# Patient Record
Sex: MALE | NOT HISPANIC OR LATINO
[De-identification: names, ages, dates, MRNs, and addresses within clinical notes are randomized per-mention and may not be internally consistent; named-entity substitution may affect disease eponyms.]

---

## 2018-08-29 ENCOUNTER — MESSAGE (OUTPATIENT)
Age: 58
End: 2018-08-29

## 2018-08-29 PROBLEM — Z86.79 HISTORY OF CONGESTIVE HEART FAILURE: Status: RESOLVED | Noted: 2018-08-29 | Resolved: 2018-08-29

## 2018-08-31 ENCOUNTER — APPOINTMENT (OUTPATIENT)
Dept: THORACIC SURGERY | Facility: CLINIC | Age: 58
End: 2018-08-31

## 2018-08-31 DIAGNOSIS — Z87.19 PERSONAL HISTORY OF OTHER DISEASES OF THE DIGESTIVE SYSTEM: ICD-10-CM

## 2018-08-31 DIAGNOSIS — M10.9 GOUT, UNSPECIFIED: ICD-10-CM

## 2018-08-31 DIAGNOSIS — N40.0 BENIGN PROSTATIC HYPERPLASIA WITHOUT LOWER URINARY TRACT SYMPMS: ICD-10-CM

## 2018-08-31 DIAGNOSIS — Z86.79 PERSONAL HISTORY OF OTHER DISEASES OF THE CIRCULATORY SYSTEM: ICD-10-CM

## 2018-08-31 DIAGNOSIS — Z01.810 ENCOUNTER FOR PREPROCEDURAL CARDIOVASCULAR EXAMINATION: ICD-10-CM

## 2018-08-31 DIAGNOSIS — K21.9 GASTRO-ESOPHAGEAL REFLUX DISEASE W/OUT ESOPHAGITIS: ICD-10-CM

## 2018-09-09 PROBLEM — N40.0 BPH (BENIGN PROSTATIC HYPERPLASIA): Status: ACTIVE | Noted: 2018-08-29

## 2018-09-11 ENCOUNTER — APPOINTMENT (OUTPATIENT)
Dept: THORACIC SURGERY | Facility: CLINIC | Age: 58
End: 2018-09-11
Payer: MEDICARE

## 2018-09-11 VITALS
DIASTOLIC BLOOD PRESSURE: 55 MMHG | BODY MASS INDEX: 18.47 KG/M2 | RESPIRATION RATE: 18 BRPM | TEMPERATURE: 97.4 F | WEIGHT: 129 LBS | OXYGEN SATURATION: 98 % | SYSTOLIC BLOOD PRESSURE: 92 MMHG | HEART RATE: 91 BPM | HEIGHT: 70 IN

## 2018-09-11 DIAGNOSIS — Z87.891 PERSONAL HISTORY OF NICOTINE DEPENDENCE: ICD-10-CM

## 2018-09-11 PROCEDURE — 99205 OFFICE O/P NEW HI 60 MIN: CPT

## 2018-09-11 RX ORDER — VITAMIN A 10000 UNIT
TABLET ORAL
Refills: 0 | Status: ACTIVE | COMMUNITY

## 2018-09-11 RX ORDER — VITAMIN B COMPLEX
CAPSULE ORAL
Refills: 0 | Status: ACTIVE | COMMUNITY

## 2018-09-25 ENCOUNTER — APPOINTMENT (OUTPATIENT)
Dept: THORACIC SURGERY | Facility: CLINIC | Age: 58
End: 2018-09-25
Payer: MEDICARE

## 2018-09-25 VITALS
SYSTOLIC BLOOD PRESSURE: 104 MMHG | OXYGEN SATURATION: 93 % | RESPIRATION RATE: 18 BRPM | DIASTOLIC BLOOD PRESSURE: 64 MMHG | BODY MASS INDEX: 18.8 KG/M2 | HEART RATE: 118 BPM | WEIGHT: 131 LBS

## 2018-09-25 PROCEDURE — 99214 OFFICE O/P EST MOD 30 MIN: CPT

## 2018-10-01 ENCOUNTER — OUTPATIENT (OUTPATIENT)
Dept: OUTPATIENT SERVICES | Facility: HOSPITAL | Age: 58
LOS: 1 days | End: 2018-10-01
Payer: MEDICARE

## 2018-10-01 VITALS
SYSTOLIC BLOOD PRESSURE: 120 MMHG | HEART RATE: 101 BPM | OXYGEN SATURATION: 98 % | RESPIRATION RATE: 16 BRPM | TEMPERATURE: 97 F | HEIGHT: 69.5 IN | DIASTOLIC BLOOD PRESSURE: 70 MMHG | WEIGHT: 130.07 LBS

## 2018-10-01 DIAGNOSIS — R22.2 LOCALIZED SWELLING, MASS AND LUMP, TRUNK: ICD-10-CM

## 2018-10-01 DIAGNOSIS — Z86.19 PERSONAL HISTORY OF OTHER INFECTIOUS AND PARASITIC DISEASES: Chronic | ICD-10-CM

## 2018-10-01 DIAGNOSIS — Z96.643 PRESENCE OF ARTIFICIAL HIP JOINT, BILATERAL: Chronic | ICD-10-CM

## 2018-10-01 DIAGNOSIS — F03.90 UNSPECIFIED DEMENTIA WITHOUT BEHAVIORAL DISTURBANCE: ICD-10-CM

## 2018-10-01 DIAGNOSIS — Z95.810 PRESENCE OF AUTOMATIC (IMPLANTABLE) CARDIAC DEFIBRILLATOR: Chronic | ICD-10-CM

## 2018-10-01 DIAGNOSIS — T14.90XA INJURY, UNSPECIFIED, INITIAL ENCOUNTER: ICD-10-CM

## 2018-10-01 DIAGNOSIS — I10 ESSENTIAL (PRIMARY) HYPERTENSION: ICD-10-CM

## 2018-10-01 LAB
BLD GP AB SCN SERPL QL: NEGATIVE — SIGNIFICANT CHANGE UP
RH IG SCN BLD-IMP: POSITIVE — SIGNIFICANT CHANGE UP

## 2018-10-01 PROCEDURE — G9001: CPT

## 2018-10-01 PROCEDURE — 93010 ELECTROCARDIOGRAM REPORT: CPT

## 2018-10-01 RX ORDER — ESOMEPRAZOLE MAGNESIUM 40 MG/1
1 CAPSULE, DELAYED RELEASE ORAL
Qty: 0 | Refills: 0 | COMMUNITY

## 2018-10-01 RX ORDER — SEVELAMER CARBONATE 2400 MG/1
1 POWDER, FOR SUSPENSION ORAL
Qty: 0 | Refills: 0 | COMMUNITY

## 2018-10-01 NOTE — H&P PST ADULT - PROBLEM SELECTOR PLAN 1
Pt and wife given preop instructions through  and verbalized understanding  Will call pt wife at 12 today for confirmation of meds - call placed to Dr Pickering office and NP states she personally charted med list in note - will confirm.   Call to  to obtain info concerning ICD make and model   Cardiologist called and states he did not do Echo or Stress on patient - last Echo in  [employer] from 2014 -  OR Book[facility] notified of ICD [employer] Model 1010 serial # [id number] and of SANTHOSH precautions

## 2018-10-01 NOTE — H&P PST ADULT - MOUTH
[facility]    BH Treatment Plan Acknowledgement    Kalen Nair was involved in the treatment plan for this current admission, 10/28/2017.  Patient has seen and agrees to the Interdisciplinary Treatment Plan.  Kalen Nair verbalizes that he/she will work with the treatment team to meet the Interdisciplinary Treatment Plan goals.    X____________________________________    Date/Time: _____________________    Patient/Legally Authorized Representative  X____________________________________    Date/Time: _____________________    Treatment Team Member   X____________________________________    Date/Time: _____________________       X____________________________________    Date/Time: _____________________    Patient/Legally Authorized Representative  X____________________________________    Date/Time: _____________________    Treatment Team Member  X____________________________________    Date/Time: _____________________       X____________________________________    Date/Time: _____________________    Patient/Legally Authorized Representative  X____________________________________    Date/Time: _____________________    Treatment Team Member  X____________________________________    Date/Time: _____________________         I have reviewed and agree with the proposed treatment plan that the treatment team and the patient have defined.    X____________________________________    Date/Time: _____________________    MD Signature (for Inpatient/Residential encounters only)    I was notified of my drug test results.    X____________________________________    Date/Time: _____________________    Patient/Legally Authorized Representative  X____________________________________    Date/Time: _____________________    Patient/Legally Authorized Representative    [id number]     normal mouth and gums/moist

## 2018-10-01 NOTE — H&P PST ADULT - PSH
H/O bilateral hip replacements  2008, 2009  History of implantable cardioverter-defibrillator (ICD) placement  pt unsure when placed  History of wound infection  right chest wall - revision 5/18 and again in 7/18

## 2018-10-01 NOTE — H&P PST ADULT - PMH
BPH (benign prostatic hyperplasia)    ESRD (end stage renal disease)    H/O chest wound  right  HLD (hyperlipidemia)    HTN (hypertension)    ICD (implantable cardioverter-defibrillator) in place    OA (osteoarthritis)    Pleural effusion BPH (benign prostatic hyperplasia)    Cardiomyopathy    ESRD (end stage renal disease)    H/O chest wound  right  HLD (hyperlipidemia)    HTN (hypertension)    ICD (implantable cardioverter-defibrillator) in place    OA (osteoarthritis)    Pleural effusion    Smoking hx    Wound  right chest

## 2018-10-01 NOTE — H&P PST ADULT - NEUROLOGICAL DETAILS
sensation intact/normal strength/responds to pain/responds to verbal commands/alert and oriented x 3

## 2018-10-01 NOTE — H&P PST ADULT - HISTORY OF PRESENT ILLNESS
Pt Pt is a 58 yr old Mandarin speaking male scheduled for Right Thoractomy Decortiation, Right Chest Wall reconstruction with Latissimjus Dorsi Flap Poss Skin Graft with VAC dressing with Dr Pickering 10/11/18. Pt has ICD but unable to identify make or model. Pt hx of ESRD with HD T/Th/Sat for 4 hrs and has stated he has stopped some of his meds but cannot say which ones. Pt seen and received [facility] and [facility] but has 2 different med lists and is unable to identify meds taking. Pt denies blood thinners. Pt has stopped ASA as of last week. Pt hx gathered from [employer] and notes from Dr Pickering - pt is poor historian. Pt is a 58 yr old Mandarin speaking male scheduled for Right Thoractomy Decortiation, Right Chest Wall reconstruction with Latissimjus Dorsi Flap Poss Skin Graft with VAC dressing with Dr Pickering 10/11/18. Pt has ICD but unable to identify make or model. Pt hx of ESRD with HD T/Th/Sat for 4 hrs and has stated he has stopped some of his meds but cannot say which ones. Pt seen and received [facility] and [facility] but has 2 different med lists and is unable to identify meds taking. Pt denies blood thinners. Pt has stopped ASA as of last week. Pt hx gathered from [employer] and notes from Dr Pickering - pt is poor historian.     Call through  to patient who went to local pharmacy for confirmation of meds and pt given preop instructions

## 2018-10-01 NOTE — H&P PST ADULT - NSANTHOSAYNRD_GEN_A_CORE
No. SANTHOSH screening performed.  STOP BANG Legend: 0-2 = LOW Risk; 3-4 = INTERMEDIATE Risk; 5-8 = HIGH Risk

## 2018-10-11 ENCOUNTER — RESULT REVIEW (OUTPATIENT)
Age: 58
End: 2018-10-11

## 2018-10-11 ENCOUNTER — INPATIENT (INPATIENT)
Facility: HOSPITAL | Age: 58
LOS: 18 days | Discharge: HOME HEALTH | End: 2018-10-30
Attending: THORACIC SURGERY (CARDIOTHORACIC VASCULAR SURGERY) | Admitting: THORACIC SURGERY (CARDIOTHORACIC VASCULAR SURGERY)
Payer: MEDICARE

## 2018-10-11 ENCOUNTER — APPOINTMENT (OUTPATIENT)
Dept: THORACIC SURGERY | Facility: HOSPITAL | Age: 58
End: 2018-10-11
Payer: MEDICARE

## 2018-10-11 VITALS
WEIGHT: 130.07 LBS | SYSTOLIC BLOOD PRESSURE: 105 MMHG | OXYGEN SATURATION: 98 % | TEMPERATURE: 98 F | HEIGHT: 69.5 IN | RESPIRATION RATE: 15 BRPM | HEART RATE: 111 BPM | DIASTOLIC BLOOD PRESSURE: 69 MMHG

## 2018-10-11 DIAGNOSIS — Z95.810 PRESENCE OF AUTOMATIC (IMPLANTABLE) CARDIAC DEFIBRILLATOR: Chronic | ICD-10-CM

## 2018-10-11 DIAGNOSIS — Z96.643 PRESENCE OF ARTIFICIAL HIP JOINT, BILATERAL: Chronic | ICD-10-CM

## 2018-10-11 DIAGNOSIS — Z86.19 PERSONAL HISTORY OF OTHER INFECTIOUS AND PARASITIC DISEASES: Chronic | ICD-10-CM

## 2018-10-11 DIAGNOSIS — R22.2 LOCALIZED SWELLING, MASS AND LUMP, TRUNK: ICD-10-CM

## 2018-10-11 LAB
ALBUMIN SERPL ELPH-MCNC: 2.6 G/DL — LOW (ref 3.3–5)
ALP SERPL-CCNC: 157 U/L — HIGH (ref 40–120)
ALT FLD-CCNC: 10 U/L — SIGNIFICANT CHANGE UP (ref 4–41)
APTT BLD: 29.9 SEC — SIGNIFICANT CHANGE UP (ref 27.5–37.4)
AST SERPL-CCNC: 18 U/L — SIGNIFICANT CHANGE UP (ref 4–40)
BASE EXCESS BLDA CALC-SCNC: -1.4 MMOL/L — SIGNIFICANT CHANGE UP
BASE EXCESS BLDA CALC-SCNC: -1.8 MMOL/L — SIGNIFICANT CHANGE UP
BASE EXCESS BLDA CALC-SCNC: -2.4 MMOL/L — SIGNIFICANT CHANGE UP
BASE EXCESS BLDA CALC-SCNC: -6 MMOL/L — SIGNIFICANT CHANGE UP
BASE EXCESS BLDA CALC-SCNC: 1.9 MMOL/L — SIGNIFICANT CHANGE UP
BASOPHILS # BLD AUTO: 0.01 K/UL — SIGNIFICANT CHANGE UP (ref 0–0.2)
BASOPHILS NFR BLD AUTO: 0.1 % — SIGNIFICANT CHANGE UP (ref 0–2)
BILIRUB SERPL-MCNC: 1.5 MG/DL — HIGH (ref 0.2–1.2)
BUN SERPL-MCNC: 35 MG/DL — HIGH (ref 7–23)
CA-I BLDA-SCNC: 1.13 MMOL/L — LOW (ref 1.15–1.29)
CA-I BLDA-SCNC: 1.16 MMOL/L — SIGNIFICANT CHANGE UP (ref 1.15–1.29)
CA-I BLDA-SCNC: 1.2 MMOL/L — SIGNIFICANT CHANGE UP (ref 1.15–1.29)
CA-I BLDA-SCNC: 1.21 MMOL/L — SIGNIFICANT CHANGE UP (ref 1.15–1.29)
CA-I BLDA-SCNC: 1.22 MMOL/L — SIGNIFICANT CHANGE UP (ref 1.15–1.29)
CALCIUM SERPL-MCNC: 8.5 MG/DL — SIGNIFICANT CHANGE UP (ref 8.4–10.5)
CHLORIDE SERPL-SCNC: 99 MMOL/L — SIGNIFICANT CHANGE UP (ref 98–107)
CO2 SERPL-SCNC: 22 MMOL/L — SIGNIFICANT CHANGE UP (ref 22–31)
CREAT SERPL-MCNC: 3.91 MG/DL — HIGH (ref 0.5–1.3)
EOSINOPHIL # BLD AUTO: 0 K/UL — SIGNIFICANT CHANGE UP (ref 0–0.5)
EOSINOPHIL NFR BLD AUTO: 0 % — SIGNIFICANT CHANGE UP (ref 0–6)
GLUCOSE BLDA-MCNC: 100 MG/DL — HIGH (ref 70–99)
GLUCOSE BLDA-MCNC: 134 MG/DL — HIGH (ref 70–99)
GLUCOSE BLDA-MCNC: 150 MG/DL — HIGH (ref 70–99)
GLUCOSE BLDA-MCNC: 225 MG/DL — HIGH (ref 70–99)
GLUCOSE BLDA-MCNC: 269 MG/DL — HIGH (ref 70–99)
GLUCOSE SERPL-MCNC: 219 MG/DL — HIGH (ref 70–99)
GRAM STN FLD: SIGNIFICANT CHANGE UP
GRAM STN WND: SIGNIFICANT CHANGE UP
GRAM STN WND: SIGNIFICANT CHANGE UP
HCO3 BLDA-SCNC: 19 MMOL/L — LOW (ref 22–26)
HCO3 BLDA-SCNC: 22 MMOL/L — SIGNIFICANT CHANGE UP (ref 22–26)
HCO3 BLDA-SCNC: 22 MMOL/L — SIGNIFICANT CHANGE UP (ref 22–26)
HCO3 BLDA-SCNC: 23 MMOL/L — SIGNIFICANT CHANGE UP (ref 22–26)
HCO3 BLDA-SCNC: 25 MMOL/L — SIGNIFICANT CHANGE UP (ref 22–26)
HCT VFR BLD CALC: 35.8 % — LOW (ref 39–50)
HCT VFR BLD CALC: 35.8 % — LOW (ref 39–50)
HCT VFR BLDA CALC: 31.3 % — LOW (ref 39–51)
HCT VFR BLDA CALC: 32.2 % — LOW (ref 39–51)
HCT VFR BLDA CALC: 33.1 % — LOW (ref 39–51)
HCT VFR BLDA CALC: 36 % — LOW (ref 39–51)
HCT VFR BLDA CALC: 36.3 % — LOW (ref 39–51)
HGB BLD-MCNC: 11.6 G/DL — LOW (ref 13–17)
HGB BLD-MCNC: 11.6 G/DL — LOW (ref 13–17)
HGB BLDA-MCNC: 10.1 G/DL — LOW (ref 13–17)
HGB BLDA-MCNC: 10.4 G/DL — LOW (ref 13–17)
HGB BLDA-MCNC: 10.7 G/DL — LOW (ref 13–17)
HGB BLDA-MCNC: 11.7 G/DL — LOW (ref 13–17)
HGB BLDA-MCNC: 11.8 G/DL — LOW (ref 13–17)
IMM GRANULOCYTES # BLD AUTO: 0.08 # — SIGNIFICANT CHANGE UP
IMM GRANULOCYTES NFR BLD AUTO: 0.7 % — SIGNIFICANT CHANGE UP (ref 0–1.5)
INR BLD: 1.11 — SIGNIFICANT CHANGE UP (ref 0.88–1.17)
LACTATE BLDA-SCNC: 0.9 MMOL/L — SIGNIFICANT CHANGE UP (ref 0.5–2)
LACTATE BLDA-SCNC: 1.4 MMOL/L — SIGNIFICANT CHANGE UP (ref 0.5–2)
LACTATE BLDA-SCNC: 2.2 MMOL/L — HIGH (ref 0.5–2)
LACTATE BLDA-SCNC: 2.5 MMOL/L — HIGH (ref 0.5–2)
LYMPHOCYTES # BLD AUTO: 0.25 K/UL — LOW (ref 1–3.3)
LYMPHOCYTES # BLD AUTO: 2.3 % — LOW (ref 13–44)
MAGNESIUM SERPL-MCNC: 2.1 MG/DL — SIGNIFICANT CHANGE UP (ref 1.6–2.6)
MCHC RBC-ENTMCNC: 31.2 PG — SIGNIFICANT CHANGE UP (ref 27–34)
MCHC RBC-ENTMCNC: 31.2 PG — SIGNIFICANT CHANGE UP (ref 27–34)
MCHC RBC-ENTMCNC: 32.4 % — SIGNIFICANT CHANGE UP (ref 32–36)
MCHC RBC-ENTMCNC: 32.4 % — SIGNIFICANT CHANGE UP (ref 32–36)
MCV RBC AUTO: 96.2 FL — SIGNIFICANT CHANGE UP (ref 80–100)
MCV RBC AUTO: 96.2 FL — SIGNIFICANT CHANGE UP (ref 80–100)
MONOCYTES # BLD AUTO: 0.73 K/UL — SIGNIFICANT CHANGE UP (ref 0–0.9)
MONOCYTES NFR BLD AUTO: 6.6 % — SIGNIFICANT CHANGE UP (ref 2–14)
NEUTROPHILS # BLD AUTO: 9.96 K/UL — HIGH (ref 1.8–7.4)
NEUTROPHILS NFR BLD AUTO: 90.3 % — HIGH (ref 43–77)
NRBC # FLD: 0 — SIGNIFICANT CHANGE UP
NRBC # FLD: 0 — SIGNIFICANT CHANGE UP
PCO2 BLDA: 50 MMHG — HIGH (ref 35–48)
PCO2 BLDA: 53 MMHG — HIGH (ref 35–48)
PCO2 BLDA: 55 MMHG — HIGH (ref 35–48)
PCO2 BLDA: 56 MMHG — HIGH (ref 35–48)
PCO2 BLDA: 57 MMHG — HIGH (ref 35–48)
PH BLDA: 7.2 PH — CRITICAL LOW (ref 7.35–7.45)
PH BLDA: 7.27 PH — LOW (ref 7.35–7.45)
PH BLDA: 7.27 PH — LOW (ref 7.35–7.45)
PH BLDA: 7.3 PH — LOW (ref 7.35–7.45)
PH BLDA: 7.31 PH — LOW (ref 7.35–7.45)
PHOSPHATE SERPL-MCNC: 5.6 MG/DL — HIGH (ref 2.5–4.5)
PLATELET # BLD AUTO: 119 K/UL — LOW (ref 150–400)
PLATELET # BLD AUTO: 119 K/UL — LOW (ref 150–400)
PMV BLD: 9.7 FL — SIGNIFICANT CHANGE UP (ref 7–13)
PMV BLD: 9.7 FL — SIGNIFICANT CHANGE UP (ref 7–13)
PO2 BLDA: 112 MMHG — HIGH (ref 83–108)
PO2 BLDA: 169 MMHG — HIGH (ref 83–108)
PO2 BLDA: 272 MMHG — HIGH (ref 83–108)
PO2 BLDA: 402 MMHG — HIGH (ref 83–108)
PO2 BLDA: 437 MMHG — HIGH (ref 83–108)
POTASSIUM BLDA-SCNC: 3.1 MMOL/L — LOW (ref 3.4–4.5)
POTASSIUM BLDA-SCNC: 3.3 MMOL/L — LOW (ref 3.4–4.5)
POTASSIUM BLDA-SCNC: 3.4 MMOL/L — SIGNIFICANT CHANGE UP (ref 3.4–4.5)
POTASSIUM BLDA-SCNC: 3.4 MMOL/L — SIGNIFICANT CHANGE UP (ref 3.4–4.5)
POTASSIUM BLDA-SCNC: 3.6 MMOL/L — SIGNIFICANT CHANGE UP (ref 3.4–4.5)
POTASSIUM BLDV-SCNC: 3.6 MMOL/L — SIGNIFICANT CHANGE UP (ref 3.4–4.5)
POTASSIUM SERPL-MCNC: 3.4 MMOL/L — LOW (ref 3.5–5.3)
POTASSIUM SERPL-SCNC: 3.4 MMOL/L — LOW (ref 3.5–5.3)
PROT SERPL-MCNC: 5.8 G/DL — LOW (ref 6–8.3)
PROTHROM AB SERPL-ACNC: 12.4 SEC — SIGNIFICANT CHANGE UP (ref 9.8–13.1)
RBC # BLD: 3.72 M/UL — LOW (ref 4.2–5.8)
RBC # BLD: 3.72 M/UL — LOW (ref 4.2–5.8)
RBC # FLD: 18.1 % — HIGH (ref 10.3–14.5)
RBC # FLD: 18.1 % — HIGH (ref 10.3–14.5)
RH IG SCN BLD-IMP: POSITIVE — SIGNIFICANT CHANGE UP
SAO2 % BLDA: 100 % — HIGH (ref 95–99)
SAO2 % BLDA: 97.1 % — SIGNIFICANT CHANGE UP (ref 95–99)
SAO2 % BLDA: 99.2 % — HIGH (ref 95–99)
SAO2 % BLDA: 99.3 % — HIGH (ref 95–99)
SAO2 % BLDA: 99.8 % — HIGH (ref 95–99)
SODIUM BLDA-SCNC: 135 MMOL/L — LOW (ref 136–146)
SODIUM BLDA-SCNC: 136 MMOL/L — SIGNIFICANT CHANGE UP (ref 136–146)
SODIUM BLDA-SCNC: 137 MMOL/L — SIGNIFICANT CHANGE UP (ref 136–146)
SODIUM BLDA-SCNC: 138 MMOL/L — SIGNIFICANT CHANGE UP (ref 136–146)
SODIUM BLDA-SCNC: 140 MMOL/L — SIGNIFICANT CHANGE UP (ref 136–146)
SODIUM SERPL-SCNC: 141 MMOL/L — SIGNIFICANT CHANGE UP (ref 135–145)
SPECIMEN SOURCE: SIGNIFICANT CHANGE UP
WBC # BLD: 11.03 K/UL — HIGH (ref 3.8–10.5)
WBC # BLD: 11.03 K/UL — HIGH (ref 3.8–10.5)
WBC # FLD AUTO: 11.03 K/UL — HIGH (ref 3.8–10.5)
WBC # FLD AUTO: 11.03 K/UL — HIGH (ref 3.8–10.5)

## 2018-10-11 PROCEDURE — 88300 SURGICAL PATH GROSS: CPT | Mod: 26,59

## 2018-10-11 PROCEDURE — 32220 RELEASE OF LUNG: CPT

## 2018-10-11 PROCEDURE — 93287 PERI-PX DEVICE EVAL & PRGR: CPT | Mod: 26

## 2018-10-11 PROCEDURE — 32220 RELEASE OF LUNG: CPT | Mod: 80

## 2018-10-11 PROCEDURE — 21600 PARTIAL REMOVAL OF RIB: CPT

## 2018-10-11 PROCEDURE — 88312 SPECIAL STAINS GROUP 1: CPT | Mod: 26

## 2018-10-11 PROCEDURE — 21600 PARTIAL REMOVAL OF RIB: CPT | Mod: 80

## 2018-10-11 PROCEDURE — 88331 PATH CONSLTJ SURG 1 BLK 1SPC: CPT | Mod: 26

## 2018-10-11 PROCEDURE — 88342 IMHCHEM/IMCYTCHM 1ST ANTB: CPT | Mod: 26

## 2018-10-11 PROCEDURE — 99291 CRITICAL CARE FIRST HOUR: CPT

## 2018-10-11 PROCEDURE — 99292 CRITICAL CARE ADDL 30 MIN: CPT

## 2018-10-11 PROCEDURE — 71045 X-RAY EXAM CHEST 1 VIEW: CPT | Mod: 26

## 2018-10-11 PROCEDURE — 19272: CPT | Mod: 22

## 2018-10-11 PROCEDURE — 88112 CYTOPATH CELL ENHANCE TECH: CPT | Mod: 26

## 2018-10-11 PROCEDURE — 88305 TISSUE EXAM BY PATHOLOGIST: CPT | Mod: 26,59

## 2018-10-11 PROCEDURE — 88341 IMHCHEM/IMCYTCHM EA ADD ANTB: CPT | Mod: 26

## 2018-10-11 PROCEDURE — 19272: CPT | Mod: 80

## 2018-10-11 PROCEDURE — 88305 TISSUE EXAM BY PATHOLOGIST: CPT | Mod: 26

## 2018-10-11 RX ORDER — PIPERACILLIN AND TAZOBACTAM 4; .5 G/20ML; G/20ML
3.38 INJECTION, POWDER, LYOPHILIZED, FOR SOLUTION INTRAVENOUS EVERY 12 HOURS
Qty: 0 | Refills: 0 | Status: DISCONTINUED | OUTPATIENT
Start: 2018-10-11 | End: 2018-10-24

## 2018-10-11 RX ORDER — VANCOMYCIN HCL 1 G
1000 VIAL (EA) INTRAVENOUS ONCE
Qty: 0 | Refills: 0 | Status: COMPLETED | OUTPATIENT
Start: 2018-10-11 | End: 2018-10-11

## 2018-10-11 RX ORDER — DEXTROSE 50 % IN WATER 50 %
12.5 SYRINGE (ML) INTRAVENOUS ONCE
Qty: 0 | Refills: 0 | Status: DISCONTINUED | OUTPATIENT
Start: 2018-10-11 | End: 2018-10-20

## 2018-10-11 RX ORDER — DEXTROSE 50 % IN WATER 50 %
25 SYRINGE (ML) INTRAVENOUS ONCE
Qty: 0 | Refills: 0 | Status: DISCONTINUED | OUTPATIENT
Start: 2018-10-11 | End: 2018-10-20

## 2018-10-11 RX ORDER — PANTOPRAZOLE SODIUM 20 MG/1
40 TABLET, DELAYED RELEASE ORAL DAILY
Qty: 0 | Refills: 0 | Status: DISCONTINUED | OUTPATIENT
Start: 2018-10-11 | End: 2018-10-30

## 2018-10-11 RX ORDER — HEPARIN SODIUM 5000 [USP'U]/ML
5000 INJECTION INTRAVENOUS; SUBCUTANEOUS ONCE
Qty: 0 | Refills: 0 | Status: COMPLETED | OUTPATIENT
Start: 2018-10-11 | End: 2018-10-11

## 2018-10-11 RX ORDER — GLUCAGON INJECTION, SOLUTION 0.5 MG/.1ML
1 INJECTION, SOLUTION SUBCUTANEOUS ONCE
Qty: 0 | Refills: 0 | Status: DISCONTINUED | OUTPATIENT
Start: 2018-10-11 | End: 2018-10-20

## 2018-10-11 RX ORDER — PROPOFOL 10 MG/ML
20 INJECTION, EMULSION INTRAVENOUS
Qty: 500 | Refills: 0 | Status: DISCONTINUED | OUTPATIENT
Start: 2018-10-11 | End: 2018-10-11

## 2018-10-11 RX ORDER — EPINEPHRINE 0.3 MG/.3ML
0.15 INJECTION INTRAMUSCULAR; SUBCUTANEOUS
Qty: 4 | Refills: 0 | Status: DISCONTINUED | OUTPATIENT
Start: 2018-10-11 | End: 2018-10-13

## 2018-10-11 RX ORDER — PHENYLEPHRINE HYDROCHLORIDE 10 MG/ML
0.2 INJECTION INTRAVENOUS
Qty: 160 | Refills: 0 | Status: DISCONTINUED | OUTPATIENT
Start: 2018-10-11 | End: 2018-10-13

## 2018-10-11 RX ORDER — METRONIDAZOLE 500 MG
TABLET ORAL
Qty: 0 | Refills: 0 | Status: DISCONTINUED | OUTPATIENT
Start: 2018-10-11 | End: 2018-10-18

## 2018-10-11 RX ORDER — METRONIDAZOLE 500 MG
500 TABLET ORAL EVERY 8 HOURS
Qty: 0 | Refills: 0 | Status: DISCONTINUED | OUTPATIENT
Start: 2018-10-12 | End: 2018-10-18

## 2018-10-11 RX ORDER — SODIUM CHLORIDE 9 MG/ML
1000 INJECTION INTRAMUSCULAR; INTRAVENOUS; SUBCUTANEOUS
Qty: 0 | Refills: 0 | Status: DISCONTINUED | OUTPATIENT
Start: 2018-10-11 | End: 2018-10-20

## 2018-10-11 RX ORDER — HEPARIN SODIUM 5000 [USP'U]/ML
5000 INJECTION INTRAVENOUS; SUBCUTANEOUS EVERY 12 HOURS
Qty: 0 | Refills: 0 | Status: DISCONTINUED | OUTPATIENT
Start: 2018-10-11 | End: 2018-10-15

## 2018-10-11 RX ORDER — DOBUTAMINE HCL 250MG/20ML
7 VIAL (ML) INTRAVENOUS
Qty: 500 | Refills: 0 | Status: DISCONTINUED | OUTPATIENT
Start: 2018-10-11 | End: 2018-10-12

## 2018-10-11 RX ORDER — PROPOFOL 10 MG/ML
20 INJECTION, EMULSION INTRAVENOUS
Qty: 1000 | Refills: 0 | Status: DISCONTINUED | OUTPATIENT
Start: 2018-10-11 | End: 2018-10-12

## 2018-10-11 RX ORDER — FENTANYL CITRATE 50 UG/ML
1 INJECTION INTRAVENOUS
Qty: 2500 | Refills: 0 | Status: DISCONTINUED | OUTPATIENT
Start: 2018-10-11 | End: 2018-10-17

## 2018-10-11 RX ORDER — ONDANSETRON 8 MG/1
4 TABLET, FILM COATED ORAL EVERY 6 HOURS
Qty: 0 | Refills: 0 | Status: DISCONTINUED | OUTPATIENT
Start: 2018-10-11 | End: 2018-10-13

## 2018-10-11 RX ORDER — SODIUM CHLORIDE 9 MG/ML
1000 INJECTION, SOLUTION INTRAVENOUS
Qty: 0 | Refills: 0 | Status: DISCONTINUED | OUTPATIENT
Start: 2018-10-11 | End: 2018-10-11

## 2018-10-11 RX ORDER — NOREPINEPHRINE BITARTRATE/D5W 8 MG/250ML
0.05 PLASTIC BAG, INJECTION (ML) INTRAVENOUS
Qty: 16 | Refills: 0 | Status: DISCONTINUED | OUTPATIENT
Start: 2018-10-11 | End: 2018-10-18

## 2018-10-11 RX ORDER — INSULIN LISPRO 100/ML
VIAL (ML) SUBCUTANEOUS EVERY 6 HOURS
Qty: 0 | Refills: 0 | Status: DISCONTINUED | OUTPATIENT
Start: 2018-10-11 | End: 2018-10-20

## 2018-10-11 RX ORDER — METRONIDAZOLE 500 MG
500 TABLET ORAL ONCE
Qty: 0 | Refills: 0 | Status: COMPLETED | OUTPATIENT
Start: 2018-10-11 | End: 2018-10-11

## 2018-10-11 RX ORDER — NALOXONE HYDROCHLORIDE 4 MG/.1ML
0.1 SPRAY NASAL
Qty: 0 | Refills: 0 | Status: DISCONTINUED | OUTPATIENT
Start: 2018-10-11 | End: 2018-10-13

## 2018-10-11 RX ORDER — SIMETHICONE 80 MG/1
1 TABLET, CHEWABLE ORAL
Qty: 0 | Refills: 0 | COMMUNITY

## 2018-10-11 RX ORDER — SODIUM CHLORIDE 9 MG/ML
1000 INJECTION, SOLUTION INTRAVENOUS
Qty: 0 | Refills: 0 | Status: DISCONTINUED | OUTPATIENT
Start: 2018-10-11 | End: 2018-10-20

## 2018-10-11 RX ORDER — DEXTROSE 50 % IN WATER 50 %
15 SYRINGE (ML) INTRAVENOUS ONCE
Qty: 0 | Refills: 0 | Status: DISCONTINUED | OUTPATIENT
Start: 2018-10-11 | End: 2018-10-20

## 2018-10-11 RX ADMIN — FENTANYL CITRATE 5.9 MICROGRAM(S)/KG/HR: 50 INJECTION INTRAVENOUS at 19:53

## 2018-10-11 RX ADMIN — PHENYLEPHRINE HYDROCHLORIDE 2.21 MICROGRAM(S)/KG/MIN: 10 INJECTION INTRAVENOUS at 21:02

## 2018-10-11 RX ADMIN — SODIUM CHLORIDE 30 MILLILITER(S): 9 INJECTION INTRAMUSCULAR; INTRAVENOUS; SUBCUTANEOUS at 19:52

## 2018-10-11 RX ADMIN — Medication 100 MILLIGRAM(S): at 18:30

## 2018-10-11 RX ADMIN — Medication 250 MILLIGRAM(S): at 21:20

## 2018-10-11 RX ADMIN — Medication 17.7 MICROGRAM(S)/KG/MIN: at 19:52

## 2018-10-11 RX ADMIN — FENTANYL CITRATE 1 MICROGRAM(S)/KG/HR: 50 INJECTION INTRAVENOUS at 20:10

## 2018-10-11 RX ADMIN — HEPARIN SODIUM 5000 UNIT(S): 5000 INJECTION INTRAVENOUS; SUBCUTANEOUS at 08:26

## 2018-10-11 RX ADMIN — PROPOFOL 7.08 MICROGRAM(S)/KG/MIN: 10 INJECTION, EMULSION INTRAVENOUS at 20:01

## 2018-10-11 RX ADMIN — PANTOPRAZOLE SODIUM 40 MILLIGRAM(S): 20 TABLET, DELAYED RELEASE ORAL at 23:02

## 2018-10-11 RX ADMIN — EPINEPHRINE 33.19 MICROGRAM(S)/KG/MIN: 0.3 INJECTION INTRAMUSCULAR; SUBCUTANEOUS at 19:52

## 2018-10-11 RX ADMIN — Medication 2.77 MICROGRAM(S)/KG/MIN: at 19:53

## 2018-10-11 RX ADMIN — PIPERACILLIN AND TAZOBACTAM 200 GRAM(S): 4; .5 INJECTION, POWDER, LYOPHILIZED, FOR SOLUTION INTRAVENOUS at 20:43

## 2018-10-11 RX ADMIN — Medication 3: at 20:21

## 2018-10-11 NOTE — BRIEF OPERATIVE NOTE - PRE-OP DX
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

## 2018-10-11 NOTE — ASU PATIENT PROFILE, ADULT - PMH
BPH (benign prostatic hyperplasia)    Cardiomyopathy    ESRD (end stage renal disease)    H/O chest wound  right  HLD (hyperlipidemia)    HTN (hypertension)    ICD (implantable cardioverter-defibrillator) in place    OA (osteoarthritis)    Pleural effusion    Smoking hx    Wound  right chest

## 2018-10-11 NOTE — BRIEF OPERATIVE NOTE - POST-OP DX
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

## 2018-10-11 NOTE — PROGRESS NOTE ADULT - SUBJECTIVE AND OBJECTIVE BOX
CLAUDIA HAN          MRN-[id number]    HPI:  Pt is a 58 yr old Mandarin speaking male scheduled for Right Thoractomy Decortiation, Right Chest Wall reconstruction with Latissimjus Dorsi Flap Poss Skin Graft with VAC dressing with Dr Pickering 10/11/18. Pt has ICD but unable to identify make or model. Pt hx of ESRD with HD T/Th/Sat for 4 hrs and has stated he has stopped some of his meds but cannot say which ones. Pt seen and received [facility] and [facility] but has 2 different med lists and is unable to identify meds taking. Pt denies blood thinners. Pt has stopped ASA as of last week. Pt hx gathered from [employer] and notes from Dr Pickering - pt is poor historian.     Call through  to patient who went to local pharmacy for confirmation of meds and pt given preop instructions (01 Oct 2018 09:25)      Procedure:  POD # :     Issues:        Interval/Overnight Events/ ROS  Pt remained hemodynamically stable overnight, not on any pressors or inotropes. OOB to chair, breathing comfortably with minimal pain. Ambulated several times . Denies pain, no SOB, no palpitations, no nausea/ no vomiting, no dizziness  A-line and sharma d/kirsten         PAST MEDICAL & SURGICAL HISTORY:  Smoking hx  Cardiomyopathy  Wound: right chest  ICD (implantable cardioverter-defibrillator) in place  OA (osteoarthritis)  HLD (hyperlipidemia)  BPH (benign prostatic hyperplasia)  Pleural effusion  HTN (hypertension)  ESRD (end stage renal disease)  H/O chest wound: right  History of wound infection: right chest wall - revision 5/18 and again in 7/18  History of implantable cardioverter-defibrillator (ICD) placement: pt unsure when placed  H/O bilateral hip replacements: 2008, 2009    Allergies    No Known Allergies    Intolerances            ***VITAL SIGNS:  Vital Signs Last 24 Hrs  T(C): 36.3 (11 Oct 2018 20:00), Max: 36.6 (11 Oct 2018 07:47)  T(F): 97.4 (11 Oct 2018 20:00), Max: 97.9 (11 Oct 2018 07:47)  HR: 109 (11 Oct 2018 20:00) (95 - 111)  BP: 97/57 (11 Oct 2018 20:00) (83/50 - 117/64)  BP(mean): 67 (11 Oct 2018 20:00) (58 - 76)  RR: 16 (11 Oct 2018 20:00) (12 - 16)  SpO2: 100% (11 Oct 2018 20:00) (98% - 100%)    I/Os:   I&O's Detail    11 Oct 2018 07:01  -  11 Oct 2018 20:13  --------------------------------------------------------  IN:    DOBUTamine Infusion: 17.7 mL    EPINEPHrine Infusion: 33.2 mL    fentaNYL  Infusion: 11.8 mL    IV PiggyBack: 100 mL    lactated ringers.: 60 mL    norepinephrine Infusion: 23.2 mL    propofol Infusion: 14.2 mL    sodium chloride 0.9%.: 90 mL  Total IN: 350.1 mL    OUT:    Bulb: 25 mL    Chest Tube: 45 mL    Chest Tube: 10 mL    Chest Tube: 310 mL  Total OUT: 390 mL    Total NET: -39.9 mL          CAPILLARY BLOOD GLUCOSE      POCT Blood Glucose.: 206 mg/dL (11 Oct 2018 18:06)      =======================  MEDICATIONS  ===================  MEDICATIONS  (STANDING):  dextrose 5%. 1000 milliLiter(s) (50 mL/Hr) IV Continuous <Continuous>  dextrose 50% Injectable 12.5 Gram(s) IV Push once  dextrose 50% Injectable 25 Gram(s) IV Push once  dextrose 50% Injectable 25 Gram(s) IV Push once  DOBUTamine Infusion 10 MICROgram(s)/kG/Min (17.7 mL/Hr) IV Continuous <Continuous>  EPINEPHrine    Infusion 0.15 MICROgram(s)/kG/Min (33.188 mL/Hr) IV Continuous <Continuous>  fentaNYL   Infusion 1 MICROgram(s)/kG/Hr (5.9 mL/Hr) IV Continuous <Continuous>  heparin  Injectable 5000 Unit(s) SubCutaneous every 12 hours  HYDROmorphone (10 MICROgram(s)/mL) + BUpivacaine 0.0625% in 0.9% Sodium Chloride PCEA 250 milliLiter(s) Epidural PCA Continuous  insulin lispro (HumaLOG) corrective regimen sliding scale   SubCutaneous every 6 hours  metroNIDAZOLE  IVPB      norepinephrine Infusion 0.05 MICROgram(s)/kG/Min (2.766 mL/Hr) IV Continuous <Continuous>  piperacillin/tazobactam IVPB. 3.375 Gram(s) IV Intermittent every 12 hours  propofol Infusion 20 MICROgram(s)/kG/Min (7.08 mL/Hr) IV Continuous <Continuous>  sodium chloride 0.9%. 1000 milliLiter(s) (30 mL/Hr) IV Continuous <Continuous>  vancomycin  IVPB 1000 milliGRAM(s) IV Intermittent once    MEDICATIONS  (PRN):  dextrose 40% Gel 15 Gram(s) Oral once PRN Blood Glucose LESS THAN 70 milliGRAM(s)/deciliter  glucagon  Injectable 1 milliGRAM(s) IntraMuscular once PRN Glucose LESS THAN 70 milligrams/deciliter  HYDROmorphone (10 MICROgram(s)/mL) + BUpivacaine 0.0625% in 0.9% Sodium Chloride PCEA Rescue Clinician  Bolus 5 milliLiter(s) Epidural every 15 minutes PRN for Pain Score greater than 6  naloxone Injectable 0.1 milliGRAM(s) IV Push every 3 minutes PRN For ANY of the following changes in patient status:  A. RR LESS THAN 10 breaths per minute, B. Oxygen saturation LESS THAN 90%, C. Sedation score of 6  ondansetron Injectable 4 milliGRAM(s) IV Push every 6 hours PRN Nausea      ======================VENTILATOR SETTINGS  ==============  Mode: AC/ CMV (Assist Control/ Continuous Mandatory Ventilation)  RR (machine): 16  TV (machine): 500  FiO2: 50  PEEP: 5  MAP: 9  PIP: 19      =================== PATIENT CARE ACCESS DEVICES ==========  Peripheral IV  Central Venous Line	R	L	IJ	Fem	SC			Placed:   Arterial Line	R	L	PT	DP	Fem	Rad	Ax	Placed:   Midline:				  Urinary Catheter, Date Placed:   Necessity of urinary, arterial, and venous catheters discussed    ======================= PHYSICAL EXAM===================  General:                         Comfortable, Awake, alert, not in any distress  Neuro:                            Moving all extremities to commands. No focal deficits	  HEENT:                           CHER/ ETT/ NGT/ trach  Respiratory:	Lungs clear on auscultation bilaterally with good aeration.                                           No rales, rhonchi, no wheezing. Effort even and unlabored.  CV:		Regular rate and rhythm. Normal S1/S2. No murmurs  Abdomen:	                     Soft,  nontender, not-distended. Bowel sounds present / absent.   Skin:		No rash.  Extremities:	Warm, no cyanosis or edema.  Palpable pulses    ============================ LABS =======================                        11.6   11.03 )-----------( 119      ( 11 Oct 2018 18:00 )             35.8     10-11    141  |  99  |  35<H>  ----------------------------<  219<H>  3.4<L>   |  22  |  3.91<H>    Ca    8.5      11 Oct 2018 18:00  Phos  5.6     10-11  Mg     2.1     10-11    TPro  5.8<L>  /  Alb  2.6<L>  /  TBili  1.5<H>  /  DBili  x   /  AST  18  /  ALT  10  /  AlkPhos  157<H>  10-11    LIVER FUNCTIONS - ( 11 Oct 2018 18:00 )  Alb: 2.6 g/dL / Pro: 5.8 g/dL / ALK PHOS: 157 u/L / ALT: 10 u/L / AST: 18 u/L / GGT: x           PT/INR - ( 11 Oct 2018 18:00 )   PT: 12.4 SEC;   INR: 1.11          PTT - ( 11 Oct 2018 18:00 )  PTT:29.9 SEC  ABG - ( 11 Oct 2018 18:00 )  pH, Arterial: 7.27  pH, Blood: x     /  pCO2: 53    /  pO2: 272   / HCO3: 22    / Base Excess: -2.4  /  SaO2: 99.3                TISSUE  10-11-18 --  --  --      TISSUE  10-11-18 --  --  --      PLEURAL FLUID  10-11-18 --  --    GPCCH^Gram Pos Cocci in Chains  QUANTITY OF BACTERIA SEEN: MODERATE (3+)  GPCPR^Gram Pos Cocci in Pairs  QUANTITY OF BACTERIA SEEN: MODERATE (3+)  WBC^White Blood Cells  QNTY CELLS IN GRAM STAIN: FEW (2+)          ===================== IMAGING STUDIES ===================  Radiology personally reviewed.    ====================ASSESSMENT AND PLAN ================      ====================== NEUROLOGY=======================  Pain control with PCA / PCEA / Tylenol IV / Toradol / Percocet  Pt is on Precedex for agitation  Pt is sedated with Propofol / Fentanyl    ==================== RESPIRATORY========================  Pt is on            L nasal canula / Face tent____% FiO2  Comfortable, no evidence of distress.  Using incentive spirometry & doing                ml  Monitor chest tube output  Chest tube to suction / water seal	    Mechanical Ventilation:  Mode: AC/ CMV (Assist Control/ Continuous Mandatory Ventilation)  RR (machine): 16  TV (machine): 500  FiO2: 50  PEEP: 5  MAP: 9  PIP: 19    Mechanical ventilator status assessed & settings reviewed  Continue bronchodilators, pulmonary toilet  Head of bed elevation to 30-40 degrees    ====================CARDIOVASCULAR=====================  Continue hemodynamic monitoring/ telemetry  Not on any pressors  Continue cardiovascular / antihypertensive medications    ===================== RENAL ============================  Continue LR 30CC/hr      D/C IVF  Monitor I/Os, BUN/ Cr  and electrolytes  D/C Sharma      Keep Sharma for UO monitoring  BPH: Continue Flomax/ Finasteride      ==================== GASTROINTESTINAL===================  On regular diet, tolerating well  Continue GI prophylaxis with Pepcid / Protonix  Continue Zofran / Reglan for nausea - PRN	  NPO    =======================    ENDOCRIN  =====================  Glycemic monitoring  F/S with coverage  ===================HEMATOLOGIC/ONCOLOGIC =============  Monitor chest tube output. No signs of active bleeding.   Follow CBC, coags  in AM  DVT prophylaxis with SCD, sc Heparin    ========================INFECTIOUS DISEASE===============  No signs of infection. Monitor for fever / leukocytosis.  All surgical incision / chest tube  sites look clean  D/C Sharma      Pertinent clinical, laboratory, radiographic, hemodynamic, echocardiographic, respiratory data, microbiologic data and chart were reviewed and analyzed frequently throughout the course of the day and night. GI and DVT prophylaxis, glycemic control, head of bed elevation and skin care issues were addressed.  Patient seen, examined and plan discussed with CT Surgery / CTICU team during rounds.  Pt remains critically ill in imminent risk of  deterioration and requires very careful cardio- pulmonary monitoring and support.    I have spent               minutes of critical care time with this pt between            am/pm    and               am/ pm         minutes spent on total encounter; more than 50% of the visit was spent counseling and/or coordinating care by the attending physician.        KERLINE Faith MD CLAUDIA HAN          MRN-[id number]    HPI:  Pt is a 58 yr old Mandarin speaking male scheduled for Right Thoractomy Decortiation, Right Chest Wall reconstruction with Latissimjus Dorsi Flap Poss Skin Graft with VAC dressing with Dr Pickering 10/11/18. Pt has ICD but unable to identify make or model. Pt hx of ESRD with HD T/Th/Sat for 4 hrs and has stated he has stopped some of his meds but cannot say which ones. Pt seen and received [facility] and CC but has 2 different med lists and is unable to identify meds taking. Pt denies blood thinners. Pt has stopped ASA as of last week. Pt hx gathered from [employer] and notes from Dr Pickering - pt is poor historian.     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    Procedure:    Procedure:  Thoracotomy  10/11/2018    Active  BFASUNCION.       Operative Findings:  · Operative Findings	Right thoracotomy, resection of portion of R 7th rib, evacuation of infected hemothorax, takedown of pleurocutaneous fistula	    Procedure:  POD # :     Issues:        Interval/Overnight Events/ ROS  Pt remained hemodynamically stable overnight, not on any pressors or inotropes. OOB to chair, breathing comfortably with minimal pain. Ambulated several times . Denies pain, no SOB, no palpitations, no nausea/ no vomiting, no dizziness  A-line and sharma d/kirsten         PAST MEDICAL & SURGICAL HISTORY:  Smoking hx  Cardiomyopathy  Wound: right chest  ICD (implantable cardioverter-defibrillator) in place  OA (osteoarthritis)  HLD (hyperlipidemia)  BPH (benign prostatic hyperplasia)  Pleural effusion  HTN (hypertension)  ESRD (end stage renal disease)  H/O chest wound: right  History of wound infection: right chest wall - revision 5/18 and again in 7/18  History of implantable cardioverter-defibrillator (ICD) placement: pt unsure when placed  H/O bilateral hip replacements: 2008, 2009    Allergies    No Known Allergies    Intolerances            ***VITAL SIGNS:  Vital Signs Last 24 Hrs  T(C): 36.3 (11 Oct 2018 20:00), Max: 36.6 (11 Oct 2018 07:47)  T(F): 97.4 (11 Oct 2018 20:00), Max: 97.9 (11 Oct 2018 07:47)  HR: 109 (11 Oct 2018 20:00) (95 - 111)  BP: 97/57 (11 Oct 2018 20:00) (83/50 - 117/64)  BP(mean): 67 (11 Oct 2018 20:00) (58 - 76)  RR: 16 (11 Oct 2018 20:00) (12 - 16)  SpO2: 100% (11 Oct 2018 20:00) (98% - 100%)    I/Os:   I&O's Detail    11 Oct 2018 07:01  -  11 Oct 2018 20:13  --------------------------------------------------------  IN:    DOBUTamine Infusion: 17.7 mL    EPINEPHrine Infusion: 33.2 mL    fentaNYL  Infusion: 11.8 mL    IV PiggyBack: 100 mL    lactated ringers.: 60 mL    norepinephrine Infusion: 23.2 mL    propofol Infusion: 14.2 mL    sodium chloride 0.9%.: 90 mL  Total IN: 350.1 mL    OUT:    Bulb: 25 mL    Chest Tube: 45 mL    Chest Tube: 10 mL    Chest Tube: 310 mL  Total OUT: 390 mL    Total NET: -39.9 mL          CAPILLARY BLOOD GLUCOSE      POCT Blood Glucose.: 206 mg/dL (11 Oct 2018 18:06)      =======================  MEDICATIONS  ===================  MEDICATIONS  (STANDING):  dextrose 5%. 1000 milliLiter(s) (50 mL/Hr) IV Continuous <Continuous>  dextrose 50% Injectable 12.5 Gram(s) IV Push once  dextrose 50% Injectable 25 Gram(s) IV Push once  dextrose 50% Injectable 25 Gram(s) IV Push once  DOBUTamine Infusion 10 MICROgram(s)/kG/Min (17.7 mL/Hr) IV Continuous <Continuous>  EPINEPHrine    Infusion 0.15 MICROgram(s)/kG/Min (33.188 mL/Hr) IV Continuous <Continuous>  fentaNYL   Infusion 1 MICROgram(s)/kG/Hr (5.9 mL/Hr) IV Continuous <Continuous>  heparin  Injectable 5000 Unit(s) SubCutaneous every 12 hours  HYDROmorphone (10 MICROgram(s)/mL) + BUpivacaine 0.0625% in 0.9% Sodium Chloride PCEA 250 milliLiter(s) Epidural PCA Continuous  insulin lispro (HumaLOG) corrective regimen sliding scale   SubCutaneous every 6 hours  metroNIDAZOLE  IVPB      norepinephrine Infusion 0.05 MICROgram(s)/kG/Min (2.766 mL/Hr) IV Continuous <Continuous>  piperacillin/tazobactam IVPB. 3.375 Gram(s) IV Intermittent every 12 hours  propofol Infusion 20 MICROgram(s)/kG/Min (7.08 mL/Hr) IV Continuous <Continuous>  sodium chloride 0.9%. 1000 milliLiter(s) (30 mL/Hr) IV Continuous <Continuous>  vancomycin  IVPB 1000 milliGRAM(s) IV Intermittent once    MEDICATIONS  (PRN):  dextrose 40% Gel 15 Gram(s) Oral once PRN Blood Glucose LESS THAN 70 milliGRAM(s)/deciliter  glucagon  Injectable 1 milliGRAM(s) IntraMuscular once PRN Glucose LESS THAN 70 milligrams/deciliter  HYDROmorphone (10 MICROgram(s)/mL) + BUpivacaine 0.0625% in 0.9% Sodium Chloride PCEA Rescue Clinician  Bolus 5 milliLiter(s) Epidural every 15 minutes PRN for Pain Score greater than 6  naloxone Injectable 0.1 milliGRAM(s) IV Push every 3 minutes PRN For ANY of the following changes in patient status:  A. RR LESS THAN 10 breaths per minute, B. Oxygen saturation LESS THAN 90%, C. Sedation score of 6  ondansetron Injectable 4 milliGRAM(s) IV Push every 6 hours PRN Nausea      ======================VENTILATOR SETTINGS  ==============  Mode: AC/ CMV (Assist Control/ Continuous Mandatory Ventilation)  RR (machine): 16  TV (machine): 500  FiO2: 50  PEEP: 5  MAP: 9  PIP: 19      =================== PATIENT CARE ACCESS DEVICES ==========  Peripheral IV  Central Venous Line	R	L	IJ	Fem	SC			Placed:   Arterial Line	R	L	PT	DP	Fem	Rad	Ax	Placed:   Midline:				  Urinary Catheter, Date Placed:   Necessity of urinary, arterial, and venous catheters discussed    ======================= PHYSICAL EXAM===================  General:                         Comfortable, Awake, alert, not in any distress  Neuro:                            Moving all extremities to commands. No focal deficits	  HEENT:                           CHER/ ETT/ NGT/ trach  Respiratory:	Lungs clear on auscultation bilaterally with good aeration.                                           No rales, rhonchi, no wheezing. Effort even and unlabored.  CV:		Regular rate and rhythm. Normal S1/S2. No murmurs  Abdomen:	                     Soft,  nontender, not-distended. Bowel sounds present / absent.   Skin:		No rash.  Extremities:	Warm, no cyanosis or edema.  Palpable pulses    ============================ LABS =======================                        11.6   11.03 )-----------( 119      ( 11 Oct 2018 18:00 )             35.8     10-11    141  |  99  |  35<H>  ----------------------------<  219<H>  3.4<L>   |  22  |  3.91<H>    Ca    8.5      11 Oct 2018 18:00  Phos  5.6     10-11  Mg     2.1     10-11    TPro  5.8<L>  /  Alb  2.6<L>  /  TBili  1.5<H>  /  DBili  x   /  AST  18  /  ALT  10  /  AlkPhos  157<H>  10-11    LIVER FUNCTIONS - ( 11 Oct 2018 18:00 )  Alb: 2.6 g/dL / Pro: 5.8 g/dL / ALK PHOS: 157 u/L / ALT: 10 u/L / AST: 18 u/L / GGT: x           PT/INR - ( 11 Oct 2018 18:00 )   PT: 12.4 SEC;   INR: 1.11          PTT - ( 11 Oct 2018 18:00 )  PTT:29.9 SEC  ABG - ( 11 Oct 2018 18:00 )  pH, Arterial: 7.27  pH, Blood: x     /  pCO2: 53    /  pO2: 272   / HCO3: 22    / Base Excess: -2.4  /  SaO2: 99.3                TISSUE  10-11-18 --  --  --      TISSUE  10-11-18 --  --  --      PLEURAL FLUID  10-11-18 --  --    GPCCH^Gram Pos Cocci in Chains  QUANTITY OF BACTERIA SEEN: MODERATE (3+)  GPCPR^Gram Pos Cocci in Pairs  QUANTITY OF BACTERIA SEEN: MODERATE (3+)  WBC^White Blood Cells  QNTY CELLS IN GRAM STAIN: FEW (2+)          ===================== IMAGING STUDIES ===================  Radiology personally reviewed.    ====================ASSESSMENT AND PLAN ================      ====================== NEUROLOGY=======================  Pain control with PCA / PCEA / Tylenol IV / Toradol / Percocet  Pt is on Precedex for agitation  Pt is sedated with Propofol / Fentanyl    ==================== RESPIRATORY========================  Pt is on            L nasal canula / Face tent____% FiO2  Comfortable, no evidence of distress.  Using incentive spirometry & doing                ml  Monitor chest tube output  Chest tube to suction / water seal	    Mechanical Ventilation:  Mode: AC/ CMV (Assist Control/ Continuous Mandatory Ventilation)  RR (machine): 16  TV (machine): 500  FiO2: 50  PEEP: 5  MAP: 9  PIP: 19    Mechanical ventilator status assessed & settings reviewed  Continue bronchodilators, pulmonary toilet  Head of bed elevation to 30-40 degrees    ====================CARDIOVASCULAR=====================  Continue hemodynamic monitoring/ telemetry  Not on any pressors  Continue cardiovascular / antihypertensive medications    ===================== RENAL ============================  Continue LR 30CC/hr      D/C IVF  Monitor I/Os, BUN/ Cr  and electrolytes  D/C Sharma      Keep Sharma for UO monitoring  BPH: Continue Flomax/ Finasteride      ==================== GASTROINTESTINAL===================  On regular diet, tolerating well  Continue GI prophylaxis with Pepcid / Protonix  Continue Zofran / Reglan for nausea - PRN	  NPO    =======================    ENDOCRIN  =====================  Glycemic monitoring  F/S with coverage  ===================HEMATOLOGIC/ONCOLOGIC =============  Monitor chest tube output. No signs of active bleeding.   Follow CBC, coags  in AM  DVT prophylaxis with SCD, sc Heparin    ========================INFECTIOUS DISEASE===============  No signs of infection. Monitor for fever / leukocytosis.  All surgical incision / chest tube  sites look clean  D/C Sharma      Pertinent clinical, laboratory, radiographic, hemodynamic, echocardiographic, respiratory data, microbiologic data and chart were reviewed and analyzed frequently throughout the course of the day and night. GI and DVT prophylaxis, glycemic control, head of bed elevation and skin care issues were addressed.  Patient seen, examined and plan discussed with CT Surgery / CTICU team during rounds.  Pt remains critically ill in imminent risk of  deterioration and requires very careful cardio- pulmonary monitoring and support.    I have spent               minutes of critical care time with this pt between            am/pm    and               am/ pm         minutes spent on total encounter; more than 50% of the visit was spent counseling and/or coordinating care by the attending physician.        KERLINE Faith MD CLAUDIA HAN          MRN-[id number]    HPI:  Pt is a 58 yr old Mandarin speaking male scheduled for Right Thoractomy Decortiation, Right Chest Wall reconstruction with Latissimjus Dorsi Flap Poss Skin Graft with VAC dressing with Dr Pickering 10/11/18. Pt has ICD but unable to identify make or model. Pt hx of ESRD with HD T/Th/Sat for 4 hrs and has stated he has stopped some of his meds but cannot say which ones. Pt seen and received MC and CC but has 2 different med lists and is unable to identify meds taking. Pt denies blood thinners. Pt has stopped ASA as of last week. Pt hx gathered from [employer] and notes from Dr Pickering - pt is poor historian.     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    Procedure:    Procedure:  Thoracotomy  10/11/2018    Active  BFASUNCION.       Operative Findings:  · Operative Findings	Right thoracotomy, resection of portion of R 7th rib, evacuation of infected hemothorax, takedown of pleurocutaneous fistula	  · Drains	3 28Fr chest tubes (A,P, and diaphragm), 2 PRS SQ SANDY drains, VAC	  · Estimated Blood Loss	1000 milliLiter(s)	  · IV Infusions - Crystalloids	4L	  · IV Infusions - Colloids	500cc	  · IV Infusions - Blood Products	4u PRBC	    Procedure:  POD # :     Issues:        Interval/Overnight Events/ ROS  Pt remained hemodynamically stable overnight, not on any pressors or inotropes. OOB to chair, breathing comfortably with minimal pain. Ambulated several times . Denies pain, no SOB, no palpitations, no nausea/ no vomiting, no dizziness  A-line and sharma d/kirsten         PAST MEDICAL & SURGICAL HISTORY:  Smoking hx  Cardiomyopathy  Wound: right chest  ICD (implantable cardioverter-defibrillator) in place  OA (osteoarthritis)  HLD (hyperlipidemia)  BPH (benign prostatic hyperplasia)  Pleural effusion  HTN (hypertension)  ESRD (end stage renal disease)  H/O chest wound: right  History of wound infection: right chest wall - revision 5/18 and again in 7/18  History of implantable cardioverter-defibrillator (ICD) placement: pt unsure when placed  H/O bilateral hip replacements: 2008, 2009    Allergies    No Known Allergies    Intolerances            ***VITAL SIGNS:  Vital Signs Last 24 Hrs  T(C): 36.3 (11 Oct 2018 20:00), Max: 36.6 (11 Oct 2018 07:47)  T(F): 97.4 (11 Oct 2018 20:00), Max: 97.9 (11 Oct 2018 07:47)  HR: 109 (11 Oct 2018 20:00) (95 - 111)  BP: 97/57 (11 Oct 2018 20:00) (83/50 - 117/64)  BP(mean): 67 (11 Oct 2018 20:00) (58 - 76)  RR: 16 (11 Oct 2018 20:00) (12 - 16)  SpO2: 100% (11 Oct 2018 20:00) (98% - 100%)    I/Os:   I&O's Detail    11 Oct 2018 07:01  -  11 Oct 2018 20:13  --------------------------------------------------------  IN:    DOBUTamine Infusion: 17.7 mL    EPINEPHrine Infusion: 33.2 mL    fentaNYL  Infusion: 11.8 mL    IV PiggyBack: 100 mL    lactated ringers.: 60 mL    norepinephrine Infusion: 23.2 mL    propofol Infusion: 14.2 mL    sodium chloride 0.9%.: 90 mL  Total IN: 350.1 mL    OUT:    Bulb: 25 mL    Chest Tube: 45 mL    Chest Tube: 10 mL    Chest Tube: 310 mL  Total OUT: 390 mL    Total NET: -39.9 mL          CAPILLARY BLOOD GLUCOSE      POCT Blood Glucose.: 206 mg/dL (11 Oct 2018 18:06)      =======================  MEDICATIONS  ===================  MEDICATIONS  (STANDING):  dextrose 5%. 1000 milliLiter(s) (50 mL/Hr) IV Continuous <Continuous>  dextrose 50% Injectable 12.5 Gram(s) IV Push once  dextrose 50% Injectable 25 Gram(s) IV Push once  dextrose 50% Injectable 25 Gram(s) IV Push once  DOBUTamine Infusion 10 MICROgram(s)/kG/Min (17.7 mL/Hr) IV Continuous <Continuous>  EPINEPHrine    Infusion 0.15 MICROgram(s)/kG/Min (33.188 mL/Hr) IV Continuous <Continuous>  fentaNYL   Infusion 1 MICROgram(s)/kG/Hr (5.9 mL/Hr) IV Continuous <Continuous>  heparin  Injectable 5000 Unit(s) SubCutaneous every 12 hours  HYDROmorphone (10 MICROgram(s)/mL) + BUpivacaine 0.0625% in 0.9% Sodium Chloride PCEA 250 milliLiter(s) Epidural PCA Continuous  insulin lispro (HumaLOG) corrective regimen sliding scale   SubCutaneous every 6 hours  metroNIDAZOLE  IVPB      norepinephrine Infusion 0.05 MICROgram(s)/kG/Min (2.766 mL/Hr) IV Continuous <Continuous>  piperacillin/tazobactam IVPB. 3.375 Gram(s) IV Intermittent every 12 hours  propofol Infusion 20 MICROgram(s)/kG/Min (7.08 mL/Hr) IV Continuous <Continuous>  sodium chloride 0.9%. 1000 milliLiter(s) (30 mL/Hr) IV Continuous <Continuous>  vancomycin  IVPB 1000 milliGRAM(s) IV Intermittent once    MEDICATIONS  (PRN):  dextrose 40% Gel 15 Gram(s) Oral once PRN Blood Glucose LESS THAN 70 milliGRAM(s)/deciliter  glucagon  Injectable 1 milliGRAM(s) IntraMuscular once PRN Glucose LESS THAN 70 milligrams/deciliter  HYDROmorphone (10 MICROgram(s)/mL) + BUpivacaine 0.0625% in 0.9% Sodium Chloride PCEA Rescue Clinician  Bolus 5 milliLiter(s) Epidural every 15 minutes PRN for Pain Score greater than 6  naloxone Injectable 0.1 milliGRAM(s) IV Push every 3 minutes PRN For ANY of the following changes in patient status:  A. RR LESS THAN 10 breaths per minute, B. Oxygen saturation LESS THAN 90%, C. Sedation score of 6  ondansetron Injectable 4 milliGRAM(s) IV Push every 6 hours PRN Nausea      ======================VENTILATOR SETTINGS  ==============  Mode: AC/ CMV (Assist Control/ Continuous Mandatory Ventilation)  RR (machine): 16  TV (machine): 500  FiO2: 50  PEEP: 5  MAP: 9  PIP: 19      =================== PATIENT CARE ACCESS DEVICES ==========  Peripheral IV  Central Venous Line	R	L	IJ	Fem	SC			Placed:   Arterial Line	R	L	PT	DP	Fem	Rad	Ax	Placed:   Midline:				  Urinary Catheter, Date Placed:   Necessity of urinary, arterial, and venous catheters discussed    ======================= PHYSICAL EXAM===================  General:                         Comfortable, Awake, alert, not in any distress  Neuro:                            Moving all extremities to commands. No focal deficits	  HEENT:                           CHER/ ETT/ NGT/ trach  Respiratory:	Lungs clear on auscultation bilaterally with good aeration.                                           No rales, rhonchi, no wheezing. Effort even and unlabored.  CV:		Regular rate and rhythm. Normal S1/S2. No murmurs  Abdomen:	                     Soft,  nontender, not-distended. Bowel sounds present / absent.   Skin:		No rash.  Extremities:	Warm, no cyanosis or edema.  Palpable pulses    ============================ LABS =======================                        11.6   11.03 )-----------( 119      ( 11 Oct 2018 18:00 )             35.8     10-11    141  |  99  |  35<H>  ----------------------------<  219<H>  3.4<L>   |  22  |  3.91<H>    Ca    8.5      11 Oct 2018 18:00  Phos  5.6     10-11  Mg     2.1     10-11    TPro  5.8<L>  /  Alb  2.6<L>  /  TBili  1.5<H>  /  DBili  x   /  AST  18  /  ALT  10  /  AlkPhos  157<H>  10-11    LIVER FUNCTIONS - ( 11 Oct 2018 18:00 )  Alb: 2.6 g/dL / Pro: 5.8 g/dL / ALK PHOS: 157 u/L / ALT: 10 u/L / AST: 18 u/L / GGT: x           PT/INR - ( 11 Oct 2018 18:00 )   PT: 12.4 SEC;   INR: 1.11          PTT - ( 11 Oct 2018 18:00 )  PTT:29.9 SEC  ABG - ( 11 Oct 2018 18:00 )  pH, Arterial: 7.27  pH, Blood: x     /  pCO2: 53    /  pO2: 272   / HCO3: 22    / Base Excess: -2.4  /  SaO2: 99.3                TISSUE  10-11-18 --  --  --      TISSUE  10-11-18 --  --  --      PLEURAL FLUID  10-11-18 --  --    GPCCH^Gram Pos Cocci in Chains  QUANTITY OF BACTERIA SEEN: MODERATE (3+)  GPCPR^Gram Pos Cocci in Pairs  QUANTITY OF BACTERIA SEEN: MODERATE (3+)  WBC^White Blood Cells  QNTY CELLS IN GRAM STAIN: FEW (2+)          ===================== IMAGING STUDIES ===================  Radiology personally reviewed.    ====================ASSESSMENT AND PLAN ================      ====================== NEUROLOGY=======================  Pain control with PCA / PCEA / Tylenol IV / Toradol / Percocet  Pt is on Precedex for agitation  Pt is sedated with Propofol / Fentanyl    ==================== RESPIRATORY========================  Pt is on            L nasal canula / Face tent____% FiO2  Comfortable, no evidence of distress.  Using incentive spirometry & doing                ml  Monitor chest tube output  Chest tube to suction / water seal	    Mechanical Ventilation:  Mode: AC/ CMV (Assist Control/ Continuous Mandatory Ventilation)  RR (machine): 16  TV (machine): 500  FiO2: 50  PEEP: 5  MAP: 9  PIP: 19    Mechanical ventilator status assessed & settings reviewed  Continue bronchodilators, pulmonary toilet  Head of bed elevation to 30-40 degrees    ====================CARDIOVASCULAR=====================  Continue hemodynamic monitoring/ telemetry  Not on any pressors  Continue cardiovascular / antihypertensive medications    ===================== RENAL ============================  Continue LR 30CC/hr      D/C IVF  Monitor I/Os, BUN/ Cr  and electrolytes  D/C Sharma      Keep Sharma for UO monitoring  BPH: Continue Flomax/ Finasteride      ==================== GASTROINTESTINAL===================  On regular diet, tolerating well  Continue GI prophylaxis with Pepcid / Protonix  Continue Zofran / Reglan for nausea - PRN	  NPO    =======================    ENDOCRIN  =====================  Glycemic monitoring  F/S with coverage  ===================HEMATOLOGIC/ONCOLOGIC =============  Monitor chest tube output. No signs of active bleeding.   Follow CBC, coags  in AM  DVT prophylaxis with SCD, sc Heparin    ========================INFECTIOUS DISEASE===============  No signs of infection. Monitor for fever / leukocytosis.  All surgical incision / chest tube  sites look clean  D/C Sharma      Pertinent clinical, laboratory, radiographic, hemodynamic, echocardiographic, respiratory data, microbiologic data and chart were reviewed and analyzed frequently throughout the course of the day and night. GI and DVT prophylaxis, glycemic control, head of bed elevation and skin care issues were addressed.  Patient seen, examined and plan discussed with CT Surgery / CTICU team during rounds.  Pt remains critically ill in imminent risk of  deterioration and requires very careful cardio- pulmonary monitoring and support.    I have spent               minutes of critical care time with this pt between            am/pm    and               am/ pm         minutes spent on total encounter; more than 50% of the visit was spent counseling and/or coordinating care by the attending physician.        KERLINE Faith MD CLAUDIA HAN          MRN-[id number]    HPI:  Pt is a 58 yr old Mandarin speaking male scheduled for Right Thoractomy Decortiation, Right Chest Wall reconstruction with Latissimjus Dorsi Flap Poss Skin Graft with VAC dressing with Dr Pickering 10/11/18. Pt has ICD but unable to identify make or model. Pt hx of ESRD with HD T/Th/Sat for 4 hrs and has stated he has stopped some of his meds but cannot say which ones. Pt seen and received MC and CC but has 2 different med lists and is unable to identify meds taking. Pt denies blood thinners. Pt has stopped ASA as of last week. Pt hx gathered from [employer] and notes from Dr Pickering - pt is poor historian.     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    Procedure:    Procedure:  Thoracotomy  10/11/2018    Active  BFALIKS.  · Operative Findings	s/p R chest wall reconstruction with tunneled latissimus dorsi flap, covered by serratus anterior flap, after R thoracotomy, takedown pleurocutaneous fistula, decortication, resection Right 7th rib	       Operative Findings:  · Operative Findings	Right thoracotomy, resection of portion of R 7th rib, evacuation of infected hemothorax, takedown of pleurocutaneous fistula	  · Drains	3 28Fr chest tubes (A,P, and diaphragm), 2 PRS SQ SANDY drains, VAC	  · Estimated Blood Loss	1000 milliLiter(s)	  · IV Infusions - Crystalloids	4L	  · IV Infusions - Colloids	500cc	  · IV Infusions - Blood Products	4u PRBC	    Procedure:  POD # :     Issues:        Interval/Overnight Events/ ROS  Pt remained hemodynamically stable overnight, not on any pressors or inotropes. OOB to chair, breathing comfortably with minimal pain. Ambulated several times . Denies pain, no SOB, no palpitations, no nausea/ no vomiting, no dizziness  A-line and sharma d/kirsten         PAST MEDICAL & SURGICAL HISTORY:  Smoking hx  Cardiomyopathy  Wound: right chest  ICD (implantable cardioverter-defibrillator) in place  OA (osteoarthritis)  HLD (hyperlipidemia)  BPH (benign prostatic hyperplasia)  Pleural effusion  HTN (hypertension)  ESRD (end stage renal disease)  H/O chest wound: right  History of wound infection: right chest wall - revision 5/18 and again in 7/18  History of implantable cardioverter-defibrillator (ICD) placement: pt unsure when placed  H/O bilateral hip replacements: 2008, 2009    Allergies    No Known Allergies    Intolerances            ***VITAL SIGNS:  Vital Signs Last 24 Hrs  T(C): 36.3 (11 Oct 2018 20:00), Max: 36.6 (11 Oct 2018 07:47)  T(F): 97.4 (11 Oct 2018 20:00), Max: 97.9 (11 Oct 2018 07:47)  HR: 109 (11 Oct 2018 20:00) (95 - 111)  BP: 97/57 (11 Oct 2018 20:00) (83/50 - 117/64)  BP(mean): 67 (11 Oct 2018 20:00) (58 - 76)  RR: 16 (11 Oct 2018 20:00) (12 - 16)  SpO2: 100% (11 Oct 2018 20:00) (98% - 100%)    I/Os:   I&O's Detail    11 Oct 2018 07:01  -  11 Oct 2018 20:13  --------------------------------------------------------  IN:    DOBUTamine Infusion: 17.7 mL    EPINEPHrine Infusion: 33.2 mL    fentaNYL  Infusion: 11.8 mL    IV PiggyBack: 100 mL    lactated ringers.: 60 mL    norepinephrine Infusion: 23.2 mL    propofol Infusion: 14.2 mL    sodium chloride 0.9%.: 90 mL  Total IN: 350.1 mL    OUT:    Bulb: 25 mL    Chest Tube: 45 mL    Chest Tube: 10 mL    Chest Tube: 310 mL  Total OUT: 390 mL    Total NET: -39.9 mL          CAPILLARY BLOOD GLUCOSE      POCT Blood Glucose.: 206 mg/dL (11 Oct 2018 18:06)      =======================  MEDICATIONS  ===================  MEDICATIONS  (STANDING):  dextrose 5%. 1000 milliLiter(s) (50 mL/Hr) IV Continuous <Continuous>  dextrose 50% Injectable 12.5 Gram(s) IV Push once  dextrose 50% Injectable 25 Gram(s) IV Push once  dextrose 50% Injectable 25 Gram(s) IV Push once  DOBUTamine Infusion 10 MICROgram(s)/kG/Min (17.7 mL/Hr) IV Continuous <Continuous>  EPINEPHrine    Infusion 0.15 MICROgram(s)/kG/Min (33.188 mL/Hr) IV Continuous <Continuous>  fentaNYL   Infusion 1 MICROgram(s)/kG/Hr (5.9 mL/Hr) IV Continuous <Continuous>  heparin  Injectable 5000 Unit(s) SubCutaneous every 12 hours  HYDROmorphone (10 MICROgram(s)/mL) + BUpivacaine 0.0625% in 0.9% Sodium Chloride PCEA 250 milliLiter(s) Epidural PCA Continuous  insulin lispro (HumaLOG) corrective regimen sliding scale   SubCutaneous every 6 hours  metroNIDAZOLE  IVPB      norepinephrine Infusion 0.05 MICROgram(s)/kG/Min (2.766 mL/Hr) IV Continuous <Continuous>  piperacillin/tazobactam IVPB. 3.375 Gram(s) IV Intermittent every 12 hours  propofol Infusion 20 MICROgram(s)/kG/Min (7.08 mL/Hr) IV Continuous <Continuous>  sodium chloride 0.9%. 1000 milliLiter(s) (30 mL/Hr) IV Continuous <Continuous>  vancomycin  IVPB 1000 milliGRAM(s) IV Intermittent once    MEDICATIONS  (PRN):  dextrose 40% Gel 15 Gram(s) Oral once PRN Blood Glucose LESS THAN 70 milliGRAM(s)/deciliter  glucagon  Injectable 1 milliGRAM(s) IntraMuscular once PRN Glucose LESS THAN 70 milligrams/deciliter  HYDROmorphone (10 MICROgram(s)/mL) + BUpivacaine 0.0625% in 0.9% Sodium Chloride PCEA Rescue Clinician  Bolus 5 milliLiter(s) Epidural every 15 minutes PRN for Pain Score greater than 6  naloxone Injectable 0.1 milliGRAM(s) IV Push every 3 minutes PRN For ANY of the following changes in patient status:  A. RR LESS THAN 10 breaths per minute, B. Oxygen saturation LESS THAN 90%, C. Sedation score of 6  ondansetron Injectable 4 milliGRAM(s) IV Push every 6 hours PRN Nausea      ======================VENTILATOR SETTINGS  ==============  Mode: AC/ CMV (Assist Control/ Continuous Mandatory Ventilation)  RR (machine): 16  TV (machine): 500  FiO2: 50  PEEP: 5  MAP: 9  PIP: 19      =================== PATIENT CARE ACCESS DEVICES ==========  Peripheral IV  Central Venous Line	R	L	IJ	Fem	SC			Placed:   Arterial Line	R	L	PT	DP	Fem	Rad	Ax	Placed:   Midline:				  Urinary Catheter, Date Placed:   Necessity of urinary, arterial, and venous catheters discussed    ======================= PHYSICAL EXAM===================  General:                         Comfortable, Awake, alert, not in any distress  Neuro:                            Moving all extremities to commands. No focal deficits	  HEENT:                           CHER/ ETT/ NGT/ trach  Respiratory:	Lungs clear on auscultation bilaterally with good aeration.                                           No rales, rhonchi, no wheezing. Effort even and unlabored.  CV:		Regular rate and rhythm. Normal S1/S2. No murmurs  Abdomen:	                     Soft,  nontender, not-distended. Bowel sounds present / absent.   Skin:		No rash.  Extremities:	Warm, no cyanosis or edema.  Palpable pulses    ============================ LABS =======================                        11.6   11.03 )-----------( 119      ( 11 Oct 2018 18:00 )             35.8     10-11    141  |  99  |  35<H>  ----------------------------<  219<H>  3.4<L>   |  22  |  3.91<H>    Ca    8.5      11 Oct 2018 18:00  Phos  5.6     10-11  Mg     2.1     10-11    TPro  5.8<L>  /  Alb  2.6<L>  /  TBili  1.5<H>  /  DBili  x   /  AST  18  /  ALT  10  /  AlkPhos  157<H>  10-11    LIVER FUNCTIONS - ( 11 Oct 2018 18:00 )  Alb: 2.6 g/dL / Pro: 5.8 g/dL / ALK PHOS: 157 u/L / ALT: 10 u/L / AST: 18 u/L / GGT: x           PT/INR - ( 11 Oct 2018 18:00 )   PT: 12.4 SEC;   INR: 1.11          PTT - ( 11 Oct 2018 18:00 )  PTT:29.9 SEC  ABG - ( 11 Oct 2018 18:00 )  pH, Arterial: 7.27  pH, Blood: x     /  pCO2: 53    /  pO2: 272   / HCO3: 22    / Base Excess: -2.4  /  SaO2: 99.3                TISSUE  10-11-18 --  --  --      TISSUE  10-11-18 --  --  --      PLEURAL FLUID  10-11-18 --  --    GPCCH^Gram Pos Cocci in Chains  QUANTITY OF BACTERIA SEEN: MODERATE (3+)  GPCPR^Gram Pos Cocci in Pairs  QUANTITY OF BACTERIA SEEN: MODERATE (3+)  WBC^White Blood Cells  QNTY CELLS IN GRAM STAIN: FEW (2+)          ===================== IMAGING STUDIES ===================  Radiology personally reviewed.    ====================ASSESSMENT AND PLAN ================      ====================== NEUROLOGY=======================  Pain control with PCA / PCEA / Tylenol IV / Toradol / Percocet  Pt is on Precedex for agitation  Pt is sedated with Propofol / Fentanyl    ==================== RESPIRATORY========================  Pt is on            L nasal canula / Face tent____% FiO2  Comfortable, no evidence of distress.  Using incentive spirometry & doing                ml  Monitor chest tube output  Chest tube to suction / water seal	    Mechanical Ventilation:  Mode: AC/ CMV (Assist Control/ Continuous Mandatory Ventilation)  RR (machine): 16  TV (machine): 500  FiO2: 50  PEEP: 5  MAP: 9  PIP: 19    Mechanical ventilator status assessed & settings reviewed  Continue bronchodilators, pulmonary toilet  Head of bed elevation to 30-40 degrees    ====================CARDIOVASCULAR=====================  Continue hemodynamic monitoring/ telemetry  Not on any pressors  Continue cardiovascular / antihypertensive medications    ===================== RENAL ============================  Continue LR 30CC/hr      D/C IVF  Monitor I/Os, BUN/ Cr  and electrolytes  D/C Sharma      Keep Sharma for UO monitoring  BPH: Continue Flomax/ Finasteride      ==================== GASTROINTESTINAL===================  On regular diet, tolerating well  Continue GI prophylaxis with Pepcid / Protonix  Continue Zofran / Reglan for nausea - PRN	  NPO    =======================    ENDOCRIN  =====================  Glycemic monitoring  F/S with coverage  ===================HEMATOLOGIC/ONCOLOGIC =============  Monitor chest tube output. No signs of active bleeding.   Follow CBC, coags  in AM  DVT prophylaxis with SCD, sc Heparin    ========================INFECTIOUS DISEASE===============  No signs of infection. Monitor for fever / leukocytosis.  All surgical incision / chest tube  sites look clean  D/C Sharma      Pertinent clinical, laboratory, radiographic, hemodynamic, echocardiographic, respiratory data, microbiologic data and chart were reviewed and analyzed frequently throughout the course of the day and night. GI and DVT prophylaxis, glycemic control, head of bed elevation and skin care issues were addressed.  Patient seen, examined and plan discussed with CT Surgery / CTICU team during rounds.  Pt remains critically ill in imminent risk of  deterioration and requires very careful cardio- pulmonary monitoring and support.    I have spent               minutes of critical care time with this pt between            am/pm    and               am/ pm         minutes spent on total encounter; more than 50% of the visit was spent counseling and/or coordinating care by the attending physician.        KERLINE Faith MD CLAUDIA HAN          MRN-[id number]    HPI:  Pt is a 58 yr old Mandarin speaking male scheduled for Right Thoractomy Decortiation, Right Chest Wall reconstruction with Latissimjus Dorsi Flap Poss Skin Graft with VAC dressing with Dr Pickering 10/11/18. Pt has ICD but unable to identify make or model. Pt hx of ESRD with HD T/Th/Sat for 4 hrs and has stated he has stopped some of his meds but cannot say which ones. Pt seen and received MC and CC but has 2 different med lists and is unable to identify meds taking. Pt denies blood thinners. Pt has stopped ASA as of last week. Pt hx gathered from [employer] and notes from Dr Pickering - pt is poor historian.        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          Procedure:  Thoracotomy  10/11/2018       · Operative Findings	Right thoracotomy, resection of portion of R 7th rib, evacuation of infected hemothorax, takedown of pleurocutaneous fistula	    · Operative Findings	s/p R chest wall reconstruction with tunneled latissimus dorsi flap, covered by serratus anterior flap, after R thoracotomy, takedown pleurocutaneous fistula, decortication, resection Right 7th rib	         · Drains	3 28Fr chest tubes (A,P, and diaphragm), 2 PRS SQ SANDY drains, VAC	  · Estimated Blood Loss	1000 milliLiter(s)	  · IV Infusions - Crystalloids	4L	  · IV Infusions - Colloids	500cc	  · IV Infusions - Blood Products	4u PRBC	       POD # : 0    Issues: s/p  infected right hemothorax evacuation             multiple right  chest tubes/ drains in place             distributive and cardiogenic shock             acute resp failure on vent support             posthemorrhagic anemia             lactic acidosis             ESRD on HD             acute on chronic systolic CHF ( EF 10 %), ICD in place        Interval/OR Events/ ROS  Pt hypotensive through the surgery - on multiple pressors / inotropes ( Levo, Epi, Dobutamine). Required 4 units of PRBC in addition to colloids and crystalloids for  symptomatic  posthemorrhagic anemia during the surgery. Pt arrived in ICU orally intubated, sedated, on Levo, Dobut, Epi.         PAST MEDICAL & SURGICAL HISTORY:  Smoking hx  Cardiomyopathy  Wound: right chest  ICD (implantable cardioverter-defibrillator) in place  OA (osteoarthritis)  HLD (hyperlipidemia)  BPH (benign prostatic hyperplasia)  Pleural effusion  HTN (hypertension)  ESRD (end stage renal disease)  H/O chest wound: right  History of wound infection: right chest wall - revision 5/18 and again in 7/18  History of implantable cardioverter-defibrillator (ICD) placement: pt unsure when placed  H/O bilateral hip replacements: 2008, 2009    Home Medications:   * Patient Currently Takes Medications as of 01-Oct-2018 09:29 documented in Structured Notes  · 	nortriptyline 50 mg oral capsule: 1 cap(s) orally once a day  · 	Vitamin B Complex: 1 tab(s) orally once a day  · 	Nephplex Rx oral tablet: 1 tab(s) orally once a day  · 	aspirin 81 mg oral tablet: 1 tab(s) orally once a day last dose 10/3/2018  · 	Vitamin C 500 mg oral tablet: 1 tab(s) orally once a day  · 	Breo Ellipta 100 mcg-25 mcg/inh inhalation powder: 1 puff(s) inhaled once a day patient denies using it  · 	Calcium 500: 1 tab(s) orally 2 times a day  · 	carvedilol 6.25 mg oral tablet: 1 tab(s) orally once a day  · 	docusate sodium 100 mg oral tablet: 1 tab(s) orally 2 times a day, As Needed  · 	folic acid 1 mg oral tablet: 1 tab(s) orally once a day  · 	Mag-Ox 400 oral tablet: 1 tab(s) orally once a day  · 	midodrine 10 mg oral tablet: 1 tab(s) orally once a day  · 	Multiple Vitamins oral tablet: 1 tab(s) orally once a day last dose 10/3/2018  · 	Namenda 10 mg oral tablet: 1 tab(s) orally 2 times a day  · 	Renvela 800 mg oral tablet: 2 tab(s) orally every 8 hours  · 	simethicone 80 mg oral tablet: 1 tab(s) orally 3 times a day (after meals)  · 	torsemide 20 mg oral tablet: 1 tab(s) orally once a day  · 	vitamin A: 1 tab(s) orally once a day      Allergies  No Known Allergies        ***VITAL SIGNS:  Vital Signs Last 24 Hrs  T(C): 36.3 (11 Oct 2018 20:00), Max: 36.6 (11 Oct 2018 07:47)  T(F): 97.4 (11 Oct 2018 20:00), Max: 97.9 (11 Oct 2018 07:47)  HR: 109 (11 Oct 2018 20:00) (95 - 111)  BP: 97/57 (11 Oct 2018 20:00) (83/50 - 117/64)  BP(mean): 67 (11 Oct 2018 20:00) (58 - 76)  RR: 16 (11 Oct 2018 20:00) (12 - 16)  SpO2: 100% (11 Oct 2018 20:00) (98% - 100%)    I/Os:   I&O's Detail    11 Oct 2018 07:01  -  11 Oct 2018 20:13  --------------------------------------------------------  IN:    DOBUTamine Infusion: 17.7 mL    EPINEPHrine Infusion: 33.2 mL    fentaNYL  Infusion: 11.8 mL    IV PiggyBack: 100 mL    lactated ringers.: 60 mL    norepinephrine Infusion: 23.2 mL    propofol Infusion: 14.2 mL    sodium chloride 0.9%.: 90 mL  Total IN: 350.1 mL    OUT:    Bulb: 25 mL    Chest Tube: 45 mL    Chest Tube: 10 mL    Chest Tube: 310 mL  Total OUT: 390 mL    Total NET: -39.9 mL      CAPILLARY BLOOD GLUCOSE  POCT Blood Glucose.: 206 mg/dL (11 Oct 2018 18:06)      =======================  MEDICATIONS  ===================  MEDICATIONS  (STANDING):  dextrose 5%. 1000 milliLiter(s) (50 mL/Hr) IV Continuous <Continuous>  dextrose 50% Injectable 12.5 Gram(s) IV Push once  dextrose 50% Injectable 25 Gram(s) IV Push once  dextrose 50% Injectable 25 Gram(s) IV Push once  DOBUTamine Infusion 10 MICROgram(s)/kG/Min (17.7 mL/Hr) IV Continuous <Continuous>  EPINEPHrine    Infusion 0.15 MICROgram(s)/kG/Min (33.188 mL/Hr) IV Continuous <Continuous>  fentaNYL   Infusion 1 MICROgram(s)/kG/Hr (5.9 mL/Hr) IV Continuous <Continuous>  heparin  Injectable 5000 Unit(s) SubCutaneous every 12 hours  HYDROmorphone (10 MICROgram(s)/mL) + BUpivacaine 0.0625% in 0.9% Sodium Chloride PCEA 250 milliLiter(s) Epidural PCA Continuous  insulin lispro (HumaLOG) corrective regimen sliding scale   SubCutaneous every 6 hours  metroNIDAZOLE  IVPB      norepinephrine Infusion 0.05 MICROgram(s)/kG/Min (2.766 mL/Hr) IV Continuous <Continuous>  piperacillin/tazobactam IVPB. 3.375 Gram(s) IV Intermittent every 12 hours  propofol Infusion 20 MICROgram(s)/kG/Min (7.08 mL/Hr) IV Continuous <Continuous>  sodium chloride 0.9%. 1000 milliLiter(s) (30 mL/Hr) IV Continuous <Continuous>  vancomycin  IVPB 1000 milliGRAM(s) IV Intermittent once    MEDICATIONS  (PRN):  dextrose 40% Gel 15 Gram(s) Oral once PRN Blood Glucose LESS THAN 70 milliGRAM(s)/deciliter  glucagon  Injectable 1 milliGRAM(s) IntraMuscular once PRN Glucose LESS THAN 70 milligrams/deciliter  HYDROmorphone (10 MICROgram(s)/mL) + BUpivacaine 0.0625% in 0.9% Sodium Chloride PCEA Rescue Clinician  Bolus 5 milliLiter(s) Epidural every 15 minutes PRN for Pain Score greater than 6  naloxone Injectable 0.1 milliGRAM(s) IV Push every 3 minutes PRN For ANY of the following changes in patient status:  A. RR LESS THAN 10 breaths per minute, B. Oxygen saturation LESS THAN 90%, C. Sedation score of 6  ondansetron Injectable 4 milliGRAM(s) IV Push every 6 hours PRN Nausea      ======================VENTILATOR SETTINGS  ==============  Mode: AC/ CMV (Assist Control/ Continuous Mandatory Ventilation)  RR (machine): 16  TV (machine): 500  FiO2: 50  PEEP: 5  MAP: 9  PIP: 19      =================== PATIENT CARE ACCESS DEVICES ==========  Peripheral IV (+)  Central Venous Line R/ IJ (+)   Arterial Line	R / Rad(+)			  Necessity of  arterial, and venous catheters discussed    ======================= PHYSICAL EXAM===================  General:             sedated, intubated  Neuro:                            Moving all extremities to commands. No focal deficits	  HEENT:                           CHER/ ETT/ NGT/ trach  Respiratory:	Lungs clear on auscultation bilaterally with good aeration.                                           No rales, rhonchi, no wheezing. Effort even and unlabored.  CV:		Regular rate and rhythm. Normal S1/S2. No murmurs  Abdomen:	                     Soft,  nontender, not-distended. Bowel sounds present / absent.   Skin:		No rash.  Extremities:	Warm, no cyanosis or edema.  Palpable pulses    ============================ LABS =======================                        11.6   11.03 )-----------( 119      ( 11 Oct 2018 18:00 )             35.8     10-11    141  |  99  |  35<H>  ----------------------------<  219<H>  3.4<L>   |  22  |  3.91<H>    Ca    8.5      11 Oct 2018 18:00  Phos  5.6     10-11  Mg     2.1     10-11    TPro  5.8<L>  /  Alb  2.6<L>  /  TBili  1.5<H>  /  DBili  x   /  AST  18  /  ALT  10  /  AlkPhos  157<H>  10-11    LIVER FUNCTIONS - ( 11 Oct 2018 18:00 )  Alb: 2.6 g/dL / Pro: 5.8 g/dL / ALK PHOS: 157 u/L / ALT: 10 u/L / AST: 18 u/L / GGT: x           PT/INR - ( 11 Oct 2018 18:00 )   PT: 12.4 SEC;   INR: 1.11          PTT - ( 11 Oct 2018 18:00 )  PTT:29.9 SEC  ABG - ( 11 Oct 2018 18:00 )  pH, Arterial: 7.27  pH, Blood: x     /  pCO2: 53    /  pO2: 272   / HCO3: 22    / Base Excess: -2.4  /  SaO2: 99.3                TISSUE  10-11-18 --  --  --      TISSUE  10-11-18 --  --  --      PLEURAL FLUID  10-11-18 --  --    GPCCH^Gram Pos Cocci in Chains  QUANTITY OF BACTERIA SEEN: MODERATE (3+)  GPCPR^Gram Pos Cocci in Pairs  QUANTITY OF BACTERIA SEEN: MODERATE (3+)  WBC^White Blood Cells  QNTY CELLS IN GRAM STAIN: FEW (2+)          ===================== IMAGING STUDIES ===================  Radiology personally reviewed.    ====================ASSESSMENT AND PLAN ================      ====================== NEUROLOGY=======================  Pain control with PCA / PCEA / Tylenol IV / Toradol / Percocet  Pt is on Precedex for agitation  Pt is sedated with Propofol / Fentanyl    ==================== RESPIRATORY========================  Pt is on            L nasal canula / Face tent____% FiO2  Comfortable, no evidence of distress.  Using incentive spirometry & doing                ml  Monitor chest tube output  Chest tube to suction / water seal	    Mechanical Ventilation:  Mode: AC/ CMV (Assist Control/ Continuous Mandatory Ventilation)  RR (machine): 16  TV (machine): 500  FiO2: 50  PEEP: 5  MAP: 9  PIP: 19    Mechanical ventilator status assessed & settings reviewed  Continue bronchodilators, pulmonary toilet  Head of bed elevation to 30-40 degrees    ====================CARDIOVASCULAR=====================  Continue hemodynamic monitoring/ telemetry  Not on any pressors  Continue cardiovascular / antihypertensive medications    ===================== RENAL ============================  Continue LR 30CC/hr      D/C IVF  Monitor I/Os, BUN/ Cr  and electrolytes  D/C Moore      Keep Moore for UO monitoring  BPH: Continue Flomax/ Finasteride      ==================== GASTROINTESTINAL===================  On regular diet, tolerating well  Continue GI prophylaxis with Pepcid / Protonix  Continue Zofran / Reglan for nausea - PRN	  NPO    =======================    ENDOCRIN  =====================  Glycemic monitoring  F/S with coverage  ===================HEMATOLOGIC/ONCOLOGIC =============  Monitor chest tube output. No signs of active bleeding.   Follow CBC, coags  in AM  DVT prophylaxis with SCD, sc Heparin    ========================INFECTIOUS DISEASE===============  No signs of infection. Monitor for fever / leukocytosis.  All surgical incision / chest tube  sites look clean  D/C Moore      Pertinent clinical, laboratory, radiographic, hemodynamic, echocardiographic, respiratory data, microbiologic data and chart were reviewed and analyzed frequently throughout the course of the day and night. GI and DVT prophylaxis, glycemic control, head of bed elevation and skin care issues were addressed.  Patient seen, examined and plan discussed with CT Surgery / CTICU team during rounds.  Pt remains critically ill in imminent risk of  deterioration and requires very careful cardio- pulmonary monitoring and support.    I have spent               minutes of critical care time with this pt between            am/pm    and               am/ pm         minutes spent on total encounter; more than 50% of the visit was spent counseling and/or coordinating care by the attending physician.        KERLINE Faith MD CLAUDIA HAN          MRN-[id number]    HPI:  Pt is a 58 yr old Mandarin speaking male scheduled for Right Thoractomy Decortiation, Right Chest Wall reconstruction with Latissimjus Dorsi Flap Poss Skin Graft with VAC dressing with Dr Pickering 10/11/18. Pt has ICD but unable to identify make or model. Pt hx of ESRD with HD T/Th/Sat for 4 hrs and has stated he has stopped some of his meds but cannot say which ones. Pt seen and received MC and CC but has 2 different med lists and is unable to identify meds taking. Pt denies blood thinners. Pt has stopped ASA as of last week. Pt hx gathered from [employer] and notes from Dr Pickering - pt is poor historian.        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          Procedure:  Thoracotomy  10/11/2018       · Operative Findings	Right thoracotomy, resection of portion of R 7th rib, evacuation of infected hemothorax, takedown of pleurocutaneous fistula	    · Operative Findings	s/p R chest wall reconstruction with tunneled latissimus dorsi flap, covered by serratus anterior flap, after R thoracotomy, takedown pleurocutaneous fistula, decortication, resection Right 7th rib	         · Drains	3 28Fr chest tubes (A,P, and diaphragm), 2 PRS SQ SANDY drains, VAC	  · Estimated Blood Loss	1000 milliLiter(s)	  · IV Infusions - Crystalloids	4L	  · IV Infusions - Colloids	500cc	  · IV Infusions - Blood Products	4u PRBC	       POD # : 0    Issues: s/p  infected right hemothorax evacuation             multiple right  chest tubes/ drains in place             distributive and cardiogenic shock             acute resp failure on vent support             posthemorrhagic anemia             lactic acidosis             ESRD on HD             acute on chronic systolic CHF ( EF 10 %), ICD in place        Interval/OR Events/ ROS  Pt hypotensive through the surgery - on multiple pressors / inotropes ( Levo, Epi, Dobutamine). Required 4 units of PRBC in addition to colloids and crystalloids for  symptomatic  posthemorrhagic anemia during the surgery. Pt arrived in ICU orally intubated, sedated, on Levo, Dobut, Epi.         PAST MEDICAL & SURGICAL HISTORY:  Smoking hx  Cardiomyopathy  Wound: right chest  ICD (implantable cardioverter-defibrillator) in place  OA (osteoarthritis)  HLD (hyperlipidemia)  BPH (benign prostatic hyperplasia)  Pleural effusion  HTN (hypertension)  ESRD (end stage renal disease)  H/O chest wound: right  History of wound infection: right chest wall - revision 5/18 and again in 7/18  History of implantable cardioverter-defibrillator (ICD) placement: pt unsure when placed  H/O bilateral hip replacements: 2008, 2009    Home Medications:   * Patient Currently Takes Medications as of 01-Oct-2018 09:29 documented in Structured Notes  · 	nortriptyline 50 mg oral capsule: 1 cap(s) orally once a day  · 	Vitamin B Complex: 1 tab(s) orally once a day  · 	Nephplex Rx oral tablet: 1 tab(s) orally once a day  · 	aspirin 81 mg oral tablet: 1 tab(s) orally once a day last dose 10/3/2018  · 	Vitamin C 500 mg oral tablet: 1 tab(s) orally once a day  · 	Breo Ellipta 100 mcg-25 mcg/inh inhalation powder: 1 puff(s) inhaled once a day patient denies using it  · 	Calcium 500: 1 tab(s) orally 2 times a day  · 	carvedilol 6.25 mg oral tablet: 1 tab(s) orally once a day  · 	docusate sodium 100 mg oral tablet: 1 tab(s) orally 2 times a day, As Needed  · 	folic acid 1 mg oral tablet: 1 tab(s) orally once a day  · 	Mag-Ox 400 oral tablet: 1 tab(s) orally once a day  · 	midodrine 10 mg oral tablet: 1 tab(s) orally once a day  · 	Multiple Vitamins oral tablet: 1 tab(s) orally once a day last dose 10/3/2018  · 	Namenda 10 mg oral tablet: 1 tab(s) orally 2 times a day  · 	Renvela 800 mg oral tablet: 2 tab(s) orally every 8 hours  · 	simethicone 80 mg oral tablet: 1 tab(s) orally 3 times a day (after meals)  · 	torsemide 20 mg oral tablet: 1 tab(s) orally once a day  · 	vitamin A: 1 tab(s) orally once a day      Allergies  No Known Allergies        ***VITAL SIGNS:  Vital Signs Last 24 Hrs  T(C): 36.3 (11 Oct 2018 20:00), Max: 36.6 (11 Oct 2018 07:47)  T(F): 97.4 (11 Oct 2018 20:00), Max: 97.9 (11 Oct 2018 07:47)  HR: 109 (11 Oct 2018 20:00) (95 - 111)  BP: 97/57 (11 Oct 2018 20:00) (83/50 - 117/64)  BP(mean): 67 (11 Oct 2018 20:00) (58 - 76)  RR: 16 (11 Oct 2018 20:00) (12 - 16)  SpO2: 100% (11 Oct 2018 20:00) (98% - 100%)    I/Os:   I&O's Detail    11 Oct 2018 07:01  -  11 Oct 2018 20:13  --------------------------------------------------------  IN:    DOBUTamine Infusion: 17.7 mL    EPINEPHrine Infusion: 33.2 mL    fentaNYL  Infusion: 11.8 mL    IV PiggyBack: 100 mL    lactated ringers.: 60 mL    norepinephrine Infusion: 23.2 mL    propofol Infusion: 14.2 mL    sodium chloride 0.9%.: 90 mL  Total IN: 350.1 mL    OUT:    Bulb: 25 mL    Chest Tube: 45 mL    Chest Tube: 10 mL    Chest Tube: 310 mL  Total OUT: 390 mL    Total NET: -39.9 mL      CAPILLARY BLOOD GLUCOSE  POCT Blood Glucose.: 206 mg/dL (11 Oct 2018 18:06)      =======================  MEDICATIONS  ===================  MEDICATIONS  (STANDING):  dextrose 5%. 1000 milliLiter(s) (50 mL/Hr) IV Continuous <Continuous>  dextrose 50% Injectable 12.5 Gram(s) IV Push once  dextrose 50% Injectable 25 Gram(s) IV Push once  dextrose 50% Injectable 25 Gram(s) IV Push once  DOBUTamine Infusion 10 MICROgram(s)/kG/Min (17.7 mL/Hr) IV Continuous <Continuous>  EPINEPHrine    Infusion 0.15 MICROgram(s)/kG/Min (33.188 mL/Hr) IV Continuous <Continuous>  fentaNYL   Infusion 1 MICROgram(s)/kG/Hr (5.9 mL/Hr) IV Continuous <Continuous>  heparin  Injectable 5000 Unit(s) SubCutaneous every 12 hours  HYDROmorphone (10 MICROgram(s)/mL) + BUpivacaine 0.0625% in 0.9% Sodium Chloride PCEA 250 milliLiter(s) Epidural PCA Continuous  insulin lispro (HumaLOG) corrective regimen sliding scale   SubCutaneous every 6 hours  metroNIDAZOLE  IVPB      norepinephrine Infusion 0.05 MICROgram(s)/kG/Min (2.766 mL/Hr) IV Continuous <Continuous>  piperacillin/tazobactam IVPB. 3.375 Gram(s) IV Intermittent every 12 hours  propofol Infusion 20 MICROgram(s)/kG/Min (7.08 mL/Hr) IV Continuous <Continuous>  sodium chloride 0.9%. 1000 milliLiter(s) (30 mL/Hr) IV Continuous <Continuous>  vancomycin  IVPB 1000 milliGRAM(s) IV Intermittent once    MEDICATIONS  (PRN):  dextrose 40% Gel 15 Gram(s) Oral once PRN Blood Glucose LESS THAN 70 milliGRAM(s)/deciliter  glucagon  Injectable 1 milliGRAM(s) IntraMuscular once PRN Glucose LESS THAN 70 milligrams/deciliter  HYDROmorphone (10 MICROgram(s)/mL) + BUpivacaine 0.0625% in 0.9% Sodium Chloride PCEA Rescue Clinician  Bolus 5 milliLiter(s) Epidural every 15 minutes PRN for Pain Score greater than 6  naloxone Injectable 0.1 milliGRAM(s) IV Push every 3 minutes PRN For ANY of the following changes in patient status:  A. RR LESS THAN 10 breaths per minute, B. Oxygen saturation LESS THAN 90%, C. Sedation score of 6  ondansetron Injectable 4 milliGRAM(s) IV Push every 6 hours PRN Nausea      ======================VENTILATOR SETTINGS  ==============  Mode: AC/ CMV (Assist Control/ Continuous Mandatory Ventilation)  RR (machine): 16  TV (machine): 500  FiO2: 50  PEEP: 5  MAP: 9  PIP: 19      =================== PATIENT CARE ACCESS DEVICES ==========  Peripheral IV (+)  Central Venous Line R/ IJ (+)   Arterial Line	R / Rad(+)			  Necessity of  arterial, and venous catheters discussed    ======================= PHYSICAL EXAM===================  General:             sedated, intubated  Neuro:               Moving  extremities spontaneously    with lower dose of sedation. 	  HEENT:                           CHER/ ETT  Respiratory:	Lungs sound coarse on auscultation bilaterally with good aeration.                            No rales, rhonchi, no wheezing.                           RIght thoracotomy site - covered with dressing, chest tube site -clean  CV:		Regular rate and rhythm. Normal S1/S2.  Abdomen:          Soft,  nontender, not-distended. Bowel sounds present - hypoactive  Skin:		No rash.  Extremities:	Warm, no cyanosis or edema.  (+) pulses by doppler    ============================ LABS =======================                        11.6   11.03 )-----------( 119      ( 11 Oct 2018 18:00 )             35.8     10-11    141       |  99  |  35<H>  ----------------------------<  219<H>  3.4<L>   |  22  |  3.91<H>    Ca    8.5      11 Oct 2018 18:00  Phos  5.6     10-11  Mg     2.1     10-11    TPro  5.8<L>  /  Alb  2.6<L>  /  TBili  1.5<H>  /  DBili  x   /  AST  18  /  ALT  10  /  AlkPhos  157<H>  10-11    LIVER FUNCTIONS - ( 11 Oct 2018 18:00 )  Alb: 2.6 g/dL / Pro: 5.8 g/dL / ALK PHOS: 157 u/L / ALT: 10 u/L / AST: 18 u/L / GGT: x           PT/INR - ( 11 Oct 2018 18:00 )   PT: 12.4 SEC;   INR: 1.11    PTT:29.9 SEC    ABG - ( 11 Oct 2018 18:00 )  pH, Arterial: 7.27  pH, Blood: x     /  pCO2: 53    /  pO2: 272   / HCO3: 22    / Base Excess: -2.4  /  SaO2: 99.3        TISSUE  10-11-18 --  --  --      TISSUE  10-11-18 --  --  --      PLEURAL FLUID  10-11-18 --  --    GPCCH^Gram Pos Cocci in Chains  QUANTITY OF BACTERIA SEEN: MODERATE (3+)  GPCPR^Gram Pos Cocci in Pairs  QUANTITY OF BACTERIA SEEN: MODERATE (3+)  WBC^White Blood Cells  QNTY CELLS IN GRAM STAIN: FEW (2+)      ===================== IMAGING STUDIES ===================  Radiology personally reviewed.    < from: Xray Chest 1 View- PORTABLE-Urgent (10.11.18 @ 19:10) >  INTERPRETATION:   ETT (+) , small right pneumothorax  trace peumopericardium  likely small pneumoperitoneum    < end of copied text >    ====================ASSESSMENT AND PLAN ================  Pt is a 58 yr old Mandarin speaking male with infected right hemothorax scheduled for Right Thoractomy Decortiation, Right Chest Wall reconstruction with Latissimjus Dorsi Flap Poss Skin Graft with VAC dressing with Dr Pickering 10/11/18. Pt has ICD but unable to identify make or model. Pt hx of ESRD with HD T/Th/Sat for 4 hrs and has stated he has stopped some of his meds but cannot say which ones. Pt seen and received [facility] and CC but has 2 different med lists and is unable to identify meds taking. Pt denies blood thinners. Pt has stopped ASA as of last week. Pt hx gathered from [employer] and notes from Dr Pickering - pt is poor historian.        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          Procedure:  Thoracotomy  10/11/2018       · Operative Findings	Right thoracotomy, resection of portion of R 7th rib, evacuation of infected hemothorax, takedown of pleurocutaneous fistula	    · Operative Findings	s/p R chest wall reconstruction with tunneled latissimus dorsi flap, covered by serratus anterior flap, after R thoracotomy, takedown pleurocutaneous fistula, decortication, resection Right 7th rib	         · Drains	3 28Fr chest tubes (A,P, and diaphragm), 2 PRS SQ SANDY drains, VAC	  · Estimated Blood Loss	1000 milliLiter(s)	  · IV Infusions - Crystalloids	4L	  · IV Infusions - Colloids	500cc	  · IV Infusions - Blood Products	4u PRBC	       POD # : 0    Issues: s/p  infected right hemothorax evacuation             multiple right  chest tubes/ drains in place             distributive and cardiogenic shock             acute resp failure on vent support             posthemorrhagic anemia             lactic acidosis             ESRD on HD             acute on chronic systolic CHF ( EF 10 %), ICD in place      ====================== NEUROLOGY=======================  Pain control with PCA / PCEA / Tylenol IV / Toradol / Percocet  Pt is on Precedex for agitation  Pt is sedated with Propofol / Fentanyl    ==================== RESPIRATORY========================  Pt is on            L nasal canula / Face tent____% FiO2  Comfortable, no evidence of distress.  Using incentive spirometry & doing                ml  Monitor chest tube output  Chest tube to suction / water seal	    Mechanical Ventilation:  Mode: AC/ CMV (Assist Control/ Continuous Mandatory Ventilation)  RR (machine): 16  TV (machine): 500  FiO2: 50  PEEP: 5  MAP: 9  PIP: 19    Mechanical ventilator status assessed & settings reviewed  Continue bronchodilators, pulmonary toilet  Head of bed elevation to 30-40 degrees    ====================CARDIOVASCULAR=====================  Continue hemodynamic monitoring/ telemetry  Not on any pressors  Continue cardiovascular / antihypertensive medications    ===================== RENAL ============================  Continue LR 30CC/hr      D/C IVF  Monitor I/Os, BUN/ Cr  and electrolytes  D/C Moore      Keep Moore for UO monitoring  BPH: Continue Flomax/ Finasteride      ==================== GASTROINTESTINAL===================  On regular diet, tolerating well  Continue GI prophylaxis with Pepcid / Protonix  Continue Zofran / Reglan for nausea - PRN	  NPO    =======================    ENDOCRIN  =====================  Glycemic monitoring  F/S with coverage  ===================HEMATOLOGIC/ONCOLOGIC =============  Monitor chest tube output. No signs of active bleeding.   Follow CBC, coags  in AM  DVT prophylaxis with SCD, sc Heparin    ========================INFECTIOUS DISEASE===============  No signs of infection. Monitor for fever / leukocytosis.  All surgical incision / chest tube  sites look clean  D/C Moore      Pertinent clinical, laboratory, radiographic, hemodynamic, echocardiographic, respiratory data, microbiologic data and chart were reviewed and analyzed frequently throughout the course of the day and night. GI and DVT prophylaxis, glycemic control, head of bed elevation and skin care issues were addressed.  Patient seen, examined and plan discussed with CT Surgery / CTICU team during rounds.  Pt remains critically ill in imminent risk of  deterioration and requires very careful cardio- pulmonary monitoring and support.    I have spent               minutes of critical care time with this pt between            am/pm    and               am/ pm         minutes spent on total encounter; more than 50% of the visit was spent counseling and/or coordinating care by the attending physician.        KERLINE Faith MD CLAUDIA HAN          MRN-[id number]    HPI:  Pt is a 58 yr old Mandarin speaking male scheduled for Right Thoracotomy, Decortication , Right Chest Wall reconstruction with Latissimjus Dorsi Flap Poss Skin Graft with VAC dressing with Dr Pickering 10/11/18. Pt has ICD but unable to identify make or model. Pt hx of ESRD with HD T/Th/Sat for 4 hrs and has stated he has stopped some of his meds but cannot say which ones. Pt seen and received MC and CC but has 2 different med lists and is unable to identify meds taking. Pt denies blood thinners. Pt has stopped ASA as of last week. Pt hx gathered from [employer] and notes from Dr Pickering - pt is poor historian.        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          Procedure:  Thoracotomy  10/11/2018       · Operative Findings	Right thoracotomy, resection of portion of R 7th rib, evacuation of infected hemothorax, takedown of pleurocutaneous fistula	    · Operative Findings	s/p R chest wall reconstruction with tunneled latissimus dorsi flap, covered by serratus anterior flap, after R thoracotomy, takedown pleurocutaneous fistula, decortication, resection Right 7th rib	         · Drains	3 28Fr chest tubes (A,P, and diaphragm), 2 PRS SQ SANDY drains, VAC	  · Estimated Blood Loss	1000 milliLiter(s)	  · IV Infusions - Crystalloids	4L	  · IV Infusions - Colloids	500cc	  · IV Infusions - Blood Products	4u PRBC	       POD # : 0    Issues: s/p  infected right hemothorax evacuation             multiple right  chest tubes/ drains in place             distributive and cardiogenic shock             acute resp failure on vent support             posthemorrhagic anemia             lactic acidosis             ESRD on HD             acute on chronic systolic CHF ( EF 10 %), ICD in place        Interval/OR Events/ ROS  Pt hypotensive through the surgery - on multiple pressors / inotropes ( Levo, Epi, Dobutamine). Required 4 units of PRBC in addition to colloids and crystalloids for  symptomatic  posthemorrhagic anemia during the surgery. Pt arrived in ICU orally intubated, sedated, on Levo, Dobut, Epi.         PAST MEDICAL & SURGICAL HISTORY:  Smoking hx  Cardiomyopathy  Wound: right chest  ICD (implantable cardioverter-defibrillator) in place  OA (osteoarthritis)  HLD (hyperlipidemia)  BPH (benign prostatic hyperplasia)  Pleural effusion  HTN (hypertension)  ESRD (end stage renal disease)  H/O chest wound: right  History of wound infection: right chest wall - revision 5/18 and again in 7/18  History of implantable cardioverter-defibrillator (ICD) placement: pt unsure when placed  H/O bilateral hip replacements: 2008, 2009    Home Medications:   * Patient Currently Takes Medications as of 01-Oct-2018 09:29 documented in Structured Notes  · 	nortriptyline 50 mg oral capsule: 1 cap(s) orally once a day  · 	Vitamin B Complex: 1 tab(s) orally once a day  · 	Nephplex Rx oral tablet: 1 tab(s) orally once a day  · 	aspirin 81 mg oral tablet: 1 tab(s) orally once a day last dose 10/3/2018  · 	Vitamin C 500 mg oral tablet: 1 tab(s) orally once a day  · 	Breo Ellipta 100 mcg-25 mcg/inh inhalation powder: 1 puff(s) inhaled once a day patient denies using it  · 	Calcium 500: 1 tab(s) orally 2 times a day  · 	carvedilol 6.25 mg oral tablet: 1 tab(s) orally once a day  · 	docusate sodium 100 mg oral tablet: 1 tab(s) orally 2 times a day, As Needed  · 	folic acid 1 mg oral tablet: 1 tab(s) orally once a day  · 	Mag-Ox 400 oral tablet: 1 tab(s) orally once a day  · 	midodrine 10 mg oral tablet: 1 tab(s) orally once a day  · 	Multiple Vitamins oral tablet: 1 tab(s) orally once a day last dose 10/3/2018  · 	Namenda 10 mg oral tablet: 1 tab(s) orally 2 times a day  · 	Renvela 800 mg oral tablet: 2 tab(s) orally every 8 hours  · 	simethicone 80 mg oral tablet: 1 tab(s) orally 3 times a day (after meals)  · 	torsemide 20 mg oral tablet: 1 tab(s) orally once a day  · 	vitamin A: 1 tab(s) orally once a day      Allergies  No Known Allergies        ***VITAL SIGNS:  Vital Signs Last 24 Hrs  T(C): 36.3 (11 Oct 2018 20:00), Max: 36.6 (11 Oct 2018 07:47)  T(F): 97.4 (11 Oct 2018 20:00), Max: 97.9 (11 Oct 2018 07:47)  HR: 109 (11 Oct 2018 20:00) (95 - 111)  BP: 97/57 (11 Oct 2018 20:00) (83/50 - 117/64)  BP(mean): 67 (11 Oct 2018 20:00) (58 - 76)  RR: 16 (11 Oct 2018 20:00) (12 - 16)  SpO2: 100% (11 Oct 2018 20:00) (98% - 100%)    I/Os:   I&O's Detail    11 Oct 2018 07:01  -  11 Oct 2018 20:13  --------------------------------------------------------  IN:    DOBUTamine Infusion: 17.7 mL    EPINEPHrine Infusion: 33.2 mL    fentaNYL  Infusion: 11.8 mL    IV PiggyBack: 100 mL    lactated ringers.: 60 mL    norepinephrine Infusion: 23.2 mL    propofol Infusion: 14.2 mL    sodium chloride 0.9%.: 90 mL  Total IN: 350.1 mL    OUT:    Bulb: 25 mL    Chest Tube: 45 mL    Chest Tube: 10 mL    Chest Tube: 310 mL  Total OUT: 390 mL    Total NET: -39.9 mL      CAPILLARY BLOOD GLUCOSE  POCT Blood Glucose.: 206 mg/dL (11 Oct 2018 18:06)      =======================  MEDICATIONS  ===================  MEDICATIONS  (STANDING):  dextrose 5%. 1000 milliLiter(s) (50 mL/Hr) IV Continuous <Continuous>  dextrose 50% Injectable 12.5 Gram(s) IV Push once  dextrose 50% Injectable 25 Gram(s) IV Push once  dextrose 50% Injectable 25 Gram(s) IV Push once  DOBUTamine Infusion 10 MICROgram(s)/kG/Min (17.7 mL/Hr) IV Continuous <Continuous>  EPINEPHrine    Infusion 0.15 MICROgram(s)/kG/Min (33.188 mL/Hr) IV Continuous <Continuous>  fentaNYL   Infusion 1 MICROgram(s)/kG/Hr (5.9 mL/Hr) IV Continuous <Continuous>  heparin  Injectable 5000 Unit(s) SubCutaneous every 12 hours  HYDROmorphone (10 MICROgram(s)/mL) + BUpivacaine 0.0625% in 0.9% Sodium Chloride PCEA 250 milliLiter(s) Epidural PCA Continuous  insulin lispro (HumaLOG) corrective regimen sliding scale   SubCutaneous every 6 hours  metroNIDAZOLE  IVPB      norepinephrine Infusion 0.05 MICROgram(s)/kG/Min (2.766 mL/Hr) IV Continuous <Continuous>  piperacillin/tazobactam IVPB. 3.375 Gram(s) IV Intermittent every 12 hours  propofol Infusion 20 MICROgram(s)/kG/Min (7.08 mL/Hr) IV Continuous <Continuous>  sodium chloride 0.9%. 1000 milliLiter(s) (30 mL/Hr) IV Continuous <Continuous>  vancomycin  IVPB 1000 milliGRAM(s) IV Intermittent once    MEDICATIONS  (PRN):  dextrose 40% Gel 15 Gram(s) Oral once PRN Blood Glucose LESS THAN 70 milliGRAM(s)/deciliter  glucagon  Injectable 1 milliGRAM(s) IntraMuscular once PRN Glucose LESS THAN 70 milligrams/deciliter  HYDROmorphone (10 MICROgram(s)/mL) + BUpivacaine 0.0625% in 0.9% Sodium Chloride PCEA Rescue Clinician  Bolus 5 milliLiter(s) Epidural every 15 minutes PRN for Pain Score greater than 6  naloxone Injectable 0.1 milliGRAM(s) IV Push every 3 minutes PRN For ANY of the following changes in patient status:  A. RR LESS THAN 10 breaths per minute, B. Oxygen saturation LESS THAN 90%, C. Sedation score of 6  ondansetron Injectable 4 milliGRAM(s) IV Push every 6 hours PRN Nausea      ======================VENTILATOR SETTINGS  ==============  Mode: AC/ CMV (Assist Control/ Continuous Mandatory Ventilation)  RR (machine): 16  TV (machine): 500  FiO2: 50  PEEP: 5  MAP: 9  PIP: 19      =================== PATIENT CARE ACCESS DEVICES ==========  Peripheral IV (+)  Central Venous Line R/ IJ (+)   Arterial Line	R / Rad(+)			  Necessity of  arterial, and venous catheters discussed    ======================= PHYSICAL EXAM===================  General:             sedated, intubated  Neuro:               Moving  extremities spontaneously    with lower dose of sedation. 	  HEENT:                           CHER/ ETT  Respiratory:	Lungs sound coarse on auscultation bilaterally with good aeration.                            No rales, rhonchi, no wheezing.                           RIght thoracotomy site - covered with dressing, chest tube site -clean  CV:		Regular rate and rhythm. Normal S1/S2.  Abdomen:          Soft,  nontender, not-distended. Bowel sounds present - hypoactive  Skin:		No rash.  Extremities:	Warm, no cyanosis or edema.  (+) pulses by doppler    ============================ LABS =======================                        11.6   11.03 )-----------( 119      ( 11 Oct 2018 18:00 )             35.8     10-11    141       |  99  |  35<H>  ----------------------------<  219<H>  3.4<L>   |  22  |  3.91<H>    Ca    8.5      11 Oct 2018 18:00  Phos  5.6     10-11  Mg     2.1     10-11    TPro  5.8<L>  /  Alb  2.6<L>  /  TBili  1.5<H>  /  DBili  x   /  AST  18  /  ALT  10  /  AlkPhos  157<H>  10-11    LIVER FUNCTIONS - ( 11 Oct 2018 18:00 )  Alb: 2.6 g/dL / Pro: 5.8 g/dL / ALK PHOS: 157 u/L / ALT: 10 u/L / AST: 18 u/L / GGT: x           PT/INR - ( 11 Oct 2018 18:00 )   PT: 12.4 SEC;   INR: 1.11    PTT:29.9 SEC    ABG - ( 11 Oct 2018 18:00 )  pH, Arterial: 7.27  pH, Blood: x     /  pCO2: 53    /  pO2: 272   / HCO3: 22    / Base Excess: -2.4  /  SaO2: 99.3        TISSUE  10-11-18 --  --  --      TISSUE  10-11-18 --  --  --      PLEURAL FLUID  10-11-18 --  --    GPCCH^Gram Pos Cocci in Chains  QUANTITY OF BACTERIA SEEN: MODERATE (3+)  GPCPR^Gram Pos Cocci in Pairs  QUANTITY OF BACTERIA SEEN: MODERATE (3+)  WBC^White Blood Cells  QNTY CELLS IN GRAM STAIN: FEW (2+)      ===================== IMAGING STUDIES ===================  Radiology personally reviewed.    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          Procedure:  Thoracotomy  10/11/2018       · Operative Findings	Right thoracotomy, resection of portion of R 7th rib, evacuation of infected hemothorax, takedown of pleurocutaneous fistula	    · Operative Findings	s/p R chest wall reconstruction with tunneled latissimus dorsi flap, covered by serratus anterior flap, after R thoracotomy, takedown pleurocutaneous fistula, decortication, resection Right 7th rib	         · Drains	3 28Fr chest tubes (A,P, and diaphragm), 2 PRS SQ SANDY drains, VAC	  · Estimated Blood Loss	1000 milliLiter(s)	  · IV Infusions - Crystalloids	4L	  · IV Infusions - Colloids	500cc	  · IV Infusions - Blood Products	4u PRBC	       POD # : 0    Issues: s/p  infected right hemothorax evacuation             multiple right  chest tubes/ drains in place             distributive and cardiogenic shock             acute resp failure on vent support             posthemorrhagic anemia             lactic acidosis             ESRD on HD             acute on chronic systolic CHF ( EF 10 %), ICD in place      ====================== NEUROLOGY=======================  Pain control with PCEA / Tylenol IV / Toradol   Pt is sedated with Propofol / Fentanyl    ==================== RESPIRATORY========================  Monitor chest tube output  Chest tube x3 to  water seal	  Candido x2 to bulb suction    Mechanical Ventilation:  Mode: AC/ CMV (Assist Control/ Continuous Mandatory Ventilation)  RR (machine): 16  TV (machine): 500  FiO2: 50  PEEP: 5  MAP: 9  PIP: 19    Mechanical ventilator status assessed & settings reviewed  Continue bronchodilators, pulmonary toilet  Head of bed elevation to 30-40 degrees  F/up  serial ABG's  ====================CARDIOVASCULAR=====================  Continue hemodynamic monitoring/ telemetry  Titrating Levo, Quan, Epi, Dobutamine for SBP > 90, MAP > 65    ===================== RENAL ============================  Continue NS 30CC/hr       Monitor I/Os, BUN/ Cr  and electrolytes  No Moore - Pt is on HD for ESRD ( next tomorrow)    ==================== GASTROINTESTINAL===================  NPO  Continue GI prophylaxis with  Protonix  Continue Zofran   for nausea - PRN	    =======================    ENDOCRIN  =====================  Glycemic monitoring  F/S with coverage  ===================HEMATOLOGIC/ONCOLOGIC =============  Monitor chest tube output. No signs of active bleeding.   Follow CBC, coags  in AM  DVT prophylaxis with SCD, sc Heparin    ========================INFECTIOUS DISEASE===============  Monitor for fever / leukocytosis.  All surgical incision / chest tube  sites look clean  F/ up OR  cultures  Empiric ABX Zosyn, Vanco, Flagyl for now      Pertinent clinical, laboratory, radiographic, hemodynamic, echocardiographic, respiratory data, microbiologic data and chart were reviewed and analyzed frequently throughout the course of the day and night. GI and DVT prophylaxis, glycemic control, head of bed elevation and skin care issues were addressed.  Patient seen, examined and plan discussed with CT Surgery / CTICU team during rounds.  Pt remains critically ill in imminent risk of  deterioration and requires very careful cardio- pulmonary monitoring and support.    I have spent   90            minutes of critical care time with this pt between            am/pm    and 11:55 pm         minutes spent on total encounter; more than 50% of the visit was spent counseling and/or coordinating care by the attending physician.        KERLINE Faith MD CLAUDIA HAN          MRN-[id number]    HPI:  Pt is a 58 yr old Mandarin speaking male scheduled for Right Thoracotomy, Decortication , Right Chest Wall reconstruction with Latissimjus Dorsi Flap Poss Skin Graft with VAC dressing with Dr Pickering 10/11/18. Pt has ICD but unable to identify make or model. Pt hx of ESRD with HD T/Th/Sat for 4 hrs and has stated he has stopped some of his meds but cannot say which ones. Pt seen and received MC and CC but has 2 different med lists and is unable to identify meds taking. Pt denies blood thinners. Pt has stopped ASA as of last week. Pt hx gathered from [employer] and notes from Dr Pickering - pt is poor historian.        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          Procedure:  Thoracotomy  10/11/2018       · Operative Findings	Right thoracotomy, resection of portion of R 7th rib, evacuation of infected hemothorax, takedown of pleurocutaneous fistula	    · Operative Findings	s/p R chest wall reconstruction with tunneled latissimus dorsi flap, covered by serratus anterior flap, after R thoracotomy, takedown pleurocutaneous fistula, decortication, resection Right 7th rib	         · Drains	3 28Fr chest tubes (A,P, and diaphragm), 2 PRS SQ SANDY drains, VAC	  · Estimated Blood Loss	1000 milliLiter(s)	  · IV Infusions - Crystalloids	4L	  · IV Infusions - Colloids	500cc	  · IV Infusions - Blood Products	4u PRBC	       POD # : 0    Issues: s/p  infected right hemothorax evacuation             multiple right  chest tubes/ drains in place             distributive and cardiogenic shock             acute resp failure on vent support             posthemorrhagic anemia             lactic acidosis             ESRD on HD             acute on chronic systolic CHF ( EF 10 %), ICD in place        Interval/OR Events/ ROS  Pt hypotensive through the surgery - on multiple pressors / inotropes ( Levo, Epi, Dobutamine). Required 4 units of PRBC in addition to colloids and crystalloids for  symptomatic  posthemorrhagic anemia during the surgery. Pt arrived in ICU orally intubated, sedated, on Levo, Dobut, Epi.         PAST MEDICAL & SURGICAL HISTORY:  Smoking hx  Cardiomyopathy  Wound: right chest  ICD (implantable cardioverter-defibrillator) in place  OA (osteoarthritis)  HLD (hyperlipidemia)  BPH (benign prostatic hyperplasia)  Pleural effusion  HTN (hypertension)  ESRD (end stage renal disease)  H/O chest wound: right  History of wound infection: right chest wall - revision 5/18 and again in 7/18  History of implantable cardioverter-defibrillator (ICD) placement: pt unsure when placed  H/O bilateral hip replacements: 2008, 2009    Home Medications:   * Patient Currently Takes Medications as of 01-Oct-2018 09:29 documented in Structured Notes  · 	nortriptyline 50 mg oral capsule: 1 cap(s) orally once a day  · 	Vitamin B Complex: 1 tab(s) orally once a day  · 	Nephplex Rx oral tablet: 1 tab(s) orally once a day  · 	aspirin 81 mg oral tablet: 1 tab(s) orally once a day last dose 10/3/2018  · 	Vitamin C 500 mg oral tablet: 1 tab(s) orally once a day  · 	Breo Ellipta 100 mcg-25 mcg/inh inhalation powder: 1 puff(s) inhaled once a day patient denies using it  · 	Calcium 500: 1 tab(s) orally 2 times a day  · 	carvedilol 6.25 mg oral tablet: 1 tab(s) orally once a day  · 	docusate sodium 100 mg oral tablet: 1 tab(s) orally 2 times a day, As Needed  · 	folic acid 1 mg oral tablet: 1 tab(s) orally once a day  · 	Mag-Ox 400 oral tablet: 1 tab(s) orally once a day  · 	midodrine 10 mg oral tablet: 1 tab(s) orally once a day  · 	Multiple Vitamins oral tablet: 1 tab(s) orally once a day last dose 10/3/2018  · 	Namenda 10 mg oral tablet: 1 tab(s) orally 2 times a day  · 	Renvela 800 mg oral tablet: 2 tab(s) orally every 8 hours  · 	simethicone 80 mg oral tablet: 1 tab(s) orally 3 times a day (after meals)  · 	torsemide 20 mg oral tablet: 1 tab(s) orally once a day  · 	vitamin A: 1 tab(s) orally once a day      Allergies  No Known Allergies        ***VITAL SIGNS:  Vital Signs Last 24 Hrs  T(C): 36.3 (11 Oct 2018 20:00), Max: 36.6 (11 Oct 2018 07:47)  T(F): 97.4 (11 Oct 2018 20:00), Max: 97.9 (11 Oct 2018 07:47)  HR: 109 (11 Oct 2018 20:00) (95 - 111)  BP: 97/57 (11 Oct 2018 20:00) (83/50 - 117/64)  BP(mean): 67 (11 Oct 2018 20:00) (58 - 76)  RR: 16 (11 Oct 2018 20:00) (12 - 16)  SpO2: 100% (11 Oct 2018 20:00) (98% - 100%)    I/Os:   I&O's Detail    11 Oct 2018 07:01  -  11 Oct 2018 20:13  --------------------------------------------------------  IN:    DOBUTamine Infusion: 17.7 mL    EPINEPHrine Infusion: 33.2 mL    fentaNYL  Infusion: 11.8 mL    IV PiggyBack: 100 mL    lactated ringers.: 60 mL    norepinephrine Infusion: 23.2 mL    propofol Infusion: 14.2 mL    sodium chloride 0.9%.: 90 mL  Total IN: 350.1 mL    OUT:    Bulb: 25 mL    Chest Tube: 45 mL    Chest Tube: 10 mL    Chest Tube: 310 mL  Total OUT: 390 mL    Total NET: -39.9 mL      CAPILLARY BLOOD GLUCOSE  POCT Blood Glucose.: 206 mg/dL (11 Oct 2018 18:06)      =======================  MEDICATIONS  ===================  MEDICATIONS  (STANDING):  dextrose 5%. 1000 milliLiter(s) (50 mL/Hr) IV Continuous <Continuous>  dextrose 50% Injectable 12.5 Gram(s) IV Push once  dextrose 50% Injectable 25 Gram(s) IV Push once  dextrose 50% Injectable 25 Gram(s) IV Push once  DOBUTamine Infusion 10 MICROgram(s)/kG/Min (17.7 mL/Hr) IV Continuous <Continuous>  EPINEPHrine    Infusion 0.15 MICROgram(s)/kG/Min (33.188 mL/Hr) IV Continuous <Continuous>  fentaNYL   Infusion 1 MICROgram(s)/kG/Hr (5.9 mL/Hr) IV Continuous <Continuous>  heparin  Injectable 5000 Unit(s) SubCutaneous every 12 hours  HYDROmorphone (10 MICROgram(s)/mL) + BUpivacaine 0.0625% in 0.9% Sodium Chloride PCEA 250 milliLiter(s) Epidural PCA Continuous  insulin lispro (HumaLOG) corrective regimen sliding scale   SubCutaneous every 6 hours  metroNIDAZOLE  IVPB      norepinephrine Infusion 0.05 MICROgram(s)/kG/Min (2.766 mL/Hr) IV Continuous <Continuous>  piperacillin/tazobactam IVPB. 3.375 Gram(s) IV Intermittent every 12 hours  propofol Infusion 20 MICROgram(s)/kG/Min (7.08 mL/Hr) IV Continuous <Continuous>  sodium chloride 0.9%. 1000 milliLiter(s) (30 mL/Hr) IV Continuous <Continuous>  vancomycin  IVPB 1000 milliGRAM(s) IV Intermittent once    MEDICATIONS  (PRN):  dextrose 40% Gel 15 Gram(s) Oral once PRN Blood Glucose LESS THAN 70 milliGRAM(s)/deciliter  glucagon  Injectable 1 milliGRAM(s) IntraMuscular once PRN Glucose LESS THAN 70 milligrams/deciliter  HYDROmorphone (10 MICROgram(s)/mL) + BUpivacaine 0.0625% in 0.9% Sodium Chloride PCEA Rescue Clinician  Bolus 5 milliLiter(s) Epidural every 15 minutes PRN for Pain Score greater than 6  naloxone Injectable 0.1 milliGRAM(s) IV Push every 3 minutes PRN For ANY of the following changes in patient status:  A. RR LESS THAN 10 breaths per minute, B. Oxygen saturation LESS THAN 90%, C. Sedation score of 6  ondansetron Injectable 4 milliGRAM(s) IV Push every 6 hours PRN Nausea      ======================VENTILATOR SETTINGS  ==============  Mode: AC/ CMV (Assist Control/ Continuous Mandatory Ventilation)  RR (machine): 16  TV (machine): 500  FiO2: 50  PEEP: 5  MAP: 9  PIP: 19      =================== PATIENT CARE ACCESS DEVICES ==========  Peripheral IV (+)  Central Venous Line R/ IJ (+)   Arterial Line	R / Rad(+)			  Necessity of  arterial, and venous catheters discussed    ======================= PHYSICAL EXAM===================  General:             sedated, intubated  Neuro:               Moving  extremities spontaneously    with lower dose of sedation. 	  HEENT:                           CHER/ ETT  Respiratory:	Lungs sound coarse on auscultation bilaterally with good aeration.                            No rales, rhonchi, no wheezing.                           RIght thoracotomy site - covered with dressing, chest tube site -clean  CV:		Regular rate and rhythm. Normal S1/S2.  Abdomen:          Soft,  nontender, not-distended. Bowel sounds present - hypoactive  Skin:		No rash.  Extremities:	Warm, no cyanosis or edema.  (+) pulses by doppler    ============================ LABS =======================                        11.6   11.03 )-----------( 119      ( 11 Oct 2018 18:00 )             35.8     10-11    141       |  99  |  35<H>  ----------------------------<  219<H>  3.4<L>   |  22  |  3.91<H>    Ca    8.5      11 Oct 2018 18:00  Phos  5.6     10-11  Mg     2.1     10-11    TPro  5.8<L>  /  Alb  2.6<L>  /  TBili  1.5<H>  /  DBili  x   /  AST  18  /  ALT  10  /  AlkPhos  157<H>  10-11    LIVER FUNCTIONS - ( 11 Oct 2018 18:00 )  Alb: 2.6 g/dL / Pro: 5.8 g/dL / ALK PHOS: 157 u/L / ALT: 10 u/L / AST: 18 u/L / GGT: x           PT/INR - ( 11 Oct 2018 18:00 )   PT: 12.4 SEC;   INR: 1.11    PTT:29.9 SEC    ABG - ( 11 Oct 2018 18:00 )  pH, Arterial: 7.27  pH, Blood: x     /  pCO2: 53    /  pO2: 272   / HCO3: 22    / Base Excess: -2.4  /  SaO2: 99.3        TISSUE  10-11-18 --  --  --      TISSUE  10-11-18 --  --  --      PLEURAL FLUID  10-11-18 --  --    GPCCH^Gram Pos Cocci in Chains  QUANTITY OF BACTERIA SEEN: MODERATE (3+)  GPCPR^Gram Pos Cocci in Pairs  QUANTITY OF BACTERIA SEEN: MODERATE (3+)  WBC^White Blood Cells  QNTY CELLS IN GRAM STAIN: FEW (2+)      ===================== IMAGING STUDIES ===================  Radiology personally reviewed.    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          Procedure:  Thoracotomy  10/11/2018       · Operative Findings	Right thoracotomy, resection of portion of R 7th rib, evacuation of infected hemothorax, takedown of pleurocutaneous fistula	    · Operative Findings	s/p R chest wall reconstruction with tunneled latissimus dorsi flap, covered by serratus anterior flap, after R thoracotomy, takedown pleurocutaneous fistula, decortication, resection Right 7th rib	         · Drains	3 28Fr chest tubes (A,P, and diaphragm), 2 PRS SQ SANDY drains, VAC	  · Estimated Blood Loss	1000 milliLiter(s)	  · IV Infusions - Crystalloids	4L	  · IV Infusions - Colloids	500cc	  · IV Infusions - Blood Products	4u PRBC	       POD # : 0    Issues: s/p  infected right hemothorax evacuation             multiple right  chest tubes/ drains in place             distributive and cardiogenic shock             acute resp failure on vent support             posthemorrhagic anemia             lactic acidosis             ESRD on HD             acute on chronic systolic CHF ( EF 10 %), ICD in place      ====================== NEUROLOGY=======================  Pain control with PCEA / Tylenol IV / Toradol   Pt is sedated with Propofol / Fentanyl    ==================== RESPIRATORY========================  Monitor chest tube output  Chest tube x3 to  water seal	  Candido x2 to bulb suction    Mechanical Ventilation:  Mode: AC/ CMV (Assist Control/ Continuous Mandatory Ventilation)  RR (machine): 16  TV (machine): 500  FiO2: 50  PEEP: 5  MAP: 9  PIP: 19    Mechanical ventilator status assessed & settings reviewed  Continue bronchodilators, pulmonary toilet  Head of bed elevation to 30-40 degrees  F/up  serial ABG's  ====================CARDIOVASCULAR=====================  Continue hemodynamic monitoring/ telemetry  Titrating Levo, Quan, Epi, Dobutamine for SBP > 90, MAP > 65    ===================== RENAL ============================  Continue NS 30CC/hr       Monitor I/Os, BUN/ Cr  and electrolytes  No Moore - Pt is on HD for ESRD ( next tomorrow)    ==================== GASTROINTESTINAL===================  NPO  Continue GI prophylaxis with  Protonix  Continue Zofran   for nausea - PRN	    =======================    ENDOCRIN  =====================  Glycemic monitoring  F/S with coverage  ===================HEMATOLOGIC/ONCOLOGIC =============  Monitor chest tube output. No signs of active bleeding.   Follow CBC, coags  in AM  DVT prophylaxis with SCD, sc Heparin    ========================INFECTIOUS DISEASE===============  Monitor for fever / leukocytosis.  All surgical incision / chest tube  sites look clean  F/ up OR  cultures and path results  Empiric ABX Zosyn, Vanco, Flagyl for now      Pertinent clinical, laboratory, radiographic, hemodynamic, echocardiographic, respiratory data, microbiologic data and chart were reviewed and analyzed frequently throughout the course of the day and night. GI and DVT prophylaxis, glycemic control, head of bed elevation and skin care issues were addressed.  Patient seen, examined and plan discussed with CT Surgery / CTICU team during rounds.  Pt remains critically ill in imminent risk of  deterioration and requires very careful cardio- pulmonary monitoring and support.    I have spent   90      minutes of critical care time with this pt between  5:50 pm    and 11:55 pm         minutes spent on total encounter; more than 50% of the visit was spent counseling and/or coordinating care by the attending physician.        KERLINE Faith MD CLAUDIA HAN          MRN-[id number]    HPI:  Pt is a 58 yr old Mandarin speaking male scheduled for Right Thoracotomy, Decortication , Right Chest Wall reconstruction with Latissimjus Dorsi Flap Poss Skin Graft with VAC dressing with Dr Pickering 10/11/18. Pt has ICD but unable to identify make or model. Pt hx of ESRD with HD T/Th/Sat for 4 hrs and has stated he has stopped some of his meds but cannot say which ones. Pt seen and received [facility] and CC but has 2 different med lists and is unable to identify meds taking. Pt denies blood thinners. Pt has stopped ASA as of last week. Pt hx gathered from [employer] and notes from Dr Pickering - pt is poor historian.        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          Procedure:  Thoracotomy  10/11/2018       · Operative Findings	Right thoracotomy, resection of portion of R 7th rib, evacuation of infected hemothorax, takedown of pleurocutaneous fistula	    · Operative Findings	s/p R chest wall reconstruction with tunneled latissimus dorsi flap, covered by serratus anterior flap, after R thoracotomy, takedown pleurocutaneous fistula, decortication, resection Right 7th rib	         · Drains	3 28Fr chest tubes (A,P, and diaphragm), 2 PRS SQ SANDY drains, VAC	  · Estimated Blood Loss	1000 milliLiter(s)	  · IV Infusions - Crystalloids	4L	  · IV Infusions - Colloids	500cc	  · IV Infusions - Blood Products	4u PRBC	       POD # : 0    Issues: s/p  infected right hemothorax evacuation             multiple right  chest tubes/ drains in place             distributive and cardiogenic shock/ SIRS             acute resp failure on vent support             posthemorrhagic anemia             lactic acidosis             ESRD on HD             acute on chronic systolic CHF ( EF 10 %), ICD in place        Interval/OR Events/ ROS  Pt hypotensive through the surgery - on multiple pressors / inotropes ( Levo, Epi, Dobutamine). Required 4 units of PRBC in addition to colloids and crystalloids for  symptomatic  posthemorrhagic anemia during the surgery. Pt arrived in ICU orally intubated, sedated, on Levo, Dobut, Epi.         PAST MEDICAL & SURGICAL HISTORY:  Smoking hx  Cardiomyopathy  Wound: right chest  ICD (implantable cardioverter-defibrillator) in place  OA (osteoarthritis)  HLD (hyperlipidemia)  BPH (benign prostatic hyperplasia)  Pleural effusion  HTN (hypertension)  ESRD (end stage renal disease)  H/O chest wound: right  History of wound infection: right chest wall - revision 5/18 and again in 7/18  History of implantable cardioverter-defibrillator (ICD) placement: pt unsure when placed  H/O bilateral hip replacements: 2008, 2009    Home Medications:   * Patient Currently Takes Medications as of 01-Oct-2018 09:29 documented in Structured Notes  · 	nortriptyline 50 mg oral capsule: 1 cap(s) orally once a day  · 	Vitamin B Complex: 1 tab(s) orally once a day  · 	Nephplex Rx oral tablet: 1 tab(s) orally once a day  · 	aspirin 81 mg oral tablet: 1 tab(s) orally once a day last dose 10/3/2018  · 	Vitamin C 500 mg oral tablet: 1 tab(s) orally once a day  · 	Breo Ellipta 100 mcg-25 mcg/inh inhalation powder: 1 puff(s) inhaled once a day patient denies using it  · 	Calcium 500: 1 tab(s) orally 2 times a day  · 	carvedilol 6.25 mg oral tablet: 1 tab(s) orally once a day  · 	docusate sodium 100 mg oral tablet: 1 tab(s) orally 2 times a day, As Needed  · 	folic acid 1 mg oral tablet: 1 tab(s) orally once a day  · 	Mag-Ox 400 oral tablet: 1 tab(s) orally once a day  · 	midodrine 10 mg oral tablet: 1 tab(s) orally once a day  · 	Multiple Vitamins oral tablet: 1 tab(s) orally once a day last dose 10/3/2018  · 	Namenda 10 mg oral tablet: 1 tab(s) orally 2 times a day  · 	Renvela 800 mg oral tablet: 2 tab(s) orally every 8 hours  · 	simethicone 80 mg oral tablet: 1 tab(s) orally 3 times a day (after meals)  · 	torsemide 20 mg oral tablet: 1 tab(s) orally once a day  · 	vitamin A: 1 tab(s) orally once a day      Allergies  No Known Allergies        ***VITAL SIGNS:  Vital Signs Last 24 Hrs  T(C): 36.3 (11 Oct 2018 20:00), Max: 36.6 (11 Oct 2018 07:47)  T(F): 97.4 (11 Oct 2018 20:00), Max: 97.9 (11 Oct 2018 07:47)  HR: 109 (11 Oct 2018 20:00) (95 - 111)  BP: 97/57 (11 Oct 2018 20:00) (83/50 - 117/64)  BP(mean): 67 (11 Oct 2018 20:00) (58 - 76)  RR: 16 (11 Oct 2018 20:00) (12 - 16)  SpO2: 100% (11 Oct 2018 20:00) (98% - 100%)    I/Os:   I&O's Detail    11 Oct 2018 07:01  -  11 Oct 2018 20:13  --------------------------------------------------------  IN:    DOBUTamine Infusion: 17.7 mL    EPINEPHrine Infusion: 33.2 mL    fentaNYL  Infusion: 11.8 mL    IV PiggyBack: 100 mL    lactated ringers.: 60 mL    norepinephrine Infusion: 23.2 mL    propofol Infusion: 14.2 mL    sodium chloride 0.9%.: 90 mL  Total IN: 350.1 mL    OUT:    Bulb: 25 mL    Chest Tube: 45 mL    Chest Tube: 10 mL    Chest Tube: 310 mL  Total OUT: 390 mL    Total NET: -39.9 mL      CAPILLARY BLOOD GLUCOSE  POCT Blood Glucose.: 206 mg/dL (11 Oct 2018 18:06)      =======================  MEDICATIONS  ===================  MEDICATIONS  (STANDING):  dextrose 5%. 1000 milliLiter(s) (50 mL/Hr) IV Continuous <Continuous>  dextrose 50% Injectable 12.5 Gram(s) IV Push once  dextrose 50% Injectable 25 Gram(s) IV Push once  dextrose 50% Injectable 25 Gram(s) IV Push once  DOBUTamine Infusion 10 MICROgram(s)/kG/Min (17.7 mL/Hr) IV Continuous <Continuous>  EPINEPHrine    Infusion 0.15 MICROgram(s)/kG/Min (33.188 mL/Hr) IV Continuous <Continuous>  fentaNYL   Infusion 1 MICROgram(s)/kG/Hr (5.9 mL/Hr) IV Continuous <Continuous>  heparin  Injectable 5000 Unit(s) SubCutaneous every 12 hours  HYDROmorphone (10 MICROgram(s)/mL) + BUpivacaine 0.0625% in 0.9% Sodium Chloride PCEA 250 milliLiter(s) Epidural PCA Continuous  insulin lispro (HumaLOG) corrective regimen sliding scale   SubCutaneous every 6 hours  metroNIDAZOLE  IVPB      norepinephrine Infusion 0.05 MICROgram(s)/kG/Min (2.766 mL/Hr) IV Continuous <Continuous>  piperacillin/tazobactam IVPB. 3.375 Gram(s) IV Intermittent every 12 hours  propofol Infusion 20 MICROgram(s)/kG/Min (7.08 mL/Hr) IV Continuous <Continuous>  sodium chloride 0.9%. 1000 milliLiter(s) (30 mL/Hr) IV Continuous <Continuous>  vancomycin  IVPB 1000 milliGRAM(s) IV Intermittent once    MEDICATIONS  (PRN):  dextrose 40% Gel 15 Gram(s) Oral once PRN Blood Glucose LESS THAN 70 milliGRAM(s)/deciliter  glucagon  Injectable 1 milliGRAM(s) IntraMuscular once PRN Glucose LESS THAN 70 milligrams/deciliter  HYDROmorphone (10 MICROgram(s)/mL) + BUpivacaine 0.0625% in 0.9% Sodium Chloride PCEA Rescue Clinician  Bolus 5 milliLiter(s) Epidural every 15 minutes PRN for Pain Score greater than 6  naloxone Injectable 0.1 milliGRAM(s) IV Push every 3 minutes PRN For ANY of the following changes in patient status:  A. RR LESS THAN 10 breaths per minute, B. Oxygen saturation LESS THAN 90%, C. Sedation score of 6  ondansetron Injectable 4 milliGRAM(s) IV Push every 6 hours PRN Nausea      ======================VENTILATOR SETTINGS  ==============  Mode: AC/ CMV (Assist Control/ Continuous Mandatory Ventilation)  RR (machine): 16  TV (machine): 500  FiO2: 50  PEEP: 5  MAP: 9  PIP: 19      =================== PATIENT CARE ACCESS DEVICES ==========  Peripheral IV (+)  Central Venous Line R/ IJ (+)   Arterial Line	R / Rad(+)			  Necessity of  arterial, and venous catheters discussed    ======================= PHYSICAL EXAM===================  General:             sedated, intubated  Neuro:               Moving  extremities spontaneously    with lower dose of sedation. 	  HEENT:                           CHER/ ETT  Respiratory:	Lungs sound coarse on auscultation bilaterally with good aeration.                            No rales, rhonchi, no wheezing.                           RIght thoracotomy site - covered with dressing, chest tube site -clean  CV:		Regular rate and rhythm. Normal S1/S2.  Abdomen:          Soft,  nontender, not-distended. Bowel sounds present - hypoactive  Skin:		No rash.  Extremities:	Warm, no cyanosis or edema.  (+) pulses by doppler    ============================ LABS =======================                        11.6   11.03 )-----------( 119      ( 11 Oct 2018 18:00 )             35.8     10-11    141       |  99  |  35<H>  ----------------------------<  219<H>  3.4<L>   |  22  |  3.91<H>    Ca    8.5      11 Oct 2018 18:00  Phos  5.6     10-11  Mg     2.1     10-11    TPro  5.8<L>  /  Alb  2.6<L>  /  TBili  1.5<H>  /  DBili  x   /  AST  18  /  ALT  10  /  AlkPhos  157<H>  10-11    LIVER FUNCTIONS - ( 11 Oct 2018 18:00 )  Alb: 2.6 g/dL / Pro: 5.8 g/dL / ALK PHOS: 157 u/L / ALT: 10 u/L / AST: 18 u/L / GGT: x           PT/INR - ( 11 Oct 2018 18:00 )   PT: 12.4 SEC;   INR: 1.11    PTT:29.9 SEC    ABG - ( 11 Oct 2018 18:00 )  pH, Arterial: 7.27  pH, Blood: x     /  pCO2: 53    /  pO2: 272   / HCO3: 22    / Base Excess: -2.4  /  SaO2: 99.3        TISSUE  10-11-18 --  --  --      TISSUE  10-11-18 --  --  --      PLEURAL FLUID  10-11-18 --  --    GPCCH^Gram Pos Cocci in Chains  QUANTITY OF BACTERIA SEEN: MODERATE (3+)  GPCPR^Gram Pos Cocci in Pairs  QUANTITY OF BACTERIA SEEN: MODERATE (3+)  WBC^White Blood Cells  QNTY CELLS IN GRAM STAIN: FEW (2+)      ===================== IMAGING STUDIES ===================  Radiology personally reviewed.    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          Procedure:  Thoracotomy  10/11/2018       · Operative Findings	Right thoracotomy, resection of portion of R 7th rib, evacuation of infected hemothorax, takedown of pleurocutaneous fistula	    · Operative Findings	s/p R chest wall reconstruction with tunneled latissimus dorsi flap, covered by serratus anterior flap, after R thoracotomy, takedown pleurocutaneous fistula, decortication, resection Right 7th rib	         · Drains	3 28Fr chest tubes (A,P, and diaphragm), 2 PRS SQ SANDY drains, VAC	  · Estimated Blood Loss	1000 milliLiter(s)	  · IV Infusions - Crystalloids	4L	  · IV Infusions - Colloids	500cc	  · IV Infusions - Blood Products	4u PRBC	       POD # : 0    Issues: s/p  infected right hemothorax evacuation             multiple right  chest tubes/ drains in place             distributive and cardiogenic shock/ SIRS             acute resp failure on vent support             posthemorrhagic anemia             lactic acidosis             ESRD on HD             acute on chronic systolic CHF ( EF 10 %), ICD in place      ====================== NEUROLOGY=======================  Pain control with PCEA / Tylenol IV / Toradol   Pt is sedated with Propofol / Fentanyl    ==================== RESPIRATORY========================  Monitor chest tube output  Chest tube x3 to  water seal	  Candido x2 to bulb suction    Mechanical Ventilation:  Mode: AC/ CMV (Assist Control/ Continuous Mandatory Ventilation)  RR (machine): 16  TV (machine): 500  FiO2: 50  PEEP: 5  MAP: 9  PIP: 19    Mechanical ventilator status assessed & settings reviewed  Continue bronchodilators, pulmonary toilet  Head of bed elevation to 30-40 degrees  F/up  serial ABG's  ====================CARDIOVASCULAR=====================  Continue hemodynamic monitoring/ telemetry  Titrating Levo, Quan, Epi, Dobutamine for SBP > 90, MAP > 65    ===================== RENAL ============================  Continue NS 30CC/hr       Monitor I/Os, BUN/ Cr  and electrolytes  No Moore - Pt is on HD for ESRD ( next tomorrow)    ==================== GASTROINTESTINAL===================  NPO  Continue GI prophylaxis with  Protonix  Continue Zofran   for nausea - PRN	    =======================    ENDOCRIN  =====================  Glycemic monitoring  F/S with coverage  ===================HEMATOLOGIC/ONCOLOGIC =============  Monitor chest tube output. No signs of active bleeding.   Follow CBC, coags  in AM  DVT prophylaxis with SCD, sc Heparin    ========================INFECTIOUS DISEASE===============  Monitor for fever / leukocytosis.  All surgical incision / chest tube  sites look clean  F/ up OR  cultures and path results  Empiric ABX Zosyn, Vanco, Flagyl for now      Pertinent clinical, laboratory, radiographic, hemodynamic, echocardiographic, respiratory data, microbiologic data and chart were reviewed and analyzed frequently throughout the course of the day and night. GI and DVT prophylaxis, glycemic control, head of bed elevation and skin care issues were addressed.  Patient seen, examined and plan discussed with CT Surgery / CTICU team during rounds.  Pt remains critically ill in imminent risk of  deterioration and requires very careful cardio- pulmonary monitoring and support.    I have spent   90      minutes of critical care time with this pt between  5:50 pm    and 11:55 pm         minutes spent on total encounter; more than 50% of the visit was spent counseling and/or coordinating care by the attending physician.        KERLINE Faith MD

## 2018-10-11 NOTE — CHART NOTE - NSCHARTNOTEFT_GEN_A_CORE
OFF and ON of the subcutaneous ICD performed prior to the Thoracotomy - Right .   Device reprogramed to original settings at the completion of the procedure.     Thank you.    Dinah Tse,FNP-C  [id number]

## 2018-10-12 DIAGNOSIS — N18.9 CHRONIC KIDNEY DISEASE, UNSPECIFIED: ICD-10-CM

## 2018-10-12 DIAGNOSIS — N18.6 END STAGE RENAL DISEASE: ICD-10-CM

## 2018-10-12 DIAGNOSIS — I50.23 ACUTE ON CHRONIC SYSTOLIC (CONGESTIVE) HEART FAILURE: ICD-10-CM

## 2018-10-12 DIAGNOSIS — E83.39 OTHER DISORDERS OF PHOSPHORUS METABOLISM: ICD-10-CM

## 2018-10-12 DIAGNOSIS — Z71.89 OTHER SPECIFIED COUNSELING: ICD-10-CM

## 2018-10-12 DIAGNOSIS — R57.9 SHOCK, UNSPECIFIED: ICD-10-CM

## 2018-10-12 PROBLEM — Z87.891 PERSONAL HISTORY OF NICOTINE DEPENDENCE: Chronic | Status: ACTIVE | Noted: 2018-10-01

## 2018-10-12 PROBLEM — E78.5 HYPERLIPIDEMIA, UNSPECIFIED: Chronic | Status: ACTIVE | Noted: 2018-10-01

## 2018-10-12 PROBLEM — M19.90 UNSPECIFIED OSTEOARTHRITIS, UNSPECIFIED SITE: Chronic | Status: ACTIVE | Noted: 2018-10-01

## 2018-10-12 PROBLEM — I42.9 CARDIOMYOPATHY, UNSPECIFIED: Chronic | Status: ACTIVE | Noted: 2018-10-01

## 2018-10-12 PROBLEM — Z95.810 PRESENCE OF AUTOMATIC (IMPLANTABLE) CARDIAC DEFIBRILLATOR: Chronic | Status: ACTIVE | Noted: 2018-10-01

## 2018-10-12 PROBLEM — I10 ESSENTIAL (PRIMARY) HYPERTENSION: Chronic | Status: ACTIVE | Noted: 2018-10-01

## 2018-10-12 PROBLEM — Z87.828 PERSONAL HISTORY OF OTHER (HEALED) PHYSICAL INJURY AND TRAUMA: Chronic | Status: ACTIVE | Noted: 2018-10-01

## 2018-10-12 PROBLEM — N40.0 BENIGN PROSTATIC HYPERPLASIA WITHOUT LOWER URINARY TRACT SYMPTOMS: Chronic | Status: ACTIVE | Noted: 2018-10-01

## 2018-10-12 PROBLEM — J90 PLEURAL EFFUSION, NOT ELSEWHERE CLASSIFIED: Chronic | Status: ACTIVE | Noted: 2018-10-01

## 2018-10-12 PROBLEM — T14.90XA INJURY, UNSPECIFIED, INITIAL ENCOUNTER: Chronic | Status: ACTIVE | Noted: 2018-10-01

## 2018-10-12 LAB
ALBUMIN SERPL ELPH-MCNC: 2.4 G/DL — LOW (ref 3.3–5)
ALBUMIN SERPL ELPH-MCNC: 2.9 G/DL — LOW (ref 3.3–5)
ALP SERPL-CCNC: 136 U/L — HIGH (ref 40–120)
ALP SERPL-CCNC: 98 U/L — SIGNIFICANT CHANGE UP (ref 40–120)
ALT FLD-CCNC: 5 U/L — SIGNIFICANT CHANGE UP (ref 4–41)
ALT FLD-CCNC: 8 U/L — SIGNIFICANT CHANGE UP (ref 4–41)
APTT BLD: 30 SEC — SIGNIFICANT CHANGE UP (ref 27.5–37.4)
AST SERPL-CCNC: 13 U/L — SIGNIFICANT CHANGE UP (ref 4–40)
AST SERPL-CCNC: 17 U/L — SIGNIFICANT CHANGE UP (ref 4–40)
BASE EXCESS BLDA CALC-SCNC: -0.6 MMOL/L — SIGNIFICANT CHANGE UP
BASE EXCESS BLDA CALC-SCNC: -0.6 MMOL/L — SIGNIFICANT CHANGE UP
BASE EXCESS BLDA CALC-SCNC: -0.8 MMOL/L — SIGNIFICANT CHANGE UP
BASE EXCESS BLDA CALC-SCNC: -1.3 MMOL/L — SIGNIFICANT CHANGE UP
BASE EXCESS BLDA CALC-SCNC: -1.8 MMOL/L — SIGNIFICANT CHANGE UP
BASE EXCESS BLDV CALC-SCNC: -0.3 MMOL/L — SIGNIFICANT CHANGE UP
BASE EXCESS BLDV CALC-SCNC: -0.6 MMOL/L — SIGNIFICANT CHANGE UP
BASOPHILS # BLD AUTO: 0.02 K/UL — SIGNIFICANT CHANGE UP (ref 0–0.2)
BASOPHILS NFR BLD AUTO: 0.2 % — SIGNIFICANT CHANGE UP (ref 0–2)
BILIRUB SERPL-MCNC: 0.6 MG/DL — SIGNIFICANT CHANGE UP (ref 0.2–1.2)
BILIRUB SERPL-MCNC: 0.9 MG/DL — SIGNIFICANT CHANGE UP (ref 0.2–1.2)
BUN SERPL-MCNC: 41 MG/DL — HIGH (ref 7–23)
BUN SERPL-MCNC: 41 MG/DL — HIGH (ref 7–23)
BUN SERPL-MCNC: 45 MG/DL — HIGH (ref 7–23)
BUN SERPL-MCNC: 48 MG/DL — HIGH (ref 7–23)
CA-I BLDA-SCNC: 1.16 MMOL/L — SIGNIFICANT CHANGE UP (ref 1.15–1.29)
CA-I BLDA-SCNC: 1.18 MMOL/L — SIGNIFICANT CHANGE UP (ref 1.15–1.29)
CALCIUM SERPL-MCNC: 7.9 MG/DL — LOW (ref 8.4–10.5)
CALCIUM SERPL-MCNC: 7.9 MG/DL — LOW (ref 8.4–10.5)
CALCIUM SERPL-MCNC: 8.2 MG/DL — LOW (ref 8.4–10.5)
CALCIUM SERPL-MCNC: 8.4 MG/DL — SIGNIFICANT CHANGE UP (ref 8.4–10.5)
CHLORIDE BLDA-SCNC: 107 MMOL/L — SIGNIFICANT CHANGE UP (ref 96–108)
CHLORIDE BLDA-SCNC: 110 MMOL/L — HIGH (ref 96–108)
CHLORIDE SERPL-SCNC: 101 MMOL/L — SIGNIFICANT CHANGE UP (ref 98–107)
CHLORIDE SERPL-SCNC: 103 MMOL/L — SIGNIFICANT CHANGE UP (ref 98–107)
CK MB BLD-MCNC: 2.5 — SIGNIFICANT CHANGE UP (ref 0–2.5)
CK MB BLD-MCNC: 4.47 NG/ML — SIGNIFICANT CHANGE UP (ref 1–6.6)
CK MB BLD-MCNC: 5.75 NG/ML — SIGNIFICANT CHANGE UP (ref 1–6.6)
CK SERPL-CCNC: 176 U/L — SIGNIFICANT CHANGE UP (ref 30–200)
CK SERPL-CCNC: 202 U/L — HIGH (ref 30–200)
CO2 SERPL-SCNC: 21 MMOL/L — LOW (ref 22–31)
CO2 SERPL-SCNC: 22 MMOL/L — SIGNIFICANT CHANGE UP (ref 22–31)
CREAT BLDA-MCNC: 4.82 MG/DL — HIGH (ref 0.5–1.3)
CREAT BLDA-MCNC: 5.23 MG/DL — HIGH (ref 0.5–1.3)
CREAT SERPL-MCNC: 4.59 MG/DL — HIGH (ref 0.5–1.3)
CREAT SERPL-MCNC: 4.59 MG/DL — HIGH (ref 0.5–1.3)
CREAT SERPL-MCNC: 4.77 MG/DL — HIGH (ref 0.5–1.3)
CREAT SERPL-MCNC: 5.21 MG/DL — HIGH (ref 0.5–1.3)
CULTURE - ACID FAST SMEAR CONCENTRATED: SIGNIFICANT CHANGE UP
EOSINOPHIL # BLD AUTO: 0.01 K/UL — SIGNIFICANT CHANGE UP (ref 0–0.5)
EOSINOPHIL NFR BLD AUTO: 0.1 % — SIGNIFICANT CHANGE UP (ref 0–6)
GAS PNL BLDV: 136 MMOL/L — SIGNIFICANT CHANGE UP (ref 136–146)
GAS PNL BLDV: 137 MMOL/L — SIGNIFICANT CHANGE UP (ref 136–146)
GLUCOSE BLDA-MCNC: 124 MG/DL — HIGH (ref 70–99)
GLUCOSE BLDA-MCNC: 126 MG/DL — HIGH (ref 70–99)
GLUCOSE BLDA-MCNC: 178 MG/DL — HIGH (ref 70–99)
GLUCOSE BLDA-MCNC: 212 MG/DL — HIGH (ref 70–99)
GLUCOSE BLDV-MCNC: 122 — HIGH (ref 70–99)
GLUCOSE BLDV-MCNC: 214 — HIGH (ref 70–99)
GLUCOSE SERPL-MCNC: 120 MG/DL — HIGH (ref 70–99)
GLUCOSE SERPL-MCNC: 127 MG/DL — HIGH (ref 70–99)
GLUCOSE SERPL-MCNC: 218 MG/DL — HIGH (ref 70–99)
GLUCOSE SERPL-MCNC: 218 MG/DL — HIGH (ref 70–99)
HBA1C BLD-MCNC: 5.5 % — SIGNIFICANT CHANGE UP (ref 4–5.6)
HCO3 BLDA-SCNC: 23 MMOL/L — SIGNIFICANT CHANGE UP (ref 22–26)
HCO3 BLDA-SCNC: 23 MMOL/L — SIGNIFICANT CHANGE UP (ref 22–26)
HCO3 BLDA-SCNC: 24 MMOL/L — SIGNIFICANT CHANGE UP (ref 22–26)
HCO3 BLDV-SCNC: 22 MMOL/L — SIGNIFICANT CHANGE UP (ref 20–27)
HCO3 BLDV-SCNC: 24 MMOL/L — SIGNIFICANT CHANGE UP (ref 20–27)
HCT VFR BLD CALC: 33.2 % — LOW (ref 39–50)
HCT VFR BLDA CALC: 27.3 % — LOW (ref 39–51)
HCT VFR BLDA CALC: 33.4 % — LOW (ref 39–51)
HCT VFR BLDA CALC: 35.3 % — LOW (ref 39–51)
HCT VFR BLDA CALC: 35.9 % — LOW (ref 39–51)
HCT VFR BLDV CALC: 27 % — LOW (ref 39–51)
HCT VFR BLDV CALC: 38.8 % — LOW (ref 39–51)
HGB BLD-MCNC: 11.2 G/DL — LOW (ref 13–17)
HGB BLDA-MCNC: 10.8 G/DL — LOW (ref 13–17)
HGB BLDA-MCNC: 11.5 G/DL — LOW (ref 13–17)
HGB BLDA-MCNC: 11.6 G/DL — LOW (ref 13–17)
HGB BLDA-MCNC: 8.8 G/DL — LOW (ref 13–17)
HGB BLDV-MCNC: 12.6 G/DL — LOW (ref 13–17)
HGB BLDV-MCNC: 8.7 G/DL — LOW (ref 13–17)
IMM GRANULOCYTES # BLD AUTO: 0.07 # — SIGNIFICANT CHANGE UP
IMM GRANULOCYTES NFR BLD AUTO: 0.6 % — SIGNIFICANT CHANGE UP (ref 0–1.5)
INR BLD: 1.03 — SIGNIFICANT CHANGE UP (ref 0.88–1.17)
LACTATE BLDA-SCNC: 0.7 MMOL/L — SIGNIFICANT CHANGE UP (ref 0.5–2)
LACTATE BLDA-SCNC: 0.8 MMOL/L — SIGNIFICANT CHANGE UP (ref 0.5–2)
LACTATE BLDA-SCNC: 2.3 MMOL/L — HIGH (ref 0.5–2)
LACTATE BLDA-SCNC: 2.8 MMOL/L — HIGH (ref 0.5–2)
LACTATE BLDA-SCNC: 3.3 MMOL/L — HIGH (ref 0.5–2)
LYMPHOCYTES # BLD AUTO: 0.8 K/UL — LOW (ref 1–3.3)
LYMPHOCYTES # BLD AUTO: 6.7 % — LOW (ref 13–44)
MAGNESIUM SERPL-MCNC: 2.1 MG/DL — SIGNIFICANT CHANGE UP (ref 1.6–2.6)
MAGNESIUM SERPL-MCNC: 2.2 MG/DL — SIGNIFICANT CHANGE UP (ref 1.6–2.6)
MAGNESIUM SERPL-MCNC: 2.2 MG/DL — SIGNIFICANT CHANGE UP (ref 1.6–2.6)
MCHC RBC-ENTMCNC: 31.4 PG — SIGNIFICANT CHANGE UP (ref 27–34)
MCHC RBC-ENTMCNC: 33.7 % — SIGNIFICANT CHANGE UP (ref 32–36)
MCV RBC AUTO: 93 FL — SIGNIFICANT CHANGE UP (ref 80–100)
MONOCYTES # BLD AUTO: 0.87 K/UL — SIGNIFICANT CHANGE UP (ref 0–0.9)
MONOCYTES NFR BLD AUTO: 7.2 % — SIGNIFICANT CHANGE UP (ref 2–14)
NEUTROPHILS # BLD AUTO: 10.25 K/UL — HIGH (ref 1.8–7.4)
NEUTROPHILS NFR BLD AUTO: 85.2 % — HIGH (ref 43–77)
NRBC # FLD: 0 — SIGNIFICANT CHANGE UP
PCO2 BLDA: 34 MMHG — LOW (ref 35–48)
PCO2 BLDA: 37 MMHG — SIGNIFICANT CHANGE UP (ref 35–48)
PCO2 BLDA: 40 MMHG — SIGNIFICANT CHANGE UP (ref 35–48)
PCO2 BLDA: 44 MMHG — SIGNIFICANT CHANGE UP (ref 35–48)
PCO2 BLDA: 50 MMHG — HIGH (ref 35–48)
PCO2 BLDV: 42 MMHG — SIGNIFICANT CHANGE UP (ref 41–51)
PCO2 BLDV: 68 MMHG — HIGH (ref 41–51)
PH BLDA: 7.31 PH — LOW (ref 7.35–7.45)
PH BLDA: 7.36 PH — SIGNIFICANT CHANGE UP (ref 7.35–7.45)
PH BLDA: 7.39 PH — SIGNIFICANT CHANGE UP (ref 7.35–7.45)
PH BLDA: 7.42 PH — SIGNIFICANT CHANGE UP (ref 7.35–7.45)
PH BLDA: 7.42 PH — SIGNIFICANT CHANGE UP (ref 7.35–7.45)
PH BLDV: 7.22 PH — LOW (ref 7.32–7.43)
PH BLDV: 7.38 PH — SIGNIFICANT CHANGE UP (ref 7.32–7.43)
PHOSPHATE SERPL-MCNC: 5.3 MG/DL — HIGH (ref 2.5–4.5)
PHOSPHATE SERPL-MCNC: 5.7 MG/DL — HIGH (ref 2.5–4.5)
PHOSPHATE SERPL-MCNC: 6 MG/DL — HIGH (ref 2.5–4.5)
PLATELET # BLD AUTO: 152 K/UL — SIGNIFICANT CHANGE UP (ref 150–400)
PMV BLD: 9.6 FL — SIGNIFICANT CHANGE UP (ref 7–13)
PO2 BLDA: 119 MMHG — HIGH (ref 83–108)
PO2 BLDA: 130 MMHG — HIGH (ref 83–108)
PO2 BLDA: 148 MMHG — HIGH (ref 83–108)
PO2 BLDA: 154 MMHG — HIGH (ref 83–108)
PO2 BLDA: 172 MMHG — HIGH (ref 83–108)
PO2 BLDV: 37 MMHG — SIGNIFICANT CHANGE UP (ref 35–40)
PO2 BLDV: 50 MMHG — HIGH (ref 35–40)
POTASSIUM BLDA-SCNC: 3.3 MMOL/L — LOW (ref 3.4–4.5)
POTASSIUM BLDA-SCNC: 3.3 MMOL/L — LOW (ref 3.4–4.5)
POTASSIUM BLDA-SCNC: 3.4 MMOL/L — SIGNIFICANT CHANGE UP (ref 3.4–4.5)
POTASSIUM BLDA-SCNC: 3.5 MMOL/L — SIGNIFICANT CHANGE UP (ref 3.4–4.5)
POTASSIUM BLDV-SCNC: 3.5 MMOL/L — SIGNIFICANT CHANGE UP (ref 3.4–4.5)
POTASSIUM BLDV-SCNC: 3.5 MMOL/L — SIGNIFICANT CHANGE UP (ref 3.4–4.5)
POTASSIUM SERPL-MCNC: 3.5 MMOL/L — SIGNIFICANT CHANGE UP (ref 3.5–5.3)
POTASSIUM SERPL-MCNC: 3.7 MMOL/L — SIGNIFICANT CHANGE UP (ref 3.5–5.3)
POTASSIUM SERPL-SCNC: 3.5 MMOL/L — SIGNIFICANT CHANGE UP (ref 3.5–5.3)
POTASSIUM SERPL-SCNC: 3.7 MMOL/L — SIGNIFICANT CHANGE UP (ref 3.5–5.3)
PROT SERPL-MCNC: 5.2 G/DL — LOW (ref 6–8.3)
PROT SERPL-MCNC: 5.5 G/DL — LOW (ref 6–8.3)
PROTHROM AB SERPL-ACNC: 11.8 SEC — SIGNIFICANT CHANGE UP (ref 9.8–13.1)
RBC # BLD: 3.57 M/UL — LOW (ref 4.2–5.8)
RBC # FLD: 18.4 % — HIGH (ref 10.3–14.5)
SAO2 % BLDA: 98.4 % — SIGNIFICANT CHANGE UP (ref 95–99)
SAO2 % BLDA: 98.8 % — SIGNIFICANT CHANGE UP (ref 95–99)
SAO2 % BLDA: 99.1 % — HIGH (ref 95–99)
SAO2 % BLDA: 99.2 % — HIGH (ref 95–99)
SAO2 % BLDA: 99.4 % — HIGH (ref 95–99)
SAO2 % BLDV: 60.4 % — SIGNIFICANT CHANGE UP (ref 60–85)
SAO2 % BLDV: 84.1 % — SIGNIFICANT CHANGE UP (ref 60–85)
SODIUM BLDA-SCNC: 134 MMOL/L — LOW (ref 136–146)
SODIUM BLDA-SCNC: 134 MMOL/L — LOW (ref 136–146)
SODIUM BLDA-SCNC: 135 MMOL/L — LOW (ref 136–146)
SODIUM BLDA-SCNC: 137 MMOL/L — SIGNIFICANT CHANGE UP (ref 136–146)
SODIUM SERPL-SCNC: 140 MMOL/L — SIGNIFICANT CHANGE UP (ref 135–145)
SODIUM SERPL-SCNC: 142 MMOL/L — SIGNIFICANT CHANGE UP (ref 135–145)
SPECIMEN SOURCE: SIGNIFICANT CHANGE UP
TROPONIN T, HIGH SENSITIVITY: 122 NG/L — CRITICAL HIGH (ref ?–14)
TROPONIN T, HIGH SENSITIVITY: 96 NG/L — CRITICAL HIGH (ref ?–14)
TROPONIN T, HIGH SENSITIVITY: 98 NG/L — CRITICAL HIGH (ref ?–14)
WBC # BLD: 12.02 K/UL — HIGH (ref 3.8–10.5)
WBC # FLD AUTO: 12.02 K/UL — HIGH (ref 3.8–10.5)

## 2018-10-12 PROCEDURE — 71045 X-RAY EXAM CHEST 1 VIEW: CPT | Mod: 26

## 2018-10-12 PROCEDURE — 99291 CRITICAL CARE FIRST HOUR: CPT

## 2018-10-12 PROCEDURE — 36556 INSERT NON-TUNNEL CV CATH: CPT

## 2018-10-12 PROCEDURE — 93306 TTE W/DOPPLER COMPLETE: CPT | Mod: 26

## 2018-10-12 PROCEDURE — 99223 1ST HOSP IP/OBS HIGH 75: CPT | Mod: GC

## 2018-10-12 PROCEDURE — 99292 CRITICAL CARE ADDL 30 MIN: CPT

## 2018-10-12 PROCEDURE — 99223 1ST HOSP IP/OBS HIGH 75: CPT

## 2018-10-12 PROCEDURE — 99222 1ST HOSP IP/OBS MODERATE 55: CPT

## 2018-10-12 RX ORDER — DEXMEDETOMIDINE HYDROCHLORIDE IN 0.9% SODIUM CHLORIDE 4 UG/ML
0.5 INJECTION INTRAVENOUS
Qty: 200 | Refills: 0 | Status: DISCONTINUED | OUTPATIENT
Start: 2018-10-12 | End: 2018-10-21

## 2018-10-12 RX ORDER — POTASSIUM CHLORIDE 20 MEQ
10 PACKET (EA) ORAL ONCE
Qty: 0 | Refills: 0 | Status: COMPLETED | OUTPATIENT
Start: 2018-10-12 | End: 2018-10-12

## 2018-10-12 RX ORDER — VASOPRESSIN 20 [USP'U]/ML
6 INJECTION INTRAVENOUS
Qty: 100 | Refills: 0 | Status: DISCONTINUED | OUTPATIENT
Start: 2018-10-12 | End: 2018-10-12

## 2018-10-12 RX ORDER — ASPIRIN/CALCIUM CARB/MAGNESIUM 324 MG
81 TABLET ORAL DAILY
Qty: 0 | Refills: 0 | Status: DISCONTINUED | OUTPATIENT
Start: 2018-10-12 | End: 2018-10-12

## 2018-10-12 RX ORDER — VASOPRESSIN 20 [USP'U]/ML
0.05 INJECTION INTRAVENOUS
Qty: 100 | Refills: 0 | Status: DISCONTINUED | OUTPATIENT
Start: 2018-10-12 | End: 2018-10-21

## 2018-10-12 RX ORDER — ASPIRIN/CALCIUM CARB/MAGNESIUM 324 MG
81 TABLET ORAL DAILY
Qty: 0 | Refills: 0 | Status: DISCONTINUED | OUTPATIENT
Start: 2018-10-12 | End: 2018-10-18

## 2018-10-12 RX ORDER — PROPOFOL 10 MG/ML
20 INJECTION, EMULSION INTRAVENOUS
Qty: 1000 | Refills: 0 | Status: DISCONTINUED | OUTPATIENT
Start: 2018-10-12 | End: 2018-10-17

## 2018-10-12 RX ORDER — ALBUMIN HUMAN 25 %
250 VIAL (ML) INTRAVENOUS ONCE
Qty: 0 | Refills: 0 | Status: COMPLETED | OUTPATIENT
Start: 2018-10-12 | End: 2018-10-12

## 2018-10-12 RX ORDER — DOBUTAMINE HCL 250MG/20ML
5 VIAL (ML) INTRAVENOUS
Qty: 500 | Refills: 0 | Status: DISCONTINUED | OUTPATIENT
Start: 2018-10-12 | End: 2018-10-17

## 2018-10-12 RX ADMIN — Medication 2.77 MICROGRAM(S)/KG/MIN: at 07:53

## 2018-10-12 RX ADMIN — PHENYLEPHRINE HYDROCHLORIDE 2.21 MICROGRAM(S)/KG/MIN: 10 INJECTION INTRAVENOUS at 07:52

## 2018-10-12 RX ADMIN — PROPOFOL 7.08 MICROGRAM(S)/KG/MIN: 10 INJECTION, EMULSION INTRAVENOUS at 05:15

## 2018-10-12 RX ADMIN — EPINEPHRINE 33.19 MICROGRAM(S)/KG/MIN: 0.3 INJECTION INTRAMUSCULAR; SUBCUTANEOUS at 07:53

## 2018-10-12 RX ADMIN — SODIUM CHLORIDE 30 MILLILITER(S): 9 INJECTION INTRAMUSCULAR; INTRAVENOUS; SUBCUTANEOUS at 07:54

## 2018-10-12 RX ADMIN — PIPERACILLIN AND TAZOBACTAM 200 GRAM(S): 4; .5 INJECTION, POWDER, LYOPHILIZED, FOR SOLUTION INTRAVENOUS at 21:17

## 2018-10-12 RX ADMIN — Medication 2.77 MICROGRAM(S)/KG/MIN: at 19:39

## 2018-10-12 RX ADMIN — Medication 100 MILLIGRAM(S): at 02:25

## 2018-10-12 RX ADMIN — Medication 1: at 05:56

## 2018-10-12 RX ADMIN — VASOPRESSIN 3 UNIT(S)/MIN: 20 INJECTION INTRAVENOUS at 19:39

## 2018-10-12 RX ADMIN — PANTOPRAZOLE SODIUM 40 MILLIGRAM(S): 20 TABLET, DELAYED RELEASE ORAL at 12:02

## 2018-10-12 RX ADMIN — HEPARIN SODIUM 5000 UNIT(S): 5000 INJECTION INTRAVENOUS; SUBCUTANEOUS at 19:19

## 2018-10-12 RX ADMIN — Medication 500 MILLILITER(S): at 12:19

## 2018-10-12 RX ADMIN — DEXMEDETOMIDINE HYDROCHLORIDE IN 0.9% SODIUM CHLORIDE 7.38 MICROGRAM(S)/KG/HR: 4 INJECTION INTRAVENOUS at 19:40

## 2018-10-12 RX ADMIN — Medication 100 MILLIGRAM(S): at 11:47

## 2018-10-12 RX ADMIN — Medication 100 MILLIEQUIVALENT(S): at 19:19

## 2018-10-12 RX ADMIN — Medication 8.85 MICROGRAM(S)/KG/MIN: at 07:51

## 2018-10-12 RX ADMIN — Medication 8.85 MICROGRAM(S)/KG/MIN: at 19:39

## 2018-10-12 RX ADMIN — PROPOFOL 7.08 MICROGRAM(S)/KG/MIN: 10 INJECTION, EMULSION INTRAVENOUS at 19:40

## 2018-10-12 RX ADMIN — PROPOFOL 7.08 MICROGRAM(S)/KG/MIN: 10 INJECTION, EMULSION INTRAVENOUS at 07:53

## 2018-10-12 RX ADMIN — PIPERACILLIN AND TAZOBACTAM 200 GRAM(S): 4; .5 INJECTION, POWDER, LYOPHILIZED, FOR SOLUTION INTRAVENOUS at 11:47

## 2018-10-12 RX ADMIN — Medication 3: at 00:20

## 2018-10-12 RX ADMIN — Medication 100 MILLIGRAM(S): at 19:19

## 2018-10-12 RX ADMIN — FENTANYL CITRATE 5.9 MICROGRAM(S)/KG/HR: 50 INJECTION INTRAVENOUS at 07:55

## 2018-10-12 RX ADMIN — HEPARIN SODIUM 5000 UNIT(S): 5000 INJECTION INTRAVENOUS; SUBCUTANEOUS at 05:51

## 2018-10-12 RX ADMIN — VASOPRESSIN 3 UNIT(S)/MIN: 20 INJECTION INTRAVENOUS at 11:48

## 2018-10-12 RX ADMIN — DEXMEDETOMIDINE HYDROCHLORIDE IN 0.9% SODIUM CHLORIDE 7.38 MICROGRAM(S)/KG/HR: 4 INJECTION INTRAVENOUS at 11:47

## 2018-10-12 RX ADMIN — EPINEPHRINE 33.19 MICROGRAM(S)/KG/MIN: 0.3 INJECTION INTRAMUSCULAR; SUBCUTANEOUS at 01:03

## 2018-10-12 RX ADMIN — Medication 12.39 MICROGRAM(S)/KG/MIN: at 03:44

## 2018-10-12 RX ADMIN — FENTANYL CITRATE 5.9 MICROGRAM(S)/KG/HR: 50 INJECTION INTRAVENOUS at 19:39

## 2018-10-12 NOTE — CONSULT NOTE ADULT - PROBLEM SELECTOR RECOMMENDATION 2
Likely cardiogenic shock.  Continue Dobutamine Likely septic shock. mixed sat 60%. Get repeat.   Continue Dobutamine

## 2018-10-12 NOTE — PROGRESS NOTE ADULT - SUBJECTIVE AND OBJECTIVE BOX
Intubated and sedated    on dobutamine, levo, and vasopressin    VAC functioning and drains to wall suction x 2  Cont VAC and drains and Abx    Will continue to follow with you

## 2018-10-12 NOTE — CONSULT NOTE ADULT - PROBLEM SELECTOR RECOMMENDATION 3
Hyperphosphatemia with phosphorus level to 6. Pt on renvela at home. Would give low phosphorus feeds via NGT. Monitor calcium and phosphorus levels Pt. with hyperphosphatemia in setting of ESRD. Serum phosphorus level elevated at 6. Pt. on phosphate binders at home. Monitor phosphorus level

## 2018-10-12 NOTE — CONSULT NOTE ADULT - SUBJECTIVE AND OBJECTIVE BOX
Patient seen and evaluated @ 10:00 AM  Chief Complaint: s/p RT thoracotomy, evacuation of hemothorax and flap    HPI:  58M Mandarin speaking with NICM EF 10% s/p ICD, ESRD on HD, multiple prior lung surgeries as below scheduled for Right Thoractomy Decortiation, Right Chest Wall reconstruction with Latissimjus Dorsi Flap Poss Skin Graft with VAC dressing with Dr Pickering 10/11/18. Pt hx of ESRD with HD T/Th/Sat for 4 hrs and has stated he has stopped some of his meds but cannot say which ones. Pt seen and received [facility] and [facility] but has 2 different med lists and is unable to identify meds taking. Pt denies blood thinners. Pt has stopped ASA as of last week. Pt hx gathered from [employer] and notes from Dr Pickering - pt is poor historian.     S/P OR 10/11 with   Right thoracotomy, resection of portion of R 7th rib, evacuation of infected hemothorax, takedown of pleurocutaneous fistula	  and 1 L EBL. Hypotensive during procedure.    Currently in CTICU requiring DObutamine 5 mcg/kg/min, Epinephrine 0.15 mcg/kg/min, Levo 0.41 mcg/kg/min, Quan 2.9 mcg/kg/min. /46, HR 92. No CVPs noted upon my review and evaluation.    Patient is intubated and sedated.    Troponin 96, , CK MB 5.75. EKG initially sinus tach, LVH with repolarization abnormalities. Repeat post-op EKG consistent with pre-op, mild AVR elevation again noted. Minimal UOP.    Prior [employer] notes reveal:  He is s/p Rt pleural effusion drainage with IR; Lt VATS, pleural biopsy, drainage of Lt pleural effusion, partial decortication of LLL and PleurX catheter placement on 1/15/2015 by Dr. JoseA lfredo Pickering at [facility]. Path was negative for malignancy and markedly degenerated cell, fibrosis, chronic inflammation. Lt PleurX removed in 2015.    Also s/p Right VATS, evacuation of hemothorax, pleural biopsy on 12/4/2017 at [facility]. Path "benign".    Also s/p Right chest wound debridement and revision on 5/9/2018 for enterococcal wound at old port side at [facility].     Also s/p re-excision of right chest wall mass on 7/25/18 for recurrent chest wall cyst by Dr. Javi Pickering at [facility]. Path showed skin w/ deep abscess formation.    CT Chest on 9/11/18:  - persistent moderate Rt pleural effusion  - minimal Lt pleural effusion  - atelectasis in the bilateral lung base  - decreasing 1.2 x 0.5cm Lt hilar LN (perviously 1.6 x 0.8cm)   - atherosclerotic vascular disease    PMH:   Smoking hx  Cardiomyopathy  Wound  ICD (implantable cardioverter-defibrillator) in place  OA (osteoarthritis)  HLD (hyperlipidemia)  BPH (benign prostatic hyperplasia)  Pleural effusion  HTN (hypertension)  ESRD (end stage renal disease)  H/O chest wound  Pacemaker    PSH:   History of wound infection  History of implantable cardioverter-defibrillator (ICD) placement  H/O bilateral hip replacements    Medications:   albumin human  5% IVPB 250 milliLiter(s) IV Intermittent once  albumin human  5% IVPB 250 milliLiter(s) IV Intermittent once  dexmedetomidine Infusion 0.5 MICROgram(s)/kG/Hr IV Continuous <Continuous>  dextrose 40% Gel 15 Gram(s) Oral once PRN  dextrose 5%. 1000 milliLiter(s) IV Continuous <Continuous>  dextrose 50% Injectable 12.5 Gram(s) IV Push once  dextrose 50% Injectable 25 Gram(s) IV Push once  dextrose 50% Injectable 25 Gram(s) IV Push once  DOBUTamine Infusion 5 MICROgram(s)/kG/Min IV Continuous <Continuous>  EPINEPHrine    Infusion 0.15 MICROgram(s)/kG/Min IV Continuous <Continuous>  fentaNYL   Infusion 1 MICROgram(s)/kG/Hr IV Continuous <Continuous>  glucagon  Injectable 1 milliGRAM(s) IntraMuscular once PRN  heparin  Injectable 5000 Unit(s) SubCutaneous every 12 hours  HYDROmorphone (10 MICROgram(s)/mL) + BUpivacaine 0.0625% in 0.9% Sodium Chloride PCEA 250 milliLiter(s) Epidural PCA Continuous  HYDROmorphone (10 MICROgram(s)/mL) + BUpivacaine 0.0625% in 0.9% Sodium Chloride PCEA Rescue Clinician  Bolus 5 milliLiter(s) Epidural every 15 minutes PRN  insulin lispro (HumaLOG) corrective regimen sliding scale   SubCutaneous every 6 hours  metroNIDAZOLE  IVPB      metroNIDAZOLE  IVPB 500 milliGRAM(s) IV Intermittent every 8 hours  naloxone Injectable 0.1 milliGRAM(s) IV Push every 3 minutes PRN  norepinephrine Infusion 0.05 MICROgram(s)/kG/Min IV Continuous <Continuous>  ondansetron Injectable 4 milliGRAM(s) IV Push every 6 hours PRN  pantoprazole  Injectable 40 milliGRAM(s) IV Push daily  phenylephrine    Infusion 0.2 MICROgram(s)/kG/Min IV Continuous <Continuous>  piperacillin/tazobactam IVPB. 3.375 Gram(s) IV Intermittent every 12 hours  propofol Infusion 20 MICROgram(s)/kG/Min IV Continuous <Continuous>  sodium chloride 0.9%. 1000 milliLiter(s) IV Continuous <Continuous>  vasopressin Infusion 0.05 Unit(s)/Min IV Continuous <Continuous>    Allergies:  No Known Allergies    FAMILY HISTORY:    Social History:  [place]    Review of Systems:  Constitutional: [ ] Fever [ ] Chills [ ] Fatigue [ ] Weight change   HEENT: [ ] Blurred vision [ ] Eye Pain [ ] Headache [ ] Runny nose [ ] Sore Throat   Respiratory: [ ] Cough [ ] Wheezing [ ] Shortness of breath  Cardiovascular: [ ] Chest Pain [ ] Palpitations [ ] WALDROP [ ] PND [ ] Orthopnea  Gastrointestinal: [ ] Abdominal Pain [ ] Diarrhea [ ] Constipation [ ] Hemorrhoids [ ] Nausea [ ] Vomiting  Genitourinary: [ ] Nocturia [ ] Dysuria [ ] Incontinence  Extremities: [ ] Swelling [ ] Joint Pain  Neurologic: [ ] Focal deficit [ ] Paresthesias [ ] Syncope  Lymphatic: [ ] Swelling [ ] Lymphadenopathy   Skin: [ ] Rash [ ] Ecchymoses [ ] Wounds [ ] Lesions  Psychiatry: [ ] Depression [ ] Suicidal/Homicidal Ideation [ ] Anxiety [ ] Sleep Disturbances  [ ] 10 point review of systems is otherwise negative except as mentioned above            [ ]Unable to obtain    Physical Exam:  T(C): 37 (10-12-18 @ 07:00), Max: 37.4 (10-12-18 @ 01:00)  HR: 100 (10-12-18 @ 09:00) (95 - 117)  BP: 88/59 (10-12-18 @ 09:00) (83/50 - 117/64)  RR: 18 (10-12-18 @ 09:00) (12 - 18)  SpO2: 99% (10-12-18 @ 09:00) (99% - 100%)  Wt(kg): --    10-11 @ 07:01  -  10-12 @ 07:00  --------------------------------------------------------  IN: 2435.6 mL / OUT: 932.5 mL / NET: 1503.1 mL    10-12 @ 07:01  -  10-12 @ 10:25  --------------------------------------------------------  IN: 880.1 mL / OUT: 110 mL / NET: 770.1 mL      Daily     Daily     Appearance: Intubated, sedated  Eyes: PERRL  HENT: Normal oral muscosa, NC/AT  Cardiovascular: normal S1 and S2, RRR, no m/r/g, no edema, normal JVP  Respiratory: decreased BS bl  Gastrointestinal: Soft, non-tender, non-distended, BS+  Musculoskeletal: No clubbing, no joint deformity   Skin: No rashes, no ecchymoses, no cyanosis  Access: RIJ TC, R femoral vascath    Cardiovascular Diagnostic Testing:  ECG: sinus, LVH, mild STD and TWI in inferior, lateral and precordial leads    Echo: none    Stress Testing: none    Cath: none    Interpretation of Telemetry: sinus    Imaging:  CXR  IMPRESSION:  Post right thoracotomy with endotracheal and chest tubes,   mild vascular congestion, basilar pneumothorax and muscle flap in place.   Pneumoperitoneum also suggested.    Labs:                        11.2   12.02 )-----------( 152      ( 12 Oct 2018 02:53 )             33.2     10-12    140  |  101  |  41<H>  ----------------------------<  218<H>  3.7   |  21<L>  |  4.59<H>    Ca    7.9<L>      12 Oct 2018 02:53  Phos  6.0     10-12  Mg     2.2     10-12    TPro  5.5<L>  /  Alb  2.4<L>  /  TBili  0.6  /  DBili  x   /  AST  17  /  ALT  8   /  AlkPhos  136<H>  10-12    PT/INR - ( 12 Oct 2018 02:53 )   PT: 11.8 SEC;   INR: 1.03          PTT - ( 12 Oct 2018 02:53 )  PTT:30.0 SEC  CARDIAC MARKERS ( 12 Oct 2018 08:30 )  x     / x     / 202 u/L / 5.75 ng/mL / x              Hemoglobin A1C, Whole Blood: 5.5 % (10-12 @ 02:53)

## 2018-10-12 NOTE — CONSULT NOTE ADULT - ASSESSMENT
58yoM w/ ESRD on HD s/p right thoracotomy now with cardiogenic shock. 58yoM w/ ESRD on HD s/p right thoracotomy now with hypotension/shock.

## 2018-10-12 NOTE — CONSULT NOTE ADULT - PROBLEM SELECTOR RECOMMENDATION 9
NICM ? per chart.  HFrEF - LVEF 20%, severely dilated LV, LVIDD 7.3 cm. Moderate MR.  Critically ill. Post surgery patient required Dobutamine 10 mcg/kg/min (now weaned to 5 mcg/kg/min) and pressors; norepinephrine, phenylephrine, vasopressin and epinephrine. BP is improving w/ MAPs 65-69, CTI team to titrate down on pressors.   Pt is anuric. Nephrology deciding on CVVHD.  Lactate is trending down, now 2.3  Currently unclear volume status. Recommend getting CVP if possible. Would help HF management.

## 2018-10-12 NOTE — CONSULT NOTE ADULT - SUBJECTIVE AND OBJECTIVE BOX
[facility] DIVISION OF KIDNEY DISEASES AND HYPERTENSION -- INITIAL CONSULT NOTE  --------------------------------------------------------------------------------  HPI:  Pt is a 57yo Mandarin speaking M w/ Hx of NICM w/ ICD, ESRD on HD, former smoker, BPH who was admitted for scheduled right thoracotomy w/ decortication and muscle flat w/ CT Surgery. Pt underwent procedure on 10/11/18, and afterwards developed shock, thought to be cardiogenic. Pt being monitored in the CTICU.    Renal now called for management of pts ESRD.  Pt intubated and sedated in the CTICU, so Hx obtained from wife, Aaron Stack, at bedside.  [employer]  was used via phone to obtain history.  Rajinder, ID#[id number].     Wife says pt has been on HD for about 6 years.  Goes to dialysis center on [facility] ([facility]), and follows with Dr. Reginald Meneses for nephrology.  Has HD via LUE AVF, on TTS schedule, for 4 hour sessions.  Says about 3 to 4 lbs of fluid is usually removed at each session.    Wife says she does not know cause of renal failure.  Pt was born in [place], but [place] than [place] now.  Says pt never had any Hx of DM and HTN.  Says pt used to drink alcohol and smoke cigarettes a lot.    Pt seen in the CTICU.  On ventilator supoort, with FiO2 to 40%.  On multiple pressors (dobutamine, epi, levo, phenyl, vaso). BPs maintaining.   On propofol and precedex.  Receiving IV Abx.    PAST HISTORY  --------------------------------------------------------------------------------  PAST MEDICAL & SURGICAL HISTORY:  Smoking hx  Cardiomyopathy  Wound: right chest  ICD (implantable cardioverter-defibrillator) in place  OA (osteoarthritis)  HLD (hyperlipidemia)  BPH (benign prostatic hyperplasia)  Pleural effusion  HTN (hypertension)  ESRD (end stage renal disease)  H/O chest wound: right  History of wound infection: right chest wall - revision 5/18 and again in 7/18  History of implantable cardioverter-defibrillator (ICD) placement: pt unsure when placed  H/O bilateral hip replacements: 2008, 2009    FAMILY HISTORY:  No known.    PAST SOCIAL HISTORY:  Alcohol and cigarette use.    ALLERGIES & MEDICATIONS  --------------------------------------------------------------------------------  Allergies    No Known Allergies    Intolerances      Standing Inpatient Medications  albumin human  5% IVPB 250 milliLiter(s) IV Intermittent once  albumin human  5% IVPB 250 milliLiter(s) IV Intermittent once  dexmedetomidine Infusion 0.5 MICROgram(s)/kG/Hr IV Continuous <Continuous>  dextrose 5%. 1000 milliLiter(s) IV Continuous <Continuous>  dextrose 50% Injectable 12.5 Gram(s) IV Push once  dextrose 50% Injectable 25 Gram(s) IV Push once  dextrose 50% Injectable 25 Gram(s) IV Push once  DOBUTamine Infusion 5 MICROgram(s)/kG/Min IV Continuous <Continuous>  EPINEPHrine    Infusion 0.15 MICROgram(s)/kG/Min IV Continuous <Continuous>  fentaNYL   Infusion 1 MICROgram(s)/kG/Hr IV Continuous <Continuous>  heparin  Injectable 5000 Unit(s) SubCutaneous every 12 hours  HYDROmorphone (10 MICROgram(s)/mL) + BUpivacaine 0.0625% in 0.9% Sodium Chloride PCEA 250 milliLiter(s) Epidural PCA Continuous  insulin lispro (HumaLOG) corrective regimen sliding scale   SubCutaneous every 6 hours  metroNIDAZOLE  IVPB      metroNIDAZOLE  IVPB 500 milliGRAM(s) IV Intermittent every 8 hours  norepinephrine Infusion 0.05 MICROgram(s)/kG/Min IV Continuous <Continuous>  pantoprazole  Injectable 40 milliGRAM(s) IV Push daily  phenylephrine    Infusion 0.2 MICROgram(s)/kG/Min IV Continuous <Continuous>  piperacillin/tazobactam IVPB. 3.375 Gram(s) IV Intermittent every 12 hours  propofol Infusion 20 MICROgram(s)/kG/Min IV Continuous <Continuous>  sodium chloride 0.9%. 1000 milliLiter(s) IV Continuous <Continuous>  vasopressin Infusion 0.05 Unit(s)/Min IV Continuous <Continuous>    PRN Inpatient Medications  dextrose 40% Gel 15 Gram(s) Oral once PRN  glucagon  Injectable 1 milliGRAM(s) IntraMuscular once PRN  HYDROmorphone (10 MICROgram(s)/mL) + BUpivacaine 0.0625% in 0.9% Sodium Chloride PCEA Rescue Clinician  Bolus 5 milliLiter(s) Epidural every 15 minutes PRN  naloxone Injectable 0.1 milliGRAM(s) IV Push every 3 minutes PRN  ondansetron Injectable 4 milliGRAM(s) IV Push every 6 hours PRN      REVIEW OF SYSTEMS  --------------------------------------------------------------------------------  Unable to assess due to clinical condition.    VITALS/PHYSICAL EXAM  --------------------------------------------------------------------------------  T(C): 37 (10-12-18 @ 07:00), Max: 37.4 (10-12-18 @ 01:00)  HR: 91 (10-12-18 @ 10:00) (91 - 117)  BP: 88/59 (10-12-18 @ 09:00) (83/50 - 117/64)  RR: 18 (10-12-18 @ 10:00) (12 - 18)  SpO2: 100% (10-12-18 @ 10:00) (99% - 100%)  Wt(kg): --  Height (cm): 176.53 (10-11-18 @ 07:47)  Weight (kg): 59 (10-11-18 @ 07:47)  BMI (kg/m2): 18.9 (10-11-18 @ 07:47)  BSA (m2): 1.73 (10-11-18 @ 07:47)      10-11-18 @ 07:01  -  10-12-18 @ 07:00  --------------------------------------------------------  IN: 2435.6 mL / OUT: 932.5 mL / NET: 1503.1 mL    10-12-18 @ 07:01  -  10-12-18 @ 11:56  --------------------------------------------------------  IN: 967.2 mL / OUT: 110 mL / NET: 857.2 mL      Physical Exam:  	Gen: NAD  	HEENT: +ET tube to vent, +NGT  	Pulm: CTA B/L, +3 chest tube drains noted draining sero-sanguinous fluid  	CV: RRR, S1S2; no rub  	Back: no sacral edema  	Abd: +BS, soft, nontender/nondistended  	: No suprapubic distension  	UE: Warm, no edema  	LE: Warm, no edema  	Neuro: +sedated  	Psych: unable to assess due to sedation  	Skin: Warm, without rashes  	Vascular access:  LUE AVF +thrill +bruit +skin intact; right femoral non-tunneled HD catheter w/ no drainage noted    LABS/STUDIES  --------------------------------------------------------------------------------              11.2   12.02 >-----------<  152      [10-12-18 @ 02:53]              33.2     140  |  101  |  41  ----------------------------<  218      [10-12-18 @ 02:53]  3.7   |  21  |  4.59        Ca     7.9     [10-12-18 @ 02:53]      Mg     2.2     [10-12-18 @ 02:53]      Phos  6.0     [10-12-18 @ 02:53]    TPro  5.5  /  Alb  2.4  /  TBili  0.6  /  DBili  x   /  AST  17  /  ALT  8   /  AlkPhos  136  [10-12-18 @ 02:53]    PT/INR: PT 11.8 , INR 1.03       [10-12-18 @ 02:53]  PTT: 30.0       [10-12-18 @ 02:53]          [10-12-18 @ 08:30]    Creatinine Trend:  SCr 4.59 [10-12 @ 02:53]  SCr 3.91 [10-11 @ 18:00]        HbA1c 5.5      [10-12-18 @ 02:53] [facility] DIVISION OF KIDNEY DISEASES AND HYPERTENSION -- INITIAL CONSULT NOTE  --------------------------------------------------------------------------------  HPI:  Pt is a 59yo Mandarin speaking M w/ Hx of NICM w/ ICD, ESRD on HD, former smoker, BPH who was admitted for scheduled right thoracotomy w/ decortication due to infected hemothorax and chest reconstruction with CT Surgery. Pt underwent procedure on 10/11/18, and afterwards developed shock, thought to be cardiogenic. Pt being monitored in the CTICU.    Renal now called for management of pts ESRD.  Pt intubated and sedated in the CTICU, so Hx obtained from wife, Aaron Stack, at bedside.  [employer]  was used via phone to obtain history.  Rajinder, ID#[id number].     Wife says pt has been on HD for about 6 years.  Goes to dialysis center on [facility] [place] ([facility]), and follows with Dr. Reginald Meneses for nephrology.  Has HD via LUE AVF, on TTS schedule, for 4 hour sessions.  Says about 3 to 4 lbs of fluid is usually removed at each session.  Last HD was Wednesday 10/10/18 in preparation for his surgery on 10/11/18.    Wife says she does not know cause of renal failure.  Pt was born in [place], but has been living in the [place] more than [place] years now.  Says pt never had any Hx of DM and HTN.  Says pt used to drink alcohol and smoke cigarettes a lot.    Pt seen in the CTICU.  On ventilator supoort, with FiO2 to 40%.  On multiple pressors (dobutamine, epi, levo, phenyl, vaso). BPs maintaining.   On propofol and precedex.  Receiving IV Abx.    PAST HISTORY  --------------------------------------------------------------------------------  PAST MEDICAL & SURGICAL HISTORY:  Smoking hx  Cardiomyopathy  Wound: right chest  ICD (implantable cardioverter-defibrillator) in place  OA (osteoarthritis)  HLD (hyperlipidemia)  BPH (benign prostatic hyperplasia)  Pleural effusion  HTN (hypertension)  ESRD (end stage renal disease)  H/O chest wound: right  History of wound infection: right chest wall - revision 5/18 and again in 7/18  History of implantable cardioverter-defibrillator (ICD) placement: pt unsure when placed  H/O bilateral hip replacements: 2008, 2009    FAMILY HISTORY:  No known.    PAST SOCIAL HISTORY:  Alcohol and cigarette use.    ALLERGIES & MEDICATIONS  --------------------------------------------------------------------------------  Allergies    No Known Allergies    Intolerances      Standing Inpatient Medications  albumin human  5% IVPB 250 milliLiter(s) IV Intermittent once  albumin human  5% IVPB 250 milliLiter(s) IV Intermittent once  dexmedetomidine Infusion 0.5 MICROgram(s)/kG/Hr IV Continuous <Continuous>  dextrose 5%. 1000 milliLiter(s) IV Continuous <Continuous>  dextrose 50% Injectable 12.5 Gram(s) IV Push once  dextrose 50% Injectable 25 Gram(s) IV Push once  dextrose 50% Injectable 25 Gram(s) IV Push once  DOBUTamine Infusion 5 MICROgram(s)/kG/Min IV Continuous <Continuous>  EPINEPHrine    Infusion 0.15 MICROgram(s)/kG/Min IV Continuous <Continuous>  fentaNYL   Infusion 1 MICROgram(s)/kG/Hr IV Continuous <Continuous>  heparin  Injectable 5000 Unit(s) SubCutaneous every 12 hours  HYDROmorphone (10 MICROgram(s)/mL) + BUpivacaine 0.0625% in 0.9% Sodium Chloride PCEA 250 milliLiter(s) Epidural PCA Continuous  insulin lispro (HumaLOG) corrective regimen sliding scale   SubCutaneous every 6 hours  metroNIDAZOLE  IVPB      metroNIDAZOLE  IVPB 500 milliGRAM(s) IV Intermittent every 8 hours  norepinephrine Infusion 0.05 MICROgram(s)/kG/Min IV Continuous <Continuous>  pantoprazole  Injectable 40 milliGRAM(s) IV Push daily  phenylephrine    Infusion 0.2 MICROgram(s)/kG/Min IV Continuous <Continuous>  piperacillin/tazobactam IVPB. 3.375 Gram(s) IV Intermittent every 12 hours  propofol Infusion 20 MICROgram(s)/kG/Min IV Continuous <Continuous>  sodium chloride 0.9%. 1000 milliLiter(s) IV Continuous <Continuous>  vasopressin Infusion 0.05 Unit(s)/Min IV Continuous <Continuous>    PRN Inpatient Medications  dextrose 40% Gel 15 Gram(s) Oral once PRN  glucagon  Injectable 1 milliGRAM(s) IntraMuscular once PRN  HYDROmorphone (10 MICROgram(s)/mL) + BUpivacaine 0.0625% in 0.9% Sodium Chloride PCEA Rescue Clinician  Bolus 5 milliLiter(s) Epidural every 15 minutes PRN  naloxone Injectable 0.1 milliGRAM(s) IV Push every 3 minutes PRN  ondansetron Injectable 4 milliGRAM(s) IV Push every 6 hours PRN      REVIEW OF SYSTEMS  --------------------------------------------------------------------------------  Unable to assess due to clinical condition.    VITALS/PHYSICAL EXAM  --------------------------------------------------------------------------------  T(C): 37 (10-12-18 @ 07:00), Max: 37.4 (10-12-18 @ 01:00)  HR: 91 (10-12-18 @ 10:00) (91 - 117)  BP: 88/59 (10-12-18 @ 09:00) (83/50 - 117/64)  RR: 18 (10-12-18 @ 10:00) (12 - 18)  SpO2: 100% (10-12-18 @ 10:00) (99% - 100%)  Wt(kg): --  Height (cm): 176.53 (10-11-18 @ 07:47)  Weight (kg): 59 (10-11-18 @ 07:47)  BMI (kg/m2): 18.9 (10-11-18 @ 07:47)  BSA (m2): 1.73 (10-11-18 @ 07:47)      10-11-18 @ 07:01  -  10-12-18 @ 07:00  --------------------------------------------------------  IN: 2435.6 mL / OUT: 932.5 mL / NET: 1503.1 mL    10-12-18 @ 07:01  -  10-12-18 @ 11:56  --------------------------------------------------------  IN: 967.2 mL / OUT: 110 mL / NET: 857.2 mL      Physical Exam:  	Gen: NAD  	HEENT: +ET tube to vent, +NGT  	Pulm: CTA B/L, +3 chest tube drains noted draining sero-sanguinous fluid  	CV: RRR, S1S2; no rub  	Back: no sacral edema  	Abd: +BS, soft, nontender/nondistended  	: No suprapubic distension  	UE: Warm, no edema  	LE: Warm, no edema  	Neuro: +sedated  	Psych: unable to assess due to sedation  	Skin: Warm, without rashes  	Vascular access:  LUE AVF +thrill +bruit +skin intact; right femoral non-tunneled HD catheter w/ no drainage noted    LABS/STUDIES  --------------------------------------------------------------------------------              11.2   12.02 >-----------<  152      [10-12-18 @ 02:53]              33.2     140  |  101  |  41  ----------------------------<  218      [10-12-18 @ 02:53]  3.7   |  21  |  4.59        Ca     7.9     [10-12-18 @ 02:53]      Mg     2.2     [10-12-18 @ 02:53]      Phos  6.0     [10-12-18 @ 02:53]    TPro  5.5  /  Alb  2.4  /  TBili  0.6  /  DBili  x   /  AST  17  /  ALT  8   /  AlkPhos  136  [10-12-18 @ 02:53]    PT/INR: PT 11.8 , INR 1.03       [10-12-18 @ 02:53]  PTT: 30.0       [10-12-18 @ 02:53]          [10-12-18 @ 08:30]    Creatinine Trend:  SCr 4.59 [10-12 @ 02:53]  SCr 3.91 [10-11 @ 18:00]        HbA1c 5.5      [10-12-18 @ 02:53] [facility] DIVISION OF KIDNEY DISEASES AND HYPERTENSION -- INITIAL CONSULT NOTE  --------------------------------------------------------------------------------  HPI:  Pt is a 57yo Mandarin speaking M w/ Hx of NICM w/ ICD, ESRD on HD, former smoker, BPH who was admitted for scheduled right thoracotomy w/ decortication due to infected hemothorax and chest reconstruction with CT Surgery. Pt underwent procedure on 10/11/18, and afterwards developed shock, thought to be cardiogenic. Pt being monitored in the CTICU.    Renal now called for management of pts ESRD.  Pt intubated and sedated in the CTICU, so Hx obtained from wife, Aaron Stack, at bedside.  [employer]  was used via phone to obtain history.  Rajinder, ID#[id number].     Wife says pt has been on HD for about 6 years.  Goes to dialysis center on [facility] ([facility]), and follows with Dr. Reginald Meneses for nephrology.  Has HD via LUE AVF, on TTS schedule, for 4 hour sessions.  Says about 3 to 4 lbs of fluid is usually removed at each session.  Last HD was Wednesday 10/10/18 in preparation for his surgery on 10/11/18.    Wife says she does not know cause of renal failure.  Pt was born in [place], but has been living [place] the [place] more [place] now.  Says pt never had any Hx of DM and HTN.  Says pt used to drink alcohol and smoke cigarettes a lot.    Pt seen in the CTICU.  On ventilator supoort, with FiO2 to 40%.  On multiple pressors (dobutamine, epi, levo, phenyl, vaso). BPs maintaining.   On propofol and precedex.  Receiving IV Abx.    PAST HISTORY  --------------------------------------------------------------------------------  PAST MEDICAL & SURGICAL HISTORY:  Smoking hx  Cardiomyopathy  Wound: right chest  ICD (implantable cardioverter-defibrillator) in place  OA (osteoarthritis)  HLD (hyperlipidemia)  BPH (benign prostatic hyperplasia)  Pleural effusion  HTN (hypertension)  ESRD (end stage renal disease)  H/O chest wound: right  History of wound infection: right chest wall - revision 5/18 and again in 7/18  History of implantable cardioverter-defibrillator (ICD) placement: pt unsure when placed  H/O bilateral hip replacements: 2008, 2009    FAMILY HISTORY:  No known.    PAST SOCIAL HISTORY:  Alcohol and cigarette use.    ALLERGIES & MEDICATIONS  --------------------------------------------------------------------------------  Allergies    No Known Allergies    Intolerances      Standing Inpatient Medications  albumin human  5% IVPB 250 milliLiter(s) IV Intermittent once  albumin human  5% IVPB 250 milliLiter(s) IV Intermittent once  dexmedetomidine Infusion 0.5 MICROgram(s)/kG/Hr IV Continuous <Continuous>  dextrose 5%. 1000 milliLiter(s) IV Continuous <Continuous>  dextrose 50% Injectable 12.5 Gram(s) IV Push once  dextrose 50% Injectable 25 Gram(s) IV Push once  dextrose 50% Injectable 25 Gram(s) IV Push once  DOBUTamine Infusion 5 MICROgram(s)/kG/Min IV Continuous <Continuous>  EPINEPHrine    Infusion 0.15 MICROgram(s)/kG/Min IV Continuous <Continuous>  fentaNYL   Infusion 1 MICROgram(s)/kG/Hr IV Continuous <Continuous>  heparin  Injectable 5000 Unit(s) SubCutaneous every 12 hours  HYDROmorphone (10 MICROgram(s)/mL) + BUpivacaine 0.0625% in 0.9% Sodium Chloride PCEA 250 milliLiter(s) Epidural PCA Continuous  insulin lispro (HumaLOG) corrective regimen sliding scale   SubCutaneous every 6 hours  metroNIDAZOLE  IVPB      metroNIDAZOLE  IVPB 500 milliGRAM(s) IV Intermittent every 8 hours  norepinephrine Infusion 0.05 MICROgram(s)/kG/Min IV Continuous <Continuous>  pantoprazole  Injectable 40 milliGRAM(s) IV Push daily  phenylephrine    Infusion 0.2 MICROgram(s)/kG/Min IV Continuous <Continuous>  piperacillin/tazobactam IVPB. 3.375 Gram(s) IV Intermittent every 12 hours  propofol Infusion 20 MICROgram(s)/kG/Min IV Continuous <Continuous>  sodium chloride 0.9%. 1000 milliLiter(s) IV Continuous <Continuous>  vasopressin Infusion 0.05 Unit(s)/Min IV Continuous <Continuous>    PRN Inpatient Medications  dextrose 40% Gel 15 Gram(s) Oral once PRN  glucagon  Injectable 1 milliGRAM(s) IntraMuscular once PRN  HYDROmorphone (10 MICROgram(s)/mL) + BUpivacaine 0.0625% in 0.9% Sodium Chloride PCEA Rescue Clinician  Bolus 5 milliLiter(s) Epidural every 15 minutes PRN  naloxone Injectable 0.1 milliGRAM(s) IV Push every 3 minutes PRN  ondansetron Injectable 4 milliGRAM(s) IV Push every 6 hours PRN    REVIEW OF SYSTEMS  --------------------------------------------------------------------------------  Unable to assess due to clinical condition.    VITALS/PHYSICAL EXAM  --------------------------------------------------------------------------------  T(C): 37 (10-12-18 @ 07:00), Max: 37.4 (10-12-18 @ 01:00)  HR: 91 (10-12-18 @ 10:00) (91 - 117)  BP: 88/59 (10-12-18 @ 09:00) (83/50 - 117/64)  RR: 18 (10-12-18 @ 10:00) (12 - 18)  SpO2: 100% (10-12-18 @ 10:00) (99% - 100%)  Wt(kg): --  Height (cm): 176.53 (10-11-18 @ 07:47)  Weight (kg): 59 (10-11-18 @ 07:47)  BMI (kg/m2): 18.9 (10-11-18 @ 07:47)  BSA (m2): 1.73 (10-11-18 @ 07:47)    10-11-18 @ 07:01  -  10-12-18 @ 07:00  --------------------------------------------------------  IN: 2435.6 mL / OUT: 932.5 mL / NET: 1503.1 mL    10-12-18 @ 07:01  -  10-12-18 @ 11:56  --------------------------------------------------------  IN: 967.2 mL / OUT: 110 mL / NET: 857.2 mL      Physical Exam:  	Gen: intubated  	HEENT: +ET tube to vent, +NGT  	Pulm: CTA B/L, +3 chest tube drains noted draining sero-sanguinous fluid  	CV: RRR, S1S2; no rub  	Back: no sacral edema  	Abd: +BS, soft  	: No suprapubic distension  	UE: Warm, no edema  	LE: Warm, no edema  	Neuro: +sedated  	Psych: unable to assess due to sedation  	Skin: Warm  	Vascular access:  LUE AVF site: +thrill +bruit +skin intact; right femoral non-tunneled HD catheter site: no bleeding seen    LABS/STUDIES  --------------------------------------------------------------------------------              11.2   12.02 >-----------<  152      [10-12-18 @ 02:53]              33.2     140  |  101  |  41  ----------------------------<  218      [10-12-18 @ 02:53]  3.7   |  21  |  4.59        Ca     7.9     [10-12-18 @ 02:53]      Mg     2.2     [10-12-18 @ 02:53]      Phos  6.0     [10-12-18 @ 02:53]    TPro  5.5  /  Alb  2.4  /  TBili  0.6  /  DBili  x   /  AST  17  /  ALT  8   /  AlkPhos  136  [10-12-18 @ 02:53]          [10-12-18 @ 08:30]    Creatinine Trend:  SCr 4.59 [10-12 @ 02:53]  SCr 3.91 [10-11 @ 18:00]

## 2018-10-12 NOTE — CONSULT NOTE ADULT - ATTENDING COMMENTS
Trend troponin to check for 20% change - likely elevated from CKD.  In any case, this may represent at type II event rather than acute MI and requires no further ischemic evaluation.
58 M severe biventricular failure, moderate MR, ESRD on HD, was hypotensive after evacuation of possibly infected hemothorax. Was hypothermic, and was put on , vaso, Quan, Levo, and epi. CVP 3 earlier today (now 10). Got albumin x 3. Epi and Quan weaned off. Levo being titrated down. Central venous sat was 60% earlier today (CO~4.2 L/min).    --Likely mixed cardiogenic and vasodilatory shock.  --Agree with fluid resuscitation and holding off on HD.  --Agree with broad-spectrum abx.  --Wean pressors as tolerated.  --Wean vent when off pressors.  --Repeat central venous sat.

## 2018-10-12 NOTE — CONSULT NOTE ADULT - SUBJECTIVE AND OBJECTIVE BOX
Date of Admission: 10/11/18    CHIEF COMPLAINT:    HISTORY OF PRESENT ILLNESS:        Allergies    No Known Allergies      MEDICATIONS:  DOBUTamine Infusion 5 MICROgram(s)/kG/Min IV Continuous <Continuous>  EPINEPHrine    Infusion 0.15 MICROgram(s)/kG/Min IV Continuous <Continuous>  heparin  Injectable 5000 Unit(s) SubCutaneous every 12 hours  norepinephrine Infusion 0.05 MICROgram(s)/kG/Min IV Continuous <Continuous>  phenylephrine    Infusion 0.2 MICROgram(s)/kG/Min IV Continuous <Continuous>  metroNIDAZOLE  IVPB      metroNIDAZOLE  IVPB 500 milliGRAM(s) IV Intermittent every 8 hours  piperacillin/tazobactam IVPB. 3.375 Gram(s) IV Intermittent every 12 hours  dexmedetomidine Infusion 0.5 MICROgram(s)/kG/Hr IV Continuous <Continuous>  fentaNYL   Infusion 1 MICROgram(s)/kG/Hr IV Continuous <Continuous>  HYDROmorphone (10 MICROgram(s)/mL) + BUpivacaine 0.0625% in 0.9% Sodium Chloride PCEA 250 milliLiter(s) Epidural PCA Continuous  HYDROmorphone (10 MICROgram(s)/mL) + BUpivacaine 0.0625% in 0.9% Sodium Chloride PCEA Rescue Clinician  Bolus 5 milliLiter(s) Epidural every 15 minutes PRN  ondansetron Injectable 4 milliGRAM(s) IV Push every 6 hours PRN  propofol Infusion 20 MICROgram(s)/kG/Min IV Continuous <Continuous>  pantoprazole  Injectable 40 milliGRAM(s) IV Push daily  dextrose 40% Gel 15 Gram(s) Oral once PRN  dextrose 50% Injectable 12.5 Gram(s) IV Push once  dextrose 50% Injectable 25 Gram(s) IV Push once  dextrose 50% Injectable 25 Gram(s) IV Push once  glucagon  Injectable 1 milliGRAM(s) IntraMuscular once PRN  insulin lispro (HumaLOG) corrective regimen sliding scale   SubCutaneous every 6 hours  vasopressin Infusion 0.05 Unit(s)/Min IV Continuous <Continuous>  albumin human  5% IVPB 250 milliLiter(s) IV Intermittent once  albumin human  5% IVPB 250 milliLiter(s) IV Intermittent once  dextrose 5%. 1000 milliLiter(s) IV Continuous <Continuous>  sodium chloride 0.9%. 1000 milliLiter(s) IV Continuous <Continuous>      PAST MEDICAL & SURGICAL HISTORY:  Smoking hx  Cardiomyopathy  Wound: right chest  ICD (implantable cardioverter-defibrillator) in place  OA (osteoarthritis)  HLD (hyperlipidemia)  BPH (benign prostatic hyperplasia)  Pleural effusion  HTN (hypertension)  ESRD (end stage renal disease)  H/O chest wound: right  History of wound infection: right chest wall - revision 5/18 and again in 7/18  History of implantable cardioverter-defibrillator (ICD) placement: pt unsure when placed  H/O bilateral hip replacements: 2008, 2009      FAMILY HISTORY:      SOCIAL HISTORY:          REVIEW OF SYSTEMS:  CONSTITUTIONAL: No fever, weight loss, or fatigue  EYES: No eye pain, visual disturbances, or discharge  ENMT:  No difficulty hearing, tinnitus, vertigo; No sinus or throat pain  NECK: No pain or stiffness  RESPIRATORY: No cough, wheezing, chills or hemoptysis; No Shortness of Breath  CARDIOVASCULAR: No chest pain, palpitations, passing out, dizziness, or leg swelling  GASTROINTESTINAL: No abdominal or epigastric pain. No nausea, vomiting, or hematemesis; No diarrhea or constipation. No melena or hematochezia.  GENITOURINARY: No dysuria, frequency, hematuria, or incontinence  NEUROLOGICAL: No headaches, memory loss, loss of strength, numbness, or tremors  SKIN: No itching, burning, rashes, or lesions   LYMPH Nodes: No enlarged glands  ENDOCRINE: No heat or cold intolerance; No hair loss  MUSCULOSKELETAL: No joint pain or swelling; No muscle, back, or extremity pain  PSYCHIATRIC: No depression, anxiety, mood swings, or difficulty sleeping  HEME/LYMPH: No easy bruising, or bleeding gums  ALLERY AND IMMUNOLOGIC: No hives or eczema	      PHYSICAL EXAM:  T(C): 37 (10-12-18 @ 07:00), Max: 37.4 (10-12-18 @ 01:00)  HR: 100 (10-12-18 @ 09:00) (95 - 117)  BP: 88/59 (10-12-18 @ 09:00) (83/50 - 117/64)  RR: 18 (10-12-18 @ 09:00) (12 - 18)  SpO2: 99% (10-12-18 @ 09:00) (99% - 100%)  Wt(kg): --  I&O's Summary    11 Oct 2018 07:01  -  12 Oct 2018 07:00  --------------------------------------------------------  IN: 2435.6 mL / OUT: 932.5 mL / NET: 1503.1 mL    12 Oct 2018 07:01  -  12 Oct 2018 10:27  --------------------------------------------------------  IN: 880.1 mL / OUT: 110 mL / NET: 770.1 mL        Appearance: Normal	  HEENT:   Normal oral mucosa, PERRL, EOMI	  Lymphatic: No lymphadenopathy  Cardiovascular: Normal S1 S2, No JVD, No murmurs, No edema  Respiratory: Lungs clear to auscultation	  Psychiatry: A & O x 3, Mood & affect appropriate  Gastrointestinal:  Soft, Non-tender, + BS	  Skin: No rashes, No ecchymoses, No cyanosis	  Neurologic: Non-focal  Extremities: Normal range of motion, No clubbing, cyanosis or edema  Vascular: Peripheral pulses palpable 2+ bilaterally        LABS:	 	    CBC Full  -  ( 12 Oct 2018 02:53 )  WBC Count : 12.02 K/uL  Hemoglobin : 11.2 g/dL  Hematocrit : 33.2 %  Platelet Count - Automated : 152 K/uL  Mean Cell Volume : 93.0 fL  Mean Cell Hemoglobin : 31.4 pg  Mean Cell Hemoglobin Concentration : 33.7 %  Auto Neutrophil # : 10.25 K/uL  Auto Lymphocyte # : 0.80 K/uL  Auto Monocyte # : 0.87 K/uL  Auto Eosinophil # : 0.01 K/uL  Auto Basophil # : 0.02 K/uL  Auto Neutrophil % : 85.2 %  Auto Lymphocyte % : 6.7 %  Auto Monocyte % : 7.2 %  Auto Eosinophil % : 0.1 %  Auto Basophil % : 0.2 %    10-12    140  |  101  |  41<H>  ----------------------------<  218<H>  3.7   |  21<L>  |  4.59<H>  10-11    141  |  99  |  35<H>  ----------------------------<  219<H>  3.4<L>   |  22  |  3.91<H>    Ca    7.9<L>      12 Oct 2018 02:53  Ca    8.5      11 Oct 2018 18:00  Phos  6.0     10-12  Phos  5.6     10-11  Mg     2.2     10-12  Mg     2.1     10-11    TPro  5.5<L>  /  Alb  2.4<L>  /  TBili  0.6  /  DBili  x   /  AST  17  /  ALT  8   /  AlkPhos  136<H>  10-12  TPro  5.8<L>  /  Alb  2.6<L>  /  TBili  1.5<H>  /  DBili  x   /  AST  18  /  ALT  10  /  AlkPhos  157<H>  10-11      proBNP:   Lipid Profile:   HgA1c: Hemoglobin A1C, Whole Blood: 5.5 % (10-12 @ 02:53)        CARDIAC MARKERS:      CKMB: 5.75 ng/mL (10-12 @ 08:30)    < from: Xray Chest 1 View- PORTABLE-Routine (10.12.18 @ 07:37) >  6:42 PM:  There is a subcutaneous AICD, endotracheal and 3 right-sidedchest tubes,   right IJ line and right-sided skin staples in place. Right subcutaneous   drain is seen.     Small pneumothorax and muscle flap is present. The left lung is clear and   no obvious vascular congestion.    6:58 PM:  Tubes and lines are unchanged. Right basilar pneumothorax again present.   Air beneath the heart probably represents pneumoperitoneum although   pneumomediastinum is a possibility.    October 12:  6:42 AM:  Right basilar pneumothorax appears slightly decreased. No other interval   change. Tubes and lines in position.      COMPARISON:  None available      IMPRESSION:  Post right thoracotomy with endotracheal and chest tubes,   mild vascular congestion, basilar pneumothorax and muscle flap in place.   Pneumoperitoneum also suggested.      < end of copied text > Date of Admission: 10/11/18    CHIEF COMPLAINT:    HISTORY OF PRESENT ILLNESS:    57 y/o male (Mandarin speaking) with hx of NICM? LVEF of 10? w/ ICD, HLD, ESRD on HD, former smoker, BPH, hx of prior chest/lung surgeries with chest wall wound infections, b/l hip replacements comes in for a scheduled right thoracotomy w/ decortication and muscle flap with Dr. Pickering on 10/11/18. Post surgery patient required Dobutamine 10 mcg/kg/min (now weaned to 5 mcg/kg/min). He is currently intubated on pressors ; norepinephrine, phelylephrine, vasopressin and epinephrine. He is also on Zosyn and metronidazole. Has 3 right sided chest tubes in place. Has a small right sided basilar phenomothorax.   HF consulted to evaluate cardiogenic shock.       Allergies    No Known Allergies      MEDICATIONS:  DOBUTamine Infusion 5 MICROgram(s)/kG/Min IV Continuous <Continuous>  EPINEPHrine    Infusion 0.15 MICROgram(s)/kG/Min IV Continuous <Continuous>  heparin  Injectable 5000 Unit(s) SubCutaneous every 12 hours  norepinephrine Infusion 0.05 MICROgram(s)/kG/Min IV Continuous <Continuous>  phenylephrine    Infusion 0.2 MICROgram(s)/kG/Min IV Continuous <Continuous>  metroNIDAZOLE  IVPB      metroNIDAZOLE  IVPB 500 milliGRAM(s) IV Intermittent every 8 hours  piperacillin/tazobactam IVPB. 3.375 Gram(s) IV Intermittent every 12 hours  dexmedetomidine Infusion 0.5 MICROgram(s)/kG/Hr IV Continuous <Continuous>  fentaNYL   Infusion 1 MICROgram(s)/kG/Hr IV Continuous <Continuous>  HYDROmorphone (10 MICROgram(s)/mL) + BUpivacaine 0.0625% in 0.9% Sodium Chloride PCEA 250 milliLiter(s) Epidural PCA Continuous  HYDROmorphone (10 MICROgram(s)/mL) + BUpivacaine 0.0625% in 0.9% Sodium Chloride PCEA Rescue Clinician  Bolus 5 milliLiter(s) Epidural every 15 minutes PRN  ondansetron Injectable 4 milliGRAM(s) IV Push every 6 hours PRN  propofol Infusion 20 MICROgram(s)/kG/Min IV Continuous <Continuous>  pantoprazole  Injectable 40 milliGRAM(s) IV Push daily  dextrose 40% Gel 15 Gram(s) Oral once PRN  dextrose 50% Injectable 12.5 Gram(s) IV Push once  dextrose 50% Injectable 25 Gram(s) IV Push once  dextrose 50% Injectable 25 Gram(s) IV Push once  glucagon  Injectable 1 milliGRAM(s) IntraMuscular once PRN  insulin lispro (HumaLOG) corrective regimen sliding scale   SubCutaneous every 6 hours  vasopressin Infusion 0.05 Unit(s)/Min IV Continuous <Continuous>  albumin human  5% IVPB 250 milliLiter(s) IV Intermittent once  albumin human  5% IVPB 250 milliLiter(s) IV Intermittent once  dextrose 5%. 1000 milliLiter(s) IV Continuous <Continuous>  sodium chloride 0.9%. 1000 milliLiter(s) IV Continuous <Continuous>      PAST MEDICAL & SURGICAL HISTORY:  Smoking hx  Cardiomyopathy  Wound: right chest  ICD (implantable cardioverter-defibrillator) in place  OA (osteoarthritis)  HLD (hyperlipidemia)  BPH (benign prostatic hyperplasia)  Pleural effusion  HTN (hypertension)  ESRD (end stage renal disease)  H/O chest wound: right  History of wound infection: right chest wall - revision 5/18 and again in 7/18  History of implantable cardioverter-defibrillator (ICD) placement: pt unsure when placed  H/O bilateral hip replacements: 2008, 2009      FAMILY HISTORY:      SOCIAL HISTORY:    Former smoker     REVIEW OF SYSTEMS:  CONSTITUTIONAL: No fever, weight loss, or fatigue  EYES: No eye pain, visual disturbances, or discharge  ENMT:  No difficulty hearing, tinnitus, vertigo; No sinus or throat pain  NECK: No pain or stiffness  RESPIRATORY: No cough, wheezing, chills or hemoptysis; No Shortness of Breath  CARDIOVASCULAR: No chest pain, palpitations, passing out, dizziness, or leg swelling  GASTROINTESTINAL: No abdominal or epigastric pain. No nausea, vomiting, or hematemesis; No diarrhea or constipation. No melena or hematochezia.  GENITOURINARY: No dysuria, frequency, hematuria, or incontinence  NEUROLOGICAL: No headaches, memory loss, loss of strength, numbness, or tremors  SKIN: No itching, burning, rashes, or lesions   LYMPH Nodes: No enlarged glands  ENDOCRINE: No heat or cold intolerance; No hair loss  MUSCULOSKELETAL: No joint pain or swelling; No muscle, back, or extremity pain  PSYCHIATRIC: No depression, anxiety, mood swings, or difficulty sleeping  HEME/LYMPH: No easy bruising, or bleeding gums  ALLERY AND IMMUNOLOGIC: No hives or eczema	      PHYSICAL EXAM:  T(C): 37 (10-12-18 @ 07:00), Max: 37.4 (10-12-18 @ 01:00)  HR: 100 (10-12-18 @ 09:00) (95 - 117)  BP: 88/59 (10-12-18 @ 09:00) (83/50 - 117/64)  RR: 18 (10-12-18 @ 09:00) (12 - 18)  SpO2: 99% (10-12-18 @ 09:00) (99% - 100%)  Wt(kg): --  I&O's Summary    11 Oct 2018 07:01  -  12 Oct 2018 07:00  --------------------------------------------------------  IN: 2435.6 mL / OUT: 932.5 mL / NET: 1503.1 mL    12 Oct 2018 07:01  -  12 Oct 2018 10:27  --------------------------------------------------------  IN: 880.1 mL / OUT: 110 mL / NET: 770.1 mL        Appearance: Normal	  HEENT:   Normal oral mucosa, PERRL, EOMI	  Lymphatic: No lymphadenopathy  Cardiovascular: Normal S1 S2, No JVD, No murmurs, No edema  Respiratory: Lungs clear to auscultation	  Psychiatry: A & O x 3, Mood & affect appropriate  Gastrointestinal:  Soft, Non-tender, + BS	  Skin: No rashes, No ecchymoses, No cyanosis	  Neurologic: Non-focal  Extremities: Normal range of motion, No clubbing, cyanosis or edema  Vascular: Peripheral pulses palpable 2+ bilaterally        LABS:	 	    CBC Full  -  ( 12 Oct 2018 02:53 )  WBC Count : 12.02 K/uL  Hemoglobin : 11.2 g/dL  Hematocrit : 33.2 %  Platelet Count - Automated : 152 K/uL  Mean Cell Volume : 93.0 fL  Mean Cell Hemoglobin : 31.4 pg  Mean Cell Hemoglobin Concentration : 33.7 %  Auto Neutrophil # : 10.25 K/uL  Auto Lymphocyte # : 0.80 K/uL  Auto Monocyte # : 0.87 K/uL  Auto Eosinophil # : 0.01 K/uL  Auto Basophil # : 0.02 K/uL  Auto Neutrophil % : 85.2 %  Auto Lymphocyte % : 6.7 %  Auto Monocyte % : 7.2 %  Auto Eosinophil % : 0.1 %  Auto Basophil % : 0.2 %    10-12    140  |  101  |  41<H>  ----------------------------<  218<H>  3.7   |  21<L>  |  4.59<H>  10-11    141  |  99  |  35<H>  ----------------------------<  219<H>  3.4<L>   |  22  |  3.91<H>    Ca    7.9<L>      12 Oct 2018 02:53  Ca    8.5      11 Oct 2018 18:00  Phos  6.0     10-12  Phos  5.6     10-11  Mg     2.2     10-12  Mg     2.1     10-11    TPro  5.5<L>  /  Alb  2.4<L>  /  TBili  0.6  /  DBili  x   /  AST  17  /  ALT  8   /  AlkPhos  136<H>  10-12  TPro  5.8<L>  /  Alb  2.6<L>  /  TBili  1.5<H>  /  DBili  x   /  AST  18  /  ALT  10  /  AlkPhos  157<H>  10-11      proBNP:   Lipid Profile:   HgA1c: Hemoglobin A1C, Whole Blood: 5.5 % (10-12 @ 02:53)        CARDIAC MARKERS:      CKMB: 5.75 ng/mL (10-12 @ 08:30)    < from: Xray Chest 1 View- PORTABLE-Routine (10.12.18 @ 07:37) >  6:42 PM:  There is a subcutaneous AICD, endotracheal and 3 right-sidedchest tubes,   right IJ line and right-sided skin staples in place. Right subcutaneous   drain is seen.     Small pneumothorax and muscle flap is present. The left lung is clear and   no obvious vascular congestion.    6:58 PM:  Tubes and lines are unchanged. Right basilar pneumothorax again present.   Air beneath the heart probably represents pneumoperitoneum although   pneumomediastinum is a possibility.    October 12:  6:42 AM:  Right basilar pneumothorax appears slightly decreased. No other interval   change. Tubes and lines in position.      COMPARISON:  None available      IMPRESSION:  Post right thoracotomy with endotracheal and chest tubes,   mild vascular congestion, basilar pneumothorax and muscle flap in place.   Pneumoperitoneum also suggested.      < end of copied text > Date of Admission: 10/11/18    CHIEF COMPLAINT:    HISTORY OF PRESENT ILLNESS:    57 y/o male (Mandarin speaking) with hx of NICM? HFrEF (LVEF 20%, severely dilated LVIDD 7.3 cm) w/ ICD, HLD, ESRD on HD, former smoker, BPH, hx of prior chest/lung surgeries with chest wall wound infections, b/l hip replacements comes in for a scheduled right thoracotomy w/ decortication and muscle flap with Dr. Jose Alfredo Pickering on 10/11/18. Post surgery patient required Dobutamine 10 mcg/kg/min (now weaned to 5 mcg/kg/min) and pressors. He is currently intubated on pressors ; norepinephrine, phenylephrine, vasopressin and epinephrine. He is also on Zosyn and metronidazole prophylactically. Has 3 right sided chest tubes in place. Has a small right sided basilar phenomothorax. HF consulted to evaluate cardiogenic shock. He is awake and alert with wife by bedside. Per record patient is a poor historian and not aware of which meds he was on at home.       Allergies    No Known Allergies      MEDICATIONS:  DOBUTamine Infusion 5 MICROgram(s)/kG/Min IV Continuous <Continuous>  EPINEPHrine    Infusion 0.15 MICROgram(s)/kG/Min IV Continuous <Continuous>  heparin  Injectable 5000 Unit(s) SubCutaneous every 12 hours  norepinephrine Infusion 0.05 MICROgram(s)/kG/Min IV Continuous <Continuous>  phenylephrine    Infusion 0.2 MICROgram(s)/kG/Min IV Continuous <Continuous>  metroNIDAZOLE  IVPB      metroNIDAZOLE  IVPB 500 milliGRAM(s) IV Intermittent every 8 hours  piperacillin/tazobactam IVPB. 3.375 Gram(s) IV Intermittent every 12 hours  dexmedetomidine Infusion 0.5 MICROgram(s)/kG/Hr IV Continuous <Continuous>  fentaNYL   Infusion 1 MICROgram(s)/kG/Hr IV Continuous <Continuous>  HYDROmorphone (10 MICROgram(s)/mL) + BUpivacaine 0.0625% in 0.9% Sodium Chloride PCEA 250 milliLiter(s) Epidural PCA Continuous  HYDROmorphone (10 MICROgram(s)/mL) + BUpivacaine 0.0625% in 0.9% Sodium Chloride PCEA Rescue Clinician  Bolus 5 milliLiter(s) Epidural every 15 minutes PRN  ondansetron Injectable 4 milliGRAM(s) IV Push every 6 hours PRN  propofol Infusion 20 MICROgram(s)/kG/Min IV Continuous <Continuous>  pantoprazole  Injectable 40 milliGRAM(s) IV Push daily  dextrose 40% Gel 15 Gram(s) Oral once PRN  dextrose 50% Injectable 12.5 Gram(s) IV Push once  dextrose 50% Injectable 25 Gram(s) IV Push once  dextrose 50% Injectable 25 Gram(s) IV Push once  glucagon  Injectable 1 milliGRAM(s) IntraMuscular once PRN  insulin lispro (HumaLOG) corrective regimen sliding scale   SubCutaneous every 6 hours  vasopressin Infusion 0.05 Unit(s)/Min IV Continuous <Continuous>  albumin human  5% IVPB 250 milliLiter(s) IV Intermittent once  albumin human  5% IVPB 250 milliLiter(s) IV Intermittent once  dextrose 5%. 1000 milliLiter(s) IV Continuous <Continuous>  sodium chloride 0.9%. 1000 milliLiter(s) IV Continuous <Continuous>      PAST MEDICAL & SURGICAL HISTORY:  Smoking hx  Cardiomyopathy  Wound: right chest  ICD (implantable cardioverter-defibrillator) in place  OA (osteoarthritis)  HLD (hyperlipidemia)  BPH (benign prostatic hyperplasia)  Pleural effusion  HTN (hypertension)  ESRD (end stage renal disease)  H/O chest wound: right  History of wound infection: right chest wall - revision 5/18 and again in 7/18  History of implantable cardioverter-defibrillator (ICD) placement: pt unsure when placed  H/O bilateral hip replacements: 2008, 2009      FAMILY HISTORY:  Unclear currently    SOCIAL HISTORY:    Former smoker     REVIEW OF SYSTEMS: Not able to obtain currently    N/A  CONSTITUTIONAL: No fever, weight loss, or fatigue  EYES: No eye pain, visual disturbances, or discharge  ENMT:  No difficulty hearing, tinnitus, vertigo; No sinus or throat pain  NECK: No pain or stiffness  RESPIRATORY: No cough, wheezing, chills or hemoptysis; No Shortness of Breath  CARDIOVASCULAR: No chest pain, palpitations, passing out, dizziness, or leg swelling  GASTROINTESTINAL: No abdominal or epigastric pain. No nausea, vomiting, or hematemesis; No diarrhea or constipation. No melena or hematochezia.  GENITOURINARY: No dysuria, frequency, hematuria, or incontinence  NEUROLOGICAL: No headaches, memory loss, loss of strength, numbness, or tremors  SKIN: No itching, burning, rashes, or lesions   LYMPH Nodes: No enlarged glands  ENDOCRINE: No heat or cold intolerance; No hair loss  MUSCULOSKELETAL: No joint pain or swelling; No muscle, back, or extremity pain  PSYCHIATRIC: No depression, anxiety, mood swings, or difficulty sleeping  HEME/LYMPH: No easy bruising, or bleeding gums  ALLERY AND IMMUNOLOGIC: No hives or eczema	      PHYSICAL EXAM:  T(C): 37 (10-12-18 @ 07:00), Max: 37.4 (10-12-18 @ 01:00)  HR: 100 (10-12-18 @ 09:00) (95 - 117)  BP: 88/59 (10-12-18 @ 09:00) (83/50 - 117/64)  RR: 18 (10-12-18 @ 09:00) (12 - 18)  SpO2: 99% (10-12-18 @ 09:00) (99% - 100%)  Wt(kg): --  I&O's Summary    11 Oct 2018 07:01  -  12 Oct 2018 07:00  --------------------------------------------------------  IN: 2435.6 mL / OUT: 932.5 mL / NET: 1503.1 mL    12 Oct 2018 07:01  -  12 Oct 2018 10:27  --------------------------------------------------------  IN: 880.1 mL / OUT: 110 mL / NET: 770.1 mL        Appearance: critical pt on multiple pressors w/ lines 	  HEENT:   Normal oral mucosa, PERRL, EOMI	  Lymphatic: No lymphadenopathy  Cardiovascular: S1 S2 RRR, Could not evaluate IJ in place, II/VI CARLOS A, No edema b/l, slightly cool b/l. 3 right sided chest tubes in place w/ serosanguinous fluid   Respiratory: intubated   Psychiatry: could not evaluate  Gastrointestinal:  Soft, Non-tender, + BS	  Skin: 3 chest tubes in place on right   Neurologic: Non-focal  Extremities: Normal range of motion, No clubbing, cyanosis or edema  Vascular: Peripheral pulses palpable 2+ bilaterally        LABS:	 	    CBC Full  -  ( 12 Oct 2018 02:53 )  WBC Count : 12.02 K/uL  Hemoglobin : 11.2 g/dL  Hematocrit : 33.2 %  Platelet Count - Automated : 152 K/uL  Mean Cell Volume : 93.0 fL  Mean Cell Hemoglobin : 31.4 pg  Mean Cell Hemoglobin Concentration : 33.7 %  Auto Neutrophil # : 10.25 K/uL  Auto Lymphocyte # : 0.80 K/uL  Auto Monocyte # : 0.87 K/uL  Auto Eosinophil # : 0.01 K/uL  Auto Basophil # : 0.02 K/uL  Auto Neutrophil % : 85.2 %  Auto Lymphocyte % : 6.7 %  Auto Monocyte % : 7.2 %  Auto Eosinophil % : 0.1 %  Auto Basophil % : 0.2 %    10-12    140  |  101  |  41<H>  ----------------------------<  218<H>  3.7   |  21<L>  |  4.59<H>  10-11    141  |  99  |  35<H>  ----------------------------<  219<H>  3.4<L>   |  22  |  3.91<H>    Ca    7.9<L>      12 Oct 2018 02:53  Ca    8.5      11 Oct 2018 18:00  Phos  6.0     10-12  Phos  5.6     10-11  Mg     2.2     10-12  Mg     2.1     10-11    TPro  5.5<L>  /  Alb  2.4<L>  /  TBili  0.6  /  DBili  x   /  AST  17  /  ALT  8   /  AlkPhos  136<H>  10-12  TPro  5.8<L>  /  Alb  2.6<L>  /  TBili  1.5<H>  /  DBili  x   /  AST  18  /  ALT  10  /  AlkPhos  157<H>  10-11      proBNP:   Lipid Profile:   HgA1c: Hemoglobin A1C, Whole Blood: 5.5 % (10-12 @ 02:53)        CARDIAC MARKERS:      CKMB: 5.75 ng/mL (10-12 @ 08:30)    < from: Xray Chest 1 View- PORTABLE-Routine (10.12.18 @ 07:37) >  6:42 PM:  There is a subcutaneous AICD, endotracheal and 3 right-sidedchest tubes,   right IJ line and right-sided skin staples in place. Right subcutaneous   drain is seen.     Small pneumothorax and muscle flap is present. The left lung is clear and   no obvious vascular congestion.    6:58 PM:  Tubes and lines are unchanged. Right basilar pneumothorax again present.   Air beneath the heart probably represents pneumoperitoneum although   pneumomediastinum is a possibility.    October 12:  6:42 AM:  Right basilar pneumothorax appears slightly decreased. No other interval   change. Tubes and lines in position.      COMPARISON:  None available      IMPRESSION:  Post right thoracotomy with endotracheal and chest tubes,   mild vascular congestion, basilar pneumothorax and muscle flap in place.   Pneumoperitoneum also suggested.      < end of copied text >

## 2018-10-12 NOTE — CONSULT NOTE ADULT - ASSESSMENT
58M Mandarin speaking with NICM EF 10% s/p ICD, ESRD on HD, multiple prior lung surgeries as below scheduled for Right Thoractomy Decortiation, Right Chest Wall reconstruction with Latissimjus Dorsi Flap Poss Skin Graft with VAC dressing with Dr Pickering 10/11/18.  Post-op complicated by shock, likely cardiogenic  Troponin 96,  and CK MB 5.75; EKG not significantly changed from prior  Requiring levo, , nena, levo    #Elevated troponin  -- likely in setting of post-operative inflammation and ESRD  -- would repeat troponin, if rise is not greater than 20% unlikely to be ACS  -- CK and CK MB currently go against ACS  -- can defer DAPT and heparin gtt currently, especially given newly post-op  -- check TTE  -- no role for cath lab at this time    #Cardiogenic Shock  -- HF team to evaluate  -- would transduce CVPs  -- serial lactates  -- consider Carolann Lee MD

## 2018-10-12 NOTE — PROGRESS NOTE ADULT - ASSESSMENT
ASSESSMENT: 58M s/p right wall reconstruction w/ tunneled latissimus dorsi flap, covered by serratus anterior flap, after R thoracotomy, takedown of pleurocutaneous fistula, decortication, and resection of right 7th rib    PLAN:  --care per [facility]  --c/w vac dressing  --drain care

## 2018-10-12 NOTE — PROGRESS NOTE ADULT - SUBJECTIVE AND OBJECTIVE BOX
CLAUDIA HAN  [id number]    Subjective:  Patient is a 58y old  Male who presents with a chief complaint of Pleural effusion and right chest wall cyst (01 Oct 2018 09:25)   Patient was seen and examined in CTICU. On nena, levo, dobutamine, and epi for pressor support. Propofol and fentanyl for sedation. Plans for sedation vacation and extubation today.     Objective:  T(C): 37 (10-12-18 @ 07:00), Max: 37.4 (10-12-18 @ 01:00)  HR: 100 (10-12-18 @ 09:00) (95 - 117)  BP: 88/59 (10-12-18 @ 09:00) (83/50 - 117/64)  RR: 18 (10-12-18 @ 09:00) (12 - 18)  SpO2: 99% (10-12-18 @ 09:00) (99% - 100%)  Wt(kg): --   10-12    140  |  101  |  41<H>  ----------------------------<  218<H>  3.7   |  21<L>  |  4.59<H>    Ca    7.9<L>      12 Oct 2018 02:53  Phos  6.0     10-12  Mg     2.2     10-12    TPro  5.5<L>  /  Alb  2.4<L>  /  TBili  0.6  /  DBili  x   /  AST  17  /  ALT  8   /  AlkPhos  136<H>  10-12                        11.2   12.02 )-----------( 152      ( 12 Oct 2018 02:53 )             33.2       10-11 @ 07:01  -  10-12 @ 07:00  --------------------------------------------------------  IN: 2435.6 mL / OUT: 932.5 mL / NET: 1503.1 mL    10-12 @ 07:01  -  10-12 @ 09:43  --------------------------------------------------------  IN: 380.1 mL / OUT: 80 mL / NET: 300.1 mL      PHYSICAL EXAM:    General: Sedated. Intubated  Chest: Vac in place on right chest wall. CT x3. JPx2 SS. No collections palpated.             MEDICATIONS  (STANDING):  albumin human  5% IVPB 250 milliLiter(s) IV Intermittent once  albumin human  5% IVPB 250 milliLiter(s) IV Intermittent once  dexmedetomidine Infusion 0.5 MICROgram(s)/kG/Hr (7.375 mL/Hr) IV Continuous <Continuous>  dextrose 5%. 1000 milliLiter(s) (50 mL/Hr) IV Continuous <Continuous>  dextrose 50% Injectable 12.5 Gram(s) IV Push once  dextrose 50% Injectable 25 Gram(s) IV Push once  dextrose 50% Injectable 25 Gram(s) IV Push once  DOBUTamine Infusion 5 MICROgram(s)/kG/Min (8.85 mL/Hr) IV Continuous <Continuous>  EPINEPHrine    Infusion 0.15 MICROgram(s)/kG/Min (33.188 mL/Hr) IV Continuous <Continuous>  fentaNYL   Infusion 1 MICROgram(s)/kG/Hr (5.9 mL/Hr) IV Continuous <Continuous>  heparin  Injectable 5000 Unit(s) SubCutaneous every 12 hours  HYDROmorphone (10 MICROgram(s)/mL) + BUpivacaine 0.0625% in 0.9% Sodium Chloride PCEA 250 milliLiter(s) Epidural PCA Continuous  insulin lispro (HumaLOG) corrective regimen sliding scale   SubCutaneous every 6 hours  metroNIDAZOLE  IVPB      metroNIDAZOLE  IVPB 500 milliGRAM(s) IV Intermittent every 8 hours  norepinephrine Infusion 0.05 MICROgram(s)/kG/Min (2.766 mL/Hr) IV Continuous <Continuous>  pantoprazole  Injectable 40 milliGRAM(s) IV Push daily  phenylephrine    Infusion 0.2 MICROgram(s)/kG/Min (2.212 mL/Hr) IV Continuous <Continuous>  piperacillin/tazobactam IVPB. 3.375 Gram(s) IV Intermittent every 12 hours  propofol Infusion 20 MICROgram(s)/kG/Min (7.08 mL/Hr) IV Continuous <Continuous>  sodium chloride 0.9%. 1000 milliLiter(s) (30 mL/Hr) IV Continuous <Continuous>  vasopressin Infusion 0.05 Unit(s)/Min (3 mL/Hr) IV Continuous <Continuous>    MEDICATIONS  (PRN):  dextrose 40% Gel 15 Gram(s) Oral once PRN Blood Glucose LESS THAN 70 milliGRAM(s)/deciliter  glucagon  Injectable 1 milliGRAM(s) IntraMuscular once PRN Glucose LESS THAN 70 milligrams/deciliter  HYDROmorphone (10 MICROgram(s)/mL) + BUpivacaine 0.0625% in 0.9% Sodium Chloride PCEA Rescue Clinician  Bolus 5 milliLiter(s) Epidural every 15 minutes PRN for Pain Score greater than 6  naloxone Injectable 0.1 milliGRAM(s) IV Push every 3 minutes PRN For ANY of the following changes in patient status:  A. RR LESS THAN 10 breaths per minute, B. Oxygen saturation LESS THAN 90%, C. Sedation score of 6  ondansetron Injectable 4 milliGRAM(s) IV Push every 6 hours PRN Nausea

## 2018-10-12 NOTE — CONSULT NOTE ADULT - PROBLEM SELECTOR RECOMMENDATION 2
Hgb low to 11.6. Hgb stable in 11 range. No need for MOOKIE therapy at this time. Monitor H/H Hemoglobin in acceptable range. Monitor H/H

## 2018-10-12 NOTE — CONSULT NOTE ADULT - ASSESSMENT
59 y/o male (Mandarin speaking) with hx of NICM? HFrEF (LVEF 20%, severely dilated LVIDD 7.3 cm) w/ ICD, HLD, ESRD on HD, former smoker, BPH, hx of prior chest/lung surgeries with chest wall wound infections, b/l hip replacements comes in for a scheduled right thoracotomy w/ decortication and muscle flap with Dr. Jose Alfredo Pickering on 10/11/18. Post surgery patient required Dobutamine 10 mcg/kg/min (now weaned to 5 mcg/kg/min) and pressors. He is currently intubated on pressors ; norepinephrine, phenylephrine, vasopressin and epinephrine. He is also on Zosyn and metronidazole prophylactically. Has 3 right sided chest tubes in place. Has a small right sided basilar phenomothorax. HF consulted to evaluate cardiogenic shock. He is awake and alert with wife by bedside. Per record patient is a poor historian and not aware of which meds he was on at home.     Critically ill on inotrope and 4 pressors.

## 2018-10-12 NOTE — CONSULT NOTE ADULT - PROBLEM SELECTOR RECOMMENDATION 9
on HD TTS, with last HD on Wednesday however. Electrolytes and volume status currently stable. Pt in shock, requiring multiple pressor support. No need for urgent HD, but if requiring HD will need CRRT due to critical condition. Monitor BMP. HD consent signed and in chart Pt. with on HD TIW (TTS), with last HD on Wednesday. Pt. currently hypotensive/in shock, requiring multiple IV vasopressors. Labs reviewed. No need for urgent HD, but if requiring HD will need CRRT due to critical condition. Monitor BMP. HD consent signed and in chart Pt. with on HD TIW (TTS), with last HD on Wednesday. Pt. currently hypotensive/in shock, requiring multiple IV vasopressors. Labs reviewed. No urgent need for HD, but if requiring HD will need CRRT due to critical condition. Monitor BMP. HD consent signed and placed in chart

## 2018-10-12 NOTE — PROGRESS NOTE ADULT - SUBJECTIVE AND OBJECTIVE BOX
CLAUDIA HAN          MRN-[id number]    HPI:  Pt is a 58 yr old Mandarin speaking male scheduled for Right Thoractomy Decortiation, Right Chest Wall reconstruction with Latissimjus Dorsi Flap Poss Skin Graft with VAC dressing with Dr Pickering 10/11/18. Pt has ICD but unable to identify make or model. Pt hx of ESRD with HD T/Th/Sat for 4 hrs and has stated he has stopped some of his meds but cannot say which ones. Pt seen and received MC and CC but has 2 different med lists and is unable to identify meds taking. Pt denies blood thinners. Pt has stopped ASA as of last week. Pt hx gathered from [employer] and notes from Dr Pickering - pt is poor historian.     Call through  to patient who went to local pharmacy for confirmation of meds and pt given preop instructions (01 Oct 2018 09:25)      Procedure:  POD # :     Issues:        Interval/Overnight Events/ ROS  Pt remained hemodynamically stable overnight, not on any pressors or inotropes. OOB to chair, breathing comfortably with minimal pain. Ambulated several times . Denies pain, no SOB, no palpitations, no nausea/ no vomiting, no dizziness  A-line and sharma d/kirsten         PAST MEDICAL & SURGICAL HISTORY:  Smoking hx  Cardiomyopathy  Wound: right chest  ICD (implantable cardioverter-defibrillator) in place  OA (osteoarthritis)  HLD (hyperlipidemia)  BPH (benign prostatic hyperplasia)  Pleural effusion  HTN (hypertension)  ESRD (end stage renal disease)  H/O chest wound: right  History of wound infection: right chest wall - revision 5/18 and again in 7/18  History of implantable cardioverter-defibrillator (ICD) placement: pt unsure when placed  H/O bilateral hip replacements: 2008, 2009    Allergies    No Known Allergies    Intolerances            ***VITAL SIGNS:  Vital Signs Last 24 Hrs  T(C): 37.2 (12 Oct 2018 04:00), Max: 37.4 (12 Oct 2018 01:00)  T(F): 98.9 (12 Oct 2018 04:00), Max: 99.3 (12 Oct 2018 01:00)  HR: 104 (12 Oct 2018 04:00) (95 - 117)  BP: 89/61 (12 Oct 2018 04:00) (83/50 - 117/64)  BP(mean): 68 (12 Oct 2018 04:00) (58 - 76)  RR: 18 (12 Oct 2018 04:00) (12 - 18)  SpO2: 99% (12 Oct 2018 04:00) (98% - 100%)    I/Os:   I&O's Detail    11 Oct 2018 07:01  -  12 Oct 2018 04:27  --------------------------------------------------------  IN:    DOBUTamine Infusion: 141.6 mL    DOBUTamine Infusion: 12.4 mL    EPINEPHrine Infusion: 298.8 mL    fentaNYL  Infusion: 106.2 mL    IV PiggyBack: 550 mL    lactated ringers.: 60 mL    norepinephrine Infusion: 203 mL    phenylephrine   Infusion: 179.3 mL    propofol Infusion: 44.3 mL    propofol Infusion: 76.2 mL    sodium chloride 0.9%.: 300 mL  Total IN: 1971.8 mL    OUT:    Bulb: 150 mL    Bulb: 7.5 mL    Chest Tube: 405 mL    Chest Tube: 65 mL    Chest Tube: 125 mL  Total OUT: 752.5 mL    Total NET: 1219.3 mL          CAPILLARY BLOOD GLUCOSE      POCT Blood Glucose.: 258 mg/dL (12 Oct 2018 00:01)  POCT Blood Glucose.: 255 mg/dL (11 Oct 2018 20:16)  POCT Blood Glucose.: 206 mg/dL (11 Oct 2018 18:06)      =======================  MEDICATIONS  ===================  MEDICATIONS  (STANDING):  dextrose 5%. 1000 milliLiter(s) (50 mL/Hr) IV Continuous <Continuous>  dextrose 50% Injectable 12.5 Gram(s) IV Push once  dextrose 50% Injectable 25 Gram(s) IV Push once  dextrose 50% Injectable 25 Gram(s) IV Push once  DOBUTamine Infusion 7 MICROgram(s)/kG/Min (12.39 mL/Hr) IV Continuous <Continuous>  EPINEPHrine    Infusion 0.15 MICROgram(s)/kG/Min (33.188 mL/Hr) IV Continuous <Continuous>  fentaNYL   Infusion 1 MICROgram(s)/kG/Hr (5.9 mL/Hr) IV Continuous <Continuous>  heparin  Injectable 5000 Unit(s) SubCutaneous every 12 hours  HYDROmorphone (10 MICROgram(s)/mL) + BUpivacaine 0.0625% in 0.9% Sodium Chloride PCEA 250 milliLiter(s) Epidural PCA Continuous  insulin lispro (HumaLOG) corrective regimen sliding scale   SubCutaneous every 6 hours  metroNIDAZOLE  IVPB      metroNIDAZOLE  IVPB 500 milliGRAM(s) IV Intermittent every 8 hours  norepinephrine Infusion 0.05 MICROgram(s)/kG/Min (2.766 mL/Hr) IV Continuous <Continuous>  pantoprazole  Injectable 40 milliGRAM(s) IV Push daily  phenylephrine    Infusion 0.2 MICROgram(s)/kG/Min (2.212 mL/Hr) IV Continuous <Continuous>  piperacillin/tazobactam IVPB. 3.375 Gram(s) IV Intermittent every 12 hours  propofol Infusion 20 MICROgram(s)/kG/Min (7.08 mL/Hr) IV Continuous <Continuous>  sodium chloride 0.9%. 1000 milliLiter(s) (30 mL/Hr) IV Continuous <Continuous>    MEDICATIONS  (PRN):  dextrose 40% Gel 15 Gram(s) Oral once PRN Blood Glucose LESS THAN 70 milliGRAM(s)/deciliter  glucagon  Injectable 1 milliGRAM(s) IntraMuscular once PRN Glucose LESS THAN 70 milligrams/deciliter  HYDROmorphone (10 MICROgram(s)/mL) + BUpivacaine 0.0625% in 0.9% Sodium Chloride PCEA Rescue Clinician  Bolus 5 milliLiter(s) Epidural every 15 minutes PRN for Pain Score greater than 6  naloxone Injectable 0.1 milliGRAM(s) IV Push every 3 minutes PRN For ANY of the following changes in patient status:  A. RR LESS THAN 10 breaths per minute, B. Oxygen saturation LESS THAN 90%, C. Sedation score of 6  ondansetron Injectable 4 milliGRAM(s) IV Push every 6 hours PRN Nausea      ======================VENTILATOR SETTINGS  ==============  Mode: AC/ CMV (Assist Control/ Continuous Mandatory Ventilation)  RR (machine): 18  TV (machine): 500  FiO2: 40  PEEP: 5  MAP: 10.4  PIP: 24      =================== PATIENT CARE ACCESS DEVICES ==========  Peripheral IV  Central Venous Line	R	L	IJ	Fem	SC			Placed:   Arterial Line	R	L	PT	DP	Fem	Rad	Ax	Placed:   Midline:				  Urinary Catheter, Date Placed:   Necessity of urinary, arterial, and venous catheters discussed    ======================= PHYSICAL EXAM===================  General:                         Comfortable, Awake, alert, not in any distress  Neuro:                            Moving all extremities to commands. No focal deficits	  HEENT:                           CHER/ ETT/ NGT/ trach  Respiratory:	Lungs clear on auscultation bilaterally with good aeration.                                           No rales, rhonchi, no wheezing. Effort even and unlabored.  CV:		Regular rate and rhythm. Normal S1/S2. No murmurs  Abdomen:	                     Soft,  nontender, not-distended. Bowel sounds present / absent.   Skin:		No rash.  Extremities:	Warm, no cyanosis or edema.  Palpable pulses    ============================ LABS =======================                        11.2   12.02 )-----------( 152      ( 12 Oct 2018 02:53 )             33.2     10-12    140  |  101  |  41<H>  ----------------------------<  218<H>  3.7   |  21<L>  |  4.59<H>    Ca    7.9<L>      12 Oct 2018 02:53  Phos  6.0     10-12  Mg     2.2     10-12    TPro  5.5<L>  /  Alb  2.4<L>  /  TBili  0.6  /  DBili  x   /  AST  17  /  ALT  8   /  AlkPhos  136<H>  10-12    LIVER FUNCTIONS - ( 12 Oct 2018 02:53 )  Alb: 2.4 g/dL / Pro: 5.5 g/dL / ALK PHOS: 136 u/L / ALT: 8 u/L / AST: 17 u/L / GGT: x           PT/INR - ( 12 Oct 2018 02:53 )   PT: 11.8 SEC;   INR: 1.03          PTT - ( 12 Oct 2018 02:53 )  PTT:30.0 SEC  ABG - ( 12 Oct 2018 02:53 )  pH, Arterial: 7.31  pH, Blood: x     /  pCO2: 50    /  pO2: 154   / HCO3: 23    / Base Excess: -1.3  /  SaO2: 99.1                TISSUE  10-11-18 --  --  --      TISSUE  10-11-18 --  --  --      PLEURAL FLUID  10-11-18 --  --    GPCCH^Gram Pos Cocci in Chains  QUANTITY OF BACTERIA SEEN: MODERATE (3+)  GPCPR^Gram Pos Cocci in Pairs  QUANTITY OF BACTERIA SEEN: MODERATE (3+)  WBC^White Blood Cells  QNTY CELLS IN GRAM STAIN: FEW (2+)          ===================== IMAGING STUDIES ===================  Radiology personally reviewed.    ====================ASSESSMENT AND PLAN ================      ====================== NEUROLOGY=======================  Pain control with PCA / PCEA / Tylenol IV / Toradol / Percocet  Pt is on Precedex for agitation  Pt is sedated with Propofol / Fentanyl    ==================== RESPIRATORY========================  Pt is on            L nasal canula / Face tent____% FiO2  Comfortable, no evidence of distress.  Using incentive spirometry & doing                ml  Monitor chest tube output  Chest tube to suction / water seal	    Mechanical Ventilation:  Mode: AC/ CMV (Assist Control/ Continuous Mandatory Ventilation)  RR (machine): 18  TV (machine): 500  FiO2: 40  PEEP: 5  MAP: 10.4  PIP: 24    Mechanical ventilator status assessed & settings reviewed  Continue bronchodilators, pulmonary toilet  Head of bed elevation to 30-40 degrees    ====================CARDIOVASCULAR=====================  Continue hemodynamic monitoring/ telemetry  Not on any pressors  Continue cardiovascular / antihypertensive medications    ===================== RENAL ============================  Continue LR 30CC/hr      D/C IVF  Monitor I/Os, BUN/ Cr  and electrolytes  D/C Sharma      Keep Sharma for UO monitoring  BPH: Continue Flomax/ Finasteride      ==================== GASTROINTESTINAL===================  On regular diet, tolerating well  Continue GI prophylaxis with Pepcid / Protonix  Continue Zofran / Reglan for nausea - PRN	  NPO    =======================    ENDOCRIN  =====================  Glycemic monitoring  F/S with coverage  ===================HEMATOLOGIC/ONCOLOGIC =============  Monitor chest tube output. No signs of active bleeding.   Follow CBC, coags  in AM  DVT prophylaxis with SCD, sc Heparin    ========================INFECTIOUS DISEASE===============  No signs of infection. Monitor for fever / leukocytosis.  All surgical incision / chest tube  sites look clean  D/C Sharma      Pertinent clinical, laboratory, radiographic, hemodynamic, echocardiographic, respiratory data, microbiologic data and chart were reviewed and analyzed frequently throughout the course of the day and night. GI and DVT prophylaxis, glycemic control, head of bed elevation and skin care issues were addressed.  Patient seen, examined and plan discussed with CT Surgery / CTICU team during rounds.  Pt remains critically ill in imminent risk of  deterioration and requires very careful cardio- pulmonary monitoring and support.    I have spent               minutes of critical care time with this pt between            am/pm    and               am/ pm         minutes spent on total encounter; more than 50% of the visit was spent counseling and/or coordinating care by the attending physician.        KERLINE Faith MD CLAUDIA HAN          MRN-[id number]      HPI:  Pt is a 58 yr old Mandarin speaking male scheduled for Right Thoracotomy, Decortication , Right Chest Wall reconstruction with Latissimjus Dorsi Flap Poss Skin Graft with VAC dressing with Dr Pickering 10/11/18. Pt has ICD but unable to identify make or model. Pt hx of ESRD with HD T/Th/Sat for 4 hrs and has stated he has stopped some of his meds but cannot say which ones. Pt seen and received MC and CC but has 2 different med lists and is unable to identify meds taking. Pt denies blood thinners. Pt has stopped ASA as of last week. Pt hx gathered from [employer] and notes from Dr Pickering - pt is poor historian.        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          Procedure:  Thoracotomy  10/11/2018       · Operative Findings	Right thoracotomy, resection of portion of R 7th rib, evacuation of infected hemothorax, takedown of pleurocutaneous fistula	    · Operative Findings	s/p R chest wall reconstruction with tunneled latissimus dorsi flap, covered by serratus anterior flap, after R thoracotomy, takedown pleurocutaneous fistula, decortication, resection Right 7th rib	         · Drains	3 28Fr chest tubes (A,P, and diaphragm), 2 PRS SQ SANDY drains, VAC	  · Estimated Blood Loss	1000 milliLiter(s)	  · IV Infusions - Crystalloids	4L	  · IV Infusions - Colloids	500cc	  · IV Infusions - Blood Products	4u PRBC	       POD # :1    Issues: s/p  infected right hemothorax evacuation             multiple right  chest tubes/ drains in place             distributive and cardiogenic shock/ SIRS             acute resp failure on vent support             posthemorrhagic anemia             lactic acidosis             hyperglycemia             ESRD on HD             acute on chronic systolic CHF ( EF 10 %), ICD in place                     Interval/OR Events/ ROS  Pt hypotensive through the surgery - on multiple pressors / inotropes ( Levo, Epi, Dobutamine). Required 4 units of PRBC in addition to colloids and crystalloids for  symptomatic  posthemorrhagic anemia during the surgery. Pt arrived in ICU orally intubated, sedated, on Levo, Dobut, Epi.    Overnight in ICU still on multiple pressors/ inotropes./ Vent support. Mixed resp/ metab acidosis minimally  improved Lactic acidemia- unchanged @ 2-3.. Pt will need HD today ( ESRD on HD - anuria)        PAST MEDICAL & SURGICAL HISTORY:  Smoking hx  Cardiomyopathy  Wound: right chest  ICD (implantable cardioverter-defibrillator) in place  OA (osteoarthritis)  HLD (hyperlipidemia)  BPH (benign prostatic hyperplasia)  Pleural effusion  HTN (hypertension)  ESRD (end stage renal disease)  H/O chest wound: right  History of wound infection: right chest wall - revision 5/18 and again in 7/18  History of implantable cardioverter-defibrillator (ICD) placement: pt unsure when placed  H/O bilateral hip replacements: 2008, 2009    Home Medications:   * Patient Currently Takes Medications as of 01-Oct-2018 09:29 documented in Structured Notes  · 	nortriptyline 50 mg oral capsule: 1 cap(s) orally once a day  · 	Vitamin B Complex: 1 tab(s) orally once a day  · 	Nephplex Rx oral tablet: 1 tab(s) orally once a day  · 	aspirin 81 mg oral tablet: 1 tab(s) orally once a day last dose 10/3/2018  · 	Vitamin C 500 mg oral tablet: 1 tab(s) orally once a day  · 	Breo Ellipta 100 mcg-25 mcg/inh inhalation powder: 1 puff(s) inhaled once a day patient denies using it  · 	Calcium 500: 1 tab(s) orally 2 times a day  · 	carvedilol 6.25 mg oral tablet: 1 tab(s) orally once a day  · 	docusate sodium 100 mg oral tablet: 1 tab(s) orally 2 times a day, As Needed  · 	folic acid 1 mg oral tablet: 1 tab(s) orally once a day  · 	Mag-Ox 400 oral tablet: 1 tab(s) orally once a day  · 	midodrine 10 mg oral tablet: 1 tab(s) orally once a day  · 	Multiple Vitamins oral tablet: 1 tab(s) orally once a day last dose 10/3/2018  · 	Namenda 10 mg oral tablet: 1 tab(s) orally 2 times a day  · 	Renvela 800 mg oral tablet: 2 tab(s) orally every 8 hours  · 	simethicone 80 mg oral tablet: 1 tab(s) orally 3 times a day (after meals)  · 	torsemide 20 mg oral tablet: 1 tab(s) orally once a day  · 	vitamin A: 1 tab(s) orally once a day      Allergies  No Known Allergies      ***VITAL SIGNS:  Vital Signs Last 24 Hrs  T(C): 37.2 (12 Oct 2018 04:00), Max: 37.4 (12 Oct 2018 01:00)  T(F): 98.9 (12 Oct 2018 04:00), Max: 99.3 (12 Oct 2018 01:00)  HR: 104 (12 Oct 2018 04:00) (95 - 117)  BP: 89/61 (12 Oct 2018 04:00) (83/50 - 117/64)  BP(mean): 68 (12 Oct 2018 04:00) (58 - 76)  RR: 18 (12 Oct 2018 04:00) (12 - 18)  SpO2: 99% (12 Oct 2018 04:00) (98% - 100%)    I/Os:   I&O's Detail    11 Oct 2018 07:01  -  12 Oct 2018 04:27  --------------------------------------------------------  IN:    DOBUTamine Infusion: 141.6 mL    DOBUTamine Infusion: 12.4 mL    EPINEPHrine Infusion: 298.8 mL    fentaNYL  Infusion: 106.2 mL    IV PiggyBack: 550 mL    lactated ringers.: 60 mL    norepinephrine Infusion: 203 mL    phenylephrine   Infusion: 179.3 mL    propofol Infusion: 44.3 mL    propofol Infusion: 76.2 mL    sodium chloride 0.9%.: 300 mL  Total IN: 1971.8 mL    OUT:    Bulb: 150 mL    Bulb: 7.5 mL    Chest Tube: 405 mL    Chest Tube: 65 mL    Chest Tube: 125 mL  Total OUT: 752.5 mL    Total NET: 1219.3 mL        CAPILLARY BLOOD GLUCOSE  POCT Blood Glucose.: 258 mg/dL (12 Oct 2018 00:01)  POCT Blood Glucose.: 255 mg/dL (11 Oct 2018 20:16)  POCT Blood Glucose.: 206 mg/dL (11 Oct 2018 18:06)      =======================  MEDICATIONS  ===================  MEDICATIONS  (STANDING):  dextrose 5%. 1000 milliLiter(s) (50 mL/Hr) IV Continuous <Continuous>  dextrose 50% Injectable 12.5 Gram(s) IV Push once  dextrose 50% Injectable 25 Gram(s) IV Push once  dextrose 50% Injectable 25 Gram(s) IV Push once  DOBUTamine Infusion 7 MICROgram(s)/kG/Min (12.39 mL/Hr) IV Continuous <Continuous>  EPINEPHrine    Infusion 0.15 MICROgram(s)/kG/Min (33.188 mL/Hr) IV Continuous <Continuous>  fentaNYL   Infusion 1 MICROgram(s)/kG/Hr (5.9 mL/Hr) IV Continuous <Continuous>  heparin  Injectable 5000 Unit(s) SubCutaneous every 12 hours  HYDROmorphone (10 MICROgram(s)/mL) + BUpivacaine 0.0625% in 0.9% Sodium Chloride PCEA 250 milliLiter(s) Epidural PCA Continuous  insulin lispro (HumaLOG) corrective regimen sliding scale   SubCutaneous every 6 hours  metroNIDAZOLE  IVPB      metroNIDAZOLE  IVPB 500 milliGRAM(s) IV Intermittent every 8 hours  norepinephrine Infusion 0.05 MICROgram(s)/kG/Min (2.766 mL/Hr) IV Continuous <Continuous>  pantoprazole  Injectable 40 milliGRAM(s) IV Push daily  phenylephrine    Infusion 0.2 MICROgram(s)/kG/Min (2.212 mL/Hr) IV Continuous <Continuous>  piperacillin/tazobactam IVPB. 3.375 Gram(s) IV Intermittent every 12 hours  propofol Infusion 20 MICROgram(s)/kG/Min (7.08 mL/Hr) IV Continuous <Continuous>  sodium chloride 0.9%. 1000 milliLiter(s) (30 mL/Hr) IV Continuous <Continuous>    MEDICATIONS  (PRN):  dextrose 40% Gel 15 Gram(s) Oral once PRN Blood Glucose LESS THAN 70 milliGRAM(s)/deciliter  glucagon  Injectable 1 milliGRAM(s) IntraMuscular once PRN Glucose LESS THAN 70 milligrams/deciliter  HYDROmorphone (10 MICROgram(s)/mL) + BUpivacaine 0.0625% in 0.9% Sodium Chloride PCEA Rescue Clinician  Bolus 5 milliLiter(s) Epidural every 15 minutes PRN for Pain Score greater than 6  naloxone Injectable 0.1 milliGRAM(s) IV Push every 3 minutes PRN For ANY of the following changes in patient status:  A. RR LESS THAN 10 breaths per minute, B. Oxygen saturation LESS THAN 90%, C. Sedation score of 6  ondansetron Injectable 4 milliGRAM(s) IV Push every 6 hours PRN Nausea      ======================VENTILATOR SETTINGS  ==============  Mode: AC/ CMV (Assist Control/ Continuous Mandatory Ventilation)  RR (machine): 18  TV (machine): 500  FiO2: 40  PEEP: 5  MAP: 10.4  PIP: 24  =================== PATIENT CARE ACCESS DEVICES ==========  Peripheral IV (+)  Central Venous Line R/ IJ (+)   Arterial Line	R / Rad(+)			  Necessity of  arterial, and venous catheters discussed    ======================= PHYSICAL EXAM===================  General:             sedated, intubated  Neuro:               Moving  extremities spontaneously    with lower dose of sedation. 	  HEENT:                           CHER/ ETT  Respiratory:	Lungs sound coarse on auscultation bilaterally with good aeration.                            No rales, rhonchi, no wheezing.                           RIght thoracotomy site - covered with dressing, chest tube site -clean  CV:		Regular rate and rhythm. Normal S1/S2.  Abdomen:          Soft,  nontender, not-distended. Bowel sounds present - hypoactive  Skin:		No rash.  Extremities:	Warm, no cyanosis or edema.  (+) pulses by doppler    ============================ LABS =======================                        11.2   12.02 )-----------( 152      ( 12 Oct 2018 02:53 )             33.2     10-12    140  |  101  |  41<H>  ----------------------------<  218<H>  3.7   |  21<L>  |  4.59<H>    Ca    7.9<L>      12 Oct 2018 02:53  Phos  6.0     10-12  Mg     2.2     10-12    TPro  5.5<L>  /  Alb  2.4<L>  /  TBili  0.6  /  DBili  x   /  AST  17  /  ALT  8   /  AlkPhos  136<H>  10-12    LIVER FUNCTIONS - ( 12 Oct 2018 02:53 )  Alb: 2.4 g/dL / Pro: 5.5 g/dL / ALK PHOS: 136 u/L / ALT: 8 u/L / AST: 17 u/L / GGT: x           PT/INR - ( 12 Oct 2018 02:53 )   PT: 11.8 SEC;   INR: 1.03          PTT - ( 12 Oct 2018 02:53 )  PTT:30.0 SEC  ABG - ( 12 Oct 2018 02:53 )  pH, Arterial: 7.31  pH, Blood: x     /  pCO2: 50    /  pO2: 154   / HCO3: 23    / Base Excess: -1.3  /  SaO2: 99.1      Blood Gas Arterial - Lactate: 3.3: Please note updated reference range. mmol/L (10.12.18 @ 02:53)  Blood Gas Arterial - Lactate: 2.2: Please note updated reference range. mmol/L (10.11.18 @ 21:20)  Blood Gas Arterial - Lactate: 2.5: Please note updated reference range. mmol/L (10.11.18 @ 18:00)      TISSUE  10-11-18 --  --  --    TISSUE  10-11-18 --  --  --    PLEURAL FLUID  10-11-18 --  --    GPCCH^Gram Pos Cocci in Chains  QUANTITY OF BACTERIA SEEN: MODERATE (3+)  GPCPR^Gram Pos Cocci in Pairs  QUANTITY OF BACTERIA SEEN: MODERATE (3+)  WBC^White Blood Cells  QNTY CELLS IN GRAM STAIN: FEW (2+)    ===================== IMAGING STUDIES ===================  Radiology personally reviewed.  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          Procedure:  Thoracotomy  10/11/2018       · Operative Findings	Right thoracotomy, resection of portion of R 7th rib, evacuation of infected hemothorax, takedown of pleurocutaneous fistula	    · Operative Findings	s/p R chest wall reconstruction with tunneled latissimus dorsi flap, covered by serratus anterior flap, after R thoracotomy, takedown pleurocutaneous fistula, decortication, resection Right 7th rib	         · Drains	3 28Fr chest tubes (A,P, and diaphragm), 2 PRS SQ SANDY drains, VAC	  · Estimated Blood Loss	1000 milliLiter(s)	  · IV Infusions - Crystalloids	4L	  · IV Infusions - Colloids	500cc	  · IV Infusions - Blood Products	4u PRBC	           Issues: s/p  infected right hemothorax evacuation             multiple right  chest tubes/ drains in place             distributive and cardiogenic shock/ SIRS             acute resp failure on vent support             posthemorrhagic anemia             lactic acidosis             ESRD on HD             acute on chronic systolic CHF ( EF 10 %), ICD in place      ====================== NEUROLOGY=======================  Pain control with PCEA / Tylenol IV / Toradol   Pt is sedated with Propofol / Fentanyl    ==================== RESPIRATORY========================  Monitor chest tube output  Chest tube x3 to  water seal	  Candido x2 to bulb suction    Mechanical Ventilation:  Mode: AC/ CMV (Assist Control/ Continuous Mandatory Ventilation)  RR (machine): 18  TV (machine): 500  FiO2: 40  PEEP: 5  MAP: 10.4  PIP: 24    Mechanical ventilator status assessed & settings reviewed  Continue bronchodilators, pulmonary toilet  Head of bed elevation to 30-40 degrees  F/up  serial ABG's    ====================CARDIOVASCULAR=====================  Continue hemodynamic monitoring/ telemetry  Titrating Levo, Quan, Epi, Dobutamine for SBP > 90, MAP > 65medications    ===================== RENAL ============================  Continue NS 30CC/hr       Monitor I/Os, BUN/ Cr  and electrolytes  No Moore - Pt is on HD for ESRD ( needs HD today)      ==================== GASTROINTESTINAL===================  NPO  Continue GI prophylaxis with  Protonix  Continue Zofran   for nausea - PRN    =======================    ENDOCRIN  =====================  Glycemic monitoring  F/S with coverage  ===================HEMATOLOGIC/ONCOLOGIC =============  Monitor chest tube output. No signs of active bleeding.   Follow CBC, coags  in AM  DVT prophylaxis with SCD, sc Heparin    ========================INFECTIOUS DISEASE===============  Monitor for fever / leukocytosis.  All surgical incision / chest tube  sites look clean  F/ up OR  cultures and path results  Empiric ABX Zosyn, Vanco, Flagyl for now      Pertinent clinical, laboratory, radiographic, hemodynamic, echocardiographic, respiratory data, microbiologic data and chart were reviewed and analyzed frequently throughout the course of the day and night. GI and DVT prophylaxis, glycemic control, head of bed elevation and skin care issues were addressed.  Patient seen, examined and plan discussed with CT Surgery / CTICU team during rounds.  Pt remains critically ill in imminent risk of  deterioration and requires very careful cardio- pulmonary monitoring and support.    I have spent    90   minutes of critical care time with this pt between    12  am    and   8 am         minutes spent on total encounter; more than 50% of the visit was spent counseling and/or coordinating care by the attending physician.        KERLINE Faith MD

## 2018-10-13 DIAGNOSIS — D64.9 ANEMIA, UNSPECIFIED: ICD-10-CM

## 2018-10-13 DIAGNOSIS — E87.70 FLUID OVERLOAD, UNSPECIFIED: ICD-10-CM

## 2018-10-13 LAB
-  AMPICILLIN: SIGNIFICANT CHANGE UP
-  CIPROFLOXACIN: SIGNIFICANT CHANGE UP
-  TETRACYCLINE: SIGNIFICANT CHANGE UP
-  VANCOMYCIN: SIGNIFICANT CHANGE UP
ALBUMIN SERPL ELPH-MCNC: 2.4 G/DL — LOW (ref 3.3–5)
ALBUMIN SERPL ELPH-MCNC: 2.5 G/DL — LOW (ref 3.3–5)
ALBUMIN SERPL ELPH-MCNC: 2.6 G/DL — LOW (ref 3.3–5)
ALP SERPL-CCNC: 108 U/L — SIGNIFICANT CHANGE UP (ref 40–120)
ALP SERPL-CCNC: 127 U/L — HIGH (ref 40–120)
ALP SERPL-CCNC: 128 U/L — HIGH (ref 40–120)
ALT FLD-CCNC: 4 U/L — SIGNIFICANT CHANGE UP (ref 4–41)
ALT FLD-CCNC: 5 U/L — SIGNIFICANT CHANGE UP (ref 4–41)
ALT FLD-CCNC: < 4 U/L — LOW (ref 4–41)
APTT BLD: 33.9 SEC — SIGNIFICANT CHANGE UP (ref 27.5–37.4)
APTT BLD: 34 SEC — SIGNIFICANT CHANGE UP (ref 27.5–37.4)
AST SERPL-CCNC: 15 U/L — SIGNIFICANT CHANGE UP (ref 4–40)
AST SERPL-CCNC: 19 U/L — SIGNIFICANT CHANGE UP (ref 4–40)
AST SERPL-CCNC: 23 U/L — SIGNIFICANT CHANGE UP (ref 4–40)
BACTERIA FLD CULT: SIGNIFICANT CHANGE UP
BASE EXCESS BLDA CALC-SCNC: -1.7 MMOL/L — SIGNIFICANT CHANGE UP
BASE EXCESS BLDA CALC-SCNC: -2.4 MMOL/L — SIGNIFICANT CHANGE UP
BASE EXCESS BLDA CALC-SCNC: -2.7 MMOL/L — SIGNIFICANT CHANGE UP
BASE EXCESS BLDV CALC-SCNC: -1.5 MMOL/L — SIGNIFICANT CHANGE UP
BASE EXCESS BLDV CALC-SCNC: -1.7 MMOL/L — SIGNIFICANT CHANGE UP
BASOPHILS # BLD AUTO: 0.02 K/UL — SIGNIFICANT CHANGE UP (ref 0–0.2)
BASOPHILS NFR BLD AUTO: 0.3 % — SIGNIFICANT CHANGE UP (ref 0–2)
BILIRUB SERPL-MCNC: 0.8 MG/DL — SIGNIFICANT CHANGE UP (ref 0.2–1.2)
BILIRUB SERPL-MCNC: 1.7 MG/DL — HIGH (ref 0.2–1.2)
BILIRUB SERPL-MCNC: 1.9 MG/DL — HIGH (ref 0.2–1.2)
BLD GP AB SCN SERPL QL: NEGATIVE — SIGNIFICANT CHANGE UP
BUN SERPL-MCNC: 42 MG/DL — HIGH (ref 7–23)
BUN SERPL-MCNC: 48 MG/DL — HIGH (ref 7–23)
BUN SERPL-MCNC: 52 MG/DL — HIGH (ref 7–23)
CA-I BLDA-SCNC: 1.14 MMOL/L — LOW (ref 1.15–1.29)
CA-I BLDA-SCNC: 1.15 MMOL/L — SIGNIFICANT CHANGE UP (ref 1.15–1.29)
CALCIUM SERPL-MCNC: 7.9 MG/DL — LOW (ref 8.4–10.5)
CALCIUM SERPL-MCNC: 8.1 MG/DL — LOW (ref 8.4–10.5)
CALCIUM SERPL-MCNC: 8.4 MG/DL — SIGNIFICANT CHANGE UP (ref 8.4–10.5)
CHLORIDE BLDA-SCNC: 106 MMOL/L — SIGNIFICANT CHANGE UP (ref 96–108)
CHLORIDE SERPL-SCNC: 101 MMOL/L — SIGNIFICANT CHANGE UP (ref 98–107)
CHLORIDE SERPL-SCNC: 101 MMOL/L — SIGNIFICANT CHANGE UP (ref 98–107)
CHLORIDE SERPL-SCNC: 102 MMOL/L — SIGNIFICANT CHANGE UP (ref 98–107)
CK MB BLD-MCNC: 2.16 NG/ML — SIGNIFICANT CHANGE UP (ref 1–6.6)
CK SERPL-CCNC: 128 U/L — SIGNIFICANT CHANGE UP (ref 30–200)
CO2 SERPL-SCNC: 18 MMOL/L — LOW (ref 22–31)
CO2 SERPL-SCNC: 20 MMOL/L — LOW (ref 22–31)
CO2 SERPL-SCNC: 20 MMOL/L — LOW (ref 22–31)
CREAT SERPL-MCNC: 4.3 MG/DL — HIGH (ref 0.5–1.3)
CREAT SERPL-MCNC: 4.8 MG/DL — HIGH (ref 0.5–1.3)
CREAT SERPL-MCNC: 5.72 MG/DL — HIGH (ref 0.5–1.3)
CULTURE - SURGICAL SITE: SIGNIFICANT CHANGE UP
CULTURE - SURGICAL SITE: SIGNIFICANT CHANGE UP
EOSINOPHIL # BLD AUTO: 0.2 K/UL — SIGNIFICANT CHANGE UP (ref 0–0.5)
EOSINOPHIL NFR BLD AUTO: 3.3 % — SIGNIFICANT CHANGE UP (ref 0–6)
GLUCOSE BLDA-MCNC: 114 MG/DL — HIGH (ref 70–99)
GLUCOSE BLDA-MCNC: 122 MG/DL — HIGH (ref 70–99)
GLUCOSE BLDA-MCNC: 124 MG/DL — HIGH (ref 70–99)
GLUCOSE SERPL-MCNC: 118 MG/DL — HIGH (ref 70–99)
GLUCOSE SERPL-MCNC: 121 MG/DL — HIGH (ref 70–99)
GLUCOSE SERPL-MCNC: 124 MG/DL — HIGH (ref 70–99)
GRAM STN WND: SIGNIFICANT CHANGE UP
HCO3 BLDA-SCNC: 22 MMOL/L — SIGNIFICANT CHANGE UP (ref 22–26)
HCO3 BLDA-SCNC: 23 MMOL/L — SIGNIFICANT CHANGE UP (ref 22–26)
HCO3 BLDA-SCNC: 23 MMOL/L — SIGNIFICANT CHANGE UP (ref 22–26)
HCO3 BLDV-SCNC: 23 MMOL/L — SIGNIFICANT CHANGE UP (ref 20–27)
HCO3 BLDV-SCNC: 23 MMOL/L — SIGNIFICANT CHANGE UP (ref 20–27)
HCT VFR BLD CALC: 18.5 % — CRITICAL LOW (ref 39–50)
HCT VFR BLD CALC: 24 % — LOW (ref 39–50)
HCT VFR BLD CALC: 24.9 % — LOW (ref 39–50)
HCT VFR BLD CALC: 25.9 % — LOW (ref 39–50)
HCT VFR BLDA CALC: 25 % — LOW (ref 39–51)
HCT VFR BLDA CALC: 27.5 % — LOW (ref 39–51)
HCT VFR BLDA CALC: 34.7 % — LOW (ref 39–51)
HGB BLD-MCNC: 6.4 G/DL — CRITICAL LOW (ref 13–17)
HGB BLD-MCNC: 8.1 G/DL — LOW (ref 13–17)
HGB BLD-MCNC: 8.5 G/DL — LOW (ref 13–17)
HGB BLD-MCNC: 9 G/DL — LOW (ref 13–17)
HGB BLDA-MCNC: 11.3 G/DL — LOW (ref 13–17)
HGB BLDA-MCNC: 8 G/DL — LOW (ref 13–17)
HGB BLDA-MCNC: 8.9 G/DL — LOW (ref 13–17)
IMM GRANULOCYTES # BLD AUTO: 0.1 # — SIGNIFICANT CHANGE UP
IMM GRANULOCYTES NFR BLD AUTO: 1.7 % — HIGH (ref 0–1.5)
INR BLD: 0.99 — SIGNIFICANT CHANGE UP (ref 0.88–1.17)
INR BLD: 1.14 — SIGNIFICANT CHANGE UP (ref 0.88–1.17)
LACTATE BLDA-SCNC: 0.6 MMOL/L — SIGNIFICANT CHANGE UP (ref 0.5–2)
LACTATE BLDA-SCNC: 0.8 MMOL/L — SIGNIFICANT CHANGE UP (ref 0.5–2)
LACTATE BLDA-SCNC: 1 MMOL/L — SIGNIFICANT CHANGE UP (ref 0.5–2)
LYMPHOCYTES # BLD AUTO: 0.5 K/UL — LOW (ref 1–3.3)
LYMPHOCYTES # BLD AUTO: 8.3 % — LOW (ref 13–44)
MAGNESIUM SERPL-MCNC: 2.3 MG/DL — SIGNIFICANT CHANGE UP (ref 1.6–2.6)
MAGNESIUM SERPL-MCNC: 2.4 MG/DL — SIGNIFICANT CHANGE UP (ref 1.6–2.6)
MCHC RBC-ENTMCNC: 31.2 PG — SIGNIFICANT CHANGE UP (ref 27–34)
MCHC RBC-ENTMCNC: 31.3 PG — SIGNIFICANT CHANGE UP (ref 27–34)
MCHC RBC-ENTMCNC: 31.7 PG — SIGNIFICANT CHANGE UP (ref 27–34)
MCHC RBC-ENTMCNC: 31.8 PG — SIGNIFICANT CHANGE UP (ref 27–34)
MCHC RBC-ENTMCNC: 33.8 % — SIGNIFICANT CHANGE UP (ref 32–36)
MCHC RBC-ENTMCNC: 34.1 % — SIGNIFICANT CHANGE UP (ref 32–36)
MCHC RBC-ENTMCNC: 34.6 % — SIGNIFICANT CHANGE UP (ref 32–36)
MCHC RBC-ENTMCNC: 34.7 % — SIGNIFICANT CHANGE UP (ref 32–36)
MCV RBC AUTO: 91.5 FL — SIGNIFICANT CHANGE UP (ref 80–100)
MCV RBC AUTO: 91.5 FL — SIGNIFICANT CHANGE UP (ref 80–100)
MCV RBC AUTO: 91.6 FL — SIGNIFICANT CHANGE UP (ref 80–100)
MCV RBC AUTO: 92.3 FL — SIGNIFICANT CHANGE UP (ref 80–100)
METHOD TYPE: SIGNIFICANT CHANGE UP
MONOCYTES # BLD AUTO: 0.37 K/UL — SIGNIFICANT CHANGE UP (ref 0–0.9)
MONOCYTES NFR BLD AUTO: 6.1 % — SIGNIFICANT CHANGE UP (ref 2–14)
NEUTROPHILS # BLD AUTO: 4.87 K/UL — SIGNIFICANT CHANGE UP (ref 1.8–7.4)
NEUTROPHILS NFR BLD AUTO: 80.3 % — HIGH (ref 43–77)
NRBC # FLD: 0 — SIGNIFICANT CHANGE UP
NRBC # FLD: 0.02 — SIGNIFICANT CHANGE UP
ORGANISM # SPEC MICROSCOPIC CNT: SIGNIFICANT CHANGE UP
ORGANISM # SPEC MICROSCOPIC CNT: SIGNIFICANT CHANGE UP
PCO2 BLDA: 32 MMHG — LOW (ref 35–48)
PCO2 BLDA: 33 MMHG — LOW (ref 35–48)
PCO2 BLDA: 37 MMHG — SIGNIFICANT CHANGE UP (ref 35–48)
PCO2 BLDV: 36 MMHG — LOW (ref 41–51)
PCO2 BLDV: 37 MMHG — LOW (ref 41–51)
PH BLDA: 7.39 PH — SIGNIFICANT CHANGE UP (ref 7.35–7.45)
PH BLDA: 7.43 PH — SIGNIFICANT CHANGE UP (ref 7.35–7.45)
PH BLDA: 7.44 PH — SIGNIFICANT CHANGE UP (ref 7.35–7.45)
PH BLDV: 7.4 PH — SIGNIFICANT CHANGE UP (ref 7.32–7.43)
PH BLDV: 7.4 PH — SIGNIFICANT CHANGE UP (ref 7.32–7.43)
PHOSPHATE SERPL-MCNC: 4.2 MG/DL — SIGNIFICANT CHANGE UP (ref 2.5–4.5)
PHOSPHATE SERPL-MCNC: 5.5 MG/DL — HIGH (ref 2.5–4.5)
PLATELET # BLD AUTO: 106 K/UL — LOW (ref 150–400)
PLATELET # BLD AUTO: 82 K/UL — LOW (ref 150–400)
PLATELET # BLD AUTO: 89 K/UL — LOW (ref 150–400)
PLATELET # BLD AUTO: 90 K/UL — LOW (ref 150–400)
PMV BLD: 10.2 FL — SIGNIFICANT CHANGE UP (ref 7–13)
PMV BLD: 9.4 FL — SIGNIFICANT CHANGE UP (ref 7–13)
PMV BLD: 9.8 FL — SIGNIFICANT CHANGE UP (ref 7–13)
PMV BLD: 9.8 FL — SIGNIFICANT CHANGE UP (ref 7–13)
PO2 BLDA: 108 MMHG — SIGNIFICANT CHANGE UP (ref 83–108)
PO2 BLDA: 139 MMHG — HIGH (ref 83–108)
PO2 BLDA: 87 MMHG — SIGNIFICANT CHANGE UP (ref 83–108)
PO2 BLDV: 44 MMHG — HIGH (ref 35–40)
PO2 BLDV: 46 MMHG — HIGH (ref 35–40)
POTASSIUM BLDA-SCNC: 3.2 MMOL/L — LOW (ref 3.4–4.5)
POTASSIUM BLDA-SCNC: 3.3 MMOL/L — LOW (ref 3.4–4.5)
POTASSIUM BLDA-SCNC: 3.4 MMOL/L — SIGNIFICANT CHANGE UP (ref 3.4–4.5)
POTASSIUM SERPL-MCNC: 3.6 MMOL/L — SIGNIFICANT CHANGE UP (ref 3.5–5.3)
POTASSIUM SERPL-MCNC: 3.6 MMOL/L — SIGNIFICANT CHANGE UP (ref 3.5–5.3)
POTASSIUM SERPL-MCNC: 3.7 MMOL/L — SIGNIFICANT CHANGE UP (ref 3.5–5.3)
POTASSIUM SERPL-SCNC: 3.6 MMOL/L — SIGNIFICANT CHANGE UP (ref 3.5–5.3)
POTASSIUM SERPL-SCNC: 3.6 MMOL/L — SIGNIFICANT CHANGE UP (ref 3.5–5.3)
POTASSIUM SERPL-SCNC: 3.7 MMOL/L — SIGNIFICANT CHANGE UP (ref 3.5–5.3)
PREALB SERPL-MCNC: 11 MG/DL — LOW (ref 20–40)
PROT SERPL-MCNC: 5 G/DL — LOW (ref 6–8.3)
PROT SERPL-MCNC: 5.1 G/DL — LOW (ref 6–8.3)
PROT SERPL-MCNC: 5.4 G/DL — LOW (ref 6–8.3)
PROTHROM AB SERPL-ACNC: 11.4 SEC — SIGNIFICANT CHANGE UP (ref 9.8–13.1)
PROTHROM AB SERPL-ACNC: 12.7 SEC — SIGNIFICANT CHANGE UP (ref 9.8–13.1)
RBC # BLD: 2.02 M/UL — LOW (ref 4.2–5.8)
RBC # BLD: 2.6 M/UL — LOW (ref 4.2–5.8)
RBC # BLD: 2.72 M/UL — LOW (ref 4.2–5.8)
RBC # BLD: 2.83 M/UL — LOW (ref 4.2–5.8)
RBC # FLD: 17.6 % — HIGH (ref 10.3–14.5)
RBC # FLD: 17.7 % — HIGH (ref 10.3–14.5)
RBC # FLD: 17.8 % — HIGH (ref 10.3–14.5)
RBC # FLD: 17.9 % — HIGH (ref 10.3–14.5)
RH IG SCN BLD-IMP: POSITIVE — SIGNIFICANT CHANGE UP
SAO2 % BLDA: 96.8 % — SIGNIFICANT CHANGE UP (ref 95–99)
SAO2 % BLDA: 98.3 % — SIGNIFICANT CHANGE UP (ref 95–99)
SAO2 % BLDA: 98.6 % — SIGNIFICANT CHANGE UP (ref 95–99)
SAO2 % BLDV: 77.8 % — SIGNIFICANT CHANGE UP (ref 60–85)
SAO2 % BLDV: 79.1 % — SIGNIFICANT CHANGE UP (ref 60–85)
SODIUM BLDA-SCNC: 133 MMOL/L — LOW (ref 136–146)
SODIUM BLDA-SCNC: 133 MMOL/L — LOW (ref 136–146)
SODIUM BLDA-SCNC: 137 MMOL/L — SIGNIFICANT CHANGE UP (ref 136–146)
SODIUM SERPL-SCNC: 138 MMOL/L — SIGNIFICANT CHANGE UP (ref 135–145)
SODIUM SERPL-SCNC: 139 MMOL/L — SIGNIFICANT CHANGE UP (ref 135–145)
SODIUM SERPL-SCNC: 139 MMOL/L — SIGNIFICANT CHANGE UP (ref 135–145)
TROPONIN T, HIGH SENSITIVITY: 113 NG/L — CRITICAL HIGH (ref ?–14)
TROPONIN T, HIGH SENSITIVITY: 83 NG/L — CRITICAL HIGH (ref ?–14)
TROPONIN T, HIGH SENSITIVITY: 88 NG/L — CRITICAL HIGH (ref ?–14)
VANCOMYCIN TROUGH SERPL-MCNC: 8.1 UG/ML — LOW (ref 10–20)
WBC # BLD: 6.06 K/UL — SIGNIFICANT CHANGE UP (ref 3.8–10.5)
WBC # BLD: 6.06 K/UL — SIGNIFICANT CHANGE UP (ref 3.8–10.5)
WBC # BLD: 6.39 K/UL — SIGNIFICANT CHANGE UP (ref 3.8–10.5)
WBC # BLD: 7.69 K/UL — SIGNIFICANT CHANGE UP (ref 3.8–10.5)
WBC # FLD AUTO: 6.06 K/UL — SIGNIFICANT CHANGE UP (ref 3.8–10.5)
WBC # FLD AUTO: 6.06 K/UL — SIGNIFICANT CHANGE UP (ref 3.8–10.5)
WBC # FLD AUTO: 6.39 K/UL — SIGNIFICANT CHANGE UP (ref 3.8–10.5)
WBC # FLD AUTO: 7.69 K/UL — SIGNIFICANT CHANGE UP (ref 3.8–10.5)

## 2018-10-13 PROCEDURE — 99291 CRITICAL CARE FIRST HOUR: CPT

## 2018-10-13 PROCEDURE — 71045 X-RAY EXAM CHEST 1 VIEW: CPT | Mod: 26

## 2018-10-13 PROCEDURE — 99292 CRITICAL CARE ADDL 30 MIN: CPT

## 2018-10-13 PROCEDURE — 99232 SBSQ HOSP IP/OBS MODERATE 35: CPT | Mod: GC

## 2018-10-13 PROCEDURE — 99233 SBSQ HOSP IP/OBS HIGH 50: CPT | Mod: GC

## 2018-10-13 RX ORDER — HYDROMORPHONE HYDROCHLORIDE 2 MG/ML
0.5 INJECTION INTRAMUSCULAR; INTRAVENOUS; SUBCUTANEOUS
Qty: 0 | Refills: 0 | Status: DISCONTINUED | OUTPATIENT
Start: 2018-10-13 | End: 2018-10-13

## 2018-10-13 RX ORDER — CALCIUM GLUCONATE 100 MG/ML
1 VIAL (ML) INTRAVENOUS ONCE
Qty: 0 | Refills: 0 | Status: COMPLETED | OUTPATIENT
Start: 2018-10-13 | End: 2018-10-13

## 2018-10-13 RX ADMIN — Medication 100 MILLIGRAM(S): at 23:23

## 2018-10-13 RX ADMIN — PROPOFOL 7.08 MICROGRAM(S)/KG/MIN: 10 INJECTION, EMULSION INTRAVENOUS at 09:28

## 2018-10-13 RX ADMIN — Medication 2.77 MICROGRAM(S)/KG/MIN: at 09:28

## 2018-10-13 RX ADMIN — Medication 200 GRAM(S): at 23:00

## 2018-10-13 RX ADMIN — VASOPRESSIN 3 UNIT(S)/MIN: 20 INJECTION INTRAVENOUS at 16:12

## 2018-10-13 RX ADMIN — Medication 100 MILLIGRAM(S): at 16:13

## 2018-10-13 RX ADMIN — HEPARIN SODIUM 5000 UNIT(S): 5000 INJECTION INTRAVENOUS; SUBCUTANEOUS at 05:04

## 2018-10-13 RX ADMIN — PIPERACILLIN AND TAZOBACTAM 200 GRAM(S): 4; .5 INJECTION, POWDER, LYOPHILIZED, FOR SOLUTION INTRAVENOUS at 22:18

## 2018-10-13 RX ADMIN — PANTOPRAZOLE SODIUM 40 MILLIGRAM(S): 20 TABLET, DELAYED RELEASE ORAL at 14:26

## 2018-10-13 RX ADMIN — PIPERACILLIN AND TAZOBACTAM 200 GRAM(S): 4; .5 INJECTION, POWDER, LYOPHILIZED, FOR SOLUTION INTRAVENOUS at 09:28

## 2018-10-13 RX ADMIN — Medication 100 MILLIGRAM(S): at 05:21

## 2018-10-13 RX ADMIN — Medication 8.85 MICROGRAM(S)/KG/MIN: at 09:28

## 2018-10-13 RX ADMIN — Medication 81 MILLIGRAM(S): at 14:26

## 2018-10-13 RX ADMIN — DEXMEDETOMIDINE HYDROCHLORIDE IN 0.9% SODIUM CHLORIDE 7.38 MICROGRAM(S)/KG/HR: 4 INJECTION INTRAVENOUS at 09:29

## 2018-10-13 NOTE — PROGRESS NOTE ADULT - SUBJECTIVE AND OBJECTIVE BOX
[facility] Division of Kidney Diseases & Hypertension  HEMODIALYSIS NOTE  --------------------------------------------------------------------------------  Chief Complaint: ESRD/Ongoing hemodialysis requirement    24 hour events/subjective: Patient was seen and evaluated this morning in the CTICU.  Patient unable to offer review of systems but discussed with the nurse that the patient had worsening pressor requirements yesterday and blood pressure was low overall.    PAST HISTORY  --------------------------------------------------------------------------------  No significant changes to PMH, PSH, FHx, SHx, unless otherwise noted    ALLERGIES & MEDICATIONS  --------------------------------------------------------------------------------  Allergies    No Known Allergies    Intolerances      Standing Inpatient Medications  albumin human  5% IVPB 250 milliLiter(s) IV Intermittent once  aspirin  chewable 81 milliGRAM(s) Oral daily  dexmedetomidine Infusion 0.5 MICROgram(s)/kG/Hr IV Continuous <Continuous>  dextrose 5%. 1000 milliLiter(s) IV Continuous <Continuous>  dextrose 50% Injectable 12.5 Gram(s) IV Push once  dextrose 50% Injectable 25 Gram(s) IV Push once  dextrose 50% Injectable 25 Gram(s) IV Push once  DOBUTamine Infusion 5 MICROgram(s)/kG/Min IV Continuous <Continuous>  EPINEPHrine    Infusion 0.15 MICROgram(s)/kG/Min IV Continuous <Continuous>  fentaNYL   Infusion 1 MICROgram(s)/kG/Hr IV Continuous <Continuous>  heparin  Injectable 5000 Unit(s) SubCutaneous every 12 hours  HYDROmorphone (10 MICROgram(s)/mL) + BUpivacaine 0.0625% in 0.9% Sodium Chloride PCEA 250 milliLiter(s) Epidural PCA Continuous  insulin lispro (HumaLOG) corrective regimen sliding scale   SubCutaneous every 6 hours  metroNIDAZOLE  IVPB      metroNIDAZOLE  IVPB 500 milliGRAM(s) IV Intermittent every 8 hours  norepinephrine Infusion 0.05 MICROgram(s)/kG/Min IV Continuous <Continuous>  pantoprazole  Injectable 40 milliGRAM(s) IV Push daily  phenylephrine    Infusion 0.2 MICROgram(s)/kG/Min IV Continuous <Continuous>  piperacillin/tazobactam IVPB. 3.375 Gram(s) IV Intermittent every 12 hours  PrismaSATE Dialysate BGK 4 / 2.5 5000 milliLiter(s) CRRT <Continuous>  PrismaSOL Filtration BGK 4 / 2.5 5000 milliLiter(s) CRRT <Continuous>  PrismaSOL Filtration BGK 4 / 2.5 5000 milliLiter(s) CRRT <Continuous>  propofol Infusion 20 MICROgram(s)/kG/Min IV Continuous <Continuous>  sodium chloride 0.9%. 1000 milliLiter(s) IV Continuous <Continuous>  vasopressin Infusion 0.05 Unit(s)/Min IV Continuous <Continuous>    PRN Inpatient Medications  dextrose 40% Gel 15 Gram(s) Oral once PRN  glucagon  Injectable 1 milliGRAM(s) IntraMuscular once PRN  HYDROmorphone (10 MICROgram(s)/mL) + BUpivacaine 0.0625% in 0.9% Sodium Chloride PCEA Rescue Clinician  Bolus 5 milliLiter(s) Epidural every 15 minutes PRN  naloxone Injectable 0.1 milliGRAM(s) IV Push every 3 minutes PRN  ondansetron Injectable 4 milliGRAM(s) IV Push every 6 hours PRN      REVIEW OF SYSTEMS  --------------------------------------------------------------------------------  unable to obtain due to intubation and critical illness    VITALS/PHYSICAL EXAM  --------------------------------------------------------------------------------  T(C): 36.3 (10-13-18 @ 08:00), Max: 36.9 (10-12-18 @ 16:00)  HR: 72 (10-13-18 @ 09:00) (70 - 97)  BP: 108/59 (10-12-18 @ 14:00) (94/56 - 108/59)  RR: 18 (10-13-18 @ 09:00) (17 - 19)  SpO2: 99% (10-13-18 @ 09:00) (97% - 100%)  Wt(kg): --        10-12-18 @ 07:01  -  10-13-18 @ 07:00  --------------------------------------------------------  IN: 3212.6 mL / OUT: 960 mL / NET: 2252.6 mL    10-13-18 @ 07:01  -  10-13-18 @ 09:36  --------------------------------------------------------  IN: 159 mL / OUT: 40 mL / NET: 119 mL      Physical Exam:  	Gen: intubated  	HEENT: +ET tube to vent  	Pulm: CTA B/L, +3 chest tube drains noted draining sero-sanguinous fluid  	CV: RRR, S1S2; no rub  	Back: no sacral edema  	Abd: +BS, soft  	: No suprapubic distension  	LE: Warm, no edema  	Neuro: +sedated  	Psych: unable to assess due to sedation  	Skin: Warm  	Vascular access:  LUE AVF site: +thrill +bruit +skin intact; right femoral non-tunneled HD catheter site: no bleeding seen    LABS/STUDIES  --------------------------------------------------------------------------------              8.1    6.06  >-----------<  89       [10-13-18 @ 03:25]              24.0     139  |  101  |  52  ----------------------------<  121      [10-13-18 @ 03:25]  3.7   |  18  |  5.72        Ca     7.9     [10-13-18 @ 03:25]      Mg     2.2     [10-12-18 @ 18:30]      Phos  5.3     [10-12-18 @ 18:30]    TPro  5.0  /  Alb  2.5  /  TBili  0.8  /  DBili  x   /  AST  15  /  ALT  5   /  AlkPhos  108  [10-13-18 @ 03:25]    PT/INR: PT 12.7 , INR 1.14       [10-13-18 @ 03:25]  PTT: 34.0       [10-13-18 @ 03:25]          [10-13-18 @ 03:25]    HbA1c 5.5      [10-12-18 @ 02:53]

## 2018-10-13 NOTE — PROGRESS NOTE ADULT - ASSESSMENT
ASSESSMENT: 58M s/p right wall reconstruction w/ tunneled latissimus dorsi flap, covered by serratus anterior flap, after R thoracotomy, takedown of pleurocutaneous fistula, decortication, and resection of right 7th rib    PLAN:  --care per CTICU  --c/w vac dressing  --drain care  --SANDY bulbs to low wall suction

## 2018-10-13 NOTE — PROGRESS NOTE ADULT - SUBJECTIVE AND OBJECTIVE BOX
Anesthesia Pain Management Service: Day _3_ of Epidural    SUBJECTIVE: Patient doing well with PCEA and no problems.  Pain Scale Score:   Refer to charted pain scores    THERAPY:  [x ] Epidural Bupivacaine 0.0625% and Hydromorphone  		[X ] 10 micrograms/mL	[ ] 5 micrograms/mL  [ ] Epidural Bupivacaine 0.0625% and Fentanyl - 2 micrograms/mL  [ ] Epidural Ropivacaine 0.1% plain – 1 mg/mL  [ ] Patient Controlled Regional Anesthesia (PCRA) Ropivacaine  		[ ] 0.2%			[ ] 0.1%    Demand dose __3_ lockout __15_ (minutes) Continuous Rate __6_ Total: _130__ Daily      MEDICATIONS  (STANDING):  albumin human  5% IVPB 250 milliLiter(s) IV Intermittent once  aspirin  chewable 81 milliGRAM(s) Oral daily  dexmedetomidine Infusion 0.5 MICROgram(s)/kG/Hr (7.375 mL/Hr) IV Continuous <Continuous>  dextrose 5%. 1000 milliLiter(s) (50 mL/Hr) IV Continuous <Continuous>  dextrose 50% Injectable 12.5 Gram(s) IV Push once  dextrose 50% Injectable 25 Gram(s) IV Push once  dextrose 50% Injectable 25 Gram(s) IV Push once  DOBUTamine Infusion 5 MICROgram(s)/kG/Min (8.85 mL/Hr) IV Continuous <Continuous>  fentaNYL   Infusion 1 MICROgram(s)/kG/Hr (5.9 mL/Hr) IV Continuous <Continuous>  heparin  Injectable 5000 Unit(s) SubCutaneous every 12 hours  HYDROmorphone (10 MICROgram(s)/mL) + BUpivacaine 0.0625% in 0.9% Sodium Chloride PCEA 250 milliLiter(s) Epidural PCA Continuous  insulin lispro (HumaLOG) corrective regimen sliding scale   SubCutaneous every 6 hours  metroNIDAZOLE  IVPB      metroNIDAZOLE  IVPB 500 milliGRAM(s) IV Intermittent every 8 hours  norepinephrine Infusion 0.05 MICROgram(s)/kG/Min (2.766 mL/Hr) IV Continuous <Continuous>  pantoprazole  Injectable 40 milliGRAM(s) IV Push daily  piperacillin/tazobactam IVPB. 3.375 Gram(s) IV Intermittent every 12 hours  PrismaSATE Dialysate BGK 4 / 2.5 5000 milliLiter(s) (1000 mL/Hr) CRRT <Continuous>  PrismaSOL Filtration BGK 4 / 2.5 5000 milliLiter(s) (1000 mL/Hr) CRRT <Continuous>  PrismaSOL Filtration BGK 4 / 2.5 5000 milliLiter(s) (200 mL/Hr) CRRT <Continuous>  propofol Infusion 20 MICROgram(s)/kG/Min (7.08 mL/Hr) IV Continuous <Continuous>  sodium chloride 0.9%. 1000 milliLiter(s) (30 mL/Hr) IV Continuous <Continuous>  vasopressin Infusion 0.05 Unit(s)/Min (3 mL/Hr) IV Continuous <Continuous>    MEDICATIONS  (PRN):  dextrose 40% Gel 15 Gram(s) Oral once PRN Blood Glucose LESS THAN 70 milliGRAM(s)/deciliter  glucagon  Injectable 1 milliGRAM(s) IntraMuscular once PRN Glucose LESS THAN 70 milligrams/deciliter  HYDROmorphone (10 MICROgram(s)/mL) + BUpivacaine 0.0625% in 0.9% Sodium Chloride PCEA Rescue Clinician  Bolus 5 milliLiter(s) Epidural every 15 minutes PRN for Pain Score greater than 6  naloxone Injectable 0.1 milliGRAM(s) IV Push every 3 minutes PRN For ANY of the following changes in patient status:  A. RR LESS THAN 10 breaths per minute, B. Oxygen saturation LESS THAN 90%, C. Sedation score of 6  ondansetron Injectable 4 milliGRAM(s) IV Push every 6 hours PRN Nausea      OBJECTIVE:    Assessment of Catheter Site:	[ ] Left	[ ] Right  [x ] Epidural 	[ ] Femoral	      [ ] Saphenous   [ ] Supraclavicular   [ ] Other:    [x ] Dressing intact	[x ] Site non-tender	[ x] Site without erythema, discharge, edema  [x ] Epidural tubing and connection checked	[x] Gross neurological exam within normal limits  [X ] Catheter removed – tip intact		[ ] Afebrile	  [ ] Febrile: ___       [ X] see Temp under VS below)    PT/INR - ( 13 Oct 2018 03:25 )   PT: 12.7 SEC;   INR: 1.14          PTT - ( 13 Oct 2018 03:25 )  PTT:34.0 SEC                      8.1    6.06  )-----------( 89       ( 13 Oct 2018 03:25 )             24.0     Vital Signs Last 24 Hrs  T(C): 36.3 (10-13-18 @ 08:00), Max: 36.9 (10-12-18 @ 16:00)  T(F): 97.3 (10-13-18 @ 08:00), Max: 98.5 (10-12-18 @ 16:00)  HR: 62 (10-13-18 @ 13:15) (62 - 92)  BP: --  BP(mean): --  RR: 18 (10-13-18 @ 13:15) (17 - 19)  SpO2: 100% (10-13-18 @ 13:15) (97% - 100%)      Sedation Score:	[x ] Alert	[ ] Drowsy	[ ] Arousable	[ ] Asleep	[ ] Unresponsive    Side Effects:	[x ] None	[ ] Nausea	[ ] Vomiting	[ ] Pruritus  		[ ] Weakness		[ ] Numbness	[ ] Other:    ASSESSMENT/ PLAN:    Therapy to  be:	[ ] Continue   [ X] Discontinued   [ X] Change to prn Analgesics    Documentation and Verification of current medications:  [ X ] Done	[ ] Not done, not eligible, reason:    Comments: Changed to IV or oral opioid medication at this point. Discussed plan with CT ICU attending & Platelet transfusion given by CT ICU and epid cath removed.    Progress Note written now but Patient was seen earlier.

## 2018-10-13 NOTE — PROGRESS NOTE ADULT - SUBJECTIVE AND OBJECTIVE BOX
CLAUDIA HAN                     [id number]    HPI:  Pt is a 58 yr old Mandarin speaking male scheduled for Right Thoractomy Decortiation, Right Chest Wall reconstruction with Latissimjus Dorsi Flap Poss Skin Graft with VAC dressing with Dr Pickering 10/11/18. Pt has ICD but unable to identify make or model. Pt hx of ESRD with HD T/Th/Sat for 4 hrs and has stated he has stopped some of his meds but cannot say which ones. Pt seen and received [facility] and [facility] but has 2 different med lists and is unable to identify meds taking. Pt denies blood thinners. Pt has stopped ASA as of last week. Pt hx gathered from [employer] and notes from Dr Pickering - pt is poor historian.     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          Procedure:  Thoracotomy  10/11/2018       · Operative Findings	Right thoracotomy, resection of portion of R 7th rib, evacuation of infected hemothorax, takedown of pleurocutaneous fistula	    · Operative Findings	s/p R chest wall reconstruction with tunneled latissimus dorsi flap, covered by serratus anterior flap, after R thoracotomy, takedown pleurocutaneous fistula, decortication, resection Right 7th rib	    Issues:  Cardiogenic shock  s/p  infected right hemothorax evacuation             distributive and cardiogenic shock/ SIRS             acute resp failure on vent support             posthemorrhagic anemia             lactic acidosis             hyperglycemia             ESRD on HD             acute on chronic systolic CHF ( EF 10 %), ICD in place        PAST MEDICAL & SURGICAL HISTORY:  Smoking hx  Cardiomyopathy  Wound: right chest  ICD (implantable cardioverter-defibrillator) in place  OA (osteoarthritis)  HLD (hyperlipidemia)  BPH (benign prostatic hyperplasia)  Pleural effusion  HTN (hypertension)  ESRD (end stage renal disease)  H/O chest wound: right  History of wound infection: right chest wall - revision  and again in   History of implantable cardioverter-defibrillator (ICD) placement: pt unsure when placed  H/O bilateral hip replacements: ,             VITAL SIGNS:  Vital Signs Last 24 Hrs  T(C): 35.6 (13 Oct 2018 04:00), Max: 37 (12 Oct 2018 07:00)  T(F): 96.1 (13 Oct 2018 04:00), Max: 98.6 (12 Oct 2018 07:00)  HR: 74 (13 Oct 2018 06:00) (72 - 103)  BP: 108/59 (12 Oct 2018 14:00) (83/59 - 108/59)  BP(mean): 70 (12 Oct 2018 14:00) (63 - 71)  RR: 18 (13 Oct 2018 06:00) (17 - 19)  SpO2: 97% (13 Oct 2018 06:00) (97% - 100%)    I/Os:   I&O's Detail    11 Oct 2018 07:01  -  12 Oct 2018 07:00  --------------------------------------------------------  IN:    DOBUTamine Infusion: 141.6 mL    DOBUTamine Infusion: 49.6 mL    EPINEPHrine Infusion: 398.4 mL    fentaNYL  Infusion: 141.6 mL    IV PiggyBack: 550 mL    lactated ringers.: 60 mL    norepinephrine Infusion: 271.1 mL    phenylephrine   Infusion: 275.6 mL    propofol Infusion: 24.8 mL    propofol Infusion: 44.3 mL    propofol Infusion: 88.6 mL    sodium chloride 0.9%.: 390 mL  Total IN: 2435.6 mL    OUT:    Bulb: 17.5 mL    Bulb: 155 mL    Chest Tube: 110 mL    Chest Tube: 155 mL    Chest Tube: 495 mL  Total OUT: 932.5 mL    Total NET: 1503.1 mL      12 Oct 2018 07:01  -  13 Oct 2018 06:56  --------------------------------------------------------  IN:    dexmedetomidine Infusion: 213.4 mL    DOBUTamine Infusion: 213.6 mL    EPINEPHrine Infusion: 106.4 mL    fentaNYL  Infusion: 283.2 mL    IV PiggyBack: 1350 mL    norepinephrine Infusion: 315.6 mL    phenylephrine   Infusion: 113.3 mL    propofol Infusion: 287.1 mL    sodium chloride 0.9%.: 270 mL    vasopressin Infusion: 60 mL  Total IN: 3212.6 mL    OUT:    Bulb: 150 mL    Bulb: 420 mL    Chest Tube: 70 mL    Chest Tube: 220 mL    Chest Tube: 100 mL  Total OUT: 960 mL    Total NET: 2252.6 mL          CAPILLARY BLOOD GLUCOSE      POCT Blood Glucose.: 126 mg/dL (13 Oct 2018 05:03)  POCT Blood Glucose.: 130 mg/dL (13 Oct 2018 00:59)  POCT Blood Glucose.: 129 mg/dL (12 Oct 2018 18:52)  POCT Blood Glucose.: 132 mg/dL (12 Oct 2018 12:00)      =======================MEDICATIONS===================  MEDICATIONS  (STANDING):  albumin human  5% IVPB 250 milliLiter(s) IV Intermittent once  aspirin  chewable 81 milliGRAM(s) Oral daily  dexmedetomidine Infusion 0.5 MICROgram(s)/kG/Hr (7.375 mL/Hr) IV Continuous <Continuous>  dextrose 5%. 1000 milliLiter(s) (50 mL/Hr) IV Continuous <Continuous>  dextrose 50% Injectable 12.5 Gram(s) IV Push once  dextrose 50% Injectable 25 Gram(s) IV Push once  dextrose 50% Injectable 25 Gram(s) IV Push once  DOBUTamine Infusion 5 MICROgram(s)/kG/Min (8.85 mL/Hr) IV Continuous <Continuous>  EPINEPHrine    Infusion 0.15 MICROgram(s)/kG/Min (33.188 mL/Hr) IV Continuous <Continuous>  fentaNYL   Infusion 1 MICROgram(s)/kG/Hr (5.9 mL/Hr) IV Continuous <Continuous>  heparin  Injectable 5000 Unit(s) SubCutaneous every 12 hours  HYDROmorphone (10 MICROgram(s)/mL) + BUpivacaine 0.0625% in 0.9% Sodium Chloride PCEA 250 milliLiter(s) Epidural PCA Continuous  insulin lispro (HumaLOG) corrective regimen sliding scale   SubCutaneous every 6 hours  metroNIDAZOLE  IVPB      metroNIDAZOLE  IVPB 500 milliGRAM(s) IV Intermittent every 8 hours  norepinephrine Infusion 0.05 MICROgram(s)/kG/Min (2.766 mL/Hr) IV Continuous <Continuous>  pantoprazole  Injectable 40 milliGRAM(s) IV Push daily  phenylephrine    Infusion 0.2 MICROgram(s)/kG/Min (2.212 mL/Hr) IV Continuous <Continuous>  piperacillin/tazobactam IVPB. 3.375 Gram(s) IV Intermittent every 12 hours  propofol Infusion 20 MICROgram(s)/kG/Min (7.08 mL/Hr) IV Continuous <Continuous>  sodium chloride 0.9%. 1000 milliLiter(s) (30 mL/Hr) IV Continuous <Continuous>  vasopressin Infusion 0.05 Unit(s)/Min (3 mL/Hr) IV Continuous <Continuous>    MEDICATIONS  (PRN):  dextrose 40% Gel 15 Gram(s) Oral once PRN Blood Glucose LESS THAN 70 milliGRAM(s)/deciliter  glucagon  Injectable 1 milliGRAM(s) IntraMuscular once PRN Glucose LESS THAN 70 milligrams/deciliter  HYDROmorphone (10 MICROgram(s)/mL) + BUpivacaine 0.0625% in 0.9% Sodium Chloride PCEA Rescue Clinician  Bolus 5 milliLiter(s) Epidural every 15 minutes PRN for Pain Score greater than 6  naloxone Injectable 0.1 milliGRAM(s) IV Push every 3 minutes PRN For ANY of the following changes in patient status:  A. RR LESS THAN 10 breaths per minute, B. Oxygen saturation LESS THAN 90%, C. Sedation score of 6  ondansetron Injectable 4 milliGRAM(s) IV Push every 6 hours PRN Nausea      =======================VENTILATOR SETTINGS===================  Mode: AC/ CMV (Assist Control/ Continuous Mandatory Ventilation)  RR (machine): 18  TV (machine): 500  FiO2: 40  PEEP: 5  MAP: 11.8  PIP: 31        PHYSICAL EXAM============================  General:                        Sedated vented,   Neuro:                            Moved all extremities to commands when off sedation,   Respiratory:	Air entry fair and  bilateral conducted sounds                                           Effort even and unlabored.  CV:		Regular rate and rhythm. Normal S1/S2                                          Distal pulses present.  Abdomen:	                     Soft, non-distended. Bowel sounds present   Skin:		No rash.  Extremities:	Warm, + edema.  Palpable pulses    ============================LABS=========================                        8.1    6.06  )-----------( 89       ( 13 Oct 2018 03:25 )             24.0     10    139  |  101  |  52<H>  ----------------------------<  121<H>  3.7   |  18<L>  |  5.72<H>    Ca    7.9<L>      13 Oct 2018 03:25  Phos  5.3     10-12  Mg     2.2     10-12    TPro  5.0<L>  /  Alb  2.5<L>  /  TBili  0.8  /  DBili  x   /  AST  15  /  ALT  5   /  AlkPhos  108  10-13    LIVER FUNCTIONS - ( 13 Oct 2018 03:25 )  Alb: 2.5 g/dL / Pro: 5.0 g/dL / ALK PHOS: 108 u/L / ALT: 5 u/L / AST: 15 u/L / GGT: x           PT/INR - ( 13 Oct 2018 03:25 )   PT: 12.7 SEC;   INR: 1.14          PTT - ( 13 Oct 2018 03:25 )  PTT:34.0 SEC  ABG - ( 13 Oct 2018 03:25 )  pH, Arterial: 7.39  pH, Blood: x     /  pCO2: 37    /  pO2: 87    / HCO3: 22    / Base Excess: -2.4  /  SaO2: 96.8                  ============================IMAGING STUDIES=========================  < from: Xray Chest 1 View- PORTABLE-Urgent (10.12.18 @ 11:41) >    Since the last exam, an enteric tube is been introduced and it courses   down the esophagus entering the stomach with its tip not included on the   exam. 3 right-sided chest tubes, subcutaneous emphysema or on this side,   skin staples and basilar pneumothorax again seen. Left lung is clear.    A/P:    ====================== NEUROLOGY=======================  Pain control with PCEA / Tylenol IV /Fentanyl drip  Pt is sedated with Propofol    ==================== RESPIRATORY========================  Acute resp failure, vented, sedated,   Monitor chest tube output  Chest tube x3 to  water seal	  Candido x2 to bulb suction    Mechanical Ventilation:  Mode: AC/ CMV (Assist Control/ Continuous Mandatory Ventilation)  RR (machine): 18  TV (machine): 500  FiO2: 40  PEEP: 5  MAP: 10.4  PIP: 24    Mechanical ventilator status assessed & settings reviewed  Continue bronchodilators, pulmonary toilet  Head of bed elevation to 30-40 degrees  F/up  serial ABG's    ====================CARDIOVASCULAR=====================   hemodynamic monitoring/ telemetry with Cental venous line following CVP, cont MAP monitoring while on /Levophed/ vesopressin drips  Titrating Levo for , MAP > 65  Elevated Trop, likely ESRD/Post-op SIRS, and unlikely ACS, Cardiology following, and d/w cards attending and Heart failure team--No Cath intervention indicated,   ASA started yesterday, and will cont.     ===================== RENAL ============================  D/W Renal team, May start CVVH today. CVVH bcath plaved.      Monitor I/Os, BUN/ Cr  and electrolytes  Monitor Acidosis/K+    ==================== GASTROINTESTINAL===================  NPO  Continue GI prophylaxis with  Protonix  Continue Zofran   for nausea - PRN    =======================    ENDOCRIN  =====================  Glycemic monitoring  F/S with coverage  ===================HEMATOLOGIC/ONCOLOGIC =============  Monitor chest tube output. No signs of active bleeding.   Follow CBC, coags  in AM  DVT prophylaxis with SCD, sc Heparin    ========================INFECTIOUS DISEASE===============  Monitor for fever / leukocytosis.  All surgical incision / chest tube  sites look clean  F/ up OR  cultures and path results  Empiric ABX Zosyn, Vanco, Flagyl for now      Pertinent clinical, laboratory, radiographic, hemodynamic, echocardiographic, respiratory data, microbiologic data and chart were reviewed and analyzed frequently throughout the course of the day and night. GI and DVT prophylaxis, glycemic control, head of bed elevation and skin care issues were addressed.  Patient seen, examined and plan discussed with CT Surgery / CTICU team during rounds.  Pt remains critically ill in imminent risk of  deterioration and requires very careful cardio- pulmonary monitoring and support.    I have spent 80 minutes of critical care time with this pt between    12  am    and   8 am         minutes spent on total encounter; more than 50% of the visit was spent counseling and/or coordinating care by the attending physician.    Dave MILLANP

## 2018-10-13 NOTE — PROGRESS NOTE ADULT - SUBJECTIVE AND OBJECTIVE BOX
CLAUDIA HAN  [id number]    Subjective:  Patient is a 58y old  Male who presents with a chief complaint of surgical procedure (12 Oct 2018 11:55)   Patient was seen and examined at bedside. No acute events overnight. Sedated and intubated. On vaso, Dobuatmin, levo for pressure support. Precedex, fentanyl for sedation. SANDY bulbs changed to low wall suction     Objective:  T(C): 35.6 (10-13-18 @ 04:00), Max: 36.9 (10-12-18 @ 16:00)  HR: 71 (10-13-18 @ 07:52) (71 - 100)  BP: 108/59 (10-12-18 @ 14:00) (88/59 - 108/59)  RR: 18 (10-13-18 @ 07:00) (17 - 19)  SpO2: 99% (10-13-18 @ 07:52) (97% - 100%)  Wt(kg): --   10-13    139  |  101  |  52<H>  ----------------------------<  121<H>  3.7   |  18<L>  |  5.72<H>    Ca    7.9<L>      13 Oct 2018 03:25  Phos  5.3     10-12  Mg     2.2     10-12    TPro  5.0<L>  /  Alb  2.5<L>  /  TBili  0.8  /  DBili  x   /  AST  15  /  ALT  5   /  AlkPhos  108  10-13                        8.1    6.06  )-----------( 89       ( 13 Oct 2018 03:25 )             24.0       10-12 @ 07:01  -  10-13 @ 07:00  --------------------------------------------------------  IN: 3212.6 mL / OUT: 960 mL / NET: 2252.6 mL    10-13 @ 07:01  -  10-13 @ 08:26  --------------------------------------------------------  IN: 53 mL / OUT: 0 mL / NET: 53 mL      PHYSICAL EXAM:    General: sedated, intubated  Chest: right chest incision vac holding with good suction. SANDY bulbs to low wall suction. Serosanguinous output.            MEDICATIONS  (STANDING):  albumin human  5% IVPB 250 milliLiter(s) IV Intermittent once  aspirin  chewable 81 milliGRAM(s) Oral daily  dexmedetomidine Infusion 0.5 MICROgram(s)/kG/Hr (7.375 mL/Hr) IV Continuous <Continuous>  dextrose 5%. 1000 milliLiter(s) (50 mL/Hr) IV Continuous <Continuous>  dextrose 50% Injectable 12.5 Gram(s) IV Push once  dextrose 50% Injectable 25 Gram(s) IV Push once  dextrose 50% Injectable 25 Gram(s) IV Push once  DOBUTamine Infusion 5 MICROgram(s)/kG/Min (8.85 mL/Hr) IV Continuous <Continuous>  EPINEPHrine    Infusion 0.15 MICROgram(s)/kG/Min (33.188 mL/Hr) IV Continuous <Continuous>  fentaNYL   Infusion 1 MICROgram(s)/kG/Hr (5.9 mL/Hr) IV Continuous <Continuous>  heparin  Injectable 5000 Unit(s) SubCutaneous every 12 hours  HYDROmorphone (10 MICROgram(s)/mL) + BUpivacaine 0.0625% in 0.9% Sodium Chloride PCEA 250 milliLiter(s) Epidural PCA Continuous  insulin lispro (HumaLOG) corrective regimen sliding scale   SubCutaneous every 6 hours  metroNIDAZOLE  IVPB      metroNIDAZOLE  IVPB 500 milliGRAM(s) IV Intermittent every 8 hours  norepinephrine Infusion 0.05 MICROgram(s)/kG/Min (2.766 mL/Hr) IV Continuous <Continuous>  pantoprazole  Injectable 40 milliGRAM(s) IV Push daily  phenylephrine    Infusion 0.2 MICROgram(s)/kG/Min (2.212 mL/Hr) IV Continuous <Continuous>  piperacillin/tazobactam IVPB. 3.375 Gram(s) IV Intermittent every 12 hours  propofol Infusion 20 MICROgram(s)/kG/Min (7.08 mL/Hr) IV Continuous <Continuous>  sodium chloride 0.9%. 1000 milliLiter(s) (30 mL/Hr) IV Continuous <Continuous>  vasopressin Infusion 0.05 Unit(s)/Min (3 mL/Hr) IV Continuous <Continuous>    MEDICATIONS  (PRN):  dextrose 40% Gel 15 Gram(s) Oral once PRN Blood Glucose LESS THAN 70 milliGRAM(s)/deciliter  glucagon  Injectable 1 milliGRAM(s) IntraMuscular once PRN Glucose LESS THAN 70 milligrams/deciliter  HYDROmorphone (10 MICROgram(s)/mL) + BUpivacaine 0.0625% in 0.9% Sodium Chloride PCEA Rescue Clinician  Bolus 5 milliLiter(s) Epidural every 15 minutes PRN for Pain Score greater than 6  naloxone Injectable 0.1 milliGRAM(s) IV Push every 3 minutes PRN For ANY of the following changes in patient status:  A. RR LESS THAN 10 breaths per minute, B. Oxygen saturation LESS THAN 90%, C. Sedation score of 6  ondansetron Injectable 4 milliGRAM(s) IV Push every 6 hours PRN Nausea

## 2018-10-13 NOTE — PROGRESS NOTE ADULT - PROBLEM SELECTOR PLAN 1
ESRD on HD for past six years through left upper extremity AVF.  Given hemodynamic instability will initiate CVVHDF via non tunneled catheter.

## 2018-10-13 NOTE — PROGRESS NOTE ADULT - SUBJECTIVE AND OBJECTIVE BOX
24H hour events: Decreased pressor requirements. Now off epinephrine and neosynephrine     MEDICATIONS:  aspirin  chewable 81 milliGRAM(s) Oral daily  DOBUTamine Infusion 5 MICROgram(s)/kG/Min IV Continuous <Continuous>  heparin  Injectable 5000 Unit(s) SubCutaneous every 12 hours  norepinephrine Infusion 0.08 MICROgram(s)/kG/Min IV Continuous <Continuous>  metroNIDAZOLE  IVPB      metroNIDAZOLE  IVPB 500 milliGRAM(s) IV Intermittent every 8 hours  piperacillin/tazobactam IVPB. 3.375 Gram(s) IV Intermittent every 12 hours  dexmedetomidine Infusion 0.5 MICROgram(s)/kG/Hr IV Continuous <Continuous>  fentaNYL   Infusion 1 MICROgram(s)/kG/Hr IV Continuous <Continuous>  HYDROmorphone (10 MICROgram(s)/mL) + BUpivacaine 0.0625% in 0.9% Sodium Chloride PCEA 250 milliLiter(s) Epidural PCA Continuous  HYDROmorphone (10 MICROgram(s)/mL) + BUpivacaine 0.0625% in 0.9% Sodium Chloride PCEA Rescue Clinician  Bolus 5 milliLiter(s) Epidural every 15 minutes PRN  ondansetron Injectable 4 milliGRAM(s) IV Push every 6 hours PRN  propofol Infusion 20 MICROgram(s)/kG/Min IV Continuous <Continuous>  pantoprazole  Injectable 40 milliGRAM(s) IV Push daily  dextrose 40% Gel 15 Gram(s) Oral once PRN  dextrose 50% Injectable 12.5 Gram(s) IV Push once  dextrose 50% Injectable 25 Gram(s) IV Push once  dextrose 50% Injectable 25 Gram(s) IV Push once  glucagon  Injectable 1 milliGRAM(s) IntraMuscular once PRN  insulin lispro (HumaLOG) corrective regimen sliding scale   SubCutaneous every 6 hours  vasopressin Infusion 0.05 Unit(s)/Min IV Continuous <Continuous>  albumin human  5% IVPB 250 milliLiter(s) IV Intermittent once  dextrose 5%. 1000 milliLiter(s) IV Continuous <Continuous>  sodium chloride 0.9%. 1000 milliLiter(s) IV Continuous <Continuous>      REVIEW OF SYSTEMS:  intubated/sedated    PHYSICAL EXAM:  T(C): 36.3 (10-13-18 @ 08:00), Max: 36.9 (10-12-18 @ 16:00)  HR: 72 (10-13-18 @ 09:00) (70 - 97)  BP: 108/59 (10-12-18 @ 14:00) (94/56 - 108/59)  RR: 18 (10-13-18 @ 09:00) (17 - 19)  SpO2: 99% (10-13-18 @ 09:00) (97% - 100%)  Wt(kg): --  I&O's Summary    12 Oct 2018 07:01  -  13 Oct 2018 07:00  --------------------------------------------------------  IN: 3212.6 mL / OUT: 960 mL / NET: 2252.6 mL    13 Oct 2018 07:01  -  13 Oct 2018 10:30  --------------------------------------------------------  IN: 159 mL / OUT: 40 mL / NET: 119 mL        Appearance: intubated/sedated/pressors and inotrope infusing/cvvhd starting via femoral temp access  HEENT: PERRL	  Cardiovascular: Normal S1 S2, No JVD, No murmurs, mild UE edema  Respiratory: Lungs clear to auscultation	  Gastrointestinal:  Soft, Non-tender, + BS		  Vascular: doplerable pulses b/l LE        LABS:	 	    CBC Full  -  ( 13 Oct 2018 03:25 )  WBC Count : 6.06 K/uL  Hemoglobin : 8.1 g/dL  Hematocrit : 24.0 %  Platelet Count - Automated : 89 K/uL  Mean Cell Volume : 92.3 fL  Mean Cell Hemoglobin : 31.2 pg  Mean Cell Hemoglobin Concentration : 33.8 %  Auto Neutrophil # : 4.87 K/uL  Auto Lymphocyte # : 0.50 K/uL  Auto Monocyte # : 0.37 K/uL  Auto Eosinophil # : 0.20 K/uL  Auto Basophil # : 0.02 K/uL  Auto Neutrophil % : 80.3 %  Auto Lymphocyte % : 8.3 %  Auto Monocyte % : 6.1 %  Auto Eosinophil % : 3.3 %  Auto Basophil % : 0.3 %    10-13    139  |  101  |  52<H>  ----------------------------<  121<H>  3.7   |  18<L>  |  5.72<H>  10-12    140  |  101  |  48<H>  ----------------------------<  120<H>  3.7   |  21<L>  |  5.21<H>    Ca    7.9<L>      13 Oct 2018 03:25  Ca    8.4      12 Oct 2018 18:30  Phos  5.3     10-12  Phos  5.7     10-12  Mg     2.2     10-12  Mg     2.1     10-12    TPro  5.0<L>  /  Alb  2.5<L>  /  TBili  0.8  /  DBili  x   /  AST  15  /  ALT  5   /  AlkPhos  108  10-13  TPro  5.2<L>  /  Alb  2.9<L>  /  TBili  0.9  /  DBili  x   /  AST  13  /  ALT  5   /  AlkPhos  98  10-12    TELEMETRY: sinus 80-90s    < from: Transthoracic Echocardiogram (10.12.18 @ 08:31) >  DIMENSIONS:  Dimensions:     Normal Values:  LA:     2.5 cm    2.0 - 4.0 cm  Ao:     3.5 cm    2.0 - 3.8 cm  SEPTUM: 0.7 cm    0.6 - 1.2 cm  PWT:    0.8 cm    0.6 - 1.1 cm  LVIDd:  7.3 cm    3.0 - 5.6 cm  LVIDs:  6.6 cm    1.8 - 4.0 cm  Derived Variables:  LVMI: 124 g/m2  RWT: 0.21  Fractional short: 10 %  Ejection Fraction (Teicholtz): 20 %    < end of copied text >  < from: Transthoracic Echocardiogram (10.12.18 @ 08:31) >  CONCLUSIONS:  1.Mitral annular calcification, otherwise normal mitral  valve. Moderate mitral regurgitation.  2. Calcified trileaflet aortic valve with normal opening.  Moderate aortic regurgitation.  Vena contracta width about  0.4 cm.  3. Severe left ventricular enlargement.  4. Severe global left ventricular systolic dysfunction.  5. Normal right ventricular size with decreased right  ventricular systolic function.  6. Estimated right ventricular systolic pressure equals 40  mm Hg, assuming right atrial pressure equals 10 mm Hg,  consistent with mild pulmonary hypertension.  *** No previous Echo exam.    < end of copied text >

## 2018-10-13 NOTE — PROGRESS NOTE ADULT - SUBJECTIVE AND OBJECTIVE BOX
CLAUDIA HAN          MRN-[id number]    HPI:  Pt is a 58 yr old Mandarin speaking male scheduled for Right Thoractomy Decortiation, Right Chest Wall reconstruction with Latissimjus Dorsi Flap Poss Skin Graft with VAC dressing with Dr Pickering 10/11/18. Pt has ICD but unable to identify make or model. Pt hx of ESRD with HD T/Th/Sat for 4 hrs and has stated he has stopped some of his meds but cannot say which ones. Pt seen and received MC and CC but has 2 different med lists and is unable to identify meds taking. Pt denies blood thinners. Pt has stopped ASA as of last week. Pt hx gathered from [employer] and notes from Dr Pickering - pt is poor historian.     Call through  to patient who went to local pharmacy for confirmation of meds and pt given preop instructions (01 Oct 2018 09:25)      Procedure:  POD # :     Issues:        Interval/Overnight Events/ ROS  Pt remained hemodynamically stable overnight, not on any pressors or inotropes. OOB to chair, breathing comfortably with minimal pain. Ambulated several times . Denies pain, no SOB, no palpitations, no nausea/ no vomiting, no dizziness  A-line and sharma d/kirsten         PAST MEDICAL & SURGICAL HISTORY:  Smoking hx  Cardiomyopathy  Wound: right chest  ICD (implantable cardioverter-defibrillator) in place  OA (osteoarthritis)  HLD (hyperlipidemia)  BPH (benign prostatic hyperplasia)  Pleural effusion  HTN (hypertension)  ESRD (end stage renal disease)  H/O chest wound: right  History of wound infection: right chest wall - revision 5/18 and again in 7/18  History of implantable cardioverter-defibrillator (ICD) placement: pt unsure when placed  H/O bilateral hip replacements: 2008, 2009    Allergies    No Known Allergies    Intolerances            ***VITAL SIGNS:  Vital Signs Last 24 Hrs  T(C): 36.4 (13 Oct 2018 20:00), Max: 36.9 (13 Oct 2018 00:00)  T(F): 97.5 (13 Oct 2018 20:00), Max: 98.4 (13 Oct 2018 00:00)  HR: 74 (13 Oct 2018 22:00) (61 - 93)  BP: --  BP(mean): --  RR: 18 (13 Oct 2018 22:00) (17 - 19)  SpO2: 100% (13 Oct 2018 22:00) (97% - 100%)    I/Os:   I&O's Detail    12 Oct 2018 07:01  -  13 Oct 2018 07:00  --------------------------------------------------------  IN:    dexmedetomidine Infusion: 213.4 mL    DOBUTamine Infusion: 213.6 mL    EPINEPHrine Infusion: 106.4 mL    fentaNYL  Infusion: 283.2 mL    IV PiggyBack: 1350 mL    norepinephrine Infusion: 315.6 mL    phenylephrine   Infusion: 113.3 mL    propofol Infusion: 287.1 mL    sodium chloride 0.9%.: 270 mL    vasopressin Infusion: 60 mL  Total IN: 3212.6 mL    OUT:    Bulb: 150 mL    Bulb: 420 mL    Chest Tube: 70 mL    Chest Tube: 220 mL    Chest Tube: 100 mL  Total OUT: 960 mL    Total NET: 2252.6 mL      13 Oct 2018 07:01  -  13 Oct 2018 22:29  --------------------------------------------------------  IN:    dexmedetomidine Infusion: 154.5 mL    DOBUTamine Infusion: 133.5 mL    fentaNYL  Infusion: 177 mL    IV PiggyBack: 300 mL    norepinephrine Infusion: 100.4 mL    Packed Red Blood Cells: 300 mL    Platelets - Single Donor: 236 mL    propofol Infusion: 196.6 mL    sodium chloride 0.9%.: 120 mL    vasopressin Infusion: 34.2 mL  Total IN: 1752.2 mL    OUT:    Bulb: 10 mL    Bulb: 300 mL    Chest Tube: 50 mL    Chest Tube: 60 mL    Chest Tube: 130 mL  Total OUT: 550 mL    Total NET: 1202.2 mL          CAPILLARY BLOOD GLUCOSE      POCT Blood Glucose.: 109 mg/dL (13 Oct 2018 19:07)  POCT Blood Glucose.: 121 mg/dL (13 Oct 2018 16:48)  POCT Blood Glucose.: 127 mg/dL (13 Oct 2018 13:59)  POCT Blood Glucose.: 126 mg/dL (13 Oct 2018 05:03)  POCT Blood Glucose.: 130 mg/dL (13 Oct 2018 00:59)      =======================  MEDICATIONS  ===================  MEDICATIONS  (STANDING):  albumin human  5% IVPB 250 milliLiter(s) IV Intermittent once  aspirin  chewable 81 milliGRAM(s) Oral daily  dexmedetomidine Infusion 0.5 MICROgram(s)/kG/Hr (7.375 mL/Hr) IV Continuous <Continuous>  dextrose 5%. 1000 milliLiter(s) (50 mL/Hr) IV Continuous <Continuous>  dextrose 50% Injectable 12.5 Gram(s) IV Push once  dextrose 50% Injectable 25 Gram(s) IV Push once  dextrose 50% Injectable 25 Gram(s) IV Push once  DOBUTamine Infusion 5 MICROgram(s)/kG/Min (8.85 mL/Hr) IV Continuous <Continuous>  fentaNYL   Infusion 1 MICROgram(s)/kG/Hr (5.9 mL/Hr) IV Continuous <Continuous>  heparin  Injectable 5000 Unit(s) SubCutaneous every 12 hours  insulin lispro (HumaLOG) corrective regimen sliding scale   SubCutaneous every 6 hours  metroNIDAZOLE  IVPB      metroNIDAZOLE  IVPB 500 milliGRAM(s) IV Intermittent every 8 hours  norepinephrine Infusion 0.05 MICROgram(s)/kG/Min (2.766 mL/Hr) IV Continuous <Continuous>  pantoprazole  Injectable 40 milliGRAM(s) IV Push daily  piperacillin/tazobactam IVPB. 3.375 Gram(s) IV Intermittent every 12 hours  PrismaSATE Dialysate BGK 4 / 2.5 5000 milliLiter(s) (1000 mL/Hr) CRRT <Continuous>  PrismaSOL Filtration BGK 4 / 2.5 5000 milliLiter(s) (1000 mL/Hr) CRRT <Continuous>  PrismaSOL Filtration BGK 4 / 2.5 5000 milliLiter(s) (200 mL/Hr) CRRT <Continuous>  propofol Infusion 20 MICROgram(s)/kG/Min (7.08 mL/Hr) IV Continuous <Continuous>  sodium chloride 0.9%. 1000 milliLiter(s) (30 mL/Hr) IV Continuous <Continuous>  vasopressin Infusion 0.05 Unit(s)/Min (3 mL/Hr) IV Continuous <Continuous>    MEDICATIONS  (PRN):  dextrose 40% Gel 15 Gram(s) Oral once PRN Blood Glucose LESS THAN 70 milliGRAM(s)/deciliter  glucagon  Injectable 1 milliGRAM(s) IntraMuscular once PRN Glucose LESS THAN 70 milligrams/deciliter      ======================VENTILATOR SETTINGS  ==============  Mode: AC/ CMV (Assist Control/ Continuous Mandatory Ventilation)  RR (machine): 18  TV (machine): 500  FiO2: 40  PEEP: 5  MAP: 10  PIP: 26      =================== PATIENT CARE ACCESS DEVICES ==========  Peripheral IV  Central Venous Line	R	L	IJ	Fem	SC			Placed:   Arterial Line	R	L	PT	DP	Fem	Rad	Ax	Placed:   Midline:				  Urinary Catheter, Date Placed:   Necessity of urinary, arterial, and venous catheters discussed    ======================= PHYSICAL EXAM===================  General:                         Comfortable, Awake, alert, not in any distress  Neuro:                            Moving all extremities to commands. No focal deficits	  HEENT:                           CHER/ ETT/ NGT/ trach  Respiratory:	Lungs clear on auscultation bilaterally with good aeration.                                           No rales, rhonchi, no wheezing. Effort even and unlabored.  CV:		Regular rate and rhythm. Normal S1/S2. No murmurs  Abdomen:	                     Soft,  nontender, not-distended. Bowel sounds present / absent.   Skin:		No rash.  Extremities:	Warm, no cyanosis or edema.  Palpable pulses    ============================ LABS =======================                        8.5    6.39  )-----------( 82       ( 13 Oct 2018 21:30 )             24.9     10-13    139  |  101  |  48<H>  ----------------------------<  124<H>  3.6   |  20<L>  |  4.80<H>    Ca    8.4      13 Oct 2018 15:45  Phos  5.5     10-13  Mg     2.3     10-13    TPro  5.4<L>  /  Alb  2.6<L>  /  TBili  1.9<H>  /  DBili  x   /  AST  23  /  ALT  < 4<L>  /  AlkPhos  127<H>  10-13    LIVER FUNCTIONS - ( 13 Oct 2018 15:45 )  Alb: 2.6 g/dL / Pro: 5.4 g/dL / ALK PHOS: 127 u/L / ALT: < 4 u/L / AST: 23 u/L / GGT: x           PT/INR - ( 13 Oct 2018 15:45 )   PT: 11.4 SEC;   INR: 0.99          PTT - ( 13 Oct 2018 15:45 )  PTT:33.9 SEC  ABG - ( 13 Oct 2018 21:30 )  pH, Arterial: 7.44  pH, Blood: x     /  pCO2: 32    /  pO2: 139   / HCO3: 23    / Base Excess: -1.7  /  SaO2: 98.6                OTHER  10-11-18 --  --  --      OTHER  10-11-18 --  --  --      TISSUE  10-11-18 --  --  --      TISSUE  10-11-18 --  --  --      TISSUE  10-11-18 --  --  Enterococcus faecalis      PLEURAL FLUID  10-11-18 --  --    GPCCH^Gram Pos Cocci in Chains  QUANTITY OF BACTERIA SEEN: MODERATE (3+)  GPCPR^Gram Pos Cocci in Pairs  QUANTITY OF BACTERIA SEEN: MODERATE (3+)  WBC^White Blood Cells  QNTY CELLS IN GRAM STAIN: FEW (2+)          ===================== IMAGING STUDIES ===================  Radiology personally reviewed.    ====================ASSESSMENT AND PLAN ================      ====================== NEUROLOGY=======================  Pain control with PCA / PCEA / Tylenol IV / Toradol / Percocet  Pt is on Precedex for agitation  Pt is sedated with Propofol / Fentanyl    ==================== RESPIRATORY========================  Pt is on            L nasal canula / Face tent____% FiO2  Comfortable, no evidence of distress.  Using incentive spirometry & doing                ml  Monitor chest tube output  Chest tube to suction / water seal	    Mechanical Ventilation:  Mode: AC/ CMV (Assist Control/ Continuous Mandatory Ventilation)  RR (machine): 18  TV (machine): 500  FiO2: 40  PEEP: 5  MAP: 10  PIP: 26    Mechanical ventilator status assessed & settings reviewed  Continue bronchodilators, pulmonary toilet  Head of bed elevation to 30-40 degrees    ====================CARDIOVASCULAR=====================  Continue hemodynamic monitoring/ telemetry  Not on any pressors  Continue cardiovascular / antihypertensive medications    ===================== RENAL ============================  Continue LR 30CC/hr      D/C IVF  Monitor I/Os, BUN/ Cr  and electrolytes  D/C Sharma      Keep Sharma for UO monitoring  BPH: Continue Flomax/ Finasteride      ==================== GASTROINTESTINAL===================  On regular diet, tolerating well  Continue GI prophylaxis with Pepcid / Protonix  Continue Zofran / Reglan for nausea - PRN	  NPO    =======================    ENDOCRIN  =====================  Glycemic monitoring  F/S with coverage  ===================HEMATOLOGIC/ONCOLOGIC =============  Monitor chest tube output. No signs of active bleeding.   Follow CBC, coags  in AM  DVT prophylaxis with SCD, sc Heparin    ========================INFECTIOUS DISEASE===============  No signs of infection. Monitor for fever / leukocytosis.  All surgical incision / chest tube  sites look clean  D/C Sharma      Pertinent clinical, laboratory, radiographic, hemodynamic, echocardiographic, respiratory data, microbiologic data and chart were reviewed and analyzed frequently throughout the course of the day and night. GI and DVT prophylaxis, glycemic control, head of bed elevation and skin care issues were addressed.  Patient seen, examined and plan discussed with CT Surgery / CTICU team during rounds.  Pt remains critically ill in imminent risk of  deterioration and requires very careful cardio- pulmonary monitoring and support.    I have spent               minutes of critical care time with this pt between            am/pm    and               am/ pm         minutes spent on total encounter; more than 50% of the visit was spent counseling and/or coordinating care by the attending physician.        KERLINE Faith MD CLAUDIA HAN          MRN-[id number]    HPI:  Pt is a 58 yr old Mandarin speaking male scheduled for Right Thoracotomy, Decortication , Right Chest Wall reconstruction with Latissimjus Dorsi Flap Poss Skin Graft with VAC dressing with Dr Pickering 10/11/18. Pt has ICD but unable to identify make or model. Pt hx of ESRD with HD T/Th/Sat for 4 hrs and has stated he has stopped some of his meds but cannot say which ones. Pt seen and received MC and CC but has 2 different med lists and is unable to identify meds taking. Pt denies blood thinners. Pt has stopped ASA as of last week. Pt hx gathered from [employer] and notes from Dr Pickering - pt is poor historian.      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          Procedure:  Thoracotomy  10/11/2018       · Operative Findings	Right thoracotomy, resection of portion of R 7th rib, evacuation of infected hemothorax, takedown of pleurocutaneous fistula	    · Operative Findings	s/p R chest wall reconstruction with tunneled latissimus dorsi flap, covered by serratus anterior flap, after R thoracotomy, takedown pleurocutaneous fistula, decortication, resection Right 7th rib	         · Drains	3 28Fr chest tubes (A,P, and diaphragm), 2 PRS SQ SANDY drains, VAC	  · Estimated Blood Loss	1000 milliLiter(s)	  · IV Infusions - Crystalloids	4L	  · IV Infusions - Colloids	500cc	  · IV Infusions - Blood Products	4u PRBC	       POD # :2    Issues: s/p  infected right hemothorax evacuation             multiple right  chest tubes/ drains in place             distributive and cardiogenic shock/ SIRS             acute resp failure on vent support             posthemorrhagic anemia             lactic acidosis             hyperglycemia             ESRD on HD             acute on chronic systolic CHF ( EF 10 %), ICD in place                   Interval/OR Events/ ROS  Pt hypotensive through the surgery - on multiple pressors / inotropes ( Levo, Epi, Dobutamine). Required 4 units of PRBC in addition to colloids and crystalloids for  symptomatic  posthemorrhagic anemia during the surgery. Pt arrived in ICU orally intubated, sedated, on Levo, Dobut, Epi.    Overnight in ICU still on multiple pressors/ inotropes./ Vent support. Mixed resp/ metab acidosis minimally  improved Lactic acidemia- unchanged @ 2-3.. Pt will need HD today ( ESRD on HD - anuria)  On POD 1 weaned off Epi, Quan. Remains on Dobutamine, Levophed, Vasopressin; on POD 2 started  CVVHD. Oxygenation and acid base status  is little  improved. Troponin is down to 88. Cardiology / CHF team on board to help in managing decompensated CHF/ cardiogenic shock. OR culture grew Enterococcus faecalis - sensitivity pending      PAST MEDICAL & SURGICAL HISTORY:  Smoking hx  Cardiomyopathy  Wound: right chest  ICD (implantable cardioverter-defibrillator) in place  OA (osteoarthritis)  HLD (hyperlipidemia)  BPH (benign prostatic hyperplasia)  Pleural effusion  HTN (hypertension)  ESRD (end stage renal disease)  H/O chest wound: right  History of wound infection: right chest wall - revision 5/18 and again in 7/18  History of implantable cardioverter-defibrillator (ICD) placement: pt unsure when placed  H/O bilateral hip replacements: 2008, 2009    Home Medications:   * Patient Currently Takes Medications as of 01-Oct-2018 09:29 documented in Structured Notes  · 	nortriptyline 50 mg oral capsule: 1 cap(s) orally once a day  · 	Vitamin B Complex: 1 tab(s) orally once a day  · 	Nephplex Rx oral tablet: 1 tab(s) orally once a day  · 	aspirin 81 mg oral tablet: 1 tab(s) orally once a day last dose 10/3/2018  · 	Vitamin C 500 mg oral tablet: 1 tab(s) orally once a day  · 	Breo Ellipta 100 mcg-25 mcg/inh inhalation powder: 1 puff(s) inhaled once a day patient denies using it  · 	Calcium 500: 1 tab(s) orally 2 times a day  · 	carvedilol 6.25 mg oral tablet: 1 tab(s) orally once a day  · 	docusate sodium 100 mg oral tablet: 1 tab(s) orally 2 times a day, As Needed  · 	folic acid 1 mg oral tablet: 1 tab(s) orally once a day  · 	Mag-Ox 400 oral tablet: 1 tab(s) orally once a day  · 	midodrine 10 mg oral tablet: 1 tab(s) orally once a day  · 	Multiple Vitamins oral tablet: 1 tab(s) orally once a day last dose 10/3/2018  · 	Namenda 10 mg oral tablet: 1 tab(s) orally 2 times a day  · 	Renvela 800 mg oral tablet: 2 tab(s) orally every 8 hours  · 	simethicone 80 mg oral tablet: 1 tab(s) orally 3 times a day (after meals)  · 	torsemide 20 mg oral tablet: 1 tab(s) orally once a day  · 	vitamin A: 1 tab(s) orally once a day      Allergies  No Known Allergies    ***VITAL SIGNS:  Vital Signs Last 24 Hrs  T(C): 36.4 (13 Oct 2018 20:00), Max: 36.9 (13 Oct 2018 00:00)  T(F): 97.5 (13 Oct 2018 20:00), Max: 98.4 (13 Oct 2018 00:00)  HR: 74 (13 Oct 2018 22:00) (61 - 93)  BP: --  BP(mean): --  RR: 18 (13 Oct 2018 22:00) (17 - 19)  SpO2: 100% (13 Oct 2018 22:00) (97% - 100%)     I&O's Detail    12 Oct 2018 07:01  -  13 Oct 2018 07:00  --------------------------------------------------------  IN:    dexmedetomidine Infusion: 213.4 mL    DOBUTamine Infusion: 213.6 mL    EPINEPHrine Infusion: 106.4 mL    fentaNYL  Infusion: 283.2 mL    IV PiggyBack: 1350 mL    norepinephrine Infusion: 315.6 mL    phenylephrine   Infusion: 113.3 mL    propofol Infusion: 287.1 mL    sodium chloride 0.9%.: 270 mL    vasopressin Infusion: 60 mL  Total IN: 3212.6 mL    OUT:    Bulb: 150 mL    Bulb: 420 mL    Chest Tube: 70 mL    Chest Tube: 220 mL    Chest Tube: 100 mL  Total OUT: 960 mL    Total NET: 2252.6 mL      13 Oct 2018 07:01  -  13 Oct 2018 22:29  --------------------------------------------------------  IN:    dexmedetomidine Infusion: 154.5 mL    DOBUTamine Infusion: 133.5 mL    fentaNYL  Infusion: 177 mL    IV PiggyBack: 300 mL    norepinephrine Infusion: 100.4 mL    Packed Red Blood Cells: 300 mL    Platelets - Single Donor: 236 mL    propofol Infusion: 196.6 mL    sodium chloride 0.9%.: 120 mL    vasopressin Infusion: 34.2 mL  Total IN: 1752.2 mL    OUT:    Bulb: 10 mL    Bulb: 300 mL    Chest Tube: 50 mL    Chest Tube: 60 mL    Chest Tube: 130 mL  Total OUT: 550 mL    Total NET: 1202.2 mL    CAPILLARY BLOOD GLUCOSE  POCT Blood Glucose.: 109 mg/dL (13 Oct 2018 19:07)  POCT Blood Glucose.: 121 mg/dL (13 Oct 2018 16:48)  POCT Blood Glucose.: 127 mg/dL (13 Oct 2018 13:59)  POCT Blood Glucose.: 126 mg/dL (13 Oct 2018 05:03)  POCT Blood Glucose.: 130 mg/dL (13 Oct 2018 00:59)    =======================  MEDICATIONS  ===================  MEDICATIONS  (STANDING):  albumin human  5% IVPB 250 milliLiter(s) IV Intermittent once  aspirin  chewable 81 milliGRAM(s) Oral daily  dexmedetomidine Infusion 0.5 MICROgram(s)/kG/Hr (7.375 mL/Hr) IV Continuous <Continuous>  dextrose 5%. 1000 milliLiter(s) (50 mL/Hr) IV Continuous <Continuous>  dextrose 50% Injectable 12.5 Gram(s) IV Push once  dextrose 50% Injectable 25 Gram(s) IV Push once  dextrose 50% Injectable 25 Gram(s) IV Push once  DOBUTamine Infusion 5 MICROgram(s)/kG/Min (8.85 mL/Hr) IV Continuous <Continuous>  fentaNYL   Infusion 1 MICROgram(s)/kG/Hr (5.9 mL/Hr) IV Continuous <Continuous>  heparin  Injectable 5000 Unit(s) SubCutaneous every 12 hours  insulin lispro (HumaLOG) corrective regimen sliding scale   SubCutaneous every 6 hours  metroNIDAZOLE  IVPB      metroNIDAZOLE  IVPB 500 milliGRAM(s) IV Intermittent every 8 hours  norepinephrine Infusion 0.05 MICROgram(s)/kG/Min (2.766 mL/Hr) IV Continuous <Continuous>  pantoprazole  Injectable 40 milliGRAM(s) IV Push daily  piperacillin/tazobactam IVPB. 3.375 Gram(s) IV Intermittent every 12 hours  PrismaSATE Dialysate BGK 4 / 2.5 5000 milliLiter(s) (1000 mL/Hr) CRRT <Continuous>  PrismaSOL Filtration BGK 4 / 2.5 5000 milliLiter(s) (1000 mL/Hr) CRRT <Continuous>  PrismaSOL Filtration BGK 4 / 2.5 5000 milliLiter(s) (200 mL/Hr) CRRT <Continuous>  propofol Infusion 20 MICROgram(s)/kG/Min (7.08 mL/Hr) IV Continuous <Continuous>  sodium chloride 0.9%. 1000 milliLiter(s) (30 mL/Hr) IV Continuous <Continuous>  vasopressin Infusion 0.05 Unit(s)/Min (3 mL/Hr) IV Continuous <Continuous>    MEDICATIONS  (PRN):  dextrose 40% Gel 15 Gram(s) Oral once PRN Blood Glucose LESS THAN 70 milliGRAM(s)/deciliter  glucagon  Injectable 1 milliGRAM(s) IntraMuscular once PRN Glucose LESS THAN 70 milligrams/deciliter    ======================VENTILATOR SETTINGS  ==============  Mode: AC/ CMV (Assist Control/ Continuous Mandatory Ventilation)  RR (machine): 18  TV (machine): 500  FiO2: 40  PEEP: 5  MAP: 10  PIP: 26    =================== PATIENT CARE ACCESS DEVICES ==========  Peripheral IV (+)  Central Venous Line R/ IJ (+)   Arterial Line	R / Rad(+)			  Necessity of  arterial, and venous catheters discussed    ======================= PHYSICAL EXAM===================  General:                         Comfortable, Awake, alert, not in any distress  Neuro:                            Moving all extremities to commands. No focal deficits	  HEENT:                           CHER/ ETT/ NGT/ trach  Respiratory:	Lungs clear on auscultation bilaterally with good aeration.                                           No rales, rhonchi, no wheezing. Effort even and unlabored.  CV:		Regular rate and rhythm. Normal S1/S2. No murmurs  Abdomen:	                     Soft,  nontender, not-distended. Bowel sounds present / absent.   Skin:		No rash.  Extremities:	Warm, no cyanosis or edema.  Palpable pulses    ============================ LABS =======================                        8.5    6.39  )-----------( 82       ( 13 Oct 2018 21:30 )             24.9     10-13    139  |  101  |  48<H>  ----------------------------<  124<H>  3.6   |  20<L>  |  4.80<H>    Ca    8.4      13 Oct 2018 15:45  Phos  5.5     10-13  Mg     2.3     10-13    TPro  5.4<L>  /  Alb  2.6<L>  /  TBili  1.9<H>  /  DBili  x   /  AST  23  /  ALT  < 4<L>  /  AlkPhos  127<H>  10-13    LIVER FUNCTIONS - ( 13 Oct 2018 15:45 )  Alb: 2.6 g/dL / Pro: 5.4 g/dL / ALK PHOS: 127 u/L / ALT: < 4 u/L / AST: 23 u/L / GGT: x           PT/INR - ( 13 Oct 2018 15:45 )   PT: 11.4 SEC;   INR: 0.99          PTT - ( 13 Oct 2018 15:45 )  PTT:33.9 SEC  ABG - ( 13 Oct 2018 21:30 )  pH, Arterial: 7.44  pH, Blood: x     /  pCO2: 32    /  pO2: 139   / HCO3: 23    / Base Excess: -1.7  /  SaO2: 98.6                OTHER  10-11-18 --  --  --      OTHER  10-11-18 --  --  --      TISSUE  10-11-18 --  --  --      TISSUE  10-11-18 --  --  --      TISSUE  10-11-18 --  --  Enterococcus faecalis      PLEURAL FLUID  10-11-18 --  --    GPCCH^Gram Pos Cocci in Chains  QUANTITY OF BACTERIA SEEN: MODERATE (3+)  GPCPR^Gram Pos Cocci in Pairs  QUANTITY OF BACTERIA SEEN: MODERATE (3+)  WBC^White Blood Cells  QNTY CELLS IN GRAM STAIN: FEW (2+)          ===================== IMAGING STUDIES ===================  Radiology personally reviewed.    ====================ASSESSMENT AND PLAN ================      ====================== NEUROLOGY=======================  Pain control with PCA / PCEA / Tylenol IV / Toradol / Percocet  Pt is on Precedex for agitation  Pt is sedated with Propofol / Fentanyl    ==================== RESPIRATORY========================  Pt is on            L nasal canula / Face tent____% FiO2  Comfortable, no evidence of distress.  Using incentive spirometry & doing                ml  Monitor chest tube output  Chest tube to suction / water seal	    Mechanical Ventilation:  Mode: AC/ CMV (Assist Control/ Continuous Mandatory Ventilation)  RR (machine): 18  TV (machine): 500  FiO2: 40  PEEP: 5  MAP: 10  PIP: 26    Mechanical ventilator status assessed & settings reviewed  Continue bronchodilators, pulmonary toilet  Head of bed elevation to 30-40 degrees    ====================CARDIOVASCULAR=====================  Continue hemodynamic monitoring/ telemetry  Not on any pressors  Continue cardiovascular / antihypertensive medications    ===================== RENAL ============================  Continue LR 30CC/hr      D/C IVF  Monitor I/Os, BUN/ Cr  and electrolytes  D/C Moore      Keep Moore for UO monitoring  BPH: Continue Flomax/ Finasteride      ==================== GASTROINTESTINAL===================  On regular diet, tolerating well  Continue GI prophylaxis with Pepcid / Protonix  Continue Zofran / Reglan for nausea - PRN	  NPO    =======================    ENDOCRIN  =====================  Glycemic monitoring  F/S with coverage  ===================HEMATOLOGIC/ONCOLOGIC =============  Monitor chest tube output. No signs of active bleeding.   Follow CBC, coags  in AM  DVT prophylaxis with SCD, sc Heparin    ========================INFECTIOUS DISEASE===============  No signs of infection. Monitor for fever / leukocytosis.  All surgical incision / chest tube  sites look clean  D/C Moore      Pertinent clinical, laboratory, radiographic, hemodynamic, echocardiographic, respiratory data, microbiologic data and chart were reviewed and analyzed frequently throughout the course of the day and night. GI and DVT prophylaxis, glycemic control, head of bed elevation and skin care issues were addressed.  Patient seen, examined and plan discussed with CT Surgery / CTICU team during rounds.  Pt remains critically ill in imminent risk of  deterioration and requires very careful cardio- pulmonary monitoring and support.    I have spent               minutes of critical care time with this pt between            am/pm    and               am/ pm         minutes spent on total encounter; more than 50% of the visit was spent counseling and/or coordinating care by the attending physician.        KERLINE Faith MD      ======================= PHYSICAL EXAM===================  General:             sedated, intubated  Neuro:               Moving  extremities spontaneously    with lower dose of sedation. 	  HEENT:                           CHER/ ETT  Respiratory:	Lungs sound coarse on auscultation bilaterally with good aeration.                            No rales, rhonchi, no wheezing.                           RIght thoracotomy site - covered with dressing, chest tube site -clean  CV:		Regular rate and rhythm. Normal S1/S2.  Abdomen:          Soft,  nontender, not-distended. Bowel sounds present - hypoactive  Skin:		No rash.  Extremities:	Warm, no cyanosis or edema.  (+) pulses by doppler    ============================ LABS =======================           Blood Gas Arterial - Lactate: 3.3: Please note updated reference range. mmol/L (10.12.18 @ 02:53)  Blood Gas Arterial - Lactate: 2.2: Please note updated reference range. mmol/L (10.11.18 @ 21:20)  Blood Gas Arterial - Lactate: 2.5: Please note updated reference range. mmol/L (10.11.18 @ 18:00)        ===================== IMAGING STUDIES ===================  Radiology personally reviewed.  < from: Xray Chest 1 View- PORTABLE-Urgent (10.11.18 @ 19:10) >  INTERPRETATION:   ETT (+) , small right pneumothorax  trace peumopericardium  likely small pneumoperitoneum    < end of copied text >  ====================ASSESSMENT AND PLAN ================  Pt is a 58 yr old Mandarin speaking male with infected right hemothorax admitted  for Right Thoracotomy,  Decortication , Right Chest Wall reconstruction       ====================== NEUROLOGY=======================  Pain control with PCEA / Tylenol IV / Toradol   Pt is sedated with Propofol / Fentanyl    ==================== RESPIRATORY========================  Monitor chest tube output  Chest tube x3 to  water seal	  Candido x2 to bulb suction    Mechanical Ventilation:  Mode: AC/ CMV (Assist Control/ Continuous Mandatory Ventilation)  RR (machine): 18  TV (machine): 500  FiO2: 40  PEEP: 5  MAP: 10.4  PIP: 24    Mechanical ventilator status assessed & settings reviewed  Continue bronchodilators, pulmonary toilet  Head of bed elevation to 30-40 degrees  F/up  serial ABG's    ====================CARDIOVASCULAR=====================  Continue hemodynamic monitoring/ telemetry  Titrating Levo, Quan, Epi, Dobutamine for SBP > 90, MAP > 65medications    ===================== RENAL ============================  Continue NS 30CC/hr       Monitor I/Os, BUN/ Cr  and electrolytes  No Moore - Pt is on HD for ESRD ( needs HD today)      ==================== GASTROINTESTINAL===================  NPO  Continue GI prophylaxis with  Protonix  Continue Zofran   for nausea - PRN    =======================    ENDOCRIN  =====================  Glycemic monitoring  F/S with coverage  ===================HEMATOLOGIC/ONCOLOGIC =============  Monitor chest tube output. No signs of active bleeding.   Follow CBC, coags  in AM  DVT prophylaxis with SCD, sc Heparin    ========================INFECTIOUS DISEASE===============  Monitor for fever / leukocytosis.  All surgical incision / chest tube  sites look clean  F/ up OR  cultures and path results  Empiric ABX Zosyn, Vanco, Flagyl for now CLAUDIA HAN          MRN-[id number]    HPI:  Pt is a 58 yr old Mandarin speaking male scheduled for Right Thoracotomy, Decortication , Right Chest Wall reconstruction with Latissimjus Dorsi Flap Poss Skin Graft with VAC dressing with Dr Pickering 10/11/18. Pt has ICD but unable to identify make or model. Pt hx of ESRD with HD T/Th/Sat for 4 hrs and has stated he has stopped some of his meds but cannot say which ones. Pt seen and received MC and CC but has 2 different med lists and is unable to identify meds taking. Pt denies blood thinners. Pt has stopped ASA as of last week. Pt hx gathered from [employer] and notes from Dr Pickering - pt is poor historian.      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          Procedure:  Thoracotomy  10/11/2018       · Operative Findings	Right thoracotomy, resection of portion of R 7th rib, evacuation of infected hemothorax, takedown of pleurocutaneous fistula	    · Operative Findings	s/p R chest wall reconstruction with tunneled latissimus dorsi flap, covered by serratus anterior flap, after R thoracotomy, takedown pleurocutaneous fistula, decortication, resection Right 7th rib	         · Drains	3 28Fr chest tubes (A,P, and diaphragm), 2 PRS SQ SANDY drains, VAC	  · Estimated Blood Loss	1000 milliLiter(s)	  · IV Infusions - Crystalloids	4L	  · IV Infusions - Colloids	500cc	  · IV Infusions - Blood Products	4u PRBC	       POD # :2    Issues: s/p  infected right hemothorax evacuation             multiple right  chest tubes/ drains in place             distributive and cardiogenic shock/ SIRS             acute resp failure on vent support             posthemorrhagic anemia             lactic acidosis             hyperglycemia             ESRD on HD             acute on chronic systolic CHF ( EF 10 %), ICD in place                   Interval/OR Events/ ROS  Pt hypotensive through the surgery - on multiple pressors / inotropes ( Levo, Epi, Dobutamine). Required 4 units of PRBC in addition to colloids and crystalloids for  symptomatic  posthemorrhagic anemia during the surgery. Pt arrived in ICU orally intubated, sedated, on Levo, Dobut, Epi.    Overnight in ICU still on multiple pressors/ inotropes./ Vent support. Mixed resp/ metab acidosis minimally  improved Lactic acidemia- unchanged @ 2-3.. Pt will need HD today ( ESRD on HD - anuria)  On POD 1 weaned off Epi, Quan. Remains on Dobutamine, Levophed, Vasopressin; on POD 2 started  CVVHD. Oxygenation and acid base status  is little  improved. Troponin is down to 88. Cardiology / CHF team on board to help in managing decompensated CHF/ cardiogenic shock. OR culture grew Enterococcus faecalis - sensitivity pending      PAST MEDICAL & SURGICAL HISTORY:  Smoking hx  Cardiomyopathy  Wound: right chest  ICD (implantable cardioverter-defibrillator) in place  OA (osteoarthritis)  HLD (hyperlipidemia)  BPH (benign prostatic hyperplasia)  Pleural effusion  HTN (hypertension)  ESRD (end stage renal disease)  H/O chest wound: right  History of wound infection: right chest wall - revision 5/18 and again in 7/18  History of implantable cardioverter-defibrillator (ICD) placement: pt unsure when placed  H/O bilateral hip replacements: 2008, 2009    Home Medications:   * Patient Currently Takes Medications as of 01-Oct-2018 09:29 documented in Structured Notes  · 	nortriptyline 50 mg oral capsule: 1 cap(s) orally once a day  · 	Vitamin B Complex: 1 tab(s) orally once a day  · 	Nephplex Rx oral tablet: 1 tab(s) orally once a day  · 	aspirin 81 mg oral tablet: 1 tab(s) orally once a day last dose 10/3/2018  · 	Vitamin C 500 mg oral tablet: 1 tab(s) orally once a day  · 	Breo Ellipta 100 mcg-25 mcg/inh inhalation powder: 1 puff(s) inhaled once a day patient denies using it  · 	Calcium 500: 1 tab(s) orally 2 times a day  · 	carvedilol 6.25 mg oral tablet: 1 tab(s) orally once a day  · 	docusate sodium 100 mg oral tablet: 1 tab(s) orally 2 times a day, As Needed  · 	folic acid 1 mg oral tablet: 1 tab(s) orally once a day  · 	Mag-Ox 400 oral tablet: 1 tab(s) orally once a day  · 	midodrine 10 mg oral tablet: 1 tab(s) orally once a day  · 	Multiple Vitamins oral tablet: 1 tab(s) orally once a day last dose 10/3/2018  · 	Namenda 10 mg oral tablet: 1 tab(s) orally 2 times a day  · 	Renvela 800 mg oral tablet: 2 tab(s) orally every 8 hours  · 	simethicone 80 mg oral tablet: 1 tab(s) orally 3 times a day (after meals)  · 	torsemide 20 mg oral tablet: 1 tab(s) orally once a day  · 	vitamin A: 1 tab(s) orally once a day      Allergies  No Known Allergies    ***VITAL SIGNS:  Vital Signs Last 24 Hrs  T(C): 36.4 (13 Oct 2018 20:00), Max: 36.9 (13 Oct 2018 00:00)  T(F): 97.5 (13 Oct 2018 20:00), Max: 98.4 (13 Oct 2018 00:00)  HR: 74 (13 Oct 2018 22:00) (61 - 93)  BP: --  BP(mean): --  RR: 18 (13 Oct 2018 22:00) (17 - 19)  SpO2: 100% (13 Oct 2018 22:00) (97% - 100%)     I&O's Detail    12 Oct 2018 07:01  -  13 Oct 2018 07:00  --------------------------------------------------------  IN:    dexmedetomidine Infusion: 213.4 mL    DOBUTamine Infusion: 213.6 mL    EPINEPHrine Infusion: 106.4 mL    fentaNYL  Infusion: 283.2 mL    IV PiggyBack: 1350 mL    norepinephrine Infusion: 315.6 mL    phenylephrine   Infusion: 113.3 mL    propofol Infusion: 287.1 mL    sodium chloride 0.9%.: 270 mL    vasopressin Infusion: 60 mL  Total IN: 3212.6 mL    OUT:    Bulb: 150 mL    Bulb: 420 mL    Chest Tube: 70 mL    Chest Tube: 220 mL    Chest Tube: 100 mL  Total OUT: 960 mL    Total NET: 2252.6 mL      13 Oct 2018 07:01  -  13 Oct 2018 22:29  --------------------------------------------------------  IN:    dexmedetomidine Infusion: 154.5 mL    DOBUTamine Infusion: 133.5 mL    fentaNYL  Infusion: 177 mL    IV PiggyBack: 300 mL    norepinephrine Infusion: 100.4 mL    Packed Red Blood Cells: 300 mL    Platelets - Single Donor: 236 mL    propofol Infusion: 196.6 mL    sodium chloride 0.9%.: 120 mL    vasopressin Infusion: 34.2 mL  Total IN: 1752.2 mL    OUT:    Bulb: 10 mL    Bulb: 300 mL    Chest Tube: 50 mL    Chest Tube: 60 mL    Chest Tube: 130 mL  Total OUT: 550 mL    Total NET: 1202.2 mL    CAPILLARY BLOOD GLUCOSE  POCT Blood Glucose.: 109 mg/dL (13 Oct 2018 19:07)  POCT Blood Glucose.: 121 mg/dL (13 Oct 2018 16:48)  POCT Blood Glucose.: 127 mg/dL (13 Oct 2018 13:59)  POCT Blood Glucose.: 126 mg/dL (13 Oct 2018 05:03)  POCT Blood Glucose.: 130 mg/dL (13 Oct 2018 00:59)    =======================  MEDICATIONS  ===================  MEDICATIONS  (STANDING):  albumin human  5% IVPB 250 milliLiter(s) IV Intermittent once  aspirin  chewable 81 milliGRAM(s) Oral daily  dexmedetomidine Infusion 0.5 MICROgram(s)/kG/Hr (7.375 mL/Hr) IV Continuous <Continuous>  dextrose 5%. 1000 milliLiter(s) (50 mL/Hr) IV Continuous <Continuous>  dextrose 50% Injectable 12.5 Gram(s) IV Push once  dextrose 50% Injectable 25 Gram(s) IV Push once  dextrose 50% Injectable 25 Gram(s) IV Push once  DOBUTamine Infusion 5 MICROgram(s)/kG/Min (8.85 mL/Hr) IV Continuous <Continuous>  fentaNYL   Infusion 1 MICROgram(s)/kG/Hr (5.9 mL/Hr) IV Continuous <Continuous>  heparin  Injectable 5000 Unit(s) SubCutaneous every 12 hours  insulin lispro (HumaLOG) corrective regimen sliding scale   SubCutaneous every 6 hours  metroNIDAZOLE  IVPB      metroNIDAZOLE  IVPB 500 milliGRAM(s) IV Intermittent every 8 hours  norepinephrine Infusion 0.05 MICROgram(s)/kG/Min (2.766 mL/Hr) IV Continuous <Continuous>  pantoprazole  Injectable 40 milliGRAM(s) IV Push daily  piperacillin/tazobactam IVPB. 3.375 Gram(s) IV Intermittent every 12 hours  PrismaSATE Dialysate BGK 4 / 2.5 5000 milliLiter(s) (1000 mL/Hr) CRRT <Continuous>  PrismaSOL Filtration BGK 4 / 2.5 5000 milliLiter(s) (1000 mL/Hr) CRRT <Continuous>  PrismaSOL Filtration BGK 4 / 2.5 5000 milliLiter(s) (200 mL/Hr) CRRT <Continuous>  propofol Infusion 20 MICROgram(s)/kG/Min (7.08 mL/Hr) IV Continuous <Continuous>  sodium chloride 0.9%. 1000 milliLiter(s) (30 mL/Hr) IV Continuous <Continuous>  vasopressin Infusion 0.05 Unit(s)/Min (3 mL/Hr) IV Continuous <Continuous>    MEDICATIONS  (PRN):  dextrose 40% Gel 15 Gram(s) Oral once PRN Blood Glucose LESS THAN 70 milliGRAM(s)/deciliter  glucagon  Injectable 1 milliGRAM(s) IntraMuscular once PRN Glucose LESS THAN 70 milligrams/deciliter    ======================VENTILATOR SETTINGS  ==============  Mode: AC/ CMV (Assist Control/ Continuous Mandatory Ventilation)  RR (machine): 18  TV (machine): 500  FiO2: 40  PEEP: 5  MAP: 10  PIP: 26    =================== PATIENT CARE ACCESS DEVICES ==========  Peripheral IV (+)  Central Venous Line R/ IJ (+) ; R fem HD catheter  Arterial Line	R / Rad(+)			  Necessity of  arterial, and venous catheters discussed    ======================= PHYSICAL EXAM===================  General:             sedated, intubated  Neuro:               Moving  extremities spontaneously    with lower dose of sedation. 	  HEENT:                           CHER/ ETT/ NGT  Respiratory:	Lungs sound coarse on auscultation bilaterally with good aeration.                            No rales, rhonchi, no wheezing.                           RIght thoracotomy site - covered with dressing, chest tube site -clean  CV:		Regular rate and rhythm. Normal S1/S2.  Abdomen:          Soft,  nontender, not-distended. Bowel sounds present - hypoactive  Skin:		No rash.  Extremities:	Warm, no cyanosis or edema.  (+) pulses by doppler    ============================ LABS =======================                        8.5    6.39  )-----------( 82       ( 13 Oct 2018 21:30 )             24.9     10-13    139  |  101      |  48<H>  ----------------------------<  124<H>  3.6   |  20<L>  |  4.80<H>    Ca    8.4      13 Oct 2018 15:45  Phos  5.5     10-13  Mg     2.3     10-13    TPro  5.4<L>  /  Alb  2.6<L>  /  TBili  1.9<H>  /  DBili  x   /  AST  23  /  ALT  < 4<L>  /  AlkPhos  127<H>  10-13    LIVER FUNCTIONS - ( 13 Oct 2018 15:45 )  Alb: 2.6 g/dL / Pro: 5.4 g/dL / ALK PHOS: 127 u/L / ALT: < 4 u/L / AST: 23 u/L / GGT: x           PT/INR - ( 13 Oct 2018 15:45 )   PT: 11.4 SEC;   INR: 0.99     PTT:33.9 SEC  ABG - ( 13 Oct 2018 21:30 )  pH, Arterial: 7.44  pH, Blood: x     /  pCO2: 32    /  pO2: 139   / HCO3: 23    / Base Excess: -1.7  /  SaO2: 98.6       Blood Gas Arterial - Lactate: 0.6: Please note updated reference range. mmol/L (10.13.18 @ 21:30)      Blood Gas Arterial - Lactate: 3.3: Please note updated reference range. mmol/L (10.12.18 @ 02:53)  Blood Gas Arterial - Lactate: 2.2: Please note updated reference range. mmol/L (10.11.18 @ 21:20)  Blood Gas Arterial - Lactate: 2.5: Please note updated reference range. mmol/L (10.11.18 @ 18:00)      OTHER  10-11-18 --  --  --    OTHER  10-11-18 --  --  --    TISSUE  10-11-18 --  --  --    TISSUE  10-11-18 --  --  --    TISSUE  10-11-18 --  --  Enterococcus faecalis    PLEURAL FLUID  10-11-18 --  --    GPCCH^Gram Pos Cocci in Chains  QUANTITY OF BACTERIA SEEN: MODERATE (3+)  GPCPR^Gram Pos Cocci in Pairs  QUANTITY OF BACTERIA SEEN: MODERATE (3+)  WBC^White Blood Cells  QNTY CELLS IN GRAM STAIN: FEW (2+)    ===================== IMAGING STUDIES ===================  Radiology personally reviewed.  < from: Xray Chest 1 View- PORTABLE-Routine (10.13.18 @ 07:33) >    INTERPRETATION:  TIME OF EXAM: October 13, 2018 at 6:43 AM    INTERPRETATION:     Endotracheal, enteric and multiple right-sided chest tubes again seen   with persistent right basilar pneumothorax. Decreasing subcutaneous   emphysema in the right chest wall and neck. Small loculated left   effusion. Both upper lobes are clear. Heart size is stable.  Opacity at the right lung base secondary to muscle flap procedure.  COMPARISON:  October 12 without significant change    IMPRESSION:  Follow-up without interval change and right basilar   pneumothorax.    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          Procedure:  Thoracotomy  10/11/2018       · Operative Findings	Right thoracotomy, resection of portion of R 7th rib, evacuation of infected hemothorax, takedown of pleurocutaneous fistula	    · Operative Findings	s/p R chest wall reconstruction with tunneled latissimus dorsi flap, covered by serratus anterior flap, after R thoracotomy, takedown pleurocutaneous fistula, decortication, resection Right 7th rib	    Issues: s/p  infected right hemothorax evacuation             multiple right  chest tubes/ drains in place             distributive and cardiogenic shock/ SIRS             acute resp failure on vent support             posthemorrhagic anemia             lactic acidosis             hyperglycemia             ESRD on HD             acute on chronic systolic CHF ( EF 10 %), ICD in place  ====================== NEUROLOGY=======================  Pain control with iv Fentanyl  Pt is sedated with Propofol /Precedex    ==================== RESPIRATORY========================  Monitor chest tube output  Chest tube x3 to  water seal	  Candido x2 to bulb suction    Mechanical Ventilation:  Mode: AC/ CMV (Assist Control/ Continuous Mandatory Ventilation)  RR (machine): 18  TV (machine): 500  FiO2: 40  PEEP: 5  MAP: 10.4  PIP: 24    Mechanical ventilator status assessed & settings reviewed  Continue bronchodilators, pulmonary toilet  Head of bed elevation to 30-40 degrees  F/up  serial ABG's  Will attempt CPAP tomorrow if more stable    ====================CARDIOVASCULAR=====================  Continue hemodynamic monitoring/ telemetry  Titrating Levo, Vaso, Dobutamine for SBP > 90, MAP > 65   ===================== RENAL ============================  Continue NS 30CC/hr       Monitor I/Os, BUN/ Cr  and electrolytes  No Moore - Pt was on HD for ESRD preop.  Currently on CVVHD due to hemodynamic instability in ICU    ==================== GASTROINTESTINAL===================  NPO  Continue GI prophylaxis with  Protonix  Possibly will start NGT feeds tomorrow    =======================    ENDOCRIN  =====================  Glycemic monitoring  F/S with coverage  ===================HEMATOLOGIC/ONCOLOGIC =============  Monitor chest tube output. No signs of active bleeding.   Follow CBC, coags  in AM  DVT prophylaxis with SCD, sc Heparin    ========================INFECTIOUS DISEASE===============  No signs of infection. Monitor for fever / leukocytosis.  All surgical incision / chest tube  sites look clean  Empiric ABX Zosyn, Vanco, Flagyl for now until cultures sensitivity available       Pertinent clinical, laboratory, radiographic, hemodynamic, echocardiographic, respiratory data, microbiologic data and chart were reviewed and analyzed frequently throughout the course of the day and night. GI and DVT prophylaxis, glycemic control, head of bed elevation and skin care issues were addressed.  Patient seen, examined and plan discussed with CT Surgery  Dr Wilkerson / CTICU team during rounds.  Pt remains critically ill in imminent risk of  deterioration and requires very careful cardio- pulmonary monitoring and support.    I have spent   80 minutes of critical care time with this pt between 8  am   and   11:55  pm         minutes spent on total encounter; more than 50% of the visit was spent counseling and/or coordinating care by the attending physician.        KERLINE Faith MD CLAUDIA HAN          MRN-[id number]    HPI:  Pt is a 58 yr old Mandarin speaking male scheduled for Right Thoracotomy, Decortication , Right Chest Wall reconstruction with Latissimjus Dorsi Flap Poss Skin Graft with VAC dressing with Dr Pickering 10/11/18. Pt has ICD but unable to identify make or model. Pt hx of ESRD with HD T/Th/Sat for 4 hrs and has stated he has stopped some of his meds but cannot say which ones. Pt seen and received MC and CC but has 2 different med lists and is unable to identify meds taking. Pt denies blood thinners. Pt has stopped ASA as of last week. Pt hx gathered from [employer] and notes from Dr Pickering - pt is poor historian.      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          Procedure:  Thoracotomy  10/11/2018       · Operative Findings	Right thoracotomy, resection of portion of R 7th rib, evacuation of infected hemothorax, takedown of pleurocutaneous fistula	    · Operative Findings	s/p R chest wall reconstruction with tunneled latissimus dorsi flap, covered by serratus anterior flap, after R thoracotomy, takedown pleurocutaneous fistula, decortication, resection Right 7th rib	         · Drains	3 28Fr chest tubes (A,P, and diaphragm), 2 PRS SQ SANDY drains, VAC	  · Estimated Blood Loss	1000 milliLiter(s)	  · IV Infusions - Crystalloids	4L	  · IV Infusions - Colloids	500cc	  · IV Infusions - Blood Products	4u PRBC	       POD # :2    Issues: s/p  infected right hemothorax evacuation             multiple right  chest tubes/ drains in place             distributive and cardiogenic shock/ SIRS             acute resp failure on vent support             posthemorrhagic anemia             lactic acidosis             hyperglycemia             ESRD on HD             acute on chronic systolic CHF ( EF 10 %), ICD in place                   Interval/OR Events/ ROS  Pt hypotensive through the surgery - on multiple pressors / inotropes ( Levo, Epi, Dobutamine). Required 4 units of PRBC in addition to colloids and crystalloids for  symptomatic  posthemorrhagic anemia during the surgery. Pt arrived in ICU orally intubated, sedated, on Levo, Dobut, Epi.    Overnight in ICU still on multiple pressors/ inotropes./ Vent support. Mixed resp/ metab acidosis minimally  improved Lactic acidemia- unchanged @ 2-3.. Pt will need HD today ( ESRD on HD - anuria)  On POD 1 weaned off Epi, Quan. Remains on Dobutamine, Levophed, Vasopressin; on POD 2 started  CVVHD. Oxygenation and acid base status  is little  improved. Troponin is down to 88. Cardiology / CHF team on board to help in managing decompensated CHF/ cardiogenic shock. OR culture grew Enterococcus faecalis - sensitivity pending      PAST MEDICAL & SURGICAL HISTORY:  Smoking hx  Cardiomyopathy  Wound: right chest  ICD (implantable cardioverter-defibrillator) in place  OA (osteoarthritis)  HLD (hyperlipidemia)  BPH (benign prostatic hyperplasia)  Pleural effusion  HTN (hypertension)  ESRD (end stage renal disease)  H/O chest wound: right  History of wound infection: right chest wall - revision 5/18 and again in 7/18  History of implantable cardioverter-defibrillator (ICD) placement: pt unsure when placed  H/O bilateral hip replacements: 2008, 2009    Home Medications:   * Patient Currently Takes Medications as of 01-Oct-2018 09:29 documented in Structured Notes  · 	nortriptyline 50 mg oral capsule: 1 cap(s) orally once a day  · 	Vitamin B Complex: 1 tab(s) orally once a day  · 	Nephplex Rx oral tablet: 1 tab(s) orally once a day  · 	aspirin 81 mg oral tablet: 1 tab(s) orally once a day last dose 10/3/2018  · 	Vitamin C 500 mg oral tablet: 1 tab(s) orally once a day  · 	Breo Ellipta 100 mcg-25 mcg/inh inhalation powder: 1 puff(s) inhaled once a day patient denies using it  · 	Calcium 500: 1 tab(s) orally 2 times a day  · 	carvedilol 6.25 mg oral tablet: 1 tab(s) orally once a day  · 	docusate sodium 100 mg oral tablet: 1 tab(s) orally 2 times a day, As Needed  · 	folic acid 1 mg oral tablet: 1 tab(s) orally once a day  · 	Mag-Ox 400 oral tablet: 1 tab(s) orally once a day  · 	midodrine 10 mg oral tablet: 1 tab(s) orally once a day  · 	Multiple Vitamins oral tablet: 1 tab(s) orally once a day last dose 10/3/2018  · 	Namenda 10 mg oral tablet: 1 tab(s) orally 2 times a day  · 	Renvela 800 mg oral tablet: 2 tab(s) orally every 8 hours  · 	simethicone 80 mg oral tablet: 1 tab(s) orally 3 times a day (after meals)  · 	torsemide 20 mg oral tablet: 1 tab(s) orally once a day  · 	vitamin A: 1 tab(s) orally once a day      Allergies  No Known Allergies    ***VITAL SIGNS:  Vital Signs Last 24 Hrs  T(C): 36.4 (13 Oct 2018 20:00), Max: 36.9 (13 Oct 2018 00:00)  T(F): 97.5 (13 Oct 2018 20:00), Max: 98.4 (13 Oct 2018 00:00)  HR: 74 (13 Oct 2018 22:00) (61 - 93)  BP: --  BP(mean): --  RR: 18 (13 Oct 2018 22:00) (17 - 19)  SpO2: 100% (13 Oct 2018 22:00) (97% - 100%)     I&O's Detail    12 Oct 2018 07:01  -  13 Oct 2018 07:00  --------------------------------------------------------  IN:    dexmedetomidine Infusion: 213.4 mL    DOBUTamine Infusion: 213.6 mL    EPINEPHrine Infusion: 106.4 mL    fentaNYL  Infusion: 283.2 mL    IV PiggyBack: 1350 mL    norepinephrine Infusion: 315.6 mL    phenylephrine   Infusion: 113.3 mL    propofol Infusion: 287.1 mL    sodium chloride 0.9%.: 270 mL    vasopressin Infusion: 60 mL  Total IN: 3212.6 mL    OUT:    Bulb: 150 mL    Bulb: 420 mL    Chest Tube: 70 mL    Chest Tube: 220 mL    Chest Tube: 100 mL  Total OUT: 960 mL    Total NET: 2252.6 mL      13 Oct 2018 07:01  -  13 Oct 2018 22:29  --------------------------------------------------------  IN:    dexmedetomidine Infusion: 154.5 mL    DOBUTamine Infusion: 133.5 mL    fentaNYL  Infusion: 177 mL    IV PiggyBack: 300 mL    norepinephrine Infusion: 100.4 mL    Packed Red Blood Cells: 300 mL    Platelets - Single Donor: 236 mL    propofol Infusion: 196.6 mL    sodium chloride 0.9%.: 120 mL    vasopressin Infusion: 34.2 mL  Total IN: 1752.2 mL    OUT:    Bulb: 10 mL    Bulb: 300 mL    Chest Tube: 50 mL    Chest Tube: 60 mL    Chest Tube: 130 mL  Total OUT: 550 mL    Total NET: 1202.2 mL    CAPILLARY BLOOD GLUCOSE  POCT Blood Glucose.: 109 mg/dL (13 Oct 2018 19:07)  POCT Blood Glucose.: 121 mg/dL (13 Oct 2018 16:48)  POCT Blood Glucose.: 127 mg/dL (13 Oct 2018 13:59)  POCT Blood Glucose.: 126 mg/dL (13 Oct 2018 05:03)  POCT Blood Glucose.: 130 mg/dL (13 Oct 2018 00:59)    =======================  MEDICATIONS  ===================  MEDICATIONS  (STANDING):  albumin human  5% IVPB 250 milliLiter(s) IV Intermittent once  aspirin  chewable 81 milliGRAM(s) Oral daily  dexmedetomidine Infusion 0.5 MICROgram(s)/kG/Hr (7.375 mL/Hr) IV Continuous <Continuous>  dextrose 5%. 1000 milliLiter(s) (50 mL/Hr) IV Continuous <Continuous>  dextrose 50% Injectable 12.5 Gram(s) IV Push once  dextrose 50% Injectable 25 Gram(s) IV Push once  dextrose 50% Injectable 25 Gram(s) IV Push once  DOBUTamine Infusion 5 MICROgram(s)/kG/Min (8.85 mL/Hr) IV Continuous <Continuous>  fentaNYL   Infusion 1 MICROgram(s)/kG/Hr (5.9 mL/Hr) IV Continuous <Continuous>  heparin  Injectable 5000 Unit(s) SubCutaneous every 12 hours  insulin lispro (HumaLOG) corrective regimen sliding scale   SubCutaneous every 6 hours  metroNIDAZOLE  IVPB      metroNIDAZOLE  IVPB 500 milliGRAM(s) IV Intermittent every 8 hours  norepinephrine Infusion 0.05 MICROgram(s)/kG/Min (2.766 mL/Hr) IV Continuous <Continuous>  pantoprazole  Injectable 40 milliGRAM(s) IV Push daily  piperacillin/tazobactam IVPB. 3.375 Gram(s) IV Intermittent every 12 hours  PrismaSATE Dialysate BGK 4 / 2.5 5000 milliLiter(s) (1000 mL/Hr) CRRT <Continuous>  PrismaSOL Filtration BGK 4 / 2.5 5000 milliLiter(s) (1000 mL/Hr) CRRT <Continuous>  PrismaSOL Filtration BGK 4 / 2.5 5000 milliLiter(s) (200 mL/Hr) CRRT <Continuous>  propofol Infusion 20 MICROgram(s)/kG/Min (7.08 mL/Hr) IV Continuous <Continuous>  sodium chloride 0.9%. 1000 milliLiter(s) (30 mL/Hr) IV Continuous <Continuous>  vasopressin Infusion 0.05 Unit(s)/Min (3 mL/Hr) IV Continuous <Continuous>    MEDICATIONS  (PRN):  dextrose 40% Gel 15 Gram(s) Oral once PRN Blood Glucose LESS THAN 70 milliGRAM(s)/deciliter  glucagon  Injectable 1 milliGRAM(s) IntraMuscular once PRN Glucose LESS THAN 70 milligrams/deciliter    ======================VENTILATOR SETTINGS  ==============  Mode: AC/ CMV (Assist Control/ Continuous Mandatory Ventilation)  RR (machine): 18  TV (machine): 500  FiO2: 40  PEEP: 5  MAP: 10  PIP: 26    =================== PATIENT CARE ACCESS DEVICES ==========  Peripheral IV (+)  Central Venous Line R/ IJ (+) ; R fem HD catheter  Arterial Line	R / Rad(+)			  Necessity of  arterial, and venous catheters discussed    ======================= PHYSICAL EXAM===================  General:             sedated, intubated  Neuro:               Moving  extremities spontaneously    with lower dose of sedation. 	  HEENT:                           CHER/ ETT/ NGT  Respiratory:	Lungs sound coarse on auscultation bilaterally with good aeration.                            No rales, rhonchi, no wheezing.                           RIght thoracotomy site - covered with dressing, chest tube site -clean  CV:		Regular rate and rhythm. Normal S1/S2.  Abdomen:          Soft,  nontender, not-distended. Bowel sounds present - hypoactive  Skin:		No rash.  Extremities:	Warm, no cyanosis or edema.  (+) pulses by doppler    ============================ LABS =======================                        8.5    6.39  )-----------( 82       ( 13 Oct 2018 21:30 )             24.9     10-13    139  |  101      |  48<H>  ----------------------------<  124<H>  3.6   |  20<L>  |  4.80<H>    Ca    8.4      13 Oct 2018 15:45  Phos  5.5     10-13  Mg     2.3     10-13    TPro  5.4<L>  /  Alb  2.6<L>  /  TBili  1.9<H>  /  DBili  x   /  AST  23  /  ALT  < 4<L>  /  AlkPhos  127<H>  10-13    LIVER FUNCTIONS - ( 13 Oct 2018 15:45 )  Alb: 2.6 g/dL / Pro: 5.4 g/dL / ALK PHOS: 127 u/L / ALT: < 4 u/L / AST: 23 u/L / GGT: x           PT/INR - ( 13 Oct 2018 15:45 )   PT: 11.4 SEC;   INR: 0.99     PTT:33.9 SEC  ABG - ( 13 Oct 2018 21:30 )  pH, Arterial: 7.44  pH, Blood: x     /  pCO2: 32    /  pO2: 139   / HCO3: 23    / Base Excess: -1.7  /  SaO2: 98.6       Blood Gas Arterial - Lactate: 0.6: Please note updated reference range. mmol/L (10.13.18 @ 21:30)      Blood Gas Arterial - Lactate: 3.3: Please note updated reference range. mmol/L (10.12.18 @ 02:53)  Blood Gas Arterial - Lactate: 2.2: Please note updated reference range. mmol/L (10.11.18 @ 21:20)  Blood Gas Arterial - Lactate: 2.5: Please note updated reference range. mmol/L (10.11.18 @ 18:00)      OTHER  10-11-18 --  --  --    OTHER  10-11-18 --  --  --    TISSUE  10-11-18 --  --  --    TISSUE  10-11-18 --  --  --    TISSUE  10-11-18 --  --  Enterococcus faecalis    PLEURAL FLUID  10-11-18 --  --    GPCCH^Gram Pos Cocci in Chains  QUANTITY OF BACTERIA SEEN: MODERATE (3+)  GPCPR^Gram Pos Cocci in Pairs  QUANTITY OF BACTERIA SEEN: MODERATE (3+)  WBC^White Blood Cells  QNTY CELLS IN GRAM STAIN: FEW (2+)    ===================== IMAGING STUDIES ===================  Radiology personally reviewed.  < from: Xray Chest 1 View- PORTABLE-Routine (10.13.18 @ 07:33) >    INTERPRETATION:  TIME OF EXAM: October 13, 2018 at 6:43 AM    INTERPRETATION:     Endotracheal, enteric and multiple right-sided chest tubes again seen   with persistent right basilar pneumothorax. Decreasing subcutaneous   emphysema in the right chest wall and neck. Small loculated left   effusion. Both upper lobes are clear. Heart size is stable.  Opacity at the right lung base secondary to muscle flap procedure.  COMPARISON:  October 12 without significant change    IMPRESSION:  Follow-up without interval change and right basilar   pneumothorax.    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          Procedure:  Thoracotomy  10/11/2018       · Operative Findings	Right thoracotomy, resection of portion of R 7th rib, evacuation of infected hemothorax, takedown of pleurocutaneous fistula	    · Operative Findings	s/p R chest wall reconstruction with tunneled latissimus dorsi flap, covered by serratus anterior flap, after R thoracotomy, takedown pleurocutaneous fistula, decortication, resection Right 7th rib	    Issues: s/p  infected right hemothorax evacuation             multiple right  chest tubes/ drains in place             distributive and cardiogenic shock/ SIRS             acute resp failure on vent support             posthemorrhagic anemia             lactic acidosis             hyperglycemia             ESRD on HD             acute on chronic systolic CHF ( EF 10 %), ICD in place  ====================== NEUROLOGY=======================  Pain control with iv Fentanyl  Pt is sedated with Propofol /Precedex    ==================== RESPIRATORY========================  Monitor chest tube output  Chest tube x3 to  water seal	  Candido x2 to bulb suction    Mechanical Ventilation:  Mode: AC/ CMV (Assist Control/ Continuous Mandatory Ventilation)  RR (machine): 18  TV (machine): 500  FiO2: 40  PEEP: 5  MAP: 10.4  PIP: 24    Mechanical ventilator status assessed & settings reviewed  Continue bronchodilators, pulmonary toilet  Head of bed elevation to 30-40 degrees  F/up  serial ABG's  Will attempt CPAP tomorrow if more stable    ====================CARDIOVASCULAR=====================  Continue hemodynamic monitoring/ telemetry  Titrating Levo, Vaso, Dobutamine for SBP > 90, MAP > 65   Elevated Trop trending down; , likely ESRD/Post-op SIRS, and unlikely ACS, Cardiology following, and d/w cards attending and Heart failure team--No Cath intervention indicated,   Flow track noninvasive CO/ CI monitoring ( CI @ 2.7-3)  ASA started yesterday, and will cont.     ===================== RENAL ============================  Continue NS 30CC/hr       Monitor I/Os, BUN/ Cr  and electrolytes  No Moore - Pt was on HD for ESRD preop.  Currently on CVVHD due to hemodynamic instability in ICU    ==================== GASTROINTESTINAL===================  NPO  Continue GI prophylaxis with  Protonix  Possibly will start NGT feeds tomorrow    =======================    ENDOCRIN  =====================  Glycemic monitoring  F/S with coverage  ===================HEMATOLOGIC/ONCOLOGIC =============  Monitor chest tube output. No signs of active bleeding.   Follow CBC, coags  in AM  DVT prophylaxis with SCD, sc Heparin    ========================INFECTIOUS DISEASE===============  No signs of infection. Monitor for fever / leukocytosis.  All surgical incision / chest tube  sites look clean  Empiric ABX Zosyn, Vanco, Flagyl for now until cultures sensitivity available       Pertinent clinical, laboratory, radiographic, hemodynamic, echocardiographic, respiratory data, microbiologic data and chart were reviewed and analyzed frequently throughout the course of the day and night. GI and DVT prophylaxis, glycemic control, head of bed elevation and skin care issues were addressed.  Patient seen, examined and plan discussed with CT Surgery  Dr Wilkerson / CTICU team during rounds.  Pt remains critically ill in imminent risk of  deterioration and requires very careful cardio- pulmonary monitoring and support.    I have spent   80 minutes of critical care time with this pt between 8  am   and   11:55  pm         minutes spent on total encounter; more than 50% of the visit was spent counseling and/or coordinating care by the attending physician.        KERLINE Faith MD CLAUDIA HAN          MRN-[id number]    HPI:  Pt is a 58 yr old Mandarin speaking male scheduled for Right Thoracotomy, Decortication , Right Chest Wall reconstruction with Latissimjus Dorsi Flap Poss Skin Graft with VAC dressing with Dr Pickering 10/11/18. Pt has ICD but unable to identify make or model. Pt hx of ESRD with HD T/Th/Sat for 4 hrs and has stated he has stopped some of his meds but cannot say which ones. Pt seen and received [facility] and [facility] but has 2 different med lists and is unable to identify meds taking. Pt denies blood thinners. Pt has stopped ASA as of last week. Pt hx gathered from [employer] and notes from Dr Pickering - pt is poor historian.      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          Procedure:  Thoracotomy  10/11/2018       · Operative Findings	Right thoracotomy, resection of portion of R 7th rib, evacuation of infected hemothorax, takedown of pleurocutaneous fistula	    · Operative Findings	s/p R chest wall reconstruction with tunneled latissimus dorsi flap, covered by serratus anterior flap, after R thoracotomy, takedown pleurocutaneous fistula, decortication, resection Right 7th rib	         · Drains	3 28Fr chest tubes (A,P, and diaphragm), 2 PRS SQ SANDY drains, VAC	  · Estimated Blood Loss	1000 milliLiter(s)	  · IV Infusions - Crystalloids	4L	  · IV Infusions - Colloids	500cc	  · IV Infusions - Blood Products	4u PRBC	       POD # :2    Issues: s/p  infected right hemothorax evacuation             multiple right  chest tubes/ drains in place             distributive and cardiogenic shock/ SIRS             acute resp failure on vent support             posthemorrhagic anemia             lactic acidosis             hyperglycemia             ESRD on HD             acute on chronic systolic CHF ( EF 10 %), ICD in place                   Interval/OR Events/ ROS  Pt hypotensive through the surgery - on multiple pressors / inotropes ( Levo, Epi, Dobutamine). Required 4 units of PRBC in addition to colloids and crystalloids for  symptomatic  posthemorrhagic anemia during the surgery. Pt arrived in ICU orally intubated, sedated, on Levo, Dobut, Epi.    Overnight in ICU still on multiple pressors/ inotropes./ Vent support. Mixed resp/ metab acidosis minimally  improved Lactic acidemia- unchanged @ 2-3.. Pt will need HD today ( ESRD on HD - anuria)  On POD 1 weaned off Epi, Quan. Remains on Dobutamine, Levophed, Vasopressin; on POD 2 started  CVVHD. Oxygenation and acid base status  is little  improved. Troponin is down to 88. Cardiology / CHF team on board to help in managing decompensated CHF/ cardiogenic shock. OR culture grew Enterococcus faecalis - sensitivity pending      PAST MEDICAL & SURGICAL HISTORY:  Smoking hx  Cardiomyopathy  Wound: right chest  ICD (implantable cardioverter-defibrillator) in place  OA (osteoarthritis)  HLD (hyperlipidemia)  BPH (benign prostatic hyperplasia)  Pleural effusion  HTN (hypertension)  ESRD (end stage renal disease)  H/O chest wound: right  History of wound infection: right chest wall - revision 5/18 and again in 7/18  History of implantable cardioverter-defibrillator (ICD) placement: pt unsure when placed  H/O bilateral hip replacements: 2008, 2009    Home Medications:   * Patient Currently Takes Medications as of 01-Oct-2018 09:29 documented in Structured Notes  · 	nortriptyline 50 mg oral capsule: 1 cap(s) orally once a day  · 	Vitamin B Complex: 1 tab(s) orally once a day  · 	Nephplex Rx oral tablet: 1 tab(s) orally once a day  · 	aspirin 81 mg oral tablet: 1 tab(s) orally once a day last dose 10/3/2018  · 	Vitamin C 500 mg oral tablet: 1 tab(s) orally once a day  · 	Breo Ellipta 100 mcg-25 mcg/inh inhalation powder: 1 puff(s) inhaled once a day patient denies using it  · 	Calcium 500: 1 tab(s) orally 2 times a day  · 	carvedilol 6.25 mg oral tablet: 1 tab(s) orally once a day  · 	docusate sodium 100 mg oral tablet: 1 tab(s) orally 2 times a day, As Needed  · 	folic acid 1 mg oral tablet: 1 tab(s) orally once a day  · 	Mag-Ox 400 oral tablet: 1 tab(s) orally once a day  · 	midodrine 10 mg oral tablet: 1 tab(s) orally once a day  · 	Multiple Vitamins oral tablet: 1 tab(s) orally once a day last dose 10/3/2018  · 	Namenda 10 mg oral tablet: 1 tab(s) orally 2 times a day  · 	Renvela 800 mg oral tablet: 2 tab(s) orally every 8 hours  · 	simethicone 80 mg oral tablet: 1 tab(s) orally 3 times a day (after meals)  · 	torsemide 20 mg oral tablet: 1 tab(s) orally once a day  · 	vitamin A: 1 tab(s) orally once a day      Allergies  No Known Allergies    ***VITAL SIGNS:  Vital Signs Last 24 Hrs  T(C): 36.4 (13 Oct 2018 20:00), Max: 36.9 (13 Oct 2018 00:00)  T(F): 97.5 (13 Oct 2018 20:00), Max: 98.4 (13 Oct 2018 00:00)  HR: 74 (13 Oct 2018 22:00) (61 - 93)  BP: --  BP(mean): --  RR: 18 (13 Oct 2018 22:00) (17 - 19)  SpO2: 100% (13 Oct 2018 22:00) (97% - 100%)     I&O's Detail    12 Oct 2018 07:01  -  13 Oct 2018 07:00  --------------------------------------------------------  IN:    dexmedetomidine Infusion: 213.4 mL    DOBUTamine Infusion: 213.6 mL    EPINEPHrine Infusion: 106.4 mL    fentaNYL  Infusion: 283.2 mL    IV PiggyBack: 1350 mL    norepinephrine Infusion: 315.6 mL    phenylephrine   Infusion: 113.3 mL    propofol Infusion: 287.1 mL    sodium chloride 0.9%.: 270 mL    vasopressin Infusion: 60 mL  Total IN: 3212.6 mL    OUT:    Bulb: 150 mL    Bulb: 420 mL    Chest Tube: 70 mL    Chest Tube: 220 mL    Chest Tube: 100 mL  Total OUT: 960 mL    Total NET: 2252.6 mL      13 Oct 2018 07:01  -  13 Oct 2018 22:29  --------------------------------------------------------  IN:    dexmedetomidine Infusion: 154.5 mL    DOBUTamine Infusion: 133.5 mL    fentaNYL  Infusion: 177 mL    IV PiggyBack: 300 mL    norepinephrine Infusion: 100.4 mL    Packed Red Blood Cells: 300 mL    Platelets - Single Donor: 236 mL    propofol Infusion: 196.6 mL    sodium chloride 0.9%.: 120 mL    vasopressin Infusion: 34.2 mL  Total IN: 1752.2 mL    OUT:    Bulb: 10 mL    Bulb: 300 mL    Chest Tube: 50 mL    Chest Tube: 60 mL    Chest Tube: 130 mL  Total OUT: 550 mL    Total NET: 1202.2 mL    CAPILLARY BLOOD GLUCOSE  POCT Blood Glucose.: 109 mg/dL (13 Oct 2018 19:07)  POCT Blood Glucose.: 121 mg/dL (13 Oct 2018 16:48)  POCT Blood Glucose.: 127 mg/dL (13 Oct 2018 13:59)  POCT Blood Glucose.: 126 mg/dL (13 Oct 2018 05:03)  POCT Blood Glucose.: 130 mg/dL (13 Oct 2018 00:59)    =======================  MEDICATIONS  ===================  MEDICATIONS  (STANDING):  albumin human  5% IVPB 250 milliLiter(s) IV Intermittent once  aspirin  chewable 81 milliGRAM(s) Oral daily  dexmedetomidine Infusion 0.5 MICROgram(s)/kG/Hr (7.375 mL/Hr) IV Continuous <Continuous>  dextrose 5%. 1000 milliLiter(s) (50 mL/Hr) IV Continuous <Continuous>  dextrose 50% Injectable 12.5 Gram(s) IV Push once  dextrose 50% Injectable 25 Gram(s) IV Push once  dextrose 50% Injectable 25 Gram(s) IV Push once  DOBUTamine Infusion 5 MICROgram(s)/kG/Min (8.85 mL/Hr) IV Continuous <Continuous>  fentaNYL   Infusion 1 MICROgram(s)/kG/Hr (5.9 mL/Hr) IV Continuous <Continuous>  heparin  Injectable 5000 Unit(s) SubCutaneous every 12 hours  insulin lispro (HumaLOG) corrective regimen sliding scale   SubCutaneous every 6 hours  metroNIDAZOLE  IVPB      metroNIDAZOLE  IVPB 500 milliGRAM(s) IV Intermittent every 8 hours  norepinephrine Infusion 0.05 MICROgram(s)/kG/Min (2.766 mL/Hr) IV Continuous <Continuous>  pantoprazole  Injectable 40 milliGRAM(s) IV Push daily  piperacillin/tazobactam IVPB. 3.375 Gram(s) IV Intermittent every 12 hours  PrismaSATE Dialysate BGK 4 / 2.5 5000 milliLiter(s) (1000 mL/Hr) CRRT <Continuous>  PrismaSOL Filtration BGK 4 / 2.5 5000 milliLiter(s) (1000 mL/Hr) CRRT <Continuous>  PrismaSOL Filtration BGK 4 / 2.5 5000 milliLiter(s) (200 mL/Hr) CRRT <Continuous>  propofol Infusion 20 MICROgram(s)/kG/Min (7.08 mL/Hr) IV Continuous <Continuous>  sodium chloride 0.9%. 1000 milliLiter(s) (30 mL/Hr) IV Continuous <Continuous>  vasopressin Infusion 0.05 Unit(s)/Min (3 mL/Hr) IV Continuous <Continuous>    MEDICATIONS  (PRN):  dextrose 40% Gel 15 Gram(s) Oral once PRN Blood Glucose LESS THAN 70 milliGRAM(s)/deciliter  glucagon  Injectable 1 milliGRAM(s) IntraMuscular once PRN Glucose LESS THAN 70 milligrams/deciliter    ======================VENTILATOR SETTINGS  ==============  Mode: AC/ CMV (Assist Control/ Continuous Mandatory Ventilation)  RR (machine): 18  TV (machine): 500  FiO2: 40  PEEP: 5  MAP: 10  PIP: 26    =================== PATIENT CARE ACCESS DEVICES ==========  Peripheral IV (+)  Central Venous Line R/ IJ (+) ; R fem HD catheter  Arterial Line	R / Rad(+)			  Necessity of  arterial, and venous catheters discussed    ======================= PHYSICAL EXAM===================  General:             sedated, intubated  Neuro:               Moving  extremities spontaneously    with lower dose of sedation. 	  HEENT:                           CHER/ ETT/ NGT  Respiratory:	Lungs sound coarse on auscultation bilaterally with good aeration.                            No rales, rhonchi, no wheezing.                           RIght thoracotomy site - covered with dressing, chest tube site -clean  CV:		Regular rate and rhythm. Normal S1/S2.  Abdomen:          Soft,  nontender, not-distended. Bowel sounds present - hypoactive  Skin:		No rash.  Extremities:	Warm, no cyanosis or edema.  (+) pulses by doppler    ============================ LABS =======================                        8.5    6.39  )-----------( 82       ( 13 Oct 2018 21:30 )             24.9     10-13    139  |  101      |  48<H>  ----------------------------<  124<H>  3.6   |  20<L>  |  4.80<H>    Ca    8.4      13 Oct 2018 15:45  Phos  5.5     10-13  Mg     2.3     10-13    TPro  5.4<L>  /  Alb  2.6<L>  /  TBili  1.9<H>  /  DBili  x   /  AST  23  /  ALT  < 4<L>  /  AlkPhos  127<H>  10-13    LIVER FUNCTIONS - ( 13 Oct 2018 15:45 )  Alb: 2.6 g/dL / Pro: 5.4 g/dL / ALK PHOS: 127 u/L / ALT: < 4 u/L / AST: 23 u/L / GGT: x           PT/INR - ( 13 Oct 2018 15:45 )   PT: 11.4 SEC;   INR: 0.99     PTT:33.9 SEC  ABG - ( 13 Oct 2018 21:30 )  pH, Arterial: 7.44  pH, Blood: x     /  pCO2: 32    /  pO2: 139   / HCO3: 23    / Base Excess: -1.7  /  SaO2: 98.6       Blood Gas Arterial - Lactate: 0.6: Please note updated reference range. mmol/L (10.13.18 @ 21:30)      Blood Gas Arterial - Lactate: 3.3: Please note updated reference range. mmol/L (10.12.18 @ 02:53)  Blood Gas Arterial - Lactate: 2.2: Please note updated reference range. mmol/L (10.11.18 @ 21:20)  Blood Gas Arterial - Lactate: 2.5: Please note updated reference range. mmol/L (10.11.18 @ 18:00)      OTHER  10-11-18 --  --  --    OTHER  10-11-18 --  --  --    TISSUE  10-11-18 --  --  --    TISSUE  10-11-18 --  --  --    TISSUE  10-11-18 --  --  Enterococcus faecalis    PLEURAL FLUID  10-11-18 --  --    GPCCH^Gram Pos Cocci in Chains  QUANTITY OF BACTERIA SEEN: MODERATE (3+)  GPCPR^Gram Pos Cocci in Pairs  QUANTITY OF BACTERIA SEEN: MODERATE (3+)  WBC^White Blood Cells  QNTY CELLS IN GRAM STAIN: FEW (2+)    ===================== IMAGING STUDIES ===================  Radiology personally reviewed.  < from: Xray Chest 1 View- PORTABLE-Routine (10.13.18 @ 07:33) >    INTERPRETATION:  TIME OF EXAM: October 13, 2018 at 6:43 AM    INTERPRETATION:     Endotracheal, enteric and multiple right-sided chest tubes again seen   with persistent right basilar pneumothorax. Decreasing subcutaneous   emphysema in the right chest wall and neck. Small loculated left   effusion. Both upper lobes are clear. Heart size is stable.  Opacity at the right lung base secondary to muscle flap procedure.  COMPARISON:  October 12 without significant change    IMPRESSION:  Follow-up without interval change and right basilar   pneumothorax.    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          Procedure:  Thoracotomy  10/11/2018       · Operative Findings	Right thoracotomy, resection of portion of R 7th rib, evacuation of infected hemothorax, takedown of pleurocutaneous fistula	    · Operative Findings	s/p R chest wall reconstruction with tunneled latissimus dorsi flap, covered by serratus anterior flap, after R thoracotomy, takedown pleurocutaneous fistula, decortication, resection Right 7th rib	    Issues: s/p  infected right hemothorax evacuation             multiple right  chest tubes/ drains in place             distributive and cardiogenic shock/ SIRS             acute resp failure on vent support             posthemorrhagic anemia/ thrombocytopenia ( s/p PRBC and PLT today)             lactic acidosis             hyperglycemia             ESRD on HD             acute on chronic systolic CHF ( EF 10 %), ICD in place  ====================== NEUROLOGY=======================  Pain control with iv Fentanyl  Pt is sedated with Propofol /Precedex    ==================== RESPIRATORY========================  Monitor chest tube output  Chest tube x3 to  water seal	  Candido x2 to bulb suction    Mechanical Ventilation:  Mode: AC/ CMV (Assist Control/ Continuous Mandatory Ventilation)  RR (machine): 18  TV (machine): 500  FiO2: 40  PEEP: 5  MAP: 10.4  PIP: 24    Mechanical ventilator status assessed & settings reviewed  Continue bronchodilators, pulmonary toilet  Head of bed elevation to 30-40 degrees  F/up  serial ABG's  Will attempt CPAP tomorrow if more stable    ====================CARDIOVASCULAR=====================  Continue hemodynamic monitoring/ telemetry  Titrating Levo, Vaso, Dobutamine for SBP > 90, MAP > 65   Elevated Trop trending down; , likely ESRD/Post-op SIRS, and unlikely ACS, Cardiology following, and d/w cards attending and Heart failure team--No Cath intervention indicated,   Flow track noninvasive CO/ CI monitoring ( CI @ 2.7-3)  ASA started yesterday, and will cont.     ===================== RENAL ============================  Continue NS 30CC/hr       Monitor I/Os, BUN/ Cr  and electrolytes  No Moore - Pt was on HD for ESRD preop.  Currently on CVVHD due to hemodynamic instability in ICU    ==================== GASTROINTESTINAL===================  NPO  Continue GI prophylaxis with  Protonix  Possibly will start NGT feeds tomorrow    =======================    ENDOCRIN  =====================  Glycemic monitoring  F/S with coverage  ===================HEMATOLOGIC/ONCOLOGIC =============  Monitor chest tube output. No signs of active bleeding.   Follow CBC, coags  in AM  DVT prophylaxis with SCD, sc Heparin    ========================INFECTIOUS DISEASE===============  No signs of infection. Monitor for fever / leukocytosis.  All surgical incision / chest tube  sites look clean  Empiric ABX Zosyn, Vanco, Flagyl for now until cultures sensitivity available       Pertinent clinical, laboratory, radiographic, hemodynamic, echocardiographic, respiratory data, microbiologic data and chart were reviewed and analyzed frequently throughout the course of the day and night. GI and DVT prophylaxis, glycemic control, head of bed elevation and skin care issues were addressed.  Patient seen, examined and plan discussed with CT Surgery  Dr Wilkerson / CTICU team during rounds.  Pt remains critically ill in imminent risk of  deterioration and requires very careful cardio- pulmonary monitoring and support.    I have spent   80 minutes of critical care time with this pt between 8  am   and   11:55  pm         minutes spent on total encounter; more than 50% of the visit was spent counseling and/or coordinating care by the attending physician.        KERLINE Faith MD

## 2018-10-13 NOTE — PROGRESS NOTE ADULT - ASSESSMENT
57 y/o male (Mandarin speaking) with hx of NICM? HFrEF (LVEF 20%, severely dilated LVIDD 7.3 cm) w/ ICD, HLD, ESRD on HD, former smoker, BPH, hx of prior chest/lung surgeries with chest wall wound infections, b/l hip replacements comes in for a scheduled right thoracotomy w/ decortication and muscle flap with Dr. Jose Alfredo Pickering on 10/11/18. Post surgery patient required Dobutamine 10 mcg/kg/min (now weaned to 5 mcg/kg/min) and multiple pressors. He is currently intubated/sedated. He is starting cvvhd today. He is also on broad spect abx. Has a small right sided basilar phenomothorax.     shock - combination cardiac/vasodilatory - lactate resolved, central venous sat 80s   -cont supportive treatment  -wean pressors as tolerated  -cont dobutamine gtt  -trend h/h; follow restrictive transfusion guidelines hgb <7

## 2018-10-14 LAB
ALBUMIN SERPL ELPH-MCNC: 2.2 G/DL — LOW (ref 3.3–5)
ALP SERPL-CCNC: 132 U/L — HIGH (ref 40–120)
ALT FLD-CCNC: 4 U/L — SIGNIFICANT CHANGE UP (ref 4–41)
APTT BLD: 32.6 SEC — SIGNIFICANT CHANGE UP (ref 27.5–37.4)
AST SERPL-CCNC: 16 U/L — SIGNIFICANT CHANGE UP (ref 4–40)
BASE EXCESS BLDA CALC-SCNC: -0.6 MMOL/L — SIGNIFICANT CHANGE UP
BASE EXCESS BLDA CALC-SCNC: -2.6 MMOL/L — SIGNIFICANT CHANGE UP
BASE EXCESS BLDV CALC-SCNC: 0.1 MMOL/L — SIGNIFICANT CHANGE UP
BILIRUB SERPL-MCNC: 1.2 MG/DL — SIGNIFICANT CHANGE UP (ref 0.2–1.2)
BUN SERPL-MCNC: 36 MG/DL — HIGH (ref 7–23)
CA-I BLDA-SCNC: 1.17 MMOL/L — SIGNIFICANT CHANGE UP (ref 1.15–1.29)
CALCIUM SERPL-MCNC: 7.6 MG/DL — LOW (ref 8.4–10.5)
CHLORIDE BLDA-SCNC: 108 MMOL/L — SIGNIFICANT CHANGE UP (ref 96–108)
CHLORIDE SERPL-SCNC: 101 MMOL/L — SIGNIFICANT CHANGE UP (ref 98–107)
CO2 SERPL-SCNC: 21 MMOL/L — LOW (ref 22–31)
CREAT BLDA-MCNC: 3.58 MG/DL — HIGH (ref 0.5–1.3)
CREAT SERPL-MCNC: 3.88 MG/DL — HIGH (ref 0.5–1.3)
GLUCOSE BLDA-MCNC: 104 MG/DL — HIGH (ref 70–99)
GLUCOSE BLDA-MCNC: 141 MG/DL — HIGH (ref 70–99)
GLUCOSE SERPL-MCNC: 105 MG/DL — HIGH (ref 70–99)
HBV CORE IGM SER-ACNC: NONREACTIVE — SIGNIFICANT CHANGE UP
HBV SURFACE AB SER-ACNC: 16.3 MLU/ML — SIGNIFICANT CHANGE UP
HCO3 BLDA-SCNC: 22 MMOL/L — SIGNIFICANT CHANGE UP (ref 22–26)
HCO3 BLDA-SCNC: 24 MMOL/L — SIGNIFICANT CHANGE UP (ref 22–26)
HCO3 BLDV-SCNC: 24 MMOL/L — SIGNIFICANT CHANGE UP (ref 20–27)
HCT VFR BLD CALC: 24 % — LOW (ref 39–50)
HCT VFR BLDA CALC: 24.4 % — LOW (ref 39–51)
HCT VFR BLDA CALC: 26.7 % — LOW (ref 39–51)
HCV AB S/CO SERPL IA: 0.21 S/CO — SIGNIFICANT CHANGE UP
HCV AB SERPL-IMP: SIGNIFICANT CHANGE UP
HGB BLD-MCNC: 8.2 G/DL — LOW (ref 13–17)
HGB BLDA-MCNC: 7.8 G/DL — LOW (ref 13–17)
HGB BLDA-MCNC: 8.6 G/DL — LOW (ref 13–17)
INR BLD: 0.98 — SIGNIFICANT CHANGE UP (ref 0.88–1.17)
LACTATE BLDA-SCNC: 0.7 MMOL/L — SIGNIFICANT CHANGE UP (ref 0.5–2)
LACTATE BLDA-SCNC: 0.8 MMOL/L — SIGNIFICANT CHANGE UP (ref 0.5–2)
MAGNESIUM SERPL-MCNC: 2.4 MG/DL — SIGNIFICANT CHANGE UP (ref 1.6–2.6)
MCHC RBC-ENTMCNC: 31.5 PG — SIGNIFICANT CHANGE UP (ref 27–34)
MCHC RBC-ENTMCNC: 34.2 % — SIGNIFICANT CHANGE UP (ref 32–36)
MCV RBC AUTO: 92.3 FL — SIGNIFICANT CHANGE UP (ref 80–100)
NRBC # FLD: 0 — SIGNIFICANT CHANGE UP
PCO2 BLDA: 34 MMHG — LOW (ref 35–48)
PCO2 BLDA: 56 MMHG — HIGH (ref 35–48)
PCO2 BLDV: 39 MMHG — LOW (ref 41–51)
PH BLDA: 7.25 PH — LOW (ref 7.35–7.45)
PH BLDA: 7.44 PH — SIGNIFICANT CHANGE UP (ref 7.35–7.45)
PH BLDV: 7.41 PH — SIGNIFICANT CHANGE UP (ref 7.32–7.43)
PHOSPHATE SERPL-MCNC: 3.5 MG/DL — SIGNIFICANT CHANGE UP (ref 2.5–4.5)
PLATELET # BLD AUTO: 92 K/UL — LOW (ref 150–400)
PMV BLD: 10.4 FL — SIGNIFICANT CHANGE UP (ref 7–13)
PO2 BLDA: 111 MMHG — HIGH (ref 83–108)
PO2 BLDA: 99 MMHG — SIGNIFICANT CHANGE UP (ref 83–108)
PO2 BLDV: 49 MMHG — HIGH (ref 35–40)
POTASSIUM BLDA-SCNC: 3.5 MMOL/L — SIGNIFICANT CHANGE UP (ref 3.4–4.5)
POTASSIUM BLDA-SCNC: 3.5 MMOL/L — SIGNIFICANT CHANGE UP (ref 3.4–4.5)
POTASSIUM SERPL-MCNC: 3.7 MMOL/L — SIGNIFICANT CHANGE UP (ref 3.5–5.3)
POTASSIUM SERPL-SCNC: 3.7 MMOL/L — SIGNIFICANT CHANGE UP (ref 3.5–5.3)
PROT SERPL-MCNC: 5.1 G/DL — LOW (ref 6–8.3)
PROTHROM AB SERPL-ACNC: 11.3 SEC — SIGNIFICANT CHANGE UP (ref 9.8–13.1)
RBC # BLD: 2.6 M/UL — LOW (ref 4.2–5.8)
RBC # FLD: 18 % — HIGH (ref 10.3–14.5)
SAO2 % BLDA: 96.6 % — SIGNIFICANT CHANGE UP (ref 95–99)
SAO2 % BLDA: 98.4 % — SIGNIFICANT CHANGE UP (ref 95–99)
SAO2 % BLDV: 83.4 % — SIGNIFICANT CHANGE UP (ref 60–85)
SODIUM BLDA-SCNC: 133 MMOL/L — LOW (ref 136–146)
SODIUM BLDA-SCNC: 136 MMOL/L — SIGNIFICANT CHANGE UP (ref 136–146)
SODIUM SERPL-SCNC: 136 MMOL/L — SIGNIFICANT CHANGE UP (ref 135–145)
TROPONIN T, HIGH SENSITIVITY: 84 NG/L — CRITICAL HIGH (ref ?–14)
WBC # BLD: 6.65 K/UL — SIGNIFICANT CHANGE UP (ref 3.8–10.5)
WBC # FLD AUTO: 6.65 K/UL — SIGNIFICANT CHANGE UP (ref 3.8–10.5)

## 2018-10-14 PROCEDURE — 71045 X-RAY EXAM CHEST 1 VIEW: CPT | Mod: 26

## 2018-10-14 PROCEDURE — 99292 CRITICAL CARE ADDL 30 MIN: CPT

## 2018-10-14 PROCEDURE — 99233 SBSQ HOSP IP/OBS HIGH 50: CPT | Mod: GC

## 2018-10-14 PROCEDURE — 99291 CRITICAL CARE FIRST HOUR: CPT

## 2018-10-14 PROCEDURE — 99232 SBSQ HOSP IP/OBS MODERATE 35: CPT | Mod: GC

## 2018-10-14 RX ORDER — VANCOMYCIN HCL 1 G
VIAL (EA) INTRAVENOUS
Qty: 0 | Refills: 0 | Status: DISCONTINUED | OUTPATIENT
Start: 2018-10-14 | End: 2018-10-14

## 2018-10-14 RX ORDER — VANCOMYCIN HCL 1 G
750 VIAL (EA) INTRAVENOUS EVERY 12 HOURS
Qty: 0 | Refills: 0 | Status: DISCONTINUED | OUTPATIENT
Start: 2018-10-14 | End: 2018-10-17

## 2018-10-14 RX ORDER — VANCOMYCIN HCL 1 G
1000 VIAL (EA) INTRAVENOUS ONCE
Qty: 0 | Refills: 0 | Status: COMPLETED | OUTPATIENT
Start: 2018-10-14 | End: 2018-10-14

## 2018-10-14 RX ORDER — VANCOMYCIN HCL 1 G
1000 VIAL (EA) INTRAVENOUS EVERY 12 HOURS
Qty: 0 | Refills: 0 | Status: DISCONTINUED | OUTPATIENT
Start: 2018-10-14 | End: 2018-10-14

## 2018-10-14 RX ADMIN — VASOPRESSIN 3 UNIT(S)/MIN: 20 INJECTION INTRAVENOUS at 06:20

## 2018-10-14 RX ADMIN — FENTANYL CITRATE 5.9 MICROGRAM(S)/KG/HR: 50 INJECTION INTRAVENOUS at 19:41

## 2018-10-14 RX ADMIN — DEXMEDETOMIDINE HYDROCHLORIDE IN 0.9% SODIUM CHLORIDE 7.38 MICROGRAM(S)/KG/HR: 4 INJECTION INTRAVENOUS at 08:28

## 2018-10-14 RX ADMIN — DEXMEDETOMIDINE HYDROCHLORIDE IN 0.9% SODIUM CHLORIDE 7.38 MICROGRAM(S)/KG/HR: 4 INJECTION INTRAVENOUS at 06:21

## 2018-10-14 RX ADMIN — Medication 2.77 MICROGRAM(S)/KG/MIN: at 08:27

## 2018-10-14 RX ADMIN — Medication 250 MILLIGRAM(S): at 08:26

## 2018-10-14 RX ADMIN — PROPOFOL 7.08 MICROGRAM(S)/KG/MIN: 10 INJECTION, EMULSION INTRAVENOUS at 08:27

## 2018-10-14 RX ADMIN — PANTOPRAZOLE SODIUM 40 MILLIGRAM(S): 20 TABLET, DELAYED RELEASE ORAL at 12:27

## 2018-10-14 RX ADMIN — PIPERACILLIN AND TAZOBACTAM 200 GRAM(S): 4; .5 INJECTION, POWDER, LYOPHILIZED, FOR SOLUTION INTRAVENOUS at 21:27

## 2018-10-14 RX ADMIN — Medication 100 MILLIGRAM(S): at 06:21

## 2018-10-14 RX ADMIN — Medication 8.85 MICROGRAM(S)/KG/MIN: at 06:21

## 2018-10-14 RX ADMIN — FENTANYL CITRATE 5.9 MICROGRAM(S)/KG/HR: 50 INJECTION INTRAVENOUS at 06:21

## 2018-10-14 RX ADMIN — PROPOFOL 7.08 MICROGRAM(S)/KG/MIN: 10 INJECTION, EMULSION INTRAVENOUS at 06:21

## 2018-10-14 RX ADMIN — Medication 8.85 MICROGRAM(S)/KG/MIN: at 19:39

## 2018-10-14 RX ADMIN — PROPOFOL 7.08 MICROGRAM(S)/KG/MIN: 10 INJECTION, EMULSION INTRAVENOUS at 19:42

## 2018-10-14 RX ADMIN — HEPARIN SODIUM 5000 UNIT(S): 5000 INJECTION INTRAVENOUS; SUBCUTANEOUS at 18:08

## 2018-10-14 RX ADMIN — FENTANYL CITRATE 5.9 MICROGRAM(S)/KG/HR: 50 INJECTION INTRAVENOUS at 08:27

## 2018-10-14 RX ADMIN — VASOPRESSIN 3 UNIT(S)/MIN: 20 INJECTION INTRAVENOUS at 19:40

## 2018-10-14 RX ADMIN — Medication 8.85 MICROGRAM(S)/KG/MIN: at 08:27

## 2018-10-14 RX ADMIN — SODIUM CHLORIDE 30 MILLILITER(S): 9 INJECTION INTRAMUSCULAR; INTRAVENOUS; SUBCUTANEOUS at 08:28

## 2018-10-14 RX ADMIN — Medication 81 MILLIGRAM(S): at 12:27

## 2018-10-14 RX ADMIN — DEXMEDETOMIDINE HYDROCHLORIDE IN 0.9% SODIUM CHLORIDE 7.38 MICROGRAM(S)/KG/HR: 4 INJECTION INTRAVENOUS at 19:41

## 2018-10-14 RX ADMIN — Medication 250 MILLIGRAM(S): at 21:13

## 2018-10-14 RX ADMIN — Medication 2.77 MICROGRAM(S)/KG/MIN: at 19:39

## 2018-10-14 RX ADMIN — Medication 100 MILLIGRAM(S): at 14:44

## 2018-10-14 RX ADMIN — Medication 100 MILLIGRAM(S): at 22:12

## 2018-10-14 RX ADMIN — PIPERACILLIN AND TAZOBACTAM 200 GRAM(S): 4; .5 INJECTION, POWDER, LYOPHILIZED, FOR SOLUTION INTRAVENOUS at 09:37

## 2018-10-14 RX ADMIN — FENTANYL CITRATE 1 MICROGRAM(S)/KG/HR: 50 INJECTION INTRAVENOUS at 13:06

## 2018-10-14 RX ADMIN — VASOPRESSIN 3 UNIT(S)/MIN: 20 INJECTION INTRAVENOUS at 08:26

## 2018-10-14 RX ADMIN — HEPARIN SODIUM 5000 UNIT(S): 5000 INJECTION INTRAVENOUS; SUBCUTANEOUS at 06:21

## 2018-10-14 NOTE — PROGRESS NOTE ADULT - PROBLEM SELECTOR PLAN 1
ESRD on HD for past six years through left upper extremity AVF. Pt remains on multiple pressors. C/w CVVHDF via non tunneled catheter. Keep net even. On all 4K baths.

## 2018-10-14 NOTE — PROGRESS NOTE ADULT - SUBJECTIVE AND OBJECTIVE BOX
CLAUDIA HAN          MRN-[id number]    HPI:  Pt is a 58 yr old Mandarin speaking male scheduled for Right Thoractomy Decortiation, Right Chest Wall reconstruction with Latissimjus Dorsi Flap Poss Skin Graft with VAC dressing with Dr Pickering 10/11/18. Pt has ICD but unable to identify make or model. Pt hx of ESRD with HD T/Th/Sat for 4 hrs and has stated he has stopped some of his meds but cannot say which ones. Pt seen and received MC and CC but has 2 different med lists and is unable to identify meds taking. Pt denies blood thinners. Pt has stopped ASA as of last week. Pt hx gathered from [employer] and notes from Dr Pickering - pt is poor historian.     Call through  to patient who went to local pharmacy for confirmation of meds and pt given preop instructions (01 Oct 2018 09:25)      Procedure:  POD # :     Issues:        Interval/Overnight Events/ ROS  Pt remained hemodynamically stable overnight, not on any pressors or inotropes. OOB to chair, breathing comfortably with minimal pain. Ambulated several times . Denies pain, no SOB, no palpitations, no nausea/ no vomiting, no dizziness  A-line and sharma d/kirsten         PAST MEDICAL & SURGICAL HISTORY:  Smoking hx  Cardiomyopathy  Wound: right chest  ICD (implantable cardioverter-defibrillator) in place  OA (osteoarthritis)  HLD (hyperlipidemia)  BPH (benign prostatic hyperplasia)  Pleural effusion  HTN (hypertension)  ESRD (end stage renal disease)  H/O chest wound: right  History of wound infection: right chest wall - revision 5/18 and again in 7/18  History of implantable cardioverter-defibrillator (ICD) placement: pt unsure when placed  H/O bilateral hip replacements: 2008, 2009    Allergies    No Known Allergies    Intolerances            ***VITAL SIGNS:  Vital Signs Last 24 Hrs  T(C): 36.4 (13 Oct 2018 20:00), Max: 36.4 (13 Oct 2018 20:00)  T(F): 97.5 (13 Oct 2018 20:00), Max: 97.5 (13 Oct 2018 20:00)  HR: 69 (13 Oct 2018 23:00) (61 - 93)  BP: --  BP(mean): --  RR: 18 (13 Oct 2018 23:00) (17 - 19)  SpO2: 100% (13 Oct 2018 23:00) (97% - 100%)    I/Os:   I&O's Detail    12 Oct 2018 07:01  -  13 Oct 2018 07:00  --------------------------------------------------------  IN:    dexmedetomidine Infusion: 213.4 mL    DOBUTamine Infusion: 213.6 mL    EPINEPHrine Infusion: 106.4 mL    fentaNYL  Infusion: 283.2 mL    IV PiggyBack: 1350 mL    norepinephrine Infusion: 315.6 mL    phenylephrine   Infusion: 113.3 mL    propofol Infusion: 287.1 mL    sodium chloride 0.9%.: 270 mL    vasopressin Infusion: 60 mL  Total IN: 3212.6 mL    OUT:    Bulb: 150 mL    Bulb: 420 mL    Chest Tube: 70 mL    Chest Tube: 220 mL    Chest Tube: 100 mL  Total OUT: 960 mL    Total NET: 2252.6 mL      13 Oct 2018 07:01  -  14 Oct 2018 00:51  --------------------------------------------------------  IN:    dexmedetomidine Infusion: 164.8 mL    DOBUTamine Infusion: 142.4 mL    fentaNYL  Infusion: 188.8 mL    IV PiggyBack: 400 mL    norepinephrine Infusion: 107 mL    Packed Red Blood Cells: 300 mL    Platelets - Single Donor: 236 mL    propofol Infusion: 210.8 mL    sodium chloride 0.9%.: 150 mL    vasopressin Infusion: 35.4 mL  Total IN: 1935.2 mL    OUT:    Bulb: 10 mL    Bulb: 300 mL    Chest Tube: 140 mL    Chest Tube: 70 mL    Chest Tube: 60 mL  Total OUT: 580 mL    Total NET: 1355.2 mL          CAPILLARY BLOOD GLUCOSE      POCT Blood Glucose.: 109 mg/dL (13 Oct 2018 19:07)  POCT Blood Glucose.: 121 mg/dL (13 Oct 2018 16:48)  POCT Blood Glucose.: 127 mg/dL (13 Oct 2018 13:59)  POCT Blood Glucose.: 126 mg/dL (13 Oct 2018 05:03)  POCT Blood Glucose.: 130 mg/dL (13 Oct 2018 00:59)      =======================  MEDICATIONS  ===================  MEDICATIONS  (STANDING):  albumin human  5% IVPB 250 milliLiter(s) IV Intermittent once  aspirin  chewable 81 milliGRAM(s) Oral daily  calcium gluconate IVPB 1 Gram(s) IV Intermittent once  dexmedetomidine Infusion 0.5 MICROgram(s)/kG/Hr (7.375 mL/Hr) IV Continuous <Continuous>  dextrose 5%. 1000 milliLiter(s) (50 mL/Hr) IV Continuous <Continuous>  dextrose 50% Injectable 12.5 Gram(s) IV Push once  dextrose 50% Injectable 25 Gram(s) IV Push once  dextrose 50% Injectable 25 Gram(s) IV Push once  DOBUTamine Infusion 5 MICROgram(s)/kG/Min (8.85 mL/Hr) IV Continuous <Continuous>  fentaNYL   Infusion 1 MICROgram(s)/kG/Hr (5.9 mL/Hr) IV Continuous <Continuous>  heparin  Injectable 5000 Unit(s) SubCutaneous every 12 hours  insulin lispro (HumaLOG) corrective regimen sliding scale   SubCutaneous every 6 hours  metroNIDAZOLE  IVPB      metroNIDAZOLE  IVPB 500 milliGRAM(s) IV Intermittent every 8 hours  norepinephrine Infusion 0.05 MICROgram(s)/kG/Min (2.766 mL/Hr) IV Continuous <Continuous>  pantoprazole  Injectable 40 milliGRAM(s) IV Push daily  piperacillin/tazobactam IVPB. 3.375 Gram(s) IV Intermittent every 12 hours  PrismaSATE Dialysate BGK 4 / 2.5 5000 milliLiter(s) (1000 mL/Hr) CRRT <Continuous>  PrismaSOL Filtration BGK 4 / 2.5 5000 milliLiter(s) (1000 mL/Hr) CRRT <Continuous>  PrismaSOL Filtration BGK 4 / 2.5 5000 milliLiter(s) (200 mL/Hr) CRRT <Continuous>  propofol Infusion 20 MICROgram(s)/kG/Min (7.08 mL/Hr) IV Continuous <Continuous>  sodium chloride 0.9%. 1000 milliLiter(s) (30 mL/Hr) IV Continuous <Continuous>  vasopressin Infusion 0.05 Unit(s)/Min (3 mL/Hr) IV Continuous <Continuous>    MEDICATIONS  (PRN):  dextrose 40% Gel 15 Gram(s) Oral once PRN Blood Glucose LESS THAN 70 milliGRAM(s)/deciliter  glucagon  Injectable 1 milliGRAM(s) IntraMuscular once PRN Glucose LESS THAN 70 milligrams/deciliter      ======================VENTILATOR SETTINGS  ==============  Mode: AC/ CMV (Assist Control/ Continuous Mandatory Ventilation)  RR (machine): 18  TV (machine): 500  FiO2: 40  PEEP: 5  MAP: 10  PIP: 26      =================== PATIENT CARE ACCESS DEVICES ==========  Peripheral IV  Central Venous Line	R	L	IJ	Fem	SC			Placed:   Arterial Line	R	L	PT	DP	Fem	Rad	Ax	Placed:   Midline:				  Urinary Catheter, Date Placed:   Necessity of urinary, arterial, and venous catheters discussed    ======================= PHYSICAL EXAM===================  General:                         Comfortable, Awake, alert, not in any distress  Neuro:                            Moving all extremities to commands. No focal deficits	  HEENT:                           CHER/ ETT/ NGT/ trach  Respiratory:	Lungs clear on auscultation bilaterally with good aeration.                                           No rales, rhonchi, no wheezing. Effort even and unlabored.  CV:		Regular rate and rhythm. Normal S1/S2. No murmurs  Abdomen:	                     Soft,  nontender, not-distended. Bowel sounds present / absent.   Skin:		No rash.  Extremities:	Warm, no cyanosis or edema.  Palpable pulses    ============================ LABS =======================                        8.5    6.39  )-----------( 82       ( 13 Oct 2018 21:30 )             24.9     10-13    138  |  102  |  42<H>  ----------------------------<  118<H>  3.6   |  20<L>  |  4.30<H>    Ca    8.1<L>      13 Oct 2018 21:30  Phos  4.2     10-13  Mg     2.4     10-13    TPro  5.1<L>  /  Alb  2.4<L>  /  TBili  1.7<H>  /  DBili  x   /  AST  19  /  ALT  4   /  AlkPhos  128<H>  10-13    LIVER FUNCTIONS - ( 13 Oct 2018 21:30 )  Alb: 2.4 g/dL / Pro: 5.1 g/dL / ALK PHOS: 128 u/L / ALT: 4 u/L / AST: 19 u/L / GGT: x           PT/INR - ( 13 Oct 2018 15:45 )   PT: 11.4 SEC;   INR: 0.99          PTT - ( 13 Oct 2018 15:45 )  PTT:33.9 SEC  ABG - ( 13 Oct 2018 21:30 )  pH, Arterial: 7.44  pH, Blood: x     /  pCO2: 32    /  pO2: 139   / HCO3: 23    / Base Excess: -1.7  /  SaO2: 98.6                OTHER  10-11-18 --  --  --      OTHER  10-11-18 --  --  --      TISSUE  10-11-18 --  --  --      TISSUE  10-11-18 --  --  --      TISSUE  10-11-18 --  --  Enterococcus faecalis      PLEURAL FLUID  10-11-18 --  --    GPCCH^Gram Pos Cocci in Chains  QUANTITY OF BACTERIA SEEN: MODERATE (3+)  GPCPR^Gram Pos Cocci in Pairs  QUANTITY OF BACTERIA SEEN: MODERATE (3+)  WBC^White Blood Cells  QNTY CELLS IN GRAM STAIN: FEW (2+)          ===================== IMAGING STUDIES ===================  Radiology personally reviewed.    ====================ASSESSMENT AND PLAN ================      ====================== NEUROLOGY=======================  Pain control with PCA / PCEA / Tylenol IV / Toradol / Percocet  Pt is on Precedex for agitation  Pt is sedated with Propofol / Fentanyl    ==================== RESPIRATORY========================  Pt is on            L nasal canula / Face tent____% FiO2  Comfortable, no evidence of distress.  Using incentive spirometry & doing                ml  Monitor chest tube output  Chest tube to suction / water seal	    Mechanical Ventilation:  Mode: AC/ CMV (Assist Control/ Continuous Mandatory Ventilation)  RR (machine): 18  TV (machine): 500  FiO2: 40  PEEP: 5  MAP: 10  PIP: 26    Mechanical ventilator status assessed & settings reviewed  Continue bronchodilators, pulmonary toilet  Head of bed elevation to 30-40 degrees    ====================CARDIOVASCULAR=====================  Continue hemodynamic monitoring/ telemetry  Not on any pressors  Continue cardiovascular / antihypertensive medications    ===================== RENAL ============================  Continue LR 30CC/hr      D/C IVF  Monitor I/Os, BUN/ Cr  and electrolytes  D/C Sharma      Keep Sharma for UO monitoring  BPH: Continue Flomax/ Finasteride      ==================== GASTROINTESTINAL===================  On regular diet, tolerating well  Continue GI prophylaxis with Pepcid / Protonix  Continue Zofran / Reglan for nausea - PRN	  NPO    =======================    ENDOCRIN  =====================  Glycemic monitoring  F/S with coverage  ===================HEMATOLOGIC/ONCOLOGIC =============  Monitor chest tube output. No signs of active bleeding.   Follow CBC, coags  in AM  DVT prophylaxis with SCD, sc Heparin    ========================INFECTIOUS DISEASE===============  No signs of infection. Monitor for fever / leukocytosis.  All surgical incision / chest tube  sites look clean  D/C Sharma      Pertinent clinical, laboratory, radiographic, hemodynamic, echocardiographic, respiratory data, microbiologic data and chart were reviewed and analyzed frequently throughout the course of the day and night. GI and DVT prophylaxis, glycemic control, head of bed elevation and skin care issues were addressed.  Patient seen, examined and plan discussed with CT Surgery / CTICU team during rounds.  Pt remains critically ill in imminent risk of  deterioration and requires very careful cardio- pulmonary monitoring and support.    I have spent               minutes of critical care time with this pt between            am/pm    and               am/ pm         minutes spent on total encounter; more than 50% of the visit was spent counseling and/or coordinating care by the attending physician.        KERLINE Faith MD CLAUDIA HAN          MRN-[id number]    HPI:  Pt is a 58 yr old Mandarin speaking male scheduled for Right Thoracotomy, Decortication , Right Chest Wall reconstruction with Latissimjus Dorsi Flap Poss Skin Graft with VAC dressing with Dr Pickering 10/11/18. Pt has ICD but unable to identify make or model. Pt hx of ESRD with HD T/Th/Sat for 4 hrs and has stated he has stopped some of his meds but cannot say which ones. Pt seen and received MC and CC but has 2 different med lists and is unable to identify meds taking. Pt denies blood thinners. Pt has stopped ASA as of last week. Pt hx gathered from [employer] and notes from Dr Pickering - pt is poor historian.      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          Procedure:  Thoracotomy  10/11/2018       · Operative Findings	Right thoracotomy, resection of portion of R 7th rib, evacuation of infected hemothorax, takedown of pleurocutaneous fistula	    · Operative Findings	s/p R chest wall reconstruction with tunneled latissimus dorsi flap, covered by serratus anterior flap, after R thoracotomy, takedown pleurocutaneous fistula, decortication, resection Right 7th rib	         · Drains	3 28Fr chest tubes (A,P, and diaphragm), 2 PRS SQ SANDY drains, VAC	  · Estimated Blood Loss	1000 milliLiter(s)	  · IV Infusions - Crystalloids	4L	  · IV Infusions - Colloids	500cc	  · IV Infusions - Blood Products	4u PRBC	       POD # 3    Issues: s/p  infected right hemothorax evacuation             multiple right  chest tubes/ drains in place             distributive and cardiogenic shock/ SIRS             acute resp failure on vent support             posthemorrhagic anemia             lactic acidosis             hyperglycemia             ESRD on HD             acute on chronic systolic CHF ( EF 10 %), ICD in place                   Interval/OR Events/ ROS  Pt hypotensive through the surgery - on multiple pressors / inotropes ( Levo, Epi, Dobutamine). Required 4 units of PRBC in addition to colloids and crystalloids for  symptomatic  posthemorrhagic anemia during the surgery. Pt arrived in ICU orally intubated, sedated, on Levo, Dobut, Epi.    Overnight in ICU still on multiple pressors/ inotropes./ Vent support. Mixed resp/ metab acidosis minimally  improved Lactic acidemia- unchanged @ 2-3.. Pt will need HD today ( ESRD on HD - anuria)  On POD 1 weaned off Epi, Quan. Remains on Dobutamine, Levophed, Vasopressin; on POD 2 started  CVVHD. Oxygenation and acid base status  is little  improved. Troponin is down to 88. Cardiology / CHF team on board to help in managing decompensated CHF/ cardiogenic shock. OR culture grew Enterococcus faecalis - sensitivity pending      PAST MEDICAL & SURGICAL HISTORY:  Smoking hx  Cardiomyopathy  Wound: right chest  ICD (implantable cardioverter-defibrillator) in place  OA (osteoarthritis)  HLD (hyperlipidemia)  BPH (benign prostatic hyperplasia)  Pleural effusion  HTN (hypertension)  ESRD (end stage renal disease)  H/O chest wound: right  History of wound infection: right chest wall - revision 5/18 and again in 7/18  History of implantable cardioverter-defibrillator (ICD) placement: pt unsure when placed  H/O bilateral hip replacements: 2008, 2009    Home Medications:   * Patient Currently Takes Medications as of 01-Oct-2018 09:29 documented in Structured Notes  · 	nortriptyline 50 mg oral capsule: 1 cap(s) orally once a day  · 	Vitamin B Complex: 1 tab(s) orally once a day  · 	Nephplex Rx oral tablet: 1 tab(s) orally once a day  · 	aspirin 81 mg oral tablet: 1 tab(s) orally once a day last dose 10/3/2018  · 	Vitamin C 500 mg oral tablet: 1 tab(s) orally once a day  · 	Breo Ellipta 100 mcg-25 mcg/inh inhalation powder: 1 puff(s) inhaled once a day patient denies using it  · 	Calcium 500: 1 tab(s) orally 2 times a day  · 	carvedilol 6.25 mg oral tablet: 1 tab(s) orally once a day  · 	docusate sodium 100 mg oral tablet: 1 tab(s) orally 2 times a day, As Needed  · 	folic acid 1 mg oral tablet: 1 tab(s) orally once a day  · 	Mag-Ox 400 oral tablet: 1 tab(s) orally once a day  · 	midodrine 10 mg oral tablet: 1 tab(s) orally once a day  · 	Multiple Vitamins oral tablet: 1 tab(s) orally once a day last dose 10/3/2018  · 	Namenda 10 mg oral tablet: 1 tab(s) orally 2 times a day  · 	Renvela 800 mg oral tablet: 2 tab(s) orally every 8 hours  · 	simethicone 80 mg oral tablet: 1 tab(s) orally 3 times a day (after meals)  · 	torsemide 20 mg oral tablet: 1 tab(s) orally once a day  · 	vitamin A: 1 tab(s) orally once a day      Allergies  No Known Allergies      ***VITAL SIGNS:  Vital Signs Last 24 Hrs  T(C): 36.4 (13 Oct 2018 20:00), Max: 36.4 (13 Oct 2018 20:00)  T(F): 97.5 (13 Oct 2018 20:00), Max: 97.5 (13 Oct 2018 20:00)  HR: 69 (13 Oct 2018 23:00) (61 - 93)  BP: --  BP(mean): --  RR: 18 (13 Oct 2018 23:00) (17 - 19)  SpO2: 100% (13 Oct 2018 23:00) (97% - 100%)    I/Os:   I&O's Detail    12 Oct 2018 07:01  -  13 Oct 2018 07:00  --------------------------------------------------------  IN:    dexmedetomidine Infusion: 213.4 mL    DOBUTamine Infusion: 213.6 mL    EPINEPHrine Infusion: 106.4 mL    fentaNYL  Infusion: 283.2 mL    IV PiggyBack: 1350 mL    norepinephrine Infusion: 315.6 mL    phenylephrine   Infusion: 113.3 mL    propofol Infusion: 287.1 mL    sodium chloride 0.9%.: 270 mL    vasopressin Infusion: 60 mL  Total IN: 3212.6 mL    OUT:    Bulb: 150 mL    Bulb: 420 mL    Chest Tube: 70 mL    Chest Tube: 220 mL    Chest Tube: 100 mL  Total OUT: 960 mL    Total NET: 2252.6 mL      13 Oct 2018 07:01  -  14 Oct 2018 00:51  --------------------------------------------------------  IN:    dexmedetomidine Infusion: 164.8 mL    DOBUTamine Infusion: 142.4 mL    fentaNYL  Infusion: 188.8 mL    IV PiggyBack: 400 mL    norepinephrine Infusion: 107 mL    Packed Red Blood Cells: 300 mL    Platelets - Single Donor: 236 mL    propofol Infusion: 210.8 mL    sodium chloride 0.9%.: 150 mL    vasopressin Infusion: 35.4 mL  Total IN: 1935.2 mL    OUT:    Bulb: 10 mL    Bulb: 300 mL    Chest Tube: 140 mL    Chest Tube: 70 mL    Chest Tube: 60 mL  Total OUT: 580 mL    Total NET: 1355.2 mL          CAPILLARY BLOOD GLUCOSE  POCT Blood Glucose.: 109 mg/dL (13 Oct 2018 19:07)  POCT Blood Glucose.: 121 mg/dL (13 Oct 2018 16:48)  POCT Blood Glucose.: 127 mg/dL (13 Oct 2018 13:59)  POCT Blood Glucose.: 126 mg/dL (13 Oct 2018 05:03)  POCT Blood Glucose.: 130 mg/dL (13 Oct 2018 00:59)      =======================  MEDICATIONS  ===================  MEDICATIONS  (STANDING):  albumin human  5% IVPB 250 milliLiter(s) IV Intermittent once  aspirin  chewable 81 milliGRAM(s) Oral daily  calcium gluconate IVPB 1 Gram(s) IV Intermittent once  dexmedetomidine Infusion 0.5 MICROgram(s)/kG/Hr (7.375 mL/Hr) IV Continuous <Continuous>  dextrose 5%. 1000 milliLiter(s) (50 mL/Hr) IV Continuous <Continuous>  dextrose 50% Injectable 12.5 Gram(s) IV Push once  dextrose 50% Injectable 25 Gram(s) IV Push once  dextrose 50% Injectable 25 Gram(s) IV Push once  DOBUTamine Infusion 5 MICROgram(s)/kG/Min (8.85 mL/Hr) IV Continuous <Continuous>  fentaNYL   Infusion 1 MICROgram(s)/kG/Hr (5.9 mL/Hr) IV Continuous <Continuous>  heparin  Injectable 5000 Unit(s) SubCutaneous every 12 hours  insulin lispro (HumaLOG) corrective regimen sliding scale   SubCutaneous every 6 hours  metroNIDAZOLE  IVPB      metroNIDAZOLE  IVPB 500 milliGRAM(s) IV Intermittent every 8 hours  norepinephrine Infusion 0.05 MICROgram(s)/kG/Min (2.766 mL/Hr) IV Continuous <Continuous>  pantoprazole  Injectable 40 milliGRAM(s) IV Push daily  piperacillin/tazobactam IVPB. 3.375 Gram(s) IV Intermittent every 12 hours  PrismaSATE Dialysate BGK 4 / 2.5 5000 milliLiter(s) (1000 mL/Hr) CRRT <Continuous>  PrismaSOL Filtration BGK 4 / 2.5 5000 milliLiter(s) (1000 mL/Hr) CRRT <Continuous>  PrismaSOL Filtration BGK 4 / 2.5 5000 milliLiter(s) (200 mL/Hr) CRRT <Continuous>  propofol Infusion 20 MICROgram(s)/kG/Min (7.08 mL/Hr) IV Continuous <Continuous>  sodium chloride 0.9%. 1000 milliLiter(s) (30 mL/Hr) IV Continuous <Continuous>  vasopressin Infusion 0.05 Unit(s)/Min (3 mL/Hr) IV Continuous <Continuous>    MEDICATIONS  (PRN):  dextrose 40% Gel 15 Gram(s) Oral once PRN Blood Glucose LESS THAN 70 milliGRAM(s)/deciliter  glucagon  Injectable 1 milliGRAM(s) IntraMuscular once PRN Glucose LESS THAN 70 milligrams/deciliter      ======================VENTILATOR SETTINGS  ==============  Mode: AC/ CMV (Assist Control/ Continuous Mandatory Ventilation)  RR (machine): 18  TV (machine): 500  FiO2: 40  PEEP: 5  MAP: 10  PIP: 26    =================== PATIENT CARE ACCESS DEVICES ==========  Peripheral IV (+)  Central Venous Line R/ IJ (+) ; R fem HD catheter  Arterial Line	R / Rad(+)			  Necessity of  arterial, and venous catheters discussed  ======================= PHYSICAL EXAM===================  General:             sedated, intubated  Neuro:               Moving  extremities spontaneously    with lower dose of sedation. 	  HEENT:                           CHER/ ETT/ NGT  Respiratory:	Lungs sound coarse on auscultation bilaterally with good aeration.                            No rales, rhonchi, no wheezing.                           RIght thoracotomy site - covered with dressing, chest tube site -clean  CV:		Regular rate and rhythm. Normal S1/S2.  Abdomen:          Soft,  nontender, not-distended. Bowel sounds present - hypoactive  Skin:		No rash.  Extremities:	Warm, no cyanosis or edema.  (+) pulses by doppler    ============================ LABS =======================                        8.5    6.39  )-----------( 82       ( 13 Oct 2018 21:30 )             24.9     10-13    138  |  102  |  42<H>  ----------------------------<  118<H>  3.6   |  20<L>  |  4.30<H>    Ca    8.1<L>      13 Oct 2018 21:30  Phos  4.2     10-13  Mg     2.4     10-13    TPro  5.1<L>  /  Alb  2.4<L>  /  TBili  1.7<H>  /  DBili  x   /  AST  19  /  ALT  4   /  AlkPhos  128<H>  10-13    LIVER FUNCTIONS - ( 13 Oct 2018 21:30 )  Alb: 2.4 g/dL / Pro: 5.1 g/dL / ALK PHOS: 128 u/L / ALT: 4 u/L / AST: 19 u/L / GGT: x           PT/INR - ( 13 Oct 2018 15:45 )   PT: 11.4 SEC;   INR: 0.99          PTT - ( 13 Oct 2018 15:45 )  PTT:33.9 SEC  ABG - ( 13 Oct 2018 21:30 )  pH, Arterial: 7.44  pH, Blood: x     /  pCO2: 32    /  pO2: 139   / HCO3: 23    / Base Excess: -1.7  /  SaO2: 98.6       Blood Gas Arterial - Lactate: 0.6: Please note updated reference range. mmol/L (10.13.18 @ 21:30)      Blood Gas Arterial - Lactate: 3.3: Please note updated reference range. mmol/L (10.12.18 @ 02:53)  Blood Gas Arterial - Lactate: 2.2: Please note updated reference range. mmol/L (10.11.18 @ 21:20)  Blood Gas Arterial - Lactate: 2.5: Please note updated reference range. mmol/L (10.11.18 @ 18:00)        Troponin T, High Sensitivity: 83: Delta: 88 on 10/13/      Troponin T, High Sensitivity: 88: Delta: 113 on 10/13/    Troponin T, High Sensitivity: 122: Delta: 98 on 10/12/      Troponin T, High Sensitivity: 98: Delta: 96 on 10/12/      OTHER  10-11-18 --  --  --    OTHER  10-11-18 --  --  --    TISSUE  10-11-18 --  --  --  TISSUE  10-11-18 --  --  --  TISSUE  10-11-18 --  --  Enterococcus faecalis  PLEURAL FLUID  10-11-18 --  --    GPCCH^Gram Pos Cocci in Chains  QUANTITY OF BACTERIA SEEN: MODERATE (3+)  GPCPR^Gram Pos Cocci in Pairs  QUANTITY OF BACTERIA SEEN: MODERATE (3+)  WBC^White Blood Cells  QNTY CELLS IN GRAM STAIN: FEW (2+)    ===================== IMAGING STUDIES ===================  Radiology personally reviewed.  < from: Xray Chest 1 View- PORTABLE-Routine (10.13.18 @ 07:33) >        INTERPRETATION:  TIME OF EXAM: October 13, 2018 at 6:43 AM    CLINICAL INFORMATION: Postop    TECHNIQUE:   Portable chest    INTERPRETATION:     Endotracheal, enteric and multiple right-sided chest tubes again seen   with persistent right basilar pneumothorax. Decreasing subcutaneous   emphysema in the right chest wall and neck. Small loculated left   effusion. Both upper lobes are clear. Heart size is stable.    Opacity at the right lung base secondary to muscle flap procedure.      COMPARISON:  October 12 without significant change      IMPRESSION:  Follow-up without interval change and right basilar   pneumothorax.    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          Procedure:  Thoracotomy  10/11/2018       · Operative Findings	Right thoracotomy, resection of portion of R 7th rib, evacuation of infected hemothorax, takedown of pleurocutaneous fistula	    · Operative Findings	s/p R chest wall reconstruction with tunneled latissimus dorsi flap, covered by serratus anterior flap, after R thoracotomy, takedown pleurocutaneous fistula, decortication, resection Right 7th rib	    Issues: s/p  infected right hemothorax evacuation/ Enterococcus empyema             multiple right  chest tubes/ drains in place             distributive and cardiogenic shock/ SIRS             acute resp failure on vent support             posthemorrhagic anemia/ thrombocytopenia ( s/p PRBC and PLT today)             lactic acidosis             hyperglycemia             ESRD on HD             acute on chronic systolic CHF ( EF 10 %), ICD in place      ====================== NEUROLOGY=======================  Pain control with iv Fentanyl  Pt is sedated with Propofol /Precedex    ==================== RESPIRATORY========================  Monitor chest tube output  Chest tube x3 to  water seal	  Candido x2 to bulb suction    Mechanical Ventilation:  Mode: AC/ CMV (Assist Control/ Continuous Mandatory Ventilation)  RR (machine): 18  TV (machine): 500  FiO2: 40  PEEP: 5  MAP: 10.4  PIP: 24    Mechanical ventilator status assessed & settings reviewed  Continue bronchodilators, pulmonary toilet  Head of bed elevation to 30-40 degrees  F/up  serial ABG's  Will attempt CPAP later today if more stable    ====================CARDIOVASCULAR=====================  Continue hemodynamic monitoring/ telemetry  Titrating Levo, Vaso, Dobutamine for SBP > 90, MAP > 65   Elevated Trop trending down; , likely ESRD/Post-op SIRS, and unlikely ACS, Cardiology following, and d/w cards attending and Heart failure team--No Cath intervention indicated,   Flow track noninvasive CO/ CI monitoring ( CI @ 2.7-3)  ASA started yesterday, and will cont.     ===================== RENAL ============================  Continue NS 30CC/hr       Monitor I/Os, BUN/ Cr  and electrolytes  No Moore - Pt was on HD for ESRD preop.  Currently on CVVHD due to hemodynamic instability in ICU      ==================== GASTROINTESTINAL===================  NPO  Continue GI prophylaxis with  Protonix  Possibly will start NGT feeds today    =======================    ENDOCRIN  =====================  Glycemic monitoring  F/S with coverage  ===================HEMATOLOGIC/ONCOLOGIC =============  Monitor chest tube output. No signs of active bleeding.   Follow CBC, coags  in AM  DVT prophylaxis with SCD, sc Heparin    ========================INFECTIOUS DISEASE===============   Monitor for fever / leukocytosis.  All surgical incision / chest tube  sites look clean  Empiric ABX Zosyn, Vanco, Flagyl for now until cultures sensitivity available       Pertinent clinical, laboratory, radiographic, hemodynamic, echocardiographic, respiratory data, microbiologic data and chart were reviewed and analyzed frequently throughout the course of the day and night. GI and DVT prophylaxis, glycemic control, head of bed elevation and skin care issues were addressed.  Patient seen, examined and plan discussed with CT Surgery / CTICU team during rounds.  Pt remains critically ill in imminent risk of  deterioration and requires very careful cardio- pulmonary monitoring and support.    I have spent      90  minutes of critical care time with this pt between     12  am  and     8   am         minutes spent on total encounter; more than 50% of the visit was spent counseling and/or coordinating care by the attending physician.        KERLINE Faith MD CLAUDIA HAN          MRN-[id number]    HPI:  Pt is a 58 yr old Mandarin speaking male scheduled for Right Thoracotomy, Decortication , Right Chest Wall reconstruction with Latissimjus Dorsi Flap Poss Skin Graft with VAC dressing with Dr Pickering 10/11/18. Pt has ICD but unable to identify make or model. Pt hx of ESRD with HD T/Th/Sat for 4 hrs and has stated he has stopped some of his meds but cannot say which ones. Pt seen and received MC and CC but has 2 different med lists and is unable to identify meds taking. Pt denies blood thinners. Pt has stopped ASA as of last week. Pt hx gathered from [employer] and notes from Dr Pickering - pt is poor historian.      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          Procedure:  Thoracotomy  10/11/2018       · Operative Findings	Right thoracotomy, resection of portion of R 7th rib, evacuation of infected hemothorax, takedown of pleurocutaneous fistula	    · Operative Findings	s/p R chest wall reconstruction with tunneled latissimus dorsi flap, covered by serratus anterior flap, after R thoracotomy, takedown pleurocutaneous fistula, decortication, resection Right 7th rib	         · Drains	3 28Fr chest tubes (A,P, and diaphragm), 2 PRS SQ SANDY drains, VAC	  · Estimated Blood Loss	1000 milliLiter(s)	  · IV Infusions - Crystalloids	4L	  · IV Infusions - Colloids	500cc	  · IV Infusions - Blood Products	4u PRBC	       POD # 3    Issues: s/p  infected right hemothorax evacuation             multiple right  chest tubes/ drains in place             distributive and cardiogenic shock/ SIRS             acute resp failure on vent support             posthemorrhagic anemia             lactic acidosis             hyperglycemia             ESRD on HD             acute on chronic systolic CHF ( EF 10 %), ICD in place                   Interval/OR Events/ ROS  Pt hypotensive through the surgery - on multiple pressors / inotropes ( Levo, Epi, Dobutamine). Required 4 units of PRBC in addition to colloids and crystalloids for  symptomatic  posthemorrhagic anemia during the surgery. Pt arrived in ICU orally intubated, sedated, on Levo, Dobut, Epi.    Overnight in ICU still on multiple pressors/ inotropes./ Vent support. Mixed resp/ metab acidosis minimally  improved Lactic acidemia- unchanged @ 2-3.. Pt will need HD today ( ESRD on HD - anuria)  On POD 1 weaned off Epi, Quan. Remains on Dobutamine, Levophed, Vasopressin; on POD 2 started  CVVHD. Oxygenation and acid base status  is little  improved. Troponin is down to 88. Cardiology / CHF team on board to help in managing decompensated CHF/ cardiogenic shock. OR culture grew Enterococcus faecalis - sensitivity pending      PAST MEDICAL & SURGICAL HISTORY:  Smoking hx  Cardiomyopathy  Wound: right chest  ICD (implantable cardioverter-defibrillator) in place  OA (osteoarthritis)  HLD (hyperlipidemia)  BPH (benign prostatic hyperplasia)  Pleural effusion  HTN (hypertension)  ESRD (end stage renal disease)  H/O chest wound: right  History of wound infection: right chest wall - revision 5/18 and again in 7/18  History of implantable cardioverter-defibrillator (ICD) placement: pt unsure when placed  H/O bilateral hip replacements: 2008, 2009    Home Medications:   * Patient Currently Takes Medications as of 01-Oct-2018 09:29 documented in Structured Notes  · 	nortriptyline 50 mg oral capsule: 1 cap(s) orally once a day  · 	Vitamin B Complex: 1 tab(s) orally once a day  · 	Nephplex Rx oral tablet: 1 tab(s) orally once a day  · 	aspirin 81 mg oral tablet: 1 tab(s) orally once a day last dose 10/3/2018  · 	Vitamin C 500 mg oral tablet: 1 tab(s) orally once a day  · 	Breo Ellipta 100 mcg-25 mcg/inh inhalation powder: 1 puff(s) inhaled once a day patient denies using it  · 	Calcium 500: 1 tab(s) orally 2 times a day  · 	carvedilol 6.25 mg oral tablet: 1 tab(s) orally once a day  · 	docusate sodium 100 mg oral tablet: 1 tab(s) orally 2 times a day, As Needed  · 	folic acid 1 mg oral tablet: 1 tab(s) orally once a day  · 	Mag-Ox 400 oral tablet: 1 tab(s) orally once a day  · 	midodrine 10 mg oral tablet: 1 tab(s) orally once a day  · 	Multiple Vitamins oral tablet: 1 tab(s) orally once a day last dose 10/3/2018  · 	Namenda 10 mg oral tablet: 1 tab(s) orally 2 times a day  · 	Renvela 800 mg oral tablet: 2 tab(s) orally every 8 hours  · 	simethicone 80 mg oral tablet: 1 tab(s) orally 3 times a day (after meals)  · 	torsemide 20 mg oral tablet: 1 tab(s) orally once a day  · 	vitamin A: 1 tab(s) orally once a day      Allergies  No Known Allergies      ***VITAL SIGNS:  Vital Signs Last 24 Hrs  T(C): 36.4 (13 Oct 2018 20:00), Max: 36.4 (13 Oct 2018 20:00)  T(F): 97.5 (13 Oct 2018 20:00), Max: 97.5 (13 Oct 2018 20:00)  HR: 69 (13 Oct 2018 23:00) (61 - 93)  BP: --  BP(mean): --  RR: 18 (13 Oct 2018 23:00) (17 - 19)  SpO2: 100% (13 Oct 2018 23:00) (97% - 100%)    I/Os:   I&O's Detail    12 Oct 2018 07:01  -  13 Oct 2018 07:00  --------------------------------------------------------  IN:    dexmedetomidine Infusion: 213.4 mL    DOBUTamine Infusion: 213.6 mL    EPINEPHrine Infusion: 106.4 mL    fentaNYL  Infusion: 283.2 mL    IV PiggyBack: 1350 mL    norepinephrine Infusion: 315.6 mL    phenylephrine   Infusion: 113.3 mL    propofol Infusion: 287.1 mL    sodium chloride 0.9%.: 270 mL    vasopressin Infusion: 60 mL  Total IN: 3212.6 mL    OUT:    Bulb: 150 mL    Bulb: 420 mL    Chest Tube: 70 mL    Chest Tube: 220 mL    Chest Tube: 100 mL  Total OUT: 960 mL    Total NET: 2252.6 mL      13 Oct 2018 07:01  -  14 Oct 2018 00:51  --------------------------------------------------------  IN:    dexmedetomidine Infusion: 164.8 mL    DOBUTamine Infusion: 142.4 mL    fentaNYL  Infusion: 188.8 mL    IV PiggyBack: 400 mL    norepinephrine Infusion: 107 mL    Packed Red Blood Cells: 300 mL    Platelets - Single Donor: 236 mL    propofol Infusion: 210.8 mL    sodium chloride 0.9%.: 150 mL    vasopressin Infusion: 35.4 mL  Total IN: 1935.2 mL    OUT:    Bulb: 10 mL    Bulb: 300 mL    Chest Tube: 140 mL    Chest Tube: 70 mL    Chest Tube: 60 mL  Total OUT: 580 mL    Total NET: 1355.2 mL          CAPILLARY BLOOD GLUCOSE  POCT Blood Glucose.: 109 mg/dL (13 Oct 2018 19:07)  POCT Blood Glucose.: 121 mg/dL (13 Oct 2018 16:48)  POCT Blood Glucose.: 127 mg/dL (13 Oct 2018 13:59)  POCT Blood Glucose.: 126 mg/dL (13 Oct 2018 05:03)  POCT Blood Glucose.: 130 mg/dL (13 Oct 2018 00:59)      =======================  MEDICATIONS  ===================  MEDICATIONS  (STANDING):  albumin human  5% IVPB 250 milliLiter(s) IV Intermittent once  aspirin  chewable 81 milliGRAM(s) Oral daily  calcium gluconate IVPB 1 Gram(s) IV Intermittent once  dexmedetomidine Infusion 0.5 MICROgram(s)/kG/Hr (7.375 mL/Hr) IV Continuous <Continuous>  dextrose 5%. 1000 milliLiter(s) (50 mL/Hr) IV Continuous <Continuous>  dextrose 50% Injectable 12.5 Gram(s) IV Push once  dextrose 50% Injectable 25 Gram(s) IV Push once  dextrose 50% Injectable 25 Gram(s) IV Push once  DOBUTamine Infusion 5 MICROgram(s)/kG/Min (8.85 mL/Hr) IV Continuous <Continuous>  fentaNYL   Infusion 1 MICROgram(s)/kG/Hr (5.9 mL/Hr) IV Continuous <Continuous>  heparin  Injectable 5000 Unit(s) SubCutaneous every 12 hours  insulin lispro (HumaLOG) corrective regimen sliding scale   SubCutaneous every 6 hours  metroNIDAZOLE  IVPB      metroNIDAZOLE  IVPB 500 milliGRAM(s) IV Intermittent every 8 hours  norepinephrine Infusion 0.05 MICROgram(s)/kG/Min (2.766 mL/Hr) IV Continuous <Continuous>  pantoprazole  Injectable 40 milliGRAM(s) IV Push daily  piperacillin/tazobactam IVPB. 3.375 Gram(s) IV Intermittent every 12 hours  PrismaSATE Dialysate BGK 4 / 2.5 5000 milliLiter(s) (1000 mL/Hr) CRRT <Continuous>  PrismaSOL Filtration BGK 4 / 2.5 5000 milliLiter(s) (1000 mL/Hr) CRRT <Continuous>  PrismaSOL Filtration BGK 4 / 2.5 5000 milliLiter(s) (200 mL/Hr) CRRT <Continuous>  propofol Infusion 20 MICROgram(s)/kG/Min (7.08 mL/Hr) IV Continuous <Continuous>  sodium chloride 0.9%. 1000 milliLiter(s) (30 mL/Hr) IV Continuous <Continuous>  vasopressin Infusion 0.05 Unit(s)/Min (3 mL/Hr) IV Continuous <Continuous>    MEDICATIONS  (PRN):  dextrose 40% Gel 15 Gram(s) Oral once PRN Blood Glucose LESS THAN 70 milliGRAM(s)/deciliter  glucagon  Injectable 1 milliGRAM(s) IntraMuscular once PRN Glucose LESS THAN 70 milligrams/deciliter      ======================VENTILATOR SETTINGS  ==============  Mode: AC/ CMV (Assist Control/ Continuous Mandatory Ventilation)  RR (machine): 18  TV (machine): 500  FiO2: 40  PEEP: 5  MAP: 10  PIP: 26    =================== PATIENT CARE ACCESS DEVICES ==========  Peripheral IV (+)  Central Venous Line R/ IJ (+) ; R fem HD catheter  Arterial Line	R / Rad(+)			  Necessity of  arterial, and venous catheters discussed  ======================= PHYSICAL EXAM===================  General:             sedated, intubated  Neuro:               Moving  extremities spontaneously    with lower dose of sedation. 	  HEENT:                           CHER/ ETT/ NGT  Respiratory:	Lungs sound coarse on auscultation bilaterally with good aeration.                            No rales, rhonchi, no wheezing.                           RIght thoracotomy site - covered with dressing, chest tube site -clean  CV:		Regular rate and rhythm. Normal S1/S2.  Abdomen:          Soft,  nontender, not-distended. Bowel sounds present - hypoactive  Skin:		No rash.  Extremities:	Warm, no cyanosis or edema.  (+) pulses by doppler    ============================ LABS =======================                        8.5    6.39  )-----------( 82       ( 13 Oct 2018 21:30 )             24.9     10-13    138  |  102  |  42<H>  ----------------------------<  118<H>  3.6   |  20<L>  |  4.30<H>    Ca    8.1<L>      13 Oct 2018 21:30  Phos  4.2     10-13  Mg     2.4     10-13    TPro  5.1<L>  /  Alb  2.4<L>  /  TBili  1.7<H>  /  DBili  x   /  AST  19  /  ALT  4   /  AlkPhos  128<H>  10-13    LIVER FUNCTIONS - ( 13 Oct 2018 21:30 )  Alb: 2.4 g/dL / Pro: 5.1 g/dL / ALK PHOS: 128 u/L / ALT: 4 u/L / AST: 19 u/L / GGT: x           PT/INR - ( 13 Oct 2018 15:45 )   PT: 11.4 SEC;   INR: 0.99          PTT - ( 13 Oct 2018 15:45 )  PTT:33.9 SEC  ABG - ( 13 Oct 2018 21:30 )  pH, Arterial: 7.44  pH, Blood: x     /  pCO2: 32    /  pO2: 139   / HCO3: 23    / Base Excess: -1.7  /  SaO2: 98.6       Blood Gas Arterial - Lactate: 0.6: Please note updated reference range. mmol/L (10.13.18 @ 21:30)      Blood Gas Arterial - Lactate: 3.3: Please note updated reference range. mmol/L (10.12.18 @ 02:53)  Blood Gas Arterial - Lactate: 2.2: Please note updated reference range. mmol/L (10.11.18 @ 21:20)  Blood Gas Arterial - Lactate: 2.5: Please note updated reference range. mmol/L (10.11.18 @ 18:00)        Troponin T, High Sensitivity: 83: Delta: 88 on 10/13/      Troponin T, High Sensitivity: 88: Delta: 113 on 10/13/    Troponin T, High Sensitivity: 122: Delta: 98 on 10/12/      Troponin T, High Sensitivity: 98: Delta: 96 on 10/12/      OTHER  10-11-18 --  --  --    OTHER  10-11-18 --  --  --    TISSUE  10-11-18 --  --  --  TISSUE  10-11-18 --  --  --  TISSUE  10-11-18 --  --  Enterococcus faecalis  PLEURAL FLUID  10-11-18 --  --    GPCCH^Gram Pos Cocci in Chains  QUANTITY OF BACTERIA SEEN: MODERATE (3+)  GPCPR^Gram Pos Cocci in Pairs  QUANTITY OF BACTERIA SEEN: MODERATE (3+)  WBC^White Blood Cells  QNTY CELLS IN GRAM STAIN: FEW (2+)    ===================== IMAGING STUDIES ===================  Radiology personally reviewed.  < from: Xray Chest 1 View- PORTABLE-Routine (10.13.18 @ 07:33) >        INTERPRETATION:  TIME OF EXAM: October 13, 2018 at 6:43 AM    CLINICAL INFORMATION: Postop    TECHNIQUE:   Portable chest    INTERPRETATION:     Endotracheal, enteric and multiple right-sided chest tubes again seen   with persistent right basilar pneumothorax. Decreasing subcutaneous   emphysema in the right chest wall and neck. Small loculated left   effusion. Both upper lobes are clear. Heart size is stable.    Opacity at the right lung base secondary to muscle flap procedure.      COMPARISON:  October 12 without significant change      IMPRESSION:  Follow-up without interval change and right basilar   pneumothorax.    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          Procedure:  Thoracotomy  10/11/2018       · Operative Findings	Right thoracotomy, resection of portion of R 7th rib, evacuation of infected hemothorax, takedown of pleurocutaneous fistula	    · Operative Findings	s/p R chest wall reconstruction with tunneled latissimus dorsi flap, covered by serratus anterior flap, after R thoracotomy, takedown pleurocutaneous fistula, decortication, resection Right 7th rib	    Issues: s/p  infected right hemothorax evacuation/ Enterococcus empyema             multiple right  chest tubes/ drains in place             distributive and cardiogenic shock/ SIRS             acute resp failure on vent support             posthemorrhagic anemia/ thrombocytopenia ( s/p PRBC and PLT today)             lactic acidosis             hyperglycemia             ESRD on HD             acute on chronic systolic CHF ( EF 10 %), ICD in place      ====================== NEUROLOGY=======================  Pain control with iv Fentanyl  Pt is sedated with Propofol /Precedex    ==================== RESPIRATORY========================  Monitor chest tube output  Chest tube x3 to  water seal	  Candido x2 to bulb suction    Mechanical Ventilation:  Mode: AC/ CMV (Assist Control/ Continuous Mandatory Ventilation)  RR (machine): 18  TV (machine): 500  FiO2: 40  PEEP: 5  MAP: 10.4  PIP: 24    Mechanical ventilator status assessed & settings reviewed  Continue bronchodilators, pulmonary toilet  Head of bed elevation to 30-40 degrees  F/up  serial ABG's  Will attempt CPAP later today if more stable    ====================CARDIOVASCULAR=====================  Continue hemodynamic monitoring/ telemetry  Titrating Levo, Vaso, Dobutamine for SBP > 90, MAP > 65   Elevated Trop trending down; , likely ESRD/Post-op SIRS, and unlikely ACS, Cardiology following, and d/w cards attending and Heart failure team--No Cath intervention indicated,   Flow track noninvasive CO/ CI monitoring ( CI @ 2.7-3)  ASA started yesterday, and will cont.     ===================== RENAL ============================  Continue NS 30CC/hr       Monitor I/Os, BUN/ Cr  and electrolytes  No Moore - Pt was on HD for ESRD preop.  Currently on CVVHD due to hemodynamic instability in ICU      ==================== GASTROINTESTINAL===================  NPO  Continue GI prophylaxis with  Protonix  Possibly will start NGT feeds today    =======================    ENDOCRIN  =====================  Glycemic monitoring  F/S with coverage  ===================HEMATOLOGIC/ONCOLOGIC =============  Monitor chest tube output. No signs of active bleeding.   Follow CBC, coags  in AM  DVT prophylaxis with SCD, sc Heparin    ========================INFECTIOUS DISEASE===============   Monitor for fever / leukocytosis.  All surgical incision / chest tube  sites look clean  Empiric ABX Zosyn, Vanco, Flagyl for now until cultures sensitivity available       Pertinent clinical, laboratory, radiographic, hemodynamic, echocardiographic, respiratory data, microbiologic data and chart were reviewed and analyzed frequently throughout the course of the day and night. GI and DVT prophylaxis, glycemic control, head of bed elevation and skin care issues were addressed.  Patient seen, examined and plan discussed with CT Surgery / CTICU team during rounds.  Pt remains critically ill in imminent risk of  deterioration and requires very careful cardio- pulmonary monitoring and support.    I have spent      90  minutes of critical care time with this pt between     12  am  and   9  am         minutes spent on total encounter; more than 50% of the visit was spent counseling and/or coordinating care by the attending physician.        KERLINE Faith MD CLAUDIA HAN          MRN-[id number]    HPI:  Pt is a 58 yr old Mandarin speaking male scheduled for Right Thoracotomy, Decortication , Right Chest Wall reconstruction with Latissimjus Dorsi Flap Poss Skin Graft with VAC dressing with Dr Pickering 10/11/18. Pt has ICD but unable to identify make or model. Pt hx of ESRD with HD T/Th/Sat for 4 hrs and has stated he has stopped some of his meds but cannot say which ones. Pt seen and received MC and CC but has 2 different med lists and is unable to identify meds taking. Pt denies blood thinners. Pt has stopped ASA as of last week. Pt hx gathered from [employer] and notes from Dr Pickering - pt is poor historian.      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          Procedure:  Thoracotomy  10/11/2018       · Operative Findings	Right thoracotomy, resection of portion of R 7th rib, evacuation of infected hemothorax, takedown of pleurocutaneous fistula	    · Operative Findings	s/p R chest wall reconstruction with tunneled latissimus dorsi flap, covered by serratus anterior flap, after R thoracotomy, takedown pleurocutaneous fistula, decortication, resection Right 7th rib	         · Drains	3 28Fr chest tubes (A,P, and diaphragm), 2 PRS SQ SANDY drains, VAC	  · Estimated Blood Loss	1000 milliLiter(s)	  · IV Infusions - Crystalloids	4L	  · IV Infusions - Colloids	500cc	  · IV Infusions - Blood Products	4u PRBC	       POD # 3    Issues: s/p  infected right hemothorax evacuation             multiple right  chest tubes/ drains in place             distributive and cardiogenic shock/ SIRS             acute resp failure on vent support             posthemorrhagic anemia             lactic acidosis             hyperglycemia             ESRD on HD             acute on chronic systolic CHF ( EF 10 %), ICD in place                   Interval/OR Events/ ROS  Pt hypotensive through the surgery - on multiple pressors / inotropes ( Levo, Epi, Dobutamine). Required 4 units of PRBC in addition to colloids and crystalloids for  symptomatic  posthemorrhagic anemia during the surgery. Pt arrived in ICU orally intubated, sedated, on Levo, Dobut, Epi.    Overnight in ICU still on multiple pressors/ inotropes./ Vent support. Mixed resp/ metab acidosis minimally  improved Lactic acidemia- unchanged @ 2-3.. Pt will need HD today ( ESRD on HD - anuria)  On POD 1 weaned off Epi, Quan. Remains on Dobutamine, Levophed, Vasopressin; on POD 2 started  CVVHD. Oxygenation and acid base status  is little  improved. Troponin is down to 88. Cardiology / CHF team on board to help in managing decompensated CHF/ cardiogenic shock. OR culture grew Enterococcus faecalis - sensitivity pending      PAST MEDICAL & SURGICAL HISTORY:  Smoking hx  Cardiomyopathy  Wound: right chest  ICD (implantable cardioverter-defibrillator) in place  OA (osteoarthritis)  HLD (hyperlipidemia)  BPH (benign prostatic hyperplasia)  Pleural effusion  HTN (hypertension)  ESRD (end stage renal disease)  H/O chest wound: right  History of wound infection: right chest wall - revision 5/18 and again in 7/18  History of implantable cardioverter-defibrillator (ICD) placement: pt unsure when placed  H/O bilateral hip replacements: 2008, 2009    Home Medications:   * Patient Currently Takes Medications as of 01-Oct-2018 09:29 documented in Structured Notes  · 	nortriptyline 50 mg oral capsule: 1 cap(s) orally once a day  · 	Vitamin B Complex: 1 tab(s) orally once a day  · 	Nephplex Rx oral tablet: 1 tab(s) orally once a day  · 	aspirin 81 mg oral tablet: 1 tab(s) orally once a day last dose 10/3/2018  · 	Vitamin C 500 mg oral tablet: 1 tab(s) orally once a day  · 	Breo Ellipta 100 mcg-25 mcg/inh inhalation powder: 1 puff(s) inhaled once a day patient denies using it  · 	Calcium 500: 1 tab(s) orally 2 times a day  · 	carvedilol 6.25 mg oral tablet: 1 tab(s) orally once a day  · 	docusate sodium 100 mg oral tablet: 1 tab(s) orally 2 times a day, As Needed  · 	folic acid 1 mg oral tablet: 1 tab(s) orally once a day  · 	Mag-Ox 400 oral tablet: 1 tab(s) orally once a day  · 	midodrine 10 mg oral tablet: 1 tab(s) orally once a day  · 	Multiple Vitamins oral tablet: 1 tab(s) orally once a day last dose 10/3/2018  · 	Namenda 10 mg oral tablet: 1 tab(s) orally 2 times a day  · 	Renvela 800 mg oral tablet: 2 tab(s) orally every 8 hours  · 	simethicone 80 mg oral tablet: 1 tab(s) orally 3 times a day (after meals)  · 	torsemide 20 mg oral tablet: 1 tab(s) orally once a day  · 	vitamin A: 1 tab(s) orally once a day      Allergies  No Known Allergies    ICU Vital Signs Last 24 Hrs  T(C): 36.9 (14 Oct 2018 04:00), Max: 36.9 (14 Oct 2018 04:00)  T(F): 98.4 (14 Oct 2018 04:00), Max: 98.4 (14 Oct 2018 04:00)  HR: 72 (14 Oct 2018 07:00) (61 - 93)  BP: --  BP(mean): --  ABP: 103/42 (14 Oct 2018 07:00) (95/40 - 114/49)  ABP(mean): 61 (14 Oct 2018 07:00) (54 - 67)  RR: 18 (14 Oct 2018 07:00) (18 - 20)  SpO2: 100% (14 Oct 2018 07:00) (99% - 100%)      I&O's Detail    13 Oct 2018 07:01  -  14 Oct 2018 07:00  --------------------------------------------------------  IN:    dexmedetomidine Infusion: 216.3 mL    DOBUTamine Infusion: 213.6 mL    fentaNYL  Infusion: 283.2 mL    IV PiggyBack: 500 mL    norepinephrine Infusion: 166.7 mL    Packed Red Blood Cells: 300 mL    Platelets - Single Donor: 236 mL    propofol Infusion: 324.4 mL    sodium chloride 0.9%.: 390 mL    vasopressin Infusion: 45 mL  Total IN: 2675.2 mL    OUT:    Bulb: 360 mL    Bulb: 90 mL    Chest Tube: 80 mL    Chest Tube: 70 mL    Chest Tube: 200 mL    Other: 603 mL  Total OUT: 1403 mL    Total NET: 1272.2 mL        12 Oct 2018 07:01  -  13 Oct 2018 07:00  --------------------------------------------------------  IN:    dexmedetomidine Infusion: 213.4 mL    DOBUTamine Infusion: 213.6 mL    EPINEPHrine Infusion: 106.4 mL    fentaNYL  Infusion: 283.2 mL    IV PiggyBack: 1350 mL    norepinephrine Infusion: 315.6 mL    phenylephrine   Infusion: 113.3 mL    propofol Infusion: 287.1 mL    sodium chloride 0.9%.: 270 mL    vasopressin Infusion: 60 mL  Total IN: 3212.6 mL    OUT:    Bulb: 150 mL    Bulb: 420 mL    Chest Tube: 70 mL    Chest Tube: 220 mL    Chest Tube: 100 mL  Total OUT: 960 mL    Total NET: 2252.6 mL    CAPILLARY BLOOD GLUCOSE  POCT Blood Glucose.: 126 mg/dL (14 Oct 2018 01:18)  POCT Blood Glucose.: 109 mg/dL (13 Oct 2018 19:07)  POCT Blood Glucose.: 121 mg/dL (13 Oct 2018 16:48)  POCT Blood Glucose.: 127 mg/dL (13 Oct 2018 13:59)    =======================  MEDICATIONS  ===================  MEDICATIONS  (STANDING):  albumin human  5% IVPB 250 milliLiter(s) IV Intermittent once  aspirin  chewable 81 milliGRAM(s) Oral daily  calcium gluconate IVPB 1 Gram(s) IV Intermittent once  dexmedetomidine Infusion 0.5 MICROgram(s)/kG/Hr (7.375 mL/Hr) IV Continuous <Continuous>  dextrose 5%. 1000 milliLiter(s) (50 mL/Hr) IV Continuous <Continuous>  dextrose 50% Injectable 12.5 Gram(s) IV Push once  dextrose 50% Injectable 25 Gram(s) IV Push once  dextrose 50% Injectable 25 Gram(s) IV Push once  DOBUTamine Infusion 5 MICROgram(s)/kG/Min (8.85 mL/Hr) IV Continuous <Continuous>  fentaNYL   Infusion 1 MICROgram(s)/kG/Hr (5.9 mL/Hr) IV Continuous <Continuous>  heparin  Injectable 5000 Unit(s) SubCutaneous every 12 hours  insulin lispro (HumaLOG) corrective regimen sliding scale   SubCutaneous every 6 hours  metroNIDAZOLE  IVPB      metroNIDAZOLE  IVPB 500 milliGRAM(s) IV Intermittent every 8 hours  norepinephrine Infusion 0.05 MICROgram(s)/kG/Min (2.766 mL/Hr) IV Continuous <Continuous>  pantoprazole  Injectable 40 milliGRAM(s) IV Push daily  piperacillin/tazobactam IVPB. 3.375 Gram(s) IV Intermittent every 12 hours  PrismaSATE Dialysate BGK 4 / 2.5 5000 milliLiter(s) (1000 mL/Hr) CRRT <Continuous>  PrismaSOL Filtration BGK 4 / 2.5 5000 milliLiter(s) (1000 mL/Hr) CRRT <Continuous>  PrismaSOL Filtration BGK 4 / 2.5 5000 milliLiter(s) (200 mL/Hr) CRRT <Continuous>  propofol Infusion 20 MICROgram(s)/kG/Min (7.08 mL/Hr) IV Continuous <Continuous>  sodium chloride 0.9%. 1000 milliLiter(s) (30 mL/Hr) IV Continuous <Continuous>  vasopressin Infusion 0.05 Unit(s)/Min (3 mL/Hr) IV Continuous <Continuous>    MEDICATIONS  (PRN):  dextrose 40% Gel 15 Gram(s) Oral once PRN Blood Glucose LESS THAN 70 milliGRAM(s)/deciliter  glucagon  Injectable 1 milliGRAM(s) IntraMuscular once PRN Glucose LESS THAN 70 milligrams/deciliter      ======================VENTILATOR SETTINGS  ==============  Mode: AC/ CMV (Assist Control/ Continuous Mandatory Ventilation)  RR (machine): 18  TV (machine): 500  FiO2: 40  PEEP: 5  MAP: 10  PIP: 26    =================== PATIENT CARE ACCESS DEVICES ==========  Peripheral IV (+)  Central Venous Line R/ IJ (+) ; R fem HD catheter  Arterial Line	R / Rad(+)			  Necessity of  arterial, and venous catheters discussed  ======================= PHYSICAL EXAM===================  General:             sedated, intubated  Neuro:               Moving  extremities spontaneously    with lower dose of sedation. 	  HEENT:                           CHER/ ETT/ NGT  Respiratory:	Lungs sound coarse on auscultation bilaterally with good aeration.                            No rales, rhonchi, no wheezing.                           RIght thoracotomy site - covered with dressing, chest tube site -clean  CV:		Regular rate and rhythm. Normal S1/S2.  Abdomen:          Soft,  nontender, not-distended. Bowel sounds present - hypoactive  Skin:		No rash.  Extremities:	Warm, no cyanosis or edema.  (+) pulses by doppler    ============================ LABS =======================                         8.2    6.65  )-----------( 92       ( 14 Oct 2018 03:50 )             24.0                        8.5    6.39  )-----------( 82       ( 13 Oct 2018 21:30 )             24.9     10-14    136  |  101      |  36<H>  ----------------------------<  105<H>  3.7   |  21<L>  |  3.88<H>    Ca    7.6<L>      14 Oct 2018 03:50  Phos  3.5     10-14  Mg     2.4     10-14    TPro  5.1<L>  /  Alb  2.2<L>  /  TBili  1.2  /  DBili  x   /  AST  16  /  ALT  4   /  AlkPhos  132<H>  10-14    10-13    138  |  102  |  42<H>  ----------------------------<  118<H>  3.6   |  20<L>  |  4.30<H>    Ca    8.1<L>      13 Oct 2018 21:30  Phos  4.2     10-13  Mg     2.4     10-13    TPro  5.1<L>  /  Alb  2.4<L>  /  TBili  1.7<H>  /  DBili  x   /  AST  19  /  ALT  4   /  AlkPhos  128<H>  10-13          PT/INR - ( 13 Oct 2018 15:45 )   PT: 11.4 SEC;   INR: 0.99     PTT:33.9 SEC  PT/INR - ( 14 Oct 2018 03:50 )   PT: 11.3 SEC;   INR: 0.98    PTT:32.6 SEC    ABG - ( 13 Oct 2018 21:30 )  pH, Arterial: 7.44  pH, Blood: x     /  pCO2: 32    /  pO2: 139   / HCO3: 23    / Base Excess: -1.7  /  SaO2: 98.6    ABG - ( 14 Oct 2018 03:50 )  pH, Arterial: 7.44  pH, Blood: x     /  pCO2: 34    /  pO2: 111   / HCO3: 24    / Base Excess: -0.6  /  SaO2: 98.4        Blood Gas Arterial - Lactate: 0.7: Please note updated reference range. mmol/L (10.14.18 @ 03:50)  Blood Gas Arterial - Lactate: 0.6: Please note updated reference range. mmol/L (10.13.18 @ 21:30)      Blood Gas Arterial - Lactate: 3.3: Please note updated reference range. mmol/L (10.12.18 @ 02:53)  Blood Gas Arterial - Lactate: 2.2: Please note updated reference range. mmol/L (10.11.18 @ 21:20)  Blood Gas Arterial - Lactate: 2.5: Please note updated reference range. mmol/L (10.11.18 @ 18:00)          Troponin T, High Sensitivity: 84: Delta: 83 on 10/13/      Troponin T, High Sensitivity: 83: Delta: 88 on 10/13/      Troponin T, High Sensitivity: 88: Delta: 113 on 10/13/    Troponin T, High Sensitivity: 122: Delta: 98 on 10/12/      Troponin T, High Sensitivity: 98: Delta: 96 on 10/12/      OTHER  10-11-18 --  --  --    OTHER  10-11-18 --  --  --    TISSUE  10-11-18 --  --  --  TISSUE  10-11-18 --  --  --  TISSUE  10-11-18 --  --  Enterococcus faecalis  PLEURAL FLUID  10-11-18 --  --    GPCCH^Gram Pos Cocci in Chains  QUANTITY OF BACTERIA SEEN: MODERATE (3+)  GPCPR^Gram Pos Cocci in Pairs  QUANTITY OF BACTERIA SEEN: MODERATE (3+)  WBC^White Blood Cells  QNTY CELLS IN GRAM STAIN: FEW (2+)    ===================== IMAGING STUDIES ===================  Radiology personally reviewed.  < from: Xray Chest 1 View- PORTABLE-Routine (10.13.18 @ 07:33) >        INTERPRETATION:  TIME OF EXAM: October 13, 2018 at 6:43 AM    CLINICAL INFORMATION: Postop    TECHNIQUE:   Portable chest    INTERPRETATION:     Endotracheal, enteric and multiple right-sided chest tubes again seen   with persistent right basilar pneumothorax. Decreasing subcutaneous   emphysema in the right chest wall and neck. Small loculated left   effusion. Both upper lobes are clear. Heart size is stable.    Opacity at the right lung base secondary to muscle flap procedure.      COMPARISON:  October 12 without significant change      IMPRESSION:  Follow-up without interval change and right basilar   pneumothorax.    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          Procedure:  Thoracotomy  10/11/2018       · Operative Findings	Right thoracotomy, resection of portion of R 7th rib, evacuation of infected hemothorax, takedown of pleurocutaneous fistula	    · Operative Findings	s/p R chest wall reconstruction with tunneled latissimus dorsi flap, covered by serratus anterior flap, after R thoracotomy, takedown pleurocutaneous fistula, decortication, resection Right 7th rib	    Issues: s/p  infected right hemothorax evacuation/ Enterococcus empyema             multiple right  chest tubes/ drains in place             distributive and cardiogenic shock/ SIRS             acute resp failure on vent support             posthemorrhagic anemia/ thrombocytopenia ( s/p PRBC and PLT today)             lactic acidosis             hyperglycemia             ESRD on HD             acute on chronic systolic CHF ( EF 10 %), ICD in place      ====================== NEUROLOGY=======================  Pain control with iv Fentanyl  Pt is sedated with Propofol /Precedex    ==================== RESPIRATORY========================  Monitor chest tube output  Chest tube x3 to  water seal	  Candido x2 to bulb suction    Mechanical Ventilation:  Mode: AC/ CMV (Assist Control/ Continuous Mandatory Ventilation)  RR (machine): 18  TV (machine): 500  FiO2: 40  PEEP: 5  MAP: 10.4  PIP: 24    Mechanical ventilator status assessed & settings reviewed  Continue bronchodilators, pulmonary toilet  Head of bed elevation to 30-40 degrees  F/up  serial ABG's  Will attempt CPAP later today if more stable    ====================CARDIOVASCULAR=====================  Continue hemodynamic monitoring/ telemetry  Titrating Levo, Vaso, Dobutamine for SBP > 90, MAP > 65   Elevated Trop trending down; , likely ESRD/Post-op SIRS, and unlikely ACS, Cardiology following, and d/w cards attending and Heart failure team--No Cath intervention indicated,   Flow track noninvasive CO/ CI monitoring ( CI @ 2.7-3)  ASA started yesterday, and will cont.     ===================== RENAL ============================  Continue NS 30CC/hr       Monitor I/Os, BUN/ Cr  and electrolytes  No Moore - Pt was on HD for ESRD preop.  Currently on CVVHD due to hemodynamic instability in ICU  Possible trial of HD today    ==================== GASTROINTESTINAL===================  NPO  Continue GI prophylaxis with  Protonix  Possibly will start NGT feeds today    =======================    ENDOCRIN  =====================  Glycemic monitoring  F/S with coverage  ===================HEMATOLOGIC/ONCOLOGIC =============  Monitor chest tube output. No signs of active bleeding.   Transfused 1 PRBC and 1 x PLT yesterday with transient response  Follow CBC, coags later today  DVT prophylaxis with SCD, sc Heparin    ========================INFECTIOUS DISEASE===============   Monitor for fever / leukocytosis.  All surgical incision / chest tube  sites look clean  Empiric ABX Zosyn, Vanco, Flagyl for now until cultures sensitivity available       Pertinent clinical, laboratory, radiographic, hemodynamic, echocardiographic, respiratory data, microbiologic data and chart were reviewed and analyzed frequently throughout the course of the day and night. GI and DVT prophylaxis, glycemic control, head of bed elevation and skin care issues were addressed.  Patient seen, examined and plan discussed with CT Surgery / CTICU team during rounds.  Pt remains critically ill in imminent risk of  deterioration and requires very careful cardio- pulmonary monitoring and support.    I have spent      90  minutes of critical care time with this pt between     12  am  and   9  am         minutes spent on total encounter; more than 50% of the visit was spent counseling and/or coordinating care by the attending physician.        KERLINE Faith MD CLAUDIA HAN          MRN-[id number]    HPI:  Pt is a 58 yr old Mandarin speaking male scheduled for Right Thoracotomy, Decortication , Right Chest Wall reconstruction with Latissimjus Dorsi Flap Poss Skin Graft with VAC dressing with Dr Pickering 10/11/18. Pt has ICD but unable to identify make or model. Pt hx of ESRD with HD T/Th/Sat for 4 hrs and has stated he has stopped some of his meds but cannot say which ones. Pt seen and received [facility] and [facility] but has 2 different med lists and is unable to identify meds taking. Pt denies blood thinners. Pt has stopped ASA as of last week. Pt hx gathered from [employer] and notes from Dr Pickering - pt is poor historian.      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          Procedure:  Thoracotomy  10/11/2018       · Operative Findings	Right thoracotomy, resection of portion of R 7th rib, evacuation of infected hemothorax, takedown of pleurocutaneous fistula	    · Operative Findings	s/p R chest wall reconstruction with tunneled latissimus dorsi flap, covered by serratus anterior flap, after R thoracotomy, takedown pleurocutaneous fistula, decortication, resection Right 7th rib	         · Drains	3 28Fr chest tubes (A,P, and diaphragm), 2 PRS SQ SANDY drains, VAC	  · Estimated Blood Loss	1000 milliLiter(s)	  · IV Infusions - Crystalloids	4L	  · IV Infusions - Colloids	500cc	  · IV Infusions - Blood Products	4u PRBC	       POD # 3    Issues: s/p  infected right hemothorax evacuation             multiple right  chest tubes/ drains in place             distributive and cardiogenic shock/ SIRS             acute resp failure on vent support             posthemorrhagic anemia             lactic acidosis             hyperglycemia             ESRD on HD             acute on chronic systolic CHF ( EF 10 %), ICD in place                   Interval/OR Events/ ROS  Pt hypotensive through the surgery - on multiple pressors / inotropes ( Levo, Epi, Dobutamine). Required 4 units of PRBC in addition to colloids and crystalloids for  symptomatic  posthemorrhagic anemia during the surgery. Pt arrived in ICU orally intubated, sedated, on Levo, Dobut, Epi.    Overnight in ICU still on multiple pressors/ inotropes./ Vent support. Mixed resp/ metab acidosis minimally  improved Lactic acidemia- unchanged @ 2-3.. Pt will need HD today ( ESRD on HD - anuria)  On POD 1 weaned off Epi, Quan. Remains on Dobutamine, Levophed, Vasopressin; on POD 2 started  CVVHD. Oxygenation and acid base status  is little  improved. Troponin is down to 88. Cardiology / CHF team on board to help in managing decompensated CHF/ cardiogenic shock. OR culture grew Enterococcus faecalis - sensitivity pending      PAST MEDICAL & SURGICAL HISTORY:  Smoking hx  Cardiomyopathy  Wound: right chest  ICD (implantable cardioverter-defibrillator) in place  OA (osteoarthritis)  HLD (hyperlipidemia)  BPH (benign prostatic hyperplasia)  Pleural effusion  HTN (hypertension)  ESRD (end stage renal disease)  H/O chest wound: right  History of wound infection: right chest wall - revision 5/18 and again in 7/18  History of implantable cardioverter-defibrillator (ICD) placement: pt unsure when placed  H/O bilateral hip replacements: 2008, 2009    Home Medications:   * Patient Currently Takes Medications as of 01-Oct-2018 09:29 documented in Structured Notes  · 	nortriptyline 50 mg oral capsule: 1 cap(s) orally once a day  · 	Vitamin B Complex: 1 tab(s) orally once a day  · 	Nephplex Rx oral tablet: 1 tab(s) orally once a day  · 	aspirin 81 mg oral tablet: 1 tab(s) orally once a day last dose 10/3/2018  · 	Vitamin C 500 mg oral tablet: 1 tab(s) orally once a day  · 	Breo Ellipta 100 mcg-25 mcg/inh inhalation powder: 1 puff(s) inhaled once a day patient denies using it  · 	Calcium 500: 1 tab(s) orally 2 times a day  · 	carvedilol 6.25 mg oral tablet: 1 tab(s) orally once a day  · 	docusate sodium 100 mg oral tablet: 1 tab(s) orally 2 times a day, As Needed  · 	folic acid 1 mg oral tablet: 1 tab(s) orally once a day  · 	Mag-Ox 400 oral tablet: 1 tab(s) orally once a day  · 	midodrine 10 mg oral tablet: 1 tab(s) orally once a day  · 	Multiple Vitamins oral tablet: 1 tab(s) orally once a day last dose 10/3/2018  · 	Namenda 10 mg oral tablet: 1 tab(s) orally 2 times a day  · 	Renvela 800 mg oral tablet: 2 tab(s) orally every 8 hours  · 	simethicone 80 mg oral tablet: 1 tab(s) orally 3 times a day (after meals)  · 	torsemide 20 mg oral tablet: 1 tab(s) orally once a day  · 	vitamin A: 1 tab(s) orally once a day      Allergies  No Known Allergies    ICU Vital Signs Last 24 Hrs  T(C): 36.9 (14 Oct 2018 04:00), Max: 36.9 (14 Oct 2018 04:00)  T(F): 98.4 (14 Oct 2018 04:00), Max: 98.4 (14 Oct 2018 04:00)  HR: 72 (14 Oct 2018 07:00) (61 - 93)  BP: --  BP(mean): --  ABP: 103/42 (14 Oct 2018 07:00) (95/40 - 114/49)  ABP(mean): 61 (14 Oct 2018 07:00) (54 - 67)  RR: 18 (14 Oct 2018 07:00) (18 - 20)  SpO2: 100% (14 Oct 2018 07:00) (99% - 100%)      I&O's Detail    13 Oct 2018 07:01  -  14 Oct 2018 07:00  --------------------------------------------------------  IN:    dexmedetomidine Infusion: 216.3 mL    DOBUTamine Infusion: 213.6 mL    fentaNYL  Infusion: 283.2 mL    IV PiggyBack: 500 mL    norepinephrine Infusion: 166.7 mL    Packed Red Blood Cells: 300 mL    Platelets - Single Donor: 236 mL    propofol Infusion: 324.4 mL    sodium chloride 0.9%.: 390 mL    vasopressin Infusion: 45 mL  Total IN: 2675.2 mL    OUT:    Bulb: 360 mL    Bulb: 90 mL    Chest Tube: 80 mL    Chest Tube: 70 mL    Chest Tube: 200 mL    Other: 603 mL  Total OUT: 1403 mL    Total NET: 1272.2 mL        12 Oct 2018 07:01  -  13 Oct 2018 07:00  --------------------------------------------------------  IN:    dexmedetomidine Infusion: 213.4 mL    DOBUTamine Infusion: 213.6 mL    EPINEPHrine Infusion: 106.4 mL    fentaNYL  Infusion: 283.2 mL    IV PiggyBack: 1350 mL    norepinephrine Infusion: 315.6 mL    phenylephrine   Infusion: 113.3 mL    propofol Infusion: 287.1 mL    sodium chloride 0.9%.: 270 mL    vasopressin Infusion: 60 mL  Total IN: 3212.6 mL    OUT:    Bulb: 150 mL    Bulb: 420 mL    Chest Tube: 70 mL    Chest Tube: 220 mL    Chest Tube: 100 mL  Total OUT: 960 mL    Total NET: 2252.6 mL    CAPILLARY BLOOD GLUCOSE  POCT Blood Glucose.: 126 mg/dL (14 Oct 2018 01:18)  POCT Blood Glucose.: 109 mg/dL (13 Oct 2018 19:07)  POCT Blood Glucose.: 121 mg/dL (13 Oct 2018 16:48)  POCT Blood Glucose.: 127 mg/dL (13 Oct 2018 13:59)    =======================  MEDICATIONS  ===================  MEDICATIONS  (STANDING):  albumin human  5% IVPB 250 milliLiter(s) IV Intermittent once  aspirin  chewable 81 milliGRAM(s) Oral daily  calcium gluconate IVPB 1 Gram(s) IV Intermittent once  dexmedetomidine Infusion 0.5 MICROgram(s)/kG/Hr (7.375 mL/Hr) IV Continuous <Continuous>  dextrose 5%. 1000 milliLiter(s) (50 mL/Hr) IV Continuous <Continuous>  dextrose 50% Injectable 12.5 Gram(s) IV Push once  dextrose 50% Injectable 25 Gram(s) IV Push once  dextrose 50% Injectable 25 Gram(s) IV Push once  DOBUTamine Infusion 5 MICROgram(s)/kG/Min (8.85 mL/Hr) IV Continuous <Continuous>  fentaNYL   Infusion 1 MICROgram(s)/kG/Hr (5.9 mL/Hr) IV Continuous <Continuous>  heparin  Injectable 5000 Unit(s) SubCutaneous every 12 hours  insulin lispro (HumaLOG) corrective regimen sliding scale   SubCutaneous every 6 hours  metroNIDAZOLE  IVPB      metroNIDAZOLE  IVPB 500 milliGRAM(s) IV Intermittent every 8 hours  norepinephrine Infusion 0.05 MICROgram(s)/kG/Min (2.766 mL/Hr) IV Continuous <Continuous>  pantoprazole  Injectable 40 milliGRAM(s) IV Push daily  piperacillin/tazobactam IVPB. 3.375 Gram(s) IV Intermittent every 12 hours  PrismaSATE Dialysate BGK 4 / 2.5 5000 milliLiter(s) (1000 mL/Hr) CRRT <Continuous>  PrismaSOL Filtration BGK 4 / 2.5 5000 milliLiter(s) (1000 mL/Hr) CRRT <Continuous>  PrismaSOL Filtration BGK 4 / 2.5 5000 milliLiter(s) (200 mL/Hr) CRRT <Continuous>  propofol Infusion 20 MICROgram(s)/kG/Min (7.08 mL/Hr) IV Continuous <Continuous>  sodium chloride 0.9%. 1000 milliLiter(s) (30 mL/Hr) IV Continuous <Continuous>  vasopressin Infusion 0.05 Unit(s)/Min (3 mL/Hr) IV Continuous <Continuous>    MEDICATIONS  (PRN):  dextrose 40% Gel 15 Gram(s) Oral once PRN Blood Glucose LESS THAN 70 milliGRAM(s)/deciliter  glucagon  Injectable 1 milliGRAM(s) IntraMuscular once PRN Glucose LESS THAN 70 milligrams/deciliter      ======================VENTILATOR SETTINGS  ==============  Mode: AC/ CMV (Assist Control/ Continuous Mandatory Ventilation)  RR (machine): 18  TV (machine): 500  FiO2: 40  PEEP: 5  MAP: 10  PIP: 26    =================== PATIENT CARE ACCESS DEVICES ==========  Peripheral IV (+)  Central Venous Line R/ IJ (+) ; R fem HD catheter  Arterial Line	R / Rad(+)			  Necessity of  arterial, and venous catheters discussed  ======================= PHYSICAL EXAM===================  General:             sedated, intubated  Neuro:               Moving  extremities spontaneously    with lower dose of sedation. 	  HEENT:                           CHER/ ETT/ NGT  Respiratory:	Lungs sound coarse on auscultation bilaterally with good aeration.                            No rales, rhonchi, no wheezing.                           RIght thoracotomy site - covered with dressing, chest tube site -clean  CV:		Regular rate and rhythm. Normal S1/S2.  Abdomen:          Soft,  nontender, not-distended. Bowel sounds present - hypoactive  Skin:		No rash.  Extremities:	Warm, no cyanosis or edema.  (+) pulses by doppler    ============================ LABS =======================                         8.2    6.65  )-----------( 92       ( 14 Oct 2018 03:50 )             24.0                        8.5    6.39  )-----------( 82       ( 13 Oct 2018 21:30 )             24.9     10-14    136  |  101      |  36<H>  ----------------------------<  105<H>  3.7   |  21<L>  |  3.88<H>    Ca    7.6<L>      14 Oct 2018 03:50  Phos  3.5     10-14  Mg     2.4     10-14    TPro  5.1<L>  /  Alb  2.2<L>  /  TBili  1.2  /  DBili  x   /  AST  16  /  ALT  4   /  AlkPhos  132<H>  10-14    10-13    138  |  102  |  42<H>  ----------------------------<  118<H>  3.6   |  20<L>  |  4.30<H>    Ca    8.1<L>      13 Oct 2018 21:30  Phos  4.2     10-13  Mg     2.4     10-13    TPro  5.1<L>  /  Alb  2.4<L>  /  TBili  1.7<H>  /  DBili  x   /  AST  19  /  ALT  4   /  AlkPhos  128<H>  10-13          PT/INR - ( 13 Oct 2018 15:45 )   PT: 11.4 SEC;   INR: 0.99     PTT:33.9 SEC  PT/INR - ( 14 Oct 2018 03:50 )   PT: 11.3 SEC;   INR: 0.98    PTT:32.6 SEC    ABG - ( 13 Oct 2018 21:30 )  pH, Arterial: 7.44  pH, Blood: x     /  pCO2: 32    /  pO2: 139   / HCO3: 23    / Base Excess: -1.7  /  SaO2: 98.6    ABG - ( 14 Oct 2018 03:50 )  pH, Arterial: 7.44  pH, Blood: x     /  pCO2: 34    /  pO2: 111   / HCO3: 24    / Base Excess: -0.6  /  SaO2: 98.4        Blood Gas Arterial - Lactate: 0.7: Please note updated reference range. mmol/L (10.14.18 @ 03:50)  Blood Gas Arterial - Lactate: 0.6: Please note updated reference range. mmol/L (10.13.18 @ 21:30)      Blood Gas Arterial - Lactate: 3.3: Please note updated reference range. mmol/L (10.12.18 @ 02:53)  Blood Gas Arterial - Lactate: 2.2: Please note updated reference range. mmol/L (10.11.18 @ 21:20)  Blood Gas Arterial - Lactate: 2.5: Please note updated reference range. mmol/L (10.11.18 @ 18:00)          Troponin T, High Sensitivity: 84: Delta: 83 on 10/13/      Troponin T, High Sensitivity: 83: Delta: 88 on 10/13/      Troponin T, High Sensitivity: 88: Delta: 113 on 10/13/    Troponin T, High Sensitivity: 122: Delta: 98 on 10/12/      Troponin T, High Sensitivity: 98: Delta: 96 on 10/12/      OTHER  10-11-18 --  --  --    OTHER  10-11-18 --  --  --    TISSUE  10-11-18 --  --  --  TISSUE  10-11-18 --  --  --  TISSUE  10-11-18 --  --  Enterococcus faecalis  PLEURAL FLUID  10-11-18 --  --    GPCCH^Gram Pos Cocci in Chains  QUANTITY OF BACTERIA SEEN: MODERATE (3+)  GPCPR^Gram Pos Cocci in Pairs  QUANTITY OF BACTERIA SEEN: MODERATE (3+)  WBC^White Blood Cells  QNTY CELLS IN GRAM STAIN: FEW (2+)    ===================== IMAGING STUDIES ===================  Radiology personally reviewed.  < from: Xray Chest 1 View- PORTABLE-Routine (10.13.18 @ 07:33) >        INTERPRETATION:  TIME OF EXAM: October 13, 2018 at 6:43 AM    CLINICAL INFORMATION: Postop    TECHNIQUE:   Portable chest    INTERPRETATION:     Endotracheal, enteric and multiple right-sided chest tubes again seen   with persistent right basilar pneumothorax. Decreasing subcutaneous   emphysema in the right chest wall and neck. Small loculated left   effusion. Both upper lobes are clear. Heart size is stable.    Opacity at the right lung base secondary to muscle flap procedure.      COMPARISON:  October 12 without significant change      IMPRESSION:  Follow-up without interval change and right basilar   pneumothorax.    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          Procedure:  Thoracotomy  10/11/2018       · Operative Findings	Right thoracotomy, resection of portion of R 7th rib, evacuation of infected hemothorax, takedown of pleurocutaneous fistula	    · Operative Findings	s/p R chest wall reconstruction with tunneled latissimus dorsi flap, covered by serratus anterior flap, after R thoracotomy, takedown pleurocutaneous fistula, decortication, resection Right 7th rib	    Issues: s/p  infected right hemothorax evacuation/ Enterococcus empyema             multiple right  chest tubes/ drains in place             distributive and cardiogenic shock/ SIRS             acute resp failure on vent support             posthemorrhagic anemia/  dilutional thrombocytopenia ( s/p PRBC and PLT today)             lactic acidosis             hyperglycemia             ESRD on HD             acute on chronic systolic CHF ( EF 10 %), ICD in place      ====================== NEUROLOGY=======================  Pain control with iv Fentanyl  Pt is sedated with Propofol /Precedex    ==================== RESPIRATORY========================  Monitor chest tube output  Chest tube x3 to  water seal	  Candido x2 to bulb suction    Mechanical Ventilation:  Mode: AC/ CMV (Assist Control/ Continuous Mandatory Ventilation)  RR (machine): 18  TV (machine): 500  FiO2: 40  PEEP: 5  MAP: 10.4  PIP: 24    Mechanical ventilator status assessed & settings reviewed  Continue bronchodilators, pulmonary toilet  Head of bed elevation to 30-40 degrees  F/up  serial ABG's  Will attempt CPAP later today if more stable    ====================CARDIOVASCULAR=====================  Continue hemodynamic monitoring/ telemetry  Titrating Levo, Vaso, Dobutamine for SBP > 90, MAP > 65   Elevated Trop trending down; , likely ESRD/Post-op SIRS, and unlikely ACS, Cardiology following, and d/w cards attending and Heart failure team--No Cath intervention indicated,   Flow track noninvasive CO/ CI monitoring ( CI @ 2.7-3)  ASA started yesterday, and will cont.     ===================== RENAL ============================  Continue NS 30CC/hr       Monitor I/Os, BUN/ Cr  and electrolytes  No Moore - Pt was on HD for ESRD preop.  Currently on CVVHD due to hemodynamic instability in ICU  Possible trial of HD today    ==================== GASTROINTESTINAL===================  NPO  Continue GI prophylaxis with  Protonix  Possibly will start NGT feeds today    =======================    ENDOCRIN  =====================  Glycemic monitoring  F/S with coverage  ===================HEMATOLOGIC/ONCOLOGIC =============  Monitor chest tube output. No signs of active bleeding.   Transfused 1 PRBC and 1 x PLT yesterday with transient response  Follow CBC, coags later today  DVT prophylaxis with SCD, sc Heparin    ========================INFECTIOUS DISEASE===============   Monitor for fever / leukocytosis.  All surgical incision / chest tube  sites look clean  Empiric ABX Zosyn, Vanco, Flagyl for now until cultures sensitivity available       Pertinent clinical, laboratory, radiographic, hemodynamic, echocardiographic, respiratory data, microbiologic data and chart were reviewed and analyzed frequently throughout the course of the day and night. GI and DVT prophylaxis, glycemic control, head of bed elevation and skin care issues were addressed.  Patient seen, examined and plan discussed with CT Surgery / CTICU team during rounds.  Pt remains critically ill in imminent risk of  deterioration and requires very careful cardio- pulmonary monitoring and support.    I have spent      90  minutes of critical care time with this pt between     12  am  and   9  am         minutes spent on total encounter; more than 50% of the visit was spent counseling and/or coordinating care by the attending physician.        KERLINE Faith MD

## 2018-10-14 NOTE — PROGRESS NOTE ADULT - SUBJECTIVE AND OBJECTIVE BOX
CLAUDIA HAN  [id number]    Subjective:  Patient is a 58y old  Male who presents with a chief complaint of surgical procedure (12 Oct 2018 11:55)  Patient was seen and examined at bedside. No acute events overnight. Sedated and intubated. On vaso, Dobuatmin, levo for pressure support. JPs on low wall suction. Chest tubes adjusted after CXR.    Vital Signs Last 24 Hrs  T(C): 36.9 (10-14-18 @ 04:00), Max: 36.9 (10-14-18 @ 04:00)  T(F): 98.4 (10-14-18 @ 04:00), Max: 98.4 (10-14-18 @ 04:00)  HR: 72 (10-14-18 @ 07:12) (61 - 93)  BP: --  BP(mean): --  RR: 18 (10-14-18 @ 07:00) (18 - 20)  SpO2: 100% (10-14-18 @ 07:12) (99% - 100%)  I&O's Detail    13 Oct 2018 07:01  -  14 Oct 2018 07:00  --------------------------------------------------------  IN:    dexmedetomidine Infusion: 216.3 mL    DOBUTamine Infusion: 213.6 mL    fentaNYL  Infusion: 283.2 mL    IV PiggyBack: 500 mL    norepinephrine Infusion: 166.7 mL    Packed Red Blood Cells: 300 mL    Platelets - Single Donor: 236 mL    propofol Infusion: 324.4 mL    sodium chloride 0.9%.: 390 mL    vasopressin Infusion: 45 mL  Total IN: 2675.2 mL    OUT:    Bulb: 360 mL    Bulb: 90 mL    Chest Tube: 80 mL    Chest Tube: 70 mL    Chest Tube: 200 mL    Other: 603 mL  Total OUT: 1403 mL    Total NET: 1272.2 mL                            8.2    6.65  )-----------( 92       ( 14 Oct 2018 03:50 )             24.0     14 Oct 2018 03:50    136    |  101    |  36     ----------------------------<  105    3.7     |  21     |  3.88     Ca    7.6        14 Oct 2018 03:50  Phos  3.5       14 Oct 2018 03:50  Mg     2.4       14 Oct 2018 03:50    TPro  5.1    /  Alb  2.2    /  TBili  1.2    /  DBili  x      /  AST  16     /  ALT  4      /  AlkPhos  132    14 Oct 2018 03:50    LIVER FUNCTIONS - ( 14 Oct 2018 03:50 )  Alb: 2.2 g/dL / Pro: 5.1 g/dL / ALK PHOS: 132 u/L / ALT: 4 u/L / AST: 16 u/L / GGT: x           PT/INR - ( 14 Oct 2018 03:50 )   PT: 11.3 SEC;   INR: 0.98          PTT - ( 14 Oct 2018 03:50 )  PTT:32.6 SEC  CAPILLARY BLOOD GLUCOSE      POCT Blood Glucose.: 114 mg/dL (14 Oct 2018 06:58)  POCT Blood Glucose.: 126 mg/dL (14 Oct 2018 01:18)  POCT Blood Glucose.: 109 mg/dL (13 Oct 2018 19:07)  POCT Blood Glucose.: 121 mg/dL (13 Oct 2018 16:48)  POCT Blood Glucose.: 127 mg/dL (13 Oct 2018 13:59)    CARDIAC MARKERS ( 13 Oct 2018 03:25 )  x     / x     / 128 u/L / 2.16 ng/mL / x      CARDIAC MARKERS ( 12 Oct 2018 12:30 )  x     / x     / 176 u/L / 4.47 ng/mL / x            PHYSICAL EXAM:  General: sedated, intubated, on CVVHD  Chest: right chest incision vac holding with good suction. SANDY bulbs to low wall suction. Serosanguinous output.    MEDICATIONS  (STANDING):  albumin human  5% IVPB 250 milliLiter(s) IV Intermittent once  aspirin  chewable 81 milliGRAM(s) Oral daily  dexmedetomidine Infusion 0.5 MICROgram(s)/kG/Hr (7.375 mL/Hr) IV Continuous <Continuous>  dextrose 5%. 1000 milliLiter(s) (50 mL/Hr) IV Continuous <Continuous>  dextrose 50% Injectable 12.5 Gram(s) IV Push once  dextrose 50% Injectable 25 Gram(s) IV Push once  dextrose 50% Injectable 25 Gram(s) IV Push once  DOBUTamine Infusion 5 MICROgram(s)/kG/Min (8.85 mL/Hr) IV Continuous <Continuous>  fentaNYL   Infusion 1 MICROgram(s)/kG/Hr (5.9 mL/Hr) IV Continuous <Continuous>  heparin  Injectable 5000 Unit(s) SubCutaneous every 12 hours  insulin lispro (HumaLOG) corrective regimen sliding scale   SubCutaneous every 6 hours  metroNIDAZOLE  IVPB      metroNIDAZOLE  IVPB 500 milliGRAM(s) IV Intermittent every 8 hours  norepinephrine Infusion 0.05 MICROgram(s)/kG/Min (2.766 mL/Hr) IV Continuous <Continuous>  pantoprazole  Injectable 40 milliGRAM(s) IV Push daily  piperacillin/tazobactam IVPB. 3.375 Gram(s) IV Intermittent every 12 hours  PrismaSATE Dialysate BGK 4 / 2.5 5000 milliLiter(s) (1000 mL/Hr) CRRT <Continuous>  PrismaSOL Filtration BGK 4 / 2.5 5000 milliLiter(s) (1000 mL/Hr) CRRT <Continuous>  PrismaSOL Filtration BGK 4 / 2.5 5000 milliLiter(s) (200 mL/Hr) CRRT <Continuous>  propofol Infusion 20 MICROgram(s)/kG/Min (7.08 mL/Hr) IV Continuous <Continuous>  sodium chloride 0.9%. 1000 milliLiter(s) (30 mL/Hr) IV Continuous <Continuous>  vancomycin  IVPB 1000 milliGRAM(s) IV Intermittent every 12 hours  vancomycin  IVPB      vasopressin Infusion 0.05 Unit(s)/Min (3 mL/Hr) IV Continuous <Continuous>    MEDICATIONS  (PRN):  dextrose 40% Gel 15 Gram(s) Oral once PRN Blood Glucose LESS THAN 70 milliGRAM(s)/deciliter  glucagon  Injectable 1 milliGRAM(s) IntraMuscular once PRN Glucose LESS THAN 70 milligrams/deciliter  MEDICATIONS  (PRN):  dextrose 40% Gel 15 Gram(s) Oral once PRN Blood Glucose LESS THAN 70 milliGRAM(s)/deciliter  glucagon  Injectable 1 milliGRAM(s) IntraMuscular once PRN Glucose LESS THAN 70 milligrams/deciliter  HYDROmorphone (10 MICROgram(s)/mL) + BUpivacaine 0.0625% in 0.9% Sodium Chloride PCEA Rescue Clinician  Bolus 5 milliLiter(s) Epidural every 15 minutes PRN for Pain Score greater than 6  naloxone Injectable 0.1 milliGRAM(s) IV Push every 3 minutes PRN For ANY of the following changes in patient status:  A. RR LESS THAN 10 breaths per minute, B. Oxygen saturation LESS THAN 90%, C. Sedation score of 6  ondansetron Injectable 4 milliGRAM(s) IV Push every 6 hours PRN Nausea

## 2018-10-14 NOTE — PROGRESS NOTE ADULT - ASSESSMENT
59 y/o male (Mandarin speaking) with hx of NICM? HFrEF (LVEF 20%, severely dilated LVIDD 7.3 cm) w/ ICD, HLD, ESRD on HD, former smoker, BPH, hx of prior chest/lung surgeries with chest wall wound infections, b/l hip replacements comes in for a scheduled right thoracotomy w/ decortication and muscle flap with Dr. Jose Alfredo Pickering on 10/11/18. Post surgery patient required Dobutamine 10 mcg/kg/min (now weaned to 5 mcg/kg/min) and multiple pressors. He is currently intubated/sedated. He is starting cvvhd today. He is also on broad spect abx. Has a small right sided basilar phenomothorax.     shock - combination cardiac/vasodilatory - lactate resolved, central venous sat 80s   -cont supportive treatment  -wean pressors as tolerated, vaso currently off. plan to wean levo next  -cont dobutamine gtt, remains at 5  -sedation wean in progress  -per ICU team, plan to wean vent today, fi02 at 40%  -suspect with decrease in propofol and possible extubation, Levo will be able to be weaned as well  -trend h/h; follow restrictive transfusion guidelines hgb <7  -will cont to follow with you

## 2018-10-14 NOTE — PROGRESS NOTE ADULT - ASSESSMENT
ASSESSMENT: 58M s/p right wall reconstruction w/ tunneled latissimus dorsi flap, covered by serratus anterior flap, after R thoracotomy, takedown of pleurocutaneous fistula, decortication, and resection of right 7th rib    PLAN:  --care per CTICU  --c/w vac dressing  --drain care  --SANDY bulbs to low wall suction   --suggest putting CT to suction to drain any further fluid in chest cavity

## 2018-10-14 NOTE — PROGRESS NOTE ADULT - SUBJECTIVE AND OBJECTIVE BOX
24H hour events:   pt awake, intubated, sedation weaning in progress  on cvvh  on , leva. vaso off currently  cvp 5-6 overnight    MEDICATIONS:  aspirin  chewable 81 milliGRAM(s) Oral daily  DOBUTamine Infusion 5 MICROgram(s)/kG/Min IV Continuous <Continuous>  heparin  Injectable 5000 Unit(s) SubCutaneous every 12 hours  norepinephrine Infusion 0.05 MICROgram(s)/kG/Min IV Continuous <Continuous>    metroNIDAZOLE  IVPB      metroNIDAZOLE  IVPB 500 milliGRAM(s) IV Intermittent every 8 hours  piperacillin/tazobactam IVPB. 3.375 Gram(s) IV Intermittent every 12 hours  vancomycin  IVPB 1000 milliGRAM(s) IV Intermittent every 12 hours  vancomycin  IVPB          dexmedetomidine Infusion 0.5 MICROgram(s)/kG/Hr IV Continuous <Continuous>  fentaNYL   Infusion 1 MICROgram(s)/kG/Hr IV Continuous <Continuous>  propofol Infusion 20 MICROgram(s)/kG/Min IV Continuous <Continuous>    pantoprazole  Injectable 40 milliGRAM(s) IV Push daily    dextrose 40% Gel 15 Gram(s) Oral once PRN  dextrose 50% Injectable 12.5 Gram(s) IV Push once  dextrose 50% Injectable 25 Gram(s) IV Push once  dextrose 50% Injectable 25 Gram(s) IV Push once  glucagon  Injectable 1 milliGRAM(s) IntraMuscular once PRN  insulin lispro (HumaLOG) corrective regimen sliding scale   SubCutaneous every 6 hours  vasopressin Infusion 0.05 Unit(s)/Min IV Continuous <Continuous>    albumin human  5% IVPB 250 milliLiter(s) IV Intermittent once  dextrose 5%. 1000 milliLiter(s) IV Continuous <Continuous>  sodium chloride 0.9%. 1000 milliLiter(s) IV Continuous <Continuous>          PHYSICAL EXAM:  T(C): 36.8 (10-14-18 @ 08:00), Max: 36.9 (10-14-18 @ 04:00)  HR: 68 (10-14-18 @ 09:00) (61 - 93)  BP: --  RR: 18 (10-14-18 @ 09:00) (18 - 20)  SpO2: 100% (10-14-18 @ 09:00) (99% - 100%)  Wt(kg): --  I&O's Summary    13 Oct 2018 07:01  -  14 Oct 2018 07:00  --------------------------------------------------------  IN: 2675.2 mL / OUT: 1672 mL / NET: 1003.2 mL    14 Oct 2018 07:01  -  14 Oct 2018 09:39  --------------------------------------------------------  IN: 380.8 mL / OUT: 130 mL / NET: 250.8 mL        Appearance: Normal	  HEENT:   Normal oral mucosa, PERRL, EOMI	  Lymphatic: No lymphadenopathy  Cardiovascular: Normal S1 S2, No JVD, No murmurs, No edema  Respiratory: b/l crackles, intubated	  Psychiatry: Mood & affect appropriate  Gastrointestinal:  Soft, Non-tender, + BS	  Skin: No rashes, No ecchymoses, No cyanosis	  Neurologic: Non-focal  Extremities: warm      LABS:	 	    CBC Full  -  ( 14 Oct 2018 03:50 )  WBC Count : 6.65 K/uL  Hemoglobin : 8.2 g/dL  Hematocrit : 24.0 %  Platelet Count - Automated : 92 K/uL  Mean Cell Volume : 92.3 fL  Mean Cell Hemoglobin : 31.5 pg  Mean Cell Hemoglobin Concentration : 34.2 %  Auto Neutrophil # : x  Auto Lymphocyte # : x  Auto Monocyte # : x  Auto Eosinophil # : x  Auto Basophil # : x  Auto Neutrophil % : x  Auto Lymphocyte % : x  Auto Monocyte % : x  Auto Eosinophil % : x  Auto Basophil % : x    10-14    136  |  101  |  36<H>  ----------------------------<  105<H>  3.7   |  21<L>  |  3.88<H>  10-13    138  |  102  |  42<H>  ----------------------------<  118<H>  3.6   |  20<L>  |  4.30<H>    Ca    7.6<L>      14 Oct 2018 03:50  Ca    8.1<L>      13 Oct 2018 21:30  Phos  3.5     10-14  Phos  4.2     10-13  Mg     2.4     10-14  Mg     2.4     10-13    TPro  5.1<L>  /  Alb  2.2<L>  /  TBili  1.2  /  DBili  x   /  AST  16  /  ALT  4   /  AlkPhos  132<H>  10-14  TPro  5.1<L>  /  Alb  2.4<L>  /  TBili  1.7<H>  /  DBili  x   /  AST  19  /  ALT  4   /  AlkPhos  128<H>  10-13      proBNP:   Lipid Profile:   HgA1c:   TSH:       CARDIAC MARKERS:            TELEMETRY: 	    ECG:  	  RADIOLOGY:  OTHER: 	    PREVIOUS DIAGNOSTIC TESTING:    [ ] Echocardiogram:  [ ]  Catheterization:  [ ] Stress Test:  	  	  ASSESSMENT/PLAN:

## 2018-10-14 NOTE — PROGRESS NOTE ADULT - SUBJECTIVE AND OBJECTIVE BOX
[facility] DIVISION OF KIDNEY DISEASES AND HYPERTENSION -- FOLLOW UP NOTE  --------------------------------------------------------------------------------  HPI: 57yo Mandarin speaking M w/ Hx of NICM w/ ICD, ESRD on HD TTS, former smoker, BPH who was admitted for scheduled right thoracotomy w/ decortication due to infected hemothorax and chest reconstruction with CT Surgery. Pt underwent procedure on 10/11/18, and afterwards developed shock, thought to be cardiogenic. Pt being monitored in the CTICU. Renal now called for management of pts ESRD. Pt on multiple pressors. Was started on CVVHDF.    24 hour events/subjective:  Pt seen at bedside. Still on 3 pressors. CVVHDF has been clotting overnight.       PAST HISTORY  --------------------------------------------------------------------------------  No significant changes to PMH, PSH, FHx, SHx, unless otherwise noted    ALLERGIES & MEDICATIONS  --------------------------------------------------------------------------------  Allergies    No Known Allergies    Intolerances      Standing Inpatient Medications  albumin human  5% IVPB 250 milliLiter(s) IV Intermittent once  aspirin  chewable 81 milliGRAM(s) Oral daily  dexmedetomidine Infusion 0.5 MICROgram(s)/kG/Hr IV Continuous <Continuous>  dextrose 5%. 1000 milliLiter(s) IV Continuous <Continuous>  dextrose 50% Injectable 12.5 Gram(s) IV Push once  dextrose 50% Injectable 25 Gram(s) IV Push once  dextrose 50% Injectable 25 Gram(s) IV Push once  DOBUTamine Infusion 5 MICROgram(s)/kG/Min IV Continuous <Continuous>  fentaNYL   Infusion 1 MICROgram(s)/kG/Hr IV Continuous <Continuous>  heparin  Injectable 5000 Unit(s) SubCutaneous every 12 hours  insulin lispro (HumaLOG) corrective regimen sliding scale   SubCutaneous every 6 hours  metroNIDAZOLE  IVPB      metroNIDAZOLE  IVPB 500 milliGRAM(s) IV Intermittent every 8 hours  norepinephrine Infusion 0.05 MICROgram(s)/kG/Min IV Continuous <Continuous>  pantoprazole  Injectable 40 milliGRAM(s) IV Push daily  piperacillin/tazobactam IVPB. 3.375 Gram(s) IV Intermittent every 12 hours  PrismaSATE Dialysate BGK 4 / 2.5 5000 milliLiter(s) CRRT <Continuous>  PrismaSOL Filtration BGK 4 / 2.5 5000 milliLiter(s) CRRT <Continuous>  PrismaSOL Filtration BGK 4 / 2.5 5000 milliLiter(s) CRRT <Continuous>  propofol Infusion 20 MICROgram(s)/kG/Min IV Continuous <Continuous>  sodium chloride 0.9%. 1000 milliLiter(s) IV Continuous <Continuous>  vancomycin  IVPB 1000 milliGRAM(s) IV Intermittent every 12 hours  vancomycin  IVPB      vasopressin Infusion 0.05 Unit(s)/Min IV Continuous <Continuous>    PRN Inpatient Medications  dextrose 40% Gel 15 Gram(s) Oral once PRN  glucagon  Injectable 1 milliGRAM(s) IntraMuscular once PRN      REVIEW OF SYSTEMS  --------------------------------------------------------------------------------  Unable to assess due to clinical condition.    VITALS/PHYSICAL EXAM  --------------------------------------------------------------------------------  T(C): 36.8 (10-14-18 @ 08:00), Max: 36.9 (10-14-18 @ 04:00)  HR: 69 (10-14-18 @ 08:00) (61 - 93)  BP: --  RR: 18 (10-14-18 @ 08:00) (18 - 20)  SpO2: 100% (10-14-18 @ 08:00) (99% - 100%)  Wt(kg): --        10-13-18 @ 07:01  -  10-14-18 @ 07:00  --------------------------------------------------------  IN: 2675.2 mL / OUT: 1672 mL / NET: 1003.2 mL    10-14-18 @ 07:01  -  10-14-18 @ 08:53  --------------------------------------------------------  IN: 332.3 mL / OUT: 30 mL / NET: 302.3 mL      Physical Exam:  	Gen: intubated  	HEENT: +ET tube to vent, +NGT  	Pulm: CTA B/L, +3 chest tube drains noted draining sero-sanguinous fluid  	CV: RRR, S1S2; no rub  	Abd: +BS, soft  	LE: Warm, no edema  	Neuro: +sedated  	Skin: Warm  	Vascular access:  GISSELLEE AVF site: +thrill +bruit +skin intact; right femoral non-tunneled HD catheter site: no bleeding seen    LABS/STUDIES  --------------------------------------------------------------------------------              8.2    6.65  >-----------<  92       [10-14-18 @ 03:50]              24.0     136  |  101  |  36  ----------------------------<  105      [10-14-18 @ 03:50]  3.7   |  21  |  3.88        Ca     7.6     [10-14-18 @ 03:50]      Mg     2.4     [10-14-18 @ 03:50]      Phos  3.5     [10-14-18 @ 03:50]    TPro  5.1  /  Alb  2.2  /  TBili  1.2  /  DBili  x   /  AST  16  /  ALT  4   /  AlkPhos  132  [10-14-18 @ 03:50]    PT/INR: PT 11.3 , INR 0.98       [10-14-18 @ 03:50]  PTT: 32.6       [10-14-18 @ 03:50]          [10-13-18 @ 03:25]    Creatinine Trend:  SCr 3.88 [10-14 @ 03:50]  SCr 4.30 [10-13 @ 21:30]  SCr 4.80 [10-13 @ 15:45]  SCr 5.72 [10-13 @ 03:25]  SCr 5.21 [10-12 @ 18:30]        HbA1c 5.5      [10-12-18 @ 02:53] [facility] DIVISION OF KIDNEY DISEASES AND HYPERTENSION -- FOLLOW UP NOTE  --------------------------------------------------------------------------------  HPI: 57yo Mandarin speaking M w/ Hx of NICM w/ ICD, ESRD on HD TTS, former smoker, BPH who was admitted for scheduled right thoracotomy w/ decortication due to infected hemothorax and chest reconstruction with CT Surgery. Pt underwent procedure on 10/11/18, and afterwards developed shock, thought to be cardiogenic. Pt being monitored in the CTICU. Renal now called for management of pts ESRD. Pt on multiple pressors. Was started on CVVHDF.    24 hour events/subjective:  Pt seen at bedside. Still on 3 pressors. CVVHDF has been clotting overnight.       PAST HISTORY  --------------------------------------------------------------------------------  No significant changes to PMH, PSH, FHx, SHx, unless otherwise noted    ALLERGIES & MEDICATIONS  --------------------------------------------------------------------------------  Allergies    No Known Allergies    Intolerances      Standing Inpatient Medications  albumin human  5% IVPB 250 milliLiter(s) IV Intermittent once  aspirin  chewable 81 milliGRAM(s) Oral daily  dexmedetomidine Infusion 0.5 MICROgram(s)/kG/Hr IV Continuous <Continuous>  dextrose 5%. 1000 milliLiter(s) IV Continuous <Continuous>  dextrose 50% Injectable 12.5 Gram(s) IV Push once  dextrose 50% Injectable 25 Gram(s) IV Push once  dextrose 50% Injectable 25 Gram(s) IV Push once  DOBUTamine Infusion 5 MICROgram(s)/kG/Min IV Continuous <Continuous>  fentaNYL   Infusion 1 MICROgram(s)/kG/Hr IV Continuous <Continuous>  heparin  Injectable 5000 Unit(s) SubCutaneous every 12 hours  insulin lispro (HumaLOG) corrective regimen sliding scale   SubCutaneous every 6 hours  metroNIDAZOLE  IVPB      metroNIDAZOLE  IVPB 500 milliGRAM(s) IV Intermittent every 8 hours  norepinephrine Infusion 0.05 MICROgram(s)/kG/Min IV Continuous <Continuous>  pantoprazole  Injectable 40 milliGRAM(s) IV Push daily  piperacillin/tazobactam IVPB. 3.375 Gram(s) IV Intermittent every 12 hours  PrismaSATE Dialysate BGK 4 / 2.5 5000 milliLiter(s) CRRT <Continuous>  PrismaSOL Filtration BGK 4 / 2.5 5000 milliLiter(s) CRRT <Continuous>  PrismaSOL Filtration BGK 4 / 2.5 5000 milliLiter(s) CRRT <Continuous>  propofol Infusion 20 MICROgram(s)/kG/Min IV Continuous <Continuous>  sodium chloride 0.9%. 1000 milliLiter(s) IV Continuous <Continuous>  vancomycin  IVPB 1000 milliGRAM(s) IV Intermittent every 12 hours  vancomycin  IVPB      vasopressin Infusion 0.05 Unit(s)/Min IV Continuous <Continuous>    PRN Inpatient Medications  dextrose 40% Gel 15 Gram(s) Oral once PRN  glucagon  Injectable 1 milliGRAM(s) IntraMuscular once PRN      REVIEW OF SYSTEMS  --------------------------------------------------------------------------------  Unable to assess due to clinical condition.    VITALS/PHYSICAL EXAM  --------------------------------------------------------------------------------  T(C): 36.8 (10-14-18 @ 08:00), Max: 36.9 (10-14-18 @ 04:00)  HR: 69 (10-14-18 @ 08:00) (61 - 93)  T(C): 36.8 (10-14-18 @ 08:00), Max: 36.9 (10-14-18 @ 04:00)  HR: 69 (10-14-18 @ 08:00) (61 - 93)  ABP: 114/48 (10-14-18 @ 08:00) (95/40 - 114/49)  ABP(mean): 68 (10-14-18 @ 08:00) (54 - 68)  RR: 18 (10-14-18 @ 08:00) (18 - 20)  SpO2: 100% (10-14-18 @ 08:00) (99% - 100%)  CVP(mm Hg): 5 (10-14-18 @ 08:00) (-2 - 6)  CO: 6.2 (10-14-18 @ 08:00) (4.6 - 6.5)  PA: --  RR: 18 (10-14-18 @ 08:00) (18 - 20)  SpO2: 100% (10-14-18 @ 08:00) (99% - 100%)  Wt(kg): --        10-13-18 @ 07:01  -  10-14-18 @ 07:00  --------------------------------------------------------  IN: 2675.2 mL / OUT: 1672 mL / NET: 1003.2 mL    10-14-18 @ 07:01  -  10-14-18 @ 08:53  --------------------------------------------------------  IN: 332.3 mL / OUT: 30 mL / NET: 302.3 mL      Physical Exam:  	Gen: intubated  	HEENT: +ET tube to vent, +NGT  	Pulm: CTA B/L, +3 chest tube drains noted draining sero-sanguinous fluid  	CV: RRR, S1S2; no rub  	Abd: +BS, soft  	LE: Warm, no edema  	Neuro: +sedated  	Skin: Warm  	Vascular access:  LUE AVF site: +thrill +bruit +skin intact; right femoral non-tunneled HD catheter site: no bleeding seen    LABS/STUDIES  --------------------------------------------------------------------------------              8.2    6.65  >-----------<  92       [10-14-18 @ 03:50]              24.0     136  |  101  |  36  ----------------------------<  105      [10-14-18 @ 03:50]  3.7   |  21  |  3.88        Ca     7.6     [10-14-18 @ 03:50]      Mg     2.4     [10-14-18 @ 03:50]      Phos  3.5     [10-14-18 @ 03:50]    TPro  5.1  /  Alb  2.2  /  TBili  1.2  /  DBili  x   /  AST  16  /  ALT  4   /  AlkPhos  132  [10-14-18 @ 03:50]    PT/INR: PT 11.3 , INR 0.98       [10-14-18 @ 03:50]  PTT: 32.6       [10-14-18 @ 03:50]          [10-13-18 @ 03:25]    Creatinine Trend:  SCr 3.88 [10-14 @ 03:50]  SCr 4.30 [10-13 @ 21:30]  SCr 4.80 [10-13 @ 15:45]  SCr 5.72 [10-13 @ 03:25]  SCr 5.21 [10-12 @ 18:30]        HbA1c 5.5      [10-12-18 @ 02:53]

## 2018-10-15 DIAGNOSIS — J86.9 PYOTHORAX WITHOUT FISTULA: ICD-10-CM

## 2018-10-15 LAB
ALBUMIN SERPL ELPH-MCNC: 2.3 G/DL — LOW (ref 3.3–5)
ALP SERPL-CCNC: 150 U/L — HIGH (ref 40–120)
ALP SERPL-CCNC: 159 U/L — HIGH (ref 40–120)
ALP SERPL-CCNC: 163 U/L — HIGH (ref 40–120)
ALT FLD-CCNC: 4 U/L — SIGNIFICANT CHANGE UP (ref 4–41)
APTT BLD: 32.5 SEC — SIGNIFICANT CHANGE UP (ref 27.5–37.4)
AST SERPL-CCNC: 15 U/L — SIGNIFICANT CHANGE UP (ref 4–40)
AST SERPL-CCNC: 16 U/L — SIGNIFICANT CHANGE UP (ref 4–40)
AST SERPL-CCNC: 18 U/L — SIGNIFICANT CHANGE UP (ref 4–40)
BASE EXCESS BLDA CALC-SCNC: -1.1 MMOL/L — SIGNIFICANT CHANGE UP
BASE EXCESS BLDA CALC-SCNC: -3.8 MMOL/L — SIGNIFICANT CHANGE UP
BASE EXCESS BLDA CALC-SCNC: -4.5 MMOL/L — SIGNIFICANT CHANGE UP
BASE EXCESS BLDV CALC-SCNC: -2.9 MMOL/L — SIGNIFICANT CHANGE UP
BASE EXCESS BLDV CALC-SCNC: -4.5 MMOL/L — SIGNIFICANT CHANGE UP
BILIRUB SERPL-MCNC: 1.2 MG/DL — SIGNIFICANT CHANGE UP (ref 0.2–1.2)
BILIRUB SERPL-MCNC: 1.2 MG/DL — SIGNIFICANT CHANGE UP (ref 0.2–1.2)
BILIRUB SERPL-MCNC: 1.3 MG/DL — HIGH (ref 0.2–1.2)
BUN SERPL-MCNC: 21 MG/DL — SIGNIFICANT CHANGE UP (ref 7–23)
BUN SERPL-MCNC: 23 MG/DL — SIGNIFICANT CHANGE UP (ref 7–23)
BUN SERPL-MCNC: 26 MG/DL — HIGH (ref 7–23)
CA-I BLDA-SCNC: 1.12 MMOL/L — LOW (ref 1.15–1.29)
CA-I BLDA-SCNC: 1.21 MMOL/L — SIGNIFICANT CHANGE UP (ref 1.15–1.29)
CALCIUM SERPL-MCNC: 7.9 MG/DL — LOW (ref 8.4–10.5)
CALCIUM SERPL-MCNC: 8 MG/DL — LOW (ref 8.4–10.5)
CALCIUM SERPL-MCNC: 8 MG/DL — LOW (ref 8.4–10.5)
CHLORIDE BLDA-SCNC: 106 MMOL/L — SIGNIFICANT CHANGE UP (ref 96–108)
CHLORIDE SERPL-SCNC: 101 MMOL/L — SIGNIFICANT CHANGE UP (ref 98–107)
CHLORIDE SERPL-SCNC: 101 MMOL/L — SIGNIFICANT CHANGE UP (ref 98–107)
CHLORIDE SERPL-SCNC: 99 MMOL/L — SIGNIFICANT CHANGE UP (ref 98–107)
CO2 SERPL-SCNC: 19 MMOL/L — LOW (ref 22–31)
CO2 SERPL-SCNC: 19 MMOL/L — LOW (ref 22–31)
CO2 SERPL-SCNC: 20 MMOL/L — LOW (ref 22–31)
CREAT SERPL-MCNC: 2.49 MG/DL — HIGH (ref 0.5–1.3)
CREAT SERPL-MCNC: 2.83 MG/DL — HIGH (ref 0.5–1.3)
CREAT SERPL-MCNC: 3.18 MG/DL — HIGH (ref 0.5–1.3)
GAS PNL BLDV: 136 MMOL/L — SIGNIFICANT CHANGE UP (ref 136–146)
GLUCOSE BLDA-MCNC: 105 MG/DL — HIGH (ref 70–99)
GLUCOSE BLDA-MCNC: 109 MG/DL — HIGH (ref 70–99)
GLUCOSE BLDA-MCNC: 133 MG/DL — HIGH (ref 70–99)
GLUCOSE BLDV-MCNC: 106 — HIGH (ref 70–99)
GLUCOSE SERPL-MCNC: 110 MG/DL — HIGH (ref 70–99)
GLUCOSE SERPL-MCNC: 112 MG/DL — HIGH (ref 70–99)
GLUCOSE SERPL-MCNC: 135 MG/DL — HIGH (ref 70–99)
HCO3 BLDA-SCNC: 21 MMOL/L — LOW (ref 22–26)
HCO3 BLDA-SCNC: 21 MMOL/L — LOW (ref 22–26)
HCO3 BLDA-SCNC: 24 MMOL/L — SIGNIFICANT CHANGE UP (ref 22–26)
HCO3 BLDV-SCNC: 20 MMOL/L — SIGNIFICANT CHANGE UP (ref 20–27)
HCO3 BLDV-SCNC: 22 MMOL/L — SIGNIFICANT CHANGE UP (ref 20–27)
HCT VFR BLD CALC: 22.8 % — LOW (ref 39–50)
HCT VFR BLD CALC: 23.7 % — LOW (ref 39–50)
HCT VFR BLD CALC: 24.4 % — LOW (ref 39–50)
HCT VFR BLD CALC: 26.1 % — LOW (ref 39–50)
HCT VFR BLDA CALC: 25 % — LOW (ref 39–51)
HCT VFR BLDA CALC: 27.2 % — LOW (ref 39–51)
HCT VFR BLDA CALC: 28.7 % — LOW (ref 39–51)
HCT VFR BLDV CALC: 24.8 % — LOW (ref 39–51)
HEPARIN CF II AG PPP-ACNC: 0.12 — SIGNIFICANT CHANGE UP (ref 0–0.39)
HGB BLD-MCNC: 7.7 G/DL — LOW (ref 13–17)
HGB BLD-MCNC: 8.1 G/DL — LOW (ref 13–17)
HGB BLD-MCNC: 8.3 G/DL — LOW (ref 13–17)
HGB BLD-MCNC: 8.6 G/DL — LOW (ref 13–17)
HGB BLDA-MCNC: 8 G/DL — LOW (ref 13–17)
HGB BLDA-MCNC: 8.8 G/DL — LOW (ref 13–17)
HGB BLDA-MCNC: 9.3 G/DL — LOW (ref 13–17)
HGB BLDV-MCNC: 8 G/DL — LOW (ref 13–17)
INR BLD: 0.98 — SIGNIFICANT CHANGE UP (ref 0.88–1.17)
LACTATE BLDA-SCNC: 0.6 MMOL/L — SIGNIFICANT CHANGE UP (ref 0.5–2)
LACTATE BLDA-SCNC: 0.7 MMOL/L — SIGNIFICANT CHANGE UP (ref 0.5–2)
LACTATE BLDA-SCNC: 0.8 MMOL/L — SIGNIFICANT CHANGE UP (ref 0.5–2)
MAGNESIUM SERPL-MCNC: 2.4 MG/DL — SIGNIFICANT CHANGE UP (ref 1.6–2.6)
MAGNESIUM SERPL-MCNC: 2.5 MG/DL — SIGNIFICANT CHANGE UP (ref 1.6–2.6)
MCHC RBC-ENTMCNC: 31 PG — SIGNIFICANT CHANGE UP (ref 27–34)
MCHC RBC-ENTMCNC: 31.2 PG — SIGNIFICANT CHANGE UP (ref 27–34)
MCHC RBC-ENTMCNC: 31.4 PG — SIGNIFICANT CHANGE UP (ref 27–34)
MCHC RBC-ENTMCNC: 31.6 PG — SIGNIFICANT CHANGE UP (ref 27–34)
MCHC RBC-ENTMCNC: 33 % — SIGNIFICANT CHANGE UP (ref 32–36)
MCHC RBC-ENTMCNC: 33.8 % — SIGNIFICANT CHANGE UP (ref 32–36)
MCHC RBC-ENTMCNC: 34 % — SIGNIFICANT CHANGE UP (ref 32–36)
MCHC RBC-ENTMCNC: 34.2 % — SIGNIFICANT CHANGE UP (ref 32–36)
MCV RBC AUTO: 91 FL — SIGNIFICANT CHANGE UP (ref 80–100)
MCV RBC AUTO: 92.6 FL — SIGNIFICANT CHANGE UP (ref 80–100)
MCV RBC AUTO: 93.1 FL — SIGNIFICANT CHANGE UP (ref 80–100)
MCV RBC AUTO: 94.6 FL — SIGNIFICANT CHANGE UP (ref 80–100)
NRBC # FLD: 0 — SIGNIFICANT CHANGE UP
PCO2 BLDA: 33 MMHG — LOW (ref 35–48)
PCO2 BLDA: 33 MMHG — LOW (ref 35–48)
PCO2 BLDA: 34 MMHG — LOW (ref 35–48)
PCO2 BLDV: 39 MMHG — LOW (ref 41–51)
PCO2 BLDV: 39 MMHG — LOW (ref 41–51)
PH BLDA: 7.39 PH — SIGNIFICANT CHANGE UP (ref 7.35–7.45)
PH BLDA: 7.4 PH — SIGNIFICANT CHANGE UP (ref 7.35–7.45)
PH BLDA: 7.44 PH — SIGNIFICANT CHANGE UP (ref 7.35–7.45)
PH BLDV: 7.34 PH — SIGNIFICANT CHANGE UP (ref 7.32–7.43)
PH BLDV: 7.37 PH — SIGNIFICANT CHANGE UP (ref 7.32–7.43)
PHOSPHATE SERPL-MCNC: 3.2 MG/DL — SIGNIFICANT CHANGE UP (ref 2.5–4.5)
PHOSPHATE SERPL-MCNC: 3.6 MG/DL — SIGNIFICANT CHANGE UP (ref 2.5–4.5)
PLATELET # BLD AUTO: 68 K/UL — LOW (ref 150–400)
PLATELET # BLD AUTO: 78 K/UL — LOW (ref 150–400)
PLATELET # BLD AUTO: 83 K/UL — LOW (ref 150–400)
PLATELET # BLD AUTO: 94 K/UL — LOW (ref 150–400)
PMV BLD: 10.6 FL — SIGNIFICANT CHANGE UP (ref 7–13)
PMV BLD: 10.6 FL — SIGNIFICANT CHANGE UP (ref 7–13)
PMV BLD: 10.7 FL — SIGNIFICANT CHANGE UP (ref 7–13)
PMV BLD: 9.9 FL — SIGNIFICANT CHANGE UP (ref 7–13)
PO2 BLDA: 112 MMHG — HIGH (ref 83–108)
PO2 BLDA: 86 MMHG — SIGNIFICANT CHANGE UP (ref 83–108)
PO2 BLDA: 92 MMHG — SIGNIFICANT CHANGE UP (ref 83–108)
PO2 BLDV: 46 MMHG — HIGH (ref 35–40)
PO2 BLDV: 47 MMHG — HIGH (ref 35–40)
POTASSIUM BLDA-SCNC: 3.5 MMOL/L — SIGNIFICANT CHANGE UP (ref 3.4–4.5)
POTASSIUM BLDA-SCNC: 3.9 MMOL/L — SIGNIFICANT CHANGE UP (ref 3.4–4.5)
POTASSIUM BLDA-SCNC: 4 MMOL/L — SIGNIFICANT CHANGE UP (ref 3.4–4.5)
POTASSIUM BLDV-SCNC: 4.3 MMOL/L — SIGNIFICANT CHANGE UP (ref 3.4–4.5)
POTASSIUM SERPL-MCNC: 3.8 MMOL/L — SIGNIFICANT CHANGE UP (ref 3.5–5.3)
POTASSIUM SERPL-MCNC: 4 MMOL/L — SIGNIFICANT CHANGE UP (ref 3.5–5.3)
POTASSIUM SERPL-MCNC: 4.4 MMOL/L — SIGNIFICANT CHANGE UP (ref 3.5–5.3)
POTASSIUM SERPL-SCNC: 3.8 MMOL/L — SIGNIFICANT CHANGE UP (ref 3.5–5.3)
POTASSIUM SERPL-SCNC: 4 MMOL/L — SIGNIFICANT CHANGE UP (ref 3.5–5.3)
POTASSIUM SERPL-SCNC: 4.4 MMOL/L — SIGNIFICANT CHANGE UP (ref 3.5–5.3)
PROT SERPL-MCNC: 5 G/DL — LOW (ref 6–8.3)
PROT SERPL-MCNC: 5.2 G/DL — LOW (ref 6–8.3)
PROT SERPL-MCNC: 5.3 G/DL — LOW (ref 6–8.3)
PROTHROM AB SERPL-ACNC: 10.9 SEC — SIGNIFICANT CHANGE UP (ref 9.8–13.1)
RBC # BLD: 2.45 M/UL — LOW (ref 4.2–5.8)
RBC # BLD: 2.56 M/UL — LOW (ref 4.2–5.8)
RBC # BLD: 2.68 M/UL — LOW (ref 4.2–5.8)
RBC # BLD: 2.76 M/UL — LOW (ref 4.2–5.8)
RBC # FLD: 19.4 % — HIGH (ref 10.3–14.5)
RBC # FLD: 19.5 % — HIGH (ref 10.3–14.5)
RBC # FLD: 19.6 % — HIGH (ref 10.3–14.5)
RBC # FLD: 19.7 % — HIGH (ref 10.3–14.5)
SAO2 % BLDA: 97 % — SIGNIFICANT CHANGE UP (ref 95–99)
SAO2 % BLDA: 97.6 % — SIGNIFICANT CHANGE UP (ref 95–99)
SAO2 % BLDA: 98.1 % — SIGNIFICANT CHANGE UP (ref 95–99)
SAO2 % BLDV: 78.5 % — SIGNIFICANT CHANGE UP (ref 60–85)
SAO2 % BLDV: 82.4 % — SIGNIFICANT CHANGE UP (ref 60–85)
SODIUM BLDA-SCNC: 130 MMOL/L — LOW (ref 136–146)
SODIUM BLDA-SCNC: 131 MMOL/L — LOW (ref 136–146)
SODIUM BLDA-SCNC: 136 MMOL/L — SIGNIFICANT CHANGE UP (ref 136–146)
SODIUM SERPL-SCNC: 134 MMOL/L — LOW (ref 135–145)
SODIUM SERPL-SCNC: 136 MMOL/L — SIGNIFICANT CHANGE UP (ref 135–145)
SODIUM SERPL-SCNC: 137 MMOL/L — SIGNIFICANT CHANGE UP (ref 135–145)
SPECIMEN SOURCE: SIGNIFICANT CHANGE UP
TROPONIN T, HIGH SENSITIVITY: 61 NG/L — CRITICAL HIGH (ref ?–14)
TROPONIN T, HIGH SENSITIVITY: 62 NG/L — CRITICAL HIGH (ref ?–14)
WBC # BLD: 6.45 K/UL — SIGNIFICANT CHANGE UP (ref 3.8–10.5)
WBC # BLD: 8.63 K/UL — SIGNIFICANT CHANGE UP (ref 3.8–10.5)
WBC # BLD: 8.84 K/UL — SIGNIFICANT CHANGE UP (ref 3.8–10.5)
WBC # BLD: 9.49 K/UL — SIGNIFICANT CHANGE UP (ref 3.8–10.5)
WBC # FLD AUTO: 6.45 K/UL — SIGNIFICANT CHANGE UP (ref 3.8–10.5)
WBC # FLD AUTO: 8.63 K/UL — SIGNIFICANT CHANGE UP (ref 3.8–10.5)
WBC # FLD AUTO: 8.84 K/UL — SIGNIFICANT CHANGE UP (ref 3.8–10.5)
WBC # FLD AUTO: 9.49 K/UL — SIGNIFICANT CHANGE UP (ref 3.8–10.5)

## 2018-10-15 PROCEDURE — 99291 CRITICAL CARE FIRST HOUR: CPT

## 2018-10-15 PROCEDURE — 71045 X-RAY EXAM CHEST 1 VIEW: CPT | Mod: 26

## 2018-10-15 PROCEDURE — 99292 CRITICAL CARE ADDL 30 MIN: CPT

## 2018-10-15 PROCEDURE — 99233 SBSQ HOSP IP/OBS HIGH 50: CPT

## 2018-10-15 PROCEDURE — 99233 SBSQ HOSP IP/OBS HIGH 50: CPT | Mod: GC

## 2018-10-15 RX ORDER — CALCIUM GLUCONATE 100 MG/ML
1 VIAL (ML) INTRAVENOUS ONCE
Qty: 0 | Refills: 0 | Status: COMPLETED | OUTPATIENT
Start: 2018-10-15 | End: 2018-10-16

## 2018-10-15 RX ORDER — POLYETHYLENE GLYCOL 3350 17 G/17G
17 POWDER, FOR SOLUTION ORAL ONCE
Qty: 0 | Refills: 0 | Status: COMPLETED | OUTPATIENT
Start: 2018-10-15 | End: 2018-10-16

## 2018-10-15 RX ORDER — ALTEPLASE 100 MG
2 KIT INTRAVENOUS ONCE
Qty: 0 | Refills: 0 | Status: COMPLETED | OUTPATIENT
Start: 2018-10-15 | End: 2018-10-15

## 2018-10-15 RX ORDER — DESMOPRESSIN ACETATE 0.1 MG/1
17.7 TABLET ORAL ONCE
Qty: 0 | Refills: 0 | Status: COMPLETED | OUTPATIENT
Start: 2018-10-15 | End: 2018-10-15

## 2018-10-15 RX ORDER — DESMOPRESSIN ACETATE 0.1 MG/1
17.7 TABLET ORAL ONCE
Qty: 0 | Refills: 0 | Status: DISCONTINUED | OUTPATIENT
Start: 2018-10-15 | End: 2018-10-15

## 2018-10-15 RX ADMIN — Medication 81 MILLIGRAM(S): at 12:43

## 2018-10-15 RX ADMIN — Medication 250 MILLIGRAM(S): at 20:53

## 2018-10-15 RX ADMIN — Medication 100 MILLIGRAM(S): at 22:13

## 2018-10-15 RX ADMIN — FENTANYL CITRATE 5.9 MICROGRAM(S)/KG/HR: 50 INJECTION INTRAVENOUS at 09:30

## 2018-10-15 RX ADMIN — ALTEPLASE 2 MILLIGRAM(S): KIT at 19:00

## 2018-10-15 RX ADMIN — Medication 100 MILLIGRAM(S): at 15:01

## 2018-10-15 RX ADMIN — DEXMEDETOMIDINE HYDROCHLORIDE IN 0.9% SODIUM CHLORIDE 7.38 MICROGRAM(S)/KG/HR: 4 INJECTION INTRAVENOUS at 09:30

## 2018-10-15 RX ADMIN — PIPERACILLIN AND TAZOBACTAM 200 GRAM(S): 4; .5 INJECTION, POWDER, LYOPHILIZED, FOR SOLUTION INTRAVENOUS at 22:13

## 2018-10-15 RX ADMIN — Medication 250 MILLIGRAM(S): at 09:29

## 2018-10-15 RX ADMIN — PANTOPRAZOLE SODIUM 40 MILLIGRAM(S): 20 TABLET, DELAYED RELEASE ORAL at 12:43

## 2018-10-15 RX ADMIN — VASOPRESSIN 3 UNIT(S)/MIN: 20 INJECTION INTRAVENOUS at 19:45

## 2018-10-15 RX ADMIN — Medication 2.77 MICROGRAM(S)/KG/MIN: at 09:28

## 2018-10-15 RX ADMIN — ALTEPLASE 2 MILLIGRAM(S): KIT at 15:45

## 2018-10-15 RX ADMIN — PROPOFOL 7.08 MICROGRAM(S)/KG/MIN: 10 INJECTION, EMULSION INTRAVENOUS at 19:46

## 2018-10-15 RX ADMIN — Medication 100 MILLIGRAM(S): at 05:30

## 2018-10-15 RX ADMIN — DEXMEDETOMIDINE HYDROCHLORIDE IN 0.9% SODIUM CHLORIDE 7.38 MICROGRAM(S)/KG/HR: 4 INJECTION INTRAVENOUS at 19:45

## 2018-10-15 RX ADMIN — VASOPRESSIN 3 UNIT(S)/MIN: 20 INJECTION INTRAVENOUS at 09:29

## 2018-10-15 RX ADMIN — DESMOPRESSIN ACETATE 217.68 MICROGRAM(S): 0.1 TABLET ORAL at 12:43

## 2018-10-15 RX ADMIN — HEPARIN SODIUM 5000 UNIT(S): 5000 INJECTION INTRAVENOUS; SUBCUTANEOUS at 06:08

## 2018-10-15 RX ADMIN — FENTANYL CITRATE 5.9 MICROGRAM(S)/KG/HR: 50 INJECTION INTRAVENOUS at 19:46

## 2018-10-15 RX ADMIN — Medication 8.85 MICROGRAM(S)/KG/MIN: at 09:30

## 2018-10-15 RX ADMIN — Medication 8.85 MICROGRAM(S)/KG/MIN: at 19:46

## 2018-10-15 RX ADMIN — PIPERACILLIN AND TAZOBACTAM 200 GRAM(S): 4; .5 INJECTION, POWDER, LYOPHILIZED, FOR SOLUTION INTRAVENOUS at 10:00

## 2018-10-15 RX ADMIN — PROPOFOL 7.08 MICROGRAM(S)/KG/MIN: 10 INJECTION, EMULSION INTRAVENOUS at 09:29

## 2018-10-15 RX ADMIN — Medication 2.77 MICROGRAM(S)/KG/MIN: at 19:46

## 2018-10-15 NOTE — PROGRESS NOTE ADULT - SUBJECTIVE AND OBJECTIVE BOX
CLAUDIA HAN  [id number]    Subjective:  Patient is a 58y old  Male who presents with a chief complaint of Dr. Pickering (14 Oct 2018 08:52)   Patient was seen and examined at bedside. No acute events overnight. On Vaso, dobutamine NE for pressor support. Fentanyl and precede for sedation. Remains intubated. Hypothermia protocol.     Objective:  T(C): 35.2 (10-15-18 @ 04:00), Max: 36.8 (10-14-18 @ 08:00)  HR: 81 (10-15-18 @ 06:00) (66 - 110)  BP: --  RR: 18 (10-15-18 @ 06:00) (11 - 19)  SpO2: 100% (10-15-18 @ 06:00) (94% - 100%)  Wt(kg): --   10-15    137  |  101  |  21  ----------------------------<  112<H>  3.8   |  20<L>  |  2.49<H>    Ca    8.0<L>      15 Oct 2018 03:30  Phos  3.5     10-14  Mg     2.4     10-14    TPro  5.2<L>  /  Alb  2.3<L>  /  TBili  1.3<H>  /  DBili  x   /  AST  16  /  ALT  4   /  AlkPhos  150<H>  10-15                        8.3    6.45  )-----------( 68       ( 15 Oct 2018 03:30 )             24.4       10-14 @ 07:01  -  10-15 @ 07:00  --------------------------------------------------------  IN: 2409.8 mL / OUT: 1860 mL / NET: 549.8 mL      PHYSICAL EXAM:    General: intubated  Chest: Vac with suction. JPx2 SS, no collections palpated             MEDICATIONS  (STANDING):  albumin human  5% IVPB 250 milliLiter(s) IV Intermittent once  alteplase for catheter clearance 2 milliGRAM(s) Catheter once  aspirin  chewable 81 milliGRAM(s) Oral daily  dexmedetomidine Infusion 0.5 MICROgram(s)/kG/Hr (7.375 mL/Hr) IV Continuous <Continuous>  dextrose 5%. 1000 milliLiter(s) (50 mL/Hr) IV Continuous <Continuous>  dextrose 50% Injectable 12.5 Gram(s) IV Push once  dextrose 50% Injectable 25 Gram(s) IV Push once  dextrose 50% Injectable 25 Gram(s) IV Push once  DOBUTamine Infusion 5 MICROgram(s)/kG/Min (8.85 mL/Hr) IV Continuous <Continuous>  fentaNYL   Infusion 1 MICROgram(s)/kG/Hr (5.9 mL/Hr) IV Continuous <Continuous>  heparin  Injectable 5000 Unit(s) SubCutaneous every 12 hours  insulin lispro (HumaLOG) corrective regimen sliding scale   SubCutaneous every 6 hours  metroNIDAZOLE  IVPB      metroNIDAZOLE  IVPB 500 milliGRAM(s) IV Intermittent every 8 hours  norepinephrine Infusion 0.05 MICROgram(s)/kG/Min (2.766 mL/Hr) IV Continuous <Continuous>  pantoprazole  Injectable 40 milliGRAM(s) IV Push daily  piperacillin/tazobactam IVPB. 3.375 Gram(s) IV Intermittent every 12 hours  PrismaSATE Dialysate BGK 4 / 2.5 5000 milliLiter(s) (1000 mL/Hr) CRRT <Continuous>  PrismaSOL Filtration BGK 4 / 2.5 5000 milliLiter(s) (1000 mL/Hr) CRRT <Continuous>  PrismaSOL Filtration BGK 4 / 2.5 5000 milliLiter(s) (200 mL/Hr) CRRT <Continuous>  propofol Infusion 20 MICROgram(s)/kG/Min (7.08 mL/Hr) IV Continuous <Continuous>  sodium chloride 0.9%. 1000 milliLiter(s) (30 mL/Hr) IV Continuous <Continuous>  vancomycin  IVPB 750 milliGRAM(s) IV Intermittent every 12 hours  vasopressin Infusion 0.05 Unit(s)/Min (3 mL/Hr) IV Continuous <Continuous>    MEDICATIONS  (PRN):  dextrose 40% Gel 15 Gram(s) Oral once PRN Blood Glucose LESS THAN 70 milliGRAM(s)/deciliter  glucagon  Injectable 1 milliGRAM(s) IntraMuscular once PRN Glucose LESS THAN 70 milligrams/deciliter      Assessment/Plan:  Patient is a 58y old  Male who presents with a chief complaint of Dr. Pickering (14 Oct 2018 08:52)

## 2018-10-15 NOTE — PROGRESS NOTE ADULT - SUBJECTIVE AND OBJECTIVE BOX
[facility] DIVISION OF KIDNEY DISEASES AND HYPERTENSION -- FOLLOW UP NOTE  --------------------------------------------------------------------------------    HPI: 59yo Mandarin speaking M w/ Hx of NICM w/ ICD, ESRD on HD TTS, former smoker, BPH who was admitted for scheduled right thoracotomy w/ decortication due to infected hemothorax and chest reconstruction with CT Surgery. Pt underwent procedure on 10/11/18, and afterwards developed shock, thought to be cardiogenic. Pt being monitored in the CTICU. Renal now called for management of pts ESRD. Pt was started on CVVHDF on 10/13/18.    24 hour events/subjective:  Pt seen at bedside. CVVHDF has been off overnight.     PAST HISTORY  --------------------------------------------------------------------------------  No significant changes to PMH, PSH, FHx, SHx, unless otherwise noted    ALLERGIES & MEDICATIONS  --------------------------------------------------------------------------------  Allergies    No Known Allergies    Intolerances      Standing Inpatient Medications  albumin human  5% IVPB 250 milliLiter(s) IV Intermittent once  alteplase for catheter clearance 2 milliGRAM(s) Catheter once  aspirin  chewable 81 milliGRAM(s) Oral daily  dexmedetomidine Infusion 0.5 MICROgram(s)/kG/Hr IV Continuous <Continuous>  dextrose 5%. 1000 milliLiter(s) IV Continuous <Continuous>  dextrose 50% Injectable 12.5 Gram(s) IV Push once  dextrose 50% Injectable 25 Gram(s) IV Push once  dextrose 50% Injectable 25 Gram(s) IV Push once  DOBUTamine Infusion 5 MICROgram(s)/kG/Min IV Continuous <Continuous>  fentaNYL   Infusion 1 MICROgram(s)/kG/Hr IV Continuous <Continuous>  heparin  Injectable 5000 Unit(s) SubCutaneous every 12 hours  insulin lispro (HumaLOG) corrective regimen sliding scale   SubCutaneous every 6 hours  metroNIDAZOLE  IVPB      metroNIDAZOLE  IVPB 500 milliGRAM(s) IV Intermittent every 8 hours  norepinephrine Infusion 0.05 MICROgram(s)/kG/Min IV Continuous <Continuous>  pantoprazole  Injectable 40 milliGRAM(s) IV Push daily  piperacillin/tazobactam IVPB. 3.375 Gram(s) IV Intermittent every 12 hours  PrismaSATE Dialysate BGK 4 / 2.5 5000 milliLiter(s) CRRT <Continuous>  PrismaSOL Filtration BGK 4 / 2.5 5000 milliLiter(s) CRRT <Continuous>  PrismaSOL Filtration BGK 4 / 2.5 5000 milliLiter(s) CRRT <Continuous>  propofol Infusion 20 MICROgram(s)/kG/Min IV Continuous <Continuous>  sodium chloride 0.9%. 1000 milliLiter(s) IV Continuous <Continuous>  vancomycin  IVPB 750 milliGRAM(s) IV Intermittent every 12 hours  vasopressin Infusion 0.05 Unit(s)/Min IV Continuous <Continuous>    PRN Inpatient Medications  dextrose 40% Gel 15 Gram(s) Oral once PRN  glucagon  Injectable 1 milliGRAM(s) IntraMuscular once PRN      REVIEW OF SYSTEMS  --------------------------------------------------------------------------------  Unable to obtain     VITALS/PHYSICAL EXAM  --------------------------------------------------------------------------------  T(C): 35.2 (10-15-18 @ 04:00), Max: 36.7 (10-14-18 @ 16:00)  HR: 81 (10-15-18 @ 06:00) (66 - 110)  BP: --  RR: 18 (10-15-18 @ 06:00) (11 - 19)  SpO2: 100% (10-15-18 @ 06:00) (94% - 100%)  Wt(kg): --      10-14-18 @ 07:01  -  10-15-18 @ 07:00  --------------------------------------------------------  IN: 2409.8 mL / OUT: 1860 mL / NET: 549.8 mL    Physical Exam:  	Gen: intubated  	HEENT: +ET tube to vent, +NGT  	Pulm: CTA B/L, + chest tube drains  	CV: RRR, S1S2; no rub  	Abd: +BS, soft  	LE: Warm, no edema  	Neuro: +sedated  	Skin: Warm  	Vascular access:  LUE AVF site: +thrill +bruit +skin intact; right femoral non-tunneled HD catheter site: no bleeding seen    LABS/STUDIES  --------------------------------------------------------------------------------              8.3    6.45  >-----------<  68       [10-15-18 @ 03:30]              24.4     137  |  101  |  21  ----------------------------<  112      [10-15-18 @ 03:30]  3.8   |  20  |  2.49        Ca     8.0     [10-15-18 @ 03:30]      Mg     2.4     [10-14-18 @ 03:50]      Phos  3.5     [10-14-18 @ 03:50]    TPro  5.2  /  Alb  2.3  /  TBili  1.3  /  DBili  x   /  AST  16  /  ALT  4   /  AlkPhos  150  [10-15-18 @ 03:30]    PT/INR: PT 11.3 , INR 0.98       [10-14-18 @ 03:50]  PTT: 32.6       [10-14-18 @ 03:50]      Creatinine Trend:  SCr 2.49 [10-15 @ 03:30]  SCr 3.88 [10-14 @ 03:50]  SCr 4.30 [10-13 @ 21:30]  SCr 4.80 [10-13 @ 15:45]  SCr 5.72 [10-13 @ 03:25]    HbA1c 5.5      [10-12-18 @ 02:53]    HBsAb 16.3      [10-13-18 @ 15:45]  HCV 0.21, Nonreactive Hepatitis C AB  S/CO Ratio                        Interpretation  < 1.0                                     Non-Reactive  1.0 - 4.9                           Weakly-Reactive  > 5.0                                 Reactive  Non-Reactive: Aperson with a non-reactive HCV antibody  result is considered uninfected.  No further action is  needed unless recent infection is suspected.  In these  cases, consider repeat testing later to detect  seroconversion..  Weakly-Reactive: HCV antibody test is abnormal, HCV RNA  Qualitative test will follow.  Reactive: HCV antibody test is abnormal, HCV RNA  Qualitative test will follow.  Note: HCV antibody testing is performed on the [employer] system.      [10-13-18 @ 15:45]

## 2018-10-15 NOTE — PROGRESS NOTE ADULT - PROBLEM SELECTOR PLAN 2
Improving. Wean pressors as tolerated per ICU team.  Suggest decrease Dobutamine to 2.5 mcg/kg/min-done this am.

## 2018-10-15 NOTE — PROGRESS NOTE ADULT - ASSESSMENT
59 y/o male (Mandarin speaking) with hx of NICM? HFrEF (LVEF 20%, severely dilated LVIDD 7.3 cm) w/ ICD, HLD, ESRD on HD, former smoker, BPH, hx of prior chest/lung surgeries with chest wall wound infections, b/l hip replacements comes in for a scheduled right thoracotomy w/ decortication and muscle flap with Dr. Jose Alfredo Pickering on 10/11/18. Post surgery patient required Dobutamine 10 mcg/kg/min (now weaned to 5 mcg/kg/min) and pressors. He is currently intubated on pressors ; norepinephrine, phenylephrine, vasopressin and epinephrine. He is also on Zosyn and metronidazole prophylactically. Has 3 right sided chest tubes in place. Has a small right sided basilar phenomothorax. HF consulted to evaluate cardiogenic shock. He is awake and alert with wife by bedside. Per record patient is a poor historian and not aware of which meds he was on at home.     Remains critically ill- e.faecalis empyema, and on pressors/inotrope.

## 2018-10-15 NOTE — PROGRESS NOTE ADULT - SUBJECTIVE AND OBJECTIVE BOX
CLAUDIA HAN                     [id number]    HPI:  Pt is a 58 yr old Mandarin speaking male scheduled for Right Thoractomy Decortiation, Right Chest Wall reconstruction with Latissimjus Dorsi Flap Poss Skin Graft with VAC dressing with Dr Pickering 10/11/18. Pt has ICD but unable to identify make or model. Pt hx of ESRD with HD T/Th/Sat for 4 hrs and has stated he has stopped some of his meds but cannot say which ones. Pt seen and received [facility] and [facility] but has 2 different med lists and is unable to identify meds taking. Pt denies blood thinners. Pt has stopped ASA as of last week. Pt hx gathered from [employer] and notes from Dr Pickering - pt is poor historian.     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          Procedure:  Thoracotomy  10/11/2018       · Operative Findings	Right thoracotomy, resection of portion of R 7th rib, evacuation of infected hemothorax, takedown of pleurocutaneous fistula	    · Operative Findings	s/p R chest wall reconstruction with tunneled latissimus dorsi flap, covered by serratus anterior flap, after R thoracotomy, takedown pleurocutaneous fistula, decortication, resection Right 7th rib	      Issues: s/p  infected right hemothorax evacuation             multiple right  chest tubes/ drains in place             distributive and cardiogenic shock/ SIRS             acute resp failure on vent support             posthemorrhagic anemia             lactic acidosis             hyperglycemia             ESRD on HD      PAST MEDICAL & SURGICAL HISTORY:  Smoking hx  Cardiomyopathy  Wound: right chest  ICD (implantable cardioverter-defibrillator) in place  OA (osteoarthritis)  HLD (hyperlipidemia)  BPH (benign prostatic hyperplasia)  Pleural effusion  HTN (hypertension)  ESRD (end stage renal disease)  H/O chest wound: right  History of wound infection: right chest wall - revision 5/18 and again in 7/18  History of implantable cardioverter-defibrillator (ICD) placement: pt unsure when placed  H/O bilateral hip replacements: 2008, 2009            VITAL SIGNS:  Vital Signs Last 24 Hrs  T(C): 35.2 (15 Oct 2018 04:00), Max: 36.8 (14 Oct 2018 08:00)  T(F): 95.4 (15 Oct 2018 04:00), Max: 98.2 (14 Oct 2018 08:00)  HR: 81 (15 Oct 2018 06:00) (66 - 110)  BP: --  BP(mean): --  RR: 18 (15 Oct 2018 06:00) (11 - 19)  SpO2: 100% (15 Oct 2018 06:00) (94% - 100%)    I/Os:   I&O's Detail    13 Oct 2018 07:01  -  14 Oct 2018 07:00  --------------------------------------------------------  IN:    dexmedetomidine Infusion: 216.3 mL    DOBUTamine Infusion: 213.6 mL    fentaNYL  Infusion: 283.2 mL    IV PiggyBack: 500 mL    norepinephrine Infusion: 166.7 mL    Packed Red Blood Cells: 300 mL    Platelets - Single Donor: 236 mL    propofol Infusion: 324.4 mL    sodium chloride 0.9%.: 390 mL    vasopressin Infusion: 45 mL  Total IN: 2675.2 mL    OUT:    Bulb: 360 mL    Bulb: 90 mL    Chest Tube: 80 mL    Chest Tube: 70 mL    Chest Tube: 200 mL    Other: 872 mL  Total OUT: 1672 mL    Total NET: 1003.2 mL      14 Oct 2018 07:01  -  15 Oct 2018 06:19  --------------------------------------------------------  IN:    dexmedetomidine Infusion: 235.5 mL    DOBUTamine Infusion: 204.7 mL    fentaNYL  Infusion: 253.9 mL    IV PiggyBack: 900 mL    norepinephrine Infusion: 184 mL    Other: 2 mL    Packed Red Blood Cells: 300 mL    propofol Infusion: 270.9 mL    sodium chloride 0.9%.: 30 mL    vasopressin Infusion: 28.8 mL  Total IN: 2409.8 mL    OUT:    Bulb: 125 mL    Bulb: 25 mL    Chest Tube: 70 mL    Chest Tube: 320 mL    Chest Tube: 20 mL    Other: 1300 mL  Total OUT: 1860 mL    Total NET: 549.8 mL          CAPILLARY BLOOD GLUCOSE      POCT Blood Glucose.: 110 mg/dL (15 Oct 2018 06:03)  POCT Blood Glucose.: 104 mg/dL (15 Oct 2018 00:37)  POCT Blood Glucose.: 114 mg/dL (14 Oct 2018 18:00)  POCT Blood Glucose.: 114 mg/dL (14 Oct 2018 06:58)      =======================MEDICATIONS===================  MEDICATIONS  (STANDING):  albumin human  5% IVPB 250 milliLiter(s) IV Intermittent once  alteplase for catheter clearance 2 milliGRAM(s) Catheter once  aspirin  chewable 81 milliGRAM(s) Oral daily  dexmedetomidine Infusion 0.5 MICROgram(s)/kG/Hr (7.375 mL/Hr) IV Continuous <Continuous>  dextrose 5%. 1000 milliLiter(s) (50 mL/Hr) IV Continuous <Continuous>  dextrose 50% Injectable 12.5 Gram(s) IV Push once  dextrose 50% Injectable 25 Gram(s) IV Push once  dextrose 50% Injectable 25 Gram(s) IV Push once  DOBUTamine Infusion 5 MICROgram(s)/kG/Min (8.85 mL/Hr) IV Continuous <Continuous>  fentaNYL   Infusion 1 MICROgram(s)/kG/Hr (5.9 mL/Hr) IV Continuous <Continuous>  heparin  Injectable 5000 Unit(s) SubCutaneous every 12 hours  insulin lispro (HumaLOG) corrective regimen sliding scale   SubCutaneous every 6 hours  metroNIDAZOLE  IVPB      metroNIDAZOLE  IVPB 500 milliGRAM(s) IV Intermittent every 8 hours  norepinephrine Infusion 0.05 MICROgram(s)/kG/Min (2.766 mL/Hr) IV Continuous <Continuous>  pantoprazole  Injectable 40 milliGRAM(s) IV Push daily  piperacillin/tazobactam IVPB. 3.375 Gram(s) IV Intermittent every 12 hours  PrismaSATE Dialysate BGK 4 / 2.5 5000 milliLiter(s) (1000 mL/Hr) CRRT <Continuous>  PrismaSOL Filtration BGK 4 / 2.5 5000 milliLiter(s) (1000 mL/Hr) CRRT <Continuous>  PrismaSOL Filtration BGK 4 / 2.5 5000 milliLiter(s) (200 mL/Hr) CRRT <Continuous>  propofol Infusion 20 MICROgram(s)/kG/Min (7.08 mL/Hr) IV Continuous <Continuous>  sodium chloride 0.9%. 1000 milliLiter(s) (30 mL/Hr) IV Continuous <Continuous>  vancomycin  IVPB 750 milliGRAM(s) IV Intermittent every 12 hours  vasopressin Infusion 0.05 Unit(s)/Min (3 mL/Hr) IV Continuous <Continuous>    MEDICATIONS  (PRN):  dextrose 40% Gel 15 Gram(s) Oral once PRN Blood Glucose LESS THAN 70 milliGRAM(s)/deciliter  glucagon  Injectable 1 milliGRAM(s) IntraMuscular once PRN Glucose LESS THAN 70 milligrams/deciliter      =======================VENTILATOR SETTINGS===================  Mode: AC/ CMV (Assist Control/ Continuous Mandatory Ventilation)  RR (machine): 18  TV (machine): 500  FiO2: 40  PEEP: 5  MAP: 12  PIP: 30          PHYSICAL EXAM============================  General:             Vented, sedated, i  Neuro:               Moving  extremities spontaneously    with lower dose of sedation. 	  HEENT:                           CHER/ ETT/ NGT  Respiratory:	Lungs sound coarse on auscultation bilaterally with good aeration.                            No rales, rhonchi, no wheezing.                           RIght thoracotomy site - covered with dressing, chest tube site -clean  CV:		Regular rate and rhythm. Normal S1/S2.  Abdomen:          Soft,  nontender, not-distended. Bowel sounds present - hypoactive  Skin:		No rash.  Extremities:	Warm, + edema.  (+) pulses by doppler  ============================LABS=========================                        8.3    6.45  )-----------( 68       ( 15 Oct 2018 03:30 )             24.4     10-15    137  |  101  |  21  ----------------------------<  112<H>  3.8   |  20<L>  |  2.49<H>    Ca    8.0<L>      15 Oct 2018 03:30  Phos  3.5     10-14  Mg     2.4     10-14    TPro  5.2<L>  /  Alb  2.3<L>  /  TBili  1.3<H>  /  DBili  x   /  AST  16  /  ALT  4   /  AlkPhos  150<H>  10-15    LIVER FUNCTIONS - ( 15 Oct 2018 03:30 )  Alb: 2.3 g/dL / Pro: 5.2 g/dL / ALK PHOS: 150 u/L / ALT: 4 u/L / AST: 16 u/L / GGT: x           PT/INR - ( 14 Oct 2018 03:50 )   PT: 11.3 SEC;   INR: 0.98          PTT - ( 14 Oct 2018 03:50 )  PTT:32.6 SEC  ABG - ( 15 Oct 2018 03:30 )  pH, Arterial: 7.44  pH, Blood: x     /  pCO2: 33    /  pO2: 92    / HCO3: 24    / Base Excess: -1.1  /  SaO2: 98.1        ============================IMAGING STUDIES=========================  < from: Xray Chest 1 View- PORTABLE-Routine (10.14.18 @ 07:19) >    Heart size and the mediastinum cannot be accurately evaluated on this   projection.  ET tube tip is approximately 4.5 cm above the rupert.Distal enteric tube   coiled upon itself with tip in the proximal stomach pointing towards the   GE junction. Right IJ line and 3 previous right chest tubes unchanged. A   fourth right chest tube or drain has its tip in the right lateral chest   wall.  There is increased right subcutaneous emphysema.  Left-sided subcutaneous ICD is not significantly changed in position.  Skin staples, chain sutures, surgical clips again overlie the right chest.  A small lateral right pneumothorax is not significantly changed. The   previous right basilar pneumothorax is decreased in size.  Small loculated left pleural effusion is not significantly changed.  There is continued opacity in the lower half of the right chest assistant   with muscle flap procedure.      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/11/2018       Trapped lung  10/11/2018          Procedure:  Thoracotomy  10/11/2018       · Operative Findings	Right thoracotomy, resection of portion of R 7th rib, evacuation of infected hemothorax, takedown of pleurocutaneous fistula	    · Operative Findings	s/p R chest wall reconstruction with tunneled latissimus dorsi flap, covered by serratus anterior flap, after R thoracotomy, takedown pleurocutaneous fistula, decortication, resection Right 7th rib	    Issues: s/p  infected right hemothorax evacuation/ Enterococcus empyema             multiple right  chest tubes/ drains in place             distributive and cardiogenic shock/ SIRS             acute resp failure on vent support             posthemorrhagic anemia/  dilutional thrombocytopenia ( s/p PRBC and PLT today)             lactic acidosis             hyperglycemia             ESRD on HD             acute on chronic systolic CHF ( EF 10 %), ICD in place      ====================== NEUROLOGY=======================  Pain control with iv Fentanyl  Pt is sedated with Propofol /Precedex    ==================== RESPIRATORY========================  Pt failed vent weaning yesterday, will cont CPAP weaning protocol today.   Monitor chest tube output  Chest tube x3 to  water seal	  Candido x2 to bulb suction    Mechanical Ventilation:  Mode: AC/ CMV (Assist Control/ Continuous Mandatory Ventilation)  RR (machine): 18  TV (machine): 500  FiO2: 40  PEEP: 5  MAP: 10.4  PIP: 24    Mechanical ventilator status assessed & settings reviewed  Continue bronchodilators, pulmonary toilet  Head of bed elevation to 30-40 degrees  F/up  serial ABG's  Will attempt CPAP later today if more stable    ====================CARDIOVASCULAR=====================  Continue hemodynamic monitoring/ telemetry  Titrating Levo, Vaso, Dobutamine for SBP > 90, MAP > 65   Elevated Trop trending down; No EKG changes  Flow track noninvasive CO/ CI monitoring   ASA started yesterday, and will cont.     ===================== RENAL ============================       Monitor I/Os, BUN/ Cr  and electrolytes  No Moore - Pt was on HD for ESRD preop.  Currently on CVVHD due to hemodynamic instability in ICU  Increase net fluid removal today.   ==================== GASTROINTESTINAL===================  NPO Tube feeds as indicated  Continue GI prophylaxis with  Protonix  Possibly will start NGT feeds today    =======================    ENDOCRIN  =====================  Glycemic monitoring  F/S with coverage  ===================HEMATOLOGIC/ONCOLOGIC =============  Monitor chest tube output. No signs of active bleeding.   Transfused 1 PRBC today, anemia post op  Follow CBC, coags later today  DVT prophylaxis with SCD, sc Heparin    ========================INFECTIOUS DISEASE===============   Monitor for fever / leukocytosis.  All surgical incision / chest tube  sites look clean  Empiric ABX Zosyn, Vanco, Flagyl for now until cultures sensitivity available       Pertinent clinical, laboratory, radiographic, hemodynamic, echocardiographic, respiratory data, microbiologic data and chart were reviewed and analyzed frequently throughout the course of the day and night. GI and DVT prophylaxis, glycemic control, head of bed elevation and skin care issues were addressed.  Patient seen, examined and plan discussed with CT Surgery / CTICU team during rounds.  Pt remains critically ill in imminent risk of  deterioration and requires very careful cardio- pulmonary monitoring and support.    I have spent  80  minutes of critical care time with this pt between     12  am  and   9  am         minutes spent on total encounter; more than 50% of the visit was spent counseling and/or coordinating care by the attending physician.          Qiuping Su DO FACEP

## 2018-10-15 NOTE — PROGRESS NOTE ADULT - PROBLEM SELECTOR PLAN 1
NICM ? per chart.  HFrEF - LVEF 20%, severely dilated LV, LVIDD 7.3 cm. Moderate MR.  CVP 6 off CVVHD. Mixed sat 78.5%. CO, CI.   Decrease dobutamine to 2.5 mcg/kg/min.   CTI team to titrate down on pressors as tolerated.   Pt is anuric. CVVHD on hold for now. Team deciding on HD.   Lactate is trending down, now 0.8. NICM ? per chart.  HFrEF - LVEF 20%, severely dilated LV, LVIDD 7.3 cm. Moderate MR.  CVP 6 off CVVHD. Mixed sat 78.5%. CO 11.612 (low hgb 8.1), CI 6.71.  Decrease dobutamine to 2.5 mcg/kg/min.   CTI team to titrate down on pressors as tolerated.   Pt is anuric. CVVHD on hold for now. Team deciding on HD.   Lactate is trending down, now 0.8.

## 2018-10-15 NOTE — PROGRESS NOTE ADULT - SUBJECTIVE AND OBJECTIVE BOX
Patient seen and examined.     Medications:  albumin human  5% IVPB 250 milliLiter(s) IV Intermittent once  alteplase for catheter clearance 2 milliGRAM(s) Catheter once  aspirin  chewable 81 milliGRAM(s) Oral daily  dexmedetomidine Infusion 0.5 MICROgram(s)/kG/Hr IV Continuous <Continuous>  dextrose 40% Gel 15 Gram(s) Oral once PRN  dextrose 5%. 1000 milliLiter(s) IV Continuous <Continuous>  dextrose 50% Injectable 12.5 Gram(s) IV Push once  dextrose 50% Injectable 25 Gram(s) IV Push once  dextrose 50% Injectable 25 Gram(s) IV Push once  DOBUTamine Infusion 5 MICROgram(s)/kG/Min IV Continuous <Continuous>  fentaNYL   Infusion 1 MICROgram(s)/kG/Hr IV Continuous <Continuous>  glucagon  Injectable 1 milliGRAM(s) IntraMuscular once PRN  heparin  Injectable 5000 Unit(s) SubCutaneous every 12 hours  insulin lispro (HumaLOG) corrective regimen sliding scale   SubCutaneous every 6 hours  metroNIDAZOLE  IVPB      metroNIDAZOLE  IVPB 500 milliGRAM(s) IV Intermittent every 8 hours  norepinephrine Infusion 0.05 MICROgram(s)/kG/Min IV Continuous <Continuous>  pantoprazole  Injectable 40 milliGRAM(s) IV Push daily  piperacillin/tazobactam IVPB. 3.375 Gram(s) IV Intermittent every 12 hours  PrismaSATE Dialysate BGK 4 / 2.5 5000 milliLiter(s) CRRT <Continuous>  PrismaSOL Filtration BGK 4 / 2.5 5000 milliLiter(s) CRRT <Continuous>  PrismaSOL Filtration BGK 4 / 2.5 5000 milliLiter(s) CRRT <Continuous>  propofol Infusion 20 MICROgram(s)/kG/Min IV Continuous <Continuous>  sodium chloride 0.9%. 1000 milliLiter(s) IV Continuous <Continuous>  vancomycin  IVPB 750 milliGRAM(s) IV Intermittent every 12 hours  vasopressin Infusion 0.05 Unit(s)/Min IV Continuous <Continuous>      Vitals:  Vital Signs Last 24 Hrs  T(C): 35.2 (15 Oct 2018 04:00), Max: 36.7 (14 Oct 2018 16:00)  T(F): 95.4 (15 Oct 2018 04:00), Max: 98 (14 Oct 2018 16:00)  HR: 90 (15 Oct 2018 09:00) (66 - 120)  BP: 115/62 (15 Oct 2018 08:00) (115/62 - 115/62)  BP(mean): 76 (15 Oct 2018 08:00) (76 - 76)  RR: 18 (15 Oct 2018 09:00) (11 - 26)  SpO2: 94% (15 Oct 2018 09:00) (94% - 100%)    Daily     Daily     I&O's Detail    14 Oct 2018 07:01  -  15 Oct 2018 07:00  --------------------------------------------------------  IN:    dexmedetomidine Infusion: 235.5 mL    DOBUTamine Infusion: 204.7 mL    fentaNYL  Infusion: 253.9 mL    IV PiggyBack: 900 mL    norepinephrine Infusion: 184 mL    Other: 2 mL    Packed Red Blood Cells: 300 mL    propofol Infusion: 270.9 mL    sodium chloride 0.9%.: 30 mL    vasopressin Infusion: 28.8 mL  Total IN: 2409.8 mL    OUT:    Bulb: 25 mL    Bulb: 125 mL    Chest Tube: 20 mL    Chest Tube: 70 mL    Chest Tube: 320 mL    Other: 1300 mL  Total OUT: 1860 mL    Total NET: 549.8 mL      15 Oct 2018 07:01  -  15 Oct 2018 09:24  --------------------------------------------------------  IN:    dexmedetomidine Infusion: 20.6 mL    DOBUTamine Infusion: 13.3 mL    fentaNYL  Infusion: 23.6 mL    norepinephrine Infusion: 26 mL    propofol Infusion: 34 mL    sodium chloride 0.9%.: 10 mL    vasopressin Infusion: 6 mL  Total IN: 133.5 mL    OUT:    Bulb: 150 mL    Chest Tube: 50 mL    Chest Tube: 20 mL  Total OUT: 220 mL    Total NET: -86.5 mL          Physical Exam:     General: No distress. Comfortable.  HEENT: EOM intact.  Neck: Neck supple. JVP not elevated. No masses  Chest: Clear to auscultation bilaterally  CV: Normal S1 and S2. No murmurs, rub, or gallops. Radial pulses normal.  Abdomen: Soft, non-distended, non-tender  Skin: No rashes or skin breakdown  Neurology: Alert and oriented times three. Sensation intact  Psych: Affect normal    Labs:                        8.3    6.45  )-----------( 68       ( 15 Oct 2018 03:30 )             24.4     10-15    137  |  101  |  21  ----------------------------<  112<H>  3.8   |  20<L>  |  2.49<H>    Ca    8.0<L>      15 Oct 2018 03:30  Phos  3.5     10-14  Mg     2.4     10-14    TPro  5.2<L>  /  Alb  2.3<L>  /  TBili  1.3<H>  /  DBili  x   /  AST  16  /  ALT  4   /  AlkPhos  150<H>  10-15    PT/INR - ( 14 Oct 2018 03:50 )   PT: 11.3 SEC;   INR: 0.98          PTT - ( 14 Oct 2018 03:50 )  PTT:32.6 SEC Patient seen and examined. Still remains intubated, on pressors and inotrope. E.faecalis empyema, hypothermic 95.4, on broad spectrum antibiotics.       Medications:  albumin human  5% IVPB 250 milliLiter(s) IV Intermittent once  alteplase for catheter clearance 2 milliGRAM(s) Catheter once  aspirin  chewable 81 milliGRAM(s) Oral daily  dexmedetomidine Infusion 0.5 MICROgram(s)/kG/Hr IV Continuous <Continuous>  dextrose 40% Gel 15 Gram(s) Oral once PRN  dextrose 5%. 1000 milliLiter(s) IV Continuous <Continuous>  dextrose 50% Injectable 12.5 Gram(s) IV Push once  dextrose 50% Injectable 25 Gram(s) IV Push once  dextrose 50% Injectable 25 Gram(s) IV Push once  DOBUTamine Infusion 5 MICROgram(s)/kG/Min IV Continuous <Continuous>  fentaNYL   Infusion 1 MICROgram(s)/kG/Hr IV Continuous <Continuous>  glucagon  Injectable 1 milliGRAM(s) IntraMuscular once PRN  heparin  Injectable 5000 Unit(s) SubCutaneous every 12 hours  insulin lispro (HumaLOG) corrective regimen sliding scale   SubCutaneous every 6 hours  metroNIDAZOLE  IVPB      metroNIDAZOLE  IVPB 500 milliGRAM(s) IV Intermittent every 8 hours  norepinephrine Infusion 0.05 MICROgram(s)/kG/Min IV Continuous <Continuous>  pantoprazole  Injectable 40 milliGRAM(s) IV Push daily  piperacillin/tazobactam IVPB. 3.375 Gram(s) IV Intermittent every 12 hours  PrismaSATE Dialysate BGK 4 / 2.5 5000 milliLiter(s) CRRT <Continuous>  PrismaSOL Filtration BGK 4 / 2.5 5000 milliLiter(s) CRRT <Continuous>  PrismaSOL Filtration BGK 4 / 2.5 5000 milliLiter(s) CRRT <Continuous>  propofol Infusion 20 MICROgram(s)/kG/Min IV Continuous <Continuous>  sodium chloride 0.9%. 1000 milliLiter(s) IV Continuous <Continuous>  vancomycin  IVPB 750 milliGRAM(s) IV Intermittent every 12 hours  vasopressin Infusion 0.05 Unit(s)/Min IV Continuous <Continuous>      Vitals:  Vital Signs Last 24 Hrs  T(C): 35.2 (15 Oct 2018 04:00), Max: 36.7 (14 Oct 2018 16:00)  T(F): 95.4 (15 Oct 2018 04:00), Max: 98 (14 Oct 2018 16:00)  HR: 90 (15 Oct 2018 09:00) (66 - 120)  BP: 115/62 (15 Oct 2018 08:00) (115/62 - 115/62)  BP(mean): 76 (15 Oct 2018 08:00) (76 - 76)  RR: 18 (15 Oct 2018 09:00) (11 - 26)  SpO2: 94% (15 Oct 2018 09:00) (94% - 100%)    Daily     Daily     I&O's Detail    14 Oct 2018 07:01  -  15 Oct 2018 07:00  --------------------------------------------------------  IN:    dexmedetomidine Infusion: 235.5 mL    DOBUTamine Infusion: 204.7 mL    fentaNYL  Infusion: 253.9 mL    IV PiggyBack: 900 mL    norepinephrine Infusion: 184 mL    Other: 2 mL    Packed Red Blood Cells: 300 mL    propofol Infusion: 270.9 mL    sodium chloride 0.9%.: 30 mL    vasopressin Infusion: 28.8 mL  Total IN: 2409.8 mL    OUT:    Bulb: 25 mL    Bulb: 125 mL    Chest Tube: 20 mL    Chest Tube: 70 mL    Chest Tube: 320 mL    Other: 1300 mL  Total OUT: 1860 mL    Total NET: 549.8 mL      15 Oct 2018 07:01  -  15 Oct 2018 09:24  --------------------------------------------------------  IN:    dexmedetomidine Infusion: 20.6 mL    DOBUTamine Infusion: 13.3 mL    fentaNYL  Infusion: 23.6 mL    norepinephrine Infusion: 26 mL    propofol Infusion: 34 mL    sodium chloride 0.9%.: 10 mL    vasopressin Infusion: 6 mL  Total IN: 133.5 mL    OUT:    Bulb: 150 mL    Chest Tube: 50 mL    Chest Tube: 20 mL  Total OUT: 220 mL    Total NET: -86.5 mL          Physical Exam:     General: sedated  HEENT: EOM intact.  Neck: Neck supple. JVP not elevated. No masses  Chest: intubated  CV: S1 and S2 RRR. II/VI CARLOS A, no rub, or gallops. Radial pulses normal. No LE edema b/l.  Abdomen: Soft, +BS  Skin: No rashes or skin breakdown  Neurology: sedated  Psych: not able to evaluate     Labs:                        8.3    6.45  )-----------( 68       ( 15 Oct 2018 03:30 )             24.4     10-15    137  |  101  |  21  ----------------------------<  112<H>  3.8   |  20<L>  |  2.49<H>    Ca    8.0<L>      15 Oct 2018 03:30  Phos  3.5     10-14  Mg     2.4     10-14    TPro  5.2<L>  /  Alb  2.3<L>  /  TBili  1.3<H>  /  DBili  x   /  AST  16  /  ALT  4   /  AlkPhos  150<H>  10-15    PT/INR - ( 14 Oct 2018 03:50 )   PT: 11.3 SEC;   INR: 0.98          PTT - ( 14 Oct 2018 03:50 )  PTT:32.6 SEC    < from: Transthoracic Echocardiogram (10.12.18 @ 08:31) >  DIMENSIONS:  Dimensions:     Normal Values:  LA:     2.5 cm    2.0 - 4.0 cm  Ao:     3.5 cm    2.0 - 3.8 cm  SEPTUM: 0.7 cm    0.6 - 1.2 cm  PWT:    0.8 cm    0.6 - 1.1 cm  LVIDd:  7.3 cm    3.0 - 5.6 cm  LVIDs:  6.6 cm    1.8 - 4.0 cm  Derived Variables:  LVMI: 124 g/m2  RWT: 0.21  Fractional short: 10 %  Ejection Fraction (Teicholtz): 20 %  ------------------------------------------------------------------------  OBSERVATIONS:  Mitral Valve: Mitral annular calcification, otherwise  normal mitral valve. Moderate mitral regurgitation.  Aortic Root: Normal aortic root.  Aortic Valve: Calcified trileaflet aortic valve with normal  opening. Moderate aortic regurgitation.  Vena contracta  width about  0.4 cm.  Left Atrium: Normal left atrium.  LA volume index = 19  cc/m2.  Left Ventricle: Severe global left ventricular systolic  dysfunction. Severe left ventricular enlargement.  Right Heart: Normal right atrium. Normal right ventricular  size with decreased right ventricular systolic function.  Normal tricuspid valve.   Mild tricuspid regurgitation.  Normal pulmonic valve.  Pericardium/PleuraNormal pericardium with no pericardial  effusion.  Hemodynamic: Estimated right ventricular systolic pressure  equals 40 mm Hg, assuming right atrial pressure equals 10  mm Hg, consistent with mild pulmonary hypertension.    < end of copied text >

## 2018-10-15 NOTE — PROGRESS NOTE ADULT - PROBLEM SELECTOR PLAN 1
ESRD on HD for past six years through left upper extremity AVF. Pt remains on multiple pressors. Pt was off CVVHDF overnight. C/w CVVHDF via non tunneled catheter today.

## 2018-10-15 NOTE — CONSULT NOTE ADULT - ASSESSMENT
Pt is a 58 yr old Mandarin speaking male scheduled for Right Thoractomy Decortiation, Right Chest Wall reconstruction with Latissimjus Dorsi Flap Poss Skin Graft with VAC dressing with Dr Pickering 10/11/18. Pt has ICD but unable to identify make or model. Pt hx of ESRD with HD T/Th/Sat for 4 hrs and has stated he has stopped some of his meds but cannot say which ones. Pt seen and received [facility] and [facility] but has 2 different med lists and is unable to identify meds taking. Pt denies blood thinners. Pt has stopped ASA as of last week. Pt hx gathered from [employer] and notes from Dr Pickering - pt is poor historian.     Call through  to patient who went to local pharmacy for confirmation of meds and pt given preop instructions (01 Oct 2018 09:25)    Pt has multiple comorbidities including CHF, EF of 10%, renal failure on dialysis,  who has had chronic bilateral pleural effusions.  The patient previously underwent left VATS and PleurX drains  in 2015.  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  During this admission pt underwent OR with Plastic and Thoracic surgery,  for definitive repair that involved thoracotomy, decortication, excision of empyema cavity along with muscle flap reconstruction. Pt underwent surgery on 10/11 (Right thoracotomy, resection of portion of R 7th rib, evacuation of infected hemothorax, takedown of pleurocutaneous fistula - and noted to have foul smelling hematoma).  OR cultures are growing Enterococcus faecalis.    Pt started on empiric vanco/zosyn/flagyl since 10/11.  He has multiple right  chest tubes/ drains in place and remains on vent support. Pt is on ESRD.  He has had intermittent episodes of hypothermia, and low bp (85/44). ID consult called for further antibiotic management.     Problem/Plan - 1:    ·	Infected right hemothorax    - Cont broad spectrum abx for now - agree with vanco/zosyn.  Thus far, OR cultures growing sensitive E.faecalis (to amp/vanco).  Renally dose abx, can cont zosyn 3.375 gm IV q12    - Dose vanco by level.  Last vanco dosed at 9 am on 10/15.  Check a random level after next hemodialysis session, if < 15, can dose 1 gm IV x1.      - Will try and obtain prior culture data from outside hospital.      - Cont wound vac, local wound care, and SANDY drain managment as per CT icu.  Fluid is serosanguinous.      - Recommend blood cultures x 2      Will follow,    Anabel Chahal  [phone]

## 2018-10-15 NOTE — PROGRESS NOTE ADULT - SUBJECTIVE AND OBJECTIVE BOX
CLAUDIA HAN          MRN-[id number]    HPI:  Pt is a 58 yr old Mandarin speaking male scheduled for Right Thoractomy Decortiation, Right Chest Wall reconstruction with Latissimjus Dorsi Flap Poss Skin Graft with VAC dressing with Dr Pickering 10/11/18. Pt has ICD but unable to identify make or model. Pt hx of ESRD with HD T/Th/Sat for 4 hrs and has stated he has stopped some of his meds but cannot say which ones. Pt seen and received MC and CC but has 2 different med lists and is unable to identify meds taking. Pt denies blood thinners. Pt has stopped ASA as of last week. Pt hx gathered from [employer] and notes from Dr Pickering - pt is poor historian.     Call through  to patient who went to local pharmacy for confirmation of meds and pt given preop instructions (01 Oct 2018 09:25)      Procedure:  POD # :     Issues:        Interval/Overnight Events/ ROS  Pt remained hemodynamically stable overnight, not on any pressors or inotropes. OOB to chair, breathing comfortably with minimal pain. Ambulated several times . Denies pain, no SOB, no palpitations, no nausea/ no vomiting, no dizziness  A-line and sharma d/kirsten         PAST MEDICAL & SURGICAL HISTORY:  Smoking hx  Cardiomyopathy  Wound: right chest  ICD (implantable cardioverter-defibrillator) in place  OA (osteoarthritis)  HLD (hyperlipidemia)  BPH (benign prostatic hyperplasia)  Pleural effusion  HTN (hypertension)  ESRD (end stage renal disease)  H/O chest wound: right  History of wound infection: right chest wall - revision 5/18 and again in 7/18  History of implantable cardioverter-defibrillator (ICD) placement: pt unsure when placed  H/O bilateral hip replacements: 2008, 2009    Allergies    No Known Allergies    Intolerances            ***VITAL SIGNS:  Vital Signs Last 24 Hrs  T(C): 36.6 (15 Oct 2018 16:00), Max: 37.1 (15 Oct 2018 12:00)  T(F): 97.9 (15 Oct 2018 16:00), Max: 98.7 (15 Oct 2018 12:00)  HR: 88 (15 Oct 2018 16:00) (66 - 120)  BP: 95/67 (15 Oct 2018 14:45) (95/67 - 115/62)  BP(mean): 73 (15 Oct 2018 14:45) (73 - 76)  RR: 18 (15 Oct 2018 16:00) (15 - 26)  SpO2: 99% (15 Oct 2018 16:00) (94% - 100%)    I/Os:   I&O's Detail    14 Oct 2018 07:01  -  15 Oct 2018 07:00  --------------------------------------------------------  IN:    dexmedetomidine Infusion: 235.5 mL    DOBUTamine Infusion: 204.7 mL    fentaNYL  Infusion: 253.9 mL    IV PiggyBack: 900 mL    norepinephrine Infusion: 184 mL    Other: 2 mL    Packed Red Blood Cells: 300 mL    propofol Infusion: 270.9 mL    sodium chloride 0.9%.: 30 mL    vasopressin Infusion: 28.8 mL  Total IN: 2409.8 mL    OUT:    Bulb: 25 mL    Bulb: 125 mL    Chest Tube: 20 mL    Chest Tube: 70 mL    Chest Tube: 320 mL    Other: 1300 mL  Total OUT: 1860 mL    Total NET: 549.8 mL      15 Oct 2018 07:01  -  15 Oct 2018 18:26  --------------------------------------------------------  IN:    dexmedetomidine Infusion: 107.5 mL    DOBUTamine Infusion: 52.9 mL    fentaNYL  Infusion: 129.8 mL    IV PiggyBack: 505 mL    norepinephrine Infusion: 119.6 mL    Packed Red Blood Cells: 267 mL    propofol Infusion: 166 mL    sodium chloride 0.9%.: 45 mL    vasopressin Infusion: 33 mL  Total IN: 1425.8 mL    OUT:    Bulb: 225 mL    Bulb: 150 mL    Chest Tube: 150 mL    Chest Tube: 70 mL    Chest Tube: 50 mL  Total OUT: 645 mL    Total NET: 780.8 mL          CAPILLARY BLOOD GLUCOSE      POCT Blood Glucose.: 119 mg/dL (15 Oct 2018 17:58)  POCT Blood Glucose.: 121 mg/dL (15 Oct 2018 12:09)  POCT Blood Glucose.: 110 mg/dL (15 Oct 2018 06:03)  POCT Blood Glucose.: 104 mg/dL (15 Oct 2018 00:37)      =======================  MEDICATIONS  ===================  MEDICATIONS  (STANDING):  albumin human  5% IVPB 250 milliLiter(s) IV Intermittent once  alteplase for catheter clearance 2 milliGRAM(s) Catheter once  aspirin  chewable 81 milliGRAM(s) Oral daily  dexmedetomidine Infusion 0.5 MICROgram(s)/kG/Hr (7.375 mL/Hr) IV Continuous <Continuous>  dextrose 5%. 1000 milliLiter(s) (50 mL/Hr) IV Continuous <Continuous>  dextrose 50% Injectable 12.5 Gram(s) IV Push once  dextrose 50% Injectable 25 Gram(s) IV Push once  dextrose 50% Injectable 25 Gram(s) IV Push once  DOBUTamine Infusion 5 MICROgram(s)/kG/Min (8.85 mL/Hr) IV Continuous <Continuous>  fentaNYL   Infusion 1 MICROgram(s)/kG/Hr (5.9 mL/Hr) IV Continuous <Continuous>  insulin lispro (HumaLOG) corrective regimen sliding scale   SubCutaneous every 6 hours  metroNIDAZOLE  IVPB      metroNIDAZOLE  IVPB 500 milliGRAM(s) IV Intermittent every 8 hours  norepinephrine Infusion 0.05 MICROgram(s)/kG/Min (2.766 mL/Hr) IV Continuous <Continuous>  pantoprazole  Injectable 40 milliGRAM(s) IV Push daily  piperacillin/tazobactam IVPB. 3.375 Gram(s) IV Intermittent every 12 hours  PrismaSATE Dialysate BGK 4 / 2.5 5000 milliLiter(s) (1000 mL/Hr) CRRT <Continuous>  PrismaSOL Filtration BGK 4 / 2.5 5000 milliLiter(s) (1000 mL/Hr) CRRT <Continuous>  PrismaSOL Filtration BGK 4 / 2.5 5000 milliLiter(s) (200 mL/Hr) CRRT <Continuous>  propofol Infusion 20 MICROgram(s)/kG/Min (7.08 mL/Hr) IV Continuous <Continuous>  sodium chloride 0.9%. 1000 milliLiter(s) (30 mL/Hr) IV Continuous <Continuous>  vancomycin  IVPB 750 milliGRAM(s) IV Intermittent every 12 hours  vasopressin Infusion 0.05 Unit(s)/Min (3 mL/Hr) IV Continuous <Continuous>    MEDICATIONS  (PRN):  dextrose 40% Gel 15 Gram(s) Oral once PRN Blood Glucose LESS THAN 70 milliGRAM(s)/deciliter  glucagon  Injectable 1 milliGRAM(s) IntraMuscular once PRN Glucose LESS THAN 70 milligrams/deciliter      ======================VENTILATOR SETTINGS  ==============  Mode: AC/ CMV (Assist Control/ Continuous Mandatory Ventilation)  RR (machine): 18  TV (machine): 500  FiO2: 40  PEEP: 5  MAP: 12  PIP: 30      =================== PATIENT CARE ACCESS DEVICES ==========  Peripheral IV  Central Venous Line	R	L	IJ	Fem	SC			Placed:   Arterial Line	R	L	PT	DP	Fem	Rad	Ax	Placed:   Midline:				  Urinary Catheter, Date Placed:   Necessity of urinary, arterial, and venous catheters discussed    ======================= PHYSICAL EXAM===================  General:                         Comfortable, Awake, alert, not in any distress  Neuro:                            Moving all extremities to commands. No focal deficits	  HEENT:                           CHER/ ETT/ NGT/ trach  Respiratory:	Lungs clear on auscultation bilaterally with good aeration.                                           No rales, rhonchi, no wheezing. Effort even and unlabored.  CV:		Regular rate and rhythm. Normal S1/S2. No murmurs  Abdomen:	                     Soft,  nontender, not-distended. Bowel sounds present / absent.   Skin:		No rash.  Extremities:	Warm, no cyanosis or edema.  Palpable pulses    ============================ LABS =======================                        7.7    9.49  )-----------( 83       ( 15 Oct 2018 17:47 )             22.8     10-15    136  |  101  |  23  ----------------------------<  135<H>  4.0   |  19<L>  |  2.83<H>    Ca    7.9<L>      15 Oct 2018 09:53  Phos  3.2     10-15  Mg     2.5     10-15    TPro  5.0<L>  /  Alb  2.3<L>  /  TBili  1.2  /  DBili  x   /  AST  18  /  ALT  4   /  AlkPhos  159<H>  10-15    LIVER FUNCTIONS - ( 15 Oct 2018 09:53 )  Alb: 2.3 g/dL / Pro: 5.0 g/dL / ALK PHOS: 159 u/L / ALT: 4 u/L / AST: 18 u/L / GGT: x           PT/INR - ( 15 Oct 2018 17:47 )   PT: 10.9 SEC;   INR: 0.98          PTT - ( 15 Oct 2018 17:47 )  PTT:32.5 SEC  ABG - ( 15 Oct 2018 17:47 )  pH, Arterial: 7.40  pH, Blood: x     /  pCO2: 33    /  pO2: 112   / HCO3: 21    / Base Excess: -4.5  /  SaO2: 97.0                OTHER  10-11-18 --  --  --      OTHER  10-11-18 --  --  --      TISSUE  10-11-18 --  --  --      TISSUE  10-11-18 --  --  --      TISSUE  10-11-18 --  --  Enterococcus faecalis      PLEURAL FLUID  10-11-18 --  --    GPCCH^Gram Pos Cocci in Chains  QUANTITY OF BACTERIA SEEN: MODERATE (3+)  GPCPR^Gram Pos Cocci in Pairs  QUANTITY OF BACTERIA SEEN: MODERATE (3+)  WBC^White Blood Cells  QNTY CELLS IN GRAM STAIN: FEW (2+)          ===================== IMAGING STUDIES ===================  Radiology personally reviewed.    ====================ASSESSMENT AND PLAN ================      ====================== NEUROLOGY=======================  Pain control with PCA / PCEA / Tylenol IV / Toradol / Percocet  Pt is on Precedex for agitation  Pt is sedated with Propofol / Fentanyl    ==================== RESPIRATORY========================  Pt is on            L nasal canula / Face tent____% FiO2  Comfortable, no evidence of distress.  Using incentive spirometry & doing                ml  Monitor chest tube output  Chest tube to suction / water seal	    Mechanical Ventilation:  Mode: AC/ CMV (Assist Control/ Continuous Mandatory Ventilation)  RR (machine): 18  TV (machine): 500  FiO2: 40  PEEP: 5  MAP: 12  PIP: 30    Mechanical ventilator status assessed & settings reviewed  Continue bronchodilators, pulmonary toilet  Head of bed elevation to 30-40 degrees    ====================CARDIOVASCULAR=====================  Continue hemodynamic monitoring/ telemetry  Not on any pressors  Continue cardiovascular / antihypertensive medications    ===================== RENAL ============================  Continue LR 30CC/hr      D/C IVF  Monitor I/Os, BUN/ Cr  and electrolytes  D/C Sharma      Keep Sharma for UO monitoring  BPH: Continue Flomax/ Finasteride      ==================== GASTROINTESTINAL===================  On regular diet, tolerating well  Continue GI prophylaxis with Pepcid / Protonix  Continue Zofran / Reglan for nausea - PRN	  NPO    =======================    ENDOCRIN  =====================  Glycemic monitoring  F/S with coverage  ===================HEMATOLOGIC/ONCOLOGIC =============  Monitor chest tube output. No signs of active bleeding.   Follow CBC, coags  in AM  DVT prophylaxis with SCD, sc Heparin    ========================INFECTIOUS DISEASE===============  No signs of infection. Monitor for fever / leukocytosis.  All surgical incision / chest tube  sites look clean  D/C Sharma      Pertinent clinical, laboratory, radiographic, hemodynamic, echocardiographic, respiratory data, microbiologic data and chart were reviewed and analyzed frequently throughout the course of the day and night. GI and DVT prophylaxis, glycemic control, head of bed elevation and skin care issues were addressed.  Patient seen, examined and plan discussed with CT Surgery / CTICU team during rounds.  Pt remains critically ill in imminent risk of  deterioration and requires very careful cardio- pulmonary monitoring and support.    I have spent               minutes of critical care time with this pt between            am/pm    and               am/ pm         minutes spent on total encounter; more than 50% of the visit was spent counseling and/or coordinating care by the attending physician.        KERLINE Faith MD CLAUDIA HAN          MRN-[id number]    HPI:  Pt is a 58 yr old Mandarin speaking male scheduled for Right Thoracotomy, Decortication , Right Chest Wall reconstruction with Latissimjus Dorsi Flap Poss Skin Graft with VAC dressing with Dr Pickering 10/11/18. Pt has ICD but unable to identify make or model. Pt hx of ESRD with HD T/Th/Sat for 4 hrs and has stated he has stopped some of his meds but cannot say which ones. Pt seen and received MC and CC but has 2 different med lists and is unable to identify meds taking. Pt denies blood thinners. Pt has stopped ASA as of last week. Pt hx gathered from [employer] and notes from Dr Pickering - pt is poor historian.      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          Procedure:  Thoracotomy  10/11/2018       · Operative Findings	Right thoracotomy, resection of portion of R 7th rib, evacuation of infected hemothorax, takedown of pleurocutaneous fistula	    · Operative Findings	s/p R chest wall reconstruction with tunneled latissimus dorsi flap, covered by serratus anterior flap, after R thoracotomy, takedown pleurocutaneous fistula, decortication, resection Right 7th rib	         · Drains	3 28Fr chest tubes (A,P, and diaphragm), 2 PRS SQ SANDY drains, VAC	  · Estimated Blood Loss	1000 milliLiter(s)	  · IV Infusions - Crystalloids	4L	  · IV Infusions - Colloids	500cc	  · IV Infusions - Blood Products	4u PRBC	       POD # 4    Issues: s/p  infected right hemothorax evacuation             multiple right  chest tubes/ drains in place             distributive and cardiogenic shock/ SIRS             acute resp failure on vent support             posthemorrhagic anemia             multifactorial thrombocytopenia ( HIT negative)             lactic acidosis             hyperglycemia             ESRD on HD             acute on chronic systolic CHF ( EF 10 %), ICD in place                   Interval/OR Events/ ROS  Pt hypotensive through the surgery - on multiple pressors / inotropes ( Levo, Epi, Dobutamine). Required 4 units of PRBC in addition to colloids and crystalloids for  symptomatic  posthemorrhagic anemia during the surgery. Pt arrived in ICU orally intubated, sedated, on Levo, Dobut, Epi.    Overnight in ICU still on multiple pressors/ inotropes./ Vent support. Mixed resp/ metab acidosis minimally  improved Lactic acidemia- unchanged @ 2-3.. Pt will need HD today ( ESRD on HD - anuria)  On POD 1 weaned off Epi, Quan. Remains on Dobutamine, Levophed, Vasopressin; on POD 2 started  CVVHD. Oxygenation and acid base status  is little  improved. Troponin is down to 88. Cardiology / CHF team on board to help in managing decompensated CHF/ cardiogenic shock. OR culture grew Enterococcus faecalis sensitive to Ampi/ Vanco  POD 3,4 decreasing doses of  Levophed, still  on Vaso/ Dobutamine. Sherman of CPAP in Am aborted due to tachypnea and pt's distress.  Will attempt HD today in view of decreased requirement for pressors.      PAST MEDICAL & SURGICAL HISTORY:  Smoking hx  Cardiomyopathy  Wound: right chest  ICD (implantable cardioverter-defibrillator) in place  OA (osteoarthritis)  HLD (hyperlipidemia)  BPH (benign prostatic hyperplasia)  Pleural effusion  HTN (hypertension)  ESRD (end stage renal disease)  H/O chest wound: right  History of wound infection: right chest wall - revision 5/18 and again in 7/18  History of implantable cardioverter-defibrillator (ICD) placement: pt unsure when placed  H/O bilateral hip replacements: 2008, 2009    Home Medications:   * Patient Currently Takes Medications as of 01-Oct-2018 09:29 documented in Structured Notes  · 	nortriptyline 50 mg oral capsule: 1 cap(s) orally once a day  · 	Vitamin B Complex: 1 tab(s) orally once a day  · 	Nephplex Rx oral tablet: 1 tab(s) orally once a day  · 	aspirin 81 mg oral tablet: 1 tab(s) orally once a day last dose 10/3/2018  · 	Vitamin C 500 mg oral tablet: 1 tab(s) orally once a day  · 	Breo Ellipta 100 mcg-25 mcg/inh inhalation powder: 1 puff(s) inhaled once a day patient denies using it  · 	Calcium 500: 1 tab(s) orally 2 times a day  · 	carvedilol 6.25 mg oral tablet: 1 tab(s) orally once a day  · 	docusate sodium 100 mg oral tablet: 1 tab(s) orally 2 times a day, As Needed  · 	folic acid 1 mg oral tablet: 1 tab(s) orally once a day  · 	Mag-Ox 400 oral tablet: 1 tab(s) orally once a day  · 	midodrine 10 mg oral tablet: 1 tab(s) orally once a day  · 	Multiple Vitamins oral tablet: 1 tab(s) orally once a day last dose 10/3/2018  · 	Namenda 10 mg oral tablet: 1 tab(s) orally 2 times a day  · 	Renvela 800 mg oral tablet: 2 tab(s) orally every 8 hours  · 	simethicone 80 mg oral tablet: 1 tab(s) orally 3 times a day (after meals)  · 	torsemide 20 mg oral tablet: 1 tab(s) orally once a day  · 	vitamin A: 1 tab(s) orally once a day      Allergies  No Known Allergies    ***VITAL SIGNS:  Vital Signs Last 24 Hrs  T(C): 36.6 (15 Oct 2018 16:00), Max: 37.1 (15 Oct 2018 12:00)  T(F): 97.9 (15 Oct 2018 16:00), Max: 98.7 (15 Oct 2018 12:00)  HR: 88 (15 Oct 2018 16:00) (66 - 120)  BP: 95/67 (15 Oct 2018 14:45) (95/67 - 115/62)  BP(mean): 73 (15 Oct 2018 14:45) (73 - 76)  RR: 18 (15 Oct 2018 16:00) (15 - 26)  SpO2: 99% (15 Oct 2018 16:00) (94% - 100%)    I/Os:   I&O's Detail    14 Oct 2018 07:01  -  15 Oct 2018 07:00  --------------------------------------------------------  IN:    dexmedetomidine Infusion: 235.5 mL    DOBUTamine Infusion: 204.7 mL    fentaNYL  Infusion: 253.9 mL    IV PiggyBack: 900 mL    norepinephrine Infusion: 184 mL    Other: 2 mL    Packed Red Blood Cells: 300 mL    propofol Infusion: 270.9 mL    sodium chloride 0.9%.: 30 mL    vasopressin Infusion: 28.8 mL  Total IN: 2409.8 mL    OUT:    Bulb: 25 mL    Bulb: 125 mL    Chest Tube: 20 mL    Chest Tube: 70 mL    Chest Tube: 320 mL    Other: 1300 mL  Total OUT: 1860 mL    Total NET: 549.8 mL    15 Oct 2018 07:01  -  15 Oct 2018 18:26  --------------------------------------------------------  IN:    dexmedetomidine Infusion: 107.5 mL    DOBUTamine Infusion: 52.9 mL    fentaNYL  Infusion: 129.8 mL    IV PiggyBack: 505 mL    norepinephrine Infusion: 119.6 mL    Packed Red Blood Cells: 267 mL    propofol Infusion: 166 mL    sodium chloride 0.9%.: 45 mL    vasopressin Infusion: 33 mL  Total IN: 1425.8 mL    OUT:    Bulb: 225 mL    Bulb: 150 mL    Chest Tube: 150 mL    Chest Tube: 70 mL    Chest Tube: 50 mL  Total OUT: 645 mL    Total NET: 780.8 mL    CAPILLARY BLOOD GLUCOSE  POCT Blood Glucose.: 119 mg/dL (15 Oct 2018 17:58)  POCT Blood Glucose.: 121 mg/dL (15 Oct 2018 12:09)  POCT Blood Glucose.: 110 mg/dL (15 Oct 2018 06:03)  POCT Blood Glucose.: 104 mg/dL (15 Oct 2018 00:37)      =======================  MEDICATIONS  ===================  MEDICATIONS  (STANDING):  albumin human  5% IVPB 250 milliLiter(s) IV Intermittent once  alteplase for catheter clearance 2 milliGRAM(s) Catheter once  aspirin  chewable 81 milliGRAM(s) Oral daily  dexmedetomidine Infusion 0.5 MICROgram(s)/kG/Hr (7.375 mL/Hr) IV Continuous <Continuous>  dextrose 5%. 1000 milliLiter(s) (50 mL/Hr) IV Continuous <Continuous>  dextrose 50% Injectable 12.5 Gram(s) IV Push once  dextrose 50% Injectable 25 Gram(s) IV Push once  dextrose 50% Injectable 25 Gram(s) IV Push once  DOBUTamine Infusion 5 MICROgram(s)/kG/Min (8.85 mL/Hr) IV Continuous <Continuous>  fentaNYL   Infusion 1 MICROgram(s)/kG/Hr (5.9 mL/Hr) IV Continuous <Continuous>  insulin lispro (HumaLOG) corrective regimen sliding scale   SubCutaneous every 6 hours  metroNIDAZOLE  IVPB      metroNIDAZOLE  IVPB 500 milliGRAM(s) IV Intermittent every 8 hours  norepinephrine Infusion 0.05 MICROgram(s)/kG/Min (2.766 mL/Hr) IV Continuous <Continuous>  pantoprazole  Injectable 40 milliGRAM(s) IV Push daily  piperacillin/tazobactam IVPB. 3.375 Gram(s) IV Intermittent every 12 hours  PrismaSATE Dialysate BGK 4 / 2.5 5000 milliLiter(s) (1000 mL/Hr) CRRT <Continuous>  PrismaSOL Filtration BGK 4 / 2.5 5000 milliLiter(s) (1000 mL/Hr) CRRT <Continuous>  PrismaSOL Filtration BGK 4 / 2.5 5000 milliLiter(s) (200 mL/Hr) CRRT <Continuous>  propofol Infusion 20 MICROgram(s)/kG/Min (7.08 mL/Hr) IV Continuous <Continuous>  sodium chloride 0.9%. 1000 milliLiter(s) (30 mL/Hr) IV Continuous <Continuous>  vancomycin  IVPB 750 milliGRAM(s) IV Intermittent every 12 hours  vasopressin Infusion 0.05 Unit(s)/Min (3 mL/Hr) IV Continuous <Continuous>    MEDICATIONS  (PRN):  dextrose 40% Gel 15 Gram(s) Oral once PRN Blood Glucose LESS THAN 70 milliGRAM(s)/deciliter  glucagon  Injectable 1 milliGRAM(s) IntraMuscular once PRN Glucose LESS THAN 70 milligrams/deciliter    ======================VENTILATOR SETTINGS  ==============  Mode: AC/ CMV (Assist Control/ Continuous Mandatory Ventilation)  RR (machine): 18  TV (machine): 500  FiO2: 40  PEEP: 5  MAP: 12  PIP: 30      =================== PATIENT CARE ACCESS DEVICES ==========  Peripheral IV (+)  Central Venous Line R/ IJ (+) ; R fem HD catheter  Arterial Line	R / Rad(+)			  Necessity of  arterial, and venous catheters discussed  ======================= PHYSICAL EXAM===================  General:             sedated, intubated  Neuro:               Moving  extremities spontaneously    with lower dose of sedation. 	  HEENT:                           CHER/ ETT/ NGT  Respiratory:	Lungs sound coarse on auscultation bilaterally with good aeration.                            No rales, rhonchi, no wheezing.                           RIght thoracotomy site - covered with dressing, chest tube site -clean  CV:		Regular rate and rhythm. Normal S1/S2.  Abdomen:          Soft,  nontender, not-distended. Bowel sounds present - hypoactive  Skin:		No rash.  Extremities:	Warm, no cyanosis or edema.  (+) pulses; left arm AV fistula    ============================ LABS =======================                        7.7    9.49  )-----------( 83       ( 15 Oct 2018 17:47 )             22.8     10-15    136  |  101      |  23  ----------------------------<  135<H>  4.0   |  19<L>  |  2.83<H>    Ca    7.9<L>      15 Oct 2018 09:53  Phos  3.2     10-15  Mg     2.5     10-15    TPro  5.0<L>  /  Alb  2.3<L>  /  TBili  1.2  /  DBili  x   /  AST  18  /  ALT  4   /  AlkPhos  159<H>  10-15          PT/INR - ( 15 Oct 2018 17:47 )   PT: 10.9 SEC;   INR: 0.98     PTT:32.5 SEC    ABG - ( 15 Oct 2018 17:47 )  pH, Arterial: 7.40  pH, Blood: x     /  pCO2: 33    /  pO2: 112   / HCO3: 21    / Base Excess: -4.5  /  SaO2: 97.0      Blood Gas Arterial - Lactate: 0.7 Please note updated reference range. mmol/L (10.15.18 @ 17:47)  Blood Gas Arterial - Lactate: 0.7: Please note updated reference range. mmol/L (10.14.18 @ 03:50)  Blood Gas Arterial - Lactate: 0.6: Please note updated reference range. mmol/L (10.13.18 @ 21:30)      Blood Gas Arterial - Lactate: 3.3: Please note updated reference range. mmol/L (10.12.18 @ 02:53)  Blood Gas Arterial - Lactate: 2.2: Please note updated reference range. mmol/L (10.11.18 @ 21:20)  Blood Gas Arterial - Lactate: 2.5: Please note updated reference range. mmol/L (10.11.18 @ 18:00)    Troponin T, High Sensitivity: 62: Delta: 84 on 10/14/   Troponin T, High Sensitivity: 84: Delta: 83 on 10/13/   Troponin T, High Sensitivity: 83: Delta: 88 on 10/13/   Troponin T, High Sensitivity: 88: Delta: 113 on 10/13/  Troponin T, High Sensitivity: 122: Delta: 98 on 10/12/  Troponin T, High Sensitivity: 98: Delta: 96 on 10/12/       OTHER  10-11-18 --  --  --    OTHER  10-11-18 --  --  --    TISSUE  10-11-18 --  --  --    TISSUE  10-11-18 --  --  --    TISSUE  10-11-18 --  --  Enterococcus faecalis    PLEURAL FLUID  10-11-18 --  --    GPCCH^Gram Pos Cocci in Chains  QUANTITY OF BACTERIA SEEN: MODERATE (3+)  GPCPR^Gram Pos Cocci in Pairs  QUANTITY OF BACTERIA SEEN: MODERATE (3+)  WBC^White Blood Cells  QNTY CELLS IN GRAM STAIN: FEW (2+)    ===================== IMAGING STUDIES ===================  Radiology personally reviewed.  < from: Xray Chest 1 View- PORTABLE-Routine (10.15.18 @ 06:57) >    CLINICAL INFORMATION: Post right thoracotomy  INTERPRETATION:     The endotracheal and enteric tube as well as right IJ line and   subcutaneous AICD are unchanged. Likewise, 3 right-sided chest tubes are   present. Subcutaneous emphysema on the right is decreasing somewhat.   Postop right sided effusion and atelectasis again seen. Postop   pneumoperitoneum.  No pneumothorax.    Small loculated left effusion unchanged. Left lung and right upper lobe   are clear. No pneumothorax appreciated on this study.      COMPARISON:  October 14  IMPRESSION:  Follow-up post right thoracotomy with multiple chest tubes   showing no significant change.    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          Procedure:  Thoracotomy  10/11/2018       · Operative Findings	Right thoracotomy, resection of portion of R 7th rib, evacuation of infected hemothorax, takedown of pleurocutaneous fistula	    · Operative Findings	s/p R chest wall reconstruction with tunneled latissimus dorsi flap, covered by serratus anterior flap, after R thoracotomy, takedown pleurocutaneous fistula, decortication, resection Right 7th rib	    Issues: s/p  infected right hemothorax evacuation/ Enterococcus empyema             multiple right  chest tubes/ drains in place             distributive and cardiogenic shock/ SIRS             acute resp failure on vent support             posthemorrhagic anemia/  dilutional thrombocytopenia ( s/p PRBC and PLT today)             lactic acidosis             hyperglycemia             ESRD on HD             acute on chronic systolic CHF ( EF 10 %), ICD in place      ====================== NEUROLOGY=======================  Pain control with iv Fentanyl  Pt is sedated with Propofol /Precedex    ==================== RESPIRATORY========================  Monitor chest tube output  Chest tube x3 to  water seal	  Candido x2 to bulb suction    Mechanical Ventilation:  Mode: AC/ CMV (Assist Control/ Continuous Mandatory Ventilation)  RR (machine): 18  TV (machine): 500  FiO2: 40  PEEP: 5  MAP: 10.4  PIP: 24    Mechanical ventilator status assessed & settings reviewed  Continue bronchodilators, pulmonary toilet  Head of bed elevation to 30-40 degrees  F/up  serial ABG's  Will attempt CPAP later today if more stable    ====================CARDIOVASCULAR=====================  Continue hemodynamic monitoring/ telemetry  Titrating Levo, Vaso, Dobutamine for SBP > 90, MAP > 65   Elevated Trop trending down; , likely ESRD/Post-op SIRS, and unlikely ACS, Cardiology following, and d/w cards attending and Heart failure team--No Cath intervention indicated,   Flow track noninvasive CO/ CI monitoring ( CI @ 2.7-3)  ASA started  2 days ago    ===================== RENAL ============================  Continue NS 30CC/hr  KVO     Monitor I/Os, BUN/ Cr  and electrolytes  No Moore - Pt was on HD for ESRD preop.  Currently off CVVHD  due to  shiley  cath clotting    Trial of HD today via left arm functional AV fistula - for fluid removal    ==================== GASTROINTESTINAL===================  NPO  Continue GI prophylaxis with  Protonix  Possibly will start NGT feeds today- tomorrow if not extubated    =======================    ENDOCRIN  =====================  Glycemic monitoring  F/S with coverage  ===================HEMATOLOGIC/ONCOLOGIC =============  Monitor chest tubes output. No signs of active bleeding.   Transfused 1 PRBC  Follow CBC, coags  today  DVT prophylaxis with SCD,    Heparin held today due to thrombocytopenia ( HIT negative). Will resume Heparin SC if PLT are    ========================INFECTIOUS DISEASE===============   Monitor for fever / leukocytosis.  All surgical incision / chest tube  sites look clean  Empiric ABX Zosyn, Vanco, Flagyl.  Blood culture x2 sent today  ID Dr Chahal on board    Pertinent clinical, laboratory, radiographic, hemodynamic, echocardiographic, respiratory data, microbiologic data and chart were reviewed and analyzed frequently throughout the course of the day and night. GI and DVT prophylaxis, glycemic control, head of bed elevation and skin care issues were addressed.  Patient seen, examined and plan discussed with CT Surgery / CTICU team during rounds.  Pt remains critically ill in imminent risk of  deterioration and requires very careful cardio- pulmonary monitoring and support.    I have spent     60   minutes of critical care time with this pt between  7 am    and 11:55 pm         minutes spent on total encounter; more than 50% of the visit was spent counseling and/or coordinating care by the attending physician.        KERLINE Faith MD CLAUDIA HAN          MRN-[id number]    HPI:  Pt is a 58 yr old Mandarin speaking male scheduled for Right Thoracotomy, Decortication , Right Chest Wall reconstruction with Latissimjus Dorsi Flap Poss Skin Graft with VAC dressing with Dr Pickering 10/11/18. Pt has ICD but unable to identify make or model. Pt hx of ESRD with HD T/Th/Sat for 4 hrs and has stated he has stopped some of his meds but cannot say which ones. Pt seen and received MC and CC but has 2 different med lists and is unable to identify meds taking. Pt denies blood thinners. Pt has stopped ASA as of last week. Pt hx gathered from [employer] and notes from Dr Pickering - pt is poor historian.      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          Procedure:  Thoracotomy  10/11/2018       · Operative Findings	Right thoracotomy, resection of portion of R 7th rib, evacuation of infected hemothorax, takedown of pleurocutaneous fistula	    · Operative Findings	s/p R chest wall reconstruction with tunneled latissimus dorsi flap, covered by serratus anterior flap, after R thoracotomy, takedown pleurocutaneous fistula, decortication, resection Right 7th rib	         · Drains	3 28Fr chest tubes (A,P, and diaphragm), 2 PRS SQ SANDY drains, VAC	  · Estimated Blood Loss	1000 milliLiter(s)	  · IV Infusions - Crystalloids	4L	  · IV Infusions - Colloids	500cc	  · IV Infusions - Blood Products	4u PRBC	       POD # 4    Issues: s/p  infected right hemothorax evacuation             multiple right  chest tubes/ drains in place             distributive and cardiogenic shock/ SIRS             acute resp failure on vent support             posthemorrhagic anemia             multifactorial thrombocytopenia ( HIT negative)             lactic acidosis             hyperglycemia             ESRD on HD             acute on chronic systolic CHF ( EF 10 %), ICD in place                   Interval/OR Events/ ROS  Pt hypotensive through the surgery - on multiple pressors / inotropes ( Levo, Epi, Dobutamine). Required 4 units of PRBC in addition to colloids and crystalloids for  symptomatic  posthemorrhagic anemia during the surgery. Pt arrived in ICU orally intubated, sedated, on Levo, Dobut, Epi.    Overnight in ICU still on multiple pressors/ inotropes./ Vent support. Mixed resp/ metab acidosis minimally  improved Lactic acidemia- unchanged @ 2-3.. Pt will need HD today ( ESRD on HD - anuria)  On POD 1 weaned off Epi, Quan. Remains on Dobutamine, Levophed, Vasopressin; on POD 2 started  CVVHD. Oxygenation and acid base status  is little  improved. Troponin is down to 88. Cardiology / CHF team on board to help in managing decompensated CHF/ cardiogenic shock. OR culture grew Enterococcus faecalis sensitive to Ampi/ Vanco  POD 3,4 decreasing doses of  Levophed, still  on Vaso/ Dobutamine. Trial of CPAP in Am aborted due to tachypnea and pt's distress.  Will attempt HD today in view of decreased requirement for pressors.      PAST MEDICAL & SURGICAL HISTORY:  Smoking hx  Cardiomyopathy  Wound: right chest  ICD (implantable cardioverter-defibrillator) in place  OA (osteoarthritis)  HLD (hyperlipidemia)  BPH (benign prostatic hyperplasia)  Pleural effusion  HTN (hypertension)  ESRD (end stage renal disease)  H/O chest wound: right  History of wound infection: right chest wall - revision 5/18 and again in 7/18  History of implantable cardioverter-defibrillator (ICD) placement: pt unsure when placed  H/O bilateral hip replacements: 2008, 2009    Home Medications:   * Patient Currently Takes Medications as of 01-Oct-2018 09:29 documented in Structured Notes  · 	nortriptyline 50 mg oral capsule: 1 cap(s) orally once a day  · 	Vitamin B Complex: 1 tab(s) orally once a day  · 	Nephplex Rx oral tablet: 1 tab(s) orally once a day  · 	aspirin 81 mg oral tablet: 1 tab(s) orally once a day last dose 10/3/2018  · 	Vitamin C 500 mg oral tablet: 1 tab(s) orally once a day  · 	Breo Ellipta 100 mcg-25 mcg/inh inhalation powder: 1 puff(s) inhaled once a day patient denies using it  · 	Calcium 500: 1 tab(s) orally 2 times a day  · 	carvedilol 6.25 mg oral tablet: 1 tab(s) orally once a day  · 	docusate sodium 100 mg oral tablet: 1 tab(s) orally 2 times a day, As Needed  · 	folic acid 1 mg oral tablet: 1 tab(s) orally once a day  · 	Mag-Ox 400 oral tablet: 1 tab(s) orally once a day  · 	midodrine 10 mg oral tablet: 1 tab(s) orally once a day  · 	Multiple Vitamins oral tablet: 1 tab(s) orally once a day last dose 10/3/2018  · 	Namenda 10 mg oral tablet: 1 tab(s) orally 2 times a day  · 	Renvela 800 mg oral tablet: 2 tab(s) orally every 8 hours  · 	simethicone 80 mg oral tablet: 1 tab(s) orally 3 times a day (after meals)  · 	torsemide 20 mg oral tablet: 1 tab(s) orally once a day  · 	vitamin A: 1 tab(s) orally once a day      Allergies  No Known Allergies    ***VITAL SIGNS:  Vital Signs Last 24 Hrs  T(C): 36.6 (15 Oct 2018 16:00), Max: 37.1 (15 Oct 2018 12:00)  T(F): 97.9 (15 Oct 2018 16:00), Max: 98.7 (15 Oct 2018 12:00)  HR: 88 (15 Oct 2018 16:00) (66 - 120)  BP: 95/67 (15 Oct 2018 14:45) (95/67 - 115/62)  BP(mean): 73 (15 Oct 2018 14:45) (73 - 76)  RR: 18 (15 Oct 2018 16:00) (15 - 26)  SpO2: 99% (15 Oct 2018 16:00) (94% - 100%)    I/Os:   I&O's Detail    14 Oct 2018 07:01  -  15 Oct 2018 07:00  --------------------------------------------------------  IN:    dexmedetomidine Infusion: 235.5 mL    DOBUTamine Infusion: 204.7 mL    fentaNYL  Infusion: 253.9 mL    IV PiggyBack: 900 mL    norepinephrine Infusion: 184 mL    Other: 2 mL    Packed Red Blood Cells: 300 mL    propofol Infusion: 270.9 mL    sodium chloride 0.9%.: 30 mL    vasopressin Infusion: 28.8 mL  Total IN: 2409.8 mL    OUT:    Bulb: 25 mL    Bulb: 125 mL    Chest Tube: 20 mL    Chest Tube: 70 mL    Chest Tube: 320 mL    Other: 1300 mL  Total OUT: 1860 mL    Total NET: 549.8 mL    15 Oct 2018 07:01  -  15 Oct 2018 18:26  --------------------------------------------------------  IN:    dexmedetomidine Infusion: 107.5 mL    DOBUTamine Infusion: 52.9 mL    fentaNYL  Infusion: 129.8 mL    IV PiggyBack: 505 mL    norepinephrine Infusion: 119.6 mL    Packed Red Blood Cells: 267 mL    propofol Infusion: 166 mL    sodium chloride 0.9%.: 45 mL    vasopressin Infusion: 33 mL  Total IN: 1425.8 mL    OUT:    Bulb: 225 mL    Bulb: 150 mL    Chest Tube: 150 mL    Chest Tube: 70 mL    Chest Tube: 50 mL  Total OUT: 645 mL    Total NET: 780.8 mL    CAPILLARY BLOOD GLUCOSE  POCT Blood Glucose.: 119 mg/dL (15 Oct 2018 17:58)  POCT Blood Glucose.: 121 mg/dL (15 Oct 2018 12:09)  POCT Blood Glucose.: 110 mg/dL (15 Oct 2018 06:03)  POCT Blood Glucose.: 104 mg/dL (15 Oct 2018 00:37)      =======================  MEDICATIONS  ===================  MEDICATIONS  (STANDING):  albumin human  5% IVPB 250 milliLiter(s) IV Intermittent once  alteplase for catheter clearance 2 milliGRAM(s) Catheter once  aspirin  chewable 81 milliGRAM(s) Oral daily  dexmedetomidine Infusion 0.5 MICROgram(s)/kG/Hr (7.375 mL/Hr) IV Continuous <Continuous>  dextrose 5%. 1000 milliLiter(s) (50 mL/Hr) IV Continuous <Continuous>  dextrose 50% Injectable 12.5 Gram(s) IV Push once  dextrose 50% Injectable 25 Gram(s) IV Push once  dextrose 50% Injectable 25 Gram(s) IV Push once  DOBUTamine Infusion 5 MICROgram(s)/kG/Min (8.85 mL/Hr) IV Continuous <Continuous>  fentaNYL   Infusion 1 MICROgram(s)/kG/Hr (5.9 mL/Hr) IV Continuous <Continuous>  insulin lispro (HumaLOG) corrective regimen sliding scale   SubCutaneous every 6 hours  metroNIDAZOLE  IVPB      metroNIDAZOLE  IVPB 500 milliGRAM(s) IV Intermittent every 8 hours  norepinephrine Infusion 0.05 MICROgram(s)/kG/Min (2.766 mL/Hr) IV Continuous <Continuous>  pantoprazole  Injectable 40 milliGRAM(s) IV Push daily  piperacillin/tazobactam IVPB. 3.375 Gram(s) IV Intermittent every 12 hours  PrismaSATE Dialysate BGK 4 / 2.5 5000 milliLiter(s) (1000 mL/Hr) CRRT <Continuous>  PrismaSOL Filtration BGK 4 / 2.5 5000 milliLiter(s) (1000 mL/Hr) CRRT <Continuous>  PrismaSOL Filtration BGK 4 / 2.5 5000 milliLiter(s) (200 mL/Hr) CRRT <Continuous>  propofol Infusion 20 MICROgram(s)/kG/Min (7.08 mL/Hr) IV Continuous <Continuous>  sodium chloride 0.9%. 1000 milliLiter(s) (30 mL/Hr) IV Continuous <Continuous>  vancomycin  IVPB 750 milliGRAM(s) IV Intermittent every 12 hours  vasopressin Infusion 0.05 Unit(s)/Min (3 mL/Hr) IV Continuous <Continuous>    MEDICATIONS  (PRN):  dextrose 40% Gel 15 Gram(s) Oral once PRN Blood Glucose LESS THAN 70 milliGRAM(s)/deciliter  glucagon  Injectable 1 milliGRAM(s) IntraMuscular once PRN Glucose LESS THAN 70 milligrams/deciliter    ======================VENTILATOR SETTINGS  ==============  Mode: AC/ CMV (Assist Control/ Continuous Mandatory Ventilation)  RR (machine): 18  TV (machine): 500  FiO2: 40  PEEP: 5  MAP: 12  PIP: 30      =================== PATIENT CARE ACCESS DEVICES ==========  Peripheral IV (+)  Central Venous Line R/ IJ (+) ; R fem HD catheter  Arterial Line	R / Rad(+)			  Necessity of  arterial, and venous catheters discussed  ======================= PHYSICAL EXAM===================  General:             sedated, intubated  Neuro:               Moving  extremities spontaneously    with lower dose of sedation. 	  HEENT:                           CHER/ ETT/ NGT  Respiratory:	Lungs sound coarse on auscultation bilaterally with good aeration.                            No rales, rhonchi, no wheezing.                           RIght thoracotomy site - covered with dressing, chest tube site -clean  CV:		Regular rate and rhythm. Normal S1/S2.  Abdomen:          Soft,  nontender, not-distended. Bowel sounds present - hypoactive  Skin:		No rash.  Extremities:	Warm, no cyanosis or edema.  (+) pulses; left arm AV fistula    ============================ LABS =======================                        7.7    9.49  )-----------( 83       ( 15 Oct 2018 17:47 )             22.8     10-15    136  |  101      |  23  ----------------------------<  135<H>  4.0   |  19<L>  |  2.83<H>    Ca    7.9<L>      15 Oct 2018 09:53  Phos  3.2     10-15  Mg     2.5     10-15    TPro  5.0<L>  /  Alb  2.3<L>  /  TBili  1.2  /  DBili  x   /  AST  18  /  ALT  4   /  AlkPhos  159<H>  10-15          PT/INR - ( 15 Oct 2018 17:47 )   PT: 10.9 SEC;   INR: 0.98     PTT:32.5 SEC    ABG - ( 15 Oct 2018 17:47 )  pH, Arterial: 7.40  pH, Blood: x     /  pCO2: 33    /  pO2: 112   / HCO3: 21    / Base Excess: -4.5  /  SaO2: 97.0      Blood Gas Arterial - Lactate: 0.7 Please note updated reference range. mmol/L (10.15.18 @ 17:47)  Blood Gas Arterial - Lactate: 0.7: Please note updated reference range. mmol/L (10.14.18 @ 03:50)  Blood Gas Arterial - Lactate: 0.6: Please note updated reference range. mmol/L (10.13.18 @ 21:30)      Blood Gas Arterial - Lactate: 3.3: Please note updated reference range. mmol/L (10.12.18 @ 02:53)  Blood Gas Arterial - Lactate: 2.2: Please note updated reference range. mmol/L (10.11.18 @ 21:20)  Blood Gas Arterial - Lactate: 2.5: Please note updated reference range. mmol/L (10.11.18 @ 18:00)    Troponin T, High Sensitivity: 62: Delta: 84 on 10/14/   Troponin T, High Sensitivity: 84: Delta: 83 on 10/13/   Troponin T, High Sensitivity: 83: Delta: 88 on 10/13/   Troponin T, High Sensitivity: 88: Delta: 113 on 10/13/  Troponin T, High Sensitivity: 122: Delta: 98 on 10/12/  Troponin T, High Sensitivity: 98: Delta: 96 on 10/12/       OTHER  10-11-18 --  --  --    OTHER  10-11-18 --  --  --    TISSUE  10-11-18 --  --  --    TISSUE  10-11-18 --  --  --    TISSUE  10-11-18 --  --  Enterococcus faecalis    PLEURAL FLUID  10-11-18 --  --    GPCCH^Gram Pos Cocci in Chains  QUANTITY OF BACTERIA SEEN: MODERATE (3+)  GPCPR^Gram Pos Cocci in Pairs  QUANTITY OF BACTERIA SEEN: MODERATE (3+)  WBC^White Blood Cells  QNTY CELLS IN GRAM STAIN: FEW (2+)    ===================== IMAGING STUDIES ===================  Radiology personally reviewed.  < from: Xray Chest 1 View- PORTABLE-Routine (10.15.18 @ 06:57) >    CLINICAL INFORMATION: Post right thoracotomy  INTERPRETATION:     The endotracheal and enteric tube as well as right IJ line and   subcutaneous AICD are unchanged. Likewise, 3 right-sided chest tubes are   present. Subcutaneous emphysema on the right is decreasing somewhat.   Postop right sided effusion and atelectasis again seen. Postop   pneumoperitoneum.  No pneumothorax.    Small loculated left effusion unchanged. Left lung and right upper lobe   are clear. No pneumothorax appreciated on this study.      COMPARISON:  October 14  IMPRESSION:  Follow-up post right thoracotomy with multiple chest tubes   showing no significant change.    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          Procedure:  Thoracotomy  10/11/2018       · Operative Findings	Right thoracotomy, resection of portion of R 7th rib, evacuation of infected hemothorax, takedown of pleurocutaneous fistula	    · Operative Findings	s/p R chest wall reconstruction with tunneled latissimus dorsi flap, covered by serratus anterior flap, after R thoracotomy, takedown pleurocutaneous fistula, decortication, resection Right 7th rib	    Issues: s/p  infected right hemothorax evacuation/ Enterococcus empyema             multiple right  chest tubes/ drains in place             distributive and cardiogenic shock/ SIRS             acute resp failure on vent support             posthemorrhagic anemia/  dilutional thrombocytopenia ( s/p PRBC and PLT today)             lactic acidosis             hyperglycemia             ESRD on HD             acute on chronic systolic CHF ( EF 10 %), ICD in place      ====================== NEUROLOGY=======================  Pain control with iv Fentanyl  Pt is sedated with Propofol /Precedex    ==================== RESPIRATORY========================  Monitor chest tube output  Chest tube x3 to  water seal	  Candido x2 to bulb suction    Mechanical Ventilation:  Mode: AC/ CMV (Assist Control/ Continuous Mandatory Ventilation)  RR (machine): 18  TV (machine): 500  FiO2: 40  PEEP: 5  MAP: 10.4  PIP: 24    Mechanical ventilator status assessed & settings reviewed  Continue bronchodilators, pulmonary toilet  Head of bed elevation to 30-40 degrees  F/up  serial ABG's  Will attempt CPAP later today if more stable    ====================CARDIOVASCULAR=====================  Continue hemodynamic monitoring/ telemetry  Titrating Levo, Vaso, Dobutamine for SBP > 90, MAP > 65   Elevated Trop trending down; , likely ESRD/Post-op SIRS, and unlikely ACS, Cardiology following, and d/w cards attending and Heart failure team--No Cath intervention indicated,   Flow track noninvasive CO/ CI monitoring ( CI @ 2.7-3)  ASA started  2 days ago    ===================== RENAL ============================  Continue NS 30CC/hr  KVO     Monitor I/Os, BUN/ Cr  and electrolytes  No Moore - Pt was on HD for ESRD preop.  Currently off CVVHD  due to  shiley  cath clotting    Trial of HD today via left arm functional AV fistula - for fluid removal    ==================== GASTROINTESTINAL===================  NPO  Continue GI prophylaxis with  Protonix  Possibly will start NGT feeds today- tomorrow if not extubated    =======================    ENDOCRIN  =====================  Glycemic monitoring  F/S with coverage  ===================HEMATOLOGIC/ONCOLOGIC =============  Monitor chest tubes output. No signs of active bleeding.   Transfused 1 PRBC  Follow CBC, coags  today  DVT prophylaxis with SCD,    Heparin held today due to thrombocytopenia ( HIT negative). Will resume Heparin SC if PLT are    ========================INFECTIOUS DISEASE===============   Monitor for fever / leukocytosis.  All surgical incision / chest tube  sites look clean  Empiric ABX Zosyn, Vanco, Flagyl.  Blood culture x2 sent today  ID Dr Chahal on board    Pertinent clinical, laboratory, radiographic, hemodynamic, echocardiographic, respiratory data, microbiologic data and chart were reviewed and analyzed frequently throughout the course of the day and night. GI and DVT prophylaxis, glycemic control, head of bed elevation and skin care issues were addressed.  Patient seen, examined and plan discussed with CT Surgery / CTICU team during rounds.  Pt remains critically ill in imminent risk of  deterioration and requires very careful cardio- pulmonary monitoring and support.    I have spent     60   minutes of critical care time with this pt between  7 am    and 11:55 pm         minutes spent on total encounter; more than 50% of the visit was spent counseling and/or coordinating care by the attending physician.        KERLINE Faith MD CLAUDIA HAN          MRN-[id number]    HPI:  Pt is a 58 yr old Mandarin speaking male scheduled for Right Thoracotomy, Decortication , Right Chest Wall reconstruction with Latissimjus Dorsi Flap Poss Skin Graft with VAC dressing with Dr Pickering 10/11/18. Pt has ICD but unable to identify make or model. Pt hx of ESRD with HD T/Th/Sat for 4 hrs and has stated he has stopped some of his meds but cannot say which ones. Pt seen and received MC and CC but has 2 different med lists and is unable to identify meds taking. Pt denies blood thinners. Pt has stopped ASA as of last week. Pt hx gathered from [employer] and notes from Dr Pickering - pt is poor historian.      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          Procedure:  Thoracotomy  10/11/2018       · Operative Findings	Right thoracotomy, resection of portion of R 7th rib, evacuation of infected hemothorax, takedown of pleurocutaneous fistula	    · Operative Findings	s/p R chest wall reconstruction with tunneled latissimus dorsi flap, covered by serratus anterior flap, after R thoracotomy, takedown pleurocutaneous fistula, decortication, resection Right 7th rib	         · Drains	3 28Fr chest tubes (A,P, and diaphragm), 2 PRS SQ SANDY drains, VAC	  · Estimated Blood Loss	1000 milliLiter(s)	  · IV Infusions - Crystalloids	4L	  · IV Infusions - Colloids	500cc	  · IV Infusions - Blood Products	4u PRBC	       POD # 4    Issues: s/p  infected right hemothorax evacuation             multiple right  chest tubes/ drains in place             distributive and cardiogenic shock/ SIRS             acute resp failure on vent support             posthemorrhagic anemia             multifactorial thrombocytopenia ( HIT negative)             lactic acidosis             hyperglycemia             ESRD on HD             acute on chronic systolic CHF ( EF 10 %), ICD in place                   Interval/OR Events/ ROS  Pt hypotensive through the surgery - on multiple pressors / inotropes ( Levo, Epi, Dobutamine). Required 4 units of PRBC in addition to colloids and crystalloids for  symptomatic  posthemorrhagic anemia during the surgery. Pt arrived in ICU orally intubated, sedated, on Levo, Dobut, Epi.    Overnight in ICU still on multiple pressors/ inotropes./ Vent support. Mixed resp/ metab acidosis minimally  improved Lactic acidemia- unchanged @ 2-3.. Pt will need HD today ( ESRD on HD - anuria)  On POD 1 weaned off Epi, Quan. Remains on Dobutamine, Levophed, Vasopressin; on POD 2 started  CVVHD. Oxygenation and acid base status  is little  improved. Troponin is down to 88. Cardiology / CHF team on board to help in managing decompensated CHF/ cardiogenic shock. OR culture grew Enterococcus faecalis sensitive to Ampi/ Vanco  POD 3,4 decreasing doses of  Levophed, still  on Vaso/ Dobutamine. Trial of CPAP in Am aborted due to tachypnea and pt's distress.  Will attempt HD today in view of decreased requirement for pressors.      PAST MEDICAL & SURGICAL HISTORY:  Smoking hx  Cardiomyopathy  Wound: right chest  ICD (implantable cardioverter-defibrillator) in place  OA (osteoarthritis)  HLD (hyperlipidemia)  BPH (benign prostatic hyperplasia)  Pleural effusion  HTN (hypertension)  ESRD (end stage renal disease)  H/O chest wound: right  History of wound infection: right chest wall - revision 5/18 and again in 7/18  History of implantable cardioverter-defibrillator (ICD) placement: pt unsure when placed  H/O bilateral hip replacements: 2008, 2009    Home Medications:   * Patient Currently Takes Medications as of 01-Oct-2018 09:29 documented in Structured Notes  · 	nortriptyline 50 mg oral capsule: 1 cap(s) orally once a day  · 	Vitamin B Complex: 1 tab(s) orally once a day  · 	Nephplex Rx oral tablet: 1 tab(s) orally once a day  · 	aspirin 81 mg oral tablet: 1 tab(s) orally once a day last dose 10/3/2018  · 	Vitamin C 500 mg oral tablet: 1 tab(s) orally once a day  · 	Breo Ellipta 100 mcg-25 mcg/inh inhalation powder: 1 puff(s) inhaled once a day patient denies using it  · 	Calcium 500: 1 tab(s) orally 2 times a day  · 	carvedilol 6.25 mg oral tablet: 1 tab(s) orally once a day  · 	docusate sodium 100 mg oral tablet: 1 tab(s) orally 2 times a day, As Needed  · 	folic acid 1 mg oral tablet: 1 tab(s) orally once a day  · 	Mag-Ox 400 oral tablet: 1 tab(s) orally once a day  · 	midodrine 10 mg oral tablet: 1 tab(s) orally once a day  · 	Multiple Vitamins oral tablet: 1 tab(s) orally once a day last dose 10/3/2018  · 	Namenda 10 mg oral tablet: 1 tab(s) orally 2 times a day  · 	Renvela 800 mg oral tablet: 2 tab(s) orally every 8 hours  · 	simethicone 80 mg oral tablet: 1 tab(s) orally 3 times a day (after meals)  · 	torsemide 20 mg oral tablet: 1 tab(s) orally once a day  · 	vitamin A: 1 tab(s) orally once a day      Allergies  No Known Allergies    ***VITAL SIGNS:  Vital Signs Last 24 Hrs  T(C): 36.6 (15 Oct 2018 16:00), Max: 37.1 (15 Oct 2018 12:00)  T(F): 97.9 (15 Oct 2018 16:00), Max: 98.7 (15 Oct 2018 12:00)  HR: 88 (15 Oct 2018 16:00) (66 - 120)  BP: 95/67 (15 Oct 2018 14:45) (95/67 - 115/62)  BP(mean): 73 (15 Oct 2018 14:45) (73 - 76)  RR: 18 (15 Oct 2018 16:00) (15 - 26)  SpO2: 99% (15 Oct 2018 16:00) (94% - 100%)    I/Os:   I&O's Detail    14 Oct 2018 07:01  -  15 Oct 2018 07:00  --------------------------------------------------------  IN:    dexmedetomidine Infusion: 235.5 mL    DOBUTamine Infusion: 204.7 mL    fentaNYL  Infusion: 253.9 mL    IV PiggyBack: 900 mL    norepinephrine Infusion: 184 mL    Other: 2 mL    Packed Red Blood Cells: 300 mL    propofol Infusion: 270.9 mL    sodium chloride 0.9%.: 30 mL    vasopressin Infusion: 28.8 mL  Total IN: 2409.8 mL    OUT:    Bulb: 25 mL    Bulb: 125 mL    Chest Tube: 20 mL    Chest Tube: 70 mL    Chest Tube: 320 mL    Other: 1300 mL  Total OUT: 1860 mL    Total NET: 549.8 mL    15 Oct 2018 07:01  -  15 Oct 2018 18:26  --------------------------------------------------------  IN:    dexmedetomidine Infusion: 107.5 mL    DOBUTamine Infusion: 52.9 mL    fentaNYL  Infusion: 129.8 mL    IV PiggyBack: 505 mL    norepinephrine Infusion: 119.6 mL    Packed Red Blood Cells: 267 mL    propofol Infusion: 166 mL    sodium chloride 0.9%.: 45 mL    vasopressin Infusion: 33 mL  Total IN: 1425.8 mL    OUT:    Bulb: 225 mL    Bulb: 150 mL    Chest Tube: 150 mL    Chest Tube: 70 mL    Chest Tube: 50 mL  Total OUT: 645 mL    Total NET: 780.8 mL    CAPILLARY BLOOD GLUCOSE  POCT Blood Glucose.: 119 mg/dL (15 Oct 2018 17:58)  POCT Blood Glucose.: 121 mg/dL (15 Oct 2018 12:09)  POCT Blood Glucose.: 110 mg/dL (15 Oct 2018 06:03)  POCT Blood Glucose.: 104 mg/dL (15 Oct 2018 00:37)      =======================  MEDICATIONS  ===================  MEDICATIONS  (STANDING):  albumin human  5% IVPB 250 milliLiter(s) IV Intermittent once  alteplase for catheter clearance 2 milliGRAM(s) Catheter once  aspirin  chewable 81 milliGRAM(s) Oral daily  dexmedetomidine Infusion 0.5 MICROgram(s)/kG/Hr (7.375 mL/Hr) IV Continuous <Continuous>  dextrose 5%. 1000 milliLiter(s) (50 mL/Hr) IV Continuous <Continuous>  dextrose 50% Injectable 12.5 Gram(s) IV Push once  dextrose 50% Injectable 25 Gram(s) IV Push once  dextrose 50% Injectable 25 Gram(s) IV Push once  DOBUTamine Infusion 5 MICROgram(s)/kG/Min (8.85 mL/Hr) IV Continuous <Continuous>  fentaNYL   Infusion 1 MICROgram(s)/kG/Hr (5.9 mL/Hr) IV Continuous <Continuous>  insulin lispro (HumaLOG) corrective regimen sliding scale   SubCutaneous every 6 hours  metroNIDAZOLE  IVPB      metroNIDAZOLE  IVPB 500 milliGRAM(s) IV Intermittent every 8 hours  norepinephrine Infusion 0.05 MICROgram(s)/kG/Min (2.766 mL/Hr) IV Continuous <Continuous>  pantoprazole  Injectable 40 milliGRAM(s) IV Push daily  piperacillin/tazobactam IVPB. 3.375 Gram(s) IV Intermittent every 12 hours  PrismaSATE Dialysate BGK 4 / 2.5 5000 milliLiter(s) (1000 mL/Hr) CRRT <Continuous>  PrismaSOL Filtration BGK 4 / 2.5 5000 milliLiter(s) (1000 mL/Hr) CRRT <Continuous>  PrismaSOL Filtration BGK 4 / 2.5 5000 milliLiter(s) (200 mL/Hr) CRRT <Continuous>  propofol Infusion 20 MICROgram(s)/kG/Min (7.08 mL/Hr) IV Continuous <Continuous>  sodium chloride 0.9%. 1000 milliLiter(s) (30 mL/Hr) IV Continuous <Continuous>  vancomycin  IVPB 750 milliGRAM(s) IV Intermittent every 12 hours  vasopressin Infusion 0.05 Unit(s)/Min (3 mL/Hr) IV Continuous <Continuous>    MEDICATIONS  (PRN):  dextrose 40% Gel 15 Gram(s) Oral once PRN Blood Glucose LESS THAN 70 milliGRAM(s)/deciliter  glucagon  Injectable 1 milliGRAM(s) IntraMuscular once PRN Glucose LESS THAN 70 milligrams/deciliter    ======================VENTILATOR SETTINGS  ==============  Mode: AC/ CMV (Assist Control/ Continuous Mandatory Ventilation)  RR (machine): 18  TV (machine): 500  FiO2: 40  PEEP: 5  MAP: 12  PIP: 30      =================== PATIENT CARE ACCESS DEVICES ==========  Peripheral IV (+)  Central Venous Line R/ IJ (+) ; R fem HD catheter  Arterial Line	R / Rad(+)			  Necessity of  arterial, and venous catheters discussed  ======================= PHYSICAL EXAM===================  General:             sedated, intubated  Neuro:               Moving  extremities spontaneously    with lower dose of sedation. 	  HEENT:                           CHER/ ETT/ NGT  Respiratory:	Lungs sound coarse on auscultation bilaterally with good aeration.                            No rales, rhonchi, no wheezing.                           RIght thoracotomy site - covered with dressing, chest tube site -clean  CV:		Regular rate and rhythm. Normal S1/S2.  Abdomen:          Soft,  nontender, not-distended. Bowel sounds present - hypoactive  Skin:		No rash.  Extremities:	Warm, no cyanosis or edema.  (+) pulses; left arm AV fistula    ============================ LABS =======================                        7.7    9.49  )-----------( 83       ( 15 Oct 2018 17:47 )             22.8     10-15    136  |  101      |  23  ----------------------------<  135<H>  4.0   |  19<L>  |  2.83<H>    Ca    7.9<L>      15 Oct 2018 09:53  Phos  3.2     10-15  Mg     2.5     10-15    TPro  5.0<L>  /  Alb  2.3<L>  /  TBili  1.2  /  DBili  x   /  AST  18  /  ALT  4   /  AlkPhos  159<H>  10-15          PT/INR - ( 15 Oct 2018 17:47 )   PT: 10.9 SEC;   INR: 0.98     PTT:32.5 SEC    ABG - ( 15 Oct 2018 17:47 )  pH, Arterial: 7.40  pH, Blood: x     /  pCO2: 33    /  pO2: 112   / HCO3: 21    / Base Excess: -4.5  /  SaO2: 97.0      Blood Gas Arterial - Lactate: 0.7 Please note updated reference range. mmol/L (10.15.18 @ 17:47)  Blood Gas Arterial - Lactate: 0.7: Please note updated reference range. mmol/L (10.14.18 @ 03:50)  Blood Gas Arterial - Lactate: 0.6: Please note updated reference range. mmol/L (10.13.18 @ 21:30)      Blood Gas Arterial - Lactate: 3.3: Please note updated reference range. mmol/L (10.12.18 @ 02:53)  Blood Gas Arterial - Lactate: 2.2: Please note updated reference range. mmol/L (10.11.18 @ 21:20)  Blood Gas Arterial - Lactate: 2.5: Please note updated reference range. mmol/L (10.11.18 @ 18:00)    Troponin T, High Sensitivity: 62: Delta: 84 on 10/14/   Troponin T, High Sensitivity: 84: Delta: 83 on 10/13/   Troponin T, High Sensitivity: 83: Delta: 88 on 10/13/   Troponin T, High Sensitivity: 88: Delta: 113 on 10/13/  Troponin T, High Sensitivity: 122: Delta: 98 on 10/12/  Troponin T, High Sensitivity: 98: Delta: 96 on 10/12/       OTHER  10-11-18 --  --  --    OTHER  10-11-18 --  --  --    TISSUE  10-11-18 --  --  --    TISSUE  10-11-18 --  --  --    TISSUE  10-11-18 --  --  Enterococcus faecalis    PLEURAL FLUID  10-11-18 --  --    GPCCH^Gram Pos Cocci in Chains  QUANTITY OF BACTERIA SEEN: MODERATE (3+)  GPCPR^Gram Pos Cocci in Pairs  QUANTITY OF BACTERIA SEEN: MODERATE (3+)  WBC^White Blood Cells  QNTY CELLS IN GRAM STAIN: FEW (2+)    ===================== IMAGING STUDIES ===================  Radiology personally reviewed.  < from: Xray Chest 1 View- PORTABLE-Routine (10.15.18 @ 06:57) >    CLINICAL INFORMATION: Post right thoracotomy  INTERPRETATION:     The endotracheal and enteric tube as well as right IJ line and   subcutaneous AICD are unchanged. Likewise, 3 right-sided chest tubes are   present. Subcutaneous emphysema on the right is decreasing somewhat.   Postop right sided effusion and atelectasis again seen. Postop   pneumoperitoneum.  No pneumothorax.    Small loculated left effusion unchanged. Left lung and right upper lobe   are clear. No pneumothorax appreciated on this study.      COMPARISON:  October 14  IMPRESSION:  Follow-up post right thoracotomy with multiple chest tubes   showing no significant change.    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          Procedure:  Thoracotomy  10/11/2018       · Operative Findings	Right thoracotomy, resection of portion of R 7th rib, evacuation of infected hemothorax, takedown of pleurocutaneous fistula	    · Operative Findings	s/p R chest wall reconstruction with tunneled latissimus dorsi flap, covered by serratus anterior flap, after R thoracotomy, takedown pleurocutaneous fistula, decortication, resection Right 7th rib	    Issues: s/p  infected right hemothorax evacuation/ Enterococcus empyema             multiple right  chest tubes/ drains in place             distributive and cardiogenic shock/ SIRS             acute resp failure on vent support             posthemorrhagic anemia/  dilutional thrombocytopenia ( s/p PRBC and PLT today)             lactic acidosis             hyperglycemia             ESRD on HD             acute on chronic systolic CHF ( EF 10 %), ICD in place      ====================== NEUROLOGY=======================  Pain control with iv Fentanyl  Pt is sedated with Propofol /Precedex    ==================== RESPIRATORY========================  Monitor chest tube output  Chest tube x3 to  water seal	  Candido x2 to bulb suction    Mechanical Ventilation:  Mode: AC/ CMV (Assist Control/ Continuous Mandatory Ventilation)  RR (machine): 18  TV (machine): 500  FiO2: 40  PEEP: 5  MAP: 10.4  PIP: 24    Mechanical ventilator status assessed & settings reviewed  Continue bronchodilators, pulmonary toilet  Head of bed elevation to 30-40 degrees  F/up  serial ABG's  Will attempt CPAP later today if more stable    ====================CARDIOVASCULAR=====================  Continue hemodynamic monitoring/ telemetry  Titrating Levo, Vaso, Dobutamine for SBP > 90, MAP > 65   Elevated Trop trending down; , likely ESRD/Post-op SIRS, and unlikely ACS, Cardiology following, and d/w cards attending and Heart failure team--No Cath intervention indicated,   Flow track noninvasive CO/ CI monitoring ( CI @ 2.7-3)  ASA started  2 days ago    ===================== RENAL ============================  Continue NS 30CC/hr  KVO     Monitor I/Os, BUN/ Cr  and electrolytes  No Moore - Pt was on HD for ESRD preop.  Currently off CVVHD  due to  shiley  cath clotting    Trial of HD today via left arm functional AV fistula - for fluid removal    ==================== GASTROINTESTINAL===================  NPO  Continue GI prophylaxis with  Protonix  Possibly will start NGT feeds today- tomorrow if not extubated    =======================    ENDOCRIN  =====================  Glycemic monitoring  F/S with coverage  ===================HEMATOLOGIC/ONCOLOGIC =============  Monitor chest tubes output. No signs of active bleeding.   Transfused 1 PRBC  Follow CBC, coags  today  DVT prophylaxis with SCD,    Heparin held today due to thrombocytopenia ( HIT negative). Will resume Heparin SC if PLT are    ========================INFECTIOUS DISEASE===============   Monitor for fever / leukocytosis.  All surgical incision / chest tube  sites look clean  Empiric ABX Zosyn, Vanco, Flagyl.  Blood culture x2 sent today  ID Dr Chahal on board    Pertinent clinical, laboratory, radiographic, hemodynamic, echocardiographic, respiratory data, microbiologic data and chart were reviewed and analyzed frequently throughout the course of the day and night. GI and DVT prophylaxis, glycemic control, head of bed elevation and skin care issues were addressed.  Patient seen, examined and plan discussed with CT Surgery / CTICU team during rounds.  Pt remains critically ill in imminent risk of  deterioration and requires very careful cardio- pulmonary monitoring and support.    I have spent     60   minutes of critical care time with this pt between  7 am    and 11:55 pm         minutes spent on total encounter; more than 50% of the visit was spent counseling and/or coordinating care by the attending physician.        KERLINE Faith MD CLAUDIA HAN          MRN-[id number]    HPI:  Pt is a 58 yr old Mandarin speaking male scheduled for Right Thoracotomy, Decortication , Right Chest Wall reconstruction with Latissimjus Dorsi Flap Poss Skin Graft with VAC dressing with Dr Pickering 10/11/18. Pt has ICD but unable to identify make or model. Pt hx of ESRD with HD T/Th/Sat for 4 hrs and has stated he has stopped some of his meds but cannot say which ones. Pt seen and received [facility] and [facility] but has 2 different med lists and is unable to identify meds taking. Pt denies blood thinners. Pt has stopped ASA as of last week. Pt hx gathered from [employer] and notes from Dr Pickering - pt is poor historian.      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          Procedure:  Thoracotomy  10/11/2018       · Operative Findings	Right thoracotomy, resection of portion of R 7th rib, evacuation of infected hemothorax, takedown of pleurocutaneous fistula	    · Operative Findings	s/p R chest wall reconstruction with tunneled latissimus dorsi flap, covered by serratus anterior flap, after R thoracotomy, takedown pleurocutaneous fistula, decortication, resection Right 7th rib	         · Drains	3 28Fr chest tubes (A,P, and diaphragm), 2 PRS SQ SANDY drains, VAC	  · Estimated Blood Loss	1000 milliLiter(s)	  · IV Infusions - Crystalloids	4L	  · IV Infusions - Colloids	500cc	  · IV Infusions - Blood Products	4u PRBC	       POD # 4    Issues: s/p  infected right hemothorax evacuation             multiple right  chest tubes/ drains in place             distributive and cardiogenic shock/ SIRS             acute resp failure on vent support             posthemorrhagic anemia             multifactorial thrombocytopenia ( HIT negative)             lactic acidosis             hyperglycemia             ESRD on HD             acute on chronic systolic CHF ( EF 10 %), ICD in place                   Interval/OR Events/ ROS  Pt hypotensive through the surgery - on multiple pressors / inotropes ( Levo, Epi, Dobutamine). Required 4 units of PRBC in addition to colloids and crystalloids for  symptomatic  posthemorrhagic anemia during the surgery. Pt arrived in ICU orally intubated, sedated, on Levo, Dobut, Epi.    Overnight in ICU still on multiple pressors/ inotropes./ Vent support. Mixed resp/ metab acidosis minimally  improved Lactic acidemia- unchanged @ 2-3.. Pt will need HD today ( ESRD on HD - anuria)  On POD 1 weaned off Epi, Quan. Remains on Dobutamine, Levophed, Vasopressin; on POD 2 started  CVVHD. Oxygenation and acid base status  is little  improved. Troponin is down to 88. Cardiology / CHF team on board to help in managing decompensated CHF/ cardiogenic shock. OR culture grew Enterococcus faecalis sensitive to Ampi/ Vanco  POD 3,4 decreasing doses of  Levophed, still  on Vaso/ Dobutamine. Trial of CPAP in Am aborted due to tachypnea and pt's distress.  Will attempt HD today in view of decreased requirement for pressors.      PAST MEDICAL & SURGICAL HISTORY:  Smoking hx  Cardiomyopathy  Wound: right chest  ICD (implantable cardioverter-defibrillator) in place  OA (osteoarthritis)  HLD (hyperlipidemia)  BPH (benign prostatic hyperplasia)  Pleural effusion  HTN (hypertension)  ESRD (end stage renal disease)  H/O chest wound: right  History of wound infection: right chest wall - revision 5/18 and again in 7/18  History of implantable cardioverter-defibrillator (ICD) placement: pt unsure when placed  H/O bilateral hip replacements: 2008, 2009    Home Medications:   * Patient Currently Takes Medications as of 01-Oct-2018 09:29 documented in Structured Notes  · 	nortriptyline 50 mg oral capsule: 1 cap(s) orally once a day  · 	Vitamin B Complex: 1 tab(s) orally once a day  · 	Nephplex Rx oral tablet: 1 tab(s) orally once a day  · 	aspirin 81 mg oral tablet: 1 tab(s) orally once a day last dose 10/3/2018  · 	Vitamin C 500 mg oral tablet: 1 tab(s) orally once a day  · 	Breo Ellipta 100 mcg-25 mcg/inh inhalation powder: 1 puff(s) inhaled once a day patient denies using it  · 	Calcium 500: 1 tab(s) orally 2 times a day  · 	carvedilol 6.25 mg oral tablet: 1 tab(s) orally once a day  · 	docusate sodium 100 mg oral tablet: 1 tab(s) orally 2 times a day, As Needed  · 	folic acid 1 mg oral tablet: 1 tab(s) orally once a day  · 	Mag-Ox 400 oral tablet: 1 tab(s) orally once a day  · 	midodrine 10 mg oral tablet: 1 tab(s) orally once a day  · 	Multiple Vitamins oral tablet: 1 tab(s) orally once a day last dose 10/3/2018  · 	Namenda 10 mg oral tablet: 1 tab(s) orally 2 times a day  · 	Renvela 800 mg oral tablet: 2 tab(s) orally every 8 hours  · 	simethicone 80 mg oral tablet: 1 tab(s) orally 3 times a day (after meals)  · 	torsemide 20 mg oral tablet: 1 tab(s) orally once a day  · 	vitamin A: 1 tab(s) orally once a day      Allergies  No Known Allergies    ***VITAL SIGNS:  Vital Signs Last 24 Hrs  T(C): 36.6 (15 Oct 2018 16:00), Max: 37.1 (15 Oct 2018 12:00)  T(F): 97.9 (15 Oct 2018 16:00), Max: 98.7 (15 Oct 2018 12:00)  HR: 88 (15 Oct 2018 16:00) (66 - 120)  BP: 95/67 (15 Oct 2018 14:45) (95/67 - 115/62)  BP(mean): 73 (15 Oct 2018 14:45) (73 - 76)  RR: 18 (15 Oct 2018 16:00) (15 - 26)  SpO2: 99% (15 Oct 2018 16:00) (94% - 100%)    I/Os:   I&O's Detail    14 Oct 2018 07:01  -  15 Oct 2018 07:00  --------------------------------------------------------  IN:    dexmedetomidine Infusion: 235.5 mL    DOBUTamine Infusion: 204.7 mL    fentaNYL  Infusion: 253.9 mL    IV PiggyBack: 900 mL    norepinephrine Infusion: 184 mL    Other: 2 mL    Packed Red Blood Cells: 300 mL    propofol Infusion: 270.9 mL    sodium chloride 0.9%.: 30 mL    vasopressin Infusion: 28.8 mL  Total IN: 2409.8 mL    OUT:    Bulb: 25 mL    Bulb: 125 mL    Chest Tube: 20 mL    Chest Tube: 70 mL    Chest Tube: 320 mL    Other: 1300 mL  Total OUT: 1860 mL    Total NET: 549.8 mL    15 Oct 2018 07:01  -  15 Oct 2018 18:26  --------------------------------------------------------  IN:    dexmedetomidine Infusion: 107.5 mL    DOBUTamine Infusion: 52.9 mL    fentaNYL  Infusion: 129.8 mL    IV PiggyBack: 505 mL    norepinephrine Infusion: 119.6 mL    Packed Red Blood Cells: 267 mL    propofol Infusion: 166 mL    sodium chloride 0.9%.: 45 mL    vasopressin Infusion: 33 mL  Total IN: 1425.8 mL    OUT:    Bulb: 225 mL    Bulb: 150 mL    Chest Tube: 150 mL    Chest Tube: 70 mL    Chest Tube: 50 mL  Total OUT: 645 mL    Total NET: 780.8 mL    CAPILLARY BLOOD GLUCOSE  POCT Blood Glucose.: 119 mg/dL (15 Oct 2018 17:58)  POCT Blood Glucose.: 121 mg/dL (15 Oct 2018 12:09)  POCT Blood Glucose.: 110 mg/dL (15 Oct 2018 06:03)  POCT Blood Glucose.: 104 mg/dL (15 Oct 2018 00:37)      =======================  MEDICATIONS  ===================  MEDICATIONS  (STANDING):  albumin human  5% IVPB 250 milliLiter(s) IV Intermittent once  alteplase for catheter clearance 2 milliGRAM(s) Catheter once  aspirin  chewable 81 milliGRAM(s) Oral daily  dexmedetomidine Infusion 0.5 MICROgram(s)/kG/Hr (7.375 mL/Hr) IV Continuous <Continuous>  dextrose 5%. 1000 milliLiter(s) (50 mL/Hr) IV Continuous <Continuous>  dextrose 50% Injectable 12.5 Gram(s) IV Push once  dextrose 50% Injectable 25 Gram(s) IV Push once  dextrose 50% Injectable 25 Gram(s) IV Push once  DOBUTamine Infusion 5 MICROgram(s)/kG/Min (8.85 mL/Hr) IV Continuous <Continuous>  fentaNYL   Infusion 1 MICROgram(s)/kG/Hr (5.9 mL/Hr) IV Continuous <Continuous>  insulin lispro (HumaLOG) corrective regimen sliding scale   SubCutaneous every 6 hours  metroNIDAZOLE  IVPB      metroNIDAZOLE  IVPB 500 milliGRAM(s) IV Intermittent every 8 hours  norepinephrine Infusion 0.05 MICROgram(s)/kG/Min (2.766 mL/Hr) IV Continuous <Continuous>  pantoprazole  Injectable 40 milliGRAM(s) IV Push daily  piperacillin/tazobactam IVPB. 3.375 Gram(s) IV Intermittent every 12 hours  PrismaSATE Dialysate BGK 4 / 2.5 5000 milliLiter(s) (1000 mL/Hr) CRRT <Continuous>  PrismaSOL Filtration BGK 4 / 2.5 5000 milliLiter(s) (1000 mL/Hr) CRRT <Continuous>  PrismaSOL Filtration BGK 4 / 2.5 5000 milliLiter(s) (200 mL/Hr) CRRT <Continuous>  propofol Infusion 20 MICROgram(s)/kG/Min (7.08 mL/Hr) IV Continuous <Continuous>  sodium chloride 0.9%. 1000 milliLiter(s) (30 mL/Hr) IV Continuous <Continuous>  vancomycin  IVPB 750 milliGRAM(s) IV Intermittent every 12 hours  vasopressin Infusion 0.05 Unit(s)/Min (3 mL/Hr) IV Continuous <Continuous>    MEDICATIONS  (PRN):  dextrose 40% Gel 15 Gram(s) Oral once PRN Blood Glucose LESS THAN 70 milliGRAM(s)/deciliter  glucagon  Injectable 1 milliGRAM(s) IntraMuscular once PRN Glucose LESS THAN 70 milligrams/deciliter    ======================VENTILATOR SETTINGS  ==============  Mode: AC/ CMV (Assist Control/ Continuous Mandatory Ventilation)  RR (machine): 18  TV (machine): 500  FiO2: 40  PEEP: 5  MAP: 12  PIP: 30      =================== PATIENT CARE ACCESS DEVICES ==========  Peripheral IV (+)  Central Venous Line R/ IJ (+) ; R fem HD catheter  Arterial Line	R / Rad(+)			  Necessity of  arterial, and venous catheters discussed  ======================= PHYSICAL EXAM===================  General:             sedated, intubated  Neuro:               Moving  extremities spontaneously    with lower dose of sedation. 	  HEENT:                           CHER/ ETT/ NGT  Respiratory:	Lungs sound coarse on auscultation bilaterally with good aeration.                            No rales, rhonchi, no wheezing.                           RIght thoracotomy site - covered with dressing, chest tube site -clean  CV:		Regular rate and rhythm. Normal S1/S2.  Abdomen:          Soft,  nontender, not-distended. Bowel sounds present - hypoactive  Skin:		No rash.  Extremities:	Warm, no cyanosis or edema.  (+) pulses; left arm AV fistula    ============================ LABS =======================                        7.7    9.49  )-----------( 83       ( 15 Oct 2018 17:47 )             22.8     10-15    136  |  101      |  23  ----------------------------<  135<H>  4.0   |  19<L>  |  2.83<H>    Ca    7.9<L>      15 Oct 2018 09:53  Phos  3.2     10-15  Mg     2.5     10-15    TPro  5.0<L>  /  Alb  2.3<L>  /  TBili  1.2  /  DBili  x   /  AST  18  /  ALT  4   /  AlkPhos  159<H>  10-15          PT/INR - ( 15 Oct 2018 17:47 )   PT: 10.9 SEC;   INR: 0.98     PTT:32.5 SEC    ABG - ( 15 Oct 2018 17:47 )  pH, Arterial: 7.40  pH, Blood: x     /  pCO2: 33    /  pO2: 112   / HCO3: 21    / Base Excess: -4.5  /  SaO2: 97.0      Blood Gas Arterial - Lactate: 0.7 Please note updated reference range. mmol/L (10.15.18 @ 17:47)  Blood Gas Arterial - Lactate: 0.7: Please note updated reference range. mmol/L (10.14.18 @ 03:50)  Blood Gas Arterial - Lactate: 0.6: Please note updated reference range. mmol/L (10.13.18 @ 21:30)      Blood Gas Arterial - Lactate: 3.3: Please note updated reference range. mmol/L (10.12.18 @ 02:53)  Blood Gas Arterial - Lactate: 2.2: Please note updated reference range. mmol/L (10.11.18 @ 21:20)  Blood Gas Arterial - Lactate: 2.5: Please note updated reference range. mmol/L (10.11.18 @ 18:00)    Troponin T, High Sensitivity: 62: Delta: 84 on 10/15/18-   Troponin T, High Sensitivity: 84: Delta: 83 on 10/13/   Troponin T, High Sensitivity: 83: Delta: 88 on 10/13/   Troponin T, High Sensitivity: 88: Delta: 113 on 10/13/  Troponin T, High Sensitivity: 122: Delta: 98 on 10/12/  Troponin T, High Sensitivity: 98: Delta: 96 on 10/12/       OTHER  10-11-18 --  --  --    OTHER  10-11-18 --  --  --    TISSUE  10-11-18 --  --  --    TISSUE  10-11-18 --  --  --    TISSUE  10-11-18 --  --  Enterococcus faecalis    PLEURAL FLUID  10-11-18 --  --    GPCCH^Gram Pos Cocci in Chains  QUANTITY OF BACTERIA SEEN: MODERATE (3+)  GPCPR^Gram Pos Cocci in Pairs  QUANTITY OF BACTERIA SEEN: MODERATE (3+)  WBC^White Blood Cells  QNTY CELLS IN GRAM STAIN: FEW (2+)    ===================== IMAGING STUDIES ===================  Radiology personally reviewed.  < from: Xray Chest 1 View- PORTABLE-Routine (10.15.18 @ 06:57) >    CLINICAL INFORMATION: Post right thoracotomy  INTERPRETATION:     The endotracheal and enteric tube as well as right IJ line and   subcutaneous AICD are unchanged. Likewise, 3 right-sided chest tubes are   present. Subcutaneous emphysema on the right is decreasing somewhat.   Postop right sided effusion and atelectasis again seen. Postop   pneumoperitoneum.  No pneumothorax.    Small loculated left effusion unchanged. Left lung and right upper lobe   are clear. No pneumothorax appreciated on this study.      COMPARISON:  October 14  IMPRESSION:  Follow-up post right thoracotomy with multiple chest tubes   showing no significant change.    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          Procedure:  Thoracotomy  10/11/2018       · Operative Findings	Right thoracotomy, resection of portion of R 7th rib, evacuation of infected hemothorax, takedown of pleurocutaneous fistula	    · Operative Findings	s/p R chest wall reconstruction with tunneled latissimus dorsi flap, covered by serratus anterior flap, after R thoracotomy, takedown pleurocutaneous fistula, decortication, resection Right 7th rib	    Issues: s/p  infected right hemothorax evacuation/ Enterococcus empyema             multiple right  chest tubes/ drains in place             distributive and cardiogenic shock/ SIRS             acute resp failure on vent support             posthemorrhagic anemia/  dilutional thrombocytopenia ( s/p PRBC and PLT today)             lactic acidosis             hyperglycemia             ESRD on HD             acute on chronic systolic CHF ( EF 10 %), ICD in place      ====================== NEUROLOGY=======================  Pain control with iv Fentanyl  Pt is sedated with Propofol /Precedex    ==================== RESPIRATORY========================  Monitor chest tube output  Chest tube x3 to  water seal	  Candido x2 to bulb suction    Mechanical Ventilation:  Mode: AC/ CMV (Assist Control/ Continuous Mandatory Ventilation)  RR (machine): 18  TV (machine): 500  FiO2: 40  PEEP: 5  MAP: 10.4  PIP: 24    Mechanical ventilator status assessed & settings reviewed  Continue bronchodilators, pulmonary toilet  Head of bed elevation to 30-40 degrees  F/up  serial ABG's  Will attempt CPAP later today if more stable    ====================CARDIOVASCULAR=====================  Continue hemodynamic monitoring/ telemetry  Titrating Levo, Vaso, Dobutamine for SBP > 90, MAP > 65   Elevated Trop trending down; , likely ESRD/Post-op SIRS, and unlikely ACS, Cardiology following, and d/w cards attending and Heart failure team--No Cath intervention indicated,   Flow track noninvasive CO/ CI monitoring ( CI @ 2.7-3)  ASA started  2 days ago    ===================== RENAL ============================  Continue NS 30CC/hr  KVO     Monitor I/Os, BUN/ Cr  and electrolytes  No Moore - Pt was on HD for ESRD preop.  Currently off CVVHD  due to  shiley  cath clotting    Trial of HD today via left arm functional AV fistula - for fluid removal    ==================== GASTROINTESTINAL===================  NPO  Continue GI prophylaxis with  Protonix  Possibly will start NGT feeds today- tomorrow if not extubated    =======================    ENDOCRIN  =====================  Glycemic monitoring  F/S with coverage  ===================HEMATOLOGIC/ONCOLOGIC =============  Monitor chest tubes output. No signs of active bleeding.   Transfused 1 PRBC  Follow CBC, coags  today  DVT prophylaxis with SCD,    Heparin held today due to thrombocytopenia ( HIT negative). Will resume Heparin SC if PLT are    ========================INFECTIOUS DISEASE===============   Monitor for fever / leukocytosis.  All surgical incision / chest tube  sites look clean  Empiric ABX Zosyn, Vanco, Flagyl.  Blood culture x2 sent today  ID Dr Chahal on board    Pertinent clinical, laboratory, radiographic, hemodynamic, echocardiographic, respiratory data, microbiologic data and chart were reviewed and analyzed frequently throughout the course of the day and night. GI and DVT prophylaxis, glycemic control, head of bed elevation and skin care issues were addressed.  Patient seen, examined and plan discussed with CT Surgery / CTICU team during rounds.  Pt remains critically ill in imminent risk of  deterioration and requires very careful cardio- pulmonary monitoring and support.    I have spent     60   minutes of critical care time with this pt between  7 am    and 11:55 pm         minutes spent on total encounter; more than 50% of the visit was spent counseling and/or coordinating care by the attending physician.        KERLINE Faith MD

## 2018-10-15 NOTE — CONSULT NOTE ADULT - SUBJECTIVE AND OBJECTIVE BOX
Patient is a 58y old  Male who presents with a chief complaint of Thoracotomy (15 Oct 2018 09:21)      HPI:    Pt is a 58 yr old Mandarin speaking male scheduled for Right Thoractomy Decortiation, Right Chest Wall reconstruction with Latissimjus Dorsi Flap Poss Skin Graft with VAC dressing with Dr Pickering 10/11/18. Pt has ICD but unable to identify make or model. Pt hx of ESRD with HD T/Th/Sat for 4 hrs and has stated he has stopped some of his meds but cannot say which ones. Pt seen and received MC and CC but has 2 different med lists and is unable to identify meds taking. Pt denies blood thinners. Pt has stopped ASA as of last week. Pt hx gathered from [employer] and notes from Dr Pickering - pt is poor historian.     Call through  to patient who went to local pharmacy for confirmation of meds and pt given preop instructions (01 Oct 2018 09:25)    Pt has multiple comorbidities including CHF, EF of 10%, renal failure on dialysis,  who has had chronic bilateral pleural effusions.  The patient previously underwent left VATS and PleurX drains  in 2015.  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  During this admission pt underwent OR with Plastic and Thoracic surgery,  for definitive repair that involved thoracotomy, decortication, excision of empyema cavity along with muscle flap reconstruction. Pt underwent surgery on 10/11 (Right thoracotomy, resection of portion of R 7th rib, evacuation of infected hemothorax, takedown of pleurocutaneous fistula - and noted to have foul smelling hematoma).  OR cultures are growing Enterococcus faecalis.    Pt started on empiric vanco/zosyn/flagyl since 10/11.  He has multiple right  chest tubes/ drains in place and remains on vent support. Pt is on ESRD.  He has had intermittent episodes of hypothermia, and low bp (85/44). ID consult called for further antibiotic management.                           REVIEW OF SYSTEMS:    Pt intubated/sedated, unable to assess      PAST MEDICAL & SURGICAL HISTORY:  Smoking hx  Cardiomyopathy  Wound: right chest  ICD (implantable cardioverter-defibrillator) in place  OA (osteoarthritis)  HLD (hyperlipidemia)  BPH (benign prostatic hyperplasia)  Pleural effusion  HTN (hypertension)  ESRD (end stage renal disease)  H/O chest wound: right  History of wound infection: right chest wall - revision 5/18 and again in 7/18  History of implantable cardioverter-defibrillator (ICD) placement: pt unsure when placed  H/O bilateral hip replacements: 2008, 2009      Allergies    No Known Allergies    Intolerances        FAMILY HISTORY:  No sig hx    SOCIAL HISTORY:        MEDICATIONS  (STANDING):  albumin human  5% IVPB 250 milliLiter(s) IV Intermittent once  alteplase for catheter clearance 2 milliGRAM(s) Catheter once  aspirin  chewable 81 milliGRAM(s) Oral daily  desmopressin IVPB 17.7 MICROGram(s) IV Intermittent once  dexmedetomidine Infusion 0.5 MICROgram(s)/kG/Hr (7.375 mL/Hr) IV Continuous <Continuous>  dextrose 5%. 1000 milliLiter(s) (50 mL/Hr) IV Continuous <Continuous>  dextrose 50% Injectable 12.5 Gram(s) IV Push once  dextrose 50% Injectable 25 Gram(s) IV Push once  dextrose 50% Injectable 25 Gram(s) IV Push once  DOBUTamine Infusion 5 MICROgram(s)/kG/Min (8.85 mL/Hr) IV Continuous <Continuous>  fentaNYL   Infusion 1 MICROgram(s)/kG/Hr (5.9 mL/Hr) IV Continuous <Continuous>  insulin lispro (HumaLOG) corrective regimen sliding scale   SubCutaneous every 6 hours  metroNIDAZOLE  IVPB      metroNIDAZOLE  IVPB 500 milliGRAM(s) IV Intermittent every 8 hours  norepinephrine Infusion 0.05 MICROgram(s)/kG/Min (2.766 mL/Hr) IV Continuous <Continuous>  pantoprazole  Injectable 40 milliGRAM(s) IV Push daily  piperacillin/tazobactam IVPB. 3.375 Gram(s) IV Intermittent every 12 hours  PrismaSATE Dialysate BGK 4 / 2.5 5000 milliLiter(s) (1000 mL/Hr) CRRT <Continuous>  PrismaSOL Filtration BGK 4 / 2.5 5000 milliLiter(s) (1000 mL/Hr) CRRT <Continuous>  PrismaSOL Filtration BGK 4 / 2.5 5000 milliLiter(s) (200 mL/Hr) CRRT <Continuous>  propofol Infusion 20 MICROgram(s)/kG/Min (7.08 mL/Hr) IV Continuous <Continuous>  sodium chloride 0.9%. 1000 milliLiter(s) (30 mL/Hr) IV Continuous <Continuous>  vancomycin  IVPB 750 milliGRAM(s) IV Intermittent every 12 hours  vasopressin Infusion 0.05 Unit(s)/Min (3 mL/Hr) IV Continuous <Continuous>    MEDICATIONS  (PRN):  dextrose 40% Gel 15 Gram(s) Oral once PRN Blood Glucose LESS THAN 70 milliGRAM(s)/deciliter  glucagon  Injectable 1 milliGRAM(s) IntraMuscular once PRN Glucose LESS THAN 70 milligrams/deciliter      Vital Signs Last 24 Hrs  T(C): 36.9 (15 Oct 2018 10:00), Max: 36.9 (15 Oct 2018 10:00)  T(F): 98.5 (15 Oct 2018 10:00), Max: 98.5 (15 Oct 2018 10:00)  HR: 88 (15 Oct 2018 12:00) (66 - 120)  BP: 115/62 (15 Oct 2018 08:00) (115/62 - 115/62)  BP(mean): 76 (15 Oct 2018 08:00) (76 - 76)  RR: 19 (15 Oct 2018 12:00) (15 - 26)  SpO2: 99% (15 Oct 2018 12:00) (94% - 100%)    PHYSICAL EXAM:    GENERAL: NAD, well-groomed  HEAD:  Atraumatic, Normocephalic  EYES: EOMI, PERRLA, conjunctiva and sclera clear  ENMT: No tonsillar erythema, exudates, or enlargement; Moist mucous membranes  NECK: Supple, No JVD  CHEST/LUNG: Clear to percussion bilaterally; No rales, rhonchi, wheezing, or rubs  HEART: Regular rate and rhythm; No murmurs, rubs, or gallops  ABDOMEN: Soft, Nontender, Nondistended; Bowel sounds present  EXTREMITIES:  2+ Peripheral Pulses, No clubbing, cyanosis, or edema  LYMPH: No lymphadenopathy noted  SKIN: No rashes or lesions    LABS:  CBC Full  -  ( 15 Oct 2018 09:53 )  WBC Count : 8.63 K/uL  Hemoglobin : 8.1 g/dL  Hematocrit : 23.7 %  Platelet Count - Automated : 78 K/uL  Mean Cell Volume : 92.6 fL  Mean Cell Hemoglobin : 31.6 pg  Mean Cell Hemoglobin Concentration : 34.2 %  Auto Neutrophil # : x  Auto Lymphocyte # : x  Auto Monocyte # : x  Auto Eosinophil # : x  Auto Basophil # : x  Auto Neutrophil % : x  Auto Lymphocyte % : x  Auto Monocyte % : x  Auto Eosinophil % : x  Auto Basophil % : x      10-15    136  |  101  |  23  ----------------------------<  135<H>  4.0   |  19<L>  |  2.83<H>    Ca    7.9<L>      15 Oct 2018 09:53  Phos  3.2     10-15  Mg     2.5     10-15    TPro  5.0<L>  /  Alb  2.3<L>  /  TBili  1.2  /  DBili  x   /  AST  18  /  ALT  4   /  AlkPhos  159<H>  10-15      LIVER FUNCTIONS - ( 15 Oct 2018 09:53 )  Alb: 2.3 g/dL / Pro: 5.0 g/dL / ALK PHOS: 159 u/L / ALT: 4 u/L / AST: 18 u/L / GGT: x                               MICROBIOLOGY:                    RADIOLOGY: Patient is a 58y old  Male who presents with a chief complaint of Thoracotomy (15 Oct 2018 09:21)      HPI:    Pt is a 58 yr old Mandarin speaking male scheduled for Right Thoractomy Decortiation, Right Chest Wall reconstruction with Latissimjus Dorsi Flap Poss Skin Graft with VAC dressing with Dr Pickering 10/11/18. Pt has ICD but unable to identify make or model. Pt hx of ESRD with HD T/Th/Sat for 4 hrs and has stated he has stopped some of his meds but cannot say which ones. Pt seen and received MC and CC but has 2 different med lists and is unable to identify meds taking. Pt denies blood thinners. Pt has stopped ASA as of last week. Pt hx gathered from [employer] and notes from Dr Pickering - pt is poor historian.     Call through  to patient who went to local pharmacy for confirmation of meds and pt given preop instructions (01 Oct 2018 09:25)    Pt has multiple comorbidities including CHF, EF of 10%, renal failure on dialysis,  who has had chronic bilateral pleural effusions.  The patient previously underwent left VATS and PleurX drains  in 2015.  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  During this admission pt underwent OR with Plastic and Thoracic surgery,  for definitive repair that involved thoracotomy, decortication, excision of empyema cavity along with muscle flap reconstruction. Pt underwent surgery on 10/11 (Right thoracotomy, resection of portion of R 7th rib, evacuation of infected hemothorax, takedown of pleurocutaneous fistula - and noted to have foul smelling hematoma).  OR cultures are growing Enterococcus faecalis.    Pt started on empiric vanco/zosyn/flagyl since 10/11.  He has multiple right  chest tubes/ drains in place and remains on vent support. Pt is on ESRD.  He has had intermittent episodes of hypothermia, and low bp (85/44). ID consult called for further antibiotic management.                           REVIEW OF SYSTEMS:    Pt intubated/sedated, unable to assess      PAST MEDICAL & SURGICAL HISTORY:  Smoking hx  Cardiomyopathy  Wound: right chest  ICD (implantable cardioverter-defibrillator) in place  OA (osteoarthritis)  HLD (hyperlipidemia)  BPH (benign prostatic hyperplasia)  Pleural effusion  HTN (hypertension)  ESRD (end stage renal disease)  H/O chest wound: right  History of wound infection: right chest wall - revision 5/18 and again in 7/18  History of implantable cardioverter-defibrillator (ICD) placement: pt unsure when placed  H/O bilateral hip replacements: 2008, 2009      Allergies    No Known Allergies    Intolerances        FAMILY HISTORY:  No sig hx    SOCIAL HISTORY:  No smoking, ivdu, etoh      MEDICATIONS  (STANDING):  albumin human  5% IVPB 250 milliLiter(s) IV Intermittent once  alteplase for catheter clearance 2 milliGRAM(s) Catheter once  aspirin  chewable 81 milliGRAM(s) Oral daily  desmopressin IVPB 17.7 MICROGram(s) IV Intermittent once  dexmedetomidine Infusion 0.5 MICROgram(s)/kG/Hr (7.375 mL/Hr) IV Continuous <Continuous>  dextrose 5%. 1000 milliLiter(s) (50 mL/Hr) IV Continuous <Continuous>  dextrose 50% Injectable 12.5 Gram(s) IV Push once  dextrose 50% Injectable 25 Gram(s) IV Push once  dextrose 50% Injectable 25 Gram(s) IV Push once  DOBUTamine Infusion 5 MICROgram(s)/kG/Min (8.85 mL/Hr) IV Continuous <Continuous>  fentaNYL   Infusion 1 MICROgram(s)/kG/Hr (5.9 mL/Hr) IV Continuous <Continuous>  insulin lispro (HumaLOG) corrective regimen sliding scale   SubCutaneous every 6 hours  metroNIDAZOLE  IVPB      metroNIDAZOLE  IVPB 500 milliGRAM(s) IV Intermittent every 8 hours  norepinephrine Infusion 0.05 MICROgram(s)/kG/Min (2.766 mL/Hr) IV Continuous <Continuous>  pantoprazole  Injectable 40 milliGRAM(s) IV Push daily  piperacillin/tazobactam IVPB. 3.375 Gram(s) IV Intermittent every 12 hours  PrismaSATE Dialysate BGK 4 / 2.5 5000 milliLiter(s) (1000 mL/Hr) CRRT <Continuous>  PrismaSOL Filtration BGK 4 / 2.5 5000 milliLiter(s) (1000 mL/Hr) CRRT <Continuous>  PrismaSOL Filtration BGK 4 / 2.5 5000 milliLiter(s) (200 mL/Hr) CRRT <Continuous>  propofol Infusion 20 MICROgram(s)/kG/Min (7.08 mL/Hr) IV Continuous <Continuous>  sodium chloride 0.9%. 1000 milliLiter(s) (30 mL/Hr) IV Continuous <Continuous>  vancomycin  IVPB 750 milliGRAM(s) IV Intermittent every 12 hours  vasopressin Infusion 0.05 Unit(s)/Min (3 mL/Hr) IV Continuous <Continuous>    MEDICATIONS  (PRN):  dextrose 40% Gel 15 Gram(s) Oral once PRN Blood Glucose LESS THAN 70 milliGRAM(s)/deciliter  glucagon  Injectable 1 milliGRAM(s) IntraMuscular once PRN Glucose LESS THAN 70 milligrams/deciliter      Vital Signs Last 24 Hrs  T(C): 36.9 (15 Oct 2018 10:00), Max: 36.9 (15 Oct 2018 10:00)  T(F): 98.5 (15 Oct 2018 10:00), Max: 98.5 (15 Oct 2018 10:00)  HR: 88 (15 Oct 2018 12:00) (66 - 120)  BP: 115/62 (15 Oct 2018 08:00) (115/62 - 115/62)  BP(mean): 76 (15 Oct 2018 08:00) (76 - 76)  RR: 19 (15 Oct 2018 12:00) (15 - 26)  SpO2: 99% (15 Oct 2018 12:00) (94% - 100%)    PHYSICAL EXAM:    GENERAL: intubated/sedated  HEAD:  Atraumatic, Normocephalic  EYES: EOMI, PERRLA, conjunctiva and sclera clear  ENMT: No tonsillar erythema, exudates, or enlargement; Moist mucous membranes  NECK: Supple, No JVD  CHEST/LUNG: Clear to percussion bilaterally; No rales, rhonchi, wheezing, or rubs  HEART: Regular rate and rhythm; No murmurs, rubs, or gallops  ABDOMEN: Soft, Nontender, Nondistended; Bowel sounds present  EXTREMITIES:  2+ Peripheral Pulses, No clubbing, cyanosis, or edema  LYMPH: No lymphadenopathy noted  SKIN:  Rt IJ central line.  SANDY drains in rt thorax - draining serosanguinous fluid    LABS:  CBC Full  -  ( 15 Oct 2018 09:53 )  WBC Count : 8.63 K/uL  Hemoglobin : 8.1 g/dL  Hematocrit : 23.7 %  Platelet Count - Automated : 78 K/uL  Mean Cell Volume : 92.6 fL  Mean Cell Hemoglobin : 31.6 pg  Mean Cell Hemoglobin Concentration : 34.2 %  Auto Neutrophil # : x  Auto Lymphocyte # : x  Auto Monocyte # : x  Auto Eosinophil # : x  Auto Basophil # : x  Auto Neutrophil % : x  Auto Lymphocyte % : x  Auto Monocyte % : x  Auto Eosinophil % : x  Auto Basophil % : x      10-15    136  |  101  |  23  ----------------------------<  135<H>  4.0   |  19<L>  |  2.83<H>    Ca    7.9<L>      15 Oct 2018 09:53  Phos  3.2     10-15  Mg     2.5     10-15    TPro  5.0<L>  /  Alb  2.3<L>  /  TBili  1.2  /  DBili  x   /  AST  18  /  ALT  4   /  AlkPhos  159<H>  10-15      LIVER FUNCTIONS - ( 15 Oct 2018 09:53 )  Alb: 2.3 g/dL / Pro: 5.0 g/dL / ALK PHOS: 159 u/L / ALT: 4 u/L / AST: 18 u/L / GGT: x                   MICROBIOLOGY:      Culture - Acid Fast Smear Concentrated (10.11.18 @ 15:17)    Culture - Acid Fast Smear Concentrated:   AFB SMEAR= NO ACID FAST BACILLI SEEN    Specimen Source: OTHER    Culture - Acid Fast Smear Concentrated (10.11.18 @ 15:04)    Specimen Source: OTHER    Culture - Acid Fast Smear Concentrated:   AFB SMEAR= NO ACID FAST BACILLI SEEN    Culture - Surg Site Aerob/Anaer w/Gm St (10.11.18 @ 13:58)    Gram Stain Wound:   GPCPR^Gram Pos Cocci in Pairs  QUANTITY OF BACTERIA SEEN: RARE (1+)  WBC^White Blood Cells  QNTY CELLS IN GRAM STAIN: RARE (1+)    Culture - Surgical Site:   MODERATE  SEE PREVIOUS CULTURE:#  COLLECTED 10/11/18 AT 1226 HRS  RECEIVED 10/11/18 AT 1316 HRS FOR AAMIR  ENTF^Enterococcus faecalis    Specimen Source: TISSUE    Culture - Surg Site Aerob/Anaer w/Gm St (10.11.18 @ 13:53)    Culture - Surgical Site:   ENTF^Enterococcus faecalis    Culture - Surgical Site:   SEE PREVIOUS CULTURE:#  COLLECTED 10/11/18 AT 1226 HRS  RECEIVED 10/11/18 AT 1316 HRS FOR AAMIR    -  Tetra/Doxy: R >8 AAMIR    -  Vancomycin: S 4 AAMIR    -  Ampicillin: S <=2 AAMIR    -  Ciprofloxacin: S <=1 AAMIR    Gram Stain Wound:   GPCPR Gram Pos Cocci in Pairs  QUANTITY OF BACTERIA SEEN: FEW (2+)  WBC^White Blood Cells  QNTY CELLS IN GRAM STAIN: FEW (2+)    Specimen Source: TISSUE    Organism Identification: Enterococcus faecalis    Organism: Enterococcus faecalis    Method Type: POSITIVE AAMIR 29                  RADIOLOGY:    < from: Xray Chest 1 View- PORTABLE-Routine (10.14.18 @ 07:19) >  IMPRESSION:  Lines, tubes, and drains as above.    Increased right subcutaneous emphysema.    Small lateral right pneumothorax is not significantly changed. Previous   right basilar pneumothorax is decreased.    Distal aspect of enteric tube coiled upon itself in the proximal stomach   with tip pointing towards the GE junction. Suggest repositioning the tube.    < end of copied text >

## 2018-10-16 LAB
ALBUMIN SERPL ELPH-MCNC: 2.3 G/DL — LOW (ref 3.3–5)
ALP SERPL-CCNC: 173 U/L — HIGH (ref 40–120)
ALT FLD-CCNC: 6 U/L — SIGNIFICANT CHANGE UP (ref 4–41)
APTT BLD: 31.8 SEC — SIGNIFICANT CHANGE UP (ref 27.5–37.4)
AST SERPL-CCNC: 17 U/L — SIGNIFICANT CHANGE UP (ref 4–40)
BASE EXCESS BLDA CALC-SCNC: -0.6 MMOL/L — SIGNIFICANT CHANGE UP
BASE EXCESS BLDA CALC-SCNC: 0.4 MMOL/L — SIGNIFICANT CHANGE UP
BASE EXCESS BLDV CALC-SCNC: -1.2 MMOL/L — SIGNIFICANT CHANGE UP
BASE EXCESS BLDV CALC-SCNC: 2.6 MMOL/L — SIGNIFICANT CHANGE UP
BILIRUB SERPL-MCNC: 1.3 MG/DL — HIGH (ref 0.2–1.2)
BUN SERPL-MCNC: 13 MG/DL — SIGNIFICANT CHANGE UP (ref 7–23)
CA-I BLDA-SCNC: 1.15 MMOL/L — SIGNIFICANT CHANGE UP (ref 1.15–1.29)
CALCIUM SERPL-MCNC: 8.5 MG/DL — SIGNIFICANT CHANGE UP (ref 8.4–10.5)
CHLORIDE SERPL-SCNC: 97 MMOL/L — LOW (ref 98–107)
CO2 SERPL-SCNC: 22 MMOL/L — SIGNIFICANT CHANGE UP (ref 22–31)
CREAT SERPL-MCNC: 2.12 MG/DL — HIGH (ref 0.5–1.3)
GLUCOSE BLDA-MCNC: 118 MG/DL — HIGH (ref 70–99)
GLUCOSE SERPL-MCNC: 121 MG/DL — HIGH (ref 70–99)
HCO3 BLDA-SCNC: 24 MMOL/L — SIGNIFICANT CHANGE UP (ref 22–26)
HCO3 BLDA-SCNC: 25 MMOL/L — SIGNIFICANT CHANGE UP (ref 22–26)
HCO3 BLDV-SCNC: 23 MMOL/L — SIGNIFICANT CHANGE UP (ref 20–27)
HCO3 BLDV-SCNC: 27 MMOL/L — SIGNIFICANT CHANGE UP (ref 20–27)
HCT VFR BLD CALC: 23.1 % — LOW (ref 39–50)
HCT VFR BLDA CALC: 24.6 % — LOW (ref 39–51)
HGB BLD-MCNC: 7.9 G/DL — LOW (ref 13–17)
HGB BLDA-MCNC: 7.9 G/DL — LOW (ref 13–17)
INR BLD: 0.97 — SIGNIFICANT CHANGE UP (ref 0.88–1.17)
LACTATE BLDA-SCNC: 0.7 MMOL/L — SIGNIFICANT CHANGE UP (ref 0.5–2)
MAGNESIUM SERPL-MCNC: 2.1 MG/DL — SIGNIFICANT CHANGE UP (ref 1.6–2.6)
MCHC RBC-ENTMCNC: 31.3 PG — SIGNIFICANT CHANGE UP (ref 27–34)
MCHC RBC-ENTMCNC: 34.2 % — SIGNIFICANT CHANGE UP (ref 32–36)
MCV RBC AUTO: 91.7 FL — SIGNIFICANT CHANGE UP (ref 80–100)
NRBC # FLD: 0 — SIGNIFICANT CHANGE UP
PCO2 BLDA: 34 MMHG — LOW (ref 35–48)
PCO2 BLDA: 44 MMHG — SIGNIFICANT CHANGE UP (ref 35–48)
PCO2 BLDV: 41 MMHG — SIGNIFICANT CHANGE UP (ref 41–51)
PCO2 BLDV: 42 MMHG — SIGNIFICANT CHANGE UP (ref 41–51)
PH BLDA: 7.36 PH — SIGNIFICANT CHANGE UP (ref 7.35–7.45)
PH BLDA: 7.47 PH — HIGH (ref 7.35–7.45)
PH BLDV: 7.37 PH — SIGNIFICANT CHANGE UP (ref 7.32–7.43)
PH BLDV: 7.43 PH — SIGNIFICANT CHANGE UP (ref 7.32–7.43)
PHOSPHATE SERPL-MCNC: 2.8 MG/DL — SIGNIFICANT CHANGE UP (ref 2.5–4.5)
PLATELET # BLD AUTO: 88 K/UL — LOW (ref 150–400)
PMV BLD: 9.8 FL — SIGNIFICANT CHANGE UP (ref 7–13)
PO2 BLDA: 105 MMHG — SIGNIFICANT CHANGE UP (ref 83–108)
PO2 BLDA: 122 MMHG — HIGH (ref 83–108)
PO2 BLDV: 36 MMHG — SIGNIFICANT CHANGE UP (ref 35–40)
PO2 BLDV: 47 MMHG — HIGH (ref 35–40)
POTASSIUM BLDA-SCNC: 3.3 MMOL/L — LOW (ref 3.4–4.5)
POTASSIUM SERPL-MCNC: 3.6 MMOL/L — SIGNIFICANT CHANGE UP (ref 3.5–5.3)
POTASSIUM SERPL-SCNC: 3.6 MMOL/L — SIGNIFICANT CHANGE UP (ref 3.5–5.3)
PROT SERPL-MCNC: 5.3 G/DL — LOW (ref 6–8.3)
PROTHROM AB SERPL-ACNC: 11.2 SEC — SIGNIFICANT CHANGE UP (ref 9.8–13.1)
RBC # BLD: 2.52 M/UL — LOW (ref 4.2–5.8)
RBC # FLD: 19 % — HIGH (ref 10.3–14.5)
SAO2 % BLDA: 97.7 % — SIGNIFICANT CHANGE UP (ref 95–99)
SAO2 % BLDA: 99.2 % — HIGH (ref 95–99)
SAO2 % BLDV: 65.3 % — SIGNIFICANT CHANGE UP (ref 60–85)
SAO2 % BLDV: 83.3 % — SIGNIFICANT CHANGE UP (ref 60–85)
SODIUM BLDA-SCNC: 132 MMOL/L — LOW (ref 136–146)
SODIUM SERPL-SCNC: 136 MMOL/L — SIGNIFICANT CHANGE UP (ref 135–145)
SPECIMEN SOURCE: SIGNIFICANT CHANGE UP
SPECIMEN SOURCE: SIGNIFICANT CHANGE UP
TROPONIN T, HIGH SENSITIVITY: 57 NG/L — CRITICAL HIGH (ref ?–14)
VANCOMYCIN FLD-MCNC: 24 UG/ML — SIGNIFICANT CHANGE UP
WBC # BLD: 9.45 K/UL — SIGNIFICANT CHANGE UP (ref 3.8–10.5)
WBC # FLD AUTO: 9.45 K/UL — SIGNIFICANT CHANGE UP (ref 3.8–10.5)

## 2018-10-16 PROCEDURE — 99233 SBSQ HOSP IP/OBS HIGH 50: CPT

## 2018-10-16 PROCEDURE — 99233 SBSQ HOSP IP/OBS HIGH 50: CPT | Mod: GC

## 2018-10-16 PROCEDURE — 99292 CRITICAL CARE ADDL 30 MIN: CPT | Mod: 24

## 2018-10-16 PROCEDURE — 36620 INSERTION CATHETER ARTERY: CPT

## 2018-10-16 PROCEDURE — 99291 CRITICAL CARE FIRST HOUR: CPT | Mod: 24

## 2018-10-16 PROCEDURE — 71045 X-RAY EXAM CHEST 1 VIEW: CPT | Mod: 26

## 2018-10-16 RX ORDER — ALBUTEROL 90 UG/1
2 AEROSOL, METERED ORAL EVERY 4 HOURS
Qty: 0 | Refills: 0 | Status: DISCONTINUED | OUTPATIENT
Start: 2018-10-16 | End: 2018-10-18

## 2018-10-16 RX ORDER — IPRATROPIUM BROMIDE 0.2 MG/ML
2 SOLUTION, NON-ORAL INHALATION EVERY 6 HOURS
Qty: 0 | Refills: 0 | Status: DISCONTINUED | OUTPATIENT
Start: 2018-10-16 | End: 2018-10-18

## 2018-10-16 RX ADMIN — DEXMEDETOMIDINE HYDROCHLORIDE IN 0.9% SODIUM CHLORIDE 7.38 MICROGRAM(S)/KG/HR: 4 INJECTION INTRAVENOUS at 19:00

## 2018-10-16 RX ADMIN — PIPERACILLIN AND TAZOBACTAM 200 GRAM(S): 4; .5 INJECTION, POWDER, LYOPHILIZED, FOR SOLUTION INTRAVENOUS at 10:50

## 2018-10-16 RX ADMIN — DEXMEDETOMIDINE HYDROCHLORIDE IN 0.9% SODIUM CHLORIDE 7.38 MICROGRAM(S)/KG/HR: 4 INJECTION INTRAVENOUS at 09:50

## 2018-10-16 RX ADMIN — Medication 2.77 MICROGRAM(S)/KG/MIN: at 09:50

## 2018-10-16 RX ADMIN — PROPOFOL 7.08 MICROGRAM(S)/KG/MIN: 10 INJECTION, EMULSION INTRAVENOUS at 19:00

## 2018-10-16 RX ADMIN — VASOPRESSIN 3 UNIT(S)/MIN: 20 INJECTION INTRAVENOUS at 09:50

## 2018-10-16 RX ADMIN — Medication 8.85 MICROGRAM(S)/KG/MIN: at 09:50

## 2018-10-16 RX ADMIN — Medication 2 PUFF(S): at 10:59

## 2018-10-16 RX ADMIN — Medication 200 GRAM(S): at 01:26

## 2018-10-16 RX ADMIN — FENTANYL CITRATE 5.9 MICROGRAM(S)/KG/HR: 50 INJECTION INTRAVENOUS at 09:49

## 2018-10-16 RX ADMIN — Medication 2 PUFF(S): at 21:17

## 2018-10-16 RX ADMIN — Medication 100 MILLIGRAM(S): at 05:11

## 2018-10-16 RX ADMIN — Medication 2.77 MICROGRAM(S)/KG/MIN: at 19:00

## 2018-10-16 RX ADMIN — Medication 250 MILLIGRAM(S): at 21:00

## 2018-10-16 RX ADMIN — Medication 100 MILLIGRAM(S): at 21:16

## 2018-10-16 RX ADMIN — PANTOPRAZOLE SODIUM 40 MILLIGRAM(S): 20 TABLET, DELAYED RELEASE ORAL at 12:44

## 2018-10-16 RX ADMIN — POLYETHYLENE GLYCOL 3350 17 GRAM(S): 17 POWDER, FOR SOLUTION ORAL at 02:00

## 2018-10-16 RX ADMIN — Medication 250 MILLIGRAM(S): at 09:30

## 2018-10-16 RX ADMIN — Medication 2 PUFF(S): at 15:33

## 2018-10-16 RX ADMIN — PROPOFOL 7.08 MICROGRAM(S)/KG/MIN: 10 INJECTION, EMULSION INTRAVENOUS at 09:50

## 2018-10-16 RX ADMIN — FENTANYL CITRATE 5.9 MICROGRAM(S)/KG/HR: 50 INJECTION INTRAVENOUS at 19:00

## 2018-10-16 RX ADMIN — Medication 100 MILLIGRAM(S): at 14:00

## 2018-10-16 RX ADMIN — ALBUTEROL 2 PUFF(S): 90 AEROSOL, METERED ORAL at 10:59

## 2018-10-16 RX ADMIN — VASOPRESSIN 3 UNIT(S)/MIN: 20 INJECTION INTRAVENOUS at 19:00

## 2018-10-16 RX ADMIN — Medication 81 MILLIGRAM(S): at 12:46

## 2018-10-16 RX ADMIN — PIPERACILLIN AND TAZOBACTAM 200 GRAM(S): 4; .5 INJECTION, POWDER, LYOPHILIZED, FOR SOLUTION INTRAVENOUS at 23:15

## 2018-10-16 NOTE — PROCEDURE NOTE - NSPROCDETAILS_GEN_ALL_CORE
guidewire recovered/lumen(s) aspirated and flushed/ultrasound guidance/sterile dressing applied/sterile technique, catheter placed
location identified, draped/prepped, sterile technique used, needle inserted/introduced/hemostasis with direct pressure, dressing applied/connected to a pressurized flush line/Seldinger technique/all materials/supplies accounted for at end of procedure/positive blood return obtained via catheter/sutured in place

## 2018-10-16 NOTE — PROGRESS NOTE ADULT - SUBJECTIVE AND OBJECTIVE BOX
[facility] DIVISION OF KIDNEY DISEASES AND HYPERTENSION -- FOLLOW UP NOTE  --------------------------------------------------------------------------------    HPI: 59yo Mandarin speaking M w/ Hx of NICM w/ ICD, ESRD on HD TTS, former smoker, BPH who was admitted for scheduled right thoracotomy w/ decortication due to infected hemothorax and chest reconstruction with CT Surgery. Pt underwent procedure on 10/11/18, and afterwards developed shock, thought to be cardiogenic. Pt being monitored in the CTICU. Renal now called for management of pts ESRD. Pt was started on CVVHDF on 10/13/18. Non-tunneled catheter non-functional and removed on 10/15/18. Pt had tolerated one session of HD on 10/15/18 with 2.1L removed.     24 hour events/subjective:  Pt remains on IV pressor support today.     PAST HISTORY  --------------------------------------------------------------------------------  No significant changes to PMH, PSH, FHx, SHx, unless otherwise noted    ALLERGIES & MEDICATIONS  --------------------------------------------------------------------------------  Allergies    No Known Allergies    Intolerances      Standing Inpatient Medications  albumin human  5% IVPB 250 milliLiter(s) IV Intermittent once  aspirin  chewable 81 milliGRAM(s) Oral daily  dexmedetomidine Infusion 0.5 MICROgram(s)/kG/Hr IV Continuous <Continuous>  dextrose 5%. 1000 milliLiter(s) IV Continuous <Continuous>  dextrose 50% Injectable 12.5 Gram(s) IV Push once  dextrose 50% Injectable 25 Gram(s) IV Push once  dextrose 50% Injectable 25 Gram(s) IV Push once  DOBUTamine Infusion 5 MICROgram(s)/kG/Min IV Continuous <Continuous>  fentaNYL   Infusion 1 MICROgram(s)/kG/Hr IV Continuous <Continuous>  insulin lispro (HumaLOG) corrective regimen sliding scale   SubCutaneous every 6 hours  metroNIDAZOLE  IVPB      metroNIDAZOLE  IVPB 500 milliGRAM(s) IV Intermittent every 8 hours  norepinephrine Infusion 0.05 MICROgram(s)/kG/Min IV Continuous <Continuous>  pantoprazole  Injectable 40 milliGRAM(s) IV Push daily  piperacillin/tazobactam IVPB. 3.375 Gram(s) IV Intermittent every 12 hours  PrismaSATE Dialysate BGK 4 / 2.5 5000 milliLiter(s) CRRT <Continuous>  PrismaSOL Filtration BGK 4 / 2.5 5000 milliLiter(s) CRRT <Continuous>  PrismaSOL Filtration BGK 4 / 2.5 5000 milliLiter(s) CRRT <Continuous>  propofol Infusion 20 MICROgram(s)/kG/Min IV Continuous <Continuous>  sodium chloride 0.9%. 1000 milliLiter(s) IV Continuous <Continuous>  vancomycin  IVPB 750 milliGRAM(s) IV Intermittent every 12 hours  vasopressin Infusion 0.05 Unit(s)/Min IV Continuous <Continuous>    PRN Inpatient Medications  dextrose 40% Gel 15 Gram(s) Oral once PRN  glucagon  Injectable 1 milliGRAM(s) IntraMuscular once PRN      REVIEW OF SYSTEMS  --------------------------------------------------------------------------------  Unable to obtain     VITALS/PHYSICAL EXAM  --------------------------------------------------------------------------------  T(C): 37.1 (10-16-18 @ 08:00), Max: 37.6 (10-16-18 @ 00:00)  HR: 104 (10-16-18 @ 08:06) (79 - 113)  BP: 95/67 (10-15-18 @ 14:45) (95/67 - 95/67)  RR: 19 (10-16-18 @ 08:00) (17 - 22)  SpO2: 100% (10-16-18 @ 08:06) (94% - 100%)  Wt(kg): --    10-15-18 @ 07:01  -  10-16-18 @ 07:00  --------------------------------------------------------  IN: 3582.7 mL / OUT: 3270 mL / NET: 312.7 mL    Physical Exam:  	Gen: intubated  	HEENT: +ET tube to vent, +NGT  	Pulm: CTA B/L, + chest tube drains  	CV: RRR, S1S2; no rub  	Abd: +BS, soft  	LE: Warm, no edema  	Skin: Warm  	Vascular access:  LUE AVF site: +thrill +bruit +skin intact; right femoral non-tunneled HD catheter site: no bleeding seen    LABS/STUDIES  --------------------------------------------------------------------------------              7.9    9.45  >-----------<  88       [10-16-18 @ 02:40]              23.1     136  |  97  |  13  ----------------------------<  121      [10-16-18 @ 02:40]  3.6   |  22  |  2.12        Ca     8.5     [10-16-18 @ 02:40]      Mg     2.1     [10-16-18 @ 02:40]      Phos  2.8     [10-16-18 @ 02:40]    TPro  5.3  /  Alb  2.3  /  TBili  1.3  /  DBili  x   /  AST  17  /  ALT  6   /  AlkPhos  173  [10-16-18 @ 02:40]    PT/INR: PT 11.2 , INR 0.97       [10-16-18 @ 02:40]  PTT: 31.8       [10-16-18 @ 02:40]    Creatinine Trend:  SCr 2.12 [10-16 @ 02:40]  SCr 3.18 [10-15 @ 17:34]  SCr 2.83 [10-15 @ 09:53]  SCr 2.49 [10-15 @ 03:30]  SCr 3.88 [10-14 @ 03:50]    HbA1c 5.5      [10-12-18 @ 02:53]    HBsAb 16.3      [10-13-18 @ 15:45]  HCV 0.21, Nonreactive Hepatitis C AB  S/CO Ratio                        Interpretation  < 1.0                                     Non-Reactive  1.0 - 4.9                           Weakly-Reactive  > 5.0                                 Reactive  Non-Reactive: [place] with a non-reactive HCV antibody  result is considered uninfected.  No further action is  needed unless recent infection is suspected.  In these  cases, consider repeat testing later to detect  seroconversion..  Weakly-Reactive: HCV antibody test is abnormal, HCV RNA  Qualitative test will follow.  Reactive: HCV antibody test is abnormal, HCV RNA  Qualitative test will follow.  Note: HCV antibody testing is performed on the [employer] system.      [10-13-18 @ 15:45]

## 2018-10-16 NOTE — PROGRESS NOTE ADULT - PROBLEM SELECTOR PLAN 1
NICM ? per chart.  HFrEF - LVEF 20%, severely dilated LV, LVIDD 7.3 cm. Moderate MR.  CVP 6. Mixed sat 83.5%. Good CO/CI  Suggest turn off dobutamine.  CTI team to titrate down on pressors as tolerated.    Lactate is trending down, now 0.7.  Had HD yesterday with net neg 2.1 fluid removal. CVP 6.   Ok to keep patient net positive 500 ml.  Used  phone to speak w/ wife.

## 2018-10-16 NOTE — PROGRESS NOTE ADULT - SUBJECTIVE AND OBJECTIVE BOX
CLAUDIA HAN          [id number]    HPI:  Pt is a 58 yr old Mandarin speaking male scheduled for Right Thoractomy Decortiation, Right Chest Wall reconstruction with Latissimjus Dorsi Flap Poss Skin Graft with VAC dressing with Dr Pickering 10/11/18. Pt has ICD but unable to identify make or model. Pt hx of ESRD with HD T/Th/Sat for 4 hrs and has stated he has stopped some of his meds but cannot say which ones. Pt seen and received MC and CC but has 2 different med lists and is unable to identify meds taking. Pt denies blood thinners. Pt has stopped ASA as of last week. Pt hx gathered from [employer] and notes from Dr Pickering - pt is poor historian.     Call through  to patient who went to local pharmacy for confirmation of meds and pt given preop instructions (01 Oct 2018 09:25)      Procedure:  POD # :     Issues:        Interval/Overnight Events/ ROS  Pt remained hemodynamically stable overnight, not on any pressors or inotropes. OOB to chair, breathing comfortably with minimal pain. Ambulated several times . Denies pain, no SOB, no palpitations, no nausea/ no vomiting, no dizziness  A-line and shrama d/kirsten         PAST MEDICAL & SURGICAL HISTORY:  Smoking hx  Cardiomyopathy  Wound: right chest  ICD (implantable cardioverter-defibrillator) in place  OA (osteoarthritis)  HLD (hyperlipidemia)  BPH (benign prostatic hyperplasia)  Pleural effusion  HTN (hypertension)  ESRD (end stage renal disease)  H/O chest wound: right  History of wound infection: right chest wall - revision 5/18 and again in 7/18  History of implantable cardioverter-defibrillator (ICD) placement: pt unsure when placed  H/O bilateral hip replacements: 2008, 2009    Allergies    No Known Allergies    Intolerances            ***VITAL SIGNS:  Vital Signs Last 24 Hrs  T(C): 36.2 (16 Oct 2018 04:00), Max: 37.6 (16 Oct 2018 00:00)  T(F): 97.2 (16 Oct 2018 04:00), Max: 99.6 (16 Oct 2018 00:00)  HR: 92 (16 Oct 2018 04:00) (71 - 120)  BP: 95/67 (15 Oct 2018 14:45) (95/67 - 115/62)  BP(mean): 73 (15 Oct 2018 14:45) (73 - 76)  RR: 18 (16 Oct 2018 04:00) (15 - 26)  SpO2: 99% (16 Oct 2018 04:00) (94% - 100%)    I/Os:   I&O's Detail    14 Oct 2018 07:01  -  15 Oct 2018 07:00  --------------------------------------------------------  IN:    dexmedetomidine Infusion: 235.5 mL    DOBUTamine Infusion: 204.7 mL    fentaNYL  Infusion: 253.9 mL    IV PiggyBack: 900 mL    norepinephrine Infusion: 184 mL    Other: 2 mL    Packed Red Blood Cells: 300 mL    propofol Infusion: 270.9 mL    sodium chloride 0.9%.: 30 mL    vasopressin Infusion: 28.8 mL  Total IN: 2409.8 mL    OUT:    Bulb: 25 mL    Bulb: 125 mL    Chest Tube: 20 mL    Chest Tube: 70 mL    Chest Tube: 320 mL    Other: 1300 mL  Total OUT: 1860 mL    Total NET: 549.8 mL      15 Oct 2018 07:01  -  16 Oct 2018 04:50  --------------------------------------------------------  IN:    dexmedetomidine Infusion: 225.5 mL    DOBUTamine Infusion: 96.9 mL    fentaNYL  Infusion: 247.8 mL    IV PiggyBack: 1055 mL    norepinephrine Infusion: 229.3 mL    Other: 400 mL    Packed Red Blood Cells: 267 mL    propofol Infusion: 326.7 mL    sodium chloride 0.9%.: 315 mL    vasopressin Infusion: 63 mL  Total IN: 3226.2 mL    OUT:    Bulb: 275 mL    Bulb: 150 mL    Chest Tube: 50 mL    Chest Tube: 70 mL    Chest Tube: 170 mL    Other: 2500 mL  Total OUT: 3215 mL    Total NET: 11.2 mL          CAPILLARY BLOOD GLUCOSE      POCT Blood Glucose.: 109 mg/dL (16 Oct 2018 01:15)  POCT Blood Glucose.: 119 mg/dL (15 Oct 2018 17:58)  POCT Blood Glucose.: 121 mg/dL (15 Oct 2018 12:09)  POCT Blood Glucose.: 110 mg/dL (15 Oct 2018 06:03)      =======================  MEDICATIONS  ===================  MEDICATIONS  (STANDING):  albumin human  5% IVPB 250 milliLiter(s) IV Intermittent once  aspirin  chewable 81 milliGRAM(s) Oral daily  dexmedetomidine Infusion 0.5 MICROgram(s)/kG/Hr (7.375 mL/Hr) IV Continuous <Continuous>  dextrose 5%. 1000 milliLiter(s) (50 mL/Hr) IV Continuous <Continuous>  dextrose 50% Injectable 12.5 Gram(s) IV Push once  dextrose 50% Injectable 25 Gram(s) IV Push once  dextrose 50% Injectable 25 Gram(s) IV Push once  DOBUTamine Infusion 5 MICROgram(s)/kG/Min (8.85 mL/Hr) IV Continuous <Continuous>  fentaNYL   Infusion 1 MICROgram(s)/kG/Hr (5.9 mL/Hr) IV Continuous <Continuous>  insulin lispro (HumaLOG) corrective regimen sliding scale   SubCutaneous every 6 hours  metroNIDAZOLE  IVPB      metroNIDAZOLE  IVPB 500 milliGRAM(s) IV Intermittent every 8 hours  norepinephrine Infusion 0.05 MICROgram(s)/kG/Min (2.766 mL/Hr) IV Continuous <Continuous>  pantoprazole  Injectable 40 milliGRAM(s) IV Push daily  piperacillin/tazobactam IVPB. 3.375 Gram(s) IV Intermittent every 12 hours  polyethylene glycol 3350 17 Gram(s) Oral once  PrismaSATE Dialysate BGK 4 / 2.5 5000 milliLiter(s) (1000 mL/Hr) CRRT <Continuous>  PrismaSOL Filtration BGK 4 / 2.5 5000 milliLiter(s) (1000 mL/Hr) CRRT <Continuous>  PrismaSOL Filtration BGK 4 / 2.5 5000 milliLiter(s) (200 mL/Hr) CRRT <Continuous>  propofol Infusion 20 MICROgram(s)/kG/Min (7.08 mL/Hr) IV Continuous <Continuous>  sodium chloride 0.9%. 1000 milliLiter(s) (30 mL/Hr) IV Continuous <Continuous>  vancomycin  IVPB 750 milliGRAM(s) IV Intermittent every 12 hours  vasopressin Infusion 0.05 Unit(s)/Min (3 mL/Hr) IV Continuous <Continuous>    MEDICATIONS  (PRN):  dextrose 40% Gel 15 Gram(s) Oral once PRN Blood Glucose LESS THAN 70 milliGRAM(s)/deciliter  glucagon  Injectable 1 milliGRAM(s) IntraMuscular once PRN Glucose LESS THAN 70 milligrams/deciliter      ======================VENTILATOR SETTINGS  ==============  Mode: AC/ CMV (Assist Control/ Continuous Mandatory Ventilation)  RR (machine): 18  TV (machine): 500  FiO2: 40  PEEP: 5  MAP: 11.8  PIP: 30      =================== PATIENT CARE ACCESS DEVICES ==========  Peripheral IV  Central Venous Line	R	L	IJ	Fem	SC			Placed:   Arterial Line	R	L	PT	DP	Fem	Rad	Ax	Placed:   Midline:				  Urinary Catheter, Date Placed:   Necessity of urinary, arterial, and venous catheters discussed    ======================= PHYSICAL EXAM===================  General:                         Comfortable, Awake, alert, not in any distress  Neuro:                            Moving all extremities to commands. No focal deficits	  HEENT:                           CHER/ ETT/ NGT/ trach  Respiratory:	Lungs clear on auscultation bilaterally with good aeration.                                           No rales, rhonchi, no wheezing. Effort even and unlabored.  CV:		Regular rate and rhythm. Normal S1/S2. No murmurs  Abdomen:	                     Soft,  nontender, not-distended. Bowel sounds present / absent.   Skin:		No rash.  Extremities:	Warm, no cyanosis or edema.  Palpable pulses    ============================ LABS =======================                        7.9    9.45  )-----------( 88       ( 16 Oct 2018 02:40 )             23.1     10-16    136  |  97<L>  |  13  ----------------------------<  121<H>  3.6   |  22  |  2.12<H>    Ca    8.5      16 Oct 2018 02:40  Phos  2.8     10-16  Mg     2.1     10-16    TPro  5.3<L>  /  Alb  2.3<L>  /  TBili  1.3<H>  /  DBili  x   /  AST  17  /  ALT  6   /  AlkPhos  173<H>  10-16    LIVER FUNCTIONS - ( 16 Oct 2018 02:40 )  Alb: 2.3 g/dL / Pro: 5.3 g/dL / ALK PHOS: 173 u/L / ALT: 6 u/L / AST: 17 u/L / GGT: x           PT/INR - ( 16 Oct 2018 02:40 )   PT: 11.2 SEC;   INR: 0.97          PTT - ( 16 Oct 2018 02:40 )  PTT:31.8 SEC  ABG - ( 16 Oct 2018 02:40 )  pH, Arterial: 7.47  pH, Blood: x     /  pCO2: 34    /  pO2: 122   / HCO3: 25    / Base Excess: 0.4   /  SaO2: 99.2                OTHER  10-11-18 --  --  --      OTHER  10-11-18 --  --  --      TISSUE  10-11-18 --  --  --      TISSUE  10-11-18 --  --  --      TISSUE  10-11-18 --  --  Enterococcus faecalis      PLEURAL FLUID  10-11-18 --  --    GPCCH^Gram Pos Cocci in Chains  QUANTITY OF BACTERIA SEEN: MODERATE (3+)  GPCPR^Gram Pos Cocci in Pairs  QUANTITY OF BACTERIA SEEN: MODERATE (3+)  WBC^White Blood Cells  QNTY CELLS IN GRAM STAIN: FEW (2+)          ===================== IMAGING STUDIES ===================  Radiology personally reviewed.    ====================ASSESSMENT AND PLAN ================      ====================== NEUROLOGY=======================  Pain control with PCA / PCEA / Tylenol IV / Toradol / Percocet  Pt is on Precedex for agitation  Pt is sedated with Propofol / Fentanyl    ==================== RESPIRATORY========================  Pt is on            L nasal canula / Face tent____% FiO2  Comfortable, no evidence of distress.  Using incentive spirometry & doing                ml  Monitor chest tube output  Chest tube to suction / water seal	    Mechanical Ventilation:  Mode: AC/ CMV (Assist Control/ Continuous Mandatory Ventilation)  RR (machine): 18  TV (machine): 500  FiO2: 40  PEEP: 5  MAP: 11.8  PIP: 30    Mechanical ventilator status assessed & settings reviewed  Continue bronchodilators, pulmonary toilet  Head of bed elevation to 30-40 degrees    ====================CARDIOVASCULAR=====================  Continue hemodynamic monitoring/ telemetry  Not on any pressors  Continue cardiovascular / antihypertensive medications    ===================== RENAL ============================  Continue LR 30CC/hr      D/C IVF  Monitor I/Os, BUN/ Cr  and electrolytes  D/C Sharma      Keep Sharma for UO monitoring  BPH: Continue Flomax/ Finasteride      ==================== GASTROINTESTINAL===================  On regular diet, tolerating well  Continue GI prophylaxis with Pepcid / Protonix  Continue Zofran / Reglan for nausea - PRN	  NPO    =======================    ENDOCRIN  =====================  Glycemic monitoring  F/S with coverage  ===================HEMATOLOGIC/ONCOLOGIC =============  Monitor chest tube output. No signs of active bleeding.   Follow CBC, coags  in AM  DVT prophylaxis with SCD, sc Heparin    ========================INFECTIOUS DISEASE===============  No signs of infection. Monitor for fever / leukocytosis.  All surgical incision / chest tube  sites look clean  D/C Sharma      Pertinent clinical, laboratory, radiographic, hemodynamic, echocardiographic, respiratory data, microbiologic data and chart were reviewed and analyzed frequently throughout the course of the day and night. GI and DVT prophylaxis, glycemic control, head of bed elevation and skin care issues were addressed.  Patient seen, examined and plan discussed with CT Surgery / CTICU team during rounds.  Pt remains critically ill in imminent risk of  deterioration and requires very careful cardio- pulmonary monitoring and support.    I have spent               minutes of critical care time with this pt between            am/pm    and               am/ pm         minutes spent on total encounter; more than 50% of the visit was spent counseling and/or coordinating care by the attending physician.        KERLINE Faith MD CLAUDIA HAN          MRN-[id number]    HPI:  Pt is a 58 yr old Mandarin speaking male scheduled for Right Thoracotomy, Decortication , Right Chest Wall reconstruction with Latissimjus Dorsi Flap Poss Skin Graft with VAC dressing with Dr Pickering 10/11/18. Pt has ICD but unable to identify make or model. Pt hx of ESRD with HD T/Th/Sat for 4 hrs and has stated he has stopped some of his meds but cannot say which ones. Pt seen and received MC and CC but has 2 different med lists and is unable to identify meds taking. Pt denies blood thinners. Pt has stopped ASA as of last week. Pt hx gathered from [employer] and notes from Dr Pickering - pt is poor historian.      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          Procedure:  Thoracotomy  10/11/2018       · Operative Findings	Right thoracotomy, resection of portion of R 7th rib, evacuation of infected hemothorax, takedown of pleurocutaneous fistula	    · Operative Findings	s/p R chest wall reconstruction with tunneled latissimus dorsi flap, covered by serratus anterior flap, after R thoracotomy, takedown pleurocutaneous fistula, decortication, resection Right 7th rib	         · Drains	3 28Fr chest tubes (A,P, and diaphragm), 2 PRS SQ SANDY drains, VAC	  · Estimated Blood Loss	1000 milliLiter(s)	  · IV Infusions - Crystalloids	4L	  · IV Infusions - Colloids	500cc	  · IV Infusions - Blood Products	4u PRBC	       POD # 5    Issues: s/p  infected right hemothorax evacuation             multiple right  chest tubes/ drains in place             distributive and cardiogenic shock/ SIRS             acute resp failure on vent support             posthemorrhagic anemia             multifactorial thrombocytopenia ( HIT negative)             lactic acidosis             hyperglycemia             ESRD on HD             acute on chronic systolic CHF ( EF 10 %), ICD in place                   Interval/OR Events/ ROS  Pt hypotensive through the surgery - on multiple pressors / inotropes ( Levo, Epi, Dobutamine). Required 4 units of PRBC in addition to colloids and crystalloids for  symptomatic  posthemorrhagic anemia during the surgery. Pt arrived in ICU orally intubated, sedated, on Levo, Dobut, Epi.    Overnight in ICU still on multiple pressors/ inotropes./ Vent support. Mixed resp/ metab acidosis minimally  improved Lactic acidemia- unchanged @ 2-3.. Pt will need HD today ( ESRD on HD - anuria)  On POD 1 weaned off Epi, Quan. Remains on Dobutamine, Levophed, Vasopressin; on POD 2 started  CVVHD. Oxygenation and acid base status  slightly   improved. Troponin is down to 88. Cardiology / CHF team on board to help in managing decompensated CHF/ cardiogenic shock. OR culture grew Enterococcus faecalis sensitive to Ampi/ Vanco  POD 3,4 decreasing doses of  Levophed, still  on Vaso/ Dobutamine. Trial of CPAP in Am aborted due to tachypnea and pt's distress.  Will attempt HD today in view of decreased requirement for pressors.  Pt tolerated full HD last evening ( 10/15/18), remains on Levo, Dobutamine, Vaso, vent support.    PAST MEDICAL & SURGICAL HISTORY:  Smoking hx  Cardiomyopathy  Wound: right chest  ICD (implantable cardioverter-defibrillator) in place  OA (osteoarthritis)  HLD (hyperlipidemia)  BPH (benign prostatic hyperplasia)  Pleural effusion  HTN (hypertension)  ESRD (end stage renal disease)  H/O chest wound: right  History of wound infection: right chest wall - revision 5/18 and again in 7/18  History of implantable cardioverter-defibrillator (ICD) placement: pt unsure when placed  H/O bilateral hip replacements: 2008, 2009    Home Medications:   * Patient Currently Takes Medications as of 01-Oct-2018 09:29 documented in Structured Notes  · 	nortriptyline 50 mg oral capsule: 1 cap(s) orally once a day  · 	Vitamin B Complex: 1 tab(s) orally once a day  · 	Nephplex Rx oral tablet: 1 tab(s) orally once a day  · 	aspirin 81 mg oral tablet: 1 tab(s) orally once a day last dose 10/3/2018  · 	Vitamin C 500 mg oral tablet: 1 tab(s) orally once a day  · 	Breo Ellipta 100 mcg-25 mcg/inh inhalation powder: 1 puff(s) inhaled once a day patient denies using it  · 	Calcium 500: 1 tab(s) orally 2 times a day  · 	carvedilol 6.25 mg oral tablet: 1 tab(s) orally once a day  · 	docusate sodium 100 mg oral tablet: 1 tab(s) orally 2 times a day, As Needed  · 	folic acid 1 mg oral tablet: 1 tab(s) orally once a day  · 	Mag-Ox 400 oral tablet: 1 tab(s) orally once a day  · 	midodrine 10 mg oral tablet: 1 tab(s) orally once a day  · 	Multiple Vitamins oral tablet: 1 tab(s) orally once a day last dose 10/3/2018  · 	Namenda 10 mg oral tablet: 1 tab(s) orally 2 times a day  · 	Renvela 800 mg oral tablet: 2 tab(s) orally every 8 hours  · 	simethicone 80 mg oral tablet: 1 tab(s) orally 3 times a day (after meals)  · 	torsemide 20 mg oral tablet: 1 tab(s) orally once a day  · 	vitamin A: 1 tab(s) orally once a day      Allergies  No Known Allergies        ***VITAL SIGNS:  Vital Signs Last 24 Hrs  T(C): 36.2 (16 Oct 2018 04:00), Max: 37.6 (16 Oct 2018 00:00)  T(F): 97.2 (16 Oct 2018 04:00), Max: 99.6 (16 Oct 2018 00:00)  HR: 92 (16 Oct 2018 04:00) (71 - 120)  BP: 95/67 (15 Oct 2018 14:45) (95/67 - 115/62)  BP(mean): 73 (15 Oct 2018 14:45) (73 - 76)  RR: 18 (16 Oct 2018 04:00) (15 - 26)  SpO2: 99% (16 Oct 2018 04:00) (94% - 100%)    I/Os:  14 Oct 2018 07:01  -  15 Oct 2018 07:00  --------------------------------------------------------  IN:    dexmedetomidine Infusion: 235.5 mL    DOBUTamine Infusion: 204.7 mL    fentaNYL  Infusion: 253.9 mL    IV PiggyBack: 900 mL    norepinephrine Infusion: 184 mL    Other: 2 mL    Packed Red Blood Cells: 300 mL    propofol Infusion: 270.9 mL    sodium chloride 0.9%.: 30 mL    vasopressin Infusion: 28.8 mL  Total IN: 2409.8 mL    OUT:    Bulb: 25 mL    Bulb: 125 mL    Chest Tube: 20 mL    Chest Tube: 70 mL    Chest Tube: 320 mL    Other: 1300 mL  Total OUT: 1860 mL    Total NET: 549.8 mL      15 Oct 2018 07:01  -  16 Oct 2018 04:50  --------------------------------------------------------  IN:    dexmedetomidine Infusion: 225.5 mL    DOBUTamine Infusion: 96.9 mL    fentaNYL  Infusion: 247.8 mL    IV PiggyBack: 1055 mL    norepinephrine Infusion: 229.3 mL    Other: 400 mL    Packed Red Blood Cells: 267 mL    propofol Infusion: 326.7 mL    sodium chloride 0.9%.: 315 mL    vasopressin Infusion: 63 mL  Total IN: 3226.2 mL    OUT:    Bulb: 275 mL    Bulb: 150 mL    Chest Tube: 50 mL    Chest Tube: 70 mL    Chest Tube: 170 mL    Other: 2500 mL  Total OUT: 3215 mL    Total NET: 11.2 mL    CAPILLARY BLOOD GLUCOSE  POCT Blood Glucose.: 109 mg/dL (16 Oct 2018 01:15)  POCT Blood Glucose.: 119 mg/dL (15 Oct 2018 17:58)  POCT Blood Glucose.: 121 mg/dL (15 Oct 2018 12:09)  POCT Blood Glucose.: 110 mg/dL (15 Oct 2018 06:03)    =======================  MEDICATIONS  ===================  MEDICATIONS  (STANDING):  albumin human  5% IVPB 250 milliLiter(s) IV Intermittent once  aspirin  chewable 81 milliGRAM(s) Oral daily  dexmedetomidine Infusion 0.5 MICROgram(s)/kG/Hr (7.375 mL/Hr) IV Continuous <Continuous>  dextrose 5%. 1000 milliLiter(s) (50 mL/Hr) IV Continuous <Continuous>  dextrose 50% Injectable 12.5 Gram(s) IV Push once  dextrose 50% Injectable 25 Gram(s) IV Push once  dextrose 50% Injectable 25 Gram(s) IV Push once  DOBUTamine Infusion 5 MICROgram(s)/kG/Min (8.85 mL/Hr) IV Continuous <Continuous>  fentaNYL   Infusion 1 MICROgram(s)/kG/Hr (5.9 mL/Hr) IV Continuous <Continuous>  insulin lispro (HumaLOG) corrective regimen sliding scale   SubCutaneous every 6 hours  metroNIDAZOLE  IVPB      metroNIDAZOLE  IVPB 500 milliGRAM(s) IV Intermittent every 8 hours  norepinephrine Infusion 0.05 MICROgram(s)/kG/Min (2.766 mL/Hr) IV Continuous <Continuous>  pantoprazole  Injectable 40 milliGRAM(s) IV Push daily  piperacillin/tazobactam IVPB. 3.375 Gram(s) IV Intermittent every 12 hours  polyethylene glycol 3350 17 Gram(s) Oral once  PrismaSATE Dialysate BGK 4 / 2.5 5000 milliLiter(s) (1000 mL/Hr) CRRT <Continuous>  PrismaSOL Filtration BGK 4 / 2.5 5000 milliLiter(s) (1000 mL/Hr) CRRT <Continuous>  PrismaSOL Filtration BGK 4 / 2.5 5000 milliLiter(s) (200 mL/Hr) CRRT <Continuous>  propofol Infusion 20 MICROgram(s)/kG/Min (7.08 mL/Hr) IV Continuous <Continuous>  sodium chloride 0.9%. 1000 milliLiter(s) (30 mL/Hr) IV Continuous <Continuous>  vancomycin  IVPB 750 milliGRAM(s) IV Intermittent every 12 hours  vasopressin Infusion 0.05 Unit(s)/Min (3 mL/Hr) IV Continuous <Continuous>    MEDICATIONS  (PRN):  dextrose 40% Gel 15 Gram(s) Oral once PRN Blood Glucose LESS THAN 70 milliGRAM(s)/deciliter  glucagon  Injectable 1 milliGRAM(s) IntraMuscular once PRN Glucose LESS THAN 70 milligrams/deciliter    ======================VENTILATOR SETTINGS  ==============  Mode: AC/ CMV (Assist Control/ Continuous Mandatory Ventilation)  RR (machine): 18  TV (machine): 500  FiO2: 40  PEEP: 5  MAP: 11.8  PIP: 30    =================== PATIENT CARE ACCESS DEVICES ==========  Peripheral IV (+)  Central Venous Line R/ IJ (+) ; R fem HD catheter- d/c today  Arterial Line	R / Rad(+)			  Necessity of  arterial, and venous catheters discussed  ======================= PHYSICAL EXAM===================  General:             sedated, intubated  Neuro:               Moving  extremities spontaneously    with lower dose of sedation. 	  HEENT:                           CHER/ ETT/ NGT  Respiratory:	Lungs sound coarse on auscultation bilaterally with good aeration.                            No rales, rhonchi, no wheezing.                           RIght thoracotomy site - covered with dressing, chest tube site -clean  CV:		Regular rate and rhythm. Normal S1/S2.  Abdomen:          Soft,  nontender, not-distended. Bowel sounds present - hypoactive  Skin:		No rash.  Extremities:	Warm, no cyanosis or edema.  (+) pulses; left arm AV fistula    ============================ LABS =======================                        7.9    9.45  )-----------( 88       ( 16 Oct 2018 02:40 )             23.1     10-16    136  |  97<L>  |  13  ----------------------------<  121<H>  3.6   |  22          |  2.12<H>    Ca    8.5      16 Oct 2018 02:40  Phos  2.8     10-16  Mg     2.1     10-16    TPro  5.3<L>  /  Alb  2.3<L>  /  TBili  1.3<H>  /  DBili  x   /  AST  17  /  ALT  6   /  AlkPhos  173<H>  10-16    LIVER FUNCTIONS - ( 16 Oct 2018 02:40 )  Alb: 2.3 g/dL / Pro: 5.3 g/dL / ALK PHOS: 173 u/L / ALT: 6 u/L / AST: 17 u/L / GGT: x           PT/INR - ( 16 Oct 2018 02:40 )   PT: 11.2 SEC;   INR: 0.97    PTT:31.8 SEC    ABG - ( 16 Oct 2018 02:40 )  pH, Arterial: 7.47  pH, Blood: x     /  pCO2: 34    /  pO2: 122   / HCO3: 25    / Base Excess: 0.4   /  SaO2: 99.2      Blood Gas Arterial - Lactate: 0.7 Please note updated reference range. mmol/L (10.15.18 @ 17:47)  Blood Gas Arterial - Lactate: 0.7: Please note updated reference range. mmol/L (10.14.18 @ 03:50)  Blood Gas Arterial - Lactate: 0.6: Please note updated reference range. mmol/L (10.13.18 @ 21:30)      Blood Gas Arterial - Lactate: 3.3: Please note updated reference range. mmol/L (10.12.18 @ 02:53)  Blood Gas Arterial - Lactate: 2.2: Please note updated reference range. mmol/L (10.11.18 @ 21:20)  Blood Gas Arterial - Lactate: 2.5: Please note updated reference range. mmol/L (10.11.18 @ 18:00)      Troponin T, High Sensitivity: 57  10.16.18 @ 02:40)  Troponin T, High Sensitivity: 62: Delta: 84 on 10/15/18-   Troponin T, High Sensitivity: 84: Delta: 83 on 10/13/   Troponin T, High Sensitivity: 83: Delta: 88 on 10/13/   Troponin T, High Sensitivity: 88: Delta: 113 on 10/13/  Troponin T, High Sensitivity: 122: Delta: 98 on 10/12/  Troponin T, High Sensitivity: 98: Delta: 96 on 10/12/    OTHER  10-11-18 --  --  --  OTHER  10-11-18 --  --  --  TISSUE  10-11-18 --  --  --    TISSUE  10-11-18 --  --  --    TISSUE  10-11-18 --  --  Enterococcus faecalis    PLEURAL FLUID  10-11-18 --  --    GPCCH^Gram Pos Cocci in Chains  QUANTITY OF BACTERIA SEEN: MODERATE (3+)  GPCPR^Gram Pos Cocci in Pairs  QUANTITY OF BACTERIA SEEN: MODERATE (3+)  WBC^White Blood Cells  QNTY CELLS IN GRAM STAIN: FEW (2+)    B cx x 2 pending      ===================== IMAGING STUDIES ===================  Radiology personally reviewed.    ====================ASSESSMENT AND PLAN ===============  Pt is a 58 yr old Mandarin speaking male with infected right hemothorax admitted  for Right Thoracotomy,  Decortication , Right Chest Wall reconstruction      Post-Op Dx:  Pleural effusion  10/11/2018       Pleural fistula  10/11/2018       Trapped lung  10/11/2018          Procedure:  Thoracotomy  10/11/2018       · Operative Findings	Right thoracotomy, resection of portion of R 7th rib, evacuation of infected hemothorax, takedown of pleurocutaneous fistula	    · Operative Findings	s/p R chest wall reconstruction with tunneled latissimus dorsi flap, covered by serratus anterior flap, after R thoracotomy, takedown pleurocutaneous fistula, decortication, resection Right 7th rib	    Issues: s/p  infected right hemothorax evacuation/ Enterococcus empyema             multiple right  chest tubes/ drains in place             distributive and cardiogenic shock/ SIRS             acute resp failure on vent support             posthemorrhagic anemia/  dilutional thrombocytopenia ( s/p PRBC and PLT today)             lactic acidosis             hyperglycemia             ESRD on HD             acute on chronic systolic CHF ( EF 10 %), ICD in place      ====================== NEUROLOGY=======================  Pain control with iv Fentanyl  Pt is sedated with Propofol /Precedex    ==================== RESPIRATORY========================  Monitor chest tube output  Chest tube x3 to  water seal	  Candido x2 to bulb suction    Mechanical Ventilation:  Mode: AC/ CMV (Assist Control/ Continuous Mandatory Ventilation)  RR (machine): 18  TV (machine): 500  FiO2: 40  PEEP: 5  MAP: 10.4  PIP: 24    Mechanical ventilator status assessed & settings reviewed  Continue bronchodilators, pulmonary toilet  Head of bed elevation to 30-40 degrees  F/up  serial ABG's  Will attempt CPAP later today.    ====================CARDIOVASCULAR=====================  Continue hemodynamic monitoring/ telemetry  Titrating Levo, Vaso, Dobutamine for SBP > 90, MAP > 65   Elevated Trop trending down; , likely ESRD/Post-op SIRS, and unlikely ACS, Cardiology following, and d/w cards attending and Heart failure team--No Cath intervention indicated,   Flow track noninvasive CO/ CI monitoring ( CI @ 2.7-3)  ASA started  2 days ago    ===================== RENAL ============================  Continue NS 30CC/hr  KVO     Monitor I/Os, BUN/ Cr  and electrolytes  No Moore - Pt was on HD for ESRD preop.  Currently off CVVHD  due to  shiley  cath clotting ; cathflo- trial w/o improvement  Successful  HD yesterday day via left arm functional AV fistula - for fluid removal 2.5 kg  Will remove righ groin shiley today    ==================== GASTROINTESTINAL===================  NPO  Continue GI prophylaxis with  Protonix  Possibly will start NGT feeds today- tomorrow if not extubated    =======================    ENDOCRIN  =====================  Glycemic monitoring  F/S with coverage  ===================HEMATOLOGIC/ONCOLOGIC =============  Monitor chest tubes output. No signs of active bleeding.   Transfused 1 PRBC - yesterday  Follow CBC, coags  today  DVT prophylaxis with SCD,    Heparin held today due to thrombocytopenia ( HIT negative). Will resume Heparin SC today    ========================INFECTIOUS DISEASE===============   Monitor for fever / leukocytosis.  All surgical incision / chest tube  sites look clean  Empiric ABX Zosyn, Vanco, Flagyl.  Blood culture x2 sent 10/15/18  ID Dr Chahal on board      Pertinent clinical, laboratory, radiographic, hemodynamic, echocardiographic, respiratory data, microbiologic data and chart were reviewed and analyzed frequently throughout the course of the day and night. GI and DVT prophylaxis, glycemic control, head of bed elevation and skin care issues were addressed.  Patient seen, examined and plan discussed with CT Surgery / CTICU team during rounds.  Pt remains critically ill in imminent risk of  deterioration and requires very careful cardio- pulmonary monitoring and support.    I have spent  90   minutes of critical care time with this pt between  12  am   and     9 am         minutes spent on total encounter; more than 50% of the visit was spent counseling and/or coordinating care by the attending physician.      KERLINE Faith MD

## 2018-10-16 NOTE — PROGRESS NOTE ADULT - SUBJECTIVE AND OBJECTIVE BOX
CLAUDIA HAN            MRN-[id number]         No Known Allergies                 HPI:  Pt is a 58 yr old Mandarin speaking male scheduled for Right Thoractomy Decortiation, Right Chest Wall reconstruction with Latissimjus Dorsi Flap Poss Skin Graft with VAC dressing with Dr Pickering 10/11/18. Pt has ICD but unable to identify make or model. Pt hx of ESRD with HD T/Th/Sat for 4 hrs and has stated he has stopped some of his meds but cannot say which ones. Pt seen and received [facility] and [facility] but has 2 different med lists and is unable to identify meds taking. Pt denies blood thinners. Pt has stopped ASA as of last week. Pt hx gathered from [employer] and notes from Dr Pickering - pt is poor historian.       Procedure: Right thoracotomy, resection of portion of R 7th rib, evacuation of infected hemothorax, takedown of pleurocutaneous fistula  / R chest wall reconstruction with tunneled latissimus dorsi flap, covered by serratus anterior flap               Issues:   Infected right hemothorax               Cardiogenic shock/ SIRS              Acute respiratory failure on vent support              Hyperglycemia              ESRD on HD              Acute on chronic systolic CHF ( EF 10 %), ICD in place  Anemia / Thrombocytopenia                 Home Medications:  aspirin 81 mg oral tablet: 1 tab(s) orally once a day last dose 10/3/2018 (11 Oct 2018 08:39)  Breo Ellipta 100 mcg-25 mcg/inh inhalation powder: 1 puff(s) inhaled once a day patient denies using it (11 Oct 2018 08:38)  Calcium 500: 1 tab(s) orally 2 times a day (11 Oct 2018 08:38)  carvedilol 6.25 mg oral tablet: 1 tab(s) orally once a day (11 Oct 2018 08:39)  docusate sodium 100 mg oral tablet: 1 tab(s) orally 2 times a day, As Needed (11 Oct 2018 08:39)  folic acid 1 mg oral tablet: 1 tab(s) orally once a day (11 Oct 2018 08:38)  Mag-Ox 400 oral tablet: 1 tab(s) orally once a day (11 Oct 2018 08:39)  midodrine 10 mg oral tablet: 1 tab(s) orally once a day (11 Oct 2018 08:39)  Multiple Vitamins oral tablet: 1 tab(s) orally once a day last dose 10/3/2018 (11 Oct 2018 08:38)  Namenda 10 mg oral tablet: 1 tab(s) orally 2 times a day (11 Oct 2018 08:38)  Nephplex Rx oral tablet: 1 tab(s) orally once a day (11 Oct 2018 08:39)  nortriptyline 50 mg oral capsule: 1 cap(s) orally once a day (11 Oct 2018 08:39)  Renvela 800 mg oral tablet: 2 tab(s) orally every 8 hours (11 Oct 2018 08:39)  simethicone 80 mg oral tablet: 1 tab(s) orally 3 times a day (after meals) (11 Oct 2018 08:39)  torsemide 20 mg oral tablet: 1 tab(s) orally once a day (11 Oct 2018 08:39)  vitamin A: 1 tab(s) orally once a day (11 Oct 2018 08:38)  Vitamin B Complex: 1 tab(s) orally once a day (11 Oct 2018 08:38)  Vitamin C 500 mg oral tablet: 1 tab(s) orally once a day (11 Oct 2018 08:38)      PAST MEDICAL & SURGICAL HISTORY:  Smoking hx  Cardiomyopathy  Wound: right chest  ICD (implantable cardioverter-defibrillator) in place  OA (osteoarthritis)  HLD (hyperlipidemia)  BPH (benign prostatic hyperplasia)  Pleural effusion  HTN (hypertension)  ESRD (end stage renal disease)  H/O chest wound: right  History of wound infection: right chest wall - revision 5/18 and again in 7/18  History of implantable cardioverter-defibrillator (ICD) placement: pt unsure when placed  H/O bilateral hip replacements: 2008, 2009        ICU Vital Signs Last 24 Hrs  T(C): 36.8 (16 Oct 2018 20:00), Max: 37.6 (16 Oct 2018 00:00)  T(F): 98.3 (16 Oct 2018 20:00), Max: 99.6 (16 Oct 2018 00:00)  HR: 78 (16 Oct 2018 21:17) (68 - 113)  BP: --  BP(mean): --  ABP: 126/48 (16 Oct 2018 21:00) (89/42 - 141/54)  ABP(mean): 70 (16 Oct 2018 21:00) (57 - 81)  RR: 20 (16 Oct 2018 21:00) (17 - 28)  SpO2: 100% (16 Oct 2018 21:17) (96% - 100%)    I&O's Detail    15 Oct 2018 07:01  -  16 Oct 2018 07:00  --------------------------------------------------------  IN:    dexmedetomidine Infusion: 256.4 mL    DOBUTamine Infusion: 110.1 mL    fentaNYL  Infusion: 283.2 mL    IV PiggyBack: 1155 mL    norepinephrine Infusion: 254.2 mL    Other: 400 mL    Packed Red Blood Cells: 267 mL    propofol Infusion: 379.8 mL    sodium chloride 0.9%.: 405 mL    vasopressin Infusion: 72 mL  Total IN: 3582.7 mL    OUT:    Bulb: 175 mL    Bulb: 305 mL    Chest Tube: 70 mL    Chest Tube: 50 mL    Chest Tube: 170 mL    Other: 2500 mL  Total OUT: 3270 mL    Total NET: 312.7 mL      16 Oct 2018 07:01  -  16 Oct 2018 22:25  --------------------------------------------------------  IN:    dexmedetomidine Infusion: 150.9 mL    DOBUTamine Infusion: 17.6 mL    fentaNYL  Infusion: 153.6 mL    IV PiggyBack: 450 mL    Nepro: 100 mL    norepinephrine Infusion: 106.2 mL    propofol Infusion: 111.1 mL    sodium chloride 0.9%.: 70 mL    vasopressin Infusion: 42 mL  Total IN: 1201.4 mL    OUT:    Bulb: 150 mL    Chest Tube: 90 mL    Chest Tube: 50 mL    Chest Tube: 80 mL  Total OUT: 370 mL    Total NET: 831.4 mL        CAPILLARY BLOOD GLUCOSE      POCT Blood Glucose.: 108 mg/dL (16 Oct 2018 18:48)      Home Medications:  aspirin 81 mg oral tablet: 1 tab(s) orally once a day last dose 10/3/2018 (11 Oct 2018 08:39)  Breo Ellipta 100 mcg-25 mcg/inh inhalation powder: 1 puff(s) inhaled once a day patient denies using it (11 Oct 2018 08:38)  Calcium 500: 1 tab(s) orally 2 times a day (11 Oct 2018 08:38)  carvedilol 6.25 mg oral tablet: 1 tab(s) orally once a day (11 Oct 2018 08:39)  docusate sodium 100 mg oral tablet: 1 tab(s) orally 2 times a day, As Needed (11 Oct 2018 08:39)  folic acid 1 mg oral tablet: 1 tab(s) orally once a day (11 Oct 2018 08:38)  Mag-Ox 400 oral tablet: 1 tab(s) orally once a day (11 Oct 2018 08:39)  midodrine 10 mg oral tablet: 1 tab(s) orally once a day (11 Oct 2018 08:39)  Multiple Vitamins oral tablet: 1 tab(s) orally once a day last dose 10/3/2018 (11 Oct 2018 08:38)  Namenda 10 mg oral tablet: 1 tab(s) orally 2 times a day (11 Oct 2018 08:38)  Nephplex Rx oral tablet: 1 tab(s) orally once a day (11 Oct 2018 08:39)  nortriptyline 50 mg oral capsule: 1 cap(s) orally once a day (11 Oct 2018 08:39)  Renvela 800 mg oral tablet: 2 tab(s) orally every 8 hours (11 Oct 2018 08:39)  simethicone 80 mg oral tablet: 1 tab(s) orally 3 times a day (after meals) (11 Oct 2018 08:39)  torsemide 20 mg oral tablet: 1 tab(s) orally once a day (11 Oct 2018 08:39)  vitamin A: 1 tab(s) orally once a day (11 Oct 2018 08:38)  Vitamin B Complex: 1 tab(s) orally once a day (11 Oct 2018 08:38)  Vitamin C 500 mg oral tablet: 1 tab(s) orally once a day (11 Oct 2018 08:38)      MEDICATIONS  (STANDING):  albumin human  5% IVPB 250 milliLiter(s) IV Intermittent once  aspirin  chewable 81 milliGRAM(s) Oral daily  dexmedetomidine Infusion 0.5 MICROgram(s)/kG/Hr (7.375 mL/Hr) IV Continuous <Continuous>  dextrose 5%. 1000 milliLiter(s) (50 mL/Hr) IV Continuous <Continuous>  dextrose 50% Injectable 12.5 Gram(s) IV Push once  dextrose 50% Injectable 25 Gram(s) IV Push once  dextrose 50% Injectable 25 Gram(s) IV Push once  DOBUTamine Infusion 5 MICROgram(s)/kG/Min (8.85 mL/Hr) IV Continuous <Continuous>  fentaNYL   Infusion 1 MICROgram(s)/kG/Hr (5.9 mL/Hr) IV Continuous <Continuous>  insulin lispro (HumaLOG) corrective regimen sliding scale   SubCutaneous every 6 hours  ipratropium 17 MICROgram(s) HFA Inhaler 2 Puff(s) Inhalation every 6 hours  metroNIDAZOLE  IVPB      metroNIDAZOLE  IVPB 500 milliGRAM(s) IV Intermittent every 8 hours  norepinephrine Infusion 0.05 MICROgram(s)/kG/Min (2.766 mL/Hr) IV Continuous <Continuous>  pantoprazole  Injectable 40 milliGRAM(s) IV Push daily  piperacillin/tazobactam IVPB. 3.375 Gram(s) IV Intermittent every 12 hours  PrismaSATE Dialysate BGK 4 / 2.5 5000 milliLiter(s) (1000 mL/Hr) CRRT <Continuous>  PrismaSOL Filtration BGK 4 / 2.5 5000 milliLiter(s) (1000 mL/Hr) CRRT <Continuous>  PrismaSOL Filtration BGK 4 / 2.5 5000 milliLiter(s) (200 mL/Hr) CRRT <Continuous>  propofol Infusion 20 MICROgram(s)/kG/Min (7.08 mL/Hr) IV Continuous <Continuous>  sodium chloride 0.9%. 1000 milliLiter(s) (30 mL/Hr) IV Continuous <Continuous>  vancomycin  IVPB 750 milliGRAM(s) IV Intermittent every 12 hours  vasopressin Infusion 0.05 Unit(s)/Min (3 mL/Hr) IV Continuous <Continuous>    MEDICATIONS  (PRN):  ALBUTerol    90 MICROgram(s) HFA Inhaler 2 Puff(s) Inhalation every 4 hours PRN Shortness of Breath and/or Wheezing  dextrose 40% Gel 15 Gram(s) Oral once PRN Blood Glucose LESS THAN 70 milliGRAM(s)/deciliter  glucagon  Injectable 1 milliGRAM(s) IntraMuscular once PRN Glucose LESS THAN 70 milligrams/deciliter      Mode: AC/ CMV (Assist Control/ Continuous Mandatory Ventilation)  RR (machine): 18  TV (machine): 500  FiO2: 40  PEEP: 5  MAP: 11  PIP: 28      Physical exam:                             General:               Pt is sedated on propofol and Precedex                                                  Neuro:                  Nonfocal                             Cardiovascular:   S1 & S2, regular                           Respiratory:         Air entry isdecreased on right side, has bilateral conducted sounds R>>L                           GI:                          Soft, nondistended and nontender, Bowel sounds active                            Ext:                        No cyanosis,  Left upper arm is slightly swollen     Labs:                                                                           7.9    9.45  )-----------( 88       ( 16 Oct 2018 02:40 )             23.1             10-16    136  |  97<L>  |  13  ----------------------------<  121<H>  3.6   |  22  |  2.12<H>    Ca    8.5      16 Oct 2018 02:40  Phos  2.8     10-16  Mg     2.1     10-16    TPro  5.3<L>  /  Alb  2.3<L>  /  TBili  1.3<H>  /  DBili  x   /  AST  17  /  ALT  6   /  AlkPhos  173<H>  10-16                  PT/INR - ( 16 Oct 2018 02:40 )   PT: 11.2 SEC;   INR: 0.97          PTT - ( 16 Oct 2018 02:40 )  PTT:31.8 SEC  LIVER FUNCTIONS - ( 16 Oct 2018 02:40 )  Alb: 2.3 g/dL / Pro: 5.3 g/dL / ALK PHOS: 173 u/L / ALT: 6 u/L / AST: 17 u/L / GGT: x               CXR:  < from: Xray Chest 1 View- PORTABLE-Routine (10.16.18 @ 06:57) >  The endotracheal tube is in satisfactory position. Enteric tube, right IJ   line and left-sided subcutaneous AICD in place and unchanged.    Likewise, multiple right-sided chest tubes are present.    Persistent small left effusion and opacity at the right lung base   consistent with effusion and atelectasis. No pneumothorax appreciated.          Plan:    General: 58yMale s/p   Right thoracotomy, resection of portion of R 7th rib, evacuation of infected hemothorax, takedown of pleurocutaneous fistula  / R chest wall reconstruction with tunneled latissimus dorsi flap, covered by serratus anterior flap. Remains hypotensive and on vent support                            Neuro:                                         Pain control with Fentanyl drip / Tylenol IV                            Cardiovascular:                                          Continue hemodynamic monitoring.    Hypotension / Shock: Continue Levophed /Vasopressin - Titrate to MAP>65    Acute on chronic systolic heart failure - Was on Dobutamine with C.I>3.5. Currently off inotropic support.  Follow cardiac index / Svo2                             Respiratory:                                         Pt is on full mechanical vent support                                         Did 3.5 hours of CPAP today with acceptable ABG                                         Monitor chest tube output- Still putting out dark bloody fluid,                                          Chest tube to suction                                                                Continue bronchodilators, pulmonary toilet     VAC dressing in place                            GI                                         On tube feeds                                         Continue GI prophylaxis with Protonix                                         Continue Zofran / Reglan for nausea - PRN	                                                                 Renal:                                         Monitor I/Os and electrolytes                                         ESRD: HD as scheduled                                                 Hem/ Onc:                                         Anemia: Transfuse PRBC during HD                                         Monitor chest tube output &  signs of bleeding.                                          Follow CBC in AM     Thrombocytopenia - probably consumptive / multifactorial - monitor. HIT -ve                            Infectious disease:                                            Monitor for fever / leukocytosis.                                          All surgical incision / chest tube  sites look clean                            Endocrine                                             Continue Accu-Checks with coverage    Pt is on SQ Heparin and Venodyne boots for DVT prophylaxis.     Pertinent clinical, laboratory, radiographic, hemodynamic, echocardiographic, respiratory data, microbiologic data and chart were reviewed and analyzed frequently throughout the course of the day and night  Patient seen, examined and plan discussed with CT Surgeon Dr. Ledesma / CTICU team during rounds.    Pt's status discussed with family at bedside, updated status    I have spent  120  minutes of critical care time with this pt between 7am  and 11.59pm              Ralph Warren MD

## 2018-10-16 NOTE — PROGRESS NOTE ADULT - SUBJECTIVE AND OBJECTIVE BOX
Patient seen and examined. He is awake, still intubated on pressors and inotrope. No longer hypothermic. Has good cardiac output and index.    Medications:  albumin human  5% IVPB 250 milliLiter(s) IV Intermittent once  ALBUTerol    90 MICROgram(s) HFA Inhaler 2 Puff(s) Inhalation every 4 hours PRN  aspirin  chewable 81 milliGRAM(s) Oral daily  dexmedetomidine Infusion 0.5 MICROgram(s)/kG/Hr IV Continuous <Continuous>  dextrose 40% Gel 15 Gram(s) Oral once PRN  dextrose 5%. 1000 milliLiter(s) IV Continuous <Continuous>  dextrose 50% Injectable 12.5 Gram(s) IV Push once  dextrose 50% Injectable 25 Gram(s) IV Push once  dextrose 50% Injectable 25 Gram(s) IV Push once  DOBUTamine Infusion 5 MICROgram(s)/kG/Min IV Continuous <Continuous>  fentaNYL   Infusion 1 MICROgram(s)/kG/Hr IV Continuous <Continuous>  glucagon  Injectable 1 milliGRAM(s) IntraMuscular once PRN  insulin lispro (HumaLOG) corrective regimen sliding scale   SubCutaneous every 6 hours  ipratropium 17 MICROgram(s) HFA Inhaler 2 Puff(s) Inhalation every 6 hours  metroNIDAZOLE  IVPB      metroNIDAZOLE  IVPB 500 milliGRAM(s) IV Intermittent every 8 hours  norepinephrine Infusion 0.05 MICROgram(s)/kG/Min IV Continuous <Continuous>  pantoprazole  Injectable 40 milliGRAM(s) IV Push daily  piperacillin/tazobactam IVPB. 3.375 Gram(s) IV Intermittent every 12 hours  PrismaSATE Dialysate BGK 4 / 2.5 5000 milliLiter(s) CRRT <Continuous>  PrismaSOL Filtration BGK 4 / 2.5 5000 milliLiter(s) CRRT <Continuous>  PrismaSOL Filtration BGK 4 / 2.5 5000 milliLiter(s) CRRT <Continuous>  propofol Infusion 20 MICROgram(s)/kG/Min IV Continuous <Continuous>  sodium chloride 0.9%. 1000 milliLiter(s) IV Continuous <Continuous>  vancomycin  IVPB 750 milliGRAM(s) IV Intermittent every 12 hours  vasopressin Infusion 0.05 Unit(s)/Min IV Continuous <Continuous>      Vitals:  Vital Signs Last 24 Hrs  T(C): 35.6 (16 Oct 2018 12:00), Max: 37.6 (16 Oct 2018 00:00)  T(F): 96 (16 Oct 2018 12:00), Max: 99.6 (16 Oct 2018 00:00)  HR: 99 (16 Oct 2018 11:04) (79 - 113)  BP: 95/67 (15 Oct 2018 14:45) (95/67 - 95/67)  BP(mean): 73 (15 Oct 2018 14:45) (73 - 73)  RR: 20 (16 Oct 2018 11:00) (17 - 28)  SpO2: 99% (16 Oct 2018 11:04) (96% - 100%)    Daily     Daily Weight in k.1 (16 Oct 2018 02:00)    I&O's Detail    15 Oct 2018 07:01  -  16 Oct 2018 07:00  --------------------------------------------------------  IN:    dexmedetomidine Infusion: 256.4 mL    DOBUTamine Infusion: 110.1 mL    fentaNYL  Infusion: 283.2 mL    IV PiggyBack: 1155 mL    norepinephrine Infusion: 254.2 mL    Other: 400 mL    Packed Red Blood Cells: 267 mL    propofol Infusion: 379.8 mL    sodium chloride 0.9%.: 405 mL    vasopressin Infusion: 72 mL  Total IN: 3582.7 mL    OUT:    Bulb: 175 mL    Bulb: 305 mL    Chest Tube: 70 mL    Chest Tube: 50 mL    Chest Tube: 170 mL    Other: 2500 mL  Total OUT: 3270 mL    Total NET: 312.7 mL      16 Oct 2018 07:01  -  16 Oct 2018 12:07  --------------------------------------------------------  IN:    dexmedetomidine Infusion: 47.9 mL    DOBUTamine Infusion: 17.6 mL    fentaNYL  Infusion: 47.2 mL    IV PiggyBack: 350 mL    norepinephrine Infusion: 33.2 mL    propofol Infusion: 3.5 mL    sodium chloride 0.9%.: 20 mL    vasopressin Infusion: 12 mL  Total IN: 531.4 mL    OUT:    Bulb: 100 mL    Chest Tube: 10 mL    Chest Tube: 80 mL    Chest Tube: 40 mL  Total OUT: 230 mL    Total NET: 301.4 mL          Physical Exam:     General: No distress. Comfortable.  HEENT: EOM intact.  Neck: Neck supple. JVP not elevated. No masses  Chest: intubated  CV: S1 and S2 RRR. II/VI CARLOS A, no rub, or gallops. Radial pulses normal. No LE edema b/l  Abdomen: Soft, non-distended, non-tender  Skin: has b/l skin hyperpigmented lesions   Neurology: Alert and oriented times three. Sensation intact  Psych: Affect normal    Labs:                        7.9    9.45  )-----------( 88       ( 16 Oct 2018 02:40 )             23.1     10-16    136  |  97<L>  |  13  ----------------------------<  121<H>  3.6   |  22  |  2.12<H>    Ca    8.5      16 Oct 2018 02:40  Phos  2.8     10-16  Mg     2.1     10-16    TPro  5.3<L>  /  Alb  2.3<L>  /  TBili  1.3<H>  /  DBili  x   /  AST  17  /  ALT  6   /  AlkPhos  173<H>  10-16    PT/INR - ( 16 Oct 2018 02:40 )   PT: 11.2 SEC;   INR: 0.97          PTT - ( 16 Oct 2018 02:40 )  PTT:31.8 SEC    < from: Transthoracic Echocardiogram (10.12.18 @ 08:31) >  DIMENSIONS:  Dimensions:     Normal Values:  LA:     2.5 cm    2.0 - 4.0 cm  Ao:     3.5 cm    2.0 - 3.8 cm  SEPTUM: 0.7 cm    0.6 - 1.2 cm  PWT:    0.8 cm    0.6 - 1.1 cm  LVIDd:  7.3 cm    3.0 - 5.6 cm  LVIDs:  6.6 cm    1.8 - 4.0 cm  Derived Variables:  LVMI: 124 g/m2  RWT: 0.21  Fractional short: 10 %  Ejection Fraction (Kianicholtz): 20 %  ------------------------------------------------------------------------  OBSERVATIONS:  Mitral Valve: Mitral annular calcification, otherwise  normal mitral valve. Moderate mitral regurgitation.  Aortic Root: Normal aortic root.  Aortic Valve: Calcified trileaflet aortic valve with normal  opening. Moderate aortic regurgitation.  Vena contracta  width about  0.4 cm.  Left Atrium: Normal left atrium.  LA volume index = 19  cc/m2.  Left Ventricle: Severe global left ventricular systolic  dysfunction. Severe left ventricular enlargement.  Right Heart: Normal right atrium. Normal right ventricular  size with decreased right ventricular systolic function.  Normal tricuspid valve.   Mild tricuspid regurgitation.  Normal pulmonic valve.  Pericardium/PleuraNormal pericardium with no pericardial  effusion.  Hemodynamic: Estimated right ventricular systolic pressure  equals 40 mm Hg, assuming right atrial pressure equals 10  mm Hg, consistent with mild pulmonary hypertension.    < end of copied text >

## 2018-10-16 NOTE — PROGRESS NOTE ADULT - SUBJECTIVE AND OBJECTIVE BOX
CLAUDIA HAN  [id number]    Subjective:  Patient is a 58y old  Male who presents with a chief complaint of pleural effusion (15 Oct 2018 12:08)   Patient was seen and examined at bedside. No acute events overnight. Continues with precedex, propofol, and fentanyl for sedation. Dobutamine, vasopressin, and levo for pressure support.     Objective:  T(C): 36.2 (10-16-18 @ 04:00), Max: 37.6 (10-16-18 @ 00:00)  HR: 96 (10-16-18 @ 06:00) (79 - 120)  BP: 95/67 (10-15-18 @ 14:45) (95/67 - 115/62)  RR: 18 (10-16-18 @ 06:00) (15 - 26)  SpO2: 100% (10-16-18 @ 06:00) (94% - 100%)  Wt(kg): --   10-16    136  |  97<L>  |  13  ----------------------------<  121<H>  3.6   |  22  |  2.12<H>    Ca    8.5      16 Oct 2018 02:40  Phos  2.8     10-16  Mg     2.1     10-16    TPro  5.3<L>  /  Alb  2.3<L>  /  TBili  1.3<H>  /  DBili  x   /  AST  17  /  ALT  6   /  AlkPhos  173<H>  10-16                        7.9    9.45  )-----------( 88       ( 16 Oct 2018 02:40 )             23.1       10-14 @ 07:01  -  10-15 @ 07:00  --------------------------------------------------------  IN: 2409.8 mL / OUT: 1860 mL / NET: 549.8 mL    10-15 @ 07:01  -  10-16 @ 06:46  --------------------------------------------------------  IN: 3497.2 mL / OUT: 3270 mL / NET: 227.2 mL      PHYSICAL EXAM:    General: Sedated but arousable, intubated  Chest: CTx3. JPx2 SS to wall suction. Vac with good seal. No collections palpated.             MEDICATIONS  (STANDING):  albumin human  5% IVPB 250 milliLiter(s) IV Intermittent once  aspirin  chewable 81 milliGRAM(s) Oral daily  dexmedetomidine Infusion 0.5 MICROgram(s)/kG/Hr (7.375 mL/Hr) IV Continuous <Continuous>  dextrose 5%. 1000 milliLiter(s) (50 mL/Hr) IV Continuous <Continuous>  dextrose 50% Injectable 12.5 Gram(s) IV Push once  dextrose 50% Injectable 25 Gram(s) IV Push once  dextrose 50% Injectable 25 Gram(s) IV Push once  DOBUTamine Infusion 5 MICROgram(s)/kG/Min (8.85 mL/Hr) IV Continuous <Continuous>  fentaNYL   Infusion 1 MICROgram(s)/kG/Hr (5.9 mL/Hr) IV Continuous <Continuous>  insulin lispro (HumaLOG) corrective regimen sliding scale   SubCutaneous every 6 hours  metroNIDAZOLE  IVPB      metroNIDAZOLE  IVPB 500 milliGRAM(s) IV Intermittent every 8 hours  norepinephrine Infusion 0.05 MICROgram(s)/kG/Min (2.766 mL/Hr) IV Continuous <Continuous>  pantoprazole  Injectable 40 milliGRAM(s) IV Push daily  piperacillin/tazobactam IVPB. 3.375 Gram(s) IV Intermittent every 12 hours  PrismaSATE Dialysate BGK 4 / 2.5 5000 milliLiter(s) (1000 mL/Hr) CRRT <Continuous>  PrismaSOL Filtration BGK 4 / 2.5 5000 milliLiter(s) (1000 mL/Hr) CRRT <Continuous>  PrismaSOL Filtration BGK 4 / 2.5 5000 milliLiter(s) (200 mL/Hr) CRRT <Continuous>  propofol Infusion 20 MICROgram(s)/kG/Min (7.08 mL/Hr) IV Continuous <Continuous>  sodium chloride 0.9%. 1000 milliLiter(s) (30 mL/Hr) IV Continuous <Continuous>  vancomycin  IVPB 750 milliGRAM(s) IV Intermittent every 12 hours  vasopressin Infusion 0.05 Unit(s)/Min (3 mL/Hr) IV Continuous <Continuous>    MEDICATIONS  (PRN):  dextrose 40% Gel 15 Gram(s) Oral once PRN Blood Glucose LESS THAN 70 milliGRAM(s)/deciliter  glucagon  Injectable 1 milliGRAM(s) IntraMuscular once PRN Glucose LESS THAN 70 milligrams/deciliter

## 2018-10-16 NOTE — PROGRESS NOTE ADULT - ASSESSMENT
57 y/o male (Mandarin speaking) with hx of NICM? HFrEF (LVEF 20%, severely dilated LVIDD 7.3 cm) w/ ICD, HLD, ESRD on HD, former smoker, BPH, hx of prior chest/lung surgeries with chest wall wound infections, b/l hip replacements comes in for a scheduled right thoracotomy w/ decortication and muscle flap with Dr. Jose Alfredo Pickering on 10/11/18. Post surgery patient required Dobutamine 10 mcg/kg/min (now weaned to 5 mcg/kg/min) and pressors. He is currently intubated on pressors ; norepinephrine, phenylephrine, vasopressin and epinephrine. He is also on Zosyn and metronidazole prophylactically. Has 3 right sided chest tubes in place. Has a small right sided basilar phenomothorax. HF consulted to evaluate cardiogenic shock. He is awake and alert with wife by bedside. Per record patient is a poor historian and not aware of which meds he was on at home.     Remains critically ill- e.faecalis empyema, and on pressors/inotrope. But cardiac output/index improving.

## 2018-10-16 NOTE — DIETITIAN INITIAL EVALUATION ADULT. - NS AS NUTRI INTERV ENTERAL NUTRITION
Rate/Volume/1) suggest decrease TF to goal of 35ml/h to provide 1512 kcal w/67gm protein to meet estimated kcal needs  If propofol is d/c'd may maintain TF at 44ml/h

## 2018-10-16 NOTE — PROGRESS NOTE ADULT - PROBLEM SELECTOR PLAN 1
ESRD on HD for past six years through left upper extremity AVF. Pt remains on IV pressor support. CVVHDF discontinued on 10/15/18. Pt tolerated one session of HD on 10/15/18 with 2.1L removed.

## 2018-10-16 NOTE — DIETITIAN INITIAL EVALUATION ADULT. - OTHER INFO
Pt seen for critical care nutrition LOS.  Pt s/p surgery for R chest wall reconstruct w/ tunneled latissimus dorsi flap, takedown pleurocutaneous fistula, decortication, R thoracotomy, resection of portion of R th rib, evacuation of infected hemothorax,.  Pt is currently, intubated/ sedated and receiving CVVHDF. Pt to be initiated on enteral nutrition support.

## 2018-10-16 NOTE — DIETITIAN INITIAL EVALUATION ADULT. - NS AS NUTRI DX ORAL SUPORT IN2
Excessive enteral nutrition infusion/2/2 Kcal provided via propofol/Less than optimal enteral nutrition composition or modality

## 2018-10-16 NOTE — PROGRESS NOTE ADULT - ASSESSMENT
ASSESSMENT: 58M s/p right wall reconstruction w/ tunneled latissimus dorsi flap, covered by serratus anterior flap, after R thoracotomy, takedown of pleurocutaneous fistula, decortication, and resection of right 7th rib    PLAN:  --care per [facility]ICU  --c/w vac dressing  --drain care  --SANDY bulbs to low wall suction   --suggest putting CT to suction to drain any further fluid in chest cavity ASSESSMENT: 58M s/p right wall reconstruction w/ tunneled latissimus dorsi flap, covered by serratus anterior flap, after R thoracotomy, takedown of pleurocutaneous fistula, decortication, and resection of right 7th rib    PLAN:  --care per [facility]ICU  --c/w vac dressing  --drain care  --SANDY bulbs to low wall suction   --suggest putting CT to suction to drain any further fluid in chest cavity  --nutrition--encourage feeding for healing

## 2018-10-16 NOTE — DIETITIAN INITIAL EVALUATION ADULT. - NS FNS MODULAR COMP
No Carb Prosource/2 packs/d to increase protein intake by an additional 30gms/d if TF is reduced to 35ml/h

## 2018-10-17 LAB
BASE EXCESS BLDA CALC-SCNC: -2.6 MMOL/L — SIGNIFICANT CHANGE UP
BASE EXCESS BLDA CALC-SCNC: 1.5 MMOL/L — SIGNIFICANT CHANGE UP
BASE EXCESS BLDA CALC-SCNC: 2.7 MMOL/L — SIGNIFICANT CHANGE UP
BASE EXCESS BLDV CALC-SCNC: -2.1 MMOL/L — SIGNIFICANT CHANGE UP
BLD GP AB SCN SERPL QL: NEGATIVE — SIGNIFICANT CHANGE UP
BUN SERPL-MCNC: 24 MG/DL — HIGH (ref 7–23)
CALCIUM SERPL-MCNC: 8.5 MG/DL — SIGNIFICANT CHANGE UP (ref 8.4–10.5)
CHLORIDE BLDA-SCNC: 103 MMOL/L — SIGNIFICANT CHANGE UP (ref 96–108)
CHLORIDE SERPL-SCNC: 95 MMOL/L — LOW (ref 98–107)
CO2 SERPL-SCNC: 19 MMOL/L — LOW (ref 22–31)
CREAT BLDA-MCNC: 2.25 MG/DL — HIGH (ref 0.5–1.3)
CREAT SERPL-MCNC: 3.31 MG/DL — HIGH (ref 0.5–1.3)
GLUCOSE BLDA-MCNC: 103 MG/DL — HIGH (ref 70–99)
GLUCOSE SERPL-MCNC: 128 MG/DL — HIGH (ref 70–99)
HCO3 BLDA-SCNC: 22 MMOL/L — SIGNIFICANT CHANGE UP (ref 22–26)
HCO3 BLDA-SCNC: 26 MMOL/L — SIGNIFICANT CHANGE UP (ref 22–26)
HCO3 BLDA-SCNC: 27 MMOL/L — HIGH (ref 22–26)
HCO3 BLDV-SCNC: 23 MMOL/L — SIGNIFICANT CHANGE UP (ref 20–27)
HCT VFR BLD CALC: 21.8 % — LOW (ref 39–50)
HCT VFR BLDA CALC: 31.9 % — LOW (ref 39–51)
HGB BLD-MCNC: 7.2 G/DL — LOW (ref 13–17)
HGB BLDA-MCNC: 10.3 G/DL — LOW (ref 13–17)
LACTATE BLDA-SCNC: 0.9 MMOL/L — SIGNIFICANT CHANGE UP (ref 0.5–2)
MAGNESIUM SERPL-MCNC: 2.3 MG/DL — SIGNIFICANT CHANGE UP (ref 1.6–2.6)
MCHC RBC-ENTMCNC: 30.6 PG — SIGNIFICANT CHANGE UP (ref 27–34)
MCHC RBC-ENTMCNC: 33 % — SIGNIFICANT CHANGE UP (ref 32–36)
MCV RBC AUTO: 92.8 FL — SIGNIFICANT CHANGE UP (ref 80–100)
NRBC # FLD: 0 — SIGNIFICANT CHANGE UP
PCO2 BLDA: 31 MMHG — LOW (ref 35–48)
PCO2 BLDA: 39 MMHG — SIGNIFICANT CHANGE UP (ref 35–48)
PCO2 BLDA: 43 MMHG — SIGNIFICANT CHANGE UP (ref 35–48)
PCO2 BLDV: 36 MMHG — LOW (ref 41–51)
PH BLDA: 7.4 PH — SIGNIFICANT CHANGE UP (ref 7.35–7.45)
PH BLDA: 7.44 PH — SIGNIFICANT CHANGE UP (ref 7.35–7.45)
PH BLDA: 7.44 PH — SIGNIFICANT CHANGE UP (ref 7.35–7.45)
PH BLDV: 7.4 PH — SIGNIFICANT CHANGE UP (ref 7.32–7.43)
PLATELET # BLD AUTO: 115 K/UL — LOW (ref 150–400)
PMV BLD: 9.4 FL — SIGNIFICANT CHANGE UP (ref 7–13)
PO2 BLDA: 104 MMHG — SIGNIFICANT CHANGE UP (ref 83–108)
PO2 BLDA: 145 MMHG — HIGH (ref 83–108)
PO2 BLDA: 76 MMHG — LOW (ref 83–108)
PO2 BLDV: 44 MMHG — HIGH (ref 35–40)
POTASSIUM BLDA-SCNC: 3.5 MMOL/L — SIGNIFICANT CHANGE UP (ref 3.4–4.5)
POTASSIUM SERPL-MCNC: 4 MMOL/L — SIGNIFICANT CHANGE UP (ref 3.5–5.3)
POTASSIUM SERPL-SCNC: 4 MMOL/L — SIGNIFICANT CHANGE UP (ref 3.5–5.3)
RBC # BLD: 2.35 M/UL — LOW (ref 4.2–5.8)
RBC # FLD: 18.6 % — HIGH (ref 10.3–14.5)
RH IG SCN BLD-IMP: POSITIVE — SIGNIFICANT CHANGE UP
SAO2 % BLDA: 96 % — SIGNIFICANT CHANGE UP (ref 95–99)
SAO2 % BLDA: 98 % — SIGNIFICANT CHANGE UP (ref 95–99)
SAO2 % BLDA: 99.4 % — HIGH (ref 95–99)
SAO2 % BLDV: 78 % — SIGNIFICANT CHANGE UP (ref 60–85)
SODIUM BLDA-SCNC: 130 MMOL/L — LOW (ref 136–146)
SODIUM SERPL-SCNC: 133 MMOL/L — LOW (ref 135–145)
VANCOMYCIN FLD-MCNC: 24.5 UG/ML — SIGNIFICANT CHANGE UP
WBC # BLD: 9.86 K/UL — SIGNIFICANT CHANGE UP (ref 3.8–10.5)
WBC # FLD AUTO: 9.86 K/UL — SIGNIFICANT CHANGE UP (ref 3.8–10.5)

## 2018-10-17 PROCEDURE — 71045 X-RAY EXAM CHEST 1 VIEW: CPT | Mod: 26

## 2018-10-17 PROCEDURE — 99291 CRITICAL CARE FIRST HOUR: CPT

## 2018-10-17 PROCEDURE — 99292 CRITICAL CARE ADDL 30 MIN: CPT

## 2018-10-17 PROCEDURE — 99233 SBSQ HOSP IP/OBS HIGH 50: CPT

## 2018-10-17 PROCEDURE — 99233 SBSQ HOSP IP/OBS HIGH 50: CPT | Mod: GC

## 2018-10-17 RX ORDER — ACETAMINOPHEN 500 MG
1000 TABLET ORAL ONCE
Qty: 0 | Refills: 0 | Status: COMPLETED | OUTPATIENT
Start: 2018-10-17 | End: 2018-10-18

## 2018-10-17 RX ORDER — VANCOMYCIN HCL 1 G
750 VIAL (EA) INTRAVENOUS EVERY 24 HOURS
Qty: 0 | Refills: 0 | Status: DISCONTINUED | OUTPATIENT
Start: 2018-10-17 | End: 2018-10-18

## 2018-10-17 RX ORDER — MIDODRINE HYDROCHLORIDE 2.5 MG/1
10 TABLET ORAL EVERY 8 HOURS
Qty: 0 | Refills: 0 | Status: DISCONTINUED | OUTPATIENT
Start: 2018-10-17 | End: 2018-10-20

## 2018-10-17 RX ORDER — AMIODARONE HYDROCHLORIDE 400 MG/1
150 TABLET ORAL ONCE
Qty: 0 | Refills: 0 | Status: COMPLETED | OUTPATIENT
Start: 2018-10-17 | End: 2018-10-17

## 2018-10-17 RX ORDER — DESMOPRESSIN ACETATE 0.1 MG/1
17 TABLET ORAL ONCE
Qty: 0 | Refills: 0 | Status: COMPLETED | OUTPATIENT
Start: 2018-10-17 | End: 2018-10-17

## 2018-10-17 RX ORDER — PHENYLEPHRINE HYDROCHLORIDE 10 MG/ML
1 INJECTION INTRAVENOUS
Qty: 160 | Refills: 0 | Status: DISCONTINUED | OUTPATIENT
Start: 2018-10-17 | End: 2018-10-20

## 2018-10-17 RX ORDER — VANCOMYCIN HCL 1 G
750 VIAL (EA) INTRAVENOUS EVERY 24 HOURS
Qty: 0 | Refills: 0 | Status: DISCONTINUED | OUTPATIENT
Start: 2018-10-17 | End: 2018-10-17

## 2018-10-17 RX ORDER — AMIODARONE HYDROCHLORIDE 400 MG/1
1 TABLET ORAL
Qty: 450 | Refills: 0 | Status: DISCONTINUED | OUTPATIENT
Start: 2018-10-17 | End: 2018-10-18

## 2018-10-17 RX ORDER — FENTANYL CITRATE 50 UG/ML
25 INJECTION INTRAVENOUS EVERY 4 HOURS
Qty: 0 | Refills: 0 | Status: DISCONTINUED | OUTPATIENT
Start: 2018-10-17 | End: 2018-10-19

## 2018-10-17 RX ORDER — MAGNESIUM SULFATE 500 MG/ML
2 VIAL (ML) INJECTION ONCE
Qty: 0 | Refills: 0 | Status: COMPLETED | OUTPATIENT
Start: 2018-10-17 | End: 2018-10-17

## 2018-10-17 RX ORDER — DESMOPRESSIN ACETATE 0.1 MG/1
17 TABLET ORAL ONCE
Qty: 0 | Refills: 0 | Status: DISCONTINUED | OUTPATIENT
Start: 2018-10-17 | End: 2018-10-17

## 2018-10-17 RX ADMIN — MIDODRINE HYDROCHLORIDE 10 MILLIGRAM(S): 2.5 TABLET ORAL at 22:48

## 2018-10-17 RX ADMIN — Medication 2 PUFF(S): at 15:15

## 2018-10-17 RX ADMIN — DEXMEDETOMIDINE HYDROCHLORIDE IN 0.9% SODIUM CHLORIDE 7.38 MICROGRAM(S)/KG/HR: 4 INJECTION INTRAVENOUS at 19:45

## 2018-10-17 RX ADMIN — DEXMEDETOMIDINE HYDROCHLORIDE IN 0.9% SODIUM CHLORIDE 7.38 MICROGRAM(S)/KG/HR: 4 INJECTION INTRAVENOUS at 07:38

## 2018-10-17 RX ADMIN — FENTANYL CITRATE 1 MICROGRAM(S)/KG/HR: 50 INJECTION INTRAVENOUS at 08:17

## 2018-10-17 RX ADMIN — PROPOFOL 7.08 MICROGRAM(S)/KG/MIN: 10 INJECTION, EMULSION INTRAVENOUS at 07:38

## 2018-10-17 RX ADMIN — AMIODARONE HYDROCHLORIDE 618 MILLIGRAM(S): 400 TABLET ORAL at 17:53

## 2018-10-17 RX ADMIN — PHENYLEPHRINE HYDROCHLORIDE 11.06 MICROGRAM(S)/KG/MIN: 10 INJECTION INTRAVENOUS at 19:51

## 2018-10-17 RX ADMIN — Medication 8.85 MICROGRAM(S)/KG/MIN: at 07:39

## 2018-10-17 RX ADMIN — Medication 2.77 MICROGRAM(S)/KG/MIN: at 07:38

## 2018-10-17 RX ADMIN — DESMOPRESSIN ACETATE 217 MICROGRAM(S): 0.1 TABLET ORAL at 06:21

## 2018-10-17 RX ADMIN — AMIODARONE HYDROCHLORIDE 618 MILLIGRAM(S): 400 TABLET ORAL at 18:53

## 2018-10-17 RX ADMIN — Medication 50 GRAM(S): at 18:37

## 2018-10-17 RX ADMIN — PIPERACILLIN AND TAZOBACTAM 200 GRAM(S): 4; .5 INJECTION, POWDER, LYOPHILIZED, FOR SOLUTION INTRAVENOUS at 22:42

## 2018-10-17 RX ADMIN — VASOPRESSIN 3 UNIT(S)/MIN: 20 INJECTION INTRAVENOUS at 07:38

## 2018-10-17 RX ADMIN — Medication 2.77 MICROGRAM(S)/KG/MIN: at 19:44

## 2018-10-17 RX ADMIN — Medication 100 MILLIGRAM(S): at 21:45

## 2018-10-17 RX ADMIN — Medication 2 PUFF(S): at 21:49

## 2018-10-17 RX ADMIN — SODIUM CHLORIDE 30 MILLILITER(S): 9 INJECTION INTRAMUSCULAR; INTRAVENOUS; SUBCUTANEOUS at 07:39

## 2018-10-17 RX ADMIN — SODIUM CHLORIDE 30 MILLILITER(S): 9 INJECTION INTRAMUSCULAR; INTRAVENOUS; SUBCUTANEOUS at 19:46

## 2018-10-17 RX ADMIN — Medication 2 PUFF(S): at 02:59

## 2018-10-17 RX ADMIN — PIPERACILLIN AND TAZOBACTAM 200 GRAM(S): 4; .5 INJECTION, POWDER, LYOPHILIZED, FOR SOLUTION INTRAVENOUS at 10:32

## 2018-10-17 RX ADMIN — AMIODARONE HYDROCHLORIDE 33.33 MG/MIN: 400 TABLET ORAL at 19:46

## 2018-10-17 RX ADMIN — VASOPRESSIN 3 UNIT(S)/MIN: 20 INJECTION INTRAVENOUS at 19:45

## 2018-10-17 RX ADMIN — Medication 2 PUFF(S): at 09:12

## 2018-10-17 RX ADMIN — Medication 100 MILLIGRAM(S): at 05:08

## 2018-10-17 RX ADMIN — FENTANYL CITRATE 5.9 MICROGRAM(S)/KG/HR: 50 INJECTION INTRAVENOUS at 07:38

## 2018-10-17 RX ADMIN — AMIODARONE HYDROCHLORIDE 33.33 MG/MIN: 400 TABLET ORAL at 18:17

## 2018-10-17 RX ADMIN — Medication 100 MILLIGRAM(S): at 15:05

## 2018-10-17 RX ADMIN — Medication 81 MILLIGRAM(S): at 11:38

## 2018-10-17 RX ADMIN — PANTOPRAZOLE SODIUM 40 MILLIGRAM(S): 20 TABLET, DELAYED RELEASE ORAL at 11:38

## 2018-10-17 NOTE — PROGRESS NOTE ADULT - SUBJECTIVE AND OBJECTIVE BOX
CLAUDIA HAN            MRN-[id number]         No Known Allergies               Pt is a 58 yr old Mandarin speaking male scheduled for Right Thoractomy Decortiation, Right Chest Wall reconstruction with Latissimjus Dorsi Flap Poss Skin Graft with VAC dressing with Dr Pickering 10/11/18. Pt has ICD but unable to identify make or model. Pt hx of ESRD with HD T/Th/Sat for 4 hrs and has stated he has stopped some of his meds but cannot say which ones. Pt seen and received [facility] and [facility] but has 2 different med lists and is unable to identify meds taking. Pt denies blood thinners. Pt has stopped ASA as of last week. Pt hx gathered from [employer] and notes from Dr Pickering - pt is poor historian.       Procedure: Right thoracotomy, resection of portion of R 7th rib, evacuation of infected hemothorax, takedown of pleurocutaneous fistula  / R chest wall reconstruction with tunneled latissimus dorsi flap, covered by serratus anterior flap               Issues:   Hypotension  Acute on chronic systolic CHF ( EF 10 %), ICD in place  Acute respiratory failure on vent support  Infected right hemothorax               Acute respiratory failure on vent support              Hyperglycemia              ESRD on HD    Anemia / Thrombocytopenia                   Home Medications:  aspirin 81 mg oral tablet: 1 tab(s) orally once a day last dose 10/3/2018 (11 Oct 2018 08:39)  Breo Ellipta 100 mcg-25 mcg/inh inhalation powder: 1 puff(s) inhaled once a day patient denies using it (11 Oct 2018 08:38)  Calcium 500: 1 tab(s) orally 2 times a day (11 Oct 2018 08:38)  carvedilol 6.25 mg oral tablet: 1 tab(s) orally once a day (11 Oct 2018 08:39)  docusate sodium 100 mg oral tablet: 1 tab(s) orally 2 times a day, As Needed (11 Oct 2018 08:39)  folic acid 1 mg oral tablet: 1 tab(s) orally once a day (11 Oct 2018 08:38)  Mag-Ox 400 oral tablet: 1 tab(s) orally once a day (11 Oct 2018 08:39)  midodrine 10 mg oral tablet: 1 tab(s) orally once a day (11 Oct 2018 08:39)  Multiple Vitamins oral tablet: 1 tab(s) orally once a day last dose 10/3/2018 (11 Oct 2018 08:38)  Namenda 10 mg oral tablet: 1 tab(s) orally 2 times a day (11 Oct 2018 08:38)  Nephplex Rx oral tablet: 1 tab(s) orally once a day (11 Oct 2018 08:39)  nortriptyline 50 mg oral capsule: 1 cap(s) orally once a day (11 Oct 2018 08:39)  Renvela 800 mg oral tablet: 2 tab(s) orally every 8 hours (11 Oct 2018 08:39)  simethicone 80 mg oral tablet: 1 tab(s) orally 3 times a day (after meals) (11 Oct 2018 08:39)  torsemide 20 mg oral tablet: 1 tab(s) orally once a day (11 Oct 2018 08:39)  vitamin A: 1 tab(s) orally once a day (11 Oct 2018 08:38)  Vitamin B Complex: 1 tab(s) orally once a day (11 Oct 2018 08:38)  Vitamin C 500 mg oral tablet: 1 tab(s) orally once a day (11 Oct 2018 08:38)      PAST MEDICAL & SURGICAL HISTORY:  Smoking hx  Cardiomyopathy  Wound: right chest  ICD (implantable cardioverter-defibrillator) in place  OA (osteoarthritis)  HLD (hyperlipidemia)  BPH (benign prostatic hyperplasia)  Pleural effusion  HTN (hypertension)  ESRD (end stage renal disease)  H/O chest wound: right  History of wound infection: right chest wall - revision 5/18 and again in 7/18  History of implantable cardioverter-defibrillator (ICD) placement: pt unsure when placed  H/O bilateral hip replacements: 2008, 2009        ICU Vital Signs Last 24 Hrs  T(C): 36.8 (17 Oct 2018 04:00), Max: 37.1 (16 Oct 2018 08:00)  T(F): 98.2 (17 Oct 2018 04:00), Max: 98.7 (16 Oct 2018 08:00)  HR: 76 (17 Oct 2018 04:00) (68 - 113)  BP: --  BP(mean): --  ABP: 113/48 (17 Oct 2018 04:00) (89/42 - 129/49)  ABP(mean): 67 (17 Oct 2018 04:00) (57 - 81)  RR: 19 (17 Oct 2018 04:00) (17 - 28)  SpO2: 100% (17 Oct 2018 04:00) (96% - 100%)    I&O's Detail    15 Oct 2018 07:01  -  16 Oct 2018 07:00  --------------------------------------------------------  IN:    dexmedetomidine Infusion: 256.4 mL    DOBUTamine Infusion: 110.1 mL    fentaNYL  Infusion: 283.2 mL    IV PiggyBack: 1155 mL    norepinephrine Infusion: 254.2 mL    Other: 400 mL    Packed Red Blood Cells: 267 mL    propofol Infusion: 379.8 mL    sodium chloride 0.9%.: 405 mL    vasopressin Infusion: 72 mL  Total IN: 3582.7 mL    OUT:    Bulb: 175 mL    Bulb: 305 mL    Chest Tube: 170 mL    Chest Tube: 70 mL    Chest Tube: 50 mL    Other: 2500 mL  Total OUT: 3270 mL    Total NET: 312.7 mL      16 Oct 2018 07:01  -  17 Oct 2018 05:46  --------------------------------------------------------  IN:    dexmedetomidine Infusion: 223 mL    DOBUTamine Infusion: 17.6 mL    fentaNYL  Infusion: 236.2 mL    IV PiggyBack: 900 mL    Nepro: 260 mL    norepinephrine Infusion: 163.1 mL    propofol Infusion: 238.3 mL    sodium chloride 0.9%.: 105 mL    vasopressin Infusion: 63 mL  Total IN: 2206.2 mL    OUT:    Bulb: 200 mL    Chest Tube: 50 mL    Chest Tube: 80 mL    Chest Tube: 120 mL  Total OUT: 450 mL    Total NET: 1756.2 mL        CAPILLARY BLOOD GLUCOSE      POCT Blood Glucose.: 131 mg/dL (17 Oct 2018 05:07)      Home Medications:  aspirin 81 mg oral tablet: 1 tab(s) orally once a day last dose 10/3/2018 (11 Oct 2018 08:39)  Breo Ellipta 100 mcg-25 mcg/inh inhalation powder: 1 puff(s) inhaled once a day patient denies using it (11 Oct 2018 08:38)  Calcium 500: 1 tab(s) orally 2 times a day (11 Oct 2018 08:38)  carvedilol 6.25 mg oral tablet: 1 tab(s) orally once a day (11 Oct 2018 08:39)  docusate sodium 100 mg oral tablet: 1 tab(s) orally 2 times a day, As Needed (11 Oct 2018 08:39)  folic acid 1 mg oral tablet: 1 tab(s) orally once a day (11 Oct 2018 08:38)  Mag-Ox 400 oral tablet: 1 tab(s) orally once a day (11 Oct 2018 08:39)  midodrine 10 mg oral tablet: 1 tab(s) orally once a day (11 Oct 2018 08:39)  Multiple Vitamins oral tablet: 1 tab(s) orally once a day last dose 10/3/2018 (11 Oct 2018 08:38)  Namenda 10 mg oral tablet: 1 tab(s) orally 2 times a day (11 Oct 2018 08:38)  Nephplex Rx oral tablet: 1 tab(s) orally once a day (11 Oct 2018 08:39)  nortriptyline 50 mg oral capsule: 1 cap(s) orally once a day (11 Oct 2018 08:39)  Renvela 800 mg oral tablet: 2 tab(s) orally every 8 hours (11 Oct 2018 08:39)  simethicone 80 mg oral tablet: 1 tab(s) orally 3 times a day (after meals) (11 Oct 2018 08:39)  torsemide 20 mg oral tablet: 1 tab(s) orally once a day (11 Oct 2018 08:39)  vitamin A: 1 tab(s) orally once a day (11 Oct 2018 08:38)  Vitamin B Complex: 1 tab(s) orally once a day (11 Oct 2018 08:38)  Vitamin C 500 mg oral tablet: 1 tab(s) orally once a day (11 Oct 2018 08:38)      MEDICATIONS  (STANDING):  albumin human  5% IVPB 250 milliLiter(s) IV Intermittent once  aspirin  chewable 81 milliGRAM(s) Oral daily  desmopressin IVPB 17 MICROGram(s) IV Intermittent once  dexmedetomidine Infusion 0.5 MICROgram(s)/kG/Hr (7.375 mL/Hr) IV Continuous <Continuous>  dextrose 5%. 1000 milliLiter(s) (50 mL/Hr) IV Continuous <Continuous>  dextrose 50% Injectable 12.5 Gram(s) IV Push once  dextrose 50% Injectable 25 Gram(s) IV Push once  dextrose 50% Injectable 25 Gram(s) IV Push once  DOBUTamine Infusion 5 MICROgram(s)/kG/Min (8.85 mL/Hr) IV Continuous <Continuous>  fentaNYL   Infusion 1 MICROgram(s)/kG/Hr (5.9 mL/Hr) IV Continuous <Continuous>  insulin lispro (HumaLOG) corrective regimen sliding scale   SubCutaneous every 6 hours  ipratropium 17 MICROgram(s) HFA Inhaler 2 Puff(s) Inhalation every 6 hours  metroNIDAZOLE  IVPB      metroNIDAZOLE  IVPB 500 milliGRAM(s) IV Intermittent every 8 hours  norepinephrine Infusion 0.05 MICROgram(s)/kG/Min (2.766 mL/Hr) IV Continuous <Continuous>  pantoprazole  Injectable 40 milliGRAM(s) IV Push daily  piperacillin/tazobactam IVPB. 3.375 Gram(s) IV Intermittent every 12 hours  PrismaSATE Dialysate BGK 4 / 2.5 5000 milliLiter(s) (1000 mL/Hr) CRRT <Continuous>  PrismaSOL Filtration BGK 4 / 2.5 5000 milliLiter(s) (1000 mL/Hr) CRRT <Continuous>  PrismaSOL Filtration BGK 4 / 2.5 5000 milliLiter(s) (200 mL/Hr) CRRT <Continuous>  propofol Infusion 20 MICROgram(s)/kG/Min (7.08 mL/Hr) IV Continuous <Continuous>  sodium chloride 0.9%. 1000 milliLiter(s) (30 mL/Hr) IV Continuous <Continuous>  vancomycin  IVPB 750 milliGRAM(s) IV Intermittent every 12 hours  vasopressin Infusion 0.05 Unit(s)/Min (3 mL/Hr) IV Continuous <Continuous>    MEDICATIONS  (PRN):  ALBUTerol    90 MICROgram(s) HFA Inhaler 2 Puff(s) Inhalation every 4 hours PRN Shortness of Breath and/or Wheezing  dextrose 40% Gel 15 Gram(s) Oral once PRN Blood Glucose LESS THAN 70 milliGRAM(s)/deciliter  glucagon  Injectable 1 milliGRAM(s) IntraMuscular once PRN Glucose LESS THAN 70 milligrams/deciliter      Mode: AC/ CMV (Assist Control/ Continuous Mandatory Ventilation)  RR (machine): 18  TV (machine): 500  FiO2: 40  PEEP: 5  MAP: 11  PIP: 28      Physical exam:       General:               Pt is sedated on propofol and Precedex                                                  Neuro:                  Nonfocal                             Cardiovascular:   S1 & S2, regular                           Respiratory:         Air entry is decreased on right side, has bilateral conducted sounds R>>L                           GI:                          Soft, nondistended and nontender, Bowel sounds active                            Ext:                        No cyanosis,  Left upper arm is slightly swollen                            Labs:                                                                           7.2    9.86  )-----------( 115      ( 17 Oct 2018 04:20 )             21.8             10-17    133<L>  |  95<L>  |  24<H>  ----------------------------<  128<H>  4.0   |  19<L>  |  3.31<H>    Ca    8.5      17 Oct 2018 04:20  Phos  2.8     10-16  Mg     2.3     10-17    TPro  5.3<L>  /  Alb  2.3<L>  /  TBili  1.3<H>  /  DBili  x   /  AST  17  /  ALT  6   /  AlkPhos  173<H>  10-16                  PT/INR - ( 16 Oct 2018 02:40 )   PT: 11.2 SEC;   INR: 0.97          PTT - ( 16 Oct 2018 02:40 )  PTT:31.8 SEC  LIVER FUNCTIONS - ( 16 Oct 2018 02:40 )  Alb: 2.3 g/dL / Pro: 5.3 g/dL / ALK PHOS: 173 u/L / ALT: 6 u/L / AST: 17 u/L / GGT: x               CXR:    < from: Xray Chest 1 View- PORTABLE-Routine (10.16.18 @ 06:57) >  The endotracheal tube is in satisfactory position. Enteric tube, right IJ   line and left-sided subcutaneous AICD in place and unchanged.    Likewise, multiple right-sided chest tubes are present.    Persistent small left effusion and opacity at the right lung base   consistent with effusion and atelectasis. No pneumothorax appreciated.      Plan:    General: 58yMale s/p   Right thoracotomy, resection of portion of R 7th rib, evacuation of infected hemothorax, takedown of pleurocutaneous fistula  / R chest wall reconstruction with tunneled latissimus dorsi flap, covered by serratus anterior flap. Remains hypotensive and on vent support                            Neuro:                                         Pain control with Fentanyl drip / Tylenol IV                            Cardiovascular:                                          Continue hemodynamic monitoring.    Hypotension / Shock: Continue Levophed /Vasopressin - Titrate to MAP>65    Acute on chronic systolic heart failure - Was on Dobutamine with C.I>3.5. Currently off inotropic support.  Follow cardiac index / Svo2                             Respiratory:                                         Pt is on full mechanical vent support                                         Did 3.5 hours of CPAP yesterday with acceptable ABG. Plan CPAP wean and possible extubation today after HD                                         Monitor chest tube output- Still putting out dark bloody fluid and Hct dropped to 21. Give DDAVP & transfuse 2uts PRBC during HD                                         Chest tube to suction                                                                Continue bronchodilators, pulmonary toilet     VAC dressing in place                            GI                                         On tube feeds                                         Continue GI prophylaxis with Protonix                                         Continue Zofran / Reglan for nausea - PRN	                                                                 Renal:                                         Monitor I/Os and electrolytes                                         ESRD: HD as scheduled                                                 Hem/ Onc:                                         Anemia: Transfuse 2 Uts PRBC during HD                                         Monitor chest tube output &  signs of bleeding. Give DDAVP x 1                                         Follow CBC in AM     Thrombocytopenia - probably consumptive / multifactorial - monitor. HIT -ve                            Infectious disease:                                            Monitor for fever / leukocytosis.                                          All surgical incision / chest tube  sites look clean                            Endocrine                                             Continue Accu-Checks with coverage    Pt is on SQ Heparin and Venodyne boots for DVT prophylaxis.     Pertinent clinical, laboratory, radiographic, hemodynamic, echocardiographic, respiratory data, microbiologic data and chart were reviewed and analyzed frequently throughout the course of the day and night  Patient seen, examined and plan discussed with CT Surgeon Dr. Ledesma / CTICU team during rounds.    Pt's status discussed with family at bedside, updated status    I have spent  90  minutes of critical care time with this pt between 12am  and 8am              Ralph Warren MD

## 2018-10-17 NOTE — PROGRESS NOTE ADULT - PROBLEM SELECTOR PLAN 1
ESRD on HD for past six years through left upper extremity AVF. Pt remains on IV pressor support. CVVHDF discontinued on 10/15/18. Pt tolerated one session of HD on 10/15/18 with 2.1L removed. Plan for another session of HD today with UF.

## 2018-10-17 NOTE — PROGRESS NOTE ADULT - SUBJECTIVE AND OBJECTIVE BOX
[facility] DIVISION OF KIDNEY DISEASES AND HYPERTENSION -- FOLLOW UP NOTE  --------------------------------------------------------------------------------    HPI: 59yo Mandarin speaking M w/ Hx of NICM w/ ICD, ESRD on HD TTS, former smoker, BPH who was admitted for scheduled right thoracotomy w/ decortication due to infected hemothorax and chest reconstruction with CT Surgery. Pt underwent procedure on 10/11/18, and afterwards developed shock, thought to be cardiogenic. Pt being monitored in the CTICU. Renal now called for management of pts ESRD. Pt was started on CVVHDF on 10/13/18. Non-tunneled catheter non-functional and removed on 10/15/18. Pt had tolerated a session of HD on 10/15/18 with 2.1L removed. Plan for HD today     24 hour events/subjective: Pt remains on IV pressor support. Plan for HD today with UF as tolerated.     PAST HISTORY  --------------------------------------------------------------------------------  No significant changes to PMH, PSH, FHx, SHx, unless otherwise noted    ALLERGIES & MEDICATIONS  --------------------------------------------------------------------------------  Allergies    No Known Allergies    Intolerances      Standing Inpatient Medications  albumin human  5% IVPB 250 milliLiter(s) IV Intermittent once  aspirin  chewable 81 milliGRAM(s) Oral daily  dexmedetomidine Infusion 0.5 MICROgram(s)/kG/Hr IV Continuous <Continuous>  dextrose 5%. 1000 milliLiter(s) IV Continuous <Continuous>  dextrose 50% Injectable 12.5 Gram(s) IV Push once  dextrose 50% Injectable 25 Gram(s) IV Push once  dextrose 50% Injectable 25 Gram(s) IV Push once  DOBUTamine Infusion 5 MICROgram(s)/kG/Min IV Continuous <Continuous>  fentaNYL   Infusion 1 MICROgram(s)/kG/Hr IV Continuous <Continuous>  insulin lispro (HumaLOG) corrective regimen sliding scale   SubCutaneous every 6 hours  ipratropium 17 MICROgram(s) HFA Inhaler 2 Puff(s) Inhalation every 6 hours  metroNIDAZOLE  IVPB      metroNIDAZOLE  IVPB 500 milliGRAM(s) IV Intermittent every 8 hours  norepinephrine Infusion 0.05 MICROgram(s)/kG/Min IV Continuous <Continuous>  pantoprazole  Injectable 40 milliGRAM(s) IV Push daily  piperacillin/tazobactam IVPB. 3.375 Gram(s) IV Intermittent every 12 hours  PrismaSATE Dialysate BGK 4 / 2.5 5000 milliLiter(s) CRRT <Continuous>  PrismaSOL Filtration BGK 4 / 2.5 5000 milliLiter(s) CRRT <Continuous>  PrismaSOL Filtration BGK 4 / 2.5 5000 milliLiter(s) CRRT <Continuous>  propofol Infusion 20 MICROgram(s)/kG/Min IV Continuous <Continuous>  sodium chloride 0.9%. 1000 milliLiter(s) IV Continuous <Continuous>  vasopressin Infusion 0.05 Unit(s)/Min IV Continuous <Continuous>    PRN Inpatient Medications  ALBUTerol    90 MICROgram(s) HFA Inhaler 2 Puff(s) Inhalation every 4 hours PRN  dextrose 40% Gel 15 Gram(s) Oral once PRN  glucagon  Injectable 1 milliGRAM(s) IntraMuscular once PRN      REVIEW OF SYSTEMS  --------------------------------------------------------------------------------  Unable to obtain     VITALS/PHYSICAL EXAM  --------------------------------------------------------------------------------  T(C): 36.8 (10-17-18 @ 04:00), Max: 37 (10-17-18 @ 00:00)  HR: 80 (10-17-18 @ 07:00) (68 - 103)  BP: --  RR: 18 (10-17-18 @ 07:00) (17 - 28)  SpO2: 100% (10-17-18 @ 07:00) (96% - 100%)  Wt(kg): --    10-16-18 @ 07:01  -  10-17-18 @ 07:00  --------------------------------------------------------  IN: 2616.8 mL / OUT: 490 mL / NET: 2126.8 mL    Physical Exam:  	Gen: intubated  	HEENT: +ET tube to vent, +NGT  	Pulm: CTA B/L, + chest tube drains  	CV: RRR, S1S2; no rub  	Abd: +BS, soft  	LE: Warm, no edema  	Skin: Warm  	Vascular access:  GISSELLEE AVF site: +thrill +bruit +skin intact    LABS/STUDIES  --------------------------------------------------------------------------------              7.2    9.86  >-----------<  115      [10-17-18 @ 04:20]              21.8     133  |  95  |  24  ----------------------------<  128      [10-17-18 @ 04:20]  4.0   |  19  |  3.31        Ca     8.5     [10-17-18 @ 04:20]      Mg     2.3     [10-17-18 @ 04:20]      Phos  2.8     [10-16-18 @ 02:40]    TPro  5.3  /  Alb  2.3  /  TBili  1.3  /  DBili  x   /  AST  17  /  ALT  6   /  AlkPhos  173  [10-16-18 @ 02:40]    PT/INR: PT 11.2 , INR 0.97       [10-16-18 @ 02:40]  PTT: 31.8       [10-16-18 @ 02:40]    Creatinine Trend:  SCr 3.31 [10-17 @ 04:20]  SCr 2.12 [10-16 @ 02:40]  SCr 3.18 [10-15 @ 17:34]  SCr 2.83 [10-15 @ 09:53]  SCr 2.49 [10-15 @ 03:30]    HbA1c 5.5      [10-12-18 @ 02:53]    HBsAb 16.3      [10-13-18 @ 15:45]  HCV 0.21, Nonreactive Hepatitis C AB  S/CO Ratio                        Interpretation  < 1.0                                     Non-Reactive  1.0 - 4.9                           Weakly-Reactive  > 5.0                                 Reactive  Non-Reactive: Aperson with a non-reactive HCV antibody  result is considered uninfected.  No further action is  needed unless recent infection is suspected.  In these  cases, consider repeat testing later to detect  seroconversion..  Weakly-Reactive: HCV antibody test is abnormal, HCV RNA  Qualitative test will follow.  Reactive: HCV antibody test is abnormal, HCV RNA  Qualitative test will follow.  Note: HCV antibody testing is performed on the "[employer] system.      [10-13-18 @ 15:45]

## 2018-10-17 NOTE — PROGRESS NOTE ADULT - PROBLEM SELECTOR PLAN 1
NICM ? per chart.  HFrEF - LVEF 20%, severely dilated LV, LVIDD 7.3 cm. Moderate MR.  CVP 4. Mixed sat 78.0%. Good CO/CI. Off dobutamine.  CTI team to titrate down on pressors as tolerated. Add home dose midodrine on HD days.  Getting HD w/ 2 U PRBC.   Ok to keep patient net positive 500 ml.

## 2018-10-17 NOTE — PROGRESS NOTE ADULT - ASSESSMENT
Pt is a 58 yr old Mandarin speaking male scheduled for Right Thoractomy Decortiation, Right Chest Wall reconstruction with Latissimjus Dorsi Flap Poss Skin Graft with VAC dressing with Dr Pickering 10/11/18. Pt has ICD but unable to identify make or model. Pt hx of ESRD with HD T/Th/Sat for 4 hrs and has stated he has stopped some of his meds but cannot say which ones. Pt seen and received [facility] and [facility] but has 2 different med lists and is unable to identify meds taking. Pt denies blood thinners. Pt has stopped ASA as of last week. Pt hx gathered from [employer] and notes from Dr Pickering - pt is poor historian.     Call through  to patient who went to local pharmacy for confirmation of meds and pt given preop instructions (01 Oct 2018 09:25)    Pt has multiple comorbidities including CHF, EF of 10%, renal failure on dialysis,  who has had chronic bilateral pleural effusions.  The patient previously underwent left VATS and PleurX drains  in 2015.  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  During this admission pt underwent OR with Plastic and Thoracic surgery,  for definitive repair that involved thoracotomy, decortication, excision of empyema cavity along with muscle flap reconstruction. Pt underwent surgery on 10/11 (Right thoracotomy, resection of portion of R 7th rib, evacuation of infected hemothorax, takedown of pleurocutaneous fistula - and noted to have foul smelling hematoma).  OR cultures are growing Enterococcus faecalis.    Pt started on empiric vanco/zosyn/flagyl since 10/11.  He has multiple right  chest tubes/ drains in place and remains on vent support. Pt is on ESRD.  He has had intermittent episodes of hypothermia, and low bp (85/44). ID consult called for further antibiotic management.     Problem/Plan - 1:    ·	Infected right hemothorax    - Cont broad spectrum abx for now - agree with zosyn.  Thus far, OR cultures growing sensitive E.faecalis (to amp/vanco).  Renally dose abx, can cont zosyn 3.375 gm IV q12 for HD.      - Can d/c further vanco dosing, no MRSA identified from any of the cultures.  BCX (-) x 2      - Cont wound vac, local wound care, and SANDY drain managment as per CT icu.  Fluid is serosanguinous.        Will follow,    Anabel Chahal  [phone]

## 2018-10-17 NOTE — PROGRESS NOTE ADULT - ASSESSMENT
ASSESSMENT: 58M s/p right wall reconstruction w/ tunneled latissimus dorsi flap, covered by serratus anterior flap, after R thoracotomy, takedown of pleurocutaneous fistula, decortication, and resection of right 7th rib POD6    PLAN:  --care per CTICU  --c/w vac dressing  --drain care  --SANDY bulbs to low wall suction   --suggest putting CT to suction to drain any further fluid in chest cavity  --nutrition--encourage feeding for healing

## 2018-10-17 NOTE — PROGRESS NOTE ADULT - SUBJECTIVE AND OBJECTIVE BOX
Infectious Diseases progress note:    Subjective:  Events noted.  No new fevers or leukocytosis.  Pt remains intubated and on vasopressors.  Tolerated CPAP yesterday.  Drop in HCT, pt getting blood transfusion.  On HD.                                               ROS:  Pt intubated, unable to assess    Allergies    No Known Allergies    Intolerances        ANTIBIOTICS/RELEVANT:  antimicrobials  metroNIDAZOLE  IVPB      metroNIDAZOLE  IVPB 500 milliGRAM(s) IV Intermittent every 8 hours  piperacillin/tazobactam IVPB. 3.375 Gram(s) IV Intermittent every 12 hours    immunologic:    OTHER:  albumin human  5% IVPB 250 milliLiter(s) IV Intermittent once  ALBUTerol    90 MICROgram(s) HFA Inhaler 2 Puff(s) Inhalation every 4 hours PRN  aspirin  chewable 81 milliGRAM(s) Oral daily  dexmedetomidine Infusion 0.5 MICROgram(s)/kG/Hr IV Continuous <Continuous>  dextrose 40% Gel 15 Gram(s) Oral once PRN  dextrose 5%. 1000 milliLiter(s) IV Continuous <Continuous>  dextrose 50% Injectable 12.5 Gram(s) IV Push once  dextrose 50% Injectable 25 Gram(s) IV Push once  dextrose 50% Injectable 25 Gram(s) IV Push once  fentaNYL   Infusion 1 MICROgram(s)/kG/Hr IV Continuous <Continuous>  glucagon  Injectable 1 milliGRAM(s) IntraMuscular once PRN  insulin lispro (HumaLOG) corrective regimen sliding scale   SubCutaneous every 6 hours  ipratropium 17 MICROgram(s) HFA Inhaler 2 Puff(s) Inhalation every 6 hours  norepinephrine Infusion 0.05 MICROgram(s)/kG/Min IV Continuous <Continuous>  pantoprazole  Injectable 40 milliGRAM(s) IV Push daily  propofol Infusion 20 MICROgram(s)/kG/Min IV Continuous <Continuous>  sodium chloride 0.9%. 1000 milliLiter(s) IV Continuous <Continuous>  vasopressin Infusion 0.05 Unit(s)/Min IV Continuous <Continuous>      Objective:  Vital Signs Last 24 Hrs  T(C): 36.7 (17 Oct 2018 09:55), Max: 37 (17 Oct 2018 00:00)  T(F): 98 (17 Oct 2018 09:55), Max: 98.6 (17 Oct 2018 00:00)  HR: 86 (17 Oct 2018 10:29) (68 - 103)  BP: --  BP(mean): --  RR: 19 (17 Oct 2018 10:00) (17 - 24)  SpO2: 100% (17 Oct 2018 10:29) (98% - 100%)    PHYSICAL EXAM:  Constitutional: intubated on ventilator  Eyes:CHER, EOMI  Ear/Nose/Throat: no thrush, mucositis.  Moist mucous membranes	  Neck:no JVD, no lymphadenopathy, supple  Respiratory: CTA adore, chest tubes   Cardiovascular: S1S2 RRR, no murmurs  Gastrointestinal:soft, nontender,  nondistended (+) BS  Extremities:no e/e/c  Skin:  rt post chest wound, SANDY drains with serosanguinous drainage,         LABS:                        7.2    9.86  )-----------( 115      ( 17 Oct 2018 04:20 )             21.8     10-17    133<L>  |  95<L>  |  24<H>  ----------------------------<  128<H>  4.0   |  19<L>  |  3.31<H>    Ca    8.5      17 Oct 2018 04:20  Phos  2.8     10-16  Mg     2.3     10-17    TPro  5.3<L>  /  Alb  2.3<L>  /  TBili  1.3<H>  /  DBili  x   /  AST  17  /  ALT  6   /  AlkPhos  173<H>  10-16    PT/INR - ( 16 Oct 2018 02:40 )   PT: 11.2 SEC;   INR: 0.97          PTT - ( 16 Oct 2018 02:40 )  PTT:31.8 SEC        Vancomycin Level, Random:  ug/mL (10-15 @ 23:45)      Vancomycin Level, Trough: 8.1 ug/mL (10-13 @ 21:30)              MICROBIOLOGY:    Culture - Blood (10.15.18 @ 18:59)    Culture - Blood:   NO ORGANISMS ISOLATED  NO ORGANISMS ISOLATED AT 24 HOURS    Specimen Source: BLOOD PERIPHERAL    Culture - Blood (10.15.18 @ 18:59)    Culture - Blood:   NO ORGANISMS ISOLATED  NO ORGANISMS ISOLATED AT 24 HOURS    Specimen Source: BLOOD PERIPHERAL    Culture - Acid Fast Smear Concentrated (10.11.18 @ 15:17)    Specimen Source: OTHER    Culture - Acid Fast Smear Concentrated:   AFB SMEAR= NO ACID FAST BACILLI SEEN    Culture - Acid Fast Smear Concentrated (10.11.18 @ 15:04)    Culture - Acid Fast Smear Concentrated:   AFB SMEAR= NO ACID FAST BACILLI SEEN    Specimen Source: OTHER    Culture - Surg Site Aerob/Anaer w/Gm St (10.11.18 @ 13:58)    Gram Stain Wound:   GPCPR^Gram Pos Cocci in Pairs  QUANTITY OF BACTERIA SEEN: RARE (1+)  WBC^White Blood Cells  QNTY CELLS IN GRAM STAIN: RARE (1+)    Culture - Surgical Site:   MODERATE  SEE PREVIOUS CULTURE:#  COLLECTED 10/11/18 AT 1226 HRS  RECEIVED 10/11/18 AT 1316 HRS FOR AAMIR  ENTF^Enterococcus faecalis    Specimen Source: TISSUE          RADIOLOGY & ADDITIONAL STUDIES:    < from: Xray Chest 1 View- PORTABLE-Routine (10.16.18 @ 06:57) >  INTERPRETATION:     The endotracheal tube is in satisfactory position. Enteric tube, right IJ   line and left-sided subcutaneous AICD in place and unchanged.    Likewise, multiple right-sided chest tubes are present.    Persistent small left effusion and opacity at the right lung base   consistent with effusion and atelectasis. No pneumothorax appreciated.      COMPARISON:  October 15      IMPRESSION:  Follow-up post right thoracotomy with multiple chest tubes   and endotracheal tube in satisfactory position.    < end of copied text >

## 2018-10-17 NOTE — PROGRESS NOTE ADULT - PROBLEM SELECTOR PLAN 2
Hb 7.2 today. Monitor hb closely. Blood transfusion as per primary team Hb 7.2 today. Monitor hb closely. Blood transfusion as per primary team- will get 2 units with HD today

## 2018-10-17 NOTE — PROGRESS NOTE ADULT - ASSESSMENT
59 y/o male (Mandarin speaking) with hx of NICM? HFrEF (LVEF 20%, severely dilated LVIDD 7.3 cm) w/ ICD, HLD, ESRD on HD, former smoker, BPH, hx of prior chest/lung surgeries with chest wall wound infections, b/l hip replacements comes in for a scheduled right thoracotomy w/ decortication and muscle flap with Dr. Jose Alfredo Pickering on 10/11/18. Post surgery patient required Dobutamine 10 mcg/kg/min (now weaned to 5 mcg/kg/min) and pressors. He is currently intubated on pressors ; norepinephrine, phenylephrine, vasopressin and epinephrine. He is also on Zosyn and metronidazole prophylactically. Has 3 right sided chest tubes in place. Has a small right sided basilar phenomothorax. HF consulted to evaluate cardiogenic shock. He is awake and alert with wife by bedside. Per record patient is a poor historian and not aware of which meds he was on at home.   Remains critically ill- e.faecalis empyema, and on pressors. But cardiac output/index improving. Off dobutamine.

## 2018-10-17 NOTE — PROGRESS NOTE ADULT - SUBJECTIVE AND OBJECTIVE BOX
CLAUDIA HAN  [id number]    Subjective:  Patient is a 58y old  Male who presents with a chief complaint of Thoracotomy (16 Oct 2018 12:06)   Patient was seen and examined at bedside. No acute events overnight. Yesterday vac was alarming, reinforced with tegaderms. Possibly take down vac today. JPs to wall suction. Failed CPAP trial yesterday. TF started yesterday. Continued vaso/dobuatmine/levo for pressor support and precedex/fentanyl/propofol for sedation.    Objective:  T(C): 36.8 (10-17-18 @ 04:00), Max: 37.1 (10-16-18 @ 08:00)  HR: 81 (10-17-18 @ 06:50) (68 - 113)  BP: --  RR: 18 (10-17-18 @ 06:00) (17 - 28)  SpO2: 100% (10-17-18 @ 06:50) (96% - 100%)  Wt(kg): --   10-17    133<L>  |  95<L>  |  24<H>  ----------------------------<  128<H>  4.0   |  19<L>  |  3.31<H>    Ca    8.5      17 Oct 2018 04:20  Phos  2.8     10-16  Mg     2.3     10-17    TPro  5.3<L>  /  Alb  2.3<L>  /  TBili  1.3<H>  /  DBili  x   /  AST  17  /  ALT  6   /  AlkPhos  173<H>  10-16                        7.2    9.86  )-----------( 115      ( 17 Oct 2018 04:20 )             21.8       10-16 @ 07:01  -  10-17 @ 07:00  --------------------------------------------------------  IN: 2533.1 mL / OUT: 490 mL / NET: 2043.1 mL      PHYSICAL EXAM:    General: Sedated, intubated.   Chest: JPx2 SS to wall suction. No collections palpated. Incisional vac with seal.             MEDICATIONS  (STANDING):  albumin human  5% IVPB 250 milliLiter(s) IV Intermittent once  aspirin  chewable 81 milliGRAM(s) Oral daily  dexmedetomidine Infusion 0.5 MICROgram(s)/kG/Hr (7.375 mL/Hr) IV Continuous <Continuous>  dextrose 5%. 1000 milliLiter(s) (50 mL/Hr) IV Continuous <Continuous>  dextrose 50% Injectable 12.5 Gram(s) IV Push once  dextrose 50% Injectable 25 Gram(s) IV Push once  dextrose 50% Injectable 25 Gram(s) IV Push once  DOBUTamine Infusion 5 MICROgram(s)/kG/Min (8.85 mL/Hr) IV Continuous <Continuous>  fentaNYL   Infusion 1 MICROgram(s)/kG/Hr (5.9 mL/Hr) IV Continuous <Continuous>  insulin lispro (HumaLOG) corrective regimen sliding scale   SubCutaneous every 6 hours  ipratropium 17 MICROgram(s) HFA Inhaler 2 Puff(s) Inhalation every 6 hours  metroNIDAZOLE  IVPB      metroNIDAZOLE  IVPB 500 milliGRAM(s) IV Intermittent every 8 hours  norepinephrine Infusion 0.05 MICROgram(s)/kG/Min (2.766 mL/Hr) IV Continuous <Continuous>  pantoprazole  Injectable 40 milliGRAM(s) IV Push daily  piperacillin/tazobactam IVPB. 3.375 Gram(s) IV Intermittent every 12 hours  PrismaSATE Dialysate BGK 4 / 2.5 5000 milliLiter(s) (1000 mL/Hr) CRRT <Continuous>  PrismaSOL Filtration BGK 4 / 2.5 5000 milliLiter(s) (1000 mL/Hr) CRRT <Continuous>  PrismaSOL Filtration BGK 4 / 2.5 5000 milliLiter(s) (200 mL/Hr) CRRT <Continuous>  propofol Infusion 20 MICROgram(s)/kG/Min (7.08 mL/Hr) IV Continuous <Continuous>  sodium chloride 0.9%. 1000 milliLiter(s) (30 mL/Hr) IV Continuous <Continuous>  vasopressin Infusion 0.05 Unit(s)/Min (3 mL/Hr) IV Continuous <Continuous>    MEDICATIONS  (PRN):  ALBUTerol    90 MICROgram(s) HFA Inhaler 2 Puff(s) Inhalation every 4 hours PRN Shortness of Breath and/or Wheezing  dextrose 40% Gel 15 Gram(s) Oral once PRN Blood Glucose LESS THAN 70 milliGRAM(s)/deciliter  glucagon  Injectable 1 milliGRAM(s) IntraMuscular once PRN Glucose LESS THAN 70 milligrams/deciliter

## 2018-10-17 NOTE — PROGRESS NOTE ADULT - SUBJECTIVE AND OBJECTIVE BOX
Patient seen and examined. Awake and responds to stimuli. Remains intubated on pressors. Good CO and CI.    Medications:  albumin human  5% IVPB 250 milliLiter(s) IV Intermittent once  ALBUTerol    90 MICROgram(s) HFA Inhaler 2 Puff(s) Inhalation every 4 hours PRN  aspirin  chewable 81 milliGRAM(s) Oral daily  dexmedetomidine Infusion 0.5 MICROgram(s)/kG/Hr IV Continuous <Continuous>  dextrose 40% Gel 15 Gram(s) Oral once PRN  dextrose 5%. 1000 milliLiter(s) IV Continuous <Continuous>  dextrose 50% Injectable 12.5 Gram(s) IV Push once  dextrose 50% Injectable 25 Gram(s) IV Push once  dextrose 50% Injectable 25 Gram(s) IV Push once  fentaNYL   Infusion 1 MICROgram(s)/kG/Hr IV Continuous <Continuous>  glucagon  Injectable 1 milliGRAM(s) IntraMuscular once PRN  insulin lispro (HumaLOG) corrective regimen sliding scale   SubCutaneous every 6 hours  ipratropium 17 MICROgram(s) HFA Inhaler 2 Puff(s) Inhalation every 6 hours  metroNIDAZOLE  IVPB      metroNIDAZOLE  IVPB 500 milliGRAM(s) IV Intermittent every 8 hours  norepinephrine Infusion 0.05 MICROgram(s)/kG/Min IV Continuous <Continuous>  pantoprazole  Injectable 40 milliGRAM(s) IV Push daily  piperacillin/tazobactam IVPB. 3.375 Gram(s) IV Intermittent every 12 hours  propofol Infusion 20 MICROgram(s)/kG/Min IV Continuous <Continuous>  sodium chloride 0.9%. 1000 milliLiter(s) IV Continuous <Continuous>  vasopressin Infusion 0.05 Unit(s)/Min IV Continuous <Continuous>      Vitals:  Vital Signs Last 24 Hrs  T(C): 36.6 (17 Oct 2018 13:30), Max: 37 (17 Oct 2018 00:00)  T(F): 97.8 (17 Oct 2018 13:30), Max: 98.6 (17 Oct 2018 00:00)  HR: 81 (17 Oct 2018 13:30) (75 - 102)  BP: --  BP(mean): --  RR: 18 (17 Oct 2018 13:30) (18 - 22)  SpO2: 100% (17 Oct 2018 13:00) (98% - 100%)    Daily     Daily Weight in k.1 (17 Oct 2018 09:55)    I&O's Detail    16 Oct 2018 07:01  -  17 Oct 2018 07:00  --------------------------------------------------------  IN:    dexmedetomidine Infusion: 253.9 mL    DOBUTamine Infusion: 17.6 mL    fentaNYL  Infusion: 271.6 mL    IV PiggyBack: 1050 mL    Nepro: 360 mL    norepinephrine Infusion: 185.7 mL    propofol Infusion: 286 mL    sodium chloride 0.9%.: 120 mL    vasopressin Infusion: 72 mL  Total IN: 2616.8 mL    OUT:    Bulb: 200 mL    Chest Tube: 80 mL    Chest Tube: 140 mL    Chest Tube: 70 mL  Total OUT: 490 mL    Total NET: 2126.8 mL      17 Oct 2018 07:  -  17 Oct 2018 14:19  --------------------------------------------------------  IN:    dexmedetomidine Infusion: 60 mL    fentaNYL  Infusion: 46 mL    IV PiggyBack: 100 mL    norepinephrine Infusion: 63 mL    Other: 1100 mL    propofol Infusion: 96 mL    sodium chloride 0.9%.: 210 mL    vasopressin Infusion: 18 mL  Total IN: 1693 mL    OUT:    Chest Tube: 60 mL    Other: 3400 mL  Total OUT: 3460 mL    Total NET: -1767 mL      Physical Exam:     General: awake responds to stimuli   HEENT: EOM intact.  Neck: Neck supple. JVP not elevated. No masses  Chest: intubated  CV: Normal S1 and S2. II/VI CARLOS A, no rub, or gallops. Radial pulses normal. No LE edema b/l.   Abdomen: Soft, non-distended, non-tender  Skin: b/l hyperpigmented lesions on legs   Neurology: responds to stimuli   Psych: can not evaluate     Labs:                        7.2    9.86  )-----------( 115      ( 17 Oct 2018 04:20 )             21.8     10-17    133<L>  |  95<L>  |  24<H>  ----------------------------<  128<H>  4.0   |  19<L>  |  3.31<H>    Ca    8.5      17 Oct 2018 04:20  Phos  2.8     10-16  Mg     2.3     10-17    TPro  5.3<L>  /  Alb  2.3<L>  /  TBili  1.3<H>  /  DBili  x   /  AST  17  /  ALT  6   /  AlkPhos  173<H>  10-16    PT/INR - ( 16 Oct 2018 02:40 )   PT: 11.2 SEC;   INR: 0.97          PTT - ( 16 Oct 2018 02:40 )  PTT:31.8 SEC    < from: Transthoracic Echocardiogram (10.12.18 @ 08:31) >  DIMENSIONS:  Dimensions:     Normal Values:  LA:     2.5 cm    2.0 - 4.0 cm  Ao:     3.5 cm    2.0 - 3.8 cm  SEPTUM: 0.7 cm    0.6 - 1.2 cm  PWT:    0.8 cm    0.6 - 1.1 cm  LVIDd:  7.3 cm    3.0 - 5.6 cm  LVIDs:  6.6 cm    1.8 - 4.0 cm  Derived Variables:  LVMI: 124 g/m2  RWT: 0.21  Fractional short: 10 %  Ejection Fraction (Teicholtz): 20 %  ------------------------------------------------------------------------  OBSERVATIONS:  Mitral Valve: Mitral annular calcification, otherwise  normal mitral valve. Moderate mitral regurgitation.  Aortic Root: Normal aortic root.  Aortic Valve: Calcified trileaflet aortic valve with normal  opening. Moderate aortic regurgitation.  Vena contracta  width about  0.4 cm.  Left Atrium: Normal left atrium.  LA volume index = 19  cc/m2.  Left Ventricle: Severe global left ventricular systolic  dysfunction. Severe left ventricular enlargement.  Right Heart: Normal right atrium. Normal right ventricular  size with decreased right ventricular systolic function.  Normal tricuspid valve.   Mild tricuspid regurgitation.  Normal pulmonic valve.  Pericardium/PleuraNormal pericardium with no pericardial  effusion.  Hemodynamic: Estimated right ventricular systolic pressure  equals 40 mm Hg, assuming right atrial pressure equals 10  mm Hg, consistent with mild pulmonary hypertension.    < end of copied text >

## 2018-10-18 LAB
ALBUMIN SERPL ELPH-MCNC: 2.6 G/DL — LOW (ref 3.3–5)
ALP SERPL-CCNC: 216 U/L — HIGH (ref 40–120)
ALT FLD-CCNC: 5 U/L — SIGNIFICANT CHANGE UP (ref 4–41)
AST SERPL-CCNC: 17 U/L — SIGNIFICANT CHANGE UP (ref 4–40)
BASE EXCESS BLDA CALC-SCNC: -0.8 MMOL/L — SIGNIFICANT CHANGE UP
BASE EXCESS BLDA CALC-SCNC: -2.3 MMOL/L — SIGNIFICANT CHANGE UP
BASE EXCESS BLDA CALC-SCNC: -3.2 MMOL/L — SIGNIFICANT CHANGE UP
BASE EXCESS BLDA CALC-SCNC: -3.4 MMOL/L — SIGNIFICANT CHANGE UP
BASE EXCESS BLDA CALC-SCNC: -4.5 MMOL/L — SIGNIFICANT CHANGE UP
BASE EXCESS BLDV CALC-SCNC: -3.5 MMOL/L — SIGNIFICANT CHANGE UP
BASOPHILS # BLD AUTO: 0.04 K/UL — SIGNIFICANT CHANGE UP (ref 0–0.2)
BASOPHILS NFR BLD AUTO: 0.3 % — SIGNIFICANT CHANGE UP (ref 0–2)
BILIRUB SERPL-MCNC: 1.6 MG/DL — HIGH (ref 0.2–1.2)
BUN SERPL-MCNC: 22 MG/DL — SIGNIFICANT CHANGE UP (ref 7–23)
CALCIUM SERPL-MCNC: 8.8 MG/DL — SIGNIFICANT CHANGE UP (ref 8.4–10.5)
CHLORIDE BLDA-SCNC: 102 MMOL/L — SIGNIFICANT CHANGE UP (ref 96–108)
CHLORIDE BLDA-SCNC: 103 MMOL/L — SIGNIFICANT CHANGE UP (ref 96–108)
CHLORIDE SERPL-SCNC: 93 MMOL/L — LOW (ref 98–107)
CO2 SERPL-SCNC: 21 MMOL/L — LOW (ref 22–31)
CORTIS SERPL-MCNC: 26.9 UG/DL — HIGH (ref 2.7–18.4)
CREAT SERPL-MCNC: 2.61 MG/DL — HIGH (ref 0.5–1.3)
EOSINOPHIL # BLD AUTO: 0.23 K/UL — SIGNIFICANT CHANGE UP (ref 0–0.5)
EOSINOPHIL NFR BLD AUTO: 1.7 % — SIGNIFICANT CHANGE UP (ref 0–6)
GLUCOSE BLDA-MCNC: 124 MG/DL — HIGH (ref 70–99)
GLUCOSE BLDA-MCNC: 126 MG/DL — HIGH (ref 70–99)
GLUCOSE SERPL-MCNC: 126 MG/DL — HIGH (ref 70–99)
HCO3 BLDA-SCNC: 20 MMOL/L — LOW (ref 22–26)
HCO3 BLDA-SCNC: 21 MMOL/L — LOW (ref 22–26)
HCO3 BLDA-SCNC: 21 MMOL/L — LOW (ref 22–26)
HCO3 BLDA-SCNC: 22 MMOL/L — SIGNIFICANT CHANGE UP (ref 22–26)
HCO3 BLDA-SCNC: 23 MMOL/L — SIGNIFICANT CHANGE UP (ref 22–26)
HCO3 BLDV-SCNC: 20 MMOL/L — SIGNIFICANT CHANGE UP (ref 20–27)
HCT VFR BLD CALC: 30.9 % — LOW (ref 39–50)
HCT VFR BLDA CALC: 31.5 % — LOW (ref 39–51)
HCT VFR BLDA CALC: 32.2 % — LOW (ref 39–51)
HGB BLD-MCNC: 10.2 G/DL — LOW (ref 13–17)
HGB BLDA-MCNC: 10.2 G/DL — LOW (ref 13–17)
HGB BLDA-MCNC: 10.4 G/DL — LOW (ref 13–17)
IMM GRANULOCYTES # BLD AUTO: 0.19 # — SIGNIFICANT CHANGE UP
IMM GRANULOCYTES NFR BLD AUTO: 1.4 % — SIGNIFICANT CHANGE UP (ref 0–1.5)
LACTATE BLDA-SCNC: 0.9 MMOL/L — SIGNIFICANT CHANGE UP (ref 0.5–2)
LACTATE BLDA-SCNC: 1 MMOL/L — SIGNIFICANT CHANGE UP (ref 0.5–2)
LYMPHOCYTES # BLD AUTO: 0.68 K/UL — LOW (ref 1–3.3)
LYMPHOCYTES # BLD AUTO: 5.1 % — LOW (ref 13–44)
MCHC RBC-ENTMCNC: 30.6 PG — SIGNIFICANT CHANGE UP (ref 27–34)
MCHC RBC-ENTMCNC: 33 % — SIGNIFICANT CHANGE UP (ref 32–36)
MCV RBC AUTO: 92.8 FL — SIGNIFICANT CHANGE UP (ref 80–100)
MONOCYTES # BLD AUTO: 0.67 K/UL — SIGNIFICANT CHANGE UP (ref 0–0.9)
MONOCYTES NFR BLD AUTO: 5.1 % — SIGNIFICANT CHANGE UP (ref 2–14)
NEUTROPHILS # BLD AUTO: 11.43 K/UL — HIGH (ref 1.8–7.4)
NEUTROPHILS NFR BLD AUTO: 86.4 % — HIGH (ref 43–77)
NRBC # FLD: 0.02 — SIGNIFICANT CHANGE UP
PCO2 BLDA: 49 MMHG — HIGH (ref 35–48)
PCO2 BLDA: 51 MMHG — HIGH (ref 35–48)
PCO2 BLDA: 55 MMHG — HIGH (ref 35–48)
PCO2 BLDA: 63 MMHG — HIGH (ref 35–48)
PCO2 BLDA: 64 MMHG — HIGH (ref 35–48)
PCO2 BLDV: 62 MMHG — HIGH (ref 41–51)
PH BLDA: 7.2 PH — CRITICAL LOW (ref 7.35–7.45)
PH BLDA: 7.21 PH — LOW (ref 7.35–7.45)
PH BLDA: 7.23 PH — LOW (ref 7.35–7.45)
PH BLDA: 7.29 PH — LOW (ref 7.35–7.45)
PH BLDA: 7.31 PH — LOW (ref 7.35–7.45)
PH BLDV: 7.21 PH — LOW (ref 7.32–7.43)
PLATELET # BLD AUTO: 193 K/UL — SIGNIFICANT CHANGE UP (ref 150–400)
PMV BLD: 9.8 FL — SIGNIFICANT CHANGE UP (ref 7–13)
PO2 BLDA: 107 MMHG — SIGNIFICANT CHANGE UP (ref 83–108)
PO2 BLDA: 109 MMHG — HIGH (ref 83–108)
PO2 BLDA: 134 MMHG — HIGH (ref 83–108)
PO2 BLDA: 89 MMHG — SIGNIFICANT CHANGE UP (ref 83–108)
PO2 BLDA: 92 MMHG — SIGNIFICANT CHANGE UP (ref 83–108)
PO2 BLDV: 51 MMHG — HIGH (ref 35–40)
POTASSIUM BLDA-SCNC: 4 MMOL/L — SIGNIFICANT CHANGE UP (ref 3.4–4.5)
POTASSIUM BLDA-SCNC: 4 MMOL/L — SIGNIFICANT CHANGE UP (ref 3.4–4.5)
POTASSIUM SERPL-MCNC: 4.4 MMOL/L — SIGNIFICANT CHANGE UP (ref 3.5–5.3)
POTASSIUM SERPL-SCNC: 4.4 MMOL/L — SIGNIFICANT CHANGE UP (ref 3.5–5.3)
PROT SERPL-MCNC: 6.4 G/DL — SIGNIFICANT CHANGE UP (ref 6–8.3)
RBC # BLD: 3.33 M/UL — LOW (ref 4.2–5.8)
RBC # FLD: 20.7 % — HIGH (ref 10.3–14.5)
SAO2 % BLDA: 95.4 % — SIGNIFICANT CHANGE UP (ref 95–99)
SAO2 % BLDA: 96.4 % — SIGNIFICANT CHANGE UP (ref 95–99)
SAO2 % BLDA: 97.1 % — SIGNIFICANT CHANGE UP (ref 95–99)
SAO2 % BLDA: 97.3 % — SIGNIFICANT CHANGE UP (ref 95–99)
SAO2 % BLDA: 98.8 % — SIGNIFICANT CHANGE UP (ref 95–99)
SAO2 % BLDV: 80.7 % — SIGNIFICANT CHANGE UP (ref 60–85)
SODIUM BLDA-SCNC: 128 MMOL/L — LOW (ref 136–146)
SODIUM BLDA-SCNC: 129 MMOL/L — LOW (ref 136–146)
SODIUM SERPL-SCNC: 133 MMOL/L — LOW (ref 135–145)
SPECIMEN SOURCE: SIGNIFICANT CHANGE UP
WBC # BLD: 13.24 K/UL — HIGH (ref 3.8–10.5)
WBC # FLD AUTO: 13.24 K/UL — HIGH (ref 3.8–10.5)

## 2018-10-18 PROCEDURE — 99233 SBSQ HOSP IP/OBS HIGH 50: CPT

## 2018-10-18 PROCEDURE — 71045 X-RAY EXAM CHEST 1 VIEW: CPT | Mod: 26

## 2018-10-18 PROCEDURE — 99291 CRITICAL CARE FIRST HOUR: CPT

## 2018-10-18 PROCEDURE — 99292 CRITICAL CARE ADDL 30 MIN: CPT

## 2018-10-18 PROCEDURE — 99233 SBSQ HOSP IP/OBS HIGH 50: CPT | Mod: GC

## 2018-10-18 RX ORDER — ACETAMINOPHEN 500 MG
1000 TABLET ORAL ONCE
Qty: 0 | Refills: 0 | Status: COMPLETED | OUTPATIENT
Start: 2018-10-19 | End: 2018-10-20

## 2018-10-18 RX ORDER — ACETAMINOPHEN 500 MG
1000 TABLET ORAL ONCE
Qty: 0 | Refills: 0 | Status: COMPLETED | OUTPATIENT
Start: 2018-10-18 | End: 2018-10-18

## 2018-10-18 RX ORDER — ASPIRIN/CALCIUM CARB/MAGNESIUM 324 MG
300 TABLET ORAL DAILY
Qty: 0 | Refills: 0 | Status: DISCONTINUED | OUTPATIENT
Start: 2018-10-18 | End: 2018-10-20

## 2018-10-18 RX ORDER — IPRATROPIUM/ALBUTEROL SULFATE 18-103MCG
3 AEROSOL WITH ADAPTER (GRAM) INHALATION EVERY 6 HOURS
Qty: 0 | Refills: 0 | Status: DISCONTINUED | OUTPATIENT
Start: 2018-10-18 | End: 2018-10-20

## 2018-10-18 RX ORDER — AMIODARONE HYDROCHLORIDE 400 MG/1
0.5 TABLET ORAL
Qty: 450 | Refills: 0 | Status: DISCONTINUED | OUTPATIENT
Start: 2018-10-18 | End: 2018-10-19

## 2018-10-18 RX ORDER — SODIUM CHLORIDE 9 MG/ML
4 INJECTION INTRAMUSCULAR; INTRAVENOUS; SUBCUTANEOUS EVERY 6 HOURS
Qty: 0 | Refills: 0 | Status: COMPLETED | OUTPATIENT
Start: 2018-10-18 | End: 2018-10-21

## 2018-10-18 RX ADMIN — Medication 3 MILLILITER(S): at 15:30

## 2018-10-18 RX ADMIN — SODIUM CHLORIDE 4 MILLILITER(S): 9 INJECTION INTRAMUSCULAR; INTRAVENOUS; SUBCUTANEOUS at 15:40

## 2018-10-18 RX ADMIN — PHENYLEPHRINE HYDROCHLORIDE 11.06 MICROGRAM(S)/KG/MIN: 10 INJECTION INTRAVENOUS at 21:50

## 2018-10-18 RX ADMIN — Medication 300 MILLIGRAM(S): at 12:33

## 2018-10-18 RX ADMIN — PIPERACILLIN AND TAZOBACTAM 200 GRAM(S): 4; .5 INJECTION, POWDER, LYOPHILIZED, FOR SOLUTION INTRAVENOUS at 11:27

## 2018-10-18 RX ADMIN — VASOPRESSIN 3 UNIT(S)/MIN: 20 INJECTION INTRAVENOUS at 21:50

## 2018-10-18 RX ADMIN — VASOPRESSIN 3 UNIT(S)/MIN: 20 INJECTION INTRAVENOUS at 08:14

## 2018-10-18 RX ADMIN — SODIUM CHLORIDE 4 MILLILITER(S): 9 INJECTION INTRAMUSCULAR; INTRAVENOUS; SUBCUTANEOUS at 09:22

## 2018-10-18 RX ADMIN — SODIUM CHLORIDE 4 MILLILITER(S): 9 INJECTION INTRAMUSCULAR; INTRAVENOUS; SUBCUTANEOUS at 21:20

## 2018-10-18 RX ADMIN — Medication 3 MILLILITER(S): at 21:19

## 2018-10-18 RX ADMIN — DEXMEDETOMIDINE HYDROCHLORIDE IN 0.9% SODIUM CHLORIDE 7.38 MICROGRAM(S)/KG/HR: 4 INJECTION INTRAVENOUS at 08:14

## 2018-10-18 RX ADMIN — Medication 400 MILLIGRAM(S): at 21:49

## 2018-10-18 RX ADMIN — Medication 2 PUFF(S): at 03:53

## 2018-10-18 RX ADMIN — PANTOPRAZOLE SODIUM 40 MILLIGRAM(S): 20 TABLET, DELAYED RELEASE ORAL at 11:27

## 2018-10-18 RX ADMIN — PIPERACILLIN AND TAZOBACTAM 200 GRAM(S): 4; .5 INJECTION, POWDER, LYOPHILIZED, FOR SOLUTION INTRAVENOUS at 22:30

## 2018-10-18 RX ADMIN — PHENYLEPHRINE HYDROCHLORIDE 11.06 MICROGRAM(S)/KG/MIN: 10 INJECTION INTRAVENOUS at 08:14

## 2018-10-18 RX ADMIN — DEXMEDETOMIDINE HYDROCHLORIDE IN 0.9% SODIUM CHLORIDE 7.38 MICROGRAM(S)/KG/HR: 4 INJECTION INTRAVENOUS at 21:50

## 2018-10-18 RX ADMIN — Medication 1000 MILLIGRAM(S): at 00:45

## 2018-10-18 RX ADMIN — Medication 1000 MILLIGRAM(S): at 21:50

## 2018-10-18 RX ADMIN — AMIODARONE HYDROCHLORIDE 16.67 MG/MIN: 400 TABLET ORAL at 21:50

## 2018-10-18 RX ADMIN — Medication 400 MILLIGRAM(S): at 00:17

## 2018-10-18 RX ADMIN — Medication 3 MILLILITER(S): at 09:22

## 2018-10-18 RX ADMIN — SODIUM CHLORIDE 30 MILLILITER(S): 9 INJECTION INTRAMUSCULAR; INTRAVENOUS; SUBCUTANEOUS at 08:15

## 2018-10-18 RX ADMIN — Medication 100 MILLIGRAM(S): at 05:09

## 2018-10-18 NOTE — PROGRESS NOTE ADULT - ASSESSMENT
Pt is a 58 yr old Mandarin speaking male scheduled for Right Thoractomy Decortiation, Right Chest Wall reconstruction with Latissimjus Dorsi Flap Poss Skin Graft with VAC dressing with Dr Pickering 10/11/18. Pt has ICD but unable to identify make or model. Pt hx of ESRD with HD T/Th/Sat for 4 hrs and has stated he has stopped some of his meds but cannot say which ones. Pt seen and received [facility] and [facility] but has 2 different med lists and is unable to identify meds taking. Pt denies blood thinners. Pt has stopped ASA as of last week. Pt hx gathered from [employer] and notes from Dr Pickering - pt is poor historian.     Call through  to patient who went to local pharmacy for confirmation of meds and pt given preop instructions (01 Oct 2018 09:25)    Pt has multiple comorbidities including CHF, EF of 10%, renal failure on dialysis,  who has had chronic bilateral pleural effusions.  The patient previously underwent left VATS and PleurX drains  in 2015.  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  During this admission pt underwent OR with Plastic and Thoracic surgery,  for definitive repair that involved thoracotomy, decortication, excision of empyema cavity along with muscle flap reconstruction. Pt underwent surgery on 10/11 (Right thoracotomy, resection of portion of R 7th rib, evacuation of infected hemothorax, takedown of pleurocutaneous fistula - and noted to have foul smelling hematoma).  OR cultures are growing Enterococcus faecalis.    Pt started on empiric vanco/zosyn/flagyl since 10/11.  He has multiple right  chest tubes/ drains in place and remains on vent support. Pt is on ESRD.  He has had intermittent episodes of hypothermia, and low bp (85/44). ID consult called for further antibiotic management.     Problem/Plan - 1:    ·	Infected right hemothorax    - Cont broad spectrum abx for now - agree with zosyn.  Thus far, OR cultures growing sensitive E.faecalis (to amp/vanco).  Renally dose abx, can cont zosyn 3.375 gm IV q12 for HD.      - Can d/c further vanco dosing, no MRSA identified from any of the cultures.  BCX (-) x 2      - Cont wound vac, local wound care, and chest tube drain managment as per CT icu.        Will follow,    Anabel Chahal  [phone]

## 2018-10-18 NOTE — PROGRESS NOTE ADULT - SUBJECTIVE AND OBJECTIVE BOX
[facility] DIVISION OF KIDNEY DISEASES AND HYPERTENSION -- FOLLOW UP NOTE  --------------------------------------------------------------------------------    HPI: 57yo Mandarin speaking M w/ Hx of NICM w/ ICD, ESRD on HD TTS, former smoker, BPH who was admitted for scheduled right thoracotomy w/ decortication due to infected hemothorax and chest reconstruction with CT Surgery. Pt underwent procedure on 10/11/18, and afterwards developed shock, thought to be cardiogenic. Pt being monitored in the CTICU. Renal now called for management of pts ESRD. Pt was started on CVVHDF on 10/13/18. Non-tunneled catheter non-functional and removed on 10/15/18. Pt had his last HD yesterday which was tolerated well.     24 hour events/subjective: Pt extubated, off IV pressor support. Pt on bipap       PAST HISTORY  --------------------------------------------------------------------------------  No significant changes to PMH, PSH, FHx, SHx, unless otherwise noted    ALLERGIES & MEDICATIONS  --------------------------------------------------------------------------------  Allergies    No Known Allergies    Intolerances      Standing Inpatient Medications  ALBUTerol/ipratropium for Nebulization 3 milliLiter(s) Nebulizer every 6 hours  amiodarone Infusion 0.5 mG/Min IV Continuous <Continuous>  aspirin  chewable 81 milliGRAM(s) Oral daily  dexmedetomidine Infusion 0.5 MICROgram(s)/kG/Hr IV Continuous <Continuous>  dextrose 5%. 1000 milliLiter(s) IV Continuous <Continuous>  dextrose 50% Injectable 12.5 Gram(s) IV Push once  dextrose 50% Injectable 25 Gram(s) IV Push once  dextrose 50% Injectable 25 Gram(s) IV Push once  insulin lispro (HumaLOG) corrective regimen sliding scale   SubCutaneous every 6 hours  metroNIDAZOLE  IVPB      metroNIDAZOLE  IVPB 500 milliGRAM(s) IV Intermittent every 8 hours  midodrine 10 milliGRAM(s) Oral every 8 hours  norepinephrine Infusion 0.05 MICROgram(s)/kG/Min IV Continuous <Continuous>  pantoprazole  Injectable 40 milliGRAM(s) IV Push daily  phenylephrine    Infusion 1 MICROgram(s)/kG/Min IV Continuous <Continuous>  piperacillin/tazobactam IVPB. 3.375 Gram(s) IV Intermittent every 12 hours  sodium chloride 0.9%. 1000 milliLiter(s) IV Continuous <Continuous>  vasopressin Infusion 0.05 Unit(s)/Min IV Continuous <Continuous>    PRN Inpatient Medications  dextrose 40% Gel 15 Gram(s) Oral once PRN  fentaNYL    Injectable 25 MICROGram(s) IV Push every 4 hours PRN  glucagon  Injectable 1 milliGRAM(s) IntraMuscular once PRN      REVIEW OF SYSTEMS  --------------------------------------------------------------------------------  Unable to obtain     VITALS/PHYSICAL EXAM  --------------------------------------------------------------------------------  T(C): 36.8 (10-18-18 @ 04:00), Max: 36.8 (10-17-18 @ 20:00)  HR: 71 (10-18-18 @ 07:18) (71 - 145)  BP: --  RR: 18 (10-18-18 @ 07:00) (9 - 24)  SpO2: 98% (10-18-18 @ 07:18) (95% - 100%)  Wt(kg): --    Weight (kg): 62.4 (10-18-18 @ 02:00)      10-17-18 @ 07:01  -  10-18-18 @ 07:00  --------------------------------------------------------  IN: 3829.4 mL / OUT: 3780 mL / NET: 49.4 mL    Physical Exam:  	Gen: intubated  	HEENT: +ET tube to vent, +NGT  	Pulm: CTA B/L, + chest tube drains  	CV: RRR, S1S2; no rub  	Abd: +BS, soft  	LE: Warm, no edema  	Skin: Warm  	Vascular access:  LUE AVF site: +thrill +bruit +skin intact    LABS/STUDIES  --------------------------------------------------------------------------------              10.2   13.24 >-----------<  193      [10-18-18 @ 04:15]              30.9     133  |  93  |  22  ----------------------------<  126      [10-18-18 @ 04:15]  4.4   |  21  |  2.61        Ca     8.8     [10-18-18 @ 04:15]      Mg     2.3     [10-17-18 @ 04:20]    TPro  6.4  /  Alb  2.6  /  TBili  1.6  /  DBili  x   /  AST  17  /  ALT  5   /  AlkPhos  216  [10-18-18 @ 04:15]    Creatinine Trend:  SCr 2.61 [10-18 @ 04:15]  SCr 3.31 [10-17 @ 04:20]  SCr 2.12 [10-16 @ 02:40]  SCr 3.18 [10-15 @ 17:34]  SCr 2.83 [10-15 @ 09:53]    HbA1c 5.5      [10-12-18 @ 02:53]    HBsAb 16.3      [10-13-18 @ 15:45]  HCV 0.21, Nonreactive Hepatitis C AB  S/CO Ratio                        Interpretation  < 1.0                                     Non-Reactive  1.0 - 4.9                           Weakly-Reactive  > 5.0                                 Reactive  Non-Reactive: Aperson with a non-reactive HCV antibody  result is considered uninfected.  No further action is  needed unless recent infection is suspected.  In these  cases, consider repeat testing later to detect  seroconversion..  Weakly-Reactive: HCV antibody test is abnormal, HCV RNA  Qualitative test will follow.  Reactive: HCV antibody test is abnormal, HCV RNA  Qualitative test will follow.  Note: HCV antibody testing is performed on the [employer]   system.      [10-13-18 @ 15:45]

## 2018-10-18 NOTE — PROGRESS NOTE ADULT - PROBLEM SELECTOR PLAN 1
NICM ? per chart.  HFrEF - LVEF 20%, severely dilated LV, LVIDD 7.3 cm. Moderate MR.  CVP 12. Please send central sat.  CTI team to titrate down on pressors as tolerated. continue home dose midodrine on HD days.  Suggest CVVHD to keep pt negative.

## 2018-10-18 NOTE — PROGRESS NOTE ADULT - SUBJECTIVE AND OBJECTIVE BOX
Infectious Diseases progress note:    Subjective: Events noted.  Pt has been extubated.  Awake, alert.  Family members at bedside.  No new fevers.  WBC elevated today.      ROS:  CONSTITUTIONAL:  No fever, chills, rigors  CARDIOVASCULAR:  No chest pain or palpitations  RESPIRATORY:   No SOB, cough, dyspnea on exertion.  No wheezing  GASTROINTESTINAL:  No abd pain, N/V, diarrhea/constipation  EXTREMITIES:  No swelling or joint pain  GENITOURINARY:  No burning on urination, increased frequency or urgency.  No flank pain  NEUROLOGIC:  No HA, visual disturbances  SKIN: No rashes    Allergies    No Known Allergies    Intolerances        ANTIBIOTICS/RELEVANT:  antimicrobials  metroNIDAZOLE  IVPB      metroNIDAZOLE  IVPB 500 milliGRAM(s) IV Intermittent every 8 hours  piperacillin/tazobactam IVPB. 3.375 Gram(s) IV Intermittent every 12 hours    immunologic:    OTHER:  ALBUTerol/ipratropium for Nebulization 3 milliLiter(s) Nebulizer every 6 hours  amiodarone Infusion 0.5 mG/Min IV Continuous <Continuous>  aspirin Suppository 300 milliGRAM(s) Rectal daily  bisacodyl Suppository 10 milliGRAM(s) Rectal daily PRN  dexmedetomidine Infusion 0.5 MICROgram(s)/kG/Hr IV Continuous <Continuous>  dextrose 40% Gel 15 Gram(s) Oral once PRN  dextrose 5%. 1000 milliLiter(s) IV Continuous <Continuous>  dextrose 50% Injectable 12.5 Gram(s) IV Push once  dextrose 50% Injectable 25 Gram(s) IV Push once  dextrose 50% Injectable 25 Gram(s) IV Push once  fentaNYL    Injectable 25 MICROGram(s) IV Push every 4 hours PRN  glucagon  Injectable 1 milliGRAM(s) IntraMuscular once PRN  insulin lispro (HumaLOG) corrective regimen sliding scale   SubCutaneous every 6 hours  midodrine 10 milliGRAM(s) Oral every 8 hours  pantoprazole  Injectable 40 milliGRAM(s) IV Push daily  phenylephrine    Infusion 1 MICROgram(s)/kG/Min IV Continuous <Continuous>  sodium chloride 0.9%. 1000 milliLiter(s) IV Continuous <Continuous>  sodium chloride 3%  Inhalation 4 milliLiter(s) Inhalation every 6 hours  vasopressin Infusion 0.05 Unit(s)/Min IV Continuous <Continuous>      Objective:  Vital Signs Last 24 Hrs  T(C): 36.1 (18 Oct 2018 16:21), Max: 36.8 (17 Oct 2018 20:00)  T(F): 97 (18 Oct 2018 16:21), Max: 98.2 (17 Oct 2018 20:00)  HR: 69 (18 Oct 2018 17:00) (68 - 145)  BP: --  BP(mean): --  RR: 13 (18 Oct 2018 17:00) (9 - 24)  SpO2: 99% (18 Oct 2018 17:00) (95% - 100%)    PHYSICAL EXAM:  Constitutional:NAD  Eyes:CHER, EOMI  Ear/Nose/Throat: no thrush, mucositis.  Moist mucous membranes	  Neck:no JVD, no lymphadenopathy, supple  Respiratory: rt sided chest tubes x 3  Cardiovascular: S1S2 RRR, no murmurs  Gastrointestinal:soft, nontender,  nondistended (+) BS  Extremities:no e/e/c  Skin:  rt posterior chest wound        LABS:                        10.2   13.24 )-----------( 193      ( 18 Oct 2018 04:15 )             30.9     10-18    133<L>  |  93<L>  |  22  ----------------------------<  126<H>  4.4   |  21<L>  |  2.61<H>    Ca    8.8      18 Oct 2018 04:15  Mg     2.3     10-17    TPro  6.4  /  Alb  2.6<L>  /  TBili  1.6<H>  /  DBili  x   /  AST  17  /  ALT  5   /  AlkPhos  216<H>  10-18            Vancomycin Level, Random:  ug/mL (10-17 @ 13:50)  Vancomycin Level, Random:  ug/mL (10-15 @ 23:45)      Vancomycin Level, Trough: 8.1 ug/mL (10-13 @ 21:30)              MICROBIOLOGY:    Culture - Blood (10.15.18 @ 18:59)    Culture - Blood:   NO ORGANISMS ISOLATED  NO ORGANISMS ISOLATED AT 48 HRS.    Specimen Source: BLOOD PERIPHERAL    Culture - Blood (10.15.18 @ 18:59)    Culture - Blood:   NO ORGANISMS ISOLATED  NO ORGANISMS ISOLATED AT 48 HRS.    Specimen Source: BLOOD PERIPHERAL    Culture - Acid Fast - Other w/Smear (10.11.18 @ 15:17)    Culture - Acid Fast - Other w/Smear:   ------------------ PRELIMINARY --------------------             NO ACID FAST BACILLI ISOLATED  AFTER ONE WEEK'S INCUBATION    Specimen Source: OTHER    Culture - Surg Site Aerob/Anaer w/Gm St (10.11.18 @ 13:58)    Gram Stain Wound:   GPCPR^Gram Pos Cocci in Pairs  QUANTITY OF BACTERIA SEEN: RARE (1+)  WBC^White Blood Cells  QNTY CELLS IN GRAM STAIN: RARE (1+)    Culture - Surgical Site:   MODERATE  SEE PREVIOUS CULTURE:#  COLLECTED 10/11/18 AT 1226 HRS  RECEIVED 10/11/18 AT 1316 HRS FOR AAMIR  ENTF^Enterococcus faecalis    Specimen Source: TISSUE    Culture - Surg Site Aerob/Anaer w/Gm St (10.11.18 @ 13:53)    Gram Stain Wound:   GPCPR Gram Pos Cocci in Pairs  QUANTITY OF BACTERIA SEEN: FEW (2+)  WBC^White Blood Cells  QNTY CELLS IN GRAM STAIN: FEW (2+)    Culture - Surgical Site:   ENTF^Enterococcus faecalis    Culture - Surgical Site:   SEE PREVIOUS CULTURE:#  COLLECTED 10/11/18 AT 1226 HRS  RECEIVED 10/11/18 AT 1316 HRS FOR AAMIR    -  Ampicillin: S <=2 AAMIR    -  Ciprofloxacin: S <=1 AAMIR    -  Tetra/Doxy: R >8 AAMIR    -  Vancomycin: S 4 AAMIR    Specimen Source: TISSUE    Organism Identification: Enterococcus faecalis    Organism: Enterococcus faecalis    Method Type: POSITIVE AAMIR 29          RADIOLOGY & ADDITIONAL STUDIES:    < from: Xray Chest 1 View- PORTABLE-Routine (10.18.18 @ 06:26) >  INTERPRETATION:     Enteric tube has been removed since the last exam. Subcutaneous AICD,   right IJ line and 3 right-sided chest tubes again identified. Loculated   right pleural effusion again present. Left lung and right upper lobe are   clear. Subcutaneous emphysema is present.  No pneumothorax.      COMPARISON:  October 17 without significant change.      IMPRESSION:  Follow-up post right thoracotomy with chest tubes.    < end of copied text >

## 2018-10-18 NOTE — SWALLOW BEDSIDE ASSESSMENT ADULT - COMMENTS
MD orders received. Chart reviewed. As per primary RN and Dr. Potter, patient remains on BiPAP at this time with no immediate plans to wean. Skilled SLP services deemed not warranted at this time. MD to re-consult this service when pt's respiratory status improves (i.e. pt can tolerate nasal cannula >30 min).

## 2018-10-18 NOTE — PROGRESS NOTE ADULT - SUBJECTIVE AND OBJECTIVE BOX
CLAUDIA HAN            MRN-[id number]         No Known Allergies               Pt is a 58 yr old Mandarin speaking male scheduled for Right Thoractomy Decortiation, Right Chest Wall reconstruction with Latissimjus Dorsi Flap Poss Skin Graft with VAC dressing with Dr Pickering 10/11/18. Pt has ICD but unable to identify make or model. Pt hx of ESRD with HD T/Th/Sat for 4 hrs and has stated he has stopped some of his meds but cannot say which ones. Pt seen and received [facility] and [facility] but has 2 different med lists and is unable to identify meds taking. Pt denies blood thinners. Pt has stopped ASA as of last week. Pt hx gathered from [employer] and notes from Dr Pickering - pt is poor historian.       Procedure: Right thoracotomy, resection of portion of R 7th rib, evacuation of infected hemothorax, takedown of pleurocutaneous fistula  / R chest wall reconstruction with tunneled latissimus dorsi flap, covered by serratus anterior flap               Issues:   Hypotension  Acute on chronic systolic CHF ( EF 10 %), ICD in place  SOB  Infected right hemothorax               Hyperglycemia              ESRD on HD    Agitation  Drips:  Vasopressin  Quan  Precedex               Home Medications:  aspirin 81 mg oral tablet: 1 tab(s) orally once a day last dose 10/3/2018 (11 Oct 2018 08:39)  Breo Ellipta 100 mcg-25 mcg/inh inhalation powder: 1 puff(s) inhaled once a day patient denies using it (11 Oct 2018 08:38)  Calcium 500: 1 tab(s) orally 2 times a day (11 Oct 2018 08:38)  carvedilol 6.25 mg oral tablet: 1 tab(s) orally once a day (11 Oct 2018 08:39)  docusate sodium 100 mg oral tablet: 1 tab(s) orally 2 times a day, As Needed (11 Oct 2018 08:39)  folic acid 1 mg oral tablet: 1 tab(s) orally once a day (11 Oct 2018 08:38)  Mag-Ox 400 oral tablet: 1 tab(s) orally once a day (11 Oct 2018 08:39)  midodrine 10 mg oral tablet: 1 tab(s) orally once a day (11 Oct 2018 08:39)  Multiple Vitamins oral tablet: 1 tab(s) orally once a day last dose 10/3/2018 (11 Oct 2018 08:38)  Namenda 10 mg oral tablet: 1 tab(s) orally 2 times a day (11 Oct 2018 08:38)  Nephplex Rx oral tablet: 1 tab(s) orally once a day (11 Oct 2018 08:39)  nortriptyline 50 mg oral capsule: 1 cap(s) orally once a day (11 Oct 2018 08:39)  Renvela 800 mg oral tablet: 2 tab(s) orally every 8 hours (11 Oct 2018 08:39)  simethicone 80 mg oral tablet: 1 tab(s) orally 3 times a day (after meals) (11 Oct 2018 08:39)  torsemide 20 mg oral tablet: 1 tab(s) orally once a day (11 Oct 2018 08:39)  vitamin A: 1 tab(s) orally once a day (11 Oct 2018 08:38)  Vitamin B Complex: 1 tab(s) orally once a day (11 Oct 2018 08:38)  Vitamin C 500 mg oral tablet: 1 tab(s) orally once a day (11 Oct 2018 08:38)      PAST MEDICAL & SURGICAL HISTORY:  Smoking hx  Cardiomyopathy  Wound: right chest  ICD (implantable cardioverter-defibrillator) in place  OA (osteoarthritis)  HLD (hyperlipidemia)  BPH (benign prostatic hyperplasia)  Pleural effusion  HTN (hypertension)  ESRD (end stage renal disease)  H/O chest wound: right  History of wound infection: right chest wall - revision 5/18 and again in 7/18  History of implantable cardioverter-defibrillator (ICD) placement: pt unsure when placed  H/O bilateral hip replacements: 2008, 2009        ICU Vital Signs Last 24 Hrs  T(C): 36.7 (18 Oct 2018 20:00), Max: 36.8 (18 Oct 2018 04:00)  T(F): 98 (18 Oct 2018 20:00), Max: 98.2 (18 Oct 2018 04:00)  HR: 76 (18 Oct 2018 23:17) (68 - 88)  BP: --  BP(mean): --  ABP: 105/54 (18 Oct 2018 23:00) (85/48 - 110/56)  ABP(mean): 72 (18 Oct 2018 23:00) (60 - 75)  RR: 15 (18 Oct 2018 23:00) (9 - 21)  SpO2: 99% (18 Oct 2018 23:17) (95% - 100%)    I&O's Detail    17 Oct 2018 07:01  -  18 Oct 2018 07:00  --------------------------------------------------------  IN:    amiodarone Infusion: 314.9 mL    amiodarone Infusion: 16.7 mL    dexmedetomidine Infusion: 182.7 mL    fentaNYL  Infusion: 58 mL    IV PiggyBack: 800 mL    Nepro: 132 mL    norepinephrine Infusion: 122.5 mL    Other: 1100 mL    phenylephrine   Infusion: 213.3 mL    propofol Infusion: 112 mL    sodium chloride 0.9%.: 720 mL    vasopressin Infusion: 74 mL  Total IN: 3846.1 mL    OUT:    Bulb: 20 mL    Bulb: 140 mL    Chest Tube: 20 mL    Chest Tube: 190 mL    Chest Tube: 10 mL    Other: 3400 mL  Total OUT: 3780 mL    Total NET: 66.1 mL      18 Oct 2018 07:01  -  18 Oct 2018 23:46  --------------------------------------------------------  IN:    amiodarone Infusion: 217.1 mL    dexmedetomidine Infusion: 105 mL    IV PiggyBack: 200 mL    Other: 500 mL    phenylephrine   Infusion: 246.6 mL    sodium chloride 0.9%.: 390 mL    vasopressin Infusion: 48 mL  Total IN: 1706.7 mL    OUT:    Chest Tube: 85 mL    Other: 2900 mL  Total OUT: 2985 mL    Total NET: -1278.3 mL        CAPILLARY BLOOD GLUCOSE      POCT Blood Glucose.: 100 mg/dL (18 Oct 2018 23:39)      Home Medications:  aspirin 81 mg oral tablet: 1 tab(s) orally once a day last dose 10/3/2018 (11 Oct 2018 08:39)  Breo Ellipta 100 mcg-25 mcg/inh inhalation powder: 1 puff(s) inhaled once a day patient denies using it (11 Oct 2018 08:38)  Calcium 500: 1 tab(s) orally 2 times a day (11 Oct 2018 08:38)  carvedilol 6.25 mg oral tablet: 1 tab(s) orally once a day (11 Oct 2018 08:39)  docusate sodium 100 mg oral tablet: 1 tab(s) orally 2 times a day, As Needed (11 Oct 2018 08:39)  folic acid 1 mg oral tablet: 1 tab(s) orally once a day (11 Oct 2018 08:38)  Mag-Ox 400 oral tablet: 1 tab(s) orally once a day (11 Oct 2018 08:39)  midodrine 10 mg oral tablet: 1 tab(s) orally once a day (11 Oct 2018 08:39)  Multiple Vitamins oral tablet: 1 tab(s) orally once a day last dose 10/3/2018 (11 Oct 2018 08:38)  Namenda 10 mg oral tablet: 1 tab(s) orally 2 times a day (11 Oct 2018 08:38)  Nephplex Rx oral tablet: 1 tab(s) orally once a day (11 Oct 2018 08:39)  nortriptyline 50 mg oral capsule: 1 cap(s) orally once a day (11 Oct 2018 08:39)  Renvela 800 mg oral tablet: 2 tab(s) orally every 8 hours (11 Oct 2018 08:39)  simethicone 80 mg oral tablet: 1 tab(s) orally 3 times a day (after meals) (11 Oct 2018 08:39)  torsemide 20 mg oral tablet: 1 tab(s) orally once a day (11 Oct 2018 08:39)  vitamin A: 1 tab(s) orally once a day (11 Oct 2018 08:38)  Vitamin B Complex: 1 tab(s) orally once a day (11 Oct 2018 08:38)  Vitamin C 500 mg oral tablet: 1 tab(s) orally once a day (11 Oct 2018 08:38)      MEDICATIONS  (STANDING):  ALBUTerol/ipratropium for Nebulization 3 milliLiter(s) Nebulizer every 6 hours  amiodarone Infusion 0.5 mG/Min (16.667 mL/Hr) IV Continuous <Continuous>  aspirin Suppository 300 milliGRAM(s) Rectal daily  dexmedetomidine Infusion 0.5 MICROgram(s)/kG/Hr (7.375 mL/Hr) IV Continuous <Continuous>  dextrose 5%. 1000 milliLiter(s) (50 mL/Hr) IV Continuous <Continuous>  dextrose 50% Injectable 12.5 Gram(s) IV Push once  dextrose 50% Injectable 25 Gram(s) IV Push once  dextrose 50% Injectable 25 Gram(s) IV Push once  insulin lispro (HumaLOG) corrective regimen sliding scale   SubCutaneous every 6 hours  midodrine 10 milliGRAM(s) Oral every 8 hours  pantoprazole  Injectable 40 milliGRAM(s) IV Push daily  phenylephrine    Infusion 1 MICROgram(s)/kG/Min (11.063 mL/Hr) IV Continuous <Continuous>  piperacillin/tazobactam IVPB. 3.375 Gram(s) IV Intermittent every 12 hours  sodium chloride 0.9%. 1000 milliLiter(s) (30 mL/Hr) IV Continuous <Continuous>  sodium chloride 3%  Inhalation 4 milliLiter(s) Inhalation every 6 hours  vasopressin Infusion 0.05 Unit(s)/Min (3 mL/Hr) IV Continuous <Continuous>    MEDICATIONS  (PRN):  bisacodyl Suppository 10 milliGRAM(s) Rectal daily PRN Constipation  dextrose 40% Gel 15 Gram(s) Oral once PRN Blood Glucose LESS THAN 70 milliGRAM(s)/deciliter  fentaNYL    Injectable 25 MICROGram(s) IV Push every 4 hours PRN Moderate Pain (4 - 6)  glucagon  Injectable 1 milliGRAM(s) IntraMuscular once PRN Glucose LESS THAN 70 milligrams/deciliter          Physical exam:   General:               Pt is alert, following commands with periods of agitation                                                  Neuro:                 Nonfocal                             Cardiovascular:     S1 & S2, regular                           Respiratory:         Air entry is decreased on right side, has bilateral conducted sounds R>>L                           GI:                          Soft, nondistended and nontender, Bowel sounds active                            Ext:                        No cyanosis,  Left upper arm is slightly swollen & ecchymotic but has good dopplers and non-tender                                  Labs:                                                                           10.2   13.24 )-----------( 193      ( 18 Oct 2018 04:15 )             30.9             10-18    133<L>  |  93<L>  |  22  ----------------------------<  126<H>  4.4   |  21<L>  |  2.61<H>    Ca    8.8      18 Oct 2018 04:15  Mg     2.3     10-17    TPro  6.4  /  Alb  2.6<L>  /  TBili  1.6<H>  /  DBili  x   /  AST  17  /  ALT  5   /  AlkPhos  216<H>  10-18                    LIVER FUNCTIONS - ( 18 Oct 2018 04:15 )  Alb: 2.6 g/dL / Pro: 6.4 g/dL / ALK PHOS: 216 u/L / ALT: 5 u/L / AST: 17 u/L / GGT: x                   CXR:    < from: Xray Chest 1 View- PORTABLE-Routine (10.18.18 @ 06:26) >  Enteric tube has been removed since the last exam. Subcutaneous AICD,   right IJ line and 3 right-sided chest tubes again identified. Loculated   right pleural effusion again present. Left lung and right upper lobe are   clear. Subcutaneous emphysema is present.  No pneumothorax.        Plan:    General: 58yMale s/p   Right thoracotomy, resection of portion of R 7th rib, evacuation of infected hemothorax, takedown of pleurocutaneous fistula  / R chest wall reconstruction with tunneled latissimus dorsi flap, covered by serratus anterior flap. Remains hypotensive and on vent support                            Neuro:                                         Pain control with F Tylenol IV    Avoid narcotics because of CO2 retention                            Cardiovascular:                                          Continue hemodynamic monitoring.    Hypotension / Shock: Continue Quan /Vasopressin - Titrate to MAP>65    A-fib: Continue Amio    Acute on chronic systolic heart failure - Was on Dobutamine with C.I>3.5. Currently off inotropic support.  CVP is 12  this morning - dialyzed with removal of 2.3kgs. Trend CVPs                            Respiratory:                                         Pt is on / off Bipap                                         Co2 is around 50 with respiratory acidosis. Continue aggressive chest PT / ENCOURAGE INCENTIVE SPIROMETRY                                         Monitor chest tube output- D/Cd Anterior chest tube                                         Chest tube to suction                                                                Continue bronchodilators, pulmonary toilet     VAC d/cd                            GI                                          Continue GI prophylaxis with Protonix                                         Continue Zofran / Reglan for nausea - PRN	                                                                 Renal:                                         Monitor I/Os and electrolytes                                         ESRD: HD as scheduled                                                 Hem/ Onc:                                         Anemia: Transfused 2 Uts PRBC during HD on 10/17                                         Monitor chest tube output &  signs of bleeding.                                          Follow CBC in AM     Thrombocytopenia - probably consumptive / multifactorial - monitor. HIT -ve                            Infectious disease:      Infected hematoma - growing E.Fecalis - Continue Zosyn                                          Monitor for fever / leukocytosis.                                          All surgical incision / chest tube  sites look clean                            Endocrine                                             Continue Accu-Checks with coverage    Pt is on SQ Heparin and Venodyne boots for DVT prophylaxis.     Pertinent clinical, laboratory, radiographic, hemodynamic, echocardiographic, respiratory data, microbiologic data and chart were reviewed and analyzed frequently throughout the course of the day and night  Patient seen, examined and plan discussed with CT Surgeon Dr. Ledesma / CTICU team during rounds.    Pt's status discussed with family at bedside, updated status. Aware of low threshold for intubation     I have spent  120  minutes of critical care time with this pt between 7am  and 11.59pm          Ralph Warren MD

## 2018-10-18 NOTE — PROGRESS NOTE ADULT - PROBLEM SELECTOR PLAN 1
ESRD on HD for past six years through left upper extremity AVF. Pt now off IV pressor support. CVVHDF discontinued on 10/15/18. Pt tolerated IHD yesterday. Pt clinically stable. Plan for HD tomorrow. ESRD on HD for past six years through left upper extremity AVF. Pt now off IV pressor support. CVVHDF discontinued on 10/15/18. Pt tolerated IHD yesterday. Pt clinically stable however elevated CVP at 14. Plan for another session of HD today for UF. Goal of 2 L.

## 2018-10-18 NOTE — PROGRESS NOTE ADULT - SUBJECTIVE AND OBJECTIVE BOX
Patient seen and examined. He is extubated. Appears somewhat tachypnic.   CVP 12.    Medications:  ALBUTerol/ipratropium for Nebulization 3 milliLiter(s) Nebulizer every 6 hours  amiodarone Infusion 0.5 mG/Min IV Continuous <Continuous>  aspirin Suppository 300 milliGRAM(s) Rectal daily  bisacodyl Suppository 10 milliGRAM(s) Rectal daily PRN  dexmedetomidine Infusion 0.5 MICROgram(s)/kG/Hr IV Continuous <Continuous>  dextrose 40% Gel 15 Gram(s) Oral once PRN  dextrose 5%. 1000 milliLiter(s) IV Continuous <Continuous>  dextrose 50% Injectable 12.5 Gram(s) IV Push once  dextrose 50% Injectable 25 Gram(s) IV Push once  dextrose 50% Injectable 25 Gram(s) IV Push once  fentaNYL    Injectable 25 MICROGram(s) IV Push every 4 hours PRN  glucagon  Injectable 1 milliGRAM(s) IntraMuscular once PRN  insulin lispro (HumaLOG) corrective regimen sliding scale   SubCutaneous every 6 hours  metroNIDAZOLE  IVPB      metroNIDAZOLE  IVPB 500 milliGRAM(s) IV Intermittent every 8 hours  midodrine 10 milliGRAM(s) Oral every 8 hours  pantoprazole  Injectable 40 milliGRAM(s) IV Push daily  phenylephrine    Infusion 1 MICROgram(s)/kG/Min IV Continuous <Continuous>  piperacillin/tazobactam IVPB. 3.375 Gram(s) IV Intermittent every 12 hours  sodium chloride 0.9%. 1000 milliLiter(s) IV Continuous <Continuous>  sodium chloride 3%  Inhalation 4 milliLiter(s) Inhalation every 6 hours  vasopressin Infusion 0.05 Unit(s)/Min IV Continuous <Continuous>      Vitals:  Vital Signs Last 24 Hrs  T(C): 36.4 (18 Oct 2018 12:00), Max: 36.8 (17 Oct 2018 20:00)  T(F): 97.6 (18 Oct 2018 12:00), Max: 98.2 (17 Oct 2018 20:00)  HR: 72 (18 Oct 2018 15:00) (68 - 145)  BP: --  BP(mean): --  RR: 15 (18 Oct 2018 15:00) (9 - 24)  SpO2: 98% (18 Oct 2018 15:00) (95% - 100%)    Daily     Daily     I&O's Detail    17 Oct 2018 07:01  -  18 Oct 2018 07:00  --------------------------------------------------------  IN:    amiodarone Infusion: 16.7 mL    amiodarone Infusion: 314.9 mL    dexmedetomidine Infusion: 182.7 mL    fentaNYL  Infusion: 58 mL    IV PiggyBack: 800 mL    Nepro: 132 mL    norepinephrine Infusion: 122.5 mL    Other: 1100 mL    phenylephrine   Infusion: 213.3 mL    propofol Infusion: 112 mL    sodium chloride 0.9%.: 720 mL    vasopressin Infusion: 74 mL  Total IN: 3846.1 mL    OUT:    Bulb: 20 mL    Bulb: 140 mL    Chest Tube: 20 mL    Chest Tube: 190 mL    Chest Tube: 10 mL    Other: 3400 mL  Total OUT: 3780 mL    Total NET: 66.1 mL      18 Oct 2018 07:01  -  18 Oct 2018 15:05  --------------------------------------------------------  IN:    amiodarone Infusion: 150.3 mL    dexmedetomidine Infusion: 81 mL    phenylephrine   Infusion: 165 mL    sodium chloride 0.9%.: 270 mL    vasopressin Infusion: 32 mL  Total IN: 698.3 mL    OUT:    Chest Tube: 45 mL  Total OUT: 45 mL    Total NET: 653.3 mL      Physical Exam:     General: No distress. Comfortable.  HEENT: EOM intact.  Neck: Neck supple. JVP mod elevated  Chest: Decreased on right  CV: S1 and S2. No murmurs, rub, or gallops. Radial pulses normal. No LE edema  Abdomen: Soft, non-distended, non-tender  Skin: No rashes or skin breakdown  Neurology: Alert and oriented times three. Sensation intact  Psych: Affect normal    Labs:                        10.2   13.24 )-----------( 193      ( 18 Oct 2018 04:15 )             30.9     10-18    133<L>  |  93<L>  |  22  ----------------------------<  126<H>  4.4   |  21<L>  |  2.61<H>    Ca    8.8      18 Oct 2018 04:15  Mg     2.3     10-17    TPro  6.4  /  Alb  2.6<L>  /  TBili  1.6<H>  /  DBili  x   /  AST  17  /  ALT  5   /  AlkPhos  216<H>  10-18    < from: Transthoracic Echocardiogram (10.12.18 @ 08:31) >  DIMENSIONS:  Dimensions:     Normal Values:  LA:     2.5 cm    2.0 - 4.0 cm  Ao:     3.5 cm    2.0 - 3.8 cm  SEPTUM: 0.7 cm    0.6 - 1.2 cm  PWT:    0.8 cm    0.6 - 1.1 cm  LVIDd:  7.3 cm    3.0 - 5.6 cm  LVIDs:  6.6 cm    1.8 - 4.0 cm  Derived Variables:  LVMI: 124 g/m2  RWT: 0.21  Fractional short: 10 %  Ejection Fraction (Teicholtz): 20 %  ------------------------------------------------------------------------  OBSERVATIONS:  Mitral Valve: Mitral annular calcification, otherwise  normal mitral valve. Moderate mitral regurgitation.  Aortic Root: Normal aortic root.  Aortic Valve: Calcified trileaflet aortic valve with normal  opening. Moderate aortic regurgitation.  Vena contracta  width about  0.4 cm.  Left Atrium: Normal left atrium.  LA volume index = 19  cc/m2.  Left Ventricle: Severe global left ventricular systolic  dysfunction. Severe left ventricular enlargement.  Right Heart: Normal right atrium. Normal right ventricular  size with decreased right ventricular systolic function.  Normal tricuspid valve.   Mild tricuspid regurgitation.  Normal pulmonic valve.  Pericardium/PleuraNormal pericardium with no pericardial  effusion.  Hemodynamic: Estimated right ventricular systolic pressure  equals 40 mm Hg, assuming right atrial pressure equals 10  mm Hg, consistent with mild pulmonary hypertension.    < end of copied text >

## 2018-10-18 NOTE — PROGRESS NOTE ADULT - ASSESSMENT
59 y/o male (Mandarin speaking) with hx of NICM? HFrEF (LVEF 20%, severely dilated LVIDD 7.3 cm) w/ ICD, HLD, ESRD on HD, former smoker, BPH, hx of prior chest/lung surgeries with chest wall wound infections, b/l hip replacements comes in for a scheduled right thoracotomy w/ decortication and muscle flap with Dr. Jose Alfredo Pickering on 10/11/18. Post surgery patient required Dobutamine 10 mcg/kg/min (now weaned to 5 mcg/kg/min) and pressors. He is currently intubated on pressors ; norepinephrine, phenylephrine, vasopressin and epinephrine. He is also on Zosyn and metronidazole prophylactically. Has 3 right sided chest tubes in place. Has a small right sided basilar phenomothorax. HF consulted to evaluate cardiogenic shock. He is awake and alert with wife by bedside. Per record patient is a poor historian and not aware of which meds he was on at home.   Remains critically ill- e.faecalis empyema, and on pressors. But cardiac output/index improving. Off dobutamine. Extubated on 10/17/18.

## 2018-10-19 LAB
ALBUMIN SERPL ELPH-MCNC: 2.5 G/DL — LOW (ref 3.3–5)
ALP SERPL-CCNC: 205 U/L — HIGH (ref 40–120)
ALT FLD-CCNC: < 4 U/L — LOW (ref 4–41)
AST SERPL-CCNC: 16 U/L — SIGNIFICANT CHANGE UP (ref 4–40)
BASE EXCESS BLDA CALC-SCNC: -3.6 MMOL/L — SIGNIFICANT CHANGE UP
BILIRUB SERPL-MCNC: 1 MG/DL — SIGNIFICANT CHANGE UP (ref 0.2–1.2)
BUN SERPL-MCNC: 23 MG/DL — SIGNIFICANT CHANGE UP (ref 7–23)
CALCIUM SERPL-MCNC: 8.9 MG/DL — SIGNIFICANT CHANGE UP (ref 8.4–10.5)
CHLORIDE SERPL-SCNC: 94 MMOL/L — LOW (ref 98–107)
CO2 SERPL-SCNC: 19 MMOL/L — LOW (ref 22–31)
CREAT SERPL-MCNC: 2.72 MG/DL — HIGH (ref 0.5–1.3)
GLUCOSE SERPL-MCNC: 110 MG/DL — HIGH (ref 70–99)
HCO3 BLDA-SCNC: 21 MMOL/L — LOW (ref 22–26)
HCT VFR BLD CALC: 31.2 % — LOW (ref 39–50)
HGB BLD-MCNC: 10 G/DL — LOW (ref 13–17)
MCHC RBC-ENTMCNC: 30.5 PG — SIGNIFICANT CHANGE UP (ref 27–34)
MCHC RBC-ENTMCNC: 32.1 % — SIGNIFICANT CHANGE UP (ref 32–36)
MCV RBC AUTO: 95.1 FL — SIGNIFICANT CHANGE UP (ref 80–100)
NRBC # FLD: 0 — SIGNIFICANT CHANGE UP
PCO2 BLDA: 53 MMHG — HIGH (ref 35–48)
PH BLDA: 7.26 PH — LOW (ref 7.35–7.45)
PLATELET # BLD AUTO: 249 K/UL — SIGNIFICANT CHANGE UP (ref 150–400)
PMV BLD: 9.7 FL — SIGNIFICANT CHANGE UP (ref 7–13)
PO2 BLDA: 123 MMHG — HIGH (ref 83–108)
POTASSIUM SERPL-MCNC: 4.2 MMOL/L — SIGNIFICANT CHANGE UP (ref 3.5–5.3)
POTASSIUM SERPL-SCNC: 4.2 MMOL/L — SIGNIFICANT CHANGE UP (ref 3.5–5.3)
PROT SERPL-MCNC: 6.6 G/DL — SIGNIFICANT CHANGE UP (ref 6–8.3)
RBC # BLD: 3.28 M/UL — LOW (ref 4.2–5.8)
RBC # FLD: 20.4 % — HIGH (ref 10.3–14.5)
SAO2 % BLDA: 98.5 % — SIGNIFICANT CHANGE UP (ref 95–99)
SODIUM SERPL-SCNC: 134 MMOL/L — LOW (ref 135–145)
WBC # BLD: 10.94 K/UL — HIGH (ref 3.8–10.5)
WBC # FLD AUTO: 10.94 K/UL — HIGH (ref 3.8–10.5)

## 2018-10-19 PROCEDURE — 99291 CRITICAL CARE FIRST HOUR: CPT

## 2018-10-19 PROCEDURE — 71045 X-RAY EXAM CHEST 1 VIEW: CPT | Mod: 26,76

## 2018-10-19 PROCEDURE — 99233 SBSQ HOSP IP/OBS HIGH 50: CPT

## 2018-10-19 PROCEDURE — 99292 CRITICAL CARE ADDL 30 MIN: CPT

## 2018-10-19 PROCEDURE — 99233 SBSQ HOSP IP/OBS HIGH 50: CPT | Mod: GC

## 2018-10-19 RX ORDER — HALOPERIDOL DECANOATE 100 MG/ML
0.5 INJECTION INTRAMUSCULAR ONCE
Qty: 0 | Refills: 0 | Status: COMPLETED | OUTPATIENT
Start: 2018-10-19 | End: 2018-10-19

## 2018-10-19 RX ADMIN — MIDODRINE HYDROCHLORIDE 10 MILLIGRAM(S): 2.5 TABLET ORAL at 22:10

## 2018-10-19 RX ADMIN — PHENYLEPHRINE HYDROCHLORIDE 11.06 MICROGRAM(S)/KG/MIN: 10 INJECTION INTRAVENOUS at 22:09

## 2018-10-19 RX ADMIN — PIPERACILLIN AND TAZOBACTAM 200 GRAM(S): 4; .5 INJECTION, POWDER, LYOPHILIZED, FOR SOLUTION INTRAVENOUS at 11:39

## 2018-10-19 RX ADMIN — PANTOPRAZOLE SODIUM 40 MILLIGRAM(S): 20 TABLET, DELAYED RELEASE ORAL at 11:43

## 2018-10-19 RX ADMIN — HALOPERIDOL DECANOATE 0.5 MILLIGRAM(S): 100 INJECTION INTRAMUSCULAR at 23:15

## 2018-10-19 RX ADMIN — PIPERACILLIN AND TAZOBACTAM 200 GRAM(S): 4; .5 INJECTION, POWDER, LYOPHILIZED, FOR SOLUTION INTRAVENOUS at 22:10

## 2018-10-19 RX ADMIN — Medication 3 MILLILITER(S): at 09:22

## 2018-10-19 RX ADMIN — MIDODRINE HYDROCHLORIDE 10 MILLIGRAM(S): 2.5 TABLET ORAL at 13:07

## 2018-10-19 RX ADMIN — SODIUM CHLORIDE 4 MILLILITER(S): 9 INJECTION INTRAMUSCULAR; INTRAVENOUS; SUBCUTANEOUS at 21:29

## 2018-10-19 RX ADMIN — SODIUM CHLORIDE 30 MILLILITER(S): 9 INJECTION INTRAMUSCULAR; INTRAVENOUS; SUBCUTANEOUS at 11:40

## 2018-10-19 RX ADMIN — Medication 3 MILLILITER(S): at 21:28

## 2018-10-19 RX ADMIN — VASOPRESSIN 3 UNIT(S)/MIN: 20 INJECTION INTRAVENOUS at 11:40

## 2018-10-19 RX ADMIN — VASOPRESSIN 3 UNIT(S)/MIN: 20 INJECTION INTRAVENOUS at 22:10

## 2018-10-19 RX ADMIN — Medication 3 MILLILITER(S): at 03:07

## 2018-10-19 RX ADMIN — SODIUM CHLORIDE 30 MILLILITER(S): 9 INJECTION INTRAMUSCULAR; INTRAVENOUS; SUBCUTANEOUS at 22:09

## 2018-10-19 RX ADMIN — SODIUM CHLORIDE 4 MILLILITER(S): 9 INJECTION INTRAMUSCULAR; INTRAVENOUS; SUBCUTANEOUS at 03:07

## 2018-10-19 RX ADMIN — DEXMEDETOMIDINE HYDROCHLORIDE IN 0.9% SODIUM CHLORIDE 7.38 MICROGRAM(S)/KG/HR: 4 INJECTION INTRAVENOUS at 11:39

## 2018-10-19 RX ADMIN — SODIUM CHLORIDE 4 MILLILITER(S): 9 INJECTION INTRAMUSCULAR; INTRAVENOUS; SUBCUTANEOUS at 09:22

## 2018-10-19 RX ADMIN — PHENYLEPHRINE HYDROCHLORIDE 11.06 MICROGRAM(S)/KG/MIN: 10 INJECTION INTRAVENOUS at 11:39

## 2018-10-19 RX ADMIN — Medication 300 MILLIGRAM(S): at 11:43

## 2018-10-19 NOTE — PHYSICAL THERAPY INITIAL EVALUATION ADULT - LEVEL OF INDEPENDENCE: GAIT, REHAB EVAL
Patient marched in place 10x, further gait assessment held at this time secondary to BP=69/40. Patient immediately transferred to trendelenburg position. BP WNL at conclusion of PT evaluation. Mariah GUTIÉRREZ RN & Miki FIGUEROA RN present

## 2018-10-19 NOTE — PROGRESS NOTE ADULT - SUBJECTIVE AND OBJECTIVE BOX
Infectious Diseases progress note:    Subjective: No acute o/n events.  WBC improved today, no new fevers.  Chest tubes x 2 removed.      ROS:  CONSTITUTIONAL:  No fever, chills, rigors  CARDIOVASCULAR:  No chest pain or palpitations  RESPIRATORY:   No SOB, cough, dyspnea on exertion.  No wheezing  GASTROINTESTINAL:  No abd pain, N/V, diarrhea/constipation  EXTREMITIES:  No swelling or joint pain  GENITOURINARY:  No burning on urination, increased frequency or urgency.  No flank pain  NEUROLOGIC:  No HA, visual disturbances  SKIN: No rashes    Allergies    No Known Allergies    Intolerances        ANTIBIOTICS/RELEVANT:  antimicrobials  piperacillin/tazobactam IVPB. 3.375 Gram(s) IV Intermittent every 12 hours    immunologic:    OTHER:  acetaminophen  IVPB .. 1000 milliGRAM(s) IV Intermittent once PRN  ALBUTerol/ipratropium for Nebulization 3 milliLiter(s) Nebulizer every 6 hours  aspirin Suppository 300 milliGRAM(s) Rectal daily  bisacodyl Suppository 10 milliGRAM(s) Rectal daily PRN  dexmedetomidine Infusion 0.5 MICROgram(s)/kG/Hr IV Continuous <Continuous>  dextrose 40% Gel 15 Gram(s) Oral once PRN  dextrose 5%. 1000 milliLiter(s) IV Continuous <Continuous>  dextrose 50% Injectable 12.5 Gram(s) IV Push once  dextrose 50% Injectable 25 Gram(s) IV Push once  dextrose 50% Injectable 25 Gram(s) IV Push once  glucagon  Injectable 1 milliGRAM(s) IntraMuscular once PRN  haloperidol    Injectable 0.5 milliGRAM(s) IntraMuscular once  insulin lispro (HumaLOG) corrective regimen sliding scale   SubCutaneous every 6 hours  midodrine 10 milliGRAM(s) Oral every 8 hours  pantoprazole  Injectable 40 milliGRAM(s) IV Push daily  phenylephrine    Infusion 1 MICROgram(s)/kG/Min IV Continuous <Continuous>  sodium chloride 0.9%. 1000 milliLiter(s) IV Continuous <Continuous>  sodium chloride 3%  Inhalation 4 milliLiter(s) Inhalation every 6 hours  vasopressin Infusion 0.05 Unit(s)/Min IV Continuous <Continuous>      Objective:  Vital Signs Last 24 Hrs  T(C): 36.5 (19 Oct 2018 18:17), Max: 36.7 (19 Oct 2018 08:00)  T(F): 97.7 (19 Oct 2018 18:17), Max: 98 (19 Oct 2018 08:00)  HR: 124 (19 Oct 2018 19:48) (67 - 126)  BP: --  BP(mean): --  RR: 23 (19 Oct 2018 19:25) (13 - 23)  SpO2: 97% (19 Oct 2018 19:48) (92% - 100%)    PHYSICAL EXAM:  Constitutional:NAD  Eyes:CHER, EOMI  Ear/Nose/Throat: no thrush, mucositis.  Moist mucous membranes	  Neck:no JVD, no lymphadenopathy, supple, rt IJ  Respiratory: CTA adore, chest tube x 1  Cardiovascular: S1S2 RRR, no murmurs  Gastrointestinal:soft, nontender,  nondistended (+) BS  Extremities:no e/e/c  Skin:  rt posterior chest wound dsg c/d/i.  SANDY drain in place        LABS:                        10.0   10.94 )-----------( 249      ( 19 Oct 2018 05:20 )             31.2     10-19    134<L>  |  94<L>  |  23  ----------------------------<  110<H>  4.2   |  19<L>  |  2.72<H>    Ca    8.9      19 Oct 2018 05:20    TPro  6.6  /  Alb  2.5<L>  /  TBili  1.0  /  DBili  x   /  AST  16  /  ALT  < 4<L>  /  AlkPhos  205<H>  10-19            Vancomycin Level, Random:  ug/mL (10-17 @ 13:50)  Vancomycin Level, Random:  ug/mL (10-15 @ 23:45)                  MICROBIOLOGY:    Culture - Blood (10.15.18 @ 18:59)    Culture - Blood:   NO ORGANISMS ISOLATED  NO ORGANISMS ISOLATED AT 96 HOURS    Specimen Source: BLOOD PERIPHERAL    Culture - Blood (10.15.18 @ 18:59)    Culture - Blood:   NO ORGANISMS ISOLATED  NO ORGANISMS ISOLATED AT 96 HOURS    Specimen Source: BLOOD PERIPHERAL    Culture - Acid Fast - Other w/Smear (10.11.18 @ 15:17)    Culture - Acid Fast - Other w/Smear:   ------------------ PRELIMINARY --------------------             NO ACID FAST BACILLI ISOLATED  AFTER ONE WEEK'S INCUBATION    Specimen Source: OTHER          RADIOLOGY & ADDITIONAL STUDIES:    < from: Xray Chest 1 View- PORTABLE-Urgent (10.19.18 @ 11:51) >  INTERPRETATION:     6:04 AM:  1 right-sided chest tube has been removed since the last exam without   complications. Loculated effusion on this side remains. Subcutaneous AICD   and right IJ line in place. No pneumothorax.    11:07 AM:  Since the last study, an additional right-sided chest tube has been   removed. One remains low in the hemithorax on this side. Persistent   loculated effusion on this side unchanged. Small left effusion is stable   and upper lung fields are clear. No complicating pneumothoraces.      COMPARISON:  October 18      IMPRESSION:  Follow-up studies post removal of 2 right-sided chest tubes.    < end of copied text >

## 2018-10-19 NOTE — SWALLOW BEDSIDE ASSESSMENT ADULT - COMMENTS
Patient seen upright in bed, cleared by nursing for swallowing evaluation.  As per nursing, dysphagia screen performed, placed on clear liquids.  Patient alert and awake in bed.  Patient with PICC line in place in neck.    Procedure: Right thoracotomy, resection of portion of R 7th rib, evacuation of infected hemothorax, takedown of pleurocutaneous fistula  / R chest wall reconstruction with tunneled latissimus dorsi flap, covered by serratus anterior flap Patient seen upright in bed, cleared by nursing for swallowing evaluation.  As per nursing, dysphagia screen performed, placed on clear liquids.  Patient alert and awake in bed.  Patient with PICC line in place in neck.  Mandarin  used #[id number] during swallow evaluation and to explain results and recommendations.      Procedure: Right thoracotomy, resection of portion of R 7th rib, evacuation of infected hemothorax, takedown of pleurocutaneous fistula  / R chest wall reconstruction with tunneled latissimus dorsi flap, covered by serratus anterior flap    Results were discussed with the physician on the floor.

## 2018-10-19 NOTE — PROGRESS NOTE ADULT - SUBJECTIVE AND OBJECTIVE BOX
CLAUDIA HAN            MRN-[id number]         No Known Allergies               Pt is a 58 yr old Mandarin speaking male scheduled for Right Thoractomy Decortiation, Right Chest Wall reconstruction with Latissimjus Dorsi Flap Poss Skin Graft with VAC dressing with Dr Pickering 10/11/18. Pt has ICD but unable to identify make or model. Pt hx of ESRD with HD T/Th/Sat for 4 hrs and has stated he has stopped some of his meds but cannot say which ones. Pt seen and received MC and CC but has 2 different med lists and is unable to identify meds taking. Pt denies blood thinners. Pt has stopped ASA as of last week. Pt hx gathered from [employer] and notes from Dr Pickering - pt is poor historian.       Procedure: Right thoracotomy, resection of portion of R 7th rib, evacuation of infected hemothorax, takedown of pleurocutaneous fistula  / R chest wall reconstruction with tunneled latissimus dorsi flap, covered by serratus anterior flap               Issues:   Hypotension  Acute on chronic systolic CHF ( EF 10 %), ICD in place  SOB  Infected right hemothorax               Hyperglycemia              ESRD on HD    Agitation  Drips:  Vasopressin  Quan  Precedex                     Home Medications:  aspirin 81 mg oral tablet: 1 tab(s) orally once a day last dose 10/3/2018 (11 Oct 2018 08:39)  Breo Ellipta 100 mcg-25 mcg/inh inhalation powder: 1 puff(s) inhaled once a day patient denies using it (11 Oct 2018 08:38)  Calcium 500: 1 tab(s) orally 2 times a day (11 Oct 2018 08:38)  carvedilol 6.25 mg oral tablet: 1 tab(s) orally once a day (11 Oct 2018 08:39)  docusate sodium 100 mg oral tablet: 1 tab(s) orally 2 times a day, As Needed (11 Oct 2018 08:39)  folic acid 1 mg oral tablet: 1 tab(s) orally once a day (11 Oct 2018 08:38)  Mag-Ox 400 oral tablet: 1 tab(s) orally once a day (11 Oct 2018 08:39)  midodrine 10 mg oral tablet: 1 tab(s) orally once a day (11 Oct 2018 08:39)  Multiple Vitamins oral tablet: 1 tab(s) orally once a day last dose 10/3/2018 (11 Oct 2018 08:38)  Namenda 10 mg oral tablet: 1 tab(s) orally 2 times a day (11 Oct 2018 08:38)  Nephplex Rx oral tablet: 1 tab(s) orally once a day (11 Oct 2018 08:39)  nortriptyline 50 mg oral capsule: 1 cap(s) orally once a day (11 Oct 2018 08:39)  Renvela 800 mg oral tablet: 2 tab(s) orally every 8 hours (11 Oct 2018 08:39)  simethicone 80 mg oral tablet: 1 tab(s) orally 3 times a day (after meals) (11 Oct 2018 08:39)  torsemide 20 mg oral tablet: 1 tab(s) orally once a day (11 Oct 2018 08:39)  vitamin A: 1 tab(s) orally once a day (11 Oct 2018 08:38)  Vitamin B Complex: 1 tab(s) orally once a day (11 Oct 2018 08:38)  Vitamin C 500 mg oral tablet: 1 tab(s) orally once a day (11 Oct 2018 08:38)      PAST MEDICAL & SURGICAL HISTORY:  Smoking hx  Cardiomyopathy  Wound: right chest  ICD (implantable cardioverter-defibrillator) in place  OA (osteoarthritis)  HLD (hyperlipidemia)  BPH (benign prostatic hyperplasia)  Pleural effusion  HTN (hypertension)  ESRD (end stage renal disease)  H/O chest wound: right  History of wound infection: right chest wall - revision 5/18 and again in 7/18  History of implantable cardioverter-defibrillator (ICD) placement: pt unsure when placed  H/O bilateral hip replacements: 2008, 2009        ICU Vital Signs Last 24 Hrs  T(C): 36.6 (19 Oct 2018 04:00), Max: 36.8 (18 Oct 2018 16:00)  T(F): 97.9 (19 Oct 2018 04:00), Max: 98.2 (18 Oct 2018 16:00)  HR: 73 (19 Oct 2018 06:00) (67 - 88)  BP: --  BP(mean): --  ABP: 90/48 (19 Oct 2018 06:00) (87/44 - 110/56)  ABP(mean): 64 (19 Oct 2018 06:00) (60 - 75)  RR: 18 (19 Oct 2018 06:00) (12 - 20)  SpO2: 99% (19 Oct 2018 06:00) (95% - 100%)    I&O's Detail    17 Oct 2018 07:01  -  18 Oct 2018 07:00  --------------------------------------------------------  IN:    amiodarone Infusion: 314.9 mL    amiodarone Infusion: 16.7 mL    dexmedetomidine Infusion: 182.7 mL    fentaNYL  Infusion: 58 mL    IV PiggyBack: 800 mL    Nepro: 132 mL    norepinephrine Infusion: 122.5 mL    Other: 1100 mL    phenylephrine   Infusion: 213.3 mL    propofol Infusion: 112 mL    sodium chloride 0.9%.: 720 mL    vasopressin Infusion: 74 mL  Total IN: 3846.1 mL    OUT:    Bulb: 20 mL    Bulb: 140 mL    Chest Tube: 20 mL    Chest Tube: 190 mL    Chest Tube: 10 mL    Other: 3400 mL  Total OUT: 3780 mL    Total NET: 66.1 mL      18 Oct 2018 07:01  -  19 Oct 2018 06:15  --------------------------------------------------------  IN:    amiodarone Infusion: 334 mL    dexmedetomidine Infusion: 203.6 mL    IV PiggyBack: 200 mL    Other: 500 mL    phenylephrine   Infusion: 351.5 mL    sodium chloride 0.9%.: 600 mL    vasopressin Infusion: 76 mL  Total IN: 2265.1 mL    OUT:    Bulb: 25 mL    Chest Tube: 125 mL    Other: 2900 mL  Total OUT: 3050 mL    Total NET: -784.9 mL        CAPILLARY BLOOD GLUCOSE      POCT Blood Glucose.: 91 mg/dL (19 Oct 2018 05:59)      Home Medications:  aspirin 81 mg oral tablet: 1 tab(s) orally once a day last dose 10/3/2018 (11 Oct 2018 08:39)  Breo Ellipta 100 mcg-25 mcg/inh inhalation powder: 1 puff(s) inhaled once a day patient denies using it (11 Oct 2018 08:38)  Calcium 500: 1 tab(s) orally 2 times a day (11 Oct 2018 08:38)  carvedilol 6.25 mg oral tablet: 1 tab(s) orally once a day (11 Oct 2018 08:39)  docusate sodium 100 mg oral tablet: 1 tab(s) orally 2 times a day, As Needed (11 Oct 2018 08:39)  folic acid 1 mg oral tablet: 1 tab(s) orally once a day (11 Oct 2018 08:38)  Mag-Ox 400 oral tablet: 1 tab(s) orally once a day (11 Oct 2018 08:39)  midodrine 10 mg oral tablet: 1 tab(s) orally once a day (11 Oct 2018 08:39)  Multiple Vitamins oral tablet: 1 tab(s) orally once a day last dose 10/3/2018 (11 Oct 2018 08:38)  Namenda 10 mg oral tablet: 1 tab(s) orally 2 times a day (11 Oct 2018 08:38)  Nephplex Rx oral tablet: 1 tab(s) orally once a day (11 Oct 2018 08:39)  nortriptyline 50 mg oral capsule: 1 cap(s) orally once a day (11 Oct 2018 08:39)  Renvela 800 mg oral tablet: 2 tab(s) orally every 8 hours (11 Oct 2018 08:39)  simethicone 80 mg oral tablet: 1 tab(s) orally 3 times a day (after meals) (11 Oct 2018 08:39)  torsemide 20 mg oral tablet: 1 tab(s) orally once a day (11 Oct 2018 08:39)  vitamin A: 1 tab(s) orally once a day (11 Oct 2018 08:38)  Vitamin B Complex: 1 tab(s) orally once a day (11 Oct 2018 08:38)  Vitamin C 500 mg oral tablet: 1 tab(s) orally once a day (11 Oct 2018 08:38)      MEDICATIONS  (STANDING):  ALBUTerol/ipratropium for Nebulization 3 milliLiter(s) Nebulizer every 6 hours  amiodarone Infusion 0.5 mG/Min (16.667 mL/Hr) IV Continuous <Continuous>  aspirin Suppository 300 milliGRAM(s) Rectal daily  dexmedetomidine Infusion 0.5 MICROgram(s)/kG/Hr (7.375 mL/Hr) IV Continuous <Continuous>  dextrose 5%. 1000 milliLiter(s) (50 mL/Hr) IV Continuous <Continuous>  dextrose 50% Injectable 12.5 Gram(s) IV Push once  dextrose 50% Injectable 25 Gram(s) IV Push once  dextrose 50% Injectable 25 Gram(s) IV Push once  insulin lispro (HumaLOG) corrective regimen sliding scale   SubCutaneous every 6 hours  midodrine 10 milliGRAM(s) Oral every 8 hours  pantoprazole  Injectable 40 milliGRAM(s) IV Push daily  phenylephrine    Infusion 1 MICROgram(s)/kG/Min (11.063 mL/Hr) IV Continuous <Continuous>  piperacillin/tazobactam IVPB. 3.375 Gram(s) IV Intermittent every 12 hours  sodium chloride 0.9%. 1000 milliLiter(s) (30 mL/Hr) IV Continuous <Continuous>  sodium chloride 3%  Inhalation 4 milliLiter(s) Inhalation every 6 hours  vasopressin Infusion 0.05 Unit(s)/Min (3 mL/Hr) IV Continuous <Continuous>    MEDICATIONS  (PRN):  acetaminophen  IVPB .. 1000 milliGRAM(s) IV Intermittent once PRN Temp greater or equal to 38C (100.4F), Moderate Pain (4 - 6)  bisacodyl Suppository 10 milliGRAM(s) Rectal daily PRN Constipation  dextrose 40% Gel 15 Gram(s) Oral once PRN Blood Glucose LESS THAN 70 milliGRAM(s)/deciliter  fentaNYL    Injectable 25 MICROGram(s) IV Push every 4 hours PRN Moderate Pain (4 - 6)  glucagon  Injectable 1 milliGRAM(s) IntraMuscular once PRN Glucose LESS THAN 70 milligrams/deciliter          Physical exam:   General:               Pt is alert, following commands with periods of agitation                                                  Neuro:                 Nonfocal                             Cardiovascular:     S1 & S2, regular                           Respiratory:         Air entry is decreased on right side, has bilateral conducted sounds                           GI:                          Soft, nondistended and nontender, Bowel sounds active                            Ext:                        No cyanosis,  Left upper arm is slightly swollen & ecchymotic but has good dopplers and non-tender                                Labs:                                                                           10.2   13.24 )-----------( 193      ( 18 Oct 2018 04:15 )             30.9             10-18    133<L>  |  93<L>  |  22  ----------------------------<  126<H>  4.4   |  21<L>  |  2.61<H>    Ca    8.8      18 Oct 2018 04:15    TPro  6.4  /  Alb  2.6<L>  /  TBili  1.6<H>  /  DBili  x   /  AST  17  /  ALT  5   /  AlkPhos  216<H>  10-18                    LIVER FUNCTIONS - ( 18 Oct 2018 04:15 )  Alb: 2.6 g/dL / Pro: 6.4 g/dL / ALK PHOS: 216 u/L / ALT: 5 u/L / AST: 17 u/L / GGT: x               CXR:    < from: Xray Chest 1 View- PORTABLE-Routine (10.18.18 @ 06:26) >  Enteric tube has been removed since the last exam. Subcutaneous AICD,   right IJ line and 3 right-sided chest tubes again identified. Loculated   right pleural effusion again present. Left lung and right upper lobe are   clear. Subcutaneous emphysema is present.  No pneumothorax.    Plan:    General: 58yMale s/p   Right thoracotomy, resection of portion of R 7th rib, evacuation of infected hemothorax, takedown of pleurocutaneous fistula  / R chest wall reconstruction with tunneled latissimus dorsi flap, covered by serratus anterior flap. Remains hypotensive and on vent support                            Neuro:                                         Pain control with F Tylenol IV    Avoid narcotics because of CO2 retention                            Cardiovascular:                                          Continue hemodynamic monitoring.    Hypotension / Shock: Continue Quan /Vasopressin - Titrate to MAP>65    A-fib: Continue Amio    Acute on chronic systolic heart failure - Was on Dobutamine with C.I>3.5. Currently off inotropic support.  CVP is 12  this morning - dialyzed with removal of 2.3kgs. Trend CVPs                            Respiratory:                                         Pt is on / off Bipap                                         Co2 is around 50 with respiratory acidosis. Continue aggressive chest PT / ENCOURAGE INCENTIVE SPIROMETRY                                         Monitor chest tube output- D/Cd Anterior chest tube                                         Chest tube to suction                                                                Continue bronchodilators, pulmonary toilet     VAC d/cd                            GI                                          Continue GI prophylaxis with Protonix                                          Continue Zofran / Reglan for nausea - PRN	      Bedside swallow test                                                                 Renal:                                         Monitor I/Os and electrolytes                                         ESRD: HD as scheduled                                                 Hem/ Onc:                                         Anemia: Transfused 2 Uts PRBC during HD on 10/17. Hct is stable                                         Monitor chest tube output &  signs of bleeding.                                          Follow CBC in AM     Thrombocytopenia - Resolved                           Infectious disease:      Infected hematoma - growing E.Fecalis - Continue Zosyn                                          Monitor for fever / leukocytosis.                                          All surgical incision / chest tube  sites look clean                            Endocrine                                             Continue Accu-Checks with coverage    Pt is on SQ Heparin and Venodyne boots for DVT prophylaxis.     Pertinent clinical, laboratory, radiographic, hemodynamic, echocardiographic, respiratory data, microbiologic data and chart were reviewed and analyzed frequently throughout the course of the day and night  Patient seen, examined and plan discussed with CT Surgeon Dr. Ledesma / CTICU team during rounds.    Pt's status discussed with family at bedside, updated status. Aware of low threshold for intubation     I have spent  90  minutes of critical care time with this pt between 12am  and 8am              Ralph Warren MD

## 2018-10-19 NOTE — SWALLOW BEDSIDE ASSESSMENT ADULT - SLP PERTINENT HISTORY OF CURRENT PROBLEM
Pt is a 58 yr old Mandarin speaking male scheduled for Right Thoractomy Decortiation, Right Chest Wall reconstruction with Latissimjus Dorsi Flap Poss Skin Graft with VAC dressing with Dr Pickering 10/11/18. Pt has ICD but unable to identify make or model. Pt hx of ESRD with HD T/Th/Sat for 4 hrs and has stated he has stopped some of his meds but cannot say which ones. Pt seen and received MC and CC but has 2 different med lists and is unable to identify meds taking. Pt denies blood thinners. Pt has stopped ASA as of last week. Pt hx gathered from [employer] and notes from Dr Pickering - pt is poor historian.

## 2018-10-19 NOTE — PROGRESS NOTE ADULT - SUBJECTIVE AND OBJECTIVE BOX
CLAUDIA HAN  [id number]    Subjective:  Patient is a 58y old  Male who presents with a chief complaint of infected hemothorax (18 Oct 2018 17:39). no acute events overnight, currently extubated on CPAP.       Objective:  T(C): 36.6 (10-19-18 @ 04:00), Max: 36.8 (10-18-18 @ 16:00)  HR: 69 (10-19-18 @ 07:00) (67 - 88)  BP: --  RR: 14 (10-19-18 @ 07:00) (12 - 20)  SpO2: 100% (10-19-18 @ 07:00) (95% - 100%)  Wt(kg): --   10-19    134<L>  |  94<L>  |  23  ----------------------------<  110<H>  4.2   |  19<L>  |  2.72<H>    Ca    8.9      19 Oct 2018 05:20    TPro  6.6  /  Alb  2.5<L>  /  TBili  1.0  /  DBili  x   /  AST  16  /  ALT  < 4<L>  /  AlkPhos  205<H>  10-19                        10.0   10.94 )-----------( 249      ( 19 Oct 2018 05:20 )             31.2       10-18 @ 07:01  -  10-19 @ 07:00  --------------------------------------------------------  IN: 2325.8 mL / OUT: 3250 mL / NET: -924.2 mL      PHYSICAL EXAM:    General: NAD, in bed  MSK: Right back incision CDI, no palpable collections, drain outputs serosanguinous      MEDICATIONS  (STANDING):  ALBUTerol/ipratropium for Nebulization 3 milliLiter(s) Nebulizer every 6 hours  amiodarone Infusion 0.5 mG/Min (16.667 mL/Hr) IV Continuous <Continuous>  aspirin Suppository 300 milliGRAM(s) Rectal daily  dexmedetomidine Infusion 0.5 MICROgram(s)/kG/Hr (7.375 mL/Hr) IV Continuous <Continuous>  dextrose 5%. 1000 milliLiter(s) (50 mL/Hr) IV Continuous <Continuous>  dextrose 50% Injectable 12.5 Gram(s) IV Push once  dextrose 50% Injectable 25 Gram(s) IV Push once  dextrose 50% Injectable 25 Gram(s) IV Push once  insulin lispro (HumaLOG) corrective regimen sliding scale   SubCutaneous every 6 hours  midodrine 10 milliGRAM(s) Oral every 8 hours  pantoprazole  Injectable 40 milliGRAM(s) IV Push daily  phenylephrine    Infusion 1 MICROgram(s)/kG/Min (11.063 mL/Hr) IV Continuous <Continuous>  piperacillin/tazobactam IVPB. 3.375 Gram(s) IV Intermittent every 12 hours  sodium chloride 0.9%. 1000 milliLiter(s) (30 mL/Hr) IV Continuous <Continuous>  sodium chloride 3%  Inhalation 4 milliLiter(s) Inhalation every 6 hours  vasopressin Infusion 0.05 Unit(s)/Min (3 mL/Hr) IV Continuous <Continuous>    MEDICATIONS  (PRN):  acetaminophen  IVPB .. 1000 milliGRAM(s) IV Intermittent once PRN Temp greater or equal to 38C (100.4F), Moderate Pain (4 - 6)  bisacodyl Suppository 10 milliGRAM(s) Rectal daily PRN Constipation  dextrose 40% Gel 15 Gram(s) Oral once PRN Blood Glucose LESS THAN 70 milliGRAM(s)/deciliter  fentaNYL    Injectable 25 MICROGram(s) IV Push every 4 hours PRN Moderate Pain (4 - 6)  glucagon  Injectable 1 milliGRAM(s) IntraMuscular once PRN Glucose LESS THAN 70 milligrams/deciliter

## 2018-10-19 NOTE — PHYSICAL THERAPY INITIAL EVALUATION ADULT - PATIENT PROFILE REVIEW, REHAB EVAL
PT orders received-->ambulate as tolerated. Consult with ERNST GUTIÉRREZ-->pt OK to participate in PT evaluation./yes

## 2018-10-19 NOTE — PROGRESS NOTE ADULT - ASSESSMENT
Pt is a 58 yr old Mandarin speaking male scheduled for Right Thoractomy Decortiation, Right Chest Wall reconstruction with Latissimjus Dorsi Flap Poss Skin Graft with VAC dressing with Dr Pickering 10/11/18. Pt has ICD but unable to identify make or model. Pt hx of ESRD with HD T/Th/Sat for 4 hrs and has stated he has stopped some of his meds but cannot say which ones. Pt seen and received [facility] and [facility] but has 2 different med lists and is unable to identify meds taking. Pt denies blood thinners. Pt has stopped ASA as of last week. Pt hx gathered from [employer] and notes from Dr Pickering - pt is poor historian.     Call through  to patient who went to local pharmacy for confirmation of meds and pt given preop instructions (01 Oct 2018 09:25)    Pt has multiple comorbidities including CHF, EF of 10%, renal failure on dialysis,  who has had chronic bilateral pleural effusions.  The patient previously underwent left VATS and PleurX drains  in 2015.  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  During this admission pt underwent OR with Plastic and Thoracic surgery,  for definitive repair that involved thoracotomy, decortication, excision of empyema cavity along with muscle flap reconstruction. Pt underwent surgery on 10/11 (Right thoracotomy, resection of portion of R 7th rib, evacuation of infected hemothorax, takedown of pleurocutaneous fistula - and noted to have foul smelling hematoma).  OR cultures are growing Enterococcus faecalis.    Pt started on empiric vanco/zosyn/flagyl since 10/11.  He has multiple right  chest tubes/ drains in place and remains on vent support. Pt is on ESRD.  He has had intermittent episodes of hypothermia, and low bp (85/44). ID consult called for further antibiotic management.     Problem/Plan - 1:    ·	Infected right hemothorax    - Cont broad spectrum abx for now - agree with zosyn.  Thus far, OR cultures growing sensitive E.faecalis (to amp/vanco).  Renally dose abx, can cont zosyn 3.375 gm IV q12 for HD.      - Can d/c further vanco dosing, no MRSA identified from any of the cultures.  BCX (-) x 2 from 10/15      - Cont wound vac, local wound care, and chest tube drain managment as per CT icu.  s/p removal of chest tube x 2      Will follow,    Anabel Chahal  [phone]

## 2018-10-19 NOTE — SWALLOW BEDSIDE ASSESSMENT ADULT - ADDITIONAL RECOMMENDATIONS
1. MD to reconsult this department if changes in medical status should change.   2. This department to follow for swallow therapy as schedule permits during this acute care stay.

## 2018-10-19 NOTE — PHYSICAL THERAPY INITIAL EVALUATION ADULT - GENERAL OBSERVATIONS, REHAB EVAL
Patient was received semi-supine in bed in NAD, +chest tube x2, +a-line, +IV, +Tele, +2L O2 nasal cannula. Patient Mandarin speaking-->[employer] [facility]  utilizedc: Ghulam, #[id number].

## 2018-10-19 NOTE — PHYSICAL THERAPY INITIAL EVALUATION ADULT - DID THE PATIENT HAVE SURGERY?
Right thoracotomy, resection of portion of R 7th rib, evacuation of infected hemothorax, takedown of pleurocutaneous fistula  / R chest wall reconstruction with tunneled latissimus dorsi flap, covered by serratus anterior flap/yes

## 2018-10-19 NOTE — PROGRESS NOTE ADULT - ASSESSMENT
Assessment/Plan:  Patient is a 58y old  Male who presents with a chief complaint of infected hemothorax (18 Oct 2018 17:39), s/p washout of pleural space and latissimus dorsi flap  - continue staples  - continue drain  - wean oxygen per ICU  - DVT ppx

## 2018-10-19 NOTE — SWALLOW BEDSIDE ASSESSMENT ADULT - SWALLOW EVAL: DIAGNOSIS
Patient presented with functional oral stage for puree, mechanical soft solids, solids, thin liquid and nectar thickened liquids marked by adequate bolus formation, transfer and clearance.  Patient presented with functional pharyngeal stage for puree, mechanical soft solids, and nectar thickened liquids marked by prompt swallow trigger, adequate hyolaryngeal elevation and no overt s/s of penetration/aspiration.  Patient presented with mild pharyngeal stage dysphagia for solids and thin liquids marked by mildly delayed swallow trigger, adequate hyolaryngeal elevation, and overt s/s of penetration/aspiration marked by multiple swallows for solids and thin liquids in addition to throat clearing post swallow for solids and wet vocal quality post swallow for thin liquids.

## 2018-10-19 NOTE — PHYSICAL THERAPY INITIAL EVALUATION ADULT - MANUAL MUSCLE TESTING RESULTS, REHAB EVAL
bilateral UEs & LEs grossly at least 3+/5, not formally tested secondary to recent surgery/grossly assessed due to

## 2018-10-19 NOTE — SWALLOW BEDSIDE ASSESSMENT ADULT - SWALLOW EVAL: RECOMMENDED FEEDING/EATING TECHNIQUES
position upright (90 degrees)/small sips/bites/maintain upright posture during/after eating for 30 mins/alternate food with liquid

## 2018-10-19 NOTE — PHYSICAL THERAPY INITIAL EVALUATION ADULT - RANGE OF MOTION EXAMINATION, REHAB EVAL
bilateral lower extremity ROM was WFL (within functional limits)/bilateral upper extremity ROM was WFL (within functional limits)/bilateral shoulder flexion not tested >90 degrees as per PT discretion

## 2018-10-19 NOTE — PROGRESS NOTE ADULT - PROBLEM SELECTOR PLAN 1
NICM ? per chart.  HFrEF - LVEF 20%, severely dilated LV, LVIDD 7.3 cm. Moderate MR.  CVP 6-8 today. Had 2900 ml removed in HD yesterday.  CTI team to titrate down on pressors as tolerated. Continue dose midodrine on HD days.  HD per renal to keep CVP around 6.  Suggest CVVHD to keep pt negative.

## 2018-10-19 NOTE — SWALLOW BEDSIDE ASSESSMENT ADULT - PHARYNGEAL PHASE
Within functional limits/no overt s/s of penetration/aspiration Wet vocal quality post oral intake/Multiple swallows/Delayed pharyngeal swallow Throat clear post oral intake/Delayed pharyngeal swallow

## 2018-10-19 NOTE — PROGRESS NOTE ADULT - SUBJECTIVE AND OBJECTIVE BOX
Medications:  acetaminophen  IVPB .. 1000 milliGRAM(s) IV Intermittent once PRN  ALBUTerol/ipratropium for Nebulization 3 milliLiter(s) Nebulizer every 6 hours  amiodarone Infusion 0.5 mG/Min IV Continuous <Continuous>  aspirin Suppository 300 milliGRAM(s) Rectal daily  bisacodyl Suppository 10 milliGRAM(s) Rectal daily PRN  dexmedetomidine Infusion 0.5 MICROgram(s)/kG/Hr IV Continuous <Continuous>  dextrose 40% Gel 15 Gram(s) Oral once PRN  dextrose 5%. 1000 milliLiter(s) IV Continuous <Continuous>  dextrose 50% Injectable 12.5 Gram(s) IV Push once  dextrose 50% Injectable 25 Gram(s) IV Push once  dextrose 50% Injectable 25 Gram(s) IV Push once  fentaNYL    Injectable 25 MICROGram(s) IV Push every 4 hours PRN  glucagon  Injectable 1 milliGRAM(s) IntraMuscular once PRN  haloperidol    Injectable 0.5 milliGRAM(s) IntraMuscular once  insulin lispro (HumaLOG) corrective regimen sliding scale   SubCutaneous every 6 hours  midodrine 10 milliGRAM(s) Oral every 8 hours  pantoprazole  Injectable 40 milliGRAM(s) IV Push daily  phenylephrine    Infusion 1 MICROgram(s)/kG/Min IV Continuous <Continuous>  piperacillin/tazobactam IVPB. 3.375 Gram(s) IV Intermittent every 12 hours  sodium chloride 0.9%. 1000 milliLiter(s) IV Continuous <Continuous>  sodium chloride 3%  Inhalation 4 milliLiter(s) Inhalation every 6 hours  vasopressin Infusion 0.05 Unit(s)/Min IV Continuous <Continuous>      Vitals:  Vital Signs Last 24 Hrs  T(C): 36.7 (19 Oct 2018 08:00), Max: 36.8 (18 Oct 2018 16:00)  T(F): 98 (19 Oct 2018 08:00), Max: 98.2 (18 Oct 2018 16:00)  HR: 84 (19 Oct 2018 11:00) (67 - 105)  BP: --  BP(mean): --  RR: 18 (19 Oct 2018 11:00) (13 - 20)  SpO2: 99% (19 Oct 2018 11:00) (92% - 100%)    Daily     Daily Weight in k.5 (19 Oct 2018 00:00)    I&O's Detail    18 Oct 2018 07:01  -  19 Oct 2018 07:00  --------------------------------------------------------  IN:    amiodarone Infusion: 334 mL    dexmedetomidine Infusion: 213.9 mL    IV PiggyBack: 200 mL    Other: 500 mL    phenylephrine   Infusion: 367.9 mL    sodium chloride 0.9%.: 630 mL    vasopressin Infusion: 80 mL  Total IN: 2325.8 mL    OUT:    Bulb: 125 mL    Bulb: 100 mL    Chest Tube: 125 mL    Other: 2900 mL  Total OUT: 3250 mL    Total NET: -924.2 mL      19 Oct 2018 07:01  -  19 Oct 2018 11:40  --------------------------------------------------------  IN:    dexmedetomidine Infusion: 50 mL    phenylephrine   Infusion: 80 mL    sodium chloride 0.9%.: 150 mL    vasopressin Infusion: 20 mL  Total IN: 300 mL    OUT:    Chest Tube: 50 mL  Total OUT: 50 mL    Total NET: 250 mL          Physical Exam:     General: No distress. Comfortable.  HEENT: EOM intact.  Neck: Neck supple. JVP not elevated. No masses  Chest: Clear to auscultation bilaterally  CV: Normal S1 and S2. No murmurs, rub, or gallops. Radial pulses normal.  Abdomen: Soft, non-distended, non-tender  Skin: No rashes or skin breakdown  Neurology: Alert and oriented times three. Sensation intact  Psych: Affect normal    Labs:                        10.0   10. )-----------( 249      ( 19 Oct 2018 05:20 )             31.2     10-19    134<L>  |  94<L>  |  23  ----------------------------<  110<H>  4.2   |  19<L>  |  2.72<H>    Ca    8.9      19 Oct 2018 05:20    TPro  6.6  /  Alb  2.5<L>  /  TBili  1.0  /  DBili  x   /  AST  16  /  ALT  < 4<L>  /  AlkPhos  205<H>  10-19      < from: Transthoracic Echocardiogram (10.12.18 @ 08:31) >  DIMENSIONS:  Dimensions:     Normal Values:  LA:     2.5 cm    2.0 - 4.0 cm  Ao:     3.5 cm    2.0 - 3.8 cm  SEPTUM: 0.7 cm    0.6 - 1.2 cm  PWT:    0.8 cm    0.6 - 1.1 cm  LVIDd:  7.3 cm    3.0 - 5.6 cm  LVIDs:  6.6 cm    1.8 - 4.0 cm  Derived Variables:  LVMI: 124 g/m2  RWT: 0.21  Fractional short: 10 %  Ejection Fraction (Teicholtz): 20 %  ------------------------------------------------------------------------  OBSERVATIONS:  Mitral Valve: Mitral annular calcification, otherwise  normal mitral valve. Moderate mitral regurgitation.  Aortic Root: Normal aortic root.  Aortic Valve: Calcified trileaflet aortic valve with normal  opening. Moderate aortic regurgitation.  Vena contracta  width about  0.4 cm.  Left Atrium: Normal left atrium.  LA volume index = 19  cc/m2.  Left Ventricle: Severe global left ventricular systolic  dysfunction. Severe left ventricular enlargement.  Right Heart: Normal right atrium. Normal right ventricular  size with decreased right ventricular systolic function.  Normal tricuspid valve.   Mild tricuspid regurgitation.  Normal pulmonic valve.  Pericardium/PleuraNormal pericardium with no pericardial  effusion.  Hemodynamic: Estimated right ventricular systolic pressure  equals 40 mm Hg, assuming right atrial pressure equals 10  mm Hg, consistent with mild pulmonary hypertension.    < end of copied text > Patient seen and examined. Talking, and appears comfortable.   One more chest tube removed. Has good CO/CI, central sat.     Medications:  acetaminophen  IVPB .. 1000 milliGRAM(s) IV Intermittent once PRN  ALBUTerol/ipratropium for Nebulization 3 milliLiter(s) Nebulizer every 6 hours  amiodarone Infusion 0.5 mG/Min IV Continuous <Continuous>  aspirin Suppository 300 milliGRAM(s) Rectal daily  bisacodyl Suppository 10 milliGRAM(s) Rectal daily PRN  dexmedetomidine Infusion 0.5 MICROgram(s)/kG/Hr IV Continuous <Continuous>  dextrose 40% Gel 15 Gram(s) Oral once PRN  dextrose 5%. 1000 milliLiter(s) IV Continuous <Continuous>  dextrose 50% Injectable 12.5 Gram(s) IV Push once  dextrose 50% Injectable 25 Gram(s) IV Push once  dextrose 50% Injectable 25 Gram(s) IV Push once  fentaNYL    Injectable 25 MICROGram(s) IV Push every 4 hours PRN  glucagon  Injectable 1 milliGRAM(s) IntraMuscular once PRN  haloperidol    Injectable 0.5 milliGRAM(s) IntraMuscular once  insulin lispro (HumaLOG) corrective regimen sliding scale   SubCutaneous every 6 hours  midodrine 10 milliGRAM(s) Oral every 8 hours  pantoprazole  Injectable 40 milliGRAM(s) IV Push daily  phenylephrine    Infusion 1 MICROgram(s)/kG/Min IV Continuous <Continuous>  piperacillin/tazobactam IVPB. 3.375 Gram(s) IV Intermittent every 12 hours  sodium chloride 0.9%. 1000 milliLiter(s) IV Continuous <Continuous>  sodium chloride 3%  Inhalation 4 milliLiter(s) Inhalation every 6 hours  vasopressin Infusion 0.05 Unit(s)/Min IV Continuous <Continuous>      Vitals:  Vital Signs Last 24 Hrs  T(C): 36.7 (19 Oct 2018 08:00), Max: 36.8 (18 Oct 2018 16:00)  T(F): 98 (19 Oct 2018 08:00), Max: 98.2 (18 Oct 2018 16:00)  HR: 84 (19 Oct 2018 11:00) (67 - 105)  BP: --  BP(mean): --  RR: 18 (19 Oct 2018 11:00) (13 - 20)  SpO2: 99% (19 Oct 2018 11:00) (92% - 100%)    Daily     Daily Weight in k.5 (19 Oct 2018 00:00)    I&O's Detail    18 Oct 2018 07:01  -  19 Oct 2018 07:00  --------------------------------------------------------  IN:    amiodarone Infusion: 334 mL    dexmedetomidine Infusion: 213.9 mL    IV PiggyBack: 200 mL    Other: 500 mL    phenylephrine   Infusion: 367.9 mL    sodium chloride 0.9%.: 630 mL    vasopressin Infusion: 80 mL  Total IN: 2325.8 mL    OUT:    Bulb: 125 mL    Bulb: 100 mL    Chest Tube: 125 mL    Other: 2900 mL  Total OUT: 3250 mL    Total NET: -924.2 mL      19 Oct 2018 07:01  -  19 Oct 2018 11:40  --------------------------------------------------------  IN:    dexmedetomidine Infusion: 50 mL    phenylephrine   Infusion: 80 mL    sodium chloride 0.9%.: 150 mL    vasopressin Infusion: 20 mL  Total IN: 300 mL    OUT:    Chest Tube: 50 mL  Total OUT: 50 mL    Total NET: 250 mL          Physical Exam:     General: No distress. Comfortable.  HEENT: EOM intact.  Neck: Neck supple. JVP not elevated. No masses  Chest: Clear to auscultation bilaterally  CV: Normal S1 and S2. II/VI CARLOS A, No rub, or gallops. Radial pulses normal. No LE edema b/l   Abdomen: Soft, non-distended, non-tender  Skin: No rashes or skin breakdown  Neurology: Alert and oriented times three. Sensation intact  Psych: Affect normal    Labs:                        10.0   10.94 )-----------( 249      ( 19 Oct 2018 05:20 )             31.2     10-    134<L>  |  94<L>  |  23  ----------------------------<  110<H>  4.2   |  19<L>  |  2.72<H>    Ca    8.9      19 Oct 2018 05:20    TPro  6.6  /  Alb  2.5<L>  /  TBili  1.0  /  DBili  x   /  AST  16  /  ALT  < 4<L>  /  AlkPhos  205<H>  10-      < from: Transthoracic Echocardiogram (10.12.18 @ 08:31) >  DIMENSIONS:  Dimensions:     Normal Values:  LA:     2.5 cm    2.0 - 4.0 cm  Ao:     3.5 cm    2.0 - 3.8 cm  SEPTUM: 0.7 cm    0.6 - 1.2 cm  PWT:    0.8 cm    0.6 - 1.1 cm  LVIDd:  7.3 cm    3.0 - 5.6 cm  LVIDs:  6.6 cm    1.8 - 4.0 cm  Derived Variables:  LVMI: 124 g/m2  RWT: 0.21  Fractional short: 10 %  Ejection Fraction (Teicholtz): 20 %  ------------------------------------------------------------------------  OBSERVATIONS:  Mitral Valve: Mitral annular calcification, otherwise  normal mitral valve. Moderate mitral regurgitation.  Aortic Root: Normal aortic root.  Aortic Valve: Calcified trileaflet aortic valve with normal  opening. Moderate aortic regurgitation.  Vena contracta  width about  0.4 cm.  Left Atrium: Normal left atrium.  LA volume index = 19  cc/m2.  Left Ventricle: Severe global left ventricular systolic  dysfunction. Severe left ventricular enlargement.  Right Heart: Normal right atrium. Normal right ventricular  size with decreased right ventricular systolic function.  Normal tricuspid valve.   Mild tricuspid regurgitation.  Normal pulmonic valve.  Pericardium/PleuraNormal pericardium with no pericardial  effusion.  Hemodynamic: Estimated right ventricular systolic pressure  equals 40 mm Hg, assuming right atrial pressure equals 10  mm Hg, consistent with mild pulmonary hypertension.    < end of copied text >

## 2018-10-19 NOTE — PROGRESS NOTE ADULT - PROBLEM SELECTOR PLAN 1
ESRD on HD for past six years through left upper extremity AVF. CVVHDF discontinued on 10/15/18. Pt tolerated IHD yesterday. Pt had an extra session of HD yesterday due to concerns for hypervolemia. Pt tolerated HD well. Plan for HD today.

## 2018-10-19 NOTE — PHYSICAL THERAPY INITIAL EVALUATION ADULT - ADDITIONAL COMMENTS
Patient reports he lives with his wife in an apartment on the 6th floor, endorses both elevator & stairs. Patient reports he required assistance with ADLs, and ambulated with a cane prior to admission. Patient has a home health aide 7 days/week, varying from 5-8 hours/day.    Patient was left semi-supine in bed as found, Mariah GUTIÉRREZ RN & Miki FIGUEROA RN present.

## 2018-10-19 NOTE — PROGRESS NOTE ADULT - SUBJECTIVE AND OBJECTIVE BOX
[facility] DIVISION OF KIDNEY DISEASES AND HYPERTENSION -- FOLLOW UP NOTE  --------------------------------------------------------------------------------    HPI: 57yo Mandarin speaking M w/ Hx of NICM w/ ICD, ESRD on HD TTS, former smoker, BPH who was admitted for scheduled right thoracotomy w/ decortication due to infected hemothorax and chest reconstruction with CT Surgery. Pt underwent procedure on 10/11/18, and afterwards developed shock, thought to be cardiogenic. Pt being monitored in the CTICU. Renal now called for management of pts ESRD. Pt was started on CVVHDF on 10/13/18. Non-tunneled catheter non-functional and removed on 10/15/18.    24 hour events/subjective:  Pt had a extra session of HD yesterday for concerns of hypervolemia. Pt tolerated HD well. Plan for HD today     PAST HISTORY  --------------------------------------------------------------------------------  No significant changes to PMH, PSH, FHx, SHx, unless otherwise noted    ALLERGIES & MEDICATIONS  --------------------------------------------------------------------------------  Allergies    No Known Allergies    Intolerances      Standing Inpatient Medications  ALBUTerol/ipratropium for Nebulization 3 milliLiter(s) Nebulizer every 6 hours  amiodarone Infusion 0.5 mG/Min IV Continuous <Continuous>  aspirin Suppository 300 milliGRAM(s) Rectal daily  dexmedetomidine Infusion 0.5 MICROgram(s)/kG/Hr IV Continuous <Continuous>  dextrose 5%. 1000 milliLiter(s) IV Continuous <Continuous>  dextrose 50% Injectable 12.5 Gram(s) IV Push once  dextrose 50% Injectable 25 Gram(s) IV Push once  dextrose 50% Injectable 25 Gram(s) IV Push once  haloperidol    Injectable 0.5 milliGRAM(s) IntraMuscular once  insulin lispro (HumaLOG) corrective regimen sliding scale   SubCutaneous every 6 hours  midodrine 10 milliGRAM(s) Oral every 8 hours  pantoprazole  Injectable 40 milliGRAM(s) IV Push daily  phenylephrine    Infusion 1 MICROgram(s)/kG/Min IV Continuous <Continuous>  piperacillin/tazobactam IVPB. 3.375 Gram(s) IV Intermittent every 12 hours  sodium chloride 0.9%. 1000 milliLiter(s) IV Continuous <Continuous>  sodium chloride 3%  Inhalation 4 milliLiter(s) Inhalation every 6 hours  vasopressin Infusion 0.05 Unit(s)/Min IV Continuous <Continuous>    PRN Inpatient Medications  acetaminophen  IVPB .. 1000 milliGRAM(s) IV Intermittent once PRN  bisacodyl Suppository 10 milliGRAM(s) Rectal daily PRN  dextrose 40% Gel 15 Gram(s) Oral once PRN  fentaNYL    Injectable 25 MICROGram(s) IV Push every 4 hours PRN  glucagon  Injectable 1 milliGRAM(s) IntraMuscular once PRN      REVIEW OF SYSTEMS  --------------------------------------------------------------------------------  Unable to obtain     VITALS/PHYSICAL EXAM  --------------------------------------------------------------------------------  T(C): 36.6 (10-19-18 @ 04:00), Max: 36.8 (10-18-18 @ 16:00)  HR: 74 (10-19-18 @ 08:00) (67 - 88)  BP: --  RR: 17 (10-19-18 @ 08:00) (12 - 20)  SpO2: 100% (10-19-18 @ 08:00) (95% - 100%)  Wt(kg): --    Weight (kg): 62.4 (10-18-18 @ 02:00)      10-18-18 @ 07:01  -  10-19-18 @ 07:00  --------------------------------------------------------  IN: 2325.8 mL / OUT: 3250 mL / NET: -924.2 mL    10-19-18 @ 07:01  -  10-19-18 @ 08:59  --------------------------------------------------------  IN: 120 mL / OUT: 0 mL / NET: 120 mL    Physical Exam:  	Gen: NAD  	Pulm: CTA B/L, + chest tube drains  	CV: RRR, S1S2; no rub  	Abd: +BS, soft  	LE: Warm, no edema  	Skin: Warm  	Vascular access:  LUE AVF site: +thrill +bruit +skin intact    LABS/STUDIES  --------------------------------------------------------------------------------              10.0   10.94 >-----------<  249      [10-19-18 @ 05:20]              31.2     134  |  94  |  23  ----------------------------<  110      [10-19-18 @ 05:20]  4.2   |  19  |  2.72        Ca     8.9     [10-19-18 @ 05:20]    TPro  6.6  /  Alb  2.5  /  TBili  1.0  /  DBili  x   /  AST  16  /  ALT  < 4  /  AlkPhos  205  [10-19-18 @ 05:20]    Creatinine Trend:  SCr 2.72 [10-19 @ 05:20]  SCr 2.61 [10-18 @ 04:15]  SCr 3.31 [10-17 @ 04:20]  SCr 2.12 [10-16 @ 02:40]  SCr 3.18 [10-15 @ 17:34]    HbA1c 5.5      [10-12-18 @ 02:53]    HBsAb 16.3      [10-13-18 @ 15:45]  HCV 0.21, Nonreactive Hepatitis C AB  S/CO Ratio                        Interpretation  < 1.0                                     Non-Reactive  1.0 - 4.9                           Weakly-Reactive  > 5.0                                 Reactive  Non-Reactive: Aperson with a non-reactive HCV antibody  result is considered uninfected.  No further action is  needed unless recent infection is suspected.  In these  cases, consider repeat testing later to detect  seroconversion..  Weakly-Reactive: HCV antibody test is abnormal, HCV RNA  Qualitative test will follow.  Reactive: HCV antibody test is abnormal, HCV RNA  Qualitative test will follow.  Note: HCV antibody testing is performed on the [employer] system.      [10-13-18 @ 15:45]

## 2018-10-19 NOTE — SWALLOW BEDSIDE ASSESSMENT ADULT - ASR SWALLOW ASPIRATION MONITOR
pneumonia/upper respiratory infection/fever/throat clearing/gurgly voice/change of breathing pattern/cough

## 2018-10-19 NOTE — PROGRESS NOTE ADULT - ASSESSMENT
59 y/o male (Mandarin speaking) with hx of NICM? HFrEF (LVEF 20%, severely dilated LVIDD 7.3 cm) w/ ICD, HLD, ESRD on HD, former smoker, BPH, hx of prior chest/lung surgeries with chest wall wound infections, b/l hip replacements comes in for a scheduled right thoracotomy w/ decortication and muscle flap with Dr. Jose Alfredo Pickering on 10/11/18. Post surgery patient required Dobutamine 10 mcg/kg/min (now weaned to 5 mcg/kg/min) and pressors. He is currently intubated on pressors ; norepinephrine, phenylephrine, vasopressin and epinephrine. He is also on Zosyn and metronidazole prophylactically. Has 3 right sided chest tubes in place. Has a small right sided basilar phenomothorax. HF consulted to evaluate cardiogenic shock. He is awake and alert with wife by bedside. Per record patient is a poor historian and not aware of which meds he was on at home.   Was critically ill- e.faecalis empyema, and on pressors. But cardiac output/index improving. Off dobutamine. Extubated on 10/17/18.

## 2018-10-19 NOTE — PHYSICAL THERAPY INITIAL EVALUATION ADULT - PERTINENT HX OF CURRENT PROBLEM, REHAB EVAL
Patient is a 58 year old male admitted to [facility] on 10/11 s/p Right thoracotomy, resection of portion of R 7th rib, evacuation of infected hemothorax, takedown of pleurocutaneous fistula  / R chest wall reconstruction with tunneled latissimus dorsi flap, covered by serratus anterior flap.  PMH includes: ESRD, ICD, OA, HLD, BPH, HTN.

## 2018-10-20 LAB
ALBUMIN SERPL ELPH-MCNC: 2.8 G/DL — LOW (ref 3.3–5)
ALP SERPL-CCNC: 181 U/L — HIGH (ref 40–120)
ALT FLD-CCNC: < 4 U/L — LOW (ref 4–41)
AST SERPL-CCNC: 17 U/L — SIGNIFICANT CHANGE UP (ref 4–40)
BACTERIA BLD CULT: SIGNIFICANT CHANGE UP
BACTERIA BLD CULT: SIGNIFICANT CHANGE UP
BASE EXCESS BLDA CALC-SCNC: -1.2 MMOL/L — SIGNIFICANT CHANGE UP
BASE EXCESS BLDA CALC-SCNC: -2.3 MMOL/L — SIGNIFICANT CHANGE UP
BILIRUB SERPL-MCNC: 0.9 MG/DL — SIGNIFICANT CHANGE UP (ref 0.2–1.2)
BUN SERPL-MCNC: 20 MG/DL — SIGNIFICANT CHANGE UP (ref 7–23)
CALCIUM SERPL-MCNC: 8.8 MG/DL — SIGNIFICANT CHANGE UP (ref 8.4–10.5)
CHLORIDE BLDA-SCNC: 106 MMOL/L — SIGNIFICANT CHANGE UP (ref 96–108)
CHLORIDE BLDA-SCNC: 106 MMOL/L — SIGNIFICANT CHANGE UP (ref 96–108)
CHLORIDE SERPL-SCNC: 96 MMOL/L — LOW (ref 98–107)
CO2 SERPL-SCNC: 24 MMOL/L — SIGNIFICANT CHANGE UP (ref 22–31)
CREAT SERPL-MCNC: 2.9 MG/DL — HIGH (ref 0.5–1.3)
GLUCOSE BLDA-MCNC: 103 MG/DL — HIGH (ref 70–99)
GLUCOSE BLDA-MCNC: 106 MG/DL — HIGH (ref 70–99)
GLUCOSE SERPL-MCNC: 106 MG/DL — HIGH (ref 70–99)
HCO3 BLDA-SCNC: 22 MMOL/L — SIGNIFICANT CHANGE UP (ref 22–26)
HCO3 BLDA-SCNC: 22 MMOL/L — SIGNIFICANT CHANGE UP (ref 22–26)
HCT VFR BLD CALC: 29.8 % — LOW (ref 39–50)
HCT VFR BLDA CALC: 29.3 % — LOW (ref 39–51)
HCT VFR BLDA CALC: 29.9 % — LOW (ref 39–51)
HGB BLD-MCNC: 9.3 G/DL — LOW (ref 13–17)
HGB BLDA-MCNC: 9.5 G/DL — LOW (ref 13–17)
HGB BLDA-MCNC: 9.6 G/DL — LOW (ref 13–17)
LACTATE BLDA-SCNC: 0.6 MMOL/L — SIGNIFICANT CHANGE UP (ref 0.5–2)
LACTATE BLDA-SCNC: 0.9 MMOL/L — SIGNIFICANT CHANGE UP (ref 0.5–2)
MCHC RBC-ENTMCNC: 30.4 PG — SIGNIFICANT CHANGE UP (ref 27–34)
MCHC RBC-ENTMCNC: 31.2 % — LOW (ref 32–36)
MCV RBC AUTO: 97.4 FL — SIGNIFICANT CHANGE UP (ref 80–100)
NRBC # FLD: 0 — SIGNIFICANT CHANGE UP
PCO2 BLDA: 50 MMHG — HIGH (ref 35–48)
PCO2 BLDA: 68 MMHG — HIGH (ref 35–48)
PH BLDA: 7.21 PH — LOW (ref 7.35–7.45)
PH BLDA: 7.29 PH — LOW (ref 7.35–7.45)
PLATELET # BLD AUTO: 245 K/UL — SIGNIFICANT CHANGE UP (ref 150–400)
PMV BLD: 9.3 FL — SIGNIFICANT CHANGE UP (ref 7–13)
PO2 BLDA: 115 MMHG — HIGH (ref 83–108)
PO2 BLDA: 162 MMHG — HIGH (ref 83–108)
POTASSIUM BLDA-SCNC: 3.4 MMOL/L — SIGNIFICANT CHANGE UP (ref 3.4–4.5)
POTASSIUM BLDA-SCNC: 3.4 MMOL/L — SIGNIFICANT CHANGE UP (ref 3.4–4.5)
POTASSIUM SERPL-MCNC: 3.6 MMOL/L — SIGNIFICANT CHANGE UP (ref 3.5–5.3)
POTASSIUM SERPL-SCNC: 3.6 MMOL/L — SIGNIFICANT CHANGE UP (ref 3.5–5.3)
PROT SERPL-MCNC: 6.3 G/DL — SIGNIFICANT CHANGE UP (ref 6–8.3)
RBC # BLD: 3.06 M/UL — LOW (ref 4.2–5.8)
RBC # FLD: 20.1 % — HIGH (ref 10.3–14.5)
SAO2 % BLDA: 98.2 % — SIGNIFICANT CHANGE UP (ref 95–99)
SAO2 % BLDA: 99 % — SIGNIFICANT CHANGE UP (ref 95–99)
SODIUM BLDA-SCNC: 133 MMOL/L — LOW (ref 136–146)
SODIUM BLDA-SCNC: 135 MMOL/L — LOW (ref 136–146)
SODIUM SERPL-SCNC: 137 MMOL/L — SIGNIFICANT CHANGE UP (ref 135–145)
WBC # BLD: 9.05 K/UL — SIGNIFICANT CHANGE UP (ref 3.8–10.5)
WBC # FLD AUTO: 9.05 K/UL — SIGNIFICANT CHANGE UP (ref 3.8–10.5)

## 2018-10-20 PROCEDURE — 99233 SBSQ HOSP IP/OBS HIGH 50: CPT | Mod: GC

## 2018-10-20 PROCEDURE — 71045 X-RAY EXAM CHEST 1 VIEW: CPT | Mod: 26

## 2018-10-20 PROCEDURE — 99291 CRITICAL CARE FIRST HOUR: CPT

## 2018-10-20 RX ORDER — DEXTROSE 50 % IN WATER 50 %
25 SYRINGE (ML) INTRAVENOUS ONCE
Qty: 0 | Refills: 0 | Status: DISCONTINUED | OUTPATIENT
Start: 2018-10-20 | End: 2018-10-30

## 2018-10-20 RX ORDER — DEXTROSE 50 % IN WATER 50 %
12.5 SYRINGE (ML) INTRAVENOUS ONCE
Qty: 0 | Refills: 0 | Status: DISCONTINUED | OUTPATIENT
Start: 2018-10-20 | End: 2018-10-30

## 2018-10-20 RX ORDER — MIDODRINE HYDROCHLORIDE 2.5 MG/1
20 TABLET ORAL EVERY 8 HOURS
Qty: 0 | Refills: 0 | Status: DISCONTINUED | OUTPATIENT
Start: 2018-10-20 | End: 2018-10-22

## 2018-10-20 RX ORDER — INSULIN LISPRO 100/ML
VIAL (ML) SUBCUTANEOUS
Qty: 0 | Refills: 0 | Status: DISCONTINUED | OUTPATIENT
Start: 2018-10-20 | End: 2018-10-30

## 2018-10-20 RX ORDER — DEXTROSE 50 % IN WATER 50 %
25 SYRINGE (ML) INTRAVENOUS ONCE
Qty: 0 | Refills: 0 | Status: DISCONTINUED | OUTPATIENT
Start: 2018-10-20 | End: 2018-10-20

## 2018-10-20 RX ORDER — GLUCAGON INJECTION, SOLUTION 0.5 MG/.1ML
1 INJECTION, SOLUTION SUBCUTANEOUS ONCE
Qty: 0 | Refills: 0 | Status: DISCONTINUED | OUTPATIENT
Start: 2018-10-20 | End: 2018-10-30

## 2018-10-20 RX ORDER — IPRATROPIUM BROMIDE 0.2 MG/ML
500 SOLUTION, NON-ORAL INHALATION EVERY 6 HOURS
Qty: 0 | Refills: 0 | Status: DISCONTINUED | OUTPATIENT
Start: 2018-10-20 | End: 2018-10-30

## 2018-10-20 RX ORDER — HEPARIN SODIUM 5000 [USP'U]/ML
5000 INJECTION INTRAVENOUS; SUBCUTANEOUS EVERY 12 HOURS
Qty: 0 | Refills: 0 | Status: DISCONTINUED | OUTPATIENT
Start: 2018-10-20 | End: 2018-10-30

## 2018-10-20 RX ORDER — INSULIN LISPRO 100/ML
VIAL (ML) SUBCUTANEOUS
Qty: 0 | Refills: 0 | Status: DISCONTINUED | OUTPATIENT
Start: 2018-10-20 | End: 2018-10-20

## 2018-10-20 RX ORDER — SODIUM CHLORIDE 9 MG/ML
1000 INJECTION, SOLUTION INTRAVENOUS
Qty: 0 | Refills: 0 | Status: DISCONTINUED | OUTPATIENT
Start: 2018-10-20 | End: 2018-10-20

## 2018-10-20 RX ORDER — SODIUM CHLORIDE 9 MG/ML
1000 INJECTION, SOLUTION INTRAVENOUS
Qty: 0 | Refills: 0 | Status: DISCONTINUED | OUTPATIENT
Start: 2018-10-20 | End: 2018-10-30

## 2018-10-20 RX ORDER — GLUCAGON INJECTION, SOLUTION 0.5 MG/.1ML
1 INJECTION, SOLUTION SUBCUTANEOUS ONCE
Qty: 0 | Refills: 0 | Status: DISCONTINUED | OUTPATIENT
Start: 2018-10-20 | End: 2018-10-20

## 2018-10-20 RX ORDER — ASPIRIN/CALCIUM CARB/MAGNESIUM 324 MG
81 TABLET ORAL DAILY
Qty: 0 | Refills: 0 | Status: DISCONTINUED | OUTPATIENT
Start: 2018-10-20 | End: 2018-10-30

## 2018-10-20 RX ORDER — DEXTROSE 50 % IN WATER 50 %
15 SYRINGE (ML) INTRAVENOUS ONCE
Qty: 0 | Refills: 0 | Status: DISCONTINUED | OUTPATIENT
Start: 2018-10-20 | End: 2018-10-20

## 2018-10-20 RX ORDER — DEXTROSE 50 % IN WATER 50 %
12.5 SYRINGE (ML) INTRAVENOUS ONCE
Qty: 0 | Refills: 0 | Status: DISCONTINUED | OUTPATIENT
Start: 2018-10-20 | End: 2018-10-20

## 2018-10-20 RX ORDER — DEXTROSE 50 % IN WATER 50 %
15 SYRINGE (ML) INTRAVENOUS ONCE
Qty: 0 | Refills: 0 | Status: DISCONTINUED | OUTPATIENT
Start: 2018-10-20 | End: 2018-10-30

## 2018-10-20 RX ORDER — DOCUSATE SODIUM 100 MG
100 CAPSULE ORAL THREE TIMES A DAY
Qty: 0 | Refills: 0 | Status: DISCONTINUED | OUTPATIENT
Start: 2018-10-20 | End: 2018-10-30

## 2018-10-20 RX ADMIN — Medication 3 MILLILITER(S): at 03:59

## 2018-10-20 RX ADMIN — Medication 1000 MILLIGRAM(S): at 12:30

## 2018-10-20 RX ADMIN — MIDODRINE HYDROCHLORIDE 20 MILLIGRAM(S): 2.5 TABLET ORAL at 21:32

## 2018-10-20 RX ADMIN — Medication 3 MILLILITER(S): at 09:40

## 2018-10-20 RX ADMIN — HEPARIN SODIUM 5000 UNIT(S): 5000 INJECTION INTRAVENOUS; SUBCUTANEOUS at 17:16

## 2018-10-20 RX ADMIN — SODIUM CHLORIDE 4 MILLILITER(S): 9 INJECTION INTRAMUSCULAR; INTRAVENOUS; SUBCUTANEOUS at 03:59

## 2018-10-20 RX ADMIN — SODIUM CHLORIDE 4 MILLILITER(S): 9 INJECTION INTRAMUSCULAR; INTRAVENOUS; SUBCUTANEOUS at 21:15

## 2018-10-20 RX ADMIN — SODIUM CHLORIDE 4 MILLILITER(S): 9 INJECTION INTRAMUSCULAR; INTRAVENOUS; SUBCUTANEOUS at 09:52

## 2018-10-20 RX ADMIN — MIDODRINE HYDROCHLORIDE 20 MILLIGRAM(S): 2.5 TABLET ORAL at 13:32

## 2018-10-20 RX ADMIN — PIPERACILLIN AND TAZOBACTAM 200 GRAM(S): 4; .5 INJECTION, POWDER, LYOPHILIZED, FOR SOLUTION INTRAVENOUS at 10:40

## 2018-10-20 RX ADMIN — Medication 3 MILLILITER(S): at 21:08

## 2018-10-20 RX ADMIN — Medication 400 MILLIGRAM(S): at 12:00

## 2018-10-20 RX ADMIN — Medication 100 MILLIGRAM(S): at 21:32

## 2018-10-20 RX ADMIN — SODIUM CHLORIDE 4 MILLILITER(S): 9 INJECTION INTRAMUSCULAR; INTRAVENOUS; SUBCUTANEOUS at 16:30

## 2018-10-20 RX ADMIN — Medication 81 MILLIGRAM(S): at 11:43

## 2018-10-20 RX ADMIN — PIPERACILLIN AND TAZOBACTAM 200 GRAM(S): 4; .5 INJECTION, POWDER, LYOPHILIZED, FOR SOLUTION INTRAVENOUS at 21:32

## 2018-10-20 RX ADMIN — MIDODRINE HYDROCHLORIDE 10 MILLIGRAM(S): 2.5 TABLET ORAL at 06:19

## 2018-10-20 RX ADMIN — Medication 3 MILLILITER(S): at 16:22

## 2018-10-20 RX ADMIN — PANTOPRAZOLE SODIUM 40 MILLIGRAM(S): 20 TABLET, DELAYED RELEASE ORAL at 11:42

## 2018-10-20 NOTE — PROGRESS NOTE ADULT - SUBJECTIVE AND OBJECTIVE BOX
[facility] Division of Kidney Diseases & Hypertension  FOLLOW UP NOTE  [phone]--------------------------------------------------------------------------------    HPI: 59yo Mandarin speaking M w/ Hx of NICM w/ ICD, ESRD on HD TTS, former smoker, BPH who was admitted for scheduled right thoracotomy w/ decortication due to infected hemothorax and chest reconstruction with CT Surgery. Pt underwent procedure on 10/11/18, and afterwards developed shock, thought to be cardiogenic. Pt being monitored in the CTICU. Renal was called for management of pts ESRD. Pt was started on CVVHDF on 10/13/18. Non-tunneled catheter non-functional and removed on 10/15/18. Patient had session of HD yesterday with 2.4 L UF achieved.       PAST HISTORY  --------------------------------------------------------------------------------  No significant changes to PMH, PSH, FHx, SHx, unless otherwise noted    ALLERGIES & MEDICATIONS  --------------------------------------------------------------------------------  Allergies    No Known Allergies    Intolerances      Standing Inpatient Medications  ALBUTerol/ipratropium for Nebulization 3 milliLiter(s) Nebulizer every 6 hours  aspirin enteric coated 81 milliGRAM(s) Oral daily  dexmedetomidine Infusion 0.5 MICROgram(s)/kG/Hr IV Continuous <Continuous>  dextrose 5%. 1000 milliLiter(s) IV Continuous <Continuous>  dextrose 50% Injectable 12.5 Gram(s) IV Push once  dextrose 50% Injectable 25 Gram(s) IV Push once  dextrose 50% Injectable 25 Gram(s) IV Push once  insulin lispro (HumaLOG) corrective regimen sliding scale   SubCutaneous every 6 hours  midodrine 20 milliGRAM(s) Oral every 8 hours  pantoprazole  Injectable 40 milliGRAM(s) IV Push daily  phenylephrine    Infusion 1 MICROgram(s)/kG/Min IV Continuous <Continuous>  piperacillin/tazobactam IVPB. 3.375 Gram(s) IV Intermittent every 12 hours  sodium chloride 3%  Inhalation 4 milliLiter(s) Inhalation every 6 hours  vasopressin Infusion 0.05 Unit(s)/Min IV Continuous <Continuous>    PRN Inpatient Medications  acetaminophen  IVPB .. 1000 milliGRAM(s) IV Intermittent once PRN  bisacodyl Suppository 10 milliGRAM(s) Rectal daily PRN  dextrose 40% Gel 15 Gram(s) Oral once PRN  glucagon  Injectable 1 milliGRAM(s) IntraMuscular once PRN      REVIEW OF SYSTEMS  --------------------------------------------------------------------------------    Respiratory: No dyspnea,  CV: No chest pain  GI: No abdominal pain, nausea, vomiting  MSK: no edema  Neuro: No dizziness/lightheadedness    All other systems were reviewed and are negative, except as noted.    VITALS/PHYSICAL EXAM  --------------------------------------------------------------------------------  T(C): 36.7 (10-20-18 @ 08:00), Max: 36.7 (10-19-18 @ 12:00)  HR: 98 (10-20-18 @ 08:00) (70 - 132)  BP: 95/53 (10-19-18 @ 21:00) (95/53 - 95/53)  RR: 20 (10-20-18 @ 08:00) (14 - 23)  SpO2: 94% (10-20-18 @ 08:00) (90% - 100%)  Wt(kg): --        10-19-18 @ 07:01  -  10-20-18 @ 07:00  --------------------------------------------------------  IN: 1739.9 mL / OUT: 3075 mL / NET: -1335.1 mL      Physical Exam:    Gen: NAD  	Pulm: CTA B/L, + chest tube drains  	CV: RRR, S1S2; no rub  	Abd: +BS, soft  	LE: Warm, no edema  	Skin: Warm  	Vascular access:  LUE AVF site: +thrill +bruit +skin intact    LABS/STUDIES  --------------------------------------------------------------------------------              9.3    9.05  >-----------<  245      [10-20-18 @ 04:15]              29.8     137  |  96  |  20  ----------------------------<  106      [10-20-18 @ 04:15]  3.6   |  24  |  2.90        Ca     8.8     [10-20-18 @ 04:15]    TPro  6.3  /  Alb  2.8  /  TBili  0.9  /  DBili  x   /  AST  17  /  ALT  < 4  /  AlkPhos  181  [10-20-18 @ 04:15]          Creatinine Trend:  SCr 2.90 [10-20 @ 04:15]  SCr 2.72 [10-19 @ 05:20]  SCr 2.61 [10-18 @ 04:15]  SCr 3.31 [10-17 @ 04:20]  SCr 2.12 [10-16 @ 02:40]          HBsAb 16.3      [10-13-18 @ 15:45]  HCV 0.21, Nonreactive Hepatitis C AB  S/CO Ratio                        Interpretation  < 1.0                                     Non-Reactive  1.0 - 4.9                           Weakly-Reactive  > 5.0                                 Reactive  Non-Reactive: Aperson with a non-reactive HCV antibody  result is considered uninfected.  No further action is  needed unless recent infection is suspected.  In these  cases, consider repeat testing later to detect  seroconversion..  Weakly-Reactive: HCV antibody test is abnormal, HCV RNA  Qualitative test will follow.  Reactive: HCV antibody test is abnormal, HCV RNA  Qualitative test will follow.  Note: HCV antibody testing is performed on the [employer]   system.      [10-13-18 @ 15:45]

## 2018-10-20 NOTE — PROGRESS NOTE ADULT - ASSESSMENT
Pt is a 58 yr old Mandarin speaking male scheduled for Right Thoractomy Decortiation, Right Chest Wall reconstruction with Latissimjus Dorsi Flap Poss Skin Graft with VAC dressing with Dr Pickering 10/11/18. Pt has ICD but unable to identify make or model. Pt hx of ESRD with HD T/Th/Sat for 4 hrs and has stated he has stopped some of his meds but cannot say which ones. Pt seen and received MC and CC but has 2 different med lists and is unable to identify meds taking. Pt denies blood thinners. Pt has stopped ASA as of last week. Pt hx gathered from [employer] and notes from Dr Pickering - pt is poor historian.     Call through  to patient who went to local pharmacy for confirmation of meds and pt given preop instructions (01 Oct 2018 09:25)    Pt has multiple comorbidities including CHF, EF of 10%, renal failure on dialysis,  who has had chronic bilateral pleural effusions.  The patient previously underwent left VATS and PleurX drains  in 2015.  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  During this admission pt underwent OR with Plastic and Thoracic surgery,  for definitive repair that involved thoracotomy, decortication, excision of empyema cavity along with muscle flap reconstruction. Pt underwent surgery on 10/11 (Right thoracotomy, resection of portion of R 7th rib, evacuation of infected hemothorax, takedown of pleurocutaneous fistula - and noted to have foul smelling hematoma).  OR cultures are growing Enterococcus faecalis.    Pt started on empiric vanco/zosyn/flagyl since 10/11.  He has multiple right  chest tubes/ drains in place and remains on vent support. Pt is on ESRD.  He has had intermittent episodes of hypothermia, and low bp (85/44). ID consult called for further antibiotic management.     Problem/Plan - 1:    ·	Infected right hemothorax    - Cont broad spectrum abx for now - agree with zosyn.  Thus far, OR cultures growing sensitive E.faecalis (to amp/vanco).  Renally dose abx, can cont zosyn 3.375 gm IV q12 for HD.      - Can d/c further vanco dosing, no MRSA identified from any of the cultures.  BCX (-) x 2 from 10/15      - Cont wound vac, local wound care, and chest tube drain managment as per CT icu.  s/p removal of chest tube x 2    * No new ID recs at this time.    Will follow,    Anabel Chahal  [phone]

## 2018-10-20 NOTE — PROGRESS NOTE ADULT - SUBJECTIVE AND OBJECTIVE BOX
CLAUDIA HAN                     [id number]    HPI:  Pt is a 58 yr old Mandarin speaking male scheduled for Right Thoractomy Decortiation, Right Chest Wall reconstruction with Latissimjus Dorsi Flap Poss Skin Graft with VAC dressing with Dr Pickering 10/11/18. Pt has ICD but unable to identify make or model. Pt hx of ESRD with HD T/Th/Sat for 4 hrs and has stated he has stopped some of his meds but cannot say which ones. Pt seen and received MC and CC but has 2 different med lists and is unable to identify meds taking. Pt denies blood thinners. Pt has stopped ASA as of last week. Pt hx gathered from [employer] and notes from Dr Pickering - pt is poor historian.     Call through  to patient who went to local pharmacy for confirmation of meds and pt given preop instructions (01 Oct 2018 09:25)      Procedure: Right thoracotomy, resection of portion of R 7th rib, evacuation of infected hemothorax, takedown of pleurocutaneous fistula  / R chest wall reconstruction with tunneled latissimus dorsi flap, covered by serratus anterior flap               Issues:   Hypotension  Acute on chronic systolic CHF ( EF 10 %), ICD in place  SOB  Infected right hemothorax               Hyperglycemia              ESRD on HD    Agitation  Drips:  Vasopressin  Quan  Precedex      Home Medications:  aspirin 81 mg oral tablet: 1 tab(s) orally once a day last dose 10/3/2018 (11 Oct 2018 08:39)  Breo Ellipta 100 mcg-25 mcg/inh inhalation powder: 1 puff(s) inhaled once a day patient denies using it (11 Oct 2018 08:38)  Calcium 500: 1 tab(s) orally 2 times a day (11 Oct 2018 08:38)  carvedilol 6.25 mg oral tablet: 1 tab(s) orally once a day (11 Oct 2018 08:39)  docusate sodium 100 mg oral tablet: 1 tab(s) orally 2 times a day, As Needed (11 Oct 2018 08:39)  folic acid 1 mg oral tablet: 1 tab(s) orally once a day (11 Oct 2018 08:38)  Mag-Ox 400 oral tablet: 1 tab(s) orally once a day (11 Oct 2018 08:39)  midodrine 10 mg oral tablet: 1 tab(s) orally once a day (11 Oct 2018 08:39)  Multiple Vitamins oral tablet: 1 tab(s) orally once a day last dose 10/3/2018 (11 Oct 2018 08:38)  Namenda 10 mg oral tablet: 1 tab(s) orally 2 times a day (11 Oct 2018 08:38)  Nephplex Rx oral tablet: 1 tab(s) orally once a day (11 Oct 2018 08:39)  nortriptyline 50 mg oral capsule: 1 cap(s) orally once a day (11 Oct 2018 08:39)  Renvela 800 mg oral tablet: 2 tab(s) orally every 8 hours (11 Oct 2018 08:39)  simethicone 80 mg oral tablet: 1 tab(s) orally 3 times a day (after meals) (11 Oct 2018 08:39)  torsemide 20 mg oral tablet: 1 tab(s) orally once a day (11 Oct 2018 08:39)  vitamin A: 1 tab(s) orally once a day (11 Oct 2018 08:38)  Vitamin B Complex: 1 tab(s) orally once a day (11 Oct 2018 08:38)  Vitamin C 500 mg oral tablet: 1 tab(s) orally once a day (11 Oct 2018 08:38)          PAST MEDICAL & SURGICAL HISTORY:  Smoking hx  Cardiomyopathy  Wound: right chest  ICD (implantable cardioverter-defibrillator) in place  OA (osteoarthritis)  HLD (hyperlipidemia)  BPH (benign prostatic hyperplasia)  Pleural effusion  HTN (hypertension)  ESRD (end stage renal disease)  H/O chest wound: right  History of wound infection: right chest wall - revision 5/18 and again in 7/18  History of implantable cardioverter-defibrillator (ICD) placement: pt unsure when placed  H/O bilateral hip replacements: 2008, 2009            VITAL SIGNS:  Vital Signs Last 24 Hrs  T(C): 36.7 (20 Oct 2018 04:00), Max: 36.7 (19 Oct 2018 08:00)  T(F): 98.1 (20 Oct 2018 04:00), Max: 98.1 (20 Oct 2018 04:00)  HR: 90 (20 Oct 2018 06:15) (69 - 132)  BP: 95/53 (19 Oct 2018 21:00) (95/53 - 95/53)  BP(mean): 64 (19 Oct 2018 21:00) (64 - 64)  RR: 22 (20 Oct 2018 06:15) (14 - 23)  SpO2: 90% (20 Oct 2018 06:15) (90% - 100%)    I/Os:   I&O's Detail    18 Oct 2018 07:01  -  19 Oct 2018 07:00  --------------------------------------------------------  IN:    amiodarone Infusion: 334 mL    dexmedetomidine Infusion: 213.9 mL    IV PiggyBack: 200 mL    Other: 500 mL    phenylephrine   Infusion: 367.9 mL    sodium chloride 0.9%.: 630 mL    vasopressin Infusion: 80 mL  Total IN: 2325.8 mL    OUT:    Bulb: 125 mL    Bulb: 100 mL    Chest Tube: 125 mL    Other: 2900 mL  Total OUT: 3250 mL    Total NET: -924.2 mL      19 Oct 2018 07:01  -  20 Oct 2018 06:22  --------------------------------------------------------  IN:    dexmedetomidine Infusion: 124.8 mL    IV PiggyBack: 100 mL    Oral Fluid: 120 mL    Other: 400 mL    phenylephrine   Infusion: 228.3 mL    sodium chloride 0.9%.: 660 mL    vasopressin Infusion: 74.4 mL  Total IN: 1707.5 mL    OUT:    Bulb: 90 mL    Bulb: 50 mL    Chest Tube: 125 mL    Other: 2800 mL  Total OUT: 3065 mL    Total NET: -1357.5 mL          CAPILLARY BLOOD GLUCOSE      POCT Blood Glucose.: 106 mg/dL (20 Oct 2018 05:21)  POCT Blood Glucose.: 95 mg/dL (19 Oct 2018 23:18)  POCT Blood Glucose.: 94 mg/dL (19 Oct 2018 18:17)  POCT Blood Glucose.: 130 mg/dL (19 Oct 2018 11:37)      =======================MEDICATIONS===================  MEDICATIONS  (STANDING):  ALBUTerol/ipratropium for Nebulization 3 milliLiter(s) Nebulizer every 6 hours  aspirin Suppository 300 milliGRAM(s) Rectal daily  dexmedetomidine Infusion 0.5 MICROgram(s)/kG/Hr (7.375 mL/Hr) IV Continuous <Continuous>  dextrose 5%. 1000 milliLiter(s) (50 mL/Hr) IV Continuous <Continuous>  dextrose 50% Injectable 12.5 Gram(s) IV Push once  dextrose 50% Injectable 25 Gram(s) IV Push once  dextrose 50% Injectable 25 Gram(s) IV Push once  insulin lispro (HumaLOG) corrective regimen sliding scale   SubCutaneous every 6 hours  midodrine 10 milliGRAM(s) Oral every 8 hours  pantoprazole  Injectable 40 milliGRAM(s) IV Push daily  phenylephrine    Infusion 1 MICROgram(s)/kG/Min (11.063 mL/Hr) IV Continuous <Continuous>  piperacillin/tazobactam IVPB. 3.375 Gram(s) IV Intermittent every 12 hours  sodium chloride 0.9%. 1000 milliLiter(s) (30 mL/Hr) IV Continuous <Continuous>  sodium chloride 3%  Inhalation 4 milliLiter(s) Inhalation every 6 hours  vasopressin Infusion 0.05 Unit(s)/Min (3 mL/Hr) IV Continuous <Continuous>    MEDICATIONS  (PRN):  acetaminophen  IVPB .. 1000 milliGRAM(s) IV Intermittent once PRN Temp greater or equal to 38C (100.4F), Moderate Pain (4 - 6)  bisacodyl Suppository 10 milliGRAM(s) Rectal daily PRN Constipation  dextrose 40% Gel 15 Gram(s) Oral once PRN Blood Glucose LESS THAN 70 milliGRAM(s)/deciliter  glucagon  Injectable 1 milliGRAM(s) IntraMuscular once PRN Glucose LESS THAN 70 milligrams/deciliter      PHYSICAL EXAM============================  General:                         Awake, alert, On BiPAP  Neuro:                            GCS 15, NONFOCAL.   Respiratory:	Air entry fair and  bilateral conducted sounds                                           Effort even and unlabored.  CV:		Regular rate and rhythm. Normal S1/S2                                          Distal pulses present.  Abdomen:	                     Soft, non-distended. Bowel sounds present   Skin:		No rash.  Extremities:	Warm, no cyanosis or edema.  Palpable pulses    ============================LABS=========================                        9.3    9.05  )-----------( 245      ( 20 Oct 2018 04:15 )             29.8     10-20    137  |  96<L>  |  20  ----------------------------<  106<H>  3.6   |  24  |  2.90<H>    Ca    8.8      20 Oct 2018 04:15    TPro  6.3  /  Alb  2.8<L>  /  TBili  0.9  /  DBili  x   /  AST  17  /  ALT  < 4<L>  /  AlkPhos  181<H>  10-20    LIVER FUNCTIONS - ( 20 Oct 2018 04:15 )  Alb: 2.8 g/dL / Pro: 6.3 g/dL / ALK PHOS: 181 u/L / ALT: < 4 u/L / AST: 17 u/L / GGT: x             ABG - ( 20 Oct 2018 04:15 )  pH, Arterial: 7.21  pH, Blood: x     /  pCO2: 68    /  pO2: 162   / HCO3: 22    / Base Excess: -1.2  /  SaO2: 99.0        ============================IMAGING STUDIES=========================  < from: Xray Chest 1 View- PORTABLE-Urgent (10.19.18 @ 11:51) >  6:04 AM:  1 right-sided chest tube has been removed since the last exam without   complications. Loculated effusion on this side remains. Subcutaneous AICD   and right IJ line in place. No pneumothorax.    11:07 AM:  Since the last study, an additional right-sided chest tube has been   removed. One remains low in the hemithorax on this side. Persistent   loculated effusion on this side unchanged. Small left effusion is stable   and upper lung fields are clear. No complicating pneumothoraces.      COMPARISON:  October 18      IMPRESSION:  Follow-up studies post removal of 2 right-sided chest tubes.    A/P:    58yMale s/p   Right thoracotomy, resection of portion of R 7th rib, evacuation of infected hemothorax, takedown of pleurocutaneous fistula  / R chest wall reconstruction with tunneled latissimus dorsi flap, covered by serratus anterior flap. Remains hypotensive and on vent support                            Neuro:                                         Pain control with  Tylenol IV    Avoid narcotics because of CO2 retention                            Cardiovascular:                                          Continue hemodynamic monitoring.    Hypotension / Shock: Continue Quan /Vasopressin - Titrate to MAP>65    A-fib: Resolved, in NSR.     Acute on chronic systolic heart failure - Was on Dobutamine with C.I>3.5. Currently off inotropic support.  CVP is 10, s/p HD. Trend CVPs                            Respiratory:                                         Pt is on / off Bipap, still has acute CO2 retention, respiratory acidosis.   Monitor ABG's                                        Continue aggressive chest PT / ENCOURAGE INCENTIVE SPIROMETRY                                         Monitor chest tube output- D/Cd Anterior and Post  chest tubes                                         Chest tube #3 to suction                                                                Continue bronchodilators, pulmonary toilet     VAC d/cd                            GI                                          Continue GI prophylaxis with Protonix                                          Continue Zofran / Reglan for nausea - PRN	      Bedside swallow test                                                                 Renal:                                         Monitor I/Os and electrolytes                                         ESRD: HD as scheduled                                                 Hem/ Onc:                                         Anemia: Transfused 2 Uts PRBC during HD on 10/17. Hct is stable                                         Monitor chest tube output &  signs of bleeding.                                          Follow CBC in AM     Thrombocytopenia - Resolved                           Infectious disease:      Infected hematoma - growing E.Fecalis - Continue Zosyn                                          Monitor for fever / leukocytosis.                                          All surgical incision / chest tube  sites look clean                            Endocrine                                             Continue Accu-Checks with coverage    Pt is on SQ Heparin and Venodyne boots for DVT prophylaxis.     Pertinent clinical, laboratory, radiographic, hemodynamic, echocardiographic, respiratory data, microbiologic data and chart were reviewed and analyzed frequently throughout the course of the day and night  Patient seen, examined and plan discussed with CT Surgeon Dr. Ledesma / CTICU team during rounds.    Pt's status discussed with family at bedside, updated status. Aware of low threshold for intubation     I have spent  45  minutes of critical care time with this pt between 12am  and 9 am          Dave Su DO, FACEP

## 2018-10-20 NOTE — PROGRESS NOTE ADULT - PROBLEM SELECTOR PLAN 1
ESRD on HD for past six years through left upper extremity AVF. CVVHDF discontinued on 10/15/18. Patient had HD yesterday with total of 2.4 L UF achieved. Labs reviewed from today and are acceptable. Patient does not appear to be in fluid overload. No plan for HD today. Monitor BMP daily.

## 2018-10-20 NOTE — PROGRESS NOTE ADULT - PROBLEM SELECTOR PLAN 2
Hb is currently 9.3. Monitor hb closely. If Hb continues to worsen, then will start patient on MOOKIE therapy.

## 2018-10-20 NOTE — PROGRESS NOTE ADULT - SUBJECTIVE AND OBJECTIVE BOX
Infectious Diseases progress note:    Subjective:  No acute o/n events.  Afebrile. WBC is normal.  Pt awake, alert, mentating well.  Family member at bedside.    ROS:  CONSTITUTIONAL:  No fever, chills, rigors  CARDIOVASCULAR:  No chest pain or palpitations  RESPIRATORY:   No SOB, cough, dyspnea on exertion.  No wheezing  GASTROINTESTINAL:  No abd pain, N/V, diarrhea/constipation  EXTREMITIES:  No swelling or joint pain  GENITOURINARY:  No burning on urination, increased frequency or urgency.  No flank pain  NEUROLOGIC:  No HA, visual disturbances  SKIN: No rashes    Allergies    No Known Allergies    Intolerances        ANTIBIOTICS/RELEVANT:  antimicrobials  piperacillin/tazobactam IVPB. 3.375 Gram(s) IV Intermittent every 12 hours    immunologic:    OTHER:  ALBUTerol/ipratropium for Nebulization 3 milliLiter(s) Nebulizer every 6 hours  aspirin enteric coated 81 milliGRAM(s) Oral daily  bisacodyl Suppository 10 milliGRAM(s) Rectal daily PRN  dexmedetomidine Infusion 0.5 MICROgram(s)/kG/Hr IV Continuous <Continuous>  dextrose 40% Gel 15 Gram(s) Oral once PRN  dextrose 5%. 1000 milliLiter(s) IV Continuous <Continuous>  dextrose 50% Injectable 12.5 Gram(s) IV Push once  dextrose 50% Injectable 25 Gram(s) IV Push once  dextrose 50% Injectable 25 Gram(s) IV Push once  docusate sodium 100 milliGRAM(s) Oral three times a day  glucagon  Injectable 1 milliGRAM(s) IntraMuscular once PRN  heparin  Injectable 5000 Unit(s) SubCutaneous every 12 hours  insulin lispro (HumaLOG) corrective regimen sliding scale   SubCutaneous three times a day before meals  insulin lispro (HumaLOG) corrective regimen sliding scale   SubCutaneous Before meals and at bedtime  midodrine 20 milliGRAM(s) Oral every 8 hours  pantoprazole  Injectable 40 milliGRAM(s) IV Push daily  phenylephrine    Infusion 1 MICROgram(s)/kG/Min IV Continuous <Continuous>  sodium chloride 3%  Inhalation 4 milliLiter(s) Inhalation every 6 hours  vasopressin Infusion 0.05 Unit(s)/Min IV Continuous <Continuous>      Objective:  Vital Signs Last 24 Hrs  T(C): 36.9 (20 Oct 2018 12:00), Max: 36.9 (20 Oct 2018 12:00)  T(F): 98.5 (20 Oct 2018 12:00), Max: 98.5 (20 Oct 2018 12:00)  HR: 102 (20 Oct 2018 16:22) (86 - 132)  BP: 95/53 (19 Oct 2018 21:00) (95/53 - 95/53)  BP(mean): 64 (19 Oct 2018 21:00) (64 - 64)  RR: 24 (20 Oct 2018 16:00) (14 - 24)  SpO2: 100% (20 Oct 2018 16:22) (89% - 100%)    PHYSICAL EXAM:  Constitutional:NAD  Eyes:CHER, EOMI  Ear/Nose/Throat: no thrush, mucositis.  Moist mucous membranes	  Neck:no JVD, no lymphadenopathy, supple.  Rt IJ  TLC  Respiratory: CTA adore, chest tube x 1  Cardiovascular: S1S2 RRR, no murmurs  Gastrointestinal:soft, nontender,  nondistended (+) BS  Extremities:no e/e/c  Skin:  rt post chest wound, SANDY drains with serosanguinous fluid.         LABS:                        9.3    9.05  )-----------( 245      ( 20 Oct 2018 04:15 )             29.8     10-20    137  |  96<L>  |  20  ----------------------------<  106<H>  3.6   |  24  |  2.90<H>    Ca    8.8      20 Oct 2018 04:15    TPro  6.3  /  Alb  2.8<L>  /  TBili  0.9  /  DBili  x   /  AST  17  /  ALT  < 4<L>  /  AlkPhos  181<H>  10-20            Vancomycin Level, Random:  ug/mL (10-17 @ 13:50)  Vancomycin Level, Random:  ug/mL (10-15 @ 23:45)                  MICROBIOLOGY:    Culture - Blood (10.15.18 @ 18:59)    Culture - Blood:   NO ORGANISMS ISOLATED  NO ORGANISMS ISOLATED AT 96 HOURS    Specimen Source: BLOOD PERIPHERAL    Culture - Blood (10.15.18 @ 18:59)    Culture - Blood:   NO ORGANISMS ISOLATED  NO ORGANISMS ISOLATED AT 96 HOURS    Specimen Source: BLOOD PERIPHERAL    Culture - Acid Fast - Other w/Smear (10.11.18 @ 15:17)    Culture - Acid Fast - Other w/Smear:   ------------------ PRELIMINARY --------------------             NO ACID FAST BACILLI ISOLATED  AFTER ONE WEEK'S INCUBATION    Specimen Source: OTHER    Culture - Acid Fast Smear Concentrated (10.11.18 @ 15:17)    Culture - Acid Fast Smear Concentrated:   AFB SMEAR= NO ACID FAST BACILLI SEEN    Specimen Source: OTHER          RADIOLOGY & ADDITIONAL STUDIES:    < from: Xray Chest 1 View- PORTABLE-Urgent (10.19.18 @ 11:51) >  INTERPRETATION:     6:04 AM:  1 right-sided chest tube has been removed since the last exam without   complications. Loculated effusion on this side remains. Subcutaneous AICD   and right IJ line in place. No pneumothorax.    11:07 AM:  Since the last study, an additional right-sided chest tube has been   removed. One remains low in the hemithorax on this side. Persistent   loculated effusion on this side unchanged. Small left effusion is stable   and upper lung fields are clear. No complicating pneumothoraces.      COMPARISON:  October 18      IMPRESSION:  Follow-up studies post removal of 2 right-sided chest tubes.    < end of copied text >

## 2018-10-20 NOTE — PROGRESS NOTE ADULT - SUBJECTIVE AND OBJECTIVE BOX
CLAUDIA HAN  [id number]    Subjective:  Patient is a 58y old  Male who presents with a chief complaint of Infected hematoma (19 Oct 2018 15:26)   Patient was seen and examined at bedside. No acute events overnight. In good spirits.     Objective:  T(C): 36.7 (10-20-18 @ 04:00), Max: 36.7 (10-19-18 @ 12:00)  HR: 90 (10-20-18 @ 07:00) (70 - 132)  BP: 95/53 (10-19-18 @ 21:00) (95/53 - 95/53)  RR: 21 (10-20-18 @ 07:00) (14 - 23)  SpO2: 99% (10-20-18 @ 07:00) (90% - 100%)  Wt(kg): --   10-20    137  |  96<L>  |  20  ----------------------------<  106<H>  3.6   |  24  |  2.90<H>    Ca    8.8      20 Oct 2018 04:15    TPro  6.3  /  Alb  2.8<L>  /  TBili  0.9  /  DBili  x   /  AST  17  /  ALT  < 4<L>  /  AlkPhos  181<H>  10-20                        9.3    9.05  )-----------( 245      ( 20 Oct 2018 04:15 )             29.8       10-19 @ 07:01  -  10-20 @ 07:00  --------------------------------------------------------  IN: 1739.9 mL / OUT: 3075 mL / NET: -1335.1 mL      PHYSICAL EXAM:    General: NAD  Chest: Incisions intact with staples, JPx2 SS to self suction, no collections palpated            MEDICATIONS  (STANDING):  ALBUTerol/ipratropium for Nebulization 3 milliLiter(s) Nebulizer every 6 hours  aspirin Suppository 300 milliGRAM(s) Rectal daily  dexmedetomidine Infusion 0.5 MICROgram(s)/kG/Hr (7.375 mL/Hr) IV Continuous <Continuous>  dextrose 5%. 1000 milliLiter(s) (50 mL/Hr) IV Continuous <Continuous>  dextrose 50% Injectable 12.5 Gram(s) IV Push once  dextrose 50% Injectable 25 Gram(s) IV Push once  dextrose 50% Injectable 25 Gram(s) IV Push once  insulin lispro (HumaLOG) corrective regimen sliding scale   SubCutaneous every 6 hours  midodrine 10 milliGRAM(s) Oral every 8 hours  pantoprazole  Injectable 40 milliGRAM(s) IV Push daily  phenylephrine    Infusion 1 MICROgram(s)/kG/Min (11.063 mL/Hr) IV Continuous <Continuous>  piperacillin/tazobactam IVPB. 3.375 Gram(s) IV Intermittent every 12 hours  sodium chloride 0.9%. 1000 milliLiter(s) (30 mL/Hr) IV Continuous <Continuous>  sodium chloride 3%  Inhalation 4 milliLiter(s) Inhalation every 6 hours  vasopressin Infusion 0.05 Unit(s)/Min (3 mL/Hr) IV Continuous <Continuous>    MEDICATIONS  (PRN):  acetaminophen  IVPB .. 1000 milliGRAM(s) IV Intermittent once PRN Temp greater or equal to 38C (100.4F), Moderate Pain (4 - 6)  bisacodyl Suppository 10 milliGRAM(s) Rectal daily PRN Constipation  dextrose 40% Gel 15 Gram(s) Oral once PRN Blood Glucose LESS THAN 70 milliGRAM(s)/deciliter  glucagon  Injectable 1 milliGRAM(s) IntraMuscular once PRN Glucose LESS THAN 70 milligrams/deciliter

## 2018-10-21 LAB
BASE EXCESS BLDA CALC-SCNC: -0.7 MMOL/L — SIGNIFICANT CHANGE UP
BASE EXCESS BLDA CALC-SCNC: -1.8 MMOL/L — SIGNIFICANT CHANGE UP
BUN SERPL-MCNC: 31 MG/DL — HIGH (ref 7–23)
CA-I BLD-SCNC: 1.25 MMOL/L — HIGH (ref 1.03–1.23)
CA-I BLDA-SCNC: 1.19 MMOL/L — SIGNIFICANT CHANGE UP (ref 1.15–1.29)
CALCIUM SERPL-MCNC: 9.2 MG/DL — SIGNIFICANT CHANGE UP (ref 8.4–10.5)
CHLORIDE SERPL-SCNC: 98 MMOL/L — SIGNIFICANT CHANGE UP (ref 98–107)
CO2 SERPL-SCNC: 24 MMOL/L — SIGNIFICANT CHANGE UP (ref 22–31)
CREAT SERPL-MCNC: 4.58 MG/DL — HIGH (ref 0.5–1.3)
GLUCOSE BLDA-MCNC: 113 MG/DL — HIGH (ref 70–99)
GLUCOSE SERPL-MCNC: 108 MG/DL — HIGH (ref 70–99)
HCO3 BLDA-SCNC: 22 MMOL/L — SIGNIFICANT CHANGE UP (ref 22–26)
HCO3 BLDA-SCNC: 23 MMOL/L — SIGNIFICANT CHANGE UP (ref 22–26)
HCT VFR BLD CALC: 30.7 % — LOW (ref 39–50)
HCT VFR BLDA CALC: 31.3 % — LOW (ref 39–51)
HGB BLD-MCNC: 9.6 G/DL — LOW (ref 13–17)
HGB BLDA-MCNC: 10.1 G/DL — LOW (ref 13–17)
LACTATE BLDA-SCNC: 0.8 MMOL/L — SIGNIFICANT CHANGE UP (ref 0.5–2)
MAGNESIUM SERPL-MCNC: 2.7 MG/DL — HIGH (ref 1.6–2.6)
MCHC RBC-ENTMCNC: 30.1 PG — SIGNIFICANT CHANGE UP (ref 27–34)
MCHC RBC-ENTMCNC: 31.3 % — LOW (ref 32–36)
MCV RBC AUTO: 96.2 FL — SIGNIFICANT CHANGE UP (ref 80–100)
NRBC # FLD: 0 — SIGNIFICANT CHANGE UP
PCO2 BLDA: 51 MMHG — HIGH (ref 35–48)
PCO2 BLDA: 56 MMHG — HIGH (ref 35–48)
PH BLDA: 7.28 PH — LOW (ref 7.35–7.45)
PH BLDA: 7.29 PH — LOW (ref 7.35–7.45)
PHOSPHATE SERPL-MCNC: 6.2 MG/DL — HIGH (ref 2.5–4.5)
PLATELET # BLD AUTO: 305 K/UL — SIGNIFICANT CHANGE UP (ref 150–400)
PMV BLD: 8.9 FL — SIGNIFICANT CHANGE UP (ref 7–13)
PO2 BLDA: 126 MMHG — HIGH (ref 83–108)
PO2 BLDA: 134 MMHG — HIGH (ref 83–108)
POTASSIUM BLDA-SCNC: 3.4 MMOL/L — SIGNIFICANT CHANGE UP (ref 3.4–4.5)
POTASSIUM SERPL-MCNC: 3.5 MMOL/L — SIGNIFICANT CHANGE UP (ref 3.5–5.3)
POTASSIUM SERPL-SCNC: 3.5 MMOL/L — SIGNIFICANT CHANGE UP (ref 3.5–5.3)
RBC # BLD: 3.19 M/UL — LOW (ref 4.2–5.8)
RBC # FLD: 19.8 % — HIGH (ref 10.3–14.5)
SAO2 % BLDA: 98.6 % — SIGNIFICANT CHANGE UP (ref 95–99)
SAO2 % BLDA: 98.7 % — SIGNIFICANT CHANGE UP (ref 95–99)
SODIUM BLDA-SCNC: 134 MMOL/L — LOW (ref 136–146)
SODIUM SERPL-SCNC: 138 MMOL/L — SIGNIFICANT CHANGE UP (ref 135–145)
T4 FREE SERPL-MCNC: 0.88 NG/DL — LOW (ref 0.9–1.8)
TSH SERPL-MCNC: 4.79 UIU/ML — HIGH (ref 0.27–4.2)
WBC # BLD: 9.58 K/UL — SIGNIFICANT CHANGE UP (ref 3.8–10.5)
WBC # FLD AUTO: 9.58 K/UL — SIGNIFICANT CHANGE UP (ref 3.8–10.5)

## 2018-10-21 PROCEDURE — 99233 SBSQ HOSP IP/OBS HIGH 50: CPT | Mod: GC

## 2018-10-21 PROCEDURE — 99292 CRITICAL CARE ADDL 30 MIN: CPT

## 2018-10-21 PROCEDURE — 99291 CRITICAL CARE FIRST HOUR: CPT

## 2018-10-21 PROCEDURE — 71045 X-RAY EXAM CHEST 1 VIEW: CPT | Mod: 26

## 2018-10-21 RX ORDER — ACETAMINOPHEN 500 MG
1000 TABLET ORAL ONCE
Qty: 0 | Refills: 0 | Status: COMPLETED | OUTPATIENT
Start: 2018-10-21 | End: 2018-10-21

## 2018-10-21 RX ORDER — SODIUM CHLORIDE 9 MG/ML
250 INJECTION INTRAMUSCULAR; INTRAVENOUS; SUBCUTANEOUS ONCE
Qty: 0 | Refills: 0 | Status: COMPLETED | OUTPATIENT
Start: 2018-10-21 | End: 2018-10-21

## 2018-10-21 RX ORDER — ACETAMINOPHEN 500 MG
1000 TABLET ORAL ONCE
Qty: 0 | Refills: 0 | Status: COMPLETED | OUTPATIENT
Start: 2018-10-21 | End: 2018-10-22

## 2018-10-21 RX ORDER — MEMANTINE HYDROCHLORIDE 10 MG/1
10 TABLET ORAL
Qty: 0 | Refills: 0 | Status: DISCONTINUED | OUTPATIENT
Start: 2018-10-21 | End: 2018-10-30

## 2018-10-21 RX ORDER — MAGNESIUM SULFATE 500 MG/ML
1 VIAL (ML) INJECTION ONCE
Qty: 0 | Refills: 0 | Status: COMPLETED | OUTPATIENT
Start: 2018-10-21 | End: 2018-10-21

## 2018-10-21 RX ORDER — KETOROLAC TROMETHAMINE 30 MG/ML
15 SYRINGE (ML) INJECTION ONCE
Qty: 0 | Refills: 0 | Status: DISCONTINUED | OUTPATIENT
Start: 2018-10-21 | End: 2018-10-22

## 2018-10-21 RX ORDER — METOPROLOL TARTRATE 50 MG
2.5 TABLET ORAL ONCE
Qty: 0 | Refills: 0 | Status: COMPLETED | OUTPATIENT
Start: 2018-10-21 | End: 2018-10-21

## 2018-10-21 RX ORDER — NORTRIPTYLINE HYDROCHLORIDE 10 MG/1
25 CAPSULE ORAL AT BEDTIME
Qty: 0 | Refills: 0 | Status: DISCONTINUED | OUTPATIENT
Start: 2018-10-21 | End: 2018-10-30

## 2018-10-21 RX ADMIN — PIPERACILLIN AND TAZOBACTAM 200 GRAM(S): 4; .5 INJECTION, POWDER, LYOPHILIZED, FOR SOLUTION INTRAVENOUS at 10:00

## 2018-10-21 RX ADMIN — MIDODRINE HYDROCHLORIDE 20 MILLIGRAM(S): 2.5 TABLET ORAL at 13:09

## 2018-10-21 RX ADMIN — Medication 400 MILLIGRAM(S): at 12:00

## 2018-10-21 RX ADMIN — SODIUM CHLORIDE 4 MILLILITER(S): 9 INJECTION INTRAMUSCULAR; INTRAVENOUS; SUBCUTANEOUS at 04:15

## 2018-10-21 RX ADMIN — Medication 500 MICROGRAM(S): at 15:52

## 2018-10-21 RX ADMIN — Medication 500 MICROGRAM(S): at 10:06

## 2018-10-21 RX ADMIN — Medication 500 MICROGRAM(S): at 21:08

## 2018-10-21 RX ADMIN — Medication 100 MILLIGRAM(S): at 22:27

## 2018-10-21 RX ADMIN — Medication 1000 MILLIGRAM(S): at 12:30

## 2018-10-21 RX ADMIN — Medication 81 MILLIGRAM(S): at 13:08

## 2018-10-21 RX ADMIN — Medication 2.5 MILLIGRAM(S): at 23:30

## 2018-10-21 RX ADMIN — PIPERACILLIN AND TAZOBACTAM 200 GRAM(S): 4; .5 INJECTION, POWDER, LYOPHILIZED, FOR SOLUTION INTRAVENOUS at 22:27

## 2018-10-21 RX ADMIN — SODIUM CHLORIDE 750 MILLILITER(S): 9 INJECTION INTRAMUSCULAR; INTRAVENOUS; SUBCUTANEOUS at 23:15

## 2018-10-21 RX ADMIN — HEPARIN SODIUM 5000 UNIT(S): 5000 INJECTION INTRAVENOUS; SUBCUTANEOUS at 17:06

## 2018-10-21 RX ADMIN — PANTOPRAZOLE SODIUM 40 MILLIGRAM(S): 20 TABLET, DELAYED RELEASE ORAL at 13:08

## 2018-10-21 RX ADMIN — HEPARIN SODIUM 5000 UNIT(S): 5000 INJECTION INTRAVENOUS; SUBCUTANEOUS at 06:53

## 2018-10-21 RX ADMIN — MIDODRINE HYDROCHLORIDE 20 MILLIGRAM(S): 2.5 TABLET ORAL at 22:43

## 2018-10-21 RX ADMIN — MEMANTINE HYDROCHLORIDE 10 MILLIGRAM(S): 10 TABLET ORAL at 17:06

## 2018-10-21 RX ADMIN — Medication 500 MICROGRAM(S): at 04:08

## 2018-10-21 RX ADMIN — NORTRIPTYLINE HYDROCHLORIDE 25 MILLIGRAM(S): 10 CAPSULE ORAL at 22:27

## 2018-10-21 RX ADMIN — MIDODRINE HYDROCHLORIDE 20 MILLIGRAM(S): 2.5 TABLET ORAL at 06:53

## 2018-10-21 NOTE — PROGRESS NOTE ADULT - SUBJECTIVE AND OBJECTIVE BOX
CLAUDIA HAN          MRN-[id number]    HPI:  Pt is a 58 yr old Mandarin speaking male scheduled for Right Thoractomy Decortiation, Right Chest Wall reconstruction with Latissimjus Dorsi Flap Poss Skin Graft with VAC dressing with Dr Pickering 10/11/18. Pt has ICD but unable to identify make or model. Pt hx of ESRD with HD T/Th/Sat for 4 hrs and has stated he has stopped some of his meds but cannot say which ones. Pt seen and received [facility] and [facility] but has 2 different med lists and is unable to identify meds taking. Pt denies blood thinners. Pt has stopped ASA as of last week. Pt hx gathered from [employer] and notes from Dr Pickering - pt is poor historian.     Call through  to patient who went to local pharmacy for confirmation of meds and pt given preop instructions (01 Oct 2018 09:25)      Procedure:  POD # :     Issues:        Interval/Overnight Events/ ROS  Pt remained hemodynamically stable overnight, not on any pressors or inotropes. OOB to chair, breathing comfortably with minimal pain. Ambulated several times . Denies pain, no SOB, no palpitations, no nausea/ no vomiting, no dizziness  A-line and sharma d/kirsten         PAST MEDICAL & SURGICAL HISTORY:  Smoking hx  Cardiomyopathy  Wound: right chest  ICD (implantable cardioverter-defibrillator) in place  OA (osteoarthritis)  HLD (hyperlipidemia)  BPH (benign prostatic hyperplasia)  Pleural effusion  HTN (hypertension)  ESRD (end stage renal disease)  H/O chest wound: right  History of wound infection: right chest wall - revision 5/18 and again in 7/18  History of implantable cardioverter-defibrillator (ICD) placement: pt unsure when placed  H/O bilateral hip replacements: 2008, 2009    Allergies    No Known Allergies    Intolerances            ***VITAL SIGNS:  Vital Signs Last 24 Hrs  T(C): 36.9 (21 Oct 2018 12:00), Max: 36.9 (21 Oct 2018 00:00)  T(F): 98.5 (21 Oct 2018 12:00), Max: 98.5 (21 Oct 2018 12:00)  HR: 110 (21 Oct 2018 12:00) (92 - 127)  BP: 129/76 (21 Oct 2018 11:00) (102/58 - 129/76)  BP(mean): 86 (21 Oct 2018 11:00) (68 - 86)  RR: 18 (21 Oct 2018 12:00) (15 - 26)  SpO2: 100% (21 Oct 2018 12:00) (93% - 100%)    I/Os:   I&O's Detail    20 Oct 2018 07:01  -  21 Oct 2018 07:00  --------------------------------------------------------  IN:    dexmedetomidine Infusion: 17.1 mL    IV PiggyBack: 300 mL    Oral Fluid: 710 mL    sodium chloride 0.9%: 30 mL    vasopressin Infusion: 7.2 mL  Total IN: 1064.3 mL    OUT:    Bulb: 50 mL    Bulb: 80 mL    Chest Tube: 180 mL  Total OUT: 310 mL    Total NET: 754.3 mL      21 Oct 2018 07:01  -  21 Oct 2018 13:51  --------------------------------------------------------  IN:    IV PiggyBack: 200 mL  Total IN: 200 mL    OUT:    Chest Tube: 20 mL  Total OUT: 20 mL    Total NET: 180 mL          CAPILLARY BLOOD GLUCOSE      POCT Blood Glucose.: 107 mg/dL (21 Oct 2018 11:28)  POCT Blood Glucose.: 101 mg/dL (21 Oct 2018 07:39)  POCT Blood Glucose.: 107 mg/dL (20 Oct 2018 21:32)  POCT Blood Glucose.: 113 mg/dL (20 Oct 2018 16:17)      =======================  MEDICATIONS  ===================  MEDICATIONS  (STANDING):  aspirin enteric coated 81 milliGRAM(s) Oral daily  dexmedetomidine Infusion 0.5 MICROgram(s)/kG/Hr (7.375 mL/Hr) IV Continuous <Continuous>  dextrose 5%. 1000 milliLiter(s) (50 mL/Hr) IV Continuous <Continuous>  dextrose 50% Injectable 12.5 Gram(s) IV Push once  dextrose 50% Injectable 25 Gram(s) IV Push once  dextrose 50% Injectable 25 Gram(s) IV Push once  docusate sodium 100 milliGRAM(s) Oral three times a day  heparin  Injectable 5000 Unit(s) SubCutaneous every 12 hours  insulin lispro (HumaLOG) corrective regimen sliding scale   SubCutaneous Before meals and at bedtime  ipratropium    for Nebulization 500 MICROGram(s) Nebulizer every 6 hours  memantine 10 milliGRAM(s) Oral two times a day  midodrine 20 milliGRAM(s) Oral every 8 hours  nortriptyline 25 milliGRAM(s) Oral at bedtime  pantoprazole  Injectable 40 milliGRAM(s) IV Push daily  piperacillin/tazobactam IVPB. 3.375 Gram(s) IV Intermittent every 12 hours  vasopressin Infusion 0.05 Unit(s)/Min (3 mL/Hr) IV Continuous <Continuous>    MEDICATIONS  (PRN):  bisacodyl Suppository 10 milliGRAM(s) Rectal daily PRN Constipation  dextrose 40% Gel 15 Gram(s) Oral once PRN Blood Glucose LESS THAN 70 milliGRAM(s)/deciliter  glucagon  Injectable 1 milliGRAM(s) IntraMuscular once PRN Glucose LESS THAN 70 milligrams/deciliter      ======================VENTILATOR SETTINGS  ==============      =================== PATIENT CARE ACCESS DEVICES ==========  Peripheral IV  Central Venous Line	R	L	IJ	Fem	SC			Placed:   Arterial Line	R	L	PT	DP	Fem	Rad	Ax	Placed:   Midline:				  Urinary Catheter, Date Placed:   Necessity of urinary, arterial, and venous catheters discussed    ======================= PHYSICAL EXAM===================  General:                         Comfortable, Awake, alert, not in any distress  Neuro:                            Moving all extremities to commands. No focal deficits	  HEENT:                           CHER/ ETT/ NGT/ trach  Respiratory:	Lungs clear on auscultation bilaterally with good aeration.                                           No rales, rhonchi, no wheezing. Effort even and unlabored.  CV:		Regular rate and rhythm. Normal S1/S2. No murmurs  Abdomen:	                     Soft,  nontender, not-distended. Bowel sounds present / absent.   Skin:		No rash.  Extremities:	Warm, no cyanosis or edema.  Palpable pulses    ============================ LABS =======================                        9.6    9.58  )-----------( 305      ( 21 Oct 2018 04:15 )             30.7     10-21    138  |  98  |  31<H>  ----------------------------<  108<H>  3.5   |  24  |  4.58<H>    Ca    9.2      21 Oct 2018 04:15  Phos  6.2     10-21  Mg     2.7     10-21    TPro  6.3  /  Alb  2.8<L>  /  TBili  0.9  /  DBili  x   /  AST  17  /  ALT  < 4<L>  /  AlkPhos  181<H>  10-20    LIVER FUNCTIONS - ( 20 Oct 2018 04:15 )  Alb: 2.8 g/dL / Pro: 6.3 g/dL / ALK PHOS: 181 u/L / ALT: < 4 u/L / AST: 17 u/L / GGT: x             ABG - ( 21 Oct 2018 04:15 )  pH, Arterial: 7.28  pH, Blood: x     /  pCO2: 56    /  pO2: 134   / HCO3: 23    / Base Excess: -0.7  /  SaO2: 98.7                BLOOD PERIPHERAL  10-15-18 --  --  --      OTHER  10-11-18 --  --  --      OTHER  10-11-18 --  --  --      TISSUE  10-11-18 --  --  --      TISSUE  10-11-18 --  --  --      TISSUE  10-11-18 --  --  Enterococcus faecalis      PLEURAL FLUID  10-11-18 --  --    GPCCH^Gram Pos Cocci in Chains  QUANTITY OF BACTERIA SEEN: MODERATE (3+)  GPCPR^Gram Pos Cocci in Pairs  QUANTITY OF BACTERIA SEEN: MODERATE (3+)  WBC^White Blood Cells  QNTY CELLS IN GRAM STAIN: FEW (2+)          ===================== IMAGING STUDIES ===================  Radiology personally reviewed.    ====================ASSESSMENT AND PLAN ================      ====================== NEUROLOGY=======================  Pain control with PCA / PCEA / Tylenol IV / Toradol / Percocet  Pt is on Precedex for agitation  Pt is sedated with Propofol / Fentanyl    ==================== RESPIRATORY========================  Pt is on            L nasal canula / Face tent____% FiO2  Comfortable, no evidence of distress.  Using incentive spirometry & doing                ml  Monitor chest tube output  Chest tube to suction / water seal	    Mechanical Ventilation:    Mechanical ventilator status assessed & settings reviewed  Continue bronchodilators, pulmonary toilet  Head of bed elevation to 30-40 degrees    ====================CARDIOVASCULAR=====================  Continue hemodynamic monitoring/ telemetry  Not on any pressors  Continue cardiovascular / antihypertensive medications    ===================== RENAL ============================  Continue LR 30CC/hr      D/C IVF  Monitor I/Os, BUN/ Cr  and electrolytes  D/C Sharma      Keep Sharma for UO monitoring  BPH: Continue Flomax/ Finasteride      ==================== GASTROINTESTINAL===================  On regular diet, tolerating well  Continue GI prophylaxis with Pepcid / Protonix  Continue Zofran / Reglan for nausea - PRN	  NPO    =======================    ENDOCRIN  =====================  Glycemic monitoring  F/S with coverage  ===================HEMATOLOGIC/ONCOLOGIC =============  Monitor chest tube output. No signs of active bleeding.   Follow CBC, coags  in AM  DVT prophylaxis with SCD, sc Heparin    ========================INFECTIOUS DISEASE===============  No signs of infection. Monitor for fever / leukocytosis.  All surgical incision / chest tube  sites look clean  D/C Sharma      Pertinent clinical, laboratory, radiographic, hemodynamic, echocardiographic, respiratory data, microbiologic data and chart were reviewed and analyzed frequently throughout the course of the day and night. GI and DVT prophylaxis, glycemic control, head of bed elevation and skin care issues were addressed.  Patient seen, examined and plan discussed with CT Surgery / CTICU team during rounds.  Pt remains critically ill in imminent risk of  deterioration and requires very careful cardio- pulmonary monitoring and support.    I have spent               minutes of critical care time with this pt between            am/pm    and               am/ pm         minutes spent on total encounter; more than 50% of the visit was spent counseling and/or coordinating care by the attending physician.        KERLINE Faith MD CLAUDIA HAN          MRN-[id number]    HPI:  Pt is a 58 yr old Mandarin speaking male scheduled for Right Thoractomy Decortiation, Right Chest Wall reconstruction with Latissimus Dorsi Flap, Poss Skin Graft with VAC dressing with Dr Pickering 10/11/18. Pt has ICD but unable to identify make or model. Pt hx of ESRD with HD T/Th/Sat for 4 hrs and has stated he has stopped some of his meds but cannot say which ones. Pt seen and received MC and CC but has 2 different med lists and is unable to identify meds taking. Pt denies blood thinners. Pt has stopped ASA as of last week. Pt hx gathered from [employer] and notes from Dr Pickering - pt is poor historian.       Procedure: 10/11/18 Right thoracotomy, resection of portion of R 7th rib, evacuation of infected hemothorax, takedown of pleurocutaneous fistula  / R chest wall reconstruction with tunneled latissimus dorsi flap, covered by serratus anterior flap               Issues: Hypotension- on Midodrine  Acute on chronic systolic CHF ( EF 10 %), ICD in place  SOB  Infected right hemothorax - (+) Enterococcus faecalis              Hyperglycemia              ESRD on HD    Agitation  severe deconditioning    Drips:  Vasopressin on / off              Precedex  on / off      Interval/Overnight Events/ ROS  Pt remained hemodynamically labile overnight,on Midodrine . OOB to chair, breathing comfortably with minimal pain. Ambulated  inside of the room with support.  Appears very deconditioned . Denies  SOB, no palpitations, no nausea/ no vomiting, no dizziness  Next HD planned for tomorrow.       PAST MEDICAL & SURGICAL HISTORY:  Smoking hx  Cardiomyopathy  Wound: right chest  ICD (implantable cardioverter-defibrillator) in place  OA (osteoarthritis)  HLD (hyperlipidemia)  BPH (benign prostatic hyperplasia)  Pleural effusion  HTN (hypertension)  ESRD (end stage renal disease)  H/O chest wound: right  History of wound infection: right chest wall - revision 5/18 and again in 7/18  History of implantable cardioverter-defibrillator (ICD) placement: pt unsure when placed  H/O bilateral hip replacements: 2008, 2009      Home Medications:  aspirin 81 mg oral tablet: 1 tab(s) orally once a day last dose 10/3/2018 (11 Oct 2018 08:39)  Breo Ellipta 100 mcg-25 mcg/inh inhalation powder: 1 puff(s) inhaled once a day patient denies using it (11 Oct 2018 08:38)  Calcium 500: 1 tab(s) orally 2 times a day (11 Oct 2018 08:38)  carvedilol 6.25 mg oral tablet: 1 tab(s) orally once a day (11 Oct 2018 08:39)  docusate sodium 100 mg oral tablet: 1 tab(s) orally 2 times a day, As Needed (11 Oct 2018 08:39)  folic acid 1 mg oral tablet: 1 tab(s) orally once a day (11 Oct 2018 08:38)  Mag-Ox 400 oral tablet: 1 tab(s) orally once a day (11 Oct 2018 08:39)  midodrine 10 mg oral tablet: 1 tab(s) orally once a day (11 Oct 2018 08:39)  Multiple Vitamins oral tablet: 1 tab(s) orally once a day last dose 10/3/2018 (11 Oct 2018 08:38)  Namenda 10 mg oral tablet: 1 tab(s) orally 2 times a day (11 Oct 2018 08:38)  Nephplex Rx oral tablet: 1 tab(s) orally once a day (11 Oct 2018 08:39)  nortriptyline 50 mg oral capsule: 1 cap(s) orally once a day (11 Oct 2018 08:39)  Renvela 800 mg oral tablet: 2 tab(s) orally every 8 hours (11 Oct 2018 08:39)  simethicone 80 mg oral tablet: 1 tab(s) orally 3 times a day (after meals) (11 Oct 2018 08:39)  torsemide 20 mg oral tablet: 1 tab(s) orally once a day (11 Oct 2018 08:39)  vitamin A: 1 tab(s) orally once a day (11 Oct 2018 08:38)  Vitamin B Complex: 1 tab(s) orally once a day (11 Oct 2018 08:38)  Vitamin C 500 mg oral tablet: 1 tab(s) orally once a day (11 Oct 2018 08:38)    Allergies  No Known Allergies      ***VITAL SIGNS:  Vital Signs Last 24 Hrs  T(C): 36.9 (21 Oct 2018 12:00), Max: 36.9 (21 Oct 2018 00:00)  T(F): 98.5 (21 Oct 2018 12:00), Max: 98.5 (21 Oct 2018 12:00)  HR: 110 (21 Oct 2018 12:00) (92 - 127)  BP: 129/76 (21 Oct 2018 11:00) (102/58 - 129/76)  BP(mean): 86 (21 Oct 2018 11:00) (68 - 86)  RR: 18 (21 Oct 2018 12:00) (15 - 26)  SpO2: 100% (21 Oct 2018 12:00) (93% - 100%)     I&O's Detail    20 Oct 2018 07:01  -  21 Oct 2018 07:00  --------------------------------------------------------  IN:    dexmedetomidine Infusion: 17.1 mL    IV PiggyBack: 300 mL    Oral Fluid: 710 mL    sodium chloride 0.9%: 30 mL    vasopressin Infusion: 7.2 mL  Total IN: 1064.3 mL    OUT:    Bulb: 50 mL    Bulb: 80 mL    Chest Tube: 180 mL  Total OUT: 310 mL    Total NET: 754.3 mL      21 Oct 2018 07:01  -  21 Oct 2018 13:51  --------------------------------------------------------  IN:    IV PiggyBack: 200 mL  Total IN: 200 mL    OUT:    Chest Tube: 20 mL  Total OUT: 20 mL    Total NET: 180 mL      CAPILLARY BLOOD GLUCOSE  POCT Blood Glucose.: 107 mg/dL (21 Oct 2018 11:28)  POCT Blood Glucose.: 101 mg/dL (21 Oct 2018 07:39)  POCT Blood Glucose.: 107 mg/dL (20 Oct 2018 21:32)  POCT Blood Glucose.: 113 mg/dL (20 Oct 2018 16:17)    =======================  MEDICATIONS  ===================  MEDICATIONS  (STANDING):  aspirin enteric coated 81 milliGRAM(s) Oral daily  dexmedetomidine Infusion 0.5 MICROgram(s)/kG/Hr (7.375 mL/Hr) IV   Off  dextrose 5%. 1000 milliLiter(s) (50 mL/Hr) IV Continuous <Continuous>  dextrose 50% Injectable 12.5 Gram(s) IV Push once  dextrose 50% Injectable 25 Gram(s) IV Push once  dextrose 50% Injectable 25 Gram(s) IV Push once  docusate sodium 100 milliGRAM(s) Oral three times a day  heparin  Injectable 5000 Unit(s) SubCutaneous every 12 hours  insulin lispro (HumaLOG) corrective regimen sliding scale   SubCutaneous Before meals and at bedtime  ipratropium    for Nebulization 500 MICROGram(s) Nebulizer every 6 hours  memantine 10 milliGRAM(s) Oral two times a day  midodrine 20 milliGRAM(s) Oral every 8 hours  nortriptyline 25 milliGRAM(s) Oral at bedtime  pantoprazole  Injectable 40 milliGRAM(s) IV Push daily  piperacillin/tazobactam IVPB. 3.375 Gram(s) IV Intermittent every 12 hours  vasopressin Infusion 0.05 Unit(s)/Min (3 mL/Hr) IV Continuous <Continuous>    MEDICATIONS  (PRN):  bisacodyl Suppository 10 milliGRAM(s) Rectal daily PRN Constipation  dextrose 40% Gel 15 Gram(s) Oral once PRN Blood Glucose LESS THAN 70 milliGRAM(s)/deciliter  glucagon  Injectable 1 milliGRAM(s) IntraMuscular once PRN Glucose LESS THAN 70 milligrams/deciliter         =================== PATIENT CARE ACCESS DEVICES ==========  Peripheral IV (+)  Central Venous Line	R / IJ (+)   Arterial Line	R/ Rad	(+)   Left arm AV fistula for HD (+)    ======================= PHYSICAL EXAM===================  General:             Comfortable, Awake, alert, not in any distress  Neuro:               Moving all extremities to commands. No focal deficits	  HEENT:                           CHER  Respiratory:	Lungs sound coarse on auscultation bilaterally; decrease right  basal breath sounds                          No rales, rhonchi, no wheezing. Effort even and unlabored.                          Right chest wound and cube site - clean  CV:		Regular rate and rhythm. Normal S1/S2. No murmurs  Abdomen:	 Soft,  nontender, not-distended. Bowel sounds present    Skin:		No rash.  Extremities:	Warm, no cyanosis or edema.  Palpable pulses    ============================ LABS =======================                        9.6    9.58  )-----------( 305      ( 21 Oct 2018 04:15 )             30.7     10-21    138  |  98  |  31<H>  ----------------------------<  108<H>  3.5   |  24  |  4.58<H>    Ca    9.2      21 Oct 2018 04:15  Phos  6.2     10-21  Mg     2.7     10-21    TPro  6.3  /  Alb  2.8<L>  /  TBili  0.9  /  DBili  x   /  AST  17  /  ALT  < 4<L>  /  AlkPhos  181<H>  10-20    LIVER FUNCTIONS - ( 20 Oct 2018 04:15 )  Alb: 2.8 g/dL / Pro: 6.3 g/dL / ALK PHOS: 181 u/L / ALT: < 4 u/L / AST: 17 u/L / GGT: x             ABG - ( 21 Oct 2018 04:15 )  pH, Arterial: 7.28  pH, Blood: x     /  pCO2: 56    /  pO2: 134   / HCO3: 23    / Base Excess: -0.7  /  SaO2: 98.7      Thyroid Stimulating Hormone, Serum (10.21.18 @ 04:15)    Thyroid Stimulating Hormone, Serum: 4.79 uIU/mL    Free Thyroxine, Serum in AM (10.21.18 @ 04:15)    Free Thyroxine, Serum: 0.88 ng/dL        BLOOD PERIPHERAL  10-15-18 --  --  --    OTHER  10-11-18 --  --  --    OTHER  10-11-18 --  --  --    TISSUE  10-11-18 --  --  --    TISSUE  10-11-18 --  --  --    TISSUE  10-11-18 --  --  Enterococcus faecalis    PLEURAL FLUID  10-11-18 --  --    GPCCH^Gram Pos Cocci in Chains  QUANTITY OF BACTERIA SEEN: MODERATE (3+)  GPCPR^Gram Pos Cocci in Pairs  QUANTITY OF BACTERIA SEEN: MODERATE (3+)  WBC^White Blood Cells  QNTY CELLS IN GRAM STAIN: FEW (2+)      ===================== IMAGING STUDIES ===================  Radiology personally reviewed.  < from: Xray Chest 1 View- PORTABLE-Routine (10.21.18 @ 06:22) >  INTERPRETATION:  EXAMINATION: XR CHEST PORTABLE ROUTINE 1V  CLINICAL INDICATION: hemothorax.    FINDINGS:     Cardiac silhouette mildly enlarged. Left-sided subcutaneous   defibrillator. Right-sided internal jugular catheter with tip overlying   SVC. Right-sided chest tube. Additional right-sided subcutaneous drain   does not appear to overlie the lung fields. Small partially loculated   right pleural effusion. Trace left pleural effusion. No pneumothorax.    IMPRESSION:   Right-sided chest tube. Additional right-sided subcutaneous drain does   not appear to overlie the lung fields. Small partially loculated right   pleural effusion.     < end of copied text >    ====================ASSESSMENT AND PLAN ================  58yMale s/p   Right thoracotomy, resection of portion of R 7th rib, evacuation of infected hemothorax, takedown of pleurocutaneous fistula  / R chest wall reconstruction with tunneled latissimus dorsi flap, covered by serratus anterior flap. Remains hypotensive on Midodrine ( off Vasopressin)    Issues: Hypotension- on Midodrine  Acute on chronic systolic CHF ( EF 10 %), ICD in place  SOB  Infected right hemothorax - (+) Enterococcus faecalis              Hyperglycemia              ESRD on HD    Agitation ( mild dementia preop)              severe deconditioning              Mild resp acidosis  ====================== NEUROLOGY=======================  Pain control with Tylenol IV / Toradol   Will avoid narcotics  C/w memantine 10 milliGRAM(s) Oral two times a day and  nortriptyline 25 milliGRAM(s) Oral at bedtime    ==================== RESPIRATORY========================  Pt is on/ off BIPAP; currently on 2 L NC          Comfortable, no evidence of distress.  incentive spirometry  as possible  Monitor right chest tube output abd david x2 output  Chest tube to  water seal, blakes to bulb suction	  Continue bronchodilators, pulmonary toilet  Head of bed elevation to 30-40 degrees  F/up ABG this PM    ====================CARDIOVASCULAR=====================  Continue hemodynamic monitoring/ telemetry ( remains in NSR, off Amio)  Hypotension - on Midodrine. Quan/ Vaso off  Acute on chronic systolic heart failure - Off inotropic support,   CVP is 8  this morning - dialyze as scheduled tomorrow    ===================== RENAL ============================  Continue LR 30CC/hr      D/C IVF  Monitor I/Os, BUN/ Cr  and electrolytes  D/C Moore      Keep Moore for UO monitoring  BPH: Continue Flomax/ Finasteride      ==================== GASTROINTESTINAL===================  On regular diet, tolerating well  Continue GI prophylaxis with Pepcid / Protonix  Continue Zofran / Reglan for nausea - PRN	  NPO    =======================    ENDOCRIN  =====================  Glycemic monitoring  F/S with coverage  ===================HEMATOLOGIC/ONCOLOGIC =============  Monitor chest tube output. No signs of active bleeding.   Follow CBC, coags  in AM  DVT prophylaxis with SCD, sc Heparin    ========================INFECTIOUS DISEASE===============  No signs of infection. Monitor for fever / leukocytosis.  All surgical incision / chest tube  sites look clean  D/C Moore      Pertinent clinical, laboratory, radiographic, hemodynamic, echocardiographic, respiratory data, microbiologic data and chart were reviewed and analyzed frequently throughout the course of the day and night. GI and DVT prophylaxis, glycemic control, head of bed elevation and skin care issues were addressed.  Patient seen, examined and plan discussed with CT Surgery / CTICU team during rounds.  Pt remains critically ill in imminent risk of  deterioration and requires very careful cardio- pulmonary monitoring and support.    I have spent               minutes of critical care time with this pt between            am/pm    and               am/ pm         minutes spent on total encounter; more than 50% of the visit was spent counseling and/or coordinating care by the attending physician.        KERLINE Faith MD                                                                                                                  GI                                          Continue GI prophylaxis with Protonix                                          Continue Zofran / Reglan for nausea - PRN	      Tolerating dysphagia diet                                                                 Renal:                                         Monitor I/Os and electrolytes                                         ESRD: HD as scheduled                                                 Hem/ Onc:                                         Anemia: Transfused 2 Uts PRBC during HD on 10/17. Hct is stable                                         Monitor chest tube output &  signs of bleeding.                                          Follow CBC in AM     Thrombocytopenia - Resolved. HIT -ve                           Infectious disease:      Infected hematoma - growing E.Fecalis - Continue Zosyn                                          Monitor for fever / leukocytosis.                                          All surgical incision / chest tube  sites look clean                            Endocrine       T4/TSH most likely Euthyroid sick syndrome.                                           Continue Accu-Checks with coverage for now. HbA1C is 5.5, if blood sugars are ok, will stop accu-checks    Pt is on SQ Heparin and Venodyne boots for DVT prophylaxis.     Pertinent clinical, laboratory, radiographic, hemodynamic, echocardiographic, respiratory data, microbiologic data and chart were reviewed and analyzed frequently throughout the course of the day and night  Patient seen, examined and plan discussed with CT Surgeon Dr. Ledesma / CTICU team during rounds.    Pt's status discussed with family at bedside, updated status.     I have spent 45  minutes of critical care time with this pt between 12am  and 8am CLAUDIA HAN          MRN-[id number]    HPI:  Pt is a 58 yr old Mandarin speaking male scheduled for Right Thoractomy Decortiation, Right Chest Wall reconstruction with Latissimus Dorsi Flap, Poss Skin Graft with VAC dressing with Dr Pickering 10/11/18. Pt has ICD but unable to identify make or model. Pt hx of ESRD with HD T/Th/Sat for 4 hrs and has stated he has stopped some of his meds but cannot say which ones. Pt seen and received MC and CC but has 2 different med lists and is unable to identify meds taking. Pt denies blood thinners. Pt has stopped ASA as of last week. Pt hx gathered from [employer] and notes from Dr Pickering - pt is poor historian.       Procedure: 10/11/18 Right thoracotomy, resection of portion of R 7th rib, evacuation of infected hemothorax, takedown of pleurocutaneous fistula  / R chest wall reconstruction with tunneled latissimus dorsi flap, covered by serratus anterior flap               Issues: Hypotension- on Midodrine  Acute on chronic systolic CHF ( EF 10 %), ICD in place  SOB  Infected right hemothorax - (+) Enterococcus faecalis              Hyperglycemia              ESRD on HD    Agitation  severe deconditioning    Drips:  Vasopressin on / off              Precedex  on / off      Interval/Overnight Events/ ROS  Pt remained hemodynamically labile overnight,on Midodrine . OOB to chair, breathing comfortably with minimal pain. Ambulated  inside of the room with support.  Appears very deconditioned . Denies  SOB, no palpitations, no nausea/ no vomiting, no dizziness  Next HD planned for tomorrow.       PAST MEDICAL & SURGICAL HISTORY:  Smoking hx  Cardiomyopathy  Wound: right chest  ICD (implantable cardioverter-defibrillator) in place  OA (osteoarthritis)  HLD (hyperlipidemia)  BPH (benign prostatic hyperplasia)  Pleural effusion  HTN (hypertension)  ESRD (end stage renal disease)  H/O chest wound: right  History of wound infection: right chest wall - revision 5/18 and again in 7/18  History of implantable cardioverter-defibrillator (ICD) placement: pt unsure when placed  H/O bilateral hip replacements: 2008, 2009      Home Medications:  aspirin 81 mg oral tablet: 1 tab(s) orally once a day last dose 10/3/2018 (11 Oct 2018 08:39)  Breo Ellipta 100 mcg-25 mcg/inh inhalation powder: 1 puff(s) inhaled once a day patient denies using it (11 Oct 2018 08:38)  Calcium 500: 1 tab(s) orally 2 times a day (11 Oct 2018 08:38)  carvedilol 6.25 mg oral tablet: 1 tab(s) orally once a day (11 Oct 2018 08:39)  docusate sodium 100 mg oral tablet: 1 tab(s) orally 2 times a day, As Needed (11 Oct 2018 08:39)  folic acid 1 mg oral tablet: 1 tab(s) orally once a day (11 Oct 2018 08:38)  Mag-Ox 400 oral tablet: 1 tab(s) orally once a day (11 Oct 2018 08:39)  midodrine 10 mg oral tablet: 1 tab(s) orally once a day (11 Oct 2018 08:39)  Multiple Vitamins oral tablet: 1 tab(s) orally once a day last dose 10/3/2018 (11 Oct 2018 08:38)  Namenda 10 mg oral tablet: 1 tab(s) orally 2 times a day (11 Oct 2018 08:38)  Nephplex Rx oral tablet: 1 tab(s) orally once a day (11 Oct 2018 08:39)  nortriptyline 50 mg oral capsule: 1 cap(s) orally once a day (11 Oct 2018 08:39)  Renvela 800 mg oral tablet: 2 tab(s) orally every 8 hours (11 Oct 2018 08:39)  simethicone 80 mg oral tablet: 1 tab(s) orally 3 times a day (after meals) (11 Oct 2018 08:39)  torsemide 20 mg oral tablet: 1 tab(s) orally once a day (11 Oct 2018 08:39)  vitamin A: 1 tab(s) orally once a day (11 Oct 2018 08:38)  Vitamin B Complex: 1 tab(s) orally once a day (11 Oct 2018 08:38)  Vitamin C 500 mg oral tablet: 1 tab(s) orally once a day (11 Oct 2018 08:38)    Allergies  No Known Allergies      ***VITAL SIGNS:  Vital Signs Last 24 Hrs  T(C): 36.9 (21 Oct 2018 12:00), Max: 36.9 (21 Oct 2018 00:00)  T(F): 98.5 (21 Oct 2018 12:00), Max: 98.5 (21 Oct 2018 12:00)  HR: 110 (21 Oct 2018 12:00) (92 - 127)  BP: 129/76 (21 Oct 2018 11:00) (102/58 - 129/76)  BP(mean): 86 (21 Oct 2018 11:00) (68 - 86)  RR: 18 (21 Oct 2018 12:00) (15 - 26)  SpO2: 100% (21 Oct 2018 12:00) (93% - 100%)     I&O's Detail    20 Oct 2018 07:01  -  21 Oct 2018 07:00  --------------------------------------------------------  IN:    dexmedetomidine Infusion: 17.1 mL    IV PiggyBack: 300 mL    Oral Fluid: 710 mL    sodium chloride 0.9%: 30 mL    vasopressin Infusion: 7.2 mL  Total IN: 1064.3 mL    OUT:    Bulb: 50 mL    Bulb: 80 mL    Chest Tube: 180 mL  Total OUT: 310 mL    Total NET: 754.3 mL      21 Oct 2018 07:01  -  21 Oct 2018 13:51  --------------------------------------------------------  IN:    IV PiggyBack: 200 mL  Total IN: 200 mL    OUT:    Chest Tube: 20 mL  Total OUT: 20 mL    Total NET: 180 mL      CAPILLARY BLOOD GLUCOSE  POCT Blood Glucose.: 107 mg/dL (21 Oct 2018 11:28)  POCT Blood Glucose.: 101 mg/dL (21 Oct 2018 07:39)  POCT Blood Glucose.: 107 mg/dL (20 Oct 2018 21:32)  POCT Blood Glucose.: 113 mg/dL (20 Oct 2018 16:17)    =======================  MEDICATIONS  ===================  MEDICATIONS  (STANDING):  aspirin enteric coated 81 milliGRAM(s) Oral daily  dexmedetomidine Infusion 0.5 MICROgram(s)/kG/Hr (7.375 mL/Hr) IV   Off  dextrose 5%. 1000 milliLiter(s) (50 mL/Hr) IV Continuous <Continuous>  dextrose 50% Injectable 12.5 Gram(s) IV Push once  dextrose 50% Injectable 25 Gram(s) IV Push once  dextrose 50% Injectable 25 Gram(s) IV Push once  docusate sodium 100 milliGRAM(s) Oral three times a day  heparin  Injectable 5000 Unit(s) SubCutaneous every 12 hours  insulin lispro (HumaLOG) corrective regimen sliding scale   SubCutaneous Before meals and at bedtime  ipratropium    for Nebulization 500 MICROGram(s) Nebulizer every 6 hours  memantine 10 milliGRAM(s) Oral two times a day  midodrine 20 milliGRAM(s) Oral every 8 hours  nortriptyline 25 milliGRAM(s) Oral at bedtime  pantoprazole  Injectable 40 milliGRAM(s) IV Push daily  piperacillin/tazobactam IVPB. 3.375 Gram(s) IV Intermittent every 12 hours  vasopressin Infusion 0.05 Unit(s)/Min (3 mL/Hr) IV Continuous <Continuous>    MEDICATIONS  (PRN):  bisacodyl Suppository 10 milliGRAM(s) Rectal daily PRN Constipation  dextrose 40% Gel 15 Gram(s) Oral once PRN Blood Glucose LESS THAN 70 milliGRAM(s)/deciliter  glucagon  Injectable 1 milliGRAM(s) IntraMuscular once PRN Glucose LESS THAN 70 milligrams/deciliter         =================== PATIENT CARE ACCESS DEVICES ==========  Peripheral IV (+)  Central Venous Line	R / IJ (+)   Arterial Line	R/ Rad	(+)   Left arm AV fistula for HD (+)    ======================= PHYSICAL EXAM===================  General:             Comfortable, Awake, alert, not in any distress  Neuro:               Moving all extremities to commands. No focal deficits	  HEENT:                           CHER  Respiratory:	Lungs sound coarse on auscultation bilaterally; decrease right  basal breath sounds                          No rales, rhonchi, no wheezing. Effort even and unlabored.                          Right chest wound and cube site - clean  CV:		Regular rate and rhythm. Normal S1/S2. No murmurs  Abdomen:	 Soft,  nontender, not-distended. Bowel sounds present    Skin:		No rash.  Extremities:	Warm, no cyanosis or edema.  Palpable pulses    ============================ LABS =======================                        9.6    9.58  )-----------( 305      ( 21 Oct 2018 04:15 )             30.7     10-21    138  |  98  |  31<H>  ----------------------------<  108<H>  3.5   |  24  |  4.58<H>    Ca    9.2      21 Oct 2018 04:15  Phos  6.2     10-21  Mg     2.7     10-21    TPro  6.3  /  Alb  2.8<L>  /  TBili  0.9  /  DBili  x   /  AST  17  /  ALT  < 4<L>  /  AlkPhos  181<H>  10-20    LIVER FUNCTIONS - ( 20 Oct 2018 04:15 )  Alb: 2.8 g/dL / Pro: 6.3 g/dL / ALK PHOS: 181 u/L / ALT: < 4 u/L / AST: 17 u/L / GGT: x             ABG - ( 21 Oct 2018 04:15 )  pH, Arterial: 7.28  pH, Blood: x     /  pCO2: 56    /  pO2: 134   / HCO3: 23    / Base Excess: -0.7  /  SaO2: 98.7      Thyroid Stimulating Hormone, Serum (10.21.18 @ 04:15)    Thyroid Stimulating Hormone, Serum: 4.79 uIU/mL    Free Thyroxine, Serum in AM (10.21.18 @ 04:15)    Free Thyroxine, Serum: 0.88 ng/dL        BLOOD PERIPHERAL  10-15-18 --  --  --    OTHER  10-11-18 --  --  --    OTHER  10-11-18 --  --  --    TISSUE  10-11-18 --  --  --    TISSUE  10-11-18 --  --  --    TISSUE  10-11-18 --  --  Enterococcus faecalis    PLEURAL FLUID  10-11-18 --  --    GPCCH^Gram Pos Cocci in Chains  QUANTITY OF BACTERIA SEEN: MODERATE (3+)  GPCPR^Gram Pos Cocci in Pairs  QUANTITY OF BACTERIA SEEN: MODERATE (3+)  WBC^White Blood Cells  QNTY CELLS IN GRAM STAIN: FEW (2+)      ===================== IMAGING STUDIES ===================  Radiology personally reviewed.  < from: Xray Chest 1 View- PORTABLE-Routine (10.21.18 @ 06:22) >  INTERPRETATION:  EXAMINATION: XR CHEST PORTABLE ROUTINE 1V  CLINICAL INDICATION: hemothorax.    FINDINGS:     Cardiac silhouette mildly enlarged. Left-sided subcutaneous   defibrillator. Right-sided internal jugular catheter with tip overlying   SVC. Right-sided chest tube. Additional right-sided subcutaneous drain   does not appear to overlie the lung fields. Small partially loculated   right pleural effusion. Trace left pleural effusion. No pneumothorax.    IMPRESSION:   Right-sided chest tube. Additional right-sided subcutaneous drain does   not appear to overlie the lung fields. Small partially loculated right   pleural effusion.     < end of copied text >    ====================ASSESSMENT AND PLAN ================  58yMale s/p   Right thoracotomy, resection of portion of R 7th rib, evacuation of infected hemothorax, takedown of pleurocutaneous fistula  / R chest wall reconstruction with tunneled latissimus dorsi flap, covered by serratus anterior flap. Remains hypotensive on Midodrine ( off Vasopressin)    Issues: Hypotension- on Midodrine  Acute on chronic systolic CHF ( EF 10 %), ICD in place  SOB  Infected right hemothorax - (+) Enterococcus faecalis              Hyperglycemia              ESRD on HD    Agitation ( mild dementia preop)              severe deconditioning              Mild resp acidosis  ====================== NEUROLOGY=======================  Pain control with Tylenol IV / Toradol   Will avoid narcotics  C/w memantine 10 milliGRAM(s) Oral two times a day and  nortriptyline 25 milliGRAM(s) Oral at bedtime  PT evaluation( consult ) requested for deconditioning     ==================== RESPIRATORY========================  Pt is on/ off BIPAP; currently on 2 L NC          Comfortable, no evidence of distress.  incentive spirometry  as possible  Monitor right chest tube output abd david x2 output  Chest tube to  water seal, blakes to bulb suction	  Continue bronchodilators, pulmonary toilet  Head of bed elevation to 30-40 degrees  F/up ABG this PM    ====================CARDIOVASCULAR=====================  Continue hemodynamic monitoring/ telemetry ( remains in NSR, off Amio)  Hypotension - on Midodrine. Quan/ Vaso off  Acute on chronic systolic heart failure - Off inotropic support,   CVP is 8  this morning - dialyze as scheduled tomorrow    ===================== RENAL ============================  Monitor I/Os, BUN/ Cr  and electrolytes  Next HD - tomorrow                   ==================== GASTROINTESTINAL===================  On PO dysphagia  diet, tolerating well  Continue GI prophylaxis with Protonix   Zofran / Reglan for nausea - PRN	    =======================    ENDOCRIN  =====================  Glycemic monitoring  F/S with coverage  T4/TSH c/w  most likely Euthyroid sick syndrome.                                      ===================HEMATOLOGIC/ONCOLOGIC =============  Monitor chest tube and blakes output. No signs of active bleeding.   Follow CBC, coags  in AM  DVT prophylaxis with SCD, sc Heparin                                          ========================INFECTIOUS DISEASE===============   Monitor for fever / leukocytosis.  All surgical incision / chest tube  sites look clean   s/p Infected hemothorax - growing E. Faecalis  - Continue Zosyn                                Pertinent clinical, laboratory, radiographic, hemodynamic, echocardiographic, respiratory data, microbiologic data and chart were reviewed and analyzed frequently throughout the course of the day and night. GI and DVT prophylaxis, glycemic control, head of bed elevation and skin care issues were addressed.  Patient seen, examined and plan discussed with CT Surgery / CTICU team during rounds.  Pt remains critically ill in imminent risk of  deterioration and requires very careful cardio- pulmonary monitoring and support.    I have spent   40  minutes of critical care time with this pt between  8 am   and     11:55 pm         minutes spent on total encounter; more than 50% of the visit was spent counseling and/or coordinating care by the attending physician.    Pt's status discussed with family at bedside, updated status.     KERLINE Faith MD CLAUDIA HAN          MRN-[id number]    HPI:  Pt is a 58 yr old Mandarin speaking male scheduled for Right Thoractomy Decortiation, Right Chest Wall reconstruction with Latissimus Dorsi Flap, Poss Skin Graft with VAC dressing with Dr Pickering 10/11/18. Pt has ICD but unable to identify make or model. Pt hx of ESRD with HD T/Th/Sat for 4 hrs and has stated he has stopped some of his meds but cannot say which ones. Pt seen and received MC and CC but has 2 different med lists and is unable to identify meds taking. Pt denies blood thinners. Pt has stopped ASA as of last week. Pt hx gathered from [employer] and notes from Dr Pickering - pt is poor historian.       Procedure: 10/11/18 Right thoracotomy, resection of portion of R 7th rib, evacuation of infected hemothorax, takedown of pleurocutaneous fistula  / R chest wall reconstruction with tunneled latissimus dorsi flap, covered by serratus anterior flap               Issues: Hypotension- on Midodrine  Acute on chronic systolic CHF ( EF 10 %), ICD in place  SOB  Infected right hemothorax - (+) Enterococcus faecalis              Hyperglycemia              ESRD on HD    Agitation  severe deconditioning    Drips:  Vasopressin on / off              Precedex  on / off      Interval/Overnight Events/ ROS  Pt remained hemodynamically labile overnight,on Midodrine . OOB to chair, breathing comfortably with minimal pain. Ambulated  inside of the room with support.  Appears very deconditioned . Denies  SOB, no palpitations, no nausea/ no vomiting, no dizziness  Next HD planned for tomorrow.    Pt extubated on 10/17/18    PAST MEDICAL & SURGICAL HISTORY:  Smoking hx  Cardiomyopathy  Wound: right chest  ICD (implantable cardioverter-defibrillator) in place  OA (osteoarthritis)  HLD (hyperlipidemia)  BPH (benign prostatic hyperplasia)  Pleural effusion  HTN (hypertension)  ESRD (end stage renal disease)  H/O chest wound: right  History of wound infection: right chest wall - revision 5/18 and again in 7/18  History of implantable cardioverter-defibrillator (ICD) placement: pt unsure when placed  H/O bilateral hip replacements: 2008, 2009      Home Medications:  aspirin 81 mg oral tablet: 1 tab(s) orally once a day last dose 10/3/2018 (11 Oct 2018 08:39)  Breo Ellipta 100 mcg-25 mcg/inh inhalation powder: 1 puff(s) inhaled once a day patient denies using it (11 Oct 2018 08:38)  Calcium 500: 1 tab(s) orally 2 times a day (11 Oct 2018 08:38)  carvedilol 6.25 mg oral tablet: 1 tab(s) orally once a day (11 Oct 2018 08:39)  docusate sodium 100 mg oral tablet: 1 tab(s) orally 2 times a day, As Needed (11 Oct 2018 08:39)  folic acid 1 mg oral tablet: 1 tab(s) orally once a day (11 Oct 2018 08:38)  Mag-Ox 400 oral tablet: 1 tab(s) orally once a day (11 Oct 2018 08:39)  midodrine 10 mg oral tablet: 1 tab(s) orally once a day (11 Oct 2018 08:39)  Multiple Vitamins oral tablet: 1 tab(s) orally once a day last dose 10/3/2018 (11 Oct 2018 08:38)  Namenda 10 mg oral tablet: 1 tab(s) orally 2 times a day (11 Oct 2018 08:38)  Nephplex Rx oral tablet: 1 tab(s) orally once a day (11 Oct 2018 08:39)  nortriptyline 50 mg oral capsule: 1 cap(s) orally once a day (11 Oct 2018 08:39)  Renvela 800 mg oral tablet: 2 tab(s) orally every 8 hours (11 Oct 2018 08:39)  simethicone 80 mg oral tablet: 1 tab(s) orally 3 times a day (after meals) (11 Oct 2018 08:39)  torsemide 20 mg oral tablet: 1 tab(s) orally once a day (11 Oct 2018 08:39)  vitamin A: 1 tab(s) orally once a day (11 Oct 2018 08:38)  Vitamin B Complex: 1 tab(s) orally once a day (11 Oct 2018 08:38)  Vitamin C 500 mg oral tablet: 1 tab(s) orally once a day (11 Oct 2018 08:38)    Allergies  No Known Allergies      ***VITAL SIGNS:  Vital Signs Last 24 Hrs  T(C): 36.9 (21 Oct 2018 12:00), Max: 36.9 (21 Oct 2018 00:00)  T(F): 98.5 (21 Oct 2018 12:00), Max: 98.5 (21 Oct 2018 12:00)  HR: 110 (21 Oct 2018 12:00) (92 - 127)  BP: 129/76 (21 Oct 2018 11:00) (102/58 - 129/76)  BP(mean): 86 (21 Oct 2018 11:00) (68 - 86)  RR: 18 (21 Oct 2018 12:00) (15 - 26)  SpO2: 100% (21 Oct 2018 12:00) (93% - 100%)     I&O's Detail    20 Oct 2018 07:01  -  21 Oct 2018 07:00  --------------------------------------------------------  IN:    dexmedetomidine Infusion: 17.1 mL    IV PiggyBack: 300 mL    Oral Fluid: 710 mL    sodium chloride 0.9%: 30 mL    vasopressin Infusion: 7.2 mL  Total IN: 1064.3 mL    OUT:    Bulb: 50 mL    Bulb: 80 mL    Chest Tube: 180 mL  Total OUT: 310 mL    Total NET: 754.3 mL      21 Oct 2018 07:01  -  21 Oct 2018 13:51  --------------------------------------------------------  IN:    IV PiggyBack: 200 mL  Total IN: 200 mL    OUT:    Chest Tube: 20 mL  Total OUT: 20 mL    Total NET: 180 mL      CAPILLARY BLOOD GLUCOSE  POCT Blood Glucose.: 107 mg/dL (21 Oct 2018 11:28)  POCT Blood Glucose.: 101 mg/dL (21 Oct 2018 07:39)  POCT Blood Glucose.: 107 mg/dL (20 Oct 2018 21:32)  POCT Blood Glucose.: 113 mg/dL (20 Oct 2018 16:17)    =======================  MEDICATIONS  ===================  MEDICATIONS  (STANDING):  aspirin enteric coated 81 milliGRAM(s) Oral daily  dexmedetomidine Infusion 0.5 MICROgram(s)/kG/Hr (7.375 mL/Hr) IV   Off  dextrose 5%. 1000 milliLiter(s) (50 mL/Hr) IV Continuous <Continuous>  dextrose 50% Injectable 12.5 Gram(s) IV Push once  dextrose 50% Injectable 25 Gram(s) IV Push once  dextrose 50% Injectable 25 Gram(s) IV Push once  docusate sodium 100 milliGRAM(s) Oral three times a day  heparin  Injectable 5000 Unit(s) SubCutaneous every 12 hours  insulin lispro (HumaLOG) corrective regimen sliding scale   SubCutaneous Before meals and at bedtime  ipratropium    for Nebulization 500 MICROGram(s) Nebulizer every 6 hours  memantine 10 milliGRAM(s) Oral two times a day  midodrine 20 milliGRAM(s) Oral every 8 hours  nortriptyline 25 milliGRAM(s) Oral at bedtime  pantoprazole  Injectable 40 milliGRAM(s) IV Push daily  piperacillin/tazobactam IVPB. 3.375 Gram(s) IV Intermittent every 12 hours  vasopressin Infusion 0.05 Unit(s)/Min (3 mL/Hr) IV Continuous <Continuous>    MEDICATIONS  (PRN):  bisacodyl Suppository 10 milliGRAM(s) Rectal daily PRN Constipation  dextrose 40% Gel 15 Gram(s) Oral once PRN Blood Glucose LESS THAN 70 milliGRAM(s)/deciliter  glucagon  Injectable 1 milliGRAM(s) IntraMuscular once PRN Glucose LESS THAN 70 milligrams/deciliter         =================== PATIENT CARE ACCESS DEVICES ==========  Peripheral IV (+)  Central Venous Line	R / IJ (+)   Arterial Line	R/ Rad	(+)   Left arm AV fistula for HD (+)    ======================= PHYSICAL EXAM===================  General:             Comfortable, Awake, alert, not in any distress  Neuro:               Moving all extremities to commands. No focal deficits	  HEENT:                           CHER  Respiratory:	Lungs sound coarse on auscultation bilaterally; decrease right  basal breath sounds                          No rales, rhonchi, no wheezing. Effort even and unlabored.                          Right chest wound and cube site - clean  CV:		Regular rate and rhythm. Normal S1/S2. No murmurs  Abdomen:	 Soft,  nontender, not-distended. Bowel sounds present    Skin:		No rash.  Extremities:	Warm, no cyanosis or edema.  Palpable pulses    ============================ LABS =======================                        9.6    9.58  )-----------( 305      ( 21 Oct 2018 04:15 )             30.7     10-21    138  |  98  |  31<H>  ----------------------------<  108<H>  3.5   |  24  |  4.58<H>    Ca    9.2      21 Oct 2018 04:15  Phos  6.2     10-21  Mg     2.7     10-21    TPro  6.3  /  Alb  2.8<L>  /  TBili  0.9  /  DBili  x   /  AST  17  /  ALT  < 4<L>  /  AlkPhos  181<H>  10-20    LIVER FUNCTIONS - ( 20 Oct 2018 04:15 )  Alb: 2.8 g/dL / Pro: 6.3 g/dL / ALK PHOS: 181 u/L / ALT: < 4 u/L / AST: 17 u/L / GGT: x             ABG - ( 21 Oct 2018 04:15 )  pH, Arterial: 7.28  pH, Blood: x     /  pCO2: 56    /  pO2: 134   / HCO3: 23    / Base Excess: -0.7  /  SaO2: 98.7      Thyroid Stimulating Hormone, Serum (10.21.18 @ 04:15)    Thyroid Stimulating Hormone, Serum: 4.79 uIU/mL    Free Thyroxine, Serum in AM (10.21.18 @ 04:15)    Free Thyroxine, Serum: 0.88 ng/dL        BLOOD PERIPHERAL  10-15-18 --  --  --    OTHER  10-11-18 --  --  --    OTHER  10-11-18 --  --  --    TISSUE  10-11-18 --  --  --    TISSUE  10-11-18 --  --  --    TISSUE  10-11-18 --  --  Enterococcus faecalis    PLEURAL FLUID  10-11-18 --  --    GPCCH^Gram Pos Cocci in Chains  QUANTITY OF BACTERIA SEEN: MODERATE (3+)  GPCPR^Gram Pos Cocci in Pairs  QUANTITY OF BACTERIA SEEN: MODERATE (3+)  WBC^White Blood Cells  QNTY CELLS IN GRAM STAIN: FEW (2+)      ===================== IMAGING STUDIES ===================  Radiology personally reviewed.  < from: Xray Chest 1 View- PORTABLE-Routine (10.21.18 @ 06:22) >  INTERPRETATION:  EXAMINATION: XR CHEST PORTABLE ROUTINE 1V  CLINICAL INDICATION: hemothorax.    FINDINGS:     Cardiac silhouette mildly enlarged. Left-sided subcutaneous   defibrillator. Right-sided internal jugular catheter with tip overlying   SVC. Right-sided chest tube. Additional right-sided subcutaneous drain   does not appear to overlie the lung fields. Small partially loculated   right pleural effusion. Trace left pleural effusion. No pneumothorax.    IMPRESSION:   Right-sided chest tube. Additional right-sided subcutaneous drain does   not appear to overlie the lung fields. Small partially loculated right   pleural effusion.     < end of copied text >    ====================ASSESSMENT AND PLAN ================  58yMale s/p   Right thoracotomy, resection of portion of R 7th rib, evacuation of infected hemothorax, takedown of pleurocutaneous fistula  / R chest wall reconstruction with tunneled latissimus dorsi flap, covered by serratus anterior flap. Remains hypotensive on Midodrine ( off Vasopressin)    Issues: Hypotension- on Midodrine  Acute on chronic systolic CHF ( EF 10 %), ICD in place  SOB  Infected right hemothorax - (+) Enterococcus faecalis              Hyperglycemia              ESRD on HD    Agitation ( mild dementia preop)              severe deconditioning              Mild resp acidosis  ====================== NEUROLOGY=======================  Pain control with Tylenol IV / Toradol   Will avoid narcotics  C/w memantine 10 milliGRAM(s) Oral two times a day and  nortriptyline 25 milliGRAM(s) Oral at bedtime  PT evaluation( consult ) requested for deconditioning     ==================== RESPIRATORY========================  Pt is on/ off BIPAP; currently on 2 L NC          Comfortable, no evidence of distress.  incentive spirometry  as possible  Monitor right chest tube output abd david x2 output  Chest tube to  water seal, blakes to bulb suction	  Continue bronchodilators, pulmonary toilet  Head of bed elevation to 30-40 degrees  F/up ABG this PM    ====================CARDIOVASCULAR=====================  Continue hemodynamic monitoring/ telemetry ( remains in NSR, off Amio)  Hypotension - on Midodrine. Quan/ Vaso off  Acute on chronic systolic heart failure - Off inotropic support,   CVP is 8  this morning - dialyze as scheduled tomorrow    ===================== RENAL ============================  Monitor I/Os, BUN/ Cr  and electrolytes  Next HD - tomorrow                   ==================== GASTROINTESTINAL===================  On PO dysphagia  diet, tolerating well  Continue GI prophylaxis with Protonix   Zofran / Reglan for nausea - PRN	    =======================    ENDOCRIN  =====================  Glycemic monitoring  F/S with coverage  T4/TSH c/w  most likely Euthyroid sick syndrome.                                      ===================HEMATOLOGIC/ONCOLOGIC =============  Monitor chest tube and blakes output. No signs of active bleeding.   Follow CBC, coags  in AM  DVT prophylaxis with SCD, sc Heparin                                          ========================INFECTIOUS DISEASE===============   Monitor for fever / leukocytosis.  All surgical incision / chest tube  sites look clean   s/p Infected hemothorax - growing E. Faecalis  - Continue Zosyn                                Pertinent clinical, laboratory, radiographic, hemodynamic, echocardiographic, respiratory data, microbiologic data and chart were reviewed and analyzed frequently throughout the course of the day and night. GI and DVT prophylaxis, glycemic control, head of bed elevation and skin care issues were addressed.  Patient seen, examined and plan discussed with CT Surgery / CTICU team during rounds.  Pt remains critically ill in imminent risk of  deterioration and requires very careful cardio- pulmonary monitoring and support.    I have spent   40  minutes of critical care time with this pt between  8 am   and     11:55 pm         minutes spent on total encounter; more than 50% of the visit was spent counseling and/or coordinating care by the attending physician.    Pt's status discussed with family at bedside, updated status.     KERLINE Faith MD CLAUDIA HAN          MRN-[id number]    HPI:  Pt is a 58 yr old Mandarin speaking male scheduled for Right Thoractomy Decortiation, Right Chest Wall reconstruction with Latissimus Dorsi Flap, Poss Skin Graft with VAC dressing with Dr Pickering 10/11/18. Pt has ICD but unable to identify make or model. Pt hx of ESRD with HD T/Th/Sat for 4 hrs and has stated he has stopped some of his meds but cannot say which ones. Pt seen and received MC and CC but has 2 different med lists and is unable to identify meds taking. Pt denies blood thinners. Pt has stopped ASA as of last week. Pt hx gathered from [employer] and notes from Dr Pickering - pt is poor historian.      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/2018         Procedure: 10/11/18 Right thoracotomy, resection of portion of R 7th rib, evacuation of infected hemothorax, takedown of pleurocutaneous fistula  / R chest wall reconstruction with tunneled latissimus dorsi flap, covered by serratus anterior flap             Issues: Hypotension- on Midodrine  Acute on chronic systolic CHF ( EF 10 %), ICD in place  SOB  Infected right hemothorax - (+) Enterococcus faecalis              Hyperglycemia              ESRD on HD    Agitation  severe deconditioning      Drips:  Vasopressin on / off              Precedex  on / off    Interval/Overnight Events/ ROS  Pt hypotensive through the surgery - on multiple pressors / inotropes ( Levo, Epi, Dobutamine). Required 4 units of PRBC in addition to colloids and crystalloids for  symptomatic  posthemorrhagic anemia during the surgery. Pt arrived in ICU orally intubated, sedated, on Levo, Dobut, Epi.    Overnight in ICU still on multiple pressors/ inotropes./ Vent support. Mixed resp/ metab acidosis minimally  improved Lactic acidemia- unchanged @ 2-3.. Pt will need HD today ( ESRD on HD - anuria)  On POD 1 weaned off Epi, Quan. Remains on Dobutamine, Levophed, Vasopressin; on POD 2 started  CVVHD. Oxygenation and acid base status  slightly   improved. Troponin is down to 88. Cardiology / CHF team on board to help in managing decompensated CHF/ cardiogenic shock. OR culture grew Enterococcus faecalis sensitive to Ampi/ Vanco  POD 3,4 decreasing doses of  Levophed, still  on Vaso/ Dobutamine. Trial of CPAP in Am aborted due to tachypnea and pt's distress.  Will attempt HD today in view of decreased requirement for pressors.  Pt tolerated full HD last evening ( 10/15/18), remains on Levo, Dobutamine, Vaso, vent support.  Pt remained hemodynamically labile overnight- POD #10 ,on Midodrine . OOB to chair, breathing comfortably with minimal pain. Ambulated  inside of the room with support.  Appears very deconditioned . Denies  SOB, no palpitations, no nausea/ no vomiting, no dizziness  Next HD planned for tomorrow( 10/22/18)    Pt extubated on 10/17/18    PAST MEDICAL & SURGICAL HISTORY:  Smoking hx  Cardiomyopathy  Wound: right chest  ICD (implantable cardioverter-defibrillator) in place  OA (osteoarthritis)  HLD (hyperlipidemia)  BPH (benign prostatic hyperplasia)  Pleural effusion  HTN (hypertension)  ESRD (end stage renal disease)  H/O chest wound: right  History of wound infection: right chest wall - revision 5/18 and again in 7/18  History of implantable cardioverter-defibrillator (ICD) placement: pt unsure when placed  H/O bilateral hip replacements: 2008, 2009    Home Medications:  aspirin 81 mg oral tablet: 1 tab(s) orally once a day last dose 10/3/2018 (11 Oct 2018 08:39)  Breo Ellipta 100 mcg-25 mcg/inh inhalation powder: 1 puff(s) inhaled once a day patient denies using it (11 Oct 2018 08:38)  Calcium 500: 1 tab(s) orally 2 times a day (11 Oct 2018 08:38)  carvedilol 6.25 mg oral tablet: 1 tab(s) orally once a day (11 Oct 2018 08:39)  docusate sodium 100 mg oral tablet: 1 tab(s) orally 2 times a day, As Needed (11 Oct 2018 08:39)  folic acid 1 mg oral tablet: 1 tab(s) orally once a day (11 Oct 2018 08:38)  Mag-Ox 400 oral tablet: 1 tab(s) orally once a day (11 Oct 2018 08:39)  midodrine 10 mg oral tablet: 1 tab(s) orally once a day (11 Oct 2018 08:39)  Multiple Vitamins oral tablet: 1 tab(s) orally once a day last dose 10/3/2018 (11 Oct 2018 08:38)  Namenda 10 mg oral tablet: 1 tab(s) orally 2 times a day (11 Oct 2018 08:38)  Nephplex Rx oral tablet: 1 tab(s) orally once a day (11 Oct 2018 08:39)  nortriptyline 50 mg oral capsule: 1 cap(s) orally once a day (11 Oct 2018 08:39)  Renvela 800 mg oral tablet: 2 tab(s) orally every 8 hours (11 Oct 2018 08:39)  simethicone 80 mg oral tablet: 1 tab(s) orally 3 times a day (after meals) (11 Oct 2018 08:39)  torsemide 20 mg oral tablet: 1 tab(s) orally once a day (11 Oct 2018 08:39)  vitamin A: 1 tab(s) orally once a day (11 Oct 2018 08:38)  Vitamin B Complex: 1 tab(s) orally once a day (11 Oct 2018 08:38)  Vitamin C 500 mg oral tablet: 1 tab(s) orally once a day (11 Oct 2018 08:38)    Allergies  No Known Allergies    ***VITAL SIGNS:  Vital Signs Last 24 Hrs  T(C): 36.9 (21 Oct 2018 12:00), Max: 36.9 (21 Oct 2018 00:00)  T(F): 98.5 (21 Oct 2018 12:00), Max: 98.5 (21 Oct 2018 12:00)  HR: 110 (21 Oct 2018 12:00) (92 - 127)  BP: 129/76 (21 Oct 2018 11:00) (102/58 - 129/76)  BP(mean): 86 (21 Oct 2018 11:00) (68 - 86)  RR: 18 (21 Oct 2018 12:00) (15 - 26)  SpO2: 100% (21 Oct 2018 12:00) (93% - 100%)     I&O's Detail    20 Oct 2018 07:01  -  21 Oct 2018 07:00  --------------------------------------------------------  IN:    dexmedetomidine Infusion: 17.1 mL    IV PiggyBack: 300 mL    Oral Fluid: 710 mL    sodium chloride 0.9%: 30 mL    vasopressin Infusion: 7.2 mL  Total IN: 1064.3 mL    OUT:    Bulb: 50 mL    Bulb: 80 mL    Chest Tube: 180 mL  Total OUT: 310 mL    Total NET: 754.3 mL    21 Oct 2018 07:01  -  21 Oct 2018 13:51  --------------------------------------------------------  IN:    IV PiggyBack: 200 mL  Total IN: 200 mL    OUT:    Chest Tube: 20 mL  Total OUT: 20 mL    Total NET: 180 mL    CAPILLARY BLOOD GLUCOSE  POCT Blood Glucose.: 107 mg/dL (21 Oct 2018 11:28)  POCT Blood Glucose.: 101 mg/dL (21 Oct 2018 07:39)  POCT Blood Glucose.: 107 mg/dL (20 Oct 2018 21:32)  POCT Blood Glucose.: 113 mg/dL (20 Oct 2018 16:17)    =======================  MEDICATIONS  ===================  MEDICATIONS  (STANDING):  aspirin enteric coated 81 milliGRAM(s) Oral daily  dexmedetomidine Infusion 0.5 MICROgram(s)/kG/Hr (7.375 mL/Hr) IV   Off  dextrose 5%. 1000 milliLiter(s) (50 mL/Hr) IV Continuous <Continuous>  dextrose 50% Injectable 12.5 Gram(s) IV Push once  dextrose 50% Injectable 25 Gram(s) IV Push once  dextrose 50% Injectable 25 Gram(s) IV Push once  docusate sodium 100 milliGRAM(s) Oral three times a day  heparin  Injectable 5000 Unit(s) SubCutaneous every 12 hours  insulin lispro (HumaLOG) corrective regimen sliding scale   SubCutaneous Before meals and at bedtime  ipratropium    for Nebulization 500 MICROGram(s) Nebulizer every 6 hours  memantine 10 milliGRAM(s) Oral two times a day  midodrine 20 milliGRAM(s) Oral every 8 hours  nortriptyline 25 milliGRAM(s) Oral at bedtime  pantoprazole  Injectable 40 milliGRAM(s) IV Push daily  piperacillin/tazobactam IVPB. 3.375 Gram(s) IV Intermittent every 12 hours  vasopressin Infusion 0.05 Unit(s)/Min (3 mL/Hr) IV Continuous <Continuous>    MEDICATIONS  (PRN):  bisacodyl Suppository 10 milliGRAM(s) Rectal daily PRN Constipation  dextrose 40% Gel 15 Gram(s) Oral once PRN Blood Glucose LESS THAN 70 milliGRAM(s)/deciliter  glucagon  Injectable 1 milliGRAM(s) IntraMuscular once PRN Glucose LESS THAN 70 milligrams/deciliter    =================== PATIENT CARE ACCESS DEVICES ==========  Peripheral IV (+)  Central Venous Line	R / IJ (+)   Arterial Line	R/ Rad	(+)   Left arm AV fistula for HD (+)    ======================= PHYSICAL EXAM===================  General:             Comfortable, Awake, alert, not in any distress  Neuro:               Moving all extremities to commands. No focal deficits	  HEENT:                           CHER  Respiratory:	Lungs sound coarse on auscultation bilaterally; decrease right  basal breath sounds                          No rales, rhonchi, no wheezing. Effort even and unlabored.                          Right chest wound and cube site - clean  CV:		Regular rate and rhythm. Normal S1/S2. No murmurs  Abdomen:	 Soft,  nontender, not-distended. Bowel sounds present    Skin:		No rash.  Extremities:	Warm, no cyanosis or edema.  Palpable pulses    ============================ LABS =======================                        9.6    9.58  )-----------( 305      ( 21 Oct 2018 04:15 )             30.7     10-21    138  |  98  |  31<H>  ----------------------------<  108<H>  3.5   |  24  |  4.58<H>    Ca    9.2      21 Oct 2018 04:15  Phos  6.2     10-21  Mg     2.7     10-21    TPro  6.3  /  Alb  2.8<L>  /  TBili  0.9  /  DBili  x   /  AST  17  /  ALT  < 4<L>  /  AlkPhos  181<H>  10-20    LIVER FUNCTIONS - ( 20 Oct 2018 04:15 )  Alb: 2.8 g/dL / Pro: 6.3 g/dL / ALK PHOS: 181 u/L / ALT: < 4 u/L / AST: 17 u/L / GGT: x             ABG - ( 21 Oct 2018 04:15 )  pH, Arterial: 7.28  pH, Blood: x     /  pCO2: 56    /  pO2: 134   / HCO3: 23    / Base Excess: -0.7  /  SaO2: 98.7      Thyroid Stimulating Hormone, Serum (10.21.18 @ 04:15)    Thyroid Stimulating Hormone, Serum: 4.79 uIU/mL    Free Thyroxine, Serum in AM (10.21.18 @ 04:15)    Free Thyroxine, Serum: 0.88 ng/dL    BLOOD PERIPHERAL  10-15-18 --  --  --    OTHER  10-11-18 --  --  --    OTHER  10-11-18 --  --  --    TISSUE  10-11-18 --  --  --    TISSUE  10-11-18 --  --  --    TISSUE  10-11-18 --  --  Enterococcus faecalis    PLEURAL FLUID  10-11-18 --  --    GPCCH^Gram Pos Cocci in Chains  QUANTITY OF BACTERIA SEEN: MODERATE (3+)  GPCPR^Gram Pos Cocci in Pairs  QUANTITY OF BACTERIA SEEN: MODERATE (3+)  WBC^White Blood Cells  QNTY CELLS IN GRAM STAIN: FEW (2+)    ===================== IMAGING STUDIES ===================  Radiology personally reviewed.  < from: Xray Chest 1 View- PORTABLE-Routine (10.21.18 @ 06:22) >  INTERPRETATION:  EXAMINATION: XR CHEST PORTABLE ROUTINE 1V  CLINICAL INDICATION: hemothorax.    FINDINGS:     Cardiac silhouette mildly enlarged. Left-sided subcutaneous   defibrillator. Right-sided internal jugular catheter with tip overlying   SVC. Right-sided chest tube. Additional right-sided subcutaneous drain   does not appear to overlie the lung fields. Small partially loculated   right pleural effusion. Trace left pleural effusion. No pneumothorax.    IMPRESSION:   Right-sided chest tube. Additional right-sided subcutaneous drain does   not appear to overlie the lung fields. Small partially loculated right   pleural effusion.     < end of copied text >    ====================ASSESSMENT AND PLAN ================  58yMale s/p   Right thoracotomy, resection of portion of R 7th rib, evacuation of infected hemothorax, takedown of pleurocutaneous fistula  / R chest wall reconstruction with tunneled latissimus dorsi flap, covered by serratus anterior flap. Remains hypotensive on Midodrine ( off Vasopressin)    Issues: Hypotension- on Midodrine  Acute on chronic systolic CHF ( EF 10 %), ICD in place  SOB  Infected right hemothorax - (+) Enterococcus faecalis              Hyperglycemia              ESRD on HD    Agitation ( mild dementia preop)              severe deconditioning              Mild resp acidosis  ====================== NEUROLOGY=======================  Pain control with Tylenol IV / Toradol   Will avoid narcotics  C/w memantine 10 milliGRAM(s) Oral two times a day and  nortriptyline 25 milliGRAM(s) Oral at bedtime  PT evaluation( consult ) requested for deconditioning     ==================== RESPIRATORY========================  Pt is on/ off BIPAP; currently on 2 L NC          Comfortable, no evidence of distress.  incentive spirometry  as possible  Monitor right chest tube output abd david x2 output  Chest tube to  water seal, blakes to bulb suction	  Continue bronchodilators, pulmonary toilet  Head of bed elevation to 30-40 degrees  F/up ABG this PM    ====================CARDIOVASCULAR=====================  Continue hemodynamic monitoring/ telemetry ( remains in NSR, off Amio)  Hypotension - on Midodrine. Quan/ Vaso off  Acute on chronic systolic heart failure - Off inotropic support,   CVP is 8  this morning - dialyze as scheduled tomorrow    ===================== RENAL ============================  Monitor I/Os, BUN/ Cr  and electrolytes  Next HD - tomorrow                   ==================== GASTROINTESTINAL===================  On PO dysphagia  diet, tolerating well  Continue GI prophylaxis with Protonix   Zofran / Reglan for nausea - PRN	    =======================    ENDOCRIN  =====================  Glycemic monitoring  F/S with coverage  T4/TSH c/w  most likely Euthyroid sick syndrome.                                      ===================HEMATOLOGIC/ONCOLOGIC =============  Monitor chest tube and blakes output. No signs of active bleeding.   Follow CBC, coags  in AM  DVT prophylaxis with SCD, sc Heparin                                          ========================INFECTIOUS DISEASE===============   Monitor for fever / leukocytosis.  All surgical incision / chest tube  sites look clean   s/p Infected hemothorax - growing E. Faecalis  - Continue Zosyn                                Pertinent clinical, laboratory, radiographic, hemodynamic, echocardiographic, respiratory data, microbiologic data and chart were reviewed and analyzed frequently throughout the course of the day and night. GI and DVT prophylaxis, glycemic control, head of bed elevation and skin care issues were addressed.  Patient seen, examined and plan discussed with CT Surgery / CTICU team during rounds.  Pt remains critically ill in imminent risk of  deterioration and requires very careful cardio- pulmonary monitoring and support.    I have spent   40  minutes of critical care time with this pt between  8 am   and     11:55 pm         minutes spent on total encounter; more than 50% of the visit was spent counseling and/or coordinating care by the attending physician.    Pt's status discussed with family at bedside, updated status.     KERLINE Faith MD CLAUDIA HAN          MRN-[id number]    HPI:  Pt is a 58 yr old Mandarin speaking male scheduled for Right Thoractomy Decortiation, Right Chest Wall reconstruction with Latissimus Dorsi Flap, Poss Skin Graft with VAC dressing with Dr Pickering 10/11/18. Pt has ICD but unable to identify make or model. Pt hx of ESRD with HD T/Th/Sat for 4 hrs and has stated he has stopped some of his meds but cannot say which ones. Pt seen and received MC and CC but has 2 different med lists and is unable to identify meds taking. Pt denies blood thinners. Pt has stopped ASA as of last week. Pt hx gathered from [employer] and notes from Dr Pickering - pt is poor historian.      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/2018         Procedure: 10/11/18 Right thoracotomy, resection of portion of R 7th rib, evacuation of infected hemothorax, takedown of pleurocutaneous fistula  / R chest wall reconstruction with tunneled latissimus dorsi flap, covered by serratus anterior flap             Issues: Hypotension- on Midodrine  Acute on chronic systolic CHF ( EF 10 %), ICD in place  SOB  Infected right hemothorax - (+) Enterococcus faecalis              Hyperglycemia              ESRD on HD    Agitation  severe deconditioning      Drips:  Vasopressin on / off              Precedex  on / off    Interval/Overnight Events/ ROS  Pt hypotensive through the surgery - on multiple pressors / inotropes ( Levo, Epi, Dobutamine). Required 4 units of PRBC in addition to colloids and crystalloids for  symptomatic  posthemorrhagic anemia during the surgery. Pt arrived in ICU orally intubated, sedated, on Levo, Dobut, Epi.    Overnight in ICU still on multiple pressors/ inotropes./ Vent support. Mixed resp/ metab acidosis minimally  improved Lactic acidemia- unchanged @ 2-3.. Pt will need HD today ( ESRD on HD - anuria)  On POD 1 weaned off Epi, Quan. Remains on Dobutamine, Levophed, Vasopressin; on POD 2 started  CVVHD. Oxygenation and acid base status  slightly   improved. Troponin is down to 88. Cardiology / CHF team on board to help in managing decompensated CHF/ cardiogenic shock. OR culture grew Enterococcus faecalis sensitive to Ampi/ Vanco  POD 3,4 decreasing doses of  Levophed, still  on Vaso/ Dobutamine. Trial of CPAP in Am aborted due to tachypnea and pt's distress.  Will attempt HD today in view of decreased requirement for pressors.  Pt tolerated full HD last evening ( 10/15/18), remains on Levo, Dobutamine, Vaso, vent support.  Pt remained hemodynamically labile overnight- POD #10 ,on Midodrine . OOB to chair, breathing comfortably with minimal pain. Ambulated  inside of the room with support.  Appears very deconditioned . Denies  SOB, no palpitations, no nausea/ no vomiting, no dizziness  Next HD planned for tomorrow( 10/22/18)    Pt extubated on 10/17/18    PAST MEDICAL & SURGICAL HISTORY:  Smoking hx  Cardiomyopathy  Wound: right chest  ICD (implantable cardioverter-defibrillator) in place  OA (osteoarthritis)  HLD (hyperlipidemia)  BPH (benign prostatic hyperplasia)  Pleural effusion  HTN (hypertension)  ESRD (end stage renal disease)  H/O chest wound: right  History of wound infection: right chest wall - revision 5/18 and again in 7/18  History of implantable cardioverter-defibrillator (ICD) placement: pt unsure when placed  H/O bilateral hip replacements: 2008, 2009    Home Medications:  aspirin 81 mg oral tablet: 1 tab(s) orally once a day last dose 10/3/2018 (11 Oct 2018 08:39)  Breo Ellipta 100 mcg-25 mcg/inh inhalation powder: 1 puff(s) inhaled once a day patient denies using it (11 Oct 2018 08:38)  Calcium 500: 1 tab(s) orally 2 times a day (11 Oct 2018 08:38)  carvedilol 6.25 mg oral tablet: 1 tab(s) orally once a day (11 Oct 2018 08:39)  docusate sodium 100 mg oral tablet: 1 tab(s) orally 2 times a day, As Needed (11 Oct 2018 08:39)  folic acid 1 mg oral tablet: 1 tab(s) orally once a day (11 Oct 2018 08:38)  Mag-Ox 400 oral tablet: 1 tab(s) orally once a day (11 Oct 2018 08:39)  midodrine 10 mg oral tablet: 1 tab(s) orally once a day (11 Oct 2018 08:39)  Multiple Vitamins oral tablet: 1 tab(s) orally once a day last dose 10/3/2018 (11 Oct 2018 08:38)  Namenda 10 mg oral tablet: 1 tab(s) orally 2 times a day (11 Oct 2018 08:38)  Nephplex Rx oral tablet: 1 tab(s) orally once a day (11 Oct 2018 08:39)  nortriptyline 50 mg oral capsule: 1 cap(s) orally once a day (11 Oct 2018 08:39)  Renvela 800 mg oral tablet: 2 tab(s) orally every 8 hours (11 Oct 2018 08:39)  simethicone 80 mg oral tablet: 1 tab(s) orally 3 times a day (after meals) (11 Oct 2018 08:39)  torsemide 20 mg oral tablet: 1 tab(s) orally once a day (11 Oct 2018 08:39)  vitamin A: 1 tab(s) orally once a day (11 Oct 2018 08:38)  Vitamin B Complex: 1 tab(s) orally once a day (11 Oct 2018 08:38)  Vitamin C 500 mg oral tablet: 1 tab(s) orally once a day (11 Oct 2018 08:38)    Allergies  No Known Allergies    ***VITAL SIGNS:  Vital Signs Last 24 Hrs  T(C): 36.9 (21 Oct 2018 12:00), Max: 36.9 (21 Oct 2018 00:00)  T(F): 98.5 (21 Oct 2018 12:00), Max: 98.5 (21 Oct 2018 12:00)  HR: 110 (21 Oct 2018 12:00) (92 - 127)  BP: 129/76 (21 Oct 2018 11:00) (102/58 - 129/76)  BP(mean): 86 (21 Oct 2018 11:00) (68 - 86)  RR: 18 (21 Oct 2018 12:00) (15 - 26)  SpO2: 100% (21 Oct 2018 12:00) (93% - 100%)     I&O's Detail    20 Oct 2018 07:01  -  21 Oct 2018 07:00  --------------------------------------------------------  IN:    dexmedetomidine Infusion: 17.1 mL    IV PiggyBack: 300 mL    Oral Fluid: 710 mL    sodium chloride 0.9%: 30 mL    vasopressin Infusion: 7.2 mL  Total IN: 1064.3 mL    OUT:    Bulb: 50 mL    Bulb: 80 mL    Chest Tube: 180 mL  Total OUT: 310 mL    Total NET: 754.3 mL    21 Oct 2018 07:01  -  21 Oct 2018 13:51  --------------------------------------------------------  IN:    IV PiggyBack: 200 mL  Total IN: 200 mL    OUT:    Chest Tube: 20 mL  Total OUT: 20 mL    Total NET: 180 mL    CAPILLARY BLOOD GLUCOSE  POCT Blood Glucose.: 107 mg/dL (21 Oct 2018 11:28)  POCT Blood Glucose.: 101 mg/dL (21 Oct 2018 07:39)  POCT Blood Glucose.: 107 mg/dL (20 Oct 2018 21:32)  POCT Blood Glucose.: 113 mg/dL (20 Oct 2018 16:17)    =======================  MEDICATIONS  ===================  MEDICATIONS  (STANDING):  aspirin enteric coated 81 milliGRAM(s) Oral daily  dexmedetomidine Infusion 0.5 MICROgram(s)/kG/Hr (7.375 mL/Hr) IV   Off  dextrose 5%. 1000 milliLiter(s) (50 mL/Hr) IV Continuous <Continuous>  dextrose 50% Injectable 12.5 Gram(s) IV Push once  dextrose 50% Injectable 25 Gram(s) IV Push once  dextrose 50% Injectable 25 Gram(s) IV Push once  docusate sodium 100 milliGRAM(s) Oral three times a day  heparin  Injectable 5000 Unit(s) SubCutaneous every 12 hours  insulin lispro (HumaLOG) corrective regimen sliding scale   SubCutaneous Before meals and at bedtime  ipratropium    for Nebulization 500 MICROGram(s) Nebulizer every 6 hours  memantine 10 milliGRAM(s) Oral two times a day  midodrine 20 milliGRAM(s) Oral every 8 hours  nortriptyline 25 milliGRAM(s) Oral at bedtime  pantoprazole  Injectable 40 milliGRAM(s) IV Push daily  piperacillin/tazobactam IVPB. 3.375 Gram(s) IV Intermittent every 12 hours  vasopressin Infusion 0.05 Unit(s)/Min (3 mL/Hr) IV Continuous <Continuous>    MEDICATIONS  (PRN):  bisacodyl Suppository 10 milliGRAM(s) Rectal daily PRN Constipation  dextrose 40% Gel 15 Gram(s) Oral once PRN Blood Glucose LESS THAN 70 milliGRAM(s)/deciliter  glucagon  Injectable 1 milliGRAM(s) IntraMuscular once PRN Glucose LESS THAN 70 milligrams/deciliter    =================== PATIENT CARE ACCESS DEVICES ==========  Peripheral IV (+)  Central Venous Line	R / IJ (+)   Arterial Line	R/ Rad	(+)   Left arm AV fistula for HD (+)    ======================= PHYSICAL EXAM===================  General:             Comfortable, Awake, alert, not in any distress  Neuro:               Moving all extremities to commands. No focal deficits	  HEENT:                           CHER  Respiratory:	Lungs sound coarse on auscultation bilaterally; decrease right  basal breath sounds                          No rales, rhonchi, no wheezing. Effort even and unlabored.                          Right chest wound and cube site - clean  CV:		Regular rate and rhythm. Normal S1/S2. No murmurs  Abdomen:	 Soft,  nontender, not-distended. Bowel sounds present    Skin:		No rash.  Extremities:	Warm, no cyanosis or edema.  Palpable pulses    ============================ LABS =======================                        9.6    9.58  )-----------( 305      ( 21 Oct 2018 04:15 )             30.7     10-21    138  |  98  |  31<H>  ----------------------------<  108<H>  3.5   |  24  |  4.58<H>    Ca    9.2      21 Oct 2018 04:15  Phos  6.2     10-21  Mg     2.7     10-21    TPro  6.3  /  Alb  2.8<L>  /  TBili  0.9  /  DBili  x   /  AST  17  /  ALT  < 4<L>  /  AlkPhos  181<H>  10-20    LIVER FUNCTIONS - ( 20 Oct 2018 04:15 )  Alb: 2.8 g/dL / Pro: 6.3 g/dL / ALK PHOS: 181 u/L / ALT: < 4 u/L / AST: 17 u/L / GGT: x             ABG - ( 21 Oct 2018 04:15 )  pH, Arterial: 7.28  pH, Blood: x     /  pCO2: 56    /  pO2: 134   / HCO3: 23    / Base Excess: -0.7  /  SaO2: 98.7      Thyroid Stimulating Hormone, Serum (10.21.18 @ 04:15)    Thyroid Stimulating Hormone, Serum: 4.79 uIU/mL    Free Thyroxine, Serum in AM (10.21.18 @ 04:15)    Free Thyroxine, Serum: 0.88 ng/dL    BLOOD PERIPHERAL  10-15-18 --  --  --    OTHER  10-11-18 --  --  --    OTHER  10-11-18 --  --  --    TISSUE  10-11-18 --  --  --    TISSUE  10-11-18 --  --  --    TISSUE  10-11-18 --  --  Enterococcus faecalis    PLEURAL FLUID  10-11-18 --  --    GPCCH^Gram Pos Cocci in Chains  QUANTITY OF BACTERIA SEEN: MODERATE (3+)  GPCPR^Gram Pos Cocci in Pairs  QUANTITY OF BACTERIA SEEN: MODERATE (3+)  WBC^White Blood Cells  QNTY CELLS IN GRAM STAIN: FEW (2+)    ===================== IMAGING STUDIES ===================  Radiology personally reviewed.  < from: Xray Chest 1 View- PORTABLE-Routine (10.21.18 @ 06:22) >  INTERPRETATION:  EXAMINATION: XR CHEST PORTABLE ROUTINE 1V  CLINICAL INDICATION: hemothorax.    FINDINGS:     Cardiac silhouette mildly enlarged. Left-sided subcutaneous   defibrillator. Right-sided internal jugular catheter with tip overlying   SVC. Right-sided chest tube. Additional right-sided subcutaneous drain   does not appear to overlie the lung fields. Small partially loculated   right pleural effusion. Trace left pleural effusion. No pneumothorax.    IMPRESSION:   Right-sided chest tube. Additional right-sided subcutaneous drain does   not appear to overlie the lung fields. Small partially loculated right   pleural effusion.     < end of copied text >    ====================ASSESSMENT AND PLAN ================  58yMale s/p   Right thoracotomy, resection of portion of R 7th rib, evacuation of infected hemothorax, takedown of pleurocutaneous fistula  / R chest wall reconstruction with tunneled latissimus dorsi flap, covered by serratus anterior flap. Remains hypotensive on Midodrine ( off Vasopressin)    Issues: Hypotension- on Midodrine  Acute on chronic systolic CHF ( EF 10 %), ICD in place  SOB  Infected right hemothorax - (+) Enterococcus faecalis              Hyperglycemia              ESRD on HD    Agitation ( mild dementia preop)              severe deconditioning              Mild resp acidosis  ====================== NEUROLOGY=======================  Pain control with Tylenol IV / Toradol   Will avoid narcotics  C/w memantine 10 milliGRAM(s) Oral two times a day and  nortriptyline 25 milliGRAM(s) Oral at bedtime  PT evaluation( consult ) requested for deconditioning     ==================== RESPIRATORY========================  Pt is on/ off BIPAP; currently on 2 L NC          Comfortable, no evidence of distress.  incentive spirometry  as possible  Monitor right chest tube output abd david x2 output  Chest tube to  water seal, blakes to bulb suction	  Continue bronchodilators, pulmonary toilet  Head of bed elevation to 30-40 degrees  F/up ABG this PM  minimally  better : ABG - ( 21 Oct 2018 14:30 )  pH, Arterial: 7.29  pH, Blood: x     /  pCO2: 51    /  pO2: 126   / HCO3: 22    / Base Excess: -1.8  /  SaO2: 98.6      ====================CARDIOVASCULAR=====================  Continue hemodynamic monitoring/ telemetry ( remains in NSR, off Amio)  Hypotension - on Midodrine. Quan/ Vaso off  Acute on chronic systolic heart failure - Off inotropic support,   CVP is 8  this morning - dialyze as scheduled tomorrow    ===================== RENAL ============================  Monitor I/Os, BUN/ Cr  and electrolytes  Next HD - tomorrow                   ==================== GASTROINTESTINAL===================  On PO dysphagia  diet, tolerating well  Continue GI prophylaxis with Protonix   Zofran / Reglan for nausea - PRN	    =======================    ENDOCRIN  =====================  Glycemic monitoring  F/S with coverage  T4/TSH c/w  most likely Euthyroid sick syndrome.                                      ===================HEMATOLOGIC/ONCOLOGIC =============  Monitor chest tube and blakes output. No signs of active bleeding.   Follow CBC, coags  in AM  DVT prophylaxis with SCD, sc Heparin                                          ========================INFECTIOUS DISEASE===============   Monitor for fever / leukocytosis.  All surgical incision / chest tube  sites look clean   s/p Infected hemothorax - growing E. Faecalis  - Continue Zosyn                                Pertinent clinical, laboratory, radiographic, hemodynamic, echocardiographic, respiratory data, microbiologic data and chart were reviewed and analyzed frequently throughout the course of the day and night. GI and DVT prophylaxis, glycemic control, head of bed elevation and skin care issues were addressed.  Patient seen, examined and plan discussed with CT Surgery / CTICU team during rounds.  Pt remains critically ill in imminent risk of  deterioration and requires very careful cardio- pulmonary monitoring and support.    I have spent   40  minutes of critical care time with this pt between  8 am   and     11:55 pm         minutes spent on total encounter; more than 50% of the visit was spent counseling and/or coordinating care by the attending physician.    Pt's status discussed with family at bedside, updated status.     KERLINE Faith MD

## 2018-10-21 NOTE — PROGRESS NOTE ADULT - SUBJECTIVE AND OBJECTIVE BOX
CLAUDIA HAN            MRN-[id number]         No Known Allergies                 Pt is a 58 yr old Mandarin speaking male scheduled for Right Thoractomy Decortiation, Right Chest Wall reconstruction with Latissimjus Dorsi Flap Poss Skin Graft with VAC dressing with Dr Pickering 10/11/18. Pt has ICD but unable to identify make or model. Pt hx of ESRD with HD T/Th/Sat for 4 hrs and has stated he has stopped some of his meds but cannot say which ones. Pt seen and received MC and CC but has 2 different med lists and is unable to identify meds taking. Pt denies blood thinners. Pt has stopped ASA as of last week. Pt hx gathered from [employer] and notes from Dr Pickering - pt is poor historian.       Procedure: Right thoracotomy, resection of portion of R 7th rib, evacuation of infected hemothorax, takedown of pleurocutaneous fistula  / R chest wall reconstruction with tunneled latissimus dorsi flap, covered by serratus anterior flap               Issues:   Hypotension  Acute on chronic systolic CHF ( EF 10 %), ICD in place  SOB  Infected right hemothorax               Hyperglycemia              ESRD on HD    Agitation  Drips:  Vasopressin on / off   Precedex  on / off         Home Medications:  aspirin 81 mg oral tablet: 1 tab(s) orally once a day last dose 10/3/2018 (11 Oct 2018 08:39)  Breo Ellipta 100 mcg-25 mcg/inh inhalation powder: 1 puff(s) inhaled once a day patient denies using it (11 Oct 2018 08:38)  Calcium 500: 1 tab(s) orally 2 times a day (11 Oct 2018 08:38)  carvedilol 6.25 mg oral tablet: 1 tab(s) orally once a day (11 Oct 2018 08:39)  docusate sodium 100 mg oral tablet: 1 tab(s) orally 2 times a day, As Needed (11 Oct 2018 08:39)  folic acid 1 mg oral tablet: 1 tab(s) orally once a day (11 Oct 2018 08:38)  Mag-Ox 400 oral tablet: 1 tab(s) orally once a day (11 Oct 2018 08:39)  midodrine 10 mg oral tablet: 1 tab(s) orally once a day (11 Oct 2018 08:39)  Multiple Vitamins oral tablet: 1 tab(s) orally once a day last dose 10/3/2018 (11 Oct 2018 08:38)  Namenda 10 mg oral tablet: 1 tab(s) orally 2 times a day (11 Oct 2018 08:38)  Nephplex Rx oral tablet: 1 tab(s) orally once a day (11 Oct 2018 08:39)  nortriptyline 50 mg oral capsule: 1 cap(s) orally once a day (11 Oct 2018 08:39)  Renvela 800 mg oral tablet: 2 tab(s) orally every 8 hours (11 Oct 2018 08:39)  simethicone 80 mg oral tablet: 1 tab(s) orally 3 times a day (after meals) (11 Oct 2018 08:39)  torsemide 20 mg oral tablet: 1 tab(s) orally once a day (11 Oct 2018 08:39)  vitamin A: 1 tab(s) orally once a day (11 Oct 2018 08:38)  Vitamin B Complex: 1 tab(s) orally once a day (11 Oct 2018 08:38)  Vitamin C 500 mg oral tablet: 1 tab(s) orally once a day (11 Oct 2018 08:38)      PAST MEDICAL & SURGICAL HISTORY:  Smoking hx  Cardiomyopathy  Wound: right chest  ICD (implantable cardioverter-defibrillator) in place  OA (osteoarthritis)  HLD (hyperlipidemia)  BPH (benign prostatic hyperplasia)  Pleural effusion  HTN (hypertension)  ESRD (end stage renal disease)  H/O chest wound: right  History of wound infection: right chest wall - revision 5/18 and again in 7/18  History of implantable cardioverter-defibrillator (ICD) placement: pt unsure when placed  H/O bilateral hip replacements: 2008, 2009        ICU Vital Signs Last 24 Hrs  T(C): 36.5 (21 Oct 2018 04:00), Max: 36.9 (20 Oct 2018 12:00)  T(F): 97.7 (21 Oct 2018 04:00), Max: 98.5 (20 Oct 2018 12:00)  HR: 92 (21 Oct 2018 04:08) (86 - 127)  BP: 102/58 (20 Oct 2018 20:00) (102/58 - 102/58)  BP(mean): 68 (20 Oct 2018 20:00) (68 - 68)  ABP: 106/45 (21 Oct 2018 04:00) (88/40 - 141/65)  ABP(mean): 64 (21 Oct 2018 04:00) (56 - 90)  RR: 22 (21 Oct 2018 04:00) (15 - 26)  SpO2: 98% (21 Oct 2018 04:08) (89% - 100%)    I&O's Detail    19 Oct 2018 07:01  -  20 Oct 2018 07:00  --------------------------------------------------------  IN:    dexmedetomidine Infusion: 124.8 mL    IV PiggyBack: 100 mL    Oral Fluid: 120 mL    Other: 400 mL    phenylephrine   Infusion: 228.3 mL    sodium chloride 0.9%: 690 mL    vasopressin Infusion: 76.8 mL  Total IN: 1739.9 mL    OUT:    Bulb: 90 mL    Bulb: 50 mL    Chest Tube: 135 mL    Other: 2800 mL  Total OUT: 3075 mL    Total NET: -1335.1 mL      20 Oct 2018 07:01  -  21 Oct 2018 05:56  --------------------------------------------------------  IN:    dexmedetomidine Infusion: 17.1 mL    IV PiggyBack: 300 mL    Oral Fluid: 710 mL    sodium chloride 0.9%: 30 mL    vasopressin Infusion: 7.2 mL  Total IN: 1064.3 mL    OUT:    Bulb: 30 mL    Bulb: 60 mL    Chest Tube: 130 mL  Total OUT: 220 mL    Total NET: 844.3 mL        CAPILLARY BLOOD GLUCOSE      POCT Blood Glucose.: 107 mg/dL (20 Oct 2018 21:32)      Home Medications:  aspirin 81 mg oral tablet: 1 tab(s) orally once a day last dose 10/3/2018 (11 Oct 2018 08:39)  Breo Ellipta 100 mcg-25 mcg/inh inhalation powder: 1 puff(s) inhaled once a day patient denies using it (11 Oct 2018 08:38)  Calcium 500: 1 tab(s) orally 2 times a day (11 Oct 2018 08:38)  carvedilol 6.25 mg oral tablet: 1 tab(s) orally once a day (11 Oct 2018 08:39)  docusate sodium 100 mg oral tablet: 1 tab(s) orally 2 times a day, As Needed (11 Oct 2018 08:39)  folic acid 1 mg oral tablet: 1 tab(s) orally once a day (11 Oct 2018 08:38)  Mag-Ox 400 oral tablet: 1 tab(s) orally once a day (11 Oct 2018 08:39)  midodrine 10 mg oral tablet: 1 tab(s) orally once a day (11 Oct 2018 08:39)  Multiple Vitamins oral tablet: 1 tab(s) orally once a day last dose 10/3/2018 (11 Oct 2018 08:38)  Namenda 10 mg oral tablet: 1 tab(s) orally 2 times a day (11 Oct 2018 08:38)  Nephplex Rx oral tablet: 1 tab(s) orally once a day (11 Oct 2018 08:39)  nortriptyline 50 mg oral capsule: 1 cap(s) orally once a day (11 Oct 2018 08:39)  Renvela 800 mg oral tablet: 2 tab(s) orally every 8 hours (11 Oct 2018 08:39)  simethicone 80 mg oral tablet: 1 tab(s) orally 3 times a day (after meals) (11 Oct 2018 08:39)  torsemide 20 mg oral tablet: 1 tab(s) orally once a day (11 Oct 2018 08:39)  vitamin A: 1 tab(s) orally once a day (11 Oct 2018 08:38)  Vitamin B Complex: 1 tab(s) orally once a day (11 Oct 2018 08:38)  Vitamin C 500 mg oral tablet: 1 tab(s) orally once a day (11 Oct 2018 08:38)      MEDICATIONS  (STANDING):  aspirin enteric coated 81 milliGRAM(s) Oral daily  dexmedetomidine Infusion 0.5 MICROgram(s)/kG/Hr (7.375 mL/Hr) IV Continuous <Continuous>  dextrose 5%. 1000 milliLiter(s) (50 mL/Hr) IV Continuous <Continuous>  dextrose 50% Injectable 12.5 Gram(s) IV Push once  dextrose 50% Injectable 25 Gram(s) IV Push once  dextrose 50% Injectable 25 Gram(s) IV Push once  docusate sodium 100 milliGRAM(s) Oral three times a day  heparin  Injectable 5000 Unit(s) SubCutaneous every 12 hours  insulin lispro (HumaLOG) corrective regimen sliding scale   SubCutaneous Before meals and at bedtime  ipratropium    for Nebulization 500 MICROGram(s) Nebulizer every 6 hours  midodrine 20 milliGRAM(s) Oral every 8 hours  pantoprazole  Injectable 40 milliGRAM(s) IV Push daily  piperacillin/tazobactam IVPB. 3.375 Gram(s) IV Intermittent every 12 hours  vasopressin Infusion 0.05 Unit(s)/Min (3 mL/Hr) IV Continuous <Continuous>    MEDICATIONS  (PRN):  bisacodyl Suppository 10 milliGRAM(s) Rectal daily PRN Constipation  dextrose 40% Gel 15 Gram(s) Oral once PRN Blood Glucose LESS THAN 70 milliGRAM(s)/deciliter  glucagon  Injectable 1 milliGRAM(s) IntraMuscular once PRN Glucose LESS THAN 70 milligrams/deciliter          Physical exam:     General:               Pt is alert, following commands with periods of agitation. Overall progressing in the right direction                                                  Neuro:                 Nonfocal                             Cardiovascular:     S1 & S2, regular                           Respiratory:         Air entry is decreased on right side, has bilateral conducted sounds                           GI:                          Soft, nondistended and nontender, Bowel sounds active                            Ext:                        No cyanosis,  Left upper arm is slightly swollen & ecchymotic but has good dopplers and non-tender                               Labs:                                                                           9.6    9.58  )-----------( 305      ( 21 Oct 2018 04:15 )             30.7             10-21    138  |  98  |  31<H>  ----------------------------<  108<H>  3.5   |  24  |  4.58<H>    Ca    9.2      21 Oct 2018 04:15  Phos  6.2     10-21  Mg     2.7     10-21    TPro  6.3  /  Alb  2.8<L>  /  TBili  0.9  /  DBili  x   /  AST  17  /  ALT  < 4<L>  /  AlkPhos  181<H>  10-20                    LIVER FUNCTIONS - ( 20 Oct 2018 04:15 )  Alb: 2.8 g/dL / Pro: 6.3 g/dL / ALK PHOS: 181 u/L / ALT: < 4 u/L / AST: 17 u/L / GGT: x                 CXR:    < from: Xray Chest 1 View- PORTABLE-Routine (10.20.18 @ 06:32) >  Right IJ central line remains in place.    Stable previously seen left chest wall SICD and cardiomegaly.    Stable lower right hemithorax postsurgical changes including presence of   a curved pleural chest tube.    No pneumothorax.    Remainder of exam unchanged.        Plan:    General: 58yMale s/p   Right thoracotomy, resection of portion of R 7th rib, evacuation of infected hemothorax, takedown of pleurocutaneous fistula  / R chest wall reconstruction with tunneled latissimus dorsi flap, covered by serratus anterior flap. Remains hypotensive and on vent support                            Neuro:                                         Pain control with F Tylenol IV    Avoid narcotics because of CO2 retention    On / Off Precedex, will restart Nortriptyline                             Cardiovascular:                                          Continue hemodynamic monitoring.    Hypotension / Shock: Continue Quan /Vasopressin - Titrate to MAP>65    A-fib: Off Amio, remains in NSR    Acute on chronic systolic heart failure - Off inotropic support,   CVP is 8  this morning - dialyze as scheduled                            Respiratory:                                         Pt is on / off Bipap                                         Co2 is around 50s with respiratory acidosis. Continue aggressive chest PT / ENCOURAGE INCENTIVE SPIROMETRY, BPAP as needed                                         Monitor chest tube output- D/Cd Anterior & posterior  chest tubes                                         Chest tube to water seal                                                                 Continue bronchodilators, pulmonary toilet     VAC d/cd                            GI                                          Continue GI prophylaxis with Protonix                                          Continue Zofran / Reglan for nausea - PRN	      Tolerating dysphagia diet                                                                 Renal:                                         Monitor I/Os and electrolytes                                         ESRD: HD as scheduled                                                 Hem/ Onc:                                         Anemia: Transfused 2 Uts PRBC during HD on 10/17. Hct is stable                                         Monitor chest tube output &  signs of bleeding.                                          Follow CBC in AM     Thrombocytopenia - Resolved. HIT -ve                           Infectious disease:      Infected hematoma - growing E.Fecalis - Continue Zosyn                                          Monitor for fever / leukocytosis.                                          All surgical incision / chest tube  sites look clean                            Endocrine                                             Continue Accu-Checks with coverage    Pt is on SQ Heparin and Venodyne boots for DVT prophylaxis.     Pertinent clinical, laboratory, radiographic, hemodynamic, echocardiographic, respiratory data, microbiologic data and chart were reviewed and analyzed frequently throughout the course of the day and night  Patient seen, examined and plan discussed with CT Surgeon Dr. Ledesma / CTICU team during rounds.    Pt's status discussed with family at bedside, updated status.     I have spent 45  minutes of critical care time with this pt between 12am  and 8am                Ralph Warren MD CLAUDIA HAN            MRN-[id number]         No Known Allergies                 Pt is a 58 yr old Mandarin speaking male scheduled for Right Thoractomy Decortiation, Right Chest Wall reconstruction with Latissimjus Dorsi Flap Poss Skin Graft with VAC dressing with Dr Pickering 10/11/18. Pt has ICD but unable to identify make or model. Pt hx of ESRD with HD T/Th/Sat for 4 hrs and has stated he has stopped some of his meds but cannot say which ones. Pt seen and received [facility] and CC but has 2 different med lists and is unable to identify meds taking. Pt denies blood thinners. Pt has stopped ASA as of last week. Pt hx gathered from [employer] and notes from Dr Pickering - pt is poor historian.       Procedure: Right thoracotomy, resection of portion of R 7th rib, evacuation of infected hemothorax, takedown of pleurocutaneous fistula  / R chest wall reconstruction with tunneled latissimus dorsi flap, covered by serratus anterior flap               Issues:   Hypotension  Acute on chronic systolic CHF ( EF 10 %), ICD in place  SOB  Infected right hemothorax               Hyperglycemia              ESRD on HD    Agitation  Drips:  Vasopressin on / off   Precedex  on / off         Home Medications:  aspirin 81 mg oral tablet: 1 tab(s) orally once a day last dose 10/3/2018 (11 Oct 2018 08:39)  Breo Ellipta 100 mcg-25 mcg/inh inhalation powder: 1 puff(s) inhaled once a day patient denies using it (11 Oct 2018 08:38)  Calcium 500: 1 tab(s) orally 2 times a day (11 Oct 2018 08:38)  carvedilol 6.25 mg oral tablet: 1 tab(s) orally once a day (11 Oct 2018 08:39)  docusate sodium 100 mg oral tablet: 1 tab(s) orally 2 times a day, As Needed (11 Oct 2018 08:39)  folic acid 1 mg oral tablet: 1 tab(s) orally once a day (11 Oct 2018 08:38)  Mag-Ox 400 oral tablet: 1 tab(s) orally once a day (11 Oct 2018 08:39)  midodrine 10 mg oral tablet: 1 tab(s) orally once a day (11 Oct 2018 08:39)  Multiple Vitamins oral tablet: 1 tab(s) orally once a day last dose 10/3/2018 (11 Oct 2018 08:38)  Namenda 10 mg oral tablet: 1 tab(s) orally 2 times a day (11 Oct 2018 08:38)  Nephplex Rx oral tablet: 1 tab(s) orally once a day (11 Oct 2018 08:39)  nortriptyline 50 mg oral capsule: 1 cap(s) orally once a day (11 Oct 2018 08:39)  Renvela 800 mg oral tablet: 2 tab(s) orally every 8 hours (11 Oct 2018 08:39)  simethicone 80 mg oral tablet: 1 tab(s) orally 3 times a day (after meals) (11 Oct 2018 08:39)  torsemide 20 mg oral tablet: 1 tab(s) orally once a day (11 Oct 2018 08:39)  vitamin A: 1 tab(s) orally once a day (11 Oct 2018 08:38)  Vitamin B Complex: 1 tab(s) orally once a day (11 Oct 2018 08:38)  Vitamin C 500 mg oral tablet: 1 tab(s) orally once a day (11 Oct 2018 08:38)      PAST MEDICAL & SURGICAL HISTORY:  Smoking hx  Cardiomyopathy  Wound: right chest  ICD (implantable cardioverter-defibrillator) in place  OA (osteoarthritis)  HLD (hyperlipidemia)  BPH (benign prostatic hyperplasia)  Pleural effusion  HTN (hypertension)  ESRD (end stage renal disease)  H/O chest wound: right  History of wound infection: right chest wall - revision 5/18 and again in 7/18  History of implantable cardioverter-defibrillator (ICD) placement: pt unsure when placed  H/O bilateral hip replacements: 2008, 2009        ICU Vital Signs Last 24 Hrs  T(C): 36.5 (21 Oct 2018 04:00), Max: 36.9 (20 Oct 2018 12:00)  T(F): 97.7 (21 Oct 2018 04:00), Max: 98.5 (20 Oct 2018 12:00)  HR: 92 (21 Oct 2018 04:08) (86 - 127)  BP: 102/58 (20 Oct 2018 20:00) (102/58 - 102/58)  BP(mean): 68 (20 Oct 2018 20:00) (68 - 68)  ABP: 106/45 (21 Oct 2018 04:00) (88/40 - 141/65)  ABP(mean): 64 (21 Oct 2018 04:00) (56 - 90)  RR: 22 (21 Oct 2018 04:00) (15 - 26)  SpO2: 98% (21 Oct 2018 04:08) (89% - 100%)    I&O's Detail    19 Oct 2018 07:01  -  20 Oct 2018 07:00  --------------------------------------------------------  IN:    dexmedetomidine Infusion: 124.8 mL    IV PiggyBack: 100 mL    Oral Fluid: 120 mL    Other: 400 mL    phenylephrine   Infusion: 228.3 mL    sodium chloride 0.9%: 690 mL    vasopressin Infusion: 76.8 mL  Total IN: 1739.9 mL    OUT:    Bulb: 90 mL    Bulb: 50 mL    Chest Tube: 135 mL    Other: 2800 mL  Total OUT: 3075 mL    Total NET: -1335.1 mL      20 Oct 2018 07:01  -  21 Oct 2018 05:56  --------------------------------------------------------  IN:    dexmedetomidine Infusion: 17.1 mL    IV PiggyBack: 300 mL    Oral Fluid: 710 mL    sodium chloride 0.9%: 30 mL    vasopressin Infusion: 7.2 mL  Total IN: 1064.3 mL    OUT:    Bulb: 30 mL    Bulb: 60 mL    Chest Tube: 130 mL  Total OUT: 220 mL    Total NET: 844.3 mL        CAPILLARY BLOOD GLUCOSE      POCT Blood Glucose.: 107 mg/dL (20 Oct 2018 21:32)      Home Medications:  aspirin 81 mg oral tablet: 1 tab(s) orally once a day last dose 10/3/2018 (11 Oct 2018 08:39)  Breo Ellipta 100 mcg-25 mcg/inh inhalation powder: 1 puff(s) inhaled once a day patient denies using it (11 Oct 2018 08:38)  Calcium 500: 1 tab(s) orally 2 times a day (11 Oct 2018 08:38)  carvedilol 6.25 mg oral tablet: 1 tab(s) orally once a day (11 Oct 2018 08:39)  docusate sodium 100 mg oral tablet: 1 tab(s) orally 2 times a day, As Needed (11 Oct 2018 08:39)  folic acid 1 mg oral tablet: 1 tab(s) orally once a day (11 Oct 2018 08:38)  Mag-Ox 400 oral tablet: 1 tab(s) orally once a day (11 Oct 2018 08:39)  midodrine 10 mg oral tablet: 1 tab(s) orally once a day (11 Oct 2018 08:39)  Multiple Vitamins oral tablet: 1 tab(s) orally once a day last dose 10/3/2018 (11 Oct 2018 08:38)  Namenda 10 mg oral tablet: 1 tab(s) orally 2 times a day (11 Oct 2018 08:38)  Nephplex Rx oral tablet: 1 tab(s) orally once a day (11 Oct 2018 08:39)  nortriptyline 50 mg oral capsule: 1 cap(s) orally once a day (11 Oct 2018 08:39)  Renvela 800 mg oral tablet: 2 tab(s) orally every 8 hours (11 Oct 2018 08:39)  simethicone 80 mg oral tablet: 1 tab(s) orally 3 times a day (after meals) (11 Oct 2018 08:39)  torsemide 20 mg oral tablet: 1 tab(s) orally once a day (11 Oct 2018 08:39)  vitamin A: 1 tab(s) orally once a day (11 Oct 2018 08:38)  Vitamin B Complex: 1 tab(s) orally once a day (11 Oct 2018 08:38)  Vitamin C 500 mg oral tablet: 1 tab(s) orally once a day (11 Oct 2018 08:38)      MEDICATIONS  (STANDING):  aspirin enteric coated 81 milliGRAM(s) Oral daily  dexmedetomidine Infusion 0.5 MICROgram(s)/kG/Hr (7.375 mL/Hr) IV Continuous <Continuous>  dextrose 5%. 1000 milliLiter(s) (50 mL/Hr) IV Continuous <Continuous>  dextrose 50% Injectable 12.5 Gram(s) IV Push once  dextrose 50% Injectable 25 Gram(s) IV Push once  dextrose 50% Injectable 25 Gram(s) IV Push once  docusate sodium 100 milliGRAM(s) Oral three times a day  heparin  Injectable 5000 Unit(s) SubCutaneous every 12 hours  insulin lispro (HumaLOG) corrective regimen sliding scale   SubCutaneous Before meals and at bedtime  ipratropium    for Nebulization 500 MICROGram(s) Nebulizer every 6 hours  midodrine 20 milliGRAM(s) Oral every 8 hours  pantoprazole  Injectable 40 milliGRAM(s) IV Push daily  piperacillin/tazobactam IVPB. 3.375 Gram(s) IV Intermittent every 12 hours  vasopressin Infusion 0.05 Unit(s)/Min (3 mL/Hr) IV Continuous <Continuous>    MEDICATIONS  (PRN):  bisacodyl Suppository 10 milliGRAM(s) Rectal daily PRN Constipation  dextrose 40% Gel 15 Gram(s) Oral once PRN Blood Glucose LESS THAN 70 milliGRAM(s)/deciliter  glucagon  Injectable 1 milliGRAM(s) IntraMuscular once PRN Glucose LESS THAN 70 milligrams/deciliter          Physical exam:     General:               Pt is alert, following commands with periods of agitation. Overall progressing in the right direction                                                  Neuro:                 Nonfocal                             Cardiovascular:     S1 & S2, regular                           Respiratory:         Air entry is decreased on right side, has bilateral conducted sounds                           GI:                          Soft, nondistended and nontender, Bowel sounds active                            Ext:                        No cyanosis,  Left upper arm is slightly swollen & ecchymotic but has good dopplers and non-tender                               Labs:                                                                           9.6    9.58  )-----------( 305      ( 21 Oct 2018 04:15 )             30.7             10-21    138  |  98  |  31<H>  ----------------------------<  108<H>  3.5   |  24  |  4.58<H>    Ca    9.2      21 Oct 2018 04:15  Phos  6.2     10-21  Mg     2.7     10-21    TPro  6.3  /  Alb  2.8<L>  /  TBili  0.9  /  DBili  x   /  AST  17  /  ALT  < 4<L>  /  AlkPhos  181<H>  10-20  Free Thyroxine, Serum in AM (10.21.18 @ 04:15)    Free Thyroxine, Serum: 0.88 ng/dL  Thyroid Stimulating Hormone, Serum (10.21.18 @ 04:15)    Thyroid Stimulating Hormone, Serum: 4.79 uIU/mL                         LIVER FUNCTIONS - ( 20 Oct 2018 04:15 )  Alb: 2.8 g/dL / Pro: 6.3 g/dL / ALK PHOS: 181 u/L / ALT: < 4 u/L / AST: 17 u/L / GGT: x                 CXR:    < from: Xray Chest 1 View- PORTABLE-Routine (10.20.18 @ 06:32) >  Right IJ central line remains in place.    Stable previously seen left chest wall SICD and cardiomegaly.    Stable lower right hemithorax postsurgical changes including presence of   a curved pleural chest tube.    No pneumothorax.    Remainder of exam unchanged.        Plan:    General: 58yMale s/p   Right thoracotomy, resection of portion of R 7th rib, evacuation of infected hemothorax, takedown of pleurocutaneous fistula  / R chest wall reconstruction with tunneled latissimus dorsi flap, covered by serratus anterior flap. Remains hypotensive and on vent support                            Neuro:                                         Pain control with F Tylenol IV    Avoid narcotics because of CO2 retention    On / Off Precedex, will restart Nortriptyline                             Cardiovascular:                                          Continue hemodynamic monitoring.    Hypotension / Shock: Continue Quan /Vasopressin - Titrate to MAP>65    A-fib: Off Amio, remains in NSR    Acute on chronic systolic heart failure - Off inotropic support,   CVP is 8  this morning - dialyze as scheduled                            Respiratory:                                         Pt is on / off Bipap                                         Co2 is around 50s with respiratory acidosis. Continue aggressive chest PT / ENCOURAGE INCENTIVE SPIROMETRY, BPAP as needed                                         Monitor chest tube output- D/Cd Anterior & posterior  chest tubes                                         Chest tube to water seal                                                                 Continue bronchodilators, pulmonary toilet     VAC d/cd                            GI                                          Continue GI prophylaxis with Protonix                                          Continue Zofran / Reglan for nausea - PRN	      Tolerating dysphagia diet                                                                 Renal:                                         Monitor I/Os and electrolytes                                         ESRD: HD as scheduled                                                 Hem/ Onc:                                         Anemia: Transfused 2 Uts PRBC during HD on 10/17. Hct is stable                                         Monitor chest tube output &  signs of bleeding.                                          Follow CBC in AM     Thrombocytopenia - Resolved. HIT -ve                           Infectious disease:      Infected hematoma - growing E.Fecalis - Continue Zosyn                                          Monitor for fever / leukocytosis.                                          All surgical incision / chest tube  sites look clean                            Endocrine       T4/TSH most likely Euthyroid sick syndrome.                                           Continue Accu-Checks with coverage for now. HbA1C is 5.5, if blood sugars are ok, will stop accu-checks    Pt is on SQ Heparin and Venodyne boots for DVT prophylaxis.     Pertinent clinical, laboratory, radiographic, hemodynamic, echocardiographic, respiratory data, microbiologic data and chart were reviewed and analyzed frequently throughout the course of the day and night  Patient seen, examined and plan discussed with CT Surgeon Dr. Ledesma / CTICU team during rounds.    Pt's status discussed with family at bedside, updated status.     I have spent 45  minutes of critical care time with this pt between 12am  and 8am                Ralph Warren MD

## 2018-10-21 NOTE — PROGRESS NOTE ADULT - ASSESSMENT
Assessment/Plan:  Patient is a 58y old  Male who presents with a chief complaint of infected hemothorax (18 Oct 2018 17:39), s/p washout of pleural space and latissimus dorsi flap  - continue staples  - continue drain  - activity per icu/primary team  - DVT ppx

## 2018-10-21 NOTE — PROGRESS NOTE ADULT - SUBJECTIVE AND OBJECTIVE BOX
Infectious Diseases progress note:    Subjective: NAD, pt c/o cough, and expectorating phlegm.  No new fevers or leukocytosis.  Family member at bedside.    ROS:  CONSTITUTIONAL:  No fever, chills, rigors  CARDIOVASCULAR:  No chest pain or palpitations  RESPIRATORY:   (+) cough  GASTROINTESTINAL:  No abd pain, N/V, diarrhea/constipation  EXTREMITIES:  No swelling or joint pain  GENITOURINARY:  No burning on urination, increased frequency or urgency.  No flank pain  NEUROLOGIC:  No HA, visual disturbances  SKIN: No rashes    Allergies    No Known Allergies    Intolerances        ANTIBIOTICS/RELEVANT:  antimicrobials  piperacillin/tazobactam IVPB. 3.375 Gram(s) IV Intermittent every 12 hours    immunologic:    OTHER:  aspirin enteric coated 81 milliGRAM(s) Oral daily  bisacodyl Suppository 10 milliGRAM(s) Rectal daily PRN  dexmedetomidine Infusion 0.5 MICROgram(s)/kG/Hr IV Continuous <Continuous>  dextrose 40% Gel 15 Gram(s) Oral once PRN  dextrose 5%. 1000 milliLiter(s) IV Continuous <Continuous>  dextrose 50% Injectable 12.5 Gram(s) IV Push once  dextrose 50% Injectable 25 Gram(s) IV Push once  dextrose 50% Injectable 25 Gram(s) IV Push once  docusate sodium 100 milliGRAM(s) Oral three times a day  glucagon  Injectable 1 milliGRAM(s) IntraMuscular once PRN  heparin  Injectable 5000 Unit(s) SubCutaneous every 12 hours  insulin lispro (HumaLOG) corrective regimen sliding scale   SubCutaneous Before meals and at bedtime  ipratropium    for Nebulization 500 MICROGram(s) Nebulizer every 6 hours  memantine 10 milliGRAM(s) Oral two times a day  midodrine 20 milliGRAM(s) Oral every 8 hours  nortriptyline 25 milliGRAM(s) Oral at bedtime  pantoprazole  Injectable 40 milliGRAM(s) IV Push daily  vasopressin Infusion 0.05 Unit(s)/Min IV Continuous <Continuous>      Objective:  Vital Signs Last 24 Hrs  T(C): 36.9 (21 Oct 2018 12:00), Max: 36.9 (21 Oct 2018 00:00)  T(F): 98.5 (21 Oct 2018 12:00), Max: 98.5 (21 Oct 2018 12:00)  HR: 110 (21 Oct 2018 12:00) (92 - 127)  BP: 129/76 (21 Oct 2018 11:00) (102/58 - 129/76)  BP(mean): 86 (21 Oct 2018 11:00) (68 - 86)  RR: 18 (21 Oct 2018 12:00) (15 - 26)  SpO2: 100% (21 Oct 2018 12:00) (93% - 100%)    PHYSICAL EXAM:  Constitutional:NAD  Eyes:CHER, EOMI  Ear/Nose/Throat: no thrush, mucositis.  Moist mucous membranes	  Neck:no JVD, no lymphadenopathy, supple  Respiratory: CTA adore, chest tube x 1  Cardiovascular: S1S2 RRR, no murmurs  Gastrointestinal:soft, nontender,  nondistended (+) BS  Extremities:no e/e/c  Skin:  no rashes, open wounds or ulcerations.  rt post thorax wound with staples in place, no SOI        LABS:                        9.6    9.58  )-----------( 305      ( 21 Oct 2018 04:15 )             30.7     10-21    138  |  98  |  31<H>  ----------------------------<  108<H>  3.5   |  24  |  4.58<H>    Ca    9.2      21 Oct 2018 04:15  Phos  6.2     10-21  Mg     2.7     10-21    TPro  6.3  /  Alb  2.8<L>  /  TBili  0.9  /  DBili  x   /  AST  17  /  ALT  < 4<L>  /  AlkPhos  181<H>  10-20            Vancomycin Level, Random:  ug/mL (10-17 @ 13:50)                  MICROBIOLOGY:    Culture - Blood (10.15.18 @ 18:59)    Culture - Blood:   NO ORGANISMS ISOLATED    Specimen Source: BLOOD PERIPHERAL    Culture - Blood (10.15.18 @ 18:59)    Culture - Blood:   NO ORGANISMS ISOLATED    Specimen Source: BLOOD PERIPHERAL          RADIOLOGY & ADDITIONAL STUDIES:    < from: Xray Chest 1 View- PORTABLE-Routine (10.21.18 @ 06:22) >  IMPRESSION:   Right-sided chest tube. Additional right-sided subcutaneous drain does   not appear to overlie the lung fields. Small partially loculated right   pleural effusion.     < end of copied text >

## 2018-10-21 NOTE — PROGRESS NOTE ADULT - PROBLEM SELECTOR PLAN 1
ESRD on HD for past six years through left upper extremity AVF. CVVHDF discontinued on 10/15/18. Patient had HD on 10/19 with total of 2.4 L UF achieved. Labs reviewed from today and are acceptable. Patient does not appear to be in fluid overload. No plan for HD today. Will arrange for maintenance HD tomorrow. Monitor BMP daily.

## 2018-10-21 NOTE — PROGRESS NOTE ADULT - SUBJECTIVE AND OBJECTIVE BOX
CLAUDIA HAN  [id number]    Subjective:  Patient is a 58y old  Male who presents with a chief complaint of Infected hematoma (19 Oct 2018 15:26)   Patient was seen and examined at bedside. No acute events overnight. In good spirits.     T(C): 36.5 (10-21-18 @ 04:00), Max: 36.9 (10-20-18 @ 12:00)  HR: 112 (10-21-18 @ 07:00) (88 - 127)  BP: 102/58 (10-20-18 @ 20:00) (102/58 - 102/58)  BP(mean): 68 (10-20-18 @ 20:00) (68 - 68)  ABP: 116/53 (10-21-18 @ 07:00) (88/40 - 128/59)  ABP(mean): 73 (10-21-18 @ 07:00) (56 - 78)  RR: 22 (10-21-18 @ 07:00) (15 - 26)  SpO2: 99% (10-21-18 @ 07:00) (93% - 100%)  Wt(kg): --  CVP(mm Hg): 5 (10-21-18 @ 04:00) (1 - 10)  CI: 5 (10-21-18 @ 04:00) (4.9 - 6.1)  CAPILLARY BLOOD GLUCOSE      POCT Blood Glucose.: 101 mg/dL (21 Oct 2018 07:39)  POCT Blood Glucose.: 107 mg/dL (20 Oct 2018 21:32)  POCT Blood Glucose.: 113 mg/dL (20 Oct 2018 16:17)  POCT Blood Glucose.: 146 mg/dL (20 Oct 2018 11:40)   N/A      10-20 @ 07:01  -  10-21 @ 07:00  --------------------------------------------------------  IN:    dexmedetomidine Infusion: 17.1 mL    IV PiggyBack: 300 mL    Oral Fluid: 710 mL    sodium chloride 0.9%: 30 mL    vasopressin Infusion: 7.2 mL  Total IN: 1064.3 mL    OUT:    Bulb: 50 mL    Bulb: 80 mL    Chest Tube: 180 mL  Total OUT: 310 mL    Total NET: 754.3 mL            PHYSICAL EXAM:    General: NAD  Chest: Incisions intact with staples, JPx2 SS to self suction, no collections palpated            MEDICATIONS  (STANDING):  ALBUTerol/ipratropium for Nebulization 3 milliLiter(s) Nebulizer every 6 hours  aspirin Suppository 300 milliGRAM(s) Rectal daily  dexmedetomidine Infusion 0.5 MICROgram(s)/kG/Hr (7.375 mL/Hr) IV Continuous <Continuous>  dextrose 5%. 1000 milliLiter(s) (50 mL/Hr) IV Continuous <Continuous>  dextrose 50% Injectable 12.5 Gram(s) IV Push once  dextrose 50% Injectable 25 Gram(s) IV Push once  dextrose 50% Injectable 25 Gram(s) IV Push once  insulin lispro (HumaLOG) corrective regimen sliding scale   SubCutaneous every 6 hours  midodrine 10 milliGRAM(s) Oral every 8 hours  pantoprazole  Injectable 40 milliGRAM(s) IV Push daily  phenylephrine    Infusion 1 MICROgram(s)/kG/Min (11.063 mL/Hr) IV Continuous <Continuous>  piperacillin/tazobactam IVPB. 3.375 Gram(s) IV Intermittent every 12 hours  sodium chloride 0.9%. 1000 milliLiter(s) (30 mL/Hr) IV Continuous <Continuous>  sodium chloride 3%  Inhalation 4 milliLiter(s) Inhalation every 6 hours  vasopressin Infusion 0.05 Unit(s)/Min (3 mL/Hr) IV Continuous <Continuous>    MEDICATIONS  (PRN):  acetaminophen  IVPB .. 1000 milliGRAM(s) IV Intermittent once PRN Temp greater or equal to 38C (100.4F), Moderate Pain (4 - 6)  bisacodyl Suppository 10 milliGRAM(s) Rectal daily PRN Constipation  dextrose 40% Gel 15 Gram(s) Oral once PRN Blood Glucose LESS THAN 70 milliGRAM(s)/deciliter  glucagon  Injectable 1 milliGRAM(s) IntraMuscular once PRN Glucose LESS THAN 70 milligrams/deciliter

## 2018-10-21 NOTE — PROGRESS NOTE ADULT - SUBJECTIVE AND OBJECTIVE BOX
[facility] Division of Kidney Diseases & Hypertension  FOLLOW UP NOTE  [phone]--------------------------------------------------------------------------------    HPI: 57yo Mandarin speaking M w/ Hx of NICM w/ ICD, ESRD on HD TTS, former smoker, BPH who was admitted for scheduled right thoracotomy w/ decortication due to infected hemothorax and chest reconstruction with CT Surgery. Pt underwent procedure on 10/11/18, and afterwards developed shock, thought to be cardiogenic. Pt being monitored in the CTICU. Renal was called for management of pts ESRD. Pt was started on CVVHDF on 10/13/18. Non-tunneled catheter non-functional and removed on 10/15/18. Patient had session of HD on 10/19 with 2.4 L UF achieved. Currently patient is sitting up in chair and denies any complaints.       PAST HISTORY  --------------------------------------------------------------------------------  No significant changes to PMH, PSH, FHx, SHx, unless otherwise noted    ALLERGIES & MEDICATIONS  --------------------------------------------------------------------------------  Allergies    No Known Allergies    Intolerances      Standing Inpatient Medications  aspirin enteric coated 81 milliGRAM(s) Oral daily  dexmedetomidine Infusion 0.5 MICROgram(s)/kG/Hr IV Continuous <Continuous>  dextrose 5%. 1000 milliLiter(s) IV Continuous <Continuous>  dextrose 50% Injectable 12.5 Gram(s) IV Push once  dextrose 50% Injectable 25 Gram(s) IV Push once  dextrose 50% Injectable 25 Gram(s) IV Push once  docusate sodium 100 milliGRAM(s) Oral three times a day  heparin  Injectable 5000 Unit(s) SubCutaneous every 12 hours  insulin lispro (HumaLOG) corrective regimen sliding scale   SubCutaneous Before meals and at bedtime  ipratropium    for Nebulization 500 MICROGram(s) Nebulizer every 6 hours  memantine 10 milliGRAM(s) Oral two times a day  midodrine 20 milliGRAM(s) Oral every 8 hours  nortriptyline 25 milliGRAM(s) Oral at bedtime  pantoprazole  Injectable 40 milliGRAM(s) IV Push daily  piperacillin/tazobactam IVPB. 3.375 Gram(s) IV Intermittent every 12 hours  vasopressin Infusion 0.05 Unit(s)/Min IV Continuous <Continuous>    PRN Inpatient Medications  bisacodyl Suppository 10 milliGRAM(s) Rectal daily PRN  dextrose 40% Gel 15 Gram(s) Oral once PRN  glucagon  Injectable 1 milliGRAM(s) IntraMuscular once PRN      REVIEW OF SYSTEMS  --------------------------------------------------------------------------------    Respiratory: No dyspnea,  CV: No chest pain  GI: No abdominal pain, nausea, vomiting  MSK: no edema  Neuro: No dizziness/lightheadedness    All other systems were reviewed and are negative, except as noted.    VITALS/PHYSICAL EXAM  --------------------------------------------------------------------------------  T(C): 36.5 (10-21-18 @ 04:00), Max: 36.9 (10-20-18 @ 12:00)  HR: 112 (10-21-18 @ 07:00) (88 - 127)  BP: 102/58 (10-20-18 @ 20:00) (102/58 - 102/58)  RR: 22 (10-21-18 @ 07:00) (15 - 26)  SpO2: 99% (10-21-18 @ 07:00) (89% - 100%)  Wt(kg): --        10-20-18 @ 07:01  -  10-21-18 @ 07:00  --------------------------------------------------------  IN: 1064.3 mL / OUT: 310 mL / NET: 754.3 mL      Physical Exam:  	  Gen: NAD  	Pulm: CTA B/L, + chest tube drains  	CV: RRR, S1S2; no rub  	Abd: +BS, soft  	LE: Warm, no edema  	Skin: Warm  	Vascular access:  LUE AVF site: +thrill +bruit +skin intact    LABS/STUDIES  --------------------------------------------------------------------------------              9.6    9.58  >-----------<  305      [10-21-18 @ 04:15]              30.7     138  |  98  |  31  ----------------------------<  108      [10-21-18 @ 04:15]  3.5   |  24  |  4.58        Ca     9.2     [10-21-18 @ 04:15]      iCa    1.25     [10-21 @ 04:15]      Mg     2.7     [10-21-18 @ 04:15]      Phos  6.2     [10-21-18 @ 04:15]    TPro  6.3  /  Alb  2.8  /  TBili  0.9  /  DBili  x   /  AST  17  /  ALT  < 4  /  AlkPhos  181  [10-20-18 @ 04:15]          Creatinine Trend:  SCr 4.58 [10-21 @ 04:15]  SCr 2.90 [10-20 @ 04:15]  SCr 2.72 [10-19 @ 05:20]  SCr 2.61 [10-18 @ 04:15]  SCr 3.31 [10-17 @ 04:20]        TSH 4.79      [10-21-18 @ 04:15]

## 2018-10-21 NOTE — PROGRESS NOTE ADULT - ASSESSMENT
Pt is a 58 yr old Mandarin speaking male scheduled for Right Thoractomy Decortiation, Right Chest Wall reconstruction with Latissimjus Dorsi Flap Poss Skin Graft with VAC dressing with Dr Pickering 10/11/18. Pt has ICD but unable to identify make or model. Pt hx of ESRD with HD T/Th/Sat for 4 hrs and has stated he has stopped some of his meds but cannot say which ones. Pt seen and received [facility] and [facility] but has 2 different med lists and is unable to identify meds taking. Pt denies blood thinners. Pt has stopped ASA as of last week. Pt hx gathered from [employer] and notes from Dr Pickering - pt is poor historian.     Call through  to patient who went to local pharmacy for confirmation of meds and pt given preop instructions (01 Oct 2018 09:25)    Pt has multiple comorbidities including CHF, EF of 10%, renal failure on dialysis,  who has had chronic bilateral pleural effusions.  The patient previously underwent left VATS and PleurX drains  in 2015.  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  During this admission pt underwent OR with Plastic and Thoracic surgery,  for definitive repair that involved thoracotomy, decortication, excision of empyema cavity along with muscle flap reconstruction. Pt underwent surgery on 10/11 (Right thoracotomy, resection of portion of R 7th rib, evacuation of infected hemothorax, takedown of pleurocutaneous fistula - and noted to have foul smelling hematoma).  OR cultures are growing Enterococcus faecalis.    Pt started on empiric vanco/zosyn/flagyl since 10/11.  He has multiple right  chest tubes/ drains in place and remains on vent support. Pt is on ESRD.  He has had intermittent episodes of hypothermia, and low bp (85/44). ID consult called for further antibiotic management.     Problem/Plan - 1:    ·	Infected right hemothorax    - Cont broad spectrum abx for now - agree with zosyn.  Thus far, OR cultures growing sensitive E.faecalis (to amp/vanco).  Renally dose abx, can cont zosyn 3.375 gm IV q12 for HD.      - Can d/c further vanco dosing, no MRSA identified from any of the cultures.  BCX (-) x 2 from 10/15      - Cont wound vac, local wound care, and chest tube drain managment as per CT icu.  s/p removal of chest tube x 2.  Pt with small loculated pleural effusion on cxr.    * No new ID recs at this time.    Will follow,    Anabel Chahal  [phone]

## 2018-10-22 LAB
ALBUMIN SERPL ELPH-MCNC: 2.5 G/DL — LOW (ref 3.3–5)
ALP SERPL-CCNC: 167 U/L — HIGH (ref 40–120)
ALT FLD-CCNC: 5 U/L — SIGNIFICANT CHANGE UP (ref 4–41)
APTT BLD: 30.8 SEC — SIGNIFICANT CHANGE UP (ref 27.5–37.4)
AST SERPL-CCNC: 17 U/L — SIGNIFICANT CHANGE UP (ref 4–40)
BASE EXCESS BLDA CALC-SCNC: -2.1 MMOL/L — SIGNIFICANT CHANGE UP
BILIRUB SERPL-MCNC: 0.7 MG/DL — SIGNIFICANT CHANGE UP (ref 0.2–1.2)
BUN SERPL-MCNC: 44 MG/DL — HIGH (ref 7–23)
CA-I BLDA-SCNC: 1.19 MMOL/L — SIGNIFICANT CHANGE UP (ref 1.15–1.29)
CALCIUM SERPL-MCNC: 8.9 MG/DL — SIGNIFICANT CHANGE UP (ref 8.4–10.5)
CHLORIDE SERPL-SCNC: 97 MMOL/L — LOW (ref 98–107)
CO2 SERPL-SCNC: 22 MMOL/L — SIGNIFICANT CHANGE UP (ref 22–31)
CREAT SERPL-MCNC: 5.8 MG/DL — HIGH (ref 0.5–1.3)
GLUCOSE BLDA-MCNC: 132 MG/DL — HIGH (ref 70–99)
GLUCOSE SERPL-MCNC: 129 MG/DL — HIGH (ref 70–99)
HCO3 BLDA-SCNC: 22 MMOL/L — SIGNIFICANT CHANGE UP (ref 22–26)
HCT VFR BLD CALC: 32.5 % — LOW (ref 39–50)
HCT VFR BLDA CALC: 31.9 % — LOW (ref 39–51)
HGB BLD-MCNC: 10.3 G/DL — LOW (ref 13–17)
HGB BLDA-MCNC: 10.3 G/DL — LOW (ref 13–17)
INR BLD: 0.96 — SIGNIFICANT CHANGE UP (ref 0.88–1.17)
LACTATE BLDA-SCNC: 1.1 MMOL/L — SIGNIFICANT CHANGE UP (ref 0.5–2)
MAGNESIUM SERPL-MCNC: 2.9 MG/DL — HIGH (ref 1.6–2.6)
MCHC RBC-ENTMCNC: 30.7 PG — SIGNIFICANT CHANGE UP (ref 27–34)
MCHC RBC-ENTMCNC: 31.7 % — LOW (ref 32–36)
MCV RBC AUTO: 97 FL — SIGNIFICANT CHANGE UP (ref 80–100)
NRBC # FLD: 0.02 — SIGNIFICANT CHANGE UP
PCO2 BLDA: 51 MMHG — HIGH (ref 35–48)
PH BLDA: 7.29 PH — LOW (ref 7.35–7.45)
PHOSPHATE SERPL-MCNC: 6.2 MG/DL — HIGH (ref 2.5–4.5)
PLATELET # BLD AUTO: 347 K/UL — SIGNIFICANT CHANGE UP (ref 150–400)
PMV BLD: 9.3 FL — SIGNIFICANT CHANGE UP (ref 7–13)
PO2 BLDA: 159 MMHG — HIGH (ref 83–108)
POTASSIUM BLDA-SCNC: 3.2 MMOL/L — LOW (ref 3.4–4.5)
POTASSIUM SERPL-MCNC: 3.3 MMOL/L — LOW (ref 3.5–5.3)
POTASSIUM SERPL-SCNC: 3.3 MMOL/L — LOW (ref 3.5–5.3)
PROT SERPL-MCNC: 6.1 G/DL — SIGNIFICANT CHANGE UP (ref 6–8.3)
PROTHROM AB SERPL-ACNC: 11 SEC — SIGNIFICANT CHANGE UP (ref 9.8–13.1)
RBC # BLD: 3.35 M/UL — LOW (ref 4.2–5.8)
RBC # FLD: 20.1 % — HIGH (ref 10.3–14.5)
SAO2 % BLDA: 99.2 % — HIGH (ref 95–99)
SODIUM BLDA-SCNC: 132 MMOL/L — LOW (ref 136–146)
SODIUM SERPL-SCNC: 137 MMOL/L — SIGNIFICANT CHANGE UP (ref 135–145)
WBC # BLD: 13.81 K/UL — HIGH (ref 3.8–10.5)
WBC # FLD AUTO: 13.81 K/UL — HIGH (ref 3.8–10.5)

## 2018-10-22 PROCEDURE — 99291 CRITICAL CARE FIRST HOUR: CPT

## 2018-10-22 PROCEDURE — 99232 SBSQ HOSP IP/OBS MODERATE 35: CPT

## 2018-10-22 PROCEDURE — 71045 X-RAY EXAM CHEST 1 VIEW: CPT | Mod: 26

## 2018-10-22 PROCEDURE — 99233 SBSQ HOSP IP/OBS HIGH 50: CPT | Mod: GC

## 2018-10-22 RX ORDER — ALBUMIN HUMAN 25 %
250 VIAL (ML) INTRAVENOUS ONCE
Qty: 0 | Refills: 0 | Status: COMPLETED | OUTPATIENT
Start: 2018-10-22 | End: 2018-10-22

## 2018-10-22 RX ORDER — ACETAMINOPHEN 500 MG
1000 TABLET ORAL ONCE
Qty: 0 | Refills: 0 | Status: COMPLETED | OUTPATIENT
Start: 2018-10-22 | End: 2018-10-22

## 2018-10-22 RX ORDER — MIDODRINE HYDROCHLORIDE 2.5 MG/1
10 TABLET ORAL EVERY 8 HOURS
Qty: 0 | Refills: 0 | Status: DISCONTINUED | OUTPATIENT
Start: 2018-10-22 | End: 2018-10-30

## 2018-10-22 RX ORDER — OXYCODONE HYDROCHLORIDE 5 MG/1
5 TABLET ORAL EVERY 4 HOURS
Qty: 0 | Refills: 0 | Status: DISCONTINUED | OUTPATIENT
Start: 2018-10-22 | End: 2018-10-27

## 2018-10-22 RX ORDER — POTASSIUM CHLORIDE 20 MEQ
10 PACKET (EA) ORAL ONCE
Qty: 0 | Refills: 0 | Status: COMPLETED | OUTPATIENT
Start: 2018-10-22 | End: 2018-10-22

## 2018-10-22 RX ADMIN — Medication 1000 MILLIGRAM(S): at 12:30

## 2018-10-22 RX ADMIN — MEMANTINE HYDROCHLORIDE 10 MILLIGRAM(S): 10 TABLET ORAL at 05:23

## 2018-10-22 RX ADMIN — Medication 1: at 18:14

## 2018-10-22 RX ADMIN — MIDODRINE HYDROCHLORIDE 10 MILLIGRAM(S): 2.5 TABLET ORAL at 17:23

## 2018-10-22 RX ADMIN — Medication 1000 MILLIGRAM(S): at 00:15

## 2018-10-22 RX ADMIN — MIDODRINE HYDROCHLORIDE 10 MILLIGRAM(S): 2.5 TABLET ORAL at 21:05

## 2018-10-22 RX ADMIN — Medication 400 MILLIGRAM(S): at 00:00

## 2018-10-22 RX ADMIN — Medication 500 MILLILITER(S): at 12:15

## 2018-10-22 RX ADMIN — PANTOPRAZOLE SODIUM 40 MILLIGRAM(S): 20 TABLET, DELAYED RELEASE ORAL at 12:32

## 2018-10-22 RX ADMIN — PIPERACILLIN AND TAZOBACTAM 200 GRAM(S): 4; .5 INJECTION, POWDER, LYOPHILIZED, FOR SOLUTION INTRAVENOUS at 21:04

## 2018-10-22 RX ADMIN — PIPERACILLIN AND TAZOBACTAM 200 GRAM(S): 4; .5 INJECTION, POWDER, LYOPHILIZED, FOR SOLUTION INTRAVENOUS at 12:31

## 2018-10-22 RX ADMIN — Medication 500 MICROGRAM(S): at 09:03

## 2018-10-22 RX ADMIN — Medication 400 MILLIGRAM(S): at 11:56

## 2018-10-22 RX ADMIN — Medication 500 MICROGRAM(S): at 03:45

## 2018-10-22 RX ADMIN — HEPARIN SODIUM 5000 UNIT(S): 5000 INJECTION INTRAVENOUS; SUBCUTANEOUS at 18:01

## 2018-10-22 RX ADMIN — MEMANTINE HYDROCHLORIDE 10 MILLIGRAM(S): 10 TABLET ORAL at 18:02

## 2018-10-22 RX ADMIN — OXYCODONE HYDROCHLORIDE 5 MILLIGRAM(S): 5 TABLET ORAL at 19:00

## 2018-10-22 RX ADMIN — Medication 500 MICROGRAM(S): at 21:12

## 2018-10-22 RX ADMIN — MIDODRINE HYDROCHLORIDE 20 MILLIGRAM(S): 2.5 TABLET ORAL at 05:23

## 2018-10-22 RX ADMIN — Medication 100 MILLIGRAM(S): at 21:04

## 2018-10-22 RX ADMIN — Medication 100 MILLIGRAM(S): at 05:23

## 2018-10-22 RX ADMIN — HEPARIN SODIUM 5000 UNIT(S): 5000 INJECTION INTRAVENOUS; SUBCUTANEOUS at 05:23

## 2018-10-22 RX ADMIN — OXYCODONE HYDROCHLORIDE 5 MILLIGRAM(S): 5 TABLET ORAL at 18:02

## 2018-10-22 RX ADMIN — Medication 81 MILLIGRAM(S): at 12:31

## 2018-10-22 RX ADMIN — Medication 500 MICROGRAM(S): at 16:12

## 2018-10-22 RX ADMIN — Medication 100 MILLIEQUIVALENT(S): at 04:18

## 2018-10-22 RX ADMIN — Medication 100 GRAM(S): at 00:08

## 2018-10-22 RX ADMIN — NORTRIPTYLINE HYDROCHLORIDE 25 MILLIGRAM(S): 10 CAPSULE ORAL at 22:11

## 2018-10-22 NOTE — PROGRESS NOTE ADULT - SUBJECTIVE AND OBJECTIVE BOX
[facility] DIVISION OF KIDNEY DISEASES AND HYPERTENSION -- FOLLOW UP NOTE  --------------------------------------------------------------------------------    HPI: 57yo Mandarin speaking M w/ Hx of NICM w/ ICD, ESRD on HD TTS, former smoker, BPH who was admitted for scheduled right thoracotomy w/ decortication due to infected hemothorax and chest reconstruction with CT Surgery. Pt underwent procedure on 10/11/18, and afterwards developed shock, thought to be cardiogenic. Pt being monitored in the CTICU. Renal was called for management of pts ESRD. Pt was started on CVVHDF on 10/13/18. Non-tunneled catheter non-functional and removed on 10/15/18. Pt seen on HD today. Pt tolerated HD well.     PAST HISTORY  --------------------------------------------------------------------------------  No significant changes to PMH, PSH, FHx, SHx, unless otherwise noted    ALLERGIES & MEDICATIONS  --------------------------------------------------------------------------------  Allergies    No Known Allergies    Intolerances      Standing Inpatient Medications  aspirin enteric coated 81 milliGRAM(s) Oral daily  dextrose 5%. 1000 milliLiter(s) IV Continuous <Continuous>  dextrose 50% Injectable 12.5 Gram(s) IV Push once  dextrose 50% Injectable 25 Gram(s) IV Push once  dextrose 50% Injectable 25 Gram(s) IV Push once  docusate sodium 100 milliGRAM(s) Oral three times a day  heparin  Injectable 5000 Unit(s) SubCutaneous every 12 hours  insulin lispro (HumaLOG) corrective regimen sliding scale   SubCutaneous Before meals and at bedtime  ipratropium    for Nebulization 500 MICROGram(s) Nebulizer every 6 hours  memantine 10 milliGRAM(s) Oral two times a day  midodrine 10 milliGRAM(s) Oral every 8 hours  nortriptyline 25 milliGRAM(s) Oral at bedtime  pantoprazole  Injectable 40 milliGRAM(s) IV Push daily  piperacillin/tazobactam IVPB. 3.375 Gram(s) IV Intermittent every 12 hours    PRN Inpatient Medications  bisacodyl Suppository 10 milliGRAM(s) Rectal daily PRN  dextrose 40% Gel 15 Gram(s) Oral once PRN  glucagon  Injectable 1 milliGRAM(s) IntraMuscular once PRN      REVIEW OF SYSTEMS  --------------------------------------------------------------------------------  Unable to obtain     VITALS/PHYSICAL EXAM  --------------------------------------------------------------------------------  T(C): 36.4 (10-22-18 @ 07:45), Max: 36.9 (10-21-18 @ 12:00)  HR: 102 (10-22-18 @ 07:45) (93 - 136)  BP: 96/63 (10-22-18 @ 01:00) (64/47 - 129/76)  RR: 22 (10-22-18 @ 07:45) (16 - 28)  SpO2: 97% (10-22-18 @ 07:45) (85% - 100%)  Wt(kg): --    10-21-18 @ 07:01  -  10-22-18 @ 07:00  --------------------------------------------------------  IN: 1410 mL / OUT: 235 mL / NET: 1175 mL    Physical Exam:  	Gen: NAD  	Pulm: CTA B/L, + chest tube drains  	CV: RRR, S1S2; no rub  	Abd: +BS, soft  	LE: Warm, no edema  	Skin: Warm  	Vascular access:  LUE AVF site: +thrill +bruit +skin intact    LABS/STUDIES  --------------------------------------------------------------------------------              10.3   13.81 >-----------<  347      [10-22-18 @ 02:40]              32.5     137  |  97  |  44  ----------------------------<  129      [10-22-18 @ 02:40]  3.3   |  22  |  5.80        Ca     8.9     [10-22-18 @ 02:40]      iCa    1.25     [10-21 @ 04:15]      Mg     2.9     [10-22-18 @ 02:40]      Phos  6.2     [10-22-18 @ 02:40]    TPro  6.1  /  Alb  2.5  /  TBili  0.7  /  DBili  x   /  AST  17  /  ALT  5   /  AlkPhos  167  [10-22-18 @ 02:40]    PT/INR: PT 11.0 , INR 0.96       [10-22-18 @ 02:40]  PTT: 30.8       [10-22-18 @ 02:40]    Creatinine Trend:  SCr 5.80 [10-22 @ 02:40]  SCr 4.58 [10-21 @ 04:15]  SCr 2.90 [10-20 @ 04:15]  SCr 2.72 [10-19 @ 05:20]  SCr 2.61 [10-18 @ 04:15]    HbA1c 5.5      [10-12-18 @ 02:53]  TSH 4.79      [10-21-18 @ 04:15]    HBsAb 16.3      [10-13-18 @ 15:45]  HCV 0.21, Nonreactive Hepatitis C AB  S/CO Ratio                        Interpretation  < 1.0                                     Non-Reactive  1.0 - 4.9                           Weakly-Reactive  > 5.0                                 Reactive  Non-Reactive: Aperson with a non-reactive HCV antibody  result is considered uninfected.  No further action is  needed unless recent infection is suspected.  In these  cases, consider repeat testing later to detect  seroconversion..  Weakly-Reactive: HCV antibody test is abnormal, HCV RNA  Qualitative test will follow.  Reactive: HCV antibody test is abnormal, HCV RNA  Qualitative test will follow.  Note: HCV antibody testing is performed on the [employer]   system.      [10-13-18 @ 15:45]

## 2018-10-22 NOTE — PROGRESS NOTE ADULT - PROBLEM SELECTOR PROBLEM 3
ESRD (end stage renal disease) on dialysis
Hypervolemia
ESRD (end stage renal disease) on dialysis

## 2018-10-22 NOTE — PROGRESS NOTE ADULT - PROBLEM SELECTOR PLAN 4
E. Faecalis empyema.  Zosyn per primary ICU team.
E. Faecalis empyema.  Zosyn per primary ICU team.  Removal of chest tube when ok w/ CT surgery.
E. Faecalis empyema.  Vanco per level, zosyn and metronidazole per primary ICU team.
E. Faecalis empyema. Hypothermic 95.4.   Vanco per level, zosyn and metronidazole per primary ICU team.

## 2018-10-22 NOTE — PROGRESS NOTE ADULT - PROBLEM SELECTOR PLAN 1
NICM ? per chart.  HFrEF - LVEF 20%, severely dilated LV, LVIDD 7.3 cm. Moderate MR.  CVP 14 this AM pre HD. Please send central sat today.   Off pressors and inotrope. Agree to continue home dose midodrine.  Had HD today, removed 2.9 L. NICM ? per chart.  HFrEF - LVEF 20%, severely dilated LV, LVIDD 7.3 cm. Moderate MR.  CVP 14 this AM pre HD.   Off pressors and inotrope. Agree to continue home dose midodrine.  Had HD today, removed 2.9 L.  Consider restart low dose BB, Coreg 3.125 mg po BID.

## 2018-10-22 NOTE — PROGRESS NOTE ADULT - SUBJECTIVE AND OBJECTIVE BOX
CLAUDIA HAN          MRN-[id number]    HPI:  Pt is a 58 yr old Mandarin speaking male scheduled for Right Thoractomy Decortiation, Right Chest Wall reconstruction with Latissimjus Dorsi Flap Poss Skin Graft with VAC dressing with Dr Pickering 10/11/18. Pt has ICD but unable to identify make or model. Pt hx of ESRD with HD T/Th/Sat for 4 hrs and has stated he has stopped some of his meds but cannot say which ones. Pt seen and received MC and CC but has 2 different med lists and is unable to identify meds taking. Pt denies blood thinners. Pt has stopped ASA as of last week. Pt hx gathered from [employer] and notes from Dr Pickering - pt is poor historian.     Call through  to patient who went to local pharmacy for confirmation of meds and pt given preop instructions (01 Oct 2018 09:25)      Procedure:  POD # :     Issues:        Interval/Overnight Events/ ROS  Pt remained hemodynamically stable overnight, not on any pressors or inotropes. OOB to chair, breathing comfortably with minimal pain. Ambulated several times . Denies pain, no SOB, no palpitations, no nausea/ no vomiting, no dizziness  A-line and sharma d/kirsten         PAST MEDICAL & SURGICAL HISTORY:  Smoking hx  Cardiomyopathy  Wound: right chest  ICD (implantable cardioverter-defibrillator) in place  OA (osteoarthritis)  HLD (hyperlipidemia)  BPH (benign prostatic hyperplasia)  Pleural effusion  HTN (hypertension)  ESRD (end stage renal disease)  H/O chest wound: right  History of wound infection: right chest wall - revision 5/18 and again in 7/18  History of implantable cardioverter-defibrillator (ICD) placement: pt unsure when placed  H/O bilateral hip replacements: 2008, 2009    Allergies    No Known Allergies    Intolerances            ***VITAL SIGNS:  Vital Signs Last 24 Hrs  T(C): 36.7 (22 Oct 2018 00:00), Max: 36.9 (21 Oct 2018 08:00)  T(F): 98 (22 Oct 2018 00:00), Max: 98.5 (21 Oct 2018 12:00)  HR: 97 (22 Oct 2018 04:00) (93 - 136)  BP: 96/63 (22 Oct 2018 01:00) (64/47 - 129/76)  BP(mean): 70 (22 Oct 2018 01:00) (50 - 86)  RR: 16 (22 Oct 2018 04:00) (16 - 28)  SpO2: 100% (22 Oct 2018 04:00) (93% - 100%)    I/Os:   I&O's Detail    20 Oct 2018 07:01  -  21 Oct 2018 07:00  --------------------------------------------------------  IN:    dexmedetomidine Infusion: 17.1 mL    IV PiggyBack: 300 mL    Oral Fluid: 710 mL    sodium chloride 0.9%: 30 mL    vasopressin Infusion: 7.2 mL  Total IN: 1064.3 mL    OUT:    Bulb: 50 mL    Bulb: 80 mL    Chest Tube: 180 mL  Total OUT: 310 mL    Total NET: 754.3 mL      21 Oct 2018 07:01  -  22 Oct 2018 05:32  --------------------------------------------------------  IN:    IV PiggyBack: 500 mL    Oral Fluid: 600 mL    Sodium Chloride 0.9% IV Bolus: 250 mL  Total IN: 1350 mL    OUT:    Bulb: 25 mL    Bulb: 40 mL    Chest Tube: 80 mL  Total OUT: 145 mL    Total NET: 1205 mL          CAPILLARY BLOOD GLUCOSE      POCT Blood Glucose.: 107 mg/dL (22 Oct 2018 05:22)  POCT Blood Glucose.: 95 mg/dL (21 Oct 2018 22:40)  POCT Blood Glucose.: 125 mg/dL (21 Oct 2018 17:03)  POCT Blood Glucose.: 107 mg/dL (21 Oct 2018 11:28)  POCT Blood Glucose.: 101 mg/dL (21 Oct 2018 07:39)      =======================  MEDICATIONS  ===================  MEDICATIONS  (STANDING):  aspirin enteric coated 81 milliGRAM(s) Oral daily  dextrose 5%. 1000 milliLiter(s) (50 mL/Hr) IV Continuous <Continuous>  dextrose 50% Injectable 12.5 Gram(s) IV Push once  dextrose 50% Injectable 25 Gram(s) IV Push once  dextrose 50% Injectable 25 Gram(s) IV Push once  docusate sodium 100 milliGRAM(s) Oral three times a day  heparin  Injectable 5000 Unit(s) SubCutaneous every 12 hours  insulin lispro (HumaLOG) corrective regimen sliding scale   SubCutaneous Before meals and at bedtime  ipratropium    for Nebulization 500 MICROGram(s) Nebulizer every 6 hours  memantine 10 milliGRAM(s) Oral two times a day  midodrine 20 milliGRAM(s) Oral every 8 hours  nortriptyline 25 milliGRAM(s) Oral at bedtime  pantoprazole  Injectable 40 milliGRAM(s) IV Push daily  piperacillin/tazobactam IVPB. 3.375 Gram(s) IV Intermittent every 12 hours    MEDICATIONS  (PRN):  bisacodyl Suppository 10 milliGRAM(s) Rectal daily PRN Constipation  dextrose 40% Gel 15 Gram(s) Oral once PRN Blood Glucose LESS THAN 70 milliGRAM(s)/deciliter  glucagon  Injectable 1 milliGRAM(s) IntraMuscular once PRN Glucose LESS THAN 70 milligrams/deciliter      ======================VENTILATOR SETTINGS  ==============      =================== PATIENT CARE ACCESS DEVICES ==========  Peripheral IV  Central Venous Line	R	L	IJ	Fem	SC			Placed:   Arterial Line	R	L	PT	DP	Fem	Rad	Ax	Placed:   Midline:				  Urinary Catheter, Date Placed:   Necessity of urinary, arterial, and venous catheters discussed    ======================= PHYSICAL EXAM===================  General:                         Comfortable, Awake, alert, not in any distress  Neuro:                            Moving all extremities to commands. No focal deficits	  HEENT:                           CHER/ ETT/ NGT/ trach  Respiratory:	Lungs clear on auscultation bilaterally with good aeration.                                           No rales, rhonchi, no wheezing. Effort even and unlabored.  CV:		Regular rate and rhythm. Normal S1/S2. No murmurs  Abdomen:	                     Soft,  nontender, not-distended. Bowel sounds present / absent.   Skin:		No rash.  Extremities:	Warm, no cyanosis or edema.  Palpable pulses    ============================ LABS =======================                        10.3   13.81 )-----------( 347      ( 22 Oct 2018 02:40 )             32.5     10-22    137  |  97<L>  |  44<H>  ----------------------------<  129<H>  3.3<L>   |  22  |  5.80<H>    Ca    8.9      22 Oct 2018 02:40  Phos  6.2     10-22  Mg     2.9     10-22    TPro  6.1  /  Alb  2.5<L>  /  TBili  0.7  /  DBili  x   /  AST  17  /  ALT  5   /  AlkPhos  167<H>  10-22    LIVER FUNCTIONS - ( 22 Oct 2018 02:40 )  Alb: 2.5 g/dL / Pro: 6.1 g/dL / ALK PHOS: 167 u/L / ALT: 5 u/L / AST: 17 u/L / GGT: x           PT/INR - ( 22 Oct 2018 02:40 )   PT: 11.0 SEC;   INR: 0.96          PTT - ( 22 Oct 2018 02:40 )  PTT:30.8 SEC  ABG - ( 22 Oct 2018 02:45 )  pH, Arterial: 7.29  pH, Blood: x     /  pCO2: 51    /  pO2: 159   / HCO3: 22    / Base Excess: -2.1  /  SaO2: 99.2                BLOOD PERIPHERAL  10-15-18 --  --  --      OTHER  10-11-18 --  --  --      OTHER  10-11-18 --  --  --      TISSUE  10-11-18 --  --  --      TISSUE  10-11-18 --  --  --      TISSUE  10-11-18 --  --  Enterococcus faecalis      PLEURAL FLUID  10-11-18 --  --    GPCCH^Gram Pos Cocci in Chains  QUANTITY OF BACTERIA SEEN: MODERATE (3+)  GPCPR^Gram Pos Cocci in Pairs  QUANTITY OF BACTERIA SEEN: MODERATE (3+)  WBC^White Blood Cells  QNTY CELLS IN GRAM STAIN: FEW (2+)          ===================== IMAGING STUDIES ===================  Radiology personally reviewed.    ====================ASSESSMENT AND PLAN ================      ====================== NEUROLOGY=======================  Pain control with PCA / PCEA / Tylenol IV / Toradol / Percocet  Pt is on Precedex for agitation  Pt is sedated with Propofol / Fentanyl    ==================== RESPIRATORY========================  Pt is on            L nasal canula / Face tent____% FiO2  Comfortable, no evidence of distress.  Using incentive spirometry & doing                ml  Monitor chest tube output  Chest tube to suction / water seal	    Mechanical Ventilation:    Mechanical ventilator status assessed & settings reviewed  Continue bronchodilators, pulmonary toilet  Head of bed elevation to 30-40 degrees    ====================CARDIOVASCULAR=====================  Continue hemodynamic monitoring/ telemetry  Not on any pressors  Continue cardiovascular / antihypertensive medications    ===================== RENAL ============================  Continue LR 30CC/hr      D/C IVF  Monitor I/Os, BUN/ Cr  and electrolytes  D/C Sharma      Keep Sharma for UO monitoring  BPH: Continue Flomax/ Finasteride      ==================== GASTROINTESTINAL===================  On regular diet, tolerating well  Continue GI prophylaxis with Pepcid / Protonix  Continue Zofran / Reglan for nausea - PRN	  NPO    =======================    ENDOCRIN  =====================  Glycemic monitoring  F/S with coverage  ===================HEMATOLOGIC/ONCOLOGIC =============  Monitor chest tube output. No signs of active bleeding.   Follow CBC, coags  in AM  DVT prophylaxis with SCD, sc Heparin    ========================INFECTIOUS DISEASE===============  No signs of infection. Monitor for fever / leukocytosis.  All surgical incision / chest tube  sites look clean  D/C Sharma      Pertinent clinical, laboratory, radiographic, hemodynamic, echocardiographic, respiratory data, microbiologic data and chart were reviewed and analyzed frequently throughout the course of the day and night. GI and DVT prophylaxis, glycemic control, head of bed elevation and skin care issues were addressed.  Patient seen, examined and plan discussed with CT Surgery / CTICU team during rounds.  Pt remains critically ill in imminent risk of  deterioration and requires very careful cardio- pulmonary monitoring and support.    I have spent               minutes of critical care time with this pt between            am/pm    and               am/ pm         minutes spent on total encounter; more than 50% of the visit was spent counseling and/or coordinating care by the attending physician.        KERLINE Faith MD CLAUDIA HAN          MRN-[id number]    HPI:  Pt is a 58 yr old Mandarin speaking male scheduled for Right Thoractomy Decortiation, Right Chest Wall reconstruction with Latissimus Dorsi Flap, Poss Skin Graft with VAC dressing with Dr Pickering 10/11/18. Pt has ICD but unable to identify make or model. Pt hx of ESRD with HD T/Th/Sat for 4 hrs and has stated he has stopped some of his meds but cannot say which ones. Pt seen and received MC and CC but has 2 different med lists and is unable to identify meds taking. Pt denies blood thinners. Pt has stopped ASA as of last week. Pt hx gathered from [employer] and notes from Dr Pickering - pt is poor historian.      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/2018         Procedure: 10/11/18 Right thoracotomy, resection of portion of R 7th rib, evacuation of infected hemothorax, takedown of pleurocutaneous fistula  / R chest wall reconstruction with tunneled latissimus dorsi flap, covered by serratus anterior flap             Issues: Hypotension- on Midodrine  Acute on chronic systolic CHF ( EF 10 %), ICD in place  SOB  Infected right hemothorax - (+) Enterococcus faecalis              Hyperglycemia              ESRD on HD    intermittent agitation/mild dementia              severe deconditioning      Interval/Overnight Events/ ROS  Pt hypotensive through the surgery - on multiple pressors / inotropes ( Levo, Epi, Dobutamine). Required 4 units of PRBC in addition to colloids and crystalloids for  symptomatic  posthemorrhagic anemia during the surgery. Pt arrived in ICU orally intubated, sedated, on Levo, Dobut, Epi.    Overnight in ICU still on multiple pressors/ inotropes./ Vent support. Mixed resp/ metab acidosis minimally  improved Lactic acidemia- unchanged @ 2-3.. Pt will need HD today ( ESRD on HD - anuria)  On POD 1 weaned off Epi, Quan. Remains on Dobutamine, Levophed, Vasopressin; on POD 2 started  CVVHD. Oxygenation and acid base status  slightly   improved. Troponin is down to 88. Cardiology / CHF team on board to help in managing decompensated CHF/ cardiogenic shock. OR culture grew Enterococcus faecalis sensitive to Ampi/ Vanco  POD 3,4 decreasing doses of  Levophed, still  on Vaso/ Dobutamine. Trial of CPAP in Am aborted due to tachypnea and pt's distress.  Will attempt HD today in view of decreased requirement for pressors.  Pt tolerated full HD last evening ( 10/15/18), remains on Levo, Dobutamine, Vaso, vent support.  Pt remained hemodynamically labile overnight- POD #10 ,on Midodrine . OOB to chair, breathing comfortably with minimal pain. Ambulated  inside of the room with support.  Appears very deconditioned . Denies  SOB, no palpitations, no nausea/ no vomiting, no dizziness  Next HD planned for tomorrow( 10/22/18)    Pt extubated on 10/17/18  Overnight 10/21/18 intermittently restless with tachycardia -130 bpm for @ 15-20 minutes - received 2.5 mg IV Lopressor/ 250 ml NaCl and Tylenol IV for evident discomfort at chest tube site. Subsequently back in sinus rhythm , slept for few hours.      PAST MEDICAL & SURGICAL HISTORY:  Smoking hx  Cardiomyopathy  Wound: right chest  ICD (implantable cardioverter-defibrillator) in place  OA (osteoarthritis)  HLD (hyperlipidemia)  BPH (benign prostatic hyperplasia)  Pleural effusion  HTN (hypertension)  ESRD (end stage renal disease)  H/O chest wound: right  History of wound infection: right chest wall - revision 5/18 and again in 7/18  History of implantable cardioverter-defibrillator (ICD) placement: pt unsure when placed  H/O bilateral hip replacements: 2008, 2009    Home Medications:  aspirin 81 mg oral tablet: 1 tab(s) orally once a day last dose 10/3/2018 (11 Oct 2018 08:39)  Breo Ellipta 100 mcg-25 mcg/inh inhalation powder: 1 puff(s) inhaled once a day patient denies using it (11 Oct 2018 08:38)  Calcium 500: 1 tab(s) orally 2 times a day (11 Oct 2018 08:38)  carvedilol 6.25 mg oral tablet: 1 tab(s) orally once a day (11 Oct 2018 08:39)  docusate sodium 100 mg oral tablet: 1 tab(s) orally 2 times a day, As Needed (11 Oct 2018 08:39)  folic acid 1 mg oral tablet: 1 tab(s) orally once a day (11 Oct 2018 08:38)  Mag-Ox 400 oral tablet: 1 tab(s) orally once a day (11 Oct 2018 08:39)  midodrine 10 mg oral tablet: 1 tab(s) orally once a day (11 Oct 2018 08:39)  Multiple Vitamins oral tablet: 1 tab(s) orally once a day last dose 10/3/2018 (11 Oct 2018 08:38)  Namenda 10 mg oral tablet: 1 tab(s) orally 2 times a day (11 Oct 2018 08:38)  Nephplex Rx oral tablet: 1 tab(s) orally once a day (11 Oct 2018 08:39)  nortriptyline 50 mg oral capsule: 1 cap(s) orally once a day (11 Oct 2018 08:39)  Renvela 800 mg oral tablet: 2 tab(s) orally every 8 hours (11 Oct 2018 08:39)  simethicone 80 mg oral tablet: 1 tab(s) orally 3 times a day (after meals) (11 Oct 2018 08:39)  torsemide 20 mg oral tablet: 1 tab(s) orally once a day (11 Oct 2018 08:39)  vitamin A: 1 tab(s) orally once a day (11 Oct 2018 08:38)  Vitamin B Complex: 1 tab(s) orally once a day (11 Oct 2018 08:38)  Vitamin C 500 mg oral tablet: 1 tab(s) orally once a day (11 Oct 2018 08:38)    Allergies  No Known Allergies      ***VITAL SIGNS:  Vital Signs Last 24 Hrs  T(C): 36.7 (22 Oct 2018 00:00), Max: 36.9 (21 Oct 2018 08:00)  T(F): 98 (22 Oct 2018 00:00), Max: 98.5 (21 Oct 2018 12:00)  HR: 97 (22 Oct 2018 04:00) (93 - 136)  BP: 96/63 (22 Oct 2018 01:00) (64/47 - 129/76)  BP(mean): 70 (22 Oct 2018 01:00) (50 - 86)  RR: 16 (22 Oct 2018 04:00) (16 - 28)  SpO2: 100% (22 Oct 2018 04:00) (93% - 100%)       I&O's Detail    20 Oct 2018 07:01  -  21 Oct 2018 07:00  --------------------------------------------------------  IN:    dexmedetomidine Infusion: 17.1 mL    IV PiggyBack: 300 mL    Oral Fluid: 710 mL    sodium chloride 0.9%: 30 mL    vasopressin Infusion: 7.2 mL  Total IN: 1064.3 mL    OUT:    Bulb: 50 mL    Bulb: 80 mL    Chest Tube: 180 mL  Total OUT: 310 mL    Total NET: 754.3 mL      21 Oct 2018 07:01  -  22 Oct 2018 05:32  --------------------------------------------------------  IN:    IV PiggyBack: 500 mL    Oral Fluid: 600 mL    Sodium Chloride 0.9% IV Bolus: 250 mL  Total IN: 1350 mL    OUT:    Bulb: 25 mL    Bulb: 40 mL    Chest Tube: 80 mL  Total OUT: 145 mL    Total NET: 1205 mL    CAPILLARY BLOOD GLUCOSE  POCT Blood Glucose.: 107 mg/dL (22 Oct 2018 05:22)  POCT Blood Glucose.: 95 mg/dL (21 Oct 2018 22:40)  POCT Blood Glucose.: 125 mg/dL (21 Oct 2018 17:03)  POCT Blood Glucose.: 107 mg/dL (21 Oct 2018 11:28)  POCT Blood Glucose.: 101 mg/dL (21 Oct 2018 07:39)    =======================  MEDICATIONS  ===================  MEDICATIONS  (STANDING):  aspirin enteric coated 81 milliGRAM(s) Oral daily  dextrose 5%. 1000 milliLiter(s) (50 mL/Hr) IV Continuous <Continuous>  dextrose 50% Injectable 12.5 Gram(s) IV Push once  dextrose 50% Injectable 25 Gram(s) IV Push once  dextrose 50% Injectable 25 Gram(s) IV Push once  docusate sodium 100 milliGRAM(s) Oral three times a day  heparin  Injectable 5000 Unit(s) SubCutaneous every 12 hours  insulin lispro (HumaLOG) corrective regimen sliding scale   SubCutaneous Before meals and at bedtime  ipratropium    for Nebulization 500 MICROGram(s) Nebulizer every 6 hours  memantine 10 milliGRAM(s) Oral two times a day  midodrine 20 milliGRAM(s) Oral every 8 hours  nortriptyline 25 milliGRAM(s) Oral at bedtime  pantoprazole  Injectable 40 milliGRAM(s) IV Push daily  piperacillin/tazobactam IVPB. 3.375 Gram(s) IV Intermittent every 12 hours    MEDICATIONS  (PRN):  bisacodyl Suppository 10 milliGRAM(s) Rectal daily PRN Constipation  dextrose 40% Gel 15 Gram(s) Oral once PRN Blood Glucose LESS THAN 70 milliGRAM(s)/deciliter  glucagon  Injectable 1 milliGRAM(s) IntraMuscular once PRN Glucose LESS THAN 70 milligrams/deciliter      =================== PATIENT CARE ACCESS DEVICES ==========  Peripheral IV (+)  Central Venous Line	R / IJ (+)   Arterial Line	R/ Rad	(+)   Left arm AV fistula for HD (+)    ======================= PHYSICAL EXAM===================  General:             Comfortable, Awake, alert, not in any distress  Neuro:               Moving all extremities to commands. No focal deficits	  HEENT:                           CHER  Respiratory:	Lungs sound coarse on auscultation bilaterally; decrease right  basal breath sounds                          No rales, rhonchi, no wheezing. Effort even and unlabored.                          Right chest wound and tube site - clean  CV:		Regular rate and rhythm. Normal S1/S2.    Abdomen:	 Soft,  nontender, not-distended. Bowel sounds present    Skin:		No rash.  Extremities:	Warm, no cyanosis or edema.  Palpable pulses    ============================ LABS =======================                        10.3   13.81 )-----------( 347      ( 22 Oct 2018 02:40 )             32.5     10-22    137       |  97<L>  |  44<H>  ----------------------------------<  129<H>  3.3<L>   |  22       |  5.80<H>    Ca    8.9      22 Oct 2018 02:40  Phos  6.2     10-22  Mg     2.9     10-22    TPro  6.1  /  Alb  2.5<L>  /  TBili  0.7  /  DBili  x   /  AST  17  /  ALT  5   /  AlkPhos  167<H>  10-22    PT/INR - ( 22 Oct 2018 02:40 )   PT: 11.0 SEC;   INR: 0.96     PTT:30.8 SEC  ABG - ( 22 Oct 2018 02:45 )  pH, Arterial: 7.29  pH, Blood: x     /  pCO2: 51    /  pO2: 159   / HCO3: 22    / Base Excess: -2.1  /  SaO2: 99.2      BLOOD PERIPHERAL  10-15-18 --  --  --    OTHER  10-11-18 --  --  --    OTHER  10-11-18 --  --  --    TISSUE  10-11-18 --  --  --    TISSUE  10-11-18 --  --  --    TISSUE  10-11-18 --  --  Enterococcus faecalis    PLEURAL FLUID  10-11-18 --  --    GPCCH^Gram Pos Cocci in Chains  QUANTITY OF BACTERIA SEEN: MODERATE (3+)  GPCPR^Gram Pos Cocci in Pairs  QUANTITY OF BACTERIA SEEN: MODERATE (3+)  WBC^White Blood Cells  QNTY CELLS IN GRAM STAIN: FEW (2+)    ===================== IMAGING STUDIES ===================  Radiology personally reviewed.  < from: Xray Chest 1 View- PORTABLE-Routine (10.21.18 @ 06:22) >    COMPARISON:10/20/2018.  FINDINGS:   Cardiac silhouette mildly enlarged. Left-sided subcutaneous   defibrillator. Right-sided internal jugular catheter with tip overlying   SVC. Right-sided chest tube. Additional right-sided subcutaneous drain   does not appear to overlie the lung fields. Small partially loculated   right pleural effusion. Trace left pleural effusion. No pneumothorax.    IMPRESSION:   Right-sided chest tube. Additional right-sided subcutaneous drain does   not appear to overlie the lung fields. Small partially loculated right   pleural effusion.     < end of copied text >    ====================ASSESSMENT AND PLAN ================    58y Male s/p   Right thoracotomy, resection of portion of R 7th rib, evacuation of infected hemothorax, takedown of pleurocutaneous fistula  / R chest wall reconstruction with tunneled latissimus dorsi flap, covered by serratus anterior flap. Remains hypotensive on Midodrine ( off Vasopressin)    Issues: Hypotension- on Midodrine  Acute on chronic systolic CHF ( EF 10 %), ICD in place  SOB  Infected right hemothorax - (+) Enterococcus faecalis              Hyperglycemia              ESRD on HD     Intermittent agitation ( mild dementia preop)              severe deconditioning              Mild resp acidosis    ====================== NEUROLOGY=======================  Pain control with Tylenol IV / Toradol   Will avoid narcotics  C/w memantine 10 milliGRAM(s) Oral two times a day and  nortriptyline 25 milliGRAM(s) Oral at bedtime  PT evaluation( consult ) requested for deconditioning   OOB with assistance  ==================== RESPIRATORY========================  Pt is on/ off BIPAP; currently on 2 L NC           no evidence of distress.  incentive spirometry  as possible  Monitor right chest tube output abd david x2 output  Chest tube to  water seal, blakes to bulb suction	  Continue bronchodilators, pulmonary toilet  Head of bed elevation to 30-40 degrees    ====================CARDIOVASCULAR=====================  Continue hemodynamic monitoring/ telemetry ( remains in NSR, off Amio)  Hypotension - on Midodrine. Quan/ Vaso off  Acute on chronic systolic heart failure - Off inotropic support,   CVP is 14 this morning - dialyze as scheduled today    ===================== RENAL ============================  Monitor I/Os, BUN/ Cr  and electrolytes  Next HD - today    ==================== GASTROINTESTINAL===================  On PO dysphagia  diet, tolerating well  Continue GI prophylaxis with Protonix   Zofran / Reglan for nausea - PRN    =======================    ENDOCRIN  =====================  Glycemic monitoring  F/S with coverage  T4/TSH c/w  most likely Euthyroid sick syndrome.    ===================HEMATOLOGIC/ONCOLOGIC =============  Monitor chest tube and blakes output. No signs of active bleeding.   Follow CBC, coags   DVT prophylaxis with SCD, sc Heparin    ========================INFECTIOUS DISEASE===============   Monitor for fever / leukocytosis.  All surgical incision / chest tube  sites look clean   s/p Infected hemothorax - growing E. Faecalis  - Continue Zosyn       Pertinent clinical, laboratory, radiographic, hemodynamic, echocardiographic, respiratory data, microbiologic data and chart were reviewed and analyzed frequently throughout the course of the day and night. GI and DVT prophylaxis, glycemic control, head of bed elevation and skin care issues were addressed.  Patient seen, examined and plan discussed with CT Surgery / CTICU team during rounds.  Pt remains critically ill in imminent risk of  deterioration and requires very careful cardio- pulmonary monitoring and support.    I have spent    60    minutes of critical care time with this pt between    12  am   and     8  am         minutes spent on total encounter; more than 50% of the visit was spent counseling and/or coordinating care by the attending physician.        KERLINE Faith MD

## 2018-10-22 NOTE — PROGRESS NOTE ADULT - ATTENDING COMMENTS
Donovan Rick is a 58 year old man (Mandarin speaking) with history of possible NICM (HFrEF with LVEF 20%), severely dilated LV with LVIDD 7.3 cm on TTE), s/p AICD, h/o HLD, ESRD on HD, former smoker, BPH, history of prior chest/lung surgeries complicated by chest wall wound infections, and history of bilateral hip replacements. He was admitted for scheduled right thoracotomy w/ decortication and muscle flap on 10/11/18. Postoperatively, he required support with dobutamine 10 mcg/kg/min (now weaned to 5 mcg/kg/min) and other pressors. He is currently intubated, sedated and  starting CVVH today. He is onon broad spectrum antibiotics. There is small right sided basilar pneumothorax. Current standing regimen: aspirin 81 mg daily, dobutamine 5 mcg/kG/Min IV, heparin 5000 units subcutaneously every 12 hours, norepinephrine Infusion 0.08 mcg/kG/Min IV, metronidazole (Flagyl) 500 mg every 8 hours, piperacillin/tazobactam (Zosyn) 3.375 grams IV every 12 hours, dexmedetomidine infusion 0.5 mcg/kG/Hr, fentanyl infusion 1 mcg/kG/Hr, hydromorphone (10 mcg/mL) + Bupivacaine 0.0625% in 0.9% sodium chloride PCEA 250 milliLiter(s) Epidural continuous PCA, ondansetron injectable 4 mg Push every 6 hours PRN, propofol infusion 20 mcg/kG/Min IV, pantoprazole  (Protonix) 40 mg IV Push daily, vasopressin  0.05 units/Min IV, dextrose 5%. 1000 mL IV Continuous and sodium chloride 0.9%. 1000 mL IV Continuous
Donovan Rick is a 58 year old man (Mandarin speaking) with history of possible NICM (HFrEF with LVEF 20%), severely dilated LV with LVIDD 7.3 cm on TTE), s/p AICD, h/o HLD, ESRD on HD, former smoker, BPH, history of prior chest/lung surgeries complicated by chest wall wound infections, and history of bilateral hip replacements. He was admitted for scheduled right thoracotomy w/ decortication and muscle flap on 10/11/18. Postoperatively, he required support with dobutamine 10 mcg/kg/min (now weaned to 5 mcg/kg/min) and other pressors. He is currently intubated, sedated and starting CVVH. He is on broad spectrum antibiotics. There is small right sided basilar pneumothorax. Current standing regimen: aspirin 81 mg daily, dobutamine 5 mcg/kG/Min IV, heparin 5000 units subcutaneously every 12 hours, norepinephrine Infusion 0.08 mcg/kG/Min IV, metronidazole (Flagyl) 500 mg every 8 hours, piperacillin/tazobactam (Zosyn) 3.375 grams IV every 12 hours, dexmedetomidine infusion 0.5 mcg/kG/Hr, fentanyl infusion 1 mcg/kG/Hr, hydromorphone (10 mcg/mL) + Bupivacaine 0.0625% in 0.9% sodium chloride PCEA 250 milliLiter(s) Epidural continuous PCA, ondansetron injectable 4 mg Push every 6 hours PRN, propofol infusion 20 mcg/kG/Min IV, pantoprazole  (Protonix) 40 mg IV Push daily, vasopressin  0.05 units/Min IV, dextrose 5%. 1000 mL IV continuous and sodium chloride 0.9%. 1000 mL IV continuous
Will attempt HD today given MAP > 60 while on pressors. Will increase pressors as needed for UF, if unable to tolerate it will need to be placed back on CVVHDF
CI remains adequate despite  dicontinued. Likely vasodilatory given need for vasopressors. CVP also low. Noted to have dropping H/H and plan to give 2 U PRBC today. Vitals notable for stable BP on pressors. Hemodynamics currently supportive for vasodilatory shock with excellent CI. CVP 4-6. On exam, no significant JVD, TLC, tachycardic, 3 chest tubes, nontender abdomen, no pedal edema, WWP. Labs reviewed.   - continue holding dobutamine as long as CI >2.5; please check central venous gas to correlate with Flotrac  - maintain CVP 6-8; give 2 U PRBC today with gentle fluid removal but overall would keep slightly net positive  - if hypotensive, would give albumin   - E. faecalis empyema; ID consult appreciated  - plan reviewed with family and ICU team
Undergoing CPAP trial which he's tolerating well. Appears comfortable.  discontinued with stable CI above 2.5. Vitals notable for stable BP on pressors. Hemodynamics currently supportive for vasodilatory shock with excellent CI. CVP 6-8 range. On exam, no significant JVD, TLC, tachycardic, 3 chest tubes, nontender abdomen, no pedal edema, WWP. Labs reviewed.   - continue holding dobutamine as long as CI >2.5; please check central venous gas to correlate with Flotrac  - maintain CVP 6-8; was dialyzed yesterday; would keep net even to slightly positive  - E. faecalis empyema; ID consult appreciated  - plan reviewed with family and ICU team
Continue current management
Feels well. Additional chest tube removed. Was dialyzed yesterday. CI excellent off inotropes but still with pressor requirement. On exam, JVD approx 8-10 cm, RRR, clear lungs, nontender abdomen, no pedal edema. Hemodynamics currently supportive for vasodilatory shock with excellent CI. CVP 4-6. Labs reviewed - cortisol wnl, TSH not checked. albumin 2.5.   - wean pressors as tolerated; unclear etiology of vasodilatory state as pt was previously on midodrine as well; cortisol wnl; please check TSH; can increase midodrine to 20 mg q8h   - maintain CVP 6-8; would dialyze gently and perhaps return to normal schedule   - E. faecalis empyema; ID consult appreciated  - plan reviewed with family and ICU team
Attempts to wean ventilation limited by diaphoresis and tachycardia. He appears in distress from unclear etiology. Vitals notable for stable BP on pressors/inotrope support. Hemodynamics currently supportive for vasodilatory shock with excellent CI. CVP 6-8 range. On exam, no significant JVD, TLC, tachycardic, 3 chest tubes, nontender abdomen, no pedal edema, WWP. Labs reviewed.   - wean dobutamine to 2.5 and repeat CI; if stable, would d/c dobutamine as is contributing to vasodilatation  - maintain CVP 6-8; CVVH to keep net even  - E. faecalis empyema; ID consult appreciated

## 2018-10-22 NOTE — PROGRESS NOTE ADULT - SUBJECTIVE AND OBJECTIVE BOX
Infectious Diseases progress note:    Subjective: NAD, awake, alert.  Ambulated today.  Afebrile.  No leukocytosis    ROS:  CONSTITUTIONAL:  No fever, chills, rigors  CARDIOVASCULAR:  No chest pain or palpitations  RESPIRATORY:   No SOB, cough, dyspnea on exertion.  No wheezing  GASTROINTESTINAL:  No abd pain, N/V, diarrhea/constipation  EXTREMITIES:  No swelling or joint pain  GENITOURINARY:  No burning on urination, increased frequency or urgency.  No flank pain  NEUROLOGIC:  No HA, visual disturbances  SKIN: No rashes    Allergies    No Known Allergies    Intolerances        ANTIBIOTICS/RELEVANT:  antimicrobials  piperacillin/tazobactam IVPB. 3.375 Gram(s) IV Intermittent every 12 hours    immunologic:    OTHER:  aspirin enteric coated 81 milliGRAM(s) Oral daily  bisacodyl Suppository 10 milliGRAM(s) Rectal daily PRN  dextrose 40% Gel 15 Gram(s) Oral once PRN  dextrose 5%. 1000 milliLiter(s) IV Continuous <Continuous>  dextrose 50% Injectable 12.5 Gram(s) IV Push once  dextrose 50% Injectable 25 Gram(s) IV Push once  dextrose 50% Injectable 25 Gram(s) IV Push once  docusate sodium 100 milliGRAM(s) Oral three times a day  glucagon  Injectable 1 milliGRAM(s) IntraMuscular once PRN  heparin  Injectable 5000 Unit(s) SubCutaneous every 12 hours  insulin lispro (HumaLOG) corrective regimen sliding scale   SubCutaneous Before meals and at bedtime  ipratropium    for Nebulization 500 MICROGram(s) Nebulizer every 6 hours  memantine 10 milliGRAM(s) Oral two times a day  midodrine 10 milliGRAM(s) Oral every 8 hours  nortriptyline 25 milliGRAM(s) Oral at bedtime  oxyCODONE    IR 5 milliGRAM(s) Oral every 4 hours PRN  pantoprazole  Injectable 40 milliGRAM(s) IV Push daily      Objective:  Vital Signs Last 24 Hrs  T(C): 36.7 (22 Oct 2018 16:00), Max: 36.7 (21 Oct 2018 20:00)  T(F): 98 (22 Oct 2018 16:00), Max: 98 (21 Oct 2018 20:00)  HR: 124 (22 Oct 2018 17:00) (93 - 136)  BP: 96/63 (22 Oct 2018 01:00) (64/47 - 101/62)  BP(mean): 70 (22 Oct 2018 01:00) (50 - 71)  RR: 29 (22 Oct 2018 17:00) (16 - 30)  SpO2: 100% (22 Oct 2018 17:00) (85% - 100%)    PHYSICAL EXAM:  Constitutional:NAD  Eyes:CHER, EOMI  Ear/Nose/Throat: no thrush, mucositis.  Moist mucous membranes	  Neck:no JVD, no lymphadenopathy, supple.  Rt IJ central line  Respiratory: CTA adore, chest tube x 1  Cardiovascular: S1S2 RRR, no murmurs  Gastrointestinal:soft, nontender,  nondistended (+) BS  Extremities:no e/e/c  Skin:  no rashes, open wounds or ulcerations, rt post chest wound, SANDY drain        LABS:                        10.3   13.81 )-----------( 347      ( 22 Oct 2018 02:40 )             32.5     10-22    137  |  97<L>  |  44<H>  ----------------------------<  129<H>  3.3<L>   |  22  |  5.80<H>    Ca    8.9      22 Oct 2018 02:40  Phos  6.2     10-22  Mg     2.9     10-22    TPro  6.1  /  Alb  2.5<L>  /  TBili  0.7  /  DBili  x   /  AST  17  /  ALT  5   /  AlkPhos  167<H>  10-22    PT/INR - ( 22 Oct 2018 02:40 )   PT: 11.0 SEC;   INR: 0.96          PTT - ( 22 Oct 2018 02:40 )  PTT:30.8 SEC          MICROBIOLOGY:    Culture - Blood (10.15.18 @ 18:59)    Culture - Blood:   NO ORGANISMS ISOLATED    Specimen Source: BLOOD PERIPHERAL    Culture - Blood (10.15.18 @ 18:59)    Culture - Blood:   NO ORGANISMS ISOLATED    Specimen Source: BLOOD PERIPHERAL          RADIOLOGY & ADDITIONAL STUDIES:    < from: Xray Chest 1 View- PORTABLE-Routine (10.22.18 @ 06:05) >  INTERPRETATION:     Right-sided chest tube is unchanged. Persistent right effusion again   seen"N. Right IJ line, AICD and subcutaneous drain unchanged. No   pneumothorax.      COMPARISON:  October 21      IMPRESSION:  Follow-up with no interval change with right-sided chest   tube and persistent effusion seen.    < end of copied text >      < from: Xray Chest 1 View- PORTABLE-Routine (10.21.18 @ 06:22) >  FINDINGS:     Cardiac silhouette mildly enlarged. Left-sided subcutaneous   defibrillator. Right-sided internal jugular catheter with tip overlying   SVC. Right-sided chest tube. Additional right-sided subcutaneous drain   does not appear to overlie the lung fields. Small partially loculated   right pleural effusion. Trace left pleural effusion. No pneumothorax.    IMPRESSION:   Right-sided chest tube. Additional right-sided subcutaneous drain does   not appear to overlie the lung fields. Small partially loculated right   pleural effusion.     < end of copied text >

## 2018-10-22 NOTE — PROGRESS NOTE ADULT - ASSESSMENT
Pt is a 58 yr old Mandarin speaking male scheduled for Right Thoractomy Decortiation, Right Chest Wall reconstruction with Latissimjus Dorsi Flap Poss Skin Graft with VAC dressing with Dr Pickering 10/11/18. Pt has ICD but unable to identify make or model. Pt hx of ESRD with HD T/Th/Sat for 4 hrs and has stated he has stopped some of his meds but cannot say which ones. Pt seen and received [facility] and [facility] but has 2 different med lists and is unable to identify meds taking. Pt denies blood thinners. Pt has stopped ASA as of last week. Pt hx gathered from [employer] and notes from Dr Pickering - pt is poor historian.     Call through  to patient who went to local pharmacy for confirmation of meds and pt given preop instructions (01 Oct 2018 09:25)    Pt has multiple comorbidities including CHF, EF of 10%, renal failure on dialysis,  who has had chronic bilateral pleural effusions.  The patient previously underwent left VATS and PleurX drains  in 2015.  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  During this admission pt underwent OR with Plastic and Thoracic surgery,  for definitive repair that involved thoracotomy, decortication, excision of empyema cavity along with muscle flap reconstruction. Pt underwent surgery on 10/11 (Right thoracotomy, resection of portion of R 7th rib, evacuation of infected hemothorax, takedown of pleurocutaneous fistula - and noted to have foul smelling hematoma).  OR cultures are growing Enterococcus faecalis.    Pt started on empiric vanco/zosyn/flagyl since 10/11.  He has multiple right  chest tubes/ drains in place and remains on vent support. Pt is on ESRD.  He has had intermittent episodes of hypothermia, and low bp (85/44). ID consult called for further antibiotic management.     Problem/Plan - 1:    ·	Infected right hemothorax    - Cont broad spectrum abx for now - agree with zosyn.  Thus far, OR cultures growing sensitive E.faecalis (to amp/vanco).  Renally dose abx, can cont zosyn 3.375 gm IV q12 for HD.      - Can d/c further vanco dosing, no MRSA identified from any of the cultures.  BCX (-) x 2 from 10/15      - Cont wound vac, local wound care, and chest tube drain managment as per CT icu.  s/p removal of chest tube x 2.  Pt with small loculated pleural effusion on cxr.    - Agree with plan for PICC line for continuation of zosyn - complete 4 week course from date of surgery (10/11).   Last set of blood cultures NGTD.        Will follow,    Anabel Chahal  [phone]

## 2018-10-22 NOTE — PROGRESS NOTE ADULT - SUBJECTIVE AND OBJECTIVE BOX
Patient seen and examined. He is resting comfortably in chair. Off pressors, on midodrine now. One chest tube still in place.   No inotropes.     Medications:  acetaminophen  IVPB .. 1000 milliGRAM(s) IV Intermittent once  albumin human  5% IVPB 250 milliLiter(s) IV Intermittent once  aspirin enteric coated 81 milliGRAM(s) Oral daily  bisacodyl Suppository 10 milliGRAM(s) Rectal daily PRN  dextrose 40% Gel 15 Gram(s) Oral once PRN  dextrose 5%. 1000 milliLiter(s) IV Continuous <Continuous>  dextrose 50% Injectable 12.5 Gram(s) IV Push once  dextrose 50% Injectable 25 Gram(s) IV Push once  dextrose 50% Injectable 25 Gram(s) IV Push once  docusate sodium 100 milliGRAM(s) Oral three times a day  glucagon  Injectable 1 milliGRAM(s) IntraMuscular once PRN  heparin  Injectable 5000 Unit(s) SubCutaneous every 12 hours  insulin lispro (HumaLOG) corrective regimen sliding scale   SubCutaneous Before meals and at bedtime  ipratropium    for Nebulization 500 MICROGram(s) Nebulizer every 6 hours  memantine 10 milliGRAM(s) Oral two times a day  midodrine 10 milliGRAM(s) Oral every 8 hours  nortriptyline 25 milliGRAM(s) Oral at bedtime  pantoprazole  Injectable 40 milliGRAM(s) IV Push daily  piperacillin/tazobactam IVPB. 3.375 Gram(s) IV Intermittent every 12 hours      Vitals:  Vital Signs Last 24 Hrs  T(C): 36.4 (22 Oct 2018 12:00), Max: 36.7 (21 Oct 2018 16:00)  T(F): 97.5 (22 Oct 2018 12:00), Max: 98.1 (21 Oct 2018 16:00)  HR: 116 (22 Oct 2018 12:00) (93 - 136)  BP: 96/63 (22 Oct 2018 01:00) (64/47 - 101/62)  BP(mean): 70 (22 Oct 2018 01:00) (50 - 71)  RR: 21 (22 Oct 2018 12:00) (16 - 28)  SpO2: 90% (22 Oct 2018 12:00) (85% - 100%)    Daily     Daily Weight in k.7 (22 Oct 2018 05:00)    I&O's Detail    21 Oct 2018 07:01  -  22 Oct 2018 07:00  --------------------------------------------------------  IN:    IV PiggyBack: 500 mL    Oral Fluid: 660 mL    Sodium Chloride 0.9% IV Bolus: 250 mL  Total IN: 1410 mL    OUT:    Bulb: 80 mL    Bulb: 40 mL    Chest Tube: 115 mL  Total OUT: 235 mL    Total NET: 1175 mL      22 Oct 2018 07:01  -  22 Oct 2018 13:13  --------------------------------------------------------  IN:    IV PiggyBack: 350 mL    Oral Fluid: 250 mL    Other: 500 mL  Total IN: 1100 mL    OUT:    Other: 2900 mL  Total OUT: 2900 mL    Total NET: -1800 mL          Physical Exam:     General: No distress. Comfortable.  HEENT: EOM intact.  Neck: Neck supple. JVP not elevated. No masses  Chest: decreased on right  CV: S1 and S2 regular rhythm, tachycardic. No murmurs, rub, or gallops. Radial pulses normal. No LE edema and is warm b/l.   Abdomen: Soft, non-distended, non-tender  Skin: No rashes or skin breakdown  Neurology: Alert and oriented times three. Sensation intact  Psych: Affect normal    Labs:                        10.3   13.81 )-----------( 347      ( 22 Oct 2018 02:40 )             32.5     10-22    137  |  97<L>  |  44<H>  ----------------------------<  129<H>  3.3<L>   |  22  |  5.80<H>    Ca    8.9      22 Oct 2018 02:40  Phos  6.2     10  Mg     2.9     10-22    TPro  6.1  /  Alb  2.5<L>  /  TBili  0.7  /  DBili  x   /  AST  17  /  ALT  5   /  AlkPhos  167<H>  10-22    PT/INR - ( 22 Oct 2018 02:40 )   PT: 11.0 SEC;   INR: 0.96          PTT - ( 22 Oct 2018 02:40 )  PTT:30.8 SEC

## 2018-10-22 NOTE — PROGRESS NOTE ADULT - PROBLEM SELECTOR PLAN 1
ESRD on HD for past six years through left upper extremity AVF. CVVHDF discontinued on 10/15/18. Pt seen during HD today. Pt tolerating HD well. Labs reviewed from today. Monitor BMP daily.

## 2018-10-22 NOTE — PROGRESS NOTE ADULT - SUBJECTIVE AND OBJECTIVE BOX
CLAUDIA HAN  [id number]    Subjective:    Patient seen and examined by plastic surgery team on morning rounds; Patient remains in SICU.   Patient reports he feels well, he has been up ambulating yesterday.   Pain is controlled, afebrile.        Objective:  T(C): 36.6 (10-22-18 @ 04:00), Max: 36.9 (10-21-18 @ 08:00)  HR: 104 (10-22-18 @ 07:00) (93 - 136)  BP: 96/63 (10-22-18 @ 01:00) (64/47 - 129/76)  RR: 24 (10-22-18 @ 07:00) (16 - 28)  SpO2: 100% (10-22-18 @ 07:00) (93% - 100%)  Wt(kg): --   10-22    137  |  97<L>  |  44<H>  ----------------------------<  129<H>  3.3<L>   |  22  |  5.80<H>    Ca    8.9      22 Oct 2018 02:40  Phos  6.2     10-22  Mg     2.9     10-22    TPro  6.1  /  Alb  2.5<L>  /  TBili  0.7  /  DBili  x   /  AST  17  /  ALT  5   /  AlkPhos  167<H>  10-22                        10.3   13.81 )-----------( 347      ( 22 Oct 2018 02:40 )             32.5       10-21 @ 07:01  -  10-22 @ 07:00  --------------------------------------------------------  IN: 1410 mL / OUT: 235 mL / NET: 1175 mL      PHYSICAL EXAM:    General: NAD, sitting up in chair, looks comfortable.   Chest: Incisions intact with staples, mild skin ischemia surrounding staples. JPx2 SS to self suction, no collections palpated                MEDICATIONS  (STANDING):  aspirin enteric coated 81 milliGRAM(s) Oral daily  dextrose 5%. 1000 milliLiter(s) (50 mL/Hr) IV Continuous <Continuous>  dextrose 50% Injectable 12.5 Gram(s) IV Push once  dextrose 50% Injectable 25 Gram(s) IV Push once  dextrose 50% Injectable 25 Gram(s) IV Push once  docusate sodium 100 milliGRAM(s) Oral three times a day  heparin  Injectable 5000 Unit(s) SubCutaneous every 12 hours  insulin lispro (HumaLOG) corrective regimen sliding scale   SubCutaneous Before meals and at bedtime  ipratropium    for Nebulization 500 MICROGram(s) Nebulizer every 6 hours  memantine 10 milliGRAM(s) Oral two times a day  midodrine 20 milliGRAM(s) Oral every 8 hours  nortriptyline 25 milliGRAM(s) Oral at bedtime  pantoprazole  Injectable 40 milliGRAM(s) IV Push daily  piperacillin/tazobactam IVPB. 3.375 Gram(s) IV Intermittent every 12 hours    MEDICATIONS  (PRN):  bisacodyl Suppository 10 milliGRAM(s) Rectal daily PRN Constipation  dextrose 40% Gel 15 Gram(s) Oral once PRN Blood Glucose LESS THAN 70 milliGRAM(s)/deciliter  glucagon  Injectable 1 milliGRAM(s) IntraMuscular once PRN Glucose LESS THAN 70 milligrams/deciliter      Assessment/Plan:  Patient is a 58y old  Male who presents with a chief complaint of evacuation of hemothorax (21 Oct 2018 14:46)

## 2018-10-22 NOTE — PROGRESS NOTE ADULT - ASSESSMENT
57 y/o male (Mandarin speaking) with hx of NICM? HFrEF (LVEF 20%, severely dilated LVIDD 7.3 cm) w/ ICD, HLD, ESRD on HD, former smoker, BPH, hx of prior chest/lung surgeries with chest wall wound infections, b/l hip replacements comes in for a scheduled right thoracotomy w/ decortication and muscle flap with Dr. Jose Alfredo Pickering on 10/11/18. Post surgery patient required Dobutamine 10 mcg/kg/min (now weaned to 5 mcg/kg/min) and pressors. He is currently intubated on pressors ; norepinephrine, phenylephrine, vasopressin and epinephrine. He is also on Zosyn and metronidazole prophylactically. Has 3 right sided chest tubes in place. Has a small right sided basilar phenomothorax. HF consulted to evaluate cardiogenic shock. He is awake and alert with wife by bedside. Per record patient is a poor historian and not aware of which meds he was on at home.   Remains critically ill- e.faecalis empyema, and on pressors. But cardiac output/index improving. Off dobutamine. Extubated on 10/17/18.

## 2018-10-22 NOTE — PROGRESS NOTE ADULT - PROBLEM SELECTOR PLAN 3
HD per renal to keep CVP around 6.
Keep  net negative. Had HD today.
Keep  net negative as above. CVP 12 today.
Keep  net positive as above.
Keep  net positive as above.
The patient has ESRD and cardiogenic shock however hemodynamically unstable.  Keep net even fluid balance, but can titrate up ultrafiltration as needed / as tolerated.
The patient has ESRD and cardiogenic shock however hemodynamically unstable.  Keep net even fluid balance, but can titrate up ultrafiltration as needed / as tolerated.
The patient has ESRD and cardiogenic shock however hemodynamically unstable.  Recommend to start with net even fluid balance but can titrate up ultrafiltration as needed / as tolerated.
CVVHD has been temporary held due to clot? Renal team assessing.

## 2018-10-22 NOTE — PROGRESS NOTE ADULT - ASSESSMENT
ASSESSMENT:     Patient is a 58y old  Male who presents with a chief complaint of infected hemothorax (18 Oct 2018 17:39), s/p washout of pleural space and latissimus dorsi flap    PLAN:     - continue staples  - continue drain  - activity per icu/primary team  - DVT ppx  - Ambulating as tolerated  - Will continue to see [facility]      Plastic Surgery  Pager X [phone]

## 2018-10-23 LAB
ALBUMIN SERPL ELPH-MCNC: 2.8 G/DL — LOW (ref 3.3–5)
ALP SERPL-CCNC: 155 U/L — HIGH (ref 40–120)
ALT FLD-CCNC: 6 U/L — SIGNIFICANT CHANGE UP (ref 4–41)
AST SERPL-CCNC: 17 U/L — SIGNIFICANT CHANGE UP (ref 4–40)
BILIRUB SERPL-MCNC: 0.7 MG/DL — SIGNIFICANT CHANGE UP (ref 0.2–1.2)
BUN SERPL-MCNC: 33 MG/DL — HIGH (ref 7–23)
CALCIUM SERPL-MCNC: 9.1 MG/DL — SIGNIFICANT CHANGE UP (ref 8.4–10.5)
CHLORIDE SERPL-SCNC: 99 MMOL/L — SIGNIFICANT CHANGE UP (ref 98–107)
CO2 SERPL-SCNC: 28 MMOL/L — SIGNIFICANT CHANGE UP (ref 22–31)
CREAT SERPL-MCNC: 4.56 MG/DL — HIGH (ref 0.5–1.3)
GLUCOSE SERPL-MCNC: 96 MG/DL — SIGNIFICANT CHANGE UP (ref 70–99)
HCT VFR BLD CALC: 30.4 % — LOW (ref 39–50)
HGB BLD-MCNC: 9.6 G/DL — LOW (ref 13–17)
MAGNESIUM SERPL-MCNC: 2.6 MG/DL — SIGNIFICANT CHANGE UP (ref 1.6–2.6)
MCHC RBC-ENTMCNC: 31.2 PG — SIGNIFICANT CHANGE UP (ref 27–34)
MCHC RBC-ENTMCNC: 31.6 % — LOW (ref 32–36)
MCV RBC AUTO: 98.7 FL — SIGNIFICANT CHANGE UP (ref 80–100)
NRBC # FLD: 0 — SIGNIFICANT CHANGE UP
PHOSPHATE SERPL-MCNC: 4.4 MG/DL — SIGNIFICANT CHANGE UP (ref 2.5–4.5)
PLATELET # BLD AUTO: 289 K/UL — SIGNIFICANT CHANGE UP (ref 150–400)
PMV BLD: 9.2 FL — SIGNIFICANT CHANGE UP (ref 7–13)
POTASSIUM SERPL-MCNC: 3.6 MMOL/L — SIGNIFICANT CHANGE UP (ref 3.5–5.3)
POTASSIUM SERPL-SCNC: 3.6 MMOL/L — SIGNIFICANT CHANGE UP (ref 3.5–5.3)
PROT SERPL-MCNC: 6.3 G/DL — SIGNIFICANT CHANGE UP (ref 6–8.3)
RBC # BLD: 3.08 M/UL — LOW (ref 4.2–5.8)
RBC # FLD: 20.4 % — HIGH (ref 10.3–14.5)
SODIUM SERPL-SCNC: 141 MMOL/L — SIGNIFICANT CHANGE UP (ref 135–145)
WBC # BLD: 11.49 K/UL — HIGH (ref 3.8–10.5)
WBC # FLD AUTO: 11.49 K/UL — HIGH (ref 3.8–10.5)

## 2018-10-23 PROCEDURE — 77001 FLUOROGUIDE FOR VEIN DEVICE: CPT | Mod: 26,GC

## 2018-10-23 PROCEDURE — 99291 CRITICAL CARE FIRST HOUR: CPT

## 2018-10-23 PROCEDURE — 36569 INSJ PICC 5 YR+ W/O IMAGING: CPT

## 2018-10-23 PROCEDURE — 76937 US GUIDE VASCULAR ACCESS: CPT | Mod: 26

## 2018-10-23 PROCEDURE — 71250 CT THORAX DX C-: CPT | Mod: 26

## 2018-10-23 PROCEDURE — 71045 X-RAY EXAM CHEST 1 VIEW: CPT | Mod: 26

## 2018-10-23 PROCEDURE — 99232 SBSQ HOSP IP/OBS MODERATE 35: CPT | Mod: GC

## 2018-10-23 RX ORDER — CHLORHEXIDINE GLUCONATE 213 G/1000ML
1 SOLUTION TOPICAL DAILY
Qty: 0 | Refills: 0 | Status: DISCONTINUED | OUTPATIENT
Start: 2018-10-23 | End: 2018-10-30

## 2018-10-23 RX ADMIN — OXYCODONE HYDROCHLORIDE 5 MILLIGRAM(S): 5 TABLET ORAL at 12:36

## 2018-10-23 RX ADMIN — HEPARIN SODIUM 5000 UNIT(S): 5000 INJECTION INTRAVENOUS; SUBCUTANEOUS at 18:14

## 2018-10-23 RX ADMIN — Medication 100 MILLIGRAM(S): at 21:48

## 2018-10-23 RX ADMIN — OXYCODONE HYDROCHLORIDE 5 MILLIGRAM(S): 5 TABLET ORAL at 13:00

## 2018-10-23 RX ADMIN — MIDODRINE HYDROCHLORIDE 10 MILLIGRAM(S): 2.5 TABLET ORAL at 05:57

## 2018-10-23 RX ADMIN — Medication 100 MILLIGRAM(S): at 16:21

## 2018-10-23 RX ADMIN — NORTRIPTYLINE HYDROCHLORIDE 25 MILLIGRAM(S): 10 CAPSULE ORAL at 21:49

## 2018-10-23 RX ADMIN — MEMANTINE HYDROCHLORIDE 10 MILLIGRAM(S): 10 TABLET ORAL at 05:58

## 2018-10-23 RX ADMIN — MEMANTINE HYDROCHLORIDE 10 MILLIGRAM(S): 10 TABLET ORAL at 18:12

## 2018-10-23 RX ADMIN — PIPERACILLIN AND TAZOBACTAM 200 GRAM(S): 4; .5 INJECTION, POWDER, LYOPHILIZED, FOR SOLUTION INTRAVENOUS at 21:48

## 2018-10-23 RX ADMIN — Medication 500 MICROGRAM(S): at 22:24

## 2018-10-23 RX ADMIN — MIDODRINE HYDROCHLORIDE 10 MILLIGRAM(S): 2.5 TABLET ORAL at 21:48

## 2018-10-23 RX ADMIN — PIPERACILLIN AND TAZOBACTAM 200 GRAM(S): 4; .5 INJECTION, POWDER, LYOPHILIZED, FOR SOLUTION INTRAVENOUS at 12:36

## 2018-10-23 RX ADMIN — Medication 100 MILLIGRAM(S): at 05:58

## 2018-10-23 RX ADMIN — Medication 81 MILLIGRAM(S): at 12:40

## 2018-10-23 RX ADMIN — PANTOPRAZOLE SODIUM 40 MILLIGRAM(S): 20 TABLET, DELAYED RELEASE ORAL at 12:40

## 2018-10-23 RX ADMIN — Medication 500 MICROGRAM(S): at 03:33

## 2018-10-23 RX ADMIN — Medication 500 MICROGRAM(S): at 15:28

## 2018-10-23 RX ADMIN — HEPARIN SODIUM 5000 UNIT(S): 5000 INJECTION INTRAVENOUS; SUBCUTANEOUS at 05:57

## 2018-10-23 RX ADMIN — MIDODRINE HYDROCHLORIDE 10 MILLIGRAM(S): 2.5 TABLET ORAL at 16:21

## 2018-10-23 NOTE — PROGRESS NOTE ADULT - SUBJECTIVE AND OBJECTIVE BOX
[facility] DIVISION OF KIDNEY DISEASES AND HYPERTENSION -- FOLLOW UP NOTE  --------------------------------------------------------------------------------    HPI: 57yo Mandarin speaking M w/ Hx of NICM w/ ICD, ESRD on HD TTS, former smoker, BPH who was admitted for scheduled right thoracotomy w/ decortication due to infected hemothorax and chest reconstruction with CT Surgery. Pt underwent procedure on 10/11/18, and afterwards developed shock, thought to be cardiogenic. Pt being monitored in the CTICU. Renal was called for management of pts ESRD. Pt was started on CVVHDF on 10/13/18. Non-tunneled catheter non-functional and removed on 10/15/18 when CRRT was stopped. Pt last hemodialysis treatment was yesterday which was tolerated well with 2.4L removed.     PAST HISTORY  --------------------------------------------------------------------------------  No significant changes to PMH, PSH, FHx, SHx, unless otherwise noted    ALLERGIES & MEDICATIONS  --------------------------------------------------------------------------------  Allergies    No Known Allergies    Intolerances      Standing Inpatient Medications  aspirin enteric coated 81 milliGRAM(s) Oral daily  dextrose 5%. 1000 milliLiter(s) IV Continuous <Continuous>  dextrose 50% Injectable 12.5 Gram(s) IV Push once  dextrose 50% Injectable 25 Gram(s) IV Push once  dextrose 50% Injectable 25 Gram(s) IV Push once  docusate sodium 100 milliGRAM(s) Oral three times a day  heparin  Injectable 5000 Unit(s) SubCutaneous every 12 hours  insulin lispro (HumaLOG) corrective regimen sliding scale   SubCutaneous Before meals and at bedtime  ipratropium    for Nebulization 500 MICROGram(s) Nebulizer every 6 hours  memantine 10 milliGRAM(s) Oral two times a day  midodrine 10 milliGRAM(s) Oral every 8 hours  nortriptyline 25 milliGRAM(s) Oral at bedtime  pantoprazole  Injectable 40 milliGRAM(s) IV Push daily  piperacillin/tazobactam IVPB. 3.375 Gram(s) IV Intermittent every 12 hours    PRN Inpatient Medications  bisacodyl Suppository 10 milliGRAM(s) Rectal daily PRN  dextrose 40% Gel 15 Gram(s) Oral once PRN  glucagon  Injectable 1 milliGRAM(s) IntraMuscular once PRN  oxyCODONE    IR 5 milliGRAM(s) Oral every 4 hours PRN      REVIEW OF SYSTEMS  --------------------------------------------------------------------------------  Unable to obtain     VITALS/PHYSICAL EXAM  --------------------------------------------------------------------------------  T(C): 36.9 (10-23-18 @ 04:00), Max: 36.9 (10-22-18 @ 20:00)  HR: 118 (10-23-18 @ 07:54) (95 - 124)  BP: 96/59 (10-23-18 @ 07:00) (90/58 - 123/73)  RR: 21 (10-23-18 @ 07:00) (14 - 30)  SpO2: 95% (10-23-18 @ 07:54) (81% - 100%)  Wt(kg): --    10-22-18 @ 07:01  -  10-23-18 @ 07:00  --------------------------------------------------------  IN: 1450 mL / OUT: 3170 mL / NET: -1720 mL    Physical Exam:  	Gen: NAD  	Pulm: CTA B/L, + chest tube drains  	CV: RRR, S1S2; no rub  	Abd: +BS, soft  	LE: Warm, no edema  	Skin: Warm  	Vascular access:  LUE AVF site: +thrill +bruit +skin intact    LABS/STUDIES  --------------------------------------------------------------------------------              9.6    11.49 >-----------<  289      [10-23-18 @ 02:30]              30.4     141  |  99  |  33  ----------------------------<  96      [10-23-18 @ 02:30]  3.6   |  28  |  4.56        Ca     9.1     [10-23-18 @ 02:30]      Mg     2.6     [10-23-18 @ 02:30]      Phos  4.4     [10-23-18 @ 02:30]    TPro  6.3  /  Alb  2.8  /  TBili  0.7  /  DBili  x   /  AST  17  /  ALT  6   /  AlkPhos  155  [10-23-18 @ 02:30]    PT/INR: PT 11.0 , INR 0.96       [10-22-18 @ 02:40]  PTT: 30.8       [10-22-18 @ 02:40]    Creatinine Trend:  SCr 4.56 [10-23 @ 02:30]  SCr 5.80 [10-22 @ 02:40]  SCr 4.58 [10-21 @ 04:15]  SCr 2.90 [10-20 @ 04:15]  SCr 2.72 [10-19 @ 05:20]      HbA1c 5.5      [10-12-18 @ 02:53]  TSH 4.79      [10-21-18 @ 04:15]    HBsAb 16.3      [10-13-18 @ 15:45]  HCV 0.21, Nonreactive Hepatitis C AB  S/CO Ratio                        Interpretation  < 1.0                                     Non-Reactive  1.0 - 4.9                           Weakly-Reactive  > 5.0                                 Reactive  Non-Reactive: Aperson with a non-reactive HCV antibody  result is considered uninfected.  No further action is  needed unless recent infection is suspected.  In these  cases, consider repeat testing later to detect  seroconversion..  Weakly-Reactive: HCV antibody test is abnormal, HCV RNA  Qualitative test will follow.  Reactive: HCV antibody test is abnormal, HCV RNA  Qualitative test will follow.  Note: HCV antibody testing is performed on the [employer]   system.      [10-13-18 @ 15:45]

## 2018-10-23 NOTE — CHART NOTE - NSCHARTNOTEFT_GEN_A_CORE
Pt is a 58 yr old Mandarin speaking male scheduled for Right Thoractomy Decortiation, Right Chest Wall reconstruction with Latissimjus Dorsi Flap Poss Skin Graft with VAC dressing with Dr Pickering 10/11/18. Pt has ICD but unable to identify make or model. Pt hx of ESRD with HD T/Th/Sat for 4 hrs and has stated he has stopped some of his meds but cannot say which ones. Pt seen and received MC and CC but has 2 different med lists and is unable to identify meds taking. Pt denies blood thinners. Pt has stopped ASA as of last week. Pt hx gathered from [employer] and notes from Dr Pickering - pt is poor historian.       Procedure: Right thoracotomy, resection of portion of R 7th rib, evacuation of infected hemothorax, takedown of pleurocutaneous fistula  / R chest wall reconstruction with tunneled latissimus dorsi flap, covered by serratus anterior flap    Pt has E.faecalis growing from surgical site. Pt needs PICC for 4 weeks of I.V antibiotics as per I.D.    Pt has A-V fistula on the left side    Pt is on schedule for PICC  by Dr. Sousa today

## 2018-10-23 NOTE — PROGRESS NOTE ADULT - ASSESSMENT
Pt is a 58 yr old Mandarin speaking male scheduled for Right Thoractomy Decortiation, Right Chest Wall reconstruction with Latissimjus Dorsi Flap Poss Skin Graft with VAC dressing with Dr Pickering 10/11/18. Pt has ICD but unable to identify make or model. Pt hx of ESRD with HD T/Th/Sat for 4 hrs and has stated he has stopped some of his meds but cannot say which ones. Pt seen and received MC and CC but has 2 different med lists and is unable to identify meds taking. Pt denies blood thinners. Pt has stopped ASA as of last week. Pt hx gathered from [employer] and notes from Dr Pickering - pt is poor historian.     Call through  to patient who went to local pharmacy for confirmation of meds and pt given preop instructions (01 Oct 2018 09:25)    Pt has multiple comorbidities including CHF, EF of 10%, renal failure on dialysis,  who has had chronic bilateral pleural effusions.  The patient previously underwent left VATS and PleurX drains  in 2015.  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  During this admission pt underwent OR with Plastic and Thoracic surgery,  for definitive repair that involved thoracotomy, decortication, excision of empyema cavity along with muscle flap reconstruction. Pt underwent surgery on 10/11 (Right thoracotomy, resection of portion of R 7th rib, evacuation of infected hemothorax, takedown of pleurocutaneous fistula - and noted to have foul smelling hematoma).  OR cultures are growing Enterococcus faecalis.    Pt started on empiric vanco/zosyn/flagyl since 10/11.  He has multiple right  chest tubes/ drains in place and remains on vent support. Pt is on ESRD.  He has had intermittent episodes of hypothermia, and low bp (85/44). ID consult called for further antibiotic management.     Problem/Plan - 1:    ·	Infected right hemothorax    - Cont broad spectrum abx for now - agree with zosyn.  Thus far, OR cultures growing sensitive E.faecalis (to amp/vanco).  Renally dose abx, can cont zosyn 3.375 gm IV q12 for HD.      - Can d/c further vanco dosing, no MRSA identified from any of the cultures.  BCX (-) x 2 from 10/15      - Cont wound vac, local wound care, and chest tube drain managment as per CT icu.  s/p removal of chest tube x 2.  Pt with small loculated pleural effusion on cxr and repeat CT scan 10/23 and bibasilar consolidation likely atelectasis.      - s/p PICC line for continuation of zosyn - complete 4 week course from date of surgery (10/11).   Last set of blood cultures NGTD.        Will follow,    Anabel Chahal  [phone]

## 2018-10-23 NOTE — PROGRESS NOTE ADULT - SUBJECTIVE AND OBJECTIVE BOX
CLAUDIA HAN                     MRN-[id number]    HPI:  Pt is a 58 yr old Mandarin speaking male scheduled for Right Thoractomy Decortiation, Right Chest Wall reconstruction with Latissimjus Dorsi Flap Poss Skin Graft with VAC dressing with Dr Pickering 10/11/18. Pt has ICD but unable to identify make or model. Pt hx of ESRD with HD T/Th/Sat for 4 hrs and has stated he has stopped some of his meds but cannot say which ones. Pt seen and received MC and CC but has 2 different med lists and is unable to identify meds taking. Pt denies blood thinners. Pt has stopped ASA as of last week. Pt hx gathered from [employer] and notes from Dr Pickering - pt is poor historian.     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         Procedure: 10/11/18 Right thoracotomy, resection of portion of R 7th rib, evacuation of infected hemothorax, takedown of pleurocutaneous fistula  / R chest wall reconstruction with tunneled latissimus dorsi flap, covered by serratus anterior flap             Issues: Hypotension- on Midodrine  Acute on chronic systolic CHF ( EF 10 %), ICD in place  SOB  Infected right hemothorax - (+) Enterococcus faecalis              Hyperglycemia              ESRD on HD    intermittent agitation/mild dementia              severe deconditionin      PAST MEDICAL & SURGICAL HISTORY:  Smoking hx  Cardiomyopathy  Wound: right chest  ICD (implantable cardioverter-defibrillator) in place  OA (osteoarthritis)  HLD (hyperlipidemia)  BPH (benign prostatic hyperplasia)  Pleural effusion  HTN (hypertension)  ESRD (end stage renal disease)  H/O chest wound: right  History of wound infection: right chest wall - revision 5/18 and again in 7/18  History of implantable cardioverter-defibrillator (ICD) placement: pt unsure when placed  H/O bilateral hip replacements: 2008, 2009            VITAL SIGNS:  Vital Signs Last 24 Hrs  T(C): 36.9 (23 Oct 2018 04:00), Max: 36.9 (22 Oct 2018 20:00)  T(F): 98.5 (23 Oct 2018 04:00), Max: 98.5 (23 Oct 2018 04:00)  HR: 111 (23 Oct 2018 06:00) (95 - 124)  BP: 107/71 (23 Oct 2018 06:00) (90/58 - 123/73)  BP(mean): 79 (23 Oct 2018 06:00) (63 - 84)  RR: 26 (23 Oct 2018 06:00) (14 - 30)  SpO2: 99% (23 Oct 2018 06:00) (81% - 100%)    I/Os:   I&O's Detail    21 Oct 2018 07:01  -  22 Oct 2018 07:00  --------------------------------------------------------  IN:    IV PiggyBack: 500 mL    Oral Fluid: 660 mL    Sodium Chloride 0.9% IV Bolus: 250 mL  Total IN: 1410 mL    OUT:    Bulb: 80 mL    Bulb: 40 mL    Chest Tube: 115 mL  Total OUT: 235 mL    Total NET: 1175 mL      22 Oct 2018 07:01  -  23 Oct 2018 06:55  --------------------------------------------------------  IN:    IV PiggyBack: 450 mL    Oral Fluid: 500 mL    Other: 500 mL  Total IN: 1450 mL    OUT:    Bulb: 120 mL    Bulb: 70 mL    Chest Tube: 80 mL    Other: 2900 mL  Total OUT: 3170 mL    Total NET: -1720 mL          CAPILLARY BLOOD GLUCOSE      POCT Blood Glucose.: 105 mg/dL (22 Oct 2018 22:18)  POCT Blood Glucose.: 157 mg/dL (22 Oct 2018 18:05)  POCT Blood Glucose.: 120 mg/dL (22 Oct 2018 12:36)      =======================MEDICATIONS===================  MEDICATIONS  (STANDING):  aspirin enteric coated 81 milliGRAM(s) Oral daily  dextrose 5%. 1000 milliLiter(s) (50 mL/Hr) IV Continuous <Continuous>  dextrose 50% Injectable 12.5 Gram(s) IV Push once  dextrose 50% Injectable 25 Gram(s) IV Push once  dextrose 50% Injectable 25 Gram(s) IV Push once  docusate sodium 100 milliGRAM(s) Oral three times a day  heparin  Injectable 5000 Unit(s) SubCutaneous every 12 hours  insulin lispro (HumaLOG) corrective regimen sliding scale   SubCutaneous Before meals and at bedtime  ipratropium    for Nebulization 500 MICROGram(s) Nebulizer every 6 hours  memantine 10 milliGRAM(s) Oral two times a day  midodrine 10 milliGRAM(s) Oral every 8 hours  nortriptyline 25 milliGRAM(s) Oral at bedtime  pantoprazole  Injectable 40 milliGRAM(s) IV Push daily  piperacillin/tazobactam IVPB. 3.375 Gram(s) IV Intermittent every 12 hours    MEDICATIONS  (PRN):  bisacodyl Suppository 10 milliGRAM(s) Rectal daily PRN Constipation  dextrose 40% Gel 15 Gram(s) Oral once PRN Blood Glucose LESS THAN 70 milliGRAM(s)/deciliter  glucagon  Injectable 1 milliGRAM(s) IntraMuscular once PRN Glucose LESS THAN 70 milligrams/deciliter  oxyCODONE    IR 5 milliGRAM(s) Oral every 4 hours PRN Moderate Pain (4 - 6)          PHYSICAL EXAM============================  General:                         Awake, alert, not in any distress  Neuro:                            Moving all extremities to commands.   Respiratory:	Air entry fair and  bilateral conducted sounds                                            CV:		Regular rate and rhythm. Normal S1/S2                                          Distal pulses present.  Abdomen:	                     Soft, non-distended. Bowel sounds present   Skin:		No rash.  Extremities:	Warm, no cyanosis or edema.  Palpable pulses    ============================LABS=========================                        9.6    11.49 )-----------( 289      ( 23 Oct 2018 02:30 )             30.4     10-23    141  |  99  |  33<H>  ----------------------------<  96  3.6   |  28  |  4.56<H>    Ca    9.1      23 Oct 2018 02:30  Phos  4.4     10-23  Mg     2.6     10-23    TPro  6.3  /  Alb  2.8<L>  /  TBili  0.7  /  DBili  x   /  AST  17  /  ALT  6   /  AlkPhos  155<H>  10-23    LIVER FUNCTIONS - ( 23 Oct 2018 02:30 )  Alb: 2.8 g/dL / Pro: 6.3 g/dL / ALK PHOS: 155 u/L / ALT: 6 u/L / AST: 17 u/L / GGT: x           PT/INR - ( 22 Oct 2018 02:40 )   PT: 11.0 SEC;   INR: 0.96          PTT - ( 22 Oct 2018 02:40 )  PTT:30.8 SEC  ABG - ( 22 Oct 2018 02:45 )  pH, Arterial: 7.29  pH, Blood: x     /  pCO2: 51    /  pO2: 159   / HCO3: 22    / Base Excess: -2.1  /  SaO2: 99.2                  ============================IMAGING STUDIES=========================  < from: Xray Chest 1 View- PORTABLE-Routine (10.22.18 @ 06:05) >  Right-sided chest tube is unchanged. Persistent right effusion again   seen"N. Right IJ line, AICD and subcutaneous drain unchanged. No   pneumothorax.      COMPARISON:  October 21      IMPRESSION:  Follow-up with no interval change with right-sided chest   tube and persistent effusion seen.      ====================ASSESSMENT AND PLAN ================    58y Male s/p   Right thoracotomy, resection of portion of R 7th rib, evacuation of infected hemothorax, takedown of pleurocutaneous fistula  / R chest wall reconstruction with tunneled latissimus dorsi flap, covered by serratus anterior flap. Mild hypotensive on Midodrine ( off Vasopressin)    Issues: Hypotension- on Midodrine  Acute on chronic systolic CHF ( EF 10 %), ICD in place  SOB  Infected right hemothorax - (+) Enterococcus faecalis              Hyperglycemia              ESRD on HD     Intermittent agitation ( mild dementia preop)              severe deconditioning              Mild resp acidosis    ====================== NEUROLOGY=======================  Pain control with Tylenol IV / Toradol   Will avoid narcotics  C/w memantine 10 milliGRAM(s) Oral two times a day and  nortriptyline 25 milliGRAM(s) Oral at bedtime  PT evaluation( consult ) requested for deconditioning   OOB with assistance  ==================== RESPIRATORY========================  Pt is on/ off BIPAP; currently on 2 L NC           no evidence of distress. OOB to chair  incentive spirometry  as possible  Monitor right chest tube output abd david x2 output  Chest tube to  water seal, blakes to bulb suction	  Continue bronchodilators, pulmonary toilet  Head of bed elevation to 30-40 degrees  CT chest today r/o Collection  ====================CARDIOVASCULAR=====================  Continue hemodynamic monitoring/ telemetry ( remains in NSR, off Amio)  Hypotension - on Midodrine. Quan/ Vaso off  Acute on chronic systolic heart failure - Off inotropic support,   CVP is 14 this morning - dialyze as scheduled today    ===================== RENAL ============================  Monitor I/Os, BUN/ Cr  and electrolytess  s/p HD yesterday  Emily; f/u    ==================== GASTROINTESTINAL===================  On PO dysphagia  diet, tolerating well  Continue GI prophylaxis with Protonix   Zofran / Reglan for nausea - PRN    =======================    ENDOCRIN  =====================  Glycemic monitoring  F/S with coverage  T4/TSH c/w  most likely Euthyroid sick syndrome.    ===================HEMATOLOGIC/ONCOLOGIC =============  Monitor chest tube and blakes output. No signs of active bleeding.   Follow CBC, coags   DVT prophylaxis with SCD, sc Heparin    ========================INFECTIOUS DISEASE===============   Monitor for fever / leukocytosis.  All surgical incision / chest tube  sites look clean   s/p Infected hemothorax - growing E. Faecalis  - Continue Zosyn x 2 more weeks. PICC line today.       Pertinent clinical, laboratory, radiographic, hemodynamic, echocardiographic, respiratory data, microbiologic data and chart were reviewed and analyzed frequently throughout the course of the day and night. GI and DVT prophylaxis, glycemic control, head of bed elevation and skin care issues were addressed.  Patient seen, examined and plan discussed with CT Surgery / CTICU team during rounds.  Pt remains critically ill in imminent risk of  deterioration and requires very careful cardio- pulmonary monitoring and support.    I have spent    30   minutes of critical care time with this pt between    12  am   and     8  am         minutes spent on total encounter; more than 50% of the visit was spent counseling and/or coordinating care by the attending physician.        Dave Su DO FACEP

## 2018-10-23 NOTE — PROGRESS NOTE ADULT - SUBJECTIVE AND OBJECTIVE BOX
Infectious Diseases progress note:    Subjective:  Pt transferred out of CT icu.  s/p PICC line placement.  No new complaints.  Afebrile.  Mild cough.  Wife at bedside.      ROS:  CONSTITUTIONAL:  No fever, chills, rigors  CARDIOVASCULAR:  No chest pain or palpitations  RESPIRATORY:   No SOB, cough, dyspnea on exertion.  No wheezing  GASTROINTESTINAL:  No abd pain, N/V, diarrhea/constipation  EXTREMITIES:  No swelling or joint pain  GENITOURINARY:  No burning on urination, increased frequency or urgency.  No flank pain  NEUROLOGIC:  No HA, visual disturbances  SKIN: No rashes    Allergies    No Known Allergies    Intolerances        ANTIBIOTICS/RELEVANT:  antimicrobials  piperacillin/tazobactam IVPB. 3.375 Gram(s) IV Intermittent every 12 hours    immunologic:    OTHER:  aspirin enteric coated 81 milliGRAM(s) Oral daily  bisacodyl Suppository 10 milliGRAM(s) Rectal daily PRN  chlorhexidine 2% Cloths 1 Application(s) Topical daily  dextrose 40% Gel 15 Gram(s) Oral once PRN  dextrose 5%. 1000 milliLiter(s) IV Continuous <Continuous>  dextrose 50% Injectable 12.5 Gram(s) IV Push once  dextrose 50% Injectable 25 Gram(s) IV Push once  dextrose 50% Injectable 25 Gram(s) IV Push once  docusate sodium 100 milliGRAM(s) Oral three times a day  glucagon  Injectable 1 milliGRAM(s) IntraMuscular once PRN  heparin  Injectable 5000 Unit(s) SubCutaneous every 12 hours  insulin lispro (HumaLOG) corrective regimen sliding scale   SubCutaneous Before meals and at bedtime  ipratropium    for Nebulization 500 MICROGram(s) Nebulizer every 6 hours  memantine 10 milliGRAM(s) Oral two times a day  midodrine 10 milliGRAM(s) Oral every 8 hours  nortriptyline 25 milliGRAM(s) Oral at bedtime  oxyCODONE    IR 5 milliGRAM(s) Oral every 4 hours PRN  pantoprazole  Injectable 40 milliGRAM(s) IV Push daily      Objective:  Vital Signs Last 24 Hrs  T(C): 36.4 (23 Oct 2018 20:26), Max: 37.3 (23 Oct 2018 10:26)  T(F): 97.6 (23 Oct 2018 20:26), Max: 99.2 (23 Oct 2018 10:26)  HR: 104 (23 Oct 2018 22:26) (101 - 118)  BP: 140/68 (23 Oct 2018 20:26) (90/58 - 151/75)  BP(mean): 74 (23 Oct 2018 12:22) (63 - 84)  RR: 18 (23 Oct 2018 20:26) (14 - 26)  SpO2: 97% (23 Oct 2018 22:26) (94% - 100%)    PHYSICAL EXAM:  Constitutional:NAD  Eyes:CHER, EOMI  Ear/Nose/Throat: no thrush, mucositis.  Moist mucous membranes	  Neck:no JVD, no lymphadenopathy, supple  Respiratory: CTA adore.  Rt chest tube  Cardiovascular: S1S2 RRR, no murmurs  Gastrointestinal:soft, nontender,  nondistended (+) BS  Extremities:no e/e/c  Skin:  no rashes, open wounds or ulcerations, rue picc line in place        LABS:                        9.6    11.49 )-----------( 289      ( 23 Oct 2018 02:30 )             30.4     10-23    141  |  99  |  33<H>  ----------------------------<  96  3.6   |  28  |  4.56<H>    Ca    9.1      23 Oct 2018 02:30  Phos  4.4     10-23  Mg     2.6     10-23    TPro  6.3  /  Alb  2.8<L>  /  TBili  0.7  /  DBili  x   /  AST  17  /  ALT  6   /  AlkPhos  155<H>  10-23    PT/INR - ( 22 Oct 2018 02:40 )   PT: 11.0 SEC;   INR: 0.96          PTT - ( 22 Oct 2018 02:40 )  PTT:30.8 SEC                        MICROBIOLOGY:          RADIOLOGY & ADDITIONAL STUDIES:    < from: CT Chest No Cont (10.23.18 @ 10:11) >  IMPRESSION:     1.  Small loculated right hydropneumothorax.     2.  Subsegmental atelectasis/consolidation of bibasilar lower lobes.   Underlying infection cannot be ruled out.    < end of copied text >

## 2018-10-23 NOTE — PROGRESS NOTE ADULT - PROBLEM SELECTOR PLAN 1
ESRD on HD for past six years through left upper extremity AVF. CVVHDF discontinued on 10/15/18. Pt has his last session of HD yesterday which was tolerated well. Plan for HD tomorrow. Labs reviewed. Pt clinically stable. Monitor BMP daily.

## 2018-10-24 LAB
BUN SERPL-MCNC: 48 MG/DL — HIGH (ref 7–23)
CALCIUM SERPL-MCNC: 9.2 MG/DL — SIGNIFICANT CHANGE UP (ref 8.4–10.5)
CHLORIDE SERPL-SCNC: 99 MMOL/L — SIGNIFICANT CHANGE UP (ref 98–107)
CO2 SERPL-SCNC: 25 MMOL/L — SIGNIFICANT CHANGE UP (ref 22–31)
CREAT SERPL-MCNC: 6.55 MG/DL — HIGH (ref 0.5–1.3)
GLUCOSE SERPL-MCNC: 132 MG/DL — HIGH (ref 70–99)
HCT VFR BLD CALC: 29.5 % — LOW (ref 39–50)
HGB BLD-MCNC: 9.2 G/DL — LOW (ref 13–17)
MCHC RBC-ENTMCNC: 31 PG — SIGNIFICANT CHANGE UP (ref 27–34)
MCHC RBC-ENTMCNC: 31.2 % — LOW (ref 32–36)
MCV RBC AUTO: 99.3 FL — SIGNIFICANT CHANGE UP (ref 80–100)
NRBC # FLD: 0 — SIGNIFICANT CHANGE UP
PLATELET # BLD AUTO: 256 K/UL — SIGNIFICANT CHANGE UP (ref 150–400)
PMV BLD: 8.9 FL — SIGNIFICANT CHANGE UP (ref 7–13)
POTASSIUM SERPL-MCNC: 4 MMOL/L — SIGNIFICANT CHANGE UP (ref 3.5–5.3)
POTASSIUM SERPL-SCNC: 4 MMOL/L — SIGNIFICANT CHANGE UP (ref 3.5–5.3)
RBC # BLD: 2.97 M/UL — LOW (ref 4.2–5.8)
RBC # FLD: 20.4 % — HIGH (ref 10.3–14.5)
SODIUM SERPL-SCNC: 141 MMOL/L — SIGNIFICANT CHANGE UP (ref 135–145)
WBC # BLD: 9.6 K/UL — SIGNIFICANT CHANGE UP (ref 3.8–10.5)
WBC # FLD AUTO: 9.6 K/UL — SIGNIFICANT CHANGE UP (ref 3.8–10.5)

## 2018-10-24 PROCEDURE — 71045 X-RAY EXAM CHEST 1 VIEW: CPT | Mod: 26

## 2018-10-24 PROCEDURE — 99232 SBSQ HOSP IP/OBS MODERATE 35: CPT | Mod: GC

## 2018-10-24 RX ORDER — PIPERACILLIN AND TAZOBACTAM 4; .5 G/20ML; G/20ML
3.38 INJECTION, POWDER, LYOPHILIZED, FOR SOLUTION INTRAVENOUS EVERY 12 HOURS
Qty: 0 | Refills: 0 | Status: DISCONTINUED | OUTPATIENT
Start: 2018-10-24 | End: 2018-10-30

## 2018-10-24 RX ORDER — BENZOCAINE AND MENTHOL 5; 1 G/100ML; G/100ML
1 LIQUID ORAL EVERY 4 HOURS
Qty: 0 | Refills: 0 | Status: DISCONTINUED | OUTPATIENT
Start: 2018-10-24 | End: 2018-10-30

## 2018-10-24 RX ADMIN — PIPERACILLIN AND TAZOBACTAM 25 GRAM(S): 4; .5 INJECTION, POWDER, LYOPHILIZED, FOR SOLUTION INTRAVENOUS at 21:40

## 2018-10-24 RX ADMIN — BENZOCAINE AND MENTHOL 1 LOZENGE: 5; 1 LIQUID ORAL at 10:12

## 2018-10-24 RX ADMIN — MEMANTINE HYDROCHLORIDE 10 MILLIGRAM(S): 10 TABLET ORAL at 06:00

## 2018-10-24 RX ADMIN — Medication 100 MILLIGRAM(S): at 13:05

## 2018-10-24 RX ADMIN — MIDODRINE HYDROCHLORIDE 10 MILLIGRAM(S): 2.5 TABLET ORAL at 21:30

## 2018-10-24 RX ADMIN — Medication 100 MILLIGRAM(S): at 21:30

## 2018-10-24 RX ADMIN — Medication 500 MICROGRAM(S): at 10:42

## 2018-10-24 RX ADMIN — OXYCODONE HYDROCHLORIDE 5 MILLIGRAM(S): 5 TABLET ORAL at 13:03

## 2018-10-24 RX ADMIN — NORTRIPTYLINE HYDROCHLORIDE 25 MILLIGRAM(S): 10 CAPSULE ORAL at 21:30

## 2018-10-24 RX ADMIN — OXYCODONE HYDROCHLORIDE 5 MILLIGRAM(S): 5 TABLET ORAL at 22:05

## 2018-10-24 RX ADMIN — Medication 81 MILLIGRAM(S): at 13:03

## 2018-10-24 RX ADMIN — Medication 500 MICROGRAM(S): at 03:46

## 2018-10-24 RX ADMIN — OXYCODONE HYDROCHLORIDE 5 MILLIGRAM(S): 5 TABLET ORAL at 08:28

## 2018-10-24 RX ADMIN — HEPARIN SODIUM 5000 UNIT(S): 5000 INJECTION INTRAVENOUS; SUBCUTANEOUS at 06:00

## 2018-10-24 RX ADMIN — HEPARIN SODIUM 5000 UNIT(S): 5000 INJECTION INTRAVENOUS; SUBCUTANEOUS at 18:44

## 2018-10-24 RX ADMIN — PIPERACILLIN AND TAZOBACTAM 200 GRAM(S): 4; .5 INJECTION, POWDER, LYOPHILIZED, FOR SOLUTION INTRAVENOUS at 10:40

## 2018-10-24 RX ADMIN — PANTOPRAZOLE SODIUM 40 MILLIGRAM(S): 20 TABLET, DELAYED RELEASE ORAL at 13:05

## 2018-10-24 RX ADMIN — MEMANTINE HYDROCHLORIDE 10 MILLIGRAM(S): 10 TABLET ORAL at 18:44

## 2018-10-24 RX ADMIN — OXYCODONE HYDROCHLORIDE 5 MILLIGRAM(S): 5 TABLET ORAL at 21:30

## 2018-10-24 RX ADMIN — Medication 500 MICROGRAM(S): at 23:30

## 2018-10-24 RX ADMIN — Medication 100 MILLIGRAM(S): at 06:00

## 2018-10-24 RX ADMIN — OXYCODONE HYDROCHLORIDE 5 MILLIGRAM(S): 5 TABLET ORAL at 14:18

## 2018-10-24 RX ADMIN — OXYCODONE HYDROCHLORIDE 5 MILLIGRAM(S): 5 TABLET ORAL at 09:45

## 2018-10-24 NOTE — PROGRESS NOTE ADULT - SUBJECTIVE AND OBJECTIVE BOX
CLAUDIA HAN  [id number]    Subjective:    Patient seen and examined by plastic surgery team, no acute changes.   Patient transferred from SICU to surgical floor, doing well.   Pain is controlled, patient tolerating PO.        Objective:  T(C): 36.5 (10-24-18 @ 05:21), Max: 37.3 (10-23-18 @ 10:26)  HR: 110 (10-24-18 @ 05:21) (101 - 118)  BP: 161/74 (10-24-18 @ 05:21) (107/65 - 161/74)  RR: 18 (10-24-18 @ 05:21) (18 - 24)  SpO2: 94% (10-24-18 @ 05:21) (93% - 100%)  Wt(kg): --   10-23    141  |  99  |  33<H>  ----------------------------<  96  3.6   |  28  |  4.56<H>    Ca    9.1      23 Oct 2018 02:30  Phos  4.4     10-23  Mg     2.6     10-23    TPro  6.3  /  Alb  2.8<L>  /  TBili  0.7  /  DBili  x   /  AST  17  /  ALT  6   /  AlkPhos  155<H>  10-23                        9.6    11.49 )-----------( 289      ( 23 Oct 2018 02:30 )             30.4       10-23 @ 07:01  -  10-24 @ 07:00  --------------------------------------------------------  IN: 0 mL / OUT: 208 mL / NET: -208 mL      PHYSICAL EXAM:    General: NAD  Back: Incisions CDI with staples intact, small area of ischemic tissue to staple line. SANDY with SS drainage. No palpable collections.             MEDICATIONS  (STANDING):  aspirin enteric coated 81 milliGRAM(s) Oral daily  chlorhexidine 2% Cloths 1 Application(s) Topical daily  dextrose 5%. 1000 milliLiter(s) (50 mL/Hr) IV Continuous <Continuous>  dextrose 50% Injectable 12.5 Gram(s) IV Push once  dextrose 50% Injectable 25 Gram(s) IV Push once  dextrose 50% Injectable 25 Gram(s) IV Push once  docusate sodium 100 milliGRAM(s) Oral three times a day  heparin  Injectable 5000 Unit(s) SubCutaneous every 12 hours  insulin lispro (HumaLOG) corrective regimen sliding scale   SubCutaneous Before meals and at bedtime  ipratropium    for Nebulization 500 MICROGram(s) Nebulizer every 6 hours  memantine 10 milliGRAM(s) Oral two times a day  midodrine 10 milliGRAM(s) Oral every 8 hours  nortriptyline 25 milliGRAM(s) Oral at bedtime  pantoprazole  Injectable 40 milliGRAM(s) IV Push daily  piperacillin/tazobactam IVPB. 3.375 Gram(s) IV Intermittent every 12 hours    MEDICATIONS  (PRN):  bisacodyl Suppository 10 milliGRAM(s) Rectal daily PRN Constipation  dextrose 40% Gel 15 Gram(s) Oral once PRN Blood Glucose LESS THAN 70 milliGRAM(s)/deciliter  glucagon  Injectable 1 milliGRAM(s) IntraMuscular once PRN Glucose LESS THAN 70 milligrams/deciliter  oxyCODONE    IR 5 milliGRAM(s) Oral every 4 hours PRN Moderate Pain (4 - 6)

## 2018-10-24 NOTE — PROGRESS NOTE ADULT - PROBLEM SELECTOR PLAN 1
ESRD on HD for past six years through left upper extremity AVF. CVVHDF discontinued on 10/15/18. Pt on IHD. Labs reviewed. Pt clinically stable. Plan for HD today. Monitor BMP daily.

## 2018-10-24 NOTE — PROGRESS NOTE ADULT - ASSESSMENT
ASSESSMENT:   Patient is a 58y old  Male who presents with a chief complaint of infected hemothorax (18 Oct 2018), s/p washout of pleural space and latissimus dorsi flap, recently transferred from SICU to surgical floor, doing well     PLAN:     - continue staples  - continue drain  - activity per primary  - DVT ppx  - Ambulating as tolerated  - Will continue to see [facility]      Plastic Surgery  Pager X [phone]

## 2018-10-24 NOTE — PROGRESS NOTE ADULT - SUBJECTIVE AND OBJECTIVE BOX
Infectious Diseases progress note:    Subjective: NAD, no acute o/n events.  Afebrile.  No new somatic complaints.    ROS:  CONSTITUTIONAL:  No fever, chills, rigors  CARDIOVASCULAR:  No chest pain or palpitations  RESPIRATORY:   No SOB, cough, dyspnea on exertion.  No wheezing  GASTROINTESTINAL:  No abd pain, N/V, diarrhea/constipation  EXTREMITIES:  No swelling or joint pain  GENITOURINARY:  No burning on urination, increased frequency or urgency.  No flank pain  NEUROLOGIC:  No HA, visual disturbances  SKIN: No rashes    Allergies    No Known Allergies    Intolerances        ANTIBIOTICS/RELEVANT:  antimicrobials  piperacillin/tazobactam IVPB. 3.375 Gram(s) IV Intermittent every 12 hours    immunologic:    OTHER:  aspirin enteric coated 81 milliGRAM(s) Oral daily  benzocaine 15 mG/menthol 3.6 mG Lozenge 1 Lozenge Oral every 4 hours PRN  bisacodyl Suppository 10 milliGRAM(s) Rectal daily PRN  chlorhexidine 2% Cloths 1 Application(s) Topical daily  dextrose 40% Gel 15 Gram(s) Oral once PRN  dextrose 5%. 1000 milliLiter(s) IV Continuous <Continuous>  dextrose 50% Injectable 12.5 Gram(s) IV Push once  dextrose 50% Injectable 25 Gram(s) IV Push once  dextrose 50% Injectable 25 Gram(s) IV Push once  docusate sodium 100 milliGRAM(s) Oral three times a day  glucagon  Injectable 1 milliGRAM(s) IntraMuscular once PRN  heparin  Injectable 5000 Unit(s) SubCutaneous every 12 hours  insulin lispro (HumaLOG) corrective regimen sliding scale   SubCutaneous Before meals and at bedtime  ipratropium    for Nebulization 500 MICROGram(s) Nebulizer every 6 hours  memantine 10 milliGRAM(s) Oral two times a day  midodrine 10 milliGRAM(s) Oral every 8 hours  nortriptyline 25 milliGRAM(s) Oral at bedtime  oxyCODONE    IR 5 milliGRAM(s) Oral every 4 hours PRN  pantoprazole  Injectable 40 milliGRAM(s) IV Push daily      Objective:  Vital Signs Last 24 Hrs  T(C): 36.8 (24 Oct 2018 08:46), Max: 36.8 (24 Oct 2018 08:46)  T(F): 98.3 (24 Oct 2018 08:46), Max: 98.3 (24 Oct 2018 08:46)  HR: 115 (24 Oct 2018 11:46) (101 - 119)  BP: 155/86 (24 Oct 2018 11:46) (140/68 - 161/74)  BP(mean): 93 (24 Oct 2018 08:46) (93 - 93)  RR: 20 (24 Oct 2018 11:46) (18 - 20)  SpO2: 100% (24 Oct 2018 11:46) (93% - 100%)    PHYSICAL EXAM:  Constitutional:NAD  Eyes:CHER, EOMI  Ear/Nose/Throat: no thrush, mucositis.  Moist mucous membranes	  Neck:no JVD, no lymphadenopathy, supple  Respiratory: CTA adore, rt chest tube  Cardiovascular: S1S2 RRR, no murmurs  Gastrointestinal:soft, nontender,  nondistended (+) BS  Extremities:no e/e/c, RUE picc  Skin:  no rashes, open wounds or ulcerations        LABS:                        9.6    11.49 )-----------( 289      ( 23 Oct 2018 02:30 )             30.4     10-23    141  |  99  |  33<H>  ----------------------------<  96  3.6   |  28  |  4.56<H>    Ca    9.1      23 Oct 2018 02:30  Phos  4.4     10-23  Mg     2.6     10-23    TPro  6.3  /  Alb  2.8<L>  /  TBili  0.7  /  DBili  x   /  AST  17  /  ALT  6   /  AlkPhos  155<H>  10-23                            MICROBIOLOGY:      no new culture data    RADIOLOGY & ADDITIONAL STUDIES:    < from: Xray Chest 1 View- PORTABLE-Routine (10.24.18 @ 08:01) >  IMPRESSION: The heart size appears enlarged. Cardiac device is noted   within the left lateral chest wall subcutaneous tissues with the lead   overlying the heart. There is a right PICC line with the tip in the   superior vena cava. Right-sided drainage cathetersare noted as on the   prior study. There are right chest wall skin staples. There are small   bilateral pleural effusions, right greater than left containing areas of   loculation on the right associated with bibasilar areas of atelectasis   unchanged since the prior study. There is no pneumothorax.    < end of copied text >

## 2018-10-24 NOTE — PROGRESS NOTE ADULT - SUBJECTIVE AND OBJECTIVE BOX
[facility] DIVISION OF KIDNEY DISEASES AND HYPERTENSION -- FOLLOW UP NOTE  --------------------------------------------------------------------------------    HPI: 59yo Mandarin speaking M w/ Hx of NICM w/ ICD, ESRD on HD TTS, former smoker, BPH who was admitted for scheduled right thoracotomy w/ decortication due to infected hemothorax and chest reconstruction with CT Surgery. Pt underwent procedure on 10/11/18, and afterwards developed shock, thought to be cardiogenic. Renal was called for management of pts ESRD. Pt was started on CVVHDF on 10/13/18. Non-tunneled catheter non-functional and removed on 10/15/18 when CRRT was stopped. Pt now on IHD. Pt planned for HD today. Pt seen at bedside. Pt in pain and complains of cough and sore throat     PAST HISTORY  --------------------------------------------------------------------------------  No significant changes to PMH, PSH, FHx, SHx, unless otherwise noted    ALLERGIES & MEDICATIONS  --------------------------------------------------------------------------------  Allergies    No Known Allergies    Intolerances      Standing Inpatient Medications  aspirin enteric coated 81 milliGRAM(s) Oral daily  chlorhexidine 2% Cloths 1 Application(s) Topical daily  dextrose 5%. 1000 milliLiter(s) IV Continuous <Continuous>  dextrose 50% Injectable 12.5 Gram(s) IV Push once  dextrose 50% Injectable 25 Gram(s) IV Push once  dextrose 50% Injectable 25 Gram(s) IV Push once  docusate sodium 100 milliGRAM(s) Oral three times a day  heparin  Injectable 5000 Unit(s) SubCutaneous every 12 hours  insulin lispro (HumaLOG) corrective regimen sliding scale   SubCutaneous Before meals and at bedtime  ipratropium    for Nebulization 500 MICROGram(s) Nebulizer every 6 hours  memantine 10 milliGRAM(s) Oral two times a day  midodrine 10 milliGRAM(s) Oral every 8 hours  nortriptyline 25 milliGRAM(s) Oral at bedtime  pantoprazole  Injectable 40 milliGRAM(s) IV Push daily  piperacillin/tazobactam IVPB. 3.375 Gram(s) IV Intermittent every 12 hours    PRN Inpatient Medications  bisacodyl Suppository 10 milliGRAM(s) Rectal daily PRN  dextrose 40% Gel 15 Gram(s) Oral once PRN  glucagon  Injectable 1 milliGRAM(s) IntraMuscular once PRN  oxyCODONE    IR 5 milliGRAM(s) Oral every 4 hours PRN      REVIEW OF SYSTEMS  --------------------------------------------------------------------------------  Unable to obtain     VITALS/PHYSICAL EXAM  --------------------------------------------------------------------------------  T(C): 36.8 (10-24-18 @ 08:46), Max: 37.3 (10-23-18 @ 10:26)  HR: 115 (10-24-18 @ 08:46) (101 - 118)  BP: 147/78 (10-24-18 @ 08:46) (139/55 - 161/74)  RR: 20 (10-24-18 @ 08:46) (18 - 20)  SpO2: 94% (10-24-18 @ 08:46) (93% - 100%)  Wt(kg): --    10-23-18 @ 07:01  -  10-24-18 @ 07:00  --------------------------------------------------------  IN: 0 mL / OUT: 208 mL / NET: -208 mL    Physical Exam:  	Gen: NAD  	Pulm: CTA B/L, + chest tube drains  	CV: RRR, S1S2; no rub  	Abd: +BS, soft  	LE: Warm, no edema  	Skin: Warm  	Vascular access:  LUE AVF site: +thrill +bruit +skin intact    LABS/STUDIES  --------------------------------------------------------------------------------              9.6    11.49 >-----------<  289      [10-23-18 @ 02:30]              30.4     141  |  99  |  33  ----------------------------<  96      [10-23-18 @ 02:30]  3.6   |  28  |  4.56        Ca     9.1     [10-23-18 @ 02:30]      Mg     2.6     [10-23-18 @ 02:30]      Phos  4.4     [10-23-18 @ 02:30]    TPro  6.3  /  Alb  2.8  /  TBili  0.7  /  DBili  x   /  AST  17  /  ALT  6   /  AlkPhos  155  [10-23-18 @ 02:30]      Creatinine Trend:  SCr 4.56 [10-23 @ 02:30]  SCr 5.80 [10-22 @ 02:40]  SCr 4.58 [10-21 @ 04:15]  SCr 2.90 [10-20 @ 04:15]  SCr 2.72 [10-19 @ 05:20]    HbA1c 5.5      [10-12-18 @ 02:53]  TSH 4.79      [10-21-18 @ 04:15]    HBsAb 16.3      [10-13-18 @ 15:45]  HCV 0.21, Nonreactive Hepatitis C AB  S/CO Ratio                        Interpretation  < 1.0                                     Non-Reactive  1.0 - 4.9                           Weakly-Reactive  > 5.0                                 Reactive  Non-Reactive: Aperson with a non-reactive HCV antibody  result is considered uninfected.  No further action is  needed unless recent infection is suspected.  In these  cases, consider repeat testing later to detect  seroconversion..  Weakly-Reactive: HCV antibody test is abnormal, HCV RNA  Qualitative test will follow.  Reactive: HCV antibody test is abnormal, HCV RNA  Qualitative test will follow.  Note: HCV antibody testing is performed on the [employer]   system.      [10-13-18 @ 15:45]

## 2018-10-24 NOTE — PROGRESS NOTE ADULT - ASSESSMENT
Pt is a 58 yr old Mandarin speaking male scheduled for Right Thoractomy Decortiation, Right Chest Wall reconstruction with Latissimjus Dorsi Flap Poss Skin Graft with VAC dressing with Dr Pickering 10/11/18. Pt has ICD but unable to identify make or model. Pt hx of ESRD with HD T/Th/Sat for 4 hrs and has stated he has stopped some of his meds but cannot say which ones. Pt seen and received [facility] and [facility] but has 2 different med lists and is unable to identify meds taking. Pt denies blood thinners. Pt has stopped ASA as of last week. Pt hx gathered from [employer] and notes from Dr Pickering - pt is poor historian.     Call through  to patient who went to local pharmacy for confirmation of meds and pt given preop instructions (01 Oct 2018 09:25)    Pt has multiple comorbidities including CHF, EF of 10%, renal failure on dialysis,  who has had chronic bilateral pleural effusions.  The patient previously underwent left VATS and PleurX drains  in 2015.  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  During this admission pt underwent OR with Plastic and Thoracic surgery,  for definitive repair that involved thoracotomy, decortication, excision of empyema cavity along with muscle flap reconstruction. Pt underwent surgery on 10/11 (Right thoracotomy, resection of portion of R 7th rib, evacuation of infected hemothorax, takedown of pleurocutaneous fistula - and noted to have foul smelling hematoma).  OR cultures are growing Enterococcus faecalis.    Pt started on empiric vanco/zosyn/flagyl since 10/11.  He has multiple right  chest tubes/ drains in place and remains on vent support. Pt is on ESRD.  He has had intermittent episodes of hypothermia, and low bp (85/44). ID consult called for further antibiotic management.     Problem/Plan - 1:    ·	Infected right hemothorax    - Cont broad spectrum abx for now - agree with zosyn.  Thus far, OR cultures growing sensitive E.faecalis (to amp/vanco).  Renally dose abx, can cont zosyn 3.375 gm IV q12 for HD.      - Cont wound vac, local wound care, and chest tube drain managment as per CT icu.  s/p removal of chest tube x 2.  Pt with small loculated pleural effusion on cxr and repeat CT scan 10/23 and bibasilar consolidation likely atelectasis.      - s/p PICC line for continuation of zosyn - complete 4 week course from date of surgery (10/11) through Nov 7th.   Last set of blood cultures NGTD.        Will follow,    Anabel Chahal  [phone]

## 2018-10-24 NOTE — PROGRESS NOTE ADULT - SUBJECTIVE AND OBJECTIVE BOX
Subjective: sore throat, plan for HD today    Vital Signs:  Vital Signs Last 24 Hrs  T(C): 36.8 (10-24-18 @ 08:46), Max: 37.3 (10-23-18 @ 10:26)  T(F): 98.3 (10-24-18 @ 08:46), Max: 99.2 (10-23-18 @ 10:26)  HR: 115 (10-24-18 @ 08:46) (101 - 118)  BP: 147/78 (10-24-18 @ 08:46) (139/55 - 161/74)  RR: 20 (10-24-18 @ 08:46) (18 - 20)  SpO2: 94% (10-24-18 @ 08:46) (93% - 100%) on (O2)    Telemetry/Alarms:  General: WN/WD NAD  Neurology: Awake, nonfocal, [place]RA[place] x 4  Eyes: Scleras clear, PERRLA/ EOMI, Gross vision intact  ENT:Gross hearing intact, grossly patent pharynx, no stridor  Neck: Neck supple, trachea midline, No JVD,   Respiratory: CTA B/L, No wheezing, rales, rhonchi  CV: RRR, S1S2, no murmurs, rubs or gallops  Abdominal: Soft, NT, ND +BS,   Extremities: No edema, + peripheral pulses  Skin: No Rashes, Hematoma, Ecchymosis  Lymphatic: No Neck, axilla, groin LAD  Psych: Oriented x 3, normal affect  Incisions: c,d,i  Tubes: Chest tube suction drained 50cc/24h no air leak; plastic sx david drains to bulbs drianed 30 and 85  Relevant labs, radiology and Medications reviewed                        9.6    11.49 )-----------( 289      ( 23 Oct 2018 02:30 )             30.4     10-23    141  |  99  |  33<H>  ----------------------------<  96  3.6   |  28  |  4.56<H>    Ca    9.1      23 Oct 2018 02:30  Phos  4.4     10-23  Mg     2.6     10-23    TPro  6.3  /  Alb  2.8<L>  /  TBili  0.7  /  DBili  x   /  AST  17  /  ALT  6   /  AlkPhos  155<H>  10-23      MEDICATIONS  (STANDING):  aspirin enteric coated 81 milliGRAM(s) Oral daily  chlorhexidine 2% Cloths 1 Application(s) Topical daily  dextrose 5%. 1000 milliLiter(s) (50 mL/Hr) IV Continuous <Continuous>  dextrose 50% Injectable 12.5 Gram(s) IV Push once  dextrose 50% Injectable 25 Gram(s) IV Push once  dextrose 50% Injectable 25 Gram(s) IV Push once  docusate sodium 100 milliGRAM(s) Oral three times a day  heparin  Injectable 5000 Unit(s) SubCutaneous every 12 hours  insulin lispro (HumaLOG) corrective regimen sliding scale   SubCutaneous Before meals and at bedtime  ipratropium    for Nebulization 500 MICROGram(s) Nebulizer every 6 hours  memantine 10 milliGRAM(s) Oral two times a day  midodrine 10 milliGRAM(s) Oral every 8 hours  nortriptyline 25 milliGRAM(s) Oral at bedtime  pantoprazole  Injectable 40 milliGRAM(s) IV Push daily  piperacillin/tazobactam IVPB. 3.375 Gram(s) IV Intermittent every 12 hours    MEDICATIONS  (PRN):  benzocaine 15 mG/menthol 3.6 mG Lozenge 1 Lozenge Oral every 4 hours PRN Sore Throat  bisacodyl Suppository 10 milliGRAM(s) Rectal daily PRN Constipation  dextrose 40% Gel 15 Gram(s) Oral once PRN Blood Glucose LESS THAN 70 milliGRAM(s)/deciliter  glucagon  Injectable 1 milliGRAM(s) IntraMuscular once PRN Glucose LESS THAN 70 milligrams/deciliter  oxyCODONE    IR 5 milliGRAM(s) Oral every 4 hours PRN Moderate Pain (4 - 6)    Pertinent Physical Exam  I&O's Summary    23 Oct 2018 07:01  -  24 Oct 2018 07:00  --------------------------------------------------------  IN: 0 mL / OUT: 208 mL / NET: -208 mL        Assessment  58y Male  w/ PAST MEDICAL & SURGICAL HISTORY:  Smoking hx  Cardiomyopathy  Wound: right chest  ICD (implantable cardioverter-defibrillator) in place  OA (osteoarthritis)  HLD (hyperlipidemia)  BPH (benign prostatic hyperplasia)  Pleural effusion  HTN (hypertension)  ESRD (end stage renal disease)  H/O chest wound: right  History of wound infection: right chest wall - revision 5/18 and again in 7/18  History of implantable cardioverter-defibrillator (ICD) placement: pt unsure when placed  H/O bilateral hip replacements: 2008, 2009  admitted with complaints of Patient is a 58y old  Male who presents with a chief complaint of ESRD on HD (24 Oct 2018 08:49)  .  58y Male s/p   Right thoracotomy, resection of portion of R 7th rib, evacuation of infected hemothorax, takedown of pleurocutaneous fistula  / R chest wall reconstruction with tunneled latissimus dorsi flap, covered by serratus anterior flap. Mild hypotensive on Midodrine ( off Vasopressin). Postop CVVHD now HD per renal; has PICC line for antibiotics    PLAN  Neuro: Pain management  Pulm: Encourage coughing, deep breathing and use of incentive spirometry. Wean off supplemental oxygen as able. Daily CXR.   Cardio: Monitor telemetry/alarms  GI: Tolerating diet. Continue stool softeners.  Renal: HD per renal  Vasc: Heparin SC/SCDs for DVT prophylaxis  Heme: Stable H/H. .   ID: Zosyn, ID follows  Therapy: OOB/ambulate  Tubes: Monitor Chest tube and drain output  Disposition: Aim to Discharge when medically optimized  Discussed with Cardiothoracic Team at AM rounds.

## 2018-10-25 LAB
BUN SERPL-MCNC: 33 MG/DL — HIGH (ref 7–23)
CALCIUM SERPL-MCNC: 9.4 MG/DL — SIGNIFICANT CHANGE UP (ref 8.4–10.5)
CHLORIDE SERPL-SCNC: 99 MMOL/L — SIGNIFICANT CHANGE UP (ref 98–107)
CO2 SERPL-SCNC: 26 MMOL/L — SIGNIFICANT CHANGE UP (ref 22–31)
CREAT SERPL-MCNC: 5.09 MG/DL — HIGH (ref 0.5–1.3)
GLUCOSE SERPL-MCNC: 121 MG/DL — HIGH (ref 70–99)
GRAM STN FLD: SIGNIFICANT CHANGE UP
HCT VFR BLD CALC: 31.5 % — LOW (ref 39–50)
HGB BLD-MCNC: 9.7 G/DL — LOW (ref 13–17)
MCHC RBC-ENTMCNC: 30.5 PG — SIGNIFICANT CHANGE UP (ref 27–34)
MCHC RBC-ENTMCNC: 30.8 % — LOW (ref 32–36)
MCV RBC AUTO: 99.1 FL — SIGNIFICANT CHANGE UP (ref 80–100)
NRBC # FLD: 0 — SIGNIFICANT CHANGE UP
PLATELET # BLD AUTO: 277 K/UL — SIGNIFICANT CHANGE UP (ref 150–400)
PMV BLD: 9.2 FL — SIGNIFICANT CHANGE UP (ref 7–13)
POTASSIUM SERPL-MCNC: 4.2 MMOL/L — SIGNIFICANT CHANGE UP (ref 3.5–5.3)
POTASSIUM SERPL-SCNC: 4.2 MMOL/L — SIGNIFICANT CHANGE UP (ref 3.5–5.3)
RBC # BLD: 3.18 M/UL — LOW (ref 4.2–5.8)
RBC # FLD: 20.6 % — HIGH (ref 10.3–14.5)
SODIUM SERPL-SCNC: 141 MMOL/L — SIGNIFICANT CHANGE UP (ref 135–145)
SPECIMEN SOURCE: SIGNIFICANT CHANGE UP
WBC # BLD: 9.46 K/UL — SIGNIFICANT CHANGE UP (ref 3.8–10.5)
WBC # FLD AUTO: 9.46 K/UL — SIGNIFICANT CHANGE UP (ref 3.8–10.5)

## 2018-10-25 PROCEDURE — 71045 X-RAY EXAM CHEST 1 VIEW: CPT | Mod: 26

## 2018-10-25 RX ADMIN — Medication 81 MILLIGRAM(S): at 11:57

## 2018-10-25 RX ADMIN — MEMANTINE HYDROCHLORIDE 10 MILLIGRAM(S): 10 TABLET ORAL at 05:12

## 2018-10-25 RX ADMIN — Medication 100 MILLIGRAM(S): at 05:12

## 2018-10-25 RX ADMIN — MIDODRINE HYDROCHLORIDE 10 MILLIGRAM(S): 2.5 TABLET ORAL at 21:51

## 2018-10-25 RX ADMIN — OXYCODONE HYDROCHLORIDE 5 MILLIGRAM(S): 5 TABLET ORAL at 12:30

## 2018-10-25 RX ADMIN — Medication 500 MICROGRAM(S): at 23:02

## 2018-10-25 RX ADMIN — HEPARIN SODIUM 5000 UNIT(S): 5000 INJECTION INTRAVENOUS; SUBCUTANEOUS at 05:12

## 2018-10-25 RX ADMIN — HEPARIN SODIUM 5000 UNIT(S): 5000 INJECTION INTRAVENOUS; SUBCUTANEOUS at 17:27

## 2018-10-25 RX ADMIN — OXYCODONE HYDROCHLORIDE 5 MILLIGRAM(S): 5 TABLET ORAL at 21:50

## 2018-10-25 RX ADMIN — MEMANTINE HYDROCHLORIDE 10 MILLIGRAM(S): 10 TABLET ORAL at 17:26

## 2018-10-25 RX ADMIN — OXYCODONE HYDROCHLORIDE 5 MILLIGRAM(S): 5 TABLET ORAL at 22:20

## 2018-10-25 RX ADMIN — OXYCODONE HYDROCHLORIDE 5 MILLIGRAM(S): 5 TABLET ORAL at 05:12

## 2018-10-25 RX ADMIN — Medication 100 MILLIGRAM(S): at 21:51

## 2018-10-25 RX ADMIN — CHLORHEXIDINE GLUCONATE 1 APPLICATION(S): 213 SOLUTION TOPICAL at 11:51

## 2018-10-25 RX ADMIN — NORTRIPTYLINE HYDROCHLORIDE 25 MILLIGRAM(S): 10 CAPSULE ORAL at 21:51

## 2018-10-25 RX ADMIN — OXYCODONE HYDROCHLORIDE 5 MILLIGRAM(S): 5 TABLET ORAL at 11:57

## 2018-10-25 RX ADMIN — Medication 500 MICROGRAM(S): at 09:11

## 2018-10-25 RX ADMIN — PANTOPRAZOLE SODIUM 40 MILLIGRAM(S): 20 TABLET, DELAYED RELEASE ORAL at 11:56

## 2018-10-25 RX ADMIN — Medication 500 MICROGRAM(S): at 16:42

## 2018-10-25 RX ADMIN — Medication 500 MICROGRAM(S): at 04:05

## 2018-10-25 RX ADMIN — OXYCODONE HYDROCHLORIDE 5 MILLIGRAM(S): 5 TABLET ORAL at 05:42

## 2018-10-25 RX ADMIN — PIPERACILLIN AND TAZOBACTAM 25 GRAM(S): 4; .5 INJECTION, POWDER, LYOPHILIZED, FOR SOLUTION INTRAVENOUS at 11:57

## 2018-10-25 RX ADMIN — Medication 100 MILLIGRAM(S): at 14:19

## 2018-10-25 NOTE — PROGRESS NOTE ADULT - SUBJECTIVE AND OBJECTIVE BOX
Infectious Diseases progress note:    Subjective:  NAD, awake, alert.  No cough/fevers/abd pain.  Wife present at bedside    ROS:  CONSTITUTIONAL:  No fever, chills, rigors  CARDIOVASCULAR:  No chest pain or palpitations  RESPIRATORY:   No SOB, cough, dyspnea on exertion.  No wheezing  GASTROINTESTINAL:  No abd pain, N/V, diarrhea/constipation  EXTREMITIES:  No swelling or joint pain  GENITOURINARY:  No burning on urination, increased frequency or urgency.  No flank pain  NEUROLOGIC:  No HA, visual disturbances  SKIN: No rashes    Allergies    No Known Allergies    Intolerances        ANTIBIOTICS/RELEVANT:  antimicrobials  piperacillin/tazobactam IVPB. 3.375 Gram(s) IV Intermittent every 12 hours    immunologic:    OTHER:  aspirin enteric coated 81 milliGRAM(s) Oral daily  benzocaine 15 mG/menthol 3.6 mG Lozenge 1 Lozenge Oral every 4 hours PRN  bisacodyl Suppository 10 milliGRAM(s) Rectal daily PRN  chlorhexidine 2% Cloths 1 Application(s) Topical daily  dextrose 40% Gel 15 Gram(s) Oral once PRN  dextrose 5%. 1000 milliLiter(s) IV Continuous <Continuous>  dextrose 50% Injectable 12.5 Gram(s) IV Push once  dextrose 50% Injectable 25 Gram(s) IV Push once  dextrose 50% Injectable 25 Gram(s) IV Push once  docusate sodium 100 milliGRAM(s) Oral three times a day  glucagon  Injectable 1 milliGRAM(s) IntraMuscular once PRN  heparin  Injectable 5000 Unit(s) SubCutaneous every 12 hours  insulin lispro (HumaLOG) corrective regimen sliding scale   SubCutaneous Before meals and at bedtime  ipratropium    for Nebulization 500 MICROGram(s) Nebulizer every 6 hours  memantine 10 milliGRAM(s) Oral two times a day  midodrine 10 milliGRAM(s) Oral every 8 hours  nortriptyline 25 milliGRAM(s) Oral at bedtime  oxyCODONE    IR 5 milliGRAM(s) Oral every 4 hours PRN  pantoprazole  Injectable 40 milliGRAM(s) IV Push daily      Objective:  Vital Signs Last 24 Hrs  T(C): 36.6 (25 Oct 2018 12:36), Max: 36.6 (25 Oct 2018 00:13)  T(F): 97.8 (25 Oct 2018 12:36), Max: 97.9 (25 Oct 2018 00:13)  HR: 111 (25 Oct 2018 16:42) (110 - 123)  BP: 122/66 (25 Oct 2018 14:03) (120/61 - 143/103)  BP(mean): 96 (25 Oct 2018 08:17) (96 - 96)  RR: 19 (25 Oct 2018 12:36) (18 - 20)  SpO2: 99% (25 Oct 2018 16:42) (94% - 100%)    PHYSICAL EXAM:  Constitutional:NAD  Eyes:CHER, EOMI  Ear/Nose/Throat: no thrush, mucositis.  Moist mucous membranes	  Neck:no JVD, no lymphadenopathy, supple  Respiratory: CTA adore, rt chest tube  Cardiovascular: S1S2 RRR, no murmurs  Gastrointestinal:soft, nontender,  nondistended (+) BS  Extremities:no e/e/c  Skin:  rt post chest staples in place.  SANDY drains with scant amt of serosanguinous fluid        LABS:                        9.7    9.46  )-----------( 277      ( 25 Oct 2018 05:20 )             31.5     10-25    141  |  99  |  33<H>  ----------------------------<  121<H>  4.2   |  26  |  5.09<H>    Ca    9.4      25 Oct 2018 05:20            MICROBIOLOGY:    Culture - Body Fluid with Gram Stain (10.25.18 @ 15:37)    Gram Stain:   NOS^No Organisms Seen  WBC^White Blood Cells  QNTY CELLS IN GRAM STAIN: FEW (2+)    Specimen Source: PLEURAL FLUID          RADIOLOGY & ADDITIONAL STUDIES:    < from: Xray Chest 1 View- PORTABLE-Routine (10.25.18 @ 07:30) >  IMPRESSION: Lines and tubes are unchanged since the prior study.   Left-sided cardiac device is again noted. The heart size is likely   enlarged. There is a small loculated right pleural effusion unchanged.   There is no evidence of pneumothorax. There is a trace left pleural   effusion likely with associated left lung base atelectasis.    < end of copied text >

## 2018-10-25 NOTE — PROGRESS NOTE ADULT - SUBJECTIVE AND OBJECTIVE BOX
Subjective: Translation done at bedside w Dr. Pickering. Pt feels better. No pain or SOB. Amb w min assist, OOB in chair.     Vital Signs:  Vital Signs Last 24 Hrs  T(C): 36.6 (10-25-18 @ 12:36), Max: 36.6 (10-24-18 @ 20:26)  T(F): 97.8 (10-25-18 @ 12:36), Max: 97.9 (10-24-18 @ 20:26)  HR: 113 (10-25-18 @ 14:03) (110 - 123)  BP: 122/66 (10-25-18 @ 14:03) (120/61 - 143/103)  RR: 19 (10-25-18 @ 12:36) (18 - 20)  SpO2: 100% (10-25-18 @ 12:36) (94% - 100%) on (O2)    Telemetry/Alarms:  General: WN/WD NAD  Neurology: Awake, nonfocal, [place] x 4  Eyes: Scleras clear, PERRLA/ EOMI, Gross vision intact  ENT:Gross hearing intact, grossly patent pharynx, no stridor  Neck: Neck supple, trachea midline, No JVD,   Respiratory: decreased throughout Rt. side.   CV: RRR, S1S2, no murmurs, rubs or gallops  Abdominal: Soft, NT, ND +BS, +BM, On HD  Extremities: No edema, + peripheral pulses  Skin: No Rashes, Hematoma, Ecchymosis. Eschar noted along posterior staple line, no drainage. Plastics aware.   Lymphatic: No Neck, axilla, groin LAD  Psych: Oriented x 3, normal affect  Incisions: Large Rt. Anterior and posterior staple line CONSTANTIN and c/d/i. Eschar noted to posterior staple line.   Tubes: Rt. CT 40cc/24hrs on Suction, no AL, old thin blood. JPs x 2 (Plastics) #1-20cc/24hrs #2-75cc/24hrs.   Relevant labs, radiology and Medications reviewed           CXr-stable, sml loculated Rt. pTx.              9.7    9.46  )-----------( 277      ( 25 Oct 2018 05:20 )             31.5     10-25    141  |  99  |  33<H>  ----------------------------<  121<H>  4.2   |  26  |  5.09<H>    Ca    9.4      25 Oct 2018 05:20        MEDICATIONS  (STANDING):  aspirin enteric coated 81 milliGRAM(s) Oral daily  chlorhexidine 2% Cloths 1 Application(s) Topical daily  dextrose 5%. 1000 milliLiter(s) (50 mL/Hr) IV Continuous <Continuous>  dextrose 50% Injectable 12.5 Gram(s) IV Push once  dextrose 50% Injectable 25 Gram(s) IV Push once  dextrose 50% Injectable 25 Gram(s) IV Push once  docusate sodium 100 milliGRAM(s) Oral three times a day  heparin  Injectable 5000 Unit(s) SubCutaneous every 12 hours  insulin lispro (HumaLOG) corrective regimen sliding scale   SubCutaneous Before meals and at bedtime  ipratropium    for Nebulization 500 MICROGram(s) Nebulizer every 6 hours  memantine 10 milliGRAM(s) Oral two times a day  midodrine 10 milliGRAM(s) Oral every 8 hours  nortriptyline 25 milliGRAM(s) Oral at bedtime  pantoprazole  Injectable 40 milliGRAM(s) IV Push daily  piperacillin/tazobactam IVPB. 3.375 Gram(s) IV Intermittent every 12 hours    MEDICATIONS  (PRN):  benzocaine 15 mG/menthol 3.6 mG Lozenge 1 Lozenge Oral every 4 hours PRN Sore Throat  bisacodyl Suppository 10 milliGRAM(s) Rectal daily PRN Constipation  dextrose 40% Gel 15 Gram(s) Oral once PRN Blood Glucose LESS THAN 70 milliGRAM(s)/deciliter  glucagon  Injectable 1 milliGRAM(s) IntraMuscular once PRN Glucose LESS THAN 70 milligrams/deciliter  oxyCODONE    IR 5 milliGRAM(s) Oral every 4 hours PRN Moderate Pain (4 - 6)    Pertinent Physical Exam  I&O's Summary    24 Oct 2018 07:01  -  25 Oct 2018 07:00  --------------------------------------------------------  IN: 400 mL / OUT: 2935 mL / NET: -2535 mL    25 Oct 2018 07:01  -  25 Oct 2018 16:09  --------------------------------------------------------  IN: 118 mL / OUT: 60 mL / NET: 58 mL        Assessment  58y Male  w/ PAST MEDICAL & SURGICAL HISTORY:  Smoking hx  Cardiomyopathy  Wound: right chest  ICD (implantable cardioverter-defibrillator) in place  OA (osteoarthritis)  HLD (hyperlipidemia)  BPH (benign prostatic hyperplasia)  Pleural effusion  HTN (hypertension)  ESRD (end stage renal disease)  H/O chest wound: right  History of wound infection: right chest wall - revision 5/18 and again in 7/18  History of implantable cardioverter-defibrillator (ICD) placement: pt unsure when placed  H/O bilateral hip replacements: 2008, 2009  admitted with complaints of Patient is a 58y old  Male who presents with a chief complaint of hemothorax (24 Oct 2018 15:04)  HPI:  Pt is a 58 yr old Mandarin speaking male scheduled for Right Thoractomy Decortiation, Right Chest Wall reconstruction with Latissimjus Dorsi Flap Poss Skin Graft with VAC dressing with Dr Pickering 10/11/18. Pt has ICD but unable to identify make or model. Pt hx of ESRD with HD T/Th/Sat for 4 hrs and has stated he has stopped some of his meds but cannot say which ones. Pt seen and received MC and CC but has 2 different med lists and is unable to identify meds taking. Pt denies blood thinners. Pt has stopped ASA as of last week. Pt hx gathered from [employer] and notes from Dr Pickering - pt is poor historian.     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         Procedure: 10/11/18 Right thoracotomy, resection of portion of R 7th rib, evacuation of infected hemothorax, takedown of pleurocutaneous fistula  / R chest wall reconstruction with tunneled latissimus dorsi flap, covered by serratus anterior flap             Issues: Hypotension- on Midodrine  Acute on chronic systolic CHF ( EF 10 %), ICD in place  SOB  Infected right hemothorax - (+) Enterococcus faecalis              Hyperglycemia              ESRD on HD    intermittent agitation/mild dementia              severe deconditionin        PLAN  Neuro: Pain management  Pulm: Encourage coughing, deep breathing and use of incentive spirometry. Wean off supplemental oxygen as able. Daily CXR.   Cardio: Monitor telemetry/alarms. Will cont Midodrine. Tachy on Tele, will cont to monitor.   GI: Tolerating diet. Continue stool softeners. Cont dysphagia diet w supplements.   Renal: monitor urine output, supplement electrolytes as needed. HD tomorrow  Vasc: Heparin SC/SCDs for DVT prophylaxis  Heme: Stable H/H. .   ID: FU cultures. Cont Zosyn until 11/7/18  Therapy: OOB/ambulate, plan home vs Rehab  Tubes: Monitor Chest tube output, will send rpt Pleural fluid culture today. IF negative will plan to d/c CT. JPs as per Plastic sx.   Disposition: Aim to D/C to home vs rehab next week.   Discussed with Cardiothoracic Team at AM rounds.

## 2018-10-25 NOTE — PROGRESS NOTE ADULT - ASSESSMENT
Pt is a 58 yr old Mandarin speaking male scheduled for Right Thoractomy Decortiation, Right Chest Wall reconstruction with Latissimjus Dorsi Flap Poss Skin Graft with VAC dressing with Dr Pickering 10/11/18. Pt has ICD but unable to identify make or model. Pt hx of ESRD with HD T/Th/Sat for 4 hrs and has stated he has stopped some of his meds but cannot say which ones. Pt seen and received MC and CC but has 2 different med lists and is unable to identify meds taking. Pt denies blood thinners. Pt has stopped ASA as of last week. Pt hx gathered from [employer] and notes from Dr Pickering - pt is poor historian.     Call through  to patient who went to local pharmacy for confirmation of meds and pt given preop instructions (01 Oct 2018 09:25)    Pt has multiple comorbidities including CHF, EF of 10%, renal failure on dialysis,  who has had chronic bilateral pleural effusions.  The patient previously underwent left VATS and PleurX drains  in 2015.  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  During this admission pt underwent OR with Plastic and Thoracic surgery,  for definitive repair that involved thoracotomy, decortication, excision of empyema cavity along with muscle flap reconstruction. Pt underwent surgery on 10/11 (Right thoracotomy, resection of portion of R 7th rib, evacuation of infected hemothorax, takedown of pleurocutaneous fistula - and noted to have foul smelling hematoma).  OR cultures are growing Enterococcus faecalis.    Pt started on empiric vanco/zosyn/flagyl since 10/11.  He has multiple right  chest tubes/ drains in place and remains on vent support. Pt is on ESRD.  He has had intermittent episodes of hypothermia, and low bp (85/44). ID consult called for further antibiotic management.     Problem/Plan - 1:    ·	Infected right hemothorax    - Cont broad spectrum abx for now - agree with zosyn.  Thus far, OR cultures growing sensitive E.faecalis (to amp/vanco).  Renally dose abx, can cont zosyn 3.375 gm IV q12 for HD.      - Cont wound vac, local wound care, and chest tube drain managment as per CT icu.  s/p removal of chest tube x 2.  Pt with small loculated pleural effusion on cxr and repeat CT scan 10/23 and bibasilar consolidation likely atelectasis.      - s/p PICC line for continuation of zosyn - complete 4 week course from date of surgery (10/11) through Nov 7th.   Last set of blood cultures NGTD.        Will follow,    Anabel Chahal  [phone]

## 2018-10-26 PROCEDURE — 90935 HEMODIALYSIS ONE EVALUATION: CPT | Mod: GC

## 2018-10-26 PROCEDURE — 71045 X-RAY EXAM CHEST 1 VIEW: CPT | Mod: 26

## 2018-10-26 RX ORDER — METOPROLOL TARTRATE 50 MG
25 TABLET ORAL DAILY
Qty: 0 | Refills: 0 | Status: DISCONTINUED | OUTPATIENT
Start: 2018-10-26 | End: 2018-10-30

## 2018-10-26 RX ADMIN — Medication 100 MILLIGRAM(S): at 20:50

## 2018-10-26 RX ADMIN — Medication 81 MILLIGRAM(S): at 15:25

## 2018-10-26 RX ADMIN — Medication 500 MICROGRAM(S): at 23:13

## 2018-10-26 RX ADMIN — OXYCODONE HYDROCHLORIDE 5 MILLIGRAM(S): 5 TABLET ORAL at 10:45

## 2018-10-26 RX ADMIN — PANTOPRAZOLE SODIUM 40 MILLIGRAM(S): 20 TABLET, DELAYED RELEASE ORAL at 15:25

## 2018-10-26 RX ADMIN — OXYCODONE HYDROCHLORIDE 5 MILLIGRAM(S): 5 TABLET ORAL at 10:28

## 2018-10-26 RX ADMIN — MIDODRINE HYDROCHLORIDE 10 MILLIGRAM(S): 2.5 TABLET ORAL at 05:36

## 2018-10-26 RX ADMIN — Medication 100 MILLIGRAM(S): at 05:36

## 2018-10-26 RX ADMIN — HEPARIN SODIUM 5000 UNIT(S): 5000 INJECTION INTRAVENOUS; SUBCUTANEOUS at 17:04

## 2018-10-26 RX ADMIN — OXYCODONE HYDROCHLORIDE 5 MILLIGRAM(S): 5 TABLET ORAL at 21:20

## 2018-10-26 RX ADMIN — PIPERACILLIN AND TAZOBACTAM 25 GRAM(S): 4; .5 INJECTION, POWDER, LYOPHILIZED, FOR SOLUTION INTRAVENOUS at 00:11

## 2018-10-26 RX ADMIN — NORTRIPTYLINE HYDROCHLORIDE 25 MILLIGRAM(S): 10 CAPSULE ORAL at 20:50

## 2018-10-26 RX ADMIN — MEMANTINE HYDROCHLORIDE 10 MILLIGRAM(S): 10 TABLET ORAL at 17:04

## 2018-10-26 RX ADMIN — PIPERACILLIN AND TAZOBACTAM 25 GRAM(S): 4; .5 INJECTION, POWDER, LYOPHILIZED, FOR SOLUTION INTRAVENOUS at 20:51

## 2018-10-26 RX ADMIN — OXYCODONE HYDROCHLORIDE 5 MILLIGRAM(S): 5 TABLET ORAL at 20:50

## 2018-10-26 RX ADMIN — MEMANTINE HYDROCHLORIDE 10 MILLIGRAM(S): 10 TABLET ORAL at 05:36

## 2018-10-26 RX ADMIN — Medication 500 MICROGRAM(S): at 15:29

## 2018-10-26 RX ADMIN — CHLORHEXIDINE GLUCONATE 1 APPLICATION(S): 213 SOLUTION TOPICAL at 15:24

## 2018-10-26 RX ADMIN — Medication 500 MICROGRAM(S): at 04:38

## 2018-10-26 RX ADMIN — Medication 25 MILLIGRAM(S): at 16:16

## 2018-10-26 RX ADMIN — Medication 500 MICROGRAM(S): at 09:44

## 2018-10-26 RX ADMIN — Medication 100 MILLIGRAM(S): at 15:25

## 2018-10-26 RX ADMIN — PIPERACILLIN AND TAZOBACTAM 25 GRAM(S): 4; .5 INJECTION, POWDER, LYOPHILIZED, FOR SOLUTION INTRAVENOUS at 10:28

## 2018-10-26 RX ADMIN — HEPARIN SODIUM 5000 UNIT(S): 5000 INJECTION INTRAVENOUS; SUBCUTANEOUS at 05:36

## 2018-10-26 RX ADMIN — MIDODRINE HYDROCHLORIDE 10 MILLIGRAM(S): 2.5 TABLET ORAL at 20:51

## 2018-10-26 NOTE — PROGRESS NOTE ADULT - SUBJECTIVE AND OBJECTIVE BOX
Subjective: Pt states doing well. No complaints. Drinking ensure supp. OOB w min assist. Plan HD today.     Vital Signs:  Vital Signs Last 24 Hrs  T(C): 36.7 (10-26-18 @ 15:21), Max: 36.8 (10-26-18 @ 05:33)  T(F): 98.1 (10-26-18 @ 15:21), Max: 98.2 (10-26-18 @ 05:33)  HR: 126 (10-26-18 @ 15:29) (109 - 135)  BP: 107/77 (10-26-18 @ 15:21) (107/77 - 151/97)  RR: 18 (10-26-18 @ 15:21) (18 - 19)  SpO2: 98% (10-26-18 @ 15:29) (93% - 100%) on (O2)    Telemetry/Alarms: HR Tele -125  General: WN/WD NAD  Neurology: Awake, nonfocal, [place] x 4  Eyes: Scleras clear, PERRLA/ EOMI, Gross vision intact  ENT:Gross hearing intact, grossly patent pharynx, no stridor  Neck: Neck supple, trachea midline, No JVD,   Respiratory: decreased throughout Rt.   CV: RRR, S1S2, no murmurs, rubs or gallops  Abdominal: Soft, NT, ND +BS, +BM  Extremities: No edema, + peripheral pulses  Skin: No Rashes, Hematoma, Ecchymosis  Lymphatic: No Neck, axilla, groin LAD  Psych: Oriented x 3, normal affect  Incisions: Large staple line c/d/i. Posterior aspect w area of eschar tissue stable. nO drainage.   Tubes: Rt.CT 18cc/24hrs on suction. SANDY 1-52cc SANDY 2- 82cc  Relevant labs, radiology and Medications reviewed           CXR stable.              9.7    9.46  )-----------( 277      ( 25 Oct 2018 05:20 )             31.5     10-25    141  |  99  |  33<H>  ----------------------------<  121<H>  4.2   |  26  |  5.09<H>    Ca    9.4      25 Oct 2018 05:20    Pleural fluid culture from 10/25- NGTD    MEDICATIONS  (STANDING):  aspirin enteric coated 81 milliGRAM(s) Oral daily  chlorhexidine 2% Cloths 1 Application(s) Topical daily  dextrose 5%. 1000 milliLiter(s) (50 mL/Hr) IV Continuous <Continuous>  dextrose 50% Injectable 12.5 Gram(s) IV Push once  dextrose 50% Injectable 25 Gram(s) IV Push once  dextrose 50% Injectable 25 Gram(s) IV Push once  docusate sodium 100 milliGRAM(s) Oral three times a day  heparin  Injectable 5000 Unit(s) SubCutaneous every 12 hours  insulin lispro (HumaLOG) corrective regimen sliding scale   SubCutaneous Before meals and at bedtime  ipratropium    for Nebulization 500 MICROGram(s) Nebulizer every 6 hours  memantine 10 milliGRAM(s) Oral two times a day  metoprolol succinate ER 25 milliGRAM(s) Oral daily  midodrine 10 milliGRAM(s) Oral every 8 hours  nortriptyline 25 milliGRAM(s) Oral at bedtime  pantoprazole  Injectable 40 milliGRAM(s) IV Push daily  piperacillin/tazobactam IVPB. 3.375 Gram(s) IV Intermittent every 12 hours    MEDICATIONS  (PRN):  benzocaine 15 mG/menthol 3.6 mG Lozenge 1 Lozenge Oral every 4 hours PRN Sore Throat  bisacodyl Suppository 10 milliGRAM(s) Rectal daily PRN Constipation  dextrose 40% Gel 15 Gram(s) Oral once PRN Blood Glucose LESS THAN 70 milliGRAM(s)/deciliter  glucagon  Injectable 1 milliGRAM(s) IntraMuscular once PRN Glucose LESS THAN 70 milligrams/deciliter  oxyCODONE    IR 5 milliGRAM(s) Oral every 4 hours PRN Moderate Pain (4 - 6)    Pertinent Physical Exam  I&O's Summary    25 Oct 2018 07:01  -  26 Oct 2018 07:00  --------------------------------------------------------  IN: 118 mL / OUT: 153 mL / NET: -35 mL    26 Oct 2018 07:01  -  26 Oct 2018 15:48  --------------------------------------------------------  IN: 500 mL / OUT: 2900 mL / NET: -2400 mL        Assessment  58y Male  w/ PAST MEDICAL & SURGICAL HISTORY:  Smoking hx  Cardiomyopathy  Wound: right chest  ICD (implantable cardioverter-defibrillator) in place  OA (osteoarthritis)  HLD (hyperlipidemia)  BPH (benign prostatic hyperplasia)  Pleural effusion  HTN (hypertension)  ESRD (end stage renal disease)  H/O chest wound: right  History of wound infection: right chest wall - revision 5/18 and again in 7/18  History of implantable cardioverter-defibrillator (ICD) placement: pt unsure when placed  H/O bilateral hip replacements: 2008, 2009  admitted with complaints of Patient is a 58y old  Male who presents with a chief complaint of Lung surgery (25 Oct 2018 16:08)  Pt is a 58 yr old Mandarin speaking male scheduled for Right Thoractomy Decortiation, Right Chest Wall reconstruction with Latissimjus Dorsi Flap Poss Skin Graft with VAC dressing with Dr Pickering 10/11/18. Pt has ICD but unable to identify make or model. Pt hx of ESRD with HD T/Th/Sat for 4 hrs and has stated he has stopped some of his meds but cannot say which ones. Pt seen and received MC and CC but has 2 different med lists and is unable to identify meds taking. Pt denies blood thinners. Pt has stopped ASA as of last week. Pt hx gathered from [employer] and notes from Dr Pickering - pt is poor historian.     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 On IV antibx until 11/7. PICC in place. Contined on HD. Tubes and JPs to remain in place for now. Rpt Pleural fluid cult sent 10/25      PLAN  Neuro: Pain management  Pulm: Encourage coughing, deep breathing and use of incentive spirometry. Wean off supplemental oxygen as able. Daily CXR. FU Pleural culture  Cardio: Monitor telemetry/alarms. Tachycardia d/w Dr. Wilks from Heart failure. Plan to continue Midodrine at current dose but add Toprol XL 25mg QD  GI: Tolerating diet. Continue stool softeners.  Renal: monitor urine output, supplement electrolytes as needed. HD today, cont per renal   Vasc: Heparin SC/SCDs for DVT prophylaxis  Heme: Stable H/H. .   ID: Cont Zosyn. FU cultures  Therapy: OOB/ambulate  Tubes: Monitor Chest tube output, continue until cult negative. JPs per plastics, to cont to follow eschar at wound.   Disposition: Aim to D/C to home once stable  Discussed with Cardiothoracic Team at AM rounds.

## 2018-10-26 NOTE — PROGRESS NOTE ADULT - ASSESSMENT
Pt is a 58 yr old Mandarin speaking male scheduled for Right Thoractomy Decortiation, Right Chest Wall reconstruction with Latissimjus Dorsi Flap Poss Skin Graft with VAC dressing with Dr Pickering 10/11/18. Pt has ICD but unable to identify make or model. Pt hx of ESRD with HD T/Th/Sat for 4 hrs and has stated he has stopped some of his meds but cannot say which ones. Pt seen and received MC and CC but has 2 different med lists and is unable to identify meds taking. Pt denies blood thinners. Pt has stopped ASA as of last week. Pt hx gathered from [employer] and notes from Dr Pickering - pt is poor historian.     Call through  to patient who went to local pharmacy for confirmation of meds and pt given preop instructions (01 Oct 2018 09:25)    Pt has multiple comorbidities including CHF, EF of 10%, renal failure on dialysis,  who has had chronic bilateral pleural effusions.  The patient previously underwent left VATS and PleurX drains  in 2015.  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  During this admission pt underwent OR with Plastic and Thoracic surgery,  for definitive repair that involved thoracotomy, decortication, excision of empyema cavity along with muscle flap reconstruction. Pt underwent surgery on 10/11 (Right thoracotomy, resection of portion of R 7th rib, evacuation of infected hemothorax, takedown of pleurocutaneous fistula - and noted to have foul smelling hematoma).  OR cultures are growing Enterococcus faecalis.    Pt started on empiric vanco/zosyn/flagyl since 10/11.  He has multiple right  chest tubes/ drains in place and remains on vent support. Pt is on ESRD.  He has had intermittent episodes of hypothermia, and low bp (85/44). ID consult called for further antibiotic management.     Problem/Plan - 1:    ·	Infected right hemothorax    - Cont broad spectrum abx for now - agree with zosyn.  Thus far, OR cultures growing sensitive E.faecalis (to amp/vanco).  Renally dose abx, can cont zosyn 3.375 gm IV q12 for HD.      - Cont wound vac, local wound care, and chest tube drain managment as per CT icu.  s/p removal of chest tube x 2.  Pt with small loculated pleural effusion on cxr and repeat CT scan 10/23 and bibasilar consolidation likely atelectasis.      - s/p PICC line for continuation of zosyn - complete 4 week course from date of surgery (10/11) through Nov 7th.   Last set of blood cultures NGTD.    - Check weekly cbc, cmp, esr, crp after discharge, while pt is on abx        Will followAnabel Rai  [phone]

## 2018-10-26 NOTE — PROGRESS NOTE ADULT - SUBJECTIVE AND OBJECTIVE BOX
Infectious Diseases progress note:    Subjective: NAD, afebrile  Comfortable.  Wife present at bedside.     ROS:  CONSTITUTIONAL:  No fever, chills, rigors  CARDIOVASCULAR:  No chest pain or palpitations  RESPIRATORY:   No SOB, cough, dyspnea on exertion.  No wheezing  GASTROINTESTINAL:  No abd pain, N/V, diarrhea/constipation  EXTREMITIES:  No swelling or joint pain  GENITOURINARY:  No burning on urination, increased frequency or urgency.  No flank pain  NEUROLOGIC:  No HA, visual disturbances  SKIN: No rashes    Allergies    No Known Allergies    Intolerances        ANTIBIOTICS/RELEVANT:  antimicrobials  piperacillin/tazobactam IVPB. 3.375 Gram(s) IV Intermittent every 12 hours    immunologic:    OTHER:  aspirin enteric coated 81 milliGRAM(s) Oral daily  benzocaine 15 mG/menthol 3.6 mG Lozenge 1 Lozenge Oral every 4 hours PRN  bisacodyl Suppository 10 milliGRAM(s) Rectal daily PRN  chlorhexidine 2% Cloths 1 Application(s) Topical daily  dextrose 40% Gel 15 Gram(s) Oral once PRN  dextrose 5%. 1000 milliLiter(s) IV Continuous <Continuous>  dextrose 50% Injectable 12.5 Gram(s) IV Push once  dextrose 50% Injectable 25 Gram(s) IV Push once  dextrose 50% Injectable 25 Gram(s) IV Push once  docusate sodium 100 milliGRAM(s) Oral three times a day  glucagon  Injectable 1 milliGRAM(s) IntraMuscular once PRN  heparin  Injectable 5000 Unit(s) SubCutaneous every 12 hours  insulin lispro (HumaLOG) corrective regimen sliding scale   SubCutaneous Before meals and at bedtime  ipratropium    for Nebulization 500 MICROGram(s) Nebulizer every 6 hours  memantine 10 milliGRAM(s) Oral two times a day  metoprolol succinate ER 25 milliGRAM(s) Oral daily  midodrine 10 milliGRAM(s) Oral every 8 hours  nortriptyline 25 milliGRAM(s) Oral at bedtime  oxyCODONE    IR 5 milliGRAM(s) Oral every 4 hours PRN  pantoprazole  Injectable 40 milliGRAM(s) IV Push daily      Objective:  Vital Signs Last 24 Hrs  T(C): 36.2 (26 Oct 2018 20:16), Max: 36.8 (26 Oct 2018 05:33)  T(F): 97.2 (26 Oct 2018 20:16), Max: 98.2 (26 Oct 2018 05:33)  HR: 74 (26 Oct 2018 23:13) (74 - 135)  BP: 126/83 (26 Oct 2018 20:16) (107/77 - 151/97)  BP(mean): --  RR: 18 (26 Oct 2018 20:16) (18 - 19)  SpO2: 95% (26 Oct 2018 23:13) (93% - 100%)    PHYSICAL EXAM:  Constitutional:NAD  Eyes:CHER, EOMI  Ear/Nose/Throat: no thrush, mucositis.  Moist mucous membranes	  Neck:no JVD, no lymphadenopathy, supple  Respiratory: CTA adore  Cardiovascular: S1S2 RRR, no murmurs  Gastrointestinal:soft, nontender,  nondistended (+) BS  Extremities:no e/e/c  Skin:  rt post chest wound without SOI.  SANDY drains in place        LABS:                        9.7    9.46  )-----------( 277      ( 25 Oct 2018 05:20 )             31.5     10-25    141  |  99  |  33<H>  ----------------------------<  121<H>  4.2   |  26  |  5.09<H>    Ca    9.4      25 Oct 2018 05:20                              MICROBIOLOGY:          RADIOLOGY & ADDITIONAL STUDIES:  < from: Xray Chest 1 View- PORTABLE-Routine (10.26.18 @ 07:24) >  A single frontal view the chest demonstrates once again smallright   effusion and left-sided pacemaker and right-sided chest tube in place   with surgical staples and a right-sided PICC line in place. No   pneumothorax is seen.    Impression:    No change right-sided postoperative changes.    < end of copied text >

## 2018-10-26 NOTE — PROGRESS NOTE ADULT - SUBJECTIVE AND OBJECTIVE BOX
Plastic Surgery Progress Note    S: Patient seen and examined.  doing well.     Vital Signs Last 24 Hrs  T(C): 36.8 (26 Oct 2018 05:33), Max: 36.8 (26 Oct 2018 05:33)  T(F): 98.2 (26 Oct 2018 05:33), Max: 98.2 (26 Oct 2018 05:33)  HR: 109 (26 Oct 2018 05:33) (109 - 123)  BP: 140/71 (26 Oct 2018 05:33) (122/66 - 140/71)  BP(mean): 96 (25 Oct 2018 08:17) (96 - 96)  RR: 18 (26 Oct 2018 05:33) (18 - 20)  SpO2: 93% (26 Oct 2018 05:33) (93% - 100%)  I&O's Detail    25 Oct 2018 07:01  -  26 Oct 2018 07:00  --------------------------------------------------------  IN:    Oral Fluid: 118 mL  Total IN: 118 mL    OUT:    Bulb: 82.5 mL    Bulb: 52.5 mL    Chest Tube: 18 mL  Total OUT: 153 mL    Total NET: -35 mL          Physical Exam:  General: Awake and alert, NAD  Back: incisions intact, area of demarcation posteriorly is stable., drains SS

## 2018-10-26 NOTE — PROGRESS NOTE ADULT - ASSESSMENT
ASSESSMENT:   Patient is a 58y old  Male who presents with a chief complaint of infected hemothorax (18 Oct 2018), s/p washout of pleural space and latissimus dorsi flap, doing well     PLAN:     - continue staples  - continue drains  - activity per primary  - DVT ppx  - Ambulating as tolerated     Plastic Surgery  Pager X [phone]

## 2018-10-26 NOTE — PROGRESS NOTE ADULT - SUBJECTIVE AND OBJECTIVE BOX
[facility] DIVISION OF KIDNEY DISEASES AND HYPERTENSION -- FOLLOW UP NOTE  --------------------------------------------------------------------------------  HPI: 59yo Mandarin speaking M w/ Hx of NICM w/ ICD, ESRD on HD TTS, former smoker, BPH who was admitted for scheduled right thoracotomy w/ decortication due to infected hemothorax and chest reconstruction with CT Surgery. Pt underwent procedure on 10/11/18, and afterwards developed shock, thought to be cardiogenic. Renal was called for management of pts ESRD. Pt was started on CVVHDF on 10/13/18. Non-tunneled catheter non-functional and removed on 10/15/18 when CRRT was stopped. Pt now on IHD.     Pt planned for HD today. Pt seen at bedside. Pt denies complaints.    PAST HISTORY  --------------------------------------------------------------------------------  No significant changes to PMH, PSH, FHx, SHx, unless otherwise noted    ALLERGIES & MEDICATIONS  --------------------------------------------------------------------------------  Allergies    No Known Allergies    Intolerances    Standing Inpatient Medications  aspirin enteric coated 81 milliGRAM(s) Oral daily  chlorhexidine 2% Cloths 1 Application(s) Topical daily  dextrose 5%. 1000 milliLiter(s) IV Continuous <Continuous>  dextrose 50% Injectable 12.5 Gram(s) IV Push once  dextrose 50% Injectable 25 Gram(s) IV Push once  dextrose 50% Injectable 25 Gram(s) IV Push once  docusate sodium 100 milliGRAM(s) Oral three times a day  heparin  Injectable 5000 Unit(s) SubCutaneous every 12 hours  insulin lispro (HumaLOG) corrective regimen sliding scale   SubCutaneous Before meals and at bedtime  ipratropium    for Nebulization 500 MICROGram(s) Nebulizer every 6 hours  memantine 10 milliGRAM(s) Oral two times a day  midodrine 10 milliGRAM(s) Oral every 8 hours  nortriptyline 25 milliGRAM(s) Oral at bedtime  pantoprazole  Injectable 40 milliGRAM(s) IV Push daily  piperacillin/tazobactam IVPB. 3.375 Gram(s) IV Intermittent every 12 hours    REVIEW OF SYSTEMS  --------------------------------------------------------------------------------  All other systems were reviewed and are negative, except as noted.    VITALS/PHYSICAL EXAM  --------------------------------------------------------------------------------  T(C): 36.8 (10-26-18 @ 05:33), Max: 36.8 (10-26-18 @ 05:33)  HR: 109 (10-26-18 @ 05:33) (109 - 120)  BP: 140/71 (10-26-18 @ 05:33) (122/66 - 140/71)  RR: 18 (10-26-18 @ 05:33) (18 - 19)  SpO2: 93% (10-26-18 @ 05:33) (93% - 100%)  Wt(kg): --    10-25-18 @ 07:01  -  10-26-18 @ 07:00  --------------------------------------------------------  IN: 118 mL / OUT: 153 mL / NET: -35 mL    Physical Exam:  	Gen: NAD  	Pulm: CTA B/L, + chest tube drains  	CV: RRR, S1S2; no rub  	Abd: +BS, soft  	LE: Warm, no edema  	Skin: Warm  	Vascular access:  LUE AVF site: +thrill +bruit +skin intact    LABS/STUDIES  --------------------------------------------------------------------------------              9.7    9.46  >-----------<  277      [10-25-18 @ 05:20]              31.5     141  |  99  |  33  ----------------------------<  121      [10-25-18 @ 05:20]  4.2   |  26  |  5.09        Ca     9.4     [10-25-18 @ 05:20]    Creatinine Trend:  SCr 5.09 [10-25 @ 05:20]  SCr 6.55 [10-24 @ 15:15]  SCr 4.56 [10-23 @ 02:30]  SCr 5.80 [10-22 @ 02:40]  SCr 4.58 [10-21 @ 04:15]

## 2018-10-27 LAB
BUN SERPL-MCNC: 35 MG/DL — HIGH (ref 7–23)
CALCIUM SERPL-MCNC: 9.7 MG/DL — SIGNIFICANT CHANGE UP (ref 8.4–10.5)
CHLORIDE SERPL-SCNC: 95 MMOL/L — LOW (ref 98–107)
CO2 SERPL-SCNC: 28 MMOL/L — SIGNIFICANT CHANGE UP (ref 22–31)
CREAT SERPL-MCNC: 5.55 MG/DL — HIGH (ref 0.5–1.3)
GLUCOSE SERPL-MCNC: 108 MG/DL — HIGH (ref 70–99)
HCT VFR BLD CALC: 35.3 % — LOW (ref 39–50)
HGB BLD-MCNC: 10.9 G/DL — LOW (ref 13–17)
MCHC RBC-ENTMCNC: 30.9 % — LOW (ref 32–36)
MCHC RBC-ENTMCNC: 31.7 PG — SIGNIFICANT CHANGE UP (ref 27–34)
MCV RBC AUTO: 102.6 FL — HIGH (ref 80–100)
NRBC # FLD: 0 — SIGNIFICANT CHANGE UP
PLATELET # BLD AUTO: 258 K/UL — SIGNIFICANT CHANGE UP (ref 150–400)
PMV BLD: 9.2 FL — SIGNIFICANT CHANGE UP (ref 7–13)
POTASSIUM SERPL-MCNC: 4.9 MMOL/L — SIGNIFICANT CHANGE UP (ref 3.5–5.3)
POTASSIUM SERPL-SCNC: 4.9 MMOL/L — SIGNIFICANT CHANGE UP (ref 3.5–5.3)
RBC # BLD: 3.44 M/UL — LOW (ref 4.2–5.8)
RBC # FLD: 20.2 % — HIGH (ref 10.3–14.5)
SODIUM SERPL-SCNC: 137 MMOL/L — SIGNIFICANT CHANGE UP (ref 135–145)
WBC # BLD: 9.21 K/UL — SIGNIFICANT CHANGE UP (ref 3.8–10.5)
WBC # FLD AUTO: 9.21 K/UL — SIGNIFICANT CHANGE UP (ref 3.8–10.5)

## 2018-10-27 PROCEDURE — 71045 X-RAY EXAM CHEST 1 VIEW: CPT | Mod: 26

## 2018-10-27 RX ADMIN — Medication 500 MICROGRAM(S): at 16:40

## 2018-10-27 RX ADMIN — OXYCODONE HYDROCHLORIDE 5 MILLIGRAM(S): 5 TABLET ORAL at 05:24

## 2018-10-27 RX ADMIN — NORTRIPTYLINE HYDROCHLORIDE 25 MILLIGRAM(S): 10 CAPSULE ORAL at 21:15

## 2018-10-27 RX ADMIN — MIDODRINE HYDROCHLORIDE 10 MILLIGRAM(S): 2.5 TABLET ORAL at 18:15

## 2018-10-27 RX ADMIN — OXYCODONE HYDROCHLORIDE 5 MILLIGRAM(S): 5 TABLET ORAL at 21:15

## 2018-10-27 RX ADMIN — OXYCODONE HYDROCHLORIDE 5 MILLIGRAM(S): 5 TABLET ORAL at 04:54

## 2018-10-27 RX ADMIN — PIPERACILLIN AND TAZOBACTAM 25 GRAM(S): 4; .5 INJECTION, POWDER, LYOPHILIZED, FOR SOLUTION INTRAVENOUS at 11:58

## 2018-10-27 RX ADMIN — Medication 1: at 13:09

## 2018-10-27 RX ADMIN — Medication 81 MILLIGRAM(S): at 11:58

## 2018-10-27 RX ADMIN — Medication 500 MICROGRAM(S): at 03:53

## 2018-10-27 RX ADMIN — HEPARIN SODIUM 5000 UNIT(S): 5000 INJECTION INTRAVENOUS; SUBCUTANEOUS at 18:14

## 2018-10-27 RX ADMIN — MIDODRINE HYDROCHLORIDE 10 MILLIGRAM(S): 2.5 TABLET ORAL at 04:56

## 2018-10-27 RX ADMIN — Medication 25 MILLIGRAM(S): at 04:58

## 2018-10-27 RX ADMIN — Medication 500 MICROGRAM(S): at 22:16

## 2018-10-27 RX ADMIN — PIPERACILLIN AND TAZOBACTAM 25 GRAM(S): 4; .5 INJECTION, POWDER, LYOPHILIZED, FOR SOLUTION INTRAVENOUS at 21:15

## 2018-10-27 RX ADMIN — MEMANTINE HYDROCHLORIDE 10 MILLIGRAM(S): 10 TABLET ORAL at 18:14

## 2018-10-27 RX ADMIN — OXYCODONE HYDROCHLORIDE 5 MILLIGRAM(S): 5 TABLET ORAL at 21:45

## 2018-10-27 RX ADMIN — Medication 500 MICROGRAM(S): at 11:28

## 2018-10-27 RX ADMIN — MEMANTINE HYDROCHLORIDE 10 MILLIGRAM(S): 10 TABLET ORAL at 04:56

## 2018-10-27 RX ADMIN — HEPARIN SODIUM 5000 UNIT(S): 5000 INJECTION INTRAVENOUS; SUBCUTANEOUS at 04:56

## 2018-10-27 RX ADMIN — PANTOPRAZOLE SODIUM 40 MILLIGRAM(S): 20 TABLET, DELAYED RELEASE ORAL at 11:58

## 2018-10-27 NOTE — PROGRESS NOTE ADULT - SUBJECTIVE AND OBJECTIVE BOX
Subjective: Pt offered no complaints    Vital Signs:  Vital Signs Last 24 Hrs  T(C): 36.4 (10-27-18 @ 05:02), Max: 36.7 (10-26-18 @ 15:21)  T(F): 97.6 (10-27-18 @ 05:02), Max: 98.1 (10-26-18 @ 15:21)  HR: 112 (10-27-18 @ 05:02) (71 - 135)  BP: 128/80 (10-27-18 @ 05:02) (107/77 - 151/97)  RR: 18 (10-27-18 @ 05:02) (18 - 19)  SpO2: 100% (10-27-18 @ 05:02) (94% - 100%) on (O2)    Telemetry/Alarms:  General: WN/WD NAD  Neurology: Awake, nonfocal, ISRAEL x 4  Eyes: Scleras clear, PERRLA/ EOMI, Gross vision intact  ENT:Gross hearing intact, grossly patent pharynx, no stridor  Neck: Neck supple, trachea midline, No JVD,   Respiratory: CTA B/L, No wheezing, rales, rhonchi  CV: RRR, S1S2, no murmurs, rubs or gallops  Abdominal: Soft, NT, ND +BS,   Extremities: No edema, + peripheral pulses  Skin: No Rashes, Hematoma, Ecchymosis  Lymphatic: No Neck, axilla, groin LAD  Psych: Oriented x 3, normal affect  Incisions: R lat flap incision with poss necrotic edges  Tubes: CT to sxn and 2 SANDY's  Relevant labs, radiology and Medications reviewed            ABG:  CXR: sm R loculated eff  MEDICATIONS  (STANDING):  aspirin enteric coated 81 milliGRAM(s) Oral daily  chlorhexidine 2% Cloths 1 Application(s) Topical daily  dextrose 5%. 1000 milliLiter(s) (50 mL/Hr) IV Continuous <Continuous>  dextrose 50% Injectable 12.5 Gram(s) IV Push once  dextrose 50% Injectable 25 Gram(s) IV Push once  dextrose 50% Injectable 25 Gram(s) IV Push once  docusate sodium 100 milliGRAM(s) Oral three times a day  heparin  Injectable 5000 Unit(s) SubCutaneous every 12 hours  insulin lispro (HumaLOG) corrective regimen sliding scale   SubCutaneous Before meals and at bedtime  ipratropium    for Nebulization 500 MICROGram(s) Nebulizer every 6 hours  memantine 10 milliGRAM(s) Oral two times a day  metoprolol succinate ER 25 milliGRAM(s) Oral daily  midodrine 10 milliGRAM(s) Oral every 8 hours  nortriptyline 25 milliGRAM(s) Oral at bedtime  pantoprazole  Injectable 40 milliGRAM(s) IV Push daily  piperacillin/tazobactam IVPB. 3.375 Gram(s) IV Intermittent every 12 hours    MEDICATIONS  (PRN):  benzocaine 15 mG/menthol 3.6 mG Lozenge 1 Lozenge Oral every 4 hours PRN Sore Throat  bisacodyl Suppository 10 milliGRAM(s) Rectal daily PRN Constipation  dextrose 40% Gel 15 Gram(s) Oral once PRN Blood Glucose LESS THAN 70 milliGRAM(s)/deciliter  glucagon  Injectable 1 milliGRAM(s) IntraMuscular once PRN Glucose LESS THAN 70 milligrams/deciliter  oxyCODONE    IR 5 milliGRAM(s) Oral every 4 hours PRN Moderate Pain (4 - 6)    Pertinent Physical Exam  I&O's Summary    26 Oct 2018 07:01  -  27 Oct 2018 07:00  --------------------------------------------------------  IN: 500 mL / OUT: 3026 mL / NET: -2526 mL        Assessment  58y Male  w/ PAST MEDICAL & SURGICAL HISTORY:  Smoking hx  Cardiomyopathy  Wound: right chest  ICD (implantable cardioverter-defibrillator) in place  OA (osteoarthritis)  HLD (hyperlipidemia)  BPH (benign prostatic hyperplasia)  Pleural effusion  HTN (hypertension)  ESRD (end stage renal disease)  H/O chest wound: right  History of wound infection: right chest wall - revision 5/18 and again in 7/18  History of implantable cardioverter-defibrillator (ICD) placement: pt unsure when placed  H/O bilateral hip replacements: 2008, 2009  admitted with complaints of Patient is a 58y old  Male who presents with a chief complaint of hemothorax (26 Oct 2018 16:23).  On (10/26), patient underwent Dialysis access placement  Thoracotomy.  Postoperative course/issues: Empyema, sepsis    PLAN  Neuro: Pain management  Pulm: Encourage coughing, deep breathing and use of incentive spirometry. Wean off supplemental oxygen as able. Daily CXR.   Cardio: Started Toprol 25 yest. Monitor telemetry/alarms  GI: Tolerating diet. Continue stool softeners.  Renal: HD tomorrow  Vasc: Heparin SC/SCDs for DVT prophylaxis  Heme: Stable H/H. .   ID: Cont antibiotics. PICC. Stable. Pleural fluid sent yest-will follow  Therapy: OOB/ambulate  Tubes: Monitor Chest tube output  Disposition: Aim to D/C to home on  Discussed with Cardiothoracic Team at AM rounds.

## 2018-10-28 LAB
BUN SERPL-MCNC: 53 MG/DL — HIGH (ref 7–23)
CALCIUM SERPL-MCNC: 9.7 MG/DL — SIGNIFICANT CHANGE UP (ref 8.4–10.5)
CHLORIDE SERPL-SCNC: 95 MMOL/L — LOW (ref 98–107)
CO2 SERPL-SCNC: 27 MMOL/L — SIGNIFICANT CHANGE UP (ref 22–31)
CREAT SERPL-MCNC: 7.55 MG/DL — HIGH (ref 0.5–1.3)
GLUCOSE SERPL-MCNC: 114 MG/DL — HIGH (ref 70–99)
HCT VFR BLD CALC: 33.6 % — LOW (ref 39–50)
HGB BLD-MCNC: 10.3 G/DL — LOW (ref 13–17)
MCHC RBC-ENTMCNC: 30.7 % — LOW (ref 32–36)
MCHC RBC-ENTMCNC: 31.3 PG — SIGNIFICANT CHANGE UP (ref 27–34)
MCV RBC AUTO: 102.1 FL — HIGH (ref 80–100)
NRBC # FLD: 0 — SIGNIFICANT CHANGE UP
PLATELET # BLD AUTO: 223 K/UL — SIGNIFICANT CHANGE UP (ref 150–400)
PMV BLD: 9.1 FL — SIGNIFICANT CHANGE UP (ref 7–13)
POTASSIUM SERPL-MCNC: 5.2 MMOL/L — SIGNIFICANT CHANGE UP (ref 3.5–5.3)
POTASSIUM SERPL-SCNC: 5.2 MMOL/L — SIGNIFICANT CHANGE UP (ref 3.5–5.3)
RBC # BLD: 3.29 M/UL — LOW (ref 4.2–5.8)
RBC # FLD: 20 % — HIGH (ref 10.3–14.5)
SODIUM SERPL-SCNC: 138 MMOL/L — SIGNIFICANT CHANGE UP (ref 135–145)
WBC # BLD: 7.7 K/UL — SIGNIFICANT CHANGE UP (ref 3.8–10.5)
WBC # FLD AUTO: 7.7 K/UL — SIGNIFICANT CHANGE UP (ref 3.8–10.5)

## 2018-10-28 PROCEDURE — 71045 X-RAY EXAM CHEST 1 VIEW: CPT | Mod: 26

## 2018-10-28 RX ADMIN — HEPARIN SODIUM 5000 UNIT(S): 5000 INJECTION INTRAVENOUS; SUBCUTANEOUS at 05:50

## 2018-10-28 RX ADMIN — MIDODRINE HYDROCHLORIDE 10 MILLIGRAM(S): 2.5 TABLET ORAL at 13:42

## 2018-10-28 RX ADMIN — HEPARIN SODIUM 5000 UNIT(S): 5000 INJECTION INTRAVENOUS; SUBCUTANEOUS at 17:02

## 2018-10-28 RX ADMIN — PIPERACILLIN AND TAZOBACTAM 25 GRAM(S): 4; .5 INJECTION, POWDER, LYOPHILIZED, FOR SOLUTION INTRAVENOUS at 11:18

## 2018-10-28 RX ADMIN — NORTRIPTYLINE HYDROCHLORIDE 25 MILLIGRAM(S): 10 CAPSULE ORAL at 21:23

## 2018-10-28 RX ADMIN — Medication 100 MILLIGRAM(S): at 05:50

## 2018-10-28 RX ADMIN — MEMANTINE HYDROCHLORIDE 10 MILLIGRAM(S): 10 TABLET ORAL at 17:02

## 2018-10-28 RX ADMIN — Medication 100 MILLIGRAM(S): at 13:42

## 2018-10-28 RX ADMIN — PANTOPRAZOLE SODIUM 40 MILLIGRAM(S): 20 TABLET, DELAYED RELEASE ORAL at 11:18

## 2018-10-28 RX ADMIN — Medication 500 MICROGRAM(S): at 04:22

## 2018-10-28 RX ADMIN — PIPERACILLIN AND TAZOBACTAM 25 GRAM(S): 4; .5 INJECTION, POWDER, LYOPHILIZED, FOR SOLUTION INTRAVENOUS at 21:23

## 2018-10-28 RX ADMIN — Medication 500 MICROGRAM(S): at 09:57

## 2018-10-28 RX ADMIN — MIDODRINE HYDROCHLORIDE 10 MILLIGRAM(S): 2.5 TABLET ORAL at 05:49

## 2018-10-28 RX ADMIN — Medication 500 MICROGRAM(S): at 16:19

## 2018-10-28 RX ADMIN — Medication 25 MILLIGRAM(S): at 05:50

## 2018-10-28 RX ADMIN — Medication 500 MICROGRAM(S): at 22:52

## 2018-10-28 RX ADMIN — MEMANTINE HYDROCHLORIDE 10 MILLIGRAM(S): 10 TABLET ORAL at 05:50

## 2018-10-28 RX ADMIN — Medication 100 MILLIGRAM(S): at 21:23

## 2018-10-28 RX ADMIN — CHLORHEXIDINE GLUCONATE 1 APPLICATION(S): 213 SOLUTION TOPICAL at 11:17

## 2018-10-28 RX ADMIN — Medication 81 MILLIGRAM(S): at 11:18

## 2018-10-28 NOTE — PROGRESS NOTE ADULT - SUBJECTIVE AND OBJECTIVE BOX
Subjective: Translation done in Chinese at bedside with Fellow Dr. Orlando Lamb. Pt states feeling generally fatigued, poor appetite at times. Wanting to know when drains will be removed. Wife at bedside. Denies CP or SOB. Amb w PT, home with no skilled needs.     Vital Signs:  Vital Signs Last 24 Hrs  T(C): 36.6 (10-28-18 @ 08:00), Max: 36.7 (10-27-18 @ 13:42)  T(F): 97.8 (10-28-18 @ 08:00), Max: 98 (10-27-18 @ 13:42)  HR: 108 (10-28-18 @ 08:00) (68 - 113)  BP: 128/79 (10-28-18 @ 08:00) (123/75 - 156/63)  RR: 18 (10-28-18 @ 08:00) (18 - 18)  SpO2: 100% (10-28-18 @ 08:00) (93% - 100%) on (O2)    Telemetry/Alarms:  General: WN/WD NAD  Neurology: Awake, nonfocal, ISRAEL x 4  Eyes: Scleras clear, PERRLA/ EOMI, Gross vision intact  ENT:Gross hearing intact, grossly patent pharynx, no stridor  Neck: Neck supple, trachea midline, No JVD,   Respiratory: CTA B/L, No wheezing, rales, rhonchi. Decreased throughout Rt. side. CTA left  CV: RRR, S1S2, no murmurs, rubs or gallops  Abdominal: Soft, NT, ND +BS, + sml bm   Extremities: No edema, + peripheral pulses  Skin: No Rashes, Hematoma, Ecchymosis  Lymphatic: No Neck, axilla, groin LAD  Psych: Oriented x 3, normal affect  Incisions: extensive Rt chest staple c/d/i except most posterior line with edging of eschar with some increasing erythema.   Tubes: rt. CT 23cc/24hrs on Suction, no AL SANDY 1-42.5cc SANDY 2-70cc/24hrs to SS.   Relevant labs, radiology and Medications reviewed           CXR w stable rt. hydroptx.              10.3   7.70  )-----------( 223      ( 28 Oct 2018 06:20 )             33.6     10-28    138  |  95<L>  |  53<H>  ----------------------------<  114<H>  5.2   |  27  |  7.55<H>    Ca    9.7      28 Oct 2018 06:20    Pleural fluid cult still NGTD    MEDICATIONS  (STANDING):  aspirin enteric coated 81 milliGRAM(s) Oral daily  chlorhexidine 2% Cloths 1 Application(s) Topical daily  dextrose 5%. 1000 milliLiter(s) (50 mL/Hr) IV Continuous <Continuous>  dextrose 50% Injectable 12.5 Gram(s) IV Push once  dextrose 50% Injectable 25 Gram(s) IV Push once  dextrose 50% Injectable 25 Gram(s) IV Push once  docusate sodium 100 milliGRAM(s) Oral three times a day  heparin  Injectable 5000 Unit(s) SubCutaneous every 12 hours  insulin lispro (HumaLOG) corrective regimen sliding scale   SubCutaneous Before meals and at bedtime  ipratropium    for Nebulization 500 MICROGram(s) Nebulizer every 6 hours  memantine 10 milliGRAM(s) Oral two times a day  metoprolol succinate ER 25 milliGRAM(s) Oral daily  midodrine 10 milliGRAM(s) Oral every 8 hours  nortriptyline 25 milliGRAM(s) Oral at bedtime  pantoprazole  Injectable 40 milliGRAM(s) IV Push daily  piperacillin/tazobactam IVPB. 3.375 Gram(s) IV Intermittent every 12 hours    MEDICATIONS  (PRN):  benzocaine 15 mG/menthol 3.6 mG Lozenge 1 Lozenge Oral every 4 hours PRN Sore Throat  bisacodyl Suppository 10 milliGRAM(s) Rectal daily PRN Constipation  dextrose 40% Gel 15 Gram(s) Oral once PRN Blood Glucose LESS THAN 70 milliGRAM(s)/deciliter  glucagon  Injectable 1 milliGRAM(s) IntraMuscular once PRN Glucose LESS THAN 70 milligrams/deciliter  oxyCODONE    IR 5 milliGRAM(s) Oral every 4 hours PRN Moderate Pain (4 - 6)    Pertinent Physical Exam  I&O's Summary    27 Oct 2018 07:01  -  28 Oct 2018 07:00  --------------------------------------------------------  IN: 0 mL / OUT: 135.5 mL / NET: -135.5 mL    28 Oct 2018 07:01  -  28 Oct 2018 13:30  --------------------------------------------------------  IN: 0 mL / OUT: 15 mL / NET: -15 mL        Assessment  58y Male  w/ PAST MEDICAL & SURGICAL HISTORY:  Smoking hx  Cardiomyopathy  Wound: right chest  ICD (implantable cardioverter-defibrillator) in place  OA (osteoarthritis)  HLD (hyperlipidemia)  BPH (benign prostatic hyperplasia)  Pleural effusion  HTN (hypertension)  ESRD (end stage renal disease)  H/O chest wound: right  History of wound infection: right chest wall - revision 5/18 and again in 7/18  History of implantable cardioverter-defibrillator (ICD) placement: pt unsure when placed  H/O bilateral hip replacements: 2008, 2009  admitted with complaints of Patient is a 58y old  Male who presents with a chief complaint of hemothorax (26 Oct 2018 16:23)  Pt is a 58 yr old Mandarin speaking male scheduled for Right Thoractomy Decortiation, Right Chest Wall reconstruction with Latissimjus Dorsi Flap Poss Skin Graft with VAC dressing with Dr Pickering 10/11/18. Pt has ICD but unable to identify make or model. Pt hx of ESRD with HD T/Th/Sat for 4 hrs and has stated he has stopped some of his meds but cannot say which ones. Pt seen and received MC and CC but has 2 different med lists and is unable to identify meds taking. Pt denies blood thinners. Pt has stopped ASA as of last week. Pt hx gathered from [employer] and notes from Dr Pickering - pt is poor historian.     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 On IV antibx until 11/7. PICC in place. Contined on HD. Tubes and JPs to remain in place for now. Rpt Pleural fluid cult sent 10/25  10/26-Toprol added for tachycardia      PLAN  Neuro: Pain management  Pulm: Encourage coughing, deep breathing and use of incentive spirometry. Wean off supplemental oxygen as able. Daily CXR. FU Pleural culture  Cardio: Monitor telemetry/alarms. Tachycardia d/w Dr. Wilks from Heart failure. Plan to continue Midodrine at current dose but add Toprol XL 25mg QD  GI: Tolerating diet. Continue stool softeners.  Renal: monitor urine output, supplement electrolytes as needed. HD tomm cont per renal   Vasc: Heparin SC/SCDs for DVT prophylaxis  Heme: Stable H/H. .   ID: Cont Zosyn. FU cultures  Therapy: OOB/ambulate  Tubes: Monitor Chest tube output, continue until cult negative. JPs per plastics, to cont to follow eschar at wound.   Disposition: Aim to D/C to home once stable  Discussed with Cardiothoracic Team at AM rounds.

## 2018-10-28 NOTE — PROGRESS NOTE ADULT - SUBJECTIVE AND OBJECTIVE BOX
Infectious Diseases progress note:    Subjective:  Awake, alert, NAD.  AFebrile.  No new complaints    ROS:  CONSTITUTIONAL:  No fever, chills, rigors  CARDIOVASCULAR:  No chest pain or palpitations  RESPIRATORY:   No SOB, cough, dyspnea on exertion.  No wheezing  GASTROINTESTINAL:  No abd pain, N/V, diarrhea/constipation  EXTREMITIES:  No swelling or joint pain  GENITOURINARY:  No burning on urination, increased frequency or urgency.  No flank pain  NEUROLOGIC:  No HA, visual disturbances  SKIN: No rashes    Allergies    No Known Allergies    Intolerances        ANTIBIOTICS/RELEVANT:  antimicrobials  piperacillin/tazobactam IVPB. 3.375 Gram(s) IV Intermittent every 12 hours    immunologic:    OTHER:  aspirin enteric coated 81 milliGRAM(s) Oral daily  benzocaine 15 mG/menthol 3.6 mG Lozenge 1 Lozenge Oral every 4 hours PRN  bisacodyl Suppository 10 milliGRAM(s) Rectal daily PRN  chlorhexidine 2% Cloths 1 Application(s) Topical daily  dextrose 40% Gel 15 Gram(s) Oral once PRN  dextrose 5%. 1000 milliLiter(s) IV Continuous <Continuous>  dextrose 50% Injectable 12.5 Gram(s) IV Push once  dextrose 50% Injectable 25 Gram(s) IV Push once  dextrose 50% Injectable 25 Gram(s) IV Push once  docusate sodium 100 milliGRAM(s) Oral three times a day  glucagon  Injectable 1 milliGRAM(s) IntraMuscular once PRN  heparin  Injectable 5000 Unit(s) SubCutaneous every 12 hours  insulin lispro (HumaLOG) corrective regimen sliding scale   SubCutaneous Before meals and at bedtime  ipratropium    for Nebulization 500 MICROGram(s) Nebulizer every 6 hours  memantine 10 milliGRAM(s) Oral two times a day  metoprolol succinate ER 25 milliGRAM(s) Oral daily  midodrine 10 milliGRAM(s) Oral every 8 hours  nortriptyline 25 milliGRAM(s) Oral at bedtime  oxyCODONE    IR 5 milliGRAM(s) Oral every 4 hours PRN  pantoprazole  Injectable 40 milliGRAM(s) IV Push daily      Objective:  Vital Signs Last 24 Hrs  T(C): 36.5 (28 Oct 2018 20:20), Max: 36.6 (28 Oct 2018 08:00)  T(F): 97.7 (28 Oct 2018 20:20), Max: 97.8 (28 Oct 2018 08:00)  HR: 102 (28 Oct 2018 22:56) (98 - 111)  BP: 139/93 (28 Oct 2018 20:20) (118/86 - 139/93)  BP(mean): --  RR: 18 (28 Oct 2018 20:20) (18 - 18)  SpO2: 99% (28 Oct 2018 22:56) (91% - 100%)    PHYSICAL EXAM:  Constitutional:NAD  Eyes:CHER, EOMI  Ear/Nose/Throat: no thrush, mucositis.  Moist mucous membranes	  Neck:no JVD, no lymphadenopathy, supple  Respiratory: CTA adore, rt sided chest tube  Cardiovascular: S1S2 RRR, no murmurs  Gastrointestinal:soft, nontender,  nondistended (+) BS  Extremities:no e/e/c  Skin:  no rashes, open wounds or ulcerations        LABS:                        10.3   7.70  )-----------( 223      ( 28 Oct 2018 06:20 )             33.6     10-28    138  |  95<L>  |  53<H>  ----------------------------<  114<H>  5.2   |  27  |  7.55<H>    Ca    9.7      28 Oct 2018 06:20        MICROBIOLOGY:    Culture - Body Fluid with Gram Stain (10.25.18 @ 15:37)    Culture - Body Fluid:   NO ORGANISMS ISOLATED AT 24 HOURS  NO ORGANISMS ISOLATED AT 48 HRS.    Gram Stain:   NOS^No Organisms Seen  WBC^White Blood Cells  QNTY CELLS IN GRAM STAIN: FEW (2+)    Specimen Source: PLEURAL FLUID          RADIOLOGY & ADDITIONAL STUDIES:    < from: Xray Chest 1 View- PORTABLE-Routine (10.28.18 @ 08:49) >  IMPRESSION: Lines and tubes appear unchanged since the prior study. There   are right chest wall skin staples. There is a small to moderate-sized   loculated right pleural effusion as on the prior study. The left-sided   cardiac device is again noted. There is a small left pleural effusion   unchanged. There is no pneumothorax.    < end of copied text >

## 2018-10-28 NOTE — PROGRESS NOTE ADULT - ASSESSMENT
Pt is a 58 yr old Mandarin speaking male scheduled for Right Thoractomy Decortiation, Right Chest Wall reconstruction with Latissimjus Dorsi Flap Poss Skin Graft with VAC dressing with Dr Pickering 10/11/18. Pt has ICD but unable to identify make or model. Pt hx of ESRD with HD T/Th/Sat for 4 hrs and has stated he has stopped some of his meds but cannot say which ones. Pt seen and received MC and CC but has 2 different med lists and is unable to identify meds taking. Pt denies blood thinners. Pt has stopped ASA as of last week. Pt hx gathered from [employer] and notes from Dr Pickering - pt is poor historian.     Call through  to patient who went to local pharmacy for confirmation of meds and pt given preop instructions (01 Oct 2018 09:25)    Pt has multiple comorbidities including CHF, EF of 10%, renal failure on dialysis,  who has had chronic bilateral pleural effusions.  The patient previously underwent left VATS and PleurX drains  in 2015.  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  During this admission pt underwent OR with Plastic and Thoracic surgery,  for definitive repair that involved thoracotomy, decortication, excision of empyema cavity along with muscle flap reconstruction. Pt underwent surgery on 10/11 (Right thoracotomy, resection of portion of R 7th rib, evacuation of infected hemothorax, takedown of pleurocutaneous fistula - and noted to have foul smelling hematoma).  OR cultures are growing Enterococcus faecalis.    Pt started on empiric vanco/zosyn/flagyl since 10/11.  He has multiple right  chest tubes/ drains in place and remains on vent support. Pt is on ESRD.  He has had intermittent episodes of hypothermia, and low bp (85/44). ID consult called for further antibiotic management.     Problem/Plan - 1:    ·	Infected right hemothorax    - Cont zosyn 3.375 q12 for HD. OR cultures growing sensitive E.faecalis (to amp/vanco).        - Cont wound vac, local wound care, and chest tube drain managment as per CT icu.  .  Pt with persistent small R loculated pleural effusion on serial cxr.    - s/p PICC line for continuation of zosyn - complete 4 week course from date of surgery (10/11) through Nov 7th.   Last set of blood cultures NGTD.    - Check weekly cbc, cmp, esr, crp after discharge, while pt is on abx    d/c planning in place        Anabel Chahal  [phone]

## 2018-10-29 LAB
BUN SERPL-MCNC: 68 MG/DL — HIGH (ref 7–23)
CALCIUM SERPL-MCNC: 9.4 MG/DL — SIGNIFICANT CHANGE UP (ref 8.4–10.5)
CHLORIDE SERPL-SCNC: 93 MMOL/L — LOW (ref 98–107)
CO2 SERPL-SCNC: 27 MMOL/L — SIGNIFICANT CHANGE UP (ref 22–31)
CREAT SERPL-MCNC: 8.76 MG/DL — HIGH (ref 0.5–1.3)
GLUCOSE SERPL-MCNC: 161 MG/DL — HIGH (ref 70–99)
POTASSIUM SERPL-MCNC: 6 MMOL/L — HIGH (ref 3.5–5.3)
POTASSIUM SERPL-SCNC: 6 MMOL/L — HIGH (ref 3.5–5.3)
SODIUM SERPL-SCNC: 137 MMOL/L — SIGNIFICANT CHANGE UP (ref 135–145)

## 2018-10-29 PROCEDURE — 90935 HEMODIALYSIS ONE EVALUATION: CPT | Mod: GC

## 2018-10-29 PROCEDURE — 71045 X-RAY EXAM CHEST 1 VIEW: CPT | Mod: 26

## 2018-10-29 RX ADMIN — MEMANTINE HYDROCHLORIDE 10 MILLIGRAM(S): 10 TABLET ORAL at 18:26

## 2018-10-29 RX ADMIN — HEPARIN SODIUM 5000 UNIT(S): 5000 INJECTION INTRAVENOUS; SUBCUTANEOUS at 18:26

## 2018-10-29 RX ADMIN — MEMANTINE HYDROCHLORIDE 10 MILLIGRAM(S): 10 TABLET ORAL at 05:34

## 2018-10-29 RX ADMIN — PIPERACILLIN AND TAZOBACTAM 25 GRAM(S): 4; .5 INJECTION, POWDER, LYOPHILIZED, FOR SOLUTION INTRAVENOUS at 21:28

## 2018-10-29 RX ADMIN — Medication 500 MICROGRAM(S): at 22:03

## 2018-10-29 RX ADMIN — CHLORHEXIDINE GLUCONATE 1 APPLICATION(S): 213 SOLUTION TOPICAL at 18:27

## 2018-10-29 RX ADMIN — Medication 500 MICROGRAM(S): at 15:34

## 2018-10-29 RX ADMIN — HEPARIN SODIUM 5000 UNIT(S): 5000 INJECTION INTRAVENOUS; SUBCUTANEOUS at 05:34

## 2018-10-29 RX ADMIN — Medication 500 MICROGRAM(S): at 10:14

## 2018-10-29 RX ADMIN — Medication 100 MILLIGRAM(S): at 21:28

## 2018-10-29 RX ADMIN — Medication 25 MILLIGRAM(S): at 13:20

## 2018-10-29 RX ADMIN — PANTOPRAZOLE SODIUM 40 MILLIGRAM(S): 20 TABLET, DELAYED RELEASE ORAL at 13:20

## 2018-10-29 RX ADMIN — Medication 100 MILLIGRAM(S): at 05:34

## 2018-10-29 RX ADMIN — NORTRIPTYLINE HYDROCHLORIDE 25 MILLIGRAM(S): 10 CAPSULE ORAL at 21:28

## 2018-10-29 RX ADMIN — Medication 81 MILLIGRAM(S): at 13:19

## 2018-10-29 RX ADMIN — MIDODRINE HYDROCHLORIDE 10 MILLIGRAM(S): 2.5 TABLET ORAL at 13:20

## 2018-10-29 RX ADMIN — MIDODRINE HYDROCHLORIDE 10 MILLIGRAM(S): 2.5 TABLET ORAL at 05:34

## 2018-10-29 RX ADMIN — Medication 500 MICROGRAM(S): at 03:04

## 2018-10-29 RX ADMIN — PIPERACILLIN AND TAZOBACTAM 25 GRAM(S): 4; .5 INJECTION, POWDER, LYOPHILIZED, FOR SOLUTION INTRAVENOUS at 10:59

## 2018-10-29 RX ADMIN — Medication 100 MILLIGRAM(S): at 13:20

## 2018-10-29 NOTE — PROGRESS NOTE ADULT - ASSESSMENT
Pt is a 58 yr old Mandarin speaking male scheduled for Right Thoractomy Decortiation, Right Chest Wall reconstruction with Latissimjus Dorsi Flap Poss Skin Graft with VAC dressing with Dr Pickering 10/11/18. Pt has ICD but unable to identify make or model. Pt hx of ESRD with HD T/Th/Sat for 4 hrs and has stated he has stopped some of his meds but cannot say which ones. Pt seen and received MC and CC but has 2 different med lists and is unable to identify meds taking. Pt denies blood thinners. Pt has stopped ASA as of last week. Pt hx gathered from [employer] and notes from Dr Pickering - pt is poor historian.     Call through  to patient who went to local pharmacy for confirmation of meds and pt given preop instructions (01 Oct 2018 09:25)    Pt has multiple comorbidities including CHF, EF of 10%, renal failure on dialysis,  who has had chronic bilateral pleural effusions.  The patient previously underwent left VATS and PleurX drains  in 2015.  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  During this admission pt underwent OR with Plastic and Thoracic surgery,  for definitive repair that involved thoracotomy, decortication, excision of empyema cavity along with muscle flap reconstruction. Pt underwent surgery on 10/11 (Right thoracotomy, resection of portion of R 7th rib, evacuation of infected hemothorax, takedown of pleurocutaneous fistula - and noted to have foul smelling hematoma).  OR cultures are growing Enterococcus faecalis.    Pt started on empiric vanco/zosyn/flagyl since 10/11.  He has multiple right  chest tubes/ drains in place and remains on vent support. Pt is on ESRD.  He has had intermittent episodes of hypothermia, and low bp (85/44). ID consult called for further antibiotic management.     Problem/Plan - 1:    ·	Infected right hemothorax    - Cont zosyn 3.375 q12 for HD. OR cultures growing sensitive E.faecalis (to amp/vanco).        - Cont wound vac, local wound care, and chest tube drain managment as per CT icu.  .  Pt with persistent small R loculated pleural effusion on serial cxr.    - s/p PICC line for continuation of zosyn - complete 4 week course from date of surgery (10/11) through Nov 7th.   Last set of blood cultures NGTD.    - s/p last chest tube removal on 10/29.  Cont to monitor SANDY drain output, management as per CTS    - Check weekly cbc, cmp, esr, crp after discharge, while pt is on abx    d/c planning in place        Anabel [facility]  [phone]

## 2018-10-29 NOTE — PROGRESS NOTE ADULT - SUBJECTIVE AND OBJECTIVE BOX
[facility] DIVISION OF KIDNEY DISEASES AND HYPERTENSION -- FOLLOW UP NOTE  --------------------------------------------------------------------------------    HPI: 57yo Mandarin speaking M w/ Hx of NICM w/ ICD, ESRD on HD TTS, former smoker, BPH who was admitted for scheduled right thoracotomy w/ decortication due to infected hemothorax and chest reconstruction with CT Surgery. Pt underwent procedure on 10/11/18, and afterwards developed shock, thought to be cardiogenic. Renal was called for management of pts ESRD. Pt was started on CVVHDF on 10/13/18. Non-tunneled catheter non-functional and removed on 10/15/18 when CRRT was stopped. Pt now on IHD.     Pt seen and examined at HD. Pt tolerating HD well. BP stable.     PAST HISTORY  --------------------------------------------------------------------------------  No significant changes to PMH, PSH, FHx, SHx, unless otherwise noted    ALLERGIES & MEDICATIONS  --------------------------------------------------------------------------------  Allergies    No Known Allergies    Intolerances      Standing Inpatient Medications  aspirin enteric coated 81 milliGRAM(s) Oral daily  chlorhexidine 2% Cloths 1 Application(s) Topical daily  dextrose 5%. 1000 milliLiter(s) IV Continuous <Continuous>  dextrose 50% Injectable 12.5 Gram(s) IV Push once  dextrose 50% Injectable 25 Gram(s) IV Push once  dextrose 50% Injectable 25 Gram(s) IV Push once  docusate sodium 100 milliGRAM(s) Oral three times a day  heparin  Injectable 5000 Unit(s) SubCutaneous every 12 hours  insulin lispro (HumaLOG) corrective regimen sliding scale   SubCutaneous Before meals and at bedtime  ipratropium    for Nebulization 500 MICROGram(s) Nebulizer every 6 hours  memantine 10 milliGRAM(s) Oral two times a day  metoprolol succinate ER 25 milliGRAM(s) Oral daily  midodrine 10 milliGRAM(s) Oral every 8 hours  nortriptyline 25 milliGRAM(s) Oral at bedtime  pantoprazole  Injectable 40 milliGRAM(s) IV Push daily  piperacillin/tazobactam IVPB. 3.375 Gram(s) IV Intermittent every 12 hours    PRN Inpatient Medications  benzocaine 15 mG/menthol 3.6 mG Lozenge 1 Lozenge Oral every 4 hours PRN  bisacodyl Suppository 10 milliGRAM(s) Rectal daily PRN  dextrose 40% Gel 15 Gram(s) Oral once PRN  glucagon  Injectable 1 milliGRAM(s) IntraMuscular once PRN  oxyCODONE    IR 5 milliGRAM(s) Oral every 4 hours PRN      REVIEW OF SYSTEMS  --------------------------------------------------------------------------------    All other systems were reviewed and are negative, except as noted.    VITALS/PHYSICAL EXAM  --------------------------------------------------------------------------------  T(C): 36.9 (10-29-18 @ 13:17), Max: 36.9 (10-29-18 @ 13:17)  HR: 111 (10-29-18 @ 13:17) (95 - 111)  BP: 128/84 (10-29-18 @ 13:17) (103/56 - 142/89)  RR: 18 (10-29-18 @ 13:17) (18 - 20)  SpO2: 98% (10-29-18 @ 13:17) (91% - 100%)  Wt(kg): --    10-28-18 @ 07:01  -  10-29-18 @ 07:00  --------------------------------------------------------  IN: 100 mL / OUT: 101 mL / NET: -1 mL    10-29-18 @ 07:01  -  10-29-18 @ 13:24  --------------------------------------------------------  IN: 500 mL / OUT: 2403 mL / NET: -1903 mL    Physical Exam:  	Gen: NAD  	Pulm: CTA B/L, + chest tube drains  	CV: RRR, S1S2; no rub  	Abd: +BS, soft  	LE: Warm, no edema  	Skin: Warm  	Vascular access:  LUE AVF site: +thrill +bruit +skin intact    LABS/STUDIES  --------------------------------------------------------------------------------              10.3   7.70  >-----------<  223      [10-28-18 @ 06:20]              33.6     137  |  93  |  68  ----------------------------<  161      [10-29-18 @ 05:30]  6.0   |  27  |  8.76        Ca     9.4     [10-29-18 @ 05:30]    Creatinine Trend:  SCr 8.76 [10-29 @ 05:30]  SCr 7.55 [10-28 @ 06:20]  SCr 5.55 [10-27 @ 06:36]  SCr 5.09 [10-25 @ 05:20]  SCr 6.55 [10-24 @ 15:15]    HbA1c 5.5      [10-12-18 @ 02:53]  TSH 4.79      [10-21-18 @ 04:15]    HBsAb 16.3      [10-13-18 @ 15:45]  HCV 0.21, Nonreactive Hepatitis C AB  S/CO Ratio                        Interpretation  < 1.0                                     Non-Reactive  1.0 - 4.9                           Weakly-Reactive  > 5.0                                 Reactive  Non-Reactive: Aperson with a non-reactive HCV antibody  result is considered uninfected.  No further action is  needed unless recent infection is suspected.  In these  cases, consider repeat testing later to detect  seroconversion..  Weakly-Reactive: HCV antibody test is abnormal, HCV RNA  Qualitative test will follow.  Reactive: HCV antibody test is abnormal, HCV RNA  Qualitative test will follow.  Note: HCV antibody testing is performed on the [employer]   system.      [10-13-18 @ 15:45]

## 2018-10-29 NOTE — PROGRESS NOTE ADULT - SUBJECTIVE AND OBJECTIVE BOX
Subjective: no acute complaints, pt does not require home O2    Vital Signs:  Vital Signs Last 24 Hrs  T(C): 36.9 (10-29-18 @ 13:17), Max: 36.9 (10-29-18 @ 13:17)  T(F): 98.4 (10-29-18 @ 13:17), Max: 98.4 (10-29-18 @ 13:17)  HR: 111 (10-29-18 @ 13:17) (95 - 111)  BP: 128/84 (10-29-18 @ 13:17) (103/56 - 142/89)  RR: 18 (10-29-18 @ 13:17) (18 - 20)  SpO2: 94% (10-29-18 @ 13:33) (91% - 100%) on (O2)    Telemetry/Alarms:  General: WN/WD NAD  Neurology: Awake, nonfocal, [place]RA[place] x 4  Eyes: Scleras clear, PERRLA/ EOMI, Gross vision intact  ENT:Gross hearing intact, grossly patent pharynx, no stridor  Neck: Neck supple, trachea midline, No JVD,   Respiratory: CTA B/L, No wheezing, rales, rhonchi  CV: RRR, S1S2, no murmurs, rubs or gallops  Abdominal: Soft, NT, ND +BS,   Extremities: No edema, + peripheral pulses  Skin: No Rashes, Hematoma, Ecchymosis  Lymphatic: No Neck, axilla, groin LAD  Psych: Oriented x 3, normal affect  Incisions: c,d,i  chest tube removed today  Relevant labs, radiology and Medications reviewed                        10.3   7.70  )-----------( 223      ( 28 Oct 2018 06:20 )             33.6     10-29    137  |  93<L>  |  68<H>  ----------------------------<  161<H>  6.0<H>   |  27  |  8.76<H>    Ca    9.4      29 Oct 2018 05:30        MEDICATIONS  (STANDING):  aspirin enteric coated 81 milliGRAM(s) Oral daily  chlorhexidine 2% Cloths 1 Application(s) Topical daily  dextrose 5%. 1000 milliLiter(s) (50 mL/Hr) IV Continuous <Continuous>  dextrose 50% Injectable 12.5 Gram(s) IV Push once  dextrose 50% Injectable 25 Gram(s) IV Push once  dextrose 50% Injectable 25 Gram(s) IV Push once  docusate sodium 100 milliGRAM(s) Oral three times a day  heparin  Injectable 5000 Unit(s) SubCutaneous every 12 hours  insulin lispro (HumaLOG) corrective regimen sliding scale   SubCutaneous Before meals and at bedtime  ipratropium    for Nebulization 500 MICROGram(s) Nebulizer every 6 hours  memantine 10 milliGRAM(s) Oral two times a day  metoprolol succinate ER 25 milliGRAM(s) Oral daily  midodrine 10 milliGRAM(s) Oral every 8 hours  nortriptyline 25 milliGRAM(s) Oral at bedtime  pantoprazole  Injectable 40 milliGRAM(s) IV Push daily  piperacillin/tazobactam IVPB. 3.375 Gram(s) IV Intermittent every 12 hours    MEDICATIONS  (PRN):  benzocaine 15 mG/menthol 3.6 mG Lozenge 1 Lozenge Oral every 4 hours PRN Sore Throat  bisacodyl Suppository 10 milliGRAM(s) Rectal daily PRN Constipation  dextrose 40% Gel 15 Gram(s) Oral once PRN Blood Glucose LESS THAN 70 milliGRAM(s)/deciliter  glucagon  Injectable 1 milliGRAM(s) IntraMuscular once PRN Glucose LESS THAN 70 milligrams/deciliter  oxyCODONE    IR 5 milliGRAM(s) Oral every 4 hours PRN Moderate Pain (4 - 6)    Pertinent Physical Exam  I&O's Summary    28 Oct 2018 07:01  -  29 Oct 2018 07:00  --------------------------------------------------------  IN: 100 mL / OUT: 101 mL / NET: -1 mL    29 Oct 2018 07:01  -  29 Oct 2018 13:57  --------------------------------------------------------  IN: 500 mL / OUT: 2403 mL / NET: -1903 mL        Assessment  58y Male  w/ PAST MEDICAL & SURGICAL HISTORY:  Smoking hx  Cardiomyopathy  Wound: right chest  ICD (implantable cardioverter-defibrillator) in place  OA (osteoarthritis)  HLD (hyperlipidemia)  BPH (benign prostatic hyperplasia)  Pleural effusion  HTN (hypertension)  ESRD (end stage renal disease)  H/O chest wound: right  History of wound infection: right chest wall - revision 5/18 and again in 7/18  History of implantable cardioverter-defibrillator (ICD) placement: pt unsure when placed  H/O bilateral hip replacements: 2008, 2009  admitted with complaints of Patient is a 58y old  Male who presents with a chief complaint of ESRD on HD (29 Oct 2018 13:23)  .  On 10/11/18 pt s/p R thoracotomy decortication and chest wall reconstruction  . Postoperative course/issues: PICC line placed,  IV zosyn until nov 7 for bacteremia; continues on HD    PLAN  Neuro: Pain management  Pulm: Encourage coughing, deep breathing and use of incentive spirometry. Wean off supplemental oxygen as able. Daily CXR.   Cardio: Monitor telemetry/alarms  GI: Tolerating diet. Continue stool softeners.  Renal: HD today  Vasc: Heparin SC/SCDs for DVT prophylaxis  Heme: Stable H/H. .   ID: Zosyn until Nov 17  Therapy: OOB/ambulate    Disposition: Aim to D/C to home once IV antibiotics arranged  Discussed with Cardiothoracic Team at AM rounds.

## 2018-10-29 NOTE — PROGRESS NOTE ADULT - SUBJECTIVE AND OBJECTIVE BOX
Infectious Diseases progress note:    Subjective: Afebrile, no complaints.  Chest tube removed.     ROS:  CONSTITUTIONAL:  No fever, chills, rigors  CARDIOVASCULAR:  No chest pain or palpitations  RESPIRATORY:   No SOB, cough, dyspnea on exertion.  No wheezing  GASTROINTESTINAL:  No abd pain, N/V, diarrhea/constipation  EXTREMITIES:  No swelling or joint pain  GENITOURINARY:  No burning on urination, increased frequency or urgency.  No flank pain  NEUROLOGIC:  No HA, visual disturbances  SKIN: No rashes    Allergies    No Known Allergies    Intolerances        ANTIBIOTICS/RELEVANT:  antimicrobials  piperacillin/tazobactam IVPB. 3.375 Gram(s) IV Intermittent every 12 hours    immunologic:    OTHER:  aspirin enteric coated 81 milliGRAM(s) Oral daily  benzocaine 15 mG/menthol 3.6 mG Lozenge 1 Lozenge Oral every 4 hours PRN  bisacodyl Suppository 10 milliGRAM(s) Rectal daily PRN  chlorhexidine 2% Cloths 1 Application(s) Topical daily  dextrose 40% Gel 15 Gram(s) Oral once PRN  dextrose 5%. 1000 milliLiter(s) IV Continuous <Continuous>  dextrose 50% Injectable 12.5 Gram(s) IV Push once  dextrose 50% Injectable 25 Gram(s) IV Push once  dextrose 50% Injectable 25 Gram(s) IV Push once  docusate sodium 100 milliGRAM(s) Oral three times a day  glucagon  Injectable 1 milliGRAM(s) IntraMuscular once PRN  heparin  Injectable 5000 Unit(s) SubCutaneous every 12 hours  insulin lispro (HumaLOG) corrective regimen sliding scale   SubCutaneous Before meals and at bedtime  ipratropium    for Nebulization 500 MICROGram(s) Nebulizer every 6 hours  memantine 10 milliGRAM(s) Oral two times a day  metoprolol succinate ER 25 milliGRAM(s) Oral daily  midodrine 10 milliGRAM(s) Oral every 8 hours  nortriptyline 25 milliGRAM(s) Oral at bedtime  oxyCODONE    IR 5 milliGRAM(s) Oral every 4 hours PRN  pantoprazole  Injectable 40 milliGRAM(s) IV Push daily      Objective:  Vital Signs Last 24 Hrs  T(C): 36.6 (29 Oct 2018 20:15), Max: 36.9 (29 Oct 2018 13:17)  T(F): 97.9 (29 Oct 2018 20:15), Max: 98.4 (29 Oct 2018 13:17)  HR: 110 (29 Oct 2018 20:15) (87 - 113)  BP: 150/70 (29 Oct 2018 20:15) (103/56 - 156/83)  BP(mean): --  RR: 18 (29 Oct 2018 20:15) (18 - 20)  SpO2: 100% (29 Oct 2018 20:15) (92% - 100%)    PHYSICAL EXAM:  Constitutional:NAD  Eyes:CHER, EOMI  Ear/Nose/Throat: no thrush, mucositis.  Moist mucous membranes	  Neck:no JVD, no lymphadenopathy, supple  Respiratory: CTA adore,  SANDY drains  Cardiovascular: S1S2 RRR, no murmurs  Gastrointestinal:soft, nontender,  nondistended (+) BS  Extremities:no e/e/c  Skin:  no rashes, open wounds or ulcerations.  rt post chest wound staples in place.  No SOI        LABS:                        10.3   7.70  )-----------( 223      ( 28 Oct 2018 06:20 )             33.6     10-29    137  |  93<L>  |  68<H>  ----------------------------<  161<H>  6.0<H>   |  27  |  8.76<H>    Ca    9.4      29 Oct 2018 05:30                MICROBIOLOGY:    Culture - Body Fluid with Gram Stain (10.25.18 @ 15:37)    Culture - Body Fluid:   NO ORGANISMS ISOLATED AT 24 HOURS  NO ORGANISMS ISOLATED AT 48 HRS.  NO ORGANISMS ISOLATED AT 72 HRS.    Gram Stain:   NOS^No Organisms Seen  WBC^White Blood Cells  QNTY CELLS IN GRAM STAIN: FEW (2+)    Specimen Source: PLEURAL FLUID    Culture - Blood (10.15.18 @ 18:59)    Culture - Blood:   NO ORGANISMS ISOLATED    Specimen Source: BLOOD PERIPHERAL          RADIOLOGY & ADDITIONAL STUDIES:    < from: Xray Chest 1 View- PORTABLE-Urgent (10.29.18 @ 12:39) >  IMPRESSION:  Right basilar chest tube has been removed. Overall size of   complex loculated right hydropneumothorax is not significantly changed.   There is increased air associated with this process, however.    A remaining right-sided chest tube or drain is predominantly within the   right lateral chest wall with tip overlying a lateral right rib.    No significant change in small loculated left pleural effusion.    < end of copied text >

## 2018-10-29 NOTE — PROGRESS NOTE ADULT - PROBLEM SELECTOR PLAN 1
ESRD on HD for past six years through left upper extremity AVF. CVVHDF discontinued on 10/15/18. Pt on IHD. Labs reviewed. Pt clinically stable. Pt seen on HD today. Pt tolerating HD with stable BP. Monitor BMP daily.

## 2018-10-29 NOTE — PROGRESS NOTE ADULT - ASSESSMENT
ASSESSMENT:   Patient is a 58y old  Male who presents with a chief complaint of infected hemothorax (18 Oct 2018), s/p washout of pleural space and latissimus dorsi flap, doing well     PLAN:     - continue staples to ischemic skin edges  - DC staples to well healing skin   - continue drain  - activity per primary  - DVT ppx  - Ambulating as tolerated  - Dispo planning      Plastic Surgery  Pager X [phone]

## 2018-10-29 NOTE — PROGRESS NOTE ADULT - SUBJECTIVE AND OBJECTIVE BOX
CLAUDIA HAN  [id number]    Subjective:    Patient seen after hemodialysis, comfortable. no complaints.   Every other staple removed from incision, tolerated well, no issues.        Objective:  T(C): 36.6 (10-30-18 @ 10:45), Max: 36.9 (10-29-18 @ 13:17)  HR: 114 (10-30-18 @ 10:45) (87 - 119)  BP: 113/60 (10-30-18 @ 10:45) (110/46 - 156/83)  RR: 18 (10-30-18 @ 10:45) (18 - 18)  SpO2: 100% (10-30-18 @ 10:45) (92% - 100%)  Wt(kg): --   10-29    137  |  93<L>  |  68<H>  ----------------------------<  161<H>  6.0<H>   |  27  |  8.76<H>    Ca    9.4      29 Oct 2018 05:30        10-29 @ 07:01  -  10-30 @ 07:00  --------------------------------------------------------  IN: 500 mL / OUT: 2503 mL / NET: -2003 mL      PHYSICAL EXAM:    General: NAD   Back: Incisions CDI, staples intact. Areas of ischemic skin edges to incision to part of wound, wound is stable, non dehisced. No collections, SANDY SS.             MEDICATIONS  (STANDING):  aspirin enteric coated 81 milliGRAM(s) Oral daily  chlorhexidine 2% Cloths 1 Application(s) Topical daily  dextrose 5%. 1000 milliLiter(s) (50 mL/Hr) IV Continuous <Continuous>  dextrose 50% Injectable 12.5 Gram(s) IV Push once  dextrose 50% Injectable 25 Gram(s) IV Push once  dextrose 50% Injectable 25 Gram(s) IV Push once  docusate sodium 100 milliGRAM(s) Oral three times a day  heparin  Injectable 5000 Unit(s) SubCutaneous every 12 hours  insulin lispro (HumaLOG) corrective regimen sliding scale   SubCutaneous Before meals and at bedtime  ipratropium    for Nebulization 500 MICROGram(s) Nebulizer every 6 hours  memantine 10 milliGRAM(s) Oral two times a day  metoprolol succinate ER 25 milliGRAM(s) Oral daily  midodrine 10 milliGRAM(s) Oral every 8 hours  nortriptyline 25 milliGRAM(s) Oral at bedtime  pantoprazole  Injectable 40 milliGRAM(s) IV Push daily  piperacillin/tazobactam IVPB. 3.375 Gram(s) IV Intermittent every 12 hours    MEDICATIONS  (PRN):  benzocaine 15 mG/menthol 3.6 mG Lozenge 1 Lozenge Oral every 4 hours PRN Sore Throat  bisacodyl Suppository 10 milliGRAM(s) Rectal daily PRN Constipation  dextrose 40% Gel 15 Gram(s) Oral once PRN Blood Glucose LESS THAN 70 milliGRAM(s)/deciliter  glucagon  Injectable 1 milliGRAM(s) IntraMuscular once PRN Glucose LESS THAN 70 milligrams/deciliter

## 2018-10-30 ENCOUNTER — TRANSCRIPTION ENCOUNTER (OUTPATIENT)
Age: 58
End: 2018-10-30

## 2018-10-30 VITALS
SYSTOLIC BLOOD PRESSURE: 114 MMHG | TEMPERATURE: 98 F | OXYGEN SATURATION: 93 % | RESPIRATION RATE: 18 BRPM | HEART RATE: 119 BPM | DIASTOLIC BLOOD PRESSURE: 52 MMHG

## 2018-10-30 PROCEDURE — 90935 HEMODIALYSIS ONE EVALUATION: CPT | Mod: GC

## 2018-10-30 PROCEDURE — 99238 HOSP IP/OBS DSCHRG MGMT 30/<: CPT

## 2018-10-30 RX ORDER — MIDODRINE HYDROCHLORIDE 2.5 MG/1
1 TABLET ORAL
Qty: 0 | Refills: 0 | COMMUNITY

## 2018-10-30 RX ORDER — PIPERACILLIN AND TAZOBACTAM 4; .5 G/20ML; G/20ML
1 INJECTION, POWDER, LYOPHILIZED, FOR SOLUTION INTRAVENOUS
Qty: 0 | Refills: 0 | COMMUNITY
Start: 2018-10-30

## 2018-10-30 RX ORDER — ASPIRIN/CALCIUM CARB/MAGNESIUM 324 MG
1 TABLET ORAL
Qty: 0 | Refills: 0 | COMMUNITY

## 2018-10-30 RX ORDER — CARVEDILOL PHOSPHATE 80 MG/1
1 CAPSULE, EXTENDED RELEASE ORAL
Qty: 0 | Refills: 0 | COMMUNITY

## 2018-10-30 RX ORDER — MIDODRINE HYDROCHLORIDE 2.5 MG/1
1 TABLET ORAL
Qty: 90 | Refills: 0 | OUTPATIENT
Start: 2018-10-30 | End: 2018-11-28

## 2018-10-30 RX ORDER — METOPROLOL TARTRATE 50 MG
1 TABLET ORAL
Qty: 30 | Refills: 0 | OUTPATIENT
Start: 2018-10-30 | End: 2018-11-28

## 2018-10-30 RX ADMIN — HEPARIN SODIUM 5000 UNIT(S): 5000 INJECTION INTRAVENOUS; SUBCUTANEOUS at 05:48

## 2018-10-30 RX ADMIN — MEMANTINE HYDROCHLORIDE 10 MILLIGRAM(S): 10 TABLET ORAL at 05:49

## 2018-10-30 RX ADMIN — PIPERACILLIN AND TAZOBACTAM 25 GRAM(S): 4; .5 INJECTION, POWDER, LYOPHILIZED, FOR SOLUTION INTRAVENOUS at 18:09

## 2018-10-30 RX ADMIN — PANTOPRAZOLE SODIUM 40 MILLIGRAM(S): 20 TABLET, DELAYED RELEASE ORAL at 14:13

## 2018-10-30 RX ADMIN — Medication 500 MICROGRAM(S): at 03:33

## 2018-10-30 RX ADMIN — MIDODRINE HYDROCHLORIDE 10 MILLIGRAM(S): 2.5 TABLET ORAL at 05:48

## 2018-10-30 RX ADMIN — Medication 100 MILLIGRAM(S): at 05:48

## 2018-10-30 RX ADMIN — Medication 81 MILLIGRAM(S): at 14:13

## 2018-10-30 RX ADMIN — PIPERACILLIN AND TAZOBACTAM 25 GRAM(S): 4; .5 INJECTION, POWDER, LYOPHILIZED, FOR SOLUTION INTRAVENOUS at 10:12

## 2018-10-30 RX ADMIN — Medication 500 MICROGRAM(S): at 09:57

## 2018-10-30 RX ADMIN — MIDODRINE HYDROCHLORIDE 10 MILLIGRAM(S): 2.5 TABLET ORAL at 14:12

## 2018-10-30 RX ADMIN — Medication 100 MILLIGRAM(S): at 14:12

## 2018-10-30 RX ADMIN — MEMANTINE HYDROCHLORIDE 10 MILLIGRAM(S): 10 TABLET ORAL at 18:09

## 2018-10-30 NOTE — DISCHARGE NOTE ADULT - PATIENT PORTAL LINK FT
You can access the [email or web address] Patient Portal, offered by [facility], by registering with the following website: [email or web address] [facility]

## 2018-10-30 NOTE — DISCHARGE NOTE ADULT - PLAN OF CARE
wound healing; improved infection stable for outpatient follow up Walk 4-5 x per day. Increase as tolerated. You may climb stairs. Use incentive spirometer. You can shower daily but new dressing should be put at drain site after showering. Leave all other wounds uncovered. All remaining staples and last drain will be removed in Dr. Bach (Plastic surgeon) office. See Dr. Bach next week in office. Call for an apt. [phone]. Continue to empty drain daily and write down how much you empty and bring it with you when you see Dr. Bach  Visiting Nurse will see you and do dressing changes and wound checks. They will also come to set up your IV antibiotics, that will end on 11/7/18  See Dr. Pickering in 1-2 weeks. Call for an apt. [phone]  Have a chest xray done prior to that apt and then bring those images with you. Continue your Dialysis as per your usual schedule. Follow up with your Nephrologist. Continue better medication management for heart failure Take new medications as directed. Stop the Coreg and the Torsemide.   Call the Heart Failure Clinic here at [facility] for an apt. for in 1 week. Call [phone]  If you can't get an apt there, please be sure to see your Cardiologist within 1 week.

## 2018-10-30 NOTE — PROGRESS NOTE ADULT - PROBLEM SELECTOR PLAN 1
ESRD on HD for past six years through left upper extremity AVF. CVVHDF discontinued on 10/15/18. Pt on IHD. Labs reviewed. Pt clinically stable. Pt seen on HD today. Pt tolerating HD. Plan to continue on Tuesday, Thursday and Saturday maintenance dialysis schedule. Monitor BMP daily.

## 2018-10-30 NOTE — DISCHARGE NOTE ADULT - PROVIDER TOKENS
TOKEN:'[id number]',FREE:[LAST:[Magdy],FIRST:[Dr],PHONE:[phone]:[(   )    -]],FREE:[LAST:[Jacques],FIRST:[],PHONE:[phone]:[(   )    -]]

## 2018-10-30 NOTE — PROGRESS NOTE ADULT - REASON FOR ADMISSION
Lung surgery
hemothorax
Lung surgery
Thoracotomy
Thoracotomy
infected hemothorax
infected hemothorax
Hemothorax
Hemothorax
Infected hematoma
Pleural effusion, pleurocutaneous fistula
evacuation of hemothorax
hemothorax
infected hemothorax
ESRD on HD
ESRD on HD, Hypervolemia
PNA
SOB
Dr. Pickering
ESRD on HD.
scheduled surgery
Thoracotomy

## 2018-10-30 NOTE — PROGRESS NOTE ADULT - PROBLEM SELECTOR PROBLEM 1
Acute on chronic systolic heart failure
ESRD (end stage renal disease) on dialysis
Acute on chronic systolic heart failure
Acute on chronic systolic heart failure

## 2018-10-30 NOTE — DISCHARGE NOTE ADULT - HOSPITAL COURSE
Pt is a 58 yr old male who underwent  Right Thoractomy Decortiation, Right Chest Wall reconstruction with Latissimus Dorsi Flap with Dr Pickering 10/11/18.  He was followed by ID and placd on IV antibiotics post-op.  A PICC was placed for long-term abx.  He also underwent HD 3 times a week. Pt is a 58 yr old Mandarin speaking male scheduled for Right Thoractomy Decortiation, Right Chest Wall reconstruction with Latissimjus Dorsi Flap Poss Skin Graft with VAC dressing with Dr Pickering 10/11/18. Pt has ICD but unable to identify make or model. Pt hx of ESRD with HD T/Th/Sat for 4 hrs and has stated he has stopped some of his meds but cannot say which ones. Pt seen and received [facility] and [facility] but has 2 different med lists and is unable to identify meds taking. Pt denies blood thinners. Pt has stopped ASA as of last week. Pt hx gathered from [employer] and notes from Dr Pickering - pt is poor historian.     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 On IV antibx until 11/7. PICC in place. Continued on HD. Tubes and JPs to remain in place for now. Rpt Pleural fluid cult sent 10/25  10/26-Toprol added for tachycardia 10/30-Pt had HD to resume previous schedule. Cont TOPROl, Midodrine without diuretic per Cardiology. Per plastics can d/c #1 SANDY today. Leave #2 for home. All home care and antibiotics and services arranged. Pt Rt. thoracotomy w continued eschar at staple line, Dr. Pickering and Dr. Bach aware. OK for pt to resume a regular diet without modified liquids per Dr. Pickering. Denies CP or SOB. AMb w minimal assist. Cleared for dc to home by Dr. Pickering.

## 2018-10-30 NOTE — DISCHARGE NOTE ADULT - MEDICATION SUMMARY - MEDICATIONS TO TAKE
I will START or STAY ON the medications listed below when I get home from the hospital:    Vitamin B Complex  -- 1 tab(s) by mouth once a day  -- Indication: For vitamin    Calcium 500  -- 1 tab(s) by mouth 2 times a day  -- Indication: For vitamin    Tylenol 325 mg oral tablet  -- 2 tab(s) by mouth every 6 hours, As Needed  -- Indication: For Pain    aspirin 81 mg oral tablet  -- 1 tab(s) by mouth once a day   -- Indication: For Anti platelet    nortriptyline 50 mg oral capsule  -- 1 cap(s) by mouth once a day  -- Indication: For Anti depressant    metoprolol succinate 25 mg oral tablet, extended release  -- 1 tab(s) by mouth once a day  -- Indication: For Heart rate control    Breo Ellipta 100 mcg-25 mcg/inh inhalation powder  -- 1 puff(s) inhaled once a day patient denies using it  -- Indication: For Breathing.     docusate sodium 100 mg oral tablet  -- 1 tab(s) by mouth 2 times a day, As Needed  -- Indication: For Constipation    Mag-Ox 400 oral tablet  -- 1 tab(s) by mouth once a day  -- Indication: For Electrolyte    midodrine 10 mg oral tablet  -- 1 tab(s) by mouth every 8 hours  -- Indication: For Blood pressure support    Namenda 10 mg oral tablet  -- 1 tab(s) by mouth 2 times a day  -- Indication: For forgetfullness    simethicone 80 mg oral tablet  -- 1 tab(s) by mouth 3 times a day (after meals)  -- Indication: For gas    piperacillin-tazobactam 2 g-0.25 g intravenous injection  -- 1  intravenous every 8 hours. Last day of doses is 11/7/18  -- Indication: For Antibiotic    Renvela 800 mg oral tablet  -- 2 tab(s) by mouth every 8 hours  -- Indication: For Hd    Multiple Vitamins oral tablet  -- 1 tab(s) by mouth once a day   -- Indication: For vitamin    Nephplex Rx oral tablet  -- 1 tab(s) by mouth once a day  -- Indication: For vitamin    Vitamin C 500 mg oral tablet  -- 1 tab(s) by mouth once a day  -- Indication: For vitamin    folic acid 1 mg oral tablet  -- 1 tab(s) by mouth once a day  -- Indication: For vitamin    vitamin A  -- 1 tab(s) by mouth once a day  -- Indication: For vitamin I will START or STAY ON the medications listed below when I get home from the hospital:    Vitamin B Complex  -- 1 tab(s) by mouth once a day  -- Indication: For vitamin    Calcium 500  -- 1 tab(s) by mouth 2 times a day  -- Indication: For vitamin    Tylenol 325 mg oral tablet  -- 2 tab(s) by mouth every 6 hours, As Needed  -- Indication: For Paoin    aspirin 81 mg oral tablet  -- 1 tab(s) by mouth once a day   -- Indication: For Anti platelet    nortriptyline 50 mg oral capsule  -- 1 cap(s) by mouth once a day  -- Indication: For Antidepressant    metoprolol succinate 25 mg oral tablet, extended release  -- 1 tab(s) by mouth once a day  -- Indication: For Heart rate    Breo Ellipta 100 mcg-25 mcg/inh inhalation powder  -- 1 puff(s) inhaled once a day patient denies using it  -- Indication: For Breathing    docusate sodium 100 mg oral tablet  -- 1 tab(s) by mouth 2 times a day, As Needed  -- Indication: For Constipation    Mag-Ox 400 oral tablet  -- 1 tab(s) by mouth once a day  -- Indication: For vitamin    midodrine 10 mg oral tablet  -- 1 tab(s) by mouth every 8 hours  -- Indication: For Blood pressure    Namenda 10 mg oral tablet  -- 1 tab(s) by mouth 2 times a day  -- Indication: For memory loss    simethicone 80 mg oral tablet  -- 1 tab(s) by mouth 3 times a day (after meals)  -- Indication: For gas    piperacillin-tazobactam 2 g-0.25 g intravenous injection  -- 1  intravenous every 12 hours until 11/7/18  -- Indication: For Infection    Renvela 800 mg oral tablet  -- 2 tab(s) by mouth every 8 hours  -- Indication: For vitamin    Nephplex Rx oral tablet  -- 1 tab(s) by mouth once a day  -- Indication: For vitamin    Multiple Vitamins oral tablet  -- 1 tab(s) by mouth once a day   -- Indication: For vitmain    Vitamin C 500 mg oral tablet  -- 1 tab(s) by mouth once a day  -- Indication: For vitamin    folic acid 1 mg oral tablet  -- 1 tab(s) by mouth once a day  -- Indication: For vitamin    vitamin A  -- 1 tab(s) by mouth once a day  -- Indication: For vitamin

## 2018-10-30 NOTE — PROGRESS NOTE ADULT - PROBLEM SELECTOR PROBLEM 2
Shock
Anemia
Shock

## 2018-10-30 NOTE — DISCHARGE NOTE ADULT - MEDICATION SUMMARY - MEDICATIONS TO STOP TAKING
I will STOP taking the medications listed below when I get home from the hospital:    carvedilol 6.25 mg oral tablet  -- 1 tab(s) by mouth once a day    torsemide 20 mg oral tablet  -- 1 tab(s) by mouth once a day

## 2018-10-30 NOTE — DISCHARGE NOTE ADULT - DURABLE MEDICAL EQUIPMENT AGENCY
[facility] [phone] for [facility].

## 2018-10-30 NOTE — PROGRESS NOTE ADULT - NSHPATTENDINGPLANDISCUSS_GEN_ALL_CORE
Dr. Mariano Abarca
Dr. Rishabh Kumar
patient
patient
patient and nurse
patient
team
nurse and patient
team
CT ICU attending
CTI Team

## 2018-10-30 NOTE — DISCHARGE NOTE ADULT - CARE PROVIDERS DIRECT ADDRESSES
[email or web address],DirectAddress_Unknown

## 2018-10-30 NOTE — DISCHARGE NOTE ADULT - MEDICATION SUMMARY - MEDICATIONS TO CHANGE
I will SWITCH the dose or number of times a day I take the medications listed below when I get home from the hospital:    midodrine 10 mg oral tablet  -- 1 tab(s) by mouth once a day

## 2018-10-30 NOTE — DISCHARGE NOTE ADULT - CARE PLAN
Principal Discharge DX:	Empyema  Goal:	wound healing; improved infection  Assessment and plan of treatment:	stable for outpatient follow up Principal Discharge DX:	Empyema  Goal:	wound healing; improved infection  Assessment and plan of treatment:	Walk 4-5 x per day. Increase as tolerated. You may climb stairs. Use incentive spirometer. You can shower daily but new dressing should be put at drain site after showering. Leave all other wounds uncovered. All remaining staples and last drain will be removed in Dr. Bach (Plastic surgeon) office. See Dr. Bach next week in office. Call for an apt. [phone]. Continue to empty drain daily and write down how much you empty and bring it with you when you see Dr. Bach  Visiting Nurse will see you and do dressing changes and wound checks. They will also come to set up your IV antibiotics, that will end on 11/7/18  See Dr. Pickering in 1-2 weeks. Call for an apt. [phone]  Have a chest xray done prior to that apt and then bring those images with you.  Secondary Diagnosis:	ESRD (end stage renal disease)  Assessment and plan of treatment:	Continue your Dialysis as per your usual schedule. Follow up with your Nephrologist.  Secondary Diagnosis:	Acute on chronic systolic heart failure  Goal:	Continue better medication management for heart failure  Assessment and plan of treatment:	Take new medications as directed. Stop the Coreg and the Torsemide.   Call the Heart Failure Clinic here at [facility] for an apt. for in 1 week. Call [phone]  If you can't get an apt there, please be sure to see your Cardiologist within 1 week.

## 2018-10-30 NOTE — DISCHARGE NOTE ADULT - ADDITIONAL INSTRUCTIONS
See Dr Pickering in 2 weeks. Call for an appointment and bring a new Chest X-ray with you.  Keep wounds clean and dry - wash with soap and let air dry.

## 2018-10-30 NOTE — PROGRESS NOTE ADULT - SUBJECTIVE AND OBJECTIVE BOX
[facility] DIVISION OF KIDNEY DISEASES AND HYPERTENSION -- FOLLOW UP NOTE  --------------------------------------------------------------------------------    HPI: 59yo Mandarin speaking M w/ Hx of NICM w/ ICD, ESRD on HD TTS, former smoker, BPH who was admitted for scheduled right thoracotomy w/ decortication due to infected hemothorax and chest reconstruction with CT Surgery. Pt underwent procedure on 10/11/18, and afterwards developed shock, thought to be cardiogenic. Renal was called for management of pts ESRD. Pt was started on CVVHDF on 10/13/18. Non-tunneled catheter non-functional and removed on 10/15/18 when CRRT was stopped. Pt now on IHD.     Pt seen and examined at HD. Pt tolerating HD well. BP stable. Pt admits to SOB today.     PAST HISTORY  --------------------------------------------------------------------------------  No significant changes to PMH, PSH, FHx, SHx, unless otherwise noted    ALLERGIES & MEDICATIONS  --------------------------------------------------------------------------------  Allergies    No Known Allergies    Intolerances      Standing Inpatient Medications  aspirin enteric coated 81 milliGRAM(s) Oral daily  chlorhexidine 2% Cloths 1 Application(s) Topical daily  dextrose 5%. 1000 milliLiter(s) IV Continuous <Continuous>  dextrose 50% Injectable 12.5 Gram(s) IV Push once  dextrose 50% Injectable 25 Gram(s) IV Push once  dextrose 50% Injectable 25 Gram(s) IV Push once  docusate sodium 100 milliGRAM(s) Oral three times a day  heparin  Injectable 5000 Unit(s) SubCutaneous every 12 hours  insulin lispro (HumaLOG) corrective regimen sliding scale   SubCutaneous Before meals and at bedtime  ipratropium    for Nebulization 500 MICROGram(s) Nebulizer every 6 hours  memantine 10 milliGRAM(s) Oral two times a day  metoprolol succinate ER 25 milliGRAM(s) Oral daily  midodrine 10 milliGRAM(s) Oral every 8 hours  nortriptyline 25 milliGRAM(s) Oral at bedtime  pantoprazole  Injectable 40 milliGRAM(s) IV Push daily  piperacillin/tazobactam IVPB. 3.375 Gram(s) IV Intermittent every 12 hours    PRN Inpatient Medications  benzocaine 15 mG/menthol 3.6 mG Lozenge 1 Lozenge Oral every 4 hours PRN  bisacodyl Suppository 10 milliGRAM(s) Rectal daily PRN  dextrose 40% Gel 15 Gram(s) Oral once PRN  glucagon  Injectable 1 milliGRAM(s) IntraMuscular once PRN      REVIEW OF SYSTEMS  --------------------------------------------------------------------------------    All other systems were reviewed and are negative, except as noted.    VITALS/PHYSICAL EXAM  --------------------------------------------------------------------------------  T(C): 36.6 (10-30-18 @ 13:03), Max: 36.9 (10-29-18 @ 13:17)  HR: 115 (10-30-18 @ 13:03) (87 - 119)  BP: 130/69 (10-30-18 @ 13:03) (110/46 - 156/83)  RR: 19 (10-30-18 @ 13:03) (18 - 19)  SpO2: 100% (10-30-18 @ 13:03) (92% - 100%)  Wt(kg): --    10-29-18 @ 07:01  -  10-30-18 @ 07:00  --------------------------------------------------------  IN: 500 mL / OUT: 2503 mL / NET: -2003 mL    10-30-18 @ 07:01  -  10-30-18 @ 13:16  --------------------------------------------------------  IN: 400 mL / OUT: 1400 mL / NET: -1000 mL    Physical Exam:  	Gen: NAD  	Pulm: CTA B/L, + chest tube drains  	CV: RRR, S1S2; no rub  	Abd: +BS, soft  	LE: Warm, no edema  	Skin: Warm  	Vascular access:  LUE AVF site: +thrill +bruit +skin intact    LABS/STUDIES  --------------------------------------------------------------------------------    137  |  93  |  68  ----------------------------<  161      [10-29-18 @ 05:30]  6.0   |  27  |  8.76        Ca     9.4     [10-29-18 @ 05:30]    Creatinine Trend:  SCr 8.76 [10-29 @ 05:30]  SCr 7.55 [10-28 @ 06:20]  SCr 5.55 [10-27 @ 06:36]  SCr 5.09 [10-25 @ 05:20]  SCr 6.55 [10-24 @ 15:15]    HbA1c 5.5      [10-12-18 @ 02:53]  TSH 4.79      [10-21-18 @ 04:15]    HBsAb 16.3      [10-13-18 @ 15:45]  HCV 0.21, Nonreactive Hepatitis C AB  S/CO Ratio                        Interpretation  < 1.0                                     Non-Reactive  1.0 - 4.9                           Weakly-Reactive  > 5.0                                 Reactive  Non-Reactive: Aperson with a non-reactive HCV antibody  result is considered uninfected.  No further action is  needed unless recent infection is suspected.  In these  cases, consider repeat testing later to detect  seroconversion..  Weakly-Reactive: HCV antibody test is abnormal, HCV RNA  Qualitative test will follow.  Reactive: HCV antibody test is abnormal, HCV RNA  Qualitative test will follow.  Note: HCV antibody testing is performed on the [employer]   system.      [10-13-18 @ 15:45]

## 2018-10-30 NOTE — PROGRESS NOTE ADULT - PROVIDER SPECIALTY LIST ADULT
CT Surgery
Critical Care
Heart Failure
Infectious Disease
Nephrology
Pain Medicine
Plastic Surgery
Thoracic Surgery
Thoracic Surgery
CT Surgery
Infectious Disease
Plastic Surgery
Infectious Disease
Heart Failure
Heart Failure

## 2018-10-30 NOTE — DISCHARGE NOTE ADULT - NS AS ACTIVITY OBS
Stairs allowed/Walking-Outdoors allowed/Showering allowed/Sex allowed/Walking-Indoors allowed/No Heavy lifting/straining/Do not drive or operate machinery Sex allowed/Walking-Outdoors allowed/Stairs allowed/Do not make important decisions/Do not drive or operate machinery/No Heavy lifting/straining/Showering allowed/Walking-Indoors allowed

## 2018-10-30 NOTE — DISCHARGE NOTE ADULT - CARE PROVIDER_API CALL
Jose Alfredo Pickering), Surgery; Thoracic Surgery  [address] [address] [place] [address]  [place] [address]  Phone: [phone]  Fax: [phone]    Dr Magdy  Phone: [phone]  Fax: (   )    -    Dr Jacques  Phone: [phone]  Fax: (   )    -

## 2018-10-30 NOTE — DISCHARGE NOTE ADULT - COMMUNITY RESOURCES
[facility]  [address] [place] [address]  [phone]  [employer]  [phone] [facility]  [address] [place] [address]  [phone]  [employer]  [phone]

## 2018-10-31 LAB — BACTERIA FLD CULT: SIGNIFICANT CHANGE UP

## 2018-11-02 ENCOUNTER — NON-APPOINTMENT (OUTPATIENT)
Age: 58
End: 2018-11-02

## 2018-11-02 ENCOUNTER — APPOINTMENT (OUTPATIENT)
Dept: CARDIOLOGY | Facility: CLINIC | Age: 58
End: 2018-11-02
Payer: MEDICARE

## 2018-11-02 VITALS — HEART RATE: 120 BPM | BODY MASS INDEX: 18.32 KG/M2 | OXYGEN SATURATION: 92 % | WEIGHT: 128 LBS | HEIGHT: 70 IN

## 2018-11-02 DIAGNOSIS — J86.0 PYOTHORAX WITH FISTULA: ICD-10-CM

## 2018-11-02 DIAGNOSIS — Z87.19 PERSONAL HISTORY OF OTHER DISEASES OF THE DIGESTIVE SYSTEM: ICD-10-CM

## 2018-11-02 DIAGNOSIS — E78.5 HYPERLIPIDEMIA, UNSPECIFIED: ICD-10-CM

## 2018-11-02 DIAGNOSIS — Z86.79 PERSONAL HISTORY OF OTHER DISEASES OF THE CIRCULATORY SYSTEM: ICD-10-CM

## 2018-11-02 DIAGNOSIS — Z95.810 PRESENCE OF AUTOMATIC (IMPLANTABLE) CARDIAC DEFIBRILLATOR: ICD-10-CM

## 2018-11-02 DIAGNOSIS — Z87.39 PERSONAL HISTORY OF OTHER DISEASES OF THE MUSCULOSKELETAL SYSTEM AND CONNECTIVE TISSUE: ICD-10-CM

## 2018-11-02 DIAGNOSIS — K21.9 GASTRO-ESOPHAGEAL REFLUX DISEASE W/OUT ESOPHAGITIS: ICD-10-CM

## 2018-11-02 DIAGNOSIS — Z99.2 END STAGE RENAL DISEASE: ICD-10-CM

## 2018-11-02 DIAGNOSIS — N18.6 END STAGE RENAL DISEASE: ICD-10-CM

## 2018-11-02 PROCEDURE — 93000 ELECTROCARDIOGRAM COMPLETE: CPT

## 2018-11-02 PROCEDURE — 99215 OFFICE O/P EST HI 40 MIN: CPT

## 2018-11-03 VITALS — SYSTOLIC BLOOD PRESSURE: 90 MMHG | DIASTOLIC BLOOD PRESSURE: 60 MMHG

## 2018-11-03 PROBLEM — J86.0: Status: RESOLVED | Noted: 2018-11-03 | Resolved: 2018-11-03

## 2018-11-03 PROBLEM — Z86.79 HISTORY OF CARDIOMYOPATHY: Status: RESOLVED | Noted: 2018-11-03 | Resolved: 2018-11-03

## 2018-11-03 PROBLEM — E78.5 HLD (HYPERLIPIDEMIA): Status: ACTIVE | Noted: 2018-08-29

## 2018-11-03 PROBLEM — Z87.19 HISTORY OF GASTROESOPHAGEAL REFLUX (GERD): Status: RESOLVED | Noted: 2018-11-03 | Resolved: 2018-11-03

## 2018-11-03 PROBLEM — K21.9 GASTROESOPHAGEAL REFLUX DISEASE: Status: ACTIVE | Noted: 2018-11-03

## 2018-11-03 PROBLEM — N18.6 END-STAGE RENAL DISEASE ON HEMODIALYSIS: Status: ACTIVE | Noted: 2018-11-03

## 2018-11-03 PROBLEM — Z87.39 HISTORY OF OSTEOARTHRITIS: Status: RESOLVED | Noted: 2018-11-03 | Resolved: 2018-11-03

## 2018-11-03 PROBLEM — N18.6 END STAGE RENAL DISEASE ON DIALYSIS: Status: RESOLVED | Noted: 2018-11-03 | Resolved: 2018-11-03

## 2018-11-03 PROBLEM — Z95.810 ICD (IMPLANTABLE CARDIOVERTER-DEFIBRILLATOR) IN PLACE: Status: ACTIVE | Noted: 2018-08-29

## 2018-11-03 RX ORDER — ASCORBIC ACID 500 MG
500 TABLET ORAL DAILY
Refills: 0 | Status: ACTIVE | COMMUNITY

## 2018-11-03 RX ORDER — MAGNESIUM OXIDE 400 MG
400 TABLET ORAL DAILY
Refills: 0 | Status: ACTIVE | COMMUNITY

## 2018-11-03 RX ORDER — MIDODRINE HYDROCHLORIDE 10 MG/1
10 TABLET ORAL 3 TIMES DAILY
Refills: 0 | Status: ACTIVE | COMMUNITY

## 2018-11-03 RX ORDER — FOLIC ACID 1 MG/1
1 TABLET ORAL DAILY
Refills: 0 | Status: ACTIVE | COMMUNITY

## 2018-11-03 RX ORDER — FLUTICASONE FUROATE AND VILANTEROL TRIFENATATE 100; 25 UG/1; UG/1
100-25 POWDER RESPIRATORY (INHALATION) DAILY
Refills: 0 | Status: ACTIVE | COMMUNITY

## 2018-11-03 RX ORDER — SIMETHICONE CHEW TAB 80 MG 80 MG
80 TABLET ORAL 4 TIMES DAILY
Refills: 0 | Status: ACTIVE | COMMUNITY

## 2018-11-03 RX ORDER — MULTI VITAMIN/MINERAL SUPPLEMENT WITH ASCORBIC ACID, NIACIN, PYRIDOXINE, PANTOTHENIC ACID, FOLIC ACID, RIBOFLAVIN, THIAMIN, BIOTIN, COBALAMIN AND ZINC. 60; 20; 12.5; 10; 10; 1.7; 1.5; 1; .3; .006 MG/1; MG/1; MG/1; MG/1; MG/1; MG/1; MG/1; MG/1; MG/1; MG/1
TABLET, COATED ORAL DAILY
Refills: 0 | Status: ACTIVE | COMMUNITY

## 2018-11-03 NOTE — REVIEW OF SYSTEMS
[Feeling Fatigued] : feeling fatigued [see HPI] : see HPI [Negative] : Heme/Lymph [Fever] : no fever [Chills] : no chills

## 2018-11-03 NOTE — REASON FOR VISIT
[FreeTextEntry1] : Mr. Rick presents to the office today for cardiovascular evaluation following a recent hospital admission. The interview was conducted with the use of a Mandarin  ([employer],  number [id number]).

## 2018-11-03 NOTE — HISTORY OF PRESENT ILLNESS
[FreeTextEntry1] : Mr. Rick presents to the office today for cardiovascular evaluation following a recent hospital admission.  The interview was conducted with the use of a Mandarin  ([employer],  number [id number]).\par \par Mr. Rick is a 58 year old Mandarin-speaking gentleman with a very extensive medical history.  He has a history of a (probable non-ischemic) cardiomyopathy.  He had a cardiac catheterization performed at [facility] on December 30th, 2014.  In summary, this demonstrated mild luminal irregularities in the left main, left anterior descending, left circumflex, and right coronary arteries.  The coronary circulation was left dominant.  Mr. Rick also has a history of hypertension, hyperlipidemia, end-stage renal disease (on hemodialysis every Tuesday, Thursday, and Saturday), osteoarthritis, benign prostatic hypertrophy (BPH), gout, and gastroesophageal reflux disease (GERD).\par \par In addition, Mr. Rick has a history of recurrent pleural effusions.  He underwent a left VATS, pleural biopsy, partial decortication of LLL, and drainage of left pleural effusion in January 2015 (at [facility]).  On December 4th, 2017 he underwent a right VATS, pleural biopsy, and evacuation of a hemothorax (at [facility]).  In May 2018, Mr. Rick underwent a right chest wound debridement and revision at [facility].  In addition, on July 25th, 2018 Mr. Rick underwent excision of a right chest wall mass (for deep abscess) at [facility].  Mr. Rick has also undergone bilateral total hip replacements (2008, 2009).\par \par On October 11th, 2018 Mr. Rick underwent a right posterolateral thoracotomy, excision of an empyema cavity, total pulmonary decortication, partial pleurectomy, partial excision of the right 7th rib, latissimus dorsi muscle flap to the chest cavity, and serratus muscle flap coverage of the chest wall defect.  This was performed at [facility].\par \par Mr. Rick's hospitalization was complicated by post-operative hypotension requiring vasopressor support.  His beta blocker therapy was held throughout his hospitalization.  A transthoracic echocardiogram performed on October 12th, 2018 demonstrated severe left ventricular dilatation (LV end-diastolic dimension 7.3 cm), severe global LV systolic dysfunction (LVEF 20%), moderate mitral regurgitation, and moderate aortic regurgitation.  During Mr. Rikc's hospitalization, a PICC line was inserted and he continues to receive intravenous antibiotics (piperacillin-tazobactam) at home.  His course of IV antibiotics is due to finish on November 7th, 2018.\par \par Mr. Rick reports that he has been feeling a lot of generalized fatigue.  He is able to walk short distances slowly and does not experience any dyspnea with this.  There has been no history of chest pain either at rest or with exertion.  Mr. Rick denies having any palpitations, presyncope, or syncope.  There has been no history of orthopnea, paroxysmal nocturnal dyspnea, or edema.  He has not been weighing himself at home.  Mr. Rick has not experienced any shocks from his defibrillator.

## 2018-11-03 NOTE — PHYSICAL EXAM
[General Appearance - Well Developed] : well developed [General Appearance - Well Nourished] : well nourished [Normal Conjunctiva] : the conjunctiva exhibited no abnormalities [Eyelids - No Xanthelasma] : the eyelids demonstrated no xanthelasmas [Normal Oral Mucosa] : normal oral mucosa [No Oral Cyanosis] : no oral cyanosis [Normal Jugular Venous A Waves Present] : normal jugular venous A waves present [Normal Jugular Venous V Waves Present] : normal jugular venous V waves present [Heart Sounds] : normal S1 and S2 [Murmurs] : no murmurs present [Edema] : no peripheral edema present [Rales / Crackles Bilateral] : no rales or crackles were heard [Wheezing Bilaterally] : no wheezing was heard [Rhonchi Bilateral] : no rhonchi were heard [Decreased Breath Sounds Unilaterally Right Lung Midlung] : breath sounds were diminished over the right midlung field [Decreased Breath Sounds Unilaterally Right Lung Base] : breath sounds were diminished over the right base [Pleural Friction Rub Bilaterally] : no friction rub heard [Bronchial Breath Sounds Bilaterally] : no bronchial breath sounds were heard [Bowel Sounds] : normal bowel sounds [Abdomen Soft] : soft [Abdomen Tenderness] : non-tender [Abnormal Walk] : normal gait [Nail Clubbing] : no clubbing of the fingernails [Cyanosis, Localized] : no localized cyanosis [Skin Color & Pigmentation] : normal skin color and pigmentation [Oriented To Time, Place, And Person] : oriented to person, place, and time [Acc Muscles Use] : no accessory muscle use

## 2018-11-03 NOTE — DISCUSSION/SUMMARY
[FreeTextEntry1] : In summary, Mr. Rick is a 58 year old Mandarin-speaking gentleman with a very extensive medical history.  He has a history of a (probable non-ischemic) cardiomyopathy.  He had a cardiac catheterization performed at [facility] on December 30th, 2014.  In summary, this demonstrated mild luminal irregularities in the left main, left anterior descending, left circumflex, and right coronary arteries.  The coronary circulation was left dominant.  Mr. Rick also has a history of hypertension, hyperlipidemia, end-stage renal disease (on hemodialysis every Tuesday, Thursday, and Saturday), osteoarthritis, benign prostatic hypertrophy (BPH), gout, and gastroesophageal reflux disease (GERD).\par \par In addition, Mr. Rick has a history of recurrent pleural effusions.  He underwent a left VATS, pleural biopsy, partial decortication of LLL, and drainage of left pleural effusion in January 2015 (at [facility]).  On December 4th, 2017 he underwent a right VATS, pleural biopsy, and evacuation of a hemothorax (at [facility]).  In May 2018, Mr. Rick underwent a right chest wound debridement and revision at [facility].  In addition, on July 25th, 2018 Mr. Rick underwent excision of a right chest wall mass (for deep abscess) at [facility].  Mr. Rick has also undergone bilateral total hip replacements (2008, 2009).\par \par On October 11th, 2018 Mr. Rick underwent a right posterolateral thoracotomy, excision of an empyema cavity, total pulmonary decortication, partial pleurectomy, partial excision of the right 7th rib, latissimus dorsi muscle flap to the chest cavity, and serratus muscle flap coverage of the chest wall defect.  This was performed at [facility].\par \par Mr. Rick appears to be doing reasonably well following his recent thoracic surgery.  He has been able to ambulate slowly and has not had any obvious evidence of decompensated heart failure.  He does remain tachycardic and his blood pressure is marginal.  For that reason, we will not yet restart his beta blocker therapy and will not initiate ACE/ARB therapy at this time.  I asked him to weigh himself on a daily basis.  Mr. Rick will continue with his current medical therapy, including midodrine on his dialysis days.  We will obtain follow up lab work.  Mr. Rick will follow up with me and will also follow up in the Heart Failure clinic.  Mr. Rick currently has his AICD interrogations performed at [facility] but will consider having them performed at [facility] going forward.

## 2018-11-13 ENCOUNTER — APPOINTMENT (OUTPATIENT)
Dept: THORACIC SURGERY | Facility: CLINIC | Age: 58
End: 2018-11-13
Payer: MEDICARE

## 2018-11-13 VITALS
WEIGHT: 124 LBS | OXYGEN SATURATION: 98 % | TEMPERATURE: 97.3 F | RESPIRATION RATE: 17 BRPM | DIASTOLIC BLOOD PRESSURE: 93 MMHG | BODY MASS INDEX: 17.79 KG/M2 | SYSTOLIC BLOOD PRESSURE: 146 MMHG | HEART RATE: 79 BPM

## 2018-11-13 PROCEDURE — 99024 POSTOP FOLLOW-UP VISIT: CPT

## 2018-11-14 LAB
FUNGUS SPEC QL CULT: SIGNIFICANT CHANGE UP

## 2018-11-22 LAB
ACID FAST STN SPEC: SIGNIFICANT CHANGE UP

## 2018-12-03 ENCOUNTER — CHART COPY (OUTPATIENT)
Age: 58
End: 2018-12-03

## 2018-12-03 PROBLEM — J90 PLEURAL EFFUSION, BILATERAL: Status: ACTIVE | Noted: 2018-09-09

## 2018-12-04 ENCOUNTER — APPOINTMENT (OUTPATIENT)
Dept: THORACIC SURGERY | Facility: CLINIC | Age: 58
End: 2018-12-04
Payer: MEDICARE

## 2018-12-04 ENCOUNTER — FORM ENCOUNTER (OUTPATIENT)
Age: 58
End: 2018-12-04

## 2018-12-04 VITALS
BODY MASS INDEX: 18.22 KG/M2 | WEIGHT: 127 LBS | SYSTOLIC BLOOD PRESSURE: 91 MMHG | HEART RATE: 104 BPM | DIASTOLIC BLOOD PRESSURE: 51 MMHG | TEMPERATURE: 97.3 F | OXYGEN SATURATION: 93 % | RESPIRATION RATE: 17 BRPM

## 2018-12-04 DIAGNOSIS — J90 PLEURAL EFFUSION, NOT ELSEWHERE CLASSIFIED: ICD-10-CM

## 2018-12-04 PROCEDURE — 99024 POSTOP FOLLOW-UP VISIT: CPT

## 2018-12-05 ENCOUNTER — OUTPATIENT (OUTPATIENT)
Dept: OUTPATIENT SERVICES | Facility: HOSPITAL | Age: 58
LOS: 1 days | End: 2018-12-05
Payer: MEDICARE

## 2018-12-05 ENCOUNTER — APPOINTMENT (OUTPATIENT)
Dept: CT IMAGING | Facility: IMAGING CENTER | Age: 58
End: 2018-12-05
Payer: MEDICARE

## 2018-12-05 DIAGNOSIS — J90 PLEURAL EFFUSION, NOT ELSEWHERE CLASSIFIED: ICD-10-CM

## 2018-12-05 DIAGNOSIS — Z86.19 PERSONAL HISTORY OF OTHER INFECTIOUS AND PARASITIC DISEASES: Chronic | ICD-10-CM

## 2018-12-05 DIAGNOSIS — Z96.643 PRESENCE OF ARTIFICIAL HIP JOINT, BILATERAL: Chronic | ICD-10-CM

## 2018-12-05 DIAGNOSIS — Z95.810 PRESENCE OF AUTOMATIC (IMPLANTABLE) CARDIAC DEFIBRILLATOR: Chronic | ICD-10-CM

## 2018-12-05 PROCEDURE — 71260 CT THORAX DX C+: CPT

## 2018-12-05 PROCEDURE — 71260 CT THORAX DX C+: CPT | Mod: 26

## 2018-12-06 ENCOUNTER — APPOINTMENT (OUTPATIENT)
Dept: INTERVENTIONAL RADIOLOGY/VASCULAR | Facility: CLINIC | Age: 58
End: 2018-12-06
Payer: MEDICARE

## 2018-12-06 VITALS
HEART RATE: 110 BPM | DIASTOLIC BLOOD PRESSURE: 57 MMHG | SYSTOLIC BLOOD PRESSURE: 91 MMHG | HEIGHT: 70 IN | BODY MASS INDEX: 17.9 KG/M2 | RESPIRATION RATE: 18 BRPM | OXYGEN SATURATION: 93 % | WEIGHT: 125 LBS

## 2018-12-06 DIAGNOSIS — I50.22 CHRONIC SYSTOLIC (CONGESTIVE) HEART FAILURE: ICD-10-CM

## 2018-12-06 PROCEDURE — 99215 OFFICE O/P EST HI 40 MIN: CPT

## 2018-12-09 ENCOUNTER — FORM ENCOUNTER (OUTPATIENT)
Age: 58
End: 2018-12-09

## 2018-12-10 ENCOUNTER — OUTPATIENT (OUTPATIENT)
Dept: OUTPATIENT SERVICES | Facility: HOSPITAL | Age: 58
LOS: 1 days | End: 2018-12-10
Payer: MEDICARE

## 2018-12-10 DIAGNOSIS — I50.22 CHRONIC SYSTOLIC (CONGESTIVE) HEART FAILURE: ICD-10-CM

## 2018-12-10 DIAGNOSIS — T88.8XXA OTHER SPECIFIED COMPLICATIONS OF SURGICAL AND MEDICAL CARE, NOT ELSEWHERE CLASSIFIED, INITIAL ENCOUNTER: ICD-10-CM

## 2018-12-10 DIAGNOSIS — R18.8 OTHER ASCITES: ICD-10-CM

## 2018-12-10 DIAGNOSIS — Z95.810 PRESENCE OF AUTOMATIC (IMPLANTABLE) CARDIAC DEFIBRILLATOR: Chronic | ICD-10-CM

## 2018-12-10 DIAGNOSIS — Z86.19 PERSONAL HISTORY OF OTHER INFECTIOUS AND PARASITIC DISEASES: Chronic | ICD-10-CM

## 2018-12-10 DIAGNOSIS — Z96.643 PRESENCE OF ARTIFICIAL HIP JOINT, BILATERAL: Chronic | ICD-10-CM

## 2018-12-10 LAB
ALBUMIN SERPL ELPH-MCNC: 3 G/DL — LOW (ref 3.3–5)
ALP SERPL-CCNC: 218 U/L — HIGH (ref 40–120)
ALT FLD-CCNC: 26 U/L — SIGNIFICANT CHANGE UP (ref 4–41)
AST SERPL-CCNC: 28 U/L — SIGNIFICANT CHANGE UP (ref 4–40)
BASOPHILS # BLD AUTO: 0.04 K/UL — SIGNIFICANT CHANGE UP (ref 0–0.2)
BASOPHILS NFR BLD AUTO: 0.6 % — SIGNIFICANT CHANGE UP (ref 0–2)
BILIRUB SERPL-MCNC: 0.3 MG/DL — SIGNIFICANT CHANGE UP (ref 0.2–1.2)
BUN SERPL-MCNC: 45 MG/DL — HIGH (ref 7–23)
CALCIUM SERPL-MCNC: 9.3 MG/DL — SIGNIFICANT CHANGE UP (ref 8.4–10.5)
CHLORIDE SERPL-SCNC: 98 MMOL/L — SIGNIFICANT CHANGE UP (ref 98–107)
CO2 SERPL-SCNC: 28 MMOL/L — SIGNIFICANT CHANGE UP (ref 22–31)
CREAT SERPL-MCNC: 5.62 MG/DL — HIGH (ref 0.5–1.3)
EOSINOPHIL # BLD AUTO: 0.14 K/UL — SIGNIFICANT CHANGE UP (ref 0–0.5)
EOSINOPHIL NFR BLD AUTO: 2 % — SIGNIFICANT CHANGE UP (ref 0–6)
GLUCOSE SERPL-MCNC: 82 MG/DL — SIGNIFICANT CHANGE UP (ref 70–99)
HCT VFR BLD CALC: 36.7 % — LOW (ref 39–50)
HGB BLD-MCNC: 11.5 G/DL — LOW (ref 13–17)
IMM GRANULOCYTES # BLD AUTO: 0.04 # — SIGNIFICANT CHANGE UP
IMM GRANULOCYTES NFR BLD AUTO: 0.6 % — SIGNIFICANT CHANGE UP (ref 0–1.5)
LYMPHOCYTES # BLD AUTO: 1.07 K/UL — SIGNIFICANT CHANGE UP (ref 1–3.3)
LYMPHOCYTES # BLD AUTO: 15.5 % — SIGNIFICANT CHANGE UP (ref 13–44)
MCHC RBC-ENTMCNC: 31.3 % — LOW (ref 32–36)
MCHC RBC-ENTMCNC: 32.1 PG — SIGNIFICANT CHANGE UP (ref 27–34)
MCV RBC AUTO: 102.5 FL — HIGH (ref 80–100)
MONOCYTES # BLD AUTO: 0.46 K/UL — SIGNIFICANT CHANGE UP (ref 0–0.9)
MONOCYTES NFR BLD AUTO: 6.6 % — SIGNIFICANT CHANGE UP (ref 2–14)
NEUTROPHILS # BLD AUTO: 5.17 K/UL — SIGNIFICANT CHANGE UP (ref 1.8–7.4)
NEUTROPHILS NFR BLD AUTO: 74.7 % — SIGNIFICANT CHANGE UP (ref 43–77)
NRBC # FLD: 0 — SIGNIFICANT CHANGE UP
PLATELET # BLD AUTO: 221 K/UL — SIGNIFICANT CHANGE UP (ref 150–400)
PMV BLD: 8.8 FL — SIGNIFICANT CHANGE UP (ref 7–13)
POTASSIUM SERPL-MCNC: 3.8 MMOL/L — SIGNIFICANT CHANGE UP (ref 3.5–5.3)
POTASSIUM SERPL-SCNC: 3.8 MMOL/L — SIGNIFICANT CHANGE UP (ref 3.5–5.3)
PROT SERPL-MCNC: 7.2 G/DL — SIGNIFICANT CHANGE UP (ref 6–8.3)
RBC # BLD: 3.58 M/UL — LOW (ref 4.2–5.8)
RBC # FLD: 17.6 % — HIGH (ref 10.3–14.5)
SODIUM SERPL-SCNC: 142 MMOL/L — SIGNIFICANT CHANGE UP (ref 135–145)
WBC # BLD: 6.92 K/UL — SIGNIFICANT CHANGE UP (ref 3.8–10.5)
WBC # FLD AUTO: 6.92 K/UL — SIGNIFICANT CHANGE UP (ref 3.8–10.5)

## 2018-12-10 PROCEDURE — 49185 SCLEROTX FLUID COLLECTION: CPT

## 2018-12-10 PROCEDURE — 49423 EXCHANGE DRAINAGE CATHETER: CPT

## 2018-12-10 PROCEDURE — 75984 XRAY CONTROL CATHETER CHANGE: CPT | Mod: 26

## 2018-12-11 ENCOUNTER — APPOINTMENT (OUTPATIENT)
Dept: THORACIC SURGERY | Facility: CLINIC | Age: 58
End: 2018-12-11

## 2018-12-11 ENCOUNTER — FORM ENCOUNTER (OUTPATIENT)
Age: 58
End: 2018-12-11

## 2018-12-12 ENCOUNTER — APPOINTMENT (OUTPATIENT)
Dept: CARDIOLOGY | Facility: CLINIC | Age: 58
End: 2018-12-12
Payer: MEDICARE

## 2018-12-12 ENCOUNTER — LABORATORY RESULT (OUTPATIENT)
Age: 58
End: 2018-12-12

## 2018-12-12 ENCOUNTER — NON-APPOINTMENT (OUTPATIENT)
Age: 58
End: 2018-12-12

## 2018-12-12 VITALS
BODY MASS INDEX: 18.18 KG/M2 | WEIGHT: 127 LBS | HEIGHT: 70 IN | DIASTOLIC BLOOD PRESSURE: 64 MMHG | OXYGEN SATURATION: 94 % | SYSTOLIC BLOOD PRESSURE: 101 MMHG | HEART RATE: 107 BPM

## 2018-12-12 DIAGNOSIS — I42.9 CARDIOMYOPATHY, UNSPECIFIED: ICD-10-CM

## 2018-12-12 PROCEDURE — 99215 OFFICE O/P EST HI 40 MIN: CPT

## 2018-12-12 PROCEDURE — 36415 COLL VENOUS BLD VENIPUNCTURE: CPT

## 2018-12-12 PROCEDURE — 75984 XRAY CONTROL CATHETER CHANGE: CPT | Mod: 26

## 2018-12-12 PROCEDURE — 93000 ELECTROCARDIOGRAM COMPLETE: CPT

## 2018-12-12 PROCEDURE — 49423 EXCHANGE DRAINAGE CATHETER: CPT

## 2018-12-12 PROCEDURE — 49185 SCLEROTX FLUID COLLECTION: CPT

## 2018-12-12 RX ORDER — PIPERACILLIN SODIUM AND TAZOBACTAM SODIUM 36; 4.5 G/180ML; G/180ML
40.5 (36-4.5) INJECTION, POWDER, LYOPHILIZED, FOR SOLUTION INTRAVENOUS
Refills: 0 | Status: DISCONTINUED | COMMUNITY
Start: 2018-10-30 | End: 2018-12-12

## 2018-12-12 RX ORDER — METFORMIN HYDROCHLORIDE 500 MG/1
500 TABLET, FILM COATED, EXTENDED RELEASE ORAL
Qty: 60 | Refills: 0 | Status: DISCONTINUED | COMMUNITY
Start: 2018-10-05 | End: 2018-12-12

## 2018-12-12 RX ORDER — METOPROLOL SUCCINATE 25 MG/1
25 TABLET, EXTENDED RELEASE ORAL
Qty: 90 | Refills: 1 | Status: ACTIVE | COMMUNITY
Start: 2018-10-30 | End: 1900-01-01

## 2018-12-14 ENCOUNTER — APPOINTMENT (OUTPATIENT)
Dept: CARDIOLOGY | Facility: CLINIC | Age: 58
End: 2018-12-14

## 2018-12-14 LAB
ALBUMIN SERPL ELPH-MCNC: 3.4 G/DL
ALP BLD-CCNC: 237 U/L
ALT SERPL-CCNC: 30 U/L
ANION GAP SERPL CALC-SCNC: 18 MMOL/L
AST SERPL-CCNC: 21 U/L
BASOPHILS # BLD AUTO: 0.02 K/UL
BASOPHILS NFR BLD AUTO: 0.3 %
BILIRUB SERPL-MCNC: 0.3 MG/DL
BUN SERPL-MCNC: 40 MG/DL
CALCIUM SERPL-MCNC: 9 MG/DL
CHLORIDE SERPL-SCNC: 94 MMOL/L
CHOLEST SERPL-MCNC: 99 MG/DL
CHOLEST/HDLC SERPL: 2.1 RATIO
CO2 SERPL-SCNC: 28 MMOL/L
CREAT SERPL-MCNC: 4.96 MG/DL
EOSINOPHIL # BLD AUTO: 0.13 K/UL
EOSINOPHIL NFR BLD AUTO: 2 %
FERRITIN SERPL-MCNC: 882 NG/ML
GLUCOSE SERPL-MCNC: 73 MG/DL
HBA1C MFR BLD HPLC: 4.8 %
HCT VFR BLD CALC: 36.8 %
HDLC SERPL-MCNC: 47 MG/DL
HGB BLD-MCNC: 11.7 G/DL
IMM GRANULOCYTES NFR BLD AUTO: 0.2 %
IRON SATN MFR SERPL: 27 %
IRON SERPL-MCNC: 47 UG/DL
LDLC SERPL CALC-MCNC: 44 MG/DL
LYMPHOCYTES # BLD AUTO: 1 K/UL
LYMPHOCYTES NFR BLD AUTO: 15.4 %
MAN DIFF?: NORMAL
MCHC RBC-ENTMCNC: 31.7 PG
MCHC RBC-ENTMCNC: 31.8 GM/DL
MCV RBC AUTO: 99.7 FL
MONOCYTES # BLD AUTO: 0.33 K/UL
MONOCYTES NFR BLD AUTO: 5.1 %
NEUTROPHILS # BLD AUTO: 5.01 K/UL
NEUTROPHILS NFR BLD AUTO: 77 %
NT-PROBNP SERPL-MCNC: ABNORMAL PG/ML
PLATELET # BLD AUTO: 257 K/UL
POTASSIUM SERPL-SCNC: 3.5 MMOL/L
PROT SERPL-MCNC: 7.4 G/DL
RBC # BLD: 3.69 M/UL
RBC # FLD: 17.8 %
SODIUM SERPL-SCNC: 140 MMOL/L
TIBC SERPL-MCNC: 172 UG/DL
TRIGL SERPL-MCNC: 39 MG/DL
TSH SERPL-ACNC: 8.59 UIU/ML
UIBC SERPL-MCNC: 125 UG/DL
WBC # FLD AUTO: 6.5 K/UL

## 2018-12-14 NOTE — ASSESSMENT
[FreeTextEntry1] : 57 y/o Mandarin speaking M w/ h/o NICM (EF 20%, LVEDD 7.3 cm, initially Dx 2014) s/p subQ [employer] ICD, HL, ESRD on HD (T/Th/Sat at [facility] [place] via LUE AVF since 2013), prior smoker, BPH, prior b/l hip replacement (12/08 on R, 2/09 on L) 2/2 steroid-induced osteoporosis, several prior thoracic surgeries at [facility] and [facility] with recent empyema s/p R thoractomy/decortication and muscle flap with Dr. Jose Alfredo Pickering and Dr. Kang Bach on 10/11/18 with prolonged hospital course with vasoplegia 2/2 E. faecalis empyema now s/p IV abx. Since discharge, reports fatigue with exertion as well as b/l leg fatigue. Appears euvolemic on exam however markedly deconditioned and some question of peripheral arterial disease as diminished pulses on left. \par \par 1. HFrEF - appears compensated, however on midodrine which is a poorly tolerated medication for HF\par - will attempt to titrate off midodrine; reduce to 5 mg three times/day with BP log; if BP >90, would then discontinue midodrine\par - ideally would like to initiate ACEi or ARB as well as MRA however will need to confer with his nephrologist first as can lead to high K; labs reviewed with K 3.5\par - BNP >70k likely d/t midodrine which is causing strain on his heart; plan as above\par - continue toprol XL 25 mg daily; will uptitrate once stable off midodrine\par - subQ ICD to be interrogated at next visit\par \par 2. SOB - likely multifactorial d/t impaired respiratory mechanics as well as heart failure\par - advised f/u with pulmonologist \par - would benefit from home PT \par \par 3. CKD/ESRD \par - goal weight 127 pounds as appears euvolemic\par \par 4. Leg fatigue - prior smoker; possible claudication\par - evaluate with LE arterial dopplers and ABIs\par - continue ASA 81 mg daily\par - will need screening for AAA given prior smoking\par \par RTC 2 months

## 2018-12-14 NOTE — HISTORY OF PRESENT ILLNESS
[FreeTextEntry1] : Briefly, 59 y/o Mandarin speaking M w/ h/o NICM (EF 20%, LVEDD 7.3 cm, initially Dx 2014) s/p subQ [employer] ICD, HL, ESRD on HD (T/Th/Sat at [facility] via LUE AVF since 2013), prior smoker, BPH, prior b/l hip replacement (12/08 on R, 2/09 on L) 2/2 steroid-induced osteoporosis, several prior thoracic surgeries at [facility] and [facility] with recent empyema s/p R thoractomy/decortication and muscle flap with Dr. Jose Alfredo Pickering and Dr. Kang Bach on 10/11/18 with prolonged hospital course with vasoplegia 2/2 E. faecalis empyema. He was admitted from 10/11-10/30. Due to pressor requirement he was transitioned to oral midodrine. He was discharged on IV abx (which were completed 11/7/18). Accompanied by wife. Used  [place] (Dr. Jose Alfredo Pickering's ) to translate. Of note, I had previously met the patient when the patient was in ICU. \par \par He was seen by Dr. Bernabe on 11/2 during which he was noted to be fatigued, tachycardic and hypotensive so medications were not uptitrated. \par \par Since completion of antibiotics, denies any fevers/chills/NS. Continues to have a drain in place which was exchanged on 12/6 by IR due to a leakage. \par \par Since discharge, patient reports feeling better but reports difficulty breathing at times. He was started on metoprolol XL 25 mg once daily late October. Denies lightheadedness/dizziness. Denies orthopnea/PND. Reports some anxiety and some pain at incision on back. Uses cane to walk and ET is 1 block limited by fatigue. Climbs 5 steps limited by weakness. \par \par Patient reports was well until 2005 when he was diagnosed with kidney issue and was given steroids for number of years. After which he developed b/l hip pain with possible necrosis of femors b/l requiring b/l hip replacements in 2008/2009. He ended up on HD since 2013. He was monitored regularly from cardiac standpoint (for possible kidney transplant evaluation) until 2014 when noted to have severe LV dysfunction (angiogram negative). He had a subQ ICD placed. Previously followed at [facility] and [facility] at times. \par \par In respect to his prior chest surgeries, he had recurrent pleural effusions and underwent L VATS, pleural biopsy, partial decortication of LLL in 1/15 at [facility]. On December 2017, he underwent R VATS, pleural biopsy, and evacuation of hemothorax at [facility]. In May 2018, he underwent a R chest wound debridement at [facility] as well. He had a recurrent chest wall cyst that was removed 7/25/18 (path c/w deep abscess formation). He had a persistent moderate R pleural effusion noted on chest CT 9/18 for which he underwent R thoractomy, excision of empyema cavity, total decortication, partial pleurectomy, partial excision of 7th rib and latissiums dorsi muscle flap on 10/11/18. \par \par Reports leg fatigue with exertion b/l and hasn't had prior evaluation for PAD. Has not been getting any physical therapy for sometime and would be interested in home PT.\par \par Hasn't had ICD checked in 3 years. \par \par Vitals checked - /64, HR 90-93% on RA.

## 2018-12-16 ENCOUNTER — FORM ENCOUNTER (OUTPATIENT)
Age: 58
End: 2018-12-16

## 2018-12-17 ENCOUNTER — OUTPATIENT (OUTPATIENT)
Dept: OUTPATIENT SERVICES | Facility: HOSPITAL | Age: 58
LOS: 1 days | End: 2018-12-17
Payer: MEDICARE

## 2018-12-17 DIAGNOSIS — Z96.643 PRESENCE OF ARTIFICIAL HIP JOINT, BILATERAL: Chronic | ICD-10-CM

## 2018-12-17 DIAGNOSIS — T88.8XXA OTHER SPECIFIED COMPLICATIONS OF SURGICAL AND MEDICAL CARE, NOT ELSEWHERE CLASSIFIED, INITIAL ENCOUNTER: ICD-10-CM

## 2018-12-17 DIAGNOSIS — Z95.810 PRESENCE OF AUTOMATIC (IMPLANTABLE) CARDIAC DEFIBRILLATOR: Chronic | ICD-10-CM

## 2018-12-17 DIAGNOSIS — Z86.19 PERSONAL HISTORY OF OTHER INFECTIOUS AND PARASITIC DISEASES: Chronic | ICD-10-CM

## 2018-12-17 PROCEDURE — 49185 SCLEROTX FLUID COLLECTION: CPT

## 2018-12-18 DIAGNOSIS — Z43.8 ENCOUNTER FOR ATTENTION TO OTHER ARTIFICIAL OPENINGS: ICD-10-CM

## 2018-12-18 DIAGNOSIS — T81.89XD OTHER COMPLICATIONS OF PROCEDURES, NOT ELSEWHERE CLASSIFIED, SUBSEQUENT ENCOUNTER: ICD-10-CM

## 2018-12-25 ENCOUNTER — FORM ENCOUNTER (OUTPATIENT)
Age: 58
End: 2018-12-25

## 2018-12-26 ENCOUNTER — OUTPATIENT (OUTPATIENT)
Dept: OUTPATIENT SERVICES | Facility: HOSPITAL | Age: 58
LOS: 1 days | End: 2018-12-26
Payer: MEDICARE

## 2018-12-26 DIAGNOSIS — Z43.8 ENCOUNTER FOR ATTENTION TO OTHER ARTIFICIAL OPENINGS: ICD-10-CM

## 2018-12-26 DIAGNOSIS — T88.8XXA OTHER SPECIFIED COMPLICATIONS OF SURGICAL AND MEDICAL CARE, NOT ELSEWHERE CLASSIFIED, INITIAL ENCOUNTER: ICD-10-CM

## 2018-12-26 DIAGNOSIS — Z96.643 PRESENCE OF ARTIFICIAL HIP JOINT, BILATERAL: Chronic | ICD-10-CM

## 2018-12-26 DIAGNOSIS — Z86.19 PERSONAL HISTORY OF OTHER INFECTIOUS AND PARASITIC DISEASES: Chronic | ICD-10-CM

## 2018-12-26 DIAGNOSIS — Z95.810 PRESENCE OF AUTOMATIC (IMPLANTABLE) CARDIAC DEFIBRILLATOR: Chronic | ICD-10-CM

## 2018-12-26 PROCEDURE — 49185 SCLEROTX FLUID COLLECTION: CPT

## 2018-12-28 ENCOUNTER — OUTPATIENT (OUTPATIENT)
Dept: OUTPATIENT SERVICES | Facility: HOSPITAL | Age: 58
LOS: 1 days | End: 2018-12-28
Payer: MEDICARE

## 2018-12-28 ENCOUNTER — APPOINTMENT (OUTPATIENT)
Dept: ULTRASOUND IMAGING | Facility: CLINIC | Age: 58
End: 2018-12-28
Payer: MEDICARE

## 2018-12-28 DIAGNOSIS — Z96.643 PRESENCE OF ARTIFICIAL HIP JOINT, BILATERAL: Chronic | ICD-10-CM

## 2018-12-28 DIAGNOSIS — Z86.19 PERSONAL HISTORY OF OTHER INFECTIOUS AND PARASITIC DISEASES: Chronic | ICD-10-CM

## 2018-12-28 DIAGNOSIS — I42.9 CARDIOMYOPATHY, UNSPECIFIED: ICD-10-CM

## 2018-12-28 DIAGNOSIS — Z95.810 PRESENCE OF AUTOMATIC (IMPLANTABLE) CARDIAC DEFIBRILLATOR: Chronic | ICD-10-CM

## 2018-12-28 PROCEDURE — 93925 LOWER EXTREMITY STUDY: CPT

## 2018-12-28 PROCEDURE — 93925 LOWER EXTREMITY STUDY: CPT | Mod: 26

## 2019-01-01 ENCOUNTER — FORM ENCOUNTER (OUTPATIENT)
Age: 59
End: 2019-01-01

## 2019-01-02 ENCOUNTER — OUTPATIENT (OUTPATIENT)
Dept: OUTPATIENT SERVICES | Facility: HOSPITAL | Age: 59
LOS: 1 days | End: 2019-01-02
Payer: MEDICARE

## 2019-01-02 ENCOUNTER — OUTPATIENT (OUTPATIENT)
Dept: OUTPATIENT SERVICES | Facility: HOSPITAL | Age: 59
LOS: 1 days | End: 2019-01-02

## 2019-01-02 DIAGNOSIS — R18.8 OTHER ASCITES: ICD-10-CM

## 2019-01-02 DIAGNOSIS — Z95.810 PRESENCE OF AUTOMATIC (IMPLANTABLE) CARDIAC DEFIBRILLATOR: Chronic | ICD-10-CM

## 2019-01-02 DIAGNOSIS — Z86.19 PERSONAL HISTORY OF OTHER INFECTIOUS AND PARASITIC DISEASES: Chronic | ICD-10-CM

## 2019-01-02 DIAGNOSIS — Z96.643 PRESENCE OF ARTIFICIAL HIP JOINT, BILATERAL: Chronic | ICD-10-CM

## 2019-01-02 PROCEDURE — 75984 XRAY CONTROL CATHETER CHANGE: CPT | Mod: 26

## 2019-01-02 PROCEDURE — 49423 EXCHANGE DRAINAGE CATHETER: CPT

## 2019-01-02 PROCEDURE — 49185 SCLEROTX FLUID COLLECTION: CPT

## 2019-01-03 DIAGNOSIS — Z43.8 ENCOUNTER FOR ATTENTION TO OTHER ARTIFICIAL OPENINGS: ICD-10-CM

## 2019-01-07 ENCOUNTER — TRANSCRIPTION ENCOUNTER (OUTPATIENT)
Age: 59
End: 2019-01-07

## 2019-01-07 ENCOUNTER — INPATIENT (INPATIENT)
Facility: HOSPITAL | Age: 59
LOS: 14 days | Discharge: HOME HEALTH | End: 2019-01-22
Attending: THORACIC SURGERY (CARDIOTHORACIC VASCULAR SURGERY) | Admitting: THORACIC SURGERY (CARDIOTHORACIC VASCULAR SURGERY)
Payer: MEDICARE

## 2019-01-07 VITALS
HEIGHT: 70.08 IN | OXYGEN SATURATION: 100 % | HEART RATE: 79 BPM | RESPIRATION RATE: 14 BRPM | SYSTOLIC BLOOD PRESSURE: 98 MMHG | TEMPERATURE: 98 F | DIASTOLIC BLOOD PRESSURE: 57 MMHG | WEIGHT: 131.84 LBS

## 2019-01-07 DIAGNOSIS — Z96.643 PRESENCE OF ARTIFICIAL HIP JOINT, BILATERAL: Chronic | ICD-10-CM

## 2019-01-07 DIAGNOSIS — Z86.19 PERSONAL HISTORY OF OTHER INFECTIOUS AND PARASITIC DISEASES: Chronic | ICD-10-CM

## 2019-01-07 DIAGNOSIS — Z98.890 OTHER SPECIFIED POSTPROCEDURAL STATES: Chronic | ICD-10-CM

## 2019-01-07 DIAGNOSIS — J91.8 PLEURAL EFFUSION IN OTHER CONDITIONS CLASSIFIED ELSEWHERE: ICD-10-CM

## 2019-01-07 DIAGNOSIS — Z95.810 PRESENCE OF AUTOMATIC (IMPLANTABLE) CARDIAC DEFIBRILLATOR: Chronic | ICD-10-CM

## 2019-01-07 LAB
ALBUMIN SERPL ELPH-MCNC: 2.7 G/DL — LOW (ref 3.3–5)
ALP SERPL-CCNC: 169 U/L — HIGH (ref 40–120)
ALT FLD-CCNC: 20 U/L — SIGNIFICANT CHANGE UP (ref 4–41)
APTT BLD: 30.5 SEC — SIGNIFICANT CHANGE UP (ref 27.5–36.3)
AST SERPL-CCNC: 19 U/L — SIGNIFICANT CHANGE UP (ref 4–40)
BASE EXCESS BLDA CALC-SCNC: -0.8 MMOL/L — SIGNIFICANT CHANGE UP
BILIRUB SERPL-MCNC: 0.3 MG/DL — SIGNIFICANT CHANGE UP (ref 0.2–1.2)
BLD GP AB SCN SERPL QL: NEGATIVE — SIGNIFICANT CHANGE UP
BUN SERPL-MCNC: 55 MG/DL — HIGH (ref 7–23)
CALCIUM SERPL-MCNC: 9.1 MG/DL — SIGNIFICANT CHANGE UP (ref 8.4–10.5)
CHLORIDE BLDA-SCNC: 100 MMOL/L — SIGNIFICANT CHANGE UP (ref 96–108)
CHLORIDE SERPL-SCNC: 98 MMOL/L — SIGNIFICANT CHANGE UP (ref 98–107)
CO2 SERPL-SCNC: 22 MMOL/L — SIGNIFICANT CHANGE UP (ref 22–31)
CREAT BLDA-MCNC: 6.76 MG/DL — HIGH (ref 0.5–1.3)
CREAT SERPL-MCNC: 6.33 MG/DL — HIGH (ref 0.5–1.3)
GLUCOSE BLDA-MCNC: 110 MG/DL — HIGH (ref 70–99)
GLUCOSE SERPL-MCNC: 105 MG/DL — HIGH (ref 70–99)
HCO3 BLDA-SCNC: 23 MMOL/L — SIGNIFICANT CHANGE UP (ref 22–26)
HCT VFR BLD CALC: 36.5 % — LOW (ref 39–50)
HCT VFR BLDA CALC: 35.5 % — LOW (ref 39–51)
HGB BLD-MCNC: 11.3 G/DL — LOW (ref 13–17)
HGB BLDA-MCNC: 11.5 G/DL — LOW (ref 13–17)
INR BLD: 1.09 — SIGNIFICANT CHANGE UP (ref 0.88–1.17)
LACTATE BLDA-SCNC: 0.8 MMOL/L — SIGNIFICANT CHANGE UP (ref 0.5–2)
MCHC RBC-ENTMCNC: 31 % — LOW (ref 32–36)
MCHC RBC-ENTMCNC: 31.5 PG — SIGNIFICANT CHANGE UP (ref 27–34)
MCV RBC AUTO: 101.7 FL — HIGH (ref 80–100)
NRBC # FLD: 0 K/UL — LOW (ref 25–125)
PCO2 BLDA: 57 MMHG — HIGH (ref 35–48)
PH BLDA: 7.27 PH — LOW (ref 7.35–7.45)
PLATELET # BLD AUTO: 131 K/UL — LOW (ref 150–400)
PMV BLD: 9.8 FL — SIGNIFICANT CHANGE UP (ref 7–13)
PO2 BLDA: 148 MMHG — HIGH (ref 83–108)
POTASSIUM BLDA-SCNC: 3.2 MMOL/L — LOW (ref 3.4–4.5)
POTASSIUM SERPL-MCNC: 3.5 MMOL/L — SIGNIFICANT CHANGE UP (ref 3.5–5.3)
POTASSIUM SERPL-SCNC: 3.5 MMOL/L — SIGNIFICANT CHANGE UP (ref 3.5–5.3)
PROT SERPL-MCNC: 6.7 G/DL — SIGNIFICANT CHANGE UP (ref 6–8.3)
PROTHROM AB SERPL-ACNC: 12.1 SEC — SIGNIFICANT CHANGE UP (ref 9.8–13.1)
RBC # BLD: 3.59 M/UL — LOW (ref 4.2–5.8)
RBC # FLD: 15.8 % — HIGH (ref 10.3–14.5)
RH IG SCN BLD-IMP: POSITIVE — SIGNIFICANT CHANGE UP
SAO2 % BLDA: 98.6 % — SIGNIFICANT CHANGE UP (ref 95–99)
SODIUM BLDA-SCNC: 133 MMOL/L — LOW (ref 136–146)
SODIUM SERPL-SCNC: 135 MMOL/L — SIGNIFICANT CHANGE UP (ref 135–145)
WBC # BLD: 4.92 K/UL — SIGNIFICANT CHANGE UP (ref 3.8–10.5)
WBC # FLD AUTO: 4.92 K/UL — SIGNIFICANT CHANGE UP (ref 3.8–10.5)

## 2019-01-07 PROCEDURE — 36245 INS CATH ABD/L-EXT ART 1ST: CPT

## 2019-01-07 PROCEDURE — 99222 1ST HOSP IP/OBS MODERATE 55: CPT

## 2019-01-07 PROCEDURE — 99292 CRITICAL CARE ADDL 30 MIN: CPT

## 2019-01-07 PROCEDURE — 71045 X-RAY EXAM CHEST 1 VIEW: CPT | Mod: 26

## 2019-01-07 PROCEDURE — 99291 CRITICAL CARE FIRST HOUR: CPT

## 2019-01-07 RX ORDER — DOCUSATE SODIUM 100 MG
100 CAPSULE ORAL DAILY
Qty: 0 | Refills: 0 | Status: DISCONTINUED | OUTPATIENT
Start: 2019-01-07 | End: 2019-01-13

## 2019-01-07 RX ORDER — PANTOPRAZOLE SODIUM 20 MG/1
40 TABLET, DELAYED RELEASE ORAL DAILY
Qty: 0 | Refills: 0 | Status: DISCONTINUED | OUTPATIENT
Start: 2019-01-07 | End: 2019-01-16

## 2019-01-07 RX ORDER — VANCOMYCIN HCL 1 G
1000 VIAL (EA) INTRAVENOUS ONCE
Qty: 0 | Refills: 0 | Status: COMPLETED | OUTPATIENT
Start: 2019-01-07 | End: 2019-01-07

## 2019-01-07 RX ORDER — CEFEPIME 1 G/1
1000 INJECTION, POWDER, FOR SOLUTION INTRAMUSCULAR; INTRAVENOUS EVERY 24 HOURS
Qty: 0 | Refills: 0 | Status: DISCONTINUED | OUTPATIENT
Start: 2019-01-07 | End: 2019-01-07

## 2019-01-07 RX ORDER — PROPOFOL 10 MG/ML
15 INJECTION, EMULSION INTRAVENOUS
Qty: 1000 | Refills: 0 | Status: DISCONTINUED | OUTPATIENT
Start: 2019-01-07 | End: 2019-01-09

## 2019-01-07 RX ORDER — SODIUM CHLORIDE 9 MG/ML
1000 INJECTION, SOLUTION INTRAVENOUS
Qty: 0 | Refills: 0 | Status: DISCONTINUED | OUTPATIENT
Start: 2019-01-07 | End: 2019-01-09

## 2019-01-07 RX ORDER — PIPERACILLIN AND TAZOBACTAM 4; .5 G/20ML; G/20ML
3.38 INJECTION, POWDER, LYOPHILIZED, FOR SOLUTION INTRAVENOUS EVERY 12 HOURS
Qty: 0 | Refills: 0 | Status: DISCONTINUED | OUTPATIENT
Start: 2019-01-07 | End: 2019-01-16

## 2019-01-07 RX ORDER — DEXMEDETOMIDINE HYDROCHLORIDE IN 0.9% SODIUM CHLORIDE 4 UG/ML
0.7 INJECTION INTRAVENOUS
Qty: 200 | Refills: 0 | Status: DISCONTINUED | OUTPATIENT
Start: 2019-01-07 | End: 2019-01-09

## 2019-01-07 RX ORDER — ALBUTEROL 90 UG/1
1 AEROSOL, METERED ORAL EVERY 4 HOURS
Qty: 0 | Refills: 0 | Status: DISCONTINUED | OUTPATIENT
Start: 2019-01-07 | End: 2019-01-22

## 2019-01-07 RX ORDER — ASPIRIN/CALCIUM CARB/MAGNESIUM 324 MG
81 TABLET ORAL DAILY
Qty: 0 | Refills: 0 | Status: DISCONTINUED | OUTPATIENT
Start: 2019-01-07 | End: 2019-01-16

## 2019-01-07 RX ORDER — HEPARIN SODIUM 5000 [USP'U]/ML
5000 INJECTION INTRAVENOUS; SUBCUTANEOUS EVERY 8 HOURS
Qty: 0 | Refills: 0 | Status: DISCONTINUED | OUTPATIENT
Start: 2019-01-07 | End: 2019-01-09

## 2019-01-07 RX ORDER — NOREPINEPHRINE BITARTRATE/D5W 8 MG/250ML
0.05 PLASTIC BAG, INJECTION (ML) INTRAVENOUS
Qty: 8 | Refills: 0 | Status: DISCONTINUED | OUTPATIENT
Start: 2019-01-07 | End: 2019-01-13

## 2019-01-07 RX ADMIN — DEXMEDETOMIDINE HYDROCHLORIDE IN 0.9% SODIUM CHLORIDE 10.46 MICROGRAM(S)/KG/HR: 4 INJECTION INTRAVENOUS at 23:11

## 2019-01-07 RX ADMIN — PROPOFOL 5.38 MICROGRAM(S)/KG/MIN: 10 INJECTION, EMULSION INTRAVENOUS at 23:10

## 2019-01-07 RX ADMIN — DEXMEDETOMIDINE HYDROCHLORIDE IN 0.9% SODIUM CHLORIDE 10.46 MICROGRAM(S)/KG/HR: 4 INJECTION INTRAVENOUS at 23:10

## 2019-01-07 RX ADMIN — SODIUM CHLORIDE 50 MILLILITER(S): 9 INJECTION, SOLUTION INTRAVENOUS at 23:12

## 2019-01-07 RX ADMIN — Medication 250 MILLIGRAM(S): at 23:10

## 2019-01-07 RX ADMIN — SODIUM CHLORIDE 50 MILLILITER(S): 9 INJECTION, SOLUTION INTRAVENOUS at 23:11

## 2019-01-07 RX ADMIN — PROPOFOL 5.38 MICROGRAM(S)/KG/MIN: 10 INJECTION, EMULSION INTRAVENOUS at 23:12

## 2019-01-07 RX ADMIN — Medication 5.61 MICROGRAM(S)/KG/MIN: at 23:10

## 2019-01-07 RX ADMIN — Medication 5.61 MICROGRAM(S)/KG/MIN: at 23:11

## 2019-01-07 RX ADMIN — HEPARIN SODIUM 5000 UNIT(S): 5000 INJECTION INTRAVENOUS; SUBCUTANEOUS at 23:12

## 2019-01-07 NOTE — H&P ADULT - PSH
H/O bilateral hip replacements  2008, 2009  History of implantable cardioverter-defibrillator (ICD) placement  pt unsure when placed  History of wound infection  right chest wall - revision 5/18 and again in 7/18  S/P thoracotomy  FB, R thoracotomy, decortication, chest wall reconstruction, lat flap

## 2019-01-07 NOTE — PROGRESS NOTE ADULT - SUBJECTIVE AND OBJECTIVE BOX
CLAUDIA HAN                     MRN-[id number]    HPI:  Pt is a 58 yr old  male who underwent a FB, R thoracotomy, decortication, chest wall reconstruction, lat flap with Dr Pickering on 10/11/18. He was admitted on 1/6/19 to [facility] with a R pleural effusion and respiratory failure,  A R PTC was placed there and he was intubated.  He still had a R SANDY drain in place.  He presented intubated and sedated on pressors. (07 Jan 2019 23:10)    Procedure: 10/11/18 Right thoracotomy, resection of portion of R 7th rib, evacuation of infected hemothorax, takedown of pleurocutaneous fistula  / R chest wall reconstruction with tunneled latissimus dorsi flap, covered by serratus anterior flap             Issues: Acute resp failure-Intubated            Hypotension- on Levophed drip  Acute on chronic systolic CHF ( EF 10 %), ICD in place  SOB  Infected right hemothorax - (+) Enterococcus faecalis in the past               Hyperglycemia              ESRD on HD    intermittent agitation/mild dementia              severe deconditioning    Home Medications:   * Patient Currently Takes Medications as of 07-Jan-2019 22:18 documented in Structured Notes  · 	midodrine 10 mg oral tablet: 1 tab(s) orally every 8 hours  · 	metoprolol succinate 25 mg oral tablet, extended release: 1 tab(s) orally once a day  · 	piperacillin-tazobactam 2 g-0.25 g intravenous injection: 1  intravenous every 12 hours until 11/7/18  · 	nortriptyline 50 mg oral capsule: 1 cap(s) orally once a day  · 	Vitamin B Complex: 1 tab(s) orally once a day  · 	Nephplex Rx oral tablet: 1 tab(s) orally once a day  · 	Vitamin C 500 mg oral tablet: 1 tab(s) orally once a day  · 	Breo Ellipta 100 mcg-25 mcg/inh inhalation powder: 1 puff(s) inhaled once a day patient denies using it  · 	Calcium 500: 1 tab(s) orally 2 times a day  · 	docusate sodium 100 mg oral tablet: 1 tab(s) orally 2 times a day, As Needed  · 	folic acid 1 mg oral tablet: 1 tab(s) orally once a day  · 	Mag-Ox 400 oral tablet: 1 tab(s) orally once a day  · 	Namenda 10 mg oral tablet: 1 tab(s) orally 2 times a day  · 	Renvela 800 mg oral tablet: 2 tab(s) orally every 8 hours  · 	simethicone 80 mg oral tablet: 1 tab(s) orally 3 times a day (after meals)  · 	vitamin A: 1 tab(s) orally once a day  · 	Multiple Vitamins oral tablet: 1 tab(s) orally once a day   · 	Tylenol 325 mg oral tablet: 2 tab(s) orally every 6 hours, As Needed  · 	aspirin 81 mg oral tablet: 1 tab(s) orally once a day   Procedure: 10/11/18 Right thoracotomy, resection of portion of R 7th rib, evacuation of infected hemothorax, takedown of pleurocutaneous fistula  / R chest wall reconstruction with tunneled latissimus dorsi flap, covered by serratus anterior flap             Issues: Acute resp failure-Intubated            Hypotension- on Levophed drip  Acute on chronic systolic CHF ( EF 10 %), ICD in place  SOB  Infected right hemothorax - (+) Enterococcus faecalis in the past               Hyperglycemia              ESRD on HD    intermittent agitation/mild dementia              severe deconditioning    Home Medications:   * Patient Currently Takes Medications as of 07-Jan-2019 22:18 documented in Structured Notes  · 	midodrine 10 mg oral tablet: 1 tab(s) orally every 8 hours  · 	metoprolol succinate 25 mg oral tablet, extended release: 1 tab(s) orally once a day  · 	piperacillin-tazobactam 2 g-0.25 g intravenous injection: 1  intravenous every 12 hours until 11/7/18  · 	nortriptyline 50 mg oral capsule: 1 cap(s) orally once a day  · 	Vitamin B Complex: 1 tab(s) orally once a day  · 	Nephplex Rx oral tablet: 1 tab(s) orally once a day  · 	Vitamin C 500 mg oral tablet: 1 tab(s) orally once a day  · 	Breo Ellipta 100 mcg-25 mcg/inh inhalation powder: 1 puff(s) inhaled once a day patient denies using it  · 	Calcium 500: 1 tab(s) orally 2 times a day  · 	docusate sodium 100 mg oral tablet: 1 tab(s) orally 2 times a day, As Needed  · 	folic acid 1 mg oral tablet: 1 tab(s) orally once a day  · 	Mag-Ox 400 oral tablet: 1 tab(s) orally once a day  · 	Namenda 10 mg oral tablet: 1 tab(s) orally 2 times a day  · 	Renvela 800 mg oral tablet: 2 tab(s) orally every 8 hours  · 	simethicone 80 mg oral tablet: 1 tab(s) orally 3 times a day (after meals)  · 	vitamin A: 1 tab(s) orally once a day  · 	Multiple Vitamins oral tablet: 1 tab(s) orally once a day   · 	Tylenol 325 mg oral tablet: 2 tab(s) orally every 6 hours, As Needed  · 	aspirin 81 mg oral tablet: 1 tab(s) orally once a day       PAST MEDICAL & SURGICAL HISTORY:  Smoking hx  Cardiomyopathy  Wound: right chest  ICD (implantable cardioverter-defibrillator) in place  OA (osteoarthritis)  HLD (hyperlipidemia)  BPH (benign prostatic hyperplasia)  Pleural effusion  HTN (hypertension)  ESRD (end stage renal disease)  H/O chest wound: right  S/P thoracotomy: FB, R thoracotomy, decortication, chest wall reconstruction, lat flap  History of wound infection: right chest wall - revision 5/18 and again in 7/18  History of implantable cardioverter-defibrillator (ICD) placement: pt unsure when placed  H/O bilateral hip replacements: 2008, 2009            VITAL SIGNS:  Vital Signs Last 24 Hrs  T(C): 36.1 (08 Jan 2019 04:00), Max: 36.8 (07 Jan 2019 20:40)  T(F): 97 (08 Jan 2019 04:00), Max: 98.3 (07 Jan 2019 20:40)  HR: 67 (08 Jan 2019 07:00) (64 - 80)  BP: 98/57 (07 Jan 2019 20:45) (98/57 - 98/57)  BP(mean): 67 (07 Jan 2019 20:45) (67 - 67)  RR: 16 (08 Jan 2019 07:00) (14 - 16)  SpO2: 99% (08 Jan 2019 07:00) (99% - 100%)    I/Os:   I&O's Detail    07 Jan 2019 07:01  -  08 Jan 2019 07:00  --------------------------------------------------------  IN:    dexmedetomidine Infusion: 115.1 mL    IV PiggyBack: 450 mL    lactated ringers.: 550 mL    norepinephrine Infusion: 71.8 mL    propofol Infusion: 168.6 mL  Total IN: 1355.5 mL    OUT:    Chest Tube: 40 mL    Indwelling Catheter - Urethral: 25 mL  Total OUT: 65 mL    Total NET: 1290.5 mL          CAPILLARY BLOOD GLUCOSE          =======================MEDICATIONS===================  MEDICATIONS  (STANDING):  ALBUTerol    90 MICROgram(s) HFA Inhaler 1 Puff(s) Inhalation every 4 hours  aspirin  chewable 81 milliGRAM(s) Oral daily  dexmedetomidine Infusion 0.7 MICROgram(s)/kG/Hr (10.465 mL/Hr) IV Continuous <Continuous>  docusate sodium Liquid 100 milliGRAM(s) Oral daily  heparin  Injectable 5000 Unit(s) SubCutaneous every 8 hours  lactated ringers. 1000 milliLiter(s) (50 mL/Hr) IV Continuous <Continuous>  norepinephrine Infusion 0.05 MICROgram(s)/kG/Min (5.606 mL/Hr) IV Continuous <Continuous>  pantoprazole  Injectable 40 milliGRAM(s) IV Push daily  piperacillin/tazobactam IVPB. 3.375 Gram(s) IV Intermittent every 12 hours  propofol Infusion 15 MICROgram(s)/kG/Min (5.382 mL/Hr) IV Continuous <Continuous>    MEDICATIONS  (PRN):      =======================VENTILATOR SETTINGS===================  Mode: AC/ CMV (Assist Control/ Continuous Mandatory Ventilation)  RR (machine): 16  TV (machine): 450  FiO2: 40  PEEP: 5  MAP: 10  PIP: 30          PHYSICAL EXAM============================  General:                        Sedated, on vent support  Neuro:                            Moving all extremities to commands.   Respiratory:	Air entry fair and  bilateral conducted sounds                                           Effort even and unlabored.  CV:		Regular rate and rhythm. Normal S1/S2                                          Distal pulses present.  Abdomen:	                     Soft, non-distended. Bowel sounds present   Skin:		No rash.  Extremities:	Warm, no cyanosis or edema.  Palpable pulses    A/P:    Pt is a 58 yr old  male who underwent a FB, R thoracotomy, decortication, chest wall reconstruction, lat flap with Dr Pickering on 10/11/18. He was admitted on 1/6/19 to [facility] with a R pleural effusion and respiratory failure,  A R PTC was placed there and he was intubated.  He still had a R SANDY drain in place.  He presented intubated and sedated on pressors. (07 Jan 2019 23:10)    Issues: Acute resp failure-Intubated            Hypotension- on Levophed drip  Acute on chronic systolic CHF ( EF 10 %), ICD in place  SOB  Infected right hemothorax - (+) Enterococcus faecalis in the past               Hyperglycemia              ESRD on HD    intermittent agitation/mild dementia              severe deconditioning    ====================== NEUROLOGY=======================  Pain control with Tylenol IV/Fentanyl  Will restart  memantine 10 milliGRAM(s) Oral two times a day and  nortriptyline 25 milliGRAM(s) Oral at bedtime, once extubated    ==================== RESPIRATORY========================  Vented sedated, Wean to extubate today  Monitor right chest tube output and SANDY drain output  Chest tube to  suction, blakes to bulb suction	  Continue bronchodilators, pulmonary toilet  Head of bed elevation to 30-40 degrees  ====================CARDIOVASCULAR=====================  Continue hemodynamic monitoring/ telemetry   Hypotension - on Levo drip, 2/2 sepsis   acute on chronic systolic heart failure: Monitor CVP    ===================== RENAL ============================  Monitor I/Os, BUN/ Cr  and electrolytess  s/p HD Dependent  Emily consult for HD today    ==================== GASTROINTESTINAL===================  NPO, IVF, KO feed in place  Continue GI prophylaxis with Protonix   Zofran / Reglan for nausea - PRN    =======================    ENDOCRIN  =====================  Glycemic monitoring  F/S with coverage  T4/TSH c/w  most likely Euthyroid sick syndrome.    ===================HEMATOLOGIC/ONCOLOGIC =============  Monitor chest tube and blakes output. No signs of active bleeding.   Follow CBC, coags   DVT prophylaxis with SCD, sc Heparin    ========================INFECTIOUS DISEASE===============   Monitor for fever / leukocytosis.  All surgical incision / chest tube  sites look clean   s/p Infected hemothorax - growing E. Faecalis in past  Started Vanco/zosyn  ID consult      Pertinent clinical, laboratory, radiographic, hemodynamic, echocardiographic, respiratory data, microbiologic data and chart were reviewed and analyzed frequently throughout the course of the day and night. GI and DVT prophylaxis, glycemic control, head of bed elevation and skin care issues were addressed.  Patient seen, examined and plan discussed with CT Surgery / CTICU team during rounds.  Pt remains critically ill in imminent risk of  deterioration and requires very careful cardio- pulmonary monitoring and support.    I have spent  75  minutes of critical care time with this pt between   7pm-12mn         minutes spent on total encounter; more than 50% of the visit was spent counseling and/or coordinating care by the attending physician.        Dave Su DO FACEP

## 2019-01-07 NOTE — PROGRESS NOTE ADULT - SUBJECTIVE AND OBJECTIVE BOX
ARTERIAL LINE INSERTION NOTE         The patient was prepped and draped in the usual sterile fashion. The R radial artery was accessed under ultrasound guidance. Pulsatile, arterial blood was visualized. Using modified Seldinger technique, a guidewire was threaded into the artery and an arterial line catheter was placed. Again the catheter demonstrated good arterial flow after removal of the guidewire. Good wave-form was obtained on the monitor.  The catheter was then sutured into place. The area was cleaned and a sterile dressing was placed. The patient tolerated the procedure well without any immediate complications.

## 2019-01-07 NOTE — H&P ADULT - NSHPSOCIALHISTORY_GEN_ALL_CORE
Social History:  · Marital Status	  · Occupation	Disabled  · Lives With	spouse     Substance Use History:  · Substance Use	caffeine  · Caffeine Type	coffee; tea  · Caffeine Amount/Frequency	1-2 cups/cans per day     Alcohol Use History:  · Have you ever consumed alcohol	never     Tobacco Usage:  · Tobacco Usage: Never smoker     Passive Smoke Exposure:  · Passive Smoke Exposure	No

## 2019-01-07 NOTE — H&P ADULT - NSHPPHYSICALEXAM_GEN_ALL_CORE
General: intubated sedated   Neurology: PARRIS  Eyes: PARRIS  ENT/Neck: ETT, no JVD  Respiratory: B/L breath sounds on vent CV: S1, S2, R Chst tube and R   Abdominal: Soft,   Extremities: No edema, + peripheral pulses  Skin: No Rashes, Hematoma, Ecchymosis

## 2019-01-07 NOTE — H&P ADULT - NSHPREVIEWOFSYSTEMS_GEN_ALL_CORE
REVIEW OF SYSTEMS  General: UTO  Skin/Breast: UTO  Ophthalmologic: UTO  ENMT: UTO  Respiratory and Thorax: UTO  Cardiovascular: UTO  Gastrointestinal: UTO	  Genitourinary: UTO	  Musculoskeletal: UTO  Neurological: UTOI	  Hematology/Lymphatics: UTO	  Endocrine:	No Hyperglycemia/ Hypoglycemia  Allergic/Immunologic:	 No Anaphylaxis/ Intolerance/ Recent illnesses

## 2019-01-07 NOTE — H&P ADULT - HISTORY OF PRESENT ILLNESS
Pt is a 58 yr old  male who underwent a FB, R thoracotomy, decortication, chest wall reconstruction, lat flap with Dr Pickering on 10/11/18. He was admitted on 1/6/19 to [facility] with a R pleural effusion and respiratory failure,  A R PTC was placed there and he was intubated.  He still had a R SANDY drain in place.  He presented intubated and sedated on pressors.

## 2019-01-08 DIAGNOSIS — N18.6 END STAGE RENAL DISEASE: ICD-10-CM

## 2019-01-08 DIAGNOSIS — I10 ESSENTIAL (PRIMARY) HYPERTENSION: ICD-10-CM

## 2019-01-08 DIAGNOSIS — D64.9 ANEMIA, UNSPECIFIED: ICD-10-CM

## 2019-01-08 LAB
ALBUMIN SERPL ELPH-MCNC: 2.5 G/DL — LOW (ref 3.3–5)
ALP SERPL-CCNC: 163 U/L — HIGH (ref 40–120)
ALT FLD-CCNC: 16 U/L — SIGNIFICANT CHANGE UP (ref 4–41)
AST SERPL-CCNC: 15 U/L — SIGNIFICANT CHANGE UP (ref 4–40)
BASE EXCESS BLDA CALC-SCNC: -1 MMOL/L — SIGNIFICANT CHANGE UP
BASE EXCESS BLDA CALC-SCNC: -1.5 MMOL/L — SIGNIFICANT CHANGE UP
BASE EXCESS BLDA CALC-SCNC: -2 MMOL/L — SIGNIFICANT CHANGE UP
BASE EXCESS BLDA CALC-SCNC: -3 MMOL/L — SIGNIFICANT CHANGE UP
BASOPHILS # BLD AUTO: 0.02 K/UL — SIGNIFICANT CHANGE UP (ref 0–0.2)
BASOPHILS NFR BLD AUTO: 0.4 % — SIGNIFICANT CHANGE UP (ref 0–2)
BILIRUB SERPL-MCNC: 0.3 MG/DL — SIGNIFICANT CHANGE UP (ref 0.2–1.2)
BUN SERPL-MCNC: 53 MG/DL — HIGH (ref 7–23)
CALCIUM SERPL-MCNC: 9.5 MG/DL — SIGNIFICANT CHANGE UP (ref 8.4–10.5)
CHLORIDE BLDA-SCNC: 102 MMOL/L — SIGNIFICANT CHANGE UP (ref 96–108)
CHLORIDE SERPL-SCNC: 93 MMOL/L — LOW (ref 98–107)
CO2 SERPL-SCNC: 20 MMOL/L — LOW (ref 22–31)
CREAT BLDA-MCNC: 7.18 MG/DL — HIGH (ref 0.5–1.3)
CREAT SERPL-MCNC: 6.47 MG/DL — HIGH (ref 0.5–1.3)
EOSINOPHIL # BLD AUTO: 0.08 K/UL — SIGNIFICANT CHANGE UP (ref 0–0.5)
EOSINOPHIL NFR BLD AUTO: 1.7 % — SIGNIFICANT CHANGE UP (ref 0–6)
GLUCOSE BLDA-MCNC: 106 MG/DL — HIGH (ref 70–99)
GLUCOSE SERPL-MCNC: 109 MG/DL — HIGH (ref 70–99)
HBV SURFACE AB SER-ACNC: REACTIVE — SIGNIFICANT CHANGE UP
HBV SURFACE AG SER-ACNC: NEGATIVE — SIGNIFICANT CHANGE UP
HCO3 BLDA-SCNC: 21 MMOL/L — LOW (ref 22–26)
HCO3 BLDA-SCNC: 22 MMOL/L — SIGNIFICANT CHANGE UP (ref 22–26)
HCO3 BLDA-SCNC: 23 MMOL/L — SIGNIFICANT CHANGE UP (ref 22–26)
HCO3 BLDA-SCNC: 23 MMOL/L — SIGNIFICANT CHANGE UP (ref 22–26)
HCT VFR BLD CALC: 35.3 % — LOW (ref 39–50)
HCT VFR BLDA CALC: 33.7 % — LOW (ref 39–51)
HCV AB S/CO SERPL IA: 0.16 S/CO — SIGNIFICANT CHANGE UP
HCV AB SERPL-IMP: SIGNIFICANT CHANGE UP
HGB BLD-MCNC: 11 G/DL — LOW (ref 13–17)
HGB BLDA-MCNC: 10.9 G/DL — LOW (ref 13–17)
IMM GRANULOCYTES NFR BLD AUTO: 0.4 % — SIGNIFICANT CHANGE UP (ref 0–1.5)
LACTATE BLDA-SCNC: 1.1 MMOL/L — SIGNIFICANT CHANGE UP (ref 0.5–2)
LYMPHOCYTES # BLD AUTO: 0.86 K/UL — LOW (ref 1–3.3)
LYMPHOCYTES # BLD AUTO: 18.8 % — SIGNIFICANT CHANGE UP (ref 13–44)
MCHC RBC-ENTMCNC: 31.2 % — LOW (ref 32–36)
MCHC RBC-ENTMCNC: 31.2 PG — SIGNIFICANT CHANGE UP (ref 27–34)
MCV RBC AUTO: 100 FL — SIGNIFICANT CHANGE UP (ref 80–100)
MONOCYTES # BLD AUTO: 0.39 K/UL — SIGNIFICANT CHANGE UP (ref 0–0.9)
MONOCYTES NFR BLD AUTO: 8.5 % — SIGNIFICANT CHANGE UP (ref 2–14)
NEUTROPHILS # BLD AUTO: 3.21 K/UL — SIGNIFICANT CHANGE UP (ref 1.8–7.4)
NEUTROPHILS NFR BLD AUTO: 70.2 % — SIGNIFICANT CHANGE UP (ref 43–77)
NRBC # FLD: 0 K/UL — LOW (ref 25–125)
PCO2 BLDA: 40 MMHG — SIGNIFICANT CHANGE UP (ref 35–48)
PCO2 BLDA: 54 MMHG — HIGH (ref 35–48)
PCO2 BLDA: 56 MMHG — HIGH (ref 35–48)
PCO2 BLDA: 61 MMHG — HIGH (ref 35–48)
PH BLDA: 7.21 PH — LOW (ref 7.35–7.45)
PH BLDA: 7.27 PH — LOW (ref 7.35–7.45)
PH BLDA: 7.28 PH — LOW (ref 7.35–7.45)
PH BLDA: 7.38 PH — SIGNIFICANT CHANGE UP (ref 7.35–7.45)
PLATELET # BLD AUTO: 125 K/UL — LOW (ref 150–400)
PMV BLD: 9.8 FL — SIGNIFICANT CHANGE UP (ref 7–13)
PO2 BLDA: 121 MMHG — HIGH (ref 83–108)
PO2 BLDA: 149 MMHG — HIGH (ref 83–108)
PO2 BLDA: 83 MMHG — SIGNIFICANT CHANGE UP (ref 83–108)
PO2 BLDA: 90 MMHG — SIGNIFICANT CHANGE UP (ref 83–108)
POTASSIUM BLDA-SCNC: 3 MMOL/L — LOW (ref 3.4–4.5)
POTASSIUM SERPL-MCNC: 3 MMOL/L — LOW (ref 3.5–5.3)
POTASSIUM SERPL-SCNC: 3 MMOL/L — LOW (ref 3.5–5.3)
PROT SERPL-MCNC: 6.4 G/DL — SIGNIFICANT CHANGE UP (ref 6–8.3)
RBC # BLD: 3.53 M/UL — LOW (ref 4.2–5.8)
RBC # FLD: 15.6 % — HIGH (ref 10.3–14.5)
SAO2 % BLDA: 94.4 % — LOW (ref 95–99)
SAO2 % BLDA: 95.7 % — SIGNIFICANT CHANGE UP (ref 95–99)
SAO2 % BLDA: 97.4 % — SIGNIFICANT CHANGE UP (ref 95–99)
SAO2 % BLDA: 99.3 % — HIGH (ref 95–99)
SODIUM BLDA-SCNC: 128 MMOL/L — LOW (ref 136–146)
SODIUM SERPL-SCNC: 128 MMOL/L — LOW (ref 135–145)
SPECIMEN SOURCE: SIGNIFICANT CHANGE UP
WBC # BLD: 4.58 K/UL — SIGNIFICANT CHANGE UP (ref 3.8–10.5)
WBC # FLD AUTO: 4.58 K/UL — SIGNIFICANT CHANGE UP (ref 3.8–10.5)

## 2019-01-08 PROCEDURE — 71250 CT THORAX DX C-: CPT | Mod: 26

## 2019-01-08 PROCEDURE — 99292 CRITICAL CARE ADDL 30 MIN: CPT | Mod: 25

## 2019-01-08 PROCEDURE — 31624 DX BRONCHOSCOPE/LAVAGE: CPT | Mod: 78

## 2019-01-08 PROCEDURE — 99223 1ST HOSP IP/OBS HIGH 75: CPT | Mod: GC

## 2019-01-08 PROCEDURE — 31645 BRNCHSC W/THER ASPIR 1ST: CPT | Mod: 78

## 2019-01-08 PROCEDURE — 71045 X-RAY EXAM CHEST 1 VIEW: CPT | Mod: 26,76

## 2019-01-08 PROCEDURE — 99292 CRITICAL CARE ADDL 30 MIN: CPT

## 2019-01-08 PROCEDURE — 99291 CRITICAL CARE FIRST HOUR: CPT | Mod: 25

## 2019-01-08 PROCEDURE — 31635 BRONCHOSCOPY W/FB REMOVAL: CPT | Mod: 78

## 2019-01-08 RX ORDER — POTASSIUM CHLORIDE 20 MEQ
20 PACKET (EA) ORAL ONCE
Qty: 0 | Refills: 0 | Status: COMPLETED | OUTPATIENT
Start: 2019-01-08 | End: 2019-01-08

## 2019-01-08 RX ADMIN — PROPOFOL 5.38 MICROGRAM(S)/KG/MIN: 10 INJECTION, EMULSION INTRAVENOUS at 23:00

## 2019-01-08 RX ADMIN — PIPERACILLIN AND TAZOBACTAM 25 GRAM(S): 4; .5 INJECTION, POWDER, LYOPHILIZED, FOR SOLUTION INTRAVENOUS at 21:23

## 2019-01-08 RX ADMIN — Medication 5.61 MICROGRAM(S)/KG/MIN: at 20:00

## 2019-01-08 RX ADMIN — HEPARIN SODIUM 5000 UNIT(S): 5000 INJECTION INTRAVENOUS; SUBCUTANEOUS at 06:38

## 2019-01-08 RX ADMIN — Medication 100 MILLIEQUIVALENT(S): at 06:39

## 2019-01-08 RX ADMIN — PROPOFOL 5.38 MICROGRAM(S)/KG/MIN: 10 INJECTION, EMULSION INTRAVENOUS at 19:25

## 2019-01-08 RX ADMIN — SODIUM CHLORIDE 50 MILLILITER(S): 9 INJECTION, SOLUTION INTRAVENOUS at 19:25

## 2019-01-08 RX ADMIN — DEXMEDETOMIDINE HYDROCHLORIDE IN 0.9% SODIUM CHLORIDE 10.46 MICROGRAM(S)/KG/HR: 4 INJECTION INTRAVENOUS at 23:00

## 2019-01-08 RX ADMIN — Medication 5.61 MICROGRAM(S)/KG/MIN: at 19:25

## 2019-01-08 RX ADMIN — Medication 100 MILLIGRAM(S): at 16:29

## 2019-01-08 RX ADMIN — PIPERACILLIN AND TAZOBACTAM 25 GRAM(S): 4; .5 INJECTION, POWDER, LYOPHILIZED, FOR SOLUTION INTRAVENOUS at 00:04

## 2019-01-08 RX ADMIN — PIPERACILLIN AND TAZOBACTAM 25 GRAM(S): 4; .5 INJECTION, POWDER, LYOPHILIZED, FOR SOLUTION INTRAVENOUS at 16:28

## 2019-01-08 RX ADMIN — HEPARIN SODIUM 5000 UNIT(S): 5000 INJECTION INTRAVENOUS; SUBCUTANEOUS at 21:23

## 2019-01-08 RX ADMIN — Medication 81 MILLIGRAM(S): at 16:30

## 2019-01-08 RX ADMIN — HEPARIN SODIUM 5000 UNIT(S): 5000 INJECTION INTRAVENOUS; SUBCUTANEOUS at 16:29

## 2019-01-08 RX ADMIN — PANTOPRAZOLE SODIUM 40 MILLIGRAM(S): 20 TABLET, DELAYED RELEASE ORAL at 16:30

## 2019-01-08 RX ADMIN — DEXMEDETOMIDINE HYDROCHLORIDE IN 0.9% SODIUM CHLORIDE 10.46 MICROGRAM(S)/KG/HR: 4 INJECTION INTRAVENOUS at 19:25

## 2019-01-08 NOTE — PROGRESS NOTE ADULT - ASSESSMENT
Pt is a 58 yr old Mandarin speaking male who underwent right thoractomy decortiation, Right Chest Wall reconstruction with Latissimjus Dorsi Flap Poss Skin Graft with VAC dressing with Dr Pickering 10/11/18.  He has h/o ESRD with HD, CHF, EF of 10%,  ICD,  with h/o chronic bilateral pleural effusions.  The patient previously underwent left VATS and PleurX drains  in 2015.  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     During his last admission at [facility], pt underwent OR with Plastic and Thoracic surgery,  for definitive repair that involved thoracotomy, decortication, excision of empyema cavity along with muscle flap reconstruction. Pt underwent surgery on 10/11 (Right thoracotomy, resection of portion of R 7th rib, evacuation of infected hemothorax, takedown of pleurocutaneous fistula - and noted to have foul smelling hematoma).  OR cultures grew Enterococcus faecalis.  Pt was treated with zosyn for 4 week course for infected hemothorax which was completed on 11/7.      He was admitted on 1/6/19 to [facility] with a R pleural effusion and respiratory failure,  A R PTC was placed there and he was intubated.  He still had a R SANDY drain in place.  He presented intubated and sedated on pressors. (07 Jan 2019 23:10) Cultures from pleural fluid at [facility] reportedly grew MRSA.  Pt is currently on vanco/zosyn.      ID consult called for further abx management.      Problem/Plan - 1:    ·	Septic shock    - Pt with R pleural effusion, s/p SANDY drain/chest tube, (+) MRSA reported from pleural fluid cultures at [facility].   f/u all culture data including blood cultures from [facility] and AAMIR levels.      - Thus far bcx here ngtd.  Agree with vanco/zosyn.  Repeat vanco random level, and redose 1gm if <15.  Cont to dose vanco by level.        Will follow,    Anabel Chahal  [phone]

## 2019-01-08 NOTE — CONSULT NOTE ADULT - PROBLEM SELECTOR RECOMMENDATION 9
Pt. with ESRD on HD TIW (MWF). Last HD as outpatient was on 1/5/19 via [facility]. Pt. currently on pressors, possible extubation today. Serum potassium WNL today. Will arrange for maintenance HD today. Monitor labs.

## 2019-01-08 NOTE — PROGRESS NOTE ADULT - SUBJECTIVE AND OBJECTIVE BOX
CLAUDIA HAN   MRN#: [id number]     The patient is a 58y Male who was seen, evaluated, & examined with the CTICU staff on rounds with a multidisciplinary care plan formulated & implemented.  All available clinical, laboratory, radiographic, pharmacologic, and electrocardiographic data were reviewed & analyzed.      The patient was in the CTICU in critical condition secondary to:     acute hypoxic respiratory failure-persistent cardiopulmonary dysfunction  septic shock    For support and evaluation & prevention of further decompensation secondary to persistent cardiopulmonary dysfunction and cardiogenic shock-cardiovascular dysfunction, respiratory failure required:     supplemental oxygen with full ventilatory support  continuous pulse oximetry monitoring  following ABGs with A-line monitoring  IV Dexmedetomidine infusion  IV Propofol infusion    Invasive hemodynamic monitoring with an A-line was required for  continuous MAP/BP monitoring to ensure adequate cardiovascular support and to evaluate for & help prevent decompensation while receiving IV Levophed infusion    secondary to     septic shock-cardiovascular dysfunction    In addition:  CT scan revealed likely foreign body in R mainstem bronchus which we removed with bronchoscopy  Will attempt to CPAP and extubate  Still on Levophed which hopefully can be turned off once patient is extubated and not sedated  Afebrile with no leukocytosis - infection is likely resolving on Vancomycin and Zosyn; ID is following, will check Vancomycin level  Maintenance HD done today with 400 cc fluid removed    The patient required critical care management and I provided 60 minutes of non-continuous care to the patient in addition to discussing the patient and plan at length with the CTICU staff and helping coordinate care.

## 2019-01-08 NOTE — PROGRESS NOTE ADULT - SUBJECTIVE AND OBJECTIVE BOX
Patient is a 58y old  Male who presents with a chief complaint of Septic Shock, Respiratory Failure (08 Jan 2019 21:09)      HPI:    Pt is a 58 yr old Mandarin speaking male who underwent right thoractomy decortiation, Right Chest Wall reconstruction with Latissimjus Dorsi Flap Poss Skin Graft with VAC dressing with Dr Pickering 10/11/18.  He has h/o ESRD with HD, CHF, EF of 10%,  ICD,  with h/o chronic bilateral pleural effusions.  The patient previously underwent left VATS and PleurX drains  in 2015.  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     During his last admission at [facility], pt underwent OR with Plastic and Thoracic surgery,  for definitive repair that involved thoracotomy, decortication, excision of empyema cavity along with muscle flap reconstruction. Pt underwent surgery on 10/11 (Right thoracotomy, resection of portion of R 7th rib, evacuation of infected hemothorax, takedown of pleurocutaneous fistula - and noted to have foul smelling hematoma).  OR cultures grew Enterococcus faecalis.  Pt was treated with zosyn for 4 week course for infected hemothorax.      He was admitted on 1/6/19 to [facility] with a R pleural effusion and respiratory failure,  A R PTC was placed there and he was intubated.  He still had a R SANDY drain in place.  He presented intubated and sedated on pressors. (07 Jan 2019 23:10) Cultures from pleural fluid at [facility] reportedly grew MRSA.  Pt is currently on vanco/zosyn.      ID consult called for further abx management.        REVIEW OF SYSTEMS:    Pt intubated      PAST MEDICAL & SURGICAL HISTORY:  Smoking hx  Cardiomyopathy  Wound: right chest  ICD (implantable cardioverter-defibrillator) in place  OA (osteoarthritis)  HLD (hyperlipidemia)  BPH (benign prostatic hyperplasia)  Pleural effusion  HTN (hypertension)  ESRD (end stage renal disease)  H/O chest wound: right  S/P thoracotomy: FB, R thoracotomy, decortication, chest wall reconstruction, lat flap  History of wound infection: right chest wall - revision 5/18 and again in 7/18  History of implantable cardioverter-defibrillator (ICD) placement: pt unsure when placed  H/O bilateral hip replacements: 2008, 2009      Allergies    No Known Allergies    Intolerances        FAMILY HISTORY:  No sig fam hx    SOCIAL HISTORY:  No smoking, ivdu, etoh      MEDICATIONS  (STANDING):  ALBUTerol    90 MICROgram(s) HFA Inhaler 1 Puff(s) Inhalation every 4 hours  aspirin  chewable 81 milliGRAM(s) Oral daily  dexmedetomidine Infusion 0.7 MICROgram(s)/kG/Hr (10.465 mL/Hr) IV Continuous <Continuous>  docusate sodium Liquid 100 milliGRAM(s) Oral daily  heparin  Injectable 5000 Unit(s) SubCutaneous every 8 hours  lactated ringers. 1000 milliLiter(s) (50 mL/Hr) IV Continuous <Continuous>  norepinephrine Infusion 0.05 MICROgram(s)/kG/Min (5.606 mL/Hr) IV Continuous <Continuous>  pantoprazole  Injectable 40 milliGRAM(s) IV Push daily  piperacillin/tazobactam IVPB. 3.375 Gram(s) IV Intermittent every 12 hours  propofol Infusion 15 MICROgram(s)/kG/Min (5.382 mL/Hr) IV Continuous <Continuous>    MEDICATIONS  (PRN):      Vital Signs Last 24 Hrs  T(C): 37.4 (08 Jan 2019 20:00), Max: 37.4 (08 Jan 2019 20:00)  T(F): 99.3 (08 Jan 2019 20:00), Max: 99.3 (08 Jan 2019 20:00)  HR: 90 (08 Jan 2019 23:24) (64 - 104)  BP: 95/57 (08 Jan 2019 11:00) (90/58 - 95/65)  BP(mean): 66 (08 Jan 2019 11:00) (66 - 71)  RR: 26 (08 Jan 2019 23:24) (14 - 26)  SpO2: 99% (08 Jan 2019 23:24) (96% - 100%)    PHYSICAL EXAM:    GENERAL: Pt intubated  HEAD:  Atraumatic, Normocephalic  EYES: EOMI, PERRLA, conjunctiva and sclera clear  ENMT: No tonsillar erythema, exudates, or enlargement; Moist mucous membranes  NECK: Supple, No JVD  CHEST/LUNG: decr BS at rt base.  chest tube and SANDY drains in place with serosanguinous outpt  HEART: Regular rate and rhythm; No murmurs, rubs, or gallops  ABDOMEN: Soft, Nontender, Nondistended; Bowel sounds present  EXTREMITIES:  2+ Peripheral Pulses, No clubbing, cyanosis, or edema  LYMPH: No lymphadenopathy noted  SKIN: No rashes or lesions. СЕРГЕЙ ARELLANO    LABS:  CBC Full  -  ( 08 Jan 2019 02:45 )  WBC Count : 4.58 K/uL  Hemoglobin : 11.0 g/dL  Hematocrit : 35.3 %  Platelet Count - Automated : 125 K/uL  Mean Cell Volume : 100.0 fL  Mean Cell Hemoglobin : 31.2 pg  Mean Cell Hemoglobin Concentration : 31.2 %  Auto Neutrophil # : 3.21 K/uL  Auto Lymphocyte # : 0.86 K/uL  Auto Monocyte # : 0.39 K/uL  Auto Eosinophil # : 0.08 K/uL  Auto Basophil # : 0.02 K/uL  Auto Neutrophil % : 70.2 %  Auto Lymphocyte % : 18.8 %  Auto Monocyte % : 8.5 %  Auto Eosinophil % : 1.7 %  Auto Basophil % : 0.4 %      01-08    128<L>  |  93<L>  |  53<H>  ----------------------------<  109<H>  3.0<L>   |  20<L>  |  6.47<H>    Ca    9.5      08 Jan 2019 02:45    TPro  6.4  /  Alb  2.5<L>  /  TBili  0.3  /  DBili  x   /  AST  15  /  ALT  16  /  AlkPhos  163<H>  01-08      LIVER FUNCTIONS - ( 08 Jan 2019 02:45 )  Alb: 2.5 g/dL / Pro: 6.4 g/dL / ALK PHOS: 163 u/L / ALT: 16 u/L / AST: 15 u/L / GGT: x                   MICROBIOLOGY:    Culture - Blood (01.07.19 @ 21:52)    Culture - Blood:   NO ORGANISMS ISOLATED  NO ORGANISMS ISOLATED AT 24 HOURS    Specimen Source: BLOOD ARTERIAL                    RADIOLOGY:      < from: CT Chest No Cont (01.08.19 @ 13:13) >    IMPRESSION: Right pleural catheter as described above within a small   right pleural effusion containing internal bubbles of air.    Bibasilar areas of atelectasis with internal progression at the right   lung base since December 5, 2018. Opacification of the right lung central   airways likely related to secretions some of which maybe due to blood   clot or hemorrhagic debris given increased density as described above.    Small amount of ascites with interval increase in size since the prior   study.    < end of copied text >

## 2019-01-08 NOTE — PROGRESS NOTE ADULT - SUBJECTIVE AND OBJECTIVE BOX
CLAUDIA HAN                     MRN-[id number]    HPI:  Pt is a 58 yr old  male who underwent a FB, R thoracotomy, decortication, chest wall reconstruction, lat flap with Dr Pickering on 10/11/18. He was admitted on 1/6/19 to [facility] with a R pleural effusion and respiratory failure,  A R PTC was placed there and he was intubated.  He still had a R SANDY drain in place.  He presented intubated and sedated on pressors. 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         Procedure: 10/11/18 Right thoracotomy, resection of portion of R 7th rib, evacuation of infected hemothorax, takedown of pleurocutaneous fistula  / R chest wall reconstruction with tunneled latissimus dorsi flap, covered by serratus anterior flap             Issues: Acute resp failure-Intubated            Hypotension- on Levophed drip  Acute on chronic systolic CHF ( EF 10 %), ICD in place  SOB  Infected right hemothorax - (+) Enterococcus faecalis in the past               Hyperglycemia              ESRD on HD    intermittent agitation/mild dementia              severe deconditioning    Home Medications:   * Patient Currently Takes Medications as of 07-Jan-2019 22:18 documented in Structured Notes  · 	midodrine 10 mg oral tablet: 1 tab(s) orally every 8 hours  · 	metoprolol succinate 25 mg oral tablet, extended release: 1 tab(s) orally once a day  · 	piperacillin-tazobactam 2 g-0.25 g intravenous injection: 1  intravenous every 12 hours until 11/7/18  · 	nortriptyline 50 mg oral capsule: 1 cap(s) orally once a day  · 	Vitamin B Complex: 1 tab(s) orally once a day  · 	Nephplex Rx oral tablet: 1 tab(s) orally once a day  · 	Vitamin C 500 mg oral tablet: 1 tab(s) orally once a day  · 	Breo Ellipta 100 mcg-25 mcg/inh inhalation powder: 1 puff(s) inhaled once a day patient denies using it  · 	Calcium 500: 1 tab(s) orally 2 times a day  · 	docusate sodium 100 mg oral tablet: 1 tab(s) orally 2 times a day, As Needed  · 	folic acid 1 mg oral tablet: 1 tab(s) orally once a day  · 	Mag-Ox 400 oral tablet: 1 tab(s) orally once a day  · 	Namenda 10 mg oral tablet: 1 tab(s) orally 2 times a day  · 	Renvela 800 mg oral tablet: 2 tab(s) orally every 8 hours  · 	simethicone 80 mg oral tablet: 1 tab(s) orally 3 times a day (after meals)  · 	vitamin A: 1 tab(s) orally once a day  · 	Multiple Vitamins oral tablet: 1 tab(s) orally once a day   · 	Tylenol 325 mg oral tablet: 2 tab(s) orally every 6 hours, As Needed  · 	aspirin 81 mg oral tablet: 1 tab(s) orally once a day       PAST MEDICAL & SURGICAL HISTORY:  Smoking hx  Cardiomyopathy  Wound: right chest  ICD (implantable cardioverter-defibrillator) in place  OA (osteoarthritis)  HLD (hyperlipidemia)  BPH (benign prostatic hyperplasia)  Pleural effusion  HTN (hypertension)  ESRD (end stage renal disease)  H/O chest wound: right  S/P thoracotomy: FB, R thoracotomy, decortication, chest wall reconstruction, lat flap  History of wound infection: right chest wall - revision 5/18 and again in 7/18  History of implantable cardioverter-defibrillator (ICD) placement: pt unsure when placed  H/O bilateral hip replacements: 2008, 2009            VITAL SIGNS:  Vital Signs Last 24 Hrs  T(C): 36.1 (08 Jan 2019 04:00), Max: 36.8 (07 Jan 2019 20:40)  T(F): 97 (08 Jan 2019 04:00), Max: 98.3 (07 Jan 2019 20:40)  HR: 67 (08 Jan 2019 06:46) (64 - 80)  BP: 98/57 (07 Jan 2019 20:45) (98/57 - 98/57)  BP(mean): 67 (07 Jan 2019 20:45) (67 - 67)  RR: 16 (08 Jan 2019 06:00) (14 - 16)  SpO2: 99% (08 Jan 2019 06:46) (99% - 100%)    I/Os:   I&O's Detail    07 Jan 2019 07:01  -  08 Jan 2019 06:58  --------------------------------------------------------  IN:    dexmedetomidine Infusion: 104.7 mL    IV PiggyBack: 350 mL    lactated ringers.: 450 mL    norepinephrine Infusion: 58.3 mL    propofol Infusion: 150.7 mL  Total IN: 1113.7 mL    OUT:    Chest Tube: 40 mL    Indwelling Catheter - Urethral: 25 mL  Total OUT: 65 mL    Total NET: 1048.7 mL          CAPILLARY BLOOD GLUCOSE          =======================MEDICATIONS===================  MEDICATIONS  (STANDING):  ALBUTerol    90 MICROgram(s) HFA Inhaler 1 Puff(s) Inhalation every 4 hours  aspirin  chewable 81 milliGRAM(s) Oral daily  dexmedetomidine Infusion 0.7 MICROgram(s)/kG/Hr (10.465 mL/Hr) IV Continuous <Continuous>  docusate sodium Liquid 100 milliGRAM(s) Oral daily  heparin  Injectable 5000 Unit(s) SubCutaneous every 8 hours  lactated ringers. 1000 milliLiter(s) (50 mL/Hr) IV Continuous <Continuous>  norepinephrine Infusion 0.05 MICROgram(s)/kG/Min (5.606 mL/Hr) IV Continuous <Continuous>  pantoprazole  Injectable 40 milliGRAM(s) IV Push daily  piperacillin/tazobactam IVPB. 3.375 Gram(s) IV Intermittent every 12 hours  propofol Infusion 15 MICROgram(s)/kG/Min (5.382 mL/Hr) IV Continuous <Continuous>    MEDICATIONS  (PRN):      =======================VENTILATOR SETTINGS===================  Mode: AC/ CMV (Assist Control/ Continuous Mandatory Ventilation)  RR (machine): 16  TV (machine): 450  FiO2: 40  PEEP: 5  MAP: 10  PIP: 30      PHYSICAL EXAM============================  General:                        Sedated, on vent support  Neuro:                            Moving all extremities to commands.   Respiratory:	Air entry fair and  bilateral conducted sounds                                           Effort even and unlabored.  CV:		Regular rate and rhythm. Normal S1/S2                                          Distal pulses present.  Abdomen:	                     Soft, non-distended. Bowel sounds present   Skin:		No rash.  Extremities:	Warm, no cyanosis or edema.  Palpable pulses    ============================LABS=========================                        11.0   4.58  )-----------( 125      ( 08 Jan 2019 02:45 )             35.3     01-08    128<L>  |  93<L>  |  53<H>  ----------------------------<  109<H>  3.0<L>   |  20<L>  |  6.47<H>    Ca    9.5      08 Jan 2019 02:45    TPro  6.4  /  Alb  2.5<L>  /  TBili  0.3  /  DBili  x   /  AST  15  /  ALT  16  /  AlkPhos  163<H>  01-08    LIVER FUNCTIONS - ( 08 Jan 2019 02:45 )  Alb: 2.5 g/dL / Pro: 6.4 g/dL / ALK PHOS: 163 u/L / ALT: 16 u/L / AST: 15 u/L / GGT: x           PT/INR - ( 07 Jan 2019 21:25 )   PT: 12.1 SEC;   INR: 1.09          PTT - ( 07 Jan 2019 21:25 )  PTT:30.5 SEC  ABG - ( 08 Jan 2019 02:45 )  pH, Arterial: 7.38  pH, Blood: x     /  pCO2: 40    /  pO2: 149   / HCO3: 23    / Base Excess: -1.5  /  SaO2: 99.3                  ============================IMAGING STUDIES=========================    < from: Xray Chest 1 View- PORTABLE-Urgent (01.07.19 @ 21:54) >    INTERPRETATION:  ETT tip is above the rupert and below the thoracic inlet  Enteric tube courses below the diaphragm    Follow up official report.    A/P:    Pt is a 58 yr old  male who underwent a FB, R thoracotomy, decortication, chest wall reconstruction, lat flap with Dr Pickering on 10/11/18. He was admitted on 1/6/19 to [facility] with a R pleural effusion and respiratory failure,  A R PTC was placed there and he was intubated.  He still had a R SANDY drain in place.  He presented intubated and sedated on pressors. (07 Jan 2019 23:10)    Issues: Acute resp failure-Intubated            Hypotension- on Levophed drip  Acute on chronic systolic CHF ( EF 10 %), ICD in place  SOB  Infected right hemothorax - (+) Enterococcus faecalis in the past               Hyperglycemia              ESRD on HD    intermittent agitation/mild dementia              severe deconditioning    ====================== NEUROLOGY=======================  Pain control with Tylenol IV/Fentanyl  Will restart  memantine 10 milliGRAM(s) Oral two times a day and  nortriptyline 25 milliGRAM(s) Oral at bedtime, once extubated    ==================== RESPIRATORY========================  Vented sedated, Wean to extubate today  Monitor right chest tube output and SANDY drain output  Chest tube to  suction, blakes to bulb suction	  Continue bronchodilators, pulmonary toilet  Head of bed elevation to 30-40 degrees  ====================CARDIOVASCULAR=====================  Continue hemodynamic monitoring/ telemetry   Hypotension - on Levo drip, 2/2 sepsis   acute on chronic systolic heart failure: Monitor CVP    ===================== RENAL ============================  Monitor I/Os, BUN/ Cr  and electrolytess  s/p HD Dependent  Emily consult for HD today    ==================== GASTROINTESTINAL===================  NPO, IVF, KO feed in place  Continue GI prophylaxis with Protonix   Zofran / Reglan for nausea - PRN    =======================    ENDOCRIN  =====================  Glycemic monitoring  F/S with coverage  T4/TSH c/w  most likely Euthyroid sick syndrome.    ===================HEMATOLOGIC/ONCOLOGIC =============  Monitor chest tube and blakes output. No signs of active bleeding.   Follow CBC, coags   DVT prophylaxis with SCD, sc Heparin    ========================INFECTIOUS DISEASE===============   Monitor for fever / leukocytosis.  All surgical incision / chest tube  sites look clean   s/p Infected hemothorax - growing E. Faecalis in past  Started Vanco/zosyn  ID consult      Pertinent clinical, laboratory, radiographic, hemodynamic, echocardiographic, respiratory data, microbiologic data and chart were reviewed and analyzed frequently throughout the course of the day and night. GI and DVT prophylaxis, glycemic control, head of bed elevation and skin care issues were addressed.  Patient seen, examined and plan discussed with CT Surgery / CTICU team during rounds.  Pt remains critically ill in imminent risk of  deterioration and requires very careful cardio- pulmonary monitoring and support.    I have spent  80  minutes of critical care time with this pt between    12  am   and     9  am         minutes spent on total encounter; more than 50% of the visit was spent counseling and/or coordinating care by the attending physician.        Dave MILLANP

## 2019-01-09 LAB
ALBUMIN SERPL ELPH-MCNC: 2.5 G/DL — LOW (ref 3.3–5)
ALP SERPL-CCNC: 139 U/L — HIGH (ref 40–120)
ALT FLD-CCNC: 29 U/L — SIGNIFICANT CHANGE UP (ref 4–41)
ANION GAP SERPL CALC-SCNC: 16 MEQ/L — HIGH (ref 7–14)
ANION GAP SERPL CALC-SCNC: 19 MEQ/L — HIGH (ref 7–14)
AST SERPL-CCNC: 44 U/L — HIGH (ref 4–40)
BASE EXCESS BLDA CALC-SCNC: -0.3 MMOL/L — SIGNIFICANT CHANGE UP
BASE EXCESS BLDA CALC-SCNC: -1.1 MMOL/L — SIGNIFICANT CHANGE UP
BASE EXCESS BLDA CALC-SCNC: -1.6 MMOL/L — SIGNIFICANT CHANGE UP
BASE EXCESS BLDA CALC-SCNC: -2.5 MMOL/L — SIGNIFICANT CHANGE UP
BASE EXCESS BLDA CALC-SCNC: -3.6 MMOL/L — SIGNIFICANT CHANGE UP
BASE EXCESS BLDA CALC-SCNC: 0 MMOL/L — SIGNIFICANT CHANGE UP
BASE EXCESS BLDA CALC-SCNC: 0.5 MMOL/L — SIGNIFICANT CHANGE UP
BASE EXCESS BLDA CALC-SCNC: 2.4 MMOL/L — SIGNIFICANT CHANGE UP
BILIRUB SERPL-MCNC: 0.4 MG/DL — SIGNIFICANT CHANGE UP (ref 0.2–1.2)
BUN SERPL-MCNC: 30 MG/DL — HIGH (ref 7–23)
BUN SERPL-MCNC: 36 MG/DL — HIGH (ref 7–23)
CA-I BLDA-SCNC: 1.17 MMOL/L — SIGNIFICANT CHANGE UP (ref 1.15–1.29)
CA-I BLDA-SCNC: 1.22 MMOL/L — SIGNIFICANT CHANGE UP (ref 1.15–1.29)
CA-I BLDA-SCNC: 1.27 MMOL/L — SIGNIFICANT CHANGE UP (ref 1.15–1.29)
CA-I BLDA-SCNC: 1.29 MMOL/L — SIGNIFICANT CHANGE UP (ref 1.15–1.29)
CALCIUM SERPL-MCNC: 8.7 MG/DL — SIGNIFICANT CHANGE UP (ref 8.4–10.5)
CALCIUM SERPL-MCNC: 9.4 MG/DL — SIGNIFICANT CHANGE UP (ref 8.4–10.5)
CHLORIDE SERPL-SCNC: 100 MMOL/L — SIGNIFICANT CHANGE UP (ref 98–107)
CHLORIDE SERPL-SCNC: 97 MMOL/L — LOW (ref 98–107)
CO2 SERPL-SCNC: 20 MMOL/L — LOW (ref 22–31)
CO2 SERPL-SCNC: 22 MMOL/L — SIGNIFICANT CHANGE UP (ref 22–31)
CREAT SERPL-MCNC: 4.45 MG/DL — HIGH (ref 0.5–1.3)
CREAT SERPL-MCNC: 5.23 MG/DL — HIGH (ref 0.5–1.3)
GLUCOSE BLDA-MCNC: 108 MG/DL — HIGH (ref 70–99)
GLUCOSE BLDA-MCNC: 77 MG/DL — SIGNIFICANT CHANGE UP (ref 70–99)
GLUCOSE BLDA-MCNC: 78 MG/DL — SIGNIFICANT CHANGE UP (ref 70–99)
GLUCOSE BLDA-MCNC: 89 MG/DL — SIGNIFICANT CHANGE UP (ref 70–99)
GLUCOSE SERPL-MCNC: 107 MG/DL — HIGH (ref 70–99)
GLUCOSE SERPL-MCNC: 83 MG/DL — SIGNIFICANT CHANGE UP (ref 70–99)
GRAM STN FLD: SIGNIFICANT CHANGE UP
GRAM STN SPT: SIGNIFICANT CHANGE UP
GRAM STN SPT: SIGNIFICANT CHANGE UP
HCO3 BLDA-SCNC: 20 MMOL/L — LOW (ref 22–26)
HCO3 BLDA-SCNC: 21 MMOL/L — LOW (ref 22–26)
HCO3 BLDA-SCNC: 23 MMOL/L — SIGNIFICANT CHANGE UP (ref 22–26)
HCO3 BLDA-SCNC: 24 MMOL/L — SIGNIFICANT CHANGE UP (ref 22–26)
HCO3 BLDA-SCNC: 24 MMOL/L — SIGNIFICANT CHANGE UP (ref 22–26)
HCO3 BLDA-SCNC: 26 MMOL/L — SIGNIFICANT CHANGE UP (ref 22–26)
HCT VFR BLD CALC: 37.7 % — LOW (ref 39–50)
HCT VFR BLD CALC: 40.4 % — SIGNIFICANT CHANGE UP (ref 39–50)
HCT VFR BLDA CALC: 33.7 % — LOW (ref 39–51)
HCT VFR BLDA CALC: 34.4 % — LOW (ref 39–51)
HCT VFR BLDA CALC: 36 % — LOW (ref 39–51)
HCT VFR BLDA CALC: 37.2 % — LOW (ref 39–51)
HGB BLD-MCNC: 11.5 G/DL — LOW (ref 13–17)
HGB BLD-MCNC: 12.4 G/DL — LOW (ref 13–17)
HGB BLDA-MCNC: 10.9 G/DL — LOW (ref 13–17)
HGB BLDA-MCNC: 11.2 G/DL — LOW (ref 13–17)
HGB BLDA-MCNC: 11.7 G/DL — LOW (ref 13–17)
HGB BLDA-MCNC: 12.1 G/DL — LOW (ref 13–17)
LACTATE BLDA-SCNC: 0.6 MMOL/L — SIGNIFICANT CHANGE UP (ref 0.5–2)
LACTATE BLDA-SCNC: 0.8 MMOL/L — SIGNIFICANT CHANGE UP (ref 0.5–2)
LACTATE BLDA-SCNC: 0.9 MMOL/L — SIGNIFICANT CHANGE UP (ref 0.5–2)
MAGNESIUM SERPL-MCNC: 2.2 MG/DL — SIGNIFICANT CHANGE UP (ref 1.6–2.6)
MCHC RBC-ENTMCNC: 30.5 % — LOW (ref 32–36)
MCHC RBC-ENTMCNC: 30.7 % — LOW (ref 32–36)
MCHC RBC-ENTMCNC: 31 PG — SIGNIFICANT CHANGE UP (ref 27–34)
MCHC RBC-ENTMCNC: 31 PG — SIGNIFICANT CHANGE UP (ref 27–34)
MCV RBC AUTO: 101 FL — HIGH (ref 80–100)
MCV RBC AUTO: 101.6 FL — HIGH (ref 80–100)
NRBC # FLD: 0 K/UL — LOW (ref 25–125)
NRBC # FLD: 0 K/UL — LOW (ref 25–125)
PCO2 BLDA: 45 MMHG — SIGNIFICANT CHANGE UP (ref 35–48)
PCO2 BLDA: 47 MMHG — SIGNIFICANT CHANGE UP (ref 35–48)
PCO2 BLDA: 50 MMHG — HIGH (ref 35–48)
PCO2 BLDA: 52 MMHG — HIGH (ref 35–48)
PCO2 BLDA: 54 MMHG — HIGH (ref 35–48)
PCO2 BLDA: 55 MMHG — HIGH (ref 35–48)
PCO2 BLDA: 57 MMHG — HIGH (ref 35–48)
PCO2 BLDA: 59 MMHG — HIGH (ref 35–48)
PH BLDA: 7.22 PH — LOW (ref 7.35–7.45)
PH BLDA: 7.26 PH — LOW (ref 7.35–7.45)
PH BLDA: 7.27 PH — LOW (ref 7.35–7.45)
PH BLDA: 7.3 PH — LOW (ref 7.35–7.45)
PH BLDA: 7.33 PH — LOW (ref 7.35–7.45)
PH BLDA: 7.33 PH — LOW (ref 7.35–7.45)
PH BLDA: 7.34 PH — LOW (ref 7.35–7.45)
PH BLDA: 7.34 PH — LOW (ref 7.35–7.45)
PHOSPHATE SERPL-MCNC: 4.8 MG/DL — HIGH (ref 2.5–4.5)
PLATELET # BLD AUTO: 109 K/UL — LOW (ref 150–400)
PLATELET # BLD AUTO: 119 K/UL — LOW (ref 150–400)
PMV BLD: 9.6 FL — SIGNIFICANT CHANGE UP (ref 7–13)
PMV BLD: 9.8 FL — SIGNIFICANT CHANGE UP (ref 7–13)
PO2 BLDA: 108 MMHG — SIGNIFICANT CHANGE UP (ref 83–108)
PO2 BLDA: 110 MMHG — HIGH (ref 83–108)
PO2 BLDA: 127 MMHG — HIGH (ref 83–108)
PO2 BLDA: 135 MMHG — HIGH (ref 83–108)
PO2 BLDA: 139 MMHG — HIGH (ref 83–108)
PO2 BLDA: 153 MMHG — HIGH (ref 83–108)
PO2 BLDA: 80 MMHG — LOW (ref 83–108)
PO2 BLDA: 93 MMHG — SIGNIFICANT CHANGE UP (ref 83–108)
POTASSIUM BLDA-SCNC: 3.1 MMOL/L — LOW (ref 3.4–4.5)
POTASSIUM BLDA-SCNC: 3.2 MMOL/L — LOW (ref 3.4–4.5)
POTASSIUM BLDA-SCNC: 3.3 MMOL/L — LOW (ref 3.4–4.5)
POTASSIUM BLDA-SCNC: 3.3 MMOL/L — LOW (ref 3.4–4.5)
POTASSIUM SERPL-MCNC: 3.6 MMOL/L — SIGNIFICANT CHANGE UP (ref 3.5–5.3)
POTASSIUM SERPL-MCNC: 3.8 MMOL/L — SIGNIFICANT CHANGE UP (ref 3.5–5.3)
POTASSIUM SERPL-SCNC: 3.6 MMOL/L — SIGNIFICANT CHANGE UP (ref 3.5–5.3)
POTASSIUM SERPL-SCNC: 3.8 MMOL/L — SIGNIFICANT CHANGE UP (ref 3.5–5.3)
PROT SERPL-MCNC: 6.3 G/DL — SIGNIFICANT CHANGE UP (ref 6–8.3)
RBC # BLD: 3.71 M/UL — LOW (ref 4.2–5.8)
RBC # BLD: 4 M/UL — LOW (ref 4.2–5.8)
RBC # FLD: 15.8 % — HIGH (ref 10.3–14.5)
RBC # FLD: 15.8 % — HIGH (ref 10.3–14.5)
SAO2 % BLDA: 93.7 % — LOW (ref 95–99)
SAO2 % BLDA: 97.2 % — SIGNIFICANT CHANGE UP (ref 95–99)
SAO2 % BLDA: 97.9 % — SIGNIFICANT CHANGE UP (ref 95–99)
SAO2 % BLDA: 97.9 % — SIGNIFICANT CHANGE UP (ref 95–99)
SAO2 % BLDA: 98 % — SIGNIFICANT CHANGE UP (ref 95–99)
SAO2 % BLDA: 98.5 % — SIGNIFICANT CHANGE UP (ref 95–99)
SAO2 % BLDA: 98.8 % — SIGNIFICANT CHANGE UP (ref 95–99)
SAO2 % BLDA: 99.3 % — HIGH (ref 95–99)
SODIUM BLDA-SCNC: 133 MMOL/L — LOW (ref 136–146)
SODIUM BLDA-SCNC: 136 MMOL/L — SIGNIFICANT CHANGE UP (ref 136–146)
SODIUM BLDA-SCNC: 137 MMOL/L — SIGNIFICANT CHANGE UP (ref 136–146)
SODIUM BLDA-SCNC: 137 MMOL/L — SIGNIFICANT CHANGE UP (ref 136–146)
SODIUM SERPL-SCNC: 136 MMOL/L — SIGNIFICANT CHANGE UP (ref 135–145)
SODIUM SERPL-SCNC: 138 MMOL/L — SIGNIFICANT CHANGE UP (ref 135–145)
SPECIMEN SOURCE: SIGNIFICANT CHANGE UP
VANCOMYCIN TROUGH SERPL-MCNC: 20.6 UG/ML — HIGH (ref 10–20)
WBC # BLD: 6.47 K/UL — SIGNIFICANT CHANGE UP (ref 3.8–10.5)
WBC # BLD: 7.89 K/UL — SIGNIFICANT CHANGE UP (ref 3.8–10.5)
WBC # FLD AUTO: 6.47 K/UL — SIGNIFICANT CHANGE UP (ref 3.8–10.5)
WBC # FLD AUTO: 7.89 K/UL — SIGNIFICANT CHANGE UP (ref 3.8–10.5)

## 2019-01-09 PROCEDURE — 99292 CRITICAL CARE ADDL 30 MIN: CPT

## 2019-01-09 PROCEDURE — 93306 TTE W/DOPPLER COMPLETE: CPT | Mod: 26

## 2019-01-09 PROCEDURE — 71045 X-RAY EXAM CHEST 1 VIEW: CPT | Mod: 26

## 2019-01-09 PROCEDURE — 99233 SBSQ HOSP IP/OBS HIGH 50: CPT | Mod: GC

## 2019-01-09 PROCEDURE — 99291 CRITICAL CARE FIRST HOUR: CPT

## 2019-01-09 RX ORDER — SODIUM CHLORIDE 9 MG/ML
1000 INJECTION, SOLUTION INTRAVENOUS
Qty: 0 | Refills: 0 | Status: DISCONTINUED | OUTPATIENT
Start: 2019-01-09 | End: 2019-01-10

## 2019-01-09 RX ORDER — DEXTROSE 50 % IN WATER 50 %
25 SYRINGE (ML) INTRAVENOUS ONCE
Qty: 0 | Refills: 0 | Status: COMPLETED | OUTPATIENT
Start: 2019-01-09 | End: 2019-01-09

## 2019-01-09 RX ORDER — ASPIRIN/CALCIUM CARB/MAGNESIUM 324 MG
300 TABLET ORAL ONCE
Qty: 0 | Refills: 0 | Status: COMPLETED | OUTPATIENT
Start: 2019-01-09 | End: 2019-01-09

## 2019-01-09 RX ORDER — DEXTROSE 50 % IN WATER 50 %
50 SYRINGE (ML) INTRAVENOUS ONCE
Qty: 0 | Refills: 0 | Status: COMPLETED | OUTPATIENT
Start: 2019-01-09 | End: 2019-01-09

## 2019-01-09 RX ORDER — SODIUM BICARBONATE 1 MEQ/ML
50 SYRINGE (ML) INTRAVENOUS
Qty: 0 | Refills: 0 | Status: COMPLETED | OUTPATIENT
Start: 2019-01-09 | End: 2019-01-09

## 2019-01-09 RX ORDER — HEPARIN SODIUM 5000 [USP'U]/ML
5000 INJECTION INTRAVENOUS; SUBCUTANEOUS EVERY 12 HOURS
Qty: 0 | Refills: 0 | Status: DISCONTINUED | OUTPATIENT
Start: 2019-01-09 | End: 2019-01-22

## 2019-01-09 RX ADMIN — Medication 25 MILLILITER(S): at 05:25

## 2019-01-09 RX ADMIN — SODIUM CHLORIDE 30 MILLILITER(S): 9 INJECTION, SOLUTION INTRAVENOUS at 19:07

## 2019-01-09 RX ADMIN — Medication 50 MILLIEQUIVALENT(S): at 03:15

## 2019-01-09 RX ADMIN — PIPERACILLIN AND TAZOBACTAM 25 GRAM(S): 4; .5 INJECTION, POWDER, LYOPHILIZED, FOR SOLUTION INTRAVENOUS at 19:07

## 2019-01-09 RX ADMIN — HEPARIN SODIUM 5000 UNIT(S): 5000 INJECTION INTRAVENOUS; SUBCUTANEOUS at 15:28

## 2019-01-09 RX ADMIN — SODIUM CHLORIDE 30 MILLILITER(S): 9 INJECTION, SOLUTION INTRAVENOUS at 12:02

## 2019-01-09 RX ADMIN — Medication 50 MILLIEQUIVALENT(S): at 05:00

## 2019-01-09 RX ADMIN — Medication 300 MILLIGRAM(S): at 12:17

## 2019-01-09 RX ADMIN — PIPERACILLIN AND TAZOBACTAM 25 GRAM(S): 4; .5 INJECTION, POWDER, LYOPHILIZED, FOR SOLUTION INTRAVENOUS at 05:29

## 2019-01-09 RX ADMIN — PANTOPRAZOLE SODIUM 40 MILLIGRAM(S): 20 TABLET, DELAYED RELEASE ORAL at 12:17

## 2019-01-09 RX ADMIN — Medication 5.61 MICROGRAM(S)/KG/MIN: at 07:00

## 2019-01-09 RX ADMIN — Medication 50 MILLILITER(S): at 12:02

## 2019-01-09 NOTE — PROGRESS NOTE ADULT - SUBJECTIVE AND OBJECTIVE BOX
Infectious Diseases progress note:    Subjective:  Events noted, pt s/p bronch yesterday for removal of foreign body in airway.  Pt now extubated.  No new fevers, no leukocytosis.  On levophed.  Pt awake,alert    ROS:  CONSTITUTIONAL:  No fever, chills, rigors  CARDIOVASCULAR:  No chest pain or palpitations  RESPIRATORY:   No SOB, cough, dyspnea on exertion.  No wheezing  GASTROINTESTINAL:  No abd pain, N/V, diarrhea/constipation  EXTREMITIES:  No swelling or joint pain  GENITOURINARY:  No burning on urination, increased frequency or urgency.  No flank pain  NEUROLOGIC:  No HA, visual disturbances  SKIN: No rashes    Allergies    No Known Allergies    Intolerances        ANTIBIOTICS/RELEVANT:  antimicrobials  piperacillin/tazobactam IVPB. 3.375 Gram(s) IV Intermittent every 12 hours    immunologic:    OTHER:  ALBUTerol    90 MICROgram(s) HFA Inhaler 1 Puff(s) Inhalation every 4 hours  aspirin  chewable 81 milliGRAM(s) Oral daily  dextrose 5% + sodium chloride 0.9%. 1000 milliLiter(s) IV Continuous <Continuous>  docusate sodium Liquid 100 milliGRAM(s) Oral daily  heparin  Injectable 5000 Unit(s) SubCutaneous every 8 hours  norepinephrine Infusion 0.05 MICROgram(s)/kG/Min IV Continuous <Continuous>  pantoprazole  Injectable 40 milliGRAM(s) IV Push daily      Objective:  Vital Signs Last 24 Hrs  T(C): 36.6 (09 Jan 2019 12:00), Max: 37.4 (08 Jan 2019 20:00)  T(F): 97.8 (09 Jan 2019 12:00), Max: 99.3 (08 Jan 2019 20:00)  HR: 109 (09 Jan 2019 12:00) (70 - 109)  BP: 106/55 (09 Jan 2019 12:00) (106/55 - 114/61)  BP(mean): 67 (09 Jan 2019 12:00) (67 - 74)  RR: 21 (09 Jan 2019 12:00) (15 - 31)  SpO2: 100% (09 Jan 2019 12:00) (96% - 100%)    PHYSICAL EXAM:  Constitutional:NAD  Eyes:CHER, EOMI  Ear/Nose/Throat: no thrush, mucositis.  Moist mucous membranes	  Neck:no JVD, no lymphadenopathy, supple  Respiratory: decr BS at RLL, rt chest tube with serosanguinous drainage.  SANDY drain with serous fluid.    Cardiovascular: S1S2 RRR, no murmurs  Gastrointestinal:soft, nontender,  nondistended (+) BS  Extremities:no e/e/c  Skin:  no rashes, open wounds or ulcerations, LUE avf        LABS:                        12.4   7.89  )-----------( 119      ( 09 Jan 2019 00:20 )             40.4     01-09    136  |  97<L>  |  30<H>  ----------------------------<  83  3.8   |  20<L>  |  4.45<H>    Ca    9.4      09 Jan 2019 00:20    TPro  6.4  /  Alb  2.5<L>  /  TBili  0.3  /  DBili  x   /  AST  15  /  ALT  16  /  AlkPhos  163<H>  01-08    PT/INR - ( 07 Jan 2019 21:25 )   PT: 12.1 SEC;   INR: 1.09          PTT - ( 07 Jan 2019 21:25 )  PTT:30.5 SEC            Vancomycin Level, Trough: 20.6 ug/mL (01-09 @ 00:20)              MICROBIOLOGY:    Culture - Respiratory with Gram Stain (01.08.19 @ 20:04)    Gram Stain Sputum:   WBC^White Blood Cells  QNTY CELLS IN GRAM STAIN: RARE (1+)  YEAST^YEAST.  QUANTITY OF BACTERIA SEEN: RARE (1+)    Specimen Source: BRONCHIAL LAVAGE    Culture - Respiratory with Gram Stain (01.08.19 @ 20:01)    Gram Stain Sputum:   WBC^White Blood Cells  QNTY CELLS IN GRAM STAIN: FEW (2+)  NOS^No Organisms Seen    Specimen Source: BRONCHIAL LAVAGE    Culture - Blood (01.07.19 @ 21:52)    Culture - Blood:   NO ORGANISMS ISOLATED  NO ORGANISMS ISOLATED AT 24 HOURS    Specimen Source: BLOOD ARTERIAL          RADIOLOGY & ADDITIONAL STUDIES:    < from: Xray Chest 1 View- PORTABLE-Routine (01.09.19 @ 06:32) >  INTERPRETATION:     January 8 at 7:30 PM:  Endotracheal tube in place. Bilateral pleural effusions again seen. Heart   size is stable. Visualized lungs are clear. No pneumothorax.    January 9 at 5:58 AM:  Since the last study, the endotracheal tube has been removed. No other   change. Bilateral loculated effusions and pigtail catheters overlying the   right upper quadrant seen. Visualized lungs are clear.      COMPARISON:  January 8      IMPRESSION:  Follow-up studies pre and post extubation with persistent   small loculated effusions.    < end of copied text >        < from: CT Chest No Cont (01.08.19 @ 13:13) >  IMPRESSION: Right pleural catheter as described above within a small   right pleural effusion containing internal bubbles of air.    Bibasilar areas of atelectasis with internal progression at the right   lung base since December 5, 2018. Opacification of the right lung central   airways likely related to secretions some of which maybe due to blood   clot or hemorrhagic debris given increased density as described above.    Small amount of ascites with interval increase in size since the prior   study.    < end of copied text >

## 2019-01-09 NOTE — PROGRESS NOTE ADULT - ASSESSMENT
Pt is a 58 yr old Mandarin speaking male who underwent right thoractomy decortiation, Right Chest Wall reconstruction with Latissimjus Dorsi Flap Poss Skin Graft with VAC dressing with Dr Pickering 10/11/18.  He has h/o ESRD with HD, CHF, EF of 10%,  ICD,  with h/o chronic bilateral pleural effusions.  The patient previously underwent left VATS and PleurX drains  in 2015.  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     During his last admission at [facility], pt underwent OR with Plastic and Thoracic surgery,  for definitive repair that involved thoracotomy, decortication, excision of empyema cavity along with muscle flap reconstruction. Pt underwent surgery on 10/11 (Right thoracotomy, resection of portion of R 7th rib, evacuation of infected hemothorax, takedown of pleurocutaneous fistula - and noted to have foul smelling hematoma).  OR cultures grew Enterococcus faecalis.  Pt was treated with zosyn for 4 week course for infected hemothorax which was completed on 11/7.      He was admitted on 1/6/19 to [facility] with a R pleural effusion and respiratory failure,  A R PTC was placed there and he was intubated.  He still had a R SANDY drain in place.  He presented intubated and sedated on pressors. (07 Jan 2019 23:10) Cultures from pleural fluid at [facility] reportedly grew MRSA.  Pt is currently on vanco/zosyn.      1/8 - Bronchoscopy for removal of foreign body in airway.      1/9 - reports from [facility] reviewed:  On admission there, pt had fever 102.3, P 121, BP 74/48.  Bcx from 1/6 (+) E.coli (sensitive to zosyn), and pleural fluid cx 1/8 grew MRSA.  Pleural fluid showed 28 WBC.  AFB (-).      ID consult called for further abx management.      Problem/Plan - 1:    Septic shock    - Pt with R pleural effusion, s/p SANDY drain/chest tube, (+) MRSA reported from pleural fluid cultures at [facility].   Blood cx also (+) for E.coli, source unclear.        - Thus far bcx and BAL cx here ngtd.  Agree with vanco/zosyn to cover both MRSA and E.coli.  Recommend CTap with po/iv contrast to evaluate for bacteremia source, as pleural fluid cultures grew MRSA.     - f/u echocardiogram.    Will follow,    Anabel Chahal  [phone]

## 2019-01-09 NOTE — PROGRESS NOTE ADULT - ASSESSMENT
59 yo male (Mandarin speaking) with medical history of NICM with ICD, ESRD on HD, former smoker, BPH who was transfer for [facility] due to septic shock on 1/7/19. Pt. was initially admitted due to fever and right pleural effusion, found to have respiratory failure and intubated and afterwards developed shock, thought to be septic. Renal now called for management of pts ESRD

## 2019-01-09 NOTE — PROGRESS NOTE ADULT - SUBJECTIVE AND OBJECTIVE BOX
[facility] DIVISION OF KIDNEY DISEASES AND HYPERTENSION -- FOLLOW UP NOTE  --------------------------------------------------------------------------------    Chief Complaint: on Biapap/ chest tube still.           PAST HISTORY  --------------------------------------------------------------------------------  No significant changes to PMH, PSH, FHx, SHx, unless otherwise noted    ALLERGIES & MEDICATIONS  --------------------------------------------------------------------------------  Allergies    No Known Allergies    Intolerances      Standing Inpatient Medications  ALBUTerol    90 MICROgram(s) HFA Inhaler 1 Puff(s) Inhalation every 4 hours  aspirin  chewable 81 milliGRAM(s) Oral daily  dexmedetomidine Infusion 0.7 MICROgram(s)/kG/Hr IV Continuous <Continuous>  docusate sodium Liquid 100 milliGRAM(s) Oral daily  heparin  Injectable 5000 Unit(s) SubCutaneous every 8 hours  norepinephrine Infusion 0.05 MICROgram(s)/kG/Min IV Continuous <Continuous>  pantoprazole  Injectable 40 milliGRAM(s) IV Push daily  piperacillin/tazobactam IVPB. 3.375 Gram(s) IV Intermittent every 12 hours  propofol Infusion 15 MICROgram(s)/kG/Min IV Continuous <Continuous>    PRN Inpatient Medications      REVIEW OF SYSTEMS  --------------------------------------------------------------------------------  Gen: bipap.   Skin: No new rashes  Respiratory:+ dyspnea,   : No hematuria,  Neuro:  no seizures,  All other systems were reviewed and are negative, except as noted.        VITALS/PHYSICAL EXAM  --------------------------------------------------------------------------------  T(C): 36.4 (01-09-19 @ 08:00), Max: 37.4 (01-08-19 @ 20:00)  HR: 90 (01-09-19 @ 09:00) (70 - 104)  BP: 112/62 (01-09-19 @ 09:00) (90/58 - 112/62)  RR: 25 (01-09-19 @ 09:00) (15 - 31)  SpO2: 100% (01-09-19 @ 09:00) (96% - 100%)  Wt(kg): --  Height (cm): 178 (01-07-19 @ 20:40)  Weight (kg): 59.8 (01-07-19 @ 20:40)  BMI (kg/m2): 18.9 (01-07-19 @ 20:40)  BSA (m2): 1.75 (01-07-19 @ 20:40)      01-08-19 @ 07:01  -  01-09-19 @ 07:00  --------------------------------------------------------  IN: 1972.1 mL / OUT: 480 mL / NET: 1492.1 mL    01-09-19 @ 07:01  -  01-09-19 @ 09:58  --------------------------------------------------------  IN: 33 mL / OUT: 0 mL / NET: 33 mL      Physical Exam:          Gen: on bipapa.   	HEENT: supple neck, NO JVD   	Pulm: difficult to auscultate. FIO2 40%, R CT           CV: RRR, S1S2; no rub  	Abd: +BS, soft, nontender/nondistended  	UE:  no edema;   	LE:  no edema  	Neuro: No focal deficits,   	Psych: Normal affect               Vascular. L avf + bruit                     	>>> <<<    LABS/STUDIES  --------------------------------------------------------------------------------              12.4   7.89  >-----------<  119      [01-09-19 @ 00:20]              40.4     136  |  97  |  30  ----------------------------<  83      [01-09-19 @ 00:20]  3.8   |  20  |  4.45        Ca     9.4     [01-09-19 @ 00:20]    TPro  6.4  /  Alb  2.5  /  TBili  0.3  /  DBili  x   /  AST  15  /  ALT  16  /  AlkPhos  163  [01-08-19 @ 02:45]    PT/INR: PT 12.1 , INR 1.09       [01-07-19 @ 21:25]  PTT: 30.5       [01-07-19 @ 21:25]      Creatinine Trend:  SCr 4.45 [01-09 @ 00:20]  SCr 6.47 [01-08 @ 02:45]  SCr 6.33 [01-07 @ 21:25]  SCr 5.62 [12-10 @ 10:18]    Sodium trend:        HbA1c 5.5      [10-12-18 @ 02:53]  TSH 4.79      [10-21-18 @ 04:15]    HBsAb Reactive      [01-08-19 @ 08:35]  HBsAg NEGATIVE      [01-08-19 @ 08:35]  HCV 0.16, Nonreactive Hepatitis C AB  S/CO Ratio                        Interpretation  < 1.0                                     Non-Reactive  1.0 - 4.9                           Weakly-Reactive  > 5.0                                 Reactive  Non-Reactive: Aperson with a non-reactive HCV antibody  result is considered uninfected.  No further action is  needed unless recent infection is suspected.  In these  cases, consider repeat testing later to detect  seroconversion..  Weakly-Reactive: HCV antibody test is abnormal, HCV RNA  Qualitative test will follow.  Reactive: HCV antibody test is abnormal, HCV RNA  Qualitative test will follow.  Note: HCV antibody testing is performed on the [employer]   system.      [01-08-19 @ 08:35]

## 2019-01-09 NOTE — PROGRESS NOTE ADULT - SUBJECTIVE AND OBJECTIVE BOX
CLAUDIA HAN   MRN#: [id number]     The patient is a 58y Male who was seen, evaluated, & examined with the CTICU staff on rounds with a multidisciplinary care plan formulated & implemented.  All available clinical, laboratory, radiographic, pharmacologic, and electrocardiographic data were reviewed & analyzed.      The patient was in the CTICU in critical condition secondary to:     acute hypoxic respiratory failure-persistent cardiopulmonary dysfunction  septic shock    For support and evaluation & prevention of further decompensation secondary to persistent cardiopulmonary dysfunction and cardiogenic shock-cardiovascular dysfunction, respiratory failure required:     supplemental oxygen with BiPaP  continuous pulse oximetry monitoring  following ABGs with A-line monitoring    Invasive hemodynamic monitoring with an A-line was required for  continuous MAP/BP monitoring to ensure adequate cardiovascular support and to evaluate for & help prevent decompensation while receiving IV Levophed infusion secondary to septic shock-cardiovascular dysfunction.     In addition:  CT scan revealed likely foreign body in R mainstem bronchus which we removed with bronchoscopy  Extubated but hypercarbic and acidotic despite unremarkable clinical appearance - on BiPaP with improving respiratory acidosis although still tenuous and may still need to be re-intubated  Also has metabolic acidosis with unclear etiology, ? due to renal failure as lactate and glucose are normal - will discus with renal if patient would benefit from dialysis again today  Still on Levophed but is decreasing  Low EF at baseline but does not have clinical signs of low output so his current condition is likely not being caused but poor forward flow/low output  Afebrile with no leukocytosis - infection is likely resolving on Vancomycin and Zosyn; ID is following, will check Vancomycin level  Maintenance HD done today with 400 cc fluid removed; overall positive 1L for the last 24 hours    The patient required critical care management and I provided 80 minutes of non-continuous care to the patient in addition to discussing the patient and plan at length with the CTICU staff and helping coordinate care.

## 2019-01-09 NOTE — PROGRESS NOTE ADULT - SUBJECTIVE AND OBJECTIVE BOX
CLAUDIA HAN          MRN-[id number]    HPI:  Pt is a 58 yr old  male who underwent a FB, R thoracotomy, decortication, chest wall reconstruction, lat flap with Dr Pickering on 10/11/18. He was admitted on 1/6/19 to [facility] with a R pleural effusion and respiratory failure,  A R PTC was placed there and he was intubated.  He still had a R SANDY drain in place.  He presented intubated and sedated on pressors. (07 Jan 2019 23:10)      Procedure:  POD # :     Issues:        Interval/Overnight OR Events/ ROS  Pt extubated in the OR after surgery. On arrival in ICU still drowsy - but easily arousable to following commands; denies SOB, no nausea, no vomiting  Respiratory status required full ventilatory support,  following of ABG’s with A-line monitoring, continuous pulse oximetry monitoring, & an IV Propofol infusion for support & to evaluate for & prevent further decompensation secondary to persistent cardiopulmonary dysfunction.     Pt remained hemodynamically stable overnight, not on any pressors or inotropes. OOB to chair, breathing comfortably with minimal pain. Ambulated several times . Denies pain, no SOB, no palpitations, no nausea/ no vomiting, no dizziness  A-line and sharma d/kirsten       PAST MEDICAL & SURGICAL HISTORY:  Smoking hx  Cardiomyopathy  Wound: right chest  ICD (implantable cardioverter-defibrillator) in place  OA (osteoarthritis)  HLD (hyperlipidemia)  BPH (benign prostatic hyperplasia)  Pleural effusion  HTN (hypertension)  ESRD (end stage renal disease)  H/O chest wound: right  S/P thoracotomy: FB, R thoracotomy, decortication, chest wall reconstruction, lat flap  History of wound infection: right chest wall - revision 5/18 and again in 7/18  History of implantable cardioverter-defibrillator (ICD) placement: pt unsure when placed  H/O bilateral hip replacements: 2008, 2009    Allergies    No Known Allergies    Intolerances      Home Medications:  aspirin 81 mg oral tablet: 1 tab(s) orally once a day  (30 Oct 2018 14:39)  Breo Ellipta 100 mcg-25 mcg/inh inhalation powder: 1 puff(s) inhaled once a day patient denies using it (11 Oct 2018 08:38)  Calcium 500: 1 tab(s) orally 2 times a day (11 Oct 2018 08:38)  docusate sodium 100 mg oral tablet: 1 tab(s) orally 2 times a day, As Needed (11 Oct 2018 08:39)  folic acid 1 mg oral tablet: 1 tab(s) orally once a day (11 Oct 2018 08:38)  Mag-Ox 400 oral tablet: 1 tab(s) orally once a day (11 Oct 2018 08:39)  Multiple Vitamins oral tablet: 1 tab(s) orally once a day  (30 Oct 2018 14:39)  Namenda 10 mg oral tablet: 1 tab(s) orally 2 times a day (11 Oct 2018 08:38)  Nephplex Rx oral tablet: 1 tab(s) orally once a day (11 Oct 2018 08:39)  nortriptyline 50 mg oral capsule: 1 cap(s) orally once a day (11 Oct 2018 08:39)  piperacillin-tazobactam 2 g-0.25 g intravenous injection: 1  intravenous every 12 hours until 11/7/18 (30 Oct 2018 16:27)  Renvela 800 mg oral tablet: 2 tab(s) orally every 8 hours (11 Oct 2018 08:39)  simethicone 80 mg oral tablet: 1 tab(s) orally 3 times a day (after meals) (11 Oct 2018 08:39)  Tylenol 325 mg oral tablet: 2 tab(s) orally every 6 hours, As Needed (30 Oct 2018 14:39)  vitamin A: 1 tab(s) orally once a day (11 Oct 2018 08:38)  Vitamin B Complex: 1 tab(s) orally once a day (11 Oct 2018 08:38)  Vitamin C 500 mg oral tablet: 1 tab(s) orally once a day (11 Oct 2018 08:38)        ***VITAL SIGNS:  Vital Signs Last 24 Hrs  T(C): 36.7 (09 Jan 2019 16:00), Max: 37.4 (08 Jan 2019 20:00)  T(F): 98 (09 Jan 2019 16:00), Max: 99.3 (08 Jan 2019 20:00)  HR: 91 (09 Jan 2019 18:00) (74 - 109)  BP: 112/59 (09 Jan 2019 18:00) (104/56 - 121/58)  BP(mean): 72 (09 Jan 2019 18:00) (67 - 78)  RR: 31 (09 Jan 2019 18:00) (15 - 31)  SpO2: 100% (09 Jan 2019 18:00) (97% - 100%)    I/Os:   I&O's Detail    08 Jan 2019 07:01  -  09 Jan 2019 07:00  --------------------------------------------------------  IN:    dexmedetomidine Infusion: 174.7 mL    IV PiggyBack: 100 mL    lactated ringers.: 1000 mL    norepinephrine Infusion: 266.4 mL    Other: 400 mL    propofol Infusion: 31 mL  Total IN: 1972.1 mL    OUT:    Chest Tube: 50 mL    Other: 400 mL    Other: 30 mL  Total OUT: 480 mL    Total NET: 1492.1 mL      09 Jan 2019 07:01  -  09 Jan 2019 18:50  --------------------------------------------------------  IN:    dextrose 5% + sodium chloride 0.9%.: 240 mL    norepinephrine Infusion: 162 mL  Total IN: 402 mL    OUT:    Chest Tube: 20 mL  Total OUT: 20 mL    Total NET: 382 mL          CAPILLARY BLOOD GLUCOSE      POCT Blood Glucose.: 90 mg/dL (09 Jan 2019 18:31)  POCT Blood Glucose.: 94 mg/dL (09 Jan 2019 13:34)  POCT Blood Glucose.: 73 mg/dL (09 Jan 2019 11:50)  POCT Blood Glucose.: 89 mg/dL (09 Jan 2019 06:46)  POCT Blood Glucose.: 72 mg/dL (09 Jan 2019 05:20)  POCT Blood Glucose.: 72 mg/dL (09 Jan 2019 05:17)      =======================  MEDICATIONS  ===================  MEDICATIONS  (STANDING):  ALBUTerol    90 MICROgram(s) HFA Inhaler 1 Puff(s) Inhalation every 4 hours  aspirin  chewable 81 milliGRAM(s) Oral daily  dextrose 5% + sodium chloride 0.9%. 1000 milliLiter(s) (30 mL/Hr) IV Continuous <Continuous>  docusate sodium Liquid 100 milliGRAM(s) Oral daily  heparin  Injectable 5000 Unit(s) SubCutaneous every 8 hours  norepinephrine Infusion 0.05 MICROgram(s)/kG/Min (5.606 mL/Hr) IV Continuous <Continuous>  pantoprazole  Injectable 40 milliGRAM(s) IV Push daily  piperacillin/tazobactam IVPB. 3.375 Gram(s) IV Intermittent every 12 hours    MEDICATIONS  (PRN):      ======================VENTILATOR SETTINGS  ==============  Mode: CPAP with PS  FiO2: 40  PEEP: 5  PS: 5  MAP: 6  PIP: 11      =================== PATIENT CARE ACCESS DEVICES ==========  Peripheral IV  Central Venous Line	R	L	IJ	Fem	SC	Placed:   Arterial Line	R	L	PT	DP	Fem	Rad	Ax	Placed:   Midline:				  Urinary Catheter, Date Placed:   Necessity of urinary, arterial, and venous catheters discussed  ======================= PHYSICAL EXAM===================  General:          Comfortable, Awake, alert, not in any distress  Neuro:             Moving all extremities to commands. No focal deficits	  HEENT:                           CHER/ ETT/ NGT/ trach  Respiratory:	Lungs clear on auscultation bilaterally with good aeration.                            No rales, rhonchi, no wheezing. Effort even and unlabored.  CV:		         Regular rate and rhythm. Normal S1/S2. No murmurs  Abdomen:	Soft,  nontender, not-distended. Bowel sounds present / absent.   Skin:		No rash.  Extremities:	Warm, no cyanosis or edema.  Palpable pulses  ============================ LABS =======================                        11.5   6.47  )-----------( 109      ( 09 Jan 2019 13:36 )             37.7     01-09    138  |  100  |  36<H>  ----------------------------<  107<H>  3.6   |  22  |  5.23<H>    Ca    8.7      09 Jan 2019 13:36  Phos  4.8     01-09  Mg     2.2     01-09    TPro  6.3  /  Alb  2.5<L>  /  TBili  0.4  /  DBili  x   /  AST  44<H>  /  ALT  29  /  AlkPhos  139<H>  01-09    LIVER FUNCTIONS - ( 09 Jan 2019 13:36 )  Alb: 2.5 g/dL / Pro: 6.3 g/dL / ALK PHOS: 139 u/L / ALT: 29 u/L / AST: 44 u/L / GGT: x           PT/INR - ( 07 Jan 2019 21:25 )   PT: 12.1 SEC;   INR: 1.09          PTT - ( 07 Jan 2019 21:25 )  PTT:30.5 SEC  ABG - ( 09 Jan 2019 16:56 )  pH, Arterial: 7.33  pH, Blood: x     /  pCO2: 47    /  pO2: 153   / HCO3: 23    / Base Excess: -1.1  /  SaO2: 99.3                BRONCHIAL LAVAGE  01-08-19 --  --  --      BRONCHIAL LAVAGE  01-08-19 --  --  --      BLOOD ARTERIAL  01-07-19 --  --  --          ===================== IMAGING STUDIES ===================  Radiology personally reviewed.    ====================ASSESSMENT AND PLAN ================      ====================== NEUROLOGY=======================  Pain control with PCA / PCEA / Tylenol IV / Toradol / Percocet  Pt is on Precedex for agitation  Pt is sedated with Propofol / Fentanyl  ==================== RESPIRATORY========================  Pt is on       L nasal canula / Face tent____% FiO2/ full mech vent support  Comfortable, no evidence of distress.  Using incentive spirometry & doing                ml  Monitor chest tube output  Chest tube to suction / water seal	  Mechanical Ventilation:  Mode: CPAP with PS  FiO2: 40  PEEP: 5  PS: 5  MAP: 6  PIP: 11    Mechanical ventilator status assessed & settings reviewed  Continuous pulse oximetry monitoring  Continue bronchodilators, pulmonary toilet  Head of bed elevation to 30-40 degrees  ====================CARDIOVASCULAR=====================  Continue hemodynamic monitoring/ telemetry  Not on any pressors/ titrate pressors for SBP>100, MAP >60-65  Continue cardiovascular / antihypertensive medications  ===================== RENAL ============================  Continue LR 30CC/hr      D/C IVF  Monitor I/Os, BUN/ Cr  and electrolytes  D/C Sharma      Keep Sharma for UO monitoring  BPH: Continue Flomax/ Finasteride  ==================== GASTROINTESTINAL===================  On regular/ tube feeds diet, tolerating well  Continue GI prophylaxis with Pepcid / Protonix  Continue Zofran / Reglan for nausea - PRN	  NPO  =======================    ENDOCRIN  =====================  Glycemic monitoring  F/S with coverage  ===================HEMATOLOGIC/ONCOLOGIC =============  Monitor chest tube output. No signs of active bleeding.   Follow CBC, coags  in AM  DVT prophylaxis with SCD, sc Heparin  ========================INFECTIOUS DISEASE===============  No signs of infection. Monitor for fever / leukocytosis.  All surgical incision / chest tube  sites look clean  D/C Sharma    Pertinent clinical, laboratory, radiographic, hemodynamic, echocardiographic, respiratory data, microbiologic data and chart were reviewed and analyzed frequently throughout the course of the day and night. GI and DVT prophylaxis, glycemic control, head of bed elevation and skin care issues were addressed.  Patient seen, examined and plan discussed with CT Surgery / CTICU team during rounds.  Pt remains critically ill in imminent risk of  deterioration and requires very careful cardio- pulmonary monitoring and support.    I have spent        minutes of critical care time with this pt between      am/pm   and        am/ pm         minutes spent on total encounter; more than 50% of the visit was spent counseling and/or coordinating care by the attending physician.      KERLINE Faith MD CLAUDIA HAN          MRN-[id number]      HPI:  Pt is a 58 yr old  male who underwent a FB, R thoracotomy, decortication, chest wall reconstruction, lat flap with Dr Pickering on 10/11/18. He was admitted on 1/6/19 to [facility] with a R pleural effusion and respiratory failure,  A R PTC was placed there and he was intubated.  He still had a R SANDY drain in place.  He presented intubated and sedated on pressors. 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         Procedure: 10/11/18 Right thoracotomy, resection of portion of R 7th rib, evacuation of infected hemothorax, takedown of pleurocutaneous fistula  / R chest wall reconstruction with tunneled latissimus dorsi flap, covered by serratus anterior flap             Issues: Acute resp failure-extubated last night to BIPAP             Resp acidosis             Hypotension with sepsis/ distributive shock- on Levophed drip  Acute on chronic systolic CHF ( EF 10 %), ICD in place  Infected right hemothorax - (+) Enterococcus faecalis in the past / now + MRSA empyema   E coli bacteriemia              ESRD on HD    intermittent agitation/mild dementia              severe deconditioning      Interval/Overnight  Events/ ROS  Respiratory status required BIPAP support due to resp acidosis on nasal cannula trial;  following of ABG’s with A-line monitoring, continuous pulse oximetry monitoring,         PAST MEDICAL & SURGICAL HISTORY:  Smoking hx  Cardiomyopathy  Wound: right chest  ICD (implantable cardioverter-defibrillator) in place  OA (osteoarthritis)  HLD (hyperlipidemia)  BPH (benign prostatic hyperplasia)  Pleural effusion  HTN (hypertension)  ESRD (end stage renal disease)  H/O chest wound: right  S/P thoracotomy: FB, R thoracotomy, decortication, chest wall reconstruction, lat flap  History of wound infection: right chest wall - revision 5/18 and again in 7/18  History of implantable cardioverter-defibrillator (ICD) placement: pt unsure when placed  H/O bilateral hip replacements: 2008, 2009    Allergies  No Known Allergies       Home Medications:  aspirin 81 mg oral tablet: 1 tab(s) orally once a day  (30 Oct 2018 14:39)  Breo Ellipta 100 mcg-25 mcg/inh inhalation powder: 1 puff(s) inhaled once a day patient denies using it (11 Oct 2018 08:38)  Calcium 500: 1 tab(s) orally 2 times a day (11 Oct 2018 08:38)  docusate sodium 100 mg oral tablet: 1 tab(s) orally 2 times a day, As Needed (11 Oct 2018 08:39)  folic acid 1 mg oral tablet: 1 tab(s) orally once a day (11 Oct 2018 08:38)  Mag-Ox 400 oral tablet: 1 tab(s) orally once a day (11 Oct 2018 08:39)  Multiple Vitamins oral tablet: 1 tab(s) orally once a day  (30 Oct 2018 14:39)  Namenda 10 mg oral tablet: 1 tab(s) orally 2 times a day (11 Oct 2018 08:38)  Nephplex Rx oral tablet: 1 tab(s) orally once a day (11 Oct 2018 08:39)  nortriptyline 50 mg oral capsule: 1 cap(s) orally once a day (11 Oct 2018 08:39)  piperacillin-tazobactam 2 g-0.25 g intravenous injection: 1  intravenous every 12 hours until 11/7/18 (30 Oct 2018 16:27)  Renvela 800 mg oral tablet: 2 tab(s) orally every 8 hours (11 Oct 2018 08:39)  simethicone 80 mg oral tablet: 1 tab(s) orally 3 times a day (after meals) (11 Oct 2018 08:39)  Tylenol 325 mg oral tablet: 2 tab(s) orally every 6 hours, As Needed (30 Oct 2018 14:39)  vitamin A: 1 tab(s) orally once a day (11 Oct 2018 08:38)  Vitamin B Complex: 1 tab(s) orally once a day (11 Oct 2018 08:38)  Vitamin C 500 mg oral tablet: 1 tab(s) orally once a day (11 Oct 2018 08:38)  Midodrine 10 Q8       ***VITAL SIGNS:  Vital Signs Last 24 Hrs  T(C): 36.7 (09 Jan 2019 16:00), Max: 37.4 (08 Jan 2019 20:00)  T(F): 98 (09 Jan 2019 16:00), Max: 99.3 (08 Jan 2019 20:00)  HR: 91 (09 Jan 2019 18:00) (74 - 109)  BP: 112/59 (09 Jan 2019 18:00) (104/56 - 121/58)  BP(mean): 72 (09 Jan 2019 18:00) (67 - 78)  RR: 31 (09 Jan 2019 18:00) (15 - 31)  SpO2: 100% (09 Jan 2019 18:00) (97% - 100%)    I/Os:   I&O's Detail    08 Jan 2019 07:01  -  09 Jan 2019 07:00  --------------------------------------------------------  IN:    dexmedetomidine Infusion: 174.7 mL    IV PiggyBack: 100 mL    lactated ringers.: 1000 mL    norepinephrine Infusion: 266.4 mL    Other: 400 mL    propofol Infusion: 31 mL  Total IN: 1972.1 mL    OUT:    Chest Tube: 50 mL    Other: 400 mL    Other: 30 mL  Total OUT: 480 mL    Total NET: 1492.1 mL      09 Jan 2019 07:01  -  09 Jan 2019 18:50  --------------------------------------------------------  IN:    dextrose 5% + sodium chloride 0.9%.: 240 mL    norepinephrine Infusion: 162 mL  Total IN: 402 mL    OUT:    Chest Tube: 20 mL  Total OUT: 20 mL    Total NET: 382 mL    CAPILLARY BLOOD GLUCOSE  POCT Blood Glucose.: 90 mg/dL (09 Jan 2019 18:31)  POCT Blood Glucose.: 94 mg/dL (09 Jan 2019 13:34)  POCT Blood Glucose.: 73 mg/dL (09 Jan 2019 11:50)  POCT Blood Glucose.: 89 mg/dL (09 Jan 2019 06:46)  POCT Blood Glucose.: 72 mg/dL (09 Jan 2019 05:20)  POCT Blood Glucose.: 72 mg/dL (09 Jan 2019 05:17)    =======================  MEDICATIONS  ===================  MEDICATIONS  (STANDING):  ALBUTerol    90 MICROgram(s) HFA Inhaler 1 Puff(s) Inhalation every 4 hours  aspirin  chewable 81 milliGRAM(s) Oral daily  dextrose 5% + sodium chloride 0.9%. 1000 milliLiter(s) (30 mL/Hr) IV Continuous <Continuous>  docusate sodium Liquid 100 milliGRAM(s) Oral daily  heparin  Injectable 5000 Unit(s) SubCutaneous every 8 hours  norepinephrine Infusion 0.05 MICROgram(s)/kG/Min (5.606 mL/Hr) IV Continuous <Continuous>  pantoprazole  Injectable 40 milliGRAM(s) IV Push daily  piperacillin/tazobactam IVPB. 3.375 Gram(s) IV Intermittent every 12 hours    MEDICATIONS  (PRN):  ======================VENTILATOR SETTINGS  ==============  Mode: CPAP with PS  FiO2: 40  PEEP: 5  PS: 5  MAP: 6  PIP: 11    =================== PATIENT CARE ACCESS DEVICES ==========  Peripheral IV (+)  Central Venous Line	R	L	IJ	Fem	SC	Placed:   Arterial Line(+)    ======================= PHYSICAL EXAM===================  General:          Comfortable, Awake, alert, not in any distress, anxious  Neuro:             Moving all extremities to commands. No focal deficits	  HEENT:                           CHER   Respiratory:	Lungs sound coarse on auscultation bilaterally with good aeration.                           no wheezing. Effort even and unlabored. on BIPAP  CV:	            Regular rate and rhythm. Normal S1/S2.  Abdomen:	Soft,  nontender, not-distended. Bowel sounds present     Skin:		No rash.  Extremities:	Warm, no cyanosis or edema.  Palpable pulses  ============================ LABS =======================                        11.5   6.47  )-----------( 109      ( 09 Jan 2019 13:36 )             37.7     01-09    138  |  100  |  36<H>  ----------------------------<  107<H>  3.6   |  22  |  5.23<H>    Ca    8.7      09 Jan 2019 13:36  Phos  4.8     01-09  Mg     2.2     01-09    TPro  6.3  /  Alb  2.5<L>  /  TBili  0.4  /  DBili  x   /  AST  44<H>  /  ALT  29  /  AlkPhos  139<H>  01-09    LIVER FUNCTIONS - ( 09 Jan 2019 13:36 )  Alb: 2.5 g/dL / Pro: 6.3 g/dL / ALK PHOS: 139 u/L / ALT: 29 u/L / AST: 44 u/L / GGT: x           PT/INR - ( 07 Jan 2019 21:25 )   PT: 12.1 SEC;   INR: 1.09     PTT:30.5 SEC      ABG - ( 09 Jan 2019 16:56 ) on BIPAP  pH, Arterial: 7.33  pH, Blood: x     /  pCO2: 47    /  pO2: 153   / HCO3: 23    / Base Excess: -1.1  /  SaO2: 99.3      Blood Gas Arterial - Lytes,Hgb,iCa,Lact (01.09.19 @ 13:36)    pH, Arterial: 7.27 pH    pCO2, Arterial: 57 mmHg    pO2, Arterial: 135 mmHg    HCO3, Arterial: 23 mmol/L       BRONCHIAL LAVAGE  01-08-19 --  --  --    BRONCHIAL LAVAGE  01-08-19 --  --  --    BLOOD ARTERIAL  01-07-19 --  --  --  Pleural fluid cx - pending    ===================== IMAGING STUDIES ===================  Radiology personally reviewed.  < from: Xray Chest 1 View- PORTABLE-Routine (01.09.19 @ 06:32) >    January 8 at 7:30 PM:  Endotracheal tube in place. Bilateral pleural effusions again seen. Heart   size is stable. Visualized lungs are clear. No pneumothorax.    January 9 at 5:58 AM:  Since the last study, the endotracheal tube has been removed. No other   change. Bilateral loculated effusions and pigtail catheters overlying the   right upper quadrant seen. Visualized lungs are clear.      COMPARISON:  January 8  IMPRESSION:  Follow-up studies pre and post extubation with persistent   small loculated effusions.    < end of copied text >    ====================ASSESSMENT AND PLAN ================  Pt is a 58 yr old  male who underwent a FB, R thoracotomy, decortication, chest wall reconstruction, lat flap with Dr Pickering on 10/11/18. He was admitted on 1/6/19 to [facility] with a R pleural effusion and respiratory failure,  A R PTC was placed there and he was intubated.  He still had a R SANDY drain in place.  He presented intubated and sedated on pressors. Now extubated to BIPAP with resp acidosis requiring continuous  airway and acid / base monitoring        Issues: Acute resp failure-extubated last night to BIPAP             Resp acidosis             Hypotension with sepsis/ distributive shock- on Levophed drip  Acute on chronic systolic CHF ( EF 10 %), ICD in place  Infected right hemothorax - (+) Enterococcus faecalis in the past / now + MRSA empyema   E coli bacteriemia              ESRD on HD    intermittent agitation/mild dementia              severe deconditioning    ====================== NEUROLOGY=======================  Pain control with Tylenol IV  Pt is on Precedex  PRN for agitation    ==================== RESPIRATORY========================  Pt is on BIPAP due to resp acidosis  without hypoxemia due to deconditioning  Comfortable, no evidence of distress.  Monitor  right chest tube and david output  Chest tube to suction, david to bulb suction    BIPAP NIV ventilator status assessed & settings reviewed  Continuous pulse oximetry monitoring  Continue bronchodilators, pulmonary toilet  Head of bed elevation to 30-40 degrees  F/up serial ABG 's  ====================CARDIOVASCULAR=====================  Continue hemodynamic monitoring/ telemetry  On Levophed - titrate pressors for SBP>100, MAP >60-65     ===================== RENAL ============================  Continue D5 NS  30CC/hr      Monitor I/Os, BUN/ Cr  and electrolytes  Next HD tomorrow  ==================== GASTROINTESTINAL===================  Strict NPO while pt on BIPAP post extubation  Pt may need swallow study before PO intake safely can be resumed due to high risk of aspiration  Continue GI prophylaxis with Protonix  Continue Zofran / Reglan for nausea - PRN	    =======================    ENDOCRIN  =====================  Glycemic monitoring  F/S with coverage  ===================HEMATOLOGIC/ONCOLOGIC =============  Monitor chest tube output. No signs of active bleeding.   Follow CBC, coags  in AM  DVT prophylaxis with SCD, sc Heparin  ========================INFECTIOUS DISEASE===============   Monitor for fever / leukocytosis.  All surgical incision / chest tube  sites look clean   F/up pleural fluid cx from today  C/w Zosyn/ Vanco - by level for E. coli bacteriemia at [place] / MRSA right empyema  Pt will need Abd/pelvis CT to looks for source of bacteriemia  ID Dr Chahal on board    Pertinent clinical, laboratory, radiographic, hemodynamic, echocardiographic, respiratory data, microbiologic data and chart were reviewed and analyzed frequently throughout the course of the day and night. GI and DVT prophylaxis, glycemic control, head of bed elevation and skin care issues were addressed.  Patient seen, examined and plan discussed with CT Surgery dr Pickering  / CTICU team during rounds.  Pt remains critically ill in imminent risk of  deterioration and requires very careful cardio- pulmonary monitoring and support.    I have spent   75    minutes of critical care time with this pt between   7   am  and 11:55 pm         minutes spent on total encounter; more than 50% of the visit was spent counseling and/or coordinating care by the attending physician.      KERLINE Faith MD CLAUDIA HAN          MRN-[id number]    HPI:  Pt is a 58 yr old  male who underwent a FB, R thoracotomy, decortication, chest wall reconstruction, lat flap with Dr Pickering on 10/11/18. He was admitted on 1/6/19 to [facility] with a R pleural effusion and respiratory failure,  A R PTC was placed there and he was intubated.  He still had a R SANDY drain in place.  He presented intubated and sedated on pressors. 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         Procedure: 10/11/18 Right thoracotomy, resection of portion of R 7th rib, evacuation of infected hemothorax, takedown of pleurocutaneous fistula  / R chest wall reconstruction with tunneled latissimus dorsi flap, covered by serratus anterior flap           Issues: Acute resp failure-extubated last night to BIPAP             Resp acidosis             Hypotension with sepsis/ distributive shock- on Levophed drip  Acute on chronic systolic CHF ( EF 10 %), ICD in place  Infected right hemothorax - (+) Enterococcus faecalis in the past / now + MRSA empyema   E coli bacteriemia              ESRD on HD    intermittent agitation/mild dementia              severe deconditioning      Interval/Overnight  Events/ ROS  Respiratory status required BIPAP support due to resp acidosis on nasal cannula trial;  following of ABG’s with A-line monitoring, continuous pulse oximetry monitoring,       PAST MEDICAL & SURGICAL HISTORY:  Smoking hx  Cardiomyopathy  Wound: right chest  ICD (implantable cardioverter-defibrillator) in place  OA (osteoarthritis)  HLD (hyperlipidemia)  BPH (benign prostatic hyperplasia)  Pleural effusion  HTN (hypertension)  ESRD (end stage renal disease)  H/O chest wound: right  S/P thoracotomy: FB, R thoracotomy, decortication, chest wall reconstruction, lat flap  History of wound infection: right chest wall - revision 5/18 and again in 7/18  History of implantable cardioverter-defibrillator (ICD) placement: pt unsure when placed  H/O bilateral hip replacements: 2008, 2009    Allergies  No Known Allergies    Home Medications:  aspirin 81 mg oral tablet: 1 tab(s) orally once a day  (30 Oct 2018 14:39)  Breo Ellipta 100 mcg-25 mcg/inh inhalation powder: 1 puff(s) inhaled once a day patient denies using it (11 Oct 2018 08:38)  Calcium 500: 1 tab(s) orally 2 times a day (11 Oct 2018 08:38)  docusate sodium 100 mg oral tablet: 1 tab(s) orally 2 times a day, As Needed (11 Oct 2018 08:39)  folic acid 1 mg oral tablet: 1 tab(s) orally once a day (11 Oct 2018 08:38)  Mag-Ox 400 oral tablet: 1 tab(s) orally once a day (11 Oct 2018 08:39)  Multiple Vitamins oral tablet: 1 tab(s) orally once a day  (30 Oct 2018 14:39)  Namenda 10 mg oral tablet: 1 tab(s) orally 2 times a day (11 Oct 2018 08:38)  Nephplex Rx oral tablet: 1 tab(s) orally once a day (11 Oct 2018 08:39)  nortriptyline 50 mg oral capsule: 1 cap(s) orally once a day (11 Oct 2018 08:39)  piperacillin-tazobactam 2 g-0.25 g intravenous injection: 1  intravenous every 12 hours until 11/7/18 (30 Oct 2018 16:27)  Renvela 800 mg oral tablet: 2 tab(s) orally every 8 hours (11 Oct 2018 08:39)  simethicone 80 mg oral tablet: 1 tab(s) orally 3 times a day (after meals) (11 Oct 2018 08:39)  Tylenol 325 mg oral tablet: 2 tab(s) orally every 6 hours, As Needed (30 Oct 2018 14:39)  vitamin A: 1 tab(s) orally once a day (11 Oct 2018 08:38)  Vitamin B Complex: 1 tab(s) orally once a day (11 Oct 2018 08:38)  Vitamin C 500 mg oral tablet: 1 tab(s) orally once a day (11 Oct 2018 08:38)  Midodrine 10 Q8     ***VITAL SIGNS:  Vital Signs Last 24 Hrs  T(C): 36.7 (09 Jan 2019 16:00), Max: 37.4 (08 Jan 2019 20:00)  T(F): 98 (09 Jan 2019 16:00), Max: 99.3 (08 Jan 2019 20:00)  HR: 91 (09 Jan 2019 18:00) (74 - 109)  BP: 112/59 (09 Jan 2019 18:00) (104/56 - 121/58)  BP(mean): 72 (09 Jan 2019 18:00) (67 - 78)  RR: 31 (09 Jan 2019 18:00) (15 - 31)  SpO2: 100% (09 Jan 2019 18:00) (97% - 100%)    I/Os:   I&O's Detail    08 Jan 2019 07:01  -  09 Jan 2019 07:00  --------------------------------------------------------  IN:    dexmedetomidine Infusion: 174.7 mL    IV PiggyBack: 100 mL    lactated ringers.: 1000 mL    norepinephrine Infusion: 266.4 mL    Other: 400 mL    propofol Infusion: 31 mL  Total IN: 1972.1 mL    OUT:    Chest Tube: 50 mL    Other: 400 mL    Other: 30 mL  Total OUT: 480 mL    Total NET: 1492.1 mL      09 Jan 2019 07:01  -  09 Jan 2019 18:50  --------------------------------------------------------  IN:    dextrose 5% + sodium chloride 0.9%.: 240 mL    norepinephrine Infusion: 162 mL  Total IN: 402 mL    OUT:    Chest Tube: 20 mL  Total OUT: 20 mL    Total NET: 382 mL    CAPILLARY BLOOD GLUCOSE  POCT Blood Glucose.: 90 mg/dL (09 Jan 2019 18:31)  POCT Blood Glucose.: 94 mg/dL (09 Jan 2019 13:34)  POCT Blood Glucose.: 73 mg/dL (09 Jan 2019 11:50)  POCT Blood Glucose.: 89 mg/dL (09 Jan 2019 06:46)  POCT Blood Glucose.: 72 mg/dL (09 Jan 2019 05:20)  POCT Blood Glucose.: 72 mg/dL (09 Jan 2019 05:17)    =======================  MEDICATIONS  ===================  MEDICATIONS  (STANDING):  ALBUTerol    90 MICROgram(s) HFA Inhaler 1 Puff(s) Inhalation every 4 hours  aspirin  chewable 81 milliGRAM(s) Oral daily  dextrose 5% + sodium chloride 0.9%. 1000 milliLiter(s) (30 mL/Hr) IV Continuous <Continuous>  docusate sodium Liquid 100 milliGRAM(s) Oral daily  heparin  Injectable 5000 Unit(s) SubCutaneous every 8 hours  norepinephrine Infusion 0.05 MICROgram(s)/kG/Min (5.606 mL/Hr) IV Continuous <Continuous>  pantoprazole  Injectable 40 milliGRAM(s) IV Push daily  piperacillin/tazobactam IVPB. 3.375 Gram(s) IV Intermittent every 12 hours    MEDICATIONS  (PRN):  ======================VENTILATOR SETTINGS  ==============  Mode: CPAP with PS  FiO2: 40  PEEP: 5  PS: 5  MAP: 6  PIP: 11  =================== PATIENT CARE ACCESS DEVICES ==========  Peripheral IV (+)  Central Venous Line	R	L	IJ	Fem	SC	Placed:   Arterial Line(+)    ======================= PHYSICAL EXAM===================  General:          Comfortable, Awake, alert, not in any distress, anxious  Neuro:             Moving all extremities to commands. No focal deficits	  HEENT:                           CHER   Respiratory:	Lungs sound coarse on auscultation bilaterally with good aeration.                           no wheezing. Effort even and unlabored. on BIPAP  CV:	            Regular rate and rhythm. Normal S1/S2.  Abdomen:	Soft,  nontender, not-distended. Bowel sounds present     Skin:		No rash.  Extremities:	Warm, no cyanosis or edema.  Palpable pulses  ============================ LABS =======================                        11.5   6.47  )-----------( 109      ( 09 Jan 2019 13:36 )             37.7     01-09    138  |  100  |  36<H>  ----------------------------<  107<H>  3.6   |  22  |  5.23<H>    Ca    8.7      09 Jan 2019 13:36  Phos  4.8     01-09  Mg     2.2     01-09    TPro  6.3  /  Alb  2.5<L>  /  TBili  0.4  /  DBili  x   /  AST  44<H>  /  ALT  29  /  AlkPhos  139<H>  01-09    LIVER FUNCTIONS - ( 09 Jan 2019 13:36 )  Alb: 2.5 g/dL / Pro: 6.3 g/dL / ALK PHOS: 139 u/L / ALT: 29 u/L / AST: 44 u/L / GGT: x           PT/INR - ( 07 Jan 2019 21:25 )   PT: 12.1 SEC;   INR: 1.09     PTT:30.5 SEC      ABG - ( 09 Jan 2019 16:56 ) on BIPAP  pH, Arterial: 7.33  pH, Blood: x     /  pCO2: 47    /  pO2: 153   / HCO3: 23    / Base Excess: -1.1  /  SaO2: 99.3      Blood Gas Arterial - Lytes,Hgb,iCa,Lact (01.09.19 @ 13:36)    pH, Arterial: 7.27 pH    pCO2, Arterial: 57 mmHg    pO2, Arterial: 135 mmHg    HCO3, Arterial: 23 mmol/L       BRONCHIAL LAVAGE  01-08-19 --  --  --    BRONCHIAL LAVAGE  01-08-19 --  --  --    BLOOD ARTERIAL  01-07-19 --  --  --  Pleural fluid cx - pending    ===================== IMAGING STUDIES ===================  Radiology personally reviewed.  < from: Xray Chest 1 View- PORTABLE-Routine (01.09.19 @ 06:32) >    January 8 at 7:30 PM:  Endotracheal tube in place. Bilateral pleural effusions again seen. Heart   size is stable. Visualized lungs are clear. No pneumothorax.    January 9 at 5:58 AM:  Since the last study, the endotracheal tube has been removed. No other   change. Bilateral loculated effusions and pigtail catheters overlying the   right upper quadrant seen. Visualized lungs are clear.      COMPARISON:  January 8  IMPRESSION:  Follow-up studies pre and post extubation with persistent   small loculated effusions.    < end of copied text >    ====================ASSESSMENT AND PLAN ================  Pt is a 58 yr old  male who underwent a FB, R thoracotomy, decortication, chest wall reconstruction, lat flap with Dr Pickering on 10/11/18. He was admitted on 1/6/19 to [facility] with a R pleural effusion and respiratory failure,  A R PTC was placed there and he was intubated.  He still had a R SANDY drain in place.  He presented intubated and sedated on pressors. Now extubated to BIPAP with resp acidosis requiring continuous  airway and acid / base monitoring        Issues: Acute resp failure-extubated last night to BIPAP             Resp acidosis             Hypotension with sepsis/ distributive shock- on Levophed drip  Acute on chronic systolic CHF ( EF 10 %), ICD in place  Infected right hemothorax - (+) Enterococcus faecalis in the past / now + MRSA empyema   E coli bacteriemia              ESRD on HD    intermittent agitation/mild dementia              severe deconditioning    ====================== NEUROLOGY=======================  Pain control with Tylenol IV  Pt is on Precedex  PRN for agitation    ==================== RESPIRATORY========================  Pt is on BIPAP due to resp acidosis  without hypoxemia due to deconditioning  Comfortable, no evidence of distress.  Monitor  right chest tube and david output  Chest tube to suction, david to bulb suction    BIPAP NIV ventilator status assessed & settings reviewed  Continuous pulse oximetry monitoring  Continue bronchodilators, pulmonary toilet  Head of bed elevation to 30-40 degrees  F/up serial ABG 's  ====================CARDIOVASCULAR=====================  Continue hemodynamic monitoring/ telemetry  On Levophed - titrate pressors for SBP>100, MAP >60-65     ===================== RENAL ============================  Continue D5 NS  30CC/hr      Monitor I/Os, BUN/ Cr  and electrolytes  Next HD tomorrow  ==================== GASTROINTESTINAL===================  Strict NPO while pt on BIPAP post extubation  Pt may need swallow study before PO intake safely can be resumed due to high risk of aspiration  Continue GI prophylaxis with Protonix  Continue Zofran / Reglan for nausea - PRN	    =======================    ENDOCRIN  =====================  Glycemic monitoring  F/S with coverage  ===================HEMATOLOGIC/ONCOLOGIC =============  Monitor chest tube output. No signs of active bleeding.   Follow CBC, coags  in AM  DVT prophylaxis with SCD, sc Heparin  ========================INFECTIOUS DISEASE===============   Monitor for fever / leukocytosis.  All surgical incision / chest tube  sites look clean   F/up pleural fluid cx from today  C/w Zosyn/ Vanco - by level for E. coli bacteriemia at [place] / MRSA right empyema  Pt will need Abd/pelvis CT to looks for source of bacteriemia  ID Dr Chahal on board    Pertinent clinical, laboratory, radiographic, hemodynamic, echocardiographic, respiratory data, microbiologic data and chart were reviewed and analyzed frequently throughout the course of the day and night. GI and DVT prophylaxis, glycemic control, head of bed elevation and skin care issues were addressed.  Patient seen, examined and plan discussed with CT Surgery dr Pickering  / CTICU team during rounds.  Pt remains critically ill in imminent risk of  deterioration and requires very careful cardio- pulmonary monitoring and support.    I have spent   75    minutes of critical care time with this pt between   7   am  and 11:55 pm         minutes spent on total encounter; more than 50% of the visit was spent counseling and/or coordinating care by the attending physician.      KERLINE Faith MD CLAUDIA HAN          MRN-[id number]    HPI:  Pt is a 58 yr old  male who underwent a FB, R thoracotomy, decortication, chest wall reconstruction, lat flap with Dr Pickering on 10/11/18. He was admitted on 1/6/19 to [facility] with a R pleural effusion and respiratory failure,  A R PTC was placed there and he was intubated.  He still had a R SANDY drain in place.  He presented intubated and sedated on pressors. 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         Procedure: 10/11/18 Right thoracotomy, resection of portion of R 7th rib, evacuation of infected hemothorax, takedown of pleurocutaneous fistula  / R chest wall reconstruction with tunneled latissimus dorsi flap, covered by serratus anterior flap           Issues: Acute resp failure-extubated last night to BIPAP             Resp acidosis             Hypotension with sepsis/ distributive shock- on Levophed drip  Acute on chronic systolic CHF ( EF 10 %), ICD in place  Infected right hemothorax - (+) Enterococcus faecalis in the past / now + MRSA empyema   E coli bacteriemia              ESRD on HD    intermittent agitation/mild dementia              severe deconditioning      Interval/Overnight  Events/ ROS  Respiratory status required BIPAP support due to resp acidosis on nasal cannula trial;  following of ABG’s with A-line monitoring, continuous pulse oximetry monitoring,       PAST MEDICAL & SURGICAL HISTORY:  Smoking hx  Cardiomyopathy  Wound: right chest  ICD (implantable cardioverter-defibrillator) in place  OA (osteoarthritis)  HLD (hyperlipidemia)  BPH (benign prostatic hyperplasia)  Pleural effusion  HTN (hypertension)  ESRD (end stage renal disease)  H/O chest wound: right  S/P thoracotomy: FB, R thoracotomy, decortication, chest wall reconstruction, lat flap  History of wound infection: right chest wall - revision 5/18 and again in 7/18  History of implantable cardioverter-defibrillator (ICD) placement: pt unsure when placed  H/O bilateral hip replacements: 2008, 2009    Allergies  No Known Allergies    Home Medications:  aspirin 81 mg oral tablet: 1 tab(s) orally once a day  (30 Oct 2018 14:39)  Breo Ellipta 100 mcg-25 mcg/inh inhalation powder: 1 puff(s) inhaled once a day patient denies using it (11 Oct 2018 08:38)  Calcium 500: 1 tab(s) orally 2 times a day (11 Oct 2018 08:38)  docusate sodium 100 mg oral tablet: 1 tab(s) orally 2 times a day, As Needed (11 Oct 2018 08:39)  folic acid 1 mg oral tablet: 1 tab(s) orally once a day (11 Oct 2018 08:38)  Mag-Ox 400 oral tablet: 1 tab(s) orally once a day (11 Oct 2018 08:39)  Multiple Vitamins oral tablet: 1 tab(s) orally once a day  (30 Oct 2018 14:39)  Namenda 10 mg oral tablet: 1 tab(s) orally 2 times a day (11 Oct 2018 08:38)  Nephplex Rx oral tablet: 1 tab(s) orally once a day (11 Oct 2018 08:39)  nortriptyline 50 mg oral capsule: 1 cap(s) orally once a day (11 Oct 2018 08:39)  piperacillin-tazobactam 2 g-0.25 g intravenous injection: 1  intravenous every 12 hours until 11/7/18 (30 Oct 2018 16:27)  Renvela 800 mg oral tablet: 2 tab(s) orally every 8 hours (11 Oct 2018 08:39)  simethicone 80 mg oral tablet: 1 tab(s) orally 3 times a day (after meals) (11 Oct 2018 08:39)  Tylenol 325 mg oral tablet: 2 tab(s) orally every 6 hours, As Needed (30 Oct 2018 14:39)  vitamin A: 1 tab(s) orally once a day (11 Oct 2018 08:38)  Vitamin B Complex: 1 tab(s) orally once a day (11 Oct 2018 08:38)  Vitamin C 500 mg oral tablet: 1 tab(s) orally once a day (11 Oct 2018 08:38)  Midodrine 10 Q8     ***VITAL SIGNS:  Vital Signs Last 24 Hrs  T(C): 36.7 (09 Jan 2019 16:00), Max: 37.4 (08 Jan 2019 20:00)  T(F): 98 (09 Jan 2019 16:00), Max: 99.3 (08 Jan 2019 20:00)  HR: 91 (09 Jan 2019 18:00) (74 - 109)  BP: 112/59 (09 Jan 2019 18:00) (104/56 - 121/58)  BP(mean): 72 (09 Jan 2019 18:00) (67 - 78)  RR: 31 (09 Jan 2019 18:00) (15 - 31)  SpO2: 100% (09 Jan 2019 18:00) (97% - 100%)    I/Os:   I&O's Detail    08 Jan 2019 07:01  -  09 Jan 2019 07:00  --------------------------------------------------------  IN:    dexmedetomidine Infusion: 174.7 mL    IV PiggyBack: 100 mL    lactated ringers.: 1000 mL    norepinephrine Infusion: 266.4 mL    Other: 400 mL    propofol Infusion: 31 mL  Total IN: 1972.1 mL    OUT:    Chest Tube: 50 mL    Other: 400 mL    Other: 30 mL  Total OUT: 480 mL    Total NET: 1492.1 mL      09 Jan 2019 07:01  -  09 Jan 2019 18:50  --------------------------------------------------------  IN:    dextrose 5% + sodium chloride 0.9%.: 240 mL    norepinephrine Infusion: 162 mL  Total IN: 402 mL    OUT:    Chest Tube: 20 mL  Total OUT: 20 mL    Total NET: 382 mL    CAPILLARY BLOOD GLUCOSE  POCT Blood Glucose.: 90 mg/dL (09 Jan 2019 18:31)  POCT Blood Glucose.: 94 mg/dL (09 Jan 2019 13:34)  POCT Blood Glucose.: 73 mg/dL (09 Jan 2019 11:50)  POCT Blood Glucose.: 89 mg/dL (09 Jan 2019 06:46)  POCT Blood Glucose.: 72 mg/dL (09 Jan 2019 05:20)  POCT Blood Glucose.: 72 mg/dL (09 Jan 2019 05:17)    =======================  MEDICATIONS  ===================  MEDICATIONS  (STANDING):  ALBUTerol    90 MICROgram(s) HFA Inhaler 1 Puff(s) Inhalation every 4 hours  aspirin  chewable 81 milliGRAM(s) Oral daily  dextrose 5% + sodium chloride 0.9%. 1000 milliLiter(s) (30 mL/Hr) IV Continuous <Continuous>  docusate sodium Liquid 100 milliGRAM(s) Oral daily  heparin  Injectable 5000 Unit(s) SubCutaneous every 8 hours  norepinephrine Infusion 0.05 MICROgram(s)/kG/Min (5.606 mL/Hr) IV Continuous <Continuous>  pantoprazole  Injectable 40 milliGRAM(s) IV Push daily  piperacillin/tazobactam IVPB. 3.375 Gram(s) IV Intermittent every 12 hours    MEDICATIONS  (PRN):  ======================VENTILATOR SETTINGS  ==============  Mode: CPAP with PS  FiO2: 40  PEEP: 5  PS: 5  MAP: 6  PIP: 11  =================== PATIENT CARE ACCESS DEVICES ==========  Peripheral IV (+)  Arterial Line(+)  Left arm AV fistula (+)  ======================= PHYSICAL EXAM===================  General:          Comfortable, Awake, alert, not in any distress, anxious  Neuro:             Moving all extremities to commands. No focal deficits	  HEENT:                           CHER   Respiratory:	Lungs sound coarse on auscultation bilaterally with good aeration.                           no wheezing. Effort even and unlabored. on BIPAP  CV:	            Regular rate and rhythm. Normal S1/S2.  Abdomen:	Soft,  nontender, not-distended. Bowel sounds present     Skin:		No rash.  Extremities:	Warm, no cyanosis or edema.  Palpable pulses  ============================ LABS =======================                        11.5   6.47  )-----------( 109      ( 09 Jan 2019 13:36 )             37.7     01-09    138  |  100  |  36<H>  ----------------------------<  107<H>  3.6   |  22  |  5.23<H>    Ca    8.7      09 Jan 2019 13:36  Phos  4.8     01-09  Mg     2.2     01-09    TPro  6.3  /  Alb  2.5<L>  /  TBili  0.4  /  DBili  x   /  AST  44<H>  /  ALT  29  /  AlkPhos  139<H>  01-09    LIVER FUNCTIONS - ( 09 Jan 2019 13:36 )  Alb: 2.5 g/dL / Pro: 6.3 g/dL / ALK PHOS: 139 u/L / ALT: 29 u/L / AST: 44 u/L / GGT: x           PT/INR - ( 07 Jan 2019 21:25 )   PT: 12.1 SEC;   INR: 1.09     PTT:30.5 SEC      ABG - ( 09 Jan 2019 16:56 ) on BIPAP  pH, Arterial: 7.33  pH, Blood: x     /  pCO2: 47    /  pO2: 153   / HCO3: 23    / Base Excess: -1.1  /  SaO2: 99.3      Blood Gas Arterial - Lytes,Hgb,iCa,Lact (01.09.19 @ 13:36)    pH, Arterial: 7.27 pH    pCO2, Arterial: 57 mmHg    pO2, Arterial: 135 mmHg    HCO3, Arterial: 23 mmol/L       BRONCHIAL LAVAGE  01-08-19 --  --  --    BRONCHIAL LAVAGE  01-08-19 --  --  --    BLOOD ARTERIAL  01-07-19 --  --  --  Pleural fluid cx - pending    ===================== IMAGING STUDIES ===================  Radiology personally reviewed.  < from: Xray Chest 1 View- PORTABLE-Routine (01.09.19 @ 06:32) >    January 8 at 7:30 PM:  Endotracheal tube in place. Bilateral pleural effusions again seen. Heart   size is stable. Visualized lungs are clear. No pneumothorax.    January 9 at 5:58 AM:  Since the last study, the endotracheal tube has been removed. No other   change. Bilateral loculated effusions and pigtail catheters overlying the   right upper quadrant seen. Visualized lungs are clear.      COMPARISON:  January 8  IMPRESSION:  Follow-up studies pre and post extubation with persistent   small loculated effusions.    < end of copied text >    ====================ASSESSMENT AND PLAN ================  Pt is a 58 yr old  male who underwent a FB, R thoracotomy, decortication, chest wall reconstruction, lat flap with Dr Pickering on 10/11/18. He was admitted on 1/6/19 to [facility] with a R pleural effusion and respiratory failure,  A R PTC was placed there and he was intubated.  He still had a R SANDY drain in place.  He presented intubated and sedated on pressors. Now extubated to BIPAP with resp acidosis requiring continuous  airway and acid / base monitoring        Issues: Acute resp failure-extubated last night to BIPAP             Resp acidosis             Hypotension with sepsis/ distributive shock- on Levophed drip  Acute on chronic systolic CHF ( EF 10 %), ICD in place  Infected right hemothorax - (+) Enterococcus faecalis in the past / now + MRSA empyema   E coli bacteriemia              ESRD on HD    intermittent agitation/mild dementia              severe deconditioning    ====================== NEUROLOGY=======================  Pain control with Tylenol IV  Pt is on Precedex  PRN for agitation    ==================== RESPIRATORY========================  Pt is on BIPAP due to resp acidosis  without hypoxemia due to deconditioning  Comfortable, no evidence of distress.  Monitor  right chest tube and david output  Chest tube to suction, david to bulb suction    BIPAP NIV ventilator status assessed & settings reviewed  Continuous pulse oximetry monitoring  Continue bronchodilators, pulmonary toilet  Head of bed elevation to 30-40 degrees  F/up serial ABG 's  ====================CARDIOVASCULAR=====================  Continue hemodynamic monitoring/ telemetry  On Levophed - titrate pressors for SBP>100, MAP >60-65     ===================== RENAL ============================  Continue D5 NS  30CC/hr      Monitor I/Os, BUN/ Cr  and electrolytes  Next HD tomorrow  ==================== GASTROINTESTINAL===================  Strict NPO while pt on BIPAP post extubation  Pt may need swallow study before PO intake safely can be resumed due to high risk of aspiration  Continue GI prophylaxis with Protonix  Continue Zofran / Reglan for nausea - PRN	    =======================    ENDOCRIN  =====================  Glycemic monitoring  F/S with coverage  ===================HEMATOLOGIC/ONCOLOGIC =============  Monitor chest tube output. No signs of active bleeding.   Follow CBC, coags  in AM  DVT prophylaxis with SCD, sc Heparin  ========================INFECTIOUS DISEASE===============   Monitor for fever / leukocytosis.  All surgical incision / chest tube  sites look clean   F/up pleural fluid cx from today  C/w Zosyn/ Vanco - by level for E. coli bacteriemia at [place] / MRSA right empyema  Pt will need Abd/pelvis CT to looks for source of bacteriemia  ID Dr Chahal on board    Pertinent clinical, laboratory, radiographic, hemodynamic, echocardiographic, respiratory data, microbiologic data and chart were reviewed and analyzed frequently throughout the course of the day and night. GI and DVT prophylaxis, glycemic control, head of bed elevation and skin care issues were addressed.  Patient seen, examined and plan discussed with CT Surgery dr Pickering  / CTICU team during rounds.  Pt remains critically ill in imminent risk of  deterioration and requires very careful cardio- pulmonary monitoring and support.    I have spent   75    minutes of critical care time with this pt between   7   am  and 11:55 pm         minutes spent on total encounter; more than 50% of the visit was spent counseling and/or coordinating care by the attending physician.      KERLINE Faith MD

## 2019-01-10 LAB
ALBUMIN SERPL ELPH-MCNC: 2.4 G/DL — LOW (ref 3.3–5)
ALP SERPL-CCNC: 136 U/L — HIGH (ref 40–120)
ALT FLD-CCNC: 30 U/L — SIGNIFICANT CHANGE UP (ref 4–41)
ANION GAP SERPL CALC-SCNC: 19 MEQ/L — HIGH (ref 7–14)
AST SERPL-CCNC: 37 U/L — SIGNIFICANT CHANGE UP (ref 4–40)
BASE EXCESS BLDA CALC-SCNC: 0.4 MMOL/L — SIGNIFICANT CHANGE UP
BASE EXCESS BLDA CALC-SCNC: 1.4 MMOL/L — SIGNIFICANT CHANGE UP
BASE EXCESS BLDA CALC-SCNC: 2.8 MMOL/L — SIGNIFICANT CHANGE UP
BASE EXCESS BLDA CALC-SCNC: 2.9 MMOL/L — SIGNIFICANT CHANGE UP
BASE EXCESS BLDA CALC-SCNC: 3.2 MMOL/L — SIGNIFICANT CHANGE UP
BASE EXCESS BLDA CALC-SCNC: 3.7 MMOL/L — SIGNIFICANT CHANGE UP
BILIRUB SERPL-MCNC: 0.4 MG/DL — SIGNIFICANT CHANGE UP (ref 0.2–1.2)
BUN SERPL-MCNC: 39 MG/DL — HIGH (ref 7–23)
CA-I BLDA-SCNC: 1.21 MMOL/L — SIGNIFICANT CHANGE UP (ref 1.15–1.29)
CA-I BLDA-SCNC: 1.23 MMOL/L — SIGNIFICANT CHANGE UP (ref 1.15–1.29)
CALCIUM SERPL-MCNC: 9.4 MG/DL — SIGNIFICANT CHANGE UP (ref 8.4–10.5)
CHLORIDE SERPL-SCNC: 99 MMOL/L — SIGNIFICANT CHANGE UP (ref 98–107)
CO2 SERPL-SCNC: 22 MMOL/L — SIGNIFICANT CHANGE UP (ref 22–31)
CREAT SERPL-MCNC: 5.86 MG/DL — HIGH (ref 0.5–1.3)
GLUCOSE BLDA-MCNC: 82 MG/DL — SIGNIFICANT CHANGE UP (ref 70–99)
GLUCOSE BLDA-MCNC: 86 MG/DL — SIGNIFICANT CHANGE UP (ref 70–99)
GLUCOSE SERPL-MCNC: 88 MG/DL — SIGNIFICANT CHANGE UP (ref 70–99)
HCO3 BLDA-SCNC: 24 MMOL/L — SIGNIFICANT CHANGE UP (ref 22–26)
HCO3 BLDA-SCNC: 25 MMOL/L — SIGNIFICANT CHANGE UP (ref 22–26)
HCO3 BLDA-SCNC: 26 MMOL/L — SIGNIFICANT CHANGE UP (ref 22–26)
HCT VFR BLD CALC: 36.3 % — LOW (ref 39–50)
HCT VFR BLDA CALC: 35.1 % — LOW (ref 39–51)
HCT VFR BLDA CALC: 35.8 % — LOW (ref 39–51)
HGB BLD-MCNC: 11.4 G/DL — LOW (ref 13–17)
HGB BLDA-MCNC: 11.4 G/DL — LOW (ref 13–17)
HGB BLDA-MCNC: 11.6 G/DL — LOW (ref 13–17)
LACTATE BLDA-SCNC: 0.7 MMOL/L — SIGNIFICANT CHANGE UP (ref 0.5–2)
LACTATE BLDA-SCNC: 1 MMOL/L — SIGNIFICANT CHANGE UP (ref 0.5–2)
MAGNESIUM SERPL-MCNC: 2.1 MG/DL — SIGNIFICANT CHANGE UP (ref 1.6–2.6)
MCHC RBC-ENTMCNC: 31.1 PG — SIGNIFICANT CHANGE UP (ref 27–34)
MCHC RBC-ENTMCNC: 31.4 % — LOW (ref 32–36)
MCV RBC AUTO: 98.9 FL — SIGNIFICANT CHANGE UP (ref 80–100)
NRBC # FLD: 0 K/UL — LOW (ref 25–125)
PCO2 BLDA: 49 MMHG — HIGH (ref 35–48)
PCO2 BLDA: 54 MMHG — HIGH (ref 35–48)
PCO2 BLDA: 62 MMHG — HIGH (ref 35–48)
PCO2 BLDA: 64 MMHG — HIGH (ref 35–48)
PCO2 BLDA: 66 MMHG — HIGH (ref 35–48)
PCO2 BLDA: 70 MMHG — CRITICAL HIGH (ref 35–48)
PH BLDA: 7.26 PH — LOW (ref 7.35–7.45)
PH BLDA: 7.27 PH — LOW (ref 7.35–7.45)
PH BLDA: 7.29 PH — LOW (ref 7.35–7.45)
PH BLDA: 7.29 PH — LOW (ref 7.35–7.45)
PH BLDA: 7.32 PH — LOW (ref 7.35–7.45)
PH BLDA: 7.34 PH — LOW (ref 7.35–7.45)
PHOSPHATE SERPL-MCNC: 4.5 MG/DL — SIGNIFICANT CHANGE UP (ref 2.5–4.5)
PLATELET # BLD AUTO: 105 K/UL — LOW (ref 150–400)
PMV BLD: 9.5 FL — SIGNIFICANT CHANGE UP (ref 7–13)
PO2 BLDA: 128 MMHG — HIGH (ref 83–108)
PO2 BLDA: 129 MMHG — HIGH (ref 83–108)
PO2 BLDA: 133 MMHG — HIGH (ref 83–108)
PO2 BLDA: 136 MMHG — HIGH (ref 83–108)
PO2 BLDA: 163 MMHG — HIGH (ref 83–108)
PO2 BLDA: 188 MMHG — HIGH (ref 83–108)
POTASSIUM BLDA-SCNC: 3.3 MMOL/L — LOW (ref 3.4–4.5)
POTASSIUM BLDA-SCNC: 3.3 MMOL/L — LOW (ref 3.4–4.5)
POTASSIUM SERPL-MCNC: 3.6 MMOL/L — SIGNIFICANT CHANGE UP (ref 3.5–5.3)
POTASSIUM SERPL-SCNC: 3.6 MMOL/L — SIGNIFICANT CHANGE UP (ref 3.5–5.3)
PROT SERPL-MCNC: 6 G/DL — SIGNIFICANT CHANGE UP (ref 6–8.3)
RBC # BLD: 3.67 M/UL — LOW (ref 4.2–5.8)
RBC # FLD: 15.9 % — HIGH (ref 10.3–14.5)
SAO2 % BLDA: 98.3 % — SIGNIFICANT CHANGE UP (ref 95–99)
SAO2 % BLDA: 98.4 % — SIGNIFICANT CHANGE UP (ref 95–99)
SAO2 % BLDA: 98.6 % — SIGNIFICANT CHANGE UP (ref 95–99)
SAO2 % BLDA: 98.8 % — SIGNIFICANT CHANGE UP (ref 95–99)
SAO2 % BLDA: 99.1 % — HIGH (ref 95–99)
SAO2 % BLDA: 99.2 % — HIGH (ref 95–99)
SODIUM BLDA-SCNC: 134 MMOL/L — LOW (ref 136–146)
SODIUM BLDA-SCNC: 135 MMOL/L — LOW (ref 136–146)
SODIUM SERPL-SCNC: 140 MMOL/L — SIGNIFICANT CHANGE UP (ref 135–145)
SPECIMEN SOURCE: SIGNIFICANT CHANGE UP
SPECIMEN SOURCE: SIGNIFICANT CHANGE UP
VANCOMYCIN FLD-MCNC: 15 UG/ML — SIGNIFICANT CHANGE UP
VANCOMYCIN TROUGH SERPL-MCNC: 18.6 UG/ML — SIGNIFICANT CHANGE UP (ref 10–20)
WBC # BLD: 7.48 K/UL — SIGNIFICANT CHANGE UP (ref 3.8–10.5)
WBC # FLD AUTO: 7.48 K/UL — SIGNIFICANT CHANGE UP (ref 3.8–10.5)

## 2019-01-10 PROCEDURE — 71045 X-RAY EXAM CHEST 1 VIEW: CPT | Mod: 26

## 2019-01-10 PROCEDURE — 99292 CRITICAL CARE ADDL 30 MIN: CPT

## 2019-01-10 PROCEDURE — 99291 CRITICAL CARE FIRST HOUR: CPT

## 2019-01-10 PROCEDURE — 90935 HEMODIALYSIS ONE EVALUATION: CPT | Mod: GC

## 2019-01-10 RX ORDER — ALBUMIN HUMAN 25 %
250 VIAL (ML) INTRAVENOUS ONCE
Qty: 0 | Refills: 0 | Status: COMPLETED | OUTPATIENT
Start: 2019-01-10 | End: 2019-01-10

## 2019-01-10 RX ADMIN — SODIUM CHLORIDE 30 MILLILITER(S): 9 INJECTION, SOLUTION INTRAVENOUS at 07:47

## 2019-01-10 RX ADMIN — Medication 5.61 MICROGRAM(S)/KG/MIN: at 07:47

## 2019-01-10 RX ADMIN — HEPARIN SODIUM 5000 UNIT(S): 5000 INJECTION INTRAVENOUS; SUBCUTANEOUS at 20:31

## 2019-01-10 RX ADMIN — PANTOPRAZOLE SODIUM 40 MILLIGRAM(S): 20 TABLET, DELAYED RELEASE ORAL at 13:02

## 2019-01-10 RX ADMIN — PIPERACILLIN AND TAZOBACTAM 25 GRAM(S): 4; .5 INJECTION, POWDER, LYOPHILIZED, FOR SOLUTION INTRAVENOUS at 20:32

## 2019-01-10 RX ADMIN — PIPERACILLIN AND TAZOBACTAM 25 GRAM(S): 4; .5 INJECTION, POWDER, LYOPHILIZED, FOR SOLUTION INTRAVENOUS at 05:21

## 2019-01-10 RX ADMIN — HEPARIN SODIUM 5000 UNIT(S): 5000 INJECTION INTRAVENOUS; SUBCUTANEOUS at 05:21

## 2019-01-10 RX ADMIN — Medication 125 MILLILITER(S): at 15:00

## 2019-01-10 NOTE — PROGRESS NOTE ADULT - ASSESSMENT
Pt is a 58 yr old Mandarin speaking male who underwent right thoractomy decortiation, Right Chest Wall reconstruction with Latissimjus Dorsi Flap Poss Skin Graft with VAC dressing with Dr Pickering 10/11/18.  He has h/o ESRD with HD, CHF, EF of 10%,  ICD,  with h/o chronic bilateral pleural effusions.  The patient previously underwent left VATS and PleurX drains  in 2015.  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     During his last admission at [facility], pt underwent OR with Plastic and Thoracic surgery,  for definitive repair that involved thoracotomy, decortication, excision of empyema cavity along with muscle flap reconstruction. Pt underwent surgery on 10/11 (Right thoracotomy, resection of portion of R 7th rib, evacuation of infected hemothorax, takedown of pleurocutaneous fistula - and noted to have foul smelling hematoma).  OR cultures grew Enterococcus faecalis.  Pt was treated with zosyn for 4 week course for infected hemothorax which was completed on 11/7.      He was admitted on 1/6/19 to [facility] with a R pleural effusion and respiratory failure,  A R PTC was placed there and he was intubated.  He still had a R SANDY drain in place.  He presented intubated and sedated on pressors. (07 Jan 2019 23:10) Cultures from pleural fluid at [facility] reportedly grew MRSA.  Pt is currently on vanco/zosyn.      1/8 - Bronchoscopy for removal of foreign body in airway.      1/9 - reports from [facility] reviewed:  On admission there, pt had fever 102.3, P 121, BP 74/48.  Bcx from 1/6 (+) E.coli (sensitive to zosyn), and pleural fluid cx 1/8 grew MRSA.  Pleural fluid showed 28 WBC.  AFB (-).      ID consult called for further abx management.      Problem/Plan - 1:    Septic shock    - Pt with R pleural effusion, s/p SANDY drain/chest tube, (+) MRSA reported from pleural fluid cultures at [facility], Total WBC from pleural fluid - 24.  Drainage has been serosanguinous.   Cont vanco by level.  Redose 1gm IV x 1 today.     - Blood cx also (+) for E.coli at [facility], source unclear.    Cont zosyn.  Repeat BCx (-) x 1 at [facility].  Recommend CTap with po/iv contrast to evaluate for bacteremia source, as pleural fluid cultures grew MRSA.       - BAL specimens (+) yeast, suspect colonizer.      - echocardiogram stable findings compared to last study.    Will follow,    Anabel Chahal  [phone]

## 2019-01-10 NOTE — PROGRESS NOTE ADULT - PROBLEM SELECTOR PLAN 1
Pt. with ESRD on HD TIW (MWF). Last HD as was on 1/8/19 via [facility]. Pt. seen during HD today tolerating well. Monitor labs.

## 2019-01-10 NOTE — PROGRESS NOTE ADULT - SUBJECTIVE AND OBJECTIVE BOX
[facility] DIVISION OF KIDNEY DISEASES AND HYPERTENSION -- FOLLOW UP NOTE  --------------------------------------------------------------------------------  HPI: 59 yo male (Mandarin speaking) with medical history of NICM with ICD, ESRD on HD, former smoker, BPH who was transfer for [facility] due to septic shock on 1/7/19. Pt. was initially admitted due to fever and right pleural effusion, found to have respiratory failure and intubated and afterwards developed shock, thought to be septic. Pt had R thoracotomy, decortication, chest wall reconstruction, lat flap with Dr Pickering on 10/11/18. Extubated 1/8/19 and currently on bipap.   Pt intubated and sedated in the CTICU, seen during HD tolerating well, on bipap    PAST HISTORY  --------------------------------------------------------------------------------  No significant changes to PMH, PSH, FHx, SHx, unless otherwise noted    ALLERGIES & MEDICATIONS  --------------------------------------------------------------------------------  Allergies    No Known Allergies    Intolerances      Standing Inpatient Medications  ALBUTerol    90 MICROgram(s) HFA Inhaler 1 Puff(s) Inhalation every 4 hours  aspirin  chewable 81 milliGRAM(s) Oral daily  dextrose 5% + sodium chloride 0.9%. 1000 milliLiter(s) IV Continuous <Continuous>  docusate sodium Liquid 100 milliGRAM(s) Oral daily  heparin  Injectable 5000 Unit(s) SubCutaneous every 12 hours  norepinephrine Infusion 0.05 MICROgram(s)/kG/Min IV Continuous <Continuous>  pantoprazole  Injectable 40 milliGRAM(s) IV Push daily  piperacillin/tazobactam IVPB. 3.375 Gram(s) IV Intermittent every 12 hours    PRN Inpatient Medications      REVIEW OF SYSTEMS  --------------------------------------------------------------------------------  Unable to obtain    VITALS/PHYSICAL EXAM  --------------------------------------------------------------------------------  T(C): 36.6 (01-10-19 @ 06:45), Max: 36.9 (01-09-19 @ 20:00)  HR: 80 (01-10-19 @ 07:40) (80 - 109)  BP: 109/65 (01-10-19 @ 07:00) (104/56 - 121/67)  RR: 15 (01-10-19 @ 07:00) (15 - 31)  SpO2: 99% (01-10-19 @ 07:40) (99% - 100%)  Wt(kg): --        01-09-19 @ 07:01  -  01-10-19 @ 07:00  --------------------------------------------------------  IN: 870 mL / OUT: 30 mL / NET: 840 mL        Physical Exam:  	Gen: Awake  	HEENT: On bipap  	Pulm: course breath sounds B/L Right pig tail draining serous fluid  	CV: S1S2  	Abd: Soft, +BS   	Ext: No LE edema B/L  	Neuro: sedated however responds to verbal stimuli  	Skin: Warm and dry  	Vascular access:  LUE AVF site: cannulated       LABS/STUDIES  --------------------------------------------------------------------------------              11.4   7.48  >-----------<  105      [01-10-19 @ 02:25]              36.3     140  |  99  |  39  ----------------------------<  88      [01-10-19 @ 02:25]  3.6   |  22  |  5.86        Ca     9.4     [01-10-19 @ 02:25]      Mg     2.1     [01-10-19 @ 02:25]      Phos  4.5     [01-10-19 @ 02:25]    TPro  6.0  /  Alb  2.4  /  TBili  0.4  /  DBili  x   /  AST  37  /  ALT  30  /  AlkPhos  136  [01-10-19 @ 02:25]          Creatinine Trend:  SCr 5.86 [01-10 @ 02:25]  SCr 5.23 [01-09 @ 13:36]  SCr 4.45 [01-09 @ 00:20]  SCr 6.47 [01-08 @ 02:45]  SCr 6.33 [01-07 @ 21:25]        HbA1c 5.5      [10-12-18 @ 02:53]  TSH 4.79      [10-21-18 @ 04:15]    HBsAb Reactive      [01-08-19 @ 08:35]  HBsAg NEGATIVE      [01-08-19 @ 08:35]  HCV 0.16, Nonreactive Hepatitis C AB  S/CO Ratio                        Interpretation  < 1.0                                     Non-Reactive  1.0 - 4.9                           Weakly-Reactive  > 5.0                                 Reactive  Non-Reactive: Aperson with a non-reactive HCV antibody  result is considered uninfected.  No further action is  needed unless recent infection is suspected.  In these  cases, consider repeat testing later to detect  seroconversion..  Weakly-Reactive: HCV antibody test is abnormal, HCV RNA  Qualitative test will follow.  Reactive: HCV antibody test is abnormal, HCV RNA  Qualitative test will follow.  Note: HCV antibody testing is performed on the [employer]   system.      [01-08-19 @ 08:35]

## 2019-01-10 NOTE — PROGRESS NOTE ADULT - SUBJECTIVE AND OBJECTIVE BOX
Infectious Diseases progress note:    Subjective: Pt awake and alert, appears comfortable.  Still on pressor support.  Afebrile.      ROS:  CONSTITUTIONAL:  No fever, chills, rigors  CARDIOVASCULAR:  No chest pain or palpitations  RESPIRATORY:   No SOB, cough, dyspnea on exertion.  No wheezing  GASTROINTESTINAL:  No abd pain, N/V, diarrhea/constipation  EXTREMITIES:  No swelling or joint pain  GENITOURINARY:  No burning on urination, increased frequency or urgency.  No flank pain  NEUROLOGIC:  No HA, visual disturbances  SKIN: No rashes    Allergies    No Known Allergies    Intolerances        ANTIBIOTICS/RELEVANT:  antimicrobials  piperacillin/tazobactam IVPB. 3.375 Gram(s) IV Intermittent every 12 hours    immunologic:    OTHER:  albumin human  5% IVPB 250 milliLiter(s) IV Intermittent once  ALBUTerol    90 MICROgram(s) HFA Inhaler 1 Puff(s) Inhalation every 4 hours  aspirin  chewable 81 milliGRAM(s) Oral daily  docusate sodium Liquid 100 milliGRAM(s) Oral daily  heparin  Injectable 5000 Unit(s) SubCutaneous every 12 hours  norepinephrine Infusion 0.05 MICROgram(s)/kG/Min IV Continuous <Continuous>  pantoprazole  Injectable 40 milliGRAM(s) IV Push daily      Objective:  Vital Signs Last 24 Hrs  T(C): 37.2 (10 Avila 2019 15:00), Max: 37.2 (10 Avila 2019 15:00)  T(F): 99 (10 Avila 2019 15:00), Max: 99 (10 Avila 2019 15:00)  HR: 120 (10 Avila 2019 18:00) (79 - 140)  BP: 88/59 (10 Avila 2019 18:00) (88/59 - 118/63)  BP(mean): 64 (10 Avila 2019 18:00) (64 - 78)  RR: 20 (10 Avila 2019 18:00) (14 - 30)  SpO2: 96% (10 Avila 2019 18:00) (91% - 100%)    PHYSICAL EXAM:  Constitutional:NAD  Eyes:CHER, EOMI  Ear/Nose/Throat: no thrush, mucositis.  Moist mucous membranes	  Neck:no JVD, no lymphadenopathy, supple  Respiratory: Decr BS at Rt base, chest tube in place  Cardiovascular: S1S2 RRR, no murmurs  Gastrointestinal:soft, nontender,  nondistended (+) BS  Extremities:no e/e/c  Skin:  no rashes, open wounds or ulcerations        LABS:                        11.4   7.48  )-----------( 105      ( 10 Avila 2019 02:25 )             36.3     01-10    140  |  99  |  39<H>  ----------------------------<  88  3.6   |  22  |  5.86<H>    Ca    9.4      10 Avila 2019 02:25  Phos  4.5     01-10  Mg     2.1     01-10    TPro  6.0  /  Alb  2.4<L>  /  TBili  0.4  /  DBili  x   /  AST  37  /  ALT  30  /  AlkPhos  136<H>  01-10            Vancomycin Level, Random: 15.0: Therapeutic ranges have not been established for random  vancomycin. Interpret results in context of patient's  clinical condition and time sample was drawn relative to  peak and trough therapeutic ranges. Therapeutic ranges for  peak vancomycin are 25-50 and for trough vancomycin 10-20  with 15-20 mcg/mL used for complicated infections. ug/mL (01.10.19 @ 13:00)                  MICROBIOLOGY:    Culture - Body Fluid with Gram Stain (01.09.19 @ 15:41)    Culture - Body Fluid:   NO GROWTH - PRELIMINARY RESULTS    Gram Stain:   WBC^White Blood Cells  QNTY CELLS IN GRAM STAIN: FEW (2+)  NOS^No Organisms Seen    Specimen Source: PLEURAL FLUID    Culture - Respiratory with Gram Stain (01.08.19 @ 20:04)    Gram Stain Sputum:   WBC^White Blood Cells  QNTY CELLS IN GRAM STAIN: RARE (1+)  YEAST^YEAST.  QUANTITY OF BACTERIA SEEN: RARE (1+)    Culture - Respiratory:   Yeast species, not Cryptococcus neoformans  YEAST^YEAST.  QUANTITY OF GROWTH: MODERATE    Specimen Source: BRONCHIAL LAVAGE    Culture - Blood (01.07.19 @ 21:52)    Culture - Blood:   NO ORGANISMS ISOLATED  NO ORGANISMS ISOLATED AT 48 HRS.    Specimen Source: BLOOD ARTERIAL          RADIOLOGY & ADDITIONAL STUDIES:    < from: Xray Chest 1 View- PORTABLE-Routine (01.10.19 @ 07:10) >  INTERPRETATION:     Small loculated bilateral effusions right greater than left are   unchanged. Subcutaneous AICD in place. No focal consolidations. Heart   size is stable. No pneumothorax.    2 pigtail catheters overlie right upper quadrant unchanged.        COMPARISON:  January 9      IMPRESSION:  Follow-up with bilateral small effusions unchanged.    < end of copied text >

## 2019-01-10 NOTE — PROGRESS NOTE ADULT - SUBJECTIVE AND OBJECTIVE BOX
CLAUDIA HAN   MRN#: [id number]     The patient is a 58y Male who was seen, evaluated, & examined with the CTICU staff on rounds with a multidisciplinary care plan formulated & implemented.  All available clinical, laboratory, radiographic, pharmacologic, and electrocardiographic data were reviewed & analyzed.      The patient was in the CTICU in critical condition secondary to:     acute hypoxic respiratory failure-persistent cardiopulmonary dysfunction  septic shock    For support and evaluation & prevention of further decompensation secondary to persistent cardiopulmonary dysfunction and cardiogenic shock-cardiovascular dysfunction, respiratory failure required:     supplemental oxygen with BiPaP  continuous pulse oximetry monitoring  following ABGs with A-line monitoring    Invasive hemodynamic monitoring with an A-line was required for continuous MAP/BP monitoring to ensure adequate cardiovascular support and to evaluate for & help prevent decompensation while receiving IV Levophed infusion secondary to septic shock-cardiovascular dysfunction.     In addition:  CT scan revealed likely foreign body in R mainstem bronchus which we removed with bronchoscopy  Extubated but hypercarbic and acidotic despite unremarkable clinical appearance   On intermittent BiPaP with improving respiratory acidosis although still tenuous and may still need to be re-intubated   Unclear what baseline PCO2 is - even with PCO2 in 60s and pH between 7.25 and 7.30 he is not tachypneic or with increased work of breathing despite low pH indicating that he is not compensated at thie PCO2 so it is probably higher than his baseline  Still on Levophed, ? if sepsis is etiology for shock - requirement did not decrease with ALbumin boluses  Low EF at baseline but does not have clinical signs of low output so his pressor and ventilatory requirements are likely not related to his cardiomyopathy which is compensated  Afebrile with no leukocytosis - infection is likely resolving on Vancomycin and Zosyn; ID is following, will check Vancomycin level    The patient required critical care management and I provided 60 minutes of non-continuous care to the patient in addition to discussing the patient and plan at length with the CTICU staff and helping coordinate care.

## 2019-01-10 NOTE — DIETITIAN INITIAL EVALUATION ADULT. - MD RECOMMEND
Dysphagia 2 , Selma thick Fluids , Renal Replacement  -No Conc. K ,No Conc. Phos, Low Sodium  + Nepro Carb Steady 8 oz 2/d

## 2019-01-10 NOTE — PROGRESS NOTE ADULT - SUBJECTIVE AND OBJECTIVE BOX
CLAUDIA HAN          MRN-[id number]    HPI:  Pt is a 58 yr old  male who underwent a FB, R thoracotomy, decortication, chest wall reconstruction, lat flap with Dr Pickering on 10/11/18. He was admitted on 1/6/19 to [facility] with a R pleural effusion and respiratory failure,  A R PTC was placed there and he was intubated.  He still had a R SANDY drain in place.  He presented intubated and sedated on pressors. (07 Jan 2019 23:10)      Procedure:  POD # :     Issues:        Interval/Overnight OR Events/ ROS  Pt extubated in the OR after surgery. On arrival in ICU still drowsy - but easily arousable to following commands; denies SOB, no nausea, no vomiting  Respiratory status required full ventilatory support,  following of ABG’s with A-line monitoring, continuous pulse oximetry monitoring, & an IV Propofol infusion for support & to evaluate for & prevent further decompensation secondary to persistent cardiopulmonary dysfunction.     Pt remained hemodynamically stable overnight, not on any pressors or inotropes. OOB to chair, breathing comfortably with minimal pain. Ambulated several times . Denies pain, no SOB, no palpitations, no nausea/ no vomiting, no dizziness  A-line and sharma d/kirsten       PAST MEDICAL & SURGICAL HISTORY:  Smoking hx  Cardiomyopathy  Wound: right chest  ICD (implantable cardioverter-defibrillator) in place  OA (osteoarthritis)  HLD (hyperlipidemia)  BPH (benign prostatic hyperplasia)  Pleural effusion  HTN (hypertension)  ESRD (end stage renal disease)  H/O chest wound: right  S/P thoracotomy: FB, R thoracotomy, decortication, chest wall reconstruction, lat flap  History of wound infection: right chest wall - revision 5/18 and again in 7/18  History of implantable cardioverter-defibrillator (ICD) placement: pt unsure when placed  H/O bilateral hip replacements: 2008, 2009    Allergies    No Known Allergies    Intolerances      Home Medications:  aspirin 81 mg oral tablet: 1 tab(s) orally once a day  (30 Oct 2018 14:39)  Breo Ellipta 100 mcg-25 mcg/inh inhalation powder: 1 puff(s) inhaled once a day patient denies using it (11 Oct 2018 08:38)  Calcium 500: 1 tab(s) orally 2 times a day (11 Oct 2018 08:38)  docusate sodium 100 mg oral tablet: 1 tab(s) orally 2 times a day, As Needed (11 Oct 2018 08:39)  folic acid 1 mg oral tablet: 1 tab(s) orally once a day (11 Oct 2018 08:38)  Mag-Ox 400 oral tablet: 1 tab(s) orally once a day (11 Oct 2018 08:39)  Multiple Vitamins oral tablet: 1 tab(s) orally once a day  (30 Oct 2018 14:39)  Namenda 10 mg oral tablet: 1 tab(s) orally 2 times a day (11 Oct 2018 08:38)  Nephplex Rx oral tablet: 1 tab(s) orally once a day (11 Oct 2018 08:39)  nortriptyline 50 mg oral capsule: 1 cap(s) orally once a day (11 Oct 2018 08:39)  piperacillin-tazobactam 2 g-0.25 g intravenous injection: 1  intravenous every 12 hours until 11/7/18 (30 Oct 2018 16:27)  Renvela 800 mg oral tablet: 2 tab(s) orally every 8 hours (11 Oct 2018 08:39)  simethicone 80 mg oral tablet: 1 tab(s) orally 3 times a day (after meals) (11 Oct 2018 08:39)  Tylenol 325 mg oral tablet: 2 tab(s) orally every 6 hours, As Needed (30 Oct 2018 14:39)  vitamin A: 1 tab(s) orally once a day (11 Oct 2018 08:38)  Vitamin B Complex: 1 tab(s) orally once a day (11 Oct 2018 08:38)  Vitamin C 500 mg oral tablet: 1 tab(s) orally once a day (11 Oct 2018 08:38)        ***VITAL SIGNS:  Vital Signs Last 24 Hrs  T(C): 36.2 (10 Avila 2019 04:00), Max: 36.9 (09 Jan 2019 20:00)  T(F): 97.1 (10 Avila 2019 04:00), Max: 98.5 (09 Jan 2019 20:00)  HR: 86 (10 Avila 2019 04:00) (83 - 109)  BP: 117/61 (10 Avila 2019 04:00) (104/56 - 121/67)  BP(mean): 74 (10 Avila 2019 04:00) (67 - 80)  RR: 20 (10 Avila 2019 04:00) (17 - 31)  SpO2: 100% (10 Avila 2019 04:00) (99% - 100%)    I/Os:   I&O's Detail    08 Jan 2019 07:01  -  09 Jan 2019 07:00  --------------------------------------------------------  IN:    dexmedetomidine Infusion: 174.7 mL    IV PiggyBack: 100 mL    lactated ringers.: 1000 mL    norepinephrine Infusion: 266.4 mL    Other: 400 mL    propofol Infusion: 31 mL  Total IN: 1972.1 mL    OUT:    Chest Tube: 50 mL    Other: 400 mL    Other: 30 mL  Total OUT: 480 mL    Total NET: 1492.1 mL      09 Jan 2019 07:01  -  10 Avila 2019 05:19  --------------------------------------------------------  IN:    dextrose 5% + sodium chloride 0.9%.: 510 mL    IV PiggyBack: 100 mL    norepinephrine Infusion: 250 mL    Other: 10 mL  Total IN: 870 mL    OUT:    Chest Tube: 30 mL  Total OUT: 30 mL    Total NET: 840 mL          CAPILLARY BLOOD GLUCOSE      POCT Blood Glucose.: 86 mg/dL (09 Jan 2019 23:17)  POCT Blood Glucose.: 90 mg/dL (09 Jan 2019 18:31)  POCT Blood Glucose.: 94 mg/dL (09 Jan 2019 13:34)  POCT Blood Glucose.: 73 mg/dL (09 Jan 2019 11:50)  POCT Blood Glucose.: 89 mg/dL (09 Jan 2019 06:46)  POCT Blood Glucose.: 72 mg/dL (09 Jan 2019 05:20)      =======================  MEDICATIONS  ===================  MEDICATIONS  (STANDING):  ALBUTerol    90 MICROgram(s) HFA Inhaler 1 Puff(s) Inhalation every 4 hours  aspirin  chewable 81 milliGRAM(s) Oral daily  dextrose 5% + sodium chloride 0.9%. 1000 milliLiter(s) (30 mL/Hr) IV Continuous <Continuous>  docusate sodium Liquid 100 milliGRAM(s) Oral daily  heparin  Injectable 5000 Unit(s) SubCutaneous every 12 hours  norepinephrine Infusion 0.05 MICROgram(s)/kG/Min (5.606 mL/Hr) IV Continuous <Continuous>  pantoprazole  Injectable 40 milliGRAM(s) IV Push daily  piperacillin/tazobactam IVPB. 3.375 Gram(s) IV Intermittent every 12 hours    MEDICATIONS  (PRN):      ======================VENTILATOR SETTINGS  ==============      =================== PATIENT CARE ACCESS DEVICES ==========  Peripheral IV  Central Venous Line	R	L	IJ	Fem	SC	Placed:   Arterial Line	R	L	PT	DP	Fem	Rad	Ax	Placed:   Midline:				  Urinary Catheter, Date Placed:   Necessity of urinary, arterial, and venous catheters discussed  ======================= PHYSICAL EXAM===================  General:          Comfortable, Awake, alert, not in any distress  Neuro:             Moving all extremities to commands. No focal deficits	  HEENT:                           CHER/ ETT/ NGT/ trach  Respiratory:	Lungs clear on auscultation bilaterally with good aeration.                            No rales, rhonchi, no wheezing. Effort even and unlabored.  CV:		         Regular rate and rhythm. Normal S1/S2. No murmurs  Abdomen:	Soft,  nontender, not-distended. Bowel sounds present / absent.   Skin:		No rash.  Extremities:	Warm, no cyanosis or edema.  Palpable pulses  ============================ LABS =======================                        11.4   7.48  )-----------( 105      ( 10 Avila 2019 02:25 )             36.3     01-10    140  |  99  |  39<H>  ----------------------------<  88  3.6   |  22  |  5.86<H>    Ca    9.4      10 Avila 2019 02:25  Phos  4.5     01-10  Mg     2.1     01-10    TPro  6.0  /  Alb  2.4<L>  /  TBili  0.4  /  DBili  x   /  AST  37  /  ALT  30  /  AlkPhos  136<H>  01-10    LIVER FUNCTIONS - ( 10 Avila 2019 02:25 )  Alb: 2.4 g/dL / Pro: 6.0 g/dL / ALK PHOS: 136 u/L / ALT: 30 u/L / AST: 37 u/L / GGT: x             ABG - ( 10 Avila 2019 02:25 )  pH, Arterial: 7.34  pH, Blood: x     /  pCO2: 49    /  pO2: 128   / HCO3: 24    / Base Excess: 0.4   /  SaO2: 98.8                PLEURAL FLUID  01-09-19 --  --    WBC^White Blood Cells  QNTY CELLS IN GRAM STAIN: FEW (2+)  NOS^No Organisms Seen      BRONCHIAL LAVAGE  01-08-19 --  --  --      BRONCHIAL LAVAGE  01-08-19 --  --  --      BLOOD ARTERIAL  01-07-19 --  --  --          ===================== IMAGING STUDIES ===================  Radiology personally reviewed.    ====================ASSESSMENT AND PLAN ================      ====================== NEUROLOGY=======================  Pain control with PCA / PCEA / Tylenol IV / Toradol / Percocet  Pt is on Precedex for agitation  Pt is sedated with Propofol / Fentanyl  ==================== RESPIRATORY========================  Pt is on       L nasal canula / Face tent____% FiO2/ full mech vent support  Comfortable, no evidence of distress.  Using incentive spirometry & doing                ml  Monitor chest tube output  Chest tube to suction / water seal	  Mechanical Ventilation:    Mechanical ventilator status assessed & settings reviewed  Continuous pulse oximetry monitoring  Continue bronchodilators, pulmonary toilet  Head of bed elevation to 30-40 degrees  ====================CARDIOVASCULAR=====================  Continue hemodynamic monitoring/ telemetry  Not on any pressors/ titrate pressors for SBP>100, MAP >60-65  Continue cardiovascular / antihypertensive medications  ===================== RENAL ============================  Continue LR 30CC/hr      D/C IVF  Monitor I/Os, BUN/ Cr  and electrolytes  D/C Sharma      Keep Sharma for UO monitoring  BPH: Continue Flomax/ Finasteride  ==================== GASTROINTESTINAL===================  On regular/ tube feeds diet, tolerating well  Continue GI prophylaxis with Pepcid / Protonix  Continue Zofran / Reglan for nausea - PRN	  NPO  =======================    ENDOCRIN  =====================  Glycemic monitoring  F/S with coverage  ===================HEMATOLOGIC/ONCOLOGIC =============  Monitor chest tube output. No signs of active bleeding.   Follow CBC, coags  in AM  DVT prophylaxis with SCD, sc Heparin  ========================INFECTIOUS DISEASE===============  No signs of infection. Monitor for fever / leukocytosis.  All surgical incision / chest tube  sites look clean  D/C Sharma    Pertinent clinical, laboratory, radiographic, hemodynamic, echocardiographic, respiratory data, microbiologic data and chart were reviewed and analyzed frequently throughout the course of the day and night. GI and DVT prophylaxis, glycemic control, head of bed elevation and skin care issues were addressed.  Patient seen, examined and plan discussed with CT Surgery / CTICU team during rounds.  Pt remains critically ill in imminent risk of  deterioration and requires very careful cardio- pulmonary monitoring and support.    I have spent        minutes of critical care time with this pt between      am/pm   and        am/ pm         minutes spent on total encounter; more than 50% of the visit was spent counseling and/or coordinating care by the attending physician.      KERLINE Faith MD CLAUDIA HAN          MRN-[id number]    HPI:  Pt is a 58 yr old  male who underwent a FB, R thoracotomy, decortication, chest wall reconstruction, lat flap with Dr Pickering on 10/11/18. He was admitted on 1/6/19 to [facility] with a R pleural effusion and respiratory failure,  A R PTC was placed there and he was intubated.  He still had a R SANDY drain in place.  He presented intubated and sedated on pressors. 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         Procedure: 10/11/18 Right thoracotomy, resection of portion of R 7th rib, evacuation of infected hemothorax, takedown of pleurocutaneous fistula  / R chest wall reconstruction with tunneled latissimus dorsi flap, covered by serratus anterior flap           Issues: Acute resp failure-extubated 1/8/19 to BIPAP             Resp acidosis             Hypotension with sepsis/ distributive shock- on Levophed drip  Acute on chronic systolic CHF ( EF 10 %), ICD in place  Infected right hemothorax - (+) Enterococcus faecalis in the past / now + MRSA empyema   E coli bacteriemia              ESRD on HD    intermittent agitation/mild dementia              severe deconditioning      Interval/Overnight  Events/ ROS  Respiratory status required BIPAP support due to resp acidosis on nasal cannula trial;  following of ABG’s with A-line monitoring, continuous pulse oximetry monitoring,    PAST MEDICAL & SURGICAL HISTORY:  Smoking hx  Cardiomyopathy  Wound: right chest  ICD (implantable cardioverter-defibrillator) in place  OA (osteoarthritis)  HLD (hyperlipidemia)  BPH (benign prostatic hyperplasia)  Pleural effusion  HTN (hypertension)  ESRD (end stage renal disease)  H/O chest wound: right  S/P thoracotomy: FB, R thoracotomy, decortication, chest wall reconstruction, lat flap  History of wound infection: right chest wall - revision 5/18 and again in 7/18  History of implantable cardioverter-defibrillator (ICD) placement: pt unsure when placed  H/O bilateral hip replacements: 2008, 2009    Allergies  No Known Allergies      Home Medications:  aspirin 81 mg oral tablet: 1 tab(s) orally once a day  (30 Oct 2018 14:39)  Breo Ellipta 100 mcg-25 mcg/inh inhalation powder: 1 puff(s) inhaled once a day patient denies using it (11 Oct 2018 08:38)  Calcium 500: 1 tab(s) orally 2 times a day (11 Oct 2018 08:38)  docusate sodium 100 mg oral tablet: 1 tab(s) orally 2 times a day, As Needed (11 Oct 2018 08:39)  folic acid 1 mg oral tablet: 1 tab(s) orally once a day (11 Oct 2018 08:38)  Mag-Ox 400 oral tablet: 1 tab(s) orally once a day (11 Oct 2018 08:39)  Multiple Vitamins oral tablet: 1 tab(s) orally once a day  (30 Oct 2018 14:39)  Namenda 10 mg oral tablet: 1 tab(s) orally 2 times a day (11 Oct 2018 08:38)  Nephplex Rx oral tablet: 1 tab(s) orally once a day (11 Oct 2018 08:39)  nortriptyline 50 mg oral capsule: 1 cap(s) orally once a day (11 Oct 2018 08:39)  piperacillin-tazobactam 2 g-0.25 g intravenous injection: 1  intravenous every 12 hours until 11/7/18 (30 Oct 2018 16:27)  Renvela 800 mg oral tablet: 2 tab(s) orally every 8 hours (11 Oct 2018 08:39)  simethicone 80 mg oral tablet: 1 tab(s) orally 3 times a day (after meals) (11 Oct 2018 08:39)  Tylenol 325 mg oral tablet: 2 tab(s) orally every 6 hours, As Needed (30 Oct 2018 14:39)  vitamin A: 1 tab(s) orally once a day (11 Oct 2018 08:38)  Vitamin B Complex: 1 tab(s) orally once a day (11 Oct 2018 08:38)  Vitamin C 500 mg oral tablet: 1 tab(s) orally once a day (11 Oct 2018 08:38)      ***VITAL SIGNS:  Vital Signs Last 24 Hrs  T(C): 36.2 (10 Avila 2019 04:00), Max: 36.9 (09 Jan 2019 20:00)  T(F): 97.1 (10 Avila 2019 04:00), Max: 98.5 (09 Jan 2019 20:00)  HR: 86 (10 Avila 2019 04:00) (83 - 109)  BP: 117/61 (10 Avila 2019 04:00) (104/56 - 121/67)  BP(mean): 74 (10 Avila 2019 04:00) (67 - 80)  RR: 20 (10 Avila 2019 04:00) (17 - 31)  SpO2: 100% (10 Avila 2019 04:00) (99% - 100%)    I/Os:   I&O's Detail    08 Jan 2019 07:01  -  09 Jan 2019 07:00  --------------------------------------------------------  IN:    dexmedetomidine Infusion: 174.7 mL    IV PiggyBack: 100 mL    lactated ringers.: 1000 mL    norepinephrine Infusion: 266.4 mL    Other: 400 mL    propofol Infusion: 31 mL  Total IN: 1972.1 mL    OUT:    Chest Tube: 50 mL    Other: 400 mL    Other: 30 mL  Total OUT: 480 mL    Total NET: 1492.1 mL      09 Jan 2019 07:01  -  10 Avila 2019 05:19  --------------------------------------------------------  IN:    dextrose 5% + sodium chloride 0.9%.: 510 mL    IV PiggyBack: 100 mL    norepinephrine Infusion: 250 mL    Other: 10 mL  Total IN: 870 mL    OUT:    Chest Tube: 30 mL  Total OUT: 30 mL    Total NET: 840 mL    CAPILLARY BLOOD GLUCOSE    POCT Blood Glucose.: 86 mg/dL (09 Jan 2019 23:17)  POCT Blood Glucose.: 90 mg/dL (09 Jan 2019 18:31)  POCT Blood Glucose.: 94 mg/dL (09 Jan 2019 13:34)  POCT Blood Glucose.: 73 mg/dL (09 Jan 2019 11:50)  POCT Blood Glucose.: 89 mg/dL (09 Jan 2019 06:46)  POCT Blood Glucose.: 72 mg/dL (09 Jan 2019 05:20)      =======================  MEDICATIONS  ===================  MEDICATIONS  (STANDING):  ALBUTerol    90 MICROgram(s) HFA Inhaler 1 Puff(s) Inhalation every 4 hours  aspirin  chewable 81 milliGRAM(s) Oral daily  dextrose 5% + sodium chloride 0.9%. 1000 milliLiter(s) (30 mL/Hr) IV Continuous <Continuous>  docusate sodium Liquid 100 milliGRAM(s) Oral daily  heparin  Injectable 5000 Unit(s) SubCutaneous every 12 hours  norepinephrine Infusion 0.05 MICROgram(s)/kG/Min (5.606 mL/Hr) IV Continuous <Continuous>  pantoprazole  Injectable 40 milliGRAM(s) IV Push daily  piperacillin/tazobactam IVPB. 3.375 Gram(s) IV Intermittent every 12 hours    MEDICATIONS  (PRN):  ======================VENTILATOR SETTINGS  ==============  BIPAP 20/5/40    =================== PATIENT CARE ACCESS DEVICES ==========  Peripheral IV (+)  Arterial Line(+)  Left arm AV fistula (+)  ======================= PHYSICAL EXAM===================  General:          Comfortable, Awake, alert, not in any distress, anxious  Neuro:             Moving all extremities to commands. No focal deficits	  HEENT:                           CHER   Respiratory:	Lungs sound coarse on auscultation bilaterally with good aeration.                           no wheezing. Effort even and unlabored. on BIPAP  CV:	            Regular rate and rhythm. Normal S1/S2.  Abdomen:	Soft,  nontender, not-distended. Bowel sounds present     Skin:		No rash.  Extremities:	Warm, no cyanosis or edema.  Palpable pulses  ============================ LABS =======================                        11.4   7.48  )-----------( 105      ( 10 Avila 2019 02:25 )             36.3     01-10    140  |  99  |  39<H>  ----------------------------<  88  3.6   |  22  |  5.86<H>    Ca    9.4      10 Avila 2019 02:25  Phos  4.5     01-10  Mg     2.1     01-10    TPro  6.0  /  Alb  2.4<L>  /  TBili  0.4  /  DBili  x   /  AST  37  /  ALT  30  /  AlkPhos  136<H>  01-10    LIVER FUNCTIONS - ( 10 Avila 2019 02:25 )  Alb: 2.4 g/dL / Pro: 6.0 g/dL / ALK PHOS: 136 u/L / ALT: 30 u/L / AST: 37 u/L / GGT: x           ABG - ( 10 Avila 2019 02:25 )  pH, Arterial: 7.34  pH, Blood: x     /  pCO2: 49    /  pO2: 128   / HCO3: 24    / Base Excess: 0.4   /  SaO2: 98.8        PLEURAL FLUID  01-09-19 --  --    WBC^White Blood Cells  QNTY CELLS IN GRAM STAIN: FEW (2+)  NOS^No Organisms Seen    BRONCHIAL LAVAGE  01-08-19 --  --  --    BRONCHIAL LAVAGE  01-08-19 --  --  --    BLOOD ARTERIAL  01-07-19 --  --  --    ===================== IMAGING STUDIES ===================  Radiology personally reviewed.  < from: Xray Chest 1 View- PORTABLE-Routine (01.09.19 @ 06:32) >  INTERPRETATION:     January 8 at 7:30 PM:  Endotracheal tube in place. Bilateral pleural effusions again seen. Heart   size is stable. Visualized lungs are clear. No pneumothorax.    January 9 at 5:58 AM:  Since the last study, the endotracheal tube has been removed. No other   change. Bilateral loculated effusions and pigtail catheters overlying the   right upper quadrant seen. Visualized lungs are clear.    COMPARISON:  January 8    IMPRESSION:  Follow-up studies pre and post extubation with persistent   small loculated effusions.    < end of copied text >    ====================ASSESSMENT AND PLAN ================      ====================== NEUROLOGY=======================  Pain control with PCA / PCEA / Tylenol IV / Toradol / Percocet  Pt is on Precedex for agitation  Pt is sedated with Propofol / Fentanyl  ==================== RESPIRATORY========================  Pt is on       L nasal canula / Face tent____% FiO2/ full mech vent support  Comfortable, no evidence of distress.  Using incentive spirometry & doing                ml  Monitor chest tube output  Chest tube to suction / water seal	  Mechanical Ventilation:    Mechanical ventilator status assessed & settings reviewed  Continuous pulse oximetry monitoring  Continue bronchodilators, pulmonary toilet  Head of bed elevation to 30-40 degrees  ====================CARDIOVASCULAR=====================  Continue hemodynamic monitoring/ telemetry  Not on any pressors/ titrate pressors for SBP>100, MAP >60-65  Continue cardiovascular / antihypertensive medications  ===================== RENAL ============================  Continue LR 30CC/hr      D/C IVF  Monitor I/Os, BUN/ Cr  and electrolytes  D/C Moore      Keep Moore for UO monitoring  BPH: Continue Flomax/ Finasteride  ==================== GASTROINTESTINAL===================  On regular/ tube feeds diet, tolerating well  Continue GI prophylaxis with Pepcid / Protonix  Continue Zofran / Reglan for nausea - PRN	  NPO  =======================    ENDOCRIN  =====================  Glycemic monitoring  F/S with coverage  ===================HEMATOLOGIC/ONCOLOGIC =============  Monitor chest tube output. No signs of active bleeding.   Follow CBC, coags  in AM  DVT prophylaxis with SCD, sc Heparin  ========================INFECTIOUS DISEASE===============  No signs of infection. Monitor for fever / leukocytosis.  All surgical incision / chest tube  sites look clean  D/C Moore    Pertinent clinical, laboratory, radiographic, hemodynamic, echocardiographic, respiratory data, microbiologic data and chart were reviewed and analyzed frequently throughout the course of the day and night. GI and DVT prophylaxis, glycemic control, head of bed elevation and skin care issues were addressed.  Patient seen, examined and plan discussed with CT Surgery / CTICU team during rounds.  Pt remains critically ill in imminent risk of  deterioration and requires very careful cardio- pulmonary monitoring and support.    I have spent        minutes of critical care time with this pt between      am/pm   and        am/ pm         minutes spent on total encounter; more than 50% of the visit was spent counseling and/or coordinating care by the attending physician.      KERLINE Faith MD                           ====================ASSESSMENT AND PLAN ================  Pt is a 58 yr old  male who underwent a FB, R thoracotomy, decortication, chest wall reconstruction, lat flap with Dr Pickering on 10/11/18. He was admitted on 1/6/19 to [facility] with a R pleural effusion and respiratory failure,  A R PTC was placed there and he was intubated.  He still had a R SANDY drain in place.  He presented intubated and sedated on pressors. Now extubated to BIPAP with resp acidosis requiring continuous  airway and acid / base monitoring        Issues: Acute resp failure-extubated last night to BIPAP             Resp acidosis             Hypotension with sepsis/ distributive shock- on Levophed drip  Acute on chronic systolic CHF ( EF 10 %), ICD in place  Infected right hemothorax - (+) Enterococcus faecalis in the past / now + MRSA empyema   E coli bacteriemia              ESRD on HD    intermittent agitation/mild dementia              severe deconditioning    ====================== NEUROLOGY=======================  Pain control with Tylenol IV  Pt is on Precedex  PRN for agitation    ==================== RESPIRATORY========================  Pt is on BIPAP due to resp acidosis  without hypoxemia due to deconditioning  Comfortable, no evidence of distress.  Monitor  right chest tube and david output  Chest tube to suction, david to bulb suction    BIPAP NIV ventilator status assessed & settings reviewed  Continuous pulse oximetry monitoring  Continue bronchodilators, pulmonary toilet  Head of bed elevation to 30-40 degrees  F/up serial ABG 's  ====================CARDIOVASCULAR=====================  Continue hemodynamic monitoring/ telemetry  On Levophed - titrate pressors for SBP>100, MAP >60-65     ===================== RENAL ============================  Continue D5 NS  30CC/hr      Monitor I/Os, BUN/ Cr  and electrolytes  Next HD tomorrow  ==================== GASTROINTESTINAL===================  Strict NPO while pt on BIPAP post extubation  Pt may need swallow study before PO intake safely can be resumed due to high risk of aspiration  Continue GI prophylaxis with Protonix  Continue Zofran / Reglan for nausea - PRN	    =======================    ENDOCRIN  =====================  Glycemic monitoring  F/S with coverage  ===================HEMATOLOGIC/ONCOLOGIC =============  Monitor chest tube output. No signs of active bleeding.   Follow CBC, coags  in AM  DVT prophylaxis with SCD, sc Heparin  ========================INFECTIOUS DISEASE===============   Monitor for fever / leukocytosis.  All surgical incision / chest tube  sites look clean   F/up pleural fluid cx from today  C/w Zosyn/ Vanco - by level for E. coli bacteriemia at [place] / MRSA right empyema  Pt will need Abd/pelvis CT to looks for source of bacteriemia  ID Dr Chahal on board    Pertinent clinical, laboratory, radiographic, hemodynamic, echocardiographic, respiratory data, microbiologic data and chart were reviewed and analyzed frequently throughout the course of the day and night. GI and DVT prophylaxis, glycemic control, head of bed elevation and skin care issues were addressed.  Patient seen, examined and plan discussed with CT Surgery dr Pickering  / CTICU team during rounds.  Pt remains critically ill in imminent risk of  deterioration and requires very careful cardio- pulmonary monitoring and support.    I have spent   75    minutes of critical care time with this pt between   7   am  and 11:55 pm         minutes spent on total encounter; more than 50% of the visit was spent counseling and/or coordinating care by the attending physician. CLAUDIA HAN          MRN-[id number]    HPI:  Pt is a 58 yr old  male who underwent a FB, R thoracotomy, decortication, chest wall reconstruction, lat flap with Dr Pickering on 10/11/18. He was admitted on 1/6/19 to [facility] with a R pleural effusion and respiratory failure,  A R PTC was placed there and he was intubated.  He still had a R SANDY drain in place.  He presented intubated and sedated on pressors. 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         Procedure: 10/11/18 Right thoracotomy, resection of portion of R 7th rib, evacuation of infected hemothorax, takedown of pleurocutaneous fistula  / R chest wall reconstruction with tunneled latissimus dorsi flap, covered by serratus anterior flap           Issues: Acute resp failure-extubated 1/8/19 to BIPAP             Resp acidosis             Hypotension with sepsis/ distributive shock- on Levophed drip  Acute on chronic systolic CHF ( EF 10 %), ICD in place  Infected right hemothorax - (+) Enterococcus faecalis in the past / now + MRSA empyema   E coli bacteriemia              ESRD on HD    intermittent agitation/mild dementia              severe deconditioning      Interval/Overnight  Events/ ROS  Respiratory status required BIPAP support due to resp acidosis on nasal cannula trial;  following of ABG’s with A-line monitoring, continuous pulse oximetry monitoring,    PAST MEDICAL & SURGICAL HISTORY:  Smoking hx  Cardiomyopathy  Wound: right chest  ICD (implantable cardioverter-defibrillator) in place  OA (osteoarthritis)  HLD (hyperlipidemia)  BPH (benign prostatic hyperplasia)  Pleural effusion  HTN (hypertension)  ESRD (end stage renal disease)  H/O chest wound: right  S/P thoracotomy: FB, R thoracotomy, decortication, chest wall reconstruction, lat flap  History of wound infection: right chest wall - revision 5/18 and again in 7/18  History of implantable cardioverter-defibrillator (ICD) placement: pt unsure when placed  H/O bilateral hip replacements: 2008, 2009    Allergies  No Known Allergies      Home Medications:  aspirin 81 mg oral tablet: 1 tab(s) orally once a day  (30 Oct 2018 14:39)  Breo Ellipta 100 mcg-25 mcg/inh inhalation powder: 1 puff(s) inhaled once a day patient denies using it (11 Oct 2018 08:38)  Calcium 500: 1 tab(s) orally 2 times a day (11 Oct 2018 08:38)  docusate sodium 100 mg oral tablet: 1 tab(s) orally 2 times a day, As Needed (11 Oct 2018 08:39)  folic acid 1 mg oral tablet: 1 tab(s) orally once a day (11 Oct 2018 08:38)  Mag-Ox 400 oral tablet: 1 tab(s) orally once a day (11 Oct 2018 08:39)  Multiple Vitamins oral tablet: 1 tab(s) orally once a day  (30 Oct 2018 14:39)  Namenda 10 mg oral tablet: 1 tab(s) orally 2 times a day (11 Oct 2018 08:38)  Nephplex Rx oral tablet: 1 tab(s) orally once a day (11 Oct 2018 08:39)  nortriptyline 50 mg oral capsule: 1 cap(s) orally once a day (11 Oct 2018 08:39)  piperacillin-tazobactam 2 g-0.25 g intravenous injection: 1  intravenous every 12 hours until 11/7/18 (30 Oct 2018 16:27)  Renvela 800 mg oral tablet: 2 tab(s) orally every 8 hours (11 Oct 2018 08:39)  simethicone 80 mg oral tablet: 1 tab(s) orally 3 times a day (after meals) (11 Oct 2018 08:39)  Tylenol 325 mg oral tablet: 2 tab(s) orally every 6 hours, As Needed (30 Oct 2018 14:39)  vitamin A: 1 tab(s) orally once a day (11 Oct 2018 08:38)  Vitamin B Complex: 1 tab(s) orally once a day (11 Oct 2018 08:38)  Vitamin C 500 mg oral tablet: 1 tab(s) orally once a day (11 Oct 2018 08:38)      ***VITAL SIGNS:  Vital Signs Last 24 Hrs  T(C): 36.2 (10 Avila 2019 04:00), Max: 36.9 (09 Jan 2019 20:00)  T(F): 97.1 (10 Avila 2019 04:00), Max: 98.5 (09 Jan 2019 20:00)  HR: 86 (10 Avila 2019 04:00) (83 - 109)  BP: 117/61 (10 Avila 2019 04:00) (104/56 - 121/67)  BP(mean): 74 (10 Avila 2019 04:00) (67 - 80)  RR: 20 (10 Avila 2019 04:00) (17 - 31)  SpO2: 100% (10 Avila 2019 04:00) (99% - 100%)    I/Os:   I&O's Detail    08 Jan 2019 07:01  -  09 Jan 2019 07:00  --------------------------------------------------------  IN:    dexmedetomidine Infusion: 174.7 mL    IV PiggyBack: 100 mL    lactated ringers.: 1000 mL    norepinephrine Infusion: 266.4 mL    Other: 400 mL    propofol Infusion: 31 mL  Total IN: 1972.1 mL    OUT:    Chest Tube: 50 mL    Other: 400 mL    Other: 30 mL  Total OUT: 480 mL    Total NET: 1492.1 mL      09 Jan 2019 07:01  -  10 Avila 2019 05:19  --------------------------------------------------------  IN:    dextrose 5% + sodium chloride 0.9%.: 510 mL    IV PiggyBack: 100 mL    norepinephrine Infusion: 250 mL    Other: 10 mL  Total IN: 870 mL    OUT:    Chest Tube: 30 mL  Total OUT: 30 mL    Total NET: 840 mL    CAPILLARY BLOOD GLUCOSE    POCT Blood Glucose.: 86 mg/dL (09 Jan 2019 23:17)  POCT Blood Glucose.: 90 mg/dL (09 Jan 2019 18:31)  POCT Blood Glucose.: 94 mg/dL (09 Jan 2019 13:34)  POCT Blood Glucose.: 73 mg/dL (09 Jan 2019 11:50)  POCT Blood Glucose.: 89 mg/dL (09 Jan 2019 06:46)  POCT Blood Glucose.: 72 mg/dL (09 Jan 2019 05:20)      =======================  MEDICATIONS  ===================  MEDICATIONS  (STANDING):  ALBUTerol    90 MICROgram(s) HFA Inhaler 1 Puff(s) Inhalation every 4 hours  aspirin  chewable 81 milliGRAM(s) Oral daily  dextrose 5% + sodium chloride 0.9%. 1000 milliLiter(s) (30 mL/Hr) IV Continuous <Continuous>  docusate sodium Liquid 100 milliGRAM(s) Oral daily  heparin  Injectable 5000 Unit(s) SubCutaneous every 12 hours  norepinephrine Infusion 0.05 MICROgram(s)/kG/Min (5.606 mL/Hr) IV Continuous <Continuous>  pantoprazole  Injectable 40 milliGRAM(s) IV Push daily  piperacillin/tazobactam IVPB. 3.375 Gram(s) IV Intermittent every 12 hours    MEDICATIONS  (PRN):  ======================VENTILATOR SETTINGS  ==============  BIPAP 20/5/40    =================== PATIENT CARE ACCESS DEVICES ==========  Peripheral IV (+)  Arterial Line(+)  Left arm AV fistula (+)  ======================= PHYSICAL EXAM===================  General:          Comfortable, Awake, alert, not in any distress, anxious  Neuro:             Moving all extremities to commands. No focal deficits	  HEENT:                           CHER   Respiratory:	Lungs sound coarse on auscultation bilaterally with good aeration.                           no wheezing. Effort even and unlabored. on BIPAP  CV:	            Regular rate and rhythm. Normal S1/S2.  Abdomen:	Soft,  nontender, not-distended. Bowel sounds present     Skin:		No rash.  Extremities:	Warm, no cyanosis or edema.  Palpable pulses  ============================ LABS =======================                        11.4   7.48  )-----------( 105      ( 10 Avila 2019 02:25 )             36.3     01-10    140  |  99  |  39<H>  ----------------------------<  88  3.6   |  22  |  5.86<H>    Ca    9.4      10 Avila 2019 02:25  Phos  4.5     01-10  Mg     2.1     01-10    TPro  6.0  /  Alb  2.4<L>  /  TBili  0.4  /  DBili  x   /  AST  37  /  ALT  30  /  AlkPhos  136<H>  01-10    LIVER FUNCTIONS - ( 10 Avila 2019 02:25 )  Alb: 2.4 g/dL / Pro: 6.0 g/dL / ALK PHOS: 136 u/L / ALT: 30 u/L / AST: 37 u/L / GGT: x           ABG - ( 10 Avila 2019 02:25 )  pH, Arterial: 7.34  pH, Blood: x     /  pCO2: 49    /  pO2: 128   / HCO3: 24    / Base Excess: 0.4   /  SaO2: 98.8        PLEURAL FLUID  01-09-19 --  --    WBC^White Blood Cells  QNTY CELLS IN GRAM STAIN: FEW (2+)  NOS^No Organisms Seen    BRONCHIAL LAVAGE  01-08-19 --  --  --    BRONCHIAL LAVAGE  01-08-19 --  --  --    BLOOD ARTERIAL  01-07-19 --  --  --    ===================== IMAGING STUDIES ===================  Radiology personally reviewed.  < from: Xray Chest 1 View- PORTABLE-Routine (01.09.19 @ 06:32) >  INTERPRETATION:     January 8 at 7:30 PM:  Endotracheal tube in place. Bilateral pleural effusions again seen. Heart   size is stable. Visualized lungs are clear. No pneumothorax.    January 9 at 5:58 AM:  Since the last study, the endotracheal tube has been removed. No other   change. Bilateral loculated effusions and pigtail catheters overlying the   right upper quadrant seen. Visualized lungs are clear.    COMPARISON:  January 8    IMPRESSION:  Follow-up studies pre and post extubation with persistent   small loculated effusions.    < end of copied text >      ====================ASSESSMENT AND PLAN ================  Pt is a 58 yr old  male who underwent a FB, R thoracotomy, decortication, chest wall reconstruction, lat flap with Dr Pickering on 10/11/18. He was admitted on 1/6/19 to [facility] with a R pleural effusion and respiratory failure,  A R PTC was placed there and he was intubated.  He still had a R SANDY drain in place.  He presented intubated and sedated on pressors. Now extubated to BIPAP with resp acidosis requiring continuous  airway and acid / base monitoring        Issues: Acute resp failure-extubated to BIPAP             Resp acidosis             Hypotension with sepsis/ distributive shock- on Levophed drip  Acute on chronic systolic CHF ( EF 10 %), ICD in place  Infected right hemothorax - (+) Enterococcus faecalis in the past / now + MRSA empyema   E coli bacteriemia              ESRD on HD    intermittent agitation/mild dementia              severe deconditioning        ====================== NEUROLOGY=======================  Pain control with Tylenol IV   Precedex  PRN for agitation    ==================== RESPIRATORY========================  Pt is on BIPAP due to resp acidosis  without hypoxemia   Comfortable, no evidence of distress.  Monitor  right chest tube and david output  Chest tube to suction, david to bulb suction    BIPAP NIV ventilator status assessed & settings reviewed  Continuous pulse oximetry monitoring  Continue bronchodilators, pulmonary toilet  Head of bed elevation to 30-40 degrees  F/up serial ABG 's  May need reintubation if not able to come off BIPAP today    ====================CARDIOVASCULAR=====================  Continue hemodynamic monitoring/ telemetry  On Levophed - titrate pressors for SBP>100, MAP >60-65     ===================== RENAL ============================  Continue D5 NS  30CC/hr      Monitor I/Os, BUN/ Cr  and electrolytes  Next HD today  ==================== GASTROINTESTINAL===================  Strict NPO while pt on BIPAP post extubation  Pt may need swallow study before PO intake safely can be resumed due to high risk of aspiration  Continue GI prophylaxis with Protonix  Continue Zofran / Reglan for nausea - PRN	    =======================    ENDOCRIN  =====================  Glycemic monitoring  F/S with coverage  ===================HEMATOLOGIC/ONCOLOGIC =============  Monitor chest tube output. No signs of active bleeding.   Follow CBC, coags  in AM  DVT prophylaxis with SCD, sc Heparin  ========================INFECTIOUS DISEASE===============   Monitor for fever / leukocytosis.  All surgical incision / chest tube  sites look clean   F/up pleural fluid cx from today  C/w Zosyn/ Vanco - by level for E. coli bacteriemia at [place] / MRSA right empyema  Pt may need Abd/pelvis CT to looks for source of bacteriemia once hemodynamically stabilizes  ID Dr Chahal on board    Pertinent clinical, laboratory, radiographic, hemodynamic, echocardiographic, respiratory data, microbiologic data and chart were reviewed and analyzed frequently throughout the course of the day and night. GI and DVT prophylaxis, glycemic control, head of bed elevation and skin care issues were addressed.  Patient seen, examined and plan discussed with CT Surgery / CTICU team during rounds.  Pt remains critically ill in imminent risk of  deterioration and requires very careful cardio- pulmonary monitoring and support.    I have spent  60      minutes of critical care time with this pt between 12     am  and    8  am         minutes spent on total encounter; more than 50% of the visit was spent counseling and/or coordinating care by the attending physician.      KERLINE Faith MD CLAUDIA HAN          MRN-[id number]    HPI:  Pt is a 58 yr old  male who underwent a FB, R thoracotomy, decortication, chest wall reconstruction, lat flap with Dr Pickering on 10/11/18. He was admitted on 1/6/19 to [facility] with a R pleural effusion and respiratory failure,  A R PTC was placed there and he was intubated.  He still had a R SANDY drain in place.  He presented intubated and sedated on pressors. 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         Procedure: 10/11/18 Right thoracotomy, resection of portion of R 7th rib, evacuation of infected hemothorax, takedown of pleurocutaneous fistula  / R chest wall reconstruction with tunneled latissimus dorsi flap, covered by serratus anterior flap           Issues: Acute resp failure-extubated 1/8/19 to BIPAP             Resp acidosis             Hypotension with sepsis/ distributive shock- on Levophed drip  Acute on chronic systolic CHF ( EF 10 %), ICD in place  Infected right hemothorax - (+) Enterococcus faecalis in the past / now + MRSA empyema   E coli bacteriemia              ESRD on HD    intermittent agitation/mild dementia              severe deconditioning      Interval/Overnight  Events/ ROS  Respiratory status required BIPAP support due to resp acidosis on nasal cannula trial;  following of ABG’s with A-line monitoring, continuous pulse oximetry monitoring,    PAST MEDICAL & SURGICAL HISTORY:  Smoking hx  Cardiomyopathy  Wound: right chest  ICD (implantable cardioverter-defibrillator) in place  OA (osteoarthritis)  HLD (hyperlipidemia)  BPH (benign prostatic hyperplasia)  Pleural effusion  HTN (hypertension)  ESRD (end stage renal disease)  H/O chest wound: right  S/P thoracotomy: FB, R thoracotomy, decortication, chest wall reconstruction, lat flap  History of wound infection: right chest wall - revision 5/18 and again in 7/18  History of implantable cardioverter-defibrillator (ICD) placement: pt unsure when placed  H/O bilateral hip replacements: 2008, 2009    Allergies  No Known Allergies      Home Medications:  aspirin 81 mg oral tablet: 1 tab(s) orally once a day  (30 Oct 2018 14:39)  Breo Ellipta 100 mcg-25 mcg/inh inhalation powder: 1 puff(s) inhaled once a day patient denies using it (11 Oct 2018 08:38)  Calcium 500: 1 tab(s) orally 2 times a day (11 Oct 2018 08:38)  docusate sodium 100 mg oral tablet: 1 tab(s) orally 2 times a day, As Needed (11 Oct 2018 08:39)  folic acid 1 mg oral tablet: 1 tab(s) orally once a day (11 Oct 2018 08:38)  Mag-Ox 400 oral tablet: 1 tab(s) orally once a day (11 Oct 2018 08:39)  Multiple Vitamins oral tablet: 1 tab(s) orally once a day  (30 Oct 2018 14:39)  Namenda 10 mg oral tablet: 1 tab(s) orally 2 times a day (11 Oct 2018 08:38)  Nephplex Rx oral tablet: 1 tab(s) orally once a day (11 Oct 2018 08:39)  nortriptyline 50 mg oral capsule: 1 cap(s) orally once a day (11 Oct 2018 08:39)  piperacillin-tazobactam 2 g-0.25 g intravenous injection: 1  intravenous every 12 hours until 11/7/18 (30 Oct 2018 16:27)  Renvela 800 mg oral tablet: 2 tab(s) orally every 8 hours (11 Oct 2018 08:39)  simethicone 80 mg oral tablet: 1 tab(s) orally 3 times a day (after meals) (11 Oct 2018 08:39)  Tylenol 325 mg oral tablet: 2 tab(s) orally every 6 hours, As Needed (30 Oct 2018 14:39)  vitamin A: 1 tab(s) orally once a day (11 Oct 2018 08:38)  Vitamin B Complex: 1 tab(s) orally once a day (11 Oct 2018 08:38)  Vitamin C 500 mg oral tablet: 1 tab(s) orally once a day (11 Oct 2018 08:38)      ***VITAL SIGNS:  Vital Signs Last 24 Hrs  T(C): 36.2 (10 Avila 2019 04:00), Max: 36.9 (09 Jan 2019 20:00)  T(F): 97.1 (10 Avila 2019 04:00), Max: 98.5 (09 Jan 2019 20:00)  HR: 86 (10 Avila 2019 04:00) (83 - 109)  BP: 117/61 (10 Avila 2019 04:00) (104/56 - 121/67)  BP(mean): 74 (10 Avila 2019 04:00) (67 - 80)  RR: 20 (10 Avila 2019 04:00) (17 - 31)  SpO2: 100% (10 Avila 2019 04:00) (99% - 100%)    I/Os:   I&O's Detail    08 Jan 2019 07:01  -  09 Jan 2019 07:00  --------------------------------------------------------  IN:    dexmedetomidine Infusion: 174.7 mL    IV PiggyBack: 100 mL    lactated ringers.: 1000 mL    norepinephrine Infusion: 266.4 mL    Other: 400 mL    propofol Infusion: 31 mL  Total IN: 1972.1 mL    OUT:    Chest Tube: 50 mL    Other: 400 mL    Other: 30 mL  Total OUT: 480 mL    Total NET: 1492.1 mL      09 Jan 2019 07:01  -  10 Avila 2019 05:19  --------------------------------------------------------  IN:    dextrose 5% + sodium chloride 0.9%.: 510 mL    IV PiggyBack: 100 mL    norepinephrine Infusion: 250 mL    Other: 10 mL  Total IN: 870 mL    OUT:    Chest Tube: 30 mL  Total OUT: 30 mL    Total NET: 840 mL    CAPILLARY BLOOD GLUCOSE    POCT Blood Glucose.: 86 mg/dL (09 Jan 2019 23:17)  POCT Blood Glucose.: 90 mg/dL (09 Jan 2019 18:31)  POCT Blood Glucose.: 94 mg/dL (09 Jan 2019 13:34)  POCT Blood Glucose.: 73 mg/dL (09 Jan 2019 11:50)  POCT Blood Glucose.: 89 mg/dL (09 Jan 2019 06:46)  POCT Blood Glucose.: 72 mg/dL (09 Jan 2019 05:20)      =======================  MEDICATIONS  ===================  MEDICATIONS  (STANDING):  ALBUTerol    90 MICROgram(s) HFA Inhaler 1 Puff(s) Inhalation every 4 hours  aspirin  chewable 81 milliGRAM(s) Oral daily  dextrose 5% + sodium chloride 0.9%. 1000 milliLiter(s) (30 mL/Hr) IV Continuous <Continuous>  docusate sodium Liquid 100 milliGRAM(s) Oral daily  heparin  Injectable 5000 Unit(s) SubCutaneous every 12 hours  norepinephrine Infusion 0.05 MICROgram(s)/kG/Min (5.606 mL/Hr) IV Continuous <Continuous>  pantoprazole  Injectable 40 milliGRAM(s) IV Push daily  piperacillin/tazobactam IVPB. 3.375 Gram(s) IV Intermittent every 12 hours    MEDICATIONS  (PRN):  ======================VENTILATOR SETTINGS  ==============  BIPAP 20/5/40    =================== PATIENT CARE ACCESS DEVICES ==========  Peripheral IV (+)  Arterial Line(+)  Left arm AV fistula (+)  ======================= PHYSICAL EXAM===================  General:          Comfortable, Awake, alert, not in any distress, anxious  Neuro:             Moving all extremities to commands. No focal deficits	  HEENT:                           CHER   Respiratory:	Lungs sound coarse on auscultation bilaterally with good aeration.                           no wheezing. Effort even and unlabored. on BIPAP  CV:	            Regular rate and rhythm. Normal S1/S2.  Abdomen:	Soft,  nontender, not-distended. Bowel sounds present     Skin:		No rash.  Extremities:	Warm, no cyanosis or edema.  Palpable pulses  ============================ LABS =======================                        11.4   7.48  )-----------( 105      ( 10 Avila 2019 02:25 )             36.3     01-10    140  |  99  |  39<H>  ----------------------------<  88  3.6   |  22  |  5.86<H>    Ca    9.4      10 Avila 2019 02:25  Phos  4.5     01-10  Mg     2.1     01-10    TPro  6.0  /  Alb  2.4<L>  /  TBili  0.4  /  DBili  x   /  AST  37  /  ALT  30  /  AlkPhos  136<H>  01-10    LIVER FUNCTIONS - ( 10 Avila 2019 02:25 )  Alb: 2.4 g/dL / Pro: 6.0 g/dL / ALK PHOS: 136 u/L / ALT: 30 u/L / AST: 37 u/L / GGT: x           ABG - ( 10 Avila 2019 02:25 )  pH, Arterial: 7.34  pH, Blood: x     /  pCO2: 49    /  pO2: 128   / HCO3: 24    / Base Excess: 0.4   /  SaO2: 98.8        PLEURAL FLUID  01-09-19 --  --    WBC^White Blood Cells  QNTY CELLS IN GRAM STAIN: FEW (2+)  NOS^No Organisms Seen    BRONCHIAL LAVAGE  01-08-19 --  --  --    BRONCHIAL LAVAGE  01-08-19 --  --  --    BLOOD ARTERIAL  01-07-19 --  --  --    ===================== IMAGING STUDIES ===================  Radiology personally reviewed.  < from: Xray Chest 1 View- PORTABLE-Routine (01.09.19 @ 06:32) >  INTERPRETATION:     January 8 at 7:30 PM:  Endotracheal tube in place. Bilateral pleural effusions again seen. Heart   size is stable. Visualized lungs are clear. No pneumothorax.    January 9 at 5:58 AM:  Since the last study, the endotracheal tube has been removed. No other   change. Bilateral loculated effusions and pigtail catheters overlying the   right upper quadrant seen. Visualized lungs are clear.    COMPARISON:  January 8    IMPRESSION:  Follow-up studies pre and post extubation with persistent   small loculated effusions.    < end of copied text >      ====================ASSESSMENT AND PLAN ================  Pt is a 58 yr old  male who underwent a FB, R thoracotomy, decortication, chest wall reconstruction, lat flap with Dr Pickering on 10/11/18. He was admitted on 1/6/19 to [facility] with a R pleural effusion and respiratory failure,  A R PTC was placed there and he was intubated.  He still had a R SANDY drain in place.  He presented intubated and sedated on pressors. Now extubated to BIPAP with resp acidosis requiring continuous  airway and acid / base monitoring        Issues: Acute resp failure-extubated to BIPAP             Resp acidosis             Hypotension with sepsis/ distributive shock- on Levophed drip  Acute on chronic systolic CHF ( EF 10 %), ICD in place  Infected right hemothorax - (+) Enterococcus faecalis in the past / now + MRSA empyema   E coli bacteriemia              ESRD on HD    intermittent agitation/mild dementia              severe deconditioning        ====================== NEUROLOGY=======================  Pain control with Tylenol IV   Precedex  PRN for agitation    ==================== RESPIRATORY========================  Pt is on BIPAP due to resp acidosis  without hypoxemia   Comfortable, no evidence of distress.  Monitor  right chest tube and david output  Chest tube to suction, david to bulb suction    BIPAP NIV ventilator status assessed & settings reviewed  Continuous pulse oximetry monitoring  Continue bronchodilators, pulmonary toilet  Head of bed elevation to 30-40 degrees  F/up serial ABG 's  May need reintubation if not able to come off BIPAP today    ====================CARDIOVASCULAR=====================  Continue hemodynamic monitoring/ telemetry  On Levophed - titrate pressors for SBP>100, MAP >60-65     ===================== RENAL ============================  Continue D5 NS  30CC/hr      Monitor I/Os, BUN/ Cr  and electrolytes  Next HD today  ==================== GASTROINTESTINAL===================  Strict NPO while pt on BIPAP post extubation  Pt may need swallow study before PO intake safely can be resumed due to high risk of aspiration  Continue GI prophylaxis with Protonix  Continue Zofran / Reglan for nausea - PRN	    =======================    ENDOCRIN  =====================  Glycemic monitoring  F/S with coverage  ===================HEMATOLOGIC/ONCOLOGIC =============  Monitor chest tube output. No signs of active bleeding.   Follow CBC, coags  in AM  DVT prophylaxis with SCD, sc Heparin  ========================INFECTIOUS DISEASE===============   Monitor for fever / leukocytosis.  All surgical incision / chest tube  sites look clean   F/up pleural fluid cx from today  C/w Zosyn/ Vanco - by level for E. coli bacteriemia at [place] / MRSA right empyema  Pt may need Abd/pelvis CT to looks for source of bacteriemia once hemodynamically stabilizes  ID Dr Chahal on board    Pertinent clinical, laboratory, radiographic, hemodynamic, echocardiographic, respiratory data, microbiologic data and chart were reviewed and analyzed frequently throughout the course of the day and night. GI and DVT prophylaxis, glycemic control, head of bed elevation and skin care issues were addressed.  Patient seen, examined and plan discussed with CT Surgery / CTICU team during rounds.  Pt remains critically ill in imminent risk of  deterioration and requires very careful cardio- pulmonary monitoring and support.    I have spent  60      minutes of critical care time with this pt between 12     am  and    9  am         minutes spent on total encounter; more than 50% of the visit was spent counseling and/or coordinating care by the attending physician.      KERLINE Faith MD

## 2019-01-10 NOTE — PROGRESS NOTE ADULT - ASSESSMENT
59 yo male (Mandarin speaking) with medical history of NICM with ICD, ESRD on HD, former smoker, BPH who was transfer for [facility] due to septic shock on 1/7/19. Pt. was initially admitted due to fever and right pleural effusion, found to have respiratory failure and intubated and afterwards developed shock, thought to be septic. Renal now called for management of pts ESRD

## 2019-01-11 LAB
ALBUMIN SERPL ELPH-MCNC: 2.6 G/DL — LOW (ref 3.3–5)
ALP SERPL-CCNC: 170 U/L — HIGH (ref 40–120)
ALT FLD-CCNC: 31 U/L — SIGNIFICANT CHANGE UP (ref 4–41)
ANION GAP SERPL CALC-SCNC: 15 MEQ/L — HIGH (ref 7–14)
AST SERPL-CCNC: 38 U/L — SIGNIFICANT CHANGE UP (ref 4–40)
BACTERIA SPT RESP CULT: SIGNIFICANT CHANGE UP
BACTERIA SPT RESP CULT: SIGNIFICANT CHANGE UP
BASE EXCESS BLDA CALC-SCNC: 3.1 MMOL/L — SIGNIFICANT CHANGE UP
BILIRUB SERPL-MCNC: 0.4 MG/DL — SIGNIFICANT CHANGE UP (ref 0.2–1.2)
BUN SERPL-MCNC: 27 MG/DL — HIGH (ref 7–23)
CALCIUM SERPL-MCNC: 9.5 MG/DL — SIGNIFICANT CHANGE UP (ref 8.4–10.5)
CHLORIDE SERPL-SCNC: 102 MMOL/L — SIGNIFICANT CHANGE UP (ref 98–107)
CO2 SERPL-SCNC: 24 MMOL/L — SIGNIFICANT CHANGE UP (ref 22–31)
CREAT SERPL-MCNC: 4.45 MG/DL — HIGH (ref 0.5–1.3)
GLUCOSE SERPL-MCNC: 100 MG/DL — HIGH (ref 70–99)
HCO3 BLDA-SCNC: 26 MMOL/L — SIGNIFICANT CHANGE UP (ref 22–26)
HCT VFR BLD CALC: 39.5 % — SIGNIFICANT CHANGE UP (ref 39–50)
HGB BLD-MCNC: 11.7 G/DL — LOW (ref 13–17)
MAGNESIUM SERPL-MCNC: 2.1 MG/DL — SIGNIFICANT CHANGE UP (ref 1.6–2.6)
MCHC RBC-ENTMCNC: 29.6 % — LOW (ref 32–36)
MCHC RBC-ENTMCNC: 30.2 PG — SIGNIFICANT CHANGE UP (ref 27–34)
MCV RBC AUTO: 102.1 FL — HIGH (ref 80–100)
NRBC # FLD: 0 K/UL — LOW (ref 25–125)
PCO2 BLDA: 58 MMHG — HIGH (ref 35–48)
PH BLDA: 7.32 PH — LOW (ref 7.35–7.45)
PHOSPHATE SERPL-MCNC: 4.3 MG/DL — SIGNIFICANT CHANGE UP (ref 2.5–4.5)
PLATELET # BLD AUTO: 104 K/UL — LOW (ref 150–400)
PMV BLD: 10.1 FL — SIGNIFICANT CHANGE UP (ref 7–13)
PO2 BLDA: 184 MMHG — HIGH (ref 83–108)
POTASSIUM SERPL-MCNC: 4.3 MMOL/L — SIGNIFICANT CHANGE UP (ref 3.5–5.3)
POTASSIUM SERPL-SCNC: 4.3 MMOL/L — SIGNIFICANT CHANGE UP (ref 3.5–5.3)
PROT SERPL-MCNC: 6.4 G/DL — SIGNIFICANT CHANGE UP (ref 6–8.3)
RBC # BLD: 3.87 M/UL — LOW (ref 4.2–5.8)
RBC # FLD: 16.2 % — HIGH (ref 10.3–14.5)
SAO2 % BLDA: 99.5 % — HIGH (ref 95–99)
SODIUM SERPL-SCNC: 141 MMOL/L — SIGNIFICANT CHANGE UP (ref 135–145)
WBC # BLD: 7.88 K/UL — SIGNIFICANT CHANGE UP (ref 3.8–10.5)
WBC # FLD AUTO: 7.88 K/UL — SIGNIFICANT CHANGE UP (ref 3.8–10.5)

## 2019-01-11 PROCEDURE — 99292 CRITICAL CARE ADDL 30 MIN: CPT

## 2019-01-11 PROCEDURE — 99233 SBSQ HOSP IP/OBS HIGH 50: CPT | Mod: GC

## 2019-01-11 PROCEDURE — 71045 X-RAY EXAM CHEST 1 VIEW: CPT | Mod: 26

## 2019-01-11 PROCEDURE — 99291 CRITICAL CARE FIRST HOUR: CPT

## 2019-01-11 RX ORDER — VANCOMYCIN HCL 1 G
1000 VIAL (EA) INTRAVENOUS ONCE
Qty: 0 | Refills: 0 | Status: COMPLETED | OUTPATIENT
Start: 2019-01-11 | End: 2019-01-11

## 2019-01-11 RX ORDER — MIDODRINE HYDROCHLORIDE 2.5 MG/1
10 TABLET ORAL EVERY 8 HOURS
Qty: 0 | Refills: 0 | Status: DISCONTINUED | OUTPATIENT
Start: 2019-01-11 | End: 2019-01-22

## 2019-01-11 RX ADMIN — Medication 5.61 MICROGRAM(S)/KG/MIN: at 07:13

## 2019-01-11 RX ADMIN — Medication 250 MILLIGRAM(S): at 07:13

## 2019-01-11 RX ADMIN — MIDODRINE HYDROCHLORIDE 10 MILLIGRAM(S): 2.5 TABLET ORAL at 22:57

## 2019-01-11 RX ADMIN — Medication 81 MILLIGRAM(S): at 13:41

## 2019-01-11 RX ADMIN — Medication 5.61 MICROGRAM(S)/KG/MIN: at 02:24

## 2019-01-11 RX ADMIN — MIDODRINE HYDROCHLORIDE 10 MILLIGRAM(S): 2.5 TABLET ORAL at 13:41

## 2019-01-11 RX ADMIN — PIPERACILLIN AND TAZOBACTAM 25 GRAM(S): 4; .5 INJECTION, POWDER, LYOPHILIZED, FOR SOLUTION INTRAVENOUS at 17:00

## 2019-01-11 RX ADMIN — HEPARIN SODIUM 5000 UNIT(S): 5000 INJECTION INTRAVENOUS; SUBCUTANEOUS at 05:51

## 2019-01-11 RX ADMIN — HEPARIN SODIUM 5000 UNIT(S): 5000 INJECTION INTRAVENOUS; SUBCUTANEOUS at 17:00

## 2019-01-11 RX ADMIN — PANTOPRAZOLE SODIUM 40 MILLIGRAM(S): 20 TABLET, DELAYED RELEASE ORAL at 13:10

## 2019-01-11 RX ADMIN — PIPERACILLIN AND TAZOBACTAM 25 GRAM(S): 4; .5 INJECTION, POWDER, LYOPHILIZED, FOR SOLUTION INTRAVENOUS at 05:50

## 2019-01-11 NOTE — PROGRESS NOTE ADULT - ASSESSMENT
59 yo male (Mandarin speaking) with medical history of NICM with ICD, ESRD on HD, former smoker, BPH who was transfer for [facility] due to septic shock on 1/7/19. Pt. was initially admitted due to fever and right pleural effusion, found to have respiratory failure and intubated and afterwards developed shock, thought to be septic. Renal now called for management of pts ESRD

## 2019-01-11 NOTE — PROGRESS NOTE ADULT - SUBJECTIVE AND OBJECTIVE BOX
[facility] DIVISION OF KIDNEY DISEASES AND HYPERTENSION -- FOLLOW UP NOTE  --------------------------------------------------------------------------------  HPI: 57 yo male (Mandarin speaking) with medical history of NICM with ICD, ESRD on HD, former smoker, BPH who was transfer for [facility] due to septic shock on 1/7/19. Pt. was initially admitted due to fever and right pleural effusion, found to have respiratory failure and intubated and afterwards developed shock, thought to be septic. Pt had R thoracotomy, decortication, chest wall reconstruction, lat flap with Dr Pickering on 10/11/18. Extubated 1/8/19 and currently on bipap.     Pt intubated and sedated in the CTICU, no complains, no events overnight, on bipap    PAST HISTORY  --------------------------------------------------------------------------------  No significant changes to PMH, PSH, FHx, SHx, unless otherwise noted    ALLERGIES & MEDICATIONS  --------------------------------------------------------------------------------  Allergies    No Known Allergies    Intolerances      Standing Inpatient Medications  ALBUTerol    90 MICROgram(s) HFA Inhaler 1 Puff(s) Inhalation every 4 hours  aspirin  chewable 81 milliGRAM(s) Oral daily  docusate sodium Liquid 100 milliGRAM(s) Oral daily  heparin  Injectable 5000 Unit(s) SubCutaneous every 12 hours  norepinephrine Infusion 0.05 MICROgram(s)/kG/Min IV Continuous <Continuous>  pantoprazole  Injectable 40 milliGRAM(s) IV Push daily  piperacillin/tazobactam IVPB. 3.375 Gram(s) IV Intermittent every 12 hours  vancomycin  IVPB 1000 milliGRAM(s) IV Intermittent once    PRN Inpatient Medications      REVIEW OF SYSTEMS  --------------------------------------------------------------------------------  Unable to obtain    VITALS/PHYSICAL EXAM  --------------------------------------------------------------------------------  T(C): 36.9 (01-11-19 @ 04:00), Max: 37.2 (01-10-19 @ 15:00)  HR: 92 (01-11-19 @ 07:00) (79 - 140)  BP: 104/63 (01-11-19 @ 07:00) (88/58 - 108/60)  RR: 18 (01-11-19 @ 07:00) (12 - 26)  SpO2: 100% (01-11-19 @ 07:00) (91% - 100%)  Wt(kg): --        01-10-19 @ 07:01  -  01-11-19 @ 07:00  --------------------------------------------------------  IN: 989.5 mL / OUT: 1100 mL / NET: -110.5 mL        Physical Exam:  	Gen: Awake  	HEENT: On bipap  	Pulm: course breath sounds B/L Right pig tail draining serous fluid  	CV: S1S2  	Abd: Soft, +BS   	Ext: No LE edema B/L  	Neuro: awake  	Skin: Warm and dry  	Vascular access:  LUE EILEENF site +thrill +bruit    LABS/STUDIES  --------------------------------------------------------------------------------              11.7   7.88  >-----------<  104      [01-11-19 @ 04:10]              39.5     141  |  102  |  27  ----------------------------<  100      [01-11-19 @ 04:10]  4.3   |  24  |  4.45        Ca     9.5     [01-11-19 @ 04:10]      Mg     2.1     [01-11-19 @ 04:10]      Phos  4.3     [01-11-19 @ 04:10]    TPro  6.4  /  Alb  2.6  /  TBili  0.4  /  DBili  x   /  AST  38  /  ALT  31  /  AlkPhos  170  [01-11-19 @ 04:10]          Creatinine Trend:  SCr 4.45 [01-11 @ 04:10]  SCr 5.86 [01-10 @ 02:25]  SCr 5.23 [01-09 @ 13:36]  SCr 4.45 [01-09 @ 00:20]  SCr 6.47 [01-08 @ 02:45] [facility] DIVISION OF KIDNEY DISEASES AND HYPERTENSION -- FOLLOW UP NOTE  --------------------------------------------------------------------------------  HPI: 57 yo male (Mandarin speaking) with medical history of NICM with ICD, ESRD on HD, former smoker, BPH who was transfer for [facility] due to septic shock on 1/7/19. Pt. was initially admitted due to fever and right pleural effusion, found to have respiratory failure and intubated and afterwards developed shock, thought to be septic. Pt had R thoracotomy, decortication, chest wall reconstruction, lat flap with Dr Pickering on 10/11/18. Extubated 1/8/19 and currently on bipap.     Pt intubated and sedated in the CTICU, no complains, no events overnight, on bipap    PAST HISTORY  --------------------------------------------------------------------------------  No significant changes to PMH, PSH, FHx, SHx, unless otherwise noted    ALLERGIES & MEDICATIONS  --------------------------------------------------------------------------------  Allergies    No Known Allergies    Intolerances      Standing Inpatient Medications  ALBUTerol    90 MICROgram(s) HFA Inhaler 1 Puff(s) Inhalation every 4 hours  aspirin  chewable 81 milliGRAM(s) Oral daily  docusate sodium Liquid 100 milliGRAM(s) Oral daily  heparin  Injectable 5000 Unit(s) SubCutaneous every 12 hours  norepinephrine Infusion 0.05 MICROgram(s)/kG/Min IV Continuous <Continuous>  pantoprazole  Injectable 40 milliGRAM(s) IV Push daily  piperacillin/tazobactam IVPB. 3.375 Gram(s) IV Intermittent every 12 hours  vancomycin  IVPB 1000 milliGRAM(s) IV Intermittent once    PRN Inpatient Medications      REVIEW OF SYSTEMS  --------------------------------------------------------------------------------  Unable to obtain    VITALS/PHYSICAL EXAM  --------------------------------------------------------------------------------  T(C): 36.9 (01-11-19 @ 04:00), Max: 37.2 (01-10-19 @ 15:00)  HR: 92 (01-11-19 @ 07:00) (79 - 140)  BP: 104/63 (01-11-19 @ 07:00) (88/58 - 108/60)  RR: 18 (01-11-19 @ 07:00) (12 - 26)  SpO2: 100% (01-11-19 @ 07:00) (91% - 100%)  Wt(kg): --        01-10-19 @ 07:01  -  01-11-19 @ 07:00  --------------------------------------------------------  IN: 989.5 mL / OUT: 1100 mL / NET: -110.5 mL        Physical Exam:  	Gen: Awake  	HEENT: off biapap this am   	Pulm: course breath sounds B/L Right pig tail draining serous fluid  	CV: S1S2  	Abd: Soft, +BS   	Ext: No LE edema B/L  	Neuro: awake  	Skin: Warm and dry  	Vascular access:  LUE AVF site +thrill +eloisa    LABS/STUDIES  --------------------------------------------------------------------------------              11.7   7.88  >-----------<  104      [01-11-19 @ 04:10]              39.5     141  |  102  |  27  ----------------------------<  100      [01-11-19 @ 04:10]  4.3   |  24  |  4.45        Ca     9.5     [01-11-19 @ 04:10]      Mg     2.1     [01-11-19 @ 04:10]      Phos  4.3     [01-11-19 @ 04:10]    TPro  6.4  /  Alb  2.6  /  TBili  0.4  /  DBili  x   /  AST  38  /  ALT  31  /  AlkPhos  170  [01-11-19 @ 04:10]          Creatinine Trend:  SCr 4.45 [01-11 @ 04:10]  SCr 5.86 [01-10 @ 02:25]  SCr 5.23 [01-09 @ 13:36]  SCr 4.45 [01-09 @ 00:20]  SCr 6.47 [01-08 @ 02:45]

## 2019-01-11 NOTE — PROGRESS NOTE ADULT - SUBJECTIVE AND OBJECTIVE BOX
Infectious Diseases progress note:    Subjective: NAD, awake, alert.  Offers no complaints.  Still requiring pressors.  Afebrile, no leukocytosis.  Repeat bcx ngtd    ROS:  CONSTITUTIONAL:  No fever, chills, rigors  CARDIOVASCULAR:  No chest pain or palpitations  RESPIRATORY:   No SOB, cough, dyspnea on exertion.  No wheezing  GASTROINTESTINAL:  No abd pain, N/V, diarrhea/constipation  EXTREMITIES:  No swelling or joint pain  GENITOURINARY:  No burning on urination, increased frequency or urgency.  No flank pain  NEUROLOGIC:  No HA, visual disturbances  SKIN: No rashes    Allergies    No Known Allergies    Intolerances        ANTIBIOTICS/RELEVANT:  antimicrobials  piperacillin/tazobactam IVPB. 3.375 Gram(s) IV Intermittent every 12 hours    immunologic:    OTHER:  ALBUTerol    90 MICROgram(s) HFA Inhaler 1 Puff(s) Inhalation every 4 hours  aspirin  chewable 81 milliGRAM(s) Oral daily  docusate sodium Liquid 100 milliGRAM(s) Oral daily  heparin  Injectable 5000 Unit(s) SubCutaneous every 12 hours  midodrine 10 milliGRAM(s) Oral every 8 hours  norepinephrine Infusion 0.05 MICROgram(s)/kG/Min IV Continuous <Continuous>  pantoprazole  Injectable 40 milliGRAM(s) IV Push daily      Objective:  Vital Signs Last 24 Hrs  T(C): 36.7 (11 Jan 2019 12:00), Max: 37.2 (10 Avila 2019 15:00)  T(F): 98 (11 Jan 2019 12:00), Max: 99 (10 Avila 2019 15:00)  HR: 106 (11 Jan 2019 13:42) (91 - 140)  BP: 104/63 (11 Jan 2019 07:00) (88/58 - 104/63)  BP(mean): 73 (11 Jan 2019 07:00) (64 - 75)  RR: 19 (11 Jan 2019 13:42) (12 - 26)  SpO2: 96% (11 Jan 2019 13:42) (87% - 100%)    PHYSICAL EXAM:  Constitutional:NAD  Eyes:CHER, EOMI  Ear/Nose/Throat: no thrush, mucositis.  Moist mucous membranes	  Neck:no JVD, no lymphadenopathy, supple  Respiratory: CTA adore, rt chest tube  Cardiovascular: S1S2 RRR, no murmurs  Gastrointestinal:soft, nontender,  nondistended (+) BS  Extremities:no e/e/c  Skin:  no rashes, open wounds or ulcerations        LABS:                        11.7   7.88  )-----------( 104      ( 11 Jan 2019 04:10 )             39.5     01-11    141  |  102  |  27<H>  ----------------------------<  100<H>  4.3   |  24  |  4.45<H>    Ca    9.5      11 Jan 2019 04:10  Phos  4.3     01-11  Mg     2.1     01-11    TPro  6.4  /  Alb  2.6<L>  /  TBili  0.4  /  DBili  x   /  AST  38  /  ALT  31  /  AlkPhos  170<H>  01-11            Vancomycin Level, Random: 15.0: Therapeutic ranges have not been established for random  vancomycin. Interpret results in context of patient's  clinical condition and time sample was drawn relative to  peak and trough therapeutic ranges. Therapeutic ranges for  peak vancomycin are 25-50 and for trough vancomycin 10-20  with 15-20 mcg/mL used for complicated infections. ug/mL (01.10.19 @ 13:00)                MICROBIOLOGY:    Culture - Blood (01.09.19 @ 22:24)    Culture - Blood:   NO ORGANISMS ISOLATED  NO ORGANISMS ISOLATED AT 24 HOURS    Specimen Source: BLOOD    Culture - Blood (01.09.19 @ 22:24)    Culture - Blood:   NO ORGANISMS ISOLATED  NO ORGANISMS ISOLATED AT 24 HOURS    Specimen Source: BLOOD PERIPHERAL    Culture - Body Fluid with Gram Stain (01.09.19 @ 15:41)    Culture - Body Fluid:   NO GROWTH - PRELIMINARY RESULTS  NO ORGANISMS ISOLATED AT 24 HOURS    Gram Stain:   WBC^White Blood Cells  QNTY CELLS IN GRAM STAIN: FEW (2+)  NOS^No Organisms Seen    Specimen Source: PLEURAL FLUID    Culture - Respiratory with Gram Stain (01.08.19 @ 20:04)    Gram Stain Sputum:   WBC^White Blood Cells  QNTY CELLS IN GRAM STAIN: RARE (1+)  YEAST^YEAST.  QUANTITY OF BACTERIA SEEN: RARE (1+)    Culture - Respiratory:   Yeast species, not Cryptococcus neoformans  YEAST^YEAST.  QUANTITY OF GROWTH: MODERATE    Specimen Source: BRONCHIAL LAVAGE    Culture - Blood (01.07.19 @ 21:52)    Culture - Blood:   NO ORGANISMS ISOLATED  NO ORGANISMS ISOLATED AT 72 HRS.    Specimen Source: BLOOD ARTERIAL          RADIOLOGY & ADDITIONAL STUDIES:    < from: Xray Chest 1 View- PORTABLE-Routine (01.11.19 @ 07:06) >  INTERPRETATION:     Bilateral small loculated effusions again seen. No focal consolidations.   Interstitial edema is present. No pneumothorax.      COMPARISON:  January 10      IMPRESSION:  Follow-up with bilateral small loculated effusions unchanged.    < end of copied text >

## 2019-01-11 NOTE — PROGRESS NOTE ADULT - PROBLEM SELECTOR PLAN 1
Pt. with ESRD on HD TIW (MWF). Last HD as was on 1/10/19 via AVF. No plan for HD today. Monitor labs.

## 2019-01-11 NOTE — PROGRESS NOTE ADULT - SUBJECTIVE AND OBJECTIVE BOX
CLAUDIA HAN   MRN#: [id number]     The patient is a 58y Male who was seen, evaluated, & examined with the CTICU staff on rounds with a multidisciplinary care plan formulated & implemented.  All available clinical, laboratory, radiographic, pharmacologic, and electrocardiographic data were reviewed & analyzed.      The patient was in the CTICU in critical condition secondary to:     acute hypoxic respiratory failure-persistent cardiopulmonary dysfunction  septic shock    For support and evaluation & prevention of further decompensation secondary to persistent cardiopulmonary dysfunction and cardiogenic shock-cardiovascular dysfunction, respiratory failure required:     supplemental oxygen with BiPaP  continuous pulse oximetry monitoring  following ABGs with A-line monitoring    Invasive hemodynamic monitoring with an A-line was required for continuous MAP/BP monitoring to ensure adequate cardiovascular support and to evaluate for & help prevent decompensation while receiving IV Levophed infusion secondary to septic shock-cardiovascular dysfunction.     In addition:  CT scan revealed likely foreign body in R mainstem bronchus which we removed with bronchoscopy  Extubated but hypercarbic and acidotic despite unremarkable clinical appearance   On intermittent BiPaP at night with improving respiratory acidosis although still tenuous and may still need to be re-intubated   Unclear what baseline PCO2 is - even with PCO2 in 60s and pH between 7.25 and 7.30 he is not tachypneic or with increased work of breathing despite low pH indicating that he is not compensated at thie PCO2 so it is probably higher than his baseline  Still on Levophed 0.05 mcg/kg/min, ? if sepsis is etiology for shock - requirement did not decrease with Albumin boluses  Low EF at baseline but does not have clinical signs of low output so his pressor and ventilatory requirements are likely not related to his cardiomyopathy which is compensated  Afebrile with no leukocytosis - infection is likely resolving on Vancomycin and Zosyn; ID is following, will dose Vancomycin today - +yeast in sputum, ID aware, otherwise no positive cultures  HD yesterday; electrolytes stable    The patient required critical care management and I provided 80 minutes of non-continuous care to the patient in addition to discussing the patient and plan at length with the CTICU staff and helping coordinate care.

## 2019-01-11 NOTE — PROGRESS NOTE ADULT - SUBJECTIVE AND OBJECTIVE BOX
CLAUDIA HAN            MRN-[id number]         No Known Allergies                 HPI:  Pt is a 58 yr old  male who underwent a FB, R thoracotomy, decortication, chest wall reconstruction, lat flap with Dr Pickering on 10/11/18. He was admitted on 1/6/19 to [facility] with a R pleural effusion and respiratory failure,  A R PTC was placed there and he was intubated.  He still had a R SANDY drain in place.  He presented intubated and sedated on pressors. (07 Jan 2019 23:10)      Procedure:    10/11/2018   Right thoracotomy, resection of portion of R 7th rib, evacuation of infected hemothorax, takedown of pleurocutaneous fistula         Issues:  Hypotension  Sepsis  Hypoxic / Hypercarbic respiratory failure  Cardiomyopathy  Pleural effusion  BPH         Drips:   Levophed            Home Medications:  aspirin 81 mg oral tablet: 1 tab(s) orally once a day  (30 Oct 2018 14:39)  Breo Ellipta 100 mcg-25 mcg/inh inhalation powder: 1 puff(s) inhaled once a day patient denies using it (11 Oct 2018 08:38)  Calcium 500: 1 tab(s) orally 2 times a day (11 Oct 2018 08:38)  docusate sodium 100 mg oral tablet: 1 tab(s) orally 2 times a day, As Needed (11 Oct 2018 08:39)  folic acid 1 mg oral tablet: 1 tab(s) orally once a day (11 Oct 2018 08:38)  Mag-Ox 400 oral tablet: 1 tab(s) orally once a day (11 Oct 2018 08:39)  Multiple Vitamins oral tablet: 1 tab(s) orally once a day  (30 Oct 2018 14:39)  Namenda 10 mg oral tablet: 1 tab(s) orally 2 times a day (11 Oct 2018 08:38)  Nephplex Rx oral tablet: 1 tab(s) orally once a day (11 Oct 2018 08:39)  nortriptyline 50 mg oral capsule: 1 cap(s) orally once a day (11 Oct 2018 08:39)  piperacillin-tazobactam 2 g-0.25 g intravenous injection: 1  intravenous every 12 hours until 11/7/18 (30 Oct 2018 16:27)  Renvela 800 mg oral tablet: 2 tab(s) orally every 8 hours (11 Oct 2018 08:39)  simethicone 80 mg oral tablet: 1 tab(s) orally 3 times a day (after meals) (11 Oct 2018 08:39)  Tylenol 325 mg oral tablet: 2 tab(s) orally every 6 hours, As Needed (30 Oct 2018 14:39)  vitamin A: 1 tab(s) orally once a day (11 Oct 2018 08:38)  Vitamin B Complex: 1 tab(s) orally once a day (11 Oct 2018 08:38)  Vitamin C 500 mg oral tablet: 1 tab(s) orally once a day (11 Oct 2018 08:38)      PAST MEDICAL & SURGICAL HISTORY:  Smoking hx  Cardiomyopathy  Wound: right chest  ICD (implantable cardioverter-defibrillator) in place  OA (osteoarthritis)  HLD (hyperlipidemia)  BPH (benign prostatic hyperplasia)  Pleural effusion  HTN (hypertension)  ESRD (end stage renal disease)  H/O chest wound: right  S/P thoracotomy: FB, R thoracotomy, decortication, chest wall reconstruction, lat flap  History of wound infection: right chest wall - revision 5/18 and again in 7/18  History of implantable cardioverter-defibrillator (ICD) placement: pt unsure when placed  H/O bilateral hip replacements: 2008, 2009        ICU Vital Signs Last 24 Hrs  T(C): 36.8 (11 Jan 2019 16:00), Max: 36.9 (11 Jan 2019 04:00)  T(F): 98.3 (11 Jan 2019 16:00), Max: 98.5 (11 Jan 2019 04:00)  HR: 108 (11 Jan 2019 17:00) (91 - 120)  BP: 95/59 (11 Jan 2019 15:00) (88/58 - 104/63)  BP(mean): 66 (11 Jan 2019 15:00) (64 - 75)  ABP: 94/47 (11 Jan 2019 17:00) (85/50 - 121/66)  ABP(mean): 61 (11 Jan 2019 17:00) (61 - 85)  RR: 21 (11 Jan 2019 17:00) (12 - 26)  SpO2: 93% (11 Jan 2019 17:00) (87% - 100%)    I&O's Detail    10 Avila 2019 07:01  -  11 Jan 2019 07:00  --------------------------------------------------------  IN:    dextrose 5% + sodium chloride 0.9%: 120 mL    IV PiggyBack: 600 mL    norepinephrine Infusion: 119.5 mL    Other: 500 mL  Total IN: 1339.5 mL    OUT:    Chest Tube: 75 mL    Other: 30 mL    Other: 1000 mL  Total OUT: 1105 mL    Total NET: 234.5 mL      11 Jan 2019 07:01  -  11 Jan 2019 18:49  --------------------------------------------------------  IN:    IV PiggyBack: 100 mL    norepinephrine Infusion: 35.7 mL    Oral Fluid: 565 mL  Total IN: 700.7 mL    OUT:    Chest Tube: 50 mL    Drain: 10 mL  Total OUT: 60 mL    Total NET: 640.7 mL        CAPILLARY BLOOD GLUCOSE      POCT Blood Glucose.: 86 mg/dL (09 Jan 2019 23:17)      Home Medications:  aspirin 81 mg oral tablet: 1 tab(s) orally once a day  (30 Oct 2018 14:39)  Breo Ellipta 100 mcg-25 mcg/inh inhalation powder: 1 puff(s) inhaled once a day patient denies using it (11 Oct 2018 08:38)  Calcium 500: 1 tab(s) orally 2 times a day (11 Oct 2018 08:38)  docusate sodium 100 mg oral tablet: 1 tab(s) orally 2 times a day, As Needed (11 Oct 2018 08:39)  folic acid 1 mg oral tablet: 1 tab(s) orally once a day (11 Oct 2018 08:38)  Mag-Ox 400 oral tablet: 1 tab(s) orally once a day (11 Oct 2018 08:39)  Multiple Vitamins oral tablet: 1 tab(s) orally once a day  (30 Oct 2018 14:39)  Namenda 10 mg oral tablet: 1 tab(s) orally 2 times a day (11 Oct 2018 08:38)  Nephplex Rx oral tablet: 1 tab(s) orally once a day (11 Oct 2018 08:39)  nortriptyline 50 mg oral capsule: 1 cap(s) orally once a day (11 Oct 2018 08:39)  piperacillin-tazobactam 2 g-0.25 g intravenous injection: 1  intravenous every 12 hours until 11/7/18 (30 Oct 2018 16:27)  Renvela 800 mg oral tablet: 2 tab(s) orally every 8 hours (11 Oct 2018 08:39)  simethicone 80 mg oral tablet: 1 tab(s) orally 3 times a day (after meals) (11 Oct 2018 08:39)  Tylenol 325 mg oral tablet: 2 tab(s) orally every 6 hours, As Needed (30 Oct 2018 14:39)  vitamin A: 1 tab(s) orally once a day (11 Oct 2018 08:38)  Vitamin B Complex: 1 tab(s) orally once a day (11 Oct 2018 08:38)  Vitamin C 500 mg oral tablet: 1 tab(s) orally once a day (11 Oct 2018 08:38)      MEDICATIONS  (STANDING):  ALBUTerol    90 MICROgram(s) HFA Inhaler 1 Puff(s) Inhalation every 4 hours  aspirin  chewable 81 milliGRAM(s) Oral daily  docusate sodium Liquid 100 milliGRAM(s) Oral daily  heparin  Injectable 5000 Unit(s) SubCutaneous every 12 hours  midodrine 10 milliGRAM(s) Oral every 8 hours  norepinephrine Infusion 0.05 MICROgram(s)/kG/Min (5.606 mL/Hr) IV Continuous <Continuous>  pantoprazole  Injectable 40 milliGRAM(s) IV Push daily  piperacillin/tazobactam IVPB. 3.375 Gram(s) IV Intermittent every 12 hours    MEDICATIONS  (PRN):          Physical exam:                             General:               Pt is awake, alert,  not in any distress                                                  Neuro:                  Nonfocal                             Cardiovascular:   S1 & S2, regular                           Respiratory:         Air entry is fair and equal on both sides, has bilateral conducted sounds                           GI:                          Soft, nondistended and nontender, Bowel sounds active                            Ext:                        No cyanosis or edema     Labs:                                                                           11.7   7.88  )-----------( 104      ( 11 Jan 2019 04:10 )             39.5             01-11    141  |  102  |  27<H>  ----------------------------<  100<H>  4.3   |  24  |  4.45<H>    Ca    9.5      11 Jan 2019 04:10  Phos  4.3     01-11  Mg     2.1     01-11    TPro  6.4  /  Alb  2.6<L>  /  TBili  0.4  /  DBili  x   /  AST  38  /  ALT  31  /  AlkPhos  170<H>  01-11                    LIVER FUNCTIONS - ( 11 Jan 2019 04:10 )  Alb: 2.6 g/dL / Pro: 6.4 g/dL / ALK PHOS: 170 u/L / ALT: 31 u/L / AST: 38 u/L / GGT: x                   CXR:      Plan:    General: 58yMale s/p   progressing well, experiencing  pain with deep breathing.                             Neuro:                                         Pain control with PCA / Tylenol IV                            Cardiovascular:                                          Continue hemodynamic monitoring.                            Respiratory:                                         Pt is on 2L  nasal canula, wean off as tolerated                                          Comfortable, not in any distress.                                         Encourage incentive spirometry                                          Monitor chest tube output                                         Chest tube to suction / water seal                                                                  Continue bronchodilators, pulmonary toilet                            GI                                         On clear liquids, advance to regular diet as tolerated                                         Continue GI prophylaxis with Pepcid / Protonix                                         Continue Zofran / Reglan for nausea - PRN	                                                                 Renal:                                         Continue LR 30cc/hr                                         Monitor I/Os and electrolytes                                         D/C Moore in AM                                                 Hem/ Onc:                                                                                  Monitor chest tube output &  signs of bleeding.                                          Follow CBC in AM                           Infectious disease:                                            No signs of infection. Monitor for fever / leukocytosis.                                          All surgical incision / chest tube  sites look clean                            Endocrine                                             Continue Accu-Checks with coverage    Pt is on SQ Heparin and Venodyne boots for DVT prophylaxis.     Pertinent clinical, laboratory, radiographic, hemodynamic, echocardiographic, respiratory data, microbiologic data and chart were reviewed and analyzed frequently throughout the course of the day and night  Patient seen, examined and plan discussed with CT Surgeon / CTICU team during rounds.    Pt's status discussed with family at bedside, updated status    I have spent     minutes of critical care time with this pt between       and                   Ralph Warren MD CLAUDIA HAN            MRN-[id number]         No Known Allergies                 HPI:  Pt is a 58 yr old  male who underwent a FB, R thoracotomy, decortication, chest wall reconstruction, lat flap with Dr Pickering on 10/11/18. He was admitted on 1/6/19 to [facility] with a R pleural effusion and respiratory failure,  A R PTC was placed there and he was intubated.  He still had a R SANDY drain in place.  He presented intubated and sedated on pressors. (07 Jan 2019 23:10)      Procedure:    10/11/2018   Right thoracotomy, resection of portion of R 7th rib, evacuation of infected hemothorax, takedown of pleurocutaneous fistula         Issues:  Hypotension  Sepsis  Hypoxic / Hypercarbic respiratory failure  Cardiomyopathy  Pleural effusion  BPH         Drips:   Levophed            Home Medications:  aspirin 81 mg oral tablet: 1 tab(s) orally once a day  (30 Oct 2018 14:39)  Breo Ellipta 100 mcg-25 mcg/inh inhalation powder: 1 puff(s) inhaled once a day patient denies using it (11 Oct 2018 08:38)  Calcium 500: 1 tab(s) orally 2 times a day (11 Oct 2018 08:38)  docusate sodium 100 mg oral tablet: 1 tab(s) orally 2 times a day, As Needed (11 Oct 2018 08:39)  folic acid 1 mg oral tablet: 1 tab(s) orally once a day (11 Oct 2018 08:38)  Mag-Ox 400 oral tablet: 1 tab(s) orally once a day (11 Oct 2018 08:39)  Multiple Vitamins oral tablet: 1 tab(s) orally once a day  (30 Oct 2018 14:39)  Namenda 10 mg oral tablet: 1 tab(s) orally 2 times a day (11 Oct 2018 08:38)  Nephplex Rx oral tablet: 1 tab(s) orally once a day (11 Oct 2018 08:39)  nortriptyline 50 mg oral capsule: 1 cap(s) orally once a day (11 Oct 2018 08:39)  piperacillin-tazobactam 2 g-0.25 g intravenous injection: 1  intravenous every 12 hours until 11/7/18 (30 Oct 2018 16:27)  Renvela 800 mg oral tablet: 2 tab(s) orally every 8 hours (11 Oct 2018 08:39)  simethicone 80 mg oral tablet: 1 tab(s) orally 3 times a day (after meals) (11 Oct 2018 08:39)  Tylenol 325 mg oral tablet: 2 tab(s) orally every 6 hours, As Needed (30 Oct 2018 14:39)  vitamin A: 1 tab(s) orally once a day (11 Oct 2018 08:38)  Vitamin B Complex: 1 tab(s) orally once a day (11 Oct 2018 08:38)  Vitamin C 500 mg oral tablet: 1 tab(s) orally once a day (11 Oct 2018 08:38)      PAST MEDICAL & SURGICAL HISTORY:  Smoking hx  Cardiomyopathy  Wound: right chest  ICD (implantable cardioverter-defibrillator) in place  OA (osteoarthritis)  HLD (hyperlipidemia)  BPH (benign prostatic hyperplasia)  Pleural effusion  HTN (hypertension)  ESRD (end stage renal disease)  H/O chest wound: right  S/P thoracotomy: FB, R thoracotomy, decortication, chest wall reconstruction, lat flap  History of wound infection: right chest wall - revision 5/18 and again in 7/18  History of implantable cardioverter-defibrillator (ICD) placement: pt unsure when placed  H/O bilateral hip replacements: 2008, 2009        ICU Vital Signs Last 24 Hrs  T(C): 36.8 (11 Jan 2019 16:00), Max: 36.9 (11 Jan 2019 04:00)  T(F): 98.3 (11 Jan 2019 16:00), Max: 98.5 (11 Jan 2019 04:00)  HR: 108 (11 Jan 2019 17:00) (91 - 120)  BP: 95/59 (11 Jan 2019 15:00) (88/58 - 104/63)  BP(mean): 66 (11 Jan 2019 15:00) (64 - 75)  ABP: 94/47 (11 Jan 2019 17:00) (85/50 - 121/66)  ABP(mean): 61 (11 Jan 2019 17:00) (61 - 85)  RR: 21 (11 Jan 2019 17:00) (12 - 26)  SpO2: 93% (11 Jan 2019 17:00) (87% - 100%)    I&O's Detail    10 Avila 2019 07:01  -  11 Jan 2019 07:00  --------------------------------------------------------  IN:    dextrose 5% + sodium chloride 0.9%: 120 mL    IV PiggyBack: 600 mL    norepinephrine Infusion: 119.5 mL    Other: 500 mL  Total IN: 1339.5 mL    OUT:    Chest Tube: 75 mL    Other: 30 mL    Other: 1000 mL  Total OUT: 1105 mL    Total NET: 234.5 mL      11 Jan 2019 07:01  -  11 Jan 2019 18:49  --------------------------------------------------------  IN:    IV PiggyBack: 100 mL    norepinephrine Infusion: 35.7 mL    Oral Fluid: 565 mL  Total IN: 700.7 mL    OUT:    Chest Tube: 50 mL    Drain: 10 mL  Total OUT: 60 mL    Total NET: 640.7 mL        CAPILLARY BLOOD GLUCOSE      POCT Blood Glucose.: 86 mg/dL (09 Jan 2019 23:17)      Home Medications:  aspirin 81 mg oral tablet: 1 tab(s) orally once a day  (30 Oct 2018 14:39)  Breo Ellipta 100 mcg-25 mcg/inh inhalation powder: 1 puff(s) inhaled once a day patient denies using it (11 Oct 2018 08:38)  Calcium 500: 1 tab(s) orally 2 times a day (11 Oct 2018 08:38)  docusate sodium 100 mg oral tablet: 1 tab(s) orally 2 times a day, As Needed (11 Oct 2018 08:39)  folic acid 1 mg oral tablet: 1 tab(s) orally once a day (11 Oct 2018 08:38)  Mag-Ox 400 oral tablet: 1 tab(s) orally once a day (11 Oct 2018 08:39)  Multiple Vitamins oral tablet: 1 tab(s) orally once a day  (30 Oct 2018 14:39)  Namenda 10 mg oral tablet: 1 tab(s) orally 2 times a day (11 Oct 2018 08:38)  Nephplex Rx oral tablet: 1 tab(s) orally once a day (11 Oct 2018 08:39)  nortriptyline 50 mg oral capsule: 1 cap(s) orally once a day (11 Oct 2018 08:39)  piperacillin-tazobactam 2 g-0.25 g intravenous injection: 1  intravenous every 12 hours until 11/7/18 (30 Oct 2018 16:27)  Renvela 800 mg oral tablet: 2 tab(s) orally every 8 hours (11 Oct 2018 08:39)  simethicone 80 mg oral tablet: 1 tab(s) orally 3 times a day (after meals) (11 Oct 2018 08:39)  Tylenol 325 mg oral tablet: 2 tab(s) orally every 6 hours, As Needed (30 Oct 2018 14:39)  vitamin A: 1 tab(s) orally once a day (11 Oct 2018 08:38)  Vitamin B Complex: 1 tab(s) orally once a day (11 Oct 2018 08:38)  Vitamin C 500 mg oral tablet: 1 tab(s) orally once a day (11 Oct 2018 08:38)      MEDICATIONS  (STANDING):  ALBUTerol    90 MICROgram(s) HFA Inhaler 1 Puff(s) Inhalation every 4 hours  aspirin  chewable 81 milliGRAM(s) Oral daily  docusate sodium Liquid 100 milliGRAM(s) Oral daily  heparin  Injectable 5000 Unit(s) SubCutaneous every 12 hours  midodrine 10 milliGRAM(s) Oral every 8 hours  norepinephrine Infusion 0.05 MICROgram(s)/kG/Min (5.606 mL/Hr) IV Continuous <Continuous>  pantoprazole  Injectable 40 milliGRAM(s) IV Push daily  piperacillin/tazobactam IVPB. 3.375 Gram(s) IV Intermittent every 12 hours    MEDICATIONS  (PRN):          Physical exam:                             General:               Pt is awake, alert,  not in any distress                                                  Neuro:                  Nonfocal                             Cardiovascular:   S1 & S2, regular                           Respiratory:         Air entry is fair and equal on both sides, has bilateral conducted sounds                           GI:                          Soft, nondistended and nontender, Bowel sounds active                            Ext:                        No cyanosis or edema     Labs:                                                                           11.7   7.88  )-----------( 104      ( 11 Jan 2019 04:10 )             39.5             01-11    141  |  102  |  27<H>  ----------------------------<  100<H>  4.3   |  24  |  4.45<H>    Ca    9.5      11 Jan 2019 04:10  Phos  4.3     01-11  Mg     2.1     01-11    TPro  6.4  /  Alb  2.6<L>  /  TBili  0.4  /  DBili  x   /  AST  38  /  ALT  31  /  AlkPhos  170<H>  01-11                    LIVER FUNCTIONS - ( 11 Jan 2019 04:10 )  Alb: 2.6 g/dL / Pro: 6.4 g/dL / ALK PHOS: 170 u/L / ALT: 31 u/L / AST: 38 u/L / GGT: x               CXR:  < from: Xray Chest 1 View- PORTABLE-Routine (01.11.19 @ 07:06) >  Bilateral small loculated effusions again seen. No focal consolidations.   Interstitial edema is present. No pneumothorax.        Plan:    General: Pt is a 58 yr old  male who underwent a FB, R thoracotomy, decortication, chest wall reconstruction, lat flap with Dr Pickering on 10/11/18. He was admitted on 1/6/19 to [facility] with a R pleural effusion and respiratory failure,  A R PTC was placed there and he was intubated.  He still had a R SADNY drain in place.  He presented intubated and sedated on pressors. (07 Jan 2019 23:10). Pt is currently extubated but on / off BiPAP for respiratory acidosis                            Neuro:                                         Pain control with PCA / Tylenol IV                            Cardiovascular:                                          Continue hemodynamic monitoring.    Hypotension: On Levophed, titrate to MAP>65            Start  Midodrine and wean off Levo as tolerated. The goal is to wean off all pressors when sepsis is resolved.                             Respiratory:                                         Pt is on 2L  nasal canula, wean off as tolerated      Respiratory acidosis: On  / Off BiPap                                         Comfortable, not in any distress.                                         Encourage incentive spirometry                                          Monitor chest tube output                                         Chest tube to suction                                                                   Continue bronchodilators, pulmonary toilet                            GI                                         On Renal / DASH diet                                         Continue GI prophylaxis with Protonix                                         Continue Zofran / Reglan for nausea - PRN	                                                                 Renal:                                         ESRD: HD as per Renal                                         Monitor I/Os and electrolytes                                                                                        Hem/ Onc:                                                                                  Monitor chest tube output &  signs of bleeding.                                          Follow CBC in AM                           Infectious disease:      All the cultures are negative during this admission but grew MRSA from pleural fluid and E.Coli in blood. Continue Vanco by level and Zosyn                                          Monitor for fever / leukocytosis.                                          All surgical incision / chest tube  sites look clean                            Endocrine                                             Continue Accu-Checks with coverage    Pt is on SQ Heparin and Venodyne boots for DVT prophylaxis.     Pertinent clinical, laboratory, radiographic, hemodynamic, echocardiographic, respiratory data, microbiologic data and chart were reviewed and analyzed frequently throughout the course of the day and night  Patient seen, examined and plan discussed with CT Surgeon Dr. Ledesma / CTICU team during rounds.    Pt's status discussed with family at bedside, updated status    I have spent 90   minutes of critical care time with this pt between 7am  and  11.59pm               Ralph Warren MD

## 2019-01-11 NOTE — PROGRESS NOTE ADULT - ASSESSMENT
Pt is a 58 yr old Mandarin speaking male who underwent right thoractomy decortiation, Right Chest Wall reconstruction with Latissimjus Dorsi Flap Poss Skin Graft with VAC dressing with Dr Pickering 10/11/18.  He has h/o ESRD with HD, CHF, EF of 10%,  ICD,  with h/o chronic bilateral pleural effusions.  The patient previously underwent left VATS and PleurX drains  in 2015.  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     During his last admission at [facility], pt underwent OR with Plastic and Thoracic surgery,  for definitive repair that involved thoracotomy, decortication, excision of empyema cavity along with muscle flap reconstruction. Pt underwent surgery on 10/11 (Right thoracotomy, resection of portion of R 7th rib, evacuation of infected hemothorax, takedown of pleurocutaneous fistula - and noted to have foul smelling hematoma).  OR cultures grew Enterococcus faecalis.  Pt was treated with zosyn for 4 week course for infected hemothorax which was completed on 11/7.      He was admitted on 1/6/19 to [facility] with a R pleural effusion and respiratory failure,  A R PTC was placed there and he was intubated.  He still had a R SANDY drain in place.  He presented intubated and sedated on pressors. (07 Jan 2019 23:10) Cultures from pleural fluid at [place] reportedly grew MRSA.  Pt is currently on vanco/zosyn.      1/8 - Bronchoscopy for removal of foreign body in airway.      1/9 - reports from [place] reviewed:  On admission there, pt had fever 102.3, P 121, BP 74/48.  Bcx from 1/6 (+) E.coli (sensitive to zosyn), and pleural fluid cx 1/8 grew MRSA.  Pleural fluid showed 28 WBC.  AFB (-).      ID consult called for further abx management.      Problem/Plan - 1:    Septic shock    - Pt with R pleural effusion, s/p SANDY drain/chest tube, (+) MRSA reported from pleural fluid cultures at [place], Total WBC from pleural fluid - 24.  Drainage has been serosanguinous.   Cont vanco by level.  Check next random after HD    - Blood cx also (+) for E.coli at [place], source unclear.    Cont zosyn.  Repeat BCx (-) x 1 at [facility].  Recommend CTap with po/iv contrast to evaluate for bacteremia source, as pleural fluid cultures grew MRSA.       - BAL specimens (+) yeast, suspect colonizer.      - echocardiogram stable findings compared to last study.    Will follow,    Anabel Chahal  [phone]

## 2019-01-12 DIAGNOSIS — N18.6 END STAGE RENAL DISEASE: ICD-10-CM

## 2019-01-12 LAB
ALBUMIN SERPL ELPH-MCNC: 2.7 G/DL — LOW (ref 3.3–5)
ALP SERPL-CCNC: 159 U/L — HIGH (ref 40–120)
ALT FLD-CCNC: 24 U/L — SIGNIFICANT CHANGE UP (ref 4–41)
ANION GAP SERPL CALC-SCNC: 11 MEQ/L — SIGNIFICANT CHANGE UP (ref 7–14)
ANION GAP SERPL CALC-SCNC: 14 MEQ/L — SIGNIFICANT CHANGE UP (ref 7–14)
AST SERPL-CCNC: 24 U/L — SIGNIFICANT CHANGE UP (ref 4–40)
BACTERIA BLD CULT: SIGNIFICANT CHANGE UP
BASE EXCESS BLDA CALC-SCNC: -0.1 MMOL/L — SIGNIFICANT CHANGE UP
BASE EXCESS BLDA CALC-SCNC: 2.4 MMOL/L — SIGNIFICANT CHANGE UP
BASE EXCESS BLDA CALC-SCNC: 4.7 MMOL/L — SIGNIFICANT CHANGE UP
BILIRUB SERPL-MCNC: 0.5 MG/DL — SIGNIFICANT CHANGE UP (ref 0.2–1.2)
BUN SERPL-MCNC: 21 MG/DL — SIGNIFICANT CHANGE UP (ref 7–23)
BUN SERPL-MCNC: 39 MG/DL — HIGH (ref 7–23)
CA-I BLDA-SCNC: 1.18 MMOL/L — SIGNIFICANT CHANGE UP (ref 1.15–1.29)
CA-I BLDA-SCNC: 1.21 MMOL/L — SIGNIFICANT CHANGE UP (ref 1.15–1.29)
CALCIUM SERPL-MCNC: 9 MG/DL — SIGNIFICANT CHANGE UP (ref 8.4–10.5)
CALCIUM SERPL-MCNC: 9.6 MG/DL — SIGNIFICANT CHANGE UP (ref 8.4–10.5)
CHLORIDE SERPL-SCNC: 102 MMOL/L — SIGNIFICANT CHANGE UP (ref 98–107)
CHLORIDE SERPL-SCNC: 98 MMOL/L — SIGNIFICANT CHANGE UP (ref 98–107)
CO2 SERPL-SCNC: 26 MMOL/L — SIGNIFICANT CHANGE UP (ref 22–31)
CO2 SERPL-SCNC: 29 MMOL/L — SIGNIFICANT CHANGE UP (ref 22–31)
CREAT SERPL-MCNC: 3.82 MG/DL — HIGH (ref 0.5–1.3)
CREAT SERPL-MCNC: 6.17 MG/DL — HIGH (ref 0.5–1.3)
GLUCOSE BLDA-MCNC: 111 MG/DL — HIGH (ref 70–99)
GLUCOSE BLDA-MCNC: 87 MG/DL — SIGNIFICANT CHANGE UP (ref 70–99)
GLUCOSE SERPL-MCNC: 93 MG/DL — SIGNIFICANT CHANGE UP (ref 70–99)
GLUCOSE SERPL-MCNC: 96 MG/DL — SIGNIFICANT CHANGE UP (ref 70–99)
HCO3 BLDA-SCNC: 23 MMOL/L — SIGNIFICANT CHANGE UP (ref 22–26)
HCO3 BLDA-SCNC: 25 MMOL/L — SIGNIFICANT CHANGE UP (ref 22–26)
HCO3 BLDA-SCNC: 28 MMOL/L — HIGH (ref 22–26)
HCT VFR BLD CALC: 39.1 % — SIGNIFICANT CHANGE UP (ref 39–50)
HCT VFR BLD CALC: 39.7 % — SIGNIFICANT CHANGE UP (ref 39–50)
HCT VFR BLDA CALC: 36.3 % — LOW (ref 39–51)
HCT VFR BLDA CALC: 36.7 % — LOW (ref 39–51)
HGB BLD-MCNC: 11.9 G/DL — LOW (ref 13–17)
HGB BLD-MCNC: 12 G/DL — LOW (ref 13–17)
HGB BLDA-MCNC: 11.8 G/DL — LOW (ref 13–17)
HGB BLDA-MCNC: 11.9 G/DL — LOW (ref 13–17)
LACTATE BLDA-SCNC: 0.8 MMOL/L — SIGNIFICANT CHANGE UP (ref 0.5–2)
LACTATE BLDA-SCNC: 0.8 MMOL/L — SIGNIFICANT CHANGE UP (ref 0.5–2)
MAGNESIUM SERPL-MCNC: 2 MG/DL — SIGNIFICANT CHANGE UP (ref 1.6–2.6)
MCHC RBC-ENTMCNC: 30.2 % — LOW (ref 32–36)
MCHC RBC-ENTMCNC: 30.4 % — LOW (ref 32–36)
MCHC RBC-ENTMCNC: 30.7 PG — SIGNIFICANT CHANGE UP (ref 27–34)
MCHC RBC-ENTMCNC: 31.6 PG — SIGNIFICANT CHANGE UP (ref 27–34)
MCV RBC AUTO: 101.5 FL — HIGH (ref 80–100)
MCV RBC AUTO: 104 FL — HIGH (ref 80–100)
NRBC # FLD: 0 K/UL — LOW (ref 25–125)
NRBC # FLD: 0 K/UL — LOW (ref 25–125)
PCO2 BLDA: 61 MMHG — HIGH (ref 35–48)
PCO2 BLDA: 63 MMHG — HIGH (ref 35–48)
PCO2 BLDA: 74 MMHG — CRITICAL HIGH (ref 35–48)
PH BLDA: 7.19 PH — CRITICAL LOW (ref 7.35–7.45)
PH BLDA: 7.28 PH — LOW (ref 7.35–7.45)
PH BLDA: 7.32 PH — LOW (ref 7.35–7.45)
PHOSPHATE SERPL-MCNC: 2.7 MG/DL — SIGNIFICANT CHANGE UP (ref 2.5–4.5)
PLATELET # BLD AUTO: 116 K/UL — LOW (ref 150–400)
PLATELET # BLD AUTO: 135 K/UL — LOW (ref 150–400)
PMV BLD: 10 FL — SIGNIFICANT CHANGE UP (ref 7–13)
PMV BLD: 10.3 FL — SIGNIFICANT CHANGE UP (ref 7–13)
PO2 BLDA: 122 MMHG — HIGH (ref 83–108)
PO2 BLDA: 151 MMHG — HIGH (ref 83–108)
PO2 BLDA: 156 MMHG — HIGH (ref 83–108)
POTASSIUM BLDA-SCNC: 3.1 MMOL/L — LOW (ref 3.4–4.5)
POTASSIUM BLDA-SCNC: 3.4 MMOL/L — SIGNIFICANT CHANGE UP (ref 3.4–4.5)
POTASSIUM SERPL-MCNC: 3.4 MMOL/L — LOW (ref 3.5–5.3)
POTASSIUM SERPL-MCNC: 4.2 MMOL/L — SIGNIFICANT CHANGE UP (ref 3.5–5.3)
POTASSIUM SERPL-SCNC: 3.4 MMOL/L — LOW (ref 3.5–5.3)
POTASSIUM SERPL-SCNC: 4.2 MMOL/L — SIGNIFICANT CHANGE UP (ref 3.5–5.3)
PROT SERPL-MCNC: 6.6 G/DL — SIGNIFICANT CHANGE UP (ref 6–8.3)
RBC # BLD: 3.76 M/UL — LOW (ref 4.2–5.8)
RBC # BLD: 3.91 M/UL — LOW (ref 4.2–5.8)
RBC # FLD: 15.9 % — HIGH (ref 10.3–14.5)
RBC # FLD: 15.9 % — HIGH (ref 10.3–14.5)
SAO2 % BLDA: 98.5 % — SIGNIFICANT CHANGE UP (ref 95–99)
SAO2 % BLDA: 98.6 % — SIGNIFICANT CHANGE UP (ref 95–99)
SAO2 % BLDA: 98.9 % — SIGNIFICANT CHANGE UP (ref 95–99)
SODIUM BLDA-SCNC: 135 MMOL/L — LOW (ref 136–146)
SODIUM BLDA-SCNC: 139 MMOL/L — SIGNIFICANT CHANGE UP (ref 136–146)
SODIUM SERPL-SCNC: 138 MMOL/L — SIGNIFICANT CHANGE UP (ref 135–145)
SODIUM SERPL-SCNC: 142 MMOL/L — SIGNIFICANT CHANGE UP (ref 135–145)
VANCOMYCIN FLD-MCNC: 20.4 UG/ML — SIGNIFICANT CHANGE UP
VANCOMYCIN FLD-MCNC: 25.6 UG/ML — CRITICAL HIGH
WBC # BLD: 7.81 K/UL — SIGNIFICANT CHANGE UP (ref 3.8–10.5)
WBC # BLD: 8.78 K/UL — SIGNIFICANT CHANGE UP (ref 3.8–10.5)
WBC # FLD AUTO: 7.81 K/UL — SIGNIFICANT CHANGE UP (ref 3.8–10.5)
WBC # FLD AUTO: 8.78 K/UL — SIGNIFICANT CHANGE UP (ref 3.8–10.5)

## 2019-01-12 PROCEDURE — 99292 CRITICAL CARE ADDL 30 MIN: CPT

## 2019-01-12 PROCEDURE — 99291 CRITICAL CARE FIRST HOUR: CPT

## 2019-01-12 PROCEDURE — 99232 SBSQ HOSP IP/OBS MODERATE 35: CPT | Mod: GC

## 2019-01-12 PROCEDURE — 71045 X-RAY EXAM CHEST 1 VIEW: CPT | Mod: 26

## 2019-01-12 RX ADMIN — Medication 81 MILLIGRAM(S): at 14:47

## 2019-01-12 RX ADMIN — PIPERACILLIN AND TAZOBACTAM 25 GRAM(S): 4; .5 INJECTION, POWDER, LYOPHILIZED, FOR SOLUTION INTRAVENOUS at 17:59

## 2019-01-12 RX ADMIN — MIDODRINE HYDROCHLORIDE 10 MILLIGRAM(S): 2.5 TABLET ORAL at 06:12

## 2019-01-12 RX ADMIN — HEPARIN SODIUM 5000 UNIT(S): 5000 INJECTION INTRAVENOUS; SUBCUTANEOUS at 06:12

## 2019-01-12 RX ADMIN — PANTOPRAZOLE SODIUM 40 MILLIGRAM(S): 20 TABLET, DELAYED RELEASE ORAL at 14:47

## 2019-01-12 RX ADMIN — HEPARIN SODIUM 5000 UNIT(S): 5000 INJECTION INTRAVENOUS; SUBCUTANEOUS at 17:59

## 2019-01-12 RX ADMIN — MIDODRINE HYDROCHLORIDE 10 MILLIGRAM(S): 2.5 TABLET ORAL at 14:47

## 2019-01-12 RX ADMIN — PIPERACILLIN AND TAZOBACTAM 25 GRAM(S): 4; .5 INJECTION, POWDER, LYOPHILIZED, FOR SOLUTION INTRAVENOUS at 06:16

## 2019-01-12 RX ADMIN — MIDODRINE HYDROCHLORIDE 10 MILLIGRAM(S): 2.5 TABLET ORAL at 18:43

## 2019-01-12 NOTE — PROGRESS NOTE ADULT - PROBLEM SELECTOR PLAN 2
Patient with anemia in the setting of ESRD. Hemoglobin at target range. Monitor hemoglobin. Patient with anemia in the setting of ESRD. Hemoglobin above target range (11.9) today. Monitor hemoglobin.

## 2019-01-12 NOTE — PROGRESS NOTE ADULT - SUBJECTIVE AND OBJECTIVE BOX
[facility] DIVISION OF KIDNEY DISEASES AND HYPERTENSION -- FOLLOW UP NOTE  --------------------------------------------------------------------------------    HPI: 57 yo male (Mandarin speaking) with a past medical history of NICM with ICD, ESRD on HD, former smoker, BPH originally transferred from [facility] due to septic shock on 1/7/19. Pt. was initially admitted due to fever and right pleural effusion, found to have acute respiratory failure therefore was intubated. Pt developed shock, thought to be septic. Pt had R thoracotomy, decortication, chest wall reconstruction, lat flap with Dr Pickering on 10/11/18. Pt extubated on 1/8/19 and currently remains on bipap. Pt seen and examined. Pt planned for HD today.     PAST HISTORY  --------------------------------------------------------------------------------  No significant changes to PMH, PSH, FHx, SHx, unless otherwise noted    ALLERGIES & MEDICATIONS  --------------------------------------------------------------------------------  Allergies    No Known Allergies    Intolerances      Standing Inpatient Medications  ALBUTerol    90 MICROgram(s) HFA Inhaler 1 Puff(s) Inhalation every 4 hours  aspirin  chewable 81 milliGRAM(s) Oral daily  docusate sodium Liquid 100 milliGRAM(s) Oral daily  heparin  Injectable 5000 Unit(s) SubCutaneous every 12 hours  midodrine 10 milliGRAM(s) Oral every 8 hours  norepinephrine Infusion 0.05 MICROgram(s)/kG/Min IV Continuous <Continuous>  pantoprazole  Injectable 40 milliGRAM(s) IV Push daily  piperacillin/tazobactam IVPB. 3.375 Gram(s) IV Intermittent every 12 hours    PRN Inpatient Medications      REVIEW OF SYSTEMS  --------------------------------------------------------------------------------  Unable to obtain as pt is requiring bipap.     VITALS/PHYSICAL EXAM  --------------------------------------------------------------------------------  T(C): 36.6 (01-12-19 @ 06:00), Max: 37 (01-12-19 @ 00:00)  HR: 88 (01-12-19 @ 07:09) (83 - 122)  BP: 92/57 (01-12-19 @ 00:00) (92/57 - 108/65)  RR: 17 (01-12-19 @ 07:00) (14 - 23)  SpO2: 98% (01-12-19 @ 07:09) (87% - 100%)  Wt(kg): --    01-11-19 @ 07:01  -  01-12-19 @ 07:00  --------------------------------------------------------  IN: 800.7 mL / OUT: 220 mL / NET: 580.7 mL    Physical Exam:  	Gen: Awake, NAD  	HEENT: on biapap    	Pulm: course breath sounds B/L +Right pig tail draining serous fluid  	CV: S1S2  	Abd: Soft, +BS   	Ext: No LE edema B/L  	Neuro: Awake  	Skin: Warm and dry  	Vascular access:  LUE AVF site +thrill +bruit    LABS/STUDIES  --------------------------------------------------------------------------------              11.9   8.78  >-----------<  116      [01-12-19 @ 04:30]              39.1     142  |  102  |  39  ----------------------------<  96      [01-12-19 @ 04:30]  4.2   |  26  |  6.17        Ca     9.6     [01-12-19 @ 04:30]      Mg     2.1     [01-11-19 @ 04:10]      Phos  4.3     [01-11-19 @ 04:10]    TPro  6.4  /  Alb  2.6  /  TBili  0.4  /  DBili  x   /  AST  38  /  ALT  31  /  AlkPhos  170  [01-11-19 @ 04:10]    Creatinine Trend:  SCr 6.17 [01-12 @ 04:30]  SCr 4.45 [01-11 @ 04:10]  SCr 5.86 [01-10 @ 02:25]  SCr 5.23 [01-09 @ 13:36]  SCr 4.45 [01-09 @ 00:20]    HbA1c 5.5      [10-12-18 @ 02:53]  TSH 4.79      [10-21-18 @ 04:15]    HBsAb Reactive      [01-08-19 @ 08:35]  HBsAg NEGATIVE      [01-08-19 @ 08:35]  HCV 0.16, Nonreactive Hepatitis C AB  S/CO Ratio                        Interpretation  < 1.0                                     Non-Reactive  1.0 - 4.9                           Weakly-Reactive  > 5.0                                 Reactive  Non-Reactive: Aperson with a non-reactive HCV antibody  result is considered uninfected.  No further action is  needed unless recent infection is suspected.  In these  cases, consider repeat testing later to detect  seroconversion..  Weakly-Reactive: HCV antibody test is abnormal, HCV RNA  Qualitative test will follow.  Reactive: HCV antibody test is abnormal, HCV RNA  Qualitative test will follow.  Note: HCV antibody testing is performed on the [employer]   system.      [01-08-19 @ 08:35] [facility] DIVISION OF KIDNEY DISEASES AND HYPERTENSION -- FOLLOW UP NOTE  --------------------------------------------------------------------------------  HPI: 57 yo male (Mandarin speaking) with a past medical history of NICM with ICD, ESRD on HD TIW, former smoker, BPH originally transferred from [facility] due to septic shock on 1/7/19. Pt. was initially admitted due to fever and right pleural effusion, found to have acute respiratory failure therefore was intubated. Pt developed shock, thought to be septic. Pt had R thoracotomy, decortication, chest wall reconstruction, lat flap with Dr Pickering on 10/11/18. Pt extubated on 1/8/19 and currently remains on bipap. Pt seen and examined. Pt. scheduled for HD today.     PAST HISTORY  --------------------------------------------------------------------------------  No significant changes to PMH, PSH, FHx, SHx, unless otherwise noted    ALLERGIES & MEDICATIONS  --------------------------------------------------------------------------------  Allergies    No Known Allergies    Intolerances      Standing Inpatient Medications  ALBUTerol    90 MICROgram(s) HFA Inhaler 1 Puff(s) Inhalation every 4 hours  aspirin  chewable 81 milliGRAM(s) Oral daily  docusate sodium Liquid 100 milliGRAM(s) Oral daily  heparin  Injectable 5000 Unit(s) SubCutaneous every 12 hours  midodrine 10 milliGRAM(s) Oral every 8 hours  norepinephrine Infusion 0.05 MICROgram(s)/kG/Min IV Continuous <Continuous>  pantoprazole  Injectable 40 milliGRAM(s) IV Push daily  piperacillin/tazobactam IVPB. 3.375 Gram(s) IV Intermittent every 12 hours    PRN Inpatient Medications      REVIEW OF SYSTEMS  --------------------------------------------------------------------------------  Unable to obtain as pt is requiring bipap.     VITALS/PHYSICAL EXAM  --------------------------------------------------------------------------------  T(C): 36.6 (01-12-19 @ 06:00), Max: 37 (01-12-19 @ 00:00)  HR: 88 (01-12-19 @ 07:09) (83 - 122)  BP: 92/57 (01-12-19 @ 00:00) (92/57 - 108/65)  RR: 17 (01-12-19 @ 07:00) (14 - 23)  SpO2: 98% (01-12-19 @ 07:09) (87% - 100%)  Wt(kg): --    01-11-19 @ 07:01  -  01-12-19 @ 07:00  --------------------------------------------------------  IN: 800.7 mL / OUT: 220 mL / NET: 580.7 mL    Physical Exam:  	Gen: Awake, NAD  	HEENT: on biapap    	Pulm: course breath sounds B/L +Right pig tail draining serous fluid  	CV: S1S2  	Abd: Soft, +BS   	Ext: No LE edema B/L  	Neuro: Awake  	Skin: Warm and dry  	Vascular access:  LUE AVF site +thrill +bruit    LABS/STUDIES  --------------------------------------------------------------------------------              11.9   8.78  >-----------<  116      [01-12-19 @ 04:30]              39.1     142  |  102  |  39  ----------------------------<  96      [01-12-19 @ 04:30]  4.2   |  26  |  6.17        Ca     9.6     [01-12-19 @ 04:30]      Mg     2.1     [01-11-19 @ 04:10]      Phos  4.3     [01-11-19 @ 04:10]    TPro  6.4  /  Alb  2.6  /  TBili  0.4  /  DBili  x   /  AST  38  /  ALT  31  /  AlkPhos  170  [01-11-19 @ 04:10]    Creatinine Trend:  SCr 6.17 [01-12 @ 04:30]  SCr 4.45 [01-11 @ 04:10]  SCr 5.86 [01-10 @ 02:25]  SCr 5.23 [01-09 @ 13:36]  SCr 4.45 [01-09 @ 00:20]    HbA1c 5.5      [10-12-18 @ 02:53]  TSH 4.79      [10-21-18 @ 04:15]    HBsAb Reactive      [01-08-19 @ 08:35]  HBsAg NEGATIVE      [01-08-19 @ 08:35]  HCV 0.16, Nonreactive Hepatitis C AB  S/CO Ratio                        Interpretation  < 1.0                                     Non-Reactive  1.0 - 4.9                           Weakly-Reactive  > 5.0                                 Reactive  Non-Reactive: Aperson with a non-reactive HCV antibody  result is considered uninfected.  No further action is  needed unless recent infection is suspected.  In these  cases, consider repeat testing later to detect  seroconversion..  Weakly-Reactive: HCV antibody test is abnormal, HCV RNA  Qualitative test will follow.  Reactive: HCV antibody test is abnormal, HCV RNA  Qualitative test will follow.  Note: HCV antibody testing is performed on the [employer]   system.      [01-08-19 @ 08:35]

## 2019-01-12 NOTE — PROGRESS NOTE ADULT - SUBJECTIVE AND OBJECTIVE BOX
CLAUDIA HAN          MRN-[id number]    HPI:  Pt is a 58 yr old  male who underwent a FB, R thoracotomy, decortication, chest wall reconstruction, lat flap with Dr Pickering on 10/11/18. He was admitted on 1/6/19 to [facility] with a R pleural effusion and respiratory failure,  A R PTC was placed there and he was intubated.  He still had a R SANDY drain in place.  He presented intubated and sedated on pressors. (07 Jan 2019 23:10)      Procedure:  POD # :     Issues:        Interval/Overnight OR Events/ ROS  Pt extubated in the OR after surgery. On arrival in ICU still drowsy - but easily arousable to following commands; denies SOB, no nausea, no vomiting  Respiratory status required full ventilatory support,  following of ABG’s with A-line monitoring, continuous pulse oximetry monitoring, & an IV Propofol infusion for support & to evaluate for & prevent further decompensation secondary to persistent cardiopulmonary dysfunction.     Pt remained hemodynamically stable overnight, not on any pressors or inotropes. OOB to chair, breathing comfortably with minimal pain. Ambulated several times . Denies pain, no SOB, no palpitations, no nausea/ no vomiting, no dizziness  A-line and sharma d/kirsten       PAST MEDICAL & SURGICAL HISTORY:  Smoking hx  Cardiomyopathy  Wound: right chest  ICD (implantable cardioverter-defibrillator) in place  OA (osteoarthritis)  HLD (hyperlipidemia)  BPH (benign prostatic hyperplasia)  Pleural effusion  HTN (hypertension)  ESRD (end stage renal disease)  H/O chest wound: right  S/P thoracotomy: FB, R thoracotomy, decortication, chest wall reconstruction, lat flap  History of wound infection: right chest wall - revision 5/18 and again in 7/18  History of implantable cardioverter-defibrillator (ICD) placement: pt unsure when placed  H/O bilateral hip replacements: 2008, 2009    Allergies    No Known Allergies    Intolerances      Home Medications:  aspirin 81 mg oral tablet: 1 tab(s) orally once a day  (30 Oct 2018 14:39)  Breo Ellipta 100 mcg-25 mcg/inh inhalation powder: 1 puff(s) inhaled once a day patient denies using it (11 Oct 2018 08:38)  Calcium 500: 1 tab(s) orally 2 times a day (11 Oct 2018 08:38)  docusate sodium 100 mg oral tablet: 1 tab(s) orally 2 times a day, As Needed (11 Oct 2018 08:39)  folic acid 1 mg oral tablet: 1 tab(s) orally once a day (11 Oct 2018 08:38)  Mag-Ox 400 oral tablet: 1 tab(s) orally once a day (11 Oct 2018 08:39)  Multiple Vitamins oral tablet: 1 tab(s) orally once a day  (30 Oct 2018 14:39)  Namenda 10 mg oral tablet: 1 tab(s) orally 2 times a day (11 Oct 2018 08:38)  Nephplex Rx oral tablet: 1 tab(s) orally once a day (11 Oct 2018 08:39)  nortriptyline 50 mg oral capsule: 1 cap(s) orally once a day (11 Oct 2018 08:39)  piperacillin-tazobactam 2 g-0.25 g intravenous injection: 1  intravenous every 12 hours until 11/7/18 (30 Oct 2018 16:27)  Renvela 800 mg oral tablet: 2 tab(s) orally every 8 hours (11 Oct 2018 08:39)  simethicone 80 mg oral tablet: 1 tab(s) orally 3 times a day (after meals) (11 Oct 2018 08:39)  Tylenol 325 mg oral tablet: 2 tab(s) orally every 6 hours, As Needed (30 Oct 2018 14:39)  vitamin A: 1 tab(s) orally once a day (11 Oct 2018 08:38)  Vitamin B Complex: 1 tab(s) orally once a day (11 Oct 2018 08:38)  Vitamin C 500 mg oral tablet: 1 tab(s) orally once a day (11 Oct 2018 08:38)        ***VITAL SIGNS:  Vital Signs Last 24 Hrs  T(C): 36.7 (12 Jan 2019 16:00), Max: 37 (12 Jan 2019 00:00)  T(F): 98.1 (12 Jan 2019 16:00), Max: 98.6 (12 Jan 2019 00:00)  HR: 107 (12 Jan 2019 16:00) (83 - 122)  BP: 91/61 (12 Jan 2019 16:00) (91/61 - 108/65)  BP(mean): 64 (12 Jan 2019 16:00) (63 - 78)  RR: 22 (12 Jan 2019 16:00) (14 - 25)  SpO2: 94% (12 Jan 2019 16:00) (88% - 100%)    I/Os:   I&O's Detail    11 Jan 2019 07:01  -  12 Jan 2019 07:00  --------------------------------------------------------  IN:    IV PiggyBack: 200 mL    norepinephrine Infusion: 35.7 mL    Oral Fluid: 565 mL  Total IN: 800.7 mL    OUT:    Chest Tube: 195 mL    Drain: 25 mL  Total OUT: 220 mL    Total NET: 580.7 mL      12 Jan 2019 07:01  -  12 Jan 2019 16:51  --------------------------------------------------------  IN:    Oral Fluid: 480 mL    Other: 500 mL  Total IN: 980 mL    OUT:    Chest Tube: 30 mL    Drain: 15 mL    Other: 2000 mL  Total OUT: 2045 mL    Total NET: -1065 mL          CAPILLARY BLOOD GLUCOSE          =======================  MEDICATIONS  ===================  MEDICATIONS  (STANDING):  ALBUTerol    90 MICROgram(s) HFA Inhaler 1 Puff(s) Inhalation every 4 hours  aspirin  chewable 81 milliGRAM(s) Oral daily  docusate sodium Liquid 100 milliGRAM(s) Oral daily  heparin  Injectable 5000 Unit(s) SubCutaneous every 12 hours  midodrine 10 milliGRAM(s) Oral every 8 hours  norepinephrine Infusion 0.05 MICROgram(s)/kG/Min (5.606 mL/Hr) IV Continuous <Continuous>  pantoprazole  Injectable 40 milliGRAM(s) IV Push daily  piperacillin/tazobactam IVPB. 3.375 Gram(s) IV Intermittent every 12 hours    MEDICATIONS  (PRN):      ======================VENTILATOR SETTINGS  ==============      =================== PATIENT CARE ACCESS DEVICES ==========  Peripheral IV  Central Venous Line	R	L	IJ	Fem	SC	Placed:   Arterial Line	R	L	PT	DP	Fem	Rad	Ax	Placed:   Midline:				  Urinary Catheter, Date Placed:   Necessity of urinary, arterial, and venous catheters discussed  ======================= PHYSICAL EXAM===================  General:          Comfortable, Awake, alert, not in any distress  Neuro:             Moving all extremities to commands. No focal deficits	  HEENT:                           CHER/ ETT/ NGT/ trach  Respiratory:	Lungs clear on auscultation bilaterally with good aeration.                            No rales, rhonchi, no wheezing. Effort even and unlabored.  CV:		         Regular rate and rhythm. Normal S1/S2. No murmurs  Abdomen:	Soft,  nontender, not-distended. Bowel sounds present / absent.   Skin:		No rash.  Extremities:	Warm, no cyanosis or edema.  Palpable pulses  ============================ LABS =======================                        12.0   7.81  )-----------( 135      ( 12 Jan 2019 14:15 )             39.7     01-12    138  |  98  |  21  ----------------------------<  93  3.4<L>   |  29  |  3.82<H>    Ca    9.0      12 Jan 2019 14:00  Phos  2.7     01-12  Mg     2.0     01-12    TPro  6.6  /  Alb  2.7<L>  /  TBili  0.5  /  DBili  x   /  AST  24  /  ALT  24  /  AlkPhos  159<H>  01-12    LIVER FUNCTIONS - ( 12 Jan 2019 14:00 )  Alb: 2.7 g/dL / Pro: 6.6 g/dL / ALK PHOS: 159 u/L / ALT: 24 u/L / AST: 24 u/L / GGT: x             ABG - ( 12 Jan 2019 14:00 )  pH, Arterial: 7.32  pH, Blood: x     /  pCO2: 61    /  pO2: 122   / HCO3: 28    / Base Excess: 4.7   /  SaO2: 98.5                BLOOD PERIPHERAL  01-09-19 --  --  --      PLEURAL FLUID  01-09-19 --  --    WBC^White Blood Cells  QNTY CELLS IN GRAM STAIN: FEW (2+)  NOS^No Organisms Seen      BRONCHIAL LAVAGE  01-08-19 --  --  --      BRONCHIAL LAVAGE  01-08-19 --  --  --      BLOOD ARTERIAL  01-07-19 --  --  --          ===================== IMAGING STUDIES ===================  Radiology personally reviewed.    ====================ASSESSMENT AND PLAN ================      ====================== NEUROLOGY=======================  Pain control with PCA / PCEA / Tylenol IV / Toradol / Percocet  Pt is on Precedex for agitation  Pt is sedated with Propofol / Fentanyl  ==================== RESPIRATORY========================  Pt is on       L nasal canula / Face tent____% FiO2/ full mech vent support  Comfortable, no evidence of distress.  Using incentive spirometry & doing                ml  Monitor chest tube output  Chest tube to suction / water seal	  Mechanical Ventilation:    Mechanical ventilator status assessed & settings reviewed  Continuous pulse oximetry monitoring  Continue bronchodilators, pulmonary toilet  Head of bed elevation to 30-40 degrees  ====================CARDIOVASCULAR=====================  Continue hemodynamic monitoring/ telemetry  Not on any pressors/ titrate pressors for SBP>100, MAP >60-65  Continue cardiovascular / antihypertensive medications  ===================== RENAL ============================  Continue LR 30CC/hr      D/C IVF  Monitor I/Os, BUN/ Cr  and electrolytes  D/C Sharma      Keep Sharma for UO monitoring  BPH: Continue Flomax/ Finasteride  ==================== GASTROINTESTINAL===================  On regular/ tube feeds diet, tolerating well  Continue GI prophylaxis with Pepcid / Protonix  Continue Zofran / Reglan for nausea - PRN	  NPO  =======================    ENDOCRIN  =====================  Glycemic monitoring  F/S with coverage  ===================HEMATOLOGIC/ONCOLOGIC =============  Monitor chest tube output. No signs of active bleeding.   Follow CBC, coags  in AM  DVT prophylaxis with SCD, sc Heparin  ========================INFECTIOUS DISEASE===============  No signs of infection. Monitor for fever / leukocytosis.  All surgical incision / chest tube  sites look clean  D/C Sharma    Pertinent clinical, laboratory, radiographic, hemodynamic, echocardiographic, respiratory data, microbiologic data and chart were reviewed and analyzed frequently throughout the course of the day and night. GI and DVT prophylaxis, glycemic control, head of bed elevation and skin care issues were addressed.  Patient seen, examined and plan discussed with CT Surgery / CTICU team during rounds.  Pt remains critically ill in imminent risk of  deterioration and requires very careful cardio- pulmonary monitoring and support.    I have spent        minutes of critical care time with this pt between      am/pm   and        am/ pm         minutes spent on total encounter; more than 50% of the visit was spent counseling and/or coordinating care by the attending physician.      KERLINE Faith MD CLAUDIA HAN          MRN-[id number]    HPI:  Pt is a 58 yr old  male who underwent a FB, R thoracotomy, decortication, chest wall reconstruction, lat flap with Dr Pickering on 10/11/18. He was admitted on 1/6/19 to [facility] with a R pleural effusion and respiratory failure,  A R PTC was placed there and he was intubated.  He still had a R SANDY drain in place.  He presented intubated and sedated on pressors. (07 Jan 2019 23:10)      Procedure:  POD # :     Issues:        Interval/Overnight OR Events/ ROS  Pt extubated in the OR after surgery. On arrival in ICU still drowsy - but easily arousable to following commands; denies SOB, no nausea, no vomiting  Respiratory status required full ventilatory support,  following of ABG’s with A-line monitoring, continuous pulse oximetry monitoring, & an IV Propofol infusion for support & to evaluate for & prevent further decompensation secondary to persistent cardiopulmonary dysfunction.     Pt remained hemodynamically stable overnight, not on any pressors or inotropes. OOB to chair, breathing comfortably with minimal pain. Ambulated several times . Denies pain, no SOB, no palpitations, no nausea/ no vomiting, no dizziness  A-line and sharma d/kirsten       PAST MEDICAL & SURGICAL HISTORY:  Smoking hx  Cardiomyopathy  Wound: right chest  ICD (implantable cardioverter-defibrillator) in place  OA (osteoarthritis)  HLD (hyperlipidemia)  BPH (benign prostatic hyperplasia)  Pleural effusion  HTN (hypertension)  ESRD (end stage renal disease)  H/O chest wound: right  S/P thoracotomy: FB, R thoracotomy, decortication, chest wall reconstruction, lat flap  History of wound infection: right chest wall - revision 5/18 and again in 7/18  History of implantable cardioverter-defibrillator (ICD) placement: pt unsure when placed  H/O bilateral hip replacements: 2008, 2009    Allergies    No Known Allergies    Intolerances      Home Medications:  aspirin 81 mg oral tablet: 1 tab(s) orally once a day  (30 Oct 2018 14:39)  Breo Ellipta 100 mcg-25 mcg/inh inhalation powder: 1 puff(s) inhaled once a day patient denies using it (11 Oct 2018 08:38)  Calcium 500: 1 tab(s) orally 2 times a day (11 Oct 2018 08:38)  docusate sodium 100 mg oral tablet: 1 tab(s) orally 2 times a day, As Needed (11 Oct 2018 08:39)  folic acid 1 mg oral tablet: 1 tab(s) orally once a day (11 Oct 2018 08:38)  Mag-Ox 400 oral tablet: 1 tab(s) orally once a day (11 Oct 2018 08:39)  Multiple Vitamins oral tablet: 1 tab(s) orally once a day  (30 Oct 2018 14:39)  Namenda 10 mg oral tablet: 1 tab(s) orally 2 times a day (11 Oct 2018 08:38)  Nephplex Rx oral tablet: 1 tab(s) orally once a day (11 Oct 2018 08:39)  nortriptyline 50 mg oral capsule: 1 cap(s) orally once a day (11 Oct 2018 08:39)  piperacillin-tazobactam 2 g-0.25 g intravenous injection: 1  intravenous every 12 hours until 11/7/18 (30 Oct 2018 16:27)  Renvela 800 mg oral tablet: 2 tab(s) orally every 8 hours (11 Oct 2018 08:39)  simethicone 80 mg oral tablet: 1 tab(s) orally 3 times a day (after meals) (11 Oct 2018 08:39)  Tylenol 325 mg oral tablet: 2 tab(s) orally every 6 hours, As Needed (30 Oct 2018 14:39)  vitamin A: 1 tab(s) orally once a day (11 Oct 2018 08:38)  Vitamin B Complex: 1 tab(s) orally once a day (11 Oct 2018 08:38)  Vitamin C 500 mg oral tablet: 1 tab(s) orally once a day (11 Oct 2018 08:38)        ***VITAL SIGNS:  Vital Signs Last 24 Hrs  T(C): 36.7 (12 Jan 2019 16:00), Max: 37 (12 Jan 2019 00:00)  T(F): 98.1 (12 Jan 2019 16:00), Max: 98.6 (12 Jan 2019 00:00)  HR: 107 (12 Jan 2019 16:00) (83 - 122)  BP: 91/61 (12 Jan 2019 16:00) (91/61 - 108/65)  BP(mean): 64 (12 Jan 2019 16:00) (63 - 78)  RR: 22 (12 Jan 2019 16:00) (14 - 25)  SpO2: 94% (12 Jan 2019 16:00) (88% - 100%)    I/Os:   I&O's Detail    11 Jan 2019 07:01  -  12 Jan 2019 07:00  --------------------------------------------------------  IN:    IV PiggyBack: 200 mL    norepinephrine Infusion: 35.7 mL    Oral Fluid: 565 mL  Total IN: 800.7 mL    OUT:    Chest Tube: 195 mL    Drain: 25 mL  Total OUT: 220 mL    Total NET: 580.7 mL      12 Jan 2019 07:01  -  12 Jan 2019 16:51  --------------------------------------------------------  IN:    Oral Fluid: 480 mL    Other: 500 mL  Total IN: 980 mL    OUT:    Chest Tube: 30 mL    Drain: 15 mL    Other: 2000 mL  Total OUT: 2045 mL    Total NET: -1065 mL          CAPILLARY BLOOD GLUCOSE          =======================  MEDICATIONS  ===================  MEDICATIONS  (STANDING):  ALBUTerol    90 MICROgram(s) HFA Inhaler 1 Puff(s) Inhalation every 4 hours  aspirin  chewable 81 milliGRAM(s) Oral daily  docusate sodium Liquid 100 milliGRAM(s) Oral daily  heparin  Injectable 5000 Unit(s) SubCutaneous every 12 hours  midodrine 10 milliGRAM(s) Oral every 8 hours  norepinephrine Infusion 0.05 MICROgram(s)/kG/Min (5.606 mL/Hr) IV Continuous <Continuous>  pantoprazole  Injectable 40 milliGRAM(s) IV Push daily  piperacillin/tazobactam IVPB. 3.375 Gram(s) IV Intermittent every 12 hours    MEDICATIONS  (PRN):      ======================VENTILATOR SETTINGS  ==============      =================== PATIENT CARE ACCESS DEVICES ==========  Peripheral IV  Central Venous Line	R	L	IJ	Fem	SC	Placed:   Arterial Line	R	L	PT	DP	Fem	Rad	Ax	Placed:   Midline:				  Urinary Catheter, Date Placed:   Necessity of urinary, arterial, and venous catheters discussed  ======================= PHYSICAL EXAM===================  General:          Comfortable, Awake, alert, not in any distress  Neuro:             Moving all extremities to commands. No focal deficits	  HEENT:                           CHER/ ETT/ NGT/ trach  Respiratory:	Lungs clear on auscultation bilaterally with good aeration.                            No rales, rhonchi, no wheezing. Effort even and unlabored.  CV:		         Regular rate and rhythm. Normal S1/S2. No murmurs  Abdomen:	Soft,  nontender, not-distended. Bowel sounds present / absent.   Skin:		No rash.  Extremities:	Warm, no cyanosis or edema.  Palpable pulses  ============================ LABS =======================                        12.0   7.81  )-----------( 135      ( 12 Jan 2019 14:15 )             39.7     01-12    138  |  98  |  21  ----------------------------<  93  3.4<L>   |  29  |  3.82<H>    Ca    9.0      12 Jan 2019 14:00  Phos  2.7     01-12  Mg     2.0     01-12    TPro  6.6  /  Alb  2.7<L>  /  TBili  0.5  /  DBili  x   /  AST  24  /  ALT  24  /  AlkPhos  159<H>  01-12    LIVER FUNCTIONS - ( 12 Jan 2019 14:00 )  Alb: 2.7 g/dL / Pro: 6.6 g/dL / ALK PHOS: 159 u/L / ALT: 24 u/L / AST: 24 u/L / GGT: x             ABG - ( 12 Jan 2019 14:00 )  pH, Arterial: 7.32  pH, Blood: x     /  pCO2: 61    /  pO2: 122   / HCO3: 28    / Base Excess: 4.7   /  SaO2: 98.5                BLOOD PERIPHERAL  01-09-19 --  --  --      PLEURAL FLUID  01-09-19 --  --    WBC^White Blood Cells  QNTY CELLS IN GRAM STAIN: FEW (2+)  NOS^No Organisms Seen      BRONCHIAL LAVAGE  01-08-19 --  --  --      BRONCHIAL LAVAGE  01-08-19 --  --  --      BLOOD ARTERIAL  01-07-19 --  --  --          ===================== IMAGING STUDIES ===================  Radiology personally reviewed.    ====================ASSESSMENT AND PLAN ================      ====================== NEUROLOGY=======================  Pain control with PCA / PCEA / Tylenol IV / Toradol / Percocet  Pt is on Precedex for agitation  Pt is sedated with Propofol / Fentanyl  ==================== RESPIRATORY========================  Pt is on       L nasal canula / Face tent____% FiO2/ full mech vent support  Comfortable, no evidence of distress.  Using incentive spirometry & doing                ml  Monitor chest tube output  Chest tube to suction / water seal	  Mechanical Ventilation:    Mechanical ventilator status assessed & settings reviewed  Continuous pulse oximetry monitoring  Continue bronchodilators, pulmonary toilet  Head of bed elevation to 30-40 degrees  ====================CARDIOVASCULAR=====================  Continue hemodynamic monitoring/ telemetry  Not on any pressors/ titrate pressors for SBP>100, MAP >60-65  Continue cardiovascular / antihypertensive medications  ===================== RENAL ============================  Continue LR 30CC/hr      D/C IVF  Monitor I/Os, BUN/ Cr  and electrolytes  D/C Sharma      Keep Sharma for UO monitoring  BPH: Continue Flomax/ Finasteride  ==================== GASTROINTESTINAL===================  On regular/ tube feeds diet, tolerating well  Continue GI prophylaxis with Pepcid / Protonix  Continue Zofran / Reglan for nausea - PRN	  NPO  =======================    ENDOCRIN  =====================  Glycemic monitoring  F/S with coverage  ===================HEMATOLOGIC/ONCOLOGIC =============  Monitor chest tube output. No signs of active bleeding.   Follow CBC, coags  in AM  DVT prophylaxis with SCD, sc Heparin  ========================INFECTIOUS DISEASE===============  No signs of infection. Monitor for fever / leukocytosis.  All surgical incision / chest tube  sites look clean  D/C Sharma    Pertinent clinical, laboratory, radiographic, hemodynamic, echocardiographic, respiratory data, microbiologic data and chart were reviewed and analyzed frequently throughout the course of the day and night. GI and DVT prophylaxis, glycemic control, head of bed elevation and skin care issues were addressed.  Patient seen, examined and plan discussed with CT Surgery / CTICU team during rounds.  Pt remains critically ill in imminent risk of  deterioration and requires very careful cardio- pulmonary monitoring and support.    I have spent        minutes of critical care time with this pt between      am/pm   and        am/ pm         minutes spent on total encounter; more than 50% of the visit was spent counseling and/or coordinating care by the attending physician.      KERLINE Faith MD            Pt is a 58 yr old  male who underwent a FB, R thoracotomy, decortication, chest wall reconstruction, lat flap with Dr Pickering on 10/11/18. He was admitted on 1/6/19 to [facility] with a R pleural effusion and respiratory failure,  A R PTC was placed there and he was intubated.  He still had a R SANDY drain in place.  He presented intubated and sedated on pressors. (07 Jan 2019 23:10)      Procedure:    10/11/2018   Right thoracotomy, resection of portion of R 7th rib, evacuation of infected hemothorax, takedown of pleurocutaneous fistula         Issues:  Hypotension  Sepsis  Respiratory acidosis  Cardiomyopathy  Pleural effusion  BPH                  Physical exam:                 General:               Pt is awake, alert,  appears to be in mild respiratory distress                                                  Neuro:                  Nonfocal                             Cardiovascular:   S1 & S2, regular                           Respiratory:         Air entry is fair and equal on both sides, has bilateral conducted sounds                           GI:                          Soft, nondistended and nontender, Bowel sounds active                            Ext:                        No cyanosis or edema           Labs:                                                                           11.9   8.78  )-----------( 116      ( 12 Jan 2019 04:30 )             39.1             01-12    142  |  102  |  39<H>  ----------------------------<  96  4.2   |  26  |  6.17<H>    Ca    9.6      12 Jan 2019 04:30  Phos  4.3     01-11  Mg     2.1     01-11    TPro  6.4  /  Alb  2.6<L>  /  TBili  0.4  /  DBili  x   /  AST  38  /  ALT  31  /  AlkPhos  170<H>  01-11                    LIVER FUNCTIONS - ( 11 Jan 2019 04:10 )  Alb: 2.6 g/dL / Pro: 6.4 g/dL / ALK PHOS: 170 u/L / ALT: 31 u/L / AST: 38 u/L / GGT: x               CXR:  < from: Xray Chest 1 View- PORTABLE-Routine (01.11.19 @ 07:06) >  Bilateral small loculated effusions again seen. No focal consolidations.   Interstitial edema is present. No pneumothorax.    Plan:    General:  Pt is a 58 yr old  male who underwent a FB, R thoracotomy, decortication, chest wall reconstruction, lat flap with Dr Pickering on 10/11/18. He was admitted on 1/6/19 to [facility] with a R pleural effusion and respiratory failure,  A R PTC was placed there and he was intubated.  He still had a R SANDY drain in place.  He presented intubated and sedated on pressors. (07 Jan 2019 23:10). Pt is currently extubated but on / off BiPAP for respiratory acidosis                            Neuro:                                         Pain control with PCA / Tylenol IV                            Cardiovascular:                                          Continue hemodynamic monitoring.    Hypotension: Off Levophed, Started on  Midodrine. The goal is to wean off all pressors when sepsis is resolved.                             Respiratory:                                         Pt is on 2L  nasal canula, wean off as tolerated      Respiratory acidosis: On  / Off BiPap                                         Comfortable, not in any distress.                                         Encourage incentive spirometry                                          Monitor chest tube output                                         Chest tube to suction                                                                   Continue bronchodilators, pulmonary toilet                            GI                                         On Renal / DASH diet                                         Continue GI prophylaxis with Protonix                                         Continue Zofran / Reglan for nausea - PRN	                                                                 Renal:                                         ESRD: HD as per Renal                                         Monitor I/Os and electrolytes                                                                                        Hem/ Onc:                                                                                  Monitor chest tube output &  signs of bleeding.                                          Follow CBC in AM                           Infectious disease:      All the cultures are negative during this admission but grew MRSA from pleural fluid and E.Coli in blood. Continue Vanco by level and Zosyn      CT scan of AP - with IV /PO contrast to look for abdominal source of E.Coli bacteremia                                           Monitor for fever / leukocytosis.                                          All surgical incision / chest tube  sites look clean                            Endocrine                                             Continue Accu-Checks with coverage    Pt is on SQ Heparin and Venodyne boots for DVT prophylaxis.     Pertinent clinical, laboratory, radiographic, hemodynamic, echocardiographic, respiratory data, microbiologic data and chart were reviewed and analyzed frequently throughout the course of the day and night  Patient seen, examined and plan discussed with CT Surgeon Dr. Ledesma / CTICU team during rounds. CLAUDIA HAN          MRN-[id number]    HPI:  Pt is a 58 yr old  male who underwent a FB, R thoracotomy, decortication, chest wall reconstruction, lat flap with Dr Pickering on 10/11/18. He was admitted on 1/6/19 to [facility] with a R pleural effusion and respiratory failure,  A R PTC was placed there and he was intubated.  He still had a R SANDY drain in place.  He presented intubated and sedated on pressors. 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         Procedure: 10/11/18 Right thoracotomy, resection of portion of R 7th rib, evacuation of infected hemothorax, takedown of pleurocutaneous fistula  / R chest wall reconstruction with tunneled latissimus dorsi flap, covered by serratus anterior flap           Issues: Acute resp failure-extubated 1/8/19 to BIPAP             Resp acidosis             Hypotension with sepsis/ distributive shock- on/ off Levophed drip  Acute on chronic systolic CHF ( EF 10 %), ICD in place  Infected right hemothorax - (+) Enterococcus faecalis in the past / now + MRSA empyema  E coli bacteriemia              ESRD on HD    intermittent agitation/mild dementia              severe deconditioning      Interval/Overnight  Events/ ROS  Respiratory status required intermittent  BIPAP support due to resp acidosis on nasal cannula trial;  following of ABG’s with A-line monitoring, continuous pulse oximetry monitoring, Today ABG slightly improved, clinically pt looks better as well. HD was done at bedside  today  with 2 L fluid removal.   Will plan to start PO dysphagia diet as tolerated with aspiration precautions while pt is off BIPAP. Plan to observe pt's clinical status off BIPAP daytime and  place  back on BIPAP overnight as d/w dr Pickering today by phone.       PAST MEDICAL & SURGICAL HISTORY:  Smoking hx  Cardiomyopathy  Wound: right chest  ICD (implantable cardioverter-defibrillator) in place  OA (osteoarthritis)  HLD (hyperlipidemia)  BPH (benign prostatic hyperplasia)  Pleural effusion  HTN (hypertension)  ESRD (end stage renal disease)  H/O chest wound: right  S/P thoracotomy: FB, R thoracotomy, decortication, chest wall reconstruction, lat flap  History of wound infection: right chest wall - revision 5/18 and again in 7/18  History of implantable cardioverter-defibrillator (ICD) placement: pt unsure when placed  H/O bilateral hip replacements: 2008, 2009    Allergies  No Known Allergies    Home Medications:  aspirin 81 mg oral tablet: 1 tab(s) orally once a day  (30 Oct 2018 14:39)  Breo Ellipta 100 mcg-25 mcg/inh inhalation powder: 1 puff(s) inhaled once a day patient denies using it (11 Oct 2018 08:38)  Calcium 500: 1 tab(s) orally 2 times a day (11 Oct 2018 08:38)  docusate sodium 100 mg oral tablet: 1 tab(s) orally 2 times a day, As Needed (11 Oct 2018 08:39)  folic acid 1 mg oral tablet: 1 tab(s) orally once a day (11 Oct 2018 08:38)  Mag-Ox 400 oral tablet: 1 tab(s) orally once a day (11 Oct 2018 08:39)  Multiple Vitamins oral tablet: 1 tab(s) orally once a day  (30 Oct 2018 14:39)  Namenda 10 mg oral tablet: 1 tab(s) orally 2 times a day (11 Oct 2018 08:38)  Nephplex Rx oral tablet: 1 tab(s) orally once a day (11 Oct 2018 08:39)  nortriptyline 50 mg oral capsule: 1 cap(s) orally once a day (11 Oct 2018 08:39)  piperacillin-tazobactam 2 g-0.25 g intravenous injection: 1  intravenous every 12 hours until 11/7/18 (30 Oct 2018 16:27)  Renvela 800 mg oral tablet: 2 tab(s) orally every 8 hours (11 Oct 2018 08:39)  simethicone 80 mg oral tablet: 1 tab(s) orally 3 times a day (after meals) (11 Oct 2018 08:39)  Tylenol 325 mg oral tablet: 2 tab(s) orally every 6 hours, As Needed (30 Oct 2018 14:39)  vitamin A: 1 tab(s) orally once a day (11 Oct 2018 08:38)  Vitamin B Complex: 1 tab(s) orally once a day (11 Oct 2018 08:38)  Vitamin C 500 mg oral tablet: 1 tab(s) orally once a day (11 Oct 2018 08:38)        ***VITAL SIGNS:  Vital Signs Last 24 Hrs  T(C): 36.7 (12 Jan 2019 16:00), Max: 37 (12 Jan 2019 00:00)  T(F): 98.1 (12 Jan 2019 16:00), Max: 98.6 (12 Jan 2019 00:00)  HR: 107 (12 Jan 2019 16:00) (83 - 122)  BP: 91/61 (12 Jan 2019 16:00) (91/61 - 108/65)  BP(mean): 64 (12 Jan 2019 16:00) (63 - 78)  RR: 22 (12 Jan 2019 16:00) (14 - 25)  SpO2: 94% (12 Jan 2019 16:00) (88% - 100%)    I/Os:   I&O's Detail    11 Jan 2019 07:01  -  12 Jan 2019 07:00  --------------------------------------------------------  IN:    IV PiggyBack: 200 mL    norepinephrine Infusion: 35.7 mL    Oral Fluid: 565 mL  Total IN: 800.7 mL    OUT:    Chest Tube: 195 mL    Drain: 25 mL  Total OUT: 220 mL    Total NET: 580.7 mL      12 Jan 2019 07:01  -  12 Jan 2019 16:51  --------------------------------------------------------  IN:    Oral Fluid: 480 mL    Other: 500 mL  Total IN: 980 mL    OUT:    Chest Tube: 30 mL    Drain: 15 mL    Other: 2000 mL  Total OUT: 2045 mL    Total NET: -1065 mL    =======================  MEDICATIONS  ===================  MEDICATIONS  (STANDING):  ALBUTerol    90 MICROgram(s) HFA Inhaler 1 Puff(s) Inhalation every 4 hours  aspirin  chewable 81 milliGRAM(s) Oral daily  docusate sodium Liquid 100 milliGRAM(s) Oral daily  heparin  Injectable 5000 Unit(s) SubCutaneous every 12 hours  midodrine 10 milliGRAM(s) Oral every 8 hours  norepinephrine Infusion 0.05 MICROgram(s)/kG/Min (5.606 mL/Hr) IV Continuous <Continuous>  pantoprazole  Injectable 40 milliGRAM(s) IV Push daily  piperacillin/tazobactam IVPB. 3.375 Gram(s) IV Intermittent every 12 hours    MEDICATIONS  (PRN):      ======================VENTILATOR SETTINGS  ==============  BIPAP standby    =================== PATIENT CARE ACCESS DEVICES ==========  Peripheral IV (+)  Central Venous Line	R/ Fem d/kirsten  Arterial Line	R/	Rad (+)     ======================= PHYSICAL EXAM===================  General:          Comfortable, Awake, alert, no distress  Neuro:             Moving all extremities to commands. No focal deficits	  HEENT:                           CHER   Respiratory:	Lungs clear on auscultation bilaterally with good aeration.                            No rales, rhonchi, no wheezing.                             Right david and pigtail tube sites - clean  CV:		 Regular rate and rhythm. Normal S1/S2. No murmurs  Abdomen:	Soft,  nontender, not-distended. Bowel sounds present    Skin:		No rash.  Extremities:	Warm, no cyanosis or edema.  Palpable pulses    ============================ LABS =======================                        12.0   7.81  )-----------( 135      ( 12 Jan 2019 14:15 )             39.7     01-12    138         |  98  |  21  ----------------------------<  93  3.4<L>   |  29  |  3.82<H>    Ca    9.0      12 Jan 2019 14:00  Phos  2.7     01-12  Mg     2.0     01-12    TPro  6.6  /  Alb  2.7<L>  /  TBili  0.5  /  DBili  x   /  AST  24  /  ALT  24  /  AlkPhos  159<H>  01-12    LIVER FUNCTIONS - ( 12 Jan 2019 14:00 )  Alb: 2.7 g/dL / Pro: 6.6 g/dL / ALK PHOS: 159 u/L / ALT: 24 u/L / AST: 24 u/L / GGT: x             ABG - ( 12 Jan 2019 14:00 )  pH, Arterial: 7.32  pH, Blood: x     /  pCO2: 61    /  pO2: 122   / HCO3: 28    / Base Excess: 4.7   /  SaO2: 98.5      BLOOD PERIPHERAL  01-09-19 --  --  --    PLEURAL FLUID  01-09-19 --  --    WBC^White Blood Cells  QNTY CELLS IN GRAM STAIN: FEW (2+)  NOS^No Organisms Seen    BRONCHIAL LAVAGE  01-08-19 --  --  --    BRONCHIAL LAVAGE  01-08-19 --  --  --  BLOOD ARTERIAL  01-07-19 --  --  --  ===================== IMAGING STUDIES ===================  Radiology personally reviewed.  < from: Xray Chest 1 View- PORTABLE-Routine (01.11.19 @ 07:06) >  INTERPRETATION:   Bilateral small loculated effusions again seen. No focal consolidations.   Interstitial edema is present. No pneumothorax.  COMPARISON:  January 10  IMPRESSION:  Follow-up with bilateral small loculated effusions unchanged.    < end of copied text >      < from: Transthoracic Echocardiogram (01.09.19 @ 16:47) >  CONCLUSIONS:< from: Transthoracic Echocardiogram (01.09.19 @ 16:47) >  Ejection Fraction (Visual Estimate): 15-20 %    1. Tethered mitral valve leaflets with normal opening.  Moderate eccentric mitral regurgitation.  2. Normal trileaflet aortic valve. Moderate aortic  regurgitation.  3. Severe left ventricular enlargement.  4. Severe global left ventricular systolic dysfunction.  5. Increased E/e'  is consistent with elevated left  ventricular filling pressure.  6. Normal right ventricular size and function.  *** Compared with echocardiogram of 10/12/2018, no  significant changes noted.    < end of copied text >      ====================ASSESSMENT AND PLAN ================  Pt is a 58 yr old  male who underwent a FB, R thoracotomy, decortication, chest wall reconstruction, lat flap with Dr Pickering on 10/11/18. He was admitted on 1/6/19 to [facility] with a R pleural effusion and respiratory failure,  A R PTC was placed there and he was intubated.  He still had a R SANDY drain in place.  He presented intubated and sedated on pressors. (07 Jan 2019 23:10). Pt is currently extubated but on / off BiPAP for respiratory acidosis    Issues: Acute resp failure-extubated 1/8/19 to BIPAP             Resp acidosis             Hypotension with sepsis/ distributive shock- on/ off Levophed drip  Acute on chronic systolic CHF ( EF 15-20 %), ICD in place  Infected right hemothorax - (+) Enterococcus faecalis in the past / now + MRSA empyema  E coli bacteriemia              ESRD on HD    intermittent agitation/mild dementia              severe deconditioning    ====================== NEUROLOGY=======================  Pain control with Tylenol IV / Toradol  Trial of OOB  ==================== RESPIRATORY========================  Pt is on  2   L nasal canula / BIPAP at night and PRN daytime  Comfortable, no evidence of distress.  Incentive spirometry    Monitor chest tube output  Chest pigtail tube to suction / david to bulb suction	  Continuous pulse oximetry monitoring  Continue bronchodilators, pulmonary toilet  Head of bed elevation to 30-40 degrees    ====================CARDIOVASCULAR=====================  Continue hemodynamic monitoring/ telemetry  Hypotension improved; off Levophed, on PO MIdodrine  ===================== RENAL ============================  ESRD on HD as per renal team ( done today)  Monitor I/Os, BUN/ Cr  and electrolytes    ==================== GASTROINTESTINAL===================  On PO renal dysphagia diet with aspiration precautions  Continue GI prophylaxis with Protonix   Zofran / Reglan for nausea - PRN	  =======================    ENDOCRIN  =====================  Glycemic monitoring  F/S with coverage  ===================HEMATOLOGIC/ONCOLOGIC =============  Monitor chest tube output. No signs of active bleeding.   Follow CBC, coags  in AM  DVT prophylaxis with SCD, sc Heparin  ========================INFECTIOUS DISEASE===============  All the cultures are negative during this admission but grew MRSA from pleural fluid and E.Coli in blood at [facility]. Continue Vanco by level and Zosyn   Monitor for fever / leukocytosis.  All surgical incision / chest tube  sites look clean  ID dr Chahal    When stable - will need CT scan of AP - with IV /PO contrast to look for abdominal source of E.Coli bacteremia       Pertinent clinical, laboratory, radiographic, hemodynamic, echocardiographic, respiratory data, microbiologic data and chart were reviewed and analyzed frequently throughout the course of the day and night. GI and DVT prophylaxis, glycemic control, head of bed elevation and skin care issues were addressed.  Patient seen, examined and plan discussed with CT Surgery Dr Wilkerson and Dr Pickering / CTICU team during rounds.  Pt remains critically ill in imminent risk of  deterioration and requires very careful cardio- pulmonary monitoring and support.    I have spent   60     minutes of critical care time with this pt between  8 am  and   11:55 pm         minutes spent on total encounter; more than 50% of the visit was spent counseling and/or coordinating care by the attending physician.      KERLINE Faith MD CLAUDIA HAN          MRN-[id number]    HPI:  Pt is a 58 yr old  male who underwent a FB, R thoracotomy, decortication, chest wall reconstruction, lat flap with Dr Pickering on 10/11/18. He was admitted on 1/6/19 to [facility] with a R pleural effusion and respiratory failure,  A R PTC was placed there and he was intubated.  He still had a R SANDY drain in place.  He presented intubated and sedated on pressors. 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         Procedure: 10/11/18 Right thoracotomy, resection of portion of R 7th rib, evacuation of infected hemothorax, takedown of pleurocutaneous fistula  / R chest wall reconstruction with tunneled latissimus dorsi flap, covered by serratus anterior flap           Issues: Acute resp failure-extubated 1/8/19 to BIPAP             Resp acidosis             Hypotension with sepsis/ distributive shock- on/ off Levophed drip  Acute on chronic systolic CHF ( EF 10 %), ICD in place  Infected right hemothorax - (+) Enterococcus faecalis in the past / now + MRSA empyema  E coli bacteriemia              ESRD on HD    intermittent agitation/mild dementia              severe deconditioning    Interval/Overnight  Events/ ROS  Respiratory status required intermittent  BIPAP support due to resp acidosis on nasal cannula trial;  following of ABG’s with A-line monitoring, continuous pulse oximetry monitoring, Today ABG slightly improved, clinically pt looks better as well. HD was done at bedside  today  with 2 L fluid removal.   Will plan to start PO dysphagia diet as tolerated with aspiration precautions while pt is off BIPAP. Plan to observe pt's clinical status off BIPAP daytime and  place  back on BIPAP overnight as d/w dr Pickering today by phone.       PAST MEDICAL & SURGICAL HISTORY:  Smoking hx  Cardiomyopathy  Wound: right chest  ICD (implantable cardioverter-defibrillator) in place  OA (osteoarthritis)  HLD (hyperlipidemia)  BPH (benign prostatic hyperplasia)  Pleural effusion  HTN (hypertension)  ESRD (end stage renal disease)  H/O chest wound: right  S/P thoracotomy: FB, R thoracotomy, decortication, chest wall reconstruction, lat flap  History of wound infection: right chest wall - revision 5/18 and again in 7/18  History of implantable cardioverter-defibrillator (ICD) placement: pt unsure when placed  H/O bilateral hip replacements: 2008, 2009    Allergies  No Known Allergies    Home Medications:  aspirin 81 mg oral tablet: 1 tab(s) orally once a day  (30 Oct 2018 14:39)  Breo Ellipta 100 mcg-25 mcg/inh inhalation powder: 1 puff(s) inhaled once a day patient denies using it (11 Oct 2018 08:38)  Calcium 500: 1 tab(s) orally 2 times a day (11 Oct 2018 08:38)  docusate sodium 100 mg oral tablet: 1 tab(s) orally 2 times a day, As Needed (11 Oct 2018 08:39)  folic acid 1 mg oral tablet: 1 tab(s) orally once a day (11 Oct 2018 08:38)  Mag-Ox 400 oral tablet: 1 tab(s) orally once a day (11 Oct 2018 08:39)  Multiple Vitamins oral tablet: 1 tab(s) orally once a day  (30 Oct 2018 14:39)  Namenda 10 mg oral tablet: 1 tab(s) orally 2 times a day (11 Oct 2018 08:38)  Nephplex Rx oral tablet: 1 tab(s) orally once a day (11 Oct 2018 08:39)  nortriptyline 50 mg oral capsule: 1 cap(s) orally once a day (11 Oct 2018 08:39)  piperacillin-tazobactam 2 g-0.25 g intravenous injection: 1  intravenous every 12 hours until 11/7/18 (30 Oct 2018 16:27)  Renvela 800 mg oral tablet: 2 tab(s) orally every 8 hours (11 Oct 2018 08:39)  simethicone 80 mg oral tablet: 1 tab(s) orally 3 times a day (after meals) (11 Oct 2018 08:39)  Tylenol 325 mg oral tablet: 2 tab(s) orally every 6 hours, As Needed (30 Oct 2018 14:39)  vitamin A: 1 tab(s) orally once a day (11 Oct 2018 08:38)  Vitamin B Complex: 1 tab(s) orally once a day (11 Oct 2018 08:38)  Vitamin C 500 mg oral tablet: 1 tab(s) orally once a day (11 Oct 2018 08:38)      ***VITAL SIGNS:  Vital Signs Last 24 Hrs  T(C): 36.7 (12 Jan 2019 16:00), Max: 37 (12 Jan 2019 00:00)  T(F): 98.1 (12 Jan 2019 16:00), Max: 98.6 (12 Jan 2019 00:00)  HR: 107 (12 Jan 2019 16:00) (83 - 122)  BP: 91/61 (12 Jan 2019 16:00) (91/61 - 108/65)  BP(mean): 64 (12 Jan 2019 16:00) (63 - 78)  RR: 22 (12 Jan 2019 16:00) (14 - 25)  SpO2: 94% (12 Jan 2019 16:00) (88% - 100%)     I&O's Detail    11 Jan 2019 07:01  -  12 Jan 2019 07:00  --------------------------------------------------------  IN:    IV PiggyBack: 200 mL    norepinephrine Infusion: 35.7 mL    Oral Fluid: 565 mL  Total IN: 800.7 mL    OUT:    Chest Tube: 195 mL    Drain: 25 mL  Total OUT: 220 mL    Total NET: 580.7 mL      12 Jan 2019 07:01  -  12 Jan 2019 16:51  --------------------------------------------------------  IN:    Oral Fluid: 480 mL    Other: 500 mL  Total IN: 980 mL    OUT:    Chest Tube: 30 mL    Drain: 15 mL    Other: 2000 mL  Total OUT: 2045 mL    Total NET: -1065 mL  =======================  MEDICATIONS  ===================  MEDICATIONS  (STANDING):  ALBUTerol    90 MICROgram(s) HFA Inhaler 1 Puff(s) Inhalation every 4 hours  aspirin  chewable 81 milliGRAM(s) Oral daily  docusate sodium Liquid 100 milliGRAM(s) Oral daily  heparin  Injectable 5000 Unit(s) SubCutaneous every 12 hours  midodrine 10 milliGRAM(s) Oral every 8 hours  norepinephrine Infusion 0.05 MICROgram(s)/kG/Min (5.606 mL/Hr) IV Continuous <Continuous>  pantoprazole  Injectable 40 milliGRAM(s) IV Push daily  piperacillin/tazobactam IVPB. 3.375 Gram(s) IV Intermittent every 12 hours    MEDICATIONS  (PRN):  ======================VENTILATOR SETTINGS  ==============  BIPAP standby  =================== PATIENT CARE ACCESS DEVICES ==========  Peripheral IV (+)  Central Venous Line	R/ Fem d/kirsten  Arterial Line	R/	Rad (+)  ======================= PHYSICAL EXAM===================  General:          Comfortable, Awake, alert, no distress  Neuro:             Moving all extremities to commands. No focal deficits	  HEENT:                           CHER   Respiratory:	Lungs clear on auscultation bilaterally with good aeration.                            No rales, rhonchi, no wheezing.                             Right david and pigtail tube sites - clean  CV:		 Regular rate and rhythm. Normal S1/S2. No murmurs  Abdomen:	Soft,  nontender, not-distended. Bowel sounds present    Skin:		No rash.  Extremities:	Warm, no cyanosis or edema.  Palpable pulses    ============================ LABS =======================                        12.0   7.81  )-----------( 135      ( 12 Jan 2019 14:15 )             39.7     01-12    138         |  98  |  21  ----------------------------<  93  3.4<L>   |  29  |  3.82<H>    Ca    9.0      12 Jan 2019 14:00  Phos  2.7     01-12  Mg     2.0     01-12    TPro  6.6  /  Alb  2.7<L>  /  TBili  0.5  /  DBili  x   /  AST  24  /  ALT  24  /  AlkPhos  159<H>  01-12    LIVER FUNCTIONS - ( 12 Jan 2019 14:00 )  Alb: 2.7 g/dL / Pro: 6.6 g/dL / ALK PHOS: 159 u/L / ALT: 24 u/L / AST: 24 u/L / GGT: x           ABG - ( 12 Jan 2019 14:00 )  pH, Arterial: 7.32  pH, Blood: x     /  pCO2: 61    /  pO2: 122   / HCO3: 28    / Base Excess: 4.7   /  SaO2: 98.5      BLOOD PERIPHERAL  01-09-19 --  --  --    PLEURAL FLUID  01-09-19 --  --    WBC^White Blood Cells  QNTY CELLS IN GRAM STAIN: FEW (2+)  NOS^No Organisms Seen    BRONCHIAL LAVAGE  01-08-19 --  --  --    BRONCHIAL LAVAGE  01-08-19 --  --  --  BLOOD ARTERIAL  01-07-19 --  --  --  ===================== IMAGING STUDIES ===================  Radiology personally reviewed.  < from: Xray Chest 1 View- PORTABLE-Routine (01.11.19 @ 07:06) >  INTERPRETATION:   Bilateral small loculated effusions again seen. No focal consolidations.   Interstitial edema is present. No pneumothorax.  COMPARISON:  January 10  IMPRESSION:  Follow-up with bilateral small loculated effusions unchanged.    < end of copied text >    < from: Transthoracic Echocardiogram (01.09.19 @ 16:47) >  CONCLUSIONS:< from: Transthoracic Echocardiogram (01.09.19 @ 16:47) >  Ejection Fraction (Visual Estimate): 15-20 %    1. Tethered mitral valve leaflets with normal opening.  Moderate eccentric mitral regurgitation.  2. Normal trileaflet aortic valve. Moderate aortic  regurgitation.  3. Severe left ventricular enlargement.  4. Severe global left ventricular systolic dysfunction.  5. Increased E/e'  is consistent with elevated left  ventricular filling pressure.  6. Normal right ventricular size and function.  *** Compared with echocardiogram of 10/12/2018, no  significant changes noted.    < end of copied text >    ====================ASSESSMENT AND PLAN ================  Pt is a 58 yr old  male who underwent a FB, R thoracotomy, decortication, chest wall reconstruction, lat flap with Dr Pickering on 10/11/18. He was admitted on 1/6/19 to [facility] with a R pleural effusion and respiratory failure,  A R PTC was placed there and he was intubated.  He still had a R SANDY drain in place.  He presented intubated and sedated on pressors. (07 Jan 2019 23:10). Pt is currently extubated but on / off BiPAP for respiratory acidosis    Issues: Acute resp failure-extubated 1/8/19 to BIPAP             Resp acidosis             Hypotension with sepsis/ distributive shock- on/ off Levophed drip  Acute on chronic systolic CHF ( EF 15-20 %), ICD in place  Infected right hemothorax - (+) Enterococcus faecalis in the past / now + MRSA empyema  E coli bacteriemia              ESRD on HD    intermittent agitation/mild dementia              severe deconditioning  ====================== NEUROLOGY=======================  Pain control with Tylenol IV / Toradol  Trial of OOB  ==================== RESPIRATORY========================  Pt is on  2   L nasal canula / BIPAP at night and PRN daytime  Comfortable, no evidence of distress.  Incentive spirometry    Monitor chest tube output  Chest pigtail tube to suction / david to bulb suction	  Continuous pulse oximetry monitoring  Continue bronchodilators, pulmonary toilet  Head of bed elevation to 30-40 degrees  ====================CARDIOVASCULAR=====================  Continue hemodynamic monitoring/ telemetry  Hypotension improved; off Levophed, on PO MIdodrine  ===================== RENAL ============================  ESRD on HD as per renal team ( done today)  Monitor I/Os, BUN/ Cr  and electrolytes  ==================== GASTROINTESTINAL===================  On PO renal dysphagia diet with aspiration precautions  Continue GI prophylaxis with Protonix   Zofran / Reglan for nausea - PRN	  =======================    ENDOCRIN  =====================  Glycemic monitoring  F/S with coverage  ===================HEMATOLOGIC/ONCOLOGIC =============  Monitor chest tube output. No signs of active bleeding.   Follow CBC, coags  in AM  DVT prophylaxis with SCD, sc Heparin  ========================INFECTIOUS DISEASE===============  All the cultures are negative during this admission but grew MRSA from pleural fluid and E.Coli in blood at [facility]. Continue Vanco by level and Zosyn   Monitor for fever / leukocytosis.  All surgical incision / chest tube  sites look clean  ID dr Chahal    When stable - will need CT scan of AP - with IV /PO contrast to look for abdominal source of E.Coli bacteremia     Pertinent clinical, laboratory, radiographic, hemodynamic, echocardiographic, respiratory data, microbiologic data and chart were reviewed and analyzed frequently throughout the course of the day and night. GI and DVT prophylaxis, glycemic control, head of bed elevation and skin care issues were addressed.  Patient seen, examined and plan discussed with CT Surgery Dr Wilkerson and Dr Pickering / CTICU team during rounds.  Pt remains critically ill in imminent risk of  deterioration and requires very careful cardio- pulmonary monitoring and support.    I have spent   60     minutes of critical care time with this pt between  8 am  and   11:55 pm         minutes spent on total encounter; more than 50% of the visit was spent counseling and/or coordinating care by the attending physician.      KERLINE Faith MD

## 2019-01-12 NOTE — PROGRESS NOTE ADULT - SUBJECTIVE AND OBJECTIVE BOX
CLAUDIA HAN            MRN-[id number]         No Known Allergies               Pt is a 58 yr old  male who underwent a FB, R thoracotomy, decortication, chest wall reconstruction, lat flap with Dr Pickering on 10/11/18. He was admitted on 1/6/19 to [facility] with a R pleural effusion and respiratory failure,  A R PTC was placed there and he was intubated.  He still had a R SANDY drain in place.  He presented intubated and sedated on pressors. (07 Jan 2019 23:10)      Procedure:    10/11/2018   Right thoracotomy, resection of portion of R 7th rib, evacuation of infected hemothorax, takedown of pleurocutaneous fistula         Issues:  Hypotension  Sepsis  Respiratory acidosis  Cardiomyopathy  Pleural effusion  BPH                 Home Medications:  aspirin 81 mg oral tablet: 1 tab(s) orally once a day  (30 Oct 2018 14:39)  Breo Ellipta 100 mcg-25 mcg/inh inhalation powder: 1 puff(s) inhaled once a day patient denies using it (11 Oct 2018 08:38)  Calcium 500: 1 tab(s) orally 2 times a day (11 Oct 2018 08:38)  docusate sodium 100 mg oral tablet: 1 tab(s) orally 2 times a day, As Needed (11 Oct 2018 08:39)  folic acid 1 mg oral tablet: 1 tab(s) orally once a day (11 Oct 2018 08:38)  Mag-Ox 400 oral tablet: 1 tab(s) orally once a day (11 Oct 2018 08:39)  Multiple Vitamins oral tablet: 1 tab(s) orally once a day  (30 Oct 2018 14:39)  Namenda 10 mg oral tablet: 1 tab(s) orally 2 times a day (11 Oct 2018 08:38)  Nephplex Rx oral tablet: 1 tab(s) orally once a day (11 Oct 2018 08:39)  nortriptyline 50 mg oral capsule: 1 cap(s) orally once a day (11 Oct 2018 08:39)  piperacillin-tazobactam 2 g-0.25 g intravenous injection: 1  intravenous every 12 hours until 11/7/18 (30 Oct 2018 16:27)  Renvela 800 mg oral tablet: 2 tab(s) orally every 8 hours (11 Oct 2018 08:39)  simethicone 80 mg oral tablet: 1 tab(s) orally 3 times a day (after meals) (11 Oct 2018 08:39)  Tylenol 325 mg oral tablet: 2 tab(s) orally every 6 hours, As Needed (30 Oct 2018 14:39)  vitamin A: 1 tab(s) orally once a day (11 Oct 2018 08:38)  Vitamin B Complex: 1 tab(s) orally once a day (11 Oct 2018 08:38)  Vitamin C 500 mg oral tablet: 1 tab(s) orally once a day (11 Oct 2018 08:38)      PAST MEDICAL & SURGICAL HISTORY:  Smoking hx  Cardiomyopathy  Wound: right chest  ICD (implantable cardioverter-defibrillator) in place  OA (osteoarthritis)  HLD (hyperlipidemia)  BPH (benign prostatic hyperplasia)  Pleural effusion  HTN (hypertension)  ESRD (end stage renal disease)  H/O chest wound: right  S/P thoracotomy: FB, R thoracotomy, decortication, chest wall reconstruction, lat flap  History of wound infection: right chest wall - revision 5/18 and again in 7/18  History of implantable cardioverter-defibrillator (ICD) placement: pt unsure when placed  H/O bilateral hip replacements: 2008, 2009        ICU Vital Signs Last 24 Hrs  T(C): 36.6 (12 Jan 2019 06:00), Max: 37 (12 Jan 2019 00:00)  T(F): 97.9 (12 Jan 2019 06:00), Max: 98.6 (12 Jan 2019 00:00)  HR: 88 (12 Jan 2019 07:09) (83 - 122)  BP: 92/57 (12 Jan 2019 00:00) (92/57 - 108/65)  BP(mean): 64 (12 Jan 2019 00:00) (63 - 74)  ABP: 92/77 (12 Jan 2019 07:00) (87/77 - 116/60)  ABP(mean): 83 (12 Jan 2019 07:00) (61 - 97)  RR: 17 (12 Jan 2019 07:00) (14 - 23)  SpO2: 98% (12 Jan 2019 07:09) (87% - 100%)    I&O's Detail    11 Jan 2019 07:01  -  12 Jan 2019 07:00  --------------------------------------------------------  IN:    IV PiggyBack: 200 mL    norepinephrine Infusion: 35.7 mL    Oral Fluid: 565 mL  Total IN: 800.7 mL    OUT:    Chest Tube: 195 mL    Drain: 25 mL  Total OUT: 220 mL    Total NET: 580.7 mL        CAPILLARY BLOOD GLUCOSE          Home Medications:  aspirin 81 mg oral tablet: 1 tab(s) orally once a day  (30 Oct 2018 14:39)  Breo Ellipta 100 mcg-25 mcg/inh inhalation powder: 1 puff(s) inhaled once a day patient denies using it (11 Oct 2018 08:38)  Calcium 500: 1 tab(s) orally 2 times a day (11 Oct 2018 08:38)  docusate sodium 100 mg oral tablet: 1 tab(s) orally 2 times a day, As Needed (11 Oct 2018 08:39)  folic acid 1 mg oral tablet: 1 tab(s) orally once a day (11 Oct 2018 08:38)  Mag-Ox 400 oral tablet: 1 tab(s) orally once a day (11 Oct 2018 08:39)  Multiple Vitamins oral tablet: 1 tab(s) orally once a day  (30 Oct 2018 14:39)  Namenda 10 mg oral tablet: 1 tab(s) orally 2 times a day (11 Oct 2018 08:38)  Nephplex Rx oral tablet: 1 tab(s) orally once a day (11 Oct 2018 08:39)  nortriptyline 50 mg oral capsule: 1 cap(s) orally once a day (11 Oct 2018 08:39)  piperacillin-tazobactam 2 g-0.25 g intravenous injection: 1  intravenous every 12 hours until 11/7/18 (30 Oct 2018 16:27)  Renvela 800 mg oral tablet: 2 tab(s) orally every 8 hours (11 Oct 2018 08:39)  simethicone 80 mg oral tablet: 1 tab(s) orally 3 times a day (after meals) (11 Oct 2018 08:39)  Tylenol 325 mg oral tablet: 2 tab(s) orally every 6 hours, As Needed (30 Oct 2018 14:39)  vitamin A: 1 tab(s) orally once a day (11 Oct 2018 08:38)  Vitamin B Complex: 1 tab(s) orally once a day (11 Oct 2018 08:38)  Vitamin C 500 mg oral tablet: 1 tab(s) orally once a day (11 Oct 2018 08:38)      MEDICATIONS  (STANDING):  ALBUTerol    90 MICROgram(s) HFA Inhaler 1 Puff(s) Inhalation every 4 hours  aspirin  chewable 81 milliGRAM(s) Oral daily  docusate sodium Liquid 100 milliGRAM(s) Oral daily  heparin  Injectable 5000 Unit(s) SubCutaneous every 12 hours  midodrine 10 milliGRAM(s) Oral every 8 hours  norepinephrine Infusion 0.05 MICROgram(s)/kG/Min (5.606 mL/Hr) IV Continuous <Continuous>  pantoprazole  Injectable 40 milliGRAM(s) IV Push daily  piperacillin/tazobactam IVPB. 3.375 Gram(s) IV Intermittent every 12 hours    MEDICATIONS  (PRN):          Physical exam:                 General:               Pt is awake, alert,  appears to be in mild respiratory distress                                                  Neuro:                  Nonfocal                             Cardiovascular:   S1 & S2, regular                           Respiratory:         Air entry is fair and equal on both sides, has bilateral conducted sounds                           GI:                          Soft, nondistended and nontender, Bowel sounds active                            Ext:                        No cyanosis or edema           Labs:                                                                           11.9   8.78  )-----------( 116      ( 12 Jan 2019 04:30 )             39.1             01-12    142  |  102  |  39<H>  ----------------------------<  96  4.2   |  26  |  6.17<H>    Ca    9.6      12 Jan 2019 04:30  Phos  4.3     01-11  Mg     2.1     01-11    TPro  6.4  /  Alb  2.6<L>  /  TBili  0.4  /  DBili  x   /  AST  38  /  ALT  31  /  AlkPhos  170<H>  01-11                    LIVER FUNCTIONS - ( 11 Jan 2019 04:10 )  Alb: 2.6 g/dL / Pro: 6.4 g/dL / ALK PHOS: 170 u/L / ALT: 31 u/L / AST: 38 u/L / GGT: x               CXR:  < from: Xray Chest 1 View- PORTABLE-Routine (01.11.19 @ 07:06) >  Bilateral small loculated effusions again seen. No focal consolidations.   Interstitial edema is present. No pneumothorax.    Plan:    General:  Pt is a 58 yr old  male who underwent a FB, R thoracotomy, decortication, chest wall reconstruction, lat flap with Dr Pickering on 10/11/18. He was admitted on 1/6/19 to [facility] with a R pleural effusion and respiratory failure,  A R PTC was placed there and he was intubated.  He still had a R SANDY drain in place.  He presented intubated and sedated on pressors. (07 Jan 2019 23:10). Pt is currently extubated but on / off BiPAP for respiratory acidosis                            Neuro:                                         Pain control with PCA / Tylenol IV                            Cardiovascular:                                          Continue hemodynamic monitoring.    Hypotension: Off Levophed, Started on  Midodrine. The goal is to wean off all pressors when sepsis is resolved.                             Respiratory:                                         Pt is on 2L  nasal canula, wean off as tolerated      Respiratory acidosis: On  / Off BiPap                                         Comfortable, not in any distress.                                         Encourage incentive spirometry                                          Monitor chest tube output                                         Chest tube to suction                                                                   Continue bronchodilators, pulmonary toilet                            GI                                         On Renal / DASH diet                                         Continue GI prophylaxis with Protonix                                         Continue Zofran / Reglan for nausea - PRN	                                                                 Renal:                                         ESRD: HD as per Renal                                         Monitor I/Os and electrolytes                                                                                        Hem/ Onc:                                                                                  Monitor chest tube output &  signs of bleeding.                                          Follow CBC in AM                           Infectious disease:      All the cultures are negative during this admission but grew MRSA from pleural fluid and E.Coli in blood. Continue Vanco by level and Zosyn      CT scan of AP - with IV /PO contrast to look for abdominal source of E.Coli bacteremia                                           Monitor for fever / leukocytosis.                                          All surgical incision / chest tube  sites look clean                            Endocrine                                             Continue Accu-Checks with coverage    Pt is on SQ Heparin and Venodyne boots for DVT prophylaxis.     Pertinent clinical, laboratory, radiographic, hemodynamic, echocardiographic, respiratory data, microbiologic data and chart were reviewed and analyzed frequently throughout the course of the day and night  Patient seen, examined and plan discussed with CT Surgeon Dr. Ledesma / CTICU team during rounds.    Pt's status discussed with family at bedside, updated status    I have spent 45   minutes of critical care time with this pt between 00am and 9am                   Ralph Warren MD

## 2019-01-12 NOTE — PROGRESS NOTE ADULT - PROBLEM SELECTOR PLAN 1
Pt. with ESRD on HD TIW (MWF). Last HD as was on 1/10/19 via [facility]. Plan for maintenance HD today. Monitor labs. Pt. with ESRD on HD TIW. Last HD was on 1/10/19 via [facility]. Plan for maintenance HD today. Monitor BP and labs

## 2019-01-13 LAB
ALBUMIN SERPL ELPH-MCNC: 2.5 G/DL — LOW (ref 3.3–5)
ALP SERPL-CCNC: 200 U/L — HIGH (ref 40–120)
ALT FLD-CCNC: 26 U/L — SIGNIFICANT CHANGE UP (ref 4–41)
ANION GAP SERPL CALC-SCNC: 11 MEQ/L — SIGNIFICANT CHANGE UP (ref 7–14)
APTT BLD: 47.8 SEC — HIGH (ref 27.5–36.3)
AST SERPL-CCNC: 26 U/L — SIGNIFICANT CHANGE UP (ref 4–40)
BASE EXCESS BLDA CALC-SCNC: 2.4 MMOL/L — SIGNIFICANT CHANGE UP
BASE EXCESS BLDA CALC-SCNC: 3.5 MMOL/L — SIGNIFICANT CHANGE UP
BILIRUB SERPL-MCNC: 0.4 MG/DL — SIGNIFICANT CHANGE UP (ref 0.2–1.2)
BUN SERPL-MCNC: 31 MG/DL — HIGH (ref 7–23)
CA-I BLDA-SCNC: 1.28 MMOL/L — SIGNIFICANT CHANGE UP (ref 1.15–1.29)
CALCIUM SERPL-MCNC: 9.3 MG/DL — SIGNIFICANT CHANGE UP (ref 8.4–10.5)
CHLORIDE BLDA-SCNC: 104 MMOL/L — SIGNIFICANT CHANGE UP (ref 96–108)
CHLORIDE SERPL-SCNC: 100 MMOL/L — SIGNIFICANT CHANGE UP (ref 98–107)
CO2 SERPL-SCNC: 28 MMOL/L — SIGNIFICANT CHANGE UP (ref 22–31)
CREAT BLDA-MCNC: SIGNIFICANT CHANGE UP MG/DL (ref 0.5–1.3)
CREAT SERPL-MCNC: 4.8 MG/DL — HIGH (ref 0.5–1.3)
GLUCOSE BLDA-MCNC: 117 MG/DL — HIGH (ref 70–99)
GLUCOSE BLDA-MCNC: 98 MG/DL — SIGNIFICANT CHANGE UP (ref 70–99)
GLUCOSE SERPL-MCNC: 102 MG/DL — HIGH (ref 70–99)
HCO3 BLDA-SCNC: 25 MMOL/L — SIGNIFICANT CHANGE UP (ref 22–26)
HCO3 BLDA-SCNC: 26 MMOL/L — SIGNIFICANT CHANGE UP (ref 22–26)
HCT VFR BLD CALC: 39.8 % — SIGNIFICANT CHANGE UP (ref 39–50)
HCT VFR BLDA CALC: 36.3 % — LOW (ref 39–51)
HCT VFR BLDA CALC: 36.8 % — LOW (ref 39–51)
HGB BLD-MCNC: 11.8 G/DL — LOW (ref 13–17)
HGB BLDA-MCNC: 11.8 G/DL — LOW (ref 13–17)
HGB BLDA-MCNC: 11.9 G/DL — LOW (ref 13–17)
INR BLD: 1.06 — SIGNIFICANT CHANGE UP (ref 0.88–1.17)
LACTATE BLDA-SCNC: 0.7 MMOL/L — SIGNIFICANT CHANGE UP (ref 0.5–2)
LACTATE BLDA-SCNC: 1.3 MMOL/L — SIGNIFICANT CHANGE UP (ref 0.5–2)
MAGNESIUM SERPL-MCNC: 2.1 MG/DL — SIGNIFICANT CHANGE UP (ref 1.6–2.6)
MCHC RBC-ENTMCNC: 29.6 % — LOW (ref 32–36)
MCHC RBC-ENTMCNC: 30.6 PG — SIGNIFICANT CHANGE UP (ref 27–34)
MCV RBC AUTO: 103.4 FL — HIGH (ref 80–100)
NRBC # FLD: 0 K/UL — LOW (ref 25–125)
PCO2 BLDA: 64 MMHG — HIGH (ref 35–48)
PCO2 BLDA: 74 MMHG — CRITICAL HIGH (ref 35–48)
PH BLDA: 7.24 PH — LOW (ref 7.35–7.45)
PH BLDA: 7.27 PH — LOW (ref 7.35–7.45)
PHOSPHATE SERPL-MCNC: 4.3 MG/DL — SIGNIFICANT CHANGE UP (ref 2.5–4.5)
PLATELET # BLD AUTO: 157 K/UL — SIGNIFICANT CHANGE UP (ref 150–400)
PMV BLD: 9.9 FL — SIGNIFICANT CHANGE UP (ref 7–13)
PO2 BLDA: 85 MMHG — SIGNIFICANT CHANGE UP (ref 83–108)
PO2 BLDA: 93 MMHG — SIGNIFICANT CHANGE UP (ref 83–108)
POTASSIUM BLDA-SCNC: 4 MMOL/L — SIGNIFICANT CHANGE UP (ref 3.4–4.5)
POTASSIUM BLDA-SCNC: 4.1 MMOL/L — SIGNIFICANT CHANGE UP (ref 3.4–4.5)
POTASSIUM SERPL-MCNC: 4.1 MMOL/L — SIGNIFICANT CHANGE UP (ref 3.5–5.3)
POTASSIUM SERPL-SCNC: 4.1 MMOL/L — SIGNIFICANT CHANGE UP (ref 3.5–5.3)
PROT SERPL-MCNC: 6.6 G/DL — SIGNIFICANT CHANGE UP (ref 6–8.3)
PROTHROM AB SERPL-ACNC: 11.8 SEC — SIGNIFICANT CHANGE UP (ref 9.8–13.1)
RBC # BLD: 3.85 M/UL — LOW (ref 4.2–5.8)
RBC # FLD: 15.7 % — HIGH (ref 10.3–14.5)
SAO2 % BLDA: 96.3 % — SIGNIFICANT CHANGE UP (ref 95–99)
SAO2 % BLDA: 96.4 % — SIGNIFICANT CHANGE UP (ref 95–99)
SODIUM BLDA-SCNC: 138 MMOL/L — SIGNIFICANT CHANGE UP (ref 136–146)
SODIUM BLDA-SCNC: 138 MMOL/L — SIGNIFICANT CHANGE UP (ref 136–146)
SODIUM SERPL-SCNC: 139 MMOL/L — SIGNIFICANT CHANGE UP (ref 135–145)
VANCOMYCIN FLD-MCNC: 21.5 UG/ML — SIGNIFICANT CHANGE UP
WBC # BLD: 8.29 K/UL — SIGNIFICANT CHANGE UP (ref 3.8–10.5)
WBC # FLD AUTO: 8.29 K/UL — SIGNIFICANT CHANGE UP (ref 3.8–10.5)

## 2019-01-13 PROCEDURE — 71045 X-RAY EXAM CHEST 1 VIEW: CPT | Mod: 26

## 2019-01-13 PROCEDURE — 99291 CRITICAL CARE FIRST HOUR: CPT

## 2019-01-13 RX ORDER — DOCUSATE SODIUM 100 MG
100 CAPSULE ORAL DAILY
Qty: 0 | Refills: 0 | Status: DISCONTINUED | OUTPATIENT
Start: 2019-01-13 | End: 2019-01-22

## 2019-01-13 RX ADMIN — MIDODRINE HYDROCHLORIDE 10 MILLIGRAM(S): 2.5 TABLET ORAL at 06:59

## 2019-01-13 RX ADMIN — Medication 100 MILLIGRAM(S): at 18:42

## 2019-01-13 RX ADMIN — HEPARIN SODIUM 5000 UNIT(S): 5000 INJECTION INTRAVENOUS; SUBCUTANEOUS at 18:40

## 2019-01-13 RX ADMIN — PIPERACILLIN AND TAZOBACTAM 25 GRAM(S): 4; .5 INJECTION, POWDER, LYOPHILIZED, FOR SOLUTION INTRAVENOUS at 18:40

## 2019-01-13 RX ADMIN — Medication 81 MILLIGRAM(S): at 13:17

## 2019-01-13 RX ADMIN — PANTOPRAZOLE SODIUM 40 MILLIGRAM(S): 20 TABLET, DELAYED RELEASE ORAL at 13:18

## 2019-01-13 RX ADMIN — MIDODRINE HYDROCHLORIDE 10 MILLIGRAM(S): 2.5 TABLET ORAL at 13:17

## 2019-01-13 RX ADMIN — PIPERACILLIN AND TAZOBACTAM 25 GRAM(S): 4; .5 INJECTION, POWDER, LYOPHILIZED, FOR SOLUTION INTRAVENOUS at 07:00

## 2019-01-13 RX ADMIN — MIDODRINE HYDROCHLORIDE 10 MILLIGRAM(S): 2.5 TABLET ORAL at 22:34

## 2019-01-13 RX ADMIN — HEPARIN SODIUM 5000 UNIT(S): 5000 INJECTION INTRAVENOUS; SUBCUTANEOUS at 06:59

## 2019-01-13 NOTE — PROGRESS NOTE ADULT - ASSESSMENT
Pt is a 58 yr old Mandarin speaking male who underwent right thoractomy decortiation, Right Chest Wall reconstruction with Latissimjus Dorsi Flap Poss Skin Graft with VAC dressing with Dr Pickering 10/11/18.  He has h/o ESRD with HD, CHF, EF of 10%,  ICD,  with h/o chronic bilateral pleural effusions.  The patient previously underwent left VATS and PleurX drains  in 2015.  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     During his last admission at [facility], pt underwent OR with Plastic and Thoracic surgery,  for definitive repair that involved thoracotomy, decortication, excision of empyema cavity along with muscle flap reconstruction. Pt underwent surgery on 10/11 (Right thoracotomy, resection of portion of R 7th rib, evacuation of infected hemothorax, takedown of pleurocutaneous fistula - and noted to have foul smelling hematoma).  OR cultures grew Enterococcus faecalis.  Pt was treated with zosyn for 4 week course for infected hemothorax which was completed on 11/7.      He was admitted on 1/6/19 to [facility] with a R pleural effusion and respiratory failure,  A R PTC was placed there and he was intubated.  He still had a R SANDY drain in place.  He presented intubated and sedated on pressors. (07 Jan 2019 23:10) Cultures from pleural fluid at [facility] reportedly grew MRSA.  Pt is currently on vanco/zosyn.      1/8 - Bronchoscopy for removal of foreign body in airway.      1/9 - reports from [facility] reviewed:  On admission there, pt had fever 102.3, P 121, BP 74/48.  Bcx from 1/6 (+) E.coli (sensitive to zosyn), and pleural fluid cx 1/8 grew MRSA.  Pleural fluid showed 28 WBC.  AFB (-).      ID consult called for further abx management.      Problem/Plan - 1:    Septic shock    - Pt with R pleural effusion, s/p SANDY drain/chest tube, (+) MRSA reported from pleural fluid cultures at [facility], Total WBC from pleural fluid - 24.  Drainage has been serosanguinous.   Cont to dose vanco by level.  Last vanco dosed on 1/11.  Level today is supratherapeutic, no need to redose.  Check repeat random level in AM.  Will treat for 4 week course.      - Blood cx also (+) for E.coli at [facility], source unclear.    Cont zosyn.  Repeat BCx (-) x 1 at [facility].  Recommend CTap with po/iv contrast to evaluate for bacteremia source, as pleural fluid cultures grew MRSA.  Will treat for 2 week course from date of negative blood cultures.        - BAL specimens (+) yeast, suspect colonizer.      - echocardiogram stable findings compared to last study.    Will follow,    Anabel Chahal  [phone]

## 2019-01-13 NOTE — PROGRESS NOTE ADULT - SUBJECTIVE AND OBJECTIVE BOX
Infectious Diseases progress note:    Subjective: Pt off pressors, on midodrine.  No fever, leukocytosis.  Appears comfortable    ROS:  CONSTITUTIONAL:  No fever, chills, rigors  CARDIOVASCULAR:  No chest pain or palpitations  RESPIRATORY:   No SOB, cough, dyspnea on exertion.  No wheezing  GASTROINTESTINAL:  No abd pain, N/V, diarrhea/constipation  EXTREMITIES:  No swelling or joint pain  GENITOURINARY:  No burning on urination, increased frequency or urgency.  No flank pain  NEUROLOGIC:  No HA, visual disturbances  SKIN: No rashes    Allergies    No Known Allergies    Intolerances        ANTIBIOTICS/RELEVANT:  antimicrobials  piperacillin/tazobactam IVPB. 3.375 Gram(s) IV Intermittent every 12 hours    immunologic:    OTHER:  ALBUTerol    90 MICROgram(s) HFA Inhaler 1 Puff(s) Inhalation every 4 hours  aspirin  chewable 81 milliGRAM(s) Oral daily  docusate sodium Liquid 100 milliGRAM(s) Oral daily  heparin  Injectable 5000 Unit(s) SubCutaneous every 12 hours  midodrine 10 milliGRAM(s) Oral every 8 hours  pantoprazole  Injectable 40 milliGRAM(s) IV Push daily      Objective:  Vital Signs Last 24 Hrs  T(C): 36.6 (13 Jan 2019 08:00), Max: 36.8 (13 Jan 2019 04:00)  T(F): 97.9 (13 Jan 2019 08:00), Max: 98.2 (13 Jan 2019 04:00)  HR: 95 (13 Jan 2019 11:29) (85 - 113)  BP: 105/63 (13 Jan 2019 08:00) (76/48 - 108/57)  BP(mean): 71 (13 Jan 2019 08:00) (54 - 73)  RR: 15 (13 Jan 2019 11:29) (13 - 25)  SpO2: 100% (13 Jan 2019 11:29) (88% - 100%)    PHYSICAL EXAM:  Constitutional:NAD  Eyes:CHER, EOMI  Ear/Nose/Throat: no thrush, mucositis.  Moist mucous membranes	  Neck:no JVD, no lymphadenopathy, supple  Respiratory: rt chest tube in place draining sanguinous fluid  Cardiovascular: S1S2 RRR, no murmurs  Gastrointestinal:soft, nontender,  nondistended (+) BS  Extremities:no e/e/c  Skin:  no rashes, open wounds or ulcerations        LABS:                        11.8   8.29  )-----------( 157      ( 13 Jan 2019 02:00 )             39.8     01-13    139  |  100  |  31<H>  ----------------------------<  102<H>  4.1   |  28  |  4.80<H>    Ca    9.3      13 Jan 2019 02:00  Phos  4.3     01-13  Mg     2.1     01-13    TPro  6.6  /  Alb  2.5<L>  /  TBili  0.4  /  DBili  x   /  AST  26  /  ALT  26  /  AlkPhos  200<H>  01-13    PT/INR - ( 13 Jan 2019 02:00 )   PT: 11.8 SEC;   INR: 1.06          PTT - ( 13 Jan 2019 02:00 )  PTT:47.8 SEC        Vancomycin Level, Random: 21.5: Therapeutic ranges have not been established for random  vancomycin. Interpret results in context of patient's  clinical condition and time sample was drawn relative to  peak and trough therapeutic ranges. Therapeutic ranges for  peak vancomycin are 25-50 and for trough vancomycin 10-20  with 15-20 mcg/mL used for complicated infections. ug/mL (01.13.19 @ 02:00)                  MICROBIOLOGY:    Culture - Blood (01.09.19 @ 22:24)    Culture - Blood:   NO ORGANISMS ISOLATED  NO ORGANISMS ISOLATED AT 72 HRS.    Specimen Source: BLOOD    Culture - Blood (01.09.19 @ 22:24)    Culture - Blood:   NO ORGANISMS ISOLATED  NO ORGANISMS ISOLATED AT 72 HRS.    Specimen Source: BLOOD PERIPHERAL          RADIOLOGY & ADDITIONAL STUDIES:    < from: Xray Chest 1 View- PORTABLE-Routine (01.13.19 @ 06:22) >  IMPRESSION: Right-sided chest tubes are again noted. There are small   bilateral loculated pleural effusions, right greater than left unchanged.   There is no evidence of a pneumothorax. Left cardiac device is again   noted.    < end of copied text >      < from: Xray Chest 1 View- PORTABLE-Routine (01.12.19 @ 07:15) >  IMPRESSION:  Pigtail drainage catheter overlies lower right hemithorax.    Persistent bilateral pleural reactions with adjacent nonspecific basilar   consolidative changes. Clear upper lungs. No pneumothorax.    Stable left chest wall SICD and cardiomegaly.    Trachea midline.    Remainder of exam unchanged.    < end of copied text >

## 2019-01-13 NOTE — PROGRESS NOTE ADULT - SUBJECTIVE AND OBJECTIVE BOX
CLAUDIA HAN          MRN-[id number]    HPI:  Pt is a 58 yr old  male who underwent a FB, R thoracotomy, decortication, chest wall reconstruction, lat flap with Dr Pickering on 10/11/18. He was admitted on 1/6/19 to [facility] with a R pleural effusion and respiratory failure,  A R PTC was placed there and he was intubated.  He still had a R SANDY drain in place.  He presented intubated and sedated on pressors. (07 Jan 2019 23:10)      Procedure:  POD # :     Issues:        Interval/Overnight OR Events/ ROS  Pt extubated in the OR after surgery. On arrival in ICU still drowsy - but easily arousable to following commands; denies SOB, no nausea, no vomiting  Respiratory status required full ventilatory support,  following of ABG’s with A-line monitoring, continuous pulse oximetry monitoring, & an IV Propofol infusion for support & to evaluate for & prevent further decompensation secondary to persistent cardiopulmonary dysfunction.     Pt remained hemodynamically stable overnight, not on any pressors or inotropes. OOB to chair, breathing comfortably with minimal pain. Ambulated several times . Denies pain, no SOB, no palpitations, no nausea/ no vomiting, no dizziness  A-line and sharma d/kirsten       PAST MEDICAL & SURGICAL HISTORY:  Smoking hx  Cardiomyopathy  Wound: right chest  ICD (implantable cardioverter-defibrillator) in place  OA (osteoarthritis)  HLD (hyperlipidemia)  BPH (benign prostatic hyperplasia)  Pleural effusion  HTN (hypertension)  ESRD (end stage renal disease)  H/O chest wound: right  S/P thoracotomy: FB, R thoracotomy, decortication, chest wall reconstruction, lat flap  History of wound infection: right chest wall - revision 5/18 and again in 7/18  History of implantable cardioverter-defibrillator (ICD) placement: pt unsure when placed  H/O bilateral hip replacements: 2008, 2009    Allergies    No Known Allergies    Intolerances      Home Medications:  aspirin 81 mg oral tablet: 1 tab(s) orally once a day  (30 Oct 2018 14:39)  Breo Ellipta 100 mcg-25 mcg/inh inhalation powder: 1 puff(s) inhaled once a day patient denies using it (11 Oct 2018 08:38)  Calcium 500: 1 tab(s) orally 2 times a day (11 Oct 2018 08:38)  docusate sodium 100 mg oral tablet: 1 tab(s) orally 2 times a day, As Needed (11 Oct 2018 08:39)  folic acid 1 mg oral tablet: 1 tab(s) orally once a day (11 Oct 2018 08:38)  Mag-Ox 400 oral tablet: 1 tab(s) orally once a day (11 Oct 2018 08:39)  Multiple Vitamins oral tablet: 1 tab(s) orally once a day  (30 Oct 2018 14:39)  Namenda 10 mg oral tablet: 1 tab(s) orally 2 times a day (11 Oct 2018 08:38)  Nephplex Rx oral tablet: 1 tab(s) orally once a day (11 Oct 2018 08:39)  nortriptyline 50 mg oral capsule: 1 cap(s) orally once a day (11 Oct 2018 08:39)  piperacillin-tazobactam 2 g-0.25 g intravenous injection: 1  intravenous every 12 hours until 11/7/18 (30 Oct 2018 16:27)  Renvela 800 mg oral tablet: 2 tab(s) orally every 8 hours (11 Oct 2018 08:39)  simethicone 80 mg oral tablet: 1 tab(s) orally 3 times a day (after meals) (11 Oct 2018 08:39)  Tylenol 325 mg oral tablet: 2 tab(s) orally every 6 hours, As Needed (30 Oct 2018 14:39)  vitamin A: 1 tab(s) orally once a day (11 Oct 2018 08:38)  Vitamin B Complex: 1 tab(s) orally once a day (11 Oct 2018 08:38)  Vitamin C 500 mg oral tablet: 1 tab(s) orally once a day (11 Oct 2018 08:38)        ***VITAL SIGNS:  Vital Signs Last 24 Hrs  T(C): 36.6 (13 Jan 2019 00:00), Max: 36.9 (12 Jan 2019 04:00)  T(F): 97.8 (13 Jan 2019 00:00), Max: 98.4 (12 Jan 2019 04:00)  HR: 102 (13 Jan 2019 00:00) (83 - 118)  BP: 86/68 (13 Jan 2019 00:00) (76/48 - 108/57)  BP(mean): 73 (13 Jan 2019 00:00) (54 - 78)  RR: 19 (13 Jan 2019 00:00) (14 - 25)  SpO2: 100% (13 Jan 2019 00:00) (88% - 100%)    I/Os:   I&O's Detail    11 Jan 2019 07:01  -  12 Jan 2019 07:00  --------------------------------------------------------  IN:    IV PiggyBack: 200 mL    norepinephrine Infusion: 35.7 mL    Oral Fluid: 565 mL  Total IN: 800.7 mL    OUT:    Chest Tube: 195 mL    Drain: 25 mL  Total OUT: 220 mL    Total NET: 580.7 mL      12 Jan 2019 07:01  -  13 Jan 2019 00:15  --------------------------------------------------------  IN:    Oral Fluid: 480 mL    Other: 500 mL  Total IN: 980 mL    OUT:    Chest Tube: 60 mL    Drain: 15 mL    Other: 2000 mL  Total OUT: 2075 mL    Total NET: -1095 mL          CAPILLARY BLOOD GLUCOSE          =======================  MEDICATIONS  ===================  MEDICATIONS  (STANDING):  ALBUTerol    90 MICROgram(s) HFA Inhaler 1 Puff(s) Inhalation every 4 hours  aspirin  chewable 81 milliGRAM(s) Oral daily  docusate sodium Liquid 100 milliGRAM(s) Oral daily  heparin  Injectable 5000 Unit(s) SubCutaneous every 12 hours  midodrine 10 milliGRAM(s) Oral every 8 hours  norepinephrine Infusion 0.05 MICROgram(s)/kG/Min (5.606 mL/Hr) IV Continuous <Continuous>  pantoprazole  Injectable 40 milliGRAM(s) IV Push daily  piperacillin/tazobactam IVPB. 3.375 Gram(s) IV Intermittent every 12 hours    MEDICATIONS  (PRN):      ======================VENTILATOR SETTINGS  ==============      =================== PATIENT CARE ACCESS DEVICES ==========  Peripheral IV  Central Venous Line	R	L	IJ	Fem	SC	Placed:   Arterial Line	R	L	PT	DP	Fem	Rad	Ax	Placed:   Midline:				  Urinary Catheter, Date Placed:   Necessity of urinary, arterial, and venous catheters discussed  ======================= PHYSICAL EXAM===================  General:          Comfortable, Awake, alert, no distress  Neuro:             Moving all extremities to commands. No focal deficits	  HEENT:                           CHER/ ETT/ NGT/ trach  Respiratory:	Lungs clear on auscultation bilaterally with good aeration.                            No rales, rhonchi, no wheezing. Effort even and unlabored.  CV:		         Regular rate and rhythm. Normal S1/S2. No murmurs  Abdomen:	Soft,  nontender, not-distended. Bowel sounds present / absent.   Skin:		No rash.  Extremities:	Warm, no cyanosis or edema.  Palpable pulses  ============================ LABS =======================                        12.0   7.81  )-----------( 135      ( 12 Jan 2019 14:15 )             39.7     01-12    138  |  98  |  21  ----------------------------<  93  3.4<L>   |  29  |  3.82<H>    Ca    9.0      12 Jan 2019 14:00  Phos  2.7     01-12  Mg     2.0     01-12    TPro  6.6  /  Alb  2.7<L>  /  TBili  0.5  /  DBili  x   /  AST  24  /  ALT  24  /  AlkPhos  159<H>  01-12    LIVER FUNCTIONS - ( 12 Jan 2019 14:00 )  Alb: 2.7 g/dL / Pro: 6.6 g/dL / ALK PHOS: 159 u/L / ALT: 24 u/L / AST: 24 u/L / GGT: x             ABG - ( 12 Jan 2019 14:00 )  pH, Arterial: 7.32  pH, Blood: x     /  pCO2: 61    /  pO2: 122   / HCO3: 28    / Base Excess: 4.7   /  SaO2: 98.5                BLOOD PERIPHERAL  01-09-19 --  --  --      PLEURAL FLUID  01-09-19 --  --    WBC^White Blood Cells  QNTY CELLS IN GRAM STAIN: FEW (2+)  NOS^No Organisms Seen      BRONCHIAL LAVAGE  01-08-19 --  --  --      BRONCHIAL LAVAGE  01-08-19 --  --  --      BLOOD ARTERIAL  01-07-19 --  --  --          ===================== IMAGING STUDIES ===================  Radiology personally reviewed.    ====================ASSESSMENT AND PLAN ================      ====================== NEUROLOGY=======================  Pain control with PCA / PCEA / Tylenol IV / Toradol / Percocet  Pt is on Precedex for agitation  Pt is sedated with Propofol / Fentanyl  ==================== RESPIRATORY========================  Pt is on       L nasal canula / Face tent____% FiO2/ full mech vent support  Comfortable, no evidence of distress.  Using incentive spirometry & doing                ml  Monitor chest tube output  Chest tube to suction / water seal	  Mechanical Ventilation:    Mechanical ventilator status assessed & settings reviewed  Continuous pulse oximetry monitoring  Continue bronchodilators, pulmonary toilet  Head of bed elevation to 30-40 degrees  ====================CARDIOVASCULAR=====================  Continue hemodynamic monitoring/ telemetry  Not on any pressors/ titrate pressors for SBP>100, MAP >60-65  Continue cardiovascular / antihypertensive medications  ===================== RENAL ============================  Continue LR 30CC/hr      D/C IVF  Monitor I/Os, BUN/ Cr  and electrolytes  D/C Sharma      Keep Sharma for UO monitoring  BPH: Continue Flomax/ Finasteride  ==================== GASTROINTESTINAL===================  On regular/ tube feeds diet, tolerating well  Continue GI prophylaxis with Pepcid / Protonix  Continue Zofran / Reglan for nausea - PRN	  NPO  =======================    ENDOCRIN  =====================  Glycemic monitoring  F/S with coverage  ===================HEMATOLOGIC/ONCOLOGIC =============  Monitor chest tube output. No signs of active bleeding.   Follow CBC, coags  in AM  DVT prophylaxis with SCD, sc Heparin  ========================INFECTIOUS DISEASE===============  No signs of infection. Monitor for fever / leukocytosis.  All surgical incision / chest tube  sites look clean  D/C Sharma    Pertinent clinical, laboratory, radiographic, hemodynamic, echocardiographic, respiratory data, microbiologic data and chart were reviewed and analyzed frequently throughout the course of the day and night. GI and DVT prophylaxis, glycemic control, head of bed elevation and skin care issues were addressed.  Patient seen, examined and plan discussed with CT Surgery / CTICU team during rounds.  Pt remains critically ill in imminent risk of  deterioration and requires very careful cardio- pulmonary monitoring and support.    I have spent        minutes of critical care time with this pt between      am/pm   and        am/ pm         minutes spent on total encounter; more than 50% of the visit was spent counseling and/or coordinating care by the attending physician.      KERLINE Faith MD CLAUDIA HAN          MRN-[id number]    HPI:  Pt is a 58 yr old  male who underwent a FB, R thoracotomy, decortication, chest wall reconstruction, lat flap with Dr Pickering on 10/11/18. He was admitted on 1/6/19 to [facility] with a R pleural effusion and respiratory failure,  A R PTC was placed there and he was intubated.  He still had a R SANDY drain in place.  He presented intubated and sedated on pressors. 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         Procedure: 10/11/18 Right thoracotomy, resection of portion of R 7th rib, evacuation of infected hemothorax, takedown of pleurocutaneous fistula  / R chest wall reconstruction with tunneled latissimus dorsi flap, covered by serratus anterior flap           Issues: Acute resp failure-extubated 1/8/19 to BIPAP             Resp acidosis             Hypotension with sepsis/ distributive shock- on/ off Levophed drip  Acute on chronic systolic CHF ( EF 10 %), ICD in place  Infected right hemothorax - (+) Enterococcus faecalis in the past / now + MRSA empyema  E coli bacteriemia              ESRD on HD    intermittent agitation/mild dementia              severe deconditioning    Interval/Overnight  Events/ ROS  Respiratory status required intermittent  BIPAP support due to resp acidosis on nasal cannula trial;  following of ABG’s with A-line monitoring, continuous pulse oximetry monitoring, Today ABG slightly improved, clinically pt looks better as well. HD was done at bedside  today 1/12/19  with 2 L fluid removal.   Restarted PO dysphagia diet as tolerated with aspiration precautions while pt is off BIPAP. Plan to observe pt's clinical status off BIPAP daytime and  place  back on BIPAP overnight as d/w dr Pickering today by phone.       PAST MEDICAL & SURGICAL HISTORY:  Smoking hx  Cardiomyopathy  Wound: right chest  ICD (implantable cardioverter-defibrillator) in place  OA (osteoarthritis)  HLD (hyperlipidemia)  BPH (benign prostatic hyperplasia)  Pleural effusion  HTN (hypertension)  ESRD (end stage renal disease)  H/O chest wound: right  S/P thoracotomy: FB, R thoracotomy, decortication, chest wall reconstruction, lat flap  History of wound infection: right chest wall - revision 5/18 and again in 7/18  History of implantable cardioverter-defibrillator (ICD) placement: pt unsure when placed  H/O bilateral hip replacements: 2008, 2009    Allergies  No Known Allergies    Home Medications:  aspirin 81 mg oral tablet: 1 tab(s) orally once a day  (30 Oct 2018 14:39)  Breo Ellipta 100 mcg-25 mcg/inh inhalation powder: 1 puff(s) inhaled once a day patient denies using it (11 Oct 2018 08:38)  Calcium 500: 1 tab(s) orally 2 times a day (11 Oct 2018 08:38)  docusate sodium 100 mg oral tablet: 1 tab(s) orally 2 times a day, As Needed (11 Oct 2018 08:39)  folic acid 1 mg oral tablet: 1 tab(s) orally once a day (11 Oct 2018 08:38)  Mag-Ox 400 oral tablet: 1 tab(s) orally once a day (11 Oct 2018 08:39)  Multiple Vitamins oral tablet: 1 tab(s) orally once a day  (30 Oct 2018 14:39)  Namenda 10 mg oral tablet: 1 tab(s) orally 2 times a day (11 Oct 2018 08:38)  Nephplex Rx oral tablet: 1 tab(s) orally once a day (11 Oct 2018 08:39)  nortriptyline 50 mg oral capsule: 1 cap(s) orally once a day (11 Oct 2018 08:39)  piperacillin-tazobactam 2 g-0.25 g intravenous injection: 1  intravenous every 12 hours until 11/7/18 (30 Oct 2018 16:27)  Renvela 800 mg oral tablet: 2 tab(s) orally every 8 hours (11 Oct 2018 08:39)  simethicone 80 mg oral tablet: 1 tab(s) orally 3 times a day (after meals) (11 Oct 2018 08:39)  Tylenol 325 mg oral tablet: 2 tab(s) orally every 6 hours, As Needed (30 Oct 2018 14:39)  vitamin A: 1 tab(s) orally once a day (11 Oct 2018 08:38)  Vitamin B Complex: 1 tab(s) orally once a day (11 Oct 2018 08:38)  Vitamin C 500 mg oral tablet: 1 tab(s) orally once a day (11 Oct 2018 08:38)    ***VITAL SIGNS:  Vital Signs Last 24 Hrs  T(C): 36.6 (13 Jan 2019 00:00), Max: 36.9 (12 Jan 2019 04:00)  T(F): 97.8 (13 Jan 2019 00:00), Max: 98.4 (12 Jan 2019 04:00)  HR: 102 (13 Jan 2019 00:00) (83 - 118)  BP: 86/68 (13 Jan 2019 00:00) (76/48 - 108/57)  BP(mean): 73 (13 Jan 2019 00:00) (54 - 78)  RR: 19 (13 Jan 2019 00:00) (14 - 25)  SpO2: 100% (13 Jan 2019 00:00) (88% - 100%)    I/Os:   I&O's Detail    11 Jan 2019 07:01  -  12 Jan 2019 07:00  --------------------------------------------------------  IN:    IV PiggyBack: 200 mL    norepinephrine Infusion: 35.7 mL    Oral Fluid: 565 mL  Total IN: 800.7 mL    OUT:    Chest Tube: 195 mL    Drain: 25 mL  Total OUT: 220 mL    Total NET: 580.7 mL      12 Jan 2019 07:01  -  13 Jan 2019 00:15  --------------------------------------------------------  IN:    Oral Fluid: 480 mL    Other: 500 mL  Total IN: 980 mL    OUT:    Chest Tube: 60 mL    Drain: 15 mL    Other: 2000 mL  Total OUT: 2075 mL    Total NET: -1095 mL    =======================  MEDICATIONS  ===================  MEDICATIONS  (STANDING):  ALBUTerol    90 MICROgram(s) HFA Inhaler 1 Puff(s) Inhalation every 4 hours  aspirin  chewable 81 milliGRAM(s) Oral daily  docusate sodium Liquid 100 milliGRAM(s) Oral daily  heparin  Injectable 5000 Unit(s) SubCutaneous every 12 hours  midodrine 10 milliGRAM(s) Oral every 8 hours  norepinephrine Infusion 0.05 MICROgram(s)/kG/Min (5.606 mL/Hr) IV Continuous <Continuous>  pantoprazole  Injectable 40 milliGRAM(s) IV Push daily  piperacillin/tazobactam IVPB. 3.375 Gram(s) IV Intermittent every 12 hours    MEDICATIONS  (PRN):      ======================VENTILATOR SETTINGS  ==============  BIPAP standby  =================== PATIENT CARE ACCESS DEVICES ==========  Peripheral IV (+)  Central Venous Line	R/ Fem d/kirsten  Arterial Line	R/	Rad (+)  ======================= PHYSICAL EXAM===================  General:          Comfortable, Awake, alert, no distress  Neuro:             Moving all extremities to commands. No focal deficits	  HEENT:                           CHER   Respiratory:	Lungs clear on auscultation bilaterally with good aeration.                            No rales, rhonchi, no wheezing.                             Right david and pigtail tube sites - clean  CV:		 Regular rate and rhythm. Normal S1/S2. No murmurs  Abdomen:	Soft,  nontender, not-distended. Bowel sounds present    Skin:		No rash.  Extremities:	Warm, no cyanosis or edema.  Palpable pulses  ============================ LABS =======================                        12.0   7.81  )-----------( 135      ( 12 Jan 2019 14:15 )             39.7     01-12    138  |  98  |  21  ----------------------------<  93  3.4<L>   |  29  |  3.82<H>    Ca    9.0      12 Jan 2019 14:00  Phos  2.7     01-12  Mg     2.0     01-12    TPro  6.6  /  Alb  2.7<L>  /  TBili  0.5  /  DBili  x   /  AST  24  /  ALT  24  /  AlkPhos  159<H>  01-12    LIVER FUNCTIONS - ( 12 Jan 2019 14:00 )  Alb: 2.7 g/dL / Pro: 6.6 g/dL / ALK PHOS: 159 u/L / ALT: 24 u/L / AST: 24 u/L / GGT: x           ABG - ( 12 Jan 2019 14:00 )  pH, Arterial: 7.32  pH, Blood: x     /  pCO2: 61    /  pO2: 122   / HCO3: 28    / Base Excess: 4.7   /  SaO2: 98.5      BLOOD PERIPHERAL  01-09-19 --  --  --  PLEURAL FLUID  01-09-19 --  --    WBC^White Blood Cells  QNTY CELLS IN GRAM STAIN: FEW (2+)  NOS^No Organisms Seen  BRONCHIAL LAVAGE  01-08-19 --  --  --  BRONCHIAL LAVAGE  01-08-19 --  --  --  BLOOD ARTERIAL  01-07-19 --  --  --  ===================== IMAGING STUDIES ===================  Radiology personally reviewed.    < from: Xray Chest 1 View- PORTABLE-Routine (01.12.19 @ 07:15) >  EXAM:  Portable AP chest from 1/12/2019 at 0614. Compared to prior study from   1/11/2019.    IMPRESSION:  Pigtail drainage catheter overlies lower right hemithorax.  Persistent bilateral pleural reactions with adjacent nonspecific basilar   consolidative changes. Clear upper lungs. No pneumothorax.  Stable left chest wall SICD and cardiomegaly.  [place] midline.  Remainder of exam unchanged.    < end of copied text >     from: Transthoracic Echocardiogram (01.09.19 @ 16:47) >  CONCLUSIONS:< from: Transthoracic Echocardiogram (01.09.19 @ 16:47) >  Ejection Fraction (Visual Estimate): 15-20 %    1. Tethered mitral valve leaflets with normal opening.  Moderate eccentric mitral regurgitation.  2. Normal trileaflet aortic valve. Moderate aortic  regurgitation.  3. Severe left ventricular enlargement.  4. Severe global left ventricular systolic dysfunction.  5. Increased E/e'  is consistent with elevated left  ventricular filling pressure.  6. Normal right ventricular size and function.  *** Compared with echocardiogram of 10/12/2018, no  significant changes noted.    < end of copied text >    ====================ASSESSMENT AND PLAN ================  Pt is a 58 yr old  male who underwent a FB, R thoracotomy, decortication, chest wall reconstruction, lat flap with Dr Pickering on 10/11/18. He was admitted on 1/6/19 to [facility] with a R pleural effusion and respiratory failure,  A R PTC was placed there and he was intubated.  He still had a R SANDY drain in place.  He presented intubated and sedated on pressors. (07 Jan 2019 23:10). Pt is currently extubated but on / off BiPAP for respiratory acidosis    Issues: Acute resp failure-extubated 1/8/19 to BIPAP             Resp acidosis             Hypotension with sepsis/ distributive shock- on/ off Levophed drip  Acute on chronic systolic CHF ( EF 15-20 %), ICD in place  Infected right hemothorax - (+) Enterococcus faecalis in the past / now + MRSA empyema  E coli bacteriemia              ESRD on HD    intermittent agitation/mild dementia              severe deconditioning    ====================== NEUROLOGY=======================  Pain control with Tylenol IV / Toradol  Trial of OOB as tolerated  ==================== RESPIRATORY========================  Pt is on  2   L nasal canula / BIPAP at night and PRN daytime  Comfortable, no evidence of distress.  Incentive spirometry    Monitor chest tube output  Chest pigtail tube to suction / david to bulb suction	  Continuous pulse oximetry monitoring  Continue bronchodilators, pulmonary toilet  Head of bed elevation to 30-40 degrees  ====================CARDIOVASCULAR=====================  Continue hemodynamic monitoring/ telemetry  Not on any pressors/ titrate pressors for SBP>100, MAP >60-65  Continue cardiovascular / antihypertensive medications  ===================== RENAL ============================  Continue LR 30CC/hr      D/C IVF  Monitor I/Os, BUN/ Cr  and electrolytes  D/C Moore      Keep Moore for UO monitoring  BPH: Continue Flomax/ Finasteride  ==================== GASTROINTESTINAL===================  On regular/ tube feeds diet, tolerating well  Continue GI prophylaxis with Pepcid / Protonix  Continue Zofran / Reglan for nausea - PRN	  NPO  =======================    ENDOCRIN  =====================  Glycemic monitoring  F/S with coverage  ===================HEMATOLOGIC/ONCOLOGIC =============  Monitor chest tube output. No signs of active bleeding.   Follow CBC, coags  in AM  DVT prophylaxis with SCD, sc Heparin  ========================INFECTIOUS DISEASE===============  No signs of infection. Monitor for fever / leukocytosis.  All surgical incision / chest tube  sites look clean  D/C Moore    Pertinent clinical, laboratory, radiographic, hemodynamic, echocardiographic, respiratory data, microbiologic data and chart were reviewed and analyzed frequently throughout the course of the day and night. GI and DVT prophylaxis, glycemic control, head of bed elevation and skin care issues were addressed.  Patient seen, examined and plan discussed with CT Surgery / CTICU team during rounds.  Pt remains critically ill in imminent risk of  deterioration and requires very careful cardio- pulmonary monitoring and support.    I have spent        minutes of critical care time with this pt between      am/pm   and        am/ pm         minutes spent on total encounter; more than 50% of the visit was spent counseling and/or coordinating care by the attending physician.      KERLINE Faith MD                  ====================CARDIOVASCULAR=====================  Continue hemodynamic monitoring/ telemetry  Hypotension improved; off Levophed, on PO MIdodrine  ===================== RENAL ============================  ESRD on HD as per renal team ( done today)  Monitor I/Os, BUN/ Cr  and electrolytes  ==================== GASTROINTESTINAL===================  On PO renal dysphagia diet with aspiration precautions  Continue GI prophylaxis with Protonix   Zofran / Reglan for nausea - PRN	  =======================    ENDOCRIN  =====================  Glycemic monitoring  F/S with coverage  ===================HEMATOLOGIC/ONCOLOGIC =============  Monitor chest tube output. No signs of active bleeding.   Follow CBC, coags  in AM  DVT prophylaxis with SCD, sc Heparin  ========================INFECTIOUS DISEASE===============  All the cultures are negative during this admission but grew MRSA from pleural fluid and E.Coli in blood at [facility]. Continue Vanco by level and Zosyn   Monitor for fever / leukocytosis.  All surgical incision / chest tube  sites look clean  ID dr Chahal    When stable - will need CT scan of AP - with IV /PO contrast to look for abdominal source of E.Coli bacteremia     Pertinent clinical, laboratory, radiographic, hemodynamic, echocardiographic, respiratory data, microbiologic data and chart were reviewed and analyzed frequently throughout the course of the day and night. GI and DVT prophylaxis, glycemic control, head of bed elevation and skin care issues were addressed.  Patient seen, examined and plan discussed with CT Surgery Dr Wilkerson and Dr Pickering / CTICU team during rounds.  Pt remains critically ill in imminent risk of  deterioration and requires very careful cardio- pulmonary monitoring and support.    I have spent   60     minutes of critical care time with this pt between  8 am  and   11:55 pm         minutes spent on total encounter; more than 50% of the visit was spent counseling and/or coordinating care by the attending physician. CLAUDIA HAN          MRN-[id number]    HPI:  Pt is a 58 yr old  male who underwent a FB, R thoracotomy, decortication, chest wall reconstruction, lat flap with Dr Pickering on 10/11/18. He was admitted on 1/6/19 to [facility] with a R pleural effusion and respiratory failure,  A R PTC was placed there and he was intubated.  He still had a R SANDY drain in place.  He presented intubated and sedated on pressors. 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         Procedure: 10/11/18 Right thoracotomy, resection of portion of R 7th rib, evacuation of infected hemothorax, takedown of pleurocutaneous fistula  / R chest wall reconstruction with tunneled latissimus dorsi flap, covered by serratus anterior flap           Issues: Acute resp failure-extubated 1/8/19 to BIPAP             Resp acidosis             Hypotension with sepsis/ distributive shock- on/ off Levophed drip  Acute on chronic systolic CHF ( EF 10 %), ICD in place  Infected right hemothorax - (+) Enterococcus faecalis in the past / now + MRSA empyema  E coli bacteriemia              ESRD on HD    intermittent agitation/mild dementia              severe deconditioning    Interval/Overnight  Events/ ROS  Respiratory status required intermittent  BIPAP support due to resp acidosis on nasal cannula trial;  following of ABG’s with A-line monitoring, continuous pulse oximetry monitoring, Today ABG slightly improved, clinically pt looks better as well. HD was done at bedside  today 1/12/19  with 2 L fluid removal.   Restarted PO dysphagia diet as tolerated with aspiration precautions while pt is off BIPAP. Plan to observe pt's clinical status off BIPAP daytime and  place  back on BIPAP overnight as d/w dr Pickering today by phone.       PAST MEDICAL & SURGICAL HISTORY:  Smoking hx  Cardiomyopathy  Wound: right chest  ICD (implantable cardioverter-defibrillator) in place  OA (osteoarthritis)  HLD (hyperlipidemia)  BPH (benign prostatic hyperplasia)  Pleural effusion  HTN (hypertension)  ESRD (end stage renal disease)  H/O chest wound: right  S/P thoracotomy: FB, R thoracotomy, decortication, chest wall reconstruction, lat flap  History of wound infection: right chest wall - revision 5/18 and again in 7/18  History of implantable cardioverter-defibrillator (ICD) placement: pt unsure when placed  H/O bilateral hip replacements: 2008, 2009    Allergies  No Known Allergies    Home Medications:  aspirin 81 mg oral tablet: 1 tab(s) orally once a day  (30 Oct 2018 14:39)  Breo Ellipta 100 mcg-25 mcg/inh inhalation powder: 1 puff(s) inhaled once a day patient denies using it (11 Oct 2018 08:38)  Calcium 500: 1 tab(s) orally 2 times a day (11 Oct 2018 08:38)  docusate sodium 100 mg oral tablet: 1 tab(s) orally 2 times a day, As Needed (11 Oct 2018 08:39)  folic acid 1 mg oral tablet: 1 tab(s) orally once a day (11 Oct 2018 08:38)  Mag-Ox 400 oral tablet: 1 tab(s) orally once a day (11 Oct 2018 08:39)  Multiple Vitamins oral tablet: 1 tab(s) orally once a day  (30 Oct 2018 14:39)  Namenda 10 mg oral tablet: 1 tab(s) orally 2 times a day (11 Oct 2018 08:38)  Nephplex Rx oral tablet: 1 tab(s) orally once a day (11 Oct 2018 08:39)  nortriptyline 50 mg oral capsule: 1 cap(s) orally once a day (11 Oct 2018 08:39)  piperacillin-tazobactam 2 g-0.25 g intravenous injection: 1  intravenous every 12 hours until 11/7/18 (30 Oct 2018 16:27)  Renvela 800 mg oral tablet: 2 tab(s) orally every 8 hours (11 Oct 2018 08:39)  simethicone 80 mg oral tablet: 1 tab(s) orally 3 times a day (after meals) (11 Oct 2018 08:39)  Tylenol 325 mg oral tablet: 2 tab(s) orally every 6 hours, As Needed (30 Oct 2018 14:39)  vitamin A: 1 tab(s) orally once a day (11 Oct 2018 08:38)  Vitamin B Complex: 1 tab(s) orally once a day (11 Oct 2018 08:38)  Vitamin C 500 mg oral tablet: 1 tab(s) orally once a day (11 Oct 2018 08:38)    ***VITAL SIGNS:  Vital Signs Last 24 Hrs  T(C): 36.6 (13 Jan 2019 00:00), Max: 36.9 (12 Jan 2019 04:00)  T(F): 97.8 (13 Jan 2019 00:00), Max: 98.4 (12 Jan 2019 04:00)  HR: 102 (13 Jan 2019 00:00) (83 - 118)  BP: 86/68 (13 Jan 2019 00:00) (76/48 - 108/57)  BP(mean): 73 (13 Jan 2019 00:00) (54 - 78)  RR: 19 (13 Jan 2019 00:00) (14 - 25)  SpO2: 100% (13 Jan 2019 00:00) (88% - 100%)    I/Os:   I&O's Detail    11 Jan 2019 07:01  -  12 Jan 2019 07:00  --------------------------------------------------------  IN:    IV PiggyBack: 200 mL    norepinephrine Infusion: 35.7 mL    Oral Fluid: 565 mL  Total IN: 800.7 mL    OUT:    Chest Tube: 195 mL    Drain: 25 mL  Total OUT: 220 mL    Total NET: 580.7 mL      12 Jan 2019 07:01  -  13 Jan 2019 00:15  --------------------------------------------------------  IN:    Oral Fluid: 480 mL    Other: 500 mL  Total IN: 980 mL    OUT:    Chest Tube: 60 mL    Drain: 15 mL    Other: 2000 mL  Total OUT: 2075 mL    Total NET: -1095 mL    =======================  MEDICATIONS  ===================  MEDICATIONS  (STANDING):  ALBUTerol    90 MICROgram(s) HFA Inhaler 1 Puff(s) Inhalation every 4 hours  aspirin  chewable 81 milliGRAM(s) Oral daily  docusate sodium Liquid 100 milliGRAM(s) Oral daily  heparin  Injectable 5000 Unit(s) SubCutaneous every 12 hours  midodrine 10 milliGRAM(s) Oral every 8 hours  norepinephrine Infusion 0.05 MICROgram(s)/kG/Min (5.606 mL/Hr) IV Continuous <Continuous>  pantoprazole  Injectable 40 milliGRAM(s) IV Push daily  piperacillin/tazobactam IVPB. 3.375 Gram(s) IV Intermittent every 12 hours    MEDICATIONS  (PRN):      ======================VENTILATOR SETTINGS  ==============  BIPAP standby  =================== PATIENT CARE ACCESS DEVICES ==========  Peripheral IV (+)  Central Venous Line	R/ Fem d/kirsten  Arterial Line	R/	Rad (+)  ======================= PHYSICAL EXAM===================  General:          Comfortable, Awake, alert, no distress  Neuro:             Moving all extremities to commands. No focal deficits	  HEENT:                           CHER   Respiratory:	Lungs clear on auscultation bilaterally with good aeration.                            No rales, rhonchi, no wheezing.                             Right david and pigtail tube sites - clean  CV:		 Regular rate and rhythm. Normal S1/S2. No murmurs  Abdomen:	Soft,  nontender, not-distended. Bowel sounds present    Skin:		No rash.  Extremities:	Warm, no cyanosis or edema.  Palpable pulses  ============================ LABS =======================                        12.0   7.81  )-----------( 135      ( 12 Jan 2019 14:15 )             39.7     01-12    138        |  98  |  21  ----------------------------<  93  3.4<L>   |  29  |  3.82<H>    Ca    9.0      12 Jan 2019 14:00  Phos  2.7     01-12  Mg     2.0     01-12    TPro  6.6  /  Alb  2.7<L>  /  TBili  0.5  /  DBili  x   /  AST  24  /  ALT  24  /  AlkPhos  159<H>  01-12    LIVER FUNCTIONS - ( 12 Jan 2019 14:00 )  Alb: 2.7 g/dL / Pro: 6.6 g/dL / ALK PHOS: 159 u/L / ALT: 24 u/L / AST: 24 u/L / GGT: x           ABG - ( 12 Jan 2019 14:00 )  pH, Arterial: 7.32  pH, Blood: x     /  pCO2: 61    /  pO2: 122   / HCO3: 28    / Base Excess: 4.7   /  SaO2: 98.5      BLOOD PERIPHERAL  01-09-19 --  --  --  PLEURAL FLUID  01-09-19 --  --    WBC^White Blood Cells  QNTY CELLS IN GRAM STAIN: FEW (2+)  NOS^No Organisms Seen  BRONCHIAL LAVAGE  01-08-19 --  --  --  BRONCHIAL LAVAGE  01-08-19 --  --  --  BLOOD ARTERIAL  01-07-19 --  --  --  ===================== IMAGING STUDIES ===================  Radiology personally reviewed.    < from: Xray Chest 1 View- PORTABLE-Routine (01.12.19 @ 07:15) >  EXAM:  Portable AP chest from 1/12/2019 at 0614. Compared to prior study from   1/11/2019.    IMPRESSION:  Pigtail drainage catheter overlies lower right hemithorax.  Persistent bilateral pleural reactions with adjacent nonspecific basilar   consolidative changes. Clear upper lungs. No pneumothorax.  Stable left chest wall SICD and cardiomegaly.  Tachea midline.  Remainder of exam unchanged.    < end of copied text >     from: Transthoracic Echocardiogram (01.09.19 @ 16:47) >  CONCLUSIONS:< from: Transthoracic Echocardiogram (01.09.19 @ 16:47) >  Ejection Fraction (Visual Estimate): 15-20 %    1. Tethered mitral valve leaflets with normal opening.  Moderate eccentric mitral regurgitation.  2. Normal trileaflet aortic valve. Moderate aortic  regurgitation.  3. Severe left ventricular enlargement.  4. Severe global left ventricular systolic dysfunction.  5. Increased E/e'  is consistent with elevated left  ventricular filling pressure.  6. Normal right ventricular size and function.  *** Compared with echocardiogram of 10/12/2018, no  significant changes noted.    < end of copied text >    ====================ASSESSMENT AND PLAN ================  Pt is a 58 yr old  male who underwent a FB, R thoracotomy, decortication, chest wall reconstruction, lat flap with Dr Pickering on 10/11/18. He was admitted on 1/6/19 to [facility] with a R pleural effusion and respiratory failure,  A R PTC was placed there and he was intubated.  He still had a R SANDY drain in place.  He presented intubated and sedated on pressors. (07 Jan 2019 23:10). Pt is currently extubated but on / off BiPAP for respiratory acidosis    Issues: Acute resp failure-extubated 1/8/19 to BIPAP             Resp acidosis             Hypotension with sepsis/ distributive shock- on/ off Levophed drip  Acute on chronic systolic CHF ( EF 15-20 %), ICD in place  Infected right hemothorax - (+) Enterococcus faecalis in the past / now + MRSA empyema  E coli bacteriemia              ESRD on HD    intermittent agitation/mild dementia              severe deconditioning    ====================== NEUROLOGY=======================  Pain control with Tylenol IV / Toradol  Trial of OOB as tolerated  ==================== RESPIRATORY========================  Pt is on  2   L nasal canula / BIPAP at night and PRN daytime  Comfortable, no evidence of distress.  Incentive spirometry    Monitor chest tube output  Chest pigtail tube to suction / david to bulb suction	  Continuous pulse oximetry monitoring  Continue bronchodilators, pulmonary toilet  Head of bed elevation to 30-40 degrees  ====================CARDIOVASCULAR=====================  Continue hemodynamic monitoring/ telemetry  Hypotension improved; off Levophed, on PO MIdodrine  ===================== RENAL ============================  ESRD on HD as per renal team ( done 1/12/19 )  Monitor I/Os, BUN/ Cr  and electrolytes  ==================== GASTROINTESTINAL===================  On PO renal dysphagia diet with aspiration precautions  Continue GI prophylaxis with Protonix   Zofran / Reglan for nausea - PRN	  =======================    ENDOCRIN  =====================  Glycemic monitoring  F/S with coverage    ===================HEMATOLOGIC/ONCOLOGIC =============  Monitor chest tube output. No signs of active bleeding.   Follow CBC, coags  in AM  DVT prophylaxis with SCD, sc Heparin  ========================INFECTIOUS DISEASE===============  All the cultures are negative during this admission but grew MRSA from pleural fluid and E.Coli in blood at [facility]. Continue Vanco by level and Zosyn   Monitor for fever / leukocytosis.  All surgical incision / chest tube  sites look clean  ID dr Chahal    When stable - will need CT scan of AP - with IV /PO contrast to look for abdominal source of E.Coli bacteremia     Pertinent clinical, laboratory, radiographic, hemodynamic, echocardiographic, respiratory data, microbiologic data and chart were reviewed and analyzed frequently throughout the course of the day and night. GI and DVT prophylaxis, glycemic control, head of bed elevation and skin care issues were addressed.  Patient seen, examined and plan discussed with CT Surgery / CTICU team during rounds.  Pt remains critically ill in imminent risk of  deterioration and requires very careful cardio- pulmonary monitoring and support.    I have spent        minutes of critical care time with this pt between 12 am   and  9  am         minutes spent on total encounter; more than 50% of the visit was spent counseling and/or coordinating care by the attending physician.      KERLINE Faith MD CLAUDIA HAN          MRN-[id number]    HPI:  Pt is a 58 yr old  male who underwent a FB, R thoracotomy, decortication, chest wall reconstruction, lat flap with Dr Pickering on 10/11/18. He was admitted on 1/6/19 to [facility] with a R pleural effusion and respiratory failure,  A R PTC was placed there and he was intubated.  He still had a R SANDY drain in place.  He presented intubated and sedated on pressors. 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         Procedure: 10/11/18 Right thoracotomy, resection of portion of R 7th rib, evacuation of infected hemothorax, takedown of pleurocutaneous fistula  / R chest wall reconstruction with tunneled latissimus dorsi flap, covered by serratus anterior flap           Issues: Acute resp failure-extubated 1/8/19 to BIPAP             Resp acidosis             Hypotension with sepsis/ distributive shock- on/ off Levophed drip  Acute on chronic systolic CHF ( EF 10 %), ICD in place  Infected right hemothorax - (+) Enterococcus faecalis in the past / now + MRSA empyema  E coli bacteriemia              ESRD on HD    intermittent agitation/mild dementia              severe deconditioning    Interval/Overnight  Events/ ROS  Respiratory status required intermittent  BIPAP support due to resp acidosis on nasal cannula trial;  following of ABG’s with A-line monitoring, continuous pulse oximetry monitoring, Today ABG slightly improved, clinically pt looks better as well. HD was done at bedside  today 1/12/19  with 2 L fluid removal.   Restarted PO dysphagia diet as tolerated with aspiration precautions while pt is off BIPAP. Plan to observe pt's clinical status off BIPAP daytime and  place  back on BIPAP overnight as d/w dr Pickering today by phone.       PAST MEDICAL & SURGICAL HISTORY:  Smoking hx  Cardiomyopathy  Wound: right chest  ICD (implantable cardioverter-defibrillator) in place  OA (osteoarthritis)  HLD (hyperlipidemia)  BPH (benign prostatic hyperplasia)  Pleural effusion  HTN (hypertension)  ESRD (end stage renal disease)  H/O chest wound: right  S/P thoracotomy: FB, R thoracotomy, decortication, chest wall reconstruction, lat flap  History of wound infection: right chest wall - revision 5/18 and again in 7/18  History of implantable cardioverter-defibrillator (ICD) placement: pt unsure when placed  H/O bilateral hip replacements: 2008, 2009    Allergies  No Known Allergies    Home Medications:  aspirin 81 mg oral tablet: 1 tab(s) orally once a day  (30 Oct 2018 14:39)  Breo Ellipta 100 mcg-25 mcg/inh inhalation powder: 1 puff(s) inhaled once a day patient denies using it (11 Oct 2018 08:38)  Calcium 500: 1 tab(s) orally 2 times a day (11 Oct 2018 08:38)  docusate sodium 100 mg oral tablet: 1 tab(s) orally 2 times a day, As Needed (11 Oct 2018 08:39)  folic acid 1 mg oral tablet: 1 tab(s) orally once a day (11 Oct 2018 08:38)  Mag-Ox 400 oral tablet: 1 tab(s) orally once a day (11 Oct 2018 08:39)  Multiple Vitamins oral tablet: 1 tab(s) orally once a day  (30 Oct 2018 14:39)  Namenda 10 mg oral tablet: 1 tab(s) orally 2 times a day (11 Oct 2018 08:38)  Nephplex Rx oral tablet: 1 tab(s) orally once a day (11 Oct 2018 08:39)  nortriptyline 50 mg oral capsule: 1 cap(s) orally once a day (11 Oct 2018 08:39)  piperacillin-tazobactam 2 g-0.25 g intravenous injection: 1  intravenous every 12 hours until 11/7/18 (30 Oct 2018 16:27)  Renvela 800 mg oral tablet: 2 tab(s) orally every 8 hours (11 Oct 2018 08:39)  simethicone 80 mg oral tablet: 1 tab(s) orally 3 times a day (after meals) (11 Oct 2018 08:39)  Tylenol 325 mg oral tablet: 2 tab(s) orally every 6 hours, As Needed (30 Oct 2018 14:39)  vitamin A: 1 tab(s) orally once a day (11 Oct 2018 08:38)  Vitamin B Complex: 1 tab(s) orally once a day (11 Oct 2018 08:38)  Vitamin C 500 mg oral tablet: 1 tab(s) orally once a day (11 Oct 2018 08:38)    ***VITAL SIGNS:  Vital Signs Last 24 Hrs  T(C): 36.6 (13 Jan 2019 00:00), Max: 36.9 (12 Jan 2019 04:00)  T(F): 97.8 (13 Jan 2019 00:00), Max: 98.4 (12 Jan 2019 04:00)  HR: 102 (13 Jan 2019 00:00) (83 - 118)  BP: 86/68 (13 Jan 2019 00:00) (76/48 - 108/57)  BP(mean): 73 (13 Jan 2019 00:00) (54 - 78)  RR: 19 (13 Jan 2019 00:00) (14 - 25)  SpO2: 100% (13 Jan 2019 00:00) (88% - 100%)    I/Os:   I&O's Detail    11 Jan 2019 07:01  -  12 Jan 2019 07:00  --------------------------------------------------------  IN:    IV PiggyBack: 200 mL    norepinephrine Infusion: 35.7 mL    Oral Fluid: 565 mL  Total IN: 800.7 mL    OUT:    Chest Tube: 195 mL    Drain: 25 mL  Total OUT: 220 mL    Total NET: 580.7 mL      12 Jan 2019 07:01  -  13 Jan 2019 00:15  --------------------------------------------------------  IN:    Oral Fluid: 480 mL    Other: 500 mL  Total IN: 980 mL    OUT:    Chest Tube: 60 mL    Drain: 15 mL    Other: 2000 mL  Total OUT: 2075 mL    Total NET: -1095 mL    =======================  MEDICATIONS  ===================  MEDICATIONS  (STANDING):  ALBUTerol    90 MICROgram(s) HFA Inhaler 1 Puff(s) Inhalation every 4 hours  aspirin  chewable 81 milliGRAM(s) Oral daily  docusate sodium Liquid 100 milliGRAM(s) Oral daily  heparin  Injectable 5000 Unit(s) SubCutaneous every 12 hours  midodrine 10 milliGRAM(s) Oral every 8 hours  norepinephrine Infusion 0.05 MICROgram(s)/kG/Min (5.606 mL/Hr) IV Continuous <Continuous>  pantoprazole  Injectable 40 milliGRAM(s) IV Push daily  piperacillin/tazobactam IVPB. 3.375 Gram(s) IV Intermittent every 12 hours    MEDICATIONS  (PRN):      ======================VENTILATOR SETTINGS  ==============  BIPAP standby  =================== PATIENT CARE ACCESS DEVICES ==========  Peripheral IV (+)  Central Venous Line	R/ Fem d/kirsten  Arterial Line	R/	Rad (+)  ======================= PHYSICAL EXAM===================  General:          Comfortable, Awake, alert, no distress  Neuro:             Moving all extremities to commands. No focal deficits	  HEENT:                           CHER   Respiratory:	Lungs clear on auscultation bilaterally with good aeration.                            No rales, rhonchi, no wheezing.                             Right david and pigtail tube sites - clean  CV:		 Regular rate and rhythm. Normal S1/S2. No murmurs  Abdomen:	Soft,  nontender, not-distended. Bowel sounds present    Skin:		No rash.  Extremities:	Warm, no cyanosis or edema.  Palpable pulses  ============================ LABS =======================                          11.8   8.29  )-----------( 157      ( 13 Jan 2019 02:00 )             39.8                       12.0   7.81  )-----------( 135      ( 12 Jan 2019 14:15 )             39.7     01-13    139  |  100  |  31<H>  ----------------------------<  102<H>  4.1   |  28  |  4.80<H>    Ca    9.3      13 Jan 2019 02:00  Phos  4.3     01-13  Mg     2.1     01-13    TPro  6.6  /  Alb  2.5<L>  /  TBili  0.4  /  DBili  x   /  AST  26  /  ALT  26  /  AlkPhos  200<H>  01-13 01-12    138        |  98  |  21  ----------------------------<  93  3.4<L>   |  29  |  3.82<H>    Ca    9.0      12 Jan 2019 14:00  Phos  2.7     01-12  Mg     2.0     01-12    TPro  6.6  /  Alb  2.7<L>  /  TBili  0.5  /  DBili  x   /  AST  24  /  ALT  24  /  AlkPhos  159<H>  01-12    LIVER FUNCTIONS - ( 12 Jan 2019 14:00 )  Alb: 2.7 g/dL / Pro: 6.6 g/dL / ALK PHOS: 159 u/L / ALT: 24 u/L / AST: 24 u/L / GGT: x           ABG - ( 13 Jan 2019 02:00 )  pH, Arterial: 7.24  pH, Blood: x     /  pCO2: 74    /  pO2: 93    / HCO3: 26    / Base Excess: 3.5   /  SaO2: 96.3      ABG - ( 12 Jan 2019 14:00 )  pH, Arterial: 7.32  pH, Blood: x     /  pCO2: 61    /  pO2: 122   / HCO3: 28    / Base Excess: 4.7   /  SaO2: 98.5      BLOOD PERIPHERAL  01-09-19 --  --  --  PLEURAL FLUID  01-09-19 --  --    WBC^White Blood Cells  QNTY CELLS IN GRAM STAIN: FEW (2+)  NOS^No Organisms Seen  BRONCHIAL LAVAGE  01-08-19 --  --  --  BRONCHIAL LAVAGE  01-08-19 --  --  --  BLOOD ARTERIAL  01-07-19 --  --  --  ===================== IMAGING STUDIES ===================  Radiology personally reviewed.    < from: Xray Chest 1 View- PORTABLE-Routine (01.12.19 @ 07:15) >  EXAM:  Portable AP chest from 1/12/2019 at 0614. Compared to prior study from   1/11/2019.    IMPRESSION:  Pigtail drainage catheter overlies lower right hemithorax.  Persistent bilateral pleural reactions with adjacent nonspecific basilar   consolidative changes. Clear upper lungs. No pneumothorax.  Stable left chest wall SICD and cardiomegaly.  Tachea midline.  Remainder of exam unchanged.    < end of copied text >     from: Transthoracic Echocardiogram (01.09.19 @ 16:47) >  CONCLUSIONS:< from: Transthoracic Echocardiogram (01.09.19 @ 16:47) >  Ejection Fraction (Visual Estimate): 15-20 %    1. Tethered mitral valve leaflets with normal opening.  Moderate eccentric mitral regurgitation.  2. Normal trileaflet aortic valve. Moderate aortic  regurgitation.  3. Severe left ventricular enlargement.  4. Severe global left ventricular systolic dysfunction.  5. Increased E/e'  is consistent with elevated left  ventricular filling pressure.  6. Normal right ventricular size and function.  *** Compared with echocardiogram of 10/12/2018, no  significant changes noted.    < end of copied text >    ====================ASSESSMENT AND PLAN ================  Pt is a 58 yr old  male who underwent a FB, R thoracotomy, decortication, chest wall reconstruction, lat flap with Dr Pickering on 10/11/18. He was admitted on 1/6/19 to [facility] with a R pleural effusion and respiratory failure,  A R PTC was placed there and he was intubated.  He still had a R SANDY drain in place.  He presented intubated and sedated on pressors. (07 Jan 2019 23:10). Pt is currently extubated but on / off BiPAP for respiratory acidosis    Issues: Acute resp failure-extubated 1/8/19 to BIPAP             Resp acidosis             Hypotension with sepsis/ distributive shock- on/ off Levophed drip  Acute on chronic systolic CHF ( EF 15-20 %), ICD in place  Infected right hemothorax - (+) Enterococcus faecalis in the past / now + MRSA empyema  E coli bacteriemia              ESRD on HD    intermittent agitation/mild dementia              severe deconditioning    ====================== NEUROLOGY=======================  Pain control with Tylenol IV / Toradol  Trial of OOB as tolerated  ==================== RESPIRATORY========================  Pt is on  2   L nasal canula / BIPAP at night and PRN daytime  Comfortable, no evidence of distress.  Incentive spirometry    Monitor chest tube output  Chest pigtail tube to suction / david to bulb suction	  Continuous pulse oximetry monitoring  Continue bronchodilators, pulmonary toilet  Head of bed elevation to 30-40 degrees  ====================CARDIOVASCULAR=====================  Continue hemodynamic monitoring/ telemetry  Hypotension improved; off Levophed, on PO MIdodrine  ===================== RENAL ============================  ESRD on HD as per renal team ( done 1/12/19 )  Monitor I/Os, BUN/ Cr  and electrolytes  ==================== GASTROINTESTINAL===================  On PO renal dysphagia diet with aspiration precautions  Continue GI prophylaxis with Protonix   Zofran / Reglan for nausea - PRN	  =======================    ENDOCRIN  =====================  Glycemic monitoring  F/S with coverage    ===================HEMATOLOGIC/ONCOLOGIC =============  Monitor chest tube output. No signs of active bleeding.   Follow CBC, coags  in AM  DVT prophylaxis with SCD, sc Heparin  ========================INFECTIOUS DISEASE===============  All the cultures are negative during this admission but grew MRSA from pleural fluid and E.Coli in blood at [facility]. Continue Vanco by level and Zosyn   Monitor for fever / leukocytosis.  All surgical incision / chest tube  sites look clean  ID dr Chahal    When stable - will need CT scan of AP - with IV /PO contrast to look for abdominal source of E.Coli bacteremia     Pertinent clinical, laboratory, radiographic, hemodynamic, echocardiographic, respiratory data, microbiologic data and chart were reviewed and analyzed frequently throughout the course of the day and night. GI and DVT prophylaxis, glycemic control, head of bed elevation and skin care issues were addressed.  Patient seen, examined and plan discussed with CT Surgery / CTICU team during rounds.  Pt remains critically ill in imminent risk of  deterioration and requires very careful cardio- pulmonary monitoring and support.    I have spent  45      minutes of critical care time with this pt between 12 am   and  9  am         minutes spent on total encounter; more than 50% of the visit was spent counseling and/or coordinating care by the attending physician.      KERLINE Faith MD CLAUDIA HAN          MRN-[id number]    HPI:  Pt is a 58 yr old  male who underwent a FB, R thoracotomy, decortication, chest wall reconstruction, lat flap with Dr Pickering on 10/11/18. He was admitted on 1/6/19 to [facility] with a R pleural effusion and respiratory failure,  A R PTC was placed there and he was intubated.  He still had a R SANDY drain in place.  He presented intubated and sedated on pressors. 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         Procedure: 10/11/18 Right thoracotomy, resection of portion of R 7th rib, evacuation of infected hemothorax, takedown of pleurocutaneous fistula  / R chest wall reconstruction with tunneled latissimus dorsi flap, covered by serratus anterior flap           Issues: Acute resp failure-extubated 1/8/19 to BIPAP             Resp acidosis             Hypotension with sepsis/ distributive shock- on/ off Levophed drip  Acute on chronic systolic CHF ( EF 10 %), ICD in place  Infected right hemothorax - (+) Enterococcus faecalis in the past / now + MRSA empyema  E coli bacteriemia              ESRD on HD    intermittent agitation/mild dementia              severe deconditioning    Interval/Overnight  Events/ ROS  Respiratory status required intermittent  BIPAP support due to resp acidosis on nasal cannula trial;  following of ABG’s with A-line monitoring, continuous pulse oximetry monitoring, Today ABG slightly improved, clinically pt looks better as well. HD was done at bedside  today 1/12/19  with 2 L fluid removal.   Restarted PO dysphagia diet as tolerated with aspiration precautions while pt is off BIPAP. Plan to observe pt's clinical status off BIPAP daytime and  place  back on BIPAP overnight as d/w dr Pickering today by phone.       PAST MEDICAL & SURGICAL HISTORY:  Smoking hx  Cardiomyopathy  Wound: right chest  ICD (implantable cardioverter-defibrillator) in place  OA (osteoarthritis)  HLD (hyperlipidemia)  BPH (benign prostatic hyperplasia)  Pleural effusion  HTN (hypertension)  ESRD (end stage renal disease)  H/O chest wound: right  S/P thoracotomy: FB, R thoracotomy, decortication, chest wall reconstruction, lat flap  History of wound infection: right chest wall - revision 5/18 and again in 7/18  History of implantable cardioverter-defibrillator (ICD) placement: pt unsure when placed  H/O bilateral hip replacements: 2008, 2009    Allergies  No Known Allergies    Home Medications:  aspirin 81 mg oral tablet: 1 tab(s) orally once a day  (30 Oct 2018 14:39)  Breo Ellipta 100 mcg-25 mcg/inh inhalation powder: 1 puff(s) inhaled once a day patient denies using it (11 Oct 2018 08:38)  Calcium 500: 1 tab(s) orally 2 times a day (11 Oct 2018 08:38)  docusate sodium 100 mg oral tablet: 1 tab(s) orally 2 times a day, As Needed (11 Oct 2018 08:39)  folic acid 1 mg oral tablet: 1 tab(s) orally once a day (11 Oct 2018 08:38)  Mag-Ox 400 oral tablet: 1 tab(s) orally once a day (11 Oct 2018 08:39)  Multiple Vitamins oral tablet: 1 tab(s) orally once a day  (30 Oct 2018 14:39)  Namenda 10 mg oral tablet: 1 tab(s) orally 2 times a day (11 Oct 2018 08:38)  Nephplex Rx oral tablet: 1 tab(s) orally once a day (11 Oct 2018 08:39)  nortriptyline 50 mg oral capsule: 1 cap(s) orally once a day (11 Oct 2018 08:39)  piperacillin-tazobactam 2 g-0.25 g intravenous injection: 1  intravenous every 12 hours until 11/7/18 (30 Oct 2018 16:27)  Renvela 800 mg oral tablet: 2 tab(s) orally every 8 hours (11 Oct 2018 08:39)  simethicone 80 mg oral tablet: 1 tab(s) orally 3 times a day (after meals) (11 Oct 2018 08:39)  Tylenol 325 mg oral tablet: 2 tab(s) orally every 6 hours, As Needed (30 Oct 2018 14:39)  vitamin A: 1 tab(s) orally once a day (11 Oct 2018 08:38)  Vitamin B Complex: 1 tab(s) orally once a day (11 Oct 2018 08:38)  Vitamin C 500 mg oral tablet: 1 tab(s) orally once a day (11 Oct 2018 08:38)    ***VITAL SIGNS:  Vital Signs Last 24 Hrs  T(C): 36.6 (13 Jan 2019 00:00), Max: 36.9 (12 Jan 2019 04:00)  T(F): 97.8 (13 Jan 2019 00:00), Max: 98.4 (12 Jan 2019 04:00)  HR: 102 (13 Jan 2019 00:00) (83 - 118)  BP: 86/68 (13 Jan 2019 00:00) (76/48 - 108/57)  BP(mean): 73 (13 Jan 2019 00:00) (54 - 78)  RR: 19 (13 Jan 2019 00:00) (14 - 25)  SpO2: 100% (13 Jan 2019 00:00) (88% - 100%)    I/Os:   I&O's Detail    11 Jan 2019 07:01  -  12 Jan 2019 07:00  --------------------------------------------------------  IN:    IV PiggyBack: 200 mL    norepinephrine Infusion: 35.7 mL    Oral Fluid: 565 mL  Total IN: 800.7 mL    OUT:    Chest Tube: 195 mL    Drain: 25 mL  Total OUT: 220 mL    Total NET: 580.7 mL      12 Jan 2019 07:01  -  13 Jan 2019 00:15  --------------------------------------------------------  IN:    Oral Fluid: 480 mL    Other: 500 mL  Total IN: 980 mL    OUT:    Chest Tube: 60 mL    Drain: 15 mL    Other: 2000 mL  Total OUT: 2075 mL    Total NET: -1095 mL    =======================  MEDICATIONS  ===================  MEDICATIONS  (STANDING):  ALBUTerol    90 MICROgram(s) HFA Inhaler 1 Puff(s) Inhalation every 4 hours  aspirin  chewable 81 milliGRAM(s) Oral daily  docusate sodium Liquid 100 milliGRAM(s) Oral daily  heparin  Injectable 5000 Unit(s) SubCutaneous every 12 hours  midodrine 10 milliGRAM(s) Oral every 8 hours  norepinephrine Infusion 0.05 MICROgram(s)/kG/Min (5.606 mL/Hr) IV Continuous <Continuous>  pantoprazole  Injectable 40 milliGRAM(s) IV Push daily  piperacillin/tazobactam IVPB. 3.375 Gram(s) IV Intermittent every 12 hours    MEDICATIONS  (PRN):      ======================VENTILATOR SETTINGS  ==============  BIPAP standby  =================== PATIENT CARE ACCESS DEVICES ==========  Peripheral IV (+)  Central Venous Line	R/ Fem d/kirsten  Arterial Line	R/	Rad (+)  ======================= PHYSICAL EXAM===================  General:          Comfortable, Awake, alert, no distress  Neuro:             Moving all extremities to commands. No focal deficits	  HEENT:                           CHER   Respiratory:	Lungs clear on auscultation bilaterally with good aeration.                            No rales, rhonchi, no wheezing.                             Right david and pigtail tube sites - clean  CV:		 Regular rate and rhythm. Normal S1/S2. No murmurs  Abdomen:	Soft,  nontender, not-distended. Bowel sounds present    Skin:		No rash.  Extremities:	Warm, no cyanosis or edema.  Palpable pulses  ============================ LABS =======================                          11.8   8.29  )-----------( 157      ( 13 Jan 2019 02:00 )             39.8                       12.0   7.81  )-----------( 135      ( 12 Jan 2019 14:15 )             39.7     01-13    139  |  100  |  31<H>  ----------------------------<  102<H>  4.1   |  28  |  4.80<H>    Ca    9.3      13 Jan 2019 02:00  Phos  4.3     01-13  Mg     2.1     01-13    TPro  6.6  /  Alb  2.5<L>  /  TBili  0.4  /  DBili  x   /  AST  26  /  ALT  26  /  AlkPhos  200<H>  01-13 01-12    138        |  98  |  21  ----------------------------<  93  3.4<L>   |  29  |  3.82<H>    Ca    9.0      12 Jan 2019 14:00  Phos  2.7     01-12  Mg     2.0     01-12    TPro  6.6  /  Alb  2.7<L>  /  TBili  0.5  /  DBili  x   /  AST  24  /  ALT  24  /  AlkPhos  159<H>  01-12    LIVER FUNCTIONS - ( 12 Jan 2019 14:00 )  Alb: 2.7 g/dL / Pro: 6.6 g/dL / ALK PHOS: 159 u/L / ALT: 24 u/L / AST: 24 u/L / GGT: x           ABG - ( 13 Jan 2019 02:00 )  pH, Arterial: 7.24  pH, Blood: x     /  pCO2: 74    /  pO2: 93    / HCO3: 26    / Base Excess: 3.5   /  SaO2: 96.3      ABG - ( 12 Jan 2019 14:00 )  pH, Arterial: 7.32  pH, Blood: x     /  pCO2: 61    /  pO2: 122   / HCO3: 28    / Base Excess: 4.7   /  SaO2: 98.5      BLOOD PERIPHERAL  01-09-19 --  --  --  PLEURAL FLUID  01-09-19 --  --    WBC^White Blood Cells  QNTY CELLS IN GRAM STAIN: FEW (2+)  NOS^No Organisms Seen  BRONCHIAL LAVAGE  01-08-19 --  --  --  BRONCHIAL LAVAGE  01-08-19 --  --  --  BLOOD ARTERIAL  01-07-19 --  --  --  ===================== IMAGING STUDIES ===================  Radiology personally reviewed.    < from: Xray Chest 1 View- PORTABLE-Routine (01.12.19 @ 07:15) >  EXAM:  Portable AP chest from 1/12/2019 at 0614. Compared to prior study from   1/11/2019.    IMPRESSION:  Pigtail drainage catheter overlies lower right hemithorax.  Persistent bilateral pleural reactions with adjacent nonspecific basilar   consolidative changes. Clear upper lungs. No pneumothorax.  Stable left chest wall SICD and cardiomegaly.  Tachea midline.  Remainder of exam unchanged.    < end of copied text >     from: Transthoracic Echocardiogram (01.09.19 @ 16:47) >  CONCLUSIONS:< from: Transthoracic Echocardiogram (01.09.19 @ 16:47) >  Ejection Fraction (Visual Estimate): 15-20 %    1. Tethered mitral valve leaflets with normal opening.  Moderate eccentric mitral regurgitation.  2. Normal trileaflet aortic valve. Moderate aortic  regurgitation.  3. Severe left ventricular enlargement.  4. Severe global left ventricular systolic dysfunction.  5. Increased E/e'  is consistent with elevated left  ventricular filling pressure.  6. Normal right ventricular size and function.  *** Compared with echocardiogram of 10/12/2018, no  significant changes noted.    < end of copied text >    ====================ASSESSMENT AND PLAN ================  Pt is a 58 yr old  male who underwent a FB, R thoracotomy, decortication, chest wall reconstruction, lat flap with Dr Pickering on 10/11/18. He was admitted on 1/6/19 to [facility] with a R pleural effusion and respiratory failure,  A R PTC was placed there and he was intubated.  He still had a R SANDY drain in place.  He presented intubated and sedated on pressors. (07 Jan 2019 23:10). Pt is currently extubated but on / off BiPAP for respiratory acidosis    Issues: Acute resp failure-extubated 1/8/19 to BIPAP             Resp acidosis             Hypotension with sepsis/ distributive shock- on/ off Levophed drip  Acute on chronic systolic CHF ( EF 15-20 %), ICD in place  Infected right hemothorax - (+) Enterococcus faecalis in the past / now + MRSA empyema  E coli bacteriemia              ESRD on HD    intermittent agitation/mild dementia              severe deconditioning    ====================== NEUROLOGY=======================  Pain control with Tylenol IV / Toradol  Trial of OOB as tolerated  ==================== RESPIRATORY========================  Pt is on  2   L nasal canula / BIPAP at night and PRN daytime  Comfortable, no evidence of distress.  Incentive spirometry    Monitor chest tube output  Chest pigtail tube to suction / david to bulb suction	  Continuous pulse oximetry monitoring  Continue bronchodilators, pulmonary toilet  Head of bed elevation to 30-40 degrees  ====================CARDIOVASCULAR=====================  Continue hemodynamic monitoring/ telemetry  Hypotension improved; off Levophed, on PO MIdodrine  ===================== RENAL ============================  ESRD on HD as per renal team ( done 1/12/19 )  Monitor I/Os, BUN/ Cr  and electrolytes  ==================== GASTROINTESTINAL===================  On PO renal dysphagia diet with aspiration precautions  Continue GI prophylaxis with Protonix   Zofran / Reglan for nausea - PRN	  =======================    ENDOCRIN  =====================  Glycemic monitoring  F/S with coverage    ===================HEMATOLOGIC/ONCOLOGIC =============  Monitor chest tube output. No signs of active bleeding.   Follow CBC, coags  in AM  DVT prophylaxis with SCD, sc Heparin  ========================INFECTIOUS DISEASE===============  All the cultures are negative during this admission but grew MRSA from pleural fluid and E.Coli in blood at [facility]. Continue Vanco by level and Zosyn   Monitor for fever / leukocytosis.  All surgical incision / chest tube  sites look clean  ID dr Chahal    When stable - will need CT scan of AP - with IV /PO contrast to look for abdominal source of E.Coli bacteremia     Pertinent clinical, laboratory, radiographic, hemodynamic, echocardiographic, respiratory data, microbiologic data and chart were reviewed and analyzed frequently throughout the course of the day and night. GI and DVT prophylaxis, glycemic control, head of bed elevation and skin care issues were addressed.  Patient seen, examined and plan discussed with CT Surgery / CTICU team during rounds.  Pt remains critically ill in imminent risk of  deterioration and requires very careful cardio- pulmonary monitoring and support.    I have spent  50     minutes of critical care time with this pt between 12 am   and  9  am         minutes spent on total encounter; more than 50% of the visit was spent counseling and/or coordinating care by the attending physician.      KERLINE Faith MD CLAUDIA HAN          MRN-[id number]    HPI:  Pt is a 58 yr old  male who underwent a FB, R thoracotomy, decortication, chest wall reconstruction, lat flap with Dr Pickering on 10/11/18. He was admitted on 1/6/19 to [facility] with a R pleural effusion and respiratory failure,  A R PTC was placed there and he was intubated.  He still had a R SANDY drain in place.  He presented intubated and sedated on pressors. 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         Procedure: 10/11/18 Right thoracotomy, resection of portion of R 7th rib, evacuation of infected hemothorax, takedown of pleurocutaneous fistula  / R chest wall reconstruction with tunneled latissimus dorsi flap, covered by serratus anterior flap           Issues: Acute resp failure-extubated 1/8/19 to BIPAP             Resp acidosis             Hypotension with sepsis/ distributive shock- on/ off Levophed drip  Acute on chronic systolic CHF ( EF 10 %), ICD in place  Infected right hemothorax - (+) Enterococcus faecalis in the past / now + MRSA empyema  E coli bacteriemia              ESRD on HD    intermittent agitation/mild dementia              severe deconditioning    Interval/Overnight  Events/ ROS  Respiratory status required intermittent  BIPAP support due to resp acidosis on nasal cannula trial;  following of ABG’s with A-line monitoring, continuous pulse oximetry monitoring, Today ABG slightly improved, clinically pt looks better as well. HD was done at bedside  today 1/12/19  with 2 L fluid removal.   Restarted PO dysphagia diet as tolerated with aspiration precautions while pt is off BIPAP. Plan to observe pt's clinical status off BIPAP daytime and  place  back on BIPAP overnight as d/w dr Pickering today by phone.       PAST MEDICAL & SURGICAL HISTORY:  Smoking hx  Cardiomyopathy  Wound: right chest  ICD (implantable cardioverter-defibrillator) in place  OA (osteoarthritis)  HLD (hyperlipidemia)  BPH (benign prostatic hyperplasia)  Pleural effusion  HTN (hypertension)  ESRD (end stage renal disease)  H/O chest wound: right  S/P thoracotomy: FB, R thoracotomy, decortication, chest wall reconstruction, lat flap  History of wound infection: right chest wall - revision 5/18 and again in 7/18  History of implantable cardioverter-defibrillator (ICD) placement: pt unsure when placed  H/O bilateral hip replacements: 2008, 2009    Allergies  No Known Allergies    Home Medications:  aspirin 81 mg oral tablet: 1 tab(s) orally once a day  (30 Oct 2018 14:39)  Breo Ellipta 100 mcg-25 mcg/inh inhalation powder: 1 puff(s) inhaled once a day patient denies using it (11 Oct 2018 08:38)  Calcium 500: 1 tab(s) orally 2 times a day (11 Oct 2018 08:38)  docusate sodium 100 mg oral tablet: 1 tab(s) orally 2 times a day, As Needed (11 Oct 2018 08:39)  folic acid 1 mg oral tablet: 1 tab(s) orally once a day (11 Oct 2018 08:38)  Mag-Ox 400 oral tablet: 1 tab(s) orally once a day (11 Oct 2018 08:39)  Multiple Vitamins oral tablet: 1 tab(s) orally once a day  (30 Oct 2018 14:39)  Namenda 10 mg oral tablet: 1 tab(s) orally 2 times a day (11 Oct 2018 08:38)  Nephplex Rx oral tablet: 1 tab(s) orally once a day (11 Oct 2018 08:39)  nortriptyline 50 mg oral capsule: 1 cap(s) orally once a day (11 Oct 2018 08:39)  piperacillin-tazobactam 2 g-0.25 g intravenous injection: 1  intravenous every 12 hours until 11/7/18 (30 Oct 2018 16:27)  Renvela 800 mg oral tablet: 2 tab(s) orally every 8 hours (11 Oct 2018 08:39)  simethicone 80 mg oral tablet: 1 tab(s) orally 3 times a day (after meals) (11 Oct 2018 08:39)  Tylenol 325 mg oral tablet: 2 tab(s) orally every 6 hours, As Needed (30 Oct 2018 14:39)  vitamin A: 1 tab(s) orally once a day (11 Oct 2018 08:38)  Vitamin B Complex: 1 tab(s) orally once a day (11 Oct 2018 08:38)  Vitamin C 500 mg oral tablet: 1 tab(s) orally once a day (11 Oct 2018 08:38)    ***VITAL SIGNS:  Vital Signs Last 24 Hrs  T(C): 36.6 (13 Jan 2019 00:00), Max: 36.9 (12 Jan 2019 04:00)  T(F): 97.8 (13 Jan 2019 00:00), Max: 98.4 (12 Jan 2019 04:00)  HR: 102 (13 Jan 2019 00:00) (83 - 118)  BP: 86/68 (13 Jan 2019 00:00) (76/48 - 108/57)  BP(mean): 73 (13 Jan 2019 00:00) (54 - 78)  RR: 19 (13 Jan 2019 00:00) (14 - 25)  SpO2: 100% (13 Jan 2019 00:00) (88% - 100%)    I/Os:   I&O's Detail    11 Jan 2019 07:01  -  12 Jan 2019 07:00  --------------------------------------------------------  IN:    IV PiggyBack: 200 mL    norepinephrine Infusion: 35.7 mL    Oral Fluid: 565 mL  Total IN: 800.7 mL    OUT:    Chest Tube: 195 mL    Drain: 25 mL  Total OUT: 220 mL    Total NET: 580.7 mL      12 Jan 2019 07:01  -  13 Jan 2019 00:15  --------------------------------------------------------  IN:    Oral Fluid: 480 mL    Other: 500 mL  Total IN: 980 mL    OUT:    Chest Tube: 60 mL    Drain: 15 mL    Other: 2000 mL  Total OUT: 2075 mL    Total NET: -1095 mL    =======================  MEDICATIONS  ===================  MEDICATIONS  (STANDING):  ALBUTerol    90 MICROgram(s) HFA Inhaler 1 Puff(s) Inhalation every 4 hours  aspirin  chewable 81 milliGRAM(s) Oral daily  docusate sodium Liquid 100 milliGRAM(s) Oral daily  heparin  Injectable 5000 Unit(s) SubCutaneous every 12 hours  midodrine 10 milliGRAM(s) Oral every 8 hours  norepinephrine Infusion 0.05 MICROgram(s)/kG/Min (5.606 mL/Hr) IV Continuous <Continuous>  pantoprazole  Injectable 40 milliGRAM(s) IV Push daily  piperacillin/tazobactam IVPB. 3.375 Gram(s) IV Intermittent every 12 hours    MEDICATIONS  (PRN):      ======================VENTILATOR SETTINGS  ==============  BIPAP standby  =================== PATIENT CARE ACCESS DEVICES ==========  Peripheral IV (+)  Central Venous Line	R/ Fem d/kirsten  Arterial Line	R/	Rad (+)  ======================= PHYSICAL EXAM===================  General:          Comfortable, Awake, alert, no distress. Sitting OOB in chair  Neuro:             Moving all extremities to commands. No focal deficits	  HEENT:                           CHER   Respiratory:	Lungs clear on auscultation bilaterally with good aeration.                            No rales, rhonchi, no wheezing.                             Right david and pigtail tube sites - clean  CV:		 Regular rate and rhythm. Normal S1/S2. No murmurs  Abdomen:	Soft,  nontender, not-distended. Bowel sounds present    Skin:		No rash.  Extremities:	Warm, no cyanosis or edema.  Palpable pulses  ============================ LABS =======================                          11.8   8.29  )-----------( 157      ( 13 Jan 2019 02:00 )             39.8                       12.0   7.81  )-----------( 135      ( 12 Jan 2019 14:15 )             39.7     01-13    139  |  100  |  31<H>  ----------------------------<  102<H>  4.1   |  28  |  4.80<H>    Ca    9.3      13 Jan 2019 02:00  Phos  4.3     01-13  Mg     2.1     01-13    TPro  6.6  /  Alb  2.5<L>  /  TBili  0.4  /  DBili  x   /  AST  26  /  ALT  26  /  AlkPhos  200<H>  01-13 01-12    138        |  98  |  21  ----------------------------<  93  3.4<L>   |  29  |  3.82<H>    Ca    9.0      12 Jan 2019 14:00  Phos  2.7     01-12  Mg     2.0     01-12    TPro  6.6  /  Alb  2.7<L>  /  TBili  0.5  /  DBili  x   /  AST  24  /  ALT  24  /  AlkPhos  159<H>  01-12    LIVER FUNCTIONS - ( 12 Jan 2019 14:00 )  Alb: 2.7 g/dL / Pro: 6.6 g/dL / ALK PHOS: 159 u/L / ALT: 24 u/L / AST: 24 u/L / GGT: x           ABG - ( 13 Jan 2019 02:00 )  pH, Arterial: 7.24  pH, Blood: x     /  pCO2: 74    /  pO2: 93    / HCO3: 26    / Base Excess: 3.5   /  SaO2: 96.3      ABG - ( 12 Jan 2019 14:00 )  pH, Arterial: 7.32  pH, Blood: x     /  pCO2: 61    /  pO2: 122   / HCO3: 28    / Base Excess: 4.7   /  SaO2: 98.5      BLOOD PERIPHERAL  01-09-19 --  --  --  PLEURAL FLUID  01-09-19 --  --    WBC^White Blood Cells  QNTY CELLS IN GRAM STAIN: FEW (2+)  NOS^No Organisms Seen  BRONCHIAL LAVAGE  01-08-19 --  --  --  BRONCHIAL LAVAGE  01-08-19 --  --  --  BLOOD ARTERIAL  01-07-19 --  --  --  ===================== IMAGING STUDIES ===================  Radiology personally reviewed.    < from: Xray Chest 1 View- PORTABLE-Routine (01.12.19 @ 07:15) >  EXAM:  Portable AP chest from 1/12/2019 at 0614. Compared to prior study from   1/11/2019.    IMPRESSION:  Pigtail drainage catheter overlies lower right hemithorax.  Persistent bilateral pleural reactions with adjacent nonspecific basilar   consolidative changes. Clear upper lungs. No pneumothorax.  Stable left chest wall SICD and cardiomegaly.  Tachea midline.  Remainder of exam unchanged.    < end of copied text >     from: Transthoracic Echocardiogram (01.09.19 @ 16:47) >  CONCLUSIONS:< from: Transthoracic Echocardiogram (01.09.19 @ 16:47) >  Ejection Fraction (Visual Estimate): 15-20 %    1. Tethered mitral valve leaflets with normal opening.  Moderate eccentric mitral regurgitation.  2. Normal trileaflet aortic valve. Moderate aortic  regurgitation.  3. Severe left ventricular enlargement.  4. Severe global left ventricular systolic dysfunction.  5. Increased E/e'  is consistent with elevated left  ventricular filling pressure.  6. Normal right ventricular size and function.  *** Compared with echocardiogram of 10/12/2018, no  significant changes noted.    < end of copied text >    ====================ASSESSMENT AND PLAN ================  Pt is a 58 yr old  male who underwent a FB, R thoracotomy, decortication, chest wall reconstruction, lat flap with Dr Pickering on 10/11/18. He was admitted on 1/6/19 to [facility] with a R pleural effusion and respiratory failure,  A R PTC was placed there and he was intubated.  He still had a R SANDY drain in place.  He presented intubated and sedated on pressors. (07 Jan 2019 23:10). Pt is currently extubated but on / off BiPAP for respiratory acidosis    Issues: Acute resp failure-extubated 1/8/19 to BIPAP             Resp acidosis             Hypotension with sepsis/ distributive shock- on/ off Levophed drip  Acute on chronic systolic CHF ( EF 15-20 %), ICD in place  Infected right hemothorax - (+) Enterococcus faecalis in the past / now + MRSA empyema  E coli bacteriemia              ESRD on HD    intermittent agitation/mild dementia              severe deconditioning    ====================== NEUROLOGY=======================  Pain control with Tylenol IV / Toradol  Trial of OOB as tolerated  ==================== RESPIRATORY========================  Pt is on  2   L nasal canula / BIPAP at night and PRN daytime  Comfortable, no evidence of distress.  Incentive spirometry    Monitor chest tube output  Chest pigtail tube to suction / david to bulb suction	  Continuous pulse oximetry monitoring  Continue bronchodilators, pulmonary toilet  Head of bed elevation to 30-40 degrees  ====================CARDIOVASCULAR=====================  Continue hemodynamic monitoring/ telemetry  Hypotension improved; off Levophed, on PO MIdodrine  ===================== RENAL ============================  ESRD on HD as per renal team ( done 1/12/19 )  Monitor I/Os, BUN/ Cr  and electrolytes  ==================== GASTROINTESTINAL===================  On PO renal dysphagia diet with aspiration precautions  Continue GI prophylaxis with Protonix   Zofran / Reglan for nausea - PRN	  =======================    ENDOCRIN  =====================  Glycemic monitoring  F/S with coverage    ===================HEMATOLOGIC/ONCOLOGIC =============  Monitor chest tube output. No signs of active bleeding.   Follow CBC, coags  in AM  DVT prophylaxis with SCD, sc Heparin  ========================INFECTIOUS DISEASE===============  All the cultures are negative during this admission but grew MRSA from pleural fluid and E.Coli in blood at [facility]. Continue Vanco by level and Zosyn   Monitor for fever / leukocytosis.  All surgical incision / chest tube  sites look clean  ID dr Chahal    When stable - will need CT scan of AP - with IV /PO contrast to look for abdominal source of E.Coli bacteremia     Pertinent clinical, laboratory, radiographic, hemodynamic, echocardiographic, respiratory data, microbiologic data and chart were reviewed and analyzed frequently throughout the course of the day and night. GI and DVT prophylaxis, glycemic control, head of bed elevation and skin care issues were addressed.  Patient seen, examined and plan discussed with CT Surgery / CTICU team during rounds.  Pt remains critically ill in imminent risk of  deterioration and requires very careful cardio- pulmonary monitoring and support.    I have spent  50     minutes of critical care time with this pt between 12 am   and  9  am         minutes spent on total encounter; more than 50% of the visit was spent counseling and/or coordinating care by the attending physician.      KERLINE Faith MD

## 2019-01-14 LAB
ALBUMIN SERPL ELPH-MCNC: 2.5 G/DL — LOW (ref 3.3–5)
ALP SERPL-CCNC: 184 U/L — HIGH (ref 40–120)
ALT FLD-CCNC: 25 U/L — SIGNIFICANT CHANGE UP (ref 4–41)
ANION GAP SERPL CALC-SCNC: 13 MEQ/L — SIGNIFICANT CHANGE UP (ref 7–14)
AST SERPL-CCNC: 26 U/L — SIGNIFICANT CHANGE UP (ref 4–40)
BACTERIA BLD CULT: SIGNIFICANT CHANGE UP
BACTERIA BLD CULT: SIGNIFICANT CHANGE UP
BASE EXCESS BLDA CALC-SCNC: 3.5 MMOL/L — SIGNIFICANT CHANGE UP
BILIRUB SERPL-MCNC: 0.4 MG/DL — SIGNIFICANT CHANGE UP (ref 0.2–1.2)
BUN SERPL-MCNC: 52 MG/DL — HIGH (ref 7–23)
CALCIUM SERPL-MCNC: 9.2 MG/DL — SIGNIFICANT CHANGE UP (ref 8.4–10.5)
CHLORIDE BLDA-SCNC: 107 MMOL/L — SIGNIFICANT CHANGE UP (ref 96–108)
CHLORIDE SERPL-SCNC: 101 MMOL/L — SIGNIFICANT CHANGE UP (ref 98–107)
CO2 SERPL-SCNC: 26 MMOL/L — SIGNIFICANT CHANGE UP (ref 22–31)
CREAT BLDA-MCNC: 6.35 MG/DL — HIGH (ref 0.5–1.3)
CREAT SERPL-MCNC: 6.33 MG/DL — HIGH (ref 0.5–1.3)
GLUCOSE BLDA-MCNC: 97 MG/DL — SIGNIFICANT CHANGE UP (ref 70–99)
GLUCOSE SERPL-MCNC: 95 MG/DL — SIGNIFICANT CHANGE UP (ref 70–99)
HCO3 BLDA-SCNC: 26 MMOL/L — SIGNIFICANT CHANGE UP (ref 22–26)
HCT VFR BLD CALC: 35.6 % — LOW (ref 39–50)
HCT VFR BLDA CALC: 33.5 % — LOW (ref 39–51)
HGB BLD-MCNC: 10.7 G/DL — LOW (ref 13–17)
HGB BLDA-MCNC: 10.9 G/DL — LOW (ref 13–17)
LACTATE BLDA-SCNC: 0.7 MMOL/L — SIGNIFICANT CHANGE UP (ref 0.5–2)
MCHC RBC-ENTMCNC: 30.1 % — LOW (ref 32–36)
MCHC RBC-ENTMCNC: 30.8 PG — SIGNIFICANT CHANGE UP (ref 27–34)
MCV RBC AUTO: 102.6 FL — HIGH (ref 80–100)
NRBC # FLD: 0 K/UL — LOW (ref 25–125)
PCO2 BLDA: 64 MMHG — HIGH (ref 35–48)
PH BLDA: 7.29 PH — LOW (ref 7.35–7.45)
PLATELET # BLD AUTO: 164 K/UL — SIGNIFICANT CHANGE UP (ref 150–400)
PMV BLD: 9.5 FL — SIGNIFICANT CHANGE UP (ref 7–13)
PO2 BLDA: 169 MMHG — HIGH (ref 83–108)
POTASSIUM BLDA-SCNC: 4.2 MMOL/L — SIGNIFICANT CHANGE UP (ref 3.4–4.5)
POTASSIUM SERPL-MCNC: 4.5 MMOL/L — SIGNIFICANT CHANGE UP (ref 3.5–5.3)
POTASSIUM SERPL-SCNC: 4.5 MMOL/L — SIGNIFICANT CHANGE UP (ref 3.5–5.3)
PROT SERPL-MCNC: 6.4 G/DL — SIGNIFICANT CHANGE UP (ref 6–8.3)
RBC # BLD: 3.47 M/UL — LOW (ref 4.2–5.8)
RBC # FLD: 15.4 % — HIGH (ref 10.3–14.5)
SAO2 % BLDA: 99.1 % — HIGH (ref 95–99)
SODIUM BLDA-SCNC: 138 MMOL/L — SIGNIFICANT CHANGE UP (ref 136–146)
SODIUM SERPL-SCNC: 140 MMOL/L — SIGNIFICANT CHANGE UP (ref 135–145)
VANCOMYCIN TROUGH SERPL-MCNC: 20.9 UG/ML — HIGH (ref 10–20)
WBC # BLD: 6.34 K/UL — SIGNIFICANT CHANGE UP (ref 3.8–10.5)
WBC # FLD AUTO: 6.34 K/UL — SIGNIFICANT CHANGE UP (ref 3.8–10.5)

## 2019-01-14 PROCEDURE — 99291 CRITICAL CARE FIRST HOUR: CPT

## 2019-01-14 PROCEDURE — 99232 SBSQ HOSP IP/OBS MODERATE 35: CPT | Mod: GC

## 2019-01-14 PROCEDURE — 71045 X-RAY EXAM CHEST 1 VIEW: CPT | Mod: 26,76

## 2019-01-14 PROCEDURE — 99292 CRITICAL CARE ADDL 30 MIN: CPT

## 2019-01-14 RX ADMIN — PIPERACILLIN AND TAZOBACTAM 25 GRAM(S): 4; .5 INJECTION, POWDER, LYOPHILIZED, FOR SOLUTION INTRAVENOUS at 05:51

## 2019-01-14 RX ADMIN — MIDODRINE HYDROCHLORIDE 10 MILLIGRAM(S): 2.5 TABLET ORAL at 13:13

## 2019-01-14 RX ADMIN — Medication 81 MILLIGRAM(S): at 12:15

## 2019-01-14 RX ADMIN — MIDODRINE HYDROCHLORIDE 10 MILLIGRAM(S): 2.5 TABLET ORAL at 05:51

## 2019-01-14 RX ADMIN — HEPARIN SODIUM 5000 UNIT(S): 5000 INJECTION INTRAVENOUS; SUBCUTANEOUS at 17:31

## 2019-01-14 RX ADMIN — MIDODRINE HYDROCHLORIDE 10 MILLIGRAM(S): 2.5 TABLET ORAL at 22:32

## 2019-01-14 RX ADMIN — HEPARIN SODIUM 5000 UNIT(S): 5000 INJECTION INTRAVENOUS; SUBCUTANEOUS at 05:51

## 2019-01-14 RX ADMIN — PANTOPRAZOLE SODIUM 40 MILLIGRAM(S): 20 TABLET, DELAYED RELEASE ORAL at 12:15

## 2019-01-14 RX ADMIN — PIPERACILLIN AND TAZOBACTAM 25 GRAM(S): 4; .5 INJECTION, POWDER, LYOPHILIZED, FOR SOLUTION INTRAVENOUS at 17:31

## 2019-01-14 RX ADMIN — Medication 100 MILLIGRAM(S): at 12:15

## 2019-01-14 NOTE — ADVANCED PRACTICE NURSE CONSULT - REASON FOR CONSULT
Patient seen on skin care rounds after wound care referral received for assessment of skin impairment and recommendations of topical management. Chart reviewed: Serum albumin 2.5g/dl, WBC 6.34, creatine 6.33, BMI 18.9kg/m2,  Reji 10-19. Patient H/O of underwent a FB, R thoracotomy, decortication, chest wall reconstruction, lat flap with Dr Pickering on 10/11/18. He was admitted on 1/6/19 to [facility] with a R pleural effusion and respiratory failure,  A R PTC was placed there and he was intubated.  He still had a R SANDY drain in place.  He presented intubated and sedated on pressors. Patient has being seeing by ID for septic shock, and nephrology for ESRD.

## 2019-01-14 NOTE — PROGRESS NOTE ADULT - SUBJECTIVE AND OBJECTIVE BOX
[facility] DIVISION OF KIDNEY DISEASES AND HYPERTENSION -- FOLLOW UP NOTE  --------------------------------------------------------------------------------  HPI: 59 yo male (Mandarin speaking) with a past medical history of NICM with ICD, ESRD on HD TIW, former smoker, BPH originally transferred from [facility] due to septic shock on 1/7/19. Pt. was initially admitted due to fever and right pleural effusion, found to have acute respiratory failure therefore was intubated. Pt developed shock, thought to be septic. Pt had R thoracotomy, decortication, chest wall reconstruction, lat flap with Dr Pickering on 10/11/18. Pt extubated on 1/8/19 and currently remains on high flow O2. Pt seen and examined. No complains    PAST HISTORY  --------------------------------------------------------------------------------  No significant changes to PMH, PSH, FHx, SHx, unless otherwise noted    ALLERGIES & MEDICATIONS  --------------------------------------------------------------------------------  Allergies    No Known Allergies    Intolerances      Standing Inpatient Medications  ALBUTerol    90 MICROgram(s) HFA Inhaler 1 Puff(s) Inhalation every 4 hours  aspirin  chewable 81 milliGRAM(s) Oral daily  docusate sodium 100 milliGRAM(s) Oral daily  heparin  Injectable 5000 Unit(s) SubCutaneous every 12 hours  midodrine 10 milliGRAM(s) Oral every 8 hours  pantoprazole  Injectable 40 milliGRAM(s) IV Push daily  piperacillin/tazobactam IVPB. 3.375 Gram(s) IV Intermittent every 12 hours    PRN Inpatient Medications      REVIEW OF SYSTEMS  --------------------------------------------------------------------------------  Gen: No fevers/chills  Respiratory: No dyspnea, cough  CV: No chest pain  GI: No abdominal pain, diarrhea  : No dysuria, hematuria  MSK: No  edema    All other systems were reviewed and are negative, except as noted.    VITALS/PHYSICAL EXAM  --------------------------------------------------------------------------------  T(C): 36.5 (01-14-19 @ 08:00), Max: 37.3 (01-13-19 @ 20:00)  HR: 90 (01-14-19 @ 07:05) (84 - 106)  BP: 106/63 (01-14-19 @ 07:00) (88/58 - 106/63)  RR: 20 (01-14-19 @ 07:06) (14 - 23)  SpO2: 100% (01-14-19 @ 07:06) (78% - 100%)  Wt(kg): --        01-13-19 @ 07:01  -  01-14-19 @ 07:00  --------------------------------------------------------  IN: 850 mL / OUT: 85 mL / NET: 765 mL        Physical Exam:  	Gen: Awake, NAD  	HEENT: on NC high flow    	Pulm: course breath sounds B/L +Right pig tail draining serous fluid  	CV: S1S2  	Abd: Soft, +BS   	Ext: No LE edema B/L  	Neuro: Awake  	Skin: Warm and dry  	Vascular access:  LUE AVF site +thrill +bruit      LABS/STUDIES  --------------------------------------------------------------------------------              10.7   6.34  >-----------<  164      [01-14-19 @ 03:30]              35.6     140  |  101  |  52  ----------------------------<  95      [01-14-19 @ 03:30]  4.5   |  26  |  6.33        Ca     9.2     [01-14-19 @ 03:30]      Mg     2.1     [01-13-19 @ 02:00]      Phos  4.3     [01-13-19 @ 02:00]    TPro  6.4  /  Alb  2.5  /  TBili  0.4  /  DBili  x   /  AST  26  /  ALT  25  /  AlkPhos  184  [01-14-19 @ 03:30]    PT/INR: PT 11.8 , INR 1.06       [01-13-19 @ 02:00]  PTT: 47.8       [01-13-19 @ 02:00]      Creatinine Trend:  SCr 6.33 [01-14 @ 03:30]  SCr 4.80 [01-13 @ 02:00]  SCr 3.82 [01-12 @ 14:00]  SCr 6.17 [01-12 @ 04:30]  SCr 4.45 [01-11 @ 04:10]        HbA1c 5.5      [10-12-18 @ 02:53]  TSH 4.79      [10-21-18 @ 04:15]    HBsAb Reactive      [01-08-19 @ 08:35]  HBsAg NEGATIVE      [01-08-19 @ 08:35]  HCV 0.16, Nonreactive Hepatitis C AB  S/CO Ratio                        Interpretation  < 1.0                                     Non-Reactive  1.0 - 4.9                           Weakly-Reactive  > 5.0                                 Reactive  Non-Reactive: Aperson with a non-reactive HCV antibody  result is considered uninfected.  No further action is  needed unless recent infection is suspected.  In these  cases, consider repeat testing later to detect  seroconversion..  Weakly-Reactive: HCV antibody test is abnormal, HCV RNA  Qualitative test will follow.  Reactive: HCV antibody test is abnormal, HCV RNA  Qualitative test will follow.  Note: HCV antibody testing is performed on the [employer]   system.      [01-08-19 @ 08:35]

## 2019-01-14 NOTE — PROGRESS NOTE ADULT - SUBJECTIVE AND OBJECTIVE BOX
CLAUDIA HAN                     MRN-[id number]    HPI:  Pt is a 58 yr old  male who underwent a FB, R thoracotomy, decortication, chest wall reconstruction, lat flap with Dr Pickering on 10/11/18. He was admitted on 1/6/19 to [facility] with a R pleural effusion and respiratory failure,  A R PTC was placed there and he was intubated.  He still had a R SANDY drain in place.  He presented intubated and sedated on pressors. 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         Procedure: 10/11/18 Right thoracotomy, resection of portion of R 7th rib, evacuation of infected hemothorax, takedown of pleurocutaneous fistula  / R chest wall reconstruction with tunneled latissimus dorsi flap, covered by serratus anterior flap           Issues: Acute resp failure, Hypercapnia--extubated 1/8/19 to BIPAP             Resp acidosis             Hypotension with sepsis/ distributive shock- on/ off Levophed drip  Acute on chronic systolic CHF ( EF 10 %), ICD in place  Infected right hemothorax - (+) Enterococcus faecalis in the past / now + MRSA empyema  E coli bacteriemia              ESRD on HD    intermittent agitation/mild dementia              severe deconditioning        PAST MEDICAL & SURGICAL HISTORY:  Smoking hx  Cardiomyopathy  Wound: right chest  ICD (implantable cardioverter-defibrillator) in place  OA (osteoarthritis)  HLD (hyperlipidemia)  BPH (benign prostatic hyperplasia)  Pleural effusion  HTN (hypertension)  ESRD (end stage renal disease)  H/O chest wound: right  S/P thoracotomy: FB, R thoracotomy, decortication, chest wall reconstruction, lat flap  History of wound infection: right chest wall - revision 5/18 and again in 7/18  History of implantable cardioverter-defibrillator (ICD) placement: pt unsure when placed  H/O bilateral hip replacements: 2008, 2009            VITAL SIGNS:  Vital Signs Last 24 Hrs  T(C): 36.7 (14 Jan 2019 00:00), Max: 37.3 (13 Jan 2019 20:00)  T(F): 98.1 (14 Jan 2019 00:00), Max: 99.1 (13 Jan 2019 20:00)  HR: 84 (14 Jan 2019 05:00) (84 - 106)  BP: 104/58 (14 Jan 2019 05:00) (88/58 - 105/67)  BP(mean): 68 (14 Jan 2019 05:00) (62 - 74)  RR: 16 (14 Jan 2019 05:00) (14 - 23)  SpO2: 100% (14 Jan 2019 05:00) (95% - 100%)    I/Os:   I&O's Detail    12 Jan 2019 07:01  -  13 Jan 2019 07:00  --------------------------------------------------------  IN:    IV PiggyBack: 100 mL    Oral Fluid: 480 mL    Other: 500 mL  Total IN: 1080 mL    OUT:    Chest Tube: 115 mL    Drain: 25 mL    Other: 2000 mL  Total OUT: 2140 mL    Total NET: -1060 mL      13 Jan 2019 07:01  -  14 Jan 2019 05:55  --------------------------------------------------------  IN:    IV PiggyBack: 150 mL    Oral Fluid: 600 mL  Total IN: 750 mL    OUT:    Chest Tube: 40 mL    Drain: 10 mL  Total OUT: 50 mL    Total NET: 700 mL          CAPILLARY BLOOD GLUCOSE          =======================MEDICATIONS===================  MEDICATIONS  (STANDING):  ALBUTerol    90 MICROgram(s) HFA Inhaler 1 Puff(s) Inhalation every 4 hours  aspirin  chewable 81 milliGRAM(s) Oral daily  docusate sodium 100 milliGRAM(s) Oral daily  heparin  Injectable 5000 Unit(s) SubCutaneous every 12 hours  midodrine 10 milliGRAM(s) Oral every 8 hours  pantoprazole  Injectable 40 milliGRAM(s) IV Push daily  piperacillin/tazobactam IVPB. 3.375 Gram(s) IV Intermittent every 12 hours    MEDICATIONS  (PRN):        PHYSICAL EXAM============================  General:                         Awake, alert, in NO distress  Neuro:                            Moving all extremities to commands.   Respiratory:	Air entry fair and  bilateral conducted sounds                                           Effort even and unlabored. able to speak while on BiPAP  CV:		Regular rate and rhythm. Normal S1/S2                                          Distal pulses present.  Abdomen:	                     Soft, non-distended. Bowel sounds present   Skin:		No rash.  Extremities:	Warm, no cyanosis or edema.  Palpable pulses    ============================LABS=========================                        10.7   6.34  )-----------( 164      ( 14 Jan 2019 03:30 )             35.6     01-14    140  |  101  |  52<H>  ----------------------------<  95  4.5   |  26  |  6.33<H>    Ca    9.2      14 Jan 2019 03:30  Phos  4.3     01-13  Mg     2.1     01-13    TPro  6.4  /  Alb  2.5<L>  /  TBili  0.4  /  DBili  x   /  AST  26  /  ALT  25  /  AlkPhos  184<H>  01-14    LIVER FUNCTIONS - ( 14 Jan 2019 03:30 )  Alb: 2.5 g/dL / Pro: 6.4 g/dL / ALK PHOS: 184 u/L / ALT: 25 u/L / AST: 26 u/L / GGT: x           PT/INR - ( 13 Jan 2019 02:00 )   PT: 11.8 SEC;   INR: 1.06          PTT - ( 13 Jan 2019 02:00 )  PTT:47.8 SEC  ABG - ( 14 Jan 2019 03:30 )  pH, Arterial: 7.29  pH, Blood: x     /  pCO2: 64    /  pO2: 169   / HCO3: 26    / Base Excess: 3.5   /  SaO2: 99.1                  ============================IMAGING STUDIES=========================  < from: Xray Chest 1 View- PORTABLE-Routine (01.13.19 @ 06:22) >  Frontal view of the chest is obtained.    Comparison is made with January 12, 2019.    IMPRESSION: Right-sided chest tubes are again noted. There are small   bilateral loculated pleural effusions, right greater than left unchanged.   There is no evidence of a pneumothorax. Left cardiac device is again   noted.        A/P:    Pt is a 58 yr old  male who underwent a FB, R thoracotomy, decortication, chest wall reconstruction, lat flap with Dr Pickering on 10/11/18. He was admitted on 1/6/19 to [facility] with a R pleural effusion and respiratory failure,  A R PTC was placed there and he was intubated.  He still had a R SANDY drain in place.  He presented intubated and sedated on pressors. (07 Jan 2019 23:10). Pt is currently extubated but on / off BiPAP for respiratory acidosis    Issues: Acute resp failure-extubated 1/8/19 to BIPAP             Resp acidosis             Hypotension with sepsis/ distributive shock- on/ off Levophed drip  Acute on chronic systolic CHF ( EF 15-20 %), ICD in place  Infected right hemothorax - (+) Enterococcus faecalis in the past / now + MRSA empyema  E coli bacteriemia              ESRD on HD    intermittent agitation/mild dementia              severe deconditioning    ====================== NEUROLOGY=======================  Pain control with Tylenol IV   Trial of OOB as tolerated  ==================== RESPIRATORY========================  Pt is on High Flow  nasal canula / BIPAP at night and PRN daytime  Comfortable,   Incentive spirometry    Monitor chest tube output  Chest pigtail tube to suction / david to bulb suction	  Continuous pulse oximetry monitoring  Continue bronchodilators, pulmonary toilet  Head of bed elevation to 30-40 degrees  ====================CARDIOVASCULAR=====================  Continue hemodynamic monitoring/ telemetry  Hypotension improved; off Levophed, on PO MIdodrine  ===================== RENAL ============================  ESRD on HD as per renal team ( done 1/12/19 )  Monitor I/Os, BUN/ Cr  and electrolytes  ==================== GASTROINTESTINAL===================  On PO renal dysphagia diet with aspiration precautions  Continue GI prophylaxis with Protonix   Zofran / Reglan for nausea - PRN	  =======================    ENDOCRIN  =====================  Glycemic monitoring  F/S with coverage    ===================HEMATOLOGIC/ONCOLOGIC =============  Monitor chest tube output. No signs of active bleeding.   Follow CBC, coags  in AM  DVT prophylaxis with SCD, sc Heparin  ========================INFECTIOUS DISEASE===============  All the cultures are negative during this admission but grew MRSA from pleural fluid and E.Coli in blood at [facility]. Continue Vanco by level and Zosyn   Monitor for fever / leukocytosis.  All surgical incision / chest tube  sites look clean  ID dr Chahal ,   Will need IV abxs for total of 2 weeks    Pertinent clinical, laboratory, radiographic, hemodynamic, echocardiographic, respiratory data, microbiologic data and chart were reviewed and analyzed frequently throughout the course of the day and night. GI and DVT prophylaxis, glycemic control, head of bed elevation and skin care issues were addressed.  Patient seen, examined and plan discussed with CT Surgery / CTICU team during rounds.  Pt remains critically ill in imminent risk of  deterioration and requires very careful cardio- pulmonary monitoring and support.    I have spent 35   minutes of critical care time with this pt between 12 am   and  9  am         minutes spent on total encounter; more than 50% of the visit was spent counseling and/or coordinating care by the attending physician.          Dave Su DO, FACEP

## 2019-01-14 NOTE — ADVANCED PRACTICE NURSE CONSULT - ASSESSMENT
A&Ox4, received sitting in the chair with seat cushion, patient appears cachectic, receiving supplemental oxygen via nasal cannula, right posterior chest wall with  pig tail.     Rish posterior chest wall chronic surgical wound s/p thoracotomy with right chest wall reconstruction, tunneled latissimus dorsi flap secondary to pleural effusions. Linear wound measures 11cmx2.5cmx0.2cm (Unable to determine true anatomical depth secondary to necrotic tissue). Tissue type presents as 100% brown, soft, areas if lifting eschar and areas of firmly attach eschar. Small serosanguinous drainage No odor. Periwound skin with hypopigmentation surrounded by hyperpigmentation. Scar at 1 0'clock extends to lateral chest wall.  At 3 0'clock 2.5cm away from periwound skin there is a an area of intact fluid filled blister (85%) and purple maroon discoloration(15%) area measures [id number]. No increased warmth, no erythema, no induration. Goals of care: Monitor for tissue type changes, enzymatic debridement of necrotic tissue, protect periwound skin. A&Ox4, received sitting in the chair with seat cushion, patient appears cachectic, receiving supplemental oxygen via nasal cannula, right posterior chest wall with  pig tail.     Rish posterior chest wall chronic surgical wound s/p thoracotomy with right chest wall reconstruction, tunneled latissimus dorsi flap secondary to pleural effusions. Linear wound measures 11cmx2.5cmx0.2cm (Unable to determine true anatomical depth secondary to necrotic tissue). Tissue type presents as 100% brown, soft, areas if lifting eschar and areas of firmly attach eschar. Small serosanguinous drainage No odor. Periwound skin with hypopigmentation surrounded by hyperpigmentation. Scar at 1 0'clock extends to lateral chest wall.  At 3 0'clock 2.5cm away from periwound skin there is a an area of intact fluid filled blister (85%) and purple maroon discoloration(15%) area measures [id number]. No increased warmth, no erythema, no induration. Goals of care: Monitor for tissue type changes, enzymatic debridement of necrotic tissue, protect periwound skin.     Findings discussed with CTICU team.

## 2019-01-14 NOTE — PROGRESS NOTE ADULT - PROBLEM SELECTOR PLAN 1
Pt. with ESRD on HD TIW (MWF). Last HD as was on 1/12/19 via AVF. No plan for HD today. Monitor labs.

## 2019-01-14 NOTE — CONSULT NOTE ADULT - SUBJECTIVE AND OBJECTIVE BOX
[facility] DIVISION OF KIDNEY DISEASES AND HYPERTENSION -- [phone]  -- INITIAL CONSULT NOTE  --------------------------------------------------------------------------------  HPI: 59 yo male (Mandarin speaking) with medical history of NICM with ICD, ESRD on HD, former smoker, BPH who was transfer for [facility] due to septic shock on 1/7/19. Pt. was initially admitted due to fever and right pleural effusion, found to have respiratory failure and intubated and afterwards developed shock, thought to be septic. Renal now called for management of pts ESRD. Pt had R thoracotomy, decortication, chest wall reconstruction, lat flap with Dr Pickering on 10/11/18.  Pt intubated and sedated in the CTICU, so Hx obtained from wife, Aaron Stack, via phone thought . Wife says pt has been on HD for about 6 years.  Goes to dialysis center on [facility] ([facility]), and follows with Dr. Reginald Menesse for nephrology.  Has HD via LUE AVF, on TTS schedule, for 4 hour sessions. Last outpatient HD on 1/5/19. Wife says she does not know cause of renal failure.    Pt seen in the CTICU, intubated, on pressors.         PAST HISTORY  --------------------------------------------------------------------------------  PAST MEDICAL & SURGICAL HISTORY:  Smoking hx  Cardiomyopathy  Wound: right chest  ICD (implantable cardioverter-defibrillator) in place  OA (osteoarthritis)  HLD (hyperlipidemia)  BPH (benign prostatic hyperplasia)  Pleural effusion  HTN (hypertension)  ESRD (end stage renal disease)  H/O chest wound: right  S/P thoracotomy: FB, R thoracotomy, decortication, chest wall reconstruction, lat flap  History of wound infection: right chest wall - revision 5/18 and again in 7/18  History of implantable cardioverter-defibrillator (ICD) placement: pt unsure when placed  H/O bilateral hip replacements: 2008, 2009    FAMILY HISTORY:    PAST SOCIAL HISTORY:  Smoker    ALLERGIES & MEDICATIONS  --------------------------------------------------------------------------------  Allergies    No Known Allergies    Intolerances      Standing Inpatient Medications  ALBUTerol    90 MICROgram(s) HFA Inhaler 1 Puff(s) Inhalation every 4 hours  aspirin  chewable 81 milliGRAM(s) Oral daily  dexmedetomidine Infusion 0.7 MICROgram(s)/kG/Hr IV Continuous <Continuous>  docusate sodium Liquid 100 milliGRAM(s) Oral daily  heparin  Injectable 5000 Unit(s) SubCutaneous every 8 hours  lactated ringers. 1000 milliLiter(s) IV Continuous <Continuous>  norepinephrine Infusion 0.05 MICROgram(s)/kG/Min IV Continuous <Continuous>  pantoprazole  Injectable 40 milliGRAM(s) IV Push daily  piperacillin/tazobactam IVPB. 3.375 Gram(s) IV Intermittent every 12 hours  propofol Infusion 15 MICROgram(s)/kG/Min IV Continuous <Continuous>    PRN Inpatient Medications      REVIEW OF SYSTEMS  --------------------------------------------------------------------------------  Unable to obtain    VITALS/PHYSICAL EXAM  --------------------------------------------------------------------------------  T(C): 36.1 (01-08-19 @ 04:00), Max: 36.8 (01-07-19 @ 20:40)  HR: 85 (01-08-19 @ 09:00) (64 - 86)  BP: 95/65 (01-08-19 @ 08:00) (95/65 - 98/57)  RR: 14 (01-08-19 @ 09:00) (14 - 16)  SpO2: 99% (01-08-19 @ 09:00) (98% - 100%)  Wt(kg): --  Height (cm): 178 (01-07-19 @ 20:40)  Weight (kg): 59.8 (01-07-19 @ 20:40)  BMI (kg/m2): 18.9 (01-07-19 @ 20:40)  BSA (m2): 1.75 (01-07-19 @ 20:40)      01-07-19 @ 07:01  -  01-08-19 @ 07:00  --------------------------------------------------------  IN: 1355.5 mL / OUT: 65 mL / NET: 1290.5 mL    01-08-19 @ 07:01  -  01-08-19 @ 09:38  --------------------------------------------------------  IN: 212.8 mL / OUT: 0 mL / NET: 212.8 mL      Physical Exam:  	Gen: Intubated  	HEENT: +ETT  	Pulm: course breath sounds B/L Right pig tail draining serous fluid  	CV: S1S2  	Abd: Soft, +BS   	Ext: No LE edema B/L  	Neuro: sedated however responds to verbal stimuli  	Skin: Warm and dry  	Vascular access:  LUE AVF site: +thrill +bruit +skin intact    LABS/STUDIES  --------------------------------------------------------------------------------              11.0   4.58  >-----------<  125      [01-08-19 @ 02:45]              35.3     128  |  93  |  53  ----------------------------<  109      [01-08-19 @ 02:45]  3.0   |  20  |  6.47        Ca     9.5     [01-08-19 @ 02:45]    TPro  6.4  /  Alb  2.5  /  TBili  0.3  /  DBili  x   /  AST  15  /  ALT  16  /  AlkPhos  163  [01-08-19 @ 02:45]    PT/INR: PT 12.1 , INR 1.09       [01-07-19 @ 21:25]  PTT: 30.5       [01-07-19 @ 21:25]      Creatinine Trend:  SCr 6.47 [01-08 @ 02:45]  SCr 6.33 [01-07 @ 21:25]  SCr 5.62 [12-10 @ 10:18]
HPI:    Pt is a 58 yr old Mandarin speaking male who underwent right thoractomy decortiation, Right Chest Wall reconstruction with Latissimjus Dorsi Flap Poss Skin Graft with VAC dressing with Dr Pickering 10/11/18.  He has h/o ESRD with HD, CHF, EF of 10%,  ICD,  with h/o chronic bilateral pleural effusions.  The patient previously underwent left VATS and PleurX drains  in 2015.  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     During his last admission at [facility], pt underwent OR with Plastic and Thoracic surgery,  for definitive repair that involved thoracotomy, decortication, excision of empyema cavity along with muscle flap reconstruction. Pt underwent surgery on 10/11 (Right thoracotomy, resection of portion of R 7th rib, evacuation of infected hemothorax, takedown of pleurocutaneous fistula - and noted to have foul smelling hematoma).  OR cultures grew Enterococcus faecalis.  Pt was treated with zosyn for 4 week course for infected hemothorax.      He was admitted on 1/6/19 to [facility] with a R pleural effusion and respiratory failure,  A R PTC was placed there and he was intubated.  He still had a R SANDY drain in place.  He presented intubated and sedated on pressors. (07 Jan 2019 23:10) Cultures from pleural fluid at [place] reportedly grew MRSA.  Pt is currently on vanco/zosyn.      ID consult called for further abx management.        REVIEW OF SYSTEMS:    Pt intubated      PAST MEDICAL & SURGICAL HISTORY:  Smoking hx  Cardiomyopathy  Wound: right chest  ICD (implantable cardioverter-defibrillator) in place  OA (osteoarthritis)  HLD (hyperlipidemia)  BPH (benign prostatic hyperplasia)  Pleural effusion  HTN (hypertension)  ESRD (end stage renal disease)  H/O chest wound: right  S/P thoracotomy: FB, R thoracotomy, decortication, chest wall reconstruction, lat flap  History of wound infection: right chest wall - revision 5/18 and again in 7/18  History of implantable cardioverter-defibrillator (ICD) placement: pt unsure when placed  H/O bilateral hip replacements: 2008, 2009      Allergies    No Known Allergies    Intolerances        FAMILY HISTORY:  No sig fam hx    SOCIAL HISTORY:  No smoking, ivdu, etoh      MEDICATIONS  (STANDING):  ALBUTerol    90 MICROgram(s) HFA Inhaler 1 Puff(s) Inhalation every 4 hours  aspirin  chewable 81 milliGRAM(s) Oral daily  dexmedetomidine Infusion 0.7 MICROgram(s)/kG/Hr (10.465 mL/Hr) IV Continuous <Continuous>  docusate sodium Liquid 100 milliGRAM(s) Oral daily  heparin  Injectable 5000 Unit(s) SubCutaneous every 8 hours  lactated ringers. 1000 milliLiter(s) (50 mL/Hr) IV Continuous <Continuous>  norepinephrine Infusion 0.05 MICROgram(s)/kG/Min (5.606 mL/Hr) IV Continuous <Continuous>  pantoprazole  Injectable 40 milliGRAM(s) IV Push daily  piperacillin/tazobactam IVPB. 3.375 Gram(s) IV Intermittent every 12 hours  propofol Infusion 15 MICROgram(s)/kG/Min (5.382 mL/Hr) IV Continuous <Continuous>    MEDICATIONS  (PRN):      Vital Signs Last 24 Hrs  T(C): 37.4 (08 Jan 2019 20:00), Max: 37.4 (08 Jan 2019 20:00)  T(F): 99.3 (08 Jan 2019 20:00), Max: 99.3 (08 Jan 2019 20:00)  HR: 90 (08 Jan 2019 23:24) (64 - 104)  BP: 95/57 (08 Jan 2019 11:00) (90/58 - 95/65)  BP(mean): 66 (08 Jan 2019 11:00) (66 - 71)  RR: 26 (08 Jan 2019 23:24) (14 - 26)  SpO2: 99% (08 Jan 2019 23:24) (96% - 100%)    PHYSICAL EXAM:    GENERAL: Pt intubated  HEAD:  Atraumatic, Normocephalic  EYES: EOMI, PERRLA, conjunctiva and sclera clear  ENMT: No tonsillar erythema, exudates, or enlargement; Moist mucous membranes  NECK: Supple, No JVD  CHEST/LUNG: decr BS at rt base.  chest tube and SANDY drains in place with serosanguinous outpt  HEART: Regular rate and rhythm; No murmurs, rubs, or gallops  ABDOMEN: Soft, Nontender, Nondistended; Bowel sounds present  EXTREMITIES:  2+ Peripheral Pulses, No clubbing, cyanosis, or edema  LYMPH: No lymphadenopathy noted  SKIN: No rashes or lesions. AL[facility]    LABS:  CBC Full  -  ( 08 Jan 2019 02:45 )  WBC Count : 4.58 K/uL  Hemoglobin : 11.0 g/dL  Hematocrit : 35.3 %  Platelet Count - Automated : 125 K/uL  Mean Cell Volume : 100.0 fL  Mean Cell Hemoglobin : 31.2 pg  Mean Cell Hemoglobin Concentration : 31.2 %  Auto Neutrophil # : 3.21 K/uL  Auto Lymphocyte # : 0.86 K/uL  Auto Monocyte # : 0.39 K/uL  Auto Eosinophil # : 0.08 K/uL  Auto Basophil # : 0.02 K/uL  Auto Neutrophil % : 70.2 %  Auto Lymphocyte % : 18.8 %  Auto Monocyte % : 8.5 %  Auto Eosinophil % : 1.7 %  Auto Basophil % : 0.4 %      01-08    128<L>  |  93<L>  |  53<H>  ----------------------------<  109<H>  3.0<L>   |  20<L>  |  6.47<H>    Ca    9.5      08 Jan 2019 02:45    TPro  6.4  /  Alb  2.5<L>  /  TBili  0.3  /  DBili  x   /  AST  15  /  ALT  16  /  AlkPhos  163<H>  01-08      LIVER FUNCTIONS - ( 08 Jan 2019 02:45 )  Alb: 2.5 g/dL / Pro: 6.4 g/dL / ALK PHOS: 163 u/L / ALT: 16 u/L / AST: 15 u/L / GGT: x                   MICROBIOLOGY:    Culture - Blood (01.07.19 @ 21:52)    Culture - Blood:   NO ORGANISMS ISOLATED  NO ORGANISMS ISOLATED AT 24 HOURS    Specimen Source: BLOOD ARTERIAL                    RADIOLOGY:      < from: CT Chest No Cont (01.08.19 @ 13:13) >    IMPRESSION: Right pleural catheter as described above within a small   right pleural effusion containing internal bubbles of air.    Bibasilar areas of atelectasis with internal progression at the right   lung base since December 5, 2018. Opacification of the right lung central   airways likely related to secretions some of which maybe due to blood   clot or hemorrhagic debris given increased density as described above.    Small amount of ascites with interval increase in size since the prior   study.    < end of copied text >
Pt. know to me for the right latissimus dorsi and serratus chest wall reconstruction  Pt. had wound edge necrosis managed conservatively and a seroma that was referred to Interventional Radiology for help with sclerosing and resolution  Pt. reported aspiration of a pill leading ICU care  A pleural catheter placed with MRSA.   E. Coli bacteremia also.    Drain output from the donor site seroma is 25 cc    PE:  loose eschar noted  lateral stage 2 small pressure wound noted    Procedure  bedside sharp debridement done    Surgical edge necrosis  Continue off loading  Agree with Silver Aquacel daily dressing change  Would not place Collagenase due to concerns of cutaneous communication developing with seroma pocket    Lateral pressure wound noted  Off loading and padded dressing per CTICU nursing protocol    Consider nutrition/calorie count and supplement as the results dictates  Will follow

## 2019-01-14 NOTE — ADVANCED PRACTICE NURSE CONSULT - RECOMMEDATIONS
Recommend [facility] ([phone]) Consult or Surgical Consult for debridement of necrotic tissue.   Upon discharge, Recommend follow up care at [facility]: [phone]. Address: [address] [place] [address].   Nutrition consult to maximize nutrition      Topical Recommendations    Offload ears with Posey offloading   Right posterior chest wall: Clean with Saf-Clens. Rinse with NS. Pat dry. Apply Collagenase over wound bed, nickel thick, cover with silicone foam with borders. Change daily.     Continue low air loss bed therapy, continue heel elevation with Z-flex fluidized positioning boots, continue to turn & reposition q2h with Z-jordan fluidized positioning device, soft pillow between bony prominences, continue moisture management with barrier creams & single breathable pad, continue measures to decrease friction/shear/pressure. Continue with nutritional support as per dietary/orders.  Plan discussed with patient. Patient educated on topical wound therapy and on dietary recommendations (high protein, low sugar diet) to optimize wound healing.     Please contact Wound Care Service Line if we can be of further assistance (ext [phone]).

## 2019-01-14 NOTE — PROGRESS NOTE ADULT - ASSESSMENT
59 yo male (Mandarin speaking) with medical history of NICM with ICD, ESRD on HD, former smoker, BPH who was transfer for [facility] due to septic shock on 1/7/19. Pt. was initially admitted due to fever and right pleural effusion, found to have respiratory failure and intubated and afterwards developed shock, thought to be septic. Renal now called for management of pts ESRD

## 2019-01-14 NOTE — PROGRESS NOTE ADULT - SUBJECTIVE AND OBJECTIVE BOX
CLAUDIA HAN   MRN#: [id number]     The patient is a 58y Male who was seen, evaluated, & examined with the CTICU staff on rounds with a multidisciplinary care plan formulated & implemented.  All available clinical, laboratory, radiographic, pharmacologic, and electrocardiographic data were reviewed & analyzed.      The patient was in the CTICU in critical condition secondary to:     acute hypoxic respiratory failure-persistent cardiopulmonary dysfunction  septic shock    For support and evaluation & prevention of further decompensation secondary to persistent cardiopulmonary dysfunction and cardiogenic shock-cardiovascular dysfunction, respiratory failure required:     supplemental oxygen with BiPaP  continuous pulse oximetry monitoring  following ABGs with A-line monitoring    Invasive hemodynamic monitoring with an A-line was required for continuous MAP/BP monitoring to ensure adequate cardiovascular support and to evaluate for & help prevent decompensation while receiving intermittent volume expansion secondary to sepsis.     In addition:  CT scan revealed likely foreign body in R mainstem bronchus which we removed with bronchoscopy  Extubated but hypercarbic and acidotic despite unremarkable clinical appearance   On intermittent BiPaP at night with improving respiratory acidosis although still tenuous and may still need to be re-intubated   Unclear baseline PCO2 - even with PCO2 in 60s and pH between 7.25 and 7.30 he is clinically compensated (not tachypneic or with increased work of breathing) despite low pH   WIll likely need to be discharged on nocturnal BiPaP as otherwise his respiratory rate drops at night and his pCO2 increases  Low EF at baseline, unchanged per recent echo   Afebrile with no leukocytosis - infection is likely resolving on Vancomycin and Zosyn; surveillance cultures negative  HD likely tomorrow    The patient required critical care management and I provided 60 minutes of non-continuous care to the patient in addition to discussing the patient and plan at length with the CTICU staff and helping coordinate care.

## 2019-01-15 LAB
ANION GAP SERPL CALC-SCNC: 15 MEQ/L — HIGH (ref 7–14)
APTT BLD: 36.9 SEC — HIGH (ref 27.5–36.3)
BACTERIA FLD CULT: SIGNIFICANT CHANGE UP
BUN SERPL-MCNC: 67 MG/DL — HIGH (ref 7–23)
CALCIUM SERPL-MCNC: 9.7 MG/DL — SIGNIFICANT CHANGE UP (ref 8.4–10.5)
CHLORIDE SERPL-SCNC: 99 MMOL/L — SIGNIFICANT CHANGE UP (ref 98–107)
CO2 SERPL-SCNC: 28 MMOL/L — SIGNIFICANT CHANGE UP (ref 22–31)
CREAT SERPL-MCNC: 7.71 MG/DL — HIGH (ref 0.5–1.3)
GLUCOSE SERPL-MCNC: 109 MG/DL — HIGH (ref 70–99)
HCT VFR BLD CALC: 37.7 % — LOW (ref 39–50)
HGB BLD-MCNC: 11.2 G/DL — LOW (ref 13–17)
INR BLD: 1.01 — SIGNIFICANT CHANGE UP (ref 0.88–1.17)
MAGNESIUM SERPL-MCNC: 2.7 MG/DL — HIGH (ref 1.6–2.6)
MCHC RBC-ENTMCNC: 29.7 % — LOW (ref 32–36)
MCHC RBC-ENTMCNC: 30.5 PG — SIGNIFICANT CHANGE UP (ref 27–34)
MCV RBC AUTO: 102.7 FL — HIGH (ref 80–100)
NRBC # FLD: 0 K/UL — LOW (ref 25–125)
PHOSPHATE SERPL-MCNC: 5.6 MG/DL — HIGH (ref 2.5–4.5)
PLATELET # BLD AUTO: 180 K/UL — SIGNIFICANT CHANGE UP (ref 150–400)
PMV BLD: 9.9 FL — SIGNIFICANT CHANGE UP (ref 7–13)
POTASSIUM SERPL-MCNC: 4.8 MMOL/L — SIGNIFICANT CHANGE UP (ref 3.5–5.3)
POTASSIUM SERPL-SCNC: 4.8 MMOL/L — SIGNIFICANT CHANGE UP (ref 3.5–5.3)
PROTHROM AB SERPL-ACNC: 11.5 SEC — SIGNIFICANT CHANGE UP (ref 9.8–13.1)
RBC # BLD: 3.67 M/UL — LOW (ref 4.2–5.8)
RBC # FLD: 15.5 % — HIGH (ref 10.3–14.5)
SODIUM SERPL-SCNC: 142 MMOL/L — SIGNIFICANT CHANGE UP (ref 135–145)
VANCOMYCIN FLD-MCNC: 12.5 UG/ML — SIGNIFICANT CHANGE UP
WBC # BLD: 6.28 K/UL — SIGNIFICANT CHANGE UP (ref 3.8–10.5)
WBC # FLD AUTO: 6.28 K/UL — SIGNIFICANT CHANGE UP (ref 3.8–10.5)

## 2019-01-15 PROCEDURE — 99292 CRITICAL CARE ADDL 30 MIN: CPT

## 2019-01-15 PROCEDURE — 99233 SBSQ HOSP IP/OBS HIGH 50: CPT | Mod: GC

## 2019-01-15 PROCEDURE — 71045 X-RAY EXAM CHEST 1 VIEW: CPT | Mod: 26

## 2019-01-15 PROCEDURE — 99291 CRITICAL CARE FIRST HOUR: CPT

## 2019-01-15 RX ORDER — DESMOPRESSIN ACETATE 0.1 MG/1
18 TABLET ORAL ONCE
Qty: 0 | Refills: 0 | Status: DISCONTINUED | OUTPATIENT
Start: 2019-01-15 | End: 2019-01-15

## 2019-01-15 RX ADMIN — HEPARIN SODIUM 5000 UNIT(S): 5000 INJECTION INTRAVENOUS; SUBCUTANEOUS at 17:40

## 2019-01-15 RX ADMIN — PANTOPRAZOLE SODIUM 40 MILLIGRAM(S): 20 TABLET, DELAYED RELEASE ORAL at 13:23

## 2019-01-15 RX ADMIN — MIDODRINE HYDROCHLORIDE 10 MILLIGRAM(S): 2.5 TABLET ORAL at 22:58

## 2019-01-15 RX ADMIN — MIDODRINE HYDROCHLORIDE 10 MILLIGRAM(S): 2.5 TABLET ORAL at 05:09

## 2019-01-15 RX ADMIN — Medication 100 MILLIGRAM(S): at 13:23

## 2019-01-15 RX ADMIN — MIDODRINE HYDROCHLORIDE 10 MILLIGRAM(S): 2.5 TABLET ORAL at 13:22

## 2019-01-15 RX ADMIN — PIPERACILLIN AND TAZOBACTAM 25 GRAM(S): 4; .5 INJECTION, POWDER, LYOPHILIZED, FOR SOLUTION INTRAVENOUS at 05:08

## 2019-01-15 RX ADMIN — Medication 1000 MILLIGRAM(S): at 23:30

## 2019-01-15 RX ADMIN — Medication 400 MILLIGRAM(S): at 22:00

## 2019-01-15 RX ADMIN — PIPERACILLIN AND TAZOBACTAM 25 GRAM(S): 4; .5 INJECTION, POWDER, LYOPHILIZED, FOR SOLUTION INTRAVENOUS at 22:58

## 2019-01-15 RX ADMIN — HEPARIN SODIUM 5000 UNIT(S): 5000 INJECTION INTRAVENOUS; SUBCUTANEOUS at 05:09

## 2019-01-15 RX ADMIN — Medication 81 MILLIGRAM(S): at 13:22

## 2019-01-15 NOTE — PROGRESS NOTE ADULT - SUBJECTIVE AND OBJECTIVE BOX
[facility] DIVISION OF KIDNEY DISEASES AND HYPERTENSION -- FOLLOW UP NOTE  --------------------------------------------------------------------------------  HPI: 59 yo male (Mandarin speaking) with a past medical history of NICM with ICD, ESRD on HD TIW, former smoker, BPH originally transferred from [facility] due to septic shock on 1/7/19. Pt. was initially admitted due to fever and right pleural effusion, found to have acute respiratory failure therefore was intubated. Pt developed shock, thought to be septic. Pt had R thoracotomy, decortication, chest wall reconstruction, lat flap with Dr Pickering on 10/11/18. Pt extubated on 1/8/19 and currently remains on NC. Pt seen and examined. No complains      PAST HISTORY  --------------------------------------------------------------------------------  No significant changes to PMH, PSH, FHx, SHx, unless otherwise noted    ALLERGIES & MEDICATIONS  --------------------------------------------------------------------------------  Allergies    No Known Allergies    Intolerances      Standing Inpatient Medications  ALBUTerol    90 MICROgram(s) HFA Inhaler 1 Puff(s) Inhalation every 4 hours  aspirin  chewable 81 milliGRAM(s) Oral daily  desmopressin Injectable 18 MICROGram(s) IV Push once  docusate sodium 100 milliGRAM(s) Oral daily  heparin  Injectable 5000 Unit(s) SubCutaneous every 12 hours  midodrine 10 milliGRAM(s) Oral every 8 hours  pantoprazole  Injectable 40 milliGRAM(s) IV Push daily  piperacillin/tazobactam IVPB. 3.375 Gram(s) IV Intermittent every 12 hours    PRN Inpatient Medications      REVIEW OF SYSTEMS  --------------------------------------------------------------------------------  Gen: No fevers/chills  Skin: No rashes  Head/Eyes/Ears: Normal hearing,   Respiratory: No dyspnea, cough  CV: No chest pain  GI: No abdominal pain, diarrhea  : No dysuria, hematuria  MSK: No  edema  Heme: No easy bruising or bleeding  Psych: No significant depression      All other systems were reviewed and are negative, except as noted.    VITALS/PHYSICAL EXAM  --------------------------------------------------------------------------------  T(C): 36.9 (01-15-19 @ 00:00), Max: 37.1 (01-14-19 @ 20:00)  HR: 101 (01-15-19 @ 07:00) (88 - 111)  BP: 110/59 (01-15-19 @ 07:00) (90/51 - 116/64)  RR: 18 (01-15-19 @ 07:00) (11 - 26)  SpO2: 97% (01-15-19 @ 07:00) (86% - 100%)  Wt(kg): --        01-14-19 @ 07:01  -  01-15-19 @ 07:00  --------------------------------------------------------  IN: 720 mL / OUT: 25 mL / NET: 695 mL        Physical Exam:  	Gen: Awake, NAD  	HEENT: on NC   	Pulm: course breath sounds B/L +Right SANDY tube  	CV: S1S2  	Abd: Soft, +BS   	Ext: No LE edema B/L  	Neuro: Awake  	Skin: Warm and dry  	Vascular access:  LUE AVF site +thrill +bruit      LABS/STUDIES  --------------------------------------------------------------------------------              11.2   6.28  >-----------<  180      [01-15-19 @ 02:45]              37.7     142  |  99  |  67  ----------------------------<  109      [01-15-19 @ 02:45]  4.8   |  28  |  7.71        Ca     9.7     [01-15-19 @ 02:45]      Mg     2.7     [01-15-19 @ 02:45]      Phos  5.6     [01-15-19 @ 02:45]    TPro  6.4  /  Alb  2.5  /  TBili  0.4  /  DBili  x   /  AST  26  /  ALT  25  /  AlkPhos  184  [01-14-19 @ 03:30]    PT/INR: PT 11.5 , INR 1.01       [01-15-19 @ 02:45]  PTT: 36.9       [01-15-19 @ 02:45]      Creatinine Trend:  SCr 7.71 [01-15 @ 02:45]  SCr 6.33 [01-14 @ 03:30]  SCr 4.80 [01-13 @ 02:00]  SCr 3.82 [01-12 @ 14:00]  SCr 6.17 [01-12 @ 04:30]        HbA1c 5.5      [10-12-18 @ 02:53]  TSH 4.79      [10-21-18 @ 04:15]    HBsAb Reactive      [01-08-19 @ 08:35]  HBsAg NEGATIVE      [01-08-19 @ 08:35]  HCV 0.16, Nonreactive Hepatitis C AB  S/CO Ratio                        Interpretation  < 1.0                                     Non-Reactive  1.0 - 4.9                           Weakly-Reactive  > 5.0                                 Reactive  Non-Reactive: Aperson with a non-reactive HCV antibody  result is considered uninfected.  No further action is  needed unless recent infection is suspected.  In these  cases, consider repeat testing later to detect  seroconversion..  Weakly-Reactive: HCV antibody test is abnormal, HCV RNA  Qualitative test will follow.  Reactive: HCV antibody test is abnormal, HCV RNA  Qualitative test will follow.  Note: HCV antibody testing is performed on the [employer]   system.      [01-08-19 @ 08:35]

## 2019-01-15 NOTE — PROGRESS NOTE ADULT - ASSESSMENT
59 yo male (Mandarin speaking) with medical history of NICM with ICD, ESRD on HD, former smoker, BPH who was transfer for [facility] due to septic shock on 1/7/19. Pt. was initially admitted due to fever and right pleural effusion, found to have respiratory failure and intubated and afterwards developed shock, thought to be septic. Renal now called for management of pts ESRD

## 2019-01-15 NOTE — PROGRESS NOTE ADULT - SUBJECTIVE AND OBJECTIVE BOX
Infectious Diseases progress note:    Subjective: NAD, awake, alert.  s/p chest tube removal.  Afebrile.      ROS:  CONSTITUTIONAL:  No fever, chills, rigors  CARDIOVASCULAR:  No chest pain or palpitations  RESPIRATORY:   No SOB, cough, dyspnea on exertion.  No wheezing  GASTROINTESTINAL:  No abd pain, N/V, diarrhea/constipation  EXTREMITIES:  No swelling or joint pain  GENITOURINARY:  No burning on urination, increased frequency or urgency.  No flank pain  NEUROLOGIC:  No HA, visual disturbances  SKIN: No rashes    Allergies    No Known Allergies    Intolerances        ANTIBIOTICS/RELEVANT:  antimicrobials  piperacillin/tazobactam IVPB. 3.375 Gram(s) IV Intermittent every 12 hours    immunologic:    OTHER:  ALBUTerol    90 MICROgram(s) HFA Inhaler 1 Puff(s) Inhalation every 4 hours  aspirin  chewable 81 milliGRAM(s) Oral daily  docusate sodium 100 milliGRAM(s) Oral daily  heparin  Injectable 5000 Unit(s) SubCutaneous every 12 hours  midodrine 10 milliGRAM(s) Oral every 8 hours  pantoprazole  Injectable 40 milliGRAM(s) IV Push daily      Objective:  Vital Signs Last 24 Hrs  T(C): 36.7 (15 Avila 2019 12:00), Max: 37.1 (14 Jan 2019 20:00)  T(F): 98 (15 Avila 2019 12:00), Max: 98.8 (14 Jan 2019 20:00)  HR: 104 (15 Avila 2019 16:00) (90 - 111)  BP: 100/63 (15 Avila 2019 16:00) (97/51 - 116/68)  BP(mean): 73 (15 Avila 2019 15:00) (62 - 80)  RR: 16 (15 Avila 2019 16:00) (11 - 26)  SpO2: 99% (15 Avila 2019 16:00) (86% - 100%)    PHYSICAL EXAM:  Constitutional:NAD  Eyes:CHER, EOMI  Ear/Nose/Throat: no thrush, mucositis.  Moist mucous membranes	  Neck:no JVD, no lymphadenopathy, supple  Respiratory: CTA adore  Cardiovascular: S1S2 RRR, no murmurs  Gastrointestinal:soft, nontender,  nondistended (+) BS  Extremities:no e/e/c  Skin:  rt post chest dsgs c/d/i, no SOI        LABS:                        11.2   6.28  )-----------( 180      ( 15 Avila 2019 02:45 )             37.7     01-15    142  |  99  |  67<H>  ----------------------------<  109<H>  4.8   |  28  |  7.71<H>    Ca    9.7      15 Avila 2019 02:45  Phos  5.6     01-15  Mg     2.7     01-15    TPro  6.4  /  Alb  2.5<L>  /  TBili  0.4  /  DBili  x   /  AST  26  /  ALT  25  /  AlkPhos  184<H>  01-14    PT/INR - ( 15 Avila 2019 02:45 )   PT: 11.5 SEC;   INR: 1.01          PTT - ( 15 Avila 2019 02:45 )  PTT:36.9 SEC        Vancomycin Level, Random:  ug/mL (01-13 @ 02:00)  Vancomycin Level, Random:  ug/mL (01-12 @ 14:00)  Vancomycin Level, Random:  ug/mL (01-12 @ 04:30)      Vancomycin Level, Trough: 20.9 ug/mL (01-14 @ 03:30)              MICROBIOLOGY:    Culture - Blood (01.09.19 @ 22:24)    Culture - Blood:   NO ORGANISMS ISOLATED    Specimen Source: BLOOD    Culture - Blood (01.09.19 @ 22:24)    Culture - Blood:   NO ORGANISMS ISOLATED    Specimen Source: BLOOD PERIPHERAL    Culture - Body Fluid with Gram Stain (01.09.19 @ 15:41)    Gram Stain:   WBC^White Blood Cells  QNTY CELLS IN GRAM STAIN: FEW (2+)  NOS^No Organisms Seen    Culture - Body Fluid:   NO GROWTH - PRELIMINARY RESULTS  NO ORGANISMS ISOLATED AT 24 HOURS  NO ORGANISMS ISOLATED AT 48 HRS.  NO ORGANISMS ISOLATED AT 72 HRS.  NO GROWTH AFTER 4 DAYS INCUBATION  NO GROWTH AFTER 5 DAYS INCUBATION    Specimen Source: PLEURAL FLUID    Culture - Respiratory with Gram Stain (01.08.19 @ 20:04)    Gram Stain Sputum:   WBC^White Blood Cells  QNTY CELLS IN GRAM STAIN: RARE (1+)  YEAST^YEAST.  QUANTITY OF BACTERIA SEEN: RARE (1+)    Culture - Respiratory:   Yeast species, not Cryptococcus neoformans  YEAST^YEAST.  QUANTITY OF GROWTH: MODERATE    Specimen Source: BRONCHIAL LAVAGE    Culture - Respiratory with Gram Stain (01.08.19 @ 20:01)    Gram Stain Sputum:   WBC^White Blood Cells  QNTY CELLS IN GRAM STAIN: FEW (2+)  NOS^No Organisms Seen    Culture - Respiratory:   NRF^Normal Respiratory Jennifer  QUANTITY OF GROWTH: RARE    Specimen Source: BRONCHIAL LAVAGE    Culture - Blood (01.07.19 @ 21:52)    Culture - Blood:   NO ORGANISMS ISOLATED    Specimen Source: BLOOD ARTERIAL          RADIOLOGY & ADDITIONAL STUDIES:    < from: Xray Chest 1 View- PORTABLE-Routine (01.15.19 @ 06:23) >    IMPRESSION:  Follow-up with bilateral small effusions unchanged and   right-sided pigtail catheter placed.    < end of copied text >

## 2019-01-15 NOTE — PROGRESS NOTE ADULT - SUBJECTIVE AND OBJECTIVE BOX
CLAUDIA HAN   MRN#: [id number]     The patient is a 58y Male who was seen, evaluated, & examined with the CTICU staff on rounds with a multidisciplinary care plan formulated & implemented.  All available clinical, laboratory, radiographic, pharmacologic, and electrocardiographic data were reviewed & analyzed.      The patient was in the CTICU in critical condition secondary to:     acute hypoxic respiratory failure-persistent cardiopulmonary dysfunction  septic shock    For support and evaluation & prevention of further decompensation secondary to persistent cardiopulmonary dysfunction and cardiogenic shock-cardiovascular dysfunction, respiratory failure required:     supplemental oxygen with BiPaP  continuous pulse oximetry monitoring  following ABGs with A-line monitoring    Invasive hemodynamic monitoring with an A-line was required for continuous MAP/BP monitoring to ensure adequate cardiovascular support and to evaluate for & help prevent decompensation while receiving intermittent volume expansion secondary to sepsis.     In addition:  CT scan revealed likely foreign body in R mainstem bronchus which was removed with bronchoscopy  Extubated but hypercarbic and acidotic despite unremarkable clinical appearance   On intermittent BiPaP at night with improving respiratory acidosis although still tenuous - will likely need to be discharged on nocturnal BiPaP or otherwise his respiratory rate drops at night and his pCO2 increases  Unclear baseline PCO2 - even with PCO2 in 60s and pH between 7.25 and 7.30 he is clinically compensated (not tachypneic or with increased work of breathing) despite low pH   Low EF at baseline, unchanged per recent echo   Afebrile with no leukocytosis - infection is likely resolving on Vancomycin and Zosyn; surveillance cultures negative  HD likely today  Some oozing from groin site where triple lumen catheter (venous) was removed despite dressing; PTT elevated for unclear reason and platelets may be dysfunctional in setting of renal failure; will dose DDAVP and if this is not effective the site may require sutures to stop the bleeding  Despite groin ooze H/H stable and actually increased from prior    The patient required critical care management and I provided 80 minutes of non-continuous care to the patient in addition to discussing the patient and plan at length with the CTICU staff and helping coordinate care.

## 2019-01-15 NOTE — PROGRESS NOTE ADULT - ASSESSMENT
Pt is a 58 yr old Mandarin speaking male who underwent right thoractomy decortiation, Right Chest Wall reconstruction with Latissimjus Dorsi Flap Poss Skin Graft with VAC dressing with Dr Pickering 10/11/18.  He has h/o ESRD with HD, CHF, EF of 10%,  ICD,  with h/o chronic bilateral pleural effusions.  The patient previously underwent left VATS and PleurX drains  in 2015.  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     During his last admission at [facility], pt underwent OR with Plastic and Thoracic surgery,  for definitive repair that involved thoracotomy, decortication, excision of empyema cavity along with muscle flap reconstruction. Pt underwent surgery on 10/11 (Right thoracotomy, resection of portion of R 7th rib, evacuation of infected hemothorax, takedown of pleurocutaneous fistula - and noted to have foul smelling hematoma).  OR cultures grew Enterococcus faecalis.  Pt was treated with zosyn for 4 week course for infected hemothorax which was completed on 11/7.      He was admitted on 1/6/19 to [facility] with a R pleural effusion and respiratory failure,  A R PTC was placed there and he was intubated.  He still had a R SANDY drain in place.  He presented intubated and sedated on pressors. (07 Jan 2019 23:10) Cultures from pleural fluid at [facility] reportedly grew MRSA.  Pt is currently on vanco/zosyn.      1/8 - Bronchoscopy for removal of foreign body in airway.      1/9 - reports from [facility] reviewed:  On admission there, pt had fever 102.3, P 121, BP 74/48.  Bcx from 1/6 (+) E.coli (sensitive to zosyn), and pleural fluid cx 1/8 grew MRSA.  Pleural fluid showed 28 WBC.  AFB (-).      ID consult called for further abx management.      Problem/Plan - 1:    Septic shock    - Pt with R pleural effusion, s/p SANDY drain/chest tube, (+) MRSA reported from pleural fluid cultures at [facility], Total WBC from pleural fluid - 24.  Drainage has been serosanguinous.   Cont to dose vanco by level.  Trough elevated on 1/14.  Check next trough after HD today.  Will treat for 4 week course.  Can dose vanco 1gm 3x/week with HD as outpatient.  Complete on 2/3.     - Blood cx also (+) for E.coli at [facility], source unclear.    Cont zosyn.  Repeat BCx (-) x 1 at [facility].  Recommend CTap with po/iv contrast to evaluate for bacteremia source, as pleural fluid cultures grew MRSA.  Will treat for 2 week course from date of negative blood cultures through 1/21.  Can change to PO cipro 250mg PO Qdaily based on sensitivity panel (sent from [facility])      - BAL specimens (+) yeast, suspect colonizer.      - echocardiogram stable findings compared to last study.    Will follow,    Anabel Chahal  [phone]

## 2019-01-16 LAB
ANION GAP SERPL CALC-SCNC: 10 MEQ/L — SIGNIFICANT CHANGE UP (ref 7–14)
ANION GAP SERPL CALC-SCNC: 13 MEQ/L — SIGNIFICANT CHANGE UP (ref 7–14)
BLD GP AB SCN SERPL QL: NEGATIVE — SIGNIFICANT CHANGE UP
BUN SERPL-MCNC: 33 MG/DL — HIGH (ref 7–23)
BUN SERPL-MCNC: 44 MG/DL — HIGH (ref 7–23)
CALCIUM SERPL-MCNC: 6.2 MG/DL — CRITICAL LOW (ref 8.4–10.5)
CALCIUM SERPL-MCNC: 9.4 MG/DL — SIGNIFICANT CHANGE UP (ref 8.4–10.5)
CHLORIDE SERPL-SCNC: 111 MMOL/L — HIGH (ref 98–107)
CHLORIDE SERPL-SCNC: 95 MMOL/L — LOW (ref 98–107)
CO2 SERPL-SCNC: 22 MMOL/L — SIGNIFICANT CHANGE UP (ref 22–31)
CO2 SERPL-SCNC: 29 MMOL/L — SIGNIFICANT CHANGE UP (ref 22–31)
CREAT SERPL-MCNC: 4.04 MG/DL — HIGH (ref 0.5–1.3)
CREAT SERPL-MCNC: 5.85 MG/DL — HIGH (ref 0.5–1.3)
GLUCOSE SERPL-MCNC: 64 MG/DL — LOW (ref 70–99)
GLUCOSE SERPL-MCNC: 93 MG/DL — SIGNIFICANT CHANGE UP (ref 70–99)
HCT VFR BLD CALC: 38.4 % — LOW (ref 39–50)
HGB BLD-MCNC: 11.5 G/DL — LOW (ref 13–17)
MCHC RBC-ENTMCNC: 29.9 % — LOW (ref 32–36)
MCHC RBC-ENTMCNC: 31.5 PG — SIGNIFICANT CHANGE UP (ref 27–34)
MCV RBC AUTO: 105.2 FL — HIGH (ref 80–100)
NRBC # FLD: 0 K/UL — LOW (ref 25–125)
PLATELET # BLD AUTO: 149 K/UL — LOW (ref 150–400)
PMV BLD: 9.5 FL — SIGNIFICANT CHANGE UP (ref 7–13)
POTASSIUM SERPL-MCNC: 2.9 MMOL/L — CRITICAL LOW (ref 3.5–5.3)
POTASSIUM SERPL-MCNC: 4.7 MMOL/L — SIGNIFICANT CHANGE UP (ref 3.5–5.3)
POTASSIUM SERPL-SCNC: 2.9 MMOL/L — CRITICAL LOW (ref 3.5–5.3)
POTASSIUM SERPL-SCNC: 4.7 MMOL/L — SIGNIFICANT CHANGE UP (ref 3.5–5.3)
RBC # BLD: 3.65 M/UL — LOW (ref 4.2–5.8)
RBC # FLD: 15.3 % — HIGH (ref 10.3–14.5)
RH IG SCN BLD-IMP: POSITIVE — SIGNIFICANT CHANGE UP
SODIUM SERPL-SCNC: 137 MMOL/L — SIGNIFICANT CHANGE UP (ref 135–145)
SODIUM SERPL-SCNC: 143 MMOL/L — SIGNIFICANT CHANGE UP (ref 135–145)
WBC # BLD: 4.85 K/UL — SIGNIFICANT CHANGE UP (ref 3.8–10.5)
WBC # FLD AUTO: 4.85 K/UL — SIGNIFICANT CHANGE UP (ref 3.8–10.5)

## 2019-01-16 PROCEDURE — 99291 CRITICAL CARE FIRST HOUR: CPT

## 2019-01-16 PROCEDURE — 99232 SBSQ HOSP IP/OBS MODERATE 35: CPT | Mod: GC

## 2019-01-16 PROCEDURE — 71045 X-RAY EXAM CHEST 1 VIEW: CPT | Mod: 26

## 2019-01-16 RX ORDER — VANCOMYCIN HCL 1 G
750 VIAL (EA) INTRAVENOUS ONCE
Qty: 0 | Refills: 0 | Status: COMPLETED | OUTPATIENT
Start: 2019-01-16 | End: 2019-01-16

## 2019-01-16 RX ORDER — ASPIRIN/CALCIUM CARB/MAGNESIUM 324 MG
81 TABLET ORAL DAILY
Qty: 0 | Refills: 0 | Status: DISCONTINUED | OUTPATIENT
Start: 2019-01-16 | End: 2019-01-22

## 2019-01-16 RX ORDER — ACETAMINOPHEN 500 MG
1000 TABLET ORAL ONCE
Qty: 0 | Refills: 0 | Status: COMPLETED | OUTPATIENT
Start: 2019-01-15 | End: 2019-01-15

## 2019-01-16 RX ORDER — ACETAMINOPHEN 500 MG
1000 TABLET ORAL ONCE
Qty: 0 | Refills: 0 | Status: COMPLETED | OUTPATIENT
Start: 2019-01-16 | End: 2019-01-16

## 2019-01-16 RX ORDER — CIPROFLOXACIN LACTATE 400MG/40ML
250 VIAL (ML) INTRAVENOUS DAILY
Qty: 0 | Refills: 0 | Status: DISCONTINUED | OUTPATIENT
Start: 2019-01-16 | End: 2019-01-22

## 2019-01-16 RX ORDER — PANTOPRAZOLE SODIUM 20 MG/1
40 TABLET, DELAYED RELEASE ORAL
Qty: 0 | Refills: 0 | Status: DISCONTINUED | OUTPATIENT
Start: 2019-01-16 | End: 2019-01-22

## 2019-01-16 RX ADMIN — Medication 400 MILLIGRAM(S): at 08:49

## 2019-01-16 RX ADMIN — Medication 250 MILLIGRAM(S): at 12:46

## 2019-01-16 RX ADMIN — Medication 100 MILLIGRAM(S): at 12:42

## 2019-01-16 RX ADMIN — PANTOPRAZOLE SODIUM 40 MILLIGRAM(S): 20 TABLET, DELAYED RELEASE ORAL at 12:41

## 2019-01-16 RX ADMIN — Medication 81 MILLIGRAM(S): at 12:42

## 2019-01-16 RX ADMIN — HEPARIN SODIUM 5000 UNIT(S): 5000 INJECTION INTRAVENOUS; SUBCUTANEOUS at 17:40

## 2019-01-16 RX ADMIN — HEPARIN SODIUM 5000 UNIT(S): 5000 INJECTION INTRAVENOUS; SUBCUTANEOUS at 05:43

## 2019-01-16 RX ADMIN — MIDODRINE HYDROCHLORIDE 10 MILLIGRAM(S): 2.5 TABLET ORAL at 06:20

## 2019-01-16 RX ADMIN — MIDODRINE HYDROCHLORIDE 10 MILLIGRAM(S): 2.5 TABLET ORAL at 15:38

## 2019-01-16 RX ADMIN — Medication 250 MILLIGRAM(S): at 12:42

## 2019-01-16 RX ADMIN — MIDODRINE HYDROCHLORIDE 10 MILLIGRAM(S): 2.5 TABLET ORAL at 21:36

## 2019-01-16 RX ADMIN — PIPERACILLIN AND TAZOBACTAM 25 GRAM(S): 4; .5 INJECTION, POWDER, LYOPHILIZED, FOR SOLUTION INTRAVENOUS at 05:44

## 2019-01-16 NOTE — PROGRESS NOTE ADULT - SUBJECTIVE AND OBJECTIVE BOX
[facility] DIVISION OF KIDNEY DISEASES AND HYPERTENSION -- FOLLOW UP NOTE  --------------------------------------------------------------------------------  HPI: 57 yo male (Mandarin speaking) with a past medical history of NICM with ICD, ESRD on HD TIW, former smoker, BPH originally transferred from [facility] due to septic shock on 1/7/19. Pt. was initially admitted due to fever and right pleural effusion, found to have acute respiratory failure therefore was intubated. Pt developed shock, thought to be septic. Pt had R thoracotomy, decortication, chest wall reconstruction, lat flap with Dr Pickering on 10/11/18. Pt extubated on 1/8/19 and currently remains on NC. Pt seen and examined. No complains    PAST HISTORY  --------------------------------------------------------------------------------  No significant changes to PMH, PSH, FHx, SHx, unless otherwise noted    ALLERGIES & MEDICATIONS  --------------------------------------------------------------------------------  Allergies    No Known Allergies    Intolerances      Standing Inpatient Medications  ALBUTerol    90 MICROgram(s) HFA Inhaler 1 Puff(s) Inhalation every 4 hours  aspirin  chewable 81 milliGRAM(s) Oral daily  ciprofloxacin     Tablet 250 milliGRAM(s) Oral daily  docusate sodium 100 milliGRAM(s) Oral daily  heparin  Injectable 5000 Unit(s) SubCutaneous every 12 hours  midodrine 10 milliGRAM(s) Oral every 8 hours  pantoprazole  Injectable 40 milliGRAM(s) IV Push daily    PRN Inpatient Medications  acetaminophen  IVPB .. 1000 milliGRAM(s) IV Intermittent once PRN      REVIEW OF SYSTEMS  --------------------------------------------------------------------------------  Gen: No fevers/chills  Skin: No rashes  Head/Eyes/Ears: Normal hearing,   Respiratory: No dyspnea, cough  CV: No chest pain  GI: No abdominal pain, diarrhea  : No dysuria, hematuria  MSK: No  edema  Heme: No easy bruising or bleeding  Psych: No significant depression      All other systems were reviewed and are negative, except as noted.    VITALS/PHYSICAL EXAM  --------------------------------------------------------------------------------  T(C): 36.7 (01-16-19 @ 04:00), Max: 36.9 (01-15-19 @ 18:50)  HR: 98 (01-16-19 @ 07:17) (93 - 117)  BP: 104/63 (01-16-19 @ 07:00) (92/55 - 117/68)  RR: 15 (01-16-19 @ 07:18) (12 - 25)  SpO2: 100% (01-16-19 @ 07:18) (91% - 100%)  Wt(kg): --        01-15-19 @ 07:01  -  01-16-19 @ 07:00  --------------------------------------------------------  IN: 1180 mL / OUT: 1635 mL / NET: -455 mL        Physical Exam:  	Gen: Awake, NAD  	HEENT: on NC   	Pulm: course breath sounds B/L +Right SANDY tube  	CV: S1S2  	Abd: Soft, +BS   	Ext: No LE edema B/L  	Neuro: Awake  	Skin: Warm and dry  	Vascular access:  LUE AVF site +thrill +bruit      LABS/STUDIES  --------------------------------------------------------------------------------              11.5   4.85  >-----------<  149      [01-16-19 @ 05:00]              38.4     137  |  95  |  44  ----------------------------<  93      [01-16-19 @ 06:10]  4.7   |  29  |  5.85        Ca     9.4     [01-16-19 @ 06:10]      Mg     2.7     [01-15-19 @ 02:45]      Phos  5.6     [01-15-19 @ 02:45]      PT/INR: PT 11.5 , INR 1.01       [01-15-19 @ 02:45]  PTT: 36.9       [01-15-19 @ 02:45]      Creatinine Trend:  SCr 5.85 [01-16 @ 06:10]  SCr 4.04 [01-16 @ 05:00]  SCr 7.71 [01-15 @ 02:45]  SCr 6.33 [01-14 @ 03:30]  SCr 4.80 [01-13 @ 02:00]        HbA1c 5.5      [10-12-18 @ 02:53]  TSH 4.79      [10-21-18 @ 04:15]    HBsAb Reactive      [01-08-19 @ 08:35]  HBsAg NEGATIVE      [01-08-19 @ 08:35]  HCV 0.16, Nonreactive Hepatitis C AB  S/CO Ratio                        Interpretation  < 1.0                                     Non-Reactive  1.0 - 4.9                           Weakly-Reactive  > 5.0                                 Reactive  Non-Reactive: Aperson with a non-reactive HCV antibody  result is considered uninfected.  No further action is  needed unless recent infection is suspected.  In these  cases, consider repeat testing later to detect  seroconversion..  Weakly-Reactive: HCV antibody test is abnormal, HCV RNA  Qualitative test will follow.  Reactive: HCV antibody test is abnormal, HCV RNA  Qualitative test will follow.  Note: HCV antibody testing is performed on the [employer]   system.      [01-08-19 @ 08:35]

## 2019-01-16 NOTE — PROGRESS NOTE ADULT - PROBLEM SELECTOR PLAN 1
Pt. with ESRD on HD TIW (MWF). Last HD as was on 1/15/19 via AVF with UF 1.1 L No plan for HD today. Monitor labs.

## 2019-01-16 NOTE — PROGRESS NOTE ADULT - SUBJECTIVE AND OBJECTIVE BOX
CLAUDIA HAN   MRN#: [id number]     The patient is a 58y Male COPD, H/O Smoking, Acute on chronic systolic heart failure. HFrEF - LVEF 20%, severely dilated LV, LVIDD 7.3 cm. Moderate MRESRD (end stage renal disease) on dialysis, s/p R chest wall reconstruction with tunneled latissimus dorsi flap, covered by serratus anterior flap, after R thoracotomy, takedown pleurocutaneous fistula, decortication, resection Right 7th rib on 10/11, was readmitted recently for acute respiratory failure, 2/2  PNA, and acute COPD excerbation.  Pt now extubated, requring BiPAP and high flow O2. Pt was seen, evaluated, & examined with the CTICU staff on rounds and later in the day with a multidisciplinary care plan formulated & implemented.  All available clinical, laboratory, radiographic, pharmacologic, and electrocardiographic data were reviewed & analyzed.      The patient was in the CTICU in critical condition secondary to persistent cardiopulmonary dysfunction, hemodynamically significant ahypovolemia, hemodynamically significant acute respiratory failure with hypercapnia/hypoxia, acidosis, & stress hyperglycemia.      Respiratory status required BiPAP, O2, , the following of ABG’s , continuous pulse oximetry monitoring, for support & to evaluate for & prevent further decompensation secondary to persistent cardiopulmonary dysfunction.      ESRD, HD dependent.     Patient required more than the usual postoperative critical care management and I provided 30 minutes of non-continuous care to the patient.  Discussed at length with the CTICU staff and helped coordinate care.

## 2019-01-16 NOTE — PROGRESS NOTE ADULT - SUBJECTIVE AND OBJECTIVE BOX
CLAUDIA HAN            MRN-[id number]         No Known Allergies                 Pt is a 58 yr old  male who underwent a FB, R thoracotomy, decortication, chest wall reconstruction, lat flap with Dr Pickering on 10/11/18. He was admitted on 1/6/19 to [facility] with a R pleural effusion and respiratory failure,  A R PTC was placed there and he was intubated.  He still had a R SANDY drain in place.  He presented intubated and sedated on pressors. (07 Jan 2019 23:10)      Procedure:    10/11/2018   Right thoracotomy, resection of portion of R 7th rib, evacuation of infected hemothorax, takedown of pleurocutaneous fistula         Issues:  Hypotension on Midodrine  Sepsis  Hypoxemia / Respiratory acidosis  Cardiomyopathy  Pleural effusion  BPH               Home Medications:  aspirin 81 mg oral tablet: 1 tab(s) orally once a day  (30 Oct 2018 14:39)  Breo Ellipta 100 mcg-25 mcg/inh inhalation powder: 1 puff(s) inhaled once a day patient denies using it (11 Oct 2018 08:38)  Calcium 500: 1 tab(s) orally 2 times a day (11 Oct 2018 08:38)  docusate sodium 100 mg oral tablet: 1 tab(s) orally 2 times a day, As Needed (11 Oct 2018 08:39)  folic acid 1 mg oral tablet: 1 tab(s) orally once a day (11 Oct 2018 08:38)  Mag-Ox 400 oral tablet: 1 tab(s) orally once a day (11 Oct 2018 08:39)  Multiple Vitamins oral tablet: 1 tab(s) orally once a day  (30 Oct 2018 14:39)  Namenda 10 mg oral tablet: 1 tab(s) orally 2 times a day (11 Oct 2018 08:38)  Nephplex Rx oral tablet: 1 tab(s) orally once a day (11 Oct 2018 08:39)  nortriptyline 50 mg oral capsule: 1 cap(s) orally once a day (11 Oct 2018 08:39)  piperacillin-tazobactam 2 g-0.25 g intravenous injection: 1  intravenous every 12 hours until 11/7/18 (30 Oct 2018 16:27)  Renvela 800 mg oral tablet: 2 tab(s) orally every 8 hours (11 Oct 2018 08:39)  simethicone 80 mg oral tablet: 1 tab(s) orally 3 times a day (after meals) (11 Oct 2018 08:39)  Tylenol 325 mg oral tablet: 2 tab(s) orally every 6 hours, As Needed (30 Oct 2018 14:39)  vitamin A: 1 tab(s) orally once a day (11 Oct 2018 08:38)  Vitamin B Complex: 1 tab(s) orally once a day (11 Oct 2018 08:38)  Vitamin C 500 mg oral tablet: 1 tab(s) orally once a day (11 Oct 2018 08:38)      PAST MEDICAL & SURGICAL HISTORY:  Smoking hx  Cardiomyopathy  Wound: right chest  ICD (implantable cardioverter-defibrillator) in place  OA (osteoarthritis)  HLD (hyperlipidemia)  BPH (benign prostatic hyperplasia)  Pleural effusion  HTN (hypertension)  ESRD (end stage renal disease)  H/O chest wound: right  S/P thoracotomy: FB, R thoracotomy, decortication, chest wall reconstruction, lat flap  History of wound infection: right chest wall - revision 5/18 and again in 7/18  History of implantable cardioverter-defibrillator (ICD) placement: pt unsure when placed  H/O bilateral hip replacements: 2008, 2009        ICU Vital Signs Last 24 Hrs  T(C): 36.7 (16 Jan 2019 04:00), Max: 36.9 (15 Avila 2019 18:50)  T(F): 98 (16 Jan 2019 04:00), Max: 98.5 (15 Avila 2019 18:50)  HR: 102 (16 Jan 2019 05:00) (93 - 117)  BP: 95/55 (16 Jan 2019 04:00) (92/55 - 117/68)  BP(mean): 63 (16 Jan 2019 04:00) (63 - 80)  ABP: --  ABP(mean): --  RR: 22 (16 Jan 2019 05:00) (12 - 25)  SpO2: 91% (16 Jan 2019 05:00) (91% - 100%)    I&O's Detail    14 Jan 2019 07:01  -  15 Avila 2019 07:00  --------------------------------------------------------  IN:    Oral Fluid: 720 mL  Total IN: 720 mL    OUT:    Chest Tube: 10 mL    Drain: 15 mL  Total OUT: 25 mL    Total NET: 695 mL      15 Avila 2019 07:01  -  16 Jan 2019 06:03  --------------------------------------------------------  IN:    IV PiggyBack: 200 mL    Oral Fluid: 480 mL    Other: 500 mL  Total IN: 1180 mL    OUT:    Drain: 35 mL    Other: 1600 mL  Total OUT: 1635 mL    Total NET: -455 mL        CAPILLARY BLOOD GLUCOSE          Home Medications:  aspirin 81 mg oral tablet: 1 tab(s) orally once a day  (30 Oct 2018 14:39)  Breo Ellipta 100 mcg-25 mcg/inh inhalation powder: 1 puff(s) inhaled once a day patient denies using it (11 Oct 2018 08:38)  Calcium 500: 1 tab(s) orally 2 times a day (11 Oct 2018 08:38)  docusate sodium 100 mg oral tablet: 1 tab(s) orally 2 times a day, As Needed (11 Oct 2018 08:39)  folic acid 1 mg oral tablet: 1 tab(s) orally once a day (11 Oct 2018 08:38)  Mag-Ox 400 oral tablet: 1 tab(s) orally once a day (11 Oct 2018 08:39)  Multiple Vitamins oral tablet: 1 tab(s) orally once a day  (30 Oct 2018 14:39)  Namenda 10 mg oral tablet: 1 tab(s) orally 2 times a day (11 Oct 2018 08:38)  Nephplex Rx oral tablet: 1 tab(s) orally once a day (11 Oct 2018 08:39)  nortriptyline 50 mg oral capsule: 1 cap(s) orally once a day (11 Oct 2018 08:39)  piperacillin-tazobactam 2 g-0.25 g intravenous injection: 1  intravenous every 12 hours until 11/7/18 (30 Oct 2018 16:27)  Renvela 800 mg oral tablet: 2 tab(s) orally every 8 hours (11 Oct 2018 08:39)  simethicone 80 mg oral tablet: 1 tab(s) orally 3 times a day (after meals) (11 Oct 2018 08:39)  Tylenol 325 mg oral tablet: 2 tab(s) orally every 6 hours, As Needed (30 Oct 2018 14:39)  vitamin A: 1 tab(s) orally once a day (11 Oct 2018 08:38)  Vitamin B Complex: 1 tab(s) orally once a day (11 Oct 2018 08:38)  Vitamin C 500 mg oral tablet: 1 tab(s) orally once a day (11 Oct 2018 08:38)      MEDICATIONS  (STANDING):  ALBUTerol    90 MICROgram(s) HFA Inhaler 1 Puff(s) Inhalation every 4 hours  aspirin  chewable 81 milliGRAM(s) Oral daily  ciprofloxacin     Tablet 250 milliGRAM(s) Oral daily  docusate sodium 100 milliGRAM(s) Oral daily  heparin  Injectable 5000 Unit(s) SubCutaneous every 12 hours  midodrine 10 milliGRAM(s) Oral every 8 hours  pantoprazole  Injectable 40 milliGRAM(s) IV Push daily    MEDICATIONS  (PRN):  acetaminophen  IVPB .. 1000 milliGRAM(s) IV Intermittent once PRN Temp greater or equal to 38C (100.4F), Moderate Pain (4 - 6)          Physical exam:   General:               Pt is awake, alert,  appears to be in mild respiratory distress                                                  Neuro:                  Nonfocal                             Cardiovascular:   S1 & S2, regular                           Respiratory:         Air entry is fair and equal on both sides, has bilateral conducted sounds                           GI:                          Soft, nondistended and nontender, Bowel sounds active                            Ext:                        No cyanosis or edema                              Labs:                                                                           11.5   4.85  )-----------( 149      ( 16 Jan 2019 05:00 )             38.4             01-15    142  |  99  |  67<H>  ----------------------------<  109<H>  4.8   |  28  |  7.71<H>    Ca    9.7      15 Avila 2019 02:45  Phos  5.6     01-15  Mg     2.7     01-15                    PT/INR - ( 15 Avila 2019 02:45 )   PT: 11.5 SEC;   INR: 1.01          PTT - ( 15 Avila 2019 02:45 )  PTT:36.9 SEC          CXR:    < from: Xray Chest 1 View- PORTABLE-Routine (01.15.19 @ 06:23) >  Bilateral small to moderate pleural effusions unchanged. Subcutaneous   AICD in place. No focal consolidations. Heart size is stable. No   pneumothorax. Right-sided pigtail catheter unchanged overlying lower   hemithorax.          Plan:    General:   Pt is a 58 yr old  male who underwent a FB, R thoracotomy, decortication, chest wall reconstruction, lat flap with Dr Pickering on 10/11/18. He was admitted on 1/6/19 to [facility] with a R pleural effusion and respiratory failure,  A R PTC was placed there and he was intubated.  He still had a R SANDY drain in place.  He presented intubated and sedated on pressors. (07 Jan 2019 23:10). Pt is currently extubated but on / off BiPAP for  hypoxia / respiratory acidosis. Pt desaturates to 80's very quickly at at night with out BiPAP                            Neuro:                                         Pain control with percocet                            Cardiovascular:                                          Continue hemodynamic monitoring.    Hypotension: Continue  Midodrine. The goal is to wean off as tolerated.                             Respiratory:                                         Pt is on 2L  nasal canula, wean off as tolerated      Hypoxemia / Respiratory acidosis: Pt has to be on BiPAP at night to maintain saturation>90s. Pt desaturated overnight when he took off BiPAP.                                           Comfortable, not in any distress.                                         Encourage incentive spirometry                                          SANDY to bulb suction                                                                  Continue bronchodilators, pulmonary toilet                            GI                                         On Renal / DASH diet                                         Continue GI prophylaxis with Protonix                                         Continue Zofran / Reglan for nausea - PRN	                                                                 Renal:                                         ESRD: HD as per Renal                                         Monitor I/Os and electrolytes                                                                                        Hem/ Onc:                                                                                  Monitor chest tube output &  signs of bleeding.                                          Follow CBC in AM                           Infectious disease:      All the cultures are negative during this admission but grew MRSA from pleural fluid and E.Coli in blood. Continue Vanco by level until 2/3 and Zosyn switched to Cipro until 1/21      CT scan of AP - with IV /PO contrast to look for abdominal source of E.Coli bacteremia                                           Monitor for fever / leukocytosis.                                          All surgical incision / chest tube  sites look clean                            Endocrine                                             Continue Accu-Checks with coverage    Pt is on SQ Heparin and Venodyne boots for DVT prophylaxis.     Pertinent clinical, laboratory, radiographic, hemodynamic, echocardiographic, respiratory data, microbiologic data and chart were reviewed and analyzed frequently throughout the course of the day and night  Patient seen, examined and plan discussed with CT Surgeon Dr. Pickering  / CTICU team during rounds.    Pt's status discussed with family at bedside, updated status    I have spent 40 minutes of critical care time with this patient between 00am and 8am      Ralph Warren MD

## 2019-01-16 NOTE — PROGRESS NOTE ADULT - ASSESSMENT
57 yo male (Mandarin speaking) with medical history of NICM with ICD, ESRD on HD, former smoker, BPH who was transfer for [facility] due to septic shock on 1/7/19. Pt. was initially admitted due to fever and right pleural effusion, found to have respiratory failure and intubated and afterwards developed shock, thought to be septic. Renal now called for management of pts ESRD

## 2019-01-16 NOTE — PROGRESS NOTE ADULT - ASSESSMENT
Pt is a 58 yr old Mandarin speaking male who underwent right thoractomy decortiation, Right Chest Wall reconstruction with Latissimjus Dorsi Flap Poss Skin Graft with VAC dressing with Dr Pickering 10/11/18.  He has h/o ESRD with HD, CHF, EF of 10%,  ICD,  with h/o chronic bilateral pleural effusions.  The patient previously underwent left VATS and PleurX drains  in 2015.  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     During his last admission at [facility], pt underwent OR with Plastic and Thoracic surgery,  for definitive repair that involved thoracotomy, decortication, excision of empyema cavity along with muscle flap reconstruction. Pt underwent surgery on 10/11 (Right thoracotomy, resection of portion of R 7th rib, evacuation of infected hemothorax, takedown of pleurocutaneous fistula - and noted to have foul smelling hematoma).  OR cultures grew Enterococcus faecalis.  Pt was treated with zosyn for 4 week course for infected hemothorax which was completed on 11/7.      He was admitted on 1/6/19 to [facility] with a R pleural effusion and respiratory failure,  A R PTC was placed there and he was intubated.  He still had a R SANDY drain in place.  He presented intubated and sedated on pressors. (07 Jan 2019 23:10) Cultures from pleural fluid at [place] reportedly grew MRSA.  Pt is currently on vanco/zosyn.      1/8 - Bronchoscopy for removal of foreign body in airway.      1/9 - reports from [place] reviewed:  On admission there, pt had fever 102.3, P 121, BP 74/48.  Bcx from 1/6 (+) E.coli (sensitive to zosyn), and pleural fluid cx 1/8 grew MRSA.  Pleural fluid showed 28 WBC.  AFB (-).      ID consult called for further abx management.      Problem/Plan - 1:    Septic shock    - Pt with R pleural effusion, s/p SANDY drain/chest tube, (+) MRSA reported from pleural fluid cultures at [place], Total WBC from pleural fluid - 24.  Drainage has been serosanguinous.   Cont to dose vanco by level.  Trough elevated on 1/14.  Last vanco dosed on 1/16.  Recommend treat for 4 week course.  Can dose vanco 1gm 3x/week with HD as outpatient.  Complete on 2/3.     - Blood cx also (+) for E.coli at [place], source unclear.    Cont zosyn.  Repeat BCx (-) x 1 at [facility].  Recommend CTap with po/iv contrast to evaluate for bacteremia source, as pleural fluid cultures grew MRSA.  Will treat for 2 week course from date of negative blood cultures through 1/21.  Can change to PO cipro 250mg PO Qdaily based on sensitivity panel (sent from [place])      - BAL specimens (+) yeast, suspect colonizer.      - echocardiogram stable findings compared to last study.    Will follow,    Anabel Chahal  [phone]

## 2019-01-16 NOTE — PROGRESS NOTE ADULT - SUBJECTIVE AND OBJECTIVE BOX
Infectious Diseases progress note:    Subjective:  Pt transferred to floors.  No acute o/n events.  Afebrile    ROS:  CONSTITUTIONAL:  No fever, chills, rigors  CARDIOVASCULAR:  No chest pain or palpitations  RESPIRATORY:   No SOB, cough, dyspnea on exertion.  No wheezing  GASTROINTESTINAL:  No abd pain, N/V, diarrhea/constipation  EXTREMITIES:  No swelling or joint pain  GENITOURINARY:  No burning on urination, increased frequency or urgency.  No flank pain  NEUROLOGIC:  No HA, visual disturbances  SKIN: No rashes    Allergies    No Known Allergies    Intolerances        ANTIBIOTICS/RELEVANT:  antimicrobials  ciprofloxacin     Tablet 250 milliGRAM(s) Oral daily    immunologic:    OTHER:  ALBUTerol    90 MICROgram(s) HFA Inhaler 1 Puff(s) Inhalation every 4 hours  aspirin  chewable 81 milliGRAM(s) Oral daily  docusate sodium 100 milliGRAM(s) Oral daily  heparin  Injectable 5000 Unit(s) SubCutaneous every 12 hours  midodrine 10 milliGRAM(s) Oral every 8 hours  pantoprazole    Tablet 40 milliGRAM(s) Oral before breakfast      Objective:  Vital Signs Last 24 Hrs  T(C): 36.6 (16 Jan 2019 19:16), Max: 36.8 (16 Jan 2019 00:00)  T(F): 97.9 (16 Jan 2019 19:16), Max: 98.2 (16 Jan 2019 00:00)  HR: 113 (16 Jan 2019 21:31) (92 - 113)  BP: 95/60 (16 Jan 2019 21:31) (92/55 - 117/68)  BP(mean): 68 (16 Jan 2019 09:00) (63 - 81)  RR: 18 (16 Jan 2019 21:31) (13 - 25)  SpO2: 99% (16 Jan 2019 21:31) (91% - 100%)    PHYSICAL EXAM:  Constitutional:NAD  Eyes:CHER, EOMI  Ear/Nose/Throat: no thrush, mucositis.  Moist mucous membranes	  Neck:no JVD, no lymphadenopathy, supple  Respiratory: CTA adore  Cardiovascular: S1S2 RRR, no murmurs  Gastrointestinal:soft, nontender,  nondistended (+) BS  Extremities:no e/e/c  Skin:  no rashes, rt post chest wound c/d/i.         LABS:                        11.5   4.85  )-----------( 149      ( 16 Jan 2019 05:00 )             38.4     01-16    137  |  95<L>  |  44<H>  ----------------------------<  93  4.7   |  29  |  5.85<H>    Ca    9.4      16 Jan 2019 06:10  Phos  5.6     01-15  Mg     2.7     01-15      PT/INR - ( 15 Avila 2019 02:45 )   PT: 11.5 SEC;   INR: 1.01          PTT - ( 15 Avila 2019 02:45 )  PTT:36.9 SEC        Vancomycin Level, Random: 12.5: Therapeutic ranges have not been established for random  vancomycin. Interpret results in context of patient's  clinical condition and time sample was drawn relative to  peak and trough therapeutic ranges. Therapeutic ranges for  peak vancomycin are 25-50 and for trough vancomycin 10-20  with 15-20 mcg/mL used for complicated infections. ug/mL (01.15.19 @ 22:00)                  MICROBIOLOGY:    No new culture data      RADIOLOGY & ADDITIONAL STUDIES:    < from: Xray Chest 1 View- PORTABLE-Routine (01.16.19 @ 06:33) >  IMPRESSION:  Follow-up with no interval change with bilateral small   loculated effusions and right-sided chest tube.    < end of copied text >

## 2019-01-17 LAB
ANION GAP SERPL CALC-SCNC: 14 MMO/L — SIGNIFICANT CHANGE UP (ref 7–14)
BUN SERPL-MCNC: 63 MG/DL — HIGH (ref 7–23)
CALCIUM SERPL-MCNC: 9.3 MG/DL — SIGNIFICANT CHANGE UP (ref 8.4–10.5)
CHLORIDE SERPL-SCNC: 95 MMOL/L — LOW (ref 98–107)
CO2 SERPL-SCNC: 28 MMOL/L — SIGNIFICANT CHANGE UP (ref 22–31)
CREAT SERPL-MCNC: 7.14 MG/DL — HIGH (ref 0.5–1.3)
GLUCOSE SERPL-MCNC: 129 MG/DL — HIGH (ref 70–99)
HCT VFR BLD CALC: 34.5 % — LOW (ref 39–50)
HGB BLD-MCNC: 10.3 G/DL — LOW (ref 13–17)
MCHC RBC-ENTMCNC: 29.9 % — LOW (ref 32–36)
MCHC RBC-ENTMCNC: 31.2 PG — SIGNIFICANT CHANGE UP (ref 27–34)
MCV RBC AUTO: 104.5 FL — HIGH (ref 80–100)
NRBC # FLD: 0 K/UL — LOW (ref 25–125)
PLATELET # BLD AUTO: 149 K/UL — LOW (ref 150–400)
PMV BLD: 10.2 FL — SIGNIFICANT CHANGE UP (ref 7–13)
POTASSIUM SERPL-MCNC: 4.2 MMOL/L — SIGNIFICANT CHANGE UP (ref 3.5–5.3)
POTASSIUM SERPL-MCNC: 4.5 MMOL/L — SIGNIFICANT CHANGE UP (ref 3.5–5.3)
POTASSIUM SERPL-SCNC: 4.2 MMOL/L — SIGNIFICANT CHANGE UP (ref 3.5–5.3)
POTASSIUM SERPL-SCNC: 4.5 MMOL/L — SIGNIFICANT CHANGE UP (ref 3.5–5.3)
RBC # BLD: 3.3 M/UL — LOW (ref 4.2–5.8)
RBC # FLD: 15.4 % — HIGH (ref 10.3–14.5)
SODIUM SERPL-SCNC: 137 MMOL/L — SIGNIFICANT CHANGE UP (ref 135–145)
VANCOMYCIN FLD-MCNC: 22 UG/ML — SIGNIFICANT CHANGE UP
WBC # BLD: 4.91 K/UL — SIGNIFICANT CHANGE UP (ref 3.8–10.5)
WBC # FLD AUTO: 4.91 K/UL — SIGNIFICANT CHANGE UP (ref 3.8–10.5)

## 2019-01-17 PROCEDURE — 99233 SBSQ HOSP IP/OBS HIGH 50: CPT

## 2019-01-17 PROCEDURE — 71045 X-RAY EXAM CHEST 1 VIEW: CPT | Mod: 26

## 2019-01-17 PROCEDURE — 90935 HEMODIALYSIS ONE EVALUATION: CPT | Mod: GC

## 2019-01-17 RX ADMIN — MIDODRINE HYDROCHLORIDE 10 MILLIGRAM(S): 2.5 TABLET ORAL at 15:49

## 2019-01-17 RX ADMIN — HEPARIN SODIUM 5000 UNIT(S): 5000 INJECTION INTRAVENOUS; SUBCUTANEOUS at 05:24

## 2019-01-17 RX ADMIN — Medication 81 MILLIGRAM(S): at 15:50

## 2019-01-17 RX ADMIN — PANTOPRAZOLE SODIUM 40 MILLIGRAM(S): 20 TABLET, DELAYED RELEASE ORAL at 05:25

## 2019-01-17 RX ADMIN — MIDODRINE HYDROCHLORIDE 10 MILLIGRAM(S): 2.5 TABLET ORAL at 05:24

## 2019-01-17 RX ADMIN — Medication 100 MILLIGRAM(S): at 15:50

## 2019-01-17 RX ADMIN — MIDODRINE HYDROCHLORIDE 10 MILLIGRAM(S): 2.5 TABLET ORAL at 21:06

## 2019-01-17 RX ADMIN — Medication 250 MILLIGRAM(S): at 15:50

## 2019-01-17 RX ADMIN — HEPARIN SODIUM 5000 UNIT(S): 5000 INJECTION INTRAVENOUS; SUBCUTANEOUS at 17:59

## 2019-01-17 NOTE — PROGRESS NOTE ADULT - SUBJECTIVE AND OBJECTIVE BOX
Subjective: pt feels well, plan for HD today, no acute complaints  pt requires continuous O2 nasal cannula during the day and BiPAP at night    Vital Signs:  Vital Signs Last 24 Hrs  T(C): 36.7 (01-17-19 @ 09:08), Max: 37.1 (01-17-19 @ 01:00)  T(F): 98 (01-17-19 @ 09:08), Max: 98.7 (01-17-19 @ 01:00)  HR: 105 (01-17-19 @ 09:08) (95 - 113)  BP: 105/57 (01-17-19 @ 09:08) (95/60 - 107/63)  RR: 22 (01-17-19 @ 11:10) (18 - 22)  SpO2: 84% (01-17-19 @ 11:10) (84% - 100%) on (O2)    Telemetry/Alarms:  General: WN/WD NAD  Neurology: Awake, nonfocal, ISRA[place] x 4  Eyes: Scleras clear, PERRLA/ EOMI, Gross vision intact  ENT:Gross hearing intact, grossly patent pharynx, no stridor  Neck: Neck supple, trachea midline, No JVD,   Respiratory: CTA B/L, No wheezing, rales, rhonchi  CV: RRR, S1S2, no murmurs, rubs or gallops  Abdominal: Soft, NT, ND +BS,   Extremities: No edema, + peripheral pulses  Skin: No Rashes, Hematoma, Ecchymosis  Lymphatic: No Neck, axilla, groin LAD  Psych: Oriented x 3, normal affect  Incisions: c,d,i  Tubes: R SANDY drain output 25cc/24h  Relevant labs, radiology and Medications reviewed                        10.3   4.91  )-----------( 149      ( 17 Jan 2019 06:30 )             34.5     01-17    137  |  95<L>  |  63<H>  ----------------------------<  129<H>  4.5   |  28  |  7.14<H>    Ca    9.3      17 Jan 2019 06:30        MEDICATIONS  (STANDING):  ALBUTerol    90 MICROgram(s) HFA Inhaler 1 Puff(s) Inhalation every 4 hours  aspirin  chewable 81 milliGRAM(s) Oral daily  ciprofloxacin     Tablet 250 milliGRAM(s) Oral daily  docusate sodium 100 milliGRAM(s) Oral daily  heparin  Injectable 5000 Unit(s) SubCutaneous every 12 hours  midodrine 10 milliGRAM(s) Oral every 8 hours  pantoprazole    Tablet 40 milliGRAM(s) Oral before breakfast    MEDICATIONS  (PRN):    Pertinent Physical Exam  I&O's Summary    16 Jan 2019 07:01  -  17 Jan 2019 07:00  --------------------------------------------------------  IN: 750 mL / OUT: 25 mL / NET: 725 mL        Assessment  Pt is a 58 yr old  male who underwent a FB, R thoracotomy, decortication, chest wall reconstruction, lat flap with Dr Pickering on 10/11/18. He was admitted on 1/6/19 to [facility] with a R pleural effusion and respiratory failure,  A R PTC was placed there and he was intubated.  He still had a R SANDY drain in place.  He presented intubated and sedated on pressors. (07 Jan 2019 23:10). Pt is currently extubated but on / off BiPAP for  hypoxia / respiratory acidosis. Pt desaturates to 80's very quickly at night without BiPAP    PLAN  Neuro: Pain management  Pulm: Encourage coughing, deep breathing and use of incentive spirometry. pt requires continuous nasal cannula during the day and BiPAP at night   Cardio: Monitor telemetry/alarms  GI: Tolerating diet. Continue stool softeners.  Renal: HD today   Vasc: Heparin SC/SCDs for DVT prophylaxis  Heme: Stable H/H. .   ID: f/u ID plan for antibiotics at home  Therapy: OOB/ambulate  Tubes: Continue R PTC to bulb  Disposition: Aim to D/C to home tomorrow  Discussed with Cardiothoracic Team at AM rounds.

## 2019-01-17 NOTE — CHART NOTE - NSCHARTNOTEFT_GEN_A_CORE
Pt SpO2 on RA at rest 86%  Pt SpO2 on RA ambulating 84%  Pt SpO2 on 2L nasal cannula O2 95%    Pt requires home O2; pt, RN, case management aware.    Niki FOSTER [id number]

## 2019-01-17 NOTE — PROGRESS NOTE ADULT - SUBJECTIVE AND OBJECTIVE BOX
No new complaints  Wants to go home    PE:  afebrile and tachycardic  Drain 25cc  right back wounds clean and granulating and less fibrinous  right lateral pressure wound with blister that came off with the tape    Right back wounds  Local wound care with Aquacel change every other day and prn  Drain management per IR as they are sclerosing a chronic seroma for me  Spoke with Dr. Junito Sousa of IR and he would prefer to remove drain after discharge from hospital  Please call  [phone] to arrange for appointment post discharge  Thanks

## 2019-01-17 NOTE — PROGRESS NOTE ADULT - SUBJECTIVE AND OBJECTIVE BOX
[facility] DIVISION OF KIDNEY DISEASES AND HYPERTENSION -- FOLLOW UP NOTE  --------------------------------------------------------------------------------  HPI: 57 yo male (Mandarin speaking) with a past medical history of NICM with ICD, ESRD on HD TIW, former smoker, BPH originally transferred from [facility] due to septic shock on 1/7/19. Pt. was initially admitted due to fever and right pleural effusion, found to have acute respiratory failure therefore was intubated. Pt developed shock, thought to be septic. Pt had R thoracotomy, decortication, chest wall reconstruction, lat flap with Dr Pickering on 10/11/18. Pt extubated on 1/8/19 and currently remains on NC. Last HD 1/15/19. Pt seen and examined. No complains    PAST HISTORY  --------------------------------------------------------------------------------  No significant changes to PMH, PSH, FHx, SHx, unless otherwise noted    ALLERGIES & MEDICATIONS  --------------------------------------------------------------------------------  Allergies    No Known Allergies    Intolerances      Standing Inpatient Medications  ALBUTerol    90 MICROgram(s) HFA Inhaler 1 Puff(s) Inhalation every 4 hours  aspirin  chewable 81 milliGRAM(s) Oral daily  ciprofloxacin     Tablet 250 milliGRAM(s) Oral daily  docusate sodium 100 milliGRAM(s) Oral daily  heparin  Injectable 5000 Unit(s) SubCutaneous every 12 hours  midodrine 10 milliGRAM(s) Oral every 8 hours  pantoprazole    Tablet 40 milliGRAM(s) Oral before breakfast    PRN Inpatient Medications      REVIEW OF SYSTEMS  --------------------------------------------------------------------------------  Gen: No fevers  Head/Eyes/Ears: Normal hearing,   Respiratory: No dyspnea, cough  CV: No chest pain  GI: No abdominal pain, diarrhea  : No dysuria, hematuria  MSK: No  edema    All other systems were reviewed and are negative, except as noted.    VITALS/PHYSICAL EXAM  --------------------------------------------------------------------------------  T(C): 36.7 (01-17-19 @ 09:08), Max: 37.1 (01-17-19 @ 01:00)  HR: 105 (01-17-19 @ 09:08) (92 - 113)  BP: 105/57 (01-17-19 @ 09:08) (95/60 - 107/63)  RR: 20 (01-17-19 @ 09:08) (17 - 20)  SpO2: 98% (01-17-19 @ 09:08) (95% - 100%)  Wt(kg): --        01-16-19 @ 07:01  -  01-17-19 @ 07:00  --------------------------------------------------------  IN: 750 mL / OUT: 25 mL / NET: 725 mL        Physical Exam:  	Gen: Awake, NAD  	HEENT: on NC   	Pulm: course breath sounds B/L +Right SANDY tube  	CV: S1S2  	Abd: Soft, +BS   	Ext: No LE edema B/L  	Neuro: Awake  	Skin: Warm and dry  	Vascular access:  LUE AVF site +thrill +bruit    LABS/STUDIES  --------------------------------------------------------------------------------              10.3   4.91  >-----------<  149      [01-17-19 @ 06:30]              34.5     137  |  95  |  63  ----------------------------<  129      [01-17-19 @ 06:30]  4.5   |  28  |  7.14        Ca     9.3     [01-17-19 @ 06:30]            Creatinine Trend:  SCr 7.14 [01-17 @ 06:30]  SCr 5.85 [01-16 @ 06:10]  SCr 4.04 [01-16 @ 05:00]  SCr 7.71 [01-15 @ 02:45]  SCr 6.33 [01-14 @ 03:30]        HbA1c 5.5      [10-12-18 @ 02:53]  TSH 4.79      [10-21-18 @ 04:15]    HBsAb Reactive      [01-08-19 @ 08:35]  HBsAg NEGATIVE      [01-08-19 @ 08:35]  HCV 0.16, Nonreactive Hepatitis C AB  S/CO Ratio                        Interpretation  < 1.0                                     Non-Reactive  1.0 - 4.9                           Weakly-Reactive  > 5.0                                 Reactive  Non-Reactive: [place]erson with a non-reactive HCV antibody  result is considered uninfected.  No further action is  needed unless recent infection is suspected.  In these  cases, consider repeat testing later to detect  seroconversion..  Weakly-Reactive: HCV antibody test is abnormal, HCV RNA  Qualitative test will follow.  Reactive: HCV antibody test is abnormal, HCV RNA  Qualitative test will follow.  Note: HCV antibody testing is performed on the [facility] system.      [01-08-19 @ 08:35]

## 2019-01-17 NOTE — CHART NOTE - NSCHARTNOTEFT_GEN_A_CORE
Attempted to see Pt today to address the consult for a calorie count. Pt was not in his room. He had just been taken to dialysis. There was no record of calorie count in Pt's room. Will attempt to see Pt tomorrow.

## 2019-01-17 NOTE — PROGRESS NOTE ADULT - ASSESSMENT
57 yo male (Mandarin speaking) with medical history of NICM with ICD, ESRD on HD, former smoker, BPH who was transfer for [facility] due to septic shock on 1/7/19. Pt. was initially admitted due to fever and right pleural effusion, found to have respiratory failure and intubated and afterwards developed shock, thought to be septic. Renal now called for management of pts ESRD

## 2019-01-17 NOTE — PROGRESS NOTE ADULT - ASSESSMENT
Pt is a 58 yr old Mandarin speaking male who underwent right thoractomy decortiation, Right Chest Wall reconstruction with Latissimjus Dorsi Flap Poss Skin Graft with VAC dressing with Dr Pickering 10/11/18.  He has h/o ESRD with HD, CHF, EF of 10%,  ICD,  with h/o chronic bilateral pleural effusions.  The patient previously underwent left VATS and PleurX drains  in 2015.  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     During his last admission at [facility], pt underwent OR with Plastic and Thoracic surgery,  for definitive repair that involved thoracotomy, decortication, excision of empyema cavity along with muscle flap reconstruction. Pt underwent surgery on 10/11 (Right thoracotomy, resection of portion of R 7th rib, evacuation of infected hemothorax, takedown of pleurocutaneous fistula - and noted to have foul smelling hematoma).  OR cultures grew Enterococcus faecalis.  Pt was treated with zosyn for 4 week course for infected hemothorax which was completed on 11/7.      He was admitted on 1/6/19 to [facility] with a R pleural effusion and respiratory failure,  A R PTC was placed there and he was intubated.  He still had a R SANDY drain in place.  He presented intubated and sedated on pressors. (07 Jan 2019 23:10) Cultures from pleural fluid at [facility] reportedly grew MRSA.  Pt is currently on vanco/zosyn.      1/8 - Bronchoscopy for removal of foreign body in airway.      1/9 - reports from [facility] reviewed:  On admission there, pt had fever 102.3, P 121, BP 74/48.  Bcx from 1/6 (+) E.coli (sensitive to zosyn), and pleural fluid cx 1/8 grew MRSA.  Pleural fluid showed 28 WBC.  AFB (-).      ID consult called for further abx management.      Problem/Plan - 1:    Septic shock    - Pt with R pleural effusion, s/p SANDY drain/chest tube, (+) MRSA reported from pleural fluid cultures at [facility], Total WBC from pleural fluid - 24.  Drainage has been serosanguinous.   Cont to dose vanco by level.  Trough elevated on 1/14.  Last vanco dosed on 1/16.  Vanco level supratherapeutic this AM.  Check repeat level tomorrow.   Recommend treat for 4 week course.  Can dose vanco 1gm 3x/week with HD as outpatient.  Complete on 2/3.     - Blood cx also (+) for E.coli at [facility], source unclear.    Cont zosyn.  Repeat BCx (-) x 1 at [facility].  Recommend CTap with po/iv contrast to evaluate for bacteremia source, as pleural fluid cultures grew MRSA.  Will treat for 2 week course from date of negative blood cultures through 1/21.  Can change to PO cipro 250mg PO Qdaily based on sensitivity panel (sent from [facility])      - BAL specimens (+) yeast, suspect colonizer.      - echocardiogram stable findings compared to last study.    - Cont local wound care of posterior chest as per plastics    Will follow,    Anabel Chahal  [phone]

## 2019-01-18 ENCOUNTER — TRANSCRIPTION ENCOUNTER (OUTPATIENT)
Age: 59
End: 2019-01-18

## 2019-01-18 LAB — VANCOMYCIN FLD-MCNC: 17.6 UG/ML — SIGNIFICANT CHANGE UP

## 2019-01-18 PROCEDURE — 99233 SBSQ HOSP IP/OBS HIGH 50: CPT

## 2019-01-18 RX ADMIN — PANTOPRAZOLE SODIUM 40 MILLIGRAM(S): 20 TABLET, DELAYED RELEASE ORAL at 05:00

## 2019-01-18 RX ADMIN — Medication 81 MILLIGRAM(S): at 12:39

## 2019-01-18 RX ADMIN — MIDODRINE HYDROCHLORIDE 10 MILLIGRAM(S): 2.5 TABLET ORAL at 21:06

## 2019-01-18 RX ADMIN — Medication 100 MILLIGRAM(S): at 12:39

## 2019-01-18 RX ADMIN — MIDODRINE HYDROCHLORIDE 10 MILLIGRAM(S): 2.5 TABLET ORAL at 05:00

## 2019-01-18 RX ADMIN — HEPARIN SODIUM 5000 UNIT(S): 5000 INJECTION INTRAVENOUS; SUBCUTANEOUS at 17:48

## 2019-01-18 RX ADMIN — MIDODRINE HYDROCHLORIDE 10 MILLIGRAM(S): 2.5 TABLET ORAL at 14:14

## 2019-01-18 RX ADMIN — Medication 250 MILLIGRAM(S): at 12:39

## 2019-01-18 RX ADMIN — HEPARIN SODIUM 5000 UNIT(S): 5000 INJECTION INTRAVENOUS; SUBCUTANEOUS at 05:00

## 2019-01-18 NOTE — DISCHARGE NOTE ADULT - MEDICATION SUMMARY - MEDICATIONS TO TAKE
I will START or STAY ON the medications listed below when I get home from the hospital:    Vitamin B Complex  -- 1 tab(s) by mouth once a day  -- Indication: For vitamin    Calcium 500  -- 1 tab(s) by mouth 2 times a day  -- Indication: For vitmain    Tylenol 325 mg oral tablet  -- 2 tab(s) by mouth every 6 hours, As Needed  -- Indication: For Pain    aspirin 81 mg oral tablet  -- 1 tab(s) by mouth once a day   -- Indication: For Anti platelet    nortriptyline 50 mg oral capsule  -- 1 cap(s) by mouth once a day  -- Indication: For Anti depressant    metoprolol succinate 25 mg oral tablet, extended release  -- 1 tab(s) by mouth once a day  -- Indication: For Heart rate    Breo Ellipta 100 mcg-25 mcg/inh inhalation powder  -- 1 puff(s) inhaled once a day patient denies using it  -- Indication: For inhaler    vancomycin 1 g intravenous injection  -- 1 gram(s) intravenous 3 times a week  Please give 3 x per week with Dialysis until 2/3/2019  -- Indication: For Antibiotics with Dialysis until 2/3/19    docusate sodium 100 mg oral tablet  -- 1 tab(s) by mouth 2 times a day, As Needed  -- Indication: For COnstipation    Mag-Ox 400 oral tablet  -- 1 tab(s) by mouth once a day  -- Indication: For Minerals    midodrine 10 mg oral tablet  -- 1 tab(s) by mouth every 8 hours  -- Indication: For Blood pressure    Namenda 10 mg oral tablet  -- 1 tab(s) by mouth 2 times a day  -- Indication: For COnfusion    simethicone 80 mg oral tablet  -- 1 tab(s) by mouth 3 times a day (after meals)  -- Indication: For Gas    Renvela 800 mg oral tablet  -- 2 tab(s) by mouth every 8 hours  -- Indication: For Phosphate binder    Multiple Vitamins oral tablet  -- 1 tab(s) by mouth once a day   -- Indication: For vitamin    Nephplex Rx oral tablet  -- 1 tab(s) by mouth once a day  -- Indication: For  vitamin    Vitamin C 500 mg oral tablet  -- 1 tab(s) by mouth once a day  -- Indication: For vitamin    folic acid 1 mg oral tablet  -- 1 tab(s) by mouth once a day  -- Indication: For Supplement    vitamin A  -- 1 tab(s) by mouth once a day  -- Indication: For vitamin

## 2019-01-18 NOTE — DISCHARGE NOTE ADULT - INSTRUCTIONS
Activity as tolerated.   Regular diet.   You may use over the counter pain medications for pain control. Activity as tolerated.   Continue to eat as much as possible, have thickened diet   You may use over the counter pain medications for pain control.

## 2019-01-18 NOTE — DISCHARGE NOTE ADULT - CARE PROVIDER_API CALL
Tomás Barraza), Critical Care Medicine  [address] [place] [address]  [address] [place] [address]  Phone: [phone]  Fax: [phone]    Jose Alfredo Pickering), Surgery; Thoracic Surgery  [address] [place] [address]  [place] [address]  Phone: [phone]  Fax: [phone]

## 2019-01-18 NOTE — PROGRESS NOTE ADULT - ASSESSMENT
Pt is a 58 yr old  male who underwent a FB, R thoracotomy, decortication, chest wall reconstruction, lat flap with Dr Pickering on 10/11/18. He was admitted on 1/6/19 to [facility] with a R pleural effusion/empyema and respiratory failure,  A R PTC was placed there and he was intubated.  He still had a R SANDY drain in place.  He presented intubated and sedated on pressors. (07 Jan 2019 23:10). Pt is currently extubated but on / off BiPAP for  hypoxia / respiratory acidosis. Pt desaturates to 80's very quickly at night without BiPAP.    -Supplemental oxygen PRN  -O/N BIPAP  -Pain control  -Regular diet  -DVT ppx/ Ambulation  -Discharge when VNS and home O2 in place, as well as outpatient HD re-established   -C/W SANDY drainage  -C/W ABX as per ID  -Patient to follow up with Dr. Pickering in 1 week and pulm Dr. Barraza in 1-2 weeks

## 2019-01-18 NOTE — DISCHARGE NOTE ADULT - PATIENT PORTAL LINK FT
You can access the [facility] Patient Portal, offered by [facility], by registering with the following website: [email or web address]

## 2019-01-18 NOTE — DISCHARGE NOTE ADULT - HOME CARE AGENCY
[facility] [phone] initial visit will be day after discharge home. A nurse will call prior to the home visit.   [facility] [phone] for resumption of HA services.

## 2019-01-18 NOTE — DISCHARGE NOTE ADULT - HOSPITAL COURSE
Pt is a 58 yr old  male who underwent a FB, R thoracotomy, decortication, chest wall reconstruction, lat flap with Dr Pickering on 10/11/18. He was admitted on 1/6/19 to [facility] with a R pleural effusion and respiratory failure,  A R PTC was placed there and he was intubated.  He still had a R SANDY drain in place.  He presented intubated and sedated on pressors.    At [facility], patient admitted to CTICU. Flex bronch performed 1/8/19. Foreign body removed believed to be a pill). Patient later extubated. Pressors weened. ID consulted for MRSA in pleural fluid cultures. Vanco and Zosyn started. Renal consulted to resume HD while inpatient. Patient hypercarbic and acidotic requiring overnight BIPAP. Pt is a 58 yr old  male who underwent a FB, R thoracotomy, decortication, chest wall reconstruction, lat flap with Dr Pickering on 10/11/18. He was admitted on 1/6/19 to [facility] with a R pleural effusion and respiratory failure,  A R PTC was placed there and he was intubated.  He still had a R SANDY drain in place.  He presented intubated and sedated on pressors.    At [facility], patient admitted to CTICU. Flex bronch performed 1/8/19. Foreign body removed believed to be a pill). Patient later extubated. Pressors weened. ID consulted for MRSA in pleural fluid cultures. Vanco and Zosyn started. Renal consulted to resume HD while inpatient. Patient hypercarbic and acidotic requiring overnight BIPAP. Patient requiring supplemental oxygen for hypoxia. ID recommending  vanco with HD as outpatient through 2/3 to complete a 4 week course, as well as PO Cipro through 1/21 (complete a 2 week course).     Patient stable for discharge home with home O2 and BIPAP, as well as SANDY bulb and VNS. Patient to follow up with Dr. Pickering within 1 week and pulmonology Dr. Barraza within 2 weeks. Pt is a 58 yr old  male who underwent a FB, R thoracotomy, decortication, chest wall reconstruction, lat flap with Dr Pickering on 10/11/18. He was admitted on 1/6/19 to [facility] with a R pleural effusion and respiratory failure,  A R PTC was placed there and he was intubated.  He still had a R SANDY drain in place.  He presented intubated and sedated on pressors.    At [facility], patient admitted to CTICU. Flex bronch performed 1/8/19. Foreign body removed believed to be a pill). Patient later extubated. Pressors weened. ID consulted for MRSA in pleural fluid cultures. Vanco and Zosyn started. Renal consulted to resume HD while inpatient. Patient hypercarbic and acidotic requiring overnight BIPAP. Patient requiring supplemental oxygen for hypoxia. ID recommending  vanco with HD as outpatient through 2/3 to complete a 4 week course, as well as PO Cipro through 1/21 (complete a 2 week course).     Patient stable for discharge home with home O2 and BIPAP, as well as SANDY bulb and VNS. Patient to follow up with Dr. Pickering within 1 week and pulmonology Dr. Barraza within 2 weeks. Continued on HD and tolerated it well. Rt. chest PTC mantained to bulb. Pt feels well. Maintained on bipap at night. No issues overnight.   Home oxygen confirmed and delivered. Pt to follow with Dr. Pickering and Dr. Barraza as outpt.

## 2019-01-18 NOTE — PROGRESS NOTE ADULT - ASSESSMENT
Pt is a 58 yr old Mandarin speaking male who underwent right thoractomy decortiation, Right Chest Wall reconstruction with Latissimjus Dorsi Flap Poss Skin Graft with VAC dressing with Dr Pickering 10/11/18.  He has h/o ESRD with HD, CHF, EF of 10%,  ICD,  with h/o chronic bilateral pleural effusions.  The patient previously underwent left VATS and PleurX drains  in 2015.  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     During his last admission at [facility], pt underwent OR with Plastic and Thoracic surgery,  for definitive repair that involved thoracotomy, decortication, excision of empyema cavity along with muscle flap reconstruction. Pt underwent surgery on 10/11 (Right thoracotomy, resection of portion of R 7th rib, evacuation of infected hemothorax, takedown of pleurocutaneous fistula - and noted to have foul smelling hematoma).  OR cultures grew Enterococcus faecalis.  Pt was treated with zosyn for 4 week course for infected hemothorax which was completed on 11/7.      He was admitted on 1/6/19 to [facility] with a R pleural effusion and respiratory failure,  A R PTC was placed there and he was intubated.  He still had a R SANDY drain in place.  He presented intubated and sedated on pressors. (07 Jan 2019 23:10) Cultures from pleural fluid at [facility] [place] reportedly grew MRSA.  Pt is currently on vanco/zosyn.      1/8 - Bronchoscopy for removal of foreign body in airway.      1/9 - reports from [facility] [place] reviewed:  On admission there, pt had fever 102.3, P 121, BP 74/48.  Bcx from 1/6 (+) E.coli (sensitive to zosyn), and pleural fluid cx 1/8 grew MRSA.  Pleural fluid showed 28 WBC.  AFB (-).      ID consult called for further abx management.      Problem/Plan - 1:    Septic shock    - Pt with R pleural effusion, s/p SANDY drain/chest tube, (+) MRSA reported from pleural fluid cultures at [facility] [place], Total WBC from pleural fluid - 24.  Drainage has been serosanguinous.   Cont to dose vanco by level.  Trough elevated on 1/14.  Last vanco dosed on 1/16.  Vanco level therapeutic today.  Check repeat level tomorrow, and redose 1gm x 1 if level < 15.   Recommend treat for 4 week course.  Can dose vanco 1gm 3x/week with HD as outpatient.  Complete on 2/3.     - Blood cx also (+) for E.coli at [facility] [place], source unclear.    Cont zosyn.  Repeat BCx (-) x 1 at [facility].  Recommend CTap with po/iv contrast to evaluate for bacteremia source, as pleural fluid cultures grew MRSA.  Will treat for 2 week course from date of negative blood cultures through 1/21.  Can change to PO cipro 250mg PO Qdaily based on sensitivity panel (sent from [facility] [place])      - BAL specimens (+) yeast, suspect colonizer.      - echocardiogram stable findings compared to last study.    - Cont local wound care of posterior chest as per plastics    Will follow,    Anabel Chahal  [phone]      (Dr. Julia Thomas covering 1/19 - 1/21)

## 2019-01-18 NOTE — DISCHARGE NOTE ADULT - COMMUNITY RESOURCES
[facility]  [address] [place], [place] [id number] [phone]

## 2019-01-18 NOTE — DISCHARGE NOTE ADULT - ADDITIONAL INSTRUCTIONS
Please call for a follow up appointment with your primary care medical doctor upon discharge regarding your recent surgery and hospitalization. Please call for a follow up appointment with Dr Pickering and with your primary care medical doctor upon discharge regarding your recent surgery and hospitalization.

## 2019-01-18 NOTE — DISCHARGE NOTE ADULT - PLAN OF CARE
s/p PTC and flex bronch with FB removal. Antibiotics. Record SANDY drain outputs daily.   Continue Vanco (IV) with HD through 2/3/19 and Ciprofloxacin (oral) through 1/21/19.   Please follow up with Dr. Pickering within 1 week. Contact info below. Resume HD as previously scheduled. Continue home medications and follow up with your primary care doctor. Home oxygen and BIPAP overnight. Please follow up with Dr. Tomás Barraza in 1-2 weeks. Contact info below. No recurrence Stable for outpatient follow up  Record bulb outputs daily.   Continue Vanco (IV) with HD through 2/3/19 and Ciprofloxacin (oral) through 1/21/19.   Please follow up with Dr. Pickering within 1 week. Contact info below. Continuation of dialysis therapy without interruption. Stable to continue your outpatient HD Continue treatment with home medications Stable to follow up with your primary care doctor. Good oxygenation with home oxygen and BIPAP overnight. Stable to follow up with Dr. Tomás Barraza in 1-2 weeks. Contact info below. Stable for outpatient follow up  Record bulb outputs daily.   Continue Vanco (IV) with HD through 2/3/19   Please follow up with Dr. Pickering within 1 week. Contact info below.

## 2019-01-18 NOTE — DISCHARGE NOTE ADULT - SECONDARY DIAGNOSIS.
ESRD (end stage renal disease) Cardiomyopathy, unspecified type COPD (chronic obstructive pulmonary disease)

## 2019-01-18 NOTE — CHART NOTE - NSCHARTNOTEFT_GEN_A_CORE
NUTRITION FOLLOW-UP: A consult was requested a calorie count. Spoke with RN from yesterday and today who both stated they did not know anything about it. The Pt states that he has been eating well and showed his breakfast tray which was 100% eaten.    Weight: 57.5 kg    Labs: BUN-63, Cr-7.14, Glu-129    Current Diet: Dys 2 , M/S, Nectar consistncy fluid diet No K, No Phosphorus [id number]/day    Enteral Recommendations:     RD to Remain Available: Constance Blake MS, RDN, CDN pager [phone]     Additional Recommendations:   1) Monitor weight, labs, po intake and skin integrity. NUTRITION FOLLOW-UP: A consult was requested a calorie count. Spoke with RN from yesterday and today who both stated they did not know anything about it. The Pt states that he has been eating well and showed his breakfast tray which was 100% eaten. Please reconsult if need arises.    Weight: 57.5 kg    Labs: BUN-63, Cr-7.14, Glu-129    Current Diet: Dys 2 , M/S, Nectar consistency fluid diet No K, No Phosphorus [id number]/day    Enteral Recommendations:     RD to Remain Available: Constance Blake MS, RDN, CDN pager [phone]     Additional Recommendations:   1) Monitor weight, labs, po intake and skin integrity.

## 2019-01-18 NOTE — PROGRESS NOTE ADULT - SUBJECTIVE AND OBJECTIVE BOX
Infectious Diseases progress note:    Subjective: NAD, awake, alert.  Afebrile  No cough/abd pain/diarrhea    ROS:  CONSTITUTIONAL:  No fever, chills, rigors  CARDIOVASCULAR:  No chest pain or palpitations  RESPIRATORY:   No SOB, cough, dyspnea on exertion.  No wheezing  GASTROINTESTINAL:  No abd pain, N/V, diarrhea/constipation  EXTREMITIES:  No swelling or joint pain  GENITOURINARY:  No burning on urination, increased frequency or urgency.  No flank pain  NEUROLOGIC:  No HA, visual disturbances  SKIN: No rashes    Allergies    No Known Allergies    Intolerances        ANTIBIOTICS/RELEVANT:  antimicrobials  ciprofloxacin     Tablet 250 milliGRAM(s) Oral daily    immunologic:    OTHER:  ALBUTerol    90 MICROgram(s) HFA Inhaler 1 Puff(s) Inhalation every 4 hours  aspirin  chewable 81 milliGRAM(s) Oral daily  docusate sodium 100 milliGRAM(s) Oral daily  heparin  Injectable 5000 Unit(s) SubCutaneous every 12 hours  midodrine 10 milliGRAM(s) Oral every 8 hours  pantoprazole    Tablet 40 milliGRAM(s) Oral before breakfast      Objective:  Vital Signs Last 24 Hrs  T(C): 36.3 (18 Jan 2019 16:07), Max: 36.8 (18 Jan 2019 00:30)  T(F): 97.3 (18 Jan 2019 16:07), Max: 98.2 (18 Jan 2019 00:30)  HR: 106 (18 Jan 2019 16:07) (106 - 112)  BP: 110/62 (18 Jan 2019 16:07) (101/56 - 110/62)  BP(mean): --  RR: 18 (18 Jan 2019 16:07) (16 - 18)  SpO2: 100% (18 Jan 2019 16:07) (97% - 100%)    PHYSICAL EXAM:  Constitutional:NAD  Eyes:CHER, EOMI  Ear/Nose/Throat: no thrush, mucositis.  Moist mucous membranes	  Neck:no JVD, no lymphadenopathy, supple  Respiratory: CTA adore  Cardiovascular: S1S2 RRR, no murmurs  Gastrointestinal:soft, nontender,  nondistended (+) BS  Extremities:no e/e/c  Skin:  no rashes, open wounds or ulcerations        LABS:                        10.3   4.91  )-----------( 149      ( 17 Jan 2019 06:30 )             34.5     01-17    x   |  x   |  x   ----------------------------<  x   4.2   |  x   |  x     Ca    9.3      17 Jan 2019 06:30            Vancomycin Level, Random: 17.6: Therapeutic ranges have not been established for random  vancomycin. Interpret results in context of patient's  clinical condition and time sample was drawn relative to  peak and trough therapeutic ranges. Therapeutic ranges for  peak vancomycin are 25-50 and for trough vancomycin 10-20  with 15-20 mcg/mL used for complicated infections. ug/mL (01.18.19 @ 07:20)                MICROBIOLOGY:          RADIOLOGY & ADDITIONAL STUDIES:    < from: Xray Chest 1 View- PORTABLE-Routine (01.17.19 @ 08:08) >  INTERPRETATION:     Is an enlarged cardiac silhouette. A left subcutaneous ICD is again   noted. The thoracic aorta is calcified.  Surgical clips again project over the right chest and upper abdomen. An   incompletely imaged pigtail catheter overlies the region of the right   lower chest/upper right abdomen. Note is again made of a lower right rib   deformity.  Small bilateral loculated pleural effusions, larger on the right, are not   significantly changed. There is likelyassociated passive atelectasis. No   pneumothorax is seen.  Mild pulmonary vascular redistribution/congestion again noted.              IMPRESSION:  No significant change in small bilateral loculated pleural   effusions, larger on the right.    Mild pulmonary vascular redistribution/congestion again noted.    < end of copied text >

## 2019-01-18 NOTE — DISCHARGE NOTE ADULT - CARE PLAN
Principal Discharge DX:	Pleural effusion  Goal:	s/p PTC and flex bronch with FB removal. Antibiotics.  Assessment and plan of treatment:	Record SANDY drain outputs daily.   Continue Vanco (IV) with HD through 2/3/19 and Ciprofloxacin (oral) through 1/21/19.   Please follow up with Dr. Pickering within 1 week. Contact info below.  Secondary Diagnosis:	ESRD (end stage renal disease)  Goal:	Resume HD as previously scheduled.  Secondary Diagnosis:	Cardiomyopathy, unspecified type  Goal:	Continue home medications and follow up with your primary care doctor.  Secondary Diagnosis:	COPD (chronic obstructive pulmonary disease)  Goal:	Home oxygen and BIPAP overnight.  Assessment and plan of treatment:	Please follow up with Dr. Tomás Barraza in 1-2 weeks. Contact info below. Principal Discharge DX:	Pleural effusion  Goal:	No recurrence  Assessment and plan of treatment:	Stable for outpatient follow up  Record bulb outputs daily.   Continue Vanco (IV) with HD through 2/3/19 and Ciprofloxacin (oral) through 1/21/19.   Please follow up with Dr. Pickering within 1 week. Contact info below.  Secondary Diagnosis:	ESRD (end stage renal disease)  Goal:	Continuation of dialysis therapy without interruption.  Assessment and plan of treatment:	Stable to continue your outpatient HD  Secondary Diagnosis:	Cardiomyopathy, unspecified type  Goal:	Continue treatment with home medications  Assessment and plan of treatment:	Stable to follow up with your primary care doctor.  Secondary Diagnosis:	COPD (chronic obstructive pulmonary disease)  Goal:	Good oxygenation with home oxygen and BIPAP overnight.  Assessment and plan of treatment:	Stable to follow up with Dr. Tomás Barraza in 1-2 weeks. Contact info below. Principal Discharge DX:	Pleural effusion  Goal:	No recurrence  Assessment and plan of treatment:	Stable for outpatient follow up  Record bulb outputs daily.   Continue Vanco (IV) with HD through 2/3/19   Please follow up with Dr. Pickering within 1 week. Contact info below.  Secondary Diagnosis:	ESRD (end stage renal disease)  Goal:	Continuation of dialysis therapy without interruption.  Assessment and plan of treatment:	Stable to continue your outpatient HD  Secondary Diagnosis:	Cardiomyopathy, unspecified type  Goal:	Continue treatment with home medications  Assessment and plan of treatment:	Stable to follow up with your primary care doctor.  Secondary Diagnosis:	COPD (chronic obstructive pulmonary disease)  Goal:	Good oxygenation with home oxygen and BIPAP overnight.  Assessment and plan of treatment:	Stable to follow up with Dr. Tomás Barraza in 1-2 weeks. Contact info below.

## 2019-01-18 NOTE — PROGRESS NOTE ADULT - SUBJECTIVE AND OBJECTIVE BOX
SUBJECTIVE: Patient doing well. No complaints this AM. Comfortable. Denies pain, CP, SOB. Underwent HD yesterday. Vanco held for high trough.     Vital Signs Last 24 Hrs  T(C): 36.5 (18 Jan 2019 12:20), Max: 36.8 (17 Jan 2019 14:50)  T(F): 97.7 (18 Jan 2019 12:20), Max: 98.2 (17 Jan 2019 14:50)  HR: 107 (18 Jan 2019 12:20) (98 - 115)  BP: 107/63 (18 Jan 2019 12:20) (101/56 - 109/62)  BP(mean): --  RR: 18 (18 Jan 2019 12:20) (16 - 18)  SpO2: 100% (18 Jan 2019 12:20) (94% - 100%)    I&O's Detail    17 Jan 2019 07:01  -  18 Jan 2019 07:00  --------------------------------------------------------  IN:    Oral Fluid: 600 mL    Other: 400 mL  Total IN: 1000 mL    OUT:    Drain: 20 mL    Other: 1700 mL  Total OUT: 1720 mL    Total NET: -720 mL      Physical Exam:  General Appearance: Appears well, NAD  Chest: B/L BS, SANDY with minimal drainage  Extremities: Grossly symmetric, SCD's in place     LABS:                        10.3   4.91  )-----------( 149      ( 17 Jan 2019 06:30 )             34.5     01-17    x   |  x   |  x   ----------------------------<  x   4.2   |  x   |  x     Ca    9.3      17 Jan 2019 06:30            RADIOLOGY & ADDITIONAL STUDIES: < from: Xray Chest 1 View- PORTABLE-Routine (01.17.19 @ 08:08) >  No significant change in small bilateral loculated pleural   effusions, larger on the right.    Mild pulmonary vascular redistribution/congestion again noted.      < end of copied text >        Irish Azul PA-C  Thoracic Surgery  p#[phone]

## 2019-01-18 NOTE — DISCHARGE NOTE ADULT - NS AS ACTIVITY OBS
Showering allowed/Walking-Indoors allowed/Sex allowed/No Heavy lifting/straining/Do not drive or operate machinery/Walking-Outdoors allowed/Stairs allowed

## 2019-01-18 NOTE — DISCHARGE NOTE ADULT - MEDICATION SUMMARY - MEDICATIONS TO STOP TAKING
I will STOP taking the medications listed below when I get home from the hospital:    piperacillin-tazobactam 2 g-0.25 g intravenous injection  -- 1  intravenous every 12 hours until 11/7/18

## 2019-01-19 LAB
POTASSIUM SERPL-MCNC: 5 MMOL/L — SIGNIFICANT CHANGE UP (ref 3.5–5.3)
POTASSIUM SERPL-SCNC: 5 MMOL/L — SIGNIFICANT CHANGE UP (ref 3.5–5.3)
VANCOMYCIN FLD-MCNC: 12.9 UG/ML — SIGNIFICANT CHANGE UP

## 2019-01-19 PROCEDURE — 99233 SBSQ HOSP IP/OBS HIGH 50: CPT

## 2019-01-19 PROCEDURE — 90935 HEMODIALYSIS ONE EVALUATION: CPT

## 2019-01-19 RX ORDER — VANCOMYCIN HCL 1 G
1000 VIAL (EA) INTRAVENOUS ONCE
Qty: 0 | Refills: 0 | Status: COMPLETED | OUTPATIENT
Start: 2019-01-19 | End: 2019-01-19

## 2019-01-19 RX ADMIN — PANTOPRAZOLE SODIUM 40 MILLIGRAM(S): 20 TABLET, DELAYED RELEASE ORAL at 05:04

## 2019-01-19 RX ADMIN — Medication 100 MILLIGRAM(S): at 11:03

## 2019-01-19 RX ADMIN — Medication 81 MILLIGRAM(S): at 11:03

## 2019-01-19 RX ADMIN — MIDODRINE HYDROCHLORIDE 10 MILLIGRAM(S): 2.5 TABLET ORAL at 05:04

## 2019-01-19 RX ADMIN — HEPARIN SODIUM 5000 UNIT(S): 5000 INJECTION INTRAVENOUS; SUBCUTANEOUS at 05:04

## 2019-01-19 RX ADMIN — MIDODRINE HYDROCHLORIDE 10 MILLIGRAM(S): 2.5 TABLET ORAL at 21:41

## 2019-01-19 RX ADMIN — HEPARIN SODIUM 5000 UNIT(S): 5000 INJECTION INTRAVENOUS; SUBCUTANEOUS at 17:34

## 2019-01-19 RX ADMIN — Medication 250 MILLIGRAM(S): at 11:03

## 2019-01-19 RX ADMIN — Medication 250 MILLIGRAM(S): at 12:16

## 2019-01-19 RX ADMIN — MIDODRINE HYDROCHLORIDE 10 MILLIGRAM(S): 2.5 TABLET ORAL at 13:36

## 2019-01-19 NOTE — PROGRESS NOTE ADULT - SUBJECTIVE AND OBJECTIVE BOX
Subjective: Translation done w  phone. Family at bedside. Pt states he's anxious to go home. Denies any pain or SOB. Does not like consistency of the food     Vital Signs:  Vital Signs Last 24 Hrs  T(C): 36.7 (01-19-19 @ 15:55), Max: 36.7 (01-19-19 @ 00:33)  T(F): 98 (01-19-19 @ 15:55), Max: 98.1 (01-19-19 @ 10:11)  HR: 104 (01-19-19 @ 15:55) (100 - 112)  BP: 112/64 (01-19-19 @ 15:55) (101/58 - 114/69)  RR: 18 (01-19-19 @ 15:55) (18 - 18)  SpO2: 99% (01-19-19 @ 15:55) (95% - 100%) on (O2)    Telemetry/Alarms:  General: WN/WD NAD  Neurology: Awake, nonfocal, ISRAEL x 4  Eyes: Scleras clear, PERRLA/ EOMI, Gross vision intact  ENT:Gross hearing intact, grossly patent pharynx, no stridor  Neck: Neck supple, trachea midline, No JVD,   Respiratory: +rhonchi w wet, productive cough. Pt suction w jet  CV: RRR, S1S2, no murmurs, rubs or gallops  Abdominal: Soft, NT, ND +BS, +BM today. Ale dysphagia diet, appetite improved  Extremities: No edema, + peripheral pulses Left AV fistula  Skin: No Rashes, Hematoma, Ecchymosis  Lymphatic: No Neck, axilla, groin LAD  Psych: Oriented x 3, normal affect  Incisions: Old Rt. CW incision w mepilex  Tubes: Rt. PTC to SANDY bulb, minimal output.   Relevant labs, radiology and Medications reviewed    01-19    x   |  x   |  x   ----------------------------<  x   5.0   |  x   |  x           MEDICATIONS  (STANDING):  ALBUTerol    90 MICROgram(s) HFA Inhaler 1 Puff(s) Inhalation every 4 hours  aspirin  chewable 81 milliGRAM(s) Oral daily  ciprofloxacin     Tablet 250 milliGRAM(s) Oral daily  docusate sodium 100 milliGRAM(s) Oral daily  heparin  Injectable 5000 Unit(s) SubCutaneous every 12 hours  midodrine 10 milliGRAM(s) Oral every 8 hours  pantoprazole    Tablet 40 milliGRAM(s) Oral before breakfast    MEDICATIONS  (PRN):    Pertinent Physical Exam  I&O's Summary    18 Jan 2019 07:01  -  19 Jan 2019 07:00  --------------------------------------------------------  IN: 0 mL / OUT: 14.5 mL / NET: -14.5 mL    19 Jan 2019 07:01  -  19 Jan 2019 16:26  --------------------------------------------------------  IN: 400 mL / OUT: 2410 mL / NET: -2010 mL        Assessment  58y Male  w/ PAST MEDICAL & SURGICAL HISTORY:  Smoking hx  Cardiomyopathy  Wound: right chest  ICD (implantable cardioverter-defibrillator) in place  OA (osteoarthritis)  HLD (hyperlipidemia)  BPH (benign prostatic hyperplasia)  Pleural effusion  HTN (hypertension)  ESRD (end stage renal disease)  H/O chest wound: right  S/P thoracotomy: FB, R thoracotomy, decortication, chest wall reconstruction, lat flap  History of wound infection: right chest wall - revision 5/18 and again in 7/18  History of implantable cardioverter-defibrillator (ICD) placement: pt unsure when placed  H/O bilateral hip replacements: 2008, 2009  admitted with complaints of Patient is a 58y old  Male who presents with a chief complaint of Sepsis, respiratory failure (19 Jan 2019 08:59)  Pt is a 58 yr old  male who underwent a FB, R thoracotomy, decortication, chest wall reconstruction, lat flap with Dr Pickering on 10/11/18. He was admitted on 1/6/19 to [facility] with a R pleural effusion/empyema and respiratory failure,  A R PTC was placed there and he was intubated.  He still had a R SANDY drain in place.  He presented intubated and sedated on pressors. (07 Jan 2019 23:10). Pt is currently extubated but on / off BiPAP for  hypoxia / respiratory acidosis. Pt desaturates to 80's very quickly at night without BiPAP.  Now had overnight oximetry shows desat to 70s with 2LNC in place. Will need bipap.   PLAN  Neuro: Pain management  Pulm: Encourage coughing, deep breathing and use of incentive spirometry.Cont Oxygen during the day Cont Bipap at night. Awaiting input from Case management about approval  Cardio: Monitor telemetry/alarms  GI: Tolerating diet. Continue stool softeners. Encourage po intake. Continue stool softners.   Renal: monitor urine output, supplement electrolytes as needed. s/p HD today  Vasc: Heparin SC/SCDs for DVT prophylaxis  Heme: Stable H/H. .   ID: Cont Cipro. VANCO x 1 ordered today based on level  Therapy: OOB/ambulate  Tubes: Cont Rt. PTC to bulb  Disposition: Aim to D/C to home once Bipap approved.   Discussed with Cardiothoracic Team at AM rounds.

## 2019-01-19 NOTE — PROGRESS NOTE ADULT - ASSESSMENT
59 yo male (Mandarin speaking) with medical history of NICM with ICD, ESRD on HD, former smoker, BPH who was transfer for [facility] due to septic shock on 1/7/19. Pt. was initially admitted due to fever and right pleural effusion, found to have respiratory failure and intubated and afterwards developed shock, thought to be septic. Renal now called for management of pts ESRD

## 2019-01-19 NOTE — PROGRESS NOTE ADULT - SUBJECTIVE AND OBJECTIVE BOX
[facility] Division of Kidney Diseases & Hypertension  HEMODIALYSIS NOTE  --------------------------------------------------------------------------------  Chief Complaint: ESRD/Ongoing hemodialysis requirement    HPI: 57 yo male (Mandarin speaking) with a past medical history of NICM with ICD, ESRD on HD TIW, former smoker, BPH originally transferred from [facility] due to septic shock on 1/7/19. Pt. was initially admitted due to fever and right pleural effusion, found to have acute respiratory failure therefore was intubated. Pt developed shock, thought to be septic. Pt had R thoracotomy, decortication, chest wall reconstruction, lat flap with Dr Pickering on 10/11/18. Pt extubated on 1/8/19 and currently remains on NC. Last HD 1/15/19.    Patient seen and examined at dialysis today.  No complaints was tolerating well.        PAST HISTORY  --------------------------------------------------------------------------------  No significant changes to PMH, PSH, FHx, SHx, unless otherwise noted    ALLERGIES & MEDICATIONS  --------------------------------------------------------------------------------  Allergies    No Known Allergies    Intolerances      Standing Inpatient Medications  ALBUTerol    90 MICROgram(s) HFA Inhaler 1 Puff(s) Inhalation every 4 hours  aspirin  chewable 81 milliGRAM(s) Oral daily  ciprofloxacin     Tablet 250 milliGRAM(s) Oral daily  docusate sodium 100 milliGRAM(s) Oral daily  heparin  Injectable 5000 Unit(s) SubCutaneous every 12 hours  midodrine 10 milliGRAM(s) Oral every 8 hours  pantoprazole    Tablet 40 milliGRAM(s) Oral before breakfast    PRN Inpatient Medications      REVIEW OF SYSTEMS  --------------------------------------------------------------------------------  Gen: No fever  CV: no cp  Pulm: no dyspnea  MSK: patient does not complain of pain.    VITALS/PHYSICAL EXAM  --------------------------------------------------------------------------------  T(C): 36.4 (01-19-19 @ 06:40), Max: 36.7 (01-19-19 @ 00:33)  HR: 104 (01-19-19 @ 06:40) (104 - 112)  BP: 108/60 (01-19-19 @ 06:40) (107/63 - 114/69)  RR: 18 (01-19-19 @ 06:40) (18 - 18)  SpO2: 96% (01-19-19 @ 05:00) (95% - 100%)  Wt(kg): --        01-18-19 @ 07:01  -  01-19-19 @ 07:00  --------------------------------------------------------  IN: 0 mL / OUT: 14.5 mL / NET: -14.5 mL      Physical Exam:    	Gen: Awake, NAD  	HEENT: on NC   	Pulm: course breath sounds B/L +Right SANDY tube  	CV: S1S2  	Abd: Soft, +BS   	Ext: No LE edema B/L  	Neuro: Awake  	Skin: Warm and dry  	Vascular access:  LUE AVF site +thrill +eloisa    LABS/STUDIES  --------------------------------------------------------------------------------    01-19    x   |  x   |  x   ----------------------------<  x   5.0   |  x   |  x       Hemodialysis Treatment:     Dialyzer:  Revaclear 300    Blood Flow:  400    Hours Of Treatment:  3 hour(s)    Dialysate(mL/minute):  500    Dialysate Bath Potassium: 2K+    Dialysate Bath Calcium: 2.5mEq/L    Primer: Without Heparin    Access: Arterio Venous Fistula    Desired Weight Loss/E.D.W.:  1.5-2 kg as tolerated    Maintain Systolic Blood Pressure >(mmHg):90    Additional Instructions: Low T 35.5    Check AM DEENA (01-18-19 @ 18:08)

## 2019-01-20 PROCEDURE — 99233 SBSQ HOSP IP/OBS HIGH 50: CPT

## 2019-01-20 RX ORDER — ACETAMINOPHEN 500 MG
1000 TABLET ORAL ONCE
Qty: 0 | Refills: 0 | Status: COMPLETED | OUTPATIENT
Start: 2019-01-20 | End: 2019-01-20

## 2019-01-20 RX ADMIN — PANTOPRAZOLE SODIUM 40 MILLIGRAM(S): 20 TABLET, DELAYED RELEASE ORAL at 05:32

## 2019-01-20 RX ADMIN — Medication 400 MILLIGRAM(S): at 21:46

## 2019-01-20 RX ADMIN — MIDODRINE HYDROCHLORIDE 10 MILLIGRAM(S): 2.5 TABLET ORAL at 05:32

## 2019-01-20 RX ADMIN — HEPARIN SODIUM 5000 UNIT(S): 5000 INJECTION INTRAVENOUS; SUBCUTANEOUS at 05:32

## 2019-01-20 RX ADMIN — Medication 81 MILLIGRAM(S): at 11:39

## 2019-01-20 RX ADMIN — Medication 250 MILLIGRAM(S): at 11:39

## 2019-01-20 RX ADMIN — MIDODRINE HYDROCHLORIDE 10 MILLIGRAM(S): 2.5 TABLET ORAL at 21:45

## 2019-01-20 RX ADMIN — Medication 1000 MILLIGRAM(S): at 22:01

## 2019-01-20 RX ADMIN — MIDODRINE HYDROCHLORIDE 10 MILLIGRAM(S): 2.5 TABLET ORAL at 13:00

## 2019-01-20 RX ADMIN — HEPARIN SODIUM 5000 UNIT(S): 5000 INJECTION INTRAVENOUS; SUBCUTANEOUS at 17:04

## 2019-01-20 RX ADMIN — Medication 100 MILLIGRAM(S): at 11:39

## 2019-01-20 NOTE — PROGRESS NOTE ADULT - SUBJECTIVE AND OBJECTIVE BOX
Subjective: no acute complaints    Vital Signs:  Vital Signs Last 24 Hrs  T(C): 36.7 (01-20-19 @ 05:38), Max: 36.8 (01-19-19 @ 20:26)  T(F): 98 (01-20-19 @ 05:38), Max: 98.3 (01-19-19 @ 20:26)  HR: 102 (01-20-19 @ 07:38) (98 - 112)  BP: 111/60 (01-20-19 @ 05:38) (101/58 - 112/64)  RR: 18 (01-20-19 @ 05:38) (18 - 20)  SpO2: 100% (01-20-19 @ 07:38) (96% - 100%) on (O2)    Telemetry/Alarms:  General: WN/WD NAD  Neurology: Awake, nonfocal, ISRAEL x 4  Eyes: Scleras clear, PERRLA/ EOMI, Gross vision intact  ENT:Gross hearing intact, grossly patent pharynx, no stridor  Neck: Neck supple, trachea midline, No JVD,   Respiratory: CTA B/L, No wheezing, rales, rhonchi  CV: RRR, S1S2, no murmurs, rubs or gallops  Abdominal: Soft, NT, ND +BS,   Extremities: No edema, + peripheral pulses  Skin: No Rashes, Hematoma, Ecchymosis  Lymphatic: No Neck, axilla, groin LAD  Psych: Oriented x 3, normal affect  Incisions: c,d,i  Tubes: R PTC drained 25  Relevant labs, radiology and Medications reviewed    01-19    x   |  x   |  x   ----------------------------<  x   5.0   |  x   |  x           MEDICATIONS  (STANDING):  ALBUTerol    90 MICROgram(s) HFA Inhaler 1 Puff(s) Inhalation every 4 hours  aspirin  chewable 81 milliGRAM(s) Oral daily  ciprofloxacin     Tablet 250 milliGRAM(s) Oral daily  docusate sodium 100 milliGRAM(s) Oral daily  heparin  Injectable 5000 Unit(s) SubCutaneous every 12 hours  midodrine 10 milliGRAM(s) Oral every 8 hours  pantoprazole    Tablet 40 milliGRAM(s) Oral before breakfast    MEDICATIONS  (PRN):    Pertinent Physical Exam  I&O's Summary    19 Jan 2019 07:01  -  20 Jan 2019 07:00  --------------------------------------------------------  IN: 400 mL / OUT: 2425 mL / NET: -2025 mL        Assessment  58y Male  w/ PAST MEDICAL & SURGICAL HISTORY:  Smoking hx  Cardiomyopathy  Wound: right chest  ICD (implantable cardioverter-defibrillator) in place  OA (osteoarthritis)  HLD (hyperlipidemia)  BPH (benign prostatic hyperplasia)  Pleural effusion  HTN (hypertension)  ESRD (end stage renal disease)  H/O chest wound: right  S/P thoracotomy: FB, R thoracotomy, decortication, chest wall reconstruction, lat flap  History of wound infection: right chest wall - revision 5/18 and again in 7/18  History of implantable cardioverter-defibrillator (ICD) placement: pt unsure when placed  H/O bilateral hip replacements: 2008, 2009  admitted with complaints of Patient is a 58y old  Male who presents with a chief complaint of Sepsis, respiratory failure (19 Jan 2019 08:59)  Pt is a 58 yr old  male who underwent a FB, R thoracotomy, decortication, chest wall reconstruction, lat flap with Dr Pickering on 10/11/18. He was admitted on 1/6/19 to [facility] with a R pleural effusion/empyema and respiratory failure,  A R PTC was placed there and he was intubated.  He still had a R SANDY drain in place.  He presented intubated and sedated on pressors. (07 Jan 2019 23:10). Pt is currently extubated but on / off BiPAP for  hypoxia / respiratory acidosis. Pt desaturates to 80's very quickly at night without BiPAP.  Now had overnight oximetry shows desat to 70s with 2LNC in place. Will need bipap.   PLAN  Neuro: Pain management  Pulm: Encourage coughing, deep breathing and use of incentive spirometry. Cont Oxygen during the day Cont Bipap at night. Awaiting input from Case management about BiPAP approval  Cardio: Monitor telemetry/alarms  GI: Tolerating diet. Continue stool softeners. Encourage po intake. Continue stool softners.   Renal: monitor urine output, supplement electrolytes as needed. s/p HD today  Vasc: Heparin SC/SCDs for DVT prophylaxis  Heme: Stable H/H. .   ID: Cont Cipro. VANCO x 1 ordered today based on level  Therapy: OOB/ambulate  Tubes: Cont Rt. PTC to bulb  Disposition: Aim to D/C to home once Bipap approved.   Discussed with Cardiothoracic Team at AM rounds.

## 2019-01-21 PROCEDURE — 99232 SBSQ HOSP IP/OBS MODERATE 35: CPT

## 2019-01-21 RX ORDER — AER TRAVELER 0.5 G/1
1 SOLUTION RECTAL; TOPICAL THREE TIMES A DAY
Qty: 0 | Refills: 0 | Status: DISCONTINUED | OUTPATIENT
Start: 2019-01-21 | End: 2019-01-22

## 2019-01-21 RX ADMIN — Medication 81 MILLIGRAM(S): at 11:47

## 2019-01-21 RX ADMIN — HEPARIN SODIUM 5000 UNIT(S): 5000 INJECTION INTRAVENOUS; SUBCUTANEOUS at 05:30

## 2019-01-21 RX ADMIN — MIDODRINE HYDROCHLORIDE 10 MILLIGRAM(S): 2.5 TABLET ORAL at 05:30

## 2019-01-21 RX ADMIN — HEPARIN SODIUM 5000 UNIT(S): 5000 INJECTION INTRAVENOUS; SUBCUTANEOUS at 17:37

## 2019-01-21 RX ADMIN — Medication 250 MILLIGRAM(S): at 11:47

## 2019-01-21 RX ADMIN — MIDODRINE HYDROCHLORIDE 10 MILLIGRAM(S): 2.5 TABLET ORAL at 14:42

## 2019-01-21 RX ADMIN — Medication 100 MILLIGRAM(S): at 11:47

## 2019-01-21 RX ADMIN — MIDODRINE HYDROCHLORIDE 10 MILLIGRAM(S): 2.5 TABLET ORAL at 22:55

## 2019-01-21 RX ADMIN — PANTOPRAZOLE SODIUM 40 MILLIGRAM(S): 20 TABLET, DELAYED RELEASE ORAL at 05:30

## 2019-01-21 NOTE — PROGRESS NOTE ADULT - ASSESSMENT
58 yr old Mandarin speaking male who underwent right thoractomy decortiation, Right Chest Wall reconstruction with Latissimjus Dorsi Flap Poss Skin Graft with VAC dressing with Dr Pickering 10/11/18.  He has h/o ESRD with HD, CHF, EF of 10%,  ICD,  with h/o chronic bilateral pleural effusions.  The patient previously underwent left VATS and PleurX drains  in 2015.  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 During his last admission at [facility], pt underwent OR with Plastic and Thoracic surgery,  for definitive repair that involved thoracotomy, decortication, excision of empyema cavity along with muscle flap reconstruction. Pt underwent surgery on 10/11 (Right thoracotomy, resection of portion of R 7th rib, evacuation of infected hemothorax, takedown of pleurocutaneous fistula - and noted to have foul smelling hematoma).  OR cultures grew Enterococcus faecalis.  Pt was treated with zosyn for 4 week course for infected hemothorax which was completed on 11/7.  He was admitted on 1/6/19 to [facility] with a R pleural effusion and respiratory failure,  A R PTC was placed there and he was intubated.  He still had a R SANDY drain in place.  He presented intubated and sedated on pressors. (07 Jan 2019 23:10) Cultures from pleural fluid at [place] reportedly grew MRSA.  On admission there, pt had fever 102.3, P 121, BP 74/48.  Bcx from 1/6 (+) E.coli (sensitive to zosyn), and pleural fluid cx 1/8 grew MRSA.  Pleural fluid showed 28 WBC.  AFB (-).  Being managed for Septic shockt with R pleural effusion, s/p SANDY drain/chest tube, (+) MRSA reported from pleural fluid cultures at [place], Total WBC from pleural fluid - 24.  Drainage has been serosanguinous.Remains on vanco with dialysis for  4 week course . till 2/3 for MRSA empyema .BC with EColi from the [facility] of unclear source s/p zosyn 58 yr old Mandarin speaking male who underwent right thoractomy decortiation, Right Chest Wall reconstruction with Latissimjus Dorsi Flap Poss Skin Graft with VAC dressing with Dr Pickering 10/11/18.  He has h/o ESRD with HD, CHF, EF of 10%,  ICD,  with h/o chronic bilateral pleural effusions.  The patient previously underwent left VATS and PleurX drains  in 2015.  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 During his last admission at [facility], pt underwent OR with Plastic and Thoracic surgery,  for definitive repair that involved thoracotomy, decortication, excision of empyema cavity along with muscle flap reconstruction. Pt underwent surgery on 10/11 (Right thoracotomy, resection of portion of R 7th rib, evacuation of infected hemothorax, takedown of pleurocutaneous fistula - and noted to have foul smelling hematoma).  OR cultures grew Enterococcus faecalis.  Pt was treated with zosyn for 4 week course for infected hemothorax which was completed on 11/7.  He was admitted on 1/6/19 to [facility] with a R pleural effusion and respiratory failure,  A R PTC was placed there and he was intubated.  He still had a R SANDY drain in place.  He presented intubated and sedated on pressors. (07 Jan 2019 23:10) Cultures from pleural fluid at [place] reportedly grew MRSA.  On admission there, pt had fever 102.3, P 121, BP 74/48.  Bcx from 1/6 (+) E.coli (sensitive to zosyn), and pleural fluid cx 1/8 grew MRSA.  Pleural fluid showed 28 WBC.  AFB (-).  Being managed for Septic shockt with R pleural effusion, s/p SANDY drain/chest tube, (+) MRSA reported from pleural fluid cultures at [place], Total WBC from pleural fluid - 24.  Drainage has been serosanguinous.Remains on vanco with dialysis for  4 week course . till 2/3 for MRSA empyema .BC with EColi from the Other [facility] of unclear source s/p zosyn which is being switch to cipro.  suggest to  provide supportive care  adequate  pain control  follow up on the cultures  and the chest film   cont vanco with each dialysis  till 2/3, dc cipro by tomorrow  CTS /renal follow up

## 2019-01-21 NOTE — PROGRESS NOTE ADULT - SUBJECTIVE AND OBJECTIVE BOX
Infectious Diseases progress note:    Subjective:  HPI:  58 yr old Mandarin speaking male who underwent right thoractomy decortiation, Right Chest Wall reconstruction with Latissimjus Dorsi Flap Poss Skin Graft with VAC dressing with Dr Pickering 10/11/18.  He has h/o ESRD with HD, CHF, EF of 10%,  ICD,  with h/o chronic bilateral pleural effusions.  The patient previously underwent left VATS and PleurX drains  in 2015.  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 During his last admission at [facility], pt underwent OR with Plastic and Thoracic surgery,  for definitive repair that involved thoracotomy, decortication, excision of empyema cavity along with muscle flap reconstruction. Pt underwent surgery on 10/11 (Right thoracotomy, resection of portion of R 7th rib, evacuation of infected hemothorax, takedown of pleurocutaneous fistula - and noted to have foul smelling hematoma).  OR cultures grew Enterococcus faecalis.  Pt was treated with zosyn for 4 week course for infected hemothorax which was completed on 11/7.  He was admitted on 1/6/19 to [facility] with a R pleural effusion and respiratory failure,  A R PTC was placed there and he was intubated.  He still had a R SANDY drain in place.  He presented intubated and sedated on pressors. (07 Jan 2019 23:10) Cultures from pleural fluid at [place] reportedly grew MRSA.  On admission there, pt had fever 102.3, P 121, BP 74/48.  Bcx from 1/6 (+) E.coli (sensitive to zosyn), and pleural fluid cx 1/8 grew MRSA.  Pleural fluid showed 28 WBC.  AFB (-).  Being managed for Septic shockt with R pleural effusion, s/p SANDY drain/chest tube, (+) MRSA reported from pleural fluid cultures at [place], Total WBC from pleural fluid - 24.  Drainage has been serosanguinous.Remains on vanco with dialysis for  4 week course . till 2/3 for MRSA empyema .BC with EColi from the Other [facility] unclear source s/p zosyn           ROS:  CONSTITUTIONAL:  No fever, chills, rigors  CARDIOVASCULAR:  No chest pain or palpitations  RESPIRATORY:   No SOB, cough, dyspnea on exertion.  No wheezing  GASTROINTESTINAL:  No abd pain, N/V, diarrhea/constipation  EXTREMITIES:  No swelling or joint pain  GENITOURINARY:  No burning on urination, increased frequency or urgency.  No flank pain  NEUROLOGIC:  No HA, visual disturbances  SKIN: No rashes    Allergies    No Known Allergies    Intolerances        ANTIBIOTICS/RELEVANT:  antimicrobials  ciprofloxacin     Tablet 250 milliGRAM(s) Oral daily    immunologic:    OTHER:  ALBUTerol    90 MICROgram(s) HFA Inhaler 1 Puff(s) Inhalation every 4 hours  aspirin  chewable 81 milliGRAM(s) Oral daily  docusate sodium 100 milliGRAM(s) Oral daily  heparin  Injectable 5000 Unit(s) SubCutaneous every 12 hours  midodrine 10 milliGRAM(s) Oral every 8 hours  pantoprazole    Tablet 40 milliGRAM(s) Oral before breakfast  witch hazel Pads 1 Application(s) Topical three times a day PRN      Objective:  Vital Signs Last 24 Hrs  T(C): 36.3 (21 Jan 2019 19:36), Max: 36.5 (20 Jan 2019 23:49)  T(F): 97.3 (21 Jan 2019 19:36), Max: 97.7 (20 Jan 2019 23:49)  HR: 112 (21 Jan 2019 19:36) (100 - 116)  BP: 113/67 (21 Jan 2019 19:36) (107/60 - 121/65)  BP(mean): --  RR: 18 (21 Jan 2019 19:36) (17 - 18)  SpO2: 97% (21 Jan 2019 19:36) (93% - 100%)    PHYSICAL EXAM:  Constitutional:NAD  Eyes:CHER, EOMI  Ear/Nose/Throat: no thrush, mucositis.  Moist mucous membranes	  Neck:no JVD, no lymphadenopathy, supple  Respiratory: CTA adore  Cardiovascular: S1S2 RRR, no murmurs  Gastrointestinal:soft, nontender,  nondistended (+) BS  Extremities:no e/e/c  Skin:  no rashes, open wounds or ulcerations    LABS:    Vancomycin Level, Random:  ug/mL (01-19 @ 08:00)  Vancomycin Level, Random:  ug/mL (01-18 @ 07:20)  Vancomycin Level, Random:  ug/mL (01-17 @ 06:30)      MICROBIOLOGY:      Culture - Blood (01.09.19 @ 22:24)    Culture - Blood:   NO ORGANISMS ISOLATED    Specimen Source: BLOOD    Culture - Body Fluid:   NO GROWTH - PRELIMINARY RESULTS  NO ORGANISMS ISOLATED AT 24 HOURS  NO ORGANISMS ISOLATED AT 48 HRS.  NO ORGANISMS ISOLATED AT 72 HRS.  NO GROWTH AFTER 4 DAYS INCUBATION  NO GROWTH AFTER 5 DAYS INCUBATION (01.09.19 @ 15:41)    Culture - Respiratory with Gram Stain (01.08.19 @ 20:01)    Gram Stain Sputum:   WBC^White Blood Cells  QNTY CELLS IN GRAM STAIN: FEW (2+)  NOS^No Organisms Seen    Culture - Respiratory:   NRF^Normal Respiratory Jennifer  QUANTITY OF GROWTH: RARE    Specimen Source: BRONCHIAL LAVAGE      RADIOLOGY & ADDITIONAL STUDIES:      < from: Xray Chest 1 View- PORTABLE-Routine (01.17.19 @ 08:08) >      < end of copied text > Infectious Diseases progress note:    Subjective:  HPI:  58 yr old Mandarin speaking male who underwent right thoractomy decortiation, Right Chest Wall reconstruction with Latissimjus Dorsi Flap Poss Skin Graft with VAC dressing with Dr Pickering 10/11/18.  He has h/o ESRD with HD, CHF, EF of 10%,  ICD,  with h/o chronic bilateral pleural effusions.  The patient previously underwent left VATS and PleurX drains  in 2015.  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 During his last admission at [facility], pt underwent OR with Plastic and Thoracic surgery,  for definitive repair that involved thoracotomy, decortication, excision of empyema cavity along with muscle flap reconstruction. Pt underwent surgery on 10/11 (Right thoracotomy, resection of portion of R 7th rib, evacuation of infected hemothorax, takedown of pleurocutaneous fistula - and noted to have foul smelling hematoma).  OR cultures grew Enterococcus faecalis.  Pt was treated with zosyn for 4 week course for infected hemothorax which was completed on 11/7.  He was admitted on 1/6/19 to [facility] with a R pleural effusion and respiratory failure,  A R PTC was placed there and he was intubated.  He still had a R SANDY drain in place.  He presented intubated and sedated on pressors. (07 Jan 2019 23:10) Cultures from pleural fluid at [place] reportedly grew MRSA.  On admission there, pt had fever 102.3, P 121, BP 74/48.  Bcx from 1/6 (+) E.coli (sensitive to zosyn), and pleural fluid cx 1/8 grew MRSA.  Pleural fluid showed 28 WBC.  AFB (-).  Being managed for Septic shockt with R pleural effusion, s/p SANDY drain/chest tube, (+) MRSA reported from pleural fluid cultures at [place], Total WBC from pleural fluid - 24.  Drainage has been serosanguinous.Remains on vanco with dialysis for  4 week course . till 2/3 for MRSA empyema .BC with EColi from the [facility] unclear source s/p zosyn which is being switched to cipro.Clinically stable ,afebrile.      ROS:  CONSTITUTIONAL:  No fever, chills, rigors  CARDIOVASCULAR:  No chest pain or palpitations  RESPIRATORY:   No SOB, cough, dyspnea on exertion.  No wheezing  GASTROINTESTINAL:  No abd pain, N/V, diarrhea/constipation  EXTREMITIES:  No swelling or joint pain  GENITOURINARY:  No burning on urination, increased frequency or urgency.  No flank pain  NEUROLOGIC:  No HA, visual disturbances  SKIN: No rashes    Allergies    No Known Allergies    Intolerances        ANTIBIOTICS/RELEVANT:  antimicrobials  ciprofloxacin     Tablet 250 milliGRAM(s) Oral daily    immunologic:    OTHER:  ALBUTerol    90 MICROgram(s) HFA Inhaler 1 Puff(s) Inhalation every 4 hours  aspirin  chewable 81 milliGRAM(s) Oral daily  docusate sodium 100 milliGRAM(s) Oral daily  heparin  Injectable 5000 Unit(s) SubCutaneous every 12 hours  midodrine 10 milliGRAM(s) Oral every 8 hours  pantoprazole    Tablet 40 milliGRAM(s) Oral before breakfast  witch hazel Pads 1 Application(s) Topical three times a day PRN      Objective:  Vital Signs Last 24 Hrs  T(C): 36.3 (21 Jan 2019 19:36), Max: 36.5 (20 Jan 2019 23:49)  T(F): 97.3 (21 Jan 2019 19:36), Max: 97.7 (20 Jan 2019 23:49)  HR: 112 (21 Jan 2019 19:36) (100 - 116)  BP: 113/67 (21 Jan 2019 19:36) (107/60 - 121/65)  BP(mean): --  RR: 18 (21 Jan 2019 19:36) (17 - 18)  SpO2: 97% (21 Jan 2019 19:36) (93% - 100%)    PHYSICAL EXAM:  Constitutional:NAD  Eyes:CHER, EOMI  Ear/Nose/Throat: no thrush, mucositis.  Moist mucous membranes	  Neck:no JVD, no lymphadenopathy, supple  Respiratory: CTA adore, Right post chestwall with drains  Cardiovascular: S1S2 RRR, no murmurs  Gastrointestinal:soft, nontender,  nondistended (+) BS  Extremities:no e/e/c  Skin:  no rashes, open wounds or ulcerations    LABS:    Vancomycin Level, Random:  ug/mL (01-19 @ 08:00)  Vancomycin Level, Random:  ug/mL (01-18 @ 07:20)  Vancomycin Level, Random:  ug/mL (01-17 @ 06:30)      MICROBIOLOGY:      Culture - Blood (01.09.19 @ 22:24)    Culture - Blood:   NO ORGANISMS ISOLATED    Specimen Source: BLOOD    Culture - Body Fluid:   NO GROWTH - PRELIMINARY RESULTS  NO ORGANISMS ISOLATED AT 24 HOURS  NO ORGANISMS ISOLATED AT 48 HRS.  NO ORGANISMS ISOLATED AT 72 HRS.  NO GROWTH AFTER 4 DAYS INCUBATION  NO GROWTH AFTER 5 DAYS INCUBATION (01.09.19 @ 15:41)    Culture - Respiratory with Gram Stain (01.08.19 @ 20:01)    Gram Stain Sputum:   WBC^White Blood Cells  QNTY CELLS IN GRAM STAIN: FEW (2+)  NOS^No Organisms Seen    Culture - Respiratory:   NRF^Normal Respiratory Jennifer  QUANTITY OF GROWTH: RARE    Specimen Source: BRONCHIAL LAVAGE      RADIOLOGY & ADDITIONAL STUDIES:

## 2019-01-22 VITALS
RESPIRATION RATE: 18 BRPM | OXYGEN SATURATION: 100 % | HEART RATE: 107 BPM | TEMPERATURE: 98 F | DIASTOLIC BLOOD PRESSURE: 58 MMHG | SYSTOLIC BLOOD PRESSURE: 108 MMHG

## 2019-01-22 PROCEDURE — 90935 HEMODIALYSIS ONE EVALUATION: CPT

## 2019-01-22 PROCEDURE — 99238 HOSP IP/OBS DSCHRG MGMT 30/<: CPT

## 2019-01-22 RX ADMIN — PANTOPRAZOLE SODIUM 40 MILLIGRAM(S): 20 TABLET, DELAYED RELEASE ORAL at 05:47

## 2019-01-22 RX ADMIN — MIDODRINE HYDROCHLORIDE 10 MILLIGRAM(S): 2.5 TABLET ORAL at 12:58

## 2019-01-22 RX ADMIN — HEPARIN SODIUM 5000 UNIT(S): 5000 INJECTION INTRAVENOUS; SUBCUTANEOUS at 05:47

## 2019-01-22 RX ADMIN — Medication 100 MILLIGRAM(S): at 12:58

## 2019-01-22 RX ADMIN — Medication 81 MILLIGRAM(S): at 12:58

## 2019-01-22 RX ADMIN — MIDODRINE HYDROCHLORIDE 10 MILLIGRAM(S): 2.5 TABLET ORAL at 05:47

## 2019-01-22 NOTE — PROGRESS NOTE ADULT - PROVIDER SPECIALTY LIST ADULT
CT Surgery
Critical Care
Infectious Disease
Nephrology
Plastic Surgery
Thoracic Surgery
Infectious Disease
Nephrology

## 2019-01-22 NOTE — PROGRESS NOTE ADULT - SUBJECTIVE AND OBJECTIVE BOX
[facility] DIVISION OF KIDNEY DISEASES AND HYPERTENSION --   --------------------------------------------------------------------------------  Chief Complaint: ESRD/Ongoing hemodialysis requirement    24 hour events/subjective: Pt. seen and examined during HD today. Pt. feels better and offered no complaints during HD rounds.    PAST HISTORY  --------------------------------------------------------------------------------  No significant changes to PMH, PSH, FHx, SHx, unless otherwise noted    ALLERGIES & MEDICATIONS  --------------------------------------------------------------------------------  Allergies    No Known Allergies    Intolerances    Standing Inpatient Medications  ALBUTerol    90 MICROgram(s) HFA Inhaler 1 Puff(s) Inhalation every 4 hours  aspirin  chewable 81 milliGRAM(s) Oral daily  ciprofloxacin     Tablet 250 milliGRAM(s) Oral daily  docusate sodium 100 milliGRAM(s) Oral daily  heparin  Injectable 5000 Unit(s) SubCutaneous every 12 hours  midodrine 10 milliGRAM(s) Oral every 8 hours  pantoprazole    Tablet 40 milliGRAM(s) Oral before breakfast    PRN Inpatient Medications  witch hazel Pads 1 Application(s) Topical three times a day PRN    REVIEW OF SYSTEMS  --------------------------------------------------------------------------------  Gen: No fever  Respiratory: No dyspnea  CV: No chest pain  GI: No abdominal pain  Neuro: No dizziness    VITALS/PHYSICAL EXAM  --------------------------------------------------------------------------------  T(C): 36.4 (01-22-19 @ 06:50), Max: 36.6 (01-22-19 @ 05:45)  HR: 104 (01-22-19 @ 06:50) (104 - 116)  BP: 112/70 (01-22-19 @ 06:50) (107/60 - 125/68)  RR: 18 (01-22-19 @ 06:50) (17 - 18)  SpO2: 99% (01-22-19 @ 05:45) (93% - 99%)  Wt(kg): --    01-21-19 @ 07:01  -  01-22-19 @ 07:00  --------------------------------------------------------  IN: 240 mL / OUT: 124.5 mL / NET: 115.5 mL    Physical Exam:  Gen: resting, NAD  	HEENT: No JVD  	Pulm: CTA B/L  	CV: S1S2+  	Abd: soft  	LE: No edema  	Vascular access: LUE AVF: skin intact  LABS/STUDIES  --------------------------------------------------------------------------------    HBsAb Reactive      [01-08-19 @ 08:35]  HBsAg NEGATIVE      [01-08-19 @ 08:35]  HCV 0.16, Nonreactive Hepatitis C AB  S/CO Ratio                        Interpretation  < 1.0                                     Non-Reactive  1.0 - 4.9                           Weakly-Reactive  > 5.0                                 Reactive  Non-Reactive: [place] with a non-reactive HCV antibody  result is considered uninfected.  No further action is  needed unless recent infection is suspected.  In these  cases, consider repeat testing later to detect  seroconversion..  Weakly-Reactive: HCV antibody test is abnormal, HCV RNA  Qualitative test will follow.  Reactive: HCV antibody test is abnormal, HCV RNA  Qualitative test will follow.  Note: HCV antibody testing is performed on the [employer]   system.      [01-08-19 @ 08:35]

## 2019-01-28 ENCOUNTER — TRANSCRIPTION ENCOUNTER (OUTPATIENT)
Age: 59
End: 2019-01-28

## 2019-01-28 ENCOUNTER — INPATIENT (INPATIENT)
Facility: HOSPITAL | Age: 59
LOS: 30 days | Discharge: NURSING, REHAB OR LONG-TERM CARE | End: 2019-02-28
Attending: THORACIC SURGERY (CARDIOTHORACIC VASCULAR SURGERY) | Admitting: THORACIC SURGERY (CARDIOTHORACIC VASCULAR SURGERY)
Payer: MEDICARE

## 2019-01-28 VITALS
RESPIRATION RATE: 16 BRPM | OXYGEN SATURATION: 94 % | TEMPERATURE: 98 F | DIASTOLIC BLOOD PRESSURE: 85 MMHG | SYSTOLIC BLOOD PRESSURE: 133 MMHG | HEART RATE: 96 BPM

## 2019-01-28 DIAGNOSIS — A41.9 SEPSIS, UNSPECIFIED ORGANISM: ICD-10-CM

## 2019-01-28 DIAGNOSIS — Z95.810 PRESENCE OF AUTOMATIC (IMPLANTABLE) CARDIAC DEFIBRILLATOR: Chronic | ICD-10-CM

## 2019-01-28 DIAGNOSIS — Z98.890 OTHER SPECIFIED POSTPROCEDURAL STATES: Chronic | ICD-10-CM

## 2019-01-28 DIAGNOSIS — Z96.643 PRESENCE OF ARTIFICIAL HIP JOINT, BILATERAL: Chronic | ICD-10-CM

## 2019-01-28 DIAGNOSIS — Z86.19 PERSONAL HISTORY OF OTHER INFECTIOUS AND PARASITIC DISEASES: Chronic | ICD-10-CM

## 2019-01-28 LAB
ALBUMIN SERPL ELPH-MCNC: 3.6 G/DL — SIGNIFICANT CHANGE UP (ref 3.3–5)
ALP SERPL-CCNC: 274 U/L — HIGH (ref 40–120)
ALT FLD-CCNC: 39 U/L — SIGNIFICANT CHANGE UP (ref 4–41)
ANION GAP SERPL CALC-SCNC: 18 MMO/L — HIGH (ref 7–14)
APTT BLD: 31.8 SEC — SIGNIFICANT CHANGE UP (ref 27.5–36.3)
AST SERPL-CCNC: 34 U/L — SIGNIFICANT CHANGE UP (ref 4–40)
BASE EXCESS BLDA CALC-SCNC: 0.3 MMOL/L — SIGNIFICANT CHANGE UP
BASE EXCESS BLDV CALC-SCNC: 2 MMOL/L — SIGNIFICANT CHANGE UP
BASOPHILS # BLD AUTO: 0.06 K/UL — SIGNIFICANT CHANGE UP (ref 0–0.2)
BASOPHILS NFR BLD AUTO: 0.7 % — SIGNIFICANT CHANGE UP (ref 0–2)
BILIRUB SERPL-MCNC: 0.4 MG/DL — SIGNIFICANT CHANGE UP (ref 0.2–1.2)
BLOOD GAS VENOUS - CREATININE: SIGNIFICANT CHANGE UP MG/DL (ref 0.5–1.3)
BUN SERPL-MCNC: 77 MG/DL — HIGH (ref 7–23)
CALCIUM SERPL-MCNC: 9.7 MG/DL — SIGNIFICANT CHANGE UP (ref 8.4–10.5)
CHLORIDE BLDA-SCNC: 105 MMOL/L — SIGNIFICANT CHANGE UP (ref 96–108)
CHLORIDE BLDV-SCNC: 99 MMOL/L — SIGNIFICANT CHANGE UP (ref 96–108)
CHLORIDE SERPL-SCNC: 96 MMOL/L — LOW (ref 98–107)
CK SERPL-CCNC: 56 U/L — SIGNIFICANT CHANGE UP (ref 30–200)
CO2 SERPL-SCNC: 28 MMOL/L — SIGNIFICANT CHANGE UP (ref 22–31)
CREAT BLDA-MCNC: 6.47 MG/DL — HIGH (ref 0.5–1.3)
CREAT SERPL-MCNC: 6.45 MG/DL — HIGH (ref 0.5–1.3)
EOSINOPHIL # BLD AUTO: 0.03 K/UL — SIGNIFICANT CHANGE UP (ref 0–0.5)
EOSINOPHIL NFR BLD AUTO: 0.3 % — SIGNIFICANT CHANGE UP (ref 0–6)
GAS PNL BLDV: 141 MMOL/L — SIGNIFICANT CHANGE UP (ref 136–146)
GLUCOSE BLDA-MCNC: 104 MG/DL — HIGH (ref 70–99)
GLUCOSE BLDV-MCNC: 132 — HIGH (ref 70–99)
GLUCOSE SERPL-MCNC: 132 MG/DL — HIGH (ref 70–99)
HCO3 BLDA-SCNC: 24 MMOL/L — SIGNIFICANT CHANGE UP (ref 22–26)
HCO3 BLDV-SCNC: 22 MMOL/L — SIGNIFICANT CHANGE UP (ref 20–27)
HCT VFR BLD CALC: 38 % — LOW (ref 39–50)
HCT VFR BLDA CALC: 31.6 % — LOW (ref 39–51)
HCT VFR BLDV CALC: 35.6 % — LOW (ref 39–51)
HGB BLD-MCNC: 10.8 G/DL — LOW (ref 13–17)
HGB BLDA-MCNC: 10.2 G/DL — LOW (ref 13–17)
HGB BLDV-MCNC: 11.5 G/DL — LOW (ref 13–17)
IMM GRANULOCYTES NFR BLD AUTO: 0.7 % — SIGNIFICANT CHANGE UP (ref 0–1.5)
INR BLD: 1.08 — SIGNIFICANT CHANGE UP (ref 0.88–1.17)
LACTATE BLDA-SCNC: 1 MMOL/L — SIGNIFICANT CHANGE UP (ref 0.5–2)
LACTATE BLDV-MCNC: 1.5 MMOL/L — SIGNIFICANT CHANGE UP (ref 0.5–2)
LYMPHOCYTES # BLD AUTO: 0.82 K/UL — LOW (ref 1–3.3)
LYMPHOCYTES # BLD AUTO: 9.6 % — LOW (ref 13–44)
MCHC RBC-ENTMCNC: 28.4 % — LOW (ref 32–36)
MCHC RBC-ENTMCNC: 31.3 PG — SIGNIFICANT CHANGE UP (ref 27–34)
MCV RBC AUTO: 110.1 FL — HIGH (ref 80–100)
MONOCYTES # BLD AUTO: 0.73 K/UL — SIGNIFICANT CHANGE UP (ref 0–0.9)
MONOCYTES NFR BLD AUTO: 8.5 % — SIGNIFICANT CHANGE UP (ref 2–14)
NEUTROPHILS # BLD AUTO: 6.88 K/UL — SIGNIFICANT CHANGE UP (ref 1.8–7.4)
NEUTROPHILS NFR BLD AUTO: 80.2 % — HIGH (ref 43–77)
NRBC # FLD: 0 K/UL — LOW (ref 25–125)
PCO2 BLDA: 65 MMHG — HIGH (ref 35–48)
PCO2 BLDV: 125 MMHG — CRITICAL HIGH (ref 41–51)
PH BLDA: 7.24 PH — LOW (ref 7.35–7.45)
PH BLDV: 7.04 PH — CRITICAL LOW (ref 7.32–7.43)
PLATELET # BLD AUTO: 225 K/UL — SIGNIFICANT CHANGE UP (ref 150–400)
PMV BLD: 10.8 FL — SIGNIFICANT CHANGE UP (ref 7–13)
PO2 BLDA: 137 MMHG — HIGH (ref 83–108)
PO2 BLDV: 41 MMHG — HIGH (ref 35–40)
POTASSIUM BLDA-SCNC: 4.3 MMOL/L — SIGNIFICANT CHANGE UP (ref 3.4–4.5)
POTASSIUM BLDV-SCNC: 5.6 MMOL/L — HIGH (ref 3.4–4.5)
POTASSIUM SERPL-MCNC: 5.6 MMOL/L — HIGH (ref 3.5–5.3)
POTASSIUM SERPL-SCNC: 5.6 MMOL/L — HIGH (ref 3.5–5.3)
PROT SERPL-MCNC: 8.6 G/DL — HIGH (ref 6–8.3)
PROTHROM AB SERPL-ACNC: 12.4 SEC — SIGNIFICANT CHANGE UP (ref 9.8–13.1)
RBC # BLD: 3.45 M/UL — LOW (ref 4.2–5.8)
RBC # FLD: 16.5 % — HIGH (ref 10.3–14.5)
SAO2 % BLDA: 98.5 % — SIGNIFICANT CHANGE UP (ref 95–99)
SAO2 % BLDV: 52.6 % — LOW (ref 60–85)
SODIUM BLDA-SCNC: 138 MMOL/L — SIGNIFICANT CHANGE UP (ref 136–146)
SODIUM SERPL-SCNC: 142 MMOL/L — SIGNIFICANT CHANGE UP (ref 135–145)
TROPONIN T, HIGH SENSITIVITY: 125 NG/L — CRITICAL HIGH (ref ?–14)
WBC # BLD: 8.58 K/UL — SIGNIFICANT CHANGE UP (ref 3.8–10.5)
WBC # FLD AUTO: 8.58 K/UL — SIGNIFICANT CHANGE UP (ref 3.8–10.5)

## 2019-01-28 PROCEDURE — 71045 X-RAY EXAM CHEST 1 VIEW: CPT | Mod: 26,76

## 2019-01-28 PROCEDURE — 71250 CT THORAX DX C-: CPT | Mod: 26

## 2019-01-28 PROCEDURE — 99292 CRITICAL CARE ADDL 30 MIN: CPT

## 2019-01-28 PROCEDURE — XXXXX: CPT

## 2019-01-28 PROCEDURE — 99291 CRITICAL CARE FIRST HOUR: CPT

## 2019-01-28 RX ORDER — MIDAZOLAM HYDROCHLORIDE 1 MG/ML
2 INJECTION, SOLUTION INTRAMUSCULAR; INTRAVENOUS ONCE
Qty: 0 | Refills: 0 | Status: DISCONTINUED | OUTPATIENT
Start: 2019-01-28 | End: 2019-01-28

## 2019-01-28 RX ORDER — NOREPINEPHRINE BITARTRATE/D5W 8 MG/250ML
0.05 PLASTIC BAG, INJECTION (ML) INTRAVENOUS
Qty: 8 | Refills: 0 | Status: DISCONTINUED | OUTPATIENT
Start: 2019-01-28 | End: 2019-02-10

## 2019-01-28 RX ORDER — PIPERACILLIN AND TAZOBACTAM 4; .5 G/20ML; G/20ML
3.38 INJECTION, POWDER, LYOPHILIZED, FOR SOLUTION INTRAVENOUS EVERY 12 HOURS
Qty: 0 | Refills: 0 | Status: DISCONTINUED | OUTPATIENT
Start: 2019-01-28 | End: 2019-02-25

## 2019-01-28 RX ORDER — MIDAZOLAM HYDROCHLORIDE 1 MG/ML
4 INJECTION, SOLUTION INTRAMUSCULAR; INTRAVENOUS ONCE
Qty: 0 | Refills: 0 | Status: DISCONTINUED | OUTPATIENT
Start: 2019-01-28 | End: 2019-01-28

## 2019-01-28 RX ORDER — MIDAZOLAM HYDROCHLORIDE 1 MG/ML
0.02 INJECTION, SOLUTION INTRAMUSCULAR; INTRAVENOUS
Qty: 100 | Refills: 0 | Status: DISCONTINUED | OUTPATIENT
Start: 2019-01-28 | End: 2019-02-01

## 2019-01-28 RX ORDER — SODIUM CHLORIDE 9 MG/ML
1000 INJECTION INTRAMUSCULAR; INTRAVENOUS; SUBCUTANEOUS ONCE
Qty: 0 | Refills: 0 | Status: COMPLETED | OUTPATIENT
Start: 2019-01-28 | End: 2019-01-28

## 2019-01-28 RX ORDER — ETOMIDATE 2 MG/ML
20 INJECTION INTRAVENOUS ONCE
Qty: 0 | Refills: 0 | Status: COMPLETED | OUTPATIENT
Start: 2019-01-28 | End: 2019-01-28

## 2019-01-28 RX ORDER — PHENYLEPHRINE HYDROCHLORIDE 10 MG/ML
0.2 INJECTION INTRAVENOUS ONCE
Qty: 0 | Refills: 0 | Status: COMPLETED | OUTPATIENT
Start: 2019-01-28 | End: 2019-01-28

## 2019-01-28 RX ORDER — VANCOMYCIN HCL 1 G
1000 VIAL (EA) INTRAVENOUS ONCE
Qty: 0 | Refills: 0 | Status: COMPLETED | OUTPATIENT
Start: 2019-01-28 | End: 2019-01-28

## 2019-01-28 RX ORDER — METOPROLOL TARTRATE 50 MG
5 TABLET ORAL EVERY 6 HOURS
Qty: 0 | Refills: 0 | Status: DISCONTINUED | OUTPATIENT
Start: 2019-01-28 | End: 2019-01-28

## 2019-01-28 RX ORDER — PIPERACILLIN AND TAZOBACTAM 4; .5 G/20ML; G/20ML
3.38 INJECTION, POWDER, LYOPHILIZED, FOR SOLUTION INTRAVENOUS ONCE
Qty: 0 | Refills: 0 | Status: COMPLETED | OUTPATIENT
Start: 2019-01-28 | End: 2019-02-11

## 2019-01-28 RX ORDER — HEPARIN SODIUM 5000 [USP'U]/ML
5000 INJECTION INTRAVENOUS; SUBCUTANEOUS EVERY 12 HOURS
Qty: 0 | Refills: 0 | Status: DISCONTINUED | OUTPATIENT
Start: 2019-01-28 | End: 2019-02-28

## 2019-01-28 RX ORDER — SODIUM CHLORIDE 9 MG/ML
1000 INJECTION, SOLUTION INTRAVENOUS
Qty: 0 | Refills: 0 | Status: DISCONTINUED | OUTPATIENT
Start: 2019-01-28 | End: 2019-01-29

## 2019-01-28 RX ORDER — DEXMEDETOMIDINE HYDROCHLORIDE IN 0.9% SODIUM CHLORIDE 4 UG/ML
0.5 INJECTION INTRAVENOUS
Qty: 200 | Refills: 0 | Status: DISCONTINUED | OUTPATIENT
Start: 2019-01-28 | End: 2019-02-10

## 2019-01-28 RX ADMIN — ETOMIDATE 20 MILLIGRAM(S): 2 INJECTION INTRAVENOUS at 14:35

## 2019-01-28 RX ADMIN — HEPARIN SODIUM 5000 UNIT(S): 5000 INJECTION INTRAVENOUS; SUBCUTANEOUS at 19:00

## 2019-01-28 RX ADMIN — DEXMEDETOMIDINE HYDROCHLORIDE IN 0.9% SODIUM CHLORIDE 7.5 MICROGRAM(S)/KG/HR: 4 INJECTION INTRAVENOUS at 19:45

## 2019-01-28 RX ADMIN — Medication 5.62 MICROGRAM(S)/KG/MIN: at 19:30

## 2019-01-28 RX ADMIN — Medication 250 MILLIGRAM(S): at 14:50

## 2019-01-28 RX ADMIN — Medication 5.62 MICROGRAM(S)/KG/MIN: at 15:43

## 2019-01-28 RX ADMIN — SODIUM CHLORIDE 1000 MILLILITER(S): 9 INJECTION INTRAMUSCULAR; INTRAVENOUS; SUBCUTANEOUS at 16:43

## 2019-01-28 RX ADMIN — SODIUM CHLORIDE 1000 MILLILITER(S): 9 INJECTION INTRAMUSCULAR; INTRAVENOUS; SUBCUTANEOUS at 14:30

## 2019-01-28 RX ADMIN — PIPERACILLIN AND TAZOBACTAM 25 GRAM(S): 4; .5 INJECTION, POWDER, LYOPHILIZED, FOR SOLUTION INTRAVENOUS at 19:00

## 2019-01-28 RX ADMIN — MIDAZOLAM HYDROCHLORIDE 1.2 MG/KG/HR: 1 INJECTION, SOLUTION INTRAMUSCULAR; INTRAVENOUS at 19:30

## 2019-01-28 RX ADMIN — PHENYLEPHRINE HYDROCHLORIDE 0.2 MILLIGRAM(S): 10 INJECTION INTRAVENOUS at 16:42

## 2019-01-28 RX ADMIN — SODIUM CHLORIDE 50 MILLILITER(S): 9 INJECTION, SOLUTION INTRAVENOUS at 19:30

## 2019-01-28 RX ADMIN — MIDAZOLAM HYDROCHLORIDE 2 MILLIGRAM(S): 1 INJECTION, SOLUTION INTRAMUSCULAR; INTRAVENOUS at 15:50

## 2019-01-28 RX ADMIN — MIDAZOLAM HYDROCHLORIDE 2 MILLIGRAM(S): 1 INJECTION, SOLUTION INTRAMUSCULAR; INTRAVENOUS at 14:53

## 2019-01-28 RX ADMIN — MIDAZOLAM HYDROCHLORIDE 1.2 MG/KG/HR: 1 INJECTION, SOLUTION INTRAMUSCULAR; INTRAVENOUS at 15:33

## 2019-01-28 RX ADMIN — MIDAZOLAM HYDROCHLORIDE 2 MILLIGRAM(S): 1 INJECTION, SOLUTION INTRAMUSCULAR; INTRAVENOUS at 15:18

## 2019-01-28 NOTE — ED PROVIDER NOTE - PHYSICAL EXAMINATION
PGY1/MD Renaldo.   VITALS: reviewed  GEN: lethargic, obtunded , A & O x 0, GCS [id number]  HEAD/EYES: NC/AT, PERRL, EOMI, anicteric sclerae, no conjunctival pallor  ENT: mucus membranes dry, no JVD, neck is supple, subtle pulpable pulses on carotid arteries  RESP: upper lung with air-in, lower right lung diminised breath sounds  CV: heart with reg rhythm S1, S2, no murmur  ABDOMEN: soft, ?distended, PD tube with no erythematous  MSK: extremities atraumatic, no skin rash, right leg old rashes? no clusters  SKIN: cold, cyanotic.   NEURO: obtunded, moving both upper extremities  PSYCH: N/A

## 2019-01-28 NOTE — H&P ADULT - NSHPLABSRESULTS_GEN_ALL_CORE
10.8   8.58  )-----------( 225      ( 28 Jan 2019 14:30 )             38.0   01-28    142  |  96<L>  |  77<H>  ----------------------------<  132<H>  5.6<H>   |  28  |  6.45<H>    Ca    9.7      28 Jan 2019 14:30    TPro  8.6<H>  /  Alb  3.6  /  TBili  0.4  /  DBili  x   /  AST  34  /  ALT  39  /  AlkPhos  274<H>  01-28    PT/INR - ( 28 Jan 2019 14:30 )   PT: 12.4 SEC;   INR: 1.08          PTT - ( 28 Jan 2019 14:30 )  PTT:31.8 SEC    Blood Gas Venous Comprehensive (01.28.19 @ 14:30)    Blood Gas Venous - Lactate: 1.5: Please note updated reference range. mmol/L    Blood Gas Venous - Chloride: 99 mmol/L    Blood Gas Venous - Creatinine: Test not performed mg/dL    pH, Venous: 7.04 pH    pCO2, Venous: 125 mmHg    pO2, Venous: 41 mmHg    HCO3, Venous: 22 mmol/L    Base Excess, Venous: 2.0: REFERENCE RANGE = -3 + 2 mmol/L mmol/L    Oxygen Saturation, Venous: 52.6 %    Blood Gas Venous - Sodium: 141 mmol/L    Blood Gas Venous - Potassium: 5.6 mmol/L    Blood Gas Venous - Glucose: 132    Blood Gas Venous - Hemoglobin: 11.5: Delta: 8.0 on 10/15/  Delta: 8.0 on 10/15/ g/dL    Blood Gas Venous - Hematocrit: 35.6: Delta: 24.8 on 10/15/  Delta: 24.8 on 10/15/ %      CXR performed on 1/28/2019 reviewed by cardiothoracic team.

## 2019-01-28 NOTE — ED PROCEDURE NOTE - ATTENDING CONTRIBUTION TO CARE
NGT placement performed without complication
+Successful tracheal intubation performed without complication

## 2019-01-28 NOTE — ED ADULT NURSE NOTE - CHIEF COMPLAINT QUOTE
Written at bedside: Received patient to room 11 brought to ER by his wife in respiratory distress. Pt very weak and lethargic, labored breathing. Pt being evaluated by MD. Respiratory called for ventilation. Pt being intubated. IVL placed to right AC 20 gauge and right hand 20 gauge. Labs drawn and sent. BC as well. Pt has no use of the left arm because it is used for dialysis Tues, Thurs and Sat. Pt also has a drain from his right lower back that wife states is not working because it is broken but it remains connected. Medications are being administered by Facilitator RN at this time, Fluids are running qand xray and respiratory are at bedside with Attending and Residents as well as pharmacy.     ERNST Engle

## 2019-01-28 NOTE — ED ADULT NURSE NOTE - OBJECTIVE STATEMENT
Received patient to room 11 brought to ER by his wife in respiratory distress. Pt very weak and lethargic, labored breathing. Pt being evaluated by MD. Respiratory called for ventilation. Pt being intubated. IVL placed to right AC 20 gauge and right hand 20 gauge. Labs drawn and sent. BC as well. Pt has no use of the left arm because it is used for dialysis Tues, Thurs and Sat. Pt also has a drain from his right lower back that wife states is not working because it is broken but it remains connected. Medications are being administered by Facilitator RN at this time, Fluids are running qand xray and respiratory are at bedside with Attending and Residents as well as pharmacy.     ERNST Engle

## 2019-01-28 NOTE — H&P ADULT - NSHPREVIEWOFSYSTEMS_GEN_ALL_CORE
General: UTO  Skin/Breast: UTO  Ophthalmologic: UTO  ENMT: UTO  Respiratory and Thorax: UTO  Cardiovascular: UTO  Gastrointestinal: UTO	  Genitourinary: UTO	  Musculoskeletal: UTO  Neurological: UTOI	  Hematology/Lymphatics: UTO

## 2019-01-28 NOTE — H&P ADULT - NSHPPHYSICALEXAM_GEN_ALL_CORE
Gen: Intubated, sedated.   Lungs: Intubated. R CT remains in place.   Cor: On Telemetry.   Abd: Soft, nontender, nondistended.

## 2019-01-28 NOTE — PROGRESS NOTE ADULT - SUBJECTIVE AND OBJECTIVE BOX
CLAUDIA HAN                     [id number]    HPI:  Mr. Han is a 58M who underwent a FB, R thoracotomy, decortication, chest wall reconstruction, lat flap with Dr Pickering on 10/11/18. He was admitted on 1/6/19 to [facility] with a R pleural effusion and respiratory failure,  A R PTC was placed there and he was intubated.  He still had a R SANDY drain in place and he was transferred to [facility]. Patient was found to have aspirated a foreign body and +MRSA empyema. He was treated and discharged on 1/22/2019.     He was found by his home nurse to be in acute distress and recommended to proceed to the ED.  At presentation, he was found to be in acute hypercarbic respiratory failure complicated by hemodynamic instability requiring emergent intubation and pressor support. (28 Jan 2019 15:34)    Procedure: 10/11/18 Right thoracotomy, resection of portion of R 7th rib, evacuation of infected hemothorax, takedown of pleurocutaneous fistula  / R chest wall reconstruction with tunneled latissimus dorsi flap, covered by serratus anterior flap           Issues:  Acute respiratory failure with hypercapnia, intubated in ED  Hypotension on  pressors  ESRD (end stage renal disease), HD dependent    Resp acidosis  Acute on chronic systolic CHF ( EF 10 %), ICD in place    PAST MEDICAL & SURGICAL HISTORY:  Smoking hx  Cardiomyopathy  Wound: right chest  ICD (implantable cardioverter-defibrillator) in place  OA (osteoarthritis)  HLD (hyperlipidemia)  BPH (benign prostatic hyperplasia)  Pleural effusion  HTN (hypertension)  ESRD (end stage renal disease)  H/O chest wound: right  S/P thoracotomy: FB, R thoracotomy, decortication, chest wall reconstruction, lat flap  History of wound infection: right chest wall - revision 5/18 and again in 7/18  History of implantable cardioverter-defibrillator (ICD) placement: pt unsure when placed  H/O bilateral hip replacements: 2008, 2009            VITAL SIGNS:  Vital Signs Last 24 Hrs  T(C): 36.8 (28 Jan 2019 15:05), Max: 36.8 (28 Jan 2019 15:05)  T(F): 98.3 (28 Jan 2019 15:05), Max: 98.3 (28 Jan 2019 15:05)  HR: 96 (28 Jan 2019 17:31) (90 - 114)  BP: 99/74 (28 Jan 2019 16:54) (74/47 - 119/103)  BP(mean): --  RR: 16 (28 Jan 2019 16:54) (14 - 17)  SpO2: 100% (28 Jan 2019 17:31) (94% - 100%)    I/Os:   I&O's Detail      CAPILLARY BLOOD GLUCOSE          =======================MEDICATIONS===================  MEDICATIONS  (STANDING):  heparin  Injectable 5000 Unit(s) SubCutaneous every 12 hours  midazolam Infusion 0.02 mG/kG/Hr (1.2 mL/Hr) IV Continuous <Continuous>  norepinephrine Infusion 0.05 MICROgram(s)/kG/Min (5.625 mL/Hr) IV Continuous <Continuous>  piperacillin/tazobactam IVPB. 3.375 Gram(s) IV Intermittent once  piperacillin/tazobactam IVPB. 3.375 Gram(s) IV Intermittent every 12 hours    MEDICATIONS  (PRN):      =======================VENTILATOR SETTINGS===================  Mode: AC/ CMV (Assist Control/ Continuous Mandatory Ventilation)  RR (machine): 14  TV (machine): 400  FiO2: 100  PEEP: 5  MAP: 10  PIP: 32      PHYSICAL EXAM============================  General:                        Intubated, sedated  Neuro:                           Intubated, sedated  Respiratory:	Air entry fair and  bilateral conducted sounds                                           Effort even and unlabored.  CV:		Regular rate and rhythm. Normal S1/S2                                          Distal pulses present.  Abdomen:	                     Soft, non-distended. Bowel sounds present, J tube in place    Skin:		No rash.  Extremities:	Warm, no cyanosis or edema.  Palpable pulses    ============================LABS=========================                        10.8   8.58  )-----------( 225      ( 28 Jan 2019 14:30 )             38.0     01-28    142  |  96<L>  |  77<H>  ----------------------------<  132<H>  5.6<H>   |  28  |  6.45<H>    Ca    9.7      28 Jan 2019 14:30    TPro  8.6<H>  /  Alb  3.6  /  TBili  0.4  /  DBili  x   /  AST  34  /  ALT  39  /  AlkPhos  274<H>  01-28    LIVER FUNCTIONS - ( 28 Jan 2019 14:30 )  Alb: 3.6 g/dL / Pro: 8.6 g/dL / ALK PHOS: 274 u/L / ALT: 39 u/L / AST: 34 u/L / GGT: x           PT/INR - ( 28 Jan 2019 14:30 )   PT: 12.4 SEC;   INR: 1.08          PTT - ( 28 Jan 2019 14:30 )  PTT:31.8 SEC        ============================IMAGING STUDIES=========================  < from: Xray Chest 1 View- PORTABLE-Urgent (01.28.19 @ 15:43) >    IMPRESSION:  Intubated with ETT tip at clavicular level.     Enteric tube inserted with tip at GE junction level and side port at mid   to distal esophagus.     Redemonstrated bilateral pleural reactions right slightly greater than   left, concomitant underlying airspace consolidations including   infection/pneumonia in the proper clinical context cannot be excluded.   Multiple surgical clips again seen over lower right hemithorax and and   lateral right chest wall/axillary soft tissues.    Clear upper lungs. No pneumothorax.    Stable left chest wall assess ICD and prominent appearing cardiac and   mediastinal silhouettes.    Stable osseous structures.     Pigtail drainage catheter again seen with tip in peripheral right upper   quadrant.        A/P:    Pt is a 58 yr old  male who underwent a FB, R thoracotomy, decortication, chest wall reconstruction, lat flap with Dr Pickering on 10/11/18. He was admitted on 1/6/19 to [facility] with a R pleural effusion and respiratory failure,  A R PTC was placed there and he was intubated.  He still had a R SANDY drain in place.  He presented intubated and sedated on pressors. (07 Jan 2019 23:10). Pt is currently extubated but on / off BiPAP for respiratory acidosis      Issues:  Acute respiratory failure with hypercapnia, intubated in ED  Hypotension on  pressors  ESRD (end stage renal disease), HD dependent    Resp acidosis  Acute on chronic systolic CHF ( EF 10 %), ICD in place   severe deconditioning    ====================== NEUROLOGY=======================  Vented, sedated, comfortable   ==================== RESPIRATORY========================  Intubated for acute hypercapnia resp failure, Monitor ABG's  Comfortable,   Monitor R Pigtail output  Chest pigtail to bulb suction	  Continuous pulse oximetry monitoring  Continue bronchodilators, pulmonary toilet  Head of bed elevation to 30-40 degrees  ====================CARDIOVASCULAR=====================  Continue hemodynamic monitoring/ telemetry  Hypotension improved; on Levophed, keep MAP>65  Restart MIdodrine  ===================== RENAL ============================  ESRD on HD as per renal team  Monitor I/Os, BUN/ Cr  and electrolytes  ==================== GASTROINTESTINAL===================  NPO  Continue GI prophylaxis with Protonix   Zofran / Reglan for nausea - PRN	  =======================    ENDOCRIN  =====================  Glycemic monitoring  F/S with coverage    ===================HEMATOLOGIC/ONCOLOGIC =============  Monitor chest tube output. No signs of active bleeding.   Follow CBC, coags  in AM  DVT prophylaxis with SCD, sc Heparin  ========================INFECTIOUS DISEASE===============  All the cultures are pending. Continue Vanco by level and Zosyn   Monitor for fever / leukocytosis.  All surgical incision / chest tube  sites look clean  ID dr Chahal ,       Pertinent clinical, laboratory, radiographic, hemodynamic, echocardiographic, respiratory data, microbiologic data and chart were reviewed and analyzed frequently throughout the course of the day and night. GI and DVT prophylaxis, glycemic control, head of bed elevation and skin care issues were addressed.  Patient seen, examined and plan discussed with CT Surgery / CTICU team during rounds.  Pt remains critically ill in imminent risk of  deterioration and requires very careful cardio- pulmonary monitoring and support.    I have spent 75 minutes of critical care time with this pt between 5pm   and 11:59pm         minutes spent on total encounter; more than 50% of the visit was spent counseling and/or coordinating care by the attending physician.          Dave MILLANP

## 2019-01-28 NOTE — PROCEDURE NOTE - PROCEDURE
<<-----Click on this checkbox to enter Procedure Arterial cannula insertion  01/28/2019    Active  [id number]

## 2019-01-28 NOTE — PROCEDURE NOTE - NSINDICATIONS_GEN_A_CORE
cannulation purposes/critical patient/monitoring purposes/arterial puncture to obtain ABG's/blood sampling

## 2019-01-28 NOTE — H&P ADULT - HISTORY OF PRESENT ILLNESS
This is an incomplete document to updated as soon as possible    In short,     This is a 58M know to the thoracic surgery service who presented to the ED in acute hypercarbic respiratory failure and hemodynamic instability.  The patient was emergently intubated and started on norepinephrine in the ED.  The patient will be admitted to the CTICU under attending Dr. Jose Alfredo Pickering. Mr. Rick is a 58M who underwent a FB, R thoracotomy, decortication, chest wall reconstruction, lat flap with Dr Pickering on 10/11/18. He was admitted on 1/6/19 to [facility] with a R pleural effusion and respiratory failure,  A R PTC was placed there and he was intubated.  He still had a R SANDY drain in place and he was transferred to [facility]. Patient was found to have aspirated a foreign body and +MRSA empyema. He was treated and discharged on 1/22/2019.     He was found by his home nurse to be in acute distress and recommended to proceed to the ED.  At presentation, he was found to be in acute hypercarbic respiratory failure complicated by hemodynamic instability requiring emergent intubation and pressor support.

## 2019-01-28 NOTE — H&P ADULT - ASSESSMENT
58M hx of  thoracotomy, decortication, chest wall reconstruction, lat flap, aspiration pna now p/w acute respiratory failure requiring intubation and pressor support.    - admit to cardiothoracic surgery, Dr. Jose Alfredo Pickering.   - admit to CTICU  - c/w intubation and ventilatory support at present time  - npo  - vte ppx  - will continue home medications as appropriate    Discussed with Dr. Jose Alfredo Pickering MD and patient seen with KRISTIN Figueredo.     Thoracic Surgery  Pager: [phone]

## 2019-01-28 NOTE — ED ADULT TRIAGE NOTE - CHIEF COMPLAINT QUOTE
Pt had mechanical fall Saturday. Down 2 steps. Pt denies any head trauma. Takes baby ASA daily. c/o left arm and right sided pain.

## 2019-01-28 NOTE — ED PROVIDER NOTE - PROGRESS NOTE DETAILS
PGY1/MD Renaldo. Came with lethargy, limited response, no verbal communication. sluggish pulses on femoral, carotid artery. nearly respiratory arrest. shock and altered mental status, needs patent airway. intubation with ethomidate, no muscle relactant or propofol.

## 2019-01-28 NOTE — ED PROVIDER NOTE - MEDICAL DECISION MAKING DETAILS
PGY1/MD Renaldo. sent from home with ams, dysoxia. lethargic, limited response, in shock, probably sepsis. hypercapnia + metabolic acidosis. needs admission to ICU.

## 2019-01-28 NOTE — ED PROVIDER NOTE - OBJECTIVE STATEMENT
HPI limited due to baseline mental status. HPI provided from his wife. Patient's wife speaks Mandarin, translational service, [id number].  PGY1/MD Renaldo. 59 yo M with complicated h/o hospitalization, discharged to home on 1/22, who presented to the ED today for dyspnea+hypoxia. Pt has been bodily ache and general weakness x 3 days. Pt fell last nigh due to weakness. Denies predrome fever, cough, or URI symptoms. Today, scheduled home nursing found him deoxygenated (SpO2 80%) and told them to go to the ED. Pt arrived at the ED with agonal breathing, limited response. HPI limited due to baseline mental status. HPI provided from his wife. Patient's wife speaks Mandarin, translational service, [id number].  PGY1/MD Renaldo. 59 yo M with complicated h/o hospitalization, discharged to home on 1/22, who presented to the ED today for dyspnea+hypoxia. Pt has c/o bodily ache and general weakness x 3 days. Pt fell last nigh due to weakness. Denies prodrome fever, cough, or URI symptoms. Today, scheduled home nursing found him deoxygenated (SpO2 80%) and told them to go to the ED. The wife states that he has maintained his PD well. Pt arrived at the ED with agonal breathing, limited response.

## 2019-01-28 NOTE — ED PROVIDER NOTE - ATTENDING CONTRIBUTION TO CARE
Attending note:   After face to face evaluation of this patient, I concur with above noted hx, pe, and care plan for this patient.  59 y/o M with h/o MRSA empyema, crf, dialysis brought to ED with agonal respiration, thready pulse.  Patient emergently intubated, fluids started, NE started, and BP found to be present.    Evaluation in progress

## 2019-01-28 NOTE — H&P ADULT - NSHPSOCIALHISTORY_GEN_ALL_CORE
· Marital Status	  · Occupation	Disabled  · Lives With	spouse     Substance Use History:  · Substance Use	caffeine  · Caffeine Type	coffee; tea  · Caffeine Amount/Frequency	1-2 cups/cans per day     Alcohol Use History:  · Have you ever consumed alcohol	never     Tobacco Usage:  · Tobacco Usage: Never smoker     Passive Smoke Exposure:  · Passive Smoke Exposure	No

## 2019-01-28 NOTE — PROCEDURE NOTE - NSPROCDETAILS_GEN_ALL_CORE
location identified, draped/prepped, sterile technique used, needle inserted/introduced/sutured in place/hemostasis with direct pressure, dressing applied/Seldinger technique/ultrasound guidance/positive blood return obtained via catheter/connected to a pressurized flush line/all materials/supplies accounted for at end of procedure

## 2019-01-29 DIAGNOSIS — N18.6 END STAGE RENAL DISEASE: ICD-10-CM

## 2019-01-29 DIAGNOSIS — D64.9 ANEMIA, UNSPECIFIED: ICD-10-CM

## 2019-01-29 LAB
ALBUMIN SERPL ELPH-MCNC: 3.7 G/DL — SIGNIFICANT CHANGE UP (ref 3.3–5)
ALP SERPL-CCNC: 180 U/L — HIGH (ref 40–120)
ALT FLD-CCNC: 34 U/L — SIGNIFICANT CHANGE UP (ref 4–41)
ANION GAP SERPL CALC-SCNC: 20 MMO/L — HIGH (ref 7–14)
ANION GAP SERPL CALC-SCNC: 21 MMO/L — HIGH (ref 7–14)
AST SERPL-CCNC: 40 U/L — SIGNIFICANT CHANGE UP (ref 4–40)
BASE EXCESS BLDA CALC-SCNC: -1.5 MMOL/L — SIGNIFICANT CHANGE UP
BASE EXCESS BLDA CALC-SCNC: -2.7 MMOL/L — SIGNIFICANT CHANGE UP
BASE EXCESS BLDA CALC-SCNC: -2.8 MMOL/L — SIGNIFICANT CHANGE UP
BASE EXCESS BLDV CALC-SCNC: -1.2 MMOL/L — SIGNIFICANT CHANGE UP
BASOPHILS # BLD AUTO: 0.05 K/UL — SIGNIFICANT CHANGE UP (ref 0–0.2)
BASOPHILS NFR BLD AUTO: 0.6 % — SIGNIFICANT CHANGE UP (ref 0–2)
BILIRUB SERPL-MCNC: 0.7 MG/DL — SIGNIFICANT CHANGE UP (ref 0.2–1.2)
BLOOD GAS VENOUS - CREATININE: 6.58 MG/DL — HIGH (ref 0.5–1.3)
BUN SERPL-MCNC: 81 MG/DL — HIGH (ref 7–23)
BUN SERPL-MCNC: 91 MG/DL — HIGH (ref 7–23)
CALCIUM SERPL-MCNC: 8.9 MG/DL — SIGNIFICANT CHANGE UP (ref 8.4–10.5)
CALCIUM SERPL-MCNC: 9.2 MG/DL — SIGNIFICANT CHANGE UP (ref 8.4–10.5)
CHLORIDE BLDA-SCNC: 102 MMOL/L — SIGNIFICANT CHANGE UP (ref 96–108)
CHLORIDE BLDA-SCNC: 103 MMOL/L — SIGNIFICANT CHANGE UP (ref 96–108)
CHLORIDE BLDV-SCNC: 105 MMOL/L — SIGNIFICANT CHANGE UP (ref 96–108)
CHLORIDE SERPL-SCNC: 96 MMOL/L — LOW (ref 98–107)
CHLORIDE SERPL-SCNC: 98 MMOL/L — SIGNIFICANT CHANGE UP (ref 98–107)
CO2 SERPL-SCNC: 23 MMOL/L — SIGNIFICANT CHANGE UP (ref 22–31)
CO2 SERPL-SCNC: 24 MMOL/L — SIGNIFICANT CHANGE UP (ref 22–31)
CREAT BLDA-MCNC: 6.49 MG/DL — HIGH (ref 0.5–1.3)
CREAT BLDA-MCNC: 6.87 MG/DL — HIGH (ref 0.5–1.3)
CREAT SERPL-MCNC: 6.4 MG/DL — HIGH (ref 0.5–1.3)
CREAT SERPL-MCNC: 6.89 MG/DL — HIGH (ref 0.5–1.3)
EOSINOPHIL # BLD AUTO: 0.4 K/UL — SIGNIFICANT CHANGE UP (ref 0–0.5)
EOSINOPHIL NFR BLD AUTO: 4.5 % — SIGNIFICANT CHANGE UP (ref 0–6)
GAS PNL BLDV: 139 MMOL/L — SIGNIFICANT CHANGE UP (ref 136–146)
GLUCOSE BLDA-MCNC: 105 MG/DL — HIGH (ref 70–99)
GLUCOSE BLDA-MCNC: 40 MG/DL — CRITICAL LOW (ref 70–99)
GLUCOSE BLDA-MCNC: 47 MG/DL — LOW (ref 70–99)
GLUCOSE BLDV-MCNC: 53 — LOW (ref 70–99)
GLUCOSE SERPL-MCNC: 108 MG/DL — HIGH (ref 70–99)
GLUCOSE SERPL-MCNC: 49 MG/DL — LOW (ref 70–99)
GRAM STN SPT: SIGNIFICANT CHANGE UP
GRAM STN SPT: SIGNIFICANT CHANGE UP
HCO3 BLDA-SCNC: 20 MMOL/L — LOW (ref 22–26)
HCO3 BLDA-SCNC: 21 MMOL/L — LOW (ref 22–26)
HCO3 BLDA-SCNC: 22 MMOL/L — SIGNIFICANT CHANGE UP (ref 22–26)
HCO3 BLDV-SCNC: 21 MMOL/L — SIGNIFICANT CHANGE UP (ref 20–27)
HCT VFR BLD CALC: 32 % — LOW (ref 39–50)
HCT VFR BLD CALC: 33.8 % — LOW (ref 39–50)
HCT VFR BLDA CALC: 30.3 % — LOW (ref 39–51)
HCT VFR BLDA CALC: 30.5 % — LOW (ref 39–51)
HCT VFR BLDA CALC: 30.5 % — LOW (ref 39–51)
HCT VFR BLDV CALC: 30.5 % — LOW (ref 39–51)
HGB BLD-MCNC: 9.5 G/DL — LOW (ref 13–17)
HGB BLD-MCNC: 9.8 G/DL — LOW (ref 13–17)
HGB BLDA-MCNC: 9.8 G/DL — LOW (ref 13–17)
HGB BLDA-MCNC: 9.9 G/DL — LOW (ref 13–17)
HGB BLDA-MCNC: 9.9 G/DL — LOW (ref 13–17)
HGB BLDV-MCNC: 9.9 G/DL — LOW (ref 13–17)
IMM GRANULOCYTES NFR BLD AUTO: 0.6 % — SIGNIFICANT CHANGE UP (ref 0–1.5)
LACTATE BLDA-SCNC: 0.6 MMOL/L — SIGNIFICANT CHANGE UP (ref 0.5–2)
LACTATE BLDA-SCNC: 0.9 MMOL/L — SIGNIFICANT CHANGE UP (ref 0.5–2)
LACTATE BLDV-MCNC: 0.9 MMOL/L — SIGNIFICANT CHANGE UP (ref 0.5–2)
LACTATE SERPL-SCNC: 0.7 MMOL/L — SIGNIFICANT CHANGE UP (ref 0.5–2)
LYMPHOCYTES # BLD AUTO: 0.78 K/UL — LOW (ref 1–3.3)
LYMPHOCYTES # BLD AUTO: 8.8 % — LOW (ref 13–44)
MAGNESIUM SERPL-MCNC: 2.6 MG/DL — SIGNIFICANT CHANGE UP (ref 1.6–2.6)
MCHC RBC-ENTMCNC: 29 % — LOW (ref 32–36)
MCHC RBC-ENTMCNC: 29.7 % — LOW (ref 32–36)
MCHC RBC-ENTMCNC: 31 PG — SIGNIFICANT CHANGE UP (ref 27–34)
MCHC RBC-ENTMCNC: 31.4 PG — SIGNIFICANT CHANGE UP (ref 27–34)
MCV RBC AUTO: 105.6 FL — HIGH (ref 80–100)
MCV RBC AUTO: 107 FL — HIGH (ref 80–100)
MONOCYTES # BLD AUTO: 0.35 K/UL — SIGNIFICANT CHANGE UP (ref 0–0.9)
MONOCYTES NFR BLD AUTO: 3.9 % — SIGNIFICANT CHANGE UP (ref 2–14)
NEUTROPHILS # BLD AUTO: 7.26 K/UL — SIGNIFICANT CHANGE UP (ref 1.8–7.4)
NEUTROPHILS NFR BLD AUTO: 81.6 % — HIGH (ref 43–77)
NRBC # FLD: 0 K/UL — LOW (ref 25–125)
NRBC # FLD: 0.02 K/UL — LOW (ref 25–125)
PCO2 BLDA: 100 MMHG — CRITICAL HIGH (ref 35–48)
PCO2 BLDA: 66 MMHG — HIGH (ref 35–48)
PCO2 BLDA: 72 MMHG — CRITICAL HIGH (ref 35–48)
PCO2 BLDV: > 110 MMHG — CRITICAL HIGH (ref 41–51)
PH BLDA: 7.05 PH — CRITICAL LOW (ref 7.35–7.45)
PH BLDA: 7.16 PH — CRITICAL LOW (ref 7.35–7.45)
PH BLDA: 7.21 PH — LOW (ref 7.35–7.45)
PH BLDV: 7.04 PH — CRITICAL LOW (ref 7.32–7.43)
PHOSPHATE SERPL-MCNC: 9.1 MG/DL — HIGH (ref 2.5–4.5)
PLATELET # BLD AUTO: 232 K/UL — SIGNIFICANT CHANGE UP (ref 150–400)
PLATELET # BLD AUTO: 260 K/UL — SIGNIFICANT CHANGE UP (ref 150–400)
PMV BLD: 10.7 FL — SIGNIFICANT CHANGE UP (ref 7–13)
PMV BLD: 11 FL — SIGNIFICANT CHANGE UP (ref 7–13)
PO2 BLDA: 117 MMHG — HIGH (ref 83–108)
PO2 BLDA: 142 MMHG — HIGH (ref 83–108)
PO2 BLDA: 95 MMHG — SIGNIFICANT CHANGE UP (ref 83–108)
PO2 BLDV: 71 MMHG — HIGH (ref 35–40)
POTASSIUM BLDA-SCNC: 4.3 MMOL/L — SIGNIFICANT CHANGE UP (ref 3.4–4.5)
POTASSIUM BLDA-SCNC: 4.3 MMOL/L — SIGNIFICANT CHANGE UP (ref 3.4–4.5)
POTASSIUM BLDA-SCNC: 4.4 MMOL/L — SIGNIFICANT CHANGE UP (ref 3.4–4.5)
POTASSIUM BLDV-SCNC: 4.5 MMOL/L — SIGNIFICANT CHANGE UP (ref 3.4–4.5)
POTASSIUM SERPL-MCNC: 4.7 MMOL/L — SIGNIFICANT CHANGE UP (ref 3.5–5.3)
POTASSIUM SERPL-MCNC: 4.7 MMOL/L — SIGNIFICANT CHANGE UP (ref 3.5–5.3)
POTASSIUM SERPL-SCNC: 4.7 MMOL/L — SIGNIFICANT CHANGE UP (ref 3.5–5.3)
POTASSIUM SERPL-SCNC: 4.7 MMOL/L — SIGNIFICANT CHANGE UP (ref 3.5–5.3)
PROT SERPL-MCNC: 7.6 G/DL — SIGNIFICANT CHANGE UP (ref 6–8.3)
RBC # BLD: 3.03 M/UL — LOW (ref 4.2–5.8)
RBC # BLD: 3.16 M/UL — LOW (ref 4.2–5.8)
RBC # FLD: 16.2 % — HIGH (ref 10.3–14.5)
RBC # FLD: 16.4 % — HIGH (ref 10.3–14.5)
SAO2 % BLDA: 94.8 % — LOW (ref 95–99)
SAO2 % BLDA: 97.4 % — SIGNIFICANT CHANGE UP (ref 95–99)
SAO2 % BLDA: 97.4 % — SIGNIFICANT CHANGE UP (ref 95–99)
SAO2 % BLDV: 85 % — SIGNIFICANT CHANGE UP (ref 60–85)
SODIUM BLDA-SCNC: 138 MMOL/L — SIGNIFICANT CHANGE UP (ref 136–146)
SODIUM BLDA-SCNC: 139 MMOL/L — SIGNIFICANT CHANGE UP (ref 136–146)
SODIUM BLDA-SCNC: 142 MMOL/L — SIGNIFICANT CHANGE UP (ref 136–146)
SODIUM SERPL-SCNC: 141 MMOL/L — SIGNIFICANT CHANGE UP (ref 135–145)
SODIUM SERPL-SCNC: 141 MMOL/L — SIGNIFICANT CHANGE UP (ref 135–145)
SPECIMEN SOURCE: SIGNIFICANT CHANGE UP
WBC # BLD: 8.26 K/UL — SIGNIFICANT CHANGE UP (ref 3.8–10.5)
WBC # BLD: 8.89 K/UL — SIGNIFICANT CHANGE UP (ref 3.8–10.5)
WBC # FLD AUTO: 8.26 K/UL — SIGNIFICANT CHANGE UP (ref 3.8–10.5)
WBC # FLD AUTO: 8.89 K/UL — SIGNIFICANT CHANGE UP (ref 3.8–10.5)

## 2019-01-29 PROCEDURE — 71045 X-RAY EXAM CHEST 1 VIEW: CPT | Mod: 26,76

## 2019-01-29 PROCEDURE — 99292 CRITICAL CARE ADDL 30 MIN: CPT

## 2019-01-29 PROCEDURE — 99222 1ST HOSP IP/OBS MODERATE 55: CPT | Mod: GC

## 2019-01-29 PROCEDURE — 36011 PLACE CATHETER IN VEIN: CPT

## 2019-01-29 PROCEDURE — 31624 DX BRONCHOSCOPE/LAVAGE: CPT

## 2019-01-29 PROCEDURE — 31645 BRNCHSC W/THER ASPIR 1ST: CPT

## 2019-01-29 PROCEDURE — 36620 INSERTION CATHETER ARTERY: CPT

## 2019-01-29 PROCEDURE — 99291 CRITICAL CARE FIRST HOUR: CPT

## 2019-01-29 RX ORDER — ALBUMIN HUMAN 25 %
250 VIAL (ML) INTRAVENOUS ONCE
Qty: 0 | Refills: 0 | Status: COMPLETED | OUTPATIENT
Start: 2019-01-29 | End: 2019-01-29

## 2019-01-29 RX ORDER — ALBUMIN HUMAN 25 %
50 VIAL (ML) INTRAVENOUS EVERY 6 HOURS
Qty: 0 | Refills: 0 | Status: COMPLETED | OUTPATIENT
Start: 2019-01-29 | End: 2019-01-30

## 2019-01-29 RX ORDER — SODIUM CHLORIDE 9 MG/ML
1000 INJECTION INTRAMUSCULAR; INTRAVENOUS; SUBCUTANEOUS
Qty: 0 | Refills: 0 | Status: DISCONTINUED | OUTPATIENT
Start: 2019-01-29 | End: 2019-02-01

## 2019-01-29 RX ORDER — DEXTROSE 10 % IN WATER 10 %
1000 INTRAVENOUS SOLUTION INTRAVENOUS
Qty: 0 | Refills: 0 | Status: DISCONTINUED | OUTPATIENT
Start: 2019-01-29 | End: 2019-02-01

## 2019-01-29 RX ORDER — VASOPRESSIN 20 [USP'U]/ML
0.05 INJECTION INTRAVENOUS
Qty: 100 | Refills: 0 | Status: DISCONTINUED | OUTPATIENT
Start: 2019-01-29 | End: 2019-02-02

## 2019-01-29 RX ORDER — FENTANYL CITRATE 50 UG/ML
2 INJECTION INTRAVENOUS
Qty: 2500 | Refills: 0 | Status: DISCONTINUED | OUTPATIENT
Start: 2019-01-29 | End: 2019-02-05

## 2019-01-29 RX ORDER — ACETAMINOPHEN 500 MG
750 TABLET ORAL ONCE
Qty: 0 | Refills: 0 | Status: COMPLETED | OUTPATIENT
Start: 2019-01-29 | End: 2019-01-29

## 2019-01-29 RX ORDER — DEXTROSE 50 % IN WATER 50 %
50 SYRINGE (ML) INTRAVENOUS ONCE
Qty: 0 | Refills: 0 | Status: COMPLETED | OUTPATIENT
Start: 2019-01-29 | End: 2019-01-29

## 2019-01-29 RX ORDER — ACETAMINOPHEN 500 MG
1000 TABLET ORAL ONCE
Qty: 0 | Refills: 0 | Status: COMPLETED | OUTPATIENT
Start: 2019-01-29 | End: 2019-01-29

## 2019-01-29 RX ORDER — VECURONIUM BROMIDE 20 MG/1
6 INJECTION, POWDER, FOR SOLUTION INTRAVENOUS ONCE
Qty: 0 | Refills: 0 | Status: COMPLETED | OUTPATIENT
Start: 2019-01-29 | End: 2019-01-29

## 2019-01-29 RX ADMIN — FENTANYL CITRATE 12 MICROGRAM(S)/KG/HR: 50 INJECTION INTRAVENOUS at 20:10

## 2019-01-29 RX ADMIN — VASOPRESSIN 3 UNIT(S)/MIN: 20 INJECTION INTRAVENOUS at 04:28

## 2019-01-29 RX ADMIN — PIPERACILLIN AND TAZOBACTAM 25 GRAM(S): 4; .5 INJECTION, POWDER, LYOPHILIZED, FOR SOLUTION INTRAVENOUS at 18:24

## 2019-01-29 RX ADMIN — FENTANYL CITRATE 12 MICROGRAM(S)/KG/HR: 50 INJECTION INTRAVENOUS at 00:30

## 2019-01-29 RX ADMIN — Medication 5.62 MICROGRAM(S)/KG/MIN: at 20:09

## 2019-01-29 RX ADMIN — VASOPRESSIN 3 UNIT(S)/MIN: 20 INJECTION INTRAVENOUS at 20:09

## 2019-01-29 RX ADMIN — Medication 400 MILLIGRAM(S): at 16:00

## 2019-01-29 RX ADMIN — FENTANYL CITRATE 2 MICROGRAM(S)/KG/HR: 50 INJECTION INTRAVENOUS at 21:00

## 2019-01-29 RX ADMIN — Medication 500 MILLILITER(S): at 01:00

## 2019-01-29 RX ADMIN — Medication 1000 MILLIGRAM(S): at 16:15

## 2019-01-29 RX ADMIN — Medication 500 MILLILITER(S): at 01:15

## 2019-01-29 RX ADMIN — HEPARIN SODIUM 5000 UNIT(S): 5000 INJECTION INTRAVENOUS; SUBCUTANEOUS at 05:10

## 2019-01-29 RX ADMIN — Medication 750 MILLIGRAM(S): at 01:00

## 2019-01-29 RX ADMIN — Medication 50 MILLILITER(S): at 07:00

## 2019-01-29 RX ADMIN — MIDAZOLAM HYDROCHLORIDE 1.2 MG/KG/HR: 1 INJECTION, SOLUTION INTRAMUSCULAR; INTRAVENOUS at 20:10

## 2019-01-29 RX ADMIN — Medication 30 MILLILITER(S): at 20:10

## 2019-01-29 RX ADMIN — Medication 50 MILLILITER(S): at 18:24

## 2019-01-29 RX ADMIN — VECURONIUM BROMIDE 6 MILLIGRAM(S): 20 INJECTION, POWDER, FOR SOLUTION INTRAVENOUS at 00:30

## 2019-01-29 RX ADMIN — PIPERACILLIN AND TAZOBACTAM 25 GRAM(S): 4; .5 INJECTION, POWDER, LYOPHILIZED, FOR SOLUTION INTRAVENOUS at 05:10

## 2019-01-29 RX ADMIN — HEPARIN SODIUM 5000 UNIT(S): 5000 INJECTION INTRAVENOUS; SUBCUTANEOUS at 18:25

## 2019-01-29 RX ADMIN — Medication 500 MILLILITER(S): at 02:15

## 2019-01-29 RX ADMIN — SODIUM CHLORIDE 50 MILLILITER(S): 9 INJECTION INTRAMUSCULAR; INTRAVENOUS; SUBCUTANEOUS at 20:08

## 2019-01-29 RX ADMIN — Medication 300 MILLIGRAM(S): at 00:30

## 2019-01-29 RX ADMIN — Medication 500 MILLILITER(S): at 02:00

## 2019-01-29 NOTE — PROGRESS NOTE ADULT - SUBJECTIVE AND OBJECTIVE BOX
CLAUDIA HAN            MRN-[id number]         No Known Allergies                 HPI:  Mr. Han is a 58M who underwent a FB, R thoracotomy, decortication, chest wall reconstruction, lat flap with Dr Pickering on 10/11/18. He was admitted on 1/6/19 to [facility] with a R pleural effusion and respiratory failure,  A R PTC was placed there and he was intubated.  He still had a R SANDY drain in place and he was transferred to [facility]. Patient was found to have aspirated a foreign body and +MRSA empyema. He was treated and discharged on 1/22/2019.     He was found by his home nurse to be in acute distress and recommended to proceed to the ED.  At presentation, he was found to be in acute hypercarbic respiratory failure complicated by hemodynamic instability requiring emergent intubation and pressor support. (28 Jan 2019 15:34)      Procedure:        Bronchoscopy   1/29/2019  Right thoracotomy, resection of portion of R 7th rib, evacuation of infected hemothorax, takedown of pleurocutaneous fistula  10/11/2018      Issues:  Acute hypoxic & hypercapnic respiratory failure  Hypotension  Pneumonia / Sepsis  ESRD / HD  Cardiomyopathy  HLD  BPH               Home Medications:  aspirin 81 mg oral tablet: 1 tab(s) orally once a day  (30 Oct 2018 14:39)  Breo Ellipta 100 mcg-25 mcg/inh inhalation powder: 1 puff(s) inhaled once a day patient denies using it (11 Oct 2018 08:38)  Calcium 500: 1 tab(s) orally 2 times a day (11 Oct 2018 08:38)  docusate sodium 100 mg oral tablet: 1 tab(s) orally 2 times a day, As Needed (11 Oct 2018 08:39)  folic acid 1 mg oral tablet: 1 tab(s) orally once a day (11 Oct 2018 08:38)  Mag-Ox 400 oral tablet: 1 tab(s) orally once a day (11 Oct 2018 08:39)  Multiple Vitamins oral tablet: 1 tab(s) orally once a day  (30 Oct 2018 14:39)  Namenda 10 mg oral tablet: 1 tab(s) orally 2 times a day (11 Oct 2018 08:38)  Nephplex Rx oral tablet: 1 tab(s) orally once a day (11 Oct 2018 08:39)  nortriptyline 50 mg oral capsule: 1 cap(s) orally once a day (11 Oct 2018 08:39)  Renvela 800 mg oral tablet: 2 tab(s) orally every 8 hours (11 Oct 2018 08:39)  simethicone 80 mg oral tablet: 1 tab(s) orally 3 times a day (after meals) (11 Oct 2018 08:39)  Tylenol 325 mg oral tablet: 2 tab(s) orally every 6 hours, As Needed (30 Oct 2018 14:39)  vancomycin 1 g intravenous injection: 1 gram(s) intravenous 3 times a week  Please give 3 x per week with Dialysis until 2/3/2019 (22 Jan 2019 13:41)  vitamin A: 1 tab(s) orally once a day (11 Oct 2018 08:38)  Vitamin B Complex: 1 tab(s) orally once a day (11 Oct 2018 08:38)  Vitamin C 500 mg oral tablet: 1 tab(s) orally once a day (11 Oct 2018 08:38)      PAST MEDICAL & SURGICAL HISTORY:  Smoking hx  Cardiomyopathy  Wound: right chest  ICD (implantable cardioverter-defibrillator) in place  OA (osteoarthritis)  HLD (hyperlipidemia)  BPH (benign prostatic hyperplasia)  Pleural effusion  HTN (hypertension)  ESRD (end stage renal disease)  H/O chest wound: right  S/P thoracotomy: FB, R thoracotomy, decortication, chest wall reconstruction, lat flap  History of wound infection: right chest wall - revision 5/18 and again in 7/18  History of implantable cardioverter-defibrillator (ICD) placement: pt unsure when placed  H/O bilateral hip replacements: 2008, 2009        ICU Vital Signs Last 24 Hrs  T(C): 38.6 (29 Jan 2019 16:00), Max: 40.1 (29 Jan 2019 00:00)  T(F): 101.4 (29 Jan 2019 16:00), Max: 104.2 (29 Jan 2019 00:00)  HR: 110 (29 Jan 2019 17:00) (101 - 129)  BP: 99/63 (29 Jan 2019 17:00) (58/21 - 120/69)  BP(mean): 70 (29 Jan 2019 17:00) (28 - 82)  ABP: 77/54 (29 Jan 2019 17:00) (52/36 - 105/65)  ABP(mean): 62 (29 Jan 2019 17:00) (41 - 76)  RR: 22 (29 Jan 2019 17:00) (14 - 22)  SpO2: 97% (29 Jan 2019 17:00) (76% - 100%)    I&O's Detail    28 Jan 2019 07:01  -  29 Jan 2019 07:00  --------------------------------------------------------  IN:    dexmedetomidine Infusion: 150 mL    dextrose 10%.: 30 mL    fentaNYL  Infusion: 84 mL    IV PiggyBack: 1300 mL    lactated ringers.: 600 mL    midazolam Infusion: 60 mL    norepinephrine Infusion: 486 mL    vasopressin Infusion: 9 mL  Total IN: 2719 mL    OUT:  Total OUT: 0 mL    Total NET: 2719 mL      29 Jan 2019 07:01  -  29 Jan 2019 18:21  --------------------------------------------------------  IN:    dexmedetomidine Infusion: 42 mL    dextrose 10%.: 300 mL    fentaNYL  Infusion: 90 mL    IV PiggyBack: 200 mL    lactated ringers.: 300 mL    midazolam Infusion: 36 mL    norepinephrine Infusion: 312.3 mL    sodium chloride 0.9%.: 150 mL    vasopressin Infusion: 37.2 mL  Total IN: 1467.5 mL    OUT:  Total OUT: 0 mL    Total NET: 1467.5 mL        CAPILLARY BLOOD GLUCOSE      POCT Blood Glucose.: 100 mg/dL (29 Jan 2019 13:58)      Home Medications:  aspirin 81 mg oral tablet: 1 tab(s) orally once a day  (30 Oct 2018 14:39)  Breo Ellipta 100 mcg-25 mcg/inh inhalation powder: 1 puff(s) inhaled once a day patient denies using it (11 Oct 2018 08:38)  Calcium 500: 1 tab(s) orally 2 times a day (11 Oct 2018 08:38)  docusate sodium 100 mg oral tablet: 1 tab(s) orally 2 times a day, As Needed (11 Oct 2018 08:39)  folic acid 1 mg oral tablet: 1 tab(s) orally once a day (11 Oct 2018 08:38)  Mag-Ox 400 oral tablet: 1 tab(s) orally once a day (11 Oct 2018 08:39)  Multiple Vitamins oral tablet: 1 tab(s) orally once a day  (30 Oct 2018 14:39)  Namenda 10 mg oral tablet: 1 tab(s) orally 2 times a day (11 Oct 2018 08:38)  Nephplex Rx oral tablet: 1 tab(s) orally once a day (11 Oct 2018 08:39)  nortriptyline 50 mg oral capsule: 1 cap(s) orally once a day (11 Oct 2018 08:39)  Renvela 800 mg oral tablet: 2 tab(s) orally every 8 hours (11 Oct 2018 08:39)  simethicone 80 mg oral tablet: 1 tab(s) orally 3 times a day (after meals) (11 Oct 2018 08:39)  Tylenol 325 mg oral tablet: 2 tab(s) orally every 6 hours, As Needed (30 Oct 2018 14:39)  vancomycin 1 g intravenous injection: 1 gram(s) intravenous 3 times a week  Please give 3 x per week with Dialysis until 2/3/2019 (22 Jan 2019 13:41)  vitamin A: 1 tab(s) orally once a day (11 Oct 2018 08:38)  Vitamin B Complex: 1 tab(s) orally once a day (11 Oct 2018 08:38)  Vitamin C 500 mg oral tablet: 1 tab(s) orally once a day (11 Oct 2018 08:38)      MEDICATIONS  (STANDING):  acetaminophen  IVPB .. 1000 milliGRAM(s) IV Intermittent once  albumin human 25% IVPB 50 milliLiter(s) IV Intermittent every 6 hours  dexmedetomidine Infusion 0.5 MICROgram(s)/kG/Hr (7.5 mL/Hr) IV Continuous <Continuous>  dextrose 10%. 1000 milliLiter(s) (30 mL/Hr) IV Continuous <Continuous>  dextrose 50% Injectable 50 milliLiter(s) IV Push once  fentaNYL   Infusion 2 MICROgram(s)/kG/Hr (12 mL/Hr) IV Continuous <Continuous>  heparin  Injectable 5000 Unit(s) SubCutaneous every 12 hours  midazolam Infusion 0.02 mG/kG/Hr (1.2 mL/Hr) IV Continuous <Continuous>  norepinephrine Infusion 0.05 MICROgram(s)/kG/Min (5.625 mL/Hr) IV Continuous <Continuous>  piperacillin/tazobactam IVPB. 3.375 Gram(s) IV Intermittent once  piperacillin/tazobactam IVPB. 3.375 Gram(s) IV Intermittent every 12 hours  sodium chloride 0.9%. 1000 milliLiter(s) (50 mL/Hr) IV Continuous <Continuous>  vasopressin Infusion 0.05 Unit(s)/Min (3 mL/Hr) IV Continuous <Continuous>    MEDICATIONS  (PRN):      Mode: AC/ CMV (Assist Control/ Continuous Mandatory Ventilation)  RR (machine): 22  TV (machine): 400  FiO2: 60  PEEP: 7  MAP: 11  PIP: 30      Physical exam:                             General:               Pt sedated                                               Neuro:                  Sedated                            Cardiovascular:   S1 & S2, regular                           Respiratory:         Air entry is decreased at  both bases, has bilateral conducted sounds                           GI:                          Soft, nondistended and nontender, Bowel sounds active                            Ext:                        No cyanosis or edema     Labs:                                                                           9.5    8.26  )-----------( 232      ( 29 Jan 2019 14:30 )             32.0             01-29    141  |  96<L>  |  91<H>  ----------------------------<  108<H>  4.7   |  24  |  6.89<H>    Ca    9.2      29 Jan 2019 14:30  Phos  9.1     01-29  Mg     2.6     01-29    TPro  7.6  /  Alb  3.7  /  TBili  0.7  /  DBili  x   /  AST  40  /  ALT  34  /  AlkPhos  180<H>  01-29                  PT/INR - ( 28 Jan 2019 14:30 )   PT: 12.4 SEC;   INR: 1.08          PTT - ( 28 Jan 2019 14:30 )  PTT:31.8 SEC  LIVER FUNCTIONS - ( 29 Jan 2019 04:25 )  Alb: 3.7 g/dL / Pro: 7.6 g/dL / ALK PHOS: 180 u/L / ALT: 34 u/L / AST: 40 u/L / GGT: x               Culture - Respiratory with Gram Stain (collected 01-29-19 @ 13:38)  Source: LUNG - UPPER LOBE RIGHT    Culture - Respiratory with Gram Stain (collected 01-29-19 @ 13:38)  Source: LUNG - UPPER LOBE LEFT          CXR:  < from: Xray Chest 1 View- PORTABLE-Urgent (01.29.19 @ 13:17) >  4:01 AM:  A right IJ line has been placed since the last exam and its tip is at the   origin of the SVC. No pneumothorax.    Endotracheal and enteric tube in addition to a subcutaneous AICDare   unchanged. Bilateral loculated small effusions slightly decreased.   Retrocardiac opacity consistent with lower lobe atelectasis as reported   on recent CT.    12:42 PM:  The endotracheal and enteric tube in addition to the right IJ line and   AICD are unchanged. Likewise, bilateral small loculated effusions again   seen. No pneumothorax or focal consolidations.      COMPARISON:  CT chest January 28      IMPRESSION:  Follow-up studies with tubes and lines in place and right IJ   line placement.        Plan:    General: Mr. Han is a 58M who underwent a FB, R thoracotomy, decortication, chest wall reconstruction, lat flap with Dr Pickering on 10/11/18. He was admitted on 1/6/19 to [facility] with a R pleural effusion and respiratory failure,  A R PTC was placed there and he was intubated.  He still had a R SANDY drain in place and he was transferred to [facility]. Patient was found to have aspirated a foreign body and +MRSA empyema. He was treated and discharged on 1/22/2019.     He was found by his home nurse to be in acute distress and recommended to proceed to the ED.  At presentation, he was found to be in acute hypercarbic respiratory failure complicated by hemodynamic instability requiring emergent intubation and pressor support. (28 Jan 2019 15:34)                            Neuro:                                         Pain control with Fentanyl drip                            Cardiovascular:                                          Continue hemodynamic monitoring.    Hypotension: Titrate Levophed and Vasopressin to MAP>65    Continue IVF                            Respiratory:                                         Pt is on full mechanical ventilator support YA--60-7                                         Wean FiO2 as tolerated                                                                  Continue bronchodilators, pulmonary toilet     Bronchoscopy showed copious greenish secretions - sent for culture     Continue antibiotics -  Vanco dose based + Zosyn                            GI                                         Started NGT feeds - Nepro 20cc/hr - advance as tolerated                                         Continue GI prophylaxis with Protonix                                                                 Renal:                                         Continue NS 50cc/hr                                         Monitor I/Os and electrolytes                                         HD - as per renal                                                 Hem/ Onc:                                                                                 Follow CBC in AM                           Infectious disease:     Bronchoscopy showed copious greenish secretions - sent for culture                                         Continue Vanco dose based + Zosyn                                                                   Endocrine                                           Hypoglycemia: On D10 @ 30cc/hr        Continue Accu-Checks with coverage      Pt is on SQ Heparin and Venodyne boots for DVT prophylaxis.     Pertinent clinical, laboratory, radiographic, hemodynamic, echocardiographic, respiratory data, microbiologic data and chart were reviewed and analyzed frequently throughout the course of the day and night  Patient seen, examined and plan discussed with CT Surgeon Dr. Pickering / CTICU team during rounds.    Pt's status discussed with wife via video traslator at bedside, updated status    I have spent 90  minutes of critical care time with this pt between 7am and 11.59pm              Ralph Warren MD

## 2019-01-29 NOTE — PROGRESS NOTE ADULT - SUBJECTIVE AND OBJECTIVE BOX
CLAUDIA HAN                     [id number]    HPI:  Mr. Han is a 58M who underwent a FB, R thoracotomy, decortication, chest wall reconstruction, lat flap with Dr Pickering on 10/11/18. He was admitted on 1/6/19 to [facility] with a R pleural effusion and respiratory failure,  A R PTC was placed there and he was intubated.  He still had a R SANDY drain in place and he was transferred to [facility]. Patient was found to have aspirated a foreign body and +MRSA empyema. He was treated and discharged on 1/22/2019.     He was found by his home nurse to be in acute distress and recommended to proceed to the ED.  At presentation, he was found to be in acute hypercarbic respiratory failure complicated by hemodynamic instability requiring emergent intubation and pressor support. (28 Jan 2019 15:34)      Procedure: 10/11/18 Right thoracotomy, resection of portion of R 7th rib, evacuation of infected hemothorax, takedown of pleurocutaneous fistula  / R chest wall reconstruction with tunneled latissimus dorsi flap, covered by serratus anterior flap           Issues:  Acute respiratory failure with hypercapnia, intubated in ED  Hypotension on  pressors  ESRD (end stage renal disease), HD dependent    Resp acidosis  Acute on chronic systolic CHF ( EF 10 %), ICD in place      PAST MEDICAL & SURGICAL HISTORY:  Smoking hx  Cardiomyopathy  Wound: right chest  ICD (implantable cardioverter-defibrillator) in place  OA (osteoarthritis)  HLD (hyperlipidemia)  BPH (benign prostatic hyperplasia)  Pleural effusion  HTN (hypertension)  ESRD (end stage renal disease)  H/O chest wound: right  S/P thoracotomy: FB, R thoracotomy, decortication, chest wall reconstruction, lat flap  History of wound infection: right chest wall - revision 5/18 and again in 7/18  History of implantable cardioverter-defibrillator (ICD) placement: pt unsure when placed  H/O bilateral hip replacements: 2008, 2009            VITAL SIGNS:  Vital Signs Last 24 Hrs  T(C): 38.2 (29 Jan 2019 04:00), Max: 40.1 (29 Jan 2019 00:00)  T(F): 100.7 (29 Jan 2019 04:00), Max: 104.2 (29 Jan 2019 00:00)  HR: 119 (29 Jan 2019 05:08) (90 - 129)  BP: 120/67 (29 Jan 2019 05:00) (58/21 - 120/67)  BP(mean): 80 (29 Jan 2019 05:00) (28 - 80)  RR: 18 (29 Jan 2019 05:00) (14 - 20)  SpO2: 99% (29 Jan 2019 05:08) (76% - 100%)    I/Os:   I&O's Detail    28 Jan 2019 07:01  -  29 Jan 2019 06:50  --------------------------------------------------------  IN:    dexmedetomidine Infusion: 129 mL    fentaNYL  Infusion: 60 mL    IV PiggyBack: 1300 mL    lactated ringers.: 550 mL    midazolam Infusion: 52.8 mL    norepinephrine Infusion: 407.3 mL    vasopressin Infusion: 3 mL  Total IN: 2502.1 mL    OUT:  Total OUT: 0 mL    Total NET: 2502.1 mL          CAPILLARY BLOOD GLUCOSE          =======================MEDICATIONS===================  MEDICATIONS  (STANDING):  dexmedetomidine Infusion 0.5 MICROgram(s)/kG/Hr (7.5 mL/Hr) IV Continuous <Continuous>  dextrose 10%. 1000 milliLiter(s) (30 mL/Hr) IV Continuous <Continuous>  fentaNYL   Infusion 2 MICROgram(s)/kG/Hr (12 mL/Hr) IV Continuous <Continuous>  heparin  Injectable 5000 Unit(s) SubCutaneous every 12 hours  lactated ringers. 1000 milliLiter(s) (50 mL/Hr) IV Continuous <Continuous>  midazolam Infusion 0.02 mG/kG/Hr (1.2 mL/Hr) IV Continuous <Continuous>  norepinephrine Infusion 0.05 MICROgram(s)/kG/Min (5.625 mL/Hr) IV Continuous <Continuous>  piperacillin/tazobactam IVPB. 3.375 Gram(s) IV Intermittent once  piperacillin/tazobactam IVPB. 3.375 Gram(s) IV Intermittent every 12 hours  vasopressin Infusion 0.05 Unit(s)/Min (3 mL/Hr) IV Continuous <Continuous>    MEDICATIONS  (PRN):      =======================VENTILATOR SETTINGS===================  Mode: AC/ CMV (Assist Control/ Continuous Mandatory Ventilation)  RR (machine): 18  TV (machine): 400  FiO2: 80  PEEP: 6  MAP: 13  PIP: 32      PHYSICAL EXAM============================  General:                        Intubated, sedated  Neuro:                           Intubated, sedated  Respiratory:	Air entry fair and  bilateral conducted sounds                                           Effort even and unlabored.  CV:		Regular rate and rhythm. Normal S1/S2                                          Distal pulses present.  Abdomen:	                     Soft, non-distended. Bowel sounds present, J tube in place    Skin:		No rash.  Extremities:	Warm, no cyanosis or edema.  Palpable pulses  ============================LABS=========================                        9.8    8.89  )-----------( 260      ( 29 Jan 2019 04:25 )             33.8     01-29    141  |  98  |  81<H>  ----------------------------<  49<L>  4.7   |  23  |  6.40<H>    Ca    8.9      29 Jan 2019 04:25  Phos  9.1     01-29  Mg     2.6     01-29    TPro  7.6  /  Alb  3.7  /  TBili  0.7  /  DBili  x   /  AST  40  /  ALT  34  /  AlkPhos  180<H>  01-29    LIVER FUNCTIONS - ( 29 Jan 2019 04:25 )  Alb: 3.7 g/dL / Pro: 7.6 g/dL / ALK PHOS: 180 u/L / ALT: 34 u/L / AST: 40 u/L / GGT: x           PT/INR - ( 28 Jan 2019 14:30 )   PT: 12.4 SEC;   INR: 1.08          PTT - ( 28 Jan 2019 14:30 )  PTT:31.8 SEC  ABG - ( 29 Jan 2019 06:30 )  pH, Arterial: 7.21  pH, Blood: x     /  pCO2: 66    /  pO2: 117   / HCO3: 22    / Base Excess: -1.5  /  SaO2: 97.4                  ============================IMAGING STUDIES=========================  < from: Xray Chest 1 View- PORTABLE-Urgent (01.28.19 @ 15:43) >    IMPRESSION:  Intubated with ETT tip at clavicular level.     Enteric tube inserted with tip at GE junction level and side port at mid   to distal esophagus.     Redemonstrated bilateral pleural reactions right slightly greater than   left, concomitant underlying airspace consolidations including   infection/pneumonia in the proper clinical context cannot be excluded.   Multiple surgical clips again seen over lower right hemithorax and and   lateral right chest wall/axillary soft tissues.    Clear upper lungs. No pneumothorax.    Stable left chest wall assess ICD and prominent appearing cardiac and   mediastinal silhouettes.    Stable osseous structures.     Pigtail drainage catheter again seen with tip in peripheral right upper   quadrant.      A/P:    Pt is a 58 yr old  male who underwent a FB, R thoracotomy, decortication, chest wall reconstruction, lat flap with Dr Pickering on 10/11/18. He was admitted on 1/6/19 to [facility] with a R pleural effusion and respiratory failure,  A R PTC was placed there and he was intubated.  He still had a R SANDY drain in place.  He presented intubated and sedated on pressors. (07 Jan 2019 23:10). Pt is currently extubated but on / off BiPAP for respiratory acidosis      Issues:  Acute respiratory failure with hypercapnia, intubated in ED  Hypotension on  pressors  ESRD (end stage renal disease), HD dependent    Resp acidosis  Acute on chronic systolic CHF ( EF 10 %), ICD in place   severe deconditioning    ====================== NEUROLOGY=======================  Vented, sedated, comfortable   ==================== RESPIRATORY========================  Intubated for acute hypercapnia resp failure, Monitor ABG's  Comfortable,   Monitor R Pigtail output  Chest pigtail to bulb suction	  Continuous pulse oximetry monitoring  Continue bronchodilators, pulmonary toilet  Head of bed elevation to 30-40 degrees  ====================CARDIOVASCULAR=====================  Continue hemodynamic monitoring/ telemetry  Hypotension improved; on Levophed, keep MAP>65  Restart MIdodrine  ===================== RENAL ============================  ESRD on HD as per renal team  Monitor I/Os, BUN/ Cr  and electrolytes  ==================== GASTROINTESTINAL===================  NPO  Continue GI prophylaxis with Protonix   Zofran / Reglan for nausea - PRN	  =======================    ENDOCRIN  =====================  Glycemic monitoring  F/S with coverage    ===================HEMATOLOGIC/ONCOLOGIC =============  Monitor chest tube output. No signs of active bleeding.   Follow CBC, coags  in AM  DVT prophylaxis with SCD, sc Heparin  ========================INFECTIOUS DISEASE===============  All the cultures are pending. Continue Vanco by level and Zosyn   Monitor for fever / leukocytosis.  All surgical incision / chest tube  sites look clean  ID dr Chahal ,       Pertinent clinical, laboratory, radiographic, hemodynamic, echocardiographic, respiratory data, microbiologic data and chart were reviewed and analyzed frequently throughout the course of the day and night. GI and DVT prophylaxis, glycemic control, head of bed elevation and skin care issues were addressed.  Patient seen, examined and plan discussed with CT Surgery / CTICU team during rounds.  Pt remains critically ill in imminent risk of  deterioration and requires very careful cardio- pulmonary monitoring and support.    I have spent 75 minutes of critical care time with this pt between 5pm   and 11:59pm         minutes spent on total encounter; more than 50% of the visit was spent counseling and/or coordinating care by the attending physician.          Dave MILLANP          A/P:    Pt is a 58 yr old  male who underwent a FB, R thoracotomy, decortication, chest wall reconstruction, lat flap with Dr Pickering on 10/11/18. He was admitted on 1/6/19 to [facility] with a R pleural effusion and respiratory failure,  A R PTC was placed there and he was intubated.  He still had a R SANDY drain in place.  He presented intubated and sedated on pressors. (07 Jan 2019 23:10). Pt is currently extubated but on / off BiPAP for respiratory acidosis      Issues:  Acute respiratory failure with hypercapnia, intubated in ED  Hypotension on  pressors  ESRD (end stage renal disease), HD dependent    Resp acidosis  Acute on chronic systolic CHF ( EF 10 %), ICD in place   severe deconditioning    ====================== NEUROLOGY=======================  Vented, sedated, comfortable   ==================== RESPIRATORY========================  Intubated for acute hypercapnia resp failure, Monitor ABG's  Comfortable,   Monitor R Pigtail output  Chest pigtail to bulb suction	  Continuous pulse oximetry monitoring  Continue bronchodilators, pulmonary toilet  Head of bed elevation to 30-40 degrees  ====================CARDIOVASCULAR=====================  Continue hemodynamic monitoring/ telemetry  Hypotension improved; on Levophed, added Vasopressin drip,  keep MAP>65  Restart MIdodrine  ===================== RENAL ============================  ESRD on HD as per renal team  Monitor I/Os, BUN/ Cr  and electrolytes  ==================== GASTROINTESTINAL===================  NPO  Continue GI prophylaxis with Protonix   Zofran / Reglan for nausea - PRN	  =======================    ENDOCRIN  =====================  Glycemic monitoring  Hypoglycemia this AM,  D10W.   F/S monitoring     ===================HEMATOLOGIC/ONCOLOGIC =============  Monitor chest tube output. No signs of active bleeding.   Follow CBC, coags  in AM  DVT prophylaxis with SCD, sc Heparin  ========================INFECTIOUS DISEASE===============  All the cultures are pending. Continue Vanco by level and Zosyn   Monitor for fever / leukocytosis.  All surgical incision / chest tube  sites look clean  ID dr Chahal ,       Pertinent clinical, laboratory, radiographic, hemodynamic, echocardiographic, respiratory data, microbiologic data and chart were reviewed and analyzed frequently throughout the course of the day and night. GI and DVT prophylaxis, glycemic control, head of bed elevation and skin care issues were addressed.  Patient seen, examined and plan discussed with CT Surgery / CTICU team during rounds.  Pt remains critically ill in imminent risk of  deterioration and requires very careful cardio- pulmonary monitoring and support.    I have spent 45 minutes of critical care time with this pt between 1am to 9am.          minutes spent on total encounter; more than 50% of the visit was spent counseling and/or coordinating care by the attending physician.          Dave MILLANP

## 2019-01-29 NOTE — PROCEDURE NOTE - PROCEDURE
<<-----Click on this checkbox to enter Procedure Flexible bronchoscopy by cardiothoracic surgery  01/29/2019    Active  [id number]

## 2019-01-29 NOTE — PROGRESS NOTE ADULT - SUBJECTIVE AND OBJECTIVE BOX
CENTRAL LINE CATHETER INSERTION NOTE      PRE-PROCEDURE  Patient and/or family/ surrogate educated about central line-associated blood stream infection prevention practices.  Central-line insertion checklist utilized.  Catheter Type:   Central venous catheter  Number of Lumens:  Triple  Antimicrobial catheter: No    TIME OUT performed prior to this procedure with verbal confirmation of correct patient identity, correct site, side and  agreement of the procedure, correct patient position, availability of necessary equipment. The consent form  is complete  and accurate. Safety precautions based on patient history or medication use has been addressed.      The patient was placed in the  Trendelenburg position. The patient’s placement site was prepped with Chlorhexidine solution, then draped using maximum sterile barrier protection. The area was injected with   3 ml of 1% Lidocaine. Using ultrasound and the  modified Seldinger technique A RIJ the catheter was introduced. Strict adherence to outlined aseptic technique, including hand washing prior to donning sterile barriers (gloves and gown), utilizing a mask and cap, plus draping the patient with a sterile drape was observed. Upon completion of line placement, the insertion site was covered with sterile dressing.  All materials used for catheter insertion, including the intact guide wire, were accounted for at the end of the procedure.    Correct placement confirmed by CXR    Pt tolerated the procedure well and no complications were noted

## 2019-01-29 NOTE — CONSULT NOTE ADULT - PROBLEM SELECTOR RECOMMENDATION 9
Pt. with ESRD on HD TIW (TTS). Last HD as outpatient was on 1/26/19. Pt. currently on IV pressors. Serum potassium WNL today. Will hold HD today. Will assess need for HD tomorrow. Monitor labs. Pt. with ESRD on HD TIW (TTS). Last HD as outpatient was on 1/26/19. Pt. currently on IV pressors. Serum potassium WNL today. Will hold HD today. Will assess need for HD tomorrow. Monitor labs

## 2019-01-29 NOTE — CONSULT NOTE ADULT - PROBLEM SELECTOR RECOMMENDATION 2
Patient with anemia in the setting of ESRD. Hemoglobin below target range. Will need to determine if patient receives MOOKIE. Monitor hemoglobin. Patient with anemia in the setting of ESRD. Hemoglobin below target range. Will need to determine if patient receives MOOKIE. Monitor hemoglobin

## 2019-01-29 NOTE — CONSULT NOTE ADULT - SUBJECTIVE AND OBJECTIVE BOX
[facility] DIVISION OF KIDNEY DISEASES AND HYPERTENSION -- [phone]  -- INITIAL CONSULT NOTE  --------------------------------------------------------------------------------  HPI: 59 yo male (Mandarin speaking) with medical history of NICM with ICD, ESRD on HD, former smoker, BPH admitted with acute hypercapnic respiratory failure. Nephrology consulted for ESRD management. Pt intubated and sedated in the CTICU. History obtained from wife (Aaron Stack via phone thought ). As per wife he was found by his home nurse to be in acute distress was brought to [facility] for further management. Found to be in acute hypercarbic respiratory failure complicated by hemodynamic instability requiring emergent intubation and pressor support. Pt's wife reports pt has been on HD for about 6 years doesn't know etiology of ESRD). Pt. receives HD at [facility] dialysis [place] and follows with Dr. Reginald Meneses for nephrology.  Pt has LUE AVF and is currently on TTS schedule, for 4 hour sessions. Last outpatient HD on 1/26/19 with no complications as per wide. Pt seen in the CTICU, intubated, on pressors. Unable to provide ROS.    PAST HISTORY  --------------------------------------------------------------------------------  PAST MEDICAL & SURGICAL HISTORY:  Smoking hx  Cardiomyopathy  Wound: right chest  ICD (implantable cardioverter-defibrillator) in place  OA (osteoarthritis)  HLD (hyperlipidemia)  BPH (benign prostatic hyperplasia)  Pleural effusion  HTN (hypertension)  ESRD (end stage renal disease)  H/O chest wound: right  S/P thoracotomy: FB, R thoracotomy, decortication, chest wall reconstruction, lat flap  History of wound infection: right chest wall - revision 5/18 and again in 7/18  History of implantable cardioverter-defibrillator (ICD) placement: pt unsure when placed  H/O bilateral hip replacements: 2008, 2009    FAMILY HISTORY:    PAST SOCIAL HISTORY:  Unable to obtain    ALLERGIES & MEDICATIONS  --------------------------------------------------------------------------------  Allergies    No Known Allergies    Intolerances      Standing Inpatient Medications  dexmedetomidine Infusion 0.5 MICROgram(s)/kG/Hr IV Continuous <Continuous>  dextrose 10%. 1000 milliLiter(s) IV Continuous <Continuous>  fentaNYL   Infusion 2 MICROgram(s)/kG/Hr IV Continuous <Continuous>  heparin  Injectable 5000 Unit(s) SubCutaneous every 12 hours  lactated ringers. 1000 milliLiter(s) IV Continuous <Continuous>  midazolam Infusion 0.02 mG/kG/Hr IV Continuous <Continuous>  norepinephrine Infusion 0.05 MICROgram(s)/kG/Min IV Continuous <Continuous>  piperacillin/tazobactam IVPB. 3.375 Gram(s) IV Intermittent once  piperacillin/tazobactam IVPB. 3.375 Gram(s) IV Intermittent every 12 hours  vasopressin Infusion 0.05 Unit(s)/Min IV Continuous <Continuous>    PRN Inpatient Medications      REVIEW OF SYSTEMS  --------------------------------------------------------------------------------  Unable to obtain    VITALS/PHYSICAL EXAM  --------------------------------------------------------------------------------  T(C): 38.2 (01-29-19 @ 04:00), Max: 40.1 (01-29-19 @ 00:00)  HR: 119 (01-29-19 @ 07:00) (90 - 129)  BP: 120/67 (01-29-19 @ 05:00) (58/21 - 120/67)  RR: 18 (01-29-19 @ 05:00) (14 - 20)  SpO2: 98% (01-29-19 @ 07:00) (76% - 100%)  Wt(kg): --  Height (cm): 178 (01-28-19 @ 15:28)  Weight (kg): 60 (01-28-19 @ 15:28)  BMI (kg/m2): 18.9 (01-28-19 @ 15:28)  BSA (m2): 1.75 (01-28-19 @ 15:28)      01-28-19 @ 07:01  -  01-29-19 @ 07:00  --------------------------------------------------------  IN: 2502.1 mL / OUT: 0 mL / NET: 2502.1 mL      Physical Exam:  	Gen: Intubated  	HEENT: +ETT  	Pulm: coarse breath sounsd B/L  	CV: S1S2  	Abd: Soft, +BS   	Ext: No LE edema B/L  	Neuro: Awake  	Skin: Warm and dry  	Vascular access: LUE AVF +thrill, bruit heard.     LABS/STUDIES  --------------------------------------------------------------------------------              9.8    8.89  >-----------<  260      [01-29-19 @ 04:25]              33.8     141  |  98  |  81  ----------------------------<  49      [01-29-19 @ 04:25]  4.7   |  23  |  6.40        Ca     8.9     [01-29-19 @ 04:25]      Mg     2.6     [01-29-19 @ 04:25]      Phos  9.1     [01-29-19 @ 04:25]    TPro  7.6  /  Alb  3.7  /  TBili  0.7  /  DBili  x   /  AST  40  /  ALT  34  /  AlkPhos  180  [01-29-19 @ 04:25]    PT/INR: PT 12.4 , INR 1.08       [01-28-19 @ 14:30]  PTT: 31.8       [01-28-19 @ 14:30]    CK 56      [01-28-19 @ 14:30]    Creatinine Trend:  SCr 6.40 [01-29 @ 04:25]  SCr 6.45 [01-28 @ 14:30]  SCr 7.14 [01-17 @ 06:30]  SCr 5.85 [01-16 @ 06:10]  SCr 4.04 [01-16 @ 05:00] [facility] DIVISION OF KIDNEY DISEASES AND HYPERTENSION -- [phone]  -- INITIAL CONSULT NOTE  --------------------------------------------------------------------------------  HPI: 59 yo male (Mandarin speaking) with medical history of NICM with ICD, ESRD on HD, former smoker, BPH admitted with acute hypercapnic respiratory failure. Nephrology consulted for ESRD management. Pt intubated and sedated in the CTICU. History obtained from wife (Aaron Stack via phone thought ). As per wife he was found by his home nurse to be in acute distress was brought to [facility] for further management. Found to be in acute hypercarbic respiratory failure, septic shock and  required emergent intubation and pressor support. Pt's wife reports pt has been on HD for about 6 years doesn't know etiology of ESRD). Pt. receives HD at [facility] dialysis [place] and follows with Dr. Reginald Meneses for nephrology.  Pt has LUE AVF and is currently on TTS schedule, for 4 hour sessions. Last outpatient HD on 1/26/19 with no complications as per wide. Pt seen in the CTICU, intubated, on pressors. Unable to provide ROS.    PAST HISTORY  --------------------------------------------------------------------------------  PAST MEDICAL & SURGICAL HISTORY:  Smoking hx  Cardiomyopathy  Wound: right chest  ICD (implantable cardioverter-defibrillator) in place  OA (osteoarthritis)  HLD (hyperlipidemia)  BPH (benign prostatic hyperplasia)  Pleural effusion  HTN (hypertension)  ESRD (end stage renal disease)  H/O chest wound: right  S/P thoracotomy: FB, R thoracotomy, decortication, chest wall reconstruction, lat flap  History of wound infection: right chest wall - revision 5/18 and again in 7/18  History of implantable cardioverter-defibrillator (ICD) placement: pt unsure when placed  H/O bilateral hip replacements: 2008, 2009    FAMILY HISTORY:  Unable to obtain    PAST SOCIAL HISTORY:  Unable to obtain    ALLERGIES & MEDICATIONS  --------------------------------------------------------------------------------  Allergies    No Known Allergies    Intolerances      Standing Inpatient Medications  dexmedetomidine Infusion 0.5 MICROgram(s)/kG/Hr IV Continuous <Continuous>  dextrose 10%. 1000 milliLiter(s) IV Continuous <Continuous>  fentaNYL   Infusion 2 MICROgram(s)/kG/Hr IV Continuous <Continuous>  heparin  Injectable 5000 Unit(s) SubCutaneous every 12 hours  lactated ringers. 1000 milliLiter(s) IV Continuous <Continuous>  midazolam Infusion 0.02 mG/kG/Hr IV Continuous <Continuous>  norepinephrine Infusion 0.05 MICROgram(s)/kG/Min IV Continuous <Continuous>  piperacillin/tazobactam IVPB. 3.375 Gram(s) IV Intermittent once  piperacillin/tazobactam IVPB. 3.375 Gram(s) IV Intermittent every 12 hours  vasopressin Infusion 0.05 Unit(s)/Min IV Continuous <Continuous>    PRN Inpatient Medications      REVIEW OF SYSTEMS  --------------------------------------------------------------------------------  Unable to obtain    VITALS/PHYSICAL EXAM  --------------------------------------------------------------------------------  T(C): 38.2 (01-29-19 @ 04:00), Max: 40.1 (01-29-19 @ 00:00)  HR: 119 (01-29-19 @ 07:00) (90 - 129)  BP: 120/67 (01-29-19 @ 05:00) (58/21 - 120/67)  RR: 18 (01-29-19 @ 05:00) (14 - 20)  SpO2: 98% (01-29-19 @ 07:00) (76% - 100%)  Wt(kg): --  Height (cm): 178 (01-28-19 @ 15:28)  Weight (kg): 60 (01-28-19 @ 15:28)  BMI (kg/m2): 18.9 (01-28-19 @ 15:28)  BSA (m2): 1.75 (01-28-19 @ 15:28)      01-28-19 @ 07:01  -  01-29-19 @ 07:00  --------------------------------------------------------  IN: 2502.1 mL / OUT: 0 mL / NET: 2502.1 mL      Physical Exam:  	Gen: Intubated  	HEENT: +ETT  	Pulm: coarse breath sounsd B/L  	CV: S1S2  	Abd: Soft, +BS   	Ext: No LE edema B/L  	Neuro: Awake  	Skin: Warm and dry  	Vascular access: LUE AVF +thrill, bruit heard.     LABS/STUDIES  --------------------------------------------------------------------------------              9.8    8.89  >-----------<  260      [01-29-19 @ 04:25]              33.8     141  |  98  |  81  ----------------------------<  49      [01-29-19 @ 04:25]  4.7   |  23  |  6.40        Ca     8.9     [01-29-19 @ 04:25]      Mg     2.6     [01-29-19 @ 04:25]      Phos  9.1     [01-29-19 @ 04:25]    TPro  7.6  /  Alb  3.7  /  TBili  0.7  /  DBili  x   /  AST  40  /  ALT  34  /  AlkPhos  180  [01-29-19 @ 04:25]    PT/INR: PT 12.4 , INR 1.08       [01-28-19 @ 14:30]  PTT: 31.8       [01-28-19 @ 14:30]    CK 56      [01-28-19 @ 14:30]    Creatinine Trend:  SCr 6.40 [01-29 @ 04:25]  SCr 6.45 [01-28 @ 14:30]  SCr 7.14 [01-17 @ 06:30]  SCr 5.85 [01-16 @ 06:10]  SCr 4.04 [01-16 @ 05:00] [facility] DIVISION OF KIDNEY DISEASES AND HYPERTENSION -- [phone]  -- INITIAL CONSULT NOTE  --------------------------------------------------------------------------------  HPI: 59 yo male (Mandarin speaking) with medical history of NICM with ICD, ESRD on HD, former smoker, BPH admitted with acute hypercapnic respiratory failure. Nephrology consulted for ESRD management. Pt intubated and sedated in the CTICU. History obtained from wife (Aaron Stack via phone thought ). As per wife he was found by his home nurse to be in acute distress was brought to [facility] for further management. Found to be in acute hypercarbic respiratory failure, septic shock and  required emergent intubation and pressor support. Pt's wife reports pt has been on HD for about 6 years (doesn't know etiology of ESRD). Pt. receives HD at [facility] dialysis [place] and follows with Dr. Reginald Meneses for nephrology. Pt has LUE AVF and is currently on TTS schedule, for 4 hour sessions. Last outpatient HD on 1/26/19 with no complications as per pt.'s wife. Pt seen in the CTICU, intubated, on pressors. Unable to provide ROS.    PAST HISTORY  --------------------------------------------------------------------------------  PAST MEDICAL & SURGICAL HISTORY:  Smoking hx  Cardiomyopathy  Wound: right chest  ICD (implantable cardioverter-defibrillator) in place  OA (osteoarthritis)  HLD (hyperlipidemia)  BPH (benign prostatic hyperplasia)  Pleural effusion  HTN (hypertension)  ESRD (end stage renal disease)  H/O chest wound: right  S/P thoracotomy: FB, R thoracotomy, decortication, chest wall reconstruction, lat flap  History of wound infection: right chest wall - revision 5/18 and again in 7/18  History of implantable cardioverter-defibrillator (ICD) placement: pt unsure when placed  H/O bilateral hip replacements: 2008, 2009    FAMILY HISTORY:  Unable to obtain    PAST SOCIAL HISTORY:  Unable to obtain    ALLERGIES & MEDICATIONS  --------------------------------------------------------------------------------  Allergies    No Known Allergies    Intolerances    Standing Inpatient Medications  dexmedetomidine Infusion 0.5 MICROgram(s)/kG/Hr IV Continuous <Continuous>  dextrose 10%. 1000 milliLiter(s) IV Continuous <Continuous>  fentaNYL   Infusion 2 MICROgram(s)/kG/Hr IV Continuous <Continuous>  heparin  Injectable 5000 Unit(s) SubCutaneous every 12 hours  lactated ringers. 1000 milliLiter(s) IV Continuous <Continuous>  midazolam Infusion 0.02 mG/kG/Hr IV Continuous <Continuous>  norepinephrine Infusion 0.05 MICROgram(s)/kG/Min IV Continuous <Continuous>  piperacillin/tazobactam IVPB. 3.375 Gram(s) IV Intermittent once  piperacillin/tazobactam IVPB. 3.375 Gram(s) IV Intermittent every 12 hours  vasopressin Infusion 0.05 Unit(s)/Min IV Continuous <Continuous>    PRN Inpatient Medications    REVIEW OF SYSTEMS  --------------------------------------------------------------------------------  Unable to obtain    VITALS/PHYSICAL EXAM  --------------------------------------------------------------------------------  T(C): 38.2 (01-29-19 @ 04:00), Max: 40.1 (01-29-19 @ 00:00)  HR: 119 (01-29-19 @ 07:00) (90 - 129)  BP: 120/67 (01-29-19 @ 05:00) (58/21 - 120/67)  RR: 18 (01-29-19 @ 05:00) (14 - 20)  SpO2: 98% (01-29-19 @ 07:00) (76% - 100%)  Wt(kg): --  Height (cm): 178 (01-28-19 @ 15:28)  Weight (kg): 60 (01-28-19 @ 15:28)  BMI (kg/m2): 18.9 (01-28-19 @ 15:28)  BSA (m2): 1.75 (01-28-19 @ 15:28)    01-28-19 @ 07:01  -  01-29-19 @ 07:00  --------------------------------------------------------  IN: 2502.1 mL / OUT: 0 mL / NET: 2502.1 mL    Physical Exam:  	Gen: Intubated  	HEENT: +ETT  	Pulm: coarse breath sounsd B/L  	CV: S1S2  	Abd: Soft, +BS   	Ext: No LE edema B/L  	Neuro: Awake  	Skin: Warm and dry  	Vascular access: LUE AVF +thrill, bruit heard.     LABS/STUDIES  --------------------------------------------------------------------------------              9.8    8.89  >-----------<  260      [01-29-19 @ 04:25]              33.8     141  |  98  |  81  ----------------------------<  49      [01-29-19 @ 04:25]  4.7   |  23  |  6.40        Ca     8.9     [01-29-19 @ 04:25]      Mg     2.6     [01-29-19 @ 04:25]      Phos  9.1     [01-29-19 @ 04:25]    TPro  7.6  /  Alb  3.7  /  TBili  0.7  /  DBili  x   /  AST  40  /  ALT  34  /  AlkPhos  180  [01-29-19 @ 04:25]    CK 56      [01-28-19 @ 14:30]    Creatinine Trend:  SCr 6.40 [01-29 @ 04:25]  SCr 6.45 [01-28 @ 14:30]  SCr 7.14 [01-17 @ 06:30]  SCr 5.85 [01-16 @ 06:10]  SCr 4.04 [01-16 @ 05:00]

## 2019-01-30 ENCOUNTER — TRANSCRIPTION ENCOUNTER (OUTPATIENT)
Age: 59
End: 2019-01-30

## 2019-01-30 LAB
ALBUMIN SERPL ELPH-MCNC: 3.1 G/DL — LOW (ref 3.3–5)
ALP SERPL-CCNC: 131 U/L — HIGH (ref 40–120)
ALT FLD-CCNC: 32 U/L — SIGNIFICANT CHANGE UP (ref 4–41)
ANION GAP SERPL CALC-SCNC: 22 MMO/L — HIGH (ref 7–14)
AST SERPL-CCNC: 35 U/L — SIGNIFICANT CHANGE UP (ref 4–40)
BASE EXCESS BLDA CALC-SCNC: -3.8 MMOL/L — SIGNIFICANT CHANGE UP
BASE EXCESS BLDA CALC-SCNC: -4.1 MMOL/L — SIGNIFICANT CHANGE UP
BILIRUB SERPL-MCNC: 0.7 MG/DL — SIGNIFICANT CHANGE UP (ref 0.2–1.2)
BUN SERPL-MCNC: 94 MG/DL — HIGH (ref 7–23)
CALCIUM SERPL-MCNC: 9.5 MG/DL — SIGNIFICANT CHANGE UP (ref 8.4–10.5)
CHLORIDE BLDA-SCNC: 103 MMOL/L — SIGNIFICANT CHANGE UP (ref 96–108)
CHLORIDE SERPL-SCNC: 96 MMOL/L — LOW (ref 98–107)
CO2 SERPL-SCNC: 20 MMOL/L — LOW (ref 22–31)
CREAT BLDA-MCNC: 7.69 MG/DL — HIGH (ref 0.5–1.3)
CREAT SERPL-MCNC: 7.04 MG/DL — HIGH (ref 0.5–1.3)
GLUCOSE BLDA-MCNC: 134 MG/DL — HIGH (ref 70–99)
GLUCOSE SERPL-MCNC: 114 MG/DL — HIGH (ref 70–99)
HCO3 BLDA-SCNC: 21 MMOL/L — LOW (ref 22–26)
HCO3 BLDA-SCNC: 21 MMOL/L — LOW (ref 22–26)
HCT VFR BLD CALC: 27.1 % — LOW (ref 39–50)
HCT VFR BLDA CALC: 25.3 % — LOW (ref 39–51)
HGB BLD-MCNC: 8.5 G/DL — LOW (ref 13–17)
HGB BLDA-MCNC: 8.1 G/DL — LOW (ref 13–17)
LACTATE BLDA-SCNC: 0.8 MMOL/L — SIGNIFICANT CHANGE UP (ref 0.5–2)
MCHC RBC-ENTMCNC: 31.4 % — LOW (ref 32–36)
MCHC RBC-ENTMCNC: 32.1 PG — SIGNIFICANT CHANGE UP (ref 27–34)
MCV RBC AUTO: 102.3 FL — HIGH (ref 80–100)
NRBC # FLD: 0 K/UL — LOW (ref 25–125)
PCO2 BLDA: 49 MMHG — HIGH (ref 35–48)
PCO2 BLDA: 54 MMHG — HIGH (ref 35–48)
PH BLDA: 7.24 PH — LOW (ref 7.35–7.45)
PH BLDA: 7.27 PH — LOW (ref 7.35–7.45)
PLATELET # BLD AUTO: 184 K/UL — SIGNIFICANT CHANGE UP (ref 150–400)
PMV BLD: 10.3 FL — SIGNIFICANT CHANGE UP (ref 7–13)
PO2 BLDA: 148 MMHG — HIGH (ref 83–108)
PO2 BLDA: 93 MMHG — SIGNIFICANT CHANGE UP (ref 83–108)
POTASSIUM BLDA-SCNC: 3.9 MMOL/L — SIGNIFICANT CHANGE UP (ref 3.4–4.5)
POTASSIUM SERPL-MCNC: 4.5 MMOL/L — SIGNIFICANT CHANGE UP (ref 3.5–5.3)
POTASSIUM SERPL-SCNC: 4.5 MMOL/L — SIGNIFICANT CHANGE UP (ref 3.5–5.3)
PROT SERPL-MCNC: 6.9 G/DL — SIGNIFICANT CHANGE UP (ref 6–8.3)
RBC # BLD: 2.65 M/UL — LOW (ref 4.2–5.8)
RBC # FLD: 16.5 % — HIGH (ref 10.3–14.5)
SAO2 % BLDA: 95.9 % — SIGNIFICANT CHANGE UP (ref 95–99)
SAO2 % BLDA: 98.5 % — SIGNIFICANT CHANGE UP (ref 95–99)
SODIUM BLDA-SCNC: 135 MMOL/L — LOW (ref 136–146)
SODIUM SERPL-SCNC: 138 MMOL/L — SIGNIFICANT CHANGE UP (ref 135–145)
SPECIMEN SOURCE: SIGNIFICANT CHANGE UP
WBC # BLD: 6.18 K/UL — SIGNIFICANT CHANGE UP (ref 3.8–10.5)
WBC # FLD AUTO: 6.18 K/UL — SIGNIFICANT CHANGE UP (ref 3.8–10.5)

## 2019-01-30 PROCEDURE — 99291 CRITICAL CARE FIRST HOUR: CPT

## 2019-01-30 PROCEDURE — 71045 X-RAY EXAM CHEST 1 VIEW: CPT | Mod: 26

## 2019-01-30 PROCEDURE — 99232 SBSQ HOSP IP/OBS MODERATE 35: CPT | Mod: GC

## 2019-01-30 PROCEDURE — 99292 CRITICAL CARE ADDL 30 MIN: CPT

## 2019-01-30 RX ORDER — VANCOMYCIN HCL 1 G
1000 VIAL (EA) INTRAVENOUS ONCE
Qty: 0 | Refills: 0 | Status: COMPLETED | OUTPATIENT
Start: 2019-01-30 | End: 2019-01-31

## 2019-01-30 RX ADMIN — Medication 50 MILLILITER(S): at 05:11

## 2019-01-30 RX ADMIN — FENTANYL CITRATE 2 MICROGRAM(S)/KG/HR: 50 INJECTION INTRAVENOUS at 20:36

## 2019-01-30 RX ADMIN — HEPARIN SODIUM 5000 UNIT(S): 5000 INJECTION INTRAVENOUS; SUBCUTANEOUS at 18:59

## 2019-01-30 RX ADMIN — PIPERACILLIN AND TAZOBACTAM 25 GRAM(S): 4; .5 INJECTION, POWDER, LYOPHILIZED, FOR SOLUTION INTRAVENOUS at 05:10

## 2019-01-30 RX ADMIN — HEPARIN SODIUM 5000 UNIT(S): 5000 INJECTION INTRAVENOUS; SUBCUTANEOUS at 05:09

## 2019-01-30 RX ADMIN — FENTANYL CITRATE 12 MICROGRAM(S)/KG/HR: 50 INJECTION INTRAVENOUS at 20:29

## 2019-01-30 RX ADMIN — MIDAZOLAM HYDROCHLORIDE 1.2 MG/KG/HR: 1 INJECTION, SOLUTION INTRAMUSCULAR; INTRAVENOUS at 20:28

## 2019-01-30 RX ADMIN — Medication 5.62 MICROGRAM(S)/KG/MIN: at 20:28

## 2019-01-30 RX ADMIN — VASOPRESSIN 3 UNIT(S)/MIN: 20 INJECTION INTRAVENOUS at 20:29

## 2019-01-30 RX ADMIN — Medication 50 MILLILITER(S): at 00:00

## 2019-01-30 RX ADMIN — PIPERACILLIN AND TAZOBACTAM 25 GRAM(S): 4; .5 INJECTION, POWDER, LYOPHILIZED, FOR SOLUTION INTRAVENOUS at 18:59

## 2019-01-30 NOTE — PROGRESS NOTE ADULT - PROBLEM SELECTOR PLAN 2
Patient with anemia in the setting of ESRD. Hemoglobin below target range. Check iron studies. Monitor hemoglobin. Patient with anemia in the setting of ESRD. Hemoglobin below target range. Check iron studies. Monitor hemoglobin

## 2019-01-30 NOTE — PROGRESS NOTE ADULT - SUBJECTIVE AND OBJECTIVE BOX
CLAUDIA HAN            MRN-[id number]         No Known Allergies               Mr. Han is a 58M who underwent a FB, R thoracotomy, decortication, chest wall reconstruction, lat flap with Dr Pickering on 10/11/18. He was admitted on 1/6/19 to [facility] with a R pleural effusion and respiratory failure,  A R PTC was placed there and he was intubated.  He still had a R SANDY drain in place and he was transferred to [facility]. Patient was found to have aspirated a foreign body and +MRSA empyema. He was treated and discharged on 1/22/2019.     He was found by his home nurse to be in acute distress and recommended to proceed to the ED.  At presentation, he was found to be in acute hypercarbic respiratory failure complicated by hemodynamic instability requiring emergent intubation and pressor support. (28 Jan 2019 15:34)      Procedure:        Bronchoscopy   1/29/2019  Right thoracotomy, resection of portion of R 7th rib, evacuation of infected hemothorax, takedown of pleurocutaneous fistula  10/11/2018      Issues:  Acute hypoxic & hypercapnic respiratory failure  Hypotension  Pneumonia / Sepsis  ESRD / HD  Cardiomyopathy  HLD  BPH                 Home Medications:  aspirin 81 mg oral tablet: 1 tab(s) orally once a day  (30 Oct 2018 14:39)  Breo Ellipta 100 mcg-25 mcg/inh inhalation powder: 1 puff(s) inhaled once a day patient denies using it (11 Oct 2018 08:38)  Calcium 500: 1 tab(s) orally 2 times a day (11 Oct 2018 08:38)  docusate sodium 100 mg oral tablet: 1 tab(s) orally 2 times a day, As Needed (11 Oct 2018 08:39)  folic acid 1 mg oral tablet: 1 tab(s) orally once a day (11 Oct 2018 08:38)  Mag-Ox 400 oral tablet: 1 tab(s) orally once a day (11 Oct 2018 08:39)  Multiple Vitamins oral tablet: 1 tab(s) orally once a day  (30 Oct 2018 14:39)  Namenda 10 mg oral tablet: 1 tab(s) orally 2 times a day (11 Oct 2018 08:38)  Nephplex Rx oral tablet: 1 tab(s) orally once a day (11 Oct 2018 08:39)  nortriptyline 50 mg oral capsule: 1 cap(s) orally once a day (11 Oct 2018 08:39)  Renvela 800 mg oral tablet: 2 tab(s) orally every 8 hours (11 Oct 2018 08:39)  simethicone 80 mg oral tablet: 1 tab(s) orally 3 times a day (after meals) (11 Oct 2018 08:39)  Tylenol 325 mg oral tablet: 2 tab(s) orally every 6 hours, As Needed (30 Oct 2018 14:39)  vancomycin 1 g intravenous injection: 1 gram(s) intravenous 3 times a week  Please give 3 x per week with Dialysis until 2/3/2019 (22 Jan 2019 13:41)  vitamin A: 1 tab(s) orally once a day (11 Oct 2018 08:38)  Vitamin B Complex: 1 tab(s) orally once a day (11 Oct 2018 08:38)  Vitamin C 500 mg oral tablet: 1 tab(s) orally once a day (11 Oct 2018 08:38)      PAST MEDICAL & SURGICAL HISTORY:  Smoking hx  Cardiomyopathy  Wound: right chest  ICD (implantable cardioverter-defibrillator) in place  OA (osteoarthritis)  HLD (hyperlipidemia)  BPH (benign prostatic hyperplasia)  Pleural effusion  HTN (hypertension)  ESRD (end stage renal disease)  H/O chest wound: right  S/P thoracotomy: FB, R thoracotomy, decortication, chest wall reconstruction, lat flap  History of wound infection: right chest wall - revision 5/18 and again in 7/18  History of implantable cardioverter-defibrillator (ICD) placement: pt unsure when placed  H/O bilateral hip replacements: 2008, 2009        ICU Vital Signs Last 24 Hrs  T(C): 36.7 (30 Jan 2019 04:00), Max: 38.6 (29 Jan 2019 16:00)  T(F): 98.1 (30 Jan 2019 04:00), Max: 101.4 (29 Jan 2019 16:00)  HR: 99 (30 Jan 2019 05:00) (84 - 121)  BP: 100/56 (30 Jan 2019 05:00) (88/52 - 120/69)  BP(mean): 66 (30 Jan 2019 05:00) (60 - 82)  ABP: 73/46 (29 Jan 2019 19:00) (52/36 - 78/50)  ABP(mean): 56 (29 Jan 2019 19:00) (41 - 62)  RR: 22 (30 Jan 2019 05:00) (16 - 22)  SpO2: 99% (30 Jan 2019 05:00) (93% - 100%)    I&O's Detail    28 Jan 2019 07:01  -  29 Jan 2019 07:00  --------------------------------------------------------  IN:    dexmedetomidine Infusion: 150 mL    dextrose 10%.: 30 mL    fentaNYL  Infusion: 84 mL    IV PiggyBack: 1300 mL    lactated ringers.: 600 mL    midazolam Infusion: 60 mL    norepinephrine Infusion: 486 mL    vasopressin Infusion: 9 mL  Total IN: 2719 mL    OUT:  Total OUT: 0 mL    Total NET: 2719 mL      29 Jan 2019 07:01  -  30 Jan 2019 05:34  --------------------------------------------------------  IN:    dexmedetomidine Infusion: 42 mL    dextrose 10%.: 660 mL    fentaNYL  Infusion: 174 mL    IV PiggyBack: 550 mL    lactated ringers.: 300 mL    midazolam Infusion: 79.2 mL    Nepro with Carb Steady: 120 mL    norepinephrine Infusion: 492.4 mL    sodium chloride 0.9%.: 750 mL    vasopressin Infusion: 87.6 mL  Total IN: 3255.2 mL    OUT:  Total OUT: 0 mL    Total NET: 3255.2 mL        CAPILLARY BLOOD GLUCOSE      POCT Blood Glucose.: 119 mg/dL (30 Jan 2019 04:45)      Home Medications:  aspirin 81 mg oral tablet: 1 tab(s) orally once a day  (30 Oct 2018 14:39)  Breo Ellipta 100 mcg-25 mcg/inh inhalation powder: 1 puff(s) inhaled once a day patient denies using it (11 Oct 2018 08:38)  Calcium 500: 1 tab(s) orally 2 times a day (11 Oct 2018 08:38)  docusate sodium 100 mg oral tablet: 1 tab(s) orally 2 times a day, As Needed (11 Oct 2018 08:39)  folic acid 1 mg oral tablet: 1 tab(s) orally once a day (11 Oct 2018 08:38)  Mag-Ox 400 oral tablet: 1 tab(s) orally once a day (11 Oct 2018 08:39)  Multiple Vitamins oral tablet: 1 tab(s) orally once a day  (30 Oct 2018 14:39)  Namenda 10 mg oral tablet: 1 tab(s) orally 2 times a day (11 Oct 2018 08:38)  Nephplex Rx oral tablet: 1 tab(s) orally once a day (11 Oct 2018 08:39)  nortriptyline 50 mg oral capsule: 1 cap(s) orally once a day (11 Oct 2018 08:39)  Renvela 800 mg oral tablet: 2 tab(s) orally every 8 hours (11 Oct 2018 08:39)  simethicone 80 mg oral tablet: 1 tab(s) orally 3 times a day (after meals) (11 Oct 2018 08:39)  Tylenol 325 mg oral tablet: 2 tab(s) orally every 6 hours, As Needed (30 Oct 2018 14:39)  vancomycin 1 g intravenous injection: 1 gram(s) intravenous 3 times a week  Please give 3 x per week with Dialysis until 2/3/2019 (22 Jan 2019 13:41)  vitamin A: 1 tab(s) orally once a day (11 Oct 2018 08:38)  Vitamin B Complex: 1 tab(s) orally once a day (11 Oct 2018 08:38)  Vitamin C 500 mg oral tablet: 1 tab(s) orally once a day (11 Oct 2018 08:38)      MEDICATIONS  (STANDING):  dexmedetomidine Infusion 0.5 MICROgram(s)/kG/Hr (7.5 mL/Hr) IV Continuous <Continuous>  dextrose 10%. 1000 milliLiter(s) (30 mL/Hr) IV Continuous <Continuous>  fentaNYL   Infusion 2 MICROgram(s)/kG/Hr (12 mL/Hr) IV Continuous <Continuous>  heparin  Injectable 5000 Unit(s) SubCutaneous every 12 hours  midazolam Infusion 0.02 mG/kG/Hr (1.2 mL/Hr) IV Continuous <Continuous>  norepinephrine Infusion 0.05 MICROgram(s)/kG/Min (5.625 mL/Hr) IV Continuous <Continuous>  piperacillin/tazobactam IVPB. 3.375 Gram(s) IV Intermittent once  piperacillin/tazobactam IVPB. 3.375 Gram(s) IV Intermittent every 12 hours  sodium chloride 0.9%. 1000 milliLiter(s) (50 mL/Hr) IV Continuous <Continuous>  vasopressin Infusion 0.05 Unit(s)/Min (3 mL/Hr) IV Continuous <Continuous>    MEDICATIONS  (PRN):      Mode: AC/ CMV (Assist Control/ Continuous Mandatory Ventilation)  RR (machine): 22  TV (machine): 400  FiO2: 60  PEEP: 7  MAP: 13  PIP: 33      Physical exam:   General:               Pt sedated                                               Neuro:                  Sedated                            Cardiovascular:   S1 & S2, regular                           Respiratory:         Air entry is decreased at  both bases, has bilateral conducted sounds                           GI:                          Soft, nondistended and nontender, Bowel sounds active                            Ext:                        No cyanosis or edema                             Labs:                                                                           8.5    6.18  )-----------( 184      ( 30 Jan 2019 04:50 )             27.1             01-30    138  |  96<L>  |  94<H>  ----------------------------<  114<H>  4.5   |  20<L>  |  7.04<H>    Ca    9.5      30 Jan 2019 04:50  Phos  9.1     01-29  Mg     2.6     01-29    TPro  6.9  /  Alb  3.1<L>  /  TBili  0.7  /  DBili  x   /  AST  35  /  ALT  32  /  AlkPhos  131<H>  01-30                  PT/INR - ( 28 Jan 2019 14:30 )   PT: 12.4 SEC;   INR: 1.08          PTT - ( 28 Jan 2019 14:30 )  PTT:31.8 SEC  LIVER FUNCTIONS - ( 30 Jan 2019 04:50 )  Alb: 3.1 g/dL / Pro: 6.9 g/dL / ALK PHOS: 131 u/L / ALT: 32 u/L / AST: 35 u/L / GGT: x               Culture - Respiratory with Gram Stain (collected 01-29-19 @ 13:38)  Source: LUNG - UPPER LOBE RIGHT    Culture - Respiratory with Gram Stain (collected 01-29-19 @ 13:38)  Source: LUNG - UPPER LOBE LEFT          CXR:    < from: Xray Chest 1 View- PORTABLE-Urgent (01.29.19 @ 13:17) >  4:01 AM:  A right IJ line has been placed since the last exam and its tip is at the   origin of the SVC. No pneumothorax.    Endotracheal and enteric tube in addition to a subcutaneous AICDare   unchanged. Bilateral loculated small effusions slightly decreased.   Retrocardiac opacity consistent with lower lobe atelectasis as reported   on recent CT.    12:42 PM:  The endotracheal and enteric tube in addition to the right IJ line and   AICD are unchanged. Likewise, bilateral small loculated effusions again   seen. No pneumothorax or focal consolidations.      COMPARISON:  CT chest January 28      IMPRESSION:  Follow-up studies with tubes and lines in place and right IJ   line placement.      Plan:    General: Mr. Han is a 58M who underwent a FB, R thoracotomy, decortication, chest wall reconstruction, lat flap with Dr Pickering on 10/11/18. He was admitted on 1/6/19 to [facility] with a R pleural effusion and respiratory failure,  A R PTC was placed there and he was intubated.  He still had a R SANDY drain in place and he was transferred to [facility]. Patient was found to have aspirated a foreign body and +MRSA empyema. He was treated and discharged on 1/22/2019.     He was found by his home nurse to be in acute distress and recommended to proceed to the ED.  At presentation, he was found to be in acute hypercarbic respiratory failure complicated by hemodynamic instability requiring emergent intubation and pressor support. (28 Jan 2019 15:34)                            Neuro:                                         Pain control with Fentanyl drip                            Cardiovascular:                                          Continue hemodynamic monitoring.    Hypotension: Titrate Levophed and Vasopressin to MAP>65    Continue IVF                            Respiratory:                                         Pt is on full mechanical ventilator support YP--60-7                                         Wean FiO2 as tolerated                                                                  Continue bronchodilators, pulmonary toilet     Bronchoscopy showed copious greenish secretions - sent for culture     Continue antibiotics -  Vanco dose based + Zosyn                            GI                                         Started NGT feeds - Nepro 20cc/hr - advance as tolerated                                         Continue GI prophylaxis with Protonix                                                                 Renal:                                         Continue NS 50cc/hr                                         Monitor I/Os and electrolytes                                         HD - as per renal                                                 Hem/ Onc:                                                                                 Follow CBC in AM                           Infectious disease:     Bronchoscopy showed copious greenish secretions - sent for culture                                         Continue Vanco dose based + Zosyn                                                                   Endocrine                                           Hypoglycemia: On D10 @ 30cc/hr        Continue Accu-Checks with coverage      Pt is on SQ Heparin and Venodyne boots for DVT prophylaxis.     Pertinent clinical, laboratory, radiographic, hemodynamic, echocardiographic, respiratory data, microbiologic data and chart were reviewed and analyzed frequently throughout the course of the day and night  Patient seen, examined and plan discussed with CT Surgeon Dr. Pickering / CTICU team during rounds.    Pt's status discussed with wife via video traslator at bedside, updated status    I have spent 70  minutes of critical care time with this pt between 00am and 8am                Ralph Warren MD

## 2019-01-30 NOTE — PROGRESS NOTE ADULT - PROBLEM SELECTOR PLAN 1
Pt. with ESRD on HD TIW (TTS). Last HD as outpatient was on 1/26/19. Pt. currently on IV pressors. Serum potassium WNL today. Will assess need for HD tomorrow. Monitor labs. Pt. with ESRD on HD TIW (TTS). Last HD as outpatient was on 1/26/19. Pt. currently on IV pressors. Serum potassium WNL today. Plan for HD today as per discussion with CTICU attending. Monitor BP and labs

## 2019-01-30 NOTE — PROGRESS NOTE ADULT - SUBJECTIVE AND OBJECTIVE BOX
[facility] DIVISION OF KIDNEY DISEASES AND HYPERTENSION -- FOLLOW UP NOTE  --------------------------------------------------------------------------------  HPI: 59 yo male (Mandarin speaking) with medical history of NICM with ICD, ESRD on HD, former smoker, BPH admitted with acute hypercapnic respiratory failure. Nephrology consulted for ESRD management. Pt intubated and sedated in the CTICU. Found to have acute hypercarbic respiratory failure, septic shock and  required emergent intubation and pressor support. Last HD on 1/26/19 as outpatient. Pt seen in the CTICU, intubated, on IV pressors however requirement has decrease. Unable to provide ROS.    PAST HISTORY  --------------------------------------------------------------------------------  No significant changes to PMH, PSH, FHx, SHx, unless otherwise noted    ALLERGIES & MEDICATIONS  --------------------------------------------------------------------------------  Allergies    No Known Allergies    Intolerances      Standing Inpatient Medications  dexmedetomidine Infusion 0.5 MICROgram(s)/kG/Hr IV Continuous <Continuous>  dextrose 10%. 1000 milliLiter(s) IV Continuous <Continuous>  fentaNYL   Infusion 2 MICROgram(s)/kG/Hr IV Continuous <Continuous>  heparin  Injectable 5000 Unit(s) SubCutaneous every 12 hours  midazolam Infusion 0.02 mG/kG/Hr IV Continuous <Continuous>  norepinephrine Infusion 0.05 MICROgram(s)/kG/Min IV Continuous <Continuous>  piperacillin/tazobactam IVPB. 3.375 Gram(s) IV Intermittent once  piperacillin/tazobactam IVPB. 3.375 Gram(s) IV Intermittent every 12 hours  sodium chloride 0.9%. 1000 milliLiter(s) IV Continuous <Continuous>  vasopressin Infusion 0.05 Unit(s)/Min IV Continuous <Continuous>    PRN Inpatient Medications      REVIEW OF SYSTEMS  --------------------------------------------------------------------------------  Unable to obtain    VITALS/PHYSICAL EXAM  --------------------------------------------------------------------------------  T(C): 36.7 (01-30-19 @ 04:00), Max: 38.6 (01-29-19 @ 16:00)  HR: 97 (01-30-19 @ 06:00) (84 - 113)  BP: 115/69 (01-30-19 @ 06:00) (88/52 - 115/69)  RR: 22 (01-30-19 @ 06:00) (16 - 22)  SpO2: 97% (01-30-19 @ 06:00) (93% - 100%)  Wt(kg): --  Height (cm): 178 (01-28-19 @ 15:28)  Weight (kg): 60 (01-28-19 @ 15:28)  BMI (kg/m2): 18.9 (01-28-19 @ 15:28)  BSA (m2): 1.75 (01-28-19 @ 15:28)      01-29-19 @ 07:01  -  01-30-19 @ 07:00  --------------------------------------------------------  IN: 3481.6 mL / OUT: 15 mL / NET: 3466.6 mL        Physical Exam:  	Gen: Intubated  	HEENT: +ETT  	Pulm: coarse breath sounsd B/L  	CV: S1S2  	Abd: Soft, +BS   	Ext: No LE edema B/L  	Neuro: Awake  	Skin: Warm and dry  	Vascular access: LUE AVF +thrill, bruit heard.     LABS/STUDIES  --------------------------------------------------------------------------------              8.5    6.18  >-----------<  184      [01-30-19 @ 04:50]              27.1     138  |  96  |  94  ----------------------------<  114      [01-30-19 @ 04:50]  4.5   |  20  |  7.04        Ca     9.5     [01-30-19 @ 04:50]      Mg     2.6     [01-29-19 @ 04:25]      Phos  9.1     [01-29-19 @ 04:25]    TPro  6.9  /  Alb  3.1  /  TBili  0.7  /  DBili  x   /  AST  35  /  ALT  32  /  AlkPhos  131  [01-30-19 @ 04:50]    PT/INR: PT 12.4 , INR 1.08       [01-28-19 @ 14:30]  PTT: 31.8       [01-28-19 @ 14:30]    CK 56      [01-28-19 @ 14:30]    Creatinine Trend:  SCr 7.04 [01-30 @ 04:50]  SCr 6.89 [01-29 @ 14:30]  SCr 6.40 [01-29 @ 04:25]  SCr 6.45 [01-28 @ 14:30]  SCr 7.14 [01-17 @ 06:30] [facility] DIVISION OF KIDNEY DISEASES AND HYPERTENSION -- FOLLOW UP NOTE  --------------------------------------------------------------------------------  HPI: 57 yo male with medical history of NICM with ICD, ESRD on HD, former smoker, BPH admitted with acute hypercapnic respiratory failure. Nephrology consulted for ESRD/HD management. Pt intubated and sedated in the CTICU. Found to have acute hypercarbic respiratory failure, septic shock and  required emergent intubation and pressor support. Last HD on 1/26/19 as outpatient. Pt seen in the CTICU, intubated, on IV pressors. Unable to provide ROS.    PAST HISTORY  --------------------------------------------------------------------------------  No significant changes to PMH, PSH, FHx, SHx, unless otherwise noted    ALLERGIES & MEDICATIONS  --------------------------------------------------------------------------------  Allergies    No Known Allergies    Intolerances    Standing Inpatient Medications  dexmedetomidine Infusion 0.5 MICROgram(s)/kG/Hr IV Continuous <Continuous>  dextrose 10%. 1000 milliLiter(s) IV Continuous <Continuous>  fentaNYL   Infusion 2 MICROgram(s)/kG/Hr IV Continuous <Continuous>  heparin  Injectable 5000 Unit(s) SubCutaneous every 12 hours  midazolam Infusion 0.02 mG/kG/Hr IV Continuous <Continuous>  norepinephrine Infusion 0.05 MICROgram(s)/kG/Min IV Continuous <Continuous>  piperacillin/tazobactam IVPB. 3.375 Gram(s) IV Intermittent once  piperacillin/tazobactam IVPB. 3.375 Gram(s) IV Intermittent every 12 hours  sodium chloride 0.9%. 1000 milliLiter(s) IV Continuous <Continuous>  vasopressin Infusion 0.05 Unit(s)/Min IV Continuous <Continuous>    PRN Inpatient Medications    REVIEW OF SYSTEMS  --------------------------------------------------------------------------------  Unable to obtain (pt. intubated)    VITALS/PHYSICAL EXAM  --------------------------------------------------------------------------------  T(C): 36.7 (01-30-19 @ 04:00), Max: 38.6 (01-29-19 @ 16:00)  HR: 97 (01-30-19 @ 06:00) (84 - 113)  BP: 115/69 (01-30-19 @ 06:00) (88/52 - 115/69)  RR: 22 (01-30-19 @ 06:00) (16 - 22)  SpO2: 97% (01-30-19 @ 06:00) (93% - 100%)  Wt(kg): --  Height (cm): 178 (01-28-19 @ 15:28)  Weight (kg): 60 (01-28-19 @ 15:28)  BMI (kg/m2): 18.9 (01-28-19 @ 15:28)  BSA (m2): 1.75 (01-28-19 @ 15:28)    01-29-19 @ 07:01  -  01-30-19 @ 07:00  --------------------------------------------------------  IN: 3481.6 mL / OUT: 15 mL / NET: 3466.6 mL    Physical Exam:  	Gen: Intubated  	HEENT: +ETT  	Pulm: coarse breath sounsd B/L  	CV: S1S2  	Abd: Soft, +BS   	Ext: No LE edema B/L  	Neuro: Awake  	Skin: Warm and dry  	Vascular access: LUE AVF +thrill, bruit heard.     LABS/STUDIES  --------------------------------------------------------------------------------              8.5    6.18  >-----------<  184      [01-30-19 @ 04:50]              27.1     138  |  96  |  94  ----------------------------<  114      [01-30-19 @ 04:50]  4.5   |  20  |  7.04        Ca     9.5     [01-30-19 @ 04:50]      Mg     2.6     [01-29-19 @ 04:25]      Phos  9.1     [01-29-19 @ 04:25]    TPro  6.9  /  Alb  3.1  /  TBili  0.7  /  DBili  x   /  AST  35  /  ALT  32  /  AlkPhos  131  [01-30-19 @ 04:50]    CK 56      [01-28-19 @ 14:30]    Creatinine Trend:  SCr 7.04 [01-30 @ 04:50]  SCr 6.89 [01-29 @ 14:30]  SCr 6.40 [01-29 @ 04:25]  SCr 6.45 [01-28 @ 14:30]  SCr 7.14 [01-17 @ 06:30]

## 2019-01-31 LAB
ALBUMIN SERPL ELPH-MCNC: 2.9 G/DL — LOW (ref 3.3–5)
ALP SERPL-CCNC: 146 U/L — HIGH (ref 40–120)
ALT FLD-CCNC: 25 U/L — SIGNIFICANT CHANGE UP (ref 4–41)
ANION GAP SERPL CALC-SCNC: 19 MMO/L — HIGH (ref 7–14)
APTT BLD: 36 SEC — SIGNIFICANT CHANGE UP (ref 27.5–36.3)
AST SERPL-CCNC: 21 U/L — SIGNIFICANT CHANGE UP (ref 4–40)
BACTERIA SPT RESP CULT: SIGNIFICANT CHANGE UP
BASE EXCESS BLDA CALC-SCNC: 0.7 MMOL/L — SIGNIFICANT CHANGE UP
BASOPHILS # BLD AUTO: 0.01 K/UL — SIGNIFICANT CHANGE UP (ref 0–0.2)
BASOPHILS NFR BLD AUTO: 0.2 % — SIGNIFICANT CHANGE UP (ref 0–2)
BILIRUB SERPL-MCNC: 0.5 MG/DL — SIGNIFICANT CHANGE UP (ref 0.2–1.2)
BLD GP AB SCN SERPL QL: NEGATIVE — SIGNIFICANT CHANGE UP
BUN SERPL-MCNC: 52 MG/DL — HIGH (ref 7–23)
CALCIUM SERPL-MCNC: 9 MG/DL — SIGNIFICANT CHANGE UP (ref 8.4–10.5)
CHLORIDE BLDA-SCNC: 101 MMOL/L — SIGNIFICANT CHANGE UP (ref 96–108)
CHLORIDE SERPL-SCNC: 95 MMOL/L — LOW (ref 98–107)
CO2 SERPL-SCNC: 22 MMOL/L — SIGNIFICANT CHANGE UP (ref 22–31)
CREAT BLDA-MCNC: 4.73 MG/DL — HIGH (ref 0.5–1.3)
CREAT SERPL-MCNC: 4.52 MG/DL — HIGH (ref 0.5–1.3)
EOSINOPHIL # BLD AUTO: 0.64 K/UL — HIGH (ref 0–0.5)
EOSINOPHIL NFR BLD AUTO: 9.8 % — HIGH (ref 0–6)
GLUCOSE BLDA-MCNC: 138 MG/DL — HIGH (ref 70–99)
GLUCOSE SERPL-MCNC: 138 MG/DL — HIGH (ref 70–99)
HCO3 BLDA-SCNC: 25 MMOL/L — SIGNIFICANT CHANGE UP (ref 22–26)
HCT VFR BLD CALC: 28.2 % — LOW (ref 39–50)
HCT VFR BLDA CALC: 27.6 % — LOW (ref 39–51)
HGB BLD-MCNC: 8.8 G/DL — LOW (ref 13–17)
HGB BLDA-MCNC: 8.9 G/DL — LOW (ref 13–17)
IMM GRANULOCYTES NFR BLD AUTO: 0.5 % — SIGNIFICANT CHANGE UP (ref 0–1.5)
LACTATE BLDA-SCNC: 1.1 MMOL/L — SIGNIFICANT CHANGE UP (ref 0.5–2)
LYMPHOCYTES # BLD AUTO: 0.36 K/UL — LOW (ref 1–3.3)
LYMPHOCYTES # BLD AUTO: 5.5 % — LOW (ref 13–44)
MCHC RBC-ENTMCNC: 30.9 PG — SIGNIFICANT CHANGE UP (ref 27–34)
MCHC RBC-ENTMCNC: 31.2 % — LOW (ref 32–36)
MCV RBC AUTO: 98.9 FL — SIGNIFICANT CHANGE UP (ref 80–100)
MONOCYTES # BLD AUTO: 0.34 K/UL — SIGNIFICANT CHANGE UP (ref 0–0.9)
MONOCYTES NFR BLD AUTO: 5.2 % — SIGNIFICANT CHANGE UP (ref 2–14)
NEUTROPHILS # BLD AUTO: 5.15 K/UL — SIGNIFICANT CHANGE UP (ref 1.8–7.4)
NEUTROPHILS NFR BLD AUTO: 78.8 % — HIGH (ref 43–77)
NRBC # FLD: 0 K/UL — LOW (ref 25–125)
PCO2 BLDA: 53 MMHG — HIGH (ref 35–48)
PH BLDA: 7.32 PH — LOW (ref 7.35–7.45)
PLATELET # BLD AUTO: 201 K/UL — SIGNIFICANT CHANGE UP (ref 150–400)
PMV BLD: 10.7 FL — SIGNIFICANT CHANGE UP (ref 7–13)
PO2 BLDA: 118 MMHG — HIGH (ref 83–108)
POTASSIUM BLDA-SCNC: 3.1 MMOL/L — LOW (ref 3.4–4.5)
POTASSIUM SERPL-MCNC: 3.4 MMOL/L — LOW (ref 3.5–5.3)
POTASSIUM SERPL-SCNC: 3.4 MMOL/L — LOW (ref 3.5–5.3)
PROT SERPL-MCNC: 6.8 G/DL — SIGNIFICANT CHANGE UP (ref 6–8.3)
RBC # BLD: 2.85 M/UL — LOW (ref 4.2–5.8)
RBC # FLD: 16.9 % — HIGH (ref 10.3–14.5)
RH IG SCN BLD-IMP: POSITIVE — SIGNIFICANT CHANGE UP
SAO2 % BLDA: 98 % — SIGNIFICANT CHANGE UP (ref 95–99)
SODIUM BLDA-SCNC: 136 MMOL/L — SIGNIFICANT CHANGE UP (ref 136–146)
SODIUM SERPL-SCNC: 136 MMOL/L — SIGNIFICANT CHANGE UP (ref 135–145)
WBC # BLD: 6.53 K/UL — SIGNIFICANT CHANGE UP (ref 3.8–10.5)
WBC # FLD AUTO: 6.53 K/UL — SIGNIFICANT CHANGE UP (ref 3.8–10.5)

## 2019-01-31 PROCEDURE — 31624 DX BRONCHOSCOPE/LAVAGE: CPT

## 2019-01-31 PROCEDURE — 99292 CRITICAL CARE ADDL 30 MIN: CPT

## 2019-01-31 PROCEDURE — 31600 PLANNED TRACHEOSTOMY: CPT

## 2019-01-31 PROCEDURE — 99232 SBSQ HOSP IP/OBS MODERATE 35: CPT | Mod: GC

## 2019-01-31 PROCEDURE — 71045 X-RAY EXAM CHEST 1 VIEW: CPT | Mod: 26

## 2019-01-31 PROCEDURE — 31645 BRNCHSC W/THER ASPIR 1ST: CPT

## 2019-01-31 PROCEDURE — 99291 CRITICAL CARE FIRST HOUR: CPT

## 2019-01-31 PROCEDURE — 43246 EGD PLACE GASTROSTOMY TUBE: CPT | Mod: AS

## 2019-01-31 PROCEDURE — 43246 EGD PLACE GASTROSTOMY TUBE: CPT

## 2019-01-31 RX ORDER — VASOPRESSIN 20 [USP'U]/ML
0.04 INJECTION INTRAVENOUS
Qty: 100 | Refills: 0 | Status: DISCONTINUED | OUTPATIENT
Start: 2019-01-31 | End: 2019-02-01

## 2019-01-31 RX ORDER — ACETAMINOPHEN 500 MG
1000 TABLET ORAL ONCE
Qty: 0 | Refills: 0 | Status: COMPLETED | OUTPATIENT
Start: 2019-01-31 | End: 2019-02-03

## 2019-01-31 RX ORDER — ERYTHROPOIETIN 10000 [IU]/ML
3000 INJECTION, SOLUTION INTRAVENOUS; SUBCUTANEOUS
Qty: 0 | Refills: 0 | Status: DISCONTINUED | OUTPATIENT
Start: 2019-01-31 | End: 2019-02-26

## 2019-01-31 RX ADMIN — FENTANYL CITRATE 2 MICROGRAM(S)/KG/HR: 50 INJECTION INTRAVENOUS at 20:20

## 2019-01-31 RX ADMIN — Medication 5.62 MICROGRAM(S)/KG/MIN: at 19:50

## 2019-01-31 RX ADMIN — PIPERACILLIN AND TAZOBACTAM 25 GRAM(S): 4; .5 INJECTION, POWDER, LYOPHILIZED, FOR SOLUTION INTRAVENOUS at 18:04

## 2019-01-31 RX ADMIN — Medication 30 MILLILITER(S): at 19:52

## 2019-01-31 RX ADMIN — PIPERACILLIN AND TAZOBACTAM 25 GRAM(S): 4; .5 INJECTION, POWDER, LYOPHILIZED, FOR SOLUTION INTRAVENOUS at 05:43

## 2019-01-31 RX ADMIN — FENTANYL CITRATE 12 MICROGRAM(S)/KG/HR: 50 INJECTION INTRAVENOUS at 19:50

## 2019-01-31 RX ADMIN — MIDAZOLAM HYDROCHLORIDE 1.2 MG/KG/HR: 1 INJECTION, SOLUTION INTRAMUSCULAR; INTRAVENOUS at 20:04

## 2019-01-31 RX ADMIN — HEPARIN SODIUM 5000 UNIT(S): 5000 INJECTION INTRAVENOUS; SUBCUTANEOUS at 05:43

## 2019-01-31 RX ADMIN — SODIUM CHLORIDE 50 MILLILITER(S): 9 INJECTION INTRAMUSCULAR; INTRAVENOUS; SUBCUTANEOUS at 19:54

## 2019-01-31 RX ADMIN — HEPARIN SODIUM 5000 UNIT(S): 5000 INJECTION INTRAVENOUS; SUBCUTANEOUS at 18:04

## 2019-01-31 RX ADMIN — ERYTHROPOIETIN 3000 UNIT(S): 10000 INJECTION, SOLUTION INTRAVENOUS; SUBCUTANEOUS at 19:45

## 2019-01-31 RX ADMIN — Medication 250 MILLIGRAM(S): at 01:15

## 2019-01-31 NOTE — ED PROCEDURE NOTE - PROCEDURE ADDITIONAL DETAILS
ED focused limited thoracic US [id number]    Focused ED ultrasound of the lungs and pleura:    Findings: Normal lung sliding bilaterally  No signs of interstitial syndrome  Small R pleural effusions  RLL pneumonia visualized    Impression: abnormal focused lung ultrasound - no pneumothorax.  No B-lines anteriorly.  RLL dense consolidation with small effusion.    Procedure note and images placed in chart

## 2019-01-31 NOTE — DIETITIAN INITIAL EVALUATION ADULT. - PHYSICAL APPEARANCE
NFPE: pt w/severe muscle wastage: temples, clavicle, shoulder; fat loss - suborbital/underweight/debilitated

## 2019-01-31 NOTE — PROGRESS NOTE ADULT - PROBLEM SELECTOR PLAN 1
Pt. with ESRD on HD TIW (TTS). Last HD was on 1/30/19 via AVF tolerated well. Pt. currently on IV pressors. Labs reviewed. Plan for HD today as per discussion with CTICU attending after surgery. Monitor BP and labs Pt. with ESRD on HD TIW (TTS). Last HD was on 1/30/19 via AVF, tolerated well. Pt. currently on IV pressors. Labs reviewed. Plan for HD today as per discussion with CTICU attending. Monitor BP and labs

## 2019-01-31 NOTE — ED PROCEDURE NOTE - NS ED ATTENDING STATEMENT MOD
Attending Only
Attending Only
I have personally seen and examined this patient.  I have fully participated in the care of this patient. I have reviewed all pertinent clinical information, including history, physical exam, plan and the Resident’s note and agree except as noted.
I have personally seen and examined this patient.  I have fully participated in the care of this patient. I have reviewed all pertinent clinical information, including history, physical exam, plan and the Resident’s note and agree except as noted.

## 2019-01-31 NOTE — PROGRESS NOTE ADULT - SUBJECTIVE AND OBJECTIVE BOX
CLAUDIA HAN            MRN-[id number]         No Known Allergies                 Mr. Han is a 58M who underwent a FB, R thoracotomy, decortication, chest wall reconstruction, lat flap with Dr Pickering on 10/11/18. He was admitted on 1/6/19 to [facility] with a R pleural effusion and respiratory failure,  A R PTC was placed there and he was intubated.  He still had a R SANDY drain in place and he was transferred to [facility]. Patient was found to have aspirated a foreign body and +MRSA empyema. He was treated and discharged on 1/22/2019.     He was found by his home nurse to be in acute distress and recommended to proceed to the ED.  At presentation, he was found to be in acute hypercarbic respiratory failure complicated by hemodynamic instability requiring emergent intubation and pressor support. (28 Jan 2019 15:34)      Procedure:        Trach and PEG  1/31/2019  Bronchoscopy   1/29/2019  Right thoracotomy, resection of portion of R 7th rib, evacuation of infected hemothorax, takedown of pleurocutaneous fistula  10/11/2018      Issues:  Acute hypoxic & hypercapnic respiratory failure  Hypotension  Pneumonia / Sepsis  ESRD / HD  Cardiomyopathy  HLD  BPH               Home Medications:  aspirin 81 mg oral tablet: 1 tab(s) orally once a day  (30 Oct 2018 14:39)  Breo Ellipta 100 mcg-25 mcg/inh inhalation powder: 1 puff(s) inhaled once a day patient denies using it (11 Oct 2018 08:38)  Calcium 500: 1 tab(s) orally 2 times a day (11 Oct 2018 08:38)  docusate sodium 100 mg oral tablet: 1 tab(s) orally 2 times a day, As Needed (11 Oct 2018 08:39)  folic acid 1 mg oral tablet: 1 tab(s) orally once a day (11 Oct 2018 08:38)  Mag-Ox 400 oral tablet: 1 tab(s) orally once a day (11 Oct 2018 08:39)  Multiple Vitamins oral tablet: 1 tab(s) orally once a day  (30 Oct 2018 14:39)  Namenda 10 mg oral tablet: 1 tab(s) orally 2 times a day (11 Oct 2018 08:38)  Nephplex Rx oral tablet: 1 tab(s) orally once a day (11 Oct 2018 08:39)  nortriptyline 50 mg oral capsule: 1 cap(s) orally once a day (11 Oct 2018 08:39)  Renvela 800 mg oral tablet: 2 tab(s) orally every 8 hours (11 Oct 2018 08:39)  simethicone 80 mg oral tablet: 1 tab(s) orally 3 times a day (after meals) (11 Oct 2018 08:39)  Tylenol 325 mg oral tablet: 2 tab(s) orally every 6 hours, As Needed (30 Oct 2018 14:39)  vancomycin 1 g intravenous injection: 1 gram(s) intravenous 3 times a week  Please give 3 x per week with Dialysis until 2/3/2019 (22 Jan 2019 13:41)  vitamin A: 1 tab(s) orally once a day (11 Oct 2018 08:38)  Vitamin B Complex: 1 tab(s) orally once a day (11 Oct 2018 08:38)  Vitamin C 500 mg oral tablet: 1 tab(s) orally once a day (11 Oct 2018 08:38)      PAST MEDICAL & SURGICAL HISTORY:  Smoking hx  Cardiomyopathy  Wound: right chest  ICD (implantable cardioverter-defibrillator) in place  OA (osteoarthritis)  HLD (hyperlipidemia)  BPH (benign prostatic hyperplasia)  Pleural effusion  HTN (hypertension)  ESRD (end stage renal disease)  H/O chest wound: right  S/P thoracotomy: FB, R thoracotomy, decortication, chest wall reconstruction, lat flap  History of wound infection: right chest wall - revision 5/18 and again in 7/18  History of implantable cardioverter-defibrillator (ICD) placement: pt unsure when placed  H/O bilateral hip replacements: 2008, 2009        ICU Vital Signs Last 24 Hrs  T(C): 36.8 (31 Jan 2019 22:27), Max: 37.3 (31 Jan 2019 19:15)  T(F): 98.3 (31 Jan 2019 22:27), Max: 99.1 (31 Jan 2019 19:15)  HR: 125 (31 Jan 2019 23:00) (94 - 134)  BP: 99/84 (31 Jan 2019 23:00) (95/53 - 129/68)  BP(mean): 88 (31 Jan 2019 23:00) (62 - 88)  ABP: 114/90 (31 Jan 2019 22:27) (85/54 - 114/90)  ABP(mean): 69 (31 Jan 2019 22:00) (65 - 90)  RR: 22 (31 Jan 2019 23:00) (20 - 23)  SpO2: 100% (31 Jan 2019 23:00) (96% - 100%)    I&O's Detail    30 Jan 2019 07:01  -  31 Jan 2019 07:00  --------------------------------------------------------  IN:    dextrose 10%.: 720 mL    fentaNYL  Infusion: 162 mL    IV PiggyBack: 250 mL    midazolam Infusion: 93.6 mL    Nepro with Carb Steady: 320 mL    norepinephrine Infusion: 101.6 mL    Other: 400 mL    sodium chloride 0.9%.: 1200 mL    vasopressin Infusion: 28.8 mL  Total IN: 3276 mL    OUT:    Bulb: 40 mL    Other: 1000 mL  Total OUT: 1040 mL    Total NET: 2236 mL      31 Jan 2019 07:01  -  31 Jan 2019 23:34  --------------------------------------------------------  IN:    dextrose 10%.: 480 mL    Enteral Tube Flush: 40 mL    fentaNYL  Infusion: 144 mL    IV PiggyBack: 50 mL    midazolam Infusion: 82.3 mL    Nepro with Carb Steady: 100 mL    norepinephrine Infusion: 182.7 mL    Other: 600 mL    sodium chloride 0.9%.: 800 mL  Total IN: 2479 mL    OUT:    Bulb: 11 mL    Other: 1600 mL  Total OUT: 1611 mL    Total NET: 868 mL    Culture - Blood:   NO ORGANISMS ISOLATED  NO ORGANISMS ISOLATED AT 48 HRS. (01.29.19 @ 22:40)  Culture - Respiratory with Gram Stain (01.29.19 @ 13:38)    Culture - Respiratory:   NRF^Normal Respiratory Jennifer  QUANTITY OF GROWTH: RARE    Gram Stain Sputum:   GPCPR^Gram Pos Cocci in Pairs  QUANTITY OF BACTERIA SEEN: RARE (1+)  YEAST^YEAST.  QUANTITY OF BACTERIA SEEN: RARE (1+)  WBC^White Blood Cells  QNTY CELLS IN GRAM STAIN: MANY (4+)    Specimen Source: LUNG - UPPER LOBE RIGHT        CAPILLARY BLOOD GLUCOSE      POCT Blood Glucose.: 130 mg/dL (31 Jan 2019 03:12)      Home Medications:  aspirin 81 mg oral tablet: 1 tab(s) orally once a day  (30 Oct 2018 14:39)  Breo Ellipta 100 mcg-25 mcg/inh inhalation powder: 1 puff(s) inhaled once a day patient denies using it (11 Oct 2018 08:38)  Calcium 500: 1 tab(s) orally 2 times a day (11 Oct 2018 08:38)  docusate sodium 100 mg oral tablet: 1 tab(s) orally 2 times a day, As Needed (11 Oct 2018 08:39)  folic acid 1 mg oral tablet: 1 tab(s) orally once a day (11 Oct 2018 08:38)  Mag-Ox 400 oral tablet: 1 tab(s) orally once a day (11 Oct 2018 08:39)  Multiple Vitamins oral tablet: 1 tab(s) orally once a day  (30 Oct 2018 14:39)  Namenda 10 mg oral tablet: 1 tab(s) orally 2 times a day (11 Oct 2018 08:38)  Nephplex Rx oral tablet: 1 tab(s) orally once a day (11 Oct 2018 08:39)  nortriptyline 50 mg oral capsule: 1 cap(s) orally once a day (11 Oct 2018 08:39)  Renvela 800 mg oral tablet: 2 tab(s) orally every 8 hours (11 Oct 2018 08:39)  simethicone 80 mg oral tablet: 1 tab(s) orally 3 times a day (after meals) (11 Oct 2018 08:39)  Tylenol 325 mg oral tablet: 2 tab(s) orally every 6 hours, As Needed (30 Oct 2018 14:39)  vancomycin 1 g intravenous injection: 1 gram(s) intravenous 3 times a week  Please give 3 x per week with Dialysis until 2/3/2019 (22 Jan 2019 13:41)  vitamin A: 1 tab(s) orally once a day (11 Oct 2018 08:38)  Vitamin B Complex: 1 tab(s) orally once a day (11 Oct 2018 08:38)  Vitamin C 500 mg oral tablet: 1 tab(s) orally once a day (11 Oct 2018 08:38)      MEDICATIONS  (STANDING):  dexmedetomidine Infusion 0.5 MICROgram(s)/kG/Hr (7.5 mL/Hr) IV Continuous <Continuous>  dextrose 10%. 1000 milliLiter(s) (30 mL/Hr) IV Continuous <Continuous>  epoetin kelly Injectable 3000 Unit(s) IV Push <User Schedule>  fentaNYL   Infusion 2 MICROgram(s)/kG/Hr (12 mL/Hr) IV Continuous <Continuous>  heparin  Injectable 5000 Unit(s) SubCutaneous every 12 hours  midazolam Infusion 0.02 mG/kG/Hr (1.2 mL/Hr) IV Continuous <Continuous>  norepinephrine Infusion 0.05 MICROgram(s)/kG/Min (5.625 mL/Hr) IV Continuous <Continuous>  piperacillin/tazobactam IVPB. 3.375 Gram(s) IV Intermittent once  piperacillin/tazobactam IVPB. 3.375 Gram(s) IV Intermittent every 12 hours  sodium chloride 0.9%. 1000 milliLiter(s) (50 mL/Hr) IV Continuous <Continuous>  vasopressin Infusion 0.05 Unit(s)/Min (3 mL/Hr) IV Continuous <Continuous>    MEDICATIONS  (PRN):  acetaminophen  IVPB .. 1000 milliGRAM(s) IV Intermittent once PRN Temp greater or equal to 38C (100.4F), Moderate Pain (4 - 6)      Mode: AC/ CMV (Assist Control/ Continuous Mandatory Ventilation)  RR (machine): 22  TV (machine): 400  FiO2: 40  PEEP: 7  MAP: 14  PIP: 27      Physical exam:     General:               Pt sedated                                               Neuro:                  Sedated                            Cardiovascular:   S1 & S2, regular                           Respiratory:         Air entry is decreased at  both bases, has bilateral conducted sounds                           GI:                          Soft, nondistended and nontender, Bowel sounds active                            Ext:                        No cyanosis or edema                              Labs:                                                                           8.8    6.53  )-----------( 201      ( 31 Jan 2019 03:20 )             28.2             01-31    136  |  95<L>  |  52<H>  ----------------------------<  138<H>  3.4<L>   |  22  |  4.52<H>    Ca    9.0      31 Jan 2019 03:20    TPro  6.8  /  Alb  2.9<L>  /  TBili  0.5  /  DBili  x   /  AST  21  /  ALT  25  /  AlkPhos  146<H>  01-31                  PTT - ( 31 Jan 2019 05:30 )  PTT:36.0 SEC  LIVER FUNCTIONS - ( 31 Jan 2019 03:20 )  Alb: 2.9 g/dL / Pro: 6.8 g/dL / ALK PHOS: 146 u/L / ALT: 25 u/L / AST: 21 u/L / GGT: x               CXR:    < from: Xray Chest 1 View- PORTABLE-Routine (01.31.19 @ 07:00) >      Heart size and the mediastinum cannot be accurately evaluated on this   projection.   ET tube tip approximately 4.9 cm above the rupert.  Enteric tube tip projects over the medial left upper quadrantof the   abdomen with side port overlying the distal esophagus. Right IJ line and   left subcutaneous ICD unchanged in position.  Small bilateral loculated pleural effusions, larger on the right, with   likely associated passive atelectasis are not significantly changed.  Previous mild interstitial edema has resolved on the right. Mild left   pulmonary vascular redistribution/congestion persists. No pneumothorax   seen.        IMPRESSION:  Enteric tube tip projects over the medial left upper   quadrant of the abdomen, possibly near the GE junction. Side port is at   the level of the distal esophagus. Suggest advancing the tube. Discussed   with Dr. Warren with read back at 9:49 AM on January 31, 2019 by Dr. Mclaughlin.    Previous mild interstitial edema has resolved on the right. Mild left   pulmonary vascular redistribution/congestion persists.    No significant change in small bilateral loculated pleural effusions,   larger on the right, with likely associated passive atelectasis.          Plan:    General: Mr. Han is a 58M who underwent a FB, R thoracotomy, decortication, chest wall reconstruction, lat flap with Dr Pickering on 10/11/18. He was admitted on 1/6/19 to [facility] with a R pleural effusion and respiratory failure,  A R PTC was placed there and he was intubated.  He still had a R SANDY drain in place and he was transferred to [facility]. Patient was found to have aspirated a foreign body and +MRSA empyema. He was treated and discharged on 1/22/2019.     He was found by his home nurse to be in acute distress and recommended to proceed to the ED.  At presentation, he was found to be in acute hypercarbic respiratory failure complicated by hemodynamic instability requiring emergent intubation and pressor support. (28 Jan 2019 15:34)                            Neuro:                                         Pain control with Fentanyl drip                            Cardiovascular:                                          Continue hemodynamic monitoring.    Hypotension: Titrate Levophed  to MAP>65    Continue IVF                            Respiratory:                                         Pt is on full mechanical ventilator support TP--60-7                                         Wean FiO2 as tolerated                                                                  Continue bronchodilators, pulmonary toilet     Bronchoscopy showed copious greenish secretions - sent for culture     Continue antibiotics -  Vanco dose based + Zosyn                            GI                                         Started PEG feeds - Nepro 20cc/hr - advance as tolerated. Flush PEG with 30cc sterile water Q4hrs                                         Continue GI prophylaxis with Protonix                                                                 Renal:                                         HD as per renal                                         Monitor I/Os and electrolytes                                                                                        Hem/ Onc:                                                                                 Follow CBC in AM                           Infectious disease:     Bronchoscopy on 1/29 showed copious greenish secretions - Continue pulmonary toilet                                         Continue Vanco based on level  + Zosyn                                                                   Endocrine         Continue Accu-Checks with coverage      Pt is on SQ Heparin and Venodyne boots for DVT prophylaxis.     Pertinent clinical, laboratory, radiographic, hemodynamic, echocardiographic, respiratory data, microbiologic data and chart were reviewed and analyzed frequently throughout the course of the day and night  Patient seen, examined and plan discussed with CT Surgeon Dr. Pickering / CTICU team during rounds.    Pt's status discussed with wife via video  at bedside, updated status    I have spent 70  minutes of critical care time with this pt between 7am and 11.59pm               Ralph Warren MD

## 2019-01-31 NOTE — PROGRESS NOTE ADULT - PROBLEM SELECTOR PLAN 2
Patient with anemia in the setting of ESRD. Hemoglobin below target range. Will give EPO with HD. Check iron studies. Monitor hemoglobin Patient with anemia in the setting of ESRD. Hemoglobin below target range. Will order IV epogen with HD. Check iron studies. Monitor hemoglobin

## 2019-01-31 NOTE — ED PROCEDURE NOTE - PROCEDURE ADDITIONAL DETAILS
Focused ED Transthoracic echo 30436 - indication (      )    Grey scale imaging obtained in four views ( parasternal long, parasternal short, apical and subxiphoid)    No pericardial effusion noted.  No evidence of cardiac tamponade  LV contractility - severely decreased  RV normal size.  IVC 2.34 -2.34 with sniff - no change.     Impression: no pericardial effusion, severely decreased contractility, RV normal size.  At least moderate MR, at least mild AI.   Dexeus.

## 2019-01-31 NOTE — DIETITIAN INITIAL EVALUATION ADULT. - OTHER INFO
Pt referred for nutrition consult 2/2 Intubated greater than 48h.  Pt admitted with dx of Sepsis, acute hypecarbic respiratory failure.  Pt currently intubated/ sedated.  Pt for PEG and trache placement today.  TF held and pt made NPO.  Spoke w/RN - pt was tolerating Nepro @20ml/h prior to being made NPO.  Pt w/recent J admission, d/c'd 1/22 - RDN note reviewed.  Wt loss reported of 16lbs x3 m with wt on admission 57.5kg.  Current wt is increased but may be related to fluid retention.  Unable to obtain nutrition hx at this time - family members not mentioned.

## 2019-01-31 NOTE — CHART NOTE - NSCHARTNOTEFT_GEN_A_CORE
NUTRITION SERVICES     Upon Nutritional Assessment by the Registered Dietitian your patient was determined to meet criteria/ has evidence of the following diagnosis/diagnoses:  [ ] Mild Protein Calorie Malnutrition   [ ] Moderate Protein Calorie Malnutrition   [ X] Severe Protein Calorie Malnutrition   [ ] Unspecified Protein Calorie Malnutrition   [ ] Underweight / BMI <19  [ ] Morbid Obesity / BMI >40    Findings as based on:  •  Comprehensive nutrition assessment and consultation

## 2019-01-31 NOTE — PROGRESS NOTE ADULT - SUBJECTIVE AND OBJECTIVE BOX
[facility] DIVISION OF KIDNEY DISEASES AND HYPERTENSION -- FOLLOW UP NOTE  --------------------------------------------------------------------------------  HPI: 59 yo male with medical history of NICM with ICD, ESRD on HD, former smoker, BPH admitted with acute hypercapnic respiratory failure. Nephrology consulted for ESRD/HD management. Pt intubated and sedated in the CTICU. Pt. currently admitted with hypercarbic respiratory failure, septic shock. Last HD on 1/30/19. Pt seen in the CTICU, intubated, underwent HD treatment yesterday with UF 0.6 kg, tolerated well, continues to be on IV pressors. Unable to provide ROS. Plan for PEG and trach placement today.     PAST HISTORY  --------------------------------------------------------------------------------  No significant changes to PMH, PSH, FHx, SHx, unless otherwise noted    ALLERGIES & MEDICATIONS  --------------------------------------------------------------------------------  Allergies    No Known Allergies    Intolerances      Standing Inpatient Medications  dexmedetomidine Infusion 0.5 MICROgram(s)/kG/Hr IV Continuous <Continuous>  dextrose 10%. 1000 milliLiter(s) IV Continuous <Continuous>  fentaNYL   Infusion 2 MICROgram(s)/kG/Hr IV Continuous <Continuous>  heparin  Injectable 5000 Unit(s) SubCutaneous every 12 hours  midazolam Infusion 0.02 mG/kG/Hr IV Continuous <Continuous>  norepinephrine Infusion 0.05 MICROgram(s)/kG/Min IV Continuous <Continuous>  piperacillin/tazobactam IVPB. 3.375 Gram(s) IV Intermittent once  piperacillin/tazobactam IVPB. 3.375 Gram(s) IV Intermittent every 12 hours  sodium chloride 0.9%. 1000 milliLiter(s) IV Continuous <Continuous>  vasopressin Infusion 0.05 Unit(s)/Min IV Continuous <Continuous>    PRN Inpatient Medications      REVIEW OF SYSTEMS  --------------------------------------------------------------------------------  Unable to obtain    VITALS/PHYSICAL EXAM  --------------------------------------------------------------------------------  T(C): 36.8 (01-31-19 @ 04:00), Max: 37.4 (01-30-19 @ 08:00)  HR: 100 (01-31-19 @ 07:06) (90 - 112)  BP: 115/58 (01-31-19 @ 05:00) (87/52 - 125/65)  RR: 20 (01-31-19 @ 05:00) (20 - 24)  SpO2: 96% (01-31-19 @ 07:06) (94% - 100%)  Wt(kg): --    01-30-19 @ 07:01  -  01-31-19 @ 07:00  --------------------------------------------------------  IN: 3174.3 mL / OUT: 1040 mL / NET: 2134.3 mL    Physical Exam:  	Gen: Intubated  	HEENT: +ETT  	Pulm: coarse breath sounsd B/L  	CV: S1S2  	Abd: Soft, +BS   	Ext: No LE edema B/L  	Neuro: Awake  	Skin: Warm and dry  	Vascular access: LUE AVF +threm, eloisa heard.     LABS/STUDIES  --------------------------------------------------------------------------------              8.8    6.53  >-----------<  201      [01-31-19 @ 03:20]              28.2     136  |  95  |  52  ----------------------------<  138      [01-31-19 @ 03:20]  3.4   |  22  |  4.52        Ca     9.0     [01-31-19 @ 03:20]    TPro  6.8  /  Alb  2.9  /  TBili  0.5  /  DBili  x   /  AST  21  /  ALT  25  /  AlkPhos  146  [01-31-19 @ 03:20]      PTT: 36.0       [01-31-19 @ 05:30]      Creatinine Trend:  SCr 4.52 [01-31 @ 03:20]  SCr 7.04 [01-30 @ 04:50]  SCr 6.89 [01-29 @ 14:30]  SCr 6.40 [01-29 @ 04:25]  SCr 6.45 [01-28 @ 14:30] [facility] DIVISION OF KIDNEY DISEASES AND HYPERTENSION -- FOLLOW UP NOTE  --------------------------------------------------------------------------------  HPI: 59 yo male with medical history of NICM with ICD, ESRD on HD, former smoker, BPH admitted with acute hypercapnic respiratory failure. Nephrology consulted for ESRD/HD management. Pt intubated and sedated in the CTICU. Pt. currently admitted with hypercarbic respiratory failure, septic shock. Last HD on 1/30/19. Pt seen in the CTICU, intubated, underwent HD treatment yesterday with UF 0.6 kg, tolerated well, continues to be on IV pressors. Unable to provide ROS. Plan for PEG and trach placement today.     PAST HISTORY  --------------------------------------------------------------------------------  No significant changes to PMH, PSH, FHx, SHx, unless otherwise noted    ALLERGIES & MEDICATIONS  --------------------------------------------------------------------------------  Allergies    No Known Allergies    Intolerances    Standing Inpatient Medications  dexmedetomidine Infusion 0.5 MICROgram(s)/kG/Hr IV Continuous <Continuous>  dextrose 10%. 1000 milliLiter(s) IV Continuous <Continuous>  fentaNYL   Infusion 2 MICROgram(s)/kG/Hr IV Continuous <Continuous>  heparin  Injectable 5000 Unit(s) SubCutaneous every 12 hours  midazolam Infusion 0.02 mG/kG/Hr IV Continuous <Continuous>  norepinephrine Infusion 0.05 MICROgram(s)/kG/Min IV Continuous <Continuous>  piperacillin/tazobactam IVPB. 3.375 Gram(s) IV Intermittent once  piperacillin/tazobactam IVPB. 3.375 Gram(s) IV Intermittent every 12 hours  sodium chloride 0.9%. 1000 milliLiter(s) IV Continuous <Continuous>  vasopressin Infusion 0.05 Unit(s)/Min IV Continuous <Continuous>    PRN Inpatient Medications    REVIEW OF SYSTEMS  --------------------------------------------------------------------------------  Unable to obtain    VITALS/PHYSICAL EXAM  --------------------------------------------------------------------------------  T(C): 36.8 (01-31-19 @ 04:00), Max: 37.4 (01-30-19 @ 08:00)  HR: 100 (01-31-19 @ 07:06) (90 - 112)  BP: 115/58 (01-31-19 @ 05:00) (87/52 - 125/65)  RR: 20 (01-31-19 @ 05:00) (20 - 24)  SpO2: 96% (01-31-19 @ 07:06) (94% - 100%)  Wt(kg): --    01-30-19 @ 07:01  -  01-31-19 @ 07:00  --------------------------------------------------------  IN: 3174.3 mL / OUT: 1040 mL / NET: 2134.3 mL    Physical Exam:  	Gen: Intubated  	HEENT: +ETT  	Pulm: coarse breath sounsd B/L  	CV: S1S2  	Abd: Soft, +BS   	Ext: No LE edema B/L  	Neuro: Awake  	Skin: Warm and dry  	Vascular access: СЕРГЕЙ AVF +eloisa hyde heard.     LABS/STUDIES  --------------------------------------------------------------------------------              8.8    6.53  >-----------<  201      [01-31-19 @ 03:20]              28.2     136  |  95  |  52  ----------------------------<  138      [01-31-19 @ 03:20]  3.4   |  22  |  4.52        Ca     9.0     [01-31-19 @ 03:20]    TPro  6.8  /  Alb  2.9  /  TBili  0.5  /  DBili  x   /  AST  21  /  ALT  25  /  AlkPhos  146  [01-31-19 @ 03:20]    Creatinine Trend:  SCr 4.52 [01-31 @ 03:20]  SCr 7.04 [01-30 @ 04:50]  SCr 6.89 [01-29 @ 14:30]  SCr 6.40 [01-29 @ 04:25]  SCr 6.45 [01-28 @ 14:30]

## 2019-01-31 NOTE — DIETITIAN INITIAL EVALUATION ADULT. - NS AS NUTRI INTERV ENTERAL NUTRITION
Rate/Volume/Composition/Insert enteral feeding tube/for PEG placement today-when medically feasible resume TF and advance to goal as tolerated.

## 2019-01-31 NOTE — BRIEF OPERATIVE NOTE - PROCEDURE
<<-----Click on this checkbox to enter Procedure PEG (percutaneous endoscopic gastrostomy)  01/31/2019    Active  [facility]  Tracheostomy  01/31/2019  Anne Cuffed 6  Active  [facility]  Bronchoscopy  01/31/2019    Active  [facility]

## 2019-01-31 NOTE — PROGRESS NOTE ADULT - SUBJECTIVE AND OBJECTIVE BOX
CLAUDIA HAN                     [id number]    HPI:  Mr. Han is a 58M who underwent a FB, R thoracotomy, decortication, chest wall reconstruction, lat flap with Dr Pickering on 10/11/18. He was admitted on 1/6/19 to [facility] with a R pleural effusion and respiratory failure,  A R PTC was placed there and he was intubated.  He still had a R SANDY drain in place and he was transferred to [facility]. Patient was found to have aspirated a foreign body and +MRSA empyema. He was treated and discharged on 1/22/2019.     He was found by his home nurse to be in acute distress and recommended to proceed to the ED.  At presentation, he was found to be in acute hypercarbic respiratory failure complicated by hemodynamic instability requiring emergent intubation and pressor support. (28 Jan 2019 15:34)    Procedure:        Bronchoscopy   1/29/2019  Right thoracotomy, resection of portion of R 7th rib, evacuation of infected hemothorax, takedown of pleurocutaneous fistula  10/11/2018      Issues:  Acute hypoxic & hypercapnic respiratory failure  Hypotension  Pneumonia / Sepsis  ESRD / HD  Cardiomyopathy  HLD  BPH                 Home Medications:  aspirin 81 mg oral tablet: 1 tab(s) orally once a day  (30 Oct 2018 14:39)  Breo Ellipta 100 mcg-25 mcg/inh inhalation powder: 1 puff(s) inhaled once a day patient denies using it (11 Oct 2018 08:38)  Calcium 500: 1 tab(s) orally 2 times a day (11 Oct 2018 08:38)  docusate sodium 100 mg oral tablet: 1 tab(s) orally 2 times a day, As Needed (11 Oct 2018 08:39)  folic acid 1 mg oral tablet: 1 tab(s) orally once a day (11 Oct 2018 08:38)  Mag-Ox 400 oral tablet: 1 tab(s) orally once a day (11 Oct 2018 08:39)  Multiple Vitamins oral tablet: 1 tab(s) orally once a day  (30 Oct 2018 14:39)  Namenda 10 mg oral tablet: 1 tab(s) orally 2 times a day (11 Oct 2018 08:38)  Nephplex Rx oral tablet: 1 tab(s) orally once a day (11 Oct 2018 08:39)  nortriptyline 50 mg oral capsule: 1 cap(s) orally once a day (11 Oct 2018 08:39)  Renvela 800 mg oral tablet: 2 tab(s) orally every 8 hours (11 Oct 2018 08:39)  simethicone 80 mg oral tablet: 1 tab(s) orally 3 times a day (after meals) (11 Oct 2018 08:39)      PAST MEDICAL & SURGICAL HISTORY:  Smoking hx  Cardiomyopathy  Wound: right chest  ICD (implantable cardioverter-defibrillator) in place  OA (osteoarthritis)  HLD (hyperlipidemia)  BPH (benign prostatic hyperplasia)  Pleural effusion  HTN (hypertension)  ESRD (end stage renal disease)  H/O chest wound: right  S/P thoracotomy: FB, R thoracotomy, decortication, chest wall reconstruction, lat flap  History of wound infection: right chest wall - revision 5/18 and again in 7/18  History of implantable cardioverter-defibrillator (ICD) placement: pt unsure when placed  H/O bilateral hip replacements: 2008, 2009            VITAL SIGNS:  Vital Signs Last 24 Hrs  T(C): 36.8 (31 Jan 2019 04:00), Max: 37.4 (30 Jan 2019 08:00)  T(F): 98.2 (31 Jan 2019 04:00), Max: 99.4 (30 Jan 2019 08:00)  HR: 100 (31 Jan 2019 07:06) (90 - 112)  BP: 115/58 (31 Jan 2019 05:00) (87/52 - 125/65)  BP(mean): 69 (31 Jan 2019 05:00) (59 - 79)  RR: 20 (31 Jan 2019 05:00) (20 - 24)  SpO2: 96% (31 Jan 2019 07:06) (94% - 100%)    I/Os:   I&O's Detail    30 Jan 2019 07:01  -  31 Jan 2019 07:00  --------------------------------------------------------  IN:    dextrose 10%.: 690 mL    fentaNYL  Infusion: 153 mL    IV PiggyBack: 250 mL    midazolam Infusion: 88.8 mL    Nepro with Carb Steady: 320 mL    norepinephrine Infusion: 93.7 mL    Other: 400 mL    sodium chloride 0.9%.: 1150 mL    vasopressin Infusion: 28.8 mL  Total IN: 3174.3 mL    OUT:    Bulb: 40 mL    Other: 1000 mL  Total OUT: 1040 mL    Total NET: 2134.3 mL          CAPILLARY BLOOD GLUCOSE      POCT Blood Glucose.: 130 mg/dL (31 Jan 2019 03:12)      =======================MEDICATIONS===================  MEDICATIONS  (STANDING):  dexmedetomidine Infusion 0.5 MICROgram(s)/kG/Hr (7.5 mL/Hr) IV Continuous <Continuous>  dextrose 10%. 1000 milliLiter(s) (30 mL/Hr) IV Continuous <Continuous>  epoetin kelly Injectable 3000 Unit(s) IV Push <User Schedule>  fentaNYL   Infusion 2 MICROgram(s)/kG/Hr (12 mL/Hr) IV Continuous <Continuous>  heparin  Injectable 5000 Unit(s) SubCutaneous every 12 hours  midazolam Infusion 0.02 mG/kG/Hr (1.2 mL/Hr) IV Continuous <Continuous>  norepinephrine Infusion 0.05 MICROgram(s)/kG/Min (5.625 mL/Hr) IV Continuous <Continuous>  piperacillin/tazobactam IVPB. 3.375 Gram(s) IV Intermittent once  piperacillin/tazobactam IVPB. 3.375 Gram(s) IV Intermittent every 12 hours  sodium chloride 0.9%. 1000 milliLiter(s) (50 mL/Hr) IV Continuous <Continuous>  vasopressin Infusion 0.05 Unit(s)/Min (3 mL/Hr) IV Continuous <Continuous>    MEDICATIONS  (PRN):      =======================VENTILATOR SETTINGS===================  Mode: AC/ CMV (Assist Control/ Continuous Mandatory Ventilation)  RR (machine): 22  TV (machine): 400  FiO2: 40  PEEP: 7  MAP: 14  PIP: 35          PHYSICAL EXAM============================    General:               Pt sedated, intubated                                               Neuro:                  Sedated  , nonfocal                          Cardiovascular:   S1 & S2, regular                           Respiratory:         Air entry is decreased at  both bases, has bilateral conducted sounds                           GI:                          Soft, nondistended and nontender, Bowel sounds active                            Ext:                        No cyanosis or edema                             ============================LABS=========================                        8.8    6.53  )-----------( 201      ( 31 Jan 2019 03:20 )             28.2     01-31    136  |  95<L>  |  52<H>  ----------------------------<  138<H>  3.4<L>   |  22  |  4.52<H>    Ca    9.0      31 Jan 2019 03:20    TPro  6.8  /  Alb  2.9<L>  /  TBili  0.5  /  DBili  x   /  AST  21  /  ALT  25  /  AlkPhos  146<H>  01-31    LIVER FUNCTIONS - ( 31 Jan 2019 03:20 )  Alb: 2.9 g/dL / Pro: 6.8 g/dL / ALK PHOS: 146 u/L / ALT: 25 u/L / AST: 21 u/L / GGT: x           PTT - ( 31 Jan 2019 05:30 )  PTT:36.0 SEC  ABG - ( 31 Jan 2019 03:20 )  pH, Arterial: 7.32  pH, Blood: x     /  pCO2: 53    /  pO2: 118   / HCO3: 25    / Base Excess: 0.7   /  SaO2: 98.0                  ============================IMAGING STUDIES=========================  < from: Xray Chest 1 View- PORTABLE-Routine (01.30.19 @ 06:49) >  INTERPRETATION:     The endotracheal, enteric tube and right IJ line in addition to the   left-sided subcutaneous AICD are unchanged. Small loculated effusions   right greater than left unchanged. Mild interstitial edema. No focal   consolidation or pneumothorax.      COMPARISON:January 29      IMPRESSION:  Follow-up showing no significant change with tubes and lines   in position, bilateral small loculated effusions and no focal   consolidation although left retrocardiac opacity is unchanged.      A/P:     58M who underwent a FB, R thoracotomy, decortication, chest wall reconstruction, lat flap with Dr Pickering on 10/11/18. He was admitted on 1/6/19 to [facility] with a R pleural effusion and respiratory failure,  A R PTC was placed there and he was intubated.  He still had a R SANDY drain in place and he was transferred to [facility]. Patient was found to have aspirated a foreign body and +MRSA empyema. He was treated and discharged on 1/22/2019.     He was found by his home nurse to be in acute distress and recommended to proceed to the ED.  At presentation, he was found to be in acute hypercarbic respiratory failure complicated by hemodynamic instability requiring emergent intubation and pressor support. (28 Jan 2019 15:34)                            Neuro:                                         Pain control with Fentanyl drip                            Cardiovascular:                                          Continue hemodynamic monitoring.    Hypotension: Titrate Levophed and Vasopressin to MAP>65, Wean off Vaso    Continue IVF                            Respiratory:                                         OR for Trach/PEG today                                         Pt is on full mechanical ventilator support FH--60-7                                         Wean FiO2 as tolerated                                                                  Continue bronchodilators, pulmonary toilet     Bronchoscopy showed copious greenish secretions - sent for culture     Continue antibiotics -  Vanco dose based + Zosyn                            GI                                         NPO for OR today                                         Continue GI prophylaxis with Protonix                                                                 Renal:                                         Continue IVF                                         Monitor I/Os and electrolytes                                         HD - as per renal                                                 Hem/ Onc:                                                                                 Follow CBC in AM                           Infectious disease:     Bronchoscopy showed copious greenish secretions - sent for culture                                         Continue Vanco dose based + Zosyn                                                                   Endocrine                                           Hypoglycemia: On D10 @ 30cc/hr        Continue Accu-Checks with coverage      Pt is on SQ Heparin and Venodyne boots for DVT prophylaxis.     Pertinent clinical, laboratory, radiographic, hemodynamic, echocardiographic, respiratory data, microbiologic data and chart were reviewed and analyzed frequently throughout the course of the day and night  Patient seen, examined and plan discussed with CT Surgeon Dr. Pickering / CTICU team during rounds.    Pt's status discussed with wife via video traslator at bedside, updated status    I have spent 75 minutes of critical care time with this pt between 00am and 8am                Dave Su DO, FACEP

## 2019-01-31 NOTE — ED PROCEDURE NOTE - NS ED PROCEDURE ASSISTED BY
The procedure was performed independently
The procedure was performed independently
Supervision was available
Supervision was available

## 2019-02-01 LAB
ANION GAP SERPL CALC-SCNC: 12 MMO/L — SIGNIFICANT CHANGE UP (ref 7–14)
BACTERIA SPT RESP CULT: SIGNIFICANT CHANGE UP
BASE EXCESS BLDA CALC-SCNC: 2 MMOL/L — SIGNIFICANT CHANGE UP
BUN SERPL-MCNC: 31 MG/DL — HIGH (ref 7–23)
CALCIUM SERPL-MCNC: 8.7 MG/DL — SIGNIFICANT CHANGE UP (ref 8.4–10.5)
CHLORIDE SERPL-SCNC: 101 MMOL/L — SIGNIFICANT CHANGE UP (ref 98–107)
CO2 SERPL-SCNC: 25 MMOL/L — SIGNIFICANT CHANGE UP (ref 22–31)
CREAT SERPL-MCNC: 3.5 MG/DL — HIGH (ref 0.5–1.3)
GLUCOSE SERPL-MCNC: 111 MG/DL — HIGH (ref 70–99)
GRAM STN SPT: SIGNIFICANT CHANGE UP
HBV SURFACE AG SER-ACNC: NEGATIVE — SIGNIFICANT CHANGE UP
HCO3 BLDA-SCNC: 26 MMOL/L — SIGNIFICANT CHANGE UP (ref 22–26)
HCT VFR BLD CALC: 27 % — LOW (ref 39–50)
HGB BLD-MCNC: 8.4 G/DL — LOW (ref 13–17)
MCHC RBC-ENTMCNC: 31.1 % — LOW (ref 32–36)
MCHC RBC-ENTMCNC: 31.8 PG — SIGNIFICANT CHANGE UP (ref 27–34)
MCV RBC AUTO: 102.3 FL — HIGH (ref 80–100)
NRBC # FLD: 0 K/UL — LOW (ref 25–125)
PCO2 BLDA: 48 MMHG — SIGNIFICANT CHANGE UP (ref 35–48)
PH BLDA: 7.37 PH — SIGNIFICANT CHANGE UP (ref 7.35–7.45)
PLATELET # BLD AUTO: 158 K/UL — SIGNIFICANT CHANGE UP (ref 150–400)
PMV BLD: 9.9 FL — SIGNIFICANT CHANGE UP (ref 7–13)
PO2 BLDA: 92 MMHG — SIGNIFICANT CHANGE UP (ref 83–108)
POTASSIUM SERPL-MCNC: 3.8 MMOL/L — SIGNIFICANT CHANGE UP (ref 3.5–5.3)
POTASSIUM SERPL-SCNC: 3.8 MMOL/L — SIGNIFICANT CHANGE UP (ref 3.5–5.3)
RBC # BLD: 2.64 M/UL — LOW (ref 4.2–5.8)
RBC # FLD: 16.8 % — HIGH (ref 10.3–14.5)
SAO2 % BLDA: 97.4 % — SIGNIFICANT CHANGE UP (ref 95–99)
SODIUM SERPL-SCNC: 138 MMOL/L — SIGNIFICANT CHANGE UP (ref 135–145)
SPECIMEN SOURCE: SIGNIFICANT CHANGE UP
VANCOMYCIN FLD-MCNC: 21.8 UG/ML — SIGNIFICANT CHANGE UP
WBC # BLD: 3.95 K/UL — SIGNIFICANT CHANGE UP (ref 3.8–10.5)
WBC # FLD AUTO: 3.95 K/UL — SIGNIFICANT CHANGE UP (ref 3.8–10.5)

## 2019-02-01 PROCEDURE — 99291 CRITICAL CARE FIRST HOUR: CPT

## 2019-02-01 PROCEDURE — 99232 SBSQ HOSP IP/OBS MODERATE 35: CPT | Mod: GC

## 2019-02-01 PROCEDURE — 71045 X-RAY EXAM CHEST 1 VIEW: CPT | Mod: 26

## 2019-02-01 PROCEDURE — 99292 CRITICAL CARE ADDL 30 MIN: CPT

## 2019-02-01 RX ORDER — QUETIAPINE FUMARATE 200 MG/1
25 TABLET, FILM COATED ORAL
Qty: 0 | Refills: 0 | Status: DISCONTINUED | OUTPATIENT
Start: 2019-02-01 | End: 2019-02-05

## 2019-02-01 RX ORDER — ACETAMINOPHEN 500 MG
1000 TABLET ORAL ONCE
Qty: 0 | Refills: 0 | Status: COMPLETED | OUTPATIENT
Start: 2019-02-01 | End: 2019-02-01

## 2019-02-01 RX ORDER — SODIUM CHLORIDE 9 MG/ML
1000 INJECTION INTRAMUSCULAR; INTRAVENOUS; SUBCUTANEOUS
Qty: 0 | Refills: 0 | Status: DISCONTINUED | OUTPATIENT
Start: 2019-02-01 | End: 2019-02-22

## 2019-02-01 RX ORDER — MIDODRINE HYDROCHLORIDE 2.5 MG/1
10 TABLET ORAL EVERY 8 HOURS
Qty: 0 | Refills: 0 | Status: DISCONTINUED | OUTPATIENT
Start: 2019-02-01 | End: 2019-02-17

## 2019-02-01 RX ADMIN — VASOPRESSIN 3 UNIT(S)/MIN: 20 INJECTION INTRAVENOUS at 00:25

## 2019-02-01 RX ADMIN — HEPARIN SODIUM 5000 UNIT(S): 5000 INJECTION INTRAVENOUS; SUBCUTANEOUS at 06:22

## 2019-02-01 RX ADMIN — Medication 5.62 MICROGRAM(S)/KG/MIN: at 20:34

## 2019-02-01 RX ADMIN — MIDODRINE HYDROCHLORIDE 10 MILLIGRAM(S): 2.5 TABLET ORAL at 13:02

## 2019-02-01 RX ADMIN — FENTANYL CITRATE 12 MICROGRAM(S)/KG/HR: 50 INJECTION INTRAVENOUS at 09:48

## 2019-02-01 RX ADMIN — FENTANYL CITRATE 2 MICROGRAM(S)/KG/HR: 50 INJECTION INTRAVENOUS at 10:10

## 2019-02-01 RX ADMIN — PIPERACILLIN AND TAZOBACTAM 25 GRAM(S): 4; .5 INJECTION, POWDER, LYOPHILIZED, FOR SOLUTION INTRAVENOUS at 06:22

## 2019-02-01 RX ADMIN — SODIUM CHLORIDE 30 MILLILITER(S): 9 INJECTION INTRAMUSCULAR; INTRAVENOUS; SUBCUTANEOUS at 20:33

## 2019-02-01 RX ADMIN — MIDODRINE HYDROCHLORIDE 10 MILLIGRAM(S): 2.5 TABLET ORAL at 22:00

## 2019-02-01 RX ADMIN — Medication 30 MILLILITER(S): at 09:47

## 2019-02-01 RX ADMIN — FENTANYL CITRATE 12 MICROGRAM(S)/KG/HR: 50 INJECTION INTRAVENOUS at 20:34

## 2019-02-01 RX ADMIN — Medication 400 MILLIGRAM(S): at 13:00

## 2019-02-01 RX ADMIN — HEPARIN SODIUM 5000 UNIT(S): 5000 INJECTION INTRAVENOUS; SUBCUTANEOUS at 18:12

## 2019-02-01 RX ADMIN — Medication 5.62 MICROGRAM(S)/KG/MIN: at 09:47

## 2019-02-01 RX ADMIN — MIDAZOLAM HYDROCHLORIDE 1.2 MG/KG/HR: 1 INJECTION, SOLUTION INTRAMUSCULAR; INTRAVENOUS at 01:44

## 2019-02-01 RX ADMIN — Medication 1000 MILLIGRAM(S): at 13:15

## 2019-02-01 RX ADMIN — PIPERACILLIN AND TAZOBACTAM 25 GRAM(S): 4; .5 INJECTION, POWDER, LYOPHILIZED, FOR SOLUTION INTRAVENOUS at 18:13

## 2019-02-01 RX ADMIN — FENTANYL CITRATE 2 MICROGRAM(S)/KG/HR: 50 INJECTION INTRAVENOUS at 21:00

## 2019-02-01 RX ADMIN — VASOPRESSIN 3 UNIT(S)/MIN: 20 INJECTION INTRAVENOUS at 09:49

## 2019-02-01 RX ADMIN — SODIUM CHLORIDE 50 MILLILITER(S): 9 INJECTION INTRAMUSCULAR; INTRAVENOUS; SUBCUTANEOUS at 09:48

## 2019-02-01 NOTE — CONSULT NOTE ADULT - SUBJECTIVE AND OBJECTIVE BOX
Patient is a 58y old  Male who presents with a chief complaint of     HPI:    Mr. Rick is a 58M who underwent a FB, R thoracotomy, decortication, chest wall reconstruction, lat flap with Dr Pickering on 10/11/18. He was admitted on 1/6/19 to [facility] with a R pleural effusion and respiratory failure,  A R PTC was placed there and he was intubated.  He still had a R SANDY drain in place and he was transferred to [facility]. Patient was found to have aspirated a foreign body and +MRSA empyema. He was treated and discharged on 1/22/2019.     He was found by his home nurse to be in acute distress and recommended to proceed to the ED.  At presentation, he was found to be in acute hypercarbic respiratory failure complicated by hemodynamic instability requiring emergent intubation and pressor support. (28 Jan 2019 15:34)      REVIEW OF SYSTEMS:    CONSTITUTIONAL: No fever, weight loss, or fatigue  EYES: No eye pain, visual disturbances, or discharge  ENMT:  No sore throat  NECK: No pain or stiffness  RESPIRATORY: No cough, wheezing, chills or hemoptysis; No shortness of breath  CARDIOVASCULAR: No chest pain, palpitations, dizziness, or leg swelling  GASTROINTESTINAL: No abdominal or epigastric pain. No nausea, vomiting, or hematemesis; No diarrhea or constipation. No melena or hematochezia.  GENITOURINARY: No dysuria, frequency, hematuria, or incontinence  NEUROLOGICAL: No headaches, memory loss, loss of strength, numbness, or tremors  SKIN: No itching, burning, rashes, or lesions   LYMPH NODES: No enlarged glands  MUSCULOSKELETAL: No joint pain or swelling; No muscle, back, or extremity pain      PAST MEDICAL & SURGICAL HISTORY:  Smoking hx  Cardiomyopathy  Wound: right chest  ICD (implantable cardioverter-defibrillator) in place  OA (osteoarthritis)  HLD (hyperlipidemia)  BPH (benign prostatic hyperplasia)  Pleural effusion  HTN (hypertension)  ESRD (end stage renal disease)  H/O chest wound: right  S/P thoracotomy: FB, R thoracotomy, decortication, chest wall reconstruction, lat flap  History of wound infection: right chest wall - revision 5/18 and again in 7/18  History of implantable cardioverter-defibrillator (ICD) placement: pt unsure when placed  H/O bilateral hip replacements: 2008, 2009      Allergies    No Known Allergies    Intolerances        FAMILY HISTORY:  No significant fam hx    SOCIAL HISTORY:        MEDICATIONS  (STANDING):  dexmedetomidine Infusion 0.5 MICROgram(s)/kG/Hr (7.5 mL/Hr) IV Continuous <Continuous>  dextrose 10%. 1000 milliLiter(s) (30 mL/Hr) IV Continuous <Continuous>  epoetin kelly Injectable 3000 Unit(s) IV Push <User Schedule>  fentaNYL   Infusion 2 MICROgram(s)/kG/Hr (12 mL/Hr) IV Continuous <Continuous>  heparin  Injectable 5000 Unit(s) SubCutaneous every 12 hours  midazolam Infusion 0.02 mG/kG/Hr (1.2 mL/Hr) IV Continuous <Continuous>  midodrine 10 milliGRAM(s) Oral every 8 hours  norepinephrine Infusion 0.05 MICROgram(s)/kG/Min (5.625 mL/Hr) IV Continuous <Continuous>  piperacillin/tazobactam IVPB. 3.375 Gram(s) IV Intermittent once  piperacillin/tazobactam IVPB. 3.375 Gram(s) IV Intermittent every 12 hours  QUEtiapine 25 milliGRAM(s) Oral two times a day  sodium chloride 0.9%. 1000 milliLiter(s) (50 mL/Hr) IV Continuous <Continuous>  vasopressin Infusion 0.05 Unit(s)/Min (3 mL/Hr) IV Continuous <Continuous>    MEDICATIONS  (PRN):  acetaminophen  IVPB .. 1000 milliGRAM(s) IV Intermittent once PRN Temp greater or equal to 38C (100.4F), Moderate Pain (4 - 6)      Vital Signs Last 24 Hrs  T(C): 38 (01 Feb 2019 08:00), Max: 38 (01 Feb 2019 08:00)  T(F): 100.4 (01 Feb 2019 08:00), Max: 100.4 (01 Feb 2019 08:00)  HR: 103 (01 Feb 2019 09:00) (90 - 134)  BP: 110/64 (01 Feb 2019 09:00) (99/84 - 129/68)  BP(mean): 75 (01 Feb 2019 09:00) (65 - 88)  RR: 22 (01 Feb 2019 09:00) (22 - 22)  SpO2: 98% (01 Feb 2019 09:00) (97% - 100%)    PHYSICAL EXAM:    GENERAL: NAD, well-groomed  HEAD:  Atraumatic, Normocephalic  EYES: EOMI, PERRLA, conjunctiva and sclera clear  ENMT: No tonsillar erythema, exudates, or enlargement; Moist mucous membranes  NECK: Supple, No JVD  CHEST/LUNG: Clear to percussion bilaterally; No rales, rhonchi, wheezing, or rubs  HEART: Regular rate and rhythm; No murmurs, rubs, or gallops  ABDOMEN: Soft, Nontender, Nondistended; Bowel sounds present  EXTREMITIES:  2+ Peripheral Pulses, No clubbing, cyanosis, or edema  LYMPH: No lymphadenopathy noted  SKIN: No rashes or lesions    LABS:  CBC Full  -  ( 01 Feb 2019 03:40 )  WBC Count : 3.95 K/uL  Hemoglobin : 8.4 g/dL  Hematocrit : 27.0 %  Platelet Count - Automated : 158 K/uL  Mean Cell Volume : 102.3 fL  Mean Cell Hemoglobin : 31.8 pg  Mean Cell Hemoglobin Concentration : 31.1 %  Auto Neutrophil # : x  Auto Lymphocyte # : x  Auto Monocyte # : x  Auto Eosinophil # : x  Auto Basophil # : x  Auto Neutrophil % : x  Auto Lymphocyte % : x  Auto Monocyte % : x  Auto Eosinophil % : x  Auto Basophil % : x      02-01    138  |  101  |  31<H>  ----------------------------<  111<H>  3.8   |  25  |  3.50<H>    Ca    8.7      01 Feb 2019 03:40    TPro  6.8  /  Alb  2.9<L>  /  TBili  0.5  /  DBili  x   /  AST  21  /  ALT  25  /  AlkPhos  146<H>  01-31      LIVER FUNCTIONS - ( 31 Jan 2019 03:20 )  Alb: 2.9 g/dL / Pro: 6.8 g/dL / ALK PHOS: 146 u/L / ALT: 25 u/L / AST: 21 u/L / GGT: x                               MICROBIOLOGY:    Culture - Blood (01.29.19 @ 22:40)    Culture - Blood:   NO ORGANISMS ISOLATED  NO ORGANISMS ISOLATED AT 48 HRS.    Specimen Source: BLOOD VENOUS    Culture - Respiratory with Gram Stain (01.29.19 @ 13:38)    Culture - Respiratory:   NRF^Normal Respiratory Jennifer  QUANTITY OF GROWTH: RARE    Gram Stain Sputum:   GPCPR^Gram Pos Cocci in Pairs  QUANTITY OF BACTERIA SEEN: RARE (1+)  YEAST^YEAST.  QUANTITY OF BACTERIA SEEN: RARE (1+)  WBC^White Blood Cells  QNTY CELLS IN GRAM STAIN: MANY (4+)    Specimen Source: LUNG - UPPER LOBE RIGHT    Culture - Respiratory with Gram Stain (01.29.19 @ 13:38)    Gram Stain Sputum:   GPCPR^Gram Pos Cocci in Pairs  QUANTITY OF BACTERIA SEEN: RARE (1+)  WBC^White Blood Cells  QNTY CELLS IN GRAM STAIN: MANY (4+)    Culture - Respiratory:   NRF^Normal Respiratory Jennifer  QUANTITY OF GROWTH: RARE    Specimen Source: LUNG - UPPER LOBE LEFT    Culture - Blood (01.28.19 @ 15:10)    Culture - Blood:   NO ORGANISMS ISOLATED  NO ORGANISMS ISOLATED AT 72 HRS.    Specimen Source: BLOOD VENOUS    Culture - Blood (01.28.19 @ 15:10)    Culture - Blood:   NO ORGANISMS ISOLATED  NO ORGANISMS ISOLATED AT 72 HRS.    Specimen Source: BLOOD PERIPHERAL                    RADIOLOGY:      < from: Xray Chest 1 View- PORTABLE-Routine (01.31.19 @ 07:00) >  INTERPRETATION:     Heart size and the mediastinum cannot be accurately evaluated on this   projection.   ET tube tip approximately 4.9 cm above the rupert.  Enteric tube tip projects over the medial left upper quadrantof the   abdomen with side port overlying the distal esophagus. Right IJ line and   left subcutaneous ICD unchanged in position.  Small bilateral loculated pleural effusions, larger on the right, with   likely associated passive atelectasis are not significantly changed.  Previous mild interstitial edema has resolved on the right. Mild left   pulmonary vascular redistribution/congestion persists. No pneumothorax   seen.            IMPRESSION:  Enteric tube tip projects over the medial left upper   quadrant of the abdomen, possibly near the GE junction. Side port is at   the level of the distal esophagus. Suggest advancing the tube. Discussed   with Dr. Warren with read back at 9:49 AM on January 31, 2019 by Dr. Mclaughlin.    Previous mild interstitial edema has resolved on the right. Mild left   pulmonary vascular redistribution/congestion persists.    No significant change in small bilateral loculated pleural effusions,   larger on the right, with likely associated passive atelectasis.    < end of copied text >          < from: Xray Chest 1 View- PORTABLE-Routine (01.30.19 @ 06:49) >    IMPRESSION:  Follow-up showing no significant change with tubes and lines   in position, bilateral small loculated effusions and no focal   consolidation although left retrocardiac opacity is unchanged.    < end of copied text >          < from: CT Chest No Cont (01.28.19 @ 17:11) >  INTERPRETATION:  Clinical information: Respiratory failure. Exam is   compared to previous study of 1/8/2019.    CT scan of the chest was obtained without administration of intravenous   contrast.    Multiple lymph nodes are present in the pretracheal space and the AP   window. One of the largest lymph node is noted in the pretracheal space   and measures approximately 2.1 x 1.4 cm.     Heart is enlarged in size. Calcification the coronary arteries noted. No   pericardial effusion is noted.     Endotracheal tube is noted in place. Mucous plug is noted within the   right lower lobe bronchus. Resultant near complete atelectasis of the   right lower lobe is noted. Complete atelectasisof the left lower lobe is   also noted. Centrilobular emphysema is noted bilaterally. Very small   right pleural effusion containing droplets of air are noted. Trace left   pleural effusion is noted.    Below the diaphragm, visualized portions of theabdomen are unremarkable.     Degenerative changes of the spine are noted. A pigtail catheter is   present within the subcutaneous tissues of the right posterior lateral   chest wall.    Impression: Mucous plug in the right lower lobe bronchus with resultant   near complete atelectasis of the right lower lobe.    Complete atelectasis of the left lower lobe.    Small right pleural effusion containing droplets of air.    Pig tail catheter is noted within the subcutaneous tissues of the right   posterior lateral chest wall.    < end of copied text > Patient is a 58y old  Male who presents with a chief complaint of     HPI:    Mr. Rick is a 58M who underwent a FB, R thoracotomy, decortication, chest wall reconstruction, lat flap with Dr Pickering on 10/11/18. He was admitted on 1/6/19 to [facility] with a R pleural effusion and respiratory failure,  A R PTC was placed there and he was intubated.  He still had a R SANDY drain in place and he was transferred to [facility]. Patient was found to have aspirated a foreign body and +MRSA empyema. He was treated and discharged on 1/22/2019.     He was found by his home nurse to be in acute distress and recommended to proceed to the ED.  At presentation, he was found to be in acute hypercarbic respiratory failure complicated by hemodynamic instability requiring emergent intubation and pressor support. (28 Jan 2019 15:34)    Pt had Tm 104.2 (on 1/29), tachycardia, hypotension.  Pt has now been extubated, s/p trach/PEG on 1/31.  CT chest shows complete L sided atelectasis with mucous plugging of right lower lobe  bronchus with near complete atelectasis.      ID consult called for antibiotic managment.        REVIEW OF SYSTEMS:    Pt sedated, unable to assess      PAST MEDICAL & SURGICAL HISTORY:  Smoking hx  Cardiomyopathy  Wound: right chest  ICD (implantable cardioverter-defibrillator) in place  OA (osteoarthritis)  HLD (hyperlipidemia)  BPH (benign prostatic hyperplasia)  Pleural effusion  HTN (hypertension)  ESRD (end stage renal disease)  H/O chest wound: right  S/P thoracotomy: FB, R thoracotomy, decortication, chest wall reconstruction, lat flap  History of wound infection: right chest wall - revision 5/18 and again in 7/18  History of implantable cardioverter-defibrillator (ICD) placement: pt unsure when placed  H/O bilateral hip replacements: 2008, 2009      Allergies    No Known Allergies    Intolerances        FAMILY HISTORY:  No significant fam hx    SOCIAL HISTORY:    No smoking, ivdu, etoh    MEDICATIONS  (STANDING):  dexmedetomidine Infusion 0.5 MICROgram(s)/kG/Hr (7.5 mL/Hr) IV Continuous <Continuous>  dextrose 10%. 1000 milliLiter(s) (30 mL/Hr) IV Continuous <Continuous>  epoetin kelly Injectable 3000 Unit(s) IV Push <User Schedule>  fentaNYL   Infusion 2 MICROgram(s)/kG/Hr (12 mL/Hr) IV Continuous <Continuous>  heparin  Injectable 5000 Unit(s) SubCutaneous every 12 hours  midazolam Infusion 0.02 mG/kG/Hr (1.2 mL/Hr) IV Continuous <Continuous>  midodrine 10 milliGRAM(s) Oral every 8 hours  norepinephrine Infusion 0.05 MICROgram(s)/kG/Min (5.625 mL/Hr) IV Continuous <Continuous>  piperacillin/tazobactam IVPB. 3.375 Gram(s) IV Intermittent once  piperacillin/tazobactam IVPB. 3.375 Gram(s) IV Intermittent every 12 hours  QUEtiapine 25 milliGRAM(s) Oral two times a day  sodium chloride 0.9%. 1000 milliLiter(s) (50 mL/Hr) IV Continuous <Continuous>  vasopressin Infusion 0.05 Unit(s)/Min (3 mL/Hr) IV Continuous <Continuous>    MEDICATIONS  (PRN):  acetaminophen  IVPB .. 1000 milliGRAM(s) IV Intermittent once PRN Temp greater or equal to 38C (100.4F), Moderate Pain (4 - 6)      Vital Signs Last 24 Hrs  T(C): 38 (01 Feb 2019 08:00), Max: 38 (01 Feb 2019 08:00)  T(F): 100.4 (01 Feb 2019 08:00), Max: 100.4 (01 Feb 2019 08:00)  HR: 103 (01 Feb 2019 09:00) (90 - 134)  BP: 110/64 (01 Feb 2019 09:00) (99/84 - 129/68)  BP(mean): 75 (01 Feb 2019 09:00) (65 - 88)  RR: 22 (01 Feb 2019 09:00) (22 - 22)  SpO2: 98% (01 Feb 2019 09:00) (97% - 100%)    PHYSICAL EXAM:    GENERAL: tracheostomy  HEAD:  Atraumatic, Normocephalic  EYES: EOMI, PERRLA, conjunctiva and sclera clear  ENMT: No tonsillar erythema, exudates, or enlargement; Moist mucous membranes  NECK: Supple, No JVD  CHEST/LUNG: Clear to percussion bilaterally; No rales, rhonchi, wheezing, or rubs, left subc ICD  HEART: Regular rate and rhythm; No murmurs, rubs, or gallops  ABDOMEN: Soft, Nontender, Nondistended; Bowel sounds present, peg  EXTREMITIES:  2+ Peripheral Pulses, No clubbing, cyanosis, or edema  LYMPH: No lymphadenopathy noted  SKIN: No rashes or lesions, rt IJ,     LABS:  CBC Full  -  ( 01 Feb 2019 03:40 )  WBC Count : 3.95 K/uL  Hemoglobin : 8.4 g/dL  Hematocrit : 27.0 %  Platelet Count - Automated : 158 K/uL  Mean Cell Volume : 102.3 fL  Mean Cell Hemoglobin : 31.8 pg  Mean Cell Hemoglobin Concentration : 31.1 %  Auto Neutrophil # : x  Auto Lymphocyte # : x  Auto Monocyte # : x  Auto Eosinophil # : x  Auto Basophil # : x  Auto Neutrophil % : x  Auto Lymphocyte % : x  Auto Monocyte % : x  Auto Eosinophil % : x  Auto Basophil % : x      02-01    138  |  101  |  31<H>  ----------------------------<  111<H>  3.8   |  25  |  3.50<H>    Ca    8.7      01 Feb 2019 03:40    TPro  6.8  /  Alb  2.9<L>  /  TBili  0.5  /  DBili  x   /  AST  21  /  ALT  25  /  AlkPhos  146<H>  01-31      LIVER FUNCTIONS - ( 31 Jan 2019 03:20 )  Alb: 2.9 g/dL / Pro: 6.8 g/dL / ALK PHOS: 146 u/L / ALT: 25 u/L / AST: 21 u/L / GGT: x                               MICROBIOLOGY:    Culture - Blood (01.29.19 @ 22:40)    Culture - Blood:   NO ORGANISMS ISOLATED  NO ORGANISMS ISOLATED AT 48 HRS.    Specimen Source: BLOOD VENOUS    Culture - Respiratory with Gram Stain (01.29.19 @ 13:38)    Culture - Respiratory:   NRF^Normal Respiratory Jennifer  QUANTITY OF GROWTH: RARE    Gram Stain Sputum:   GPCPR^Gram Pos Cocci in Pairs  QUANTITY OF BACTERIA SEEN: RARE (1+)  YEAST^YEAST.  QUANTITY OF BACTERIA SEEN: RARE (1+)  WBC^White Blood Cells  QNTY CELLS IN GRAM STAIN: MANY (4+)    Specimen Source: LUNG - UPPER LOBE RIGHT    Culture - Respiratory with Gram Stain (01.29.19 @ 13:38)    Gram Stain Sputum:   GPCPR^Gram Pos Cocci in Pairs  QUANTITY OF BACTERIA SEEN: RARE (1+)  WBC^White Blood Cells  QNTY CELLS IN GRAM STAIN: MANY (4+)    Culture - Respiratory:   NRF^Normal Respiratory Jennifer  QUANTITY OF GROWTH: RARE    Specimen Source: LUNG - UPPER LOBE LEFT    Culture - Blood (01.28.19 @ 15:10)    Culture - Blood:   NO ORGANISMS ISOLATED  NO ORGANISMS ISOLATED AT 72 HRS.    Specimen Source: BLOOD VENOUS    Culture - Blood (01.28.19 @ 15:10)    Culture - Blood:   NO ORGANISMS ISOLATED  NO ORGANISMS ISOLATED AT 72 HRS.    Specimen Source: BLOOD PERIPHERAL                    RADIOLOGY:      < from: Xray Chest 1 View- PORTABLE-Routine (01.31.19 @ 07:00) >  INTERPRETATION:     Heart size and the mediastinum cannot be accurately evaluated on this   projection.   ET tube tip approximately 4.9 cm above the rupert.  Enteric tube tip projects over the medial left upper quadrantof the   abdomen with side port overlying the distal esophagus. Right IJ line and   left subcutaneous ICD unchanged in position.  Small bilateral loculated pleural effusions, larger on the right, with   likely associated passive atelectasis are not significantly changed.  Previous mild interstitial edema has resolved on the right. Mild left   pulmonary vascular redistribution/congestion persists. No pneumothorax   seen.            IMPRESSION:  Enteric tube tip projects over the medial left upper   quadrant of the abdomen, possibly near the GE junction. Side port is at   the level of the distal esophagus. Suggest advancing the tube. Discussed   with Dr. Warren with read back at 9:49 AM on January 31, 2019 by Dr. Mclaughlin.    Previous mild interstitial edema has resolved on the right. Mild left   pulmonary vascular redistribution/congestion persists.    No significant change in small bilateral loculated pleural effusions,   larger on the right, with likely associated passive atelectasis.    < end of copied text >          < from: Xray Chest 1 View- PORTABLE-Routine (01.30.19 @ 06:49) >    IMPRESSION:  Follow-up showing no significant change with tubes and lines   in position, bilateral small loculated effusions and no focal   consolidation although left retrocardiac opacity is unchanged.    < end of copied text >          < from: CT Chest No Cont (01.28.19 @ 17:11) >  INTERPRETATION:  Clinical information: Respiratory failure. Exam is   compared to previous study of 1/8/2019.    CT scan of the chest was obtained without administration of intravenous   contrast.    Multiple lymph nodes are present in the pretracheal space and the AP   window. One of the largest lymph node is noted in the pretracheal space   and measures approximately 2.1 x 1.4 cm.     Heart is enlarged in size. Calcification the coronary arteries noted. No   pericardial effusion is noted.     Endotracheal tube is noted in place. Mucous plug is noted within the   right lower lobe bronchus. Resultant near complete atelectasis of the   right lower lobe is noted. Complete atelectasisof the left lower lobe is   also noted. Centrilobular emphysema is noted bilaterally. Very small   right pleural effusion containing droplets of air are noted. Trace left   pleural effusion is noted.    Below the diaphragm, visualized portions of theabdomen are unremarkable.     Degenerative changes of the spine are noted. A pigtail catheter is   present within the subcutaneous tissues of the right posterior lateral   chest wall.    Impression: Mucous plug in the right lower lobe bronchus with resultant   near complete atelectasis of the right lower lobe.    Complete atelectasis of the left lower lobe.    Small right pleural effusion containing droplets of air.    Pig tail catheter is noted within the subcutaneous tissues of the right   posterior lateral chest wall.    < end of copied text >

## 2019-02-01 NOTE — PROGRESS NOTE ADULT - SUBJECTIVE AND OBJECTIVE BOX
CLAUDIA HAN            MRN-[id number]         No Known Allergies               Mr. Han is a 58M who underwent a FB, R thoracotomy, decortication, chest wall reconstruction, lat flap with Dr Pickering on 10/11/18. He was admitted on 1/6/19 to [facility] with a R pleural effusion and respiratory failure,  A R PTC was placed there and he was intubated.  He still had a R SANDY drain in place and he was transferred to [facility]. Patient was found to have aspirated a foreign body and +MRSA empyema. He was treated and discharged on 1/22/2019.     He was found by his home nurse to be in acute distress and recommended to proceed to the ED.  At presentation, he was found to be in acute hypercarbic respiratory failure complicated by hemodynamic instability requiring emergent intubation and pressor support. (28 Jan 2019 15:34)      Procedure:        Trach and PEG  1/31/2019  Bronchoscopy   1/29/2019  Right thoracotomy, resection of portion of R 7th rib, evacuation of infected hemothorax, takedown of pleurocutaneous fistula  10/11/2018      Issues:  Acute hypoxic & hypercapnic respiratory failure  Hypotension  Pneumonia / Sepsis  ESRD / HD  Cardiomyopathy  HLD  BPH  Drips:  Levo  Vaso  Versed  Fentanyl               Home Medications:  aspirin 81 mg oral tablet: 1 tab(s) orally once a day  (30 Oct 2018 14:39)  Breo Ellipta 100 mcg-25 mcg/inh inhalation powder: 1 puff(s) inhaled once a day patient denies using it (11 Oct 2018 08:38)  Calcium 500: 1 tab(s) orally 2 times a day (11 Oct 2018 08:38)  docusate sodium 100 mg oral tablet: 1 tab(s) orally 2 times a day, As Needed (11 Oct 2018 08:39)  folic acid 1 mg oral tablet: 1 tab(s) orally once a day (11 Oct 2018 08:38)  Mag-Ox 400 oral tablet: 1 tab(s) orally once a day (11 Oct 2018 08:39)  Multiple Vitamins oral tablet: 1 tab(s) orally once a day  (30 Oct 2018 14:39)  Namenda 10 mg oral tablet: 1 tab(s) orally 2 times a day (11 Oct 2018 08:38)  Nephplex Rx oral tablet: 1 tab(s) orally once a day (11 Oct 2018 08:39)  nortriptyline 50 mg oral capsule: 1 cap(s) orally once a day (11 Oct 2018 08:39)  Renvela 800 mg oral tablet: 2 tab(s) orally every 8 hours (11 Oct 2018 08:39)  simethicone 80 mg oral tablet: 1 tab(s) orally 3 times a day (after meals) (11 Oct 2018 08:39)  Tylenol 325 mg oral tablet: 2 tab(s) orally every 6 hours, As Needed (30 Oct 2018 14:39)  vancomycin 1 g intravenous injection: 1 gram(s) intravenous 3 times a week  Please give 3 x per week with Dialysis until 2/3/2019 (22 Jan 2019 13:41)  vitamin A: 1 tab(s) orally once a day (11 Oct 2018 08:38)  Vitamin B Complex: 1 tab(s) orally once a day (11 Oct 2018 08:38)  Vitamin C 500 mg oral tablet: 1 tab(s) orally once a day (11 Oct 2018 08:38)      PAST MEDICAL & SURGICAL HISTORY:  Smoking hx  Cardiomyopathy  Wound: right chest  ICD (implantable cardioverter-defibrillator) in place  OA (osteoarthritis)  HLD (hyperlipidemia)  BPH (benign prostatic hyperplasia)  Pleural effusion  HTN (hypertension)  ESRD (end stage renal disease)  H/O chest wound: right  S/P thoracotomy: FB, R thoracotomy, decortication, chest wall reconstruction, lat flap  History of wound infection: right chest wall - revision 5/18 and again in 7/18  History of implantable cardioverter-defibrillator (ICD) placement: pt unsure when placed  H/O bilateral hip replacements: 2008, 2009        ICU Vital Signs Last 24 Hrs  T(C): 36.9 (01 Feb 2019 04:00), Max: 37.3 (31 Jan 2019 19:15)  T(F): 98.4 (01 Feb 2019 04:00), Max: 99.1 (31 Jan 2019 19:15)  HR: 99 (01 Feb 2019 05:00) (94 - 134)  BP: 119/57 (01 Feb 2019 04:00) (95/53 - 129/68)  BP(mean): 72 (01 Feb 2019 04:00) (62 - 88)  ABP: 114/70 (01 Feb 2019 05:00) (84/70 - 114/90)  ABP(mean): 84 (01 Feb 2019 05:00) (65 - 90)  RR: 22 (01 Feb 2019 05:00) (20 - 23)  SpO2: 100% (01 Feb 2019 05:00) (96% - 100%)    I&O's Detail    30 Jan 2019 07:01  -  31 Jan 2019 07:00  --------------------------------------------------------  IN:    dextrose 10%.: 720 mL    fentaNYL  Infusion: 162 mL    IV PiggyBack: 250 mL    midazolam Infusion: 93.6 mL    Nepro with Carb Steady: 320 mL    norepinephrine Infusion: 101.6 mL    Other: 400 mL    sodium chloride 0.9%.: 1200 mL    vasopressin Infusion: 28.8 mL  Total IN: 3276 mL    OUT:    Bulb: 40 mL    Other: 1000 mL  Total OUT: 1040 mL    Total NET: 2236 mL      31 Jan 2019 07:01  -  01 Feb 2019 05:40  --------------------------------------------------------  IN:    dextrose 10%.: 660 mL    Enteral Tube Flush: 80 mL    fentaNYL  Infusion: 186 mL    IV PiggyBack: 50 mL    midazolam Infusion: 113.5 mL    Nepro with Carb Steady: 220 mL    norepinephrine Infusion: 254.8 mL    Other: 600 mL    sodium chloride 0.9%.: 1100 mL    vasopressin Infusion: 15 mL  Total IN: 3279.3 mL    OUT:    Bulb: 41 mL    Other: 1600 mL  Total OUT: 1641 mL    Total NET: 1638.3 mL        CAPILLARY BLOOD GLUCOSE      POCT Blood Glucose.: 130 mg/dL (31 Jan 2019 03:12)      Home Medications:  aspirin 81 mg oral tablet: 1 tab(s) orally once a day  (30 Oct 2018 14:39)  Breo Ellipta 100 mcg-25 mcg/inh inhalation powder: 1 puff(s) inhaled once a day patient denies using it (11 Oct 2018 08:38)  Calcium 500: 1 tab(s) orally 2 times a day (11 Oct 2018 08:38)  docusate sodium 100 mg oral tablet: 1 tab(s) orally 2 times a day, As Needed (11 Oct 2018 08:39)  folic acid 1 mg oral tablet: 1 tab(s) orally once a day (11 Oct 2018 08:38)  Mag-Ox 400 oral tablet: 1 tab(s) orally once a day (11 Oct 2018 08:39)  Multiple Vitamins oral tablet: 1 tab(s) orally once a day  (30 Oct 2018 14:39)  Namenda 10 mg oral tablet: 1 tab(s) orally 2 times a day (11 Oct 2018 08:38)  Nephplex Rx oral tablet: 1 tab(s) orally once a day (11 Oct 2018 08:39)  nortriptyline 50 mg oral capsule: 1 cap(s) orally once a day (11 Oct 2018 08:39)  Renvela 800 mg oral tablet: 2 tab(s) orally every 8 hours (11 Oct 2018 08:39)  simethicone 80 mg oral tablet: 1 tab(s) orally 3 times a day (after meals) (11 Oct 2018 08:39)  Tylenol 325 mg oral tablet: 2 tab(s) orally every 6 hours, As Needed (30 Oct 2018 14:39)  vancomycin 1 g intravenous injection: 1 gram(s) intravenous 3 times a week  Please give 3 x per week with Dialysis until 2/3/2019 (22 Jan 2019 13:41)  vitamin A: 1 tab(s) orally once a day (11 Oct 2018 08:38)  Vitamin B Complex: 1 tab(s) orally once a day (11 Oct 2018 08:38)  Vitamin C 500 mg oral tablet: 1 tab(s) orally once a day (11 Oct 2018 08:38)      MEDICATIONS  (STANDING):  dexmedetomidine Infusion 0.5 MICROgram(s)/kG/Hr (7.5 mL/Hr) IV Continuous <Continuous>  dextrose 10%. 1000 milliLiter(s) (30 mL/Hr) IV Continuous <Continuous>  epoetin kelly Injectable 3000 Unit(s) IV Push <User Schedule>  fentaNYL   Infusion 2 MICROgram(s)/kG/Hr (12 mL/Hr) IV Continuous <Continuous>  heparin  Injectable 5000 Unit(s) SubCutaneous every 12 hours  midazolam Infusion 0.02 mG/kG/Hr (1.2 mL/Hr) IV Continuous <Continuous>  norepinephrine Infusion 0.05 MICROgram(s)/kG/Min (5.625 mL/Hr) IV Continuous <Continuous>  piperacillin/tazobactam IVPB. 3.375 Gram(s) IV Intermittent once  piperacillin/tazobactam IVPB. 3.375 Gram(s) IV Intermittent every 12 hours  sodium chloride 0.9%. 1000 milliLiter(s) (50 mL/Hr) IV Continuous <Continuous>  vasopressin Infusion 0.05 Unit(s)/Min (3 mL/Hr) IV Continuous <Continuous>    MEDICATIONS  (PRN):  acetaminophen  IVPB .. 1000 milliGRAM(s) IV Intermittent once PRN Temp greater or equal to 38C (100.4F), Moderate Pain (4 - 6)      Mode: AC/ CMV (Assist Control/ Continuous Mandatory Ventilation)  RR (machine): 22  TV (machine): 400  FiO2: 40  PEEP: 7  MAP: 13  PIP: 31      Physical exam:   General:               Pt sedated                                               Neuro:                  Sedated                            Cardiovascular:   S1 & S2, regular                           Respiratory:         Air entry is decreased at  both bases, has bilateral conducted sounds                           GI:                          Soft, nondistended and nontender, Bowel sounds active                            Ext:                        No cyanosis or edema.                           Labs:                                                                           8.4    3.95  )-----------( 158      ( 01 Feb 2019 03:40 )             27.0             02-01    138  |  101  |  31<H>  ----------------------------<  111<H>  3.8   |  25  |  3.50<H>    Ca    8.7      01 Feb 2019 03:40    TPro  6.8  /  Alb  2.9<L>  /  TBili  0.5  /  DBili  x   /  AST  21  /  ALT  25  /  AlkPhos  146<H>  01-31                  PTT - ( 31 Jan 2019 05:30 )  PTT:36.0 SEC  LIVER FUNCTIONS - ( 31 Jan 2019 03:20 )  Alb: 2.9 g/dL / Pro: 6.8 g/dL / ALK PHOS: 146 u/L / ALT: 25 u/L / AST: 21 u/L / GGT: x                   CXR:    < from: Xray Chest 1 View- PORTABLE-Routine (01.31.19 @ 07:00) >      Heart size and the mediastinum cannot be accurately evaluated on this   projection.   ET tube tip approximately 4.9 cm above the rupert.  Enteric tube tip projects over the medial left upper quadrantof the   abdomen with side port overlying the distal esophagus. Right IJ line and   left subcutaneous ICD unchanged in position.  Small bilateral loculated pleural effusions, larger on the right, with   likely associated passive atelectasis are not significantly changed.  Previous mild interstitial edema has resolved on the right. Mild left   pulmonary vascular redistribution/congestion persists. No pneumothorax   seen.      IMPRESSION:  Enteric tube tip projects over the medial left upper   quadrant of the abdomen, possibly near the GE junction. Side port is at   the level of the distal esophagus. Suggest advancing the tube. Discussed   with Dr. Warren with read back at 9:49 AM on January 31, 2019 by Dr. Mclaughlin.    Previous mild interstitial edema has resolved on the right. Mild left   pulmonary vascular redistribution/congestion persists.    No significant change in small bilateral loculated pleural effusions,   larger on the right, with likely associated passive atelectasis.            Plan:    General: Mr. Han is a 58M who underwent a FB, R thoracotomy, decortication, chest wall reconstruction, lat flap with Dr Pickering on 10/11/18. He was admitted on 1/6/19 to [facility] with a R pleural effusion and respiratory failure,  A R PTC was placed there and he was intubated.  He still had a R SANDY drain in place and he was transferred to [facility]. Patient was found to have aspirated a foreign body and +MRSA empyema. He was treated and discharged on 1/22/2019.     He was found by his home nurse to be in acute distress and recommended to proceed to the ED.  At presentation, he was found to be in acute hypercarbic respiratory failure complicated by hemodynamic instability requiring emergent intubation and pressor support. (28 Jan 2019 15:34)                            Neuro:                                         Pain control with Fentanyl drip                            Cardiovascular:                                          Continue hemodynamic monitoring.    Hypotension: Titrate Levophed / Vasopressin  to MAP>65                                                  Respiratory:                        Pt is on full mechanical ventilator support [id number]                                         Trached on 1/31                                                                 Continue bronchodilators, pulmonary toilet     Continue antibiotics -  Vanco based on level + Zosyn                            GI                                         Nepro 20cc/hr - advance as tolerated to goal 35cc/hr. Flush PEG with 30cc sterile water Q4hrs                                         Continue GI prophylaxis with Protonix                                                                 Renal:                                         HD as per renal                                         Monitor I/Os and electrolytes                                                                                        Hem/ Onc:                                                                                 Follow CBC in AM                           Infectious disease:     Bronchoscopy on 1/29 showed copious greenish secretions - Continue pulmonary toilet                                         Continue Vanco based on level  + Zosyn                                                                   Endocrine         Continue Accu-Checks with coverage      Pt is on SQ Heparin and Venodyne boots for DVT prophylaxis.     Pertinent clinical, laboratory, radiographic, hemodynamic, echocardiographic, respiratory data, microbiologic data and chart were reviewed and analyzed frequently throughout the course of the day and night  Patient seen, examined and plan discussed with CT Surgeon Dr. Pickering / CTICU team during rounds.    Pt's status discussed with wife via video  at bedside, updated status    I have spent 70  minutes of critical care time with this pt between 00am and 8am             Ralph Warren MD

## 2019-02-01 NOTE — PROGRESS NOTE ADULT - PROBLEM SELECTOR PLAN 1
Pt. with ESRD on HD TIW (TTS). Last HD was on 1/31/19 via AVF, tolerated well. Pt. currently on IV pressors. Labs reviewed. No plan for HD today as per discussion with CTICU attending. Monitor BP and labs Pt. with ESRD on HD TIW (TTS). Last HD was on 1/31/19 via AVF, tolerated treatment well. Pt. currently on IV pressors. Labs reviewed. No plan for HD today as per discussion with CTICU attending. Monitor BP and labs

## 2019-02-01 NOTE — CONSULT NOTE ADULT - ASSESSMENT
Pt. with ESRD on HD TIW (TTS)
58M who underwent a FB, R thoracotomy, decortication, chest wall reconstruction, lat flap with Dr Pickering on 10/11/18. He was admitted on 1/6/19 to [facility] with a R pleural effusion and respiratory failure,  A R PTC was placed there and he was intubated.  He still had a R SANDY drain in place and he was transferred to [facility]. Patient was found to have aspirated a foreign body and +MRSA empyema. He was treated and discharged on 1/22/2019.     He was found by his home nurse to be in acute distress and recommended to proceed to the ED.  At presentation, he was found to be in acute hypercarbic respiratory failure complicated by hemodynamic instability requiring emergent intubation and pressor support. (28 Jan 2019 15:34)    Pt had Tm 104.2 (on 1/29), tachycardia, hypotension.  Pt has now been extubated, s/p trach/PEG on 1/31.  CT chest shows complete L sided atelectasis with mucous plugging of right lower lobe  bronchus with near complete atelectasis.      ID consult called for antibiotic managment.     Recommend:    - Pt p/w severe sepsis with shock and respiratory distress.  Pt with high fevers.  Found to have complete and near complete atelectasis of L and R lung respectively with mucous plugging.  Agree with broad spectrum abx to cover for post-obstructive pna.      - Agree with vancomycin and zosyn.  Pt with recent MRSA infection in pleural effusion (last admission, s/p trt with 4 weeks vancomycin).  Maintain vanco trough between 15-20.  Dose vanco by level, post HD.      - f/u blood cultures, respiratory cultures.    - Cont care as per CTU. Continue hemodynamic monitoring.  Pt being titrated on levophed.  On full mechanical ventilator support, s/p tracheostomy. Continue bronchodilators, pulmonary toilet, Continue GI prophylaxis with Protonix.   Monitor temp curve and WBC.    Will follow,    Anabel Chahal  [phone]

## 2019-02-01 NOTE — PROGRESS NOTE ADULT - SUBJECTIVE AND OBJECTIVE BOX
POST ANESTHESIA EVALUATION    58y Male POSTOP DAY 1 S/P tracheostomy, PEG     MENTAL STATUS: Patient participation [ x ] Awake     [  ] Arousable     [  ] Sedated    AIRWAY PATENCY: [  ] Satisfactory  [ x ] Other: tracheostomy    Vital Signs Last 24 Hrs  T(C): 36.9 (01 Feb 2019 04:00), Max: 37.3 (31 Jan 2019 19:15)  T(F): 98.4 (01 Feb 2019 04:00), Max: 99.1 (31 Jan 2019 19:15)  HR: 90 (01 Feb 2019 06:52) (90 - 134)  BP: 115/66 (01 Feb 2019 06:00) (99/84 - 129/68)  BP(mean): 78 (01 Feb 2019 06:00) (65 - 88)  RR: 22 (01 Feb 2019 06:00) (22 - 23)  SpO2: 100% (01 Feb 2019 06:52) (97% - 100%)  I&O's Summary    31 Jan 2019 07:01  -  01 Feb 2019 07:00  --------------------------------------------------------  IN: 3546.8 mL / OUT: 1641 mL / NET: 1905.8 mL          NAUSEA/ VOMITTING:  [ x ] NONE  [  ] CONTROLLED [  ] OTHER     PAIN: [ x ] CONTROLLED WITH CURRENT REGIMEN  [  ] OTHER    [ x ] NO APPARENT ANESTHESIA COMPLICATIONS

## 2019-02-01 NOTE — PROGRESS NOTE ADULT - SUBJECTIVE AND OBJECTIVE BOX
[facility] DIVISION OF KIDNEY DISEASES AND HYPERTENSION -- FOLLOW UP NOTE  --------------------------------------------------------------------------------  HPI: 59 yo male with medical history of NICM with ICD, ESRD on HD, former smoker, BPH admitted with acute hypercapnic respiratory failure. Nephrology consulted for ESRD/HD management. Pt intubated and sedated in the CTICU. Pt. currently admitted with hypercarbic respiratory failure, septic shock. Last HD on 1/31/19. Pt seen in the CTICU, intubated, underwent HD treatment yesterday with UF 1 kg and PEG and trach placement, tolerated well, continues to be on IV pressors. Unable to provide ROS.     PAST HISTORY  --------------------------------------------------------------------------------  No significant changes to PMH, PSH, FHx, SHx, unless otherwise noted    ALLERGIES & MEDICATIONS  --------------------------------------------------------------------------------  Allergies    No Known Allergies    Intolerances      Standing Inpatient Medications  dexmedetomidine Infusion 0.5 MICROgram(s)/kG/Hr IV Continuous <Continuous>  dextrose 10%. 1000 milliLiter(s) IV Continuous <Continuous>  epoetin kelly Injectable 3000 Unit(s) IV Push <User Schedule>  fentaNYL   Infusion 2 MICROgram(s)/kG/Hr IV Continuous <Continuous>  heparin  Injectable 5000 Unit(s) SubCutaneous every 12 hours  midazolam Infusion 0.02 mG/kG/Hr IV Continuous <Continuous>  norepinephrine Infusion 0.05 MICROgram(s)/kG/Min IV Continuous <Continuous>  piperacillin/tazobactam IVPB. 3.375 Gram(s) IV Intermittent once  piperacillin/tazobactam IVPB. 3.375 Gram(s) IV Intermittent every 12 hours  sodium chloride 0.9%. 1000 milliLiter(s) IV Continuous <Continuous>  vasopressin Infusion 0.05 Unit(s)/Min IV Continuous <Continuous>    PRN Inpatient Medications  acetaminophen  IVPB .. 1000 milliGRAM(s) IV Intermittent once PRN      REVIEW OF SYSTEMS  --------------------------------------------------------------------------------  Unable to obtain    VITALS/PHYSICAL EXAM  --------------------------------------------------------------------------------  T(C): 36.9 (02-01-19 @ 04:00), Max: 37.3 (01-31-19 @ 19:15)  HR: 90 (02-01-19 @ 06:52) (90 - 134)  BP: 115/66 (02-01-19 @ 06:00) (99/84 - 129/68)  RR: 22 (02-01-19 @ 06:00) (20 - 23)  SpO2: 100% (02-01-19 @ 06:52) (97% - 100%)  Wt(kg): --    01-31-19 @ 07:01  -  02-01-19 @ 07:00  --------------------------------------------------------  IN: 3546.8 mL / OUT: 1641 mL / NET: 1905.8 mL    Physical Exam:  	Gen: Intubated  	HEENT: +ETT  	Pulm: coarse breath sounsd B/L  	CV: S1S2  	Abd: Soft, +BS   	Ext: No LE edema B/L  	Neuro: Awake  	Skin: Warm and dry  	Vascular access: LUE AVF +thrill, bruit heard.     LABS/STUDIES  --------------------------------------------------------------------------------              8.4    3.95  >-----------<  158      [02-01-19 @ 03:40]              27.0     138  |  101  |  31  ----------------------------<  111      [02-01-19 @ 03:40]  3.8   |  25  |  3.50        Ca     8.7     [02-01-19 @ 03:40]    TPro  6.8  /  Alb  2.9  /  TBili  0.5  /  DBili  x   /  AST  21  /  ALT  25  /  AlkPhos  146  [01-31-19 @ 03:20]      PTT: 36.0       [01-31-19 @ 05:30]      Creatinine Trend:  SCr 3.50 [02-01 @ 03:40]  SCr 4.52 [01-31 @ 03:20]  SCr 7.04 [01-30 @ 04:50]  SCr 6.89 [01-29 @ 14:30]  SCr 6.40 [01-29 @ 04:25] [facility] DIVISION OF KIDNEY DISEASES AND HYPERTENSION -- FOLLOW UP NOTE  --------------------------------------------------------------------------------  HPI: 59 yo male with medical history of NICM with ICD, ESRD on HD, former smoker, BPH admitted with acute hypercapnic respiratory failure. Nephrology consulted for ESRD/HD management. Pt intubated and sedated in the CTICU. Pt. currently admitted with hypercarbic respiratory failure, septic shock. Last HD on 1/31/19. Pt seen in the CTICU, intubated, received HD treatment yesterday. Pt. also underwent PEG and trach placement, continues to be on IV pressors.     PAST HISTORY  --------------------------------------------------------------------------------  No significant changes to PMH, PSH, FHx, SHx, unless otherwise noted    ALLERGIES & MEDICATIONS  --------------------------------------------------------------------------------  Allergies    No Known Allergies    Intolerances    Standing Inpatient Medications  dexmedetomidine Infusion 0.5 MICROgram(s)/kG/Hr IV Continuous <Continuous>  dextrose 10%. 1000 milliLiter(s) IV Continuous <Continuous>  epoetin kelly Injectable 3000 Unit(s) IV Push <User Schedule>  fentaNYL   Infusion 2 MICROgram(s)/kG/Hr IV Continuous <Continuous>  heparin  Injectable 5000 Unit(s) SubCutaneous every 12 hours  midazolam Infusion 0.02 mG/kG/Hr IV Continuous <Continuous>  norepinephrine Infusion 0.05 MICROgram(s)/kG/Min IV Continuous <Continuous>  piperacillin/tazobactam IVPB. 3.375 Gram(s) IV Intermittent once  piperacillin/tazobactam IVPB. 3.375 Gram(s) IV Intermittent every 12 hours  sodium chloride 0.9%. 1000 milliLiter(s) IV Continuous <Continuous>  vasopressin Infusion 0.05 Unit(s)/Min IV Continuous <Continuous>    PRN Inpatient Medications  acetaminophen  IVPB .. 1000 milliGRAM(s) IV Intermittent once PRN    REVIEW OF SYSTEMS  --------------------------------------------------------------------------------  Unable to obtain    VITALS/PHYSICAL EXAM  --------------------------------------------------------------------------------  T(C): 36.9 (02-01-19 @ 04:00), Max: 37.3 (01-31-19 @ 19:15)  HR: 90 (02-01-19 @ 06:52) (90 - 134)  BP: 115/66 (02-01-19 @ 06:00) (99/84 - 129/68)  RR: 22 (02-01-19 @ 06:00) (20 - 23)  SpO2: 100% (02-01-19 @ 06:52) (97% - 100%)  Wt(kg): --    01-31-19 @ 07:01  -  02-01-19 @ 07:00  --------------------------------------------------------  IN: 3546.8 mL / OUT: 1641 mL / NET: 1905.8 mL    Physical Exam:  	Gen: Intubated  	HEENT: +ETT  	Pulm: coarse breath sounds B/L  	CV: S1S2  	Abd: Soft, +BS   	Ext: No LE edema B/L  	Neuro: Awake  	Skin: Warm and dry  	Vascular access: LUE AVF site: +thrill, bruit heard.     LABS/STUDIES  --------------------------------------------------------------------------------              8.4    3.95  >-----------<  158      [02-01-19 @ 03:40]              27.0     138  |  101  |  31  ----------------------------<  111      [02-01-19 @ 03:40]  3.8   |  25  |  3.50        Ca     8.7     [02-01-19 @ 03:40]    TPro  6.8  /  Alb  2.9  /  TBili  0.5  /  DBili  x   /  AST  21  /  ALT  25  /  AlkPhos  146  [01-31-19 @ 03:20]    Creatinine Trend:  SCr 3.50 [02-01 @ 03:40]  SCr 4.52 [01-31 @ 03:20]  SCr 7.04 [01-30 @ 04:50]  SCr 6.89 [01-29 @ 14:30]  SCr 6.40 [01-29 @ 04:25]

## 2019-02-01 NOTE — PROGRESS NOTE ADULT - PROBLEM SELECTOR PLAN 2
Patient with anemia in the setting of ESRD. Hemoglobin below target range. Will order IV epogen with HD. Check iron studies. Monitor hemoglobin Patient with anemia in the setting of ESRD. Hemoglobin below target range. Pt. on MOOKIE treatment with HD. Monitor hemoglobin

## 2019-02-02 LAB
ALBUMIN SERPL ELPH-MCNC: 2.7 G/DL — LOW (ref 3.3–5)
ALP SERPL-CCNC: 207 U/L — HIGH (ref 40–120)
ALT FLD-CCNC: 17 U/L — SIGNIFICANT CHANGE UP (ref 4–41)
ANION GAP SERPL CALC-SCNC: 12 MMO/L — SIGNIFICANT CHANGE UP (ref 7–14)
AST SERPL-CCNC: 14 U/L — SIGNIFICANT CHANGE UP (ref 4–40)
BACTERIA BLD CULT: SIGNIFICANT CHANGE UP
BACTERIA BLD CULT: SIGNIFICANT CHANGE UP
BASE EXCESS BLDA CALC-SCNC: 1.5 MMOL/L — SIGNIFICANT CHANGE UP
BASOPHILS # BLD AUTO: 0.01 K/UL — SIGNIFICANT CHANGE UP (ref 0–0.2)
BASOPHILS NFR BLD AUTO: 0.2 % — SIGNIFICANT CHANGE UP (ref 0–2)
BILIRUB SERPL-MCNC: 0.5 MG/DL — SIGNIFICANT CHANGE UP (ref 0.2–1.2)
BUN SERPL-MCNC: 42 MG/DL — HIGH (ref 7–23)
CALCIUM SERPL-MCNC: 9.2 MG/DL — SIGNIFICANT CHANGE UP (ref 8.4–10.5)
CHLORIDE BLDA-SCNC: 104 MMOL/L — SIGNIFICANT CHANGE UP (ref 96–108)
CHLORIDE SERPL-SCNC: 99 MMOL/L — SIGNIFICANT CHANGE UP (ref 98–107)
CO2 SERPL-SCNC: 25 MMOL/L — SIGNIFICANT CHANGE UP (ref 22–31)
CREAT SERPL-MCNC: 4.45 MG/DL — HIGH (ref 0.5–1.3)
EOSINOPHIL # BLD AUTO: 0.51 K/UL — HIGH (ref 0–0.5)
EOSINOPHIL NFR BLD AUTO: 9.2 % — HIGH (ref 0–6)
GLUCOSE BLDA-MCNC: 116 MG/DL — HIGH (ref 70–99)
GLUCOSE SERPL-MCNC: 115 MG/DL — HIGH (ref 70–99)
HBV CORE AB SER-ACNC: REACTIVE — SIGNIFICANT CHANGE UP
HBV SURFACE AB SER-ACNC: REACTIVE — SIGNIFICANT CHANGE UP
HCO3 BLDA-SCNC: 26 MMOL/L — SIGNIFICANT CHANGE UP (ref 22–26)
HCT VFR BLD CALC: 27.6 % — LOW (ref 39–50)
HCT VFR BLDA CALC: 25.4 % — LOW (ref 39–51)
HCV AB S/CO SERPL IA: 0.23 S/CO — SIGNIFICANT CHANGE UP
HCV AB SERPL-IMP: SIGNIFICANT CHANGE UP
HGB BLD-MCNC: 8.4 G/DL — LOW (ref 13–17)
HGB BLDA-MCNC: 8.2 G/DL — LOW (ref 13–17)
IMM GRANULOCYTES NFR BLD AUTO: 0.5 % — SIGNIFICANT CHANGE UP (ref 0–1.5)
LACTATE BLDA-SCNC: 1.2 MMOL/L — SIGNIFICANT CHANGE UP (ref 0.5–2)
LYMPHOCYTES # BLD AUTO: 0.56 K/UL — LOW (ref 1–3.3)
LYMPHOCYTES # BLD AUTO: 10.1 % — LOW (ref 13–44)
MCHC RBC-ENTMCNC: 30.4 % — LOW (ref 32–36)
MCHC RBC-ENTMCNC: 31 PG — SIGNIFICANT CHANGE UP (ref 27–34)
MCV RBC AUTO: 101.8 FL — HIGH (ref 80–100)
MONOCYTES # BLD AUTO: 0.34 K/UL — SIGNIFICANT CHANGE UP (ref 0–0.9)
MONOCYTES NFR BLD AUTO: 6.1 % — SIGNIFICANT CHANGE UP (ref 2–14)
NEUTROPHILS # BLD AUTO: 4.12 K/UL — SIGNIFICANT CHANGE UP (ref 1.8–7.4)
NEUTROPHILS NFR BLD AUTO: 73.9 % — SIGNIFICANT CHANGE UP (ref 43–77)
NRBC # FLD: 0 K/UL — LOW (ref 25–125)
PCO2 BLDA: 51 MMHG — HIGH (ref 35–48)
PH BLDA: 7.34 PH — LOW (ref 7.35–7.45)
PLATELET # BLD AUTO: 159 K/UL — SIGNIFICANT CHANGE UP (ref 150–400)
PMV BLD: 10.5 FL — SIGNIFICANT CHANGE UP (ref 7–13)
PO2 BLDA: 146 MMHG — HIGH (ref 83–108)
POTASSIUM BLDA-SCNC: 3.7 MMOL/L — SIGNIFICANT CHANGE UP (ref 3.4–4.5)
POTASSIUM SERPL-MCNC: 3.9 MMOL/L — SIGNIFICANT CHANGE UP (ref 3.5–5.3)
POTASSIUM SERPL-SCNC: 3.9 MMOL/L — SIGNIFICANT CHANGE UP (ref 3.5–5.3)
PREALB SERPL-MCNC: 8 MG/DL — LOW (ref 20–40)
PROT SERPL-MCNC: 6.4 G/DL — SIGNIFICANT CHANGE UP (ref 6–8.3)
RBC # BLD: 2.71 M/UL — LOW (ref 4.2–5.8)
RBC # FLD: 17.2 % — HIGH (ref 10.3–14.5)
SAO2 % BLDA: 99 % — SIGNIFICANT CHANGE UP (ref 95–99)
SODIUM BLDA-SCNC: 136 MMOL/L — SIGNIFICANT CHANGE UP (ref 136–146)
SODIUM SERPL-SCNC: 136 MMOL/L — SIGNIFICANT CHANGE UP (ref 135–145)
SPECIMEN SOURCE: SIGNIFICANT CHANGE UP
VANCOMYCIN FLD-MCNC: 17.7 UG/ML — SIGNIFICANT CHANGE UP
VANCOMYCIN TROUGH SERPL-MCNC: 24.6 UG/ML — HIGH (ref 10–20)
WBC # BLD: 5.57 K/UL — SIGNIFICANT CHANGE UP (ref 3.8–10.5)
WBC # FLD AUTO: 5.57 K/UL — SIGNIFICANT CHANGE UP (ref 3.8–10.5)

## 2019-02-02 PROCEDURE — 99233 SBSQ HOSP IP/OBS HIGH 50: CPT | Mod: GC

## 2019-02-02 PROCEDURE — 99291 CRITICAL CARE FIRST HOUR: CPT

## 2019-02-02 PROCEDURE — 99292 CRITICAL CARE ADDL 30 MIN: CPT

## 2019-02-02 PROCEDURE — 71045 X-RAY EXAM CHEST 1 VIEW: CPT | Mod: 26

## 2019-02-02 RX ORDER — ACETAMINOPHEN 500 MG
1000 TABLET ORAL ONCE
Qty: 0 | Refills: 0 | Status: COMPLETED | OUTPATIENT
Start: 2019-02-03 | End: 2019-02-13

## 2019-02-02 RX ORDER — FLUCONAZOLE 150 MG/1
200 TABLET ORAL ONCE
Qty: 0 | Refills: 0 | Status: COMPLETED | OUTPATIENT
Start: 2019-02-02 | End: 2019-02-02

## 2019-02-02 RX ORDER — FLUCONAZOLE 150 MG/1
TABLET ORAL
Qty: 0 | Refills: 0 | Status: DISCONTINUED | OUTPATIENT
Start: 2019-02-02 | End: 2019-02-08

## 2019-02-02 RX ORDER — FLUCONAZOLE 150 MG/1
200 TABLET ORAL EVERY 24 HOURS
Qty: 0 | Refills: 0 | Status: DISCONTINUED | OUTPATIENT
Start: 2019-02-03 | End: 2019-02-08

## 2019-02-02 RX ORDER — ACETAMINOPHEN 500 MG
1000 TABLET ORAL ONCE
Qty: 0 | Refills: 0 | Status: COMPLETED | OUTPATIENT
Start: 2019-02-02 | End: 2019-02-02

## 2019-02-02 RX ADMIN — Medication 5.62 MICROGRAM(S)/KG/MIN: at 08:17

## 2019-02-02 RX ADMIN — MIDODRINE HYDROCHLORIDE 10 MILLIGRAM(S): 2.5 TABLET ORAL at 16:54

## 2019-02-02 RX ADMIN — PIPERACILLIN AND TAZOBACTAM 25 GRAM(S): 4; .5 INJECTION, POWDER, LYOPHILIZED, FOR SOLUTION INTRAVENOUS at 05:18

## 2019-02-02 RX ADMIN — HEPARIN SODIUM 5000 UNIT(S): 5000 INJECTION INTRAVENOUS; SUBCUTANEOUS at 19:35

## 2019-02-02 RX ADMIN — FLUCONAZOLE 100 MILLIGRAM(S): 150 TABLET ORAL at 08:16

## 2019-02-02 RX ADMIN — ERYTHROPOIETIN 3000 UNIT(S): 10000 INJECTION, SOLUTION INTRAVENOUS; SUBCUTANEOUS at 14:19

## 2019-02-02 RX ADMIN — MIDODRINE HYDROCHLORIDE 10 MILLIGRAM(S): 2.5 TABLET ORAL at 23:01

## 2019-02-02 RX ADMIN — Medication 5.62 MICROGRAM(S)/KG/MIN: at 23:01

## 2019-02-02 RX ADMIN — HEPARIN SODIUM 5000 UNIT(S): 5000 INJECTION INTRAVENOUS; SUBCUTANEOUS at 05:18

## 2019-02-02 RX ADMIN — Medication 1000 MILLIGRAM(S): at 20:30

## 2019-02-02 RX ADMIN — Medication 400 MILLIGRAM(S): at 20:00

## 2019-02-02 RX ADMIN — MIDODRINE HYDROCHLORIDE 10 MILLIGRAM(S): 2.5 TABLET ORAL at 05:18

## 2019-02-02 RX ADMIN — FENTANYL CITRATE 12 MICROGRAM(S)/KG/HR: 50 INJECTION INTRAVENOUS at 08:17

## 2019-02-02 RX ADMIN — SODIUM CHLORIDE 30 MILLILITER(S): 9 INJECTION INTRAMUSCULAR; INTRAVENOUS; SUBCUTANEOUS at 08:17

## 2019-02-02 RX ADMIN — QUETIAPINE FUMARATE 25 MILLIGRAM(S): 200 TABLET, FILM COATED ORAL at 19:36

## 2019-02-02 RX ADMIN — PIPERACILLIN AND TAZOBACTAM 25 GRAM(S): 4; .5 INJECTION, POWDER, LYOPHILIZED, FOR SOLUTION INTRAVENOUS at 19:35

## 2019-02-02 NOTE — PROGRESS NOTE ADULT - SUBJECTIVE AND OBJECTIVE BOX
CLAUDIA HAN            MRN-[id number]         No Known Allergies               Mr. Han is a 58M who underwent a FB, R thoracotomy, decortication, chest wall reconstruction, lat flap with Dr Pickering on 10/11/18. He was admitted on 1/6/19 to [facility] with a R pleural effusion and respiratory failure,  A R PTC was placed there and he was intubated.  He still had a R SANDY drain in place and he was transferred to [facility]. Patient was found to have aspirated a foreign body and +MRSA empyema. He was treated and discharged on 1/22/2019.     He was found by his home nurse to be in acute distress and recommended to proceed to the ED.  At presentation, he was found to be in acute hypercarbic respiratory failure complicated by hemodynamic instability requiring emergent intubation and pressor support. (28 Jan 2019 15:34)      Procedure:        Trach and PEG  1/31/2019  Bronchoscopy   1/29/2019  Right thoracotomy, resection of portion of R 7th rib, evacuation of infected hemothorax, takedown of pleurocutaneous fistula  10/11/2018      Issues:  Acute hypoxic & hypercapnic respiratory failure  Hypotension  Pneumonia / Sepsis  ESRD / HD  Cardiomyopathy  HLD  BPH  Drips:  Levo  Fentanyl                   Home Medications:  aspirin 81 mg oral tablet: 1 tab(s) orally once a day  (30 Oct 2018 14:39)  Breo Ellipta 100 mcg-25 mcg/inh inhalation powder: 1 puff(s) inhaled once a day patient denies using it (11 Oct 2018 08:38)  Calcium 500: 1 tab(s) orally 2 times a day (11 Oct 2018 08:38)  docusate sodium 100 mg oral tablet: 1 tab(s) orally 2 times a day, As Needed (11 Oct 2018 08:39)  folic acid 1 mg oral tablet: 1 tab(s) orally once a day (11 Oct 2018 08:38)  Mag-Ox 400 oral tablet: 1 tab(s) orally once a day (11 Oct 2018 08:39)  Multiple Vitamins oral tablet: 1 tab(s) orally once a day  (30 Oct 2018 14:39)  Namenda 10 mg oral tablet: 1 tab(s) orally 2 times a day (11 Oct 2018 08:38)  Nephplex Rx oral tablet: 1 tab(s) orally once a day (11 Oct 2018 08:39)  nortriptyline 50 mg oral capsule: 1 cap(s) orally once a day (11 Oct 2018 08:39)  Renvela 800 mg oral tablet: 2 tab(s) orally every 8 hours (11 Oct 2018 08:39)  simethicone 80 mg oral tablet: 1 tab(s) orally 3 times a day (after meals) (11 Oct 2018 08:39)  Tylenol 325 mg oral tablet: 2 tab(s) orally every 6 hours, As Needed (30 Oct 2018 14:39)  vancomycin 1 g intravenous injection: 1 gram(s) intravenous 3 times a week  Please give 3 x per week with Dialysis until 2/3/2019 (22 Jan 2019 13:41)  vitamin A: 1 tab(s) orally once a day (11 Oct 2018 08:38)  Vitamin B Complex: 1 tab(s) orally once a day (11 Oct 2018 08:38)  Vitamin C 500 mg oral tablet: 1 tab(s) orally once a day (11 Oct 2018 08:38)      PAST MEDICAL & SURGICAL HISTORY:  Smoking hx  Cardiomyopathy  Wound: right chest  ICD (implantable cardioverter-defibrillator) in place  OA (osteoarthritis)  HLD (hyperlipidemia)  BPH (benign prostatic hyperplasia)  Pleural effusion  HTN (hypertension)  ESRD (end stage renal disease)  H/O chest wound: right  S/P thoracotomy: FB, R thoracotomy, decortication, chest wall reconstruction, lat flap  History of wound infection: right chest wall - revision 5/18 and again in 7/18  History of implantable cardioverter-defibrillator (ICD) placement: pt unsure when placed  H/O bilateral hip replacements: 2008, 2009        ICU Vital Signs Last 24 Hrs  T(C): 37.1 (02 Feb 2019 14:45), Max: 37.8 (02 Feb 2019 12:00)  T(F): 98.8 (02 Feb 2019 14:45), Max: 100.1 (02 Feb 2019 12:00)  HR: 107 (02 Feb 2019 17:00) (96 - 126)  BP: 114/63 (02 Feb 2019 17:00) (93/56 - 125/58)  BP(mean): 75 (02 Feb 2019 17:00) (64 - 92)  ABP: 101/62 (02 Feb 2019 17:00) (71/49 - 125/72)  ABP(mean): 75 (02 Feb 2019 17:00) (58 - 91)  RR: 22 (02 Feb 2019 17:00) (20 - 22)  SpO2: 100% (02 Feb 2019 17:00) (95% - 100%)    I&O's Detail    01 Feb 2019 07:01  -  02 Feb 2019 07:00  --------------------------------------------------------  IN:    dextrose 10%: 330 mL    Enteral Tube Flush: 80 mL    fentaNYL  Infusion: 78 mL    midazolam Infusion: 10.8 mL    Nepro with Carb Steady: 840 mL    norepinephrine Infusion: 132.1 mL    sodium chloride 0.9%: 550 mL    sodium chloride 0.9%.: 390 mL    vasopressin Infusion: 9 mL  Total IN: 2419.9 mL    OUT:    Bulb: 55 mL  Total OUT: 55 mL    Total NET: 2364.9 mL      02 Feb 2019 07:01  -  02 Feb 2019 17:48  --------------------------------------------------------  IN:    dexmedetomidine Infusion: 22.5 mL    Enteral Tube Flush: 90 mL    fentaNYL  Infusion: 42 mL    IV PiggyBack: 100 mL    Nepro with Carb Steady: 385 mL    norepinephrine Infusion: 10 mL    Other: 400 mL    sodium chloride 0.9%.: 330 mL  Total IN: 1379.5 mL    OUT:    Bulb: 40 mL    Other: 1400 mL  Total OUT: 1440 mL    Total NET: -60.5 mL        CAPILLARY BLOOD GLUCOSE      POCT Blood Glucose.: 125 mg/dL (01 Feb 2019 18:21)      Home Medications:  aspirin 81 mg oral tablet: 1 tab(s) orally once a day  (30 Oct 2018 14:39)  Breo Ellipta 100 mcg-25 mcg/inh inhalation powder: 1 puff(s) inhaled once a day patient denies using it (11 Oct 2018 08:38)  Calcium 500: 1 tab(s) orally 2 times a day (11 Oct 2018 08:38)  docusate sodium 100 mg oral tablet: 1 tab(s) orally 2 times a day, As Needed (11 Oct 2018 08:39)  folic acid 1 mg oral tablet: 1 tab(s) orally once a day (11 Oct 2018 08:38)  Mag-Ox 400 oral tablet: 1 tab(s) orally once a day (11 Oct 2018 08:39)  Multiple Vitamins oral tablet: 1 tab(s) orally once a day  (30 Oct 2018 14:39)  Namenda 10 mg oral tablet: 1 tab(s) orally 2 times a day (11 Oct 2018 08:38)  Nephplex Rx oral tablet: 1 tab(s) orally once a day (11 Oct 2018 08:39)  nortriptyline 50 mg oral capsule: 1 cap(s) orally once a day (11 Oct 2018 08:39)  Renvela 800 mg oral tablet: 2 tab(s) orally every 8 hours (11 Oct 2018 08:39)  simethicone 80 mg oral tablet: 1 tab(s) orally 3 times a day (after meals) (11 Oct 2018 08:39)  Tylenol 325 mg oral tablet: 2 tab(s) orally every 6 hours, As Needed (30 Oct 2018 14:39)  vancomycin 1 g intravenous injection: 1 gram(s) intravenous 3 times a week  Please give 3 x per week with Dialysis until 2/3/2019 (22 Jan 2019 13:41)  vitamin A: 1 tab(s) orally once a day (11 Oct 2018 08:38)  Vitamin B Complex: 1 tab(s) orally once a day (11 Oct 2018 08:38)  Vitamin C 500 mg oral tablet: 1 tab(s) orally once a day (11 Oct 2018 08:38)      MEDICATIONS  (STANDING):  dexmedetomidine Infusion 0.5 MICROgram(s)/kG/Hr (7.5 mL/Hr) IV Continuous <Continuous>  epoetin kelly Injectable 3000 Unit(s) IV Push <User Schedule>  fentaNYL   Infusion 2 MICROgram(s)/kG/Hr (12 mL/Hr) IV Continuous <Continuous>  fluconAZOLE IVPB      heparin  Injectable 5000 Unit(s) SubCutaneous every 12 hours  midodrine 10 milliGRAM(s) Oral every 8 hours  norepinephrine Infusion 0.05 MICROgram(s)/kG/Min (5.625 mL/Hr) IV Continuous <Continuous>  piperacillin/tazobactam IVPB. 3.375 Gram(s) IV Intermittent once  piperacillin/tazobactam IVPB. 3.375 Gram(s) IV Intermittent every 12 hours  QUEtiapine 25 milliGRAM(s) Oral two times a day  sodium chloride 0.9%. 1000 milliLiter(s) (30 mL/Hr) IV Continuous <Continuous>    MEDICATIONS  (PRN):  acetaminophen  IVPB .. 1000 milliGRAM(s) IV Intermittent once PRN Temp greater or equal to 38C (100.4F), Moderate Pain (4 - 6)      Mode: AC/ CMV (Assist Control/ Continuous Mandatory Ventilation)  RR (machine): 22  TV (machine): 400  FiO2: 50  PEEP: 5  MAP: 12  PIP: 29      Physical exam:   General:               Pt sedated                                               Neuro:                  Sedated                            Cardiovascular:   S1 & S2, regular                           Respiratory:         Air entry is decreased at  both bases, has bilateral conducted sounds                           GI:                          Soft, nondistended and nontender, Bowel sounds active                            Ext:                        No cyanosis or edema                             Labs:                                                                           8.4    5.57  )-----------( 159      ( 02 Feb 2019 02:30 )             27.6             02-02    136  |  99  |  42<H>  ----------------------------<  115<H>  3.9   |  25  |  4.45<H>    Ca    9.2      02 Feb 2019 02:30    TPro  6.4  /  Alb  2.7<L>  /  TBili  0.5  /  DBili  x   /  AST  14  /  ALT  17  /  AlkPhos  207<H>  02-02                    LIVER FUNCTIONS - ( 02 Feb 2019 02:30 )  Alb: 2.7 g/dL / Pro: 6.4 g/dL / ALK PHOS: 207 u/L / ALT: 17 u/L / AST: 14 u/L / GGT: x               Culture - Respiratory with Gram Stain (collected 02-01-19 @ 22:35)  Source: ENDOTRACHEAL SPECIMEN    Culture - Blood:   NO ORGANISMS ISOLATED  NO ORGANISMS ISOLATED AT 72 HRS. (01.29.19 @ 22:40)          CXR:    < from: Xray Chest 1 View- PORTABLE-Routine (02.01.19 @ 07:18) >  Status post extubation and removal of enteric tube with   tracheostomy tube in position and bilateral small effusions unchanged.        Plan:    General: Mr. Han is a 58M who underwent a FB, R thoracotomy, decortication, chest wall reconstruction, lat flap with Dr Pickering on 10/11/18. He was admitted on 1/6/19 to [facility] with a R pleural effusion and respiratory failure,  A R PTC was placed there and he was intubated.  He still had a R SANDY drain in place and he was transferred to [facility]. Patient was found to have aspirated a foreign body and +MRSA empyema. He was treated and discharged on 1/22/2019.     He was found by his home nurse to be in acute distress and recommended to proceed to the ED.  At presentation, he was found to be in acute hypercarbic respiratory failure complicated by hemodynamic instability requiring emergent intubation and pressor support. (28 Jan 2019 15:34)                            Neuro:                                         Pain control with Fentanyl drip / Precedex                            Cardiovascular:                                          Continue hemodynamic monitoring.    Hypotension: Titrate Levophed   to MAP>65, Restarted Midodrine 10mg/TID, Wean off Levo                                                  Respiratory:                        Pt is on full mechanical ventilator support WH--40-7. CPAP wean as tolerated                                         Trached on 1/31                                                                 Continue bronchodilators, pulmonary toilet     Continue antibiotics -  Vanco based on level + Zosyn. Diflucan added because of yeast in BAL                            GI                                         Nepro 35cc/hr. Flush PEG with 30cc sterile water Q4hrs                                         Continue GI prophylaxis with Protonix                                                                 Renal:                                         HD as per renal / TIW                                         Monitor I/Os and electrolytes                                                                                        Hem/ Onc:                                                                                 Follow CBC in AM                           Infectious disease:     Bronchoscopy on 1/29 showed copious greenish secretions - Continue pulmonary toilet                                         Continue Vanco based on level  + Zosyn + Diflucan                                                                   Endocrine         Continue Accu-Checks with coverage      Pt is on SQ Heparin and Venodyne boots for DVT prophylaxis.     Pertinent clinical, laboratory, radiographic, hemodynamic, echocardiographic, respiratory data, microbiologic data and chart were reviewed and analyzed frequently throughout the course of the day and night  Patient seen, examined and plan discussed with CT Surgeon Dr. Pickering / CTICU team during rounds.    Pt's status discussed with wife via video  at bedside, updated status. Aware pt will be transferred to Vent / HD facility when ready     I have spent 70  minutes of critical care time with this pt between 7am and 11.59pm             Ralph Warren MD

## 2019-02-02 NOTE — PROGRESS NOTE ADULT - PROBLEM SELECTOR PLAN 1
Pt. with ESRD on HD TIW (TTS). Last HD was on 1/31/19 via AVF, tolerated treatment well. Pt. currently on IV pressors. Labs reviewed. Plan for HD today. Monitor BP and labs

## 2019-02-02 NOTE — PROGRESS NOTE ADULT - ASSESSMENT
58M who underwent a FB, R thoracotomy, decortication, chest wall reconstruction, lat flap with Dr Pickering on 10/11/18. He was admitted on 1/6/19 to [facility] with a R pleural effusion and respiratory failure,  A R PTC was placed there and he was intubated.  He still had a R SANDY drain in place and he was transferred to [facility]. Patient was found to have aspirated a foreign body and +MRSA empyema. He was treated and discharged on 1/22/2019.     He was found by his home nurse to be in acute distress and recommended to proceed to the ED.  At presentation, he was found to be in acute hypercarbic respiratory failure complicated by hemodynamic instability requiring emergent intubation and pressor support. (28 Jan 2019 15:34)    Pt had Tm 104.2 (on 1/29), tachycardia, hypotension.  Pt has now been extubated, s/p trach/PEG on 1/31.  CT chest shows complete L sided atelectasis with mucous plugging of right lower lobe  bronchus with near complete atelectasis.      ID consult called for antibiotic managment.     Recommend:    - Pt p/w severe sepsis with shock and respiratory distress.  Pt with high fevers.  Found to have complete and near complete atelectasis of L and R lung respectively with mucous plugging.  Agree with broad spectrum abx to cover for post-obstructive pna.      - Agree with vancomycin and zosyn.  Pt with recent MRSA infection in pleural effusion (last admission, s/p trt with 4 weeks vancomycin).  Maintain vanco trough between 15-20.  Dose vanco by level, post HD.      - f/u repeat blood cultures sent on 2/1 (pt continues to have intermittent fevers), respiratory cultures growing GPC clusters, GPC pairs, GVR, and yeast.  Fluconazole added, f/u ID.    - Cont care as per CTU. Continue hemodynamic monitoring.  Pt being titrated on levophed.  On full mechanical ventilator support, s/p tracheostomy. Continue bronchodilators, pulmonary toilet, Continue GI prophylaxis with Protonix.   Monitor temp curve and WBC.    Will follow,    Anabel Chahal  [phone]

## 2019-02-02 NOTE — PROGRESS NOTE ADULT - SUBJECTIVE AND OBJECTIVE BOX
[facility] Division of Kidney Diseases & Hypertension  FOLLOW UP NOTE  [phone]--------------------------------------------------------------------------------    PI: 59 yo male with medical history of NICM with ICD, ESRD on HD, former smoker, BPH admitted with acute hypercapnic respiratory failure. Nephrology consulted for ESRD/HD management. Pt intubated and sedated in the CTICU. Pt. currently admitted with hypercarbic respiratory failure, septic shock. Last HD on 1/31/19. Pt seen in the CTICU, intubated and continues to be on IV pressors.     PAST HISTORY  --------------------------------------------------------------------------------  No significant changes to PMH, PSH, FHx, SHx, unless otherwise noted    ALLERGIES & MEDICATIONS  --------------------------------------------------------------------------------  Allergies    No Known Allergies    Intolerances      Standing Inpatient Medications  dexmedetomidine Infusion 0.5 MICROgram(s)/kG/Hr IV Continuous <Continuous>  epoetin kelly Injectable 3000 Unit(s) IV Push <User Schedule>  fentaNYL   Infusion 2 MICROgram(s)/kG/Hr IV Continuous <Continuous>  fluconAZOLE IVPB      heparin  Injectable 5000 Unit(s) SubCutaneous every 12 hours  midodrine 10 milliGRAM(s) Oral every 8 hours  norepinephrine Infusion 0.05 MICROgram(s)/kG/Min IV Continuous <Continuous>  piperacillin/tazobactam IVPB. 3.375 Gram(s) IV Intermittent once  piperacillin/tazobactam IVPB. 3.375 Gram(s) IV Intermittent every 12 hours  QUEtiapine 25 milliGRAM(s) Oral two times a day  sodium chloride 0.9%. 1000 milliLiter(s) IV Continuous <Continuous>    PRN Inpatient Medications  acetaminophen  IVPB .. 1000 milliGRAM(s) IV Intermittent once PRN      REVIEW OF SYSTEMS  --------------------------------------------------------------------------------  Unable to obtain.     VITALS/PHYSICAL EXAM  --------------------------------------------------------------------------------  T(C): 37.2 (02-02-19 @ 04:00), Max: 38.6 (02-01-19 @ 12:00)  HR: 102 (02-02-19 @ 08:00) (96 - 126)  BP: 118/63 (02-02-19 @ 08:00) (93/56 - 125/70)  RR: 22 (02-02-19 @ 08:00) (20 - 22)  SpO2: 100% (02-02-19 @ 08:00) (97% - 100%)  Wt(kg): --        02-01-19 @ 07:01  -  02-02-19 @ 07:00  --------------------------------------------------------  IN: 2419.9 mL / OUT: 55 mL / NET: 2364.9 mL    02-02-19 @ 07:01  -  02-02-19 @ 08:51  --------------------------------------------------------  IN: 260 mL / OUT: 0 mL / NET: 260 mL      Physical Exam:  	  Gen: Intubated  	HEENT: +ETT  	Pulm: coarse breath sounds B/L  	CV: S1S2  	Abd: Soft, +BS   	Ext: No LE edema B/L  	Neuro: Awake  	Skin: Warm and dry  	Vascular access: LUE AVF site: +thrill, bruit heard.     LABS/STUDIES  --------------------------------------------------------------------------------              8.4    5.57  >-----------<  159      [02-02-19 @ 02:30]              27.6     136  |  99  |  42  ----------------------------<  115      [02-02-19 @ 02:30]  3.9   |  25  |  4.45        Ca     9.2     [02-02-19 @ 02:30]    TPro  6.4  /  Alb  2.7  /  TBili  0.5  /  DBili  x   /  AST  14  /  ALT  17  /  AlkPhos  207  [02-02-19 @ 02:30]          Creatinine Trend:  SCr 4.45 [02-02 @ 02:30]  SCr 3.50 [02-01 @ 03:40]  SCr 4.52 [01-31 @ 03:20]  SCr 7.04 [01-30 @ 04:50]  SCr 6.89 [01-29 @ 14:30]          HBsAg NEGATIVE      [02-01-19 @ 12:20]

## 2019-02-02 NOTE — PROGRESS NOTE ADULT - SUBJECTIVE AND OBJECTIVE BOX
CLAUDIA HAN                     [id number]    HPI:  Mr. Han is a 58M who underwent a FB, R thoracotomy, decortication, chest wall reconstruction, lat flap with Dr Pickerign on 10/11/18. He was admitted on 1/6/19 to [facility] with a R pleural effusion and respiratory failure,  A R PTC was placed there and he was intubated.  He still had a R SANDY drain in place and he was transferred to [facility]. Patient was found to have aspirated a foreign body and +MRSA empyema. He was treated and discharged on 1/22/2019.     He was found by his home nurse to be in acute distress and recommended to proceed to the ED.  At presentation, he was found to be in acute hypercarbic respiratory failure complicated by hemodynamic instability requiring emergent intubation and pressor support. (28 Jan 2019 15:34)    Procedure:        Trach and PEG  1/31/2019  Bronchoscopy   1/29/2019  Right thoracotomy, resection of portion of R 7th rib, evacuation of infected hemothorax, takedown of pleurocutaneous fistula  10/11/2018      Issues:  Acute hypoxic & hypercapnic respiratory failure  Hypotension  Pneumonia / Sepsis  ESRD / HD  Cardiomyopathy  HLD  BPH  Drips:  Levo  Fentanyl          PAST MEDICAL & SURGICAL HISTORY:  Smoking hx  Cardiomyopathy  Wound: right chest  ICD (implantable cardioverter-defibrillator) in place  OA (osteoarthritis)  HLD (hyperlipidemia)  BPH (benign prostatic hyperplasia)  Pleural effusion  HTN (hypertension)  ESRD (end stage renal disease)  H/O chest wound: right  S/P thoracotomy: FB, R thoracotomy, decortication, chest wall reconstruction, lat flap  History of wound infection: right chest wall - revision 5/18 and again in 7/18  History of implantable cardioverter-defibrillator (ICD) placement: pt unsure when placed  H/O bilateral hip replacements: 2008, 2009            VITAL SIGNS:  Vital Signs Last 24 Hrs  T(C): 37.2 (02 Feb 2019 04:00), Max: 38.6 (01 Feb 2019 12:00)  T(F): 98.9 (02 Feb 2019 04:00), Max: 101.4 (01 Feb 2019 12:00)  HR: 106 (02 Feb 2019 07:02) (96 - 126)  BP: 120/56 (02 Feb 2019 06:00) (93/56 - 125/70)  BP(mean): 71 (02 Feb 2019 06:00) (65 - 83)  RR: 22 (02 Feb 2019 06:00) (20 - 22)  SpO2: 100% (02 Feb 2019 07:02) (97% - 100%)    I/Os:   I&O's Detail    01 Feb 2019 07:01  -  02 Feb 2019 07:00  --------------------------------------------------------  IN:    dextrose 10%: 330 mL    Enteral Tube Flush: 80 mL    fentaNYL  Infusion: 78 mL    midazolam Infusion: 10.8 mL    Nepro with Carb Steady: 840 mL    norepinephrine Infusion: 132.1 mL    sodium chloride 0.9%: 550 mL    sodium chloride 0.9%.: 390 mL    vasopressin Infusion: 9 mL  Total IN: 2419.9 mL    OUT:    Bulb: 55 mL  Total OUT: 55 mL    Total NET: 2364.9 mL          CAPILLARY BLOOD GLUCOSE      POCT Blood Glucose.: 125 mg/dL (01 Feb 2019 18:21)      =======================MEDICATIONS===================  MEDICATIONS  (STANDING):  dexmedetomidine Infusion 0.5 MICROgram(s)/kG/Hr (7.5 mL/Hr) IV Continuous <Continuous>  epoetin kelly Injectable 3000 Unit(s) IV Push <User Schedule>  fentaNYL   Infusion 2 MICROgram(s)/kG/Hr (12 mL/Hr) IV Continuous <Continuous>  heparin  Injectable 5000 Unit(s) SubCutaneous every 12 hours  midodrine 10 milliGRAM(s) Oral every 8 hours  norepinephrine Infusion 0.05 MICROgram(s)/kG/Min (5.625 mL/Hr) IV Continuous <Continuous>  piperacillin/tazobactam IVPB. 3.375 Gram(s) IV Intermittent once  piperacillin/tazobactam IVPB. 3.375 Gram(s) IV Intermittent every 12 hours  QUEtiapine 25 milliGRAM(s) Oral two times a day  sodium chloride 0.9%. 1000 milliLiter(s) (30 mL/Hr) IV Continuous <Continuous>  vasopressin Infusion 0.05 Unit(s)/Min (3 mL/Hr) IV Continuous <Continuous>    MEDICATIONS  (PRN):  acetaminophen  IVPB .. 1000 milliGRAM(s) IV Intermittent once PRN Temp greater or equal to 38C (100.4F), Moderate Pain (4 - 6)      =======================VENTILATOR SETTINGS===================  Mode: AC/ CMV (Assist Control/ Continuous Mandatory Ventilation)  RR (machine): 22  TV (machine): 400  FiO2: 50  PEEP: 5  MAP: 12  PIP: 33        PHYSICAL EXAM============================  General:               Pt sedated , Vented                                              Neuro:                  Sedated  , vented                          Cardiovascular:   S1 & S2, regular                           Respiratory:         Air entry is decreased at  both bases, has bilateral conducted sounds                           GI:                          Soft, nondistended and nontender, Bowel sounds active                            Ext:                        No cyanosis or edema.  ============================LABS=========================                        8.4    5.57  )-----------( 159      ( 02 Feb 2019 02:30 )             27.6     02-02    136  |  99  |  42<H>  ----------------------------<  115<H>  3.9   |  25  |  4.45<H>    Ca    9.2      02 Feb 2019 02:30    TPro  6.4  /  Alb  2.7<L>  /  TBili  0.5  /  DBili  x   /  AST  14  /  ALT  17  /  AlkPhos  207<H>  02-02    LIVER FUNCTIONS - ( 02 Feb 2019 02:30 )  Alb: 2.7 g/dL / Pro: 6.4 g/dL / ALK PHOS: 207 u/L / ALT: 17 u/L / AST: 14 u/L / GGT: x             ABG - ( 02 Feb 2019 02:30 )  pH, Arterial: 7.34  pH, Blood: x     /  pCO2: 51    /  pO2: 146   / HCO3: 26    / Base Excess: 1.5   /  SaO2: 99.0                  ============================IMAGING STUDIES=========================  < from: Xray Chest 1 View- PORTABLE-Routine (02.01.19 @ 07:18) >    Since the last exam, the endotracheal and enteric tubes have been   removed. A tracheostomy tube has been placed. Right IJ line and   subcutaneous AICD in position. Bilateral small postop effusions   unchanged. Lungs are clear. Right IJ line remains.      COMPARISON:  January 31      IMPRESSION:  Status post extubation and removal of enteric tube with   tracheostomy tube in position and bilateral small effusions unchanged.    A/P:   58M who underwent a FB, R thoracotomy, decortication, chest wall reconstruction, lat flap with Dr Pickering on 10/11/18. He was admitted on 1/6/19 to [facility] with a R pleural effusion and respiratory failure,  A R PTC was placed there and he was intubated.  He still had a R SANDY drain in place and he was transferred to [facility]. Patient was found to have aspirated a foreign body and +MRSA empyema. He was treated and discharged on 1/22/2019.     He was found by his home nurse to be in acute distress and recommended to proceed to the ED.  At presentation, he was found to be in acute hypercarbic respiratory failure complicated by hemodynamic instability requiring emergent intubation and pressor support. (28 Jan 2019 15:34)                            Neuro:                                         Pain control with Fentanyl drip                            Cardiovascular:                                          Continue hemodynamic monitoring.    Hypotension: Titrate Levophed   to MAP>65, Vasopressin weaned off                                                   Respiratory:  Continue CPAP weaning protocol as casey,                   Pt is on full mechanical ventilator support [id number]--[id number]                                         s/p Trached on 1/31                                                                 Continue bronchodilators, pulmonary toilet     Continue antibiotics -  Vanco based on level + Zosyn                            GI                                         Nepro advance as tolerated to goal 35cc/hr.                                          Continue GI prophylaxis with Protonix                                                                 Renal:                                         HD as per renal                                         Monitor I/Os and electrolytes                                                                                        Hem/ Onc:                                                                                 Follow CBC in AM                           Infectious disease:     Bronchoscopy on 1/29 showed copious greenish secretions - Continue pulmonary toilet                                         Continue Vanco based on level  + Zosyn                                                                   Endocrine         Continue Accu-Checks with coverage      Pt is on SQ Heparin and Venodyne boots for DVT prophylaxis.     Pertinent clinical, laboratory, radiographic, hemodynamic, echocardiographic, respiratory data, microbiologic data and chart were reviewed and analyzed frequently throughout the course of the day and night  Patient seen, examined and plan discussed with CT Surgeon Dr. Pickering / CTICU team during rounds.    Pt's status discussed with wife via video  at bedside, updated status    I have spent 80 minutes of critical care time with this pt between 00am and 9am               Dave Su DO, FACEP

## 2019-02-02 NOTE — PROGRESS NOTE ADULT - SUBJECTIVE AND OBJECTIVE BOX
Infectious Diseases progress note:    Subjective: (+) yeast from bronch cultures.  Pt started on diflucan.  Had rectal temp of 100.6 as per d/w RN.      ROS:  nonverbal, on mild sedation, unable to evaluate    Allergies    No Known Allergies    Intolerances        ANTIBIOTICS/RELEVANT:  antimicrobials  fluconAZOLE IVPB      piperacillin/tazobactam IVPB. 3.375 Gram(s) IV Intermittent once  piperacillin/tazobactam IVPB. 3.375 Gram(s) IV Intermittent every 12 hours    immunologic:  epoetin kelly Injectable 3000 Unit(s) IV Push <User Schedule>    OTHER:  acetaminophen  IVPB .. 1000 milliGRAM(s) IV Intermittent once PRN  dexmedetomidine Infusion 0.5 MICROgram(s)/kG/Hr IV Continuous <Continuous>  fentaNYL   Infusion 2 MICROgram(s)/kG/Hr IV Continuous <Continuous>  heparin  Injectable 5000 Unit(s) SubCutaneous every 12 hours  midodrine 10 milliGRAM(s) Oral every 8 hours  norepinephrine Infusion 0.05 MICROgram(s)/kG/Min IV Continuous <Continuous>  QUEtiapine 25 milliGRAM(s) Oral two times a day  sodium chloride 0.9%. 1000 milliLiter(s) IV Continuous <Continuous>      Objective:  Vital Signs Last 24 Hrs  T(C): 36.7 (02 Feb 2019 08:00), Max: 37.6 (01 Feb 2019 16:00)  T(F): 98 (02 Feb 2019 08:00), Max: 99.6 (01 Feb 2019 16:00)  HR: 107 (02 Feb 2019 11:03) (96 - 126)  BP: 117/67 (02 Feb 2019 11:00) (93/56 - 125/58)  BP(mean): 79 (02 Feb 2019 11:00) (64 - 79)  RR: 22 (02 Feb 2019 11:00) (20 - 22)  SpO2: 100% (02 Feb 2019 11:03) (97% - 100%)    PHYSICAL EXAM:  Constitutional: pt trached, sedated  Eyes:CHER, EOMI  Ear/Nose/Throat: no thrush, mucositis.  Moist mucous membranes	  Neck:no JVD, no lymphadenopathy, supple  Respiratory: CTA adore  Cardiovascular: S1S2 RRR, no murmurs  Gastrointestinal:soft, nontender,  nondistended (+) BS  Extremities:no e/e/c  Skin:  post rt chest wound dsg c/d/i        LABS:                        8.4    5.57  )-----------( 159      ( 02 Feb 2019 02:30 )             27.6     02-02    136  |  99  |  42<H>  ----------------------------<  115<H>  3.9   |  25  |  4.45<H>    Ca    9.2      02 Feb 2019 02:30    TPro  6.4  /  Alb  2.7<L>  /  TBili  0.5  /  DBili  x   /  AST  14  /  ALT  17  /  AlkPhos  207<H>  02-02            Vancomycin Level, Random:  ug/mL (02-01 @ 03:40)      Vancomycin Level, Trough: 24.6 ug/mL (02-02 @ 02:30)              MICROBIOLOGY:    Culture - Respiratory with Gram Stain (02.01.19 @ 22:35)    Gram Stain Sputum:   WBC^White Blood Cells  QNTY CELLS IN GRAM STAIN: MODERATE (3+)  GPCCL^Gram Pos Cocci In Clusters  QUANTITY OF BACTERIA SEEN: MODERATE (3+)  YEAST^YEAST.  QUANTITY OF BACTERIA SEEN: FEW (2+)  GPR^Gram Positive Rods  QUANTITY OF BACTERIA SEEN: FEW (2+)    Specimen Source: ENDOTRACHEAL SPECIMEN    Culture - Blood (01.29.19 @ 22:40)    Culture - Blood:   NO ORGANISMS ISOLATED  NO ORGANISMS ISOLATED AT 72 HRS.    Specimen Source: BLOOD VENOUS    Culture - Blood (01.28.19 @ 15:10)    Culture - Blood:   NO ORGANISMS ISOLATED  NO ORGANISMS ISOLATED AT 96 HOURS    Specimen Source: BLOOD PERIPHERAL    Culture - Respiratory with Gram Stain (01.29.19 @ 13:38)    Culture - Respiratory:   NRF^Normal Respiratory Jennifer  QUANTITY OF GROWTH: RARE    Gram Stain Sputum:   GPCPR^Gram Pos Cocci in Pairs  QUANTITY OF BACTERIA SEEN: RARE (1+)  YEAST^YEAST.  QUANTITY OF BACTERIA SEEN: RARE (1+)  WBC^White Blood Cells  QNTY CELLS IN GRAM STAIN: MANY (4+)    Specimen Source: LUNG - UPPER LOBE RIGHT    Culture - Respiratory with Gram Stain (01.29.19 @ 13:38)    Gram Stain Sputum:   GPCPR^Gram Pos Cocci in Pairs  QUANTITY OF BACTERIA SEEN: RARE (1+)  WBC^White Blood Cells  QNTY CELLS IN GRAM STAIN: MANY (4+)    Culture - Respiratory:   NRF^Normal Respiratory Jennifer  QUANTITY OF GROWTH: RARE    Specimen Source: LUNG - UPPER LOBE LEFT    Culture - Blood (01.28.19 @ 15:10)    Culture - Blood:   NO ORGANISMS ISOLATED  NO ORGANISMS ISOLATED AT 96 HOURS    Specimen Source: BLOOD VENOUS          RADIOLOGY & ADDITIONAL STUDIES:      < from: Xray Chest 1 View- PORTABLE-Routine (02.01.19 @ 07:18) >  INTERPRETATION:     Since the last exam, the endotracheal and enteric tubes have been   removed. A tracheostomy tube has been placed. Right IJ line and   subcutaneous AICD in position. Bilateral small postop effusions   unchanged. Lungs are clear. Right IJ line remains.      COMPARISON:  January 31      IMPRESSION:  Status post extubation and removal of enteric tube with   tracheostomy tube in position and bilateral small effusions unchanged.    < end of copied text >      < from: Xray Chest 1 View- PORTABLE-Routine (01.30.19 @ 06:49) >  INTERPRETATION:     The endotracheal, enteric tube and right IJ line in addition to the   left-sided subcutaneous AICD are unchanged. Small loculated effusions   right greater than left unchanged. Mild interstitial edema. No focal   consolidation or pneumothorax.      COMPARISON:January 29      IMPRESSION:  Follow-up showing no significant change with tubes and lines   in position, bilateral small loculated effusions and no focal   consolidation although left retrocardiac opacity is unchanged.    < end of copied text >

## 2019-02-03 LAB
ANION GAP SERPL CALC-SCNC: 14 MMO/L — SIGNIFICANT CHANGE UP (ref 7–14)
BACTERIA BLD CULT: SIGNIFICANT CHANGE UP
BUN SERPL-MCNC: 37 MG/DL — HIGH (ref 7–23)
C DIFF TOX GENS STL QL NAA+PROBE: SIGNIFICANT CHANGE UP
CALCIUM SERPL-MCNC: 9 MG/DL — SIGNIFICANT CHANGE UP (ref 8.4–10.5)
CHLORIDE SERPL-SCNC: 97 MMOL/L — LOW (ref 98–107)
CO2 SERPL-SCNC: 24 MMOL/L — SIGNIFICANT CHANGE UP (ref 22–31)
CREAT SERPL-MCNC: 3.51 MG/DL — HIGH (ref 0.5–1.3)
GLUCOSE SERPL-MCNC: 126 MG/DL — HIGH (ref 70–99)
HCT VFR BLD CALC: 28.1 % — LOW (ref 39–50)
HGB BLD-MCNC: 8.4 G/DL — LOW (ref 13–17)
MCHC RBC-ENTMCNC: 29.9 % — LOW (ref 32–36)
MCHC RBC-ENTMCNC: 30.9 PG — SIGNIFICANT CHANGE UP (ref 27–34)
MCV RBC AUTO: 103.3 FL — HIGH (ref 80–100)
METHOD TYPE: SIGNIFICANT CHANGE UP
NRBC # FLD: 0 K/UL — LOW (ref 25–125)
ORGANISM # SPEC MICROSCOPIC CNT: SIGNIFICANT CHANGE UP
PLATELET # BLD AUTO: 145 K/UL — LOW (ref 150–400)
PMV BLD: 10.3 FL — SIGNIFICANT CHANGE UP (ref 7–13)
POTASSIUM SERPL-MCNC: 3.8 MMOL/L — SIGNIFICANT CHANGE UP (ref 3.5–5.3)
POTASSIUM SERPL-SCNC: 3.8 MMOL/L — SIGNIFICANT CHANGE UP (ref 3.5–5.3)
RBC # BLD: 2.72 M/UL — LOW (ref 4.2–5.8)
RBC # FLD: 17.1 % — HIGH (ref 10.3–14.5)
SODIUM SERPL-SCNC: 135 MMOL/L — SIGNIFICANT CHANGE UP (ref 135–145)
VANCOMYCIN FLD-MCNC: 17.7 UG/ML — SIGNIFICANT CHANGE UP
WBC # BLD: 6.76 K/UL — SIGNIFICANT CHANGE UP (ref 3.8–10.5)
WBC # FLD AUTO: 6.76 K/UL — SIGNIFICANT CHANGE UP (ref 3.8–10.5)

## 2019-02-03 PROCEDURE — 99291 CRITICAL CARE FIRST HOUR: CPT

## 2019-02-03 PROCEDURE — 71250 CT THORAX DX C-: CPT | Mod: 26

## 2019-02-03 PROCEDURE — 71045 X-RAY EXAM CHEST 1 VIEW: CPT | Mod: 26

## 2019-02-03 PROCEDURE — 99233 SBSQ HOSP IP/OBS HIGH 50: CPT | Mod: GC

## 2019-02-03 PROCEDURE — 74176 CT ABD & PELVIS W/O CONTRAST: CPT | Mod: 26

## 2019-02-03 RX ORDER — KETOROLAC TROMETHAMINE 30 MG/ML
15 SYRINGE (ML) INJECTION ONCE
Qty: 0 | Refills: 0 | Status: DISCONTINUED | OUTPATIENT
Start: 2019-02-03 | End: 2019-02-03

## 2019-02-03 RX ORDER — COLLAGENASE CLOSTRIDIUM HIST. 250 UNIT/G
1 OINTMENT (GRAM) TOPICAL DAILY
Qty: 0 | Refills: 0 | Status: DISCONTINUED | OUTPATIENT
Start: 2019-02-03 | End: 2019-02-14

## 2019-02-03 RX ADMIN — SODIUM CHLORIDE 10 MILLILITER(S): 9 INJECTION INTRAMUSCULAR; INTRAVENOUS; SUBCUTANEOUS at 18:30

## 2019-02-03 RX ADMIN — Medication 15 MILLIGRAM(S): at 20:23

## 2019-02-03 RX ADMIN — SODIUM CHLORIDE 10 MILLILITER(S): 9 INJECTION INTRAMUSCULAR; INTRAVENOUS; SUBCUTANEOUS at 20:21

## 2019-02-03 RX ADMIN — FLUCONAZOLE 100 MILLIGRAM(S): 150 TABLET ORAL at 06:45

## 2019-02-03 RX ADMIN — Medication 5.62 MICROGRAM(S)/KG/MIN: at 20:20

## 2019-02-03 RX ADMIN — Medication 1 APPLICATION(S): at 13:13

## 2019-02-03 RX ADMIN — QUETIAPINE FUMARATE 25 MILLIGRAM(S): 200 TABLET, FILM COATED ORAL at 17:37

## 2019-02-03 RX ADMIN — HEPARIN SODIUM 5000 UNIT(S): 5000 INJECTION INTRAVENOUS; SUBCUTANEOUS at 17:37

## 2019-02-03 RX ADMIN — PIPERACILLIN AND TAZOBACTAM 25 GRAM(S): 4; .5 INJECTION, POWDER, LYOPHILIZED, FOR SOLUTION INTRAVENOUS at 05:31

## 2019-02-03 RX ADMIN — SODIUM CHLORIDE 30 MILLILITER(S): 9 INJECTION INTRAMUSCULAR; INTRAVENOUS; SUBCUTANEOUS at 08:00

## 2019-02-03 RX ADMIN — SODIUM CHLORIDE 10 MILLILITER(S): 9 INJECTION INTRAMUSCULAR; INTRAVENOUS; SUBCUTANEOUS at 13:14

## 2019-02-03 RX ADMIN — QUETIAPINE FUMARATE 25 MILLIGRAM(S): 200 TABLET, FILM COATED ORAL at 05:30

## 2019-02-03 RX ADMIN — MIDODRINE HYDROCHLORIDE 10 MILLIGRAM(S): 2.5 TABLET ORAL at 05:30

## 2019-02-03 RX ADMIN — HEPARIN SODIUM 5000 UNIT(S): 5000 INJECTION INTRAVENOUS; SUBCUTANEOUS at 05:30

## 2019-02-03 RX ADMIN — Medication 15 MILLIGRAM(S): at 21:15

## 2019-02-03 RX ADMIN — DEXMEDETOMIDINE HYDROCHLORIDE IN 0.9% SODIUM CHLORIDE 7.5 MICROGRAM(S)/KG/HR: 4 INJECTION INTRAVENOUS at 20:21

## 2019-02-03 RX ADMIN — PIPERACILLIN AND TAZOBACTAM 25 GRAM(S): 4; .5 INJECTION, POWDER, LYOPHILIZED, FOR SOLUTION INTRAVENOUS at 17:37

## 2019-02-03 RX ADMIN — FENTANYL CITRATE 12 MICROGRAM(S)/KG/HR: 50 INJECTION INTRAVENOUS at 09:50

## 2019-02-03 RX ADMIN — DEXMEDETOMIDINE HYDROCHLORIDE IN 0.9% SODIUM CHLORIDE 7.5 MICROGRAM(S)/KG/HR: 4 INJECTION INTRAVENOUS at 09:51

## 2019-02-03 RX ADMIN — Medication 400 MILLIGRAM(S): at 16:07

## 2019-02-03 RX ADMIN — FENTANYL CITRATE 12 MICROGRAM(S)/KG/HR: 50 INJECTION INTRAVENOUS at 20:22

## 2019-02-03 RX ADMIN — MIDODRINE HYDROCHLORIDE 10 MILLIGRAM(S): 2.5 TABLET ORAL at 13:13

## 2019-02-03 RX ADMIN — Medication 5.62 MICROGRAM(S)/KG/MIN: at 09:00

## 2019-02-03 NOTE — PROGRESS NOTE ADULT - SUBJECTIVE AND OBJECTIVE BOX
CLAUDIA HAN            MRN-[id number]         No Known Allergies                 Mr. Han is a 58M who underwent a FB, R thoracotomy, decortication, chest wall reconstruction, lat flap with Dr Pickering on 10/11/18. He was admitted on 1/6/19 to [facility] with a R pleural effusion and respiratory failure,  A R PTC was placed there and he was intubated.  He still had a R SANDY drain in place and he was transferred to [facility]. Patient was found to have aspirated a foreign body and +MRSA empyema. He was treated and discharged on 1/22/2019.     He was found by his home nurse to be in acute distress and recommended to proceed to the ED.  At presentation, he was found to be in acute hypercarbic respiratory failure complicated by hemodynamic instability requiring emergent intubation and pressor support. (28 Jan 2019 15:34)      Procedure:        Trach and PEG  1/31/2019  Bronchoscopy   1/29/2019  Right thoracotomy, resection of portion of R 7th rib, evacuation of infected hemothorax, takedown of pleurocutaneous fistula  10/11/2018      Issues:  Acute hypoxic & hypercapnic respiratory failure  Hypotension  Pneumonia /  Fever / Sepsis  ESRD / HD  Cardiomyopathy  HLD  BPH  Drips:  Levo  Fentanyl                   Home Medications:  aspirin 81 mg oral tablet: 1 tab(s) orally once a day  (30 Oct 2018 14:39)  Breo Ellipta 100 mcg-25 mcg/inh inhalation powder: 1 puff(s) inhaled once a day patient denies using it (11 Oct 2018 08:38)  Calcium 500: 1 tab(s) orally 2 times a day (11 Oct 2018 08:38)  docusate sodium 100 mg oral tablet: 1 tab(s) orally 2 times a day, As Needed (11 Oct 2018 08:39)  folic acid 1 mg oral tablet: 1 tab(s) orally once a day (11 Oct 2018 08:38)  Mag-Ox 400 oral tablet: 1 tab(s) orally once a day (11 Oct 2018 08:39)  Multiple Vitamins oral tablet: 1 tab(s) orally once a day  (30 Oct 2018 14:39)  Namenda 10 mg oral tablet: 1 tab(s) orally 2 times a day (11 Oct 2018 08:38)  Nephplex Rx oral tablet: 1 tab(s) orally once a day (11 Oct 2018 08:39)  nortriptyline 50 mg oral capsule: 1 cap(s) orally once a day (11 Oct 2018 08:39)  Renvela 800 mg oral tablet: 2 tab(s) orally every 8 hours (11 Oct 2018 08:39)  simethicone 80 mg oral tablet: 1 tab(s) orally 3 times a day (after meals) (11 Oct 2018 08:39)  Tylenol 325 mg oral tablet: 2 tab(s) orally every 6 hours, As Needed (30 Oct 2018 14:39)  vancomycin 1 g intravenous injection: 1 gram(s) intravenous 3 times a week  Please give 3 x per week with Dialysis until 2/3/2019 (22 Jan 2019 13:41)  vitamin A: 1 tab(s) orally once a day (11 Oct 2018 08:38)  Vitamin B Complex: 1 tab(s) orally once a day (11 Oct 2018 08:38)  Vitamin C 500 mg oral tablet: 1 tab(s) orally once a day (11 Oct 2018 08:38)      PAST MEDICAL & SURGICAL HISTORY:  Smoking hx  Cardiomyopathy  Wound: right chest  ICD (implantable cardioverter-defibrillator) in place  OA (osteoarthritis)  HLD (hyperlipidemia)  BPH (benign prostatic hyperplasia)  Pleural effusion  HTN (hypertension)  ESRD (end stage renal disease)  H/O chest wound: right  S/P thoracotomy: FB, R thoracotomy, decortication, chest wall reconstruction, lat flap  History of wound infection: right chest wall - revision 5/18 and again in 7/18  History of implantable cardioverter-defibrillator (ICD) placement: pt unsure when placed  H/O bilateral hip replacements: 2008, 2009        ICU Vital Signs Last 24 Hrs  T(C): 36.1 (03 Feb 2019 04:00), Max: 39.4 (02 Feb 2019 20:00)  T(F): 97 (03 Feb 2019 04:00), Max: 103 (02 Feb 2019 20:00)  HR: 75 (03 Feb 2019 04:00) (74 - 120)  BP: 114/61 (03 Feb 2019 04:00) (87/50 - 128/64)  BP(mean): 71 (03 Feb 2019 04:00) (56 - 92)  ABP: 90/50 (03 Feb 2019 04:00) (70/42 - 125/72)  ABP(mean): 63 (03 Feb 2019 04:00) (51 - 91)  RR: 22 (03 Feb 2019 04:00) (21 - 22)  SpO2: 100% (03 Feb 2019 04:00) (95% - 100%)    I&O's Detail    01 Feb 2019 07:01  -  02 Feb 2019 07:00  --------------------------------------------------------  IN:    dextrose 10%: 330 mL    Enteral Tube Flush: 80 mL    fentaNYL  Infusion: 78 mL    midazolam Infusion: 10.8 mL    Nepro with Carb Steady: 840 mL    norepinephrine Infusion: 132.1 mL    sodium chloride 0.9%: 550 mL    sodium chloride 0.9%.: 390 mL    vasopressin Infusion: 9 mL  Total IN: 2419.9 mL    OUT:    Bulb: 55 mL  Total OUT: 55 mL    Total NET: 2364.9 mL      02 Feb 2019 07:01  -  03 Feb 2019 05:49  --------------------------------------------------------  IN:    dexmedetomidine Infusion: 40.5 mL    Enteral Tube Flush: 130 mL    fentaNYL  Infusion: 67 mL    IV PiggyBack: 200 mL    Nepro with Carb Steady: 510 mL    norepinephrine Infusion: 10 mL    Other: 400 mL    sodium chloride 0.9%.: 450 mL  Total IN: 1807.5 mL    OUT:    Bulb: 40 mL    Other: 1400 mL  Total OUT: 1440 mL    Total NET: 367.5 mL        CAPILLARY BLOOD GLUCOSE      POCT Blood Glucose.: 125 mg/dL (01 Feb 2019 18:21)      Home Medications:  aspirin 81 mg oral tablet: 1 tab(s) orally once a day  (30 Oct 2018 14:39)  Breo Ellipta 100 mcg-25 mcg/inh inhalation powder: 1 puff(s) inhaled once a day patient denies using it (11 Oct 2018 08:38)  Calcium 500: 1 tab(s) orally 2 times a day (11 Oct 2018 08:38)  docusate sodium 100 mg oral tablet: 1 tab(s) orally 2 times a day, As Needed (11 Oct 2018 08:39)  folic acid 1 mg oral tablet: 1 tab(s) orally once a day (11 Oct 2018 08:38)  Mag-Ox 400 oral tablet: 1 tab(s) orally once a day (11 Oct 2018 08:39)  Multiple Vitamins oral tablet: 1 tab(s) orally once a day  (30 Oct 2018 14:39)  Namenda 10 mg oral tablet: 1 tab(s) orally 2 times a day (11 Oct 2018 08:38)  Nephplex Rx oral tablet: 1 tab(s) orally once a day (11 Oct 2018 08:39)  nortriptyline 50 mg oral capsule: 1 cap(s) orally once a day (11 Oct 2018 08:39)  Renvela 800 mg oral tablet: 2 tab(s) orally every 8 hours (11 Oct 2018 08:39)  simethicone 80 mg oral tablet: 1 tab(s) orally 3 times a day (after meals) (11 Oct 2018 08:39)  Tylenol 325 mg oral tablet: 2 tab(s) orally every 6 hours, As Needed (30 Oct 2018 14:39)  vancomycin 1 g intravenous injection: 1 gram(s) intravenous 3 times a week  Please give 3 x per week with Dialysis until 2/3/2019 (22 Jan 2019 13:41)  vitamin A: 1 tab(s) orally once a day (11 Oct 2018 08:38)  Vitamin B Complex: 1 tab(s) orally once a day (11 Oct 2018 08:38)  Vitamin C 500 mg oral tablet: 1 tab(s) orally once a day (11 Oct 2018 08:38)      MEDICATIONS  (STANDING):  acetaminophen  IVPB .. 1000 milliGRAM(s) IV Intermittent once  dexmedetomidine Infusion 0.5 MICROgram(s)/kG/Hr (7.5 mL/Hr) IV Continuous <Continuous>  epoetin kelly Injectable 3000 Unit(s) IV Push <User Schedule>  fentaNYL   Infusion 2 MICROgram(s)/kG/Hr (12 mL/Hr) IV Continuous <Continuous>  fluconAZOLE IVPB      fluconAZOLE IVPB 200 milliGRAM(s) IV Intermittent every 24 hours  heparin  Injectable 5000 Unit(s) SubCutaneous every 12 hours  midodrine 10 milliGRAM(s) Oral every 8 hours  norepinephrine Infusion 0.05 MICROgram(s)/kG/Min (5.625 mL/Hr) IV Continuous <Continuous>  piperacillin/tazobactam IVPB. 3.375 Gram(s) IV Intermittent once  piperacillin/tazobactam IVPB. 3.375 Gram(s) IV Intermittent every 12 hours  QUEtiapine 25 milliGRAM(s) Oral two times a day  sodium chloride 0.9%. 1000 milliLiter(s) (30 mL/Hr) IV Continuous <Continuous>    MEDICATIONS  (PRN):  acetaminophen  IVPB .. 1000 milliGRAM(s) IV Intermittent once PRN Temp greater or equal to 38C (100.4F), Moderate Pain (4 - 6)  acetaminophen  IVPB .. 1000 milliGRAM(s) IV Intermittent once PRN Temp greater or equal to 38C (100.4F), Moderate Pain (4 - 6)      Mode: AC/ CMV (Assist Control/ Continuous Mandatory Ventilation)  RR (machine): 22  TV (machine): 400  FiO2: 50  PEEP: 5  MAP: 12  PIP: 28      Physical exam:   General:               Pt sedated                                               Neuro:                  Sedated                            Cardiovascular:   S1 & S2, regular                           Respiratory:         Air entry is decreased at  both bases, has bilateral conducted sounds                           GI:                          Soft, nondistended and nontender, Bowel sounds active                            Ext:                        No cyanosis or edema                              Labs:                                                                           8.4    5.57  )-----------( 159      ( 02 Feb 2019 02:30 )             27.6             02-02    136  |  99  |  42<H>  ----------------------------<  115<H>  3.9   |  25  |  4.45<H>    Ca    9.2      02 Feb 2019 02:30    TPro  6.4  /  Alb  2.7<L>  /  TBili  0.5  /  DBili  x   /  AST  14  /  ALT  17  /  AlkPhos  207<H>  02-02                    LIVER FUNCTIONS - ( 02 Feb 2019 02:30 )  Alb: 2.7 g/dL / Pro: 6.4 g/dL / ALK PHOS: 207 u/L / ALT: 17 u/L / AST: 14 u/L / GGT: x           Culture - Blood (02.01.19 @ 21:47)    Culture - Blood:   NO ORGANISMS ISOLATED AT 24 HOURS  BCPCR^BLOOD CULTURE PCR  ***Blood Panel PCR results on this specimen are available  approximately 3 hours after the Gram stain result***  Gram stain, PCR, and/or culture results may not always  correspond due to difference in methodologies  ------------------------------------------------------------    ***** CRITICAL RESULT *****    GPCCL^Gram Pos Cocci In Clusters  AFTER: 30 HOURS INCUBATION  BOTTLE: AEROBIC BOTTLE        CXR:  < from: Xray Chest 1 View- PORTABLE-Routine (02.01.19 @ 07:18) >  Status post extubation and removal of enteric tube with   tracheostomy tube in position and bilateral small effusions unchanged.      Plan:    General: Mr. Han is a 58M who underwent a FB, R thoracotomy, decortication, chest wall reconstruction, lat flap with Dr Pickering on 10/11/18. He was admitted on 1/6/19 to [facility] with a R pleural effusion and respiratory failure,  A R PTC was placed there and he was intubated.  He still had a R SANDY drain in place and he was transferred to [facility]. Patient was found to have aspirated a foreign body and +MRSA empyema. He was treated and discharged on 1/22/2019.     He was found by his home nurse to be in acute distress and recommended to proceed to the ED.  At presentation, he was found to be in acute hypercarbic respiratory failure complicated by hemodynamic instability requiring emergent intubation and pressor support. (28 Jan 2019 15:34)                            Neuro:                                         Pain control with Fentanyl drip / Precedex                            Cardiovascular:                                          Continue hemodynamic monitoring.    Hypotension: On  /Off Levophed, titrate  to MAP>65, Restarted Midodrine 10mg/TID, Wean off Levo as tolerated                                                  Respiratory:                        Pt is on full mechanical ventilator support IJ--40-7. CPAP wean as tolerated                                         Trached on 1/31                                                                 Continue bronchodilators, pulmonary toilet     Continue antibiotics -  Vanco based on level + Zosyn. Diflucan added because of yeast in BAL                            GI                                         Nepro 35cc/hr. Flush PEG with 30cc sterile water Q4hrs                                         Continue GI prophylaxis with Protonix                                                                 Renal:                                         HD as per renal / TIW                                          Monitor I/Os and electrolytes                                                                                        Hem/ Onc:                                                                                 Follow CBC in AM                           Infectious disease:     Bronchoscopy on 1/29 showed copious greenish secretions - Continue pulmonary toilet                                         Blood cultures - GPCC, Continue Vanco based on level  + Zosyn + Diflucan                                                                   Endocrine         Continue Accu-Checks with coverage      Pt is on SQ Heparin and Venodyne boots for DVT prophylaxis.     Pertinent clinical, laboratory, radiographic, hemodynamic, echocardiographic, respiratory data, microbiologic data and chart were reviewed and analyzed frequently throughout the course of the day and night  Patient seen, examined and plan discussed with CT Surgeon Dr. Pickering / CTICU team during rounds.    Pt's status discussed with wife at bedside, updated status.     I have spent 70  minutes of critical care time with this pt between 00am and 9am              Ralph Warren MD

## 2019-02-03 NOTE — PROGRESS NOTE ADULT - PROBLEM SELECTOR PLAN 1
Pt. with ESRD on HD TIW (TTS). Last HD was on 2/2/19 via AVF, tolerated treatment well. Pt. currently on IV pressors. Labs reviewed from today. No plan for HD today. Monitor BP and labs

## 2019-02-03 NOTE — PROGRESS NOTE ADULT - SUBJECTIVE AND OBJECTIVE BOX
[facility] Division of Kidney Diseases & Hypertension  FOLLOW UP NOTE  [phone]--------------------------------------------------------------------------------    HPI: 57 yo male with medical history of NICM with ICD, ESRD on HD, former smoker, BPH admitted with acute hypercapnic respiratory failure. Nephrology consulted for ESRD/HD management. Pt intubated and sedated in the CTICU. Pt. currently admitted with hypercarbic respiratory failure, septic shock. Last HD on 2/2/19. Pt seen in the CTICU, intubated and continues to be on IV pressors.     PAST HISTORY  --------------------------------------------------------------------------------  No significant changes to PMH, PSH, FHx, SHx, unless otherwise noted    ALLERGIES & MEDICATIONS  --------------------------------------------------------------------------------  Allergies    No Known Allergies    Intolerances      Standing Inpatient Medications  dexmedetomidine Infusion 0.5 MICROgram(s)/kG/Hr IV Continuous <Continuous>  epoetin kelly Injectable 3000 Unit(s) IV Push <User Schedule>  fentaNYL   Infusion 2 MICROgram(s)/kG/Hr IV Continuous <Continuous>  fluconAZOLE IVPB      fluconAZOLE IVPB 200 milliGRAM(s) IV Intermittent every 24 hours  heparin  Injectable 5000 Unit(s) SubCutaneous every 12 hours  midodrine 10 milliGRAM(s) Oral every 8 hours  norepinephrine Infusion 0.05 MICROgram(s)/kG/Min IV Continuous <Continuous>  piperacillin/tazobactam IVPB. 3.375 Gram(s) IV Intermittent once  piperacillin/tazobactam IVPB. 3.375 Gram(s) IV Intermittent every 12 hours  QUEtiapine 25 milliGRAM(s) Oral two times a day  sodium chloride 0.9%. 1000 milliLiter(s) IV Continuous <Continuous>    PRN Inpatient Medications  acetaminophen  IVPB .. 1000 milliGRAM(s) IV Intermittent once PRN  acetaminophen  IVPB .. 1000 milliGRAM(s) IV Intermittent once PRN      REVIEW OF SYSTEMS  --------------------------------------------------------------------------------  Unable to obtain.     VITALS/PHYSICAL EXAM  --------------------------------------------------------------------------------  T(C): 36.1 (02-03-19 @ 04:00), Max: 39.4 (02-02-19 @ 20:00)  HR: 77 (02-03-19 @ 07:33) (74 - 120)  BP: 123/59 (02-03-19 @ 07:00) (87/50 - 128/64)  RR: 22 (02-03-19 @ 07:00) (21 - 22)  SpO2: 100% (02-03-19 @ 07:33) (95% - 100%)  Wt(kg): --        02-02-19 @ 07:01  -  02-03-19 @ 07:00  --------------------------------------------------------  IN: 2482.3 mL / OUT: 1480 mL / NET: 1002.3 mL      Physical Exam:  	  Gen: Intubated  	HEENT: +ETT  	Pulm: coarse breath sounds B/L  	CV: S1S2  	Abd: Soft, +BS   	Ext: No LE edema B/L  	Neuro: Awake  	Skin: Warm and dry  	Vascular access: LUE AVF site: +thrill, bruit heard.     LABS/STUDIES  --------------------------------------------------------------------------------              8.4    6.76  >-----------<  145      [02-03-19 @ 05:50]              28.1     135  |  97  |  37  ----------------------------<  126      [02-03-19 @ 05:50]  3.8   |  24  |  3.51        Ca     9.0     [02-03-19 @ 05:50]    TPro  6.4  /  Alb  2.7  /  TBili  0.5  /  DBili  x   /  AST  14  /  ALT  17  /  AlkPhos  207  [02-02-19 @ 02:30]          Creatinine Trend:  SCr 3.51 [02-03 @ 05:50]  SCr 4.45 [02-02 @ 02:30]  SCr 3.50 [02-01 @ 03:40]  SCr 4.52 [01-31 @ 03:20]  SCr 7.04 [01-30 @ 04:50]          HBsAb Reactive      [02-01-19 @ 12:20]  HBsAg NEGATIVE      [02-01-19 @ 12:20]  HBcAb Reactive      [02-01-19 @ 12:20]  HCV 0.23, Nonreactive Hepatitis C AB  S/CO Ratio                        Interpretation  < 1.0                                     Non-Reactive  1.0 - 4.9                           Weakly-Reactive  > 5.0                                 Reactive  Non-Reactive: Aperson with a non-reactive HCV antibody  result is considered uninfected.  No further action is  needed unless recent infection is suspected.  In these  cases, consider repeat testing later to detect  seroconversion..  Weakly-Reactive: HCV antibody test is abnormal, HCV RNA  Qualitative test will follow.  Reactive: HCV antibody test is abnormal, HCV RNA  Qualitative test will follow.  Note: HCV antibody testing is performed on the [employer]   system.      [02-01-19 @ 12:20]

## 2019-02-03 NOTE — PROGRESS NOTE ADULT - PROBLEM SELECTOR PLAN 2
Patient with anemia in the setting of ESRD. Hemoglobin below target range. Pt. on MOOKIE treatment with HD. Monitor hemoglobin

## 2019-02-03 NOTE — PROGRESS NOTE ADULT - SUBJECTIVE AND OBJECTIVE BOX
CLAUDIA HAN          MRN-[id number]    HPI:  Mr. Han is a 58M who underwent a FB, R thoracotomy, decortication, chest wall reconstruction, lat flap with Dr Pickering on 10/11/18. He was admitted on 1/6/19 to [facility] with a R pleural effusion and respiratory failure,  A R PTC was placed there and he was intubated.  He still had a R SANDY drain in place and he was transferred to [facility]. Patient was found to have aspirated a foreign body and +MRSA empyema. He was treated and discharged on 1/22/2019.     He was found by his home nurse to be in acute distress and recommended to proceed to the ED.  At presentation, he was found to be in acute hypercarbic respiratory failure complicated by hemodynamic instability requiring emergent intubation and pressor support. (28 Jan 2019 15:34)      Procedure:  POD # :     Issues:        Interval/Overnight OR Events/ ROS  Pt extubated in the OR after surgery. On arrival in ICU still drowsy - but easily arousable to following commands; denies SOB, no nausea, no vomiting  Respiratory status required full ventilatory support,  following of ABG’s with A-line monitoring, continuous pulse oximetry monitoring, & an IV Propofol infusion for support & to evaluate for & prevent further decompensation secondary to persistent cardiopulmonary dysfunction.     Pt remained hemodynamically stable overnight, not on any pressors or inotropes. OOB to chair, breathing comfortably with minimal pain. Ambulated several times . Denies pain, no SOB, no palpitations, no nausea/ no vomiting, no dizziness  A-line and sharma d/kirsten       PAST MEDICAL & SURGICAL HISTORY:  Smoking hx  Cardiomyopathy  Wound: right chest  ICD (implantable cardioverter-defibrillator) in place  OA (osteoarthritis)  HLD (hyperlipidemia)  BPH (benign prostatic hyperplasia)  Pleural effusion  HTN (hypertension)  ESRD (end stage renal disease)  H/O chest wound: right  S/P thoracotomy: FB, R thoracotomy, decortication, chest wall reconstruction, lat flap  History of wound infection: right chest wall - revision 5/18 and again in 7/18  History of implantable cardioverter-defibrillator (ICD) placement: pt unsure when placed  H/O bilateral hip replacements: 2008, 2009    Allergies    No Known Allergies    Intolerances      Home Medications:  aspirin 81 mg oral tablet: 1 tab(s) orally once a day  (30 Oct 2018 14:39)  Breo Ellipta 100 mcg-25 mcg/inh inhalation powder: 1 puff(s) inhaled once a day patient denies using it (11 Oct 2018 08:38)  Calcium 500: 1 tab(s) orally 2 times a day (11 Oct 2018 08:38)  docusate sodium 100 mg oral tablet: 1 tab(s) orally 2 times a day, As Needed (11 Oct 2018 08:39)  folic acid 1 mg oral tablet: 1 tab(s) orally once a day (11 Oct 2018 08:38)  Mag-Ox 400 oral tablet: 1 tab(s) orally once a day (11 Oct 2018 08:39)  Multiple Vitamins oral tablet: 1 tab(s) orally once a day  (30 Oct 2018 14:39)  Namenda 10 mg oral tablet: 1 tab(s) orally 2 times a day (11 Oct 2018 08:38)  Nephplex Rx oral tablet: 1 tab(s) orally once a day (11 Oct 2018 08:39)  nortriptyline 50 mg oral capsule: 1 cap(s) orally once a day (11 Oct 2018 08:39)  Renvela 800 mg oral tablet: 2 tab(s) orally every 8 hours (11 Oct 2018 08:39)  simethicone 80 mg oral tablet: 1 tab(s) orally 3 times a day (after meals) (11 Oct 2018 08:39)  Tylenol 325 mg oral tablet: 2 tab(s) orally every 6 hours, As Needed (30 Oct 2018 14:39)  vancomycin 1 g intravenous injection: 1 gram(s) intravenous 3 times a week  Please give 3 x per week with Dialysis until 2/3/2019 (22 Jan 2019 13:41)  vitamin A: 1 tab(s) orally once a day (11 Oct 2018 08:38)  Vitamin B Complex: 1 tab(s) orally once a day (11 Oct 2018 08:38)  Vitamin C 500 mg oral tablet: 1 tab(s) orally once a day (11 Oct 2018 08:38)        ***VITAL SIGNS:  Vital Signs Last 24 Hrs  T(C): 38.3 (03 Feb 2019 16:00), Max: 39.4 (02 Feb 2019 20:00)  T(F): 100.9 (03 Feb 2019 16:00), Max: 103 (02 Feb 2019 20:00)  HR: 90 (03 Feb 2019 18:00) (74 - 115)  BP: 113/64 (03 Feb 2019 16:00) (87/50 - 128/64)  BP(mean): 76 (03 Feb 2019 16:00) (56 - 79)  RR: 22 (03 Feb 2019 18:00) (21 - 22)  SpO2: 100% (03 Feb 2019 18:00) (100% - 100%)    I/Os:   I&O's Detail    02 Feb 2019 07:01  -  03 Feb 2019 07:00  --------------------------------------------------------  IN:    dexmedetomidine Infusion: 76.5 mL    Enteral Tube Flush: 210 mL    fentaNYL  Infusion: 107 mL    IV PiggyBack: 200 mL    Nepro with Carb Steady: 755 mL    norepinephrine Infusion: 43.8 mL    Other: 400 mL    sodium chloride 0.9%.: 690 mL  Total IN: 2482.3 mL    OUT:    Bulb: 80 mL    Other: 1400 mL  Total OUT: 1480 mL    Total NET: 1002.3 mL      03 Feb 2019 07:01  -  03 Feb 2019 18:38  --------------------------------------------------------  IN:    dexmedetomidine Infusion: 54 mL    Enteral Tube Flush: 580 mL    fentaNYL  Infusion: 70.2 mL    Nepro with Carb Steady: 175 mL    norepinephrine Infusion: 72.4 mL    sodium chloride 0.9%.: 120 mL  Total IN: 1071.6 mL    OUT:    Bulb: 20 mL  Total OUT: 20 mL    Total NET: 1051.6 mL          CAPILLARY BLOOD GLUCOSE          =======================  MEDICATIONS  ===================  MEDICATIONS  (STANDING):  collagenase Ointment 1 Application(s) Topical daily  dexmedetomidine Infusion 0.5 MICROgram(s)/kG/Hr (7.5 mL/Hr) IV Continuous <Continuous>  epoetin kelly Injectable 3000 Unit(s) IV Push <User Schedule>  fentaNYL   Infusion 2 MICROgram(s)/kG/Hr (12 mL/Hr) IV Continuous <Continuous>  fluconAZOLE IVPB      fluconAZOLE IVPB 200 milliGRAM(s) IV Intermittent every 24 hours  heparin  Injectable 5000 Unit(s) SubCutaneous every 12 hours  midodrine 10 milliGRAM(s) Oral every 8 hours  norepinephrine Infusion 0.05 MICROgram(s)/kG/Min (5.625 mL/Hr) IV Continuous <Continuous>  piperacillin/tazobactam IVPB. 3.375 Gram(s) IV Intermittent once  piperacillin/tazobactam IVPB. 3.375 Gram(s) IV Intermittent every 12 hours  QUEtiapine 25 milliGRAM(s) Oral two times a day  sodium chloride 0.9%. 1000 milliLiter(s) (10 mL/Hr) IV Continuous <Continuous>    MEDICATIONS  (PRN):  acetaminophen  IVPB .. 1000 milliGRAM(s) IV Intermittent once PRN Temp greater or equal to 38C (100.4F), Moderate Pain (4 - 6)      ======================VENTILATOR SETTINGS  ==============  Mode: AC/ CMV (Assist Control/ Continuous Mandatory Ventilation)  RR (machine): 22  TV (machine): 400  FiO2: 50  PEEP: 5  MAP: 10  PIP: 26      =================== PATIENT CARE ACCESS DEVICES ==========  Peripheral IV  Central Venous Line	R	L	IJ	Fem	SC	Placed:   Arterial Line	R	L	PT	DP	Fem	Rad	Ax	Placed:   Midline:				  Urinary Catheter, Date Placed:   Necessity of urinary, arterial, and venous catheters discussed  ======================= PHYSICAL EXAM===================  General:          Comfortable, Awake, alert, no distress  Neuro:             Moving all extremities to commands. No focal deficits	  HEENT:                           CHER/ ETT/ NGT/ trach  Respiratory:	Lungs clear on auscultation bilaterally with good aeration.                            No rales, rhonchi, no wheezing. Effort even and unlabored.  CV:		         Regular rate and rhythm. Normal S1/S2. No murmurs  Abdomen:	Soft,  nontender, not-distended. Bowel sounds present / absent.   Skin:		No rash.  Extremities:	Warm, no cyanosis or edema.  Palpable pulses  ============================ LABS =======================                        8.4    6.76  )-----------( 145      ( 03 Feb 2019 05:50 )             28.1     02-03    135  |  97<L>  |  37<H>  ----------------------------<  126<H>  3.8   |  24  |  3.51<H>    Ca    9.0      03 Feb 2019 05:50    TPro  6.4  /  Alb  2.7<L>  /  TBili  0.5  /  DBili  x   /  AST  14  /  ALT  17  /  AlkPhos  207<H>  02-02    LIVER FUNCTIONS - ( 02 Feb 2019 02:30 )  Alb: 2.7 g/dL / Pro: 6.4 g/dL / ALK PHOS: 207 u/L / ALT: 17 u/L / AST: 14 u/L / GGT: x             ABG - ( 02 Feb 2019 02:30 )  pH, Arterial: 7.34  pH, Blood: x     /  pCO2: 51    /  pO2: 146   / HCO3: 26    / Base Excess: 1.5   /  SaO2: 99.0                ENDOTRACHEAL SPECIMEN  02-01-19 --  --  --      BLOOD PERIPHERAL  02-01-19 --  --  BLOOD CULTURE PCR      BLOOD VENOUS  01-29-19 --  --  --      LUNG - UPPER LOBE LEFT  01-29-19 --  --  --      BLOOD PERIPHERAL  01-28-19 --  --  --      BLOOD PERIPHERAL  01-09-19 --  --  --      PLEURAL FLUID  01-09-19 --  --    WBC^White Blood Cells  QNTY CELLS IN GRAM STAIN: FEW (2+)  NOS^No Organisms Seen      BRONCHIAL LAVAGE  01-08-19 --  --  --      BRONCHIAL LAVAGE  01-08-19 --  --  --      BLOOD ARTERIAL  01-07-19 --  --  --          ===================== IMAGING STUDIES ===================  Radiology personally reviewed.    ====================ASSESSMENT AND PLAN ================      ====================== NEUROLOGY=======================  Pain control with PCA / PCEA / Tylenol IV / Toradol / Percocet  Pt is on Precedex for agitation  Pt is sedated with Propofol / Fentanyl  ==================== RESPIRATORY========================  Pt is on       L nasal canula / Face tent____% FiO2/ full mech vent support  Comfortable, no evidence of distress.  Using incentive spirometry & doing                ml  Monitor chest tube output  Chest tube to suction / water seal	  Mechanical Ventilation:  Mode: AC/ CMV (Assist Control/ Continuous Mandatory Ventilation)  RR (machine): 22  TV (machine): 400  FiO2: 50  PEEP: 5  MAP: 10  PIP: 26    Mechanical ventilator status assessed & settings reviewed  Continuous pulse oximetry monitoring  Continue bronchodilators, pulmonary toilet  Head of bed elevation to 30-40 degrees  ====================CARDIOVASCULAR=====================  Continue hemodynamic monitoring/ telemetry  Not on any pressors/ titrate pressors for SBP>100, MAP >60-65  Continue cardiovascular / antihypertensive medications  ===================== RENAL ============================  Continue LR 30CC/hr      D/C IVF  Monitor I/Os, BUN/ Cr  and electrolytes  D/C Sharma      Keep Sharma for UO monitoring  BPH: Continue Flomax/ Finasteride  ==================== GASTROINTESTINAL===================  On regular/ tube feeds diet, tolerating well  Continue GI prophylaxis with Pepcid / Protonix  Continue Zofran / Reglan for nausea - PRN	  NPO  =======================    ENDOCRIN  =====================  Glycemic monitoring  F/S with coverage  ===================HEMATOLOGIC/ONCOLOGIC =============  Monitor chest tube output. No signs of active bleeding.   Follow CBC, coags  in AM  DVT prophylaxis with SCD, sc Heparin  ========================INFECTIOUS DISEASE===============  No signs of infection. Monitor for fever / leukocytosis.  All surgical incision / chest tube  sites look clean  D/C Sharma    Pertinent clinical, laboratory, radiographic, hemodynamic, echocardiographic, respiratory data, microbiologic data and chart were reviewed and analyzed frequently throughout the course of the day and night. GI and DVT prophylaxis, glycemic control, head of bed elevation and skin care issues were addressed.  Patient seen, examined and plan discussed with CT Surgery / CTICU team during rounds.  Pt remains critically ill in imminent risk of  deterioration and requires very careful cardio- pulmonary monitoring and support.    I have spent        minutes of critical care time with this pt between      am/pm   and        am/ pm         minutes spent on total encounter; more than 50% of the visit was spent counseling and/or coordinating care by the attending physician.      KERLINE Faith MD CLAUDIA HAN          MRN-[id number]    HPI:  Mr. Han is a 58M who underwent a FB, R thoracotomy, decortication, chest wall reconstruction, lat flap with Dr Pickering on 10/11/18. He was admitted on 1/6/19 to [facility] with a R pleural effusion and respiratory failure,  A R PTC was placed there and he was intubated.  He still had a R SANDY drain in place and he was transferred to [facility]. Patient was found to have aspirated a foreign body and +MRSA empyema. He was treated and discharged on 1/22/2019.     He was found by his home nurse to be in acute distress and recommended to proceed to the ED.  At presentation, he was found to be in acute hypercarbic respiratory failure complicated by hemodynamic instability requiring emergent intubation and pressor support. (28 Jan 2019 15:34)      Procedure:        Trach and PEG  1/31/2019  Bronchoscopy   1/29/2019  Right thoracotomy, resection of portion of R 7th rib, evacuation of infected hemothorax, takedown of pleurocutaneous fistula  10/11/2018      Issues:  Acute hypoxic & hypercapnic respiratory failure  Hypotension  Pneumonia /  Fever / Sepsis  ESRD / HD  Cardiomyopathy  HLD  BPH    Drips:  Levo  Precedex              Fentanyl        Interval/Overnight OR Events/ ROS  Respiratory status required full ventilatory support,  following of ABG’s with A-line monitoring, continuous pulse oximetry monitoring,      PAST MEDICAL & SURGICAL HISTORY:  Smoking hx  Cardiomyopathy  Wound: right chest  ICD (implantable cardioverter-defibrillator) in place  OA (osteoarthritis)  HLD (hyperlipidemia)  BPH (benign prostatic hyperplasia)  Pleural effusion  HTN (hypertension)  ESRD (end stage renal disease)  H/O chest wound: right  S/P thoracotomy: FB, R thoracotomy, decortication, chest wall reconstruction, lat flap  History of wound infection: right chest wall - revision 5/18 and again in 7/18  History of implantable cardioverter-defibrillator (ICD) placement: pt unsure when placed  H/O bilateral hip replacements: 2008, 2009      Allergies  No Known Allergies        Home Medications:  aspirin 81 mg oral tablet: 1 tab(s) orally once a day  (30 Oct 2018 14:39)  Breo Ellipta 100 mcg-25 mcg/inh inhalation powder: 1 puff(s) inhaled once a day patient denies using it (11 Oct 2018 08:38)  Calcium 500: 1 tab(s) orally 2 times a day (11 Oct 2018 08:38)  docusate sodium 100 mg oral tablet: 1 tab(s) orally 2 times a day, As Needed (11 Oct 2018 08:39)  folic acid 1 mg oral tablet: 1 tab(s) orally once a day (11 Oct 2018 08:38)  Mag-Ox 400 oral tablet: 1 tab(s) orally once a day (11 Oct 2018 08:39)  Multiple Vitamins oral tablet: 1 tab(s) orally once a day  (30 Oct 2018 14:39)  Namenda 10 mg oral tablet: 1 tab(s) orally 2 times a day (11 Oct 2018 08:38)  Nephplex Rx oral tablet: 1 tab(s) orally once a day (11 Oct 2018 08:39)  nortriptyline 50 mg oral capsule: 1 cap(s) orally once a day (11 Oct 2018 08:39)  Renvela 800 mg oral tablet: 2 tab(s) orally every 8 hours (11 Oct 2018 08:39)  simethicone 80 mg oral tablet: 1 tab(s) orally 3 times a day (after meals) (11 Oct 2018 08:39)  Tylenol 325 mg oral tablet: 2 tab(s) orally every 6 hours, As Needed (30 Oct 2018 14:39)  vancomycin 1 g intravenous injection: 1 gram(s) intravenous 3 times a week  Please give 3 x per week with Dialysis until 2/3/2019 (22 Jan 2019 13:41)  vitamin A: 1 tab(s) orally once a day (11 Oct 2018 08:38)  Vitamin B Complex: 1 tab(s) orally once a day (11 Oct 2018 08:38)  Vitamin C 500 mg oral tablet: 1 tab(s) orally once a day (11 Oct 2018 08:38)      ***VITAL SIGNS:  Vital Signs Last 24 Hrs  T(C): 38.3 (03 Feb 2019 16:00), Max: 39.4 (02 Feb 2019 20:00)  T(F): 100.9 (03 Feb 2019 16:00), Max: 103 (02 Feb 2019 20:00)  HR: 90 (03 Feb 2019 18:00) (74 - 115)  BP: 113/64 (03 Feb 2019 16:00) (87/50 - 128/64)  BP(mean): 76 (03 Feb 2019 16:00) (56 - 79)  RR: 22 (03 Feb 2019 18:00) (21 - 22)  SpO2: 100% (03 Feb 2019 18:00) (100% - 100%)    I/Os:  02 Feb 2019 07:01  -  03 Feb 2019 07:00  --------------------------------------------------------  IN:    dexmedetomidine Infusion: 76.5 mL    Enteral Tube Flush: 210 mL    fentaNYL  Infusion: 107 mL    IV PiggyBack: 200 mL    Nepro with Carb Steady: 755 mL    norepinephrine Infusion: 43.8 mL    Other: 400 mL    sodium chloride 0.9%.: 690 mL  Total IN: 2482.3 mL    OUT:    Bulb: 80 mL    Other: 1400 mL  Total OUT: 1480 mL    Total NET: 1002.3 mL      03 Feb 2019 07:01  -  03 Feb 2019 18:38  --------------------------------------------------------  IN:    dexmedetomidine Infusion: 54 mL    Enteral Tube Flush: 580 mL    fentaNYL  Infusion: 70.2 mL    Nepro with Carb Steady: 175 mL    norepinephrine Infusion: 72.4 mL    sodium chloride 0.9%.: 120 mL  Total IN: 1071.6 mL    OUT:    Bulb: 20 mL  Total OUT: 20 mL    Total NET: 1051.6 mL    =======================  MEDICATIONS  ===================  MEDICATIONS  (STANDING):  collagenase Ointment 1 Application(s) Topical daily  dexmedetomidine Infusion 0.5 MICROgram(s)/kG/Hr (7.5 mL/Hr) IV Continuous <Continuous>  epoetin kelly Injectable 3000 Unit(s) IV Push <User Schedule>  fentaNYL   Infusion 2 MICROgram(s)/kG/Hr (12 mL/Hr) IV Continuous <Continuous>  fluconAZOLE IVPB      fluconAZOLE IVPB 200 milliGRAM(s) IV Intermittent every 24 hours  heparin  Injectable 5000 Unit(s) SubCutaneous every 12 hours  midodrine 10 milliGRAM(s) Oral every 8 hours  norepinephrine Infusion 0.05 MICROgram(s)/kG/Min (5.625 mL/Hr) IV Continuous <Continuous>  piperacillin/tazobactam IVPB. 3.375 Gram(s) IV Intermittent once  piperacillin/tazobactam IVPB. 3.375 Gram(s) IV Intermittent every 12 hours  QUEtiapine 25 milliGRAM(s) Oral two times a day  sodium chloride 0.9%. 1000 milliLiter(s) (10 mL/Hr) IV Continuous <Continuous>    MEDICATIONS  (PRN):  acetaminophen  IVPB .. 1000 milliGRAM(s) IV Intermittent once PRN Temp greater or equal to 38C (100.4F), Moderate Pain (4 - 6)      ======================VENTILATOR SETTINGS  ==============  Mode: AC/ CMV (Assist Control/ Continuous Mandatory Ventilation)  RR (machine): 22  TV (machine): 400  FiO2: 50  PEEP: 5  MAP: 10  PIP: 26      =================== PATIENT CARE ACCESS DEVICES ==========  Peripheral IV  Central Venous Line	R	L	IJ	Fem	SC	Placed:   Arterial Line	R	L	PT	DP	Fem	Rad	Ax	Placed:   Midline:				  Urinary Catheter, Date Placed:   Necessity of urinary, arterial, and venous catheters discussed  ======================= PHYSICAL EXAM===================  General:          Comfortable, Awake, alert, no distress  Neuro:             Moving all extremities to commands. No focal deficits	  HEENT:                           CHER/ ETT/ NGT/ trach  Respiratory:	Lungs clear on auscultation bilaterally with good aeration.                            No rales, rhonchi, no wheezing. Effort even and unlabored.  CV:		         Regular rate and rhythm. Normal S1/S2. No murmurs  Abdomen:	Soft,  nontender, not-distended. Bowel sounds present / absent.   Skin:		No rash.  Extremities:	Warm, no cyanosis or edema.  Palpable pulses  ============================ LABS =======================                        8.4    6.76  )-----------( 145      ( 03 Feb 2019 05:50 )             28.1     02-03    135  |  97<L>  |  37<H>  ----------------------------<  126<H>  3.8   |  24  |  3.51<H>    Ca    9.0      03 Feb 2019 05:50    TPro  6.4  /  Alb  2.7<L>  /  TBili  0.5  /  DBili  x   /  AST  14  /  ALT  17  /  AlkPhos  207<H>  02-02    LIVER FUNCTIONS - ( 02 Feb 2019 02:30 )  Alb: 2.7 g/dL / Pro: 6.4 g/dL / ALK PHOS: 207 u/L / ALT: 17 u/L / AST: 14 u/L / GGT: x             ABG - ( 02 Feb 2019 02:30 )  pH, Arterial: 7.34  pH, Blood: x     /  pCO2: 51    /  pO2: 146   / HCO3: 26    / Base Excess: 1.5   /  SaO2: 99.0                ENDOTRACHEAL SPECIMEN  02-01-19 --  --  --      BLOOD PERIPHERAL  02-01-19 --  --  BLOOD CULTURE PCR      BLOOD VENOUS  01-29-19 --  --  --      LUNG - UPPER LOBE LEFT  01-29-19 --  --  --      BLOOD PERIPHERAL  01-28-19 --  --  --      BLOOD PERIPHERAL  01-09-19 --  --  --      PLEURAL FLUID  01-09-19 --  --    WBC^White Blood Cells  QNTY CELLS IN GRAM STAIN: FEW (2+)  NOS^No Organisms Seen      BRONCHIAL LAVAGE  01-08-19 --  --  --      BRONCHIAL LAVAGE  01-08-19 --  --  --      BLOOD ARTERIAL  01-07-19 --  --  --          ===================== IMAGING STUDIES ===================  Radiology personally reviewed.    ====================ASSESSMENT AND PLAN ================      ====================== NEUROLOGY=======================  Pain control with PCA / PCEA / Tylenol IV / Toradol / Percocet  Pt is on Precedex for agitation  Pt is sedated with Propofol / Fentanyl  ==================== RESPIRATORY========================  Pt is on       L nasal canula / Face tent____% FiO2/ full mech vent support  Comfortable, no evidence of distress.  Using incentive spirometry & doing                ml  Monitor chest tube output  Chest tube to suction / water seal	  Mechanical Ventilation:  Mode: AC/ CMV (Assist Control/ Continuous Mandatory Ventilation)  RR (machine): 22  TV (machine): 400  FiO2: 50  PEEP: 5  MAP: 10  PIP: 26    Mechanical ventilator status assessed & settings reviewed  Continuous pulse oximetry monitoring  Continue bronchodilators, pulmonary toilet  Head of bed elevation to 30-40 degrees  ====================CARDIOVASCULAR=====================  Continue hemodynamic monitoring/ telemetry  Not on any pressors/ titrate pressors for SBP>100, MAP >60-65  Continue cardiovascular / antihypertensive medications  ===================== RENAL ============================  Continue LR 30CC/hr      D/C IVF  Monitor I/Os, BUN/ Cr  and electrolytes  D/C Moore      Keep Moore for UO monitoring  BPH: Continue Flomax/ Finasteride  ==================== GASTROINTESTINAL===================  On regular/ tube feeds diet, tolerating well  Continue GI prophylaxis with Pepcid / Protonix  Continue Zofran / Reglan for nausea - PRN	  NPO  =======================    ENDOCRIN  =====================  Glycemic monitoring  F/S with coverage  ===================HEMATOLOGIC/ONCOLOGIC =============  Monitor chest tube output. No signs of active bleeding.   Follow CBC, coags  in AM  DVT prophylaxis with SCD, sc Heparin  ========================INFECTIOUS DISEASE===============  No signs of infection. Monitor for fever / leukocytosis.  All surgical incision / chest tube  sites look clean  D/C Moore    Pertinent clinical, laboratory, radiographic, hemodynamic, echocardiographic, respiratory data, microbiologic data and chart were reviewed and analyzed frequently throughout the course of the day and night. GI and DVT prophylaxis, glycemic control, head of bed elevation and skin care issues were addressed.  Patient seen, examined and plan discussed with CT Surgery / CTICU team during rounds.  Pt remains critically ill in imminent risk of  deterioration and requires very careful cardio- pulmonary monitoring and support.    I have spent        minutes of critical care time with this pt between      am/pm   and        am/ pm         minutes spent on total encounter; more than 50% of the visit was spent counseling and/or coordinating care by the attending physician.      KERLINE Faith MD                       MEDICATIONS  (STANDING):  acetaminophen  IVPB .. 1000 milliGRAM(s) IV Intermittent once  dexmedetomidine Infusion 0.5 MICROgram(s)/kG/Hr (7.5 mL/Hr) IV Continuous <Continuous>  epoetin kelly Injectable 3000 Unit(s) IV Push <User Schedule>  fentaNYL   Infusion 2 MICROgram(s)/kG/Hr (12 mL/Hr) IV Continuous <Continuous>  fluconAZOLE IVPB      fluconAZOLE IVPB 200 milliGRAM(s) IV Intermittent every 24 hours  heparin  Injectable 5000 Unit(s) SubCutaneous every 12 hours  midodrine 10 milliGRAM(s) Oral every 8 hours  norepinephrine Infusion 0.05 MICROgram(s)/kG/Min (5.625 mL/Hr) IV Continuous <Continuous>  piperacillin/tazobactam IVPB. 3.375 Gram(s) IV Intermittent once  piperacillin/tazobactam IVPB. 3.375 Gram(s) IV Intermittent every 12 hours  QUEtiapine 25 milliGRAM(s) Oral two times a day  sodium chloride 0.9%. 1000 milliLiter(s) (30 mL/Hr) IV Continuous <Continuous>    MEDICATIONS  (PRN):  acetaminophen  IVPB .. 1000 milliGRAM(s) IV Intermittent once PRN Temp greater or equal to 38C (100.4F), Moderate Pain (4 - 6)  acetaminophen  IVPB .. 1000 milliGRAM(s) IV Intermittent once PRN Temp greater or equal to 38C (100.4F), Moderate Pain (4 - 6)      Mode: AC/ CMV (Assist Control/ Continuous Mandatory Ventilation)  RR (machine): 22  TV (machine): 400  FiO2: 50  PEEP: 5  MAP: 12  PIP: 28      Physical exam:   General:               Pt sedated                                               Neuro:                  Sedated                            Cardiovascular:   S1 & S2, regular                           Respiratory:         Air entry is decreased at  both bases, has bilateral conducted sounds                           GI:                          Soft, nondistended and nontender, Bowel sounds active                            Ext:                        No cyanosis or edema                              Labs:                                                                           8.4    5.57  )-----------( 159      ( 02 Feb 2019 02:30 )             27.6             02-02    136  |  99  |  42<H>  ----------------------------<  115<H>  3.9   |  25  |  4.45<H>    Ca    9.2      02 Feb 2019 02:30    TPro  6.4  /  Alb  2.7<L>  /  TBili  0.5  /  DBili  x   /  AST  14  /  ALT  17  /  AlkPhos  207<H>  02-02                    LIVER FUNCTIONS - ( 02 Feb 2019 02:30 )  Alb: 2.7 g/dL / Pro: 6.4 g/dL / ALK PHOS: 207 u/L / ALT: 17 u/L / AST: 14 u/L / GGT: x           Culture - Blood (02.01.19 @ 21:47)    Culture - Blood:   NO ORGANISMS ISOLATED AT 24 HOURS  BCPCR^BLOOD CULTURE PCR  ***Blood Panel PCR results on this specimen are available  approximately 3 hours after the Gram stain result***  Gram stain, PCR, and/or culture results may not always  correspond due to difference in methodologies  ------------------------------------------------------------    ***** CRITICAL RESULT *****    GPCCL^Gram Pos Cocci In Clusters  AFTER: 30 HOURS INCUBATION  BOTTLE: AEROBIC BOTTLE        CXR:  < from: Xray Chest 1 View- PORTABLE-Routine (02.01.19 @ 07:18) >  Status post extubation and removal of enteric tube with   tracheostomy tube in position and bilateral small effusions unchanged.      Plan:    General: Mr. Han is a 58M who underwent a FB, R thoracotomy, decortication, chest wall reconstruction, lat flap with Dr Pickering on 10/11/18. He was admitted on 1/6/19 to [facility] with a R pleural effusion and respiratory failure,  A R PTC was placed there and he was intubated.  He still had a R SANDY drain in place and he was transferred to [facility]. Patient was found to have aspirated a foreign body and +MRSA empyema. He was treated and discharged on 1/22/2019.     He was found by his home nurse to be in acute distress and recommended to proceed to the ED.  At presentation, he was found to be in acute hypercarbic respiratory failure complicated by hemodynamic instability requiring emergent intubation and pressor support. (28 Jan 2019 15:34)                            Neuro:                                         Pain control with Fentanyl drip / Precedex                            Cardiovascular:                                          Continue hemodynamic monitoring.    Hypotension: On  /Off Levophed, titrate  to MAP>65, Restarted Midodrine 10mg/TID, Wean off Levo as tolerated                                                  Respiratory:                        Pt is on full mechanical ventilator support DN--40-7. CPAP wean as tolerated                                         Trached on 1/31                                                                 Continue bronchodilators, pulmonary toilet     Continue antibiotics -  Vanco based on level + Zosyn. Diflucan added because of yeast in BAL                            GI                                         Nepro 35cc/hr. Flush PEG with 30cc sterile water Q4hrs                                         Continue GI prophylaxis with Protonix                                                                 Renal:                                         HD as per renal / TIW                                          Monitor I/Os and electrolytes                                                                                        Hem/ Onc:                                                                                 Follow CBC in AM                           Infectious disease:     Bronchoscopy on 1/29 showed copious greenish secretions - Continue pulmonary toilet                                         Blood cultures - GPCC, Continue Vanco based on level  + Zosyn + Diflucan                                                                   Endocrine         Continue Accu-Checks with coverage      Pt is on SQ Heparin and Venodyne boots for DVT prophylaxis.     Pertinent clinical, laboratory, radiographic, hemodynamic, echocardiographic, respiratory data, microbiologic data and chart were reviewed and analyzed frequently throughout the course of the day and night  Patient seen, examined and plan discussed with CT Surgeon Dr. Pickering / CTICU team during rounds.    Pt's status discussed with wife at bedside, updated status.     I have spent 70  minutes of critical care time with this pt between 00am and 9am CLAUDIA HAN          MRN-[id number]    HPI:  Mr. Han is a 58M who underwent a FB, R thoracotomy, decortication, chest wall reconstruction, lat flap with Dr Pickering on 10/11/18. He was admitted on 1/6/19 to [facility] with a R pleural effusion and respiratory failure,  A R PTC was placed there and he was intubated.  He still had a R SANDY drain in place and he was transferred to [facility]. Patient was found to have aspirated a foreign body and +MRSA empyema. He was treated and discharged on 1/22/2019.     He was found by his home nurse to be in acute distress and recommended to proceed to the ED.  At presentation, he was found to be in acute hypercarbic respiratory failure complicated by hemodynamic instability requiring emergent intubation and pressor support. (28 Jan 2019 15:34)      Procedure:        Trach and PEG  1/31/2019  Bronchoscopy   1/29/2019  Right thoracotomy, resection of portion of R 7th rib, evacuation of infected hemothorax, takedown of pleurocutaneous fistula  10/11/2018      Issues:  Acute hypoxic & hypercapnic respiratory failure  Hypotension  Pneumonia /  Fever / Sepsis  ESRD / HD  Cardiomyopathy  HLD  BPH    Drips:  Levo  Precedex              Fentanyl        Interval/Overnight OR Events/ ROS  Respiratory status required full ventilatory support,  following of ABG’s with A-line monitoring, continuous pulse oximetry monitoring,      PAST MEDICAL & SURGICAL HISTORY:  Smoking hx  Cardiomyopathy  Wound: right chest  ICD (implantable cardioverter-defibrillator) in place  OA (osteoarthritis)  HLD (hyperlipidemia)  BPH (benign prostatic hyperplasia)  Pleural effusion  HTN (hypertension)  ESRD (end stage renal disease)  H/O chest wound: right  S/P thoracotomy: FB, R thoracotomy, decortication, chest wall reconstruction, lat flap  History of wound infection: right chest wall - revision 5/18 and again in 7/18  History of implantable cardioverter-defibrillator (ICD) placement: pt unsure when placed  H/O bilateral hip replacements: 2008, 2009      Allergies  No Known Allergies        Home Medications:  aspirin 81 mg oral tablet: 1 tab(s) orally once a day  (30 Oct 2018 14:39)  Breo Ellipta 100 mcg-25 mcg/inh inhalation powder: 1 puff(s) inhaled once a day patient denies using it (11 Oct 2018 08:38)  Calcium 500: 1 tab(s) orally 2 times a day (11 Oct 2018 08:38)  docusate sodium 100 mg oral tablet: 1 tab(s) orally 2 times a day, As Needed (11 Oct 2018 08:39)  folic acid 1 mg oral tablet: 1 tab(s) orally once a day (11 Oct 2018 08:38)  Mag-Ox 400 oral tablet: 1 tab(s) orally once a day (11 Oct 2018 08:39)  Multiple Vitamins oral tablet: 1 tab(s) orally once a day  (30 Oct 2018 14:39)  Namenda 10 mg oral tablet: 1 tab(s) orally 2 times a day (11 Oct 2018 08:38)  Nephplex Rx oral tablet: 1 tab(s) orally once a day (11 Oct 2018 08:39)  nortriptyline 50 mg oral capsule: 1 cap(s) orally once a day (11 Oct 2018 08:39)  Renvela 800 mg oral tablet: 2 tab(s) orally every 8 hours (11 Oct 2018 08:39)  simethicone 80 mg oral tablet: 1 tab(s) orally 3 times a day (after meals) (11 Oct 2018 08:39)  Tylenol 325 mg oral tablet: 2 tab(s) orally every 6 hours, As Needed (30 Oct 2018 14:39)  vancomycin 1 g intravenous injection: 1 gram(s) intravenous 3 times a week  Please give 3 x per week with Dialysis until 2/3/2019 (22 Jan 2019 13:41)  vitamin A: 1 tab(s) orally once a day (11 Oct 2018 08:38)  Vitamin B Complex: 1 tab(s) orally once a day (11 Oct 2018 08:38)  Vitamin C 500 mg oral tablet: 1 tab(s) orally once a day (11 Oct 2018 08:38)      ***VITAL SIGNS:  Vital Signs Last 24 Hrs  T(C): 38.3 (03 Feb 2019 16:00), Max: 39.4 (02 Feb 2019 20:00)  T(F): 100.9 (03 Feb 2019 16:00), Max: 103 (02 Feb 2019 20:00)  HR: 90 (03 Feb 2019 18:00) (74 - 115)  BP: 113/64 (03 Feb 2019 16:00) (87/50 - 128/64)  BP(mean): 76 (03 Feb 2019 16:00) (56 - 79)  RR: 22 (03 Feb 2019 18:00) (21 - 22)  SpO2: 100% (03 Feb 2019 18:00) (100% - 100%)    I/Os:  02 Feb 2019 07:01  -  03 Feb 2019 07:00  --------------------------------------------------------  IN:    dexmedetomidine Infusion: 76.5 mL    Enteral Tube Flush: 210 mL    fentaNYL  Infusion: 107 mL    IV PiggyBack: 200 mL    Nepro with Carb Steady: 755 mL    norepinephrine Infusion: 43.8 mL    Other: 400 mL    sodium chloride 0.9%.: 690 mL  Total IN: 2482.3 mL    OUT:    Bulb: 80 mL    Other: 1400 mL  Total OUT: 1480 mL    Total NET: 1002.3 mL      03 Feb 2019 07:01  -  03 Feb 2019 18:38  --------------------------------------------------------  IN:    dexmedetomidine Infusion: 54 mL    Enteral Tube Flush: 580 mL    fentaNYL  Infusion: 70.2 mL    Nepro with Carb Steady: 175 mL    norepinephrine Infusion: 72.4 mL    sodium chloride 0.9%.: 120 mL  Total IN: 1071.6 mL    OUT:    Bulb: 20 mL  Total OUT: 20 mL    Total NET: 1051.6 mL    =======================  MEDICATIONS  ===================  MEDICATIONS  (STANDING):  collagenase Ointment 1 Application(s) Topical daily  dexmedetomidine Infusion 0.5 MICROgram(s)/kG/Hr (7.5 mL/Hr) IV Continuous <Continuous>  epoetin kelly Injectable 3000 Unit(s) IV Push <User Schedule>  fentaNYL   Infusion 2 MICROgram(s)/kG/Hr (12 mL/Hr) IV Continuous <Continuous>  fluconAZOLE IVPB      fluconAZOLE IVPB 200 milliGRAM(s) IV Intermittent every 24 hours  heparin  Injectable 5000 Unit(s) SubCutaneous every 12 hours  midodrine 10 milliGRAM(s) Oral every 8 hours  norepinephrine Infusion 0.05 MICROgram(s)/kG/Min (5.625 mL/Hr) IV Continuous <Continuous>  piperacillin/tazobactam IVPB. 3.375 Gram(s) IV Intermittent once  piperacillin/tazobactam IVPB. 3.375 Gram(s) IV Intermittent every 12 hours  QUEtiapine 25 milliGRAM(s) Oral two times a day  sodium chloride 0.9%. 1000 milliLiter(s) (10 mL/Hr) IV Continuous <Continuous>    MEDICATIONS  (PRN):  acetaminophen  IVPB .. 1000 milliGRAM(s) IV Intermittent once PRN Temp greater or equal to 38C (100.4F), Moderate Pain (4 - 6)      ======================VENTILATOR SETTINGS  ==============  Mode: AC/ CMV (Assist Control/ Continuous Mandatory Ventilation)  RR (machine): 22  TV (machine): 400  FiO2: 50  PEEP: 5  MAP: 10  PIP: 26    ======================= PHYSICAL EXAM===================  General:           no distress  Neuro:             sedated	  HEENT:                           CHER/   trach  Respiratory:	Lungs clear on auscultation bilaterally with good aeration.                            No rales, rhonchi, no wheezing.   CV:		 Regular rate and rhythm, (+) S1/S2.    Abdomen:	Soft,  nontender, not-distended. Bowel sounds present    Skin:		No rash.  Extremities:	Warm, no cyanosis or edema.  Palpable pulses    ============================ LABS =======================                        8.4    6.76  )-----------( 145      ( 03 Feb 2019 05:50 )             28.1     02-03    135  |  97<L>  |  37<H>  ----------------------------<  126<H>  3.8   |  24        |  3.51<H>    Ca    9.0      03 Feb 2019 05:50    TPro  6.4  /  Alb  2.7<L>  /  TBili  0.5  /  DBili  x   /  AST  14  /  ALT  17  /  AlkPhos  207<H>  02-02    LIVER FUNCTIONS - ( 02 Feb 2019 02:30 )  Alb: 2.7 g/dL / Pro: 6.4 g/dL / ALK PHOS: 207 u/L / ALT: 17 u/L / AST: 14 u/L / GGT: x             ABG - ( 02 Feb 2019 02:30 )  pH, Arterial: 7.34  pH, Blood: x     /  pCO2: 51    /  pO2: 146   / HCO3: 26    / Base Excess: 1.5   /  SaO2: 99.0      ENDOTRACHEAL SPECIMEN  02-01-19 --  --  --    BLOOD PERIPHERAL  02-01-19 --  --  BLOOD CULTURE PCR    BLOOD VENOUS  01-29-19 --  --  --    LUNG - UPPER LOBE LEFT  01-29-19 --  --  --    BLOOD PERIPHERAL  01-28-19 --  --  --    BLOOD PERIPHERAL  01-09-19 --  --  --    PLEURAL FLUID  01-09-19 --  --    WBC^White Blood Cells  QNTY CELLS IN GRAM STAIN: FEW (2+)  NOS^No Organisms Seen    BRONCHIAL LAVAGE  01-08-19 --  --  --  BRONCHIAL LAVAGE  01-08-19 --  --  --  BLOOD ARTERIAL  01-07-19 --  --  --  ===================== IMAGING STUDIES ===================  Radiology personally reviewed.  < from: CT Chest No Cont (02.03.19 @ 14:34) >    IMPRESSION:   Overall, there is improved aeration of the bilateral lower lobar distal   airways with decrease in degree of lobar atelectasis.   Small bilateral pleural effusions.  No bowel obstruction or inflammation.  Moderate volume ascites.  Indeterminate thick-walled lesion with central hypodensity in the right   inguinal region, measuring up to 2.8 cm. If there is clinical concern for   abscess, an ultrasound can be performed for further evaluation.    < end of copied text >    ====================ASSESSMENT AND PLAN ================    General: Mr. Han is a 58M who underwent a FB, R thoracotomy, decortication, chest wall reconstruction, lat flap with Dr Pickering on 10/11/18. He was admitted on 1/6/19 to [facility] with a R pleural effusion and respiratory failure,  A R PTC was placed there and he was intubated.  He still had a R SANDY drain in place and he was transferred to [facility]. Patient was found to have aspirated a foreign body and +MRSA empyema. He was treated and discharged on 1/22/2019.     He was found by his home nurse to be in acute distress and recommended to proceed to the ED.  At presentation, he was found to be in acute hypercarbic respiratory failure complicated by hemodynamic instability requiring emergent intubation and pressor support. (28 Jan 2019 15:34)    Procedure:        Trach and PEG  1/31/2019  Bronchoscopy   1/29/2019  Right thoracotomy, resection of portion of R 7th rib, evacuation of infected hemothorax, takedown of pleurocutaneous fistula  10/11/2018      Issues:  Acute hypoxic & hypercapnic respiratory failure  Hypotension  Pneumonia /  Fever / Sepsis  ESRD / HD  Cardiomyopathy  HLD  BPH  ====================== NEUROLOGY=======================  Pain control with Fentanyl  Pt is on Precedex for agitation     ==================== RESPIRATORY========================  Pt is on  full [facility] vent support  Comfortable, no evidence of distress.  Monitor  right chest tube output  	  Mechanical Ventilation:  Mode: AC/ CMV (Assist Control/ Continuous Mandatory Ventilation)  RR (machine): 22  TV (machine): 400  FiO2: 50  PEEP: 5  MAP: 10  PIP: 26    Mechanical ventilator status assessed & settings reviewed  Continuous pulse oximetry monitoring  Continue bronchodilators, pulmonary toilet  Head of bed elevation to 30-40 degrees  Continue antibiotics -  Vanco based on level + Zosyn. Diflucan added because of yeast in BAL  ====================CARDIOVASCULAR=====================  Continue hemodynamic monitoring/ telemetry   Hypotension: On  /Off Levophed, titrate  to MAP>65, Restarted Midodrine 10mg/TID, Wean off Levo as tolerated  ===================== RENAL ============================  Continue LR 30CC/hr      D/C IVF  Monitor I/Os, BUN/ Cr  and electrolytes  D/C Moore      Keep Moore for UO monitoring  BPH: Continue Flomax/ Finasteride  ==================== GASTROINTESTINAL===================  On J tube feeds Nepro 35cc/hr diet, tolerating well  Continue GI prophylaxis with Protonix   Zofran / Reglan for nausea - PRN	  =======================    ENDOCRIN  =====================  Glycemic monitoring  F/S with coverage  ===================HEMATOLOGIC/ONCOLOGIC =============  Monitor chest tube output. No signs of active bleeding.   Follow CBC, coags  in AM  DVT prophylaxis with SCD, sc Heparin  ========================INFECTIOUS DISEASE===============  No signs of infection. Monitor for fever / leukocytosis.  All surgical incision / chest tube  sites look clean  D/C Moore    Pertinent clinical, laboratory, radiographic, hemodynamic, echocardiographic, respiratory data, microbiologic data and chart were reviewed and analyzed frequently throughout the course of the day and night. GI and DVT prophylaxis, glycemic control, head of bed elevation and skin care issues were addressed.  Patient seen, examined and plan discussed with CT Surgery / CTICU team during rounds.  Pt remains critically ill in imminent risk of  deterioration and requires very careful cardio- pulmonary monitoring and support.    I have spent        minutes of critical care time with this pt between      am/pm   and        am/ pm         minutes spent on total encounter; more than 50% of the visit was spent counseling and/or coordinating care by the attending physician.      KERLINE Faith MD                                                                                                                                                                                                 Renal:                                         HD as per renal / TIW                                          Monitor I/Os and electrolytes                                                                                        Hem/ Onc:                                                                                 Follow CBC in AM                           Infectious disease:     Bronchoscopy on 1/29 showed copious greenish secretions - Continue pulmonary toilet                                         Blood cultures - [facility], Continue Vanco based on level  + Zosyn + Diflucan                                                                   Endocrine         Continue Accu-Checks with coverage      Pt is on SQ Heparin and Venodyne boots for DVT prophylaxis.     Pertinent clinical, laboratory, radiographic, hemodynamic, echocardiographic, respiratory data, microbiologic data and chart were reviewed and analyzed frequently throughout the course of the day and night  Patient seen, examined and plan discussed with CT Surgeon Dr. Pickering / CTICU team during rounds.    Pt's status discussed with wife at bedside, updated status.     I have spent 70  minutes of critical care time with this pt between 00am and 9am CLAUDIA HAN          MRN-[id number]    HPI:  Mr. Han is a 58M who underwent a FB, R thoracotomy, decortication, chest wall reconstruction, lat flap with Dr Pickering on 10/11/18. He was admitted on 1/6/19 to [facility] with a R pleural effusion and respiratory failure,  A R PTC was placed there and he was intubated.  He still had a R SANDY drain in place and he was transferred to [facility]. Patient was found to have aspirated a foreign body and +MRSA empyema. He was treated and discharged on 1/22/2019.     He was found by his home nurse to be in acute distress and recommended to proceed to the ED.  At presentation, he was found to be in acute hypercarbic respiratory failure complicated by hemodynamic instability requiring emergent intubation and pressor support. (28 Jan 2019 15:34)      Procedure:        Trach and PEG  1/31/2019  Bronchoscopy   1/29/2019  Right thoracotomy, resection of portion of R 7th rib, evacuation of infected hemothorax, takedown of pleurocutaneous fistula  10/11/2018      Issues:  Acute hypoxic & hypercapnic respiratory failure  Hypotension  Pneumonia /  Fever / Sepsis  ESRD / HD  Cardiomyopathy  HLD  BPH    Drips:  Levo  Precedex              Fentanyl        Interval/Overnight OR Events/ ROS  Respiratory status required full ventilatory support,  following of ABG’s with A-line monitoring, continuous pulse oximetry monitoring,      PAST MEDICAL & SURGICAL HISTORY:  Smoking hx  Cardiomyopathy  Wound: right chest  ICD (implantable cardioverter-defibrillator) in place  OA (osteoarthritis)  HLD (hyperlipidemia)  BPH (benign prostatic hyperplasia)  Pleural effusion  HTN (hypertension)  ESRD (end stage renal disease)  H/O chest wound: right  S/P thoracotomy: FB, R thoracotomy, decortication, chest wall reconstruction, lat flap  History of wound infection: right chest wall - revision 5/18 and again in 7/18  History of implantable cardioverter-defibrillator (ICD) placement: pt unsure when placed  H/O bilateral hip replacements: 2008, 2009      Allergies  No Known Allergies        Home Medications:  aspirin 81 mg oral tablet: 1 tab(s) orally once a day  (30 Oct 2018 14:39)  Breo Ellipta 100 mcg-25 mcg/inh inhalation powder: 1 puff(s) inhaled once a day patient denies using it (11 Oct 2018 08:38)  Calcium 500: 1 tab(s) orally 2 times a day (11 Oct 2018 08:38)  docusate sodium 100 mg oral tablet: 1 tab(s) orally 2 times a day, As Needed (11 Oct 2018 08:39)  folic acid 1 mg oral tablet: 1 tab(s) orally once a day (11 Oct 2018 08:38)  Mag-Ox 400 oral tablet: 1 tab(s) orally once a day (11 Oct 2018 08:39)  Multiple Vitamins oral tablet: 1 tab(s) orally once a day  (30 Oct 2018 14:39)  Namenda 10 mg oral tablet: 1 tab(s) orally 2 times a day (11 Oct 2018 08:38)  Nephplex Rx oral tablet: 1 tab(s) orally once a day (11 Oct 2018 08:39)  nortriptyline 50 mg oral capsule: 1 cap(s) orally once a day (11 Oct 2018 08:39)  Renvela 800 mg oral tablet: 2 tab(s) orally every 8 hours (11 Oct 2018 08:39)  simethicone 80 mg oral tablet: 1 tab(s) orally 3 times a day (after meals) (11 Oct 2018 08:39)  Tylenol 325 mg oral tablet: 2 tab(s) orally every 6 hours, As Needed (30 Oct 2018 14:39)  vancomycin 1 g intravenous injection: 1 gram(s) intravenous 3 times a week  Please give 3 x per week with Dialysis until 2/3/2019 (22 Jan 2019 13:41)  vitamin A: 1 tab(s) orally once a day (11 Oct 2018 08:38)  Vitamin B Complex: 1 tab(s) orally once a day (11 Oct 2018 08:38)  Vitamin C 500 mg oral tablet: 1 tab(s) orally once a day (11 Oct 2018 08:38)      ***VITAL SIGNS:  Vital Signs Last 24 Hrs  T(C): 38.3 (03 Feb 2019 16:00), Max: 39.4 (02 Feb 2019 20:00)  T(F): 100.9 (03 Feb 2019 16:00), Max: 103 (02 Feb 2019 20:00)  HR: 90 (03 Feb 2019 18:00) (74 - 115)  BP: 113/64 (03 Feb 2019 16:00) (87/50 - 128/64)  BP(mean): 76 (03 Feb 2019 16:00) (56 - 79)  RR: 22 (03 Feb 2019 18:00) (21 - 22)  SpO2: 100% (03 Feb 2019 18:00) (100% - 100%)    I/Os:  02 Feb 2019 07:01  -  03 Feb 2019 07:00  --------------------------------------------------------  IN:    dexmedetomidine Infusion: 76.5 mL    Enteral Tube Flush: 210 mL    fentaNYL  Infusion: 107 mL    IV PiggyBack: 200 mL    Nepro with Carb Steady: 755 mL    norepinephrine Infusion: 43.8 mL    Other: 400 mL    sodium chloride 0.9%.: 690 mL  Total IN: 2482.3 mL    OUT:    Bulb: 80 mL    Other: 1400 mL  Total OUT: 1480 mL    Total NET: 1002.3 mL      03 Feb 2019 07:01  -  03 Feb 2019 18:38  --------------------------------------------------------  IN:    dexmedetomidine Infusion: 54 mL    Enteral Tube Flush: 580 mL    fentaNYL  Infusion: 70.2 mL    Nepro with Carb Steady: 175 mL    norepinephrine Infusion: 72.4 mL    sodium chloride 0.9%.: 120 mL  Total IN: 1071.6 mL    OUT:    Bulb: 20 mL  Total OUT: 20 mL    Total NET: 1051.6 mL    =======================  MEDICATIONS  ===================  MEDICATIONS  (STANDING):  collagenase Ointment 1 Application(s) Topical daily  dexmedetomidine Infusion 0.5 MICROgram(s)/kG/Hr (7.5 mL/Hr) IV Continuous <Continuous>  epoetin kelly Injectable 3000 Unit(s) IV Push <User Schedule>  fentaNYL   Infusion 2 MICROgram(s)/kG/Hr (12 mL/Hr) IV Continuous <Continuous>  fluconAZOLE IVPB      fluconAZOLE IVPB 200 milliGRAM(s) IV Intermittent every 24 hours  heparin  Injectable 5000 Unit(s) SubCutaneous every 12 hours  midodrine 10 milliGRAM(s) Oral every 8 hours  norepinephrine Infusion 0.05 MICROgram(s)/kG/Min (5.625 mL/Hr) IV Continuous <Continuous>  piperacillin/tazobactam IVPB. 3.375 Gram(s) IV Intermittent once  piperacillin/tazobactam IVPB. 3.375 Gram(s) IV Intermittent every 12 hours  QUEtiapine 25 milliGRAM(s) Oral two times a day  sodium chloride 0.9%. 1000 milliLiter(s) (10 mL/Hr) IV Continuous <Continuous>    MEDICATIONS  (PRN):  acetaminophen  IVPB .. 1000 milliGRAM(s) IV Intermittent once PRN Temp greater or equal to 38C (100.4F), Moderate Pain (4 - 6)      ======================VENTILATOR SETTINGS  ==============  Mode: AC/ CMV (Assist Control/ Continuous Mandatory Ventilation)  RR (machine): 22  TV (machine): 400  FiO2: 50  PEEP: 5  MAP: 10  PIP: 26    ======================= PHYSICAL EXAM===================  General:           no distress  Neuro:             sedated	  HEENT:                           CHER/   trach  Respiratory:	Lungs clear on auscultation bilaterally with good aeration.                            No rales, rhonchi, no wheezing.   CV:		 Regular rate and rhythm, (+) S1/S2.    Abdomen:	Soft,  nontender, not-distended. Bowel sounds present    Skin:		No rash.  Extremities:	Warm, no cyanosis or edema.  Palpable pulses    ============================ LABS =======================                        8.4    6.76  )-----------( 145      ( 03 Feb 2019 05:50 )             28.1     02-03    135  |  97<L>  |  37<H>  ----------------------------<  126<H>  3.8   |  24        |  3.51<H>    Ca    9.0      03 Feb 2019 05:50    TPro  6.4  /  Alb  2.7<L>  /  TBili  0.5  /  DBili  x   /  AST  14  /  ALT  17  /  AlkPhos  207<H>  02-02    LIVER FUNCTIONS - ( 02 Feb 2019 02:30 )  Alb: 2.7 g/dL / Pro: 6.4 g/dL / ALK PHOS: 207 u/L / ALT: 17 u/L / AST: 14 u/L / GGT: x             ABG - ( 02 Feb 2019 02:30 )  pH, Arterial: 7.34  pH, Blood: x     /  pCO2: 51    /  pO2: 146   / HCO3: 26    / Base Excess: 1.5   /  SaO2: 99.0      ENDOTRACHEAL SPECIMEN  02-01-19 --  --  --    BLOOD PERIPHERAL  02-01-19 --  --  BLOOD CULTURE PCR    BLOOD VENOUS  01-29-19 --  --  --    LUNG - UPPER LOBE LEFT  01-29-19 --  --  --    BLOOD PERIPHERAL  01-28-19 --  --  --    BLOOD PERIPHERAL  01-09-19 --  --  --    PLEURAL FLUID  01-09-19 --  --    WBC^White Blood Cells  QNTY CELLS IN GRAM STAIN: FEW (2+)  NOS^No Organisms Seen    BRONCHIAL LAVAGE  01-08-19 --  --  --  BRONCHIAL LAVAGE  01-08-19 --  --  --  BLOOD ARTERIAL  01-07-19 --  --  --  ===================== IMAGING STUDIES ===================  Radiology personally reviewed.  < from: CT Chest No Cont (02.03.19 @ 14:34) >    IMPRESSION:   Overall, there is improved aeration of the bilateral lower lobar distal   airways with decrease in degree of lobar atelectasis.   Small bilateral pleural effusions.  No bowel obstruction or inflammation.  Moderate volume ascites.  Indeterminate thick-walled lesion with central hypodensity in the right   inguinal region, measuring up to 2.8 cm. If there is clinical concern for   abscess, an ultrasound can be performed for further evaluation.    < end of copied text >    ====================ASSESSMENT AND PLAN ================    General: Mr. Han is a 58M who underwent a FB, R thoracotomy, decortication, chest wall reconstruction, lat flap with Dr Pickering on 10/11/18. He was admitted on 1/6/19 to [facility] with a R pleural effusion and respiratory failure,  A R PTC was placed there and he was intubated.  He still had a R SANDY drain in place and he was transferred to [facility]. Patient was found to have aspirated a foreign body and +MRSA empyema. He was treated and discharged on 1/22/2019.     He was found by his home nurse to be in acute distress and recommended to proceed to the ED.  At presentation, he was found to be in acute hypercarbic respiratory failure complicated by hemodynamic instability requiring emergent intubation and pressor support. (28 Jan 2019 15:34)    Procedure:        Trach and PEG  1/31/2019  Bronchoscopy   1/29/2019  Right thoracotomy, resection of portion of R 7th rib, evacuation of infected hemothorax, takedown of pleurocutaneous fistula  10/11/2018      Issues:  Acute hypoxic & hypercapnic respiratory failure  Hypotension  Pneumonia /  Fever / Sepsis  ESRD / HD  Cardiomyopathy  HLD  BPH  ====================== NEUROLOGY=======================  Pain control with Fentanyl  Pt is on Precedex for agitation     ==================== RESPIRATORY========================  Pt is on  full [facility] vent support  Comfortable, no evidence of distress.  Monitor  right chest tube output  	  Mechanical Ventilation:  Mode: AC/ CMV (Assist Control/ Continuous Mandatory Ventilation)  RR (machine): 22  TV (machine): 400  FiO2: 50  PEEP: 5  MAP: 10  PIP: 26    Mechanical ventilator status assessed & settings reviewed  Continuous pulse oximetry monitoring  Continue bronchodilators, pulmonary toilet  Head of bed elevation to 30-40 degrees  Continue antibiotics -  Vanco based on level + Zosyn. Diflucan added because of yeast in BAL  ====================CARDIOVASCULAR=====================  Continue hemodynamic monitoring/ telemetry   Hypotension: On  /Off Levophed, titrate  to MAP>65, Restarted Midodrine 10mg/TID, Wean off Levo as tolerated  ===================== RENAL ============================  ESRD on HD TIW  Monitor I/Os, BUN/ Cr  and electrolytes  Renal f/up        ==================== GASTROINTESTINAL===================  On J tube feeds Nepro 35cc/hr diet, tolerating well  Continue GI prophylaxis with Protonix   Zofran / Reglan for nausea - PRN	  =======================    ENDOCRIN  =====================  Glycemic monitoring  F/S with coverage  ===================HEMATOLOGIC/ONCOLOGIC =============   No signs of active bleeding.   Follow CBC, coags  in AM  DVT prophylaxis with SCD, sc Heparin  ========================INFECTIOUS DISEASE===============  Monitor for fever / leukocytosis.  Bronchoscopy on 1/29 showed copious greenish secretions - Continue pulmonary toilet   Blood cultures - [facility], Continue Vanco by  level  + Zosyn + Diflucan    Pertinent clinical, laboratory, radiographic, hemodynamic, echocardiographic, respiratory data, microbiologic data and chart were reviewed and analyzed frequently throughout the course of the day and night. GI and DVT prophylaxis, glycemic control, head of bed elevation and skin care issues were addressed.  Patient seen, examined and plan discussed with CT Surgery Dr Pickering / CTICU team during rounds.  Pt remains critically ill in imminent risk of  deterioration and requires very careful cardio- pulmonary monitoring and support.    I have spent   60     minutes of critical care time with this pt between  8 am  and   11:59  pm         minutes spent on total encounter; more than 50% of the visit was spent counseling and/or coordinating care by the attending physician.      KERLINE Faith MD CLAUDIA HAN          MRN-[id number]    HPI:  Mr. Han is a 58M who underwent a FB, R thoracotomy, decortication, chest wall reconstruction, lat flap with Dr Pickering on 10/11/18. He was admitted on 1/6/19 to [facility] with a R pleural effusion and respiratory failure,  A R PTC was placed there and he was intubated.  He still had a R SANDY drain in place and he was transferred to [facility]. Patient was found to have aspirated a foreign body and +MRSA empyema. He was treated and discharged on 1/22/2019.     He was found by his home nurse to be in acute distress and recommended to proceed to the ED.  At presentation, he was found to be in acute hypercarbic respiratory failure complicated by hemodynamic instability requiring emergent intubation and pressor support. (28 Jan 2019 15:34)      Procedure:        Trach and PEG  1/31/2019  Bronchoscopy   1/29/2019  Right thoracotomy, resection of portion of R 7th rib, evacuation of infected hemothorax, takedown of pleurocutaneous fistula  10/11/2018    Issues:  Acute hypoxic & hypercapnic respiratory failure  Hypotension  Pneumonia /  Fever / Sepsis  ESRD / HD  Cardiomyopathy  HLD  BPH    Drips:  Levo  Precedex              Fentanyl        Interval/Overnight OR Events/ ROS  Respiratory status required full ventilatory support,  following of ABG’s with A-line monitoring, continuous pulse oximetry monitoring,      PAST MEDICAL & SURGICAL HISTORY:  Smoking hx  Cardiomyopathy  Wound: right chest  ICD (implantable cardioverter-defibrillator) in place  OA (osteoarthritis)  HLD (hyperlipidemia)  BPH (benign prostatic hyperplasia)  Pleural effusion  HTN (hypertension)  ESRD (end stage renal disease)  H/O chest wound: right  S/P thoracotomy: FB, R thoracotomy, decortication, chest wall reconstruction, lat flap  History of wound infection: right chest wall - revision 5/18 and again in 7/18  History of implantable cardioverter-defibrillator (ICD) placement: pt unsure when placed  H/O bilateral hip replacements: 2008, 2009      Allergies  No Known Allergies      Home Medications:  aspirin 81 mg oral tablet: 1 tab(s) orally once a day  (30 Oct 2018 14:39)  Breo Ellipta 100 mcg-25 mcg/inh inhalation powder: 1 puff(s) inhaled once a day patient denies using it (11 Oct 2018 08:38)  Calcium 500: 1 tab(s) orally 2 times a day (11 Oct 2018 08:38)  docusate sodium 100 mg oral tablet: 1 tab(s) orally 2 times a day, As Needed (11 Oct 2018 08:39)  folic acid 1 mg oral tablet: 1 tab(s) orally once a day (11 Oct 2018 08:38)  Mag-Ox 400 oral tablet: 1 tab(s) orally once a day (11 Oct 2018 08:39)  Multiple Vitamins oral tablet: 1 tab(s) orally once a day  (30 Oct 2018 14:39)  Namenda 10 mg oral tablet: 1 tab(s) orally 2 times a day (11 Oct 2018 08:38)  Nephplex Rx oral tablet: 1 tab(s) orally once a day (11 Oct 2018 08:39)  nortriptyline 50 mg oral capsule: 1 cap(s) orally once a day (11 Oct 2018 08:39)  Renvela 800 mg oral tablet: 2 tab(s) orally every 8 hours (11 Oct 2018 08:39)  simethicone 80 mg oral tablet: 1 tab(s) orally 3 times a day (after meals) (11 Oct 2018 08:39)  Tylenol 325 mg oral tablet: 2 tab(s) orally every 6 hours, As Needed (30 Oct 2018 14:39)  vancomycin 1 g intravenous injection: 1 gram(s) intravenous 3 times a week  Please give 3 x per week with Dialysis until 2/3/2019 (22 Jan 2019 13:41)  vitamin A: 1 tab(s) orally once a day (11 Oct 2018 08:38)  Vitamin B Complex: 1 tab(s) orally once a day (11 Oct 2018 08:38)  Vitamin C 500 mg oral tablet: 1 tab(s) orally once a day (11 Oct 2018 08:38)      ***VITAL SIGNS:  Vital Signs Last 24 Hrs  T(C): 38.3 (03 Feb 2019 16:00), Max: 39.4 (02 Feb 2019 20:00)  T(F): 100.9 (03 Feb 2019 16:00), Max: 103 (02 Feb 2019 20:00)  HR: 90 (03 Feb 2019 18:00) (74 - 115)  BP: 113/64 (03 Feb 2019 16:00) (87/50 - 128/64)  BP(mean): 76 (03 Feb 2019 16:00) (56 - 79)  RR: 22 (03 Feb 2019 18:00) (21 - 22)  SpO2: 100% (03 Feb 2019 18:00) (100% - 100%)    I/Os:  02 Feb 2019 07:01  -  03 Feb 2019 07:00  --------------------------------------------------------  IN:    dexmedetomidine Infusion: 76.5 mL    Enteral Tube Flush: 210 mL    fentaNYL  Infusion: 107 mL    IV PiggyBack: 200 mL    Nepro with Carb Steady: 755 mL    norepinephrine Infusion: 43.8 mL    Other: 400 mL    sodium chloride 0.9%.: 690 mL  Total IN: 2482.3 mL    OUT:    Bulb: 80 mL    Other: 1400 mL  Total OUT: 1480 mL    Total NET: 1002.3 mL      03 Feb 2019 07:01  -  03 Feb 2019 18:38  --------------------------------------------------------  IN:    dexmedetomidine Infusion: 54 mL    Enteral Tube Flush: 580 mL    fentaNYL  Infusion: 70.2 mL    Nepro with Carb Steady: 175 mL    norepinephrine Infusion: 72.4 mL    sodium chloride 0.9%.: 120 mL  Total IN: 1071.6 mL    OUT:    Bulb: 20 mL  Total OUT: 20 mL    Total NET: 1051.6 mL    =======================  MEDICATIONS  ===================  MEDICATIONS  (STANDING):  collagenase Ointment 1 Application(s) Topical daily  dexmedetomidine Infusion 0.5 MICROgram(s)/kG/Hr (7.5 mL/Hr) IV Continuous <Continuous>  epoetin kelly Injectable 3000 Unit(s) IV Push <User Schedule>  fentaNYL   Infusion 2 MICROgram(s)/kG/Hr (12 mL/Hr) IV Continuous <Continuous>  fluconAZOLE IVPB      fluconAZOLE IVPB 200 milliGRAM(s) IV Intermittent every 24 hours  heparin  Injectable 5000 Unit(s) SubCutaneous every 12 hours  midodrine 10 milliGRAM(s) Oral every 8 hours  norepinephrine Infusion 0.05 MICROgram(s)/kG/Min (5.625 mL/Hr) IV Continuous <Continuous>  piperacillin/tazobactam IVPB. 3.375 Gram(s) IV Intermittent once  piperacillin/tazobactam IVPB. 3.375 Gram(s) IV Intermittent every 12 hours  QUEtiapine 25 milliGRAM(s) Oral two times a day  sodium chloride 0.9%. 1000 milliLiter(s) (10 mL/Hr) IV Continuous <Continuous>    MEDICATIONS  (PRN):  acetaminophen  IVPB .. 1000 milliGRAM(s) IV Intermittent once PRN Temp greater or equal to 38C (100.4F), Moderate Pain (4 - 6)  ======================VENTILATOR SETTINGS  ==============  Mode: AC/ CMV (Assist Control/ Continuous Mandatory Ventilation)  RR (machine): 22  TV (machine): 400  FiO2: 50  PEEP: 5  MAP: 10  PIP: 26    ======================= PHYSICAL EXAM===================  General:           no distress  Neuro:             sedated	  HEENT:                           CHER/   trach  Respiratory:	Lungs clear on auscultation bilaterally with good aeration.                            No rales, rhonchi, no wheezing.   CV:		 Regular rate and rhythm, (+) S1/S2.    Abdomen:	Soft,  nontender, not-distended. Bowel sounds present    Skin:		No rash.  Extremities:	Warm, no cyanosis or edema.  Palpable pulses  ============================ LABS =======================                        8.4    6.76  )-----------( 145      ( 03 Feb 2019 05:50 )             28.1     02-03    135  |  97<L>  |  37<H>  ----------------------------<  126<H>  3.8   |  24        |  3.51<H>    Ca    9.0      03 Feb 2019 05:50    TPro  6.4  /  Alb  2.7<L>  /  TBili  0.5  /  DBili  x   /  AST  14  /  ALT  17  /  AlkPhos  207<H>  02-02    LIVER FUNCTIONS - ( 02 Feb 2019 02:30 )  Alb: 2.7 g/dL / Pro: 6.4 g/dL / ALK PHOS: 207 u/L / ALT: 17 u/L / AST: 14 u/L / GGT: x           ABG - ( 02 Feb 2019 02:30 )  pH, Arterial: 7.34  pH, Blood: x     /  pCO2: 51    /  pO2: 146   / HCO3: 26    / Base Excess: 1.5   /  SaO2: 99.0      ENDOTRACHEAL SPECIMEN  02-01-19 --  --  --    BLOOD PERIPHERAL  02-01-19 --  --  BLOOD CULTURE PCR    BLOOD VENOUS  01-29-19 --  --  --    LUNG - UPPER LOBE LEFT  01-29-19 --  --  --    BLOOD PERIPHERAL  01-28-19 --  --  --    BLOOD PERIPHERAL  01-09-19 --  --  --    PLEURAL FLUID  01-09-19 --  --    WBC^White Blood Cells  QNTY CELLS IN GRAM STAIN: FEW (2+)  NOS^No Organisms Seen    BRONCHIAL LAVAGE  01-08-19 --  --  --  BRONCHIAL LAVAGE  01-08-19 --  --  --  BLOOD ARTERIAL  01-07-19 --  --  --  ===================== IMAGING STUDIES ===================  Radiology personally reviewed.  < from: CT Chest No Cont (02.03.19 @ 14:34) >    IMPRESSION:   Overall, there is improved aeration of the bilateral lower lobar distal   airways with decrease in degree of lobar atelectasis.   Small bilateral pleural effusions.  No bowel obstruction or inflammation.  Moderate volume ascites.  Indeterminate thick-walled lesion with central hypodensity in the right   inguinal region, measuring up to 2.8 cm. If there is clinical concern for   abscess, an ultrasound can be performed for further evaluation.    < end of copied text >    ====================ASSESSMENT AND PLAN ================  Mr. Hna is a 58M who underwent a FB, R thoracotomy, decortication, chest wall reconstruction, lat flap with Dr Pickering on 10/11/18. He was admitted on 1/6/19 to [facility] with a R pleural effusion and respiratory failure,  A R PTC was placed there and he was intubated.  He still had a R SANDY drain in place and he was transferred to [facility]. Patient was found to have aspirated a foreign body and +MRSA empyema. He was treated and discharged on 1/22/2019.     He was found by his home nurse to be in acute distress and recommended to proceed to the ED.  At presentation, he was found to be in acute hypercarbic respiratory failure complicated by hemodynamic instability requiring emergent intubation and pressor support. (28 Jan 2019 15:34)    Procedure:        Trach and PEG  1/31/2019  Bronchoscopy   1/29/2019  Right thoracotomy, resection of portion of R 7th rib, evacuation of infected hemothorax, takedown of pleurocutaneous fistula  10/11/2018    Issues:  Acute hypoxic & hypercapnic respiratory failure  Hypotension  Pneumonia /  Fever / Sepsis  ESRD / HD  Cardiomyopathy  HLD  BPH  ====================== NEUROLOGY=======================  Pain control with Fentanyl  Pt is on Precedex for agitation  ==================== RESPIRATORY========================  Pt is on  full [facility] vent support  Comfortable, no evidence of distress.  Monitor  right chest tube output  	  Mechanical Ventilation:  Mode: AC/ CMV (Assist Control/ Continuous Mandatory Ventilation)  RR (machine): 22  TV (machine): 400  FiO2: 50  PEEP: 5  MAP: 10  PIP: 26    Mechanical ventilator status assessed & settings reviewed  Continuous pulse oximetry monitoring  Continue bronchodilators, pulmonary toilet  Head of bed elevation to 30-40 degrees  Continue antibiotics -  Vanco based on level + Zosyn. Diflucan added because of yeast in BAL  ====================CARDIOVASCULAR=====================  Continue hemodynamic monitoring/ telemetry   Hypotension: On  /Off Levophed, titrate  to MAP>65, Restarted Midodrine 10mg/TID, Wean off Levo as tolerated  ===================== RENAL ============================  ESRD on HD TIW  Monitor I/Os, BUN/ Cr  and electrolytes  Renal f/up        ==================== GASTROINTESTINAL===================  On J tube feeds Nepro 35cc/hr diet, tolerating well  Continue GI prophylaxis with Protonix   Zofran / Reglan for nausea - PRN	  =======================    ENDOCRIN  =====================  Glycemic monitoring  F/S with coverage  ===================HEMATOLOGIC/ONCOLOGIC =============   No signs of active bleeding.   Follow CBC, coags  in AM  DVT prophylaxis with SCD, sc Heparin  ========================INFECTIOUS DISEASE===============  Monitor for fever / leukocytosis.  Bronchoscopy on 1/29 showed copious greenish secretions - Continue pulmonary toilet   Blood cultures - [facility], Continue Vanco by  level  + Zosyn + Diflucan    Pertinent clinical, laboratory, radiographic, hemodynamic, echocardiographic, respiratory data, microbiologic data and chart were reviewed and analyzed frequently throughout the course of the day and night. GI and DVT prophylaxis, glycemic control, head of bed elevation and skin care issues were addressed.  Patient seen, examined and plan discussed with CT Surgery Dr Pickering / CTICU team during rounds.  Pt remains critically ill in imminent risk of  deterioration and requires very careful cardio- pulmonary monitoring and support.    I have spent   60     minutes of critical care time with this pt between  8 am  and   11:59  pm         minutes spent on total encounter; more than 50% of the visit was spent counseling and/or coordinating care by the attending physician.    KERLINE Faith MD

## 2019-02-04 LAB
-  AMIKACIN: SIGNIFICANT CHANGE UP
-  AMPICILLIN/SULBACTAM: SIGNIFICANT CHANGE UP
-  AMPICILLIN: SIGNIFICANT CHANGE UP
-  AZTREONAM: SIGNIFICANT CHANGE UP
-  CEFAZOLIN: SIGNIFICANT CHANGE UP
-  CEFAZOLIN: SIGNIFICANT CHANGE UP
-  CEFEPIME: SIGNIFICANT CHANGE UP
-  CEFOXITIN: SIGNIFICANT CHANGE UP
-  CEFTAZIDIME: SIGNIFICANT CHANGE UP
-  CEFTRIAXONE: SIGNIFICANT CHANGE UP
-  CIPROFLOXACIN: SIGNIFICANT CHANGE UP
-  CIPROFLOXACIN: SIGNIFICANT CHANGE UP
-  CLINDAMYCIN: SIGNIFICANT CHANGE UP
-  COAGULASE NEGATIVE STAPHYLOCOCCUS: SIGNIFICANT CHANGE UP
-  DAPTOMYCIN: SIGNIFICANT CHANGE UP
-  ERTAPENEM: SIGNIFICANT CHANGE UP
-  ERYTHROMYCIN: SIGNIFICANT CHANGE UP
-  GENTAMICIN: SIGNIFICANT CHANGE UP
-  GENTAMICIN: SIGNIFICANT CHANGE UP
-  IMIPENEM: SIGNIFICANT CHANGE UP
-  LEVOFLOXACIN: SIGNIFICANT CHANGE UP
-  LEVOFLOXACIN: SIGNIFICANT CHANGE UP
-  LINEZOLID: SIGNIFICANT CHANGE UP
-  MEROPENEM: SIGNIFICANT CHANGE UP
-  MOXIFLOXACIN(AEROBIC): SIGNIFICANT CHANGE UP
-  OXACILLIN: SIGNIFICANT CHANGE UP
-  PENICILLIN: SIGNIFICANT CHANGE UP
-  PIPERACILLIN/TAZOBACTAM: SIGNIFICANT CHANGE UP
-  RIFAMPIN.: SIGNIFICANT CHANGE UP
-  TETRACYCLINE: SIGNIFICANT CHANGE UP
-  TIGECYCLINE: SIGNIFICANT CHANGE UP
-  TOBRAMYCIN: SIGNIFICANT CHANGE UP
-  TRIMETHOPRIM/SULFAMETHOXAZOLE: SIGNIFICANT CHANGE UP
-  TRIMETHOPRIM/SULFAMETHOXAZOLE: SIGNIFICANT CHANGE UP
-  VANCOMYCIN: SIGNIFICANT CHANGE UP
ALBUMIN SERPL ELPH-MCNC: 2.1 G/DL — LOW (ref 3.3–5)
ALP SERPL-CCNC: 372 U/L — HIGH (ref 40–120)
ALT FLD-CCNC: 17 U/L — SIGNIFICANT CHANGE UP (ref 4–41)
ANION GAP SERPL CALC-SCNC: 14 MMO/L — SIGNIFICANT CHANGE UP (ref 7–14)
AST SERPL-CCNC: 31 U/L — SIGNIFICANT CHANGE UP (ref 4–40)
BACTERIA BLD CULT: SIGNIFICANT CHANGE UP
BACTERIA SPT RESP CULT: SIGNIFICANT CHANGE UP
BASE EXCESS BLDA CALC-SCNC: 1 MMOL/L — SIGNIFICANT CHANGE UP
BILIRUB SERPL-MCNC: 0.4 MG/DL — SIGNIFICANT CHANGE UP (ref 0.2–1.2)
BUN SERPL-MCNC: 49 MG/DL — HIGH (ref 7–23)
CA-I BLDA-SCNC: 1.22 MMOL/L — SIGNIFICANT CHANGE UP (ref 1.15–1.29)
CALCIUM SERPL-MCNC: 9.4 MG/DL — SIGNIFICANT CHANGE UP (ref 8.4–10.5)
CHLORIDE SERPL-SCNC: 97 MMOL/L — LOW (ref 98–107)
CO2 SERPL-SCNC: 23 MMOL/L — SIGNIFICANT CHANGE UP (ref 22–31)
CREAT SERPL-MCNC: 4.51 MG/DL — HIGH (ref 0.5–1.3)
GLUCOSE BLDA-MCNC: 110 MG/DL — HIGH (ref 70–99)
GLUCOSE SERPL-MCNC: 117 MG/DL — HIGH (ref 70–99)
GRAM STN SPT: SIGNIFICANT CHANGE UP
HCO3 BLDA-SCNC: 25 MMOL/L — SIGNIFICANT CHANGE UP (ref 22–26)
HCT VFR BLD CALC: 30.9 % — LOW (ref 39–50)
HCT VFR BLDA CALC: 28.5 % — LOW (ref 39–51)
HGB BLD-MCNC: 9.2 G/DL — LOW (ref 13–17)
HGB BLDA-MCNC: 9.2 G/DL — LOW (ref 13–17)
LACTATE BLDA-SCNC: 0.7 MMOL/L — SIGNIFICANT CHANGE UP (ref 0.5–2)
MAGNESIUM SERPL-MCNC: 2.2 MG/DL — SIGNIFICANT CHANGE UP (ref 1.6–2.6)
MCHC RBC-ENTMCNC: 29.8 % — LOW (ref 32–36)
MCHC RBC-ENTMCNC: 30 PG — SIGNIFICANT CHANGE UP (ref 27–34)
MCV RBC AUTO: 100.7 FL — HIGH (ref 80–100)
METHOD TYPE: SIGNIFICANT CHANGE UP
METHOD TYPE: SIGNIFICANT CHANGE UP
NRBC # FLD: 0 K/UL — LOW (ref 25–125)
ORGANISM # SPEC MICROSCOPIC CNT: SIGNIFICANT CHANGE UP
PCO2 BLDA: 42 MMHG — SIGNIFICANT CHANGE UP (ref 35–48)
PH BLDA: 7.4 PH — SIGNIFICANT CHANGE UP (ref 7.35–7.45)
PHOSPHATE SERPL-MCNC: 4.4 MG/DL — SIGNIFICANT CHANGE UP (ref 2.5–4.5)
PLATELET # BLD AUTO: 205 K/UL — SIGNIFICANT CHANGE UP (ref 150–400)
PMV BLD: 10.5 FL — SIGNIFICANT CHANGE UP (ref 7–13)
PO2 BLDA: 151 MMHG — HIGH (ref 83–108)
POTASSIUM BLDA-SCNC: 3.5 MMOL/L — SIGNIFICANT CHANGE UP (ref 3.4–4.5)
POTASSIUM SERPL-MCNC: 3.8 MMOL/L — SIGNIFICANT CHANGE UP (ref 3.5–5.3)
POTASSIUM SERPL-SCNC: 3.8 MMOL/L — SIGNIFICANT CHANGE UP (ref 3.5–5.3)
PROT SERPL-MCNC: 6.3 G/DL — SIGNIFICANT CHANGE UP (ref 6–8.3)
RBC # BLD: 3.07 M/UL — LOW (ref 4.2–5.8)
RBC # FLD: 16.8 % — HIGH (ref 10.3–14.5)
SAO2 % BLDA: 98.9 % — SIGNIFICANT CHANGE UP (ref 95–99)
SODIUM BLDA-SCNC: 130 MMOL/L — LOW (ref 136–146)
SODIUM SERPL-SCNC: 134 MMOL/L — LOW (ref 135–145)
VANCOMYCIN FLD-MCNC: 17.1 UG/ML — SIGNIFICANT CHANGE UP
WBC # BLD: 8.72 K/UL — SIGNIFICANT CHANGE UP (ref 3.8–10.5)
WBC # FLD AUTO: 8.72 K/UL — SIGNIFICANT CHANGE UP (ref 3.8–10.5)

## 2019-02-04 PROCEDURE — 71045 X-RAY EXAM CHEST 1 VIEW: CPT | Mod: 26

## 2019-02-04 PROCEDURE — 93306 TTE W/DOPPLER COMPLETE: CPT | Mod: 26

## 2019-02-04 PROCEDURE — 99233 SBSQ HOSP IP/OBS HIGH 50: CPT | Mod: GC

## 2019-02-04 PROCEDURE — 99291 CRITICAL CARE FIRST HOUR: CPT

## 2019-02-04 PROCEDURE — 76857 US EXAM PELVIC LIMITED: CPT | Mod: 26

## 2019-02-04 RX ADMIN — MIDODRINE HYDROCHLORIDE 10 MILLIGRAM(S): 2.5 TABLET ORAL at 08:03

## 2019-02-04 RX ADMIN — Medication 5.62 MICROGRAM(S)/KG/MIN: at 08:18

## 2019-02-04 RX ADMIN — Medication 1 APPLICATION(S): at 11:29

## 2019-02-04 RX ADMIN — MIDODRINE HYDROCHLORIDE 10 MILLIGRAM(S): 2.5 TABLET ORAL at 23:11

## 2019-02-04 RX ADMIN — HEPARIN SODIUM 5000 UNIT(S): 5000 INJECTION INTRAVENOUS; SUBCUTANEOUS at 17:31

## 2019-02-04 RX ADMIN — QUETIAPINE FUMARATE 25 MILLIGRAM(S): 200 TABLET, FILM COATED ORAL at 17:31

## 2019-02-04 RX ADMIN — PIPERACILLIN AND TAZOBACTAM 25 GRAM(S): 4; .5 INJECTION, POWDER, LYOPHILIZED, FOR SOLUTION INTRAVENOUS at 17:31

## 2019-02-04 RX ADMIN — SODIUM CHLORIDE 10 MILLILITER(S): 9 INJECTION INTRAMUSCULAR; INTRAVENOUS; SUBCUTANEOUS at 08:18

## 2019-02-04 RX ADMIN — MIDODRINE HYDROCHLORIDE 10 MILLIGRAM(S): 2.5 TABLET ORAL at 00:42

## 2019-02-04 RX ADMIN — MIDODRINE HYDROCHLORIDE 10 MILLIGRAM(S): 2.5 TABLET ORAL at 14:24

## 2019-02-04 RX ADMIN — FENTANYL CITRATE 12 MICROGRAM(S)/KG/HR: 50 INJECTION INTRAVENOUS at 08:19

## 2019-02-04 RX ADMIN — QUETIAPINE FUMARATE 25 MILLIGRAM(S): 200 TABLET, FILM COATED ORAL at 07:17

## 2019-02-04 RX ADMIN — PIPERACILLIN AND TAZOBACTAM 25 GRAM(S): 4; .5 INJECTION, POWDER, LYOPHILIZED, FOR SOLUTION INTRAVENOUS at 07:18

## 2019-02-04 RX ADMIN — DEXMEDETOMIDINE HYDROCHLORIDE IN 0.9% SODIUM CHLORIDE 7.5 MICROGRAM(S)/KG/HR: 4 INJECTION INTRAVENOUS at 08:19

## 2019-02-04 RX ADMIN — FLUCONAZOLE 100 MILLIGRAM(S): 150 TABLET ORAL at 07:17

## 2019-02-04 RX ADMIN — HEPARIN SODIUM 5000 UNIT(S): 5000 INJECTION INTRAVENOUS; SUBCUTANEOUS at 07:18

## 2019-02-04 NOTE — PROGRESS NOTE ADULT - SUBJECTIVE AND OBJECTIVE BOX
CLAUDIA HAN          MRN-[id number]    HPI:  Mr. Han is a 58M who underwent a FB, R thoracotomy, decortication, chest wall reconstruction, lat flap with Dr Pickering on 10/11/18. He was admitted on 1/6/19 to [facility] with a R pleural effusion and respiratory failure,  A R PTC was placed there and he was intubated.  He still had a R SANDY drain in place and he was transferred to [facility]. Patient was found to have aspirated a foreign body and +MRSA empyema. He was treated and discharged on 1/22/2019.     He was found by his home nurse to be in acute distress and recommended to proceed to the ED.  At presentation, he was found to be in acute hypercarbic respiratory failure complicated by hemodynamic instability requiring emergent intubation and pressor support. (28 Jan 2019 15:34)      Procedure:  POD # :     Issues:        Interval/Overnight OR Events/ ROS  Pt extubated in the OR after surgery. On arrival in ICU still drowsy - but easily arousable to following commands; denies SOB, no nausea, no vomiting  Respiratory status required full ventilatory support,  following of ABG’s with A-line monitoring, continuous pulse oximetry monitoring, & an IV Propofol infusion for support & to evaluate for & prevent further decompensation secondary to persistent cardiopulmonary dysfunction.     Pt remained hemodynamically stable overnight, not on any pressors or inotropes. OOB to chair, breathing comfortably with minimal pain. Ambulated several times . Denies pain, no SOB, no palpitations, no nausea/ no vomiting, no dizziness  A-line and sharma d/kirsten       PAST MEDICAL & SURGICAL HISTORY:  Smoking hx  Cardiomyopathy  Wound: right chest  ICD (implantable cardioverter-defibrillator) in place  OA (osteoarthritis)  HLD (hyperlipidemia)  BPH (benign prostatic hyperplasia)  Pleural effusion  HTN (hypertension)  ESRD (end stage renal disease)  H/O chest wound: right  S/P thoracotomy: FB, R thoracotomy, decortication, chest wall reconstruction, lat flap  History of wound infection: right chest wall - revision 5/18 and again in 7/18  History of implantable cardioverter-defibrillator (ICD) placement: pt unsure when placed  H/O bilateral hip replacements: 2008, 2009    Allergies    No Known Allergies    Intolerances      Home Medications:  aspirin 81 mg oral tablet: 1 tab(s) orally once a day  (30 Oct 2018 14:39)  Breo Ellipta 100 mcg-25 mcg/inh inhalation powder: 1 puff(s) inhaled once a day patient denies using it (11 Oct 2018 08:38)  Calcium 500: 1 tab(s) orally 2 times a day (11 Oct 2018 08:38)  docusate sodium 100 mg oral tablet: 1 tab(s) orally 2 times a day, As Needed (11 Oct 2018 08:39)  folic acid 1 mg oral tablet: 1 tab(s) orally once a day (11 Oct 2018 08:38)  Mag-Ox 400 oral tablet: 1 tab(s) orally once a day (11 Oct 2018 08:39)  Multiple Vitamins oral tablet: 1 tab(s) orally once a day  (30 Oct 2018 14:39)  Namenda 10 mg oral tablet: 1 tab(s) orally 2 times a day (11 Oct 2018 08:38)  Nephplex Rx oral tablet: 1 tab(s) orally once a day (11 Oct 2018 08:39)  nortriptyline 50 mg oral capsule: 1 cap(s) orally once a day (11 Oct 2018 08:39)  Renvela 800 mg oral tablet: 2 tab(s) orally every 8 hours (11 Oct 2018 08:39)  simethicone 80 mg oral tablet: 1 tab(s) orally 3 times a day (after meals) (11 Oct 2018 08:39)  Tylenol 325 mg oral tablet: 2 tab(s) orally every 6 hours, As Needed (30 Oct 2018 14:39)  vancomycin 1 g intravenous injection: 1 gram(s) intravenous 3 times a week  Please give 3 x per week with Dialysis until 2/3/2019 (22 Jan 2019 13:41)  vitamin A: 1 tab(s) orally once a day (11 Oct 2018 08:38)  Vitamin B Complex: 1 tab(s) orally once a day (11 Oct 2018 08:38)  Vitamin C 500 mg oral tablet: 1 tab(s) orally once a day (11 Oct 2018 08:38)        ***VITAL SIGNS:  Vital Signs Last 24 Hrs  T(C): 36.6 (04 Feb 2019 04:00), Max: 38.3 (03 Feb 2019 16:00)  T(F): 97.9 (04 Feb 2019 04:00), Max: 100.9 (03 Feb 2019 16:00)  HR: 83 (04 Feb 2019 05:00) (76 - 94)  BP: 113/64 (03 Feb 2019 16:00) (88/48 - 123/59)  BP(mean): 76 (03 Feb 2019 16:00) (57 - 76)  RR: 22 (04 Feb 2019 05:00) (21 - 22)  SpO2: 100% (04 Feb 2019 05:00) (100% - 100%)    I/Os:   I&O's Detail    02 Feb 2019 07:01  -  03 Feb 2019 07:00  --------------------------------------------------------  IN:    dexmedetomidine Infusion: 76.5 mL    Enteral Tube Flush: 210 mL    fentaNYL  Infusion: 107 mL    IV PiggyBack: 200 mL    Nepro with Carb Steady: 755 mL    norepinephrine Infusion: 43.8 mL    Other: 400 mL    sodium chloride 0.9%.: 690 mL  Total IN: 2482.3 mL    OUT:    Bulb: 80 mL    Other: 1400 mL  Total OUT: 1480 mL    Total NET: 1002.3 mL      03 Feb 2019 07:01  -  04 Feb 2019 06:00  --------------------------------------------------------  IN:    dexmedetomidine Infusion: 103.5 mL    Enteral Tube Flush: 660 mL    fentaNYL  Infusion: 136.2 mL    Nepro with Carb Steady: 560 mL    norepinephrine Infusion: 196.7 mL    sodium chloride 0.9%.: 230 mL  Total IN: 1886.4 mL    OUT:    Bulb: 20 mL  Total OUT: 20 mL    Total NET: 1866.4 mL          CAPILLARY BLOOD GLUCOSE          =======================  MEDICATIONS  ===================  MEDICATIONS  (STANDING):  collagenase Ointment 1 Application(s) Topical daily  dexmedetomidine Infusion 0.5 MICROgram(s)/kG/Hr (7.5 mL/Hr) IV Continuous <Continuous>  epoetin kelly Injectable 3000 Unit(s) IV Push <User Schedule>  fentaNYL   Infusion 2 MICROgram(s)/kG/Hr (12 mL/Hr) IV Continuous <Continuous>  fluconAZOLE IVPB      fluconAZOLE IVPB 200 milliGRAM(s) IV Intermittent every 24 hours  heparin  Injectable 5000 Unit(s) SubCutaneous every 12 hours  midodrine 10 milliGRAM(s) Oral every 8 hours  norepinephrine Infusion 0.05 MICROgram(s)/kG/Min (5.625 mL/Hr) IV Continuous <Continuous>  piperacillin/tazobactam IVPB. 3.375 Gram(s) IV Intermittent once  piperacillin/tazobactam IVPB. 3.375 Gram(s) IV Intermittent every 12 hours  QUEtiapine 25 milliGRAM(s) Oral two times a day  sodium chloride 0.9%. 1000 milliLiter(s) (10 mL/Hr) IV Continuous <Continuous>    MEDICATIONS  (PRN):  acetaminophen  IVPB .. 1000 milliGRAM(s) IV Intermittent once PRN Temp greater or equal to 38C (100.4F), Moderate Pain (4 - 6)      ======================VENTILATOR SETTINGS  ==============  Mode: AC/ CMV (Assist Control/ Continuous Mandatory Ventilation)  RR (machine): 22  TV (machine): 400  FiO2: 50  PEEP: 5  MAP: 10  PIP: 26      =================== PATIENT CARE ACCESS DEVICES ==========  Peripheral IV  Central Venous Line	R	L	IJ	Fem	SC	Placed:   Arterial Line	R	L	PT	DP	Fem	Rad	Ax	Placed:   Midline:				  Urinary Catheter, Date Placed:   Necessity of urinary, arterial, and venous catheters discussed  ======================= PHYSICAL EXAM===================  General:          Comfortable, Awake, alert, no distress  Neuro:             Moving all extremities to commands. No focal deficits	  HEENT:                           CHER/ ETT/ NGT/ trach  Respiratory:	Lungs clear on auscultation bilaterally with good aeration.                            No rales, rhonchi, no wheezing. Effort even and unlabored.  CV:		         Regular rate and rhythm. Normal S1/S2. No murmurs  Abdomen:	Soft,  nontender, not-distended. Bowel sounds present / absent.   Skin:		No rash.  Extremities:	Warm, no cyanosis or edema.  Palpable pulses  ============================ LABS =======================                        9.2    8.72  )-----------( 205      ( 04 Feb 2019 05:15 )             30.9     02-03    135  |  97<L>  |  37<H>  ----------------------------<  126<H>  3.8   |  24  |  3.51<H>    Ca    9.0      03 Feb 2019 05:50          ABG - ( 04 Feb 2019 05:15 )  pH, Arterial: 7.40  pH, Blood: x     /  pCO2: 42    /  pO2: 151   / HCO3: 25    / Base Excess: 1.0   /  SaO2: 98.9                ENDOTRACHEAL SPECIMEN  02-01-19 --  --  --      BLOOD PERIPHERAL  02-01-19 --  --  BLOOD CULTURE PCR      BLOOD VENOUS  01-29-19 --  --  --      LUNG - UPPER LOBE LEFT  01-29-19 --  --  --      BLOOD PERIPHERAL  01-28-19 --  --  --      BLOOD PERIPHERAL  01-09-19 --  --  --      PLEURAL FLUID  01-09-19 --  --    WBC^White Blood Cells  QNTY CELLS IN GRAM STAIN: FEW (2+)  NOS^No Organisms Seen      BRONCHIAL LAVAGE  01-08-19 --  --  --      BRONCHIAL LAVAGE  01-08-19 --  --  --      BLOOD ARTERIAL  01-07-19 --  --  --          ===================== IMAGING STUDIES ===================  Radiology personally reviewed.    ====================ASSESSMENT AND PLAN ================      ====================== NEUROLOGY=======================  Pain control with PCA / PCEA / Tylenol IV / Toradol / Percocet  Pt is on Precedex for agitation  Pt is sedated with Propofol / Fentanyl  ==================== RESPIRATORY========================  Pt is on       L nasal canula / Face tent____% FiO2/ full mech vent support  Comfortable, no evidence of distress.  Using incentive spirometry & doing                ml  Monitor chest tube output  Chest tube to suction / water seal	  Mechanical Ventilation:  Mode: AC/ CMV (Assist Control/ Continuous Mandatory Ventilation)  RR (machine): 22  TV (machine): 400  FiO2: 50  PEEP: 5  MAP: 10  PIP: 26    Mechanical ventilator status assessed & settings reviewed  Continuous pulse oximetry monitoring  Continue bronchodilators, pulmonary toilet  Head of bed elevation to 30-40 degrees  ====================CARDIOVASCULAR=====================  Continue hemodynamic monitoring/ telemetry  Not on any pressors/ titrate pressors for SBP>100, MAP >60-65  Continue cardiovascular / antihypertensive medications  ===================== RENAL ============================  Continue LR 30CC/hr      D/C IVF  Monitor I/Os, BUN/ Cr  and electrolytes  D/C Sharma      Keep Sharma for UO monitoring  BPH: Continue Flomax/ Finasteride  ==================== GASTROINTESTINAL===================  On regular/ tube feeds diet, tolerating well  Continue GI prophylaxis with Pepcid / Protonix  Continue Zofran / Reglan for nausea - PRN	  NPO  =======================    ENDOCRIN  =====================  Glycemic monitoring  F/S with coverage  ===================HEMATOLOGIC/ONCOLOGIC =============  Monitor chest tube output. No signs of active bleeding.   Follow CBC, coags  in AM  DVT prophylaxis with SCD, sc Heparin  ========================INFECTIOUS DISEASE===============  No signs of infection. Monitor for fever / leukocytosis.  All surgical incision / chest tube  sites look clean  D/C Sharma    Pertinent clinical, laboratory, radiographic, hemodynamic, echocardiographic, respiratory data, microbiologic data and chart were reviewed and analyzed frequently throughout the course of the day and night. GI and DVT prophylaxis, glycemic control, head of bed elevation and skin care issues were addressed.  Patient seen, examined and plan discussed with CT Surgery / CTICU team during rounds.  Pt remains critically ill in imminent risk of  deterioration and requires very careful cardio- pulmonary monitoring and support.    I have spent        minutes of critical care time with this pt between      am/pm   and        am/ pm         minutes spent on total encounter; more than 50% of the visit was spent counseling and/or coordinating care by the attending physician.      KERLINE Faith MD CLAUDIA HAN          MRN-[id number]    HPI:  Mr. Han is a 58M who underwent a FB, R thoracotomy, decortication, chest wall reconstruction, lat flap with Dr Pickering on 10/11/18. He was admitted on 1/6/19 to [facility] with a R pleural effusion and respiratory failure,  A R PTC was placed there and he was intubated.  He still had a R SANDY drain in place and he was transferred to [facility]. Patient was found to have aspirated a foreign body and +MRSA empyema. He was treated and discharged on 1/22/2019.     He was found by his home nurse to be in acute distress and recommended to proceed to the ED.  At presentation, he was found to be in acute hypercarbic respiratory failure complicated by hemodynamic instability requiring emergent intubation and pressor support. (28 Jan 2019 15:34)      Procedure:        Trach and PEG  1/31/2019  Bronchoscopy   1/29/2019  Right thoracotomy, resection of portion of R 7th rib, evacuation of infected hemothorax, takedown of pleurocutaneous fistula  10/11/2018    Issues:  Acute hypoxic & hypercapnic respiratory failure  Hypotension  Pneumonia /  Fever / Sepsis  ESRD / HD  Cardiomyopathy  HLD  BPH    Drips:  Levo  Precedex              Fentanyl        Interval/Overnight OR Events/ ROS  Respiratory status required full ventilatory support,  following of ABG’s with A-line monitoring, continuous pulse oximetry monitoring,  CT chest , abd, pelvis yesterday - w/o evidence of  collection responsible for intermittent fevers except for possible right inguinal thick wall 2.8 cm  collection      PAST MEDICAL & SURGICAL HISTORY:  Smoking hx  Cardiomyopathy  Wound: right chest  ICD (implantable cardioverter-defibrillator) in place  OA (osteoarthritis)  HLD (hyperlipidemia)  BPH (benign prostatic hyperplasia)  Pleural effusion  HTN (hypertension)  ESRD (end stage renal disease)  H/O chest wound: right  S/P thoracotomy: FB, R thoracotomy, decortication, chest wall reconstruction, lat flap  History of wound infection: right chest wall - revision 5/18 and again in 7/18  History of implantable cardioverter-defibrillator (ICD) placement: pt unsure when placed  H/O bilateral hip replacements: 2008, 2009    Allergies  No Known Allergies      Home Medications:  aspirin 81 mg oral tablet: 1 tab(s) orally once a day  (30 Oct 2018 14:39)  Breo Ellipta 100 mcg-25 mcg/inh inhalation powder: 1 puff(s) inhaled once a day patient denies using it (11 Oct 2018 08:38)  Calcium 500: 1 tab(s) orally 2 times a day (11 Oct 2018 08:38)  docusate sodium 100 mg oral tablet: 1 tab(s) orally 2 times a day, As Needed (11 Oct 2018 08:39)  folic acid 1 mg oral tablet: 1 tab(s) orally once a day (11 Oct 2018 08:38)  Mag-Ox 400 oral tablet: 1 tab(s) orally once a day (11 Oct 2018 08:39)  Multiple Vitamins oral tablet: 1 tab(s) orally once a day  (30 Oct 2018 14:39)  Namenda 10 mg oral tablet: 1 tab(s) orally 2 times a day (11 Oct 2018 08:38)  Nephplex Rx oral tablet: 1 tab(s) orally once a day (11 Oct 2018 08:39)  nortriptyline 50 mg oral capsule: 1 cap(s) orally once a day (11 Oct 2018 08:39)  Renvela 800 mg oral tablet: 2 tab(s) orally every 8 hours (11 Oct 2018 08:39)  simethicone 80 mg oral tablet: 1 tab(s) orally 3 times a day (after meals) (11 Oct 2018 08:39)  Tylenol 325 mg oral tablet: 2 tab(s) orally every 6 hours, As Needed (30 Oct 2018 14:39)  vancomycin 1 g intravenous injection: 1 gram(s) intravenous 3 times a week  Please give 3 x per week with Dialysis until 2/3/2019 (22 Jan 2019 13:41)  vitamin A: 1 tab(s) orally once a day (11 Oct 2018 08:38)  Vitamin B Complex: 1 tab(s) orally once a day (11 Oct 2018 08:38)  Vitamin C 500 mg oral tablet: 1 tab(s) orally once a day (11 Oct 2018 08:38)      ***VITAL SIGNS:  Vital Signs Last 24 Hrs  T(C): 36.6 (04 Feb 2019 04:00), Max: 38.3 (03 Feb 2019 16:00)  T(F): 97.9 (04 Feb 2019 04:00), Max: 100.9 (03 Feb 2019 16:00)  HR: 83 (04 Feb 2019 05:00) (76 - 94)  BP: 113/64 (03 Feb 2019 16:00) (88/48 - 123/59)  BP(mean): 76 (03 Feb 2019 16:00) (57 - 76)  RR: 22 (04 Feb 2019 05:00) (21 - 22)  SpO2: 100% (04 Feb 2019 05:00) (100% - 100%)    I/Os:   I&O's Detail    02 Feb 2019 07:01  -  03 Feb 2019 07:00  --------------------------------------------------------  IN:    dexmedetomidine Infusion: 76.5 mL    Enteral Tube Flush: 210 mL    fentaNYL  Infusion: 107 mL    IV PiggyBack: 200 mL    Nepro with Carb Steady: 755 mL    norepinephrine Infusion: 43.8 mL    Other: 400 mL    sodium chloride 0.9%.: 690 mL  Total IN: 2482.3 mL    OUT:    Bulb: 80 mL    Other: 1400 mL  Total OUT: 1480 mL    Total NET: 1002.3 mL      03 Feb 2019 07:01  -  04 Feb 2019 06:00  --------------------------------------------------------  IN:    dexmedetomidine Infusion: 103.5 mL    Enteral Tube Flush: 660 mL    fentaNYL  Infusion: 136.2 mL    Nepro with Carb Steady: 560 mL    norepinephrine Infusion: 196.7 mL    sodium chloride 0.9%.: 230 mL  Total IN: 1886.4 mL    OUT:    Bulb: 20 mL  Total OUT: 20 mL    Total NET: 1866.4 mL    =======================  MEDICATIONS  ===================  MEDICATIONS  (STANDING):  collagenase Ointment 1 Application(s) Topical daily  dexmedetomidine Infusion 0.5 MICROgram(s)/kG/Hr (7.5 mL/Hr) IV Continuous <Continuous>  epoetin kelly Injectable 3000 Unit(s) IV Push <User Schedule>  fentaNYL   Infusion 2 MICROgram(s)/kG/Hr (12 mL/Hr) IV Continuous <Continuous>  fluconAZOLE IVPB      fluconAZOLE IVPB 200 milliGRAM(s) IV Intermittent every 24 hours  heparin  Injectable 5000 Unit(s) SubCutaneous every 12 hours  midodrine 10 milliGRAM(s) Oral every 8 hours  norepinephrine Infusion 0.05 MICROgram(s)/kG/Min (5.625 mL/Hr) IV Continuous <Continuous>  piperacillin/tazobactam IVPB. 3.375 Gram(s) IV Intermittent once  piperacillin/tazobactam IVPB. 3.375 Gram(s) IV Intermittent every 12 hours  QUEtiapine 25 milliGRAM(s) Oral two times a day  sodium chloride 0.9%. 1000 milliLiter(s) (10 mL/Hr) IV Continuous <Continuous>    MEDICATIONS  (PRN):  acetaminophen  IVPB .. 1000 milliGRAM(s) IV Intermittent once PRN Temp greater or equal to 38C (100.4F), Moderate Pain (4 - 6)    ======================VENTILATOR SETTINGS  ==============  Mode: AC/ CMV (Assist Control/ Continuous Mandatory Ventilation)  RR (machine): 22  TV (machine): 400  FiO2: 50  PEEP: 5  MAP: 10  PIP: 26    ======================= PHYSICAL EXAM===================  General:           no distress  Neuro:             sedated	  HEENT:             CHER/   trach  Respiratory:	Lungs clear on auscultation bilaterally with good aeration.                            No rales, rhonchi, no wheezing.   CV:		 Regular rate and rhythm, (+) S1/S2.    Abdomen:	Soft,  nontender, not-distended. Bowel sounds present    Skin:		No rash.  Extremities:	Warm, no cyanosis or edema.  Palpable pulses  ============================ LABS =======================                        9.2    8.72  )-----------( 205      ( 04 Feb 2019 05:15 )             30.9     02-04    134<L>  |  97<L>  |  49<H>  ---------------------------------<  117<H>  3.8         |  23         |  4.51<H>    Ca    9.4      04 Feb 2019 05:15  Phos  4.4     02-04  Mg     2.2     02-04    TPro  6.3  /  Alb  2.1<L>  /  TBili  0.4  /  DBili  x   /  AST  31  /  ALT  17  /  AlkPhos  372<H>  02-04 02-03    135  |  97<L>  |  37<H>  ----------------------------<  126<H>  3.8   |  24        |  3.51<H>    Ca    9.0      03 Feb 2019 05:50    ABG - ( 04 Feb 2019 05:15 )  pH, Arterial: 7.40  pH, Blood: x     /  pCO2: 42    /  pO2: 151   / HCO3: 25    / Base Excess: 1.0   /  SaO2: 98.9        ENDOTRACHEAL SPECIMEN  02-01-19 --  --  --  BLOOD PERIPHERAL  02-01-19 --  --  BLOOD CULTURE PCR  BLOOD VENOUS  01-29-19 --  --  -  LUNG - UPPER LOBE LEFT  01-29-19 --  --  --  BLOOD PERIPHERAL  01-28-19 --  --  --  BLOOD PERIPHERAL  01-09-19 --  --  --  PLEURAL FLUID  01-09-19 --  --    WBC^White Blood Cells  QNTY CELLS IN GRAM STAIN: FEW (2+)  NOS^No Organisms Seen    BRONCHIAL LAVAGE  01-08-19 --  --  --  BRONCHIAL LAVAGE  01-08-19 --  --  --  BLOOD ARTERIAL  01-07-19 --  --  --    ===================== IMAGING STUDIES ===================  Radiology personally reviewed.  < from: CT Chest No Cont (02.03.19 @ 14:34) >    IMPRESSION:   Overall, there is improved aeration of the bilateral lower lobar distal   airways with decrease in degree of lobar atelectasis.   Small bilateral pleural effusions.  No bowel obstruction or inflammation.  Moderate volume ascites.  Indeterminate thick-walled lesion with central hypodensity in the right   inguinal region, measuring up to 2.8 cm. If there is clinical concern for   abscess, an ultrasound can be performed for further evaluation.    < end of copied text   =====================ASSESSMENT AND PLAN ================  Mr. Han is a 58M who underwent a FB, R thoracotomy, decortication, chest wall reconstruction, lat flap with Dr Pickering on 10/11/18. He was admitted on 1/6/19 to [facility] with a R pleural effusion and respiratory failure,  A R PTC was placed there and he was intubated.  He still had a R SANDY drain in place and he was transferred to [facility]. Patient was found to have aspirated a foreign body and +MRSA empyema. He was treated and discharged on 1/22/2019.     He was found by his home nurse to be in acute distress and recommended to proceed to the ED.  At presentation, he was found to be in acute hypercarbic respiratory failure complicated by hemodynamic instability requiring emergent intubation and pressor support. (28 Jan 2019 15:34)    Procedure:        Trach and PEG  1/31/2019  Bronchoscopy   1/29/2019  Right thoracotomy, resection of portion of R 7th rib, evacuation of infected hemothorax, takedown of pleurocutaneous fistula  10/11/2018    Issues:  Acute hypoxic & hypercapnic respiratory failure  Hypotension  Pneumonia /  Fever / Sepsis  ESRD / HD  Cardiomyopathy  HLD              ====================== NEUROLOGY=======================  Pain control with Fentanyl  Pt is on Precedex for agitation  ==================== RESPIRATORY========================  [facility]  Comfortable, no evidence of distress.  Monitor  right chest tube output  	  Mechanical Ventilation:  Mode: AC/ CMV (Assist Control/ Continuous Mandatory Ventilation)  RR (machine): 22  TV (machine): 400  FiO2: 50  PEEP: 5  MAP: 10  PIP: 26  CPAP trials  as tolerated  Mechanical ventilator status assessed & settings reviewed  Continuous pulse oximetry monitoring  Continue bronchodilators, pulmonary toilet  Head of bed elevation to 30-40 degrees  Continue antibiotics -  Vanco based on level + Zosyn. Diflucan added because of yeast in BAL  ====================CARDIOVASCULAR=====================  Continue hemodynamic monitoring/ telemetry   Hypotension: On  /Off Levophed, titrate  to MAP>65, Restarted Midodrine 10mg/TID, Wean off Levo as tolerated  ===================== RENAL ============================  ESRD on HD TIW  Monitor I/Os, BUN/ Cr  and electrolytes  Renal f/up        ==================== GASTROINTESTINAL===================  On J tube feeds Nepro 35cc/hr diet, tolerating well  Continue GI prophylaxis with Protonix   Zofran / Reglan for nausea - PRN  =======================    ENDOCRIN  =====================  Glycemic monitoring  F/S with coverage  ===================HEMATOLOGIC/ONCOLOGIC =============   No signs of active bleeding.   Follow CBC, coags  in AM  DVT prophylaxis with SCD, sc Heparin  ========================INFECTIOUS DISEASE===============  Monitor for fever / leukocytosis.  Bronchoscopy on 1/29 showed copious greenish secretions - Continue pulmonary toilet   Blood cultures - [facility], Continue Vanco by  level  + Zosyn + Diflucan    Pertinent clinical, laboratory, radiographic, hemodynamic, echocardiographic, respiratory data, microbiologic data and chart were reviewed and analyzed frequently throughout the course of the day and night. GI and DVT prophylaxis, glycemic control, head of bed elevation and skin care issues were addressed.  Patient seen, examined and plan discussed with CT Surgery / CTICU team during rounds.  Pt remains critically ill in imminent risk of  deterioration and requires very careful cardio- pulmonary monitoring and support.    I have spent  60   minutes of critical care time with this pt between  12  am  and   8 am         minutes spent on total encounter; more than 50% of the visit was spent counseling and/or coordinating care by the attending physician.      KERLINE Faith MD

## 2019-02-04 NOTE — PROGRESS NOTE ADULT - ASSESSMENT
58M who underwent a FB, R thoracotomy, decortication, chest wall reconstruction, lat flap with Dr Pickering on 10/11/18. He was admitted on 1/6/19 to [facility] with a R pleural effusion and respiratory failure,  A R PTC was placed there and he was intubated.  He still had a R SANDY drain in place and he was transferred to [facility] Patient was found to have aspirated a foreign body and +MRSA empyema. He was treated and discharged on 1/22/2019.     He was found by his home nurse to be in acute distress and recommended to proceed to the ED.  At presentation, he was found to be in acute hypercarbic respiratory failure complicated by hemodynamic instability requiring emergent intubation and pressor support. (28 Jan 2019 15:34)    Pt had Tm 104.2 (on 1/29), tachycardia, hypotension.  Pt has now been extubated, s/p trach/PEG on 1/31.  CT chest shows complete L sided atelectasis with mucous plugging of right lower lobe  bronchus with near complete atelectasis.      ID consult called for antibiotic managment.     Recommend:    - Pt p/w severe sepsis with shock and respiratory distress.  Pt with high fevers.  Found to have complete and near complete atelectasis of L and R lung respectively with mucous plugging.  Agree with broad spectrum abx to cover for post-obstructive pna.  Repeat CT chest from 2/3 shows improved aeration.      - Agree with vancomycin and zosyn.  Pt with recent MRSA infection in pleural effusion (last admission, s/p trt with 4 weeks vancomycin).  Maintain vanco trough between 15-20.  Dose vanco by level, post HD.      - f/u repeat blood cultures sent on 2/1 (pt continues to have intermittent fevers), respiratory cultures growing GPC clusters, GPC pairs, GVR, and yeast.  Fluconazole added, f/u ID.    - Cont care as per CTU. Continue hemodynamic monitoring.  Pt being titrated on levophed.  On full mechanical ventilator support, s/p tracheostomy. Continue bronchodilators, pulmonary toilet, Continue GI prophylaxis with Protonix.   Monitor temp curve and WBC.    Will follow,    Anabel Chahal  [phone] 58M who underwent a FB, R thoracotomy, decortication, chest wall reconstruction, lat flap with Dr Pickering on 10/11/18. He was admitted on 1/6/19 to [facility] with a R pleural effusion and respiratory failure,  A R PTC was placed there and he was intubated.  He still had a R SANDY drain in place and he was transferred to [facility]. Patient was found to have aspirated a foreign body and +MRSA empyema. He was treated and discharged on 1/22/2019.     He was found by his home nurse to be in acute distress and recommended to proceed to the ED.  At presentation, he was found to be in acute hypercarbic respiratory failure complicated by hemodynamic instability requiring emergent intubation and pressor support. (28 Jan 2019 15:34)    Pt had Tm 104.2 (on 1/29), tachycardia, hypotension.  Pt has now been extubated, s/p trach/PEG on 1/31.  CT chest shows complete L sided atelectasis with mucous plugging of right lower lobe  bronchus with near complete atelectasis.      ID consult called for antibiotic managment.     Recommend:    - Pt p/w severe sepsis with shock and respiratory distress.  Pt with high fevers.  Found to have complete and near complete atelectasis of L and R lung respectively with mucous plugging.  Agree with broad spectrum abx to cover for post-obstructive pna.  Repeat CT chest from 2/3 shows improved aeration.      - Cultures results show:    2/1: endotrach - MRSA,  Klebsiella (pan sens), yeast (gram stain only)  2/1: bcx - CNS  1/29:  lung cx - nl resp milton  1/28: bcx - NGTD    - Agree with vancomycin and zosyn.   Maintain vanco trough between 15-20.  Dose vanco by level, post HD.  Fluconazole added on 2/2.  Seen on gram stain only.    -  Check repeat blood cultures to ensure clearance, CNS likely contaminant.    - CT a/p showed rt inguinal lesion.  Subsequent US shows marked edema and small lymph nodes.  Cont to monitor.      - Cont care as per CTU. Continue hemodynamic monitoring.  Pressors being titrated.  On full mechanical ventilator support, s/p tracheostomy. Continue bronchodilators, pulmonary toilet, Continue GI prophylaxis with Protonix.   Monitor temp curve and WBC.    Will follow,    Anabel Chahal  [phone]

## 2019-02-04 NOTE — PROGRESS NOTE ADULT - PROBLEM SELECTOR PLAN 1
Pt. with ESRD on HD TIW (TTS). Last HD was on 2/2/19 via AVF, tolerated treatment well. Labs reviewed from today. No plan for HD today. Plan for maintenance HD tomorrow. Monitor BP and labs

## 2019-02-04 NOTE — PROGRESS NOTE ADULT - SUBJECTIVE AND OBJECTIVE BOX
[facility] DIVISION OF KIDNEY DISEASES AND HYPERTENSION -- FOLLOW UP NOTE  --------------------------------------------------------------------------------    HPI: 57 yo male with medical history of NICM with ICD, ESRD on HD, former smoker, BPH admitted with acute hypercapnic respiratory failure. Nephrology consulted for ESRD/HD management. Pt intubated and sedated in the CTICU. Pt. currently admitted with hypercarbic respiratory failure, septic shock. Last HD on 2/2/19. Pt seen in the CTICU s/p Trach on 1/31/19.  Pt. Off IV Pressor support     PAST HISTORY  --------------------------------------------------------------------------------  No significant changes to PMH, PSH, FHx, SHx, unless otherwise noted    ALLERGIES & MEDICATIONS  --------------------------------------------------------------------------------  Allergies    No Known Allergies    Intolerances      Standing Inpatient Medications  collagenase Ointment 1 Application(s) Topical daily  dexmedetomidine Infusion 0.5 MICROgram(s)/kG/Hr IV Continuous <Continuous>  epoetin kelly Injectable 3000 Unit(s) IV Push <User Schedule>  fentaNYL   Infusion 2 MICROgram(s)/kG/Hr IV Continuous <Continuous>  fluconAZOLE IVPB      fluconAZOLE IVPB 200 milliGRAM(s) IV Intermittent every 24 hours  heparin  Injectable 5000 Unit(s) SubCutaneous every 12 hours  midodrine 10 milliGRAM(s) Oral every 8 hours  norepinephrine Infusion 0.05 MICROgram(s)/kG/Min IV Continuous <Continuous>  piperacillin/tazobactam IVPB. 3.375 Gram(s) IV Intermittent once  piperacillin/tazobactam IVPB. 3.375 Gram(s) IV Intermittent every 12 hours  QUEtiapine 25 milliGRAM(s) Oral two times a day  sodium chloride 0.9%. 1000 milliLiter(s) IV Continuous <Continuous>    PRN Inpatient Medications  acetaminophen  IVPB .. 1000 milliGRAM(s) IV Intermittent once PRN      REVIEW OF SYSTEMS  --------------------------------------------------------------------------------  Unable to obtain     VITALS/PHYSICAL EXAM  --------------------------------------------------------------------------------  T(C): 36.3 (02-04-19 @ 08:00), Max: 38.3 (02-03-19 @ 16:00)  HR: 87 (02-04-19 @ 08:00) (80 - 94)  BP: 113/64 (02-03-19 @ 16:00) (88/48 - 113/64)  RR: 21 (02-04-19 @ 08:00) (21 - 22)  SpO2: 100% (02-04-19 @ 08:00) (96% - 100%)  Wt(kg): --    02-03-19 @ 07:01  -  02-04-19 @ 07:00  --------------------------------------------------------  IN: 1986.4 mL / OUT: 30 mL / NET: 1956.4 mL    02-04-19 @ 07:01  -  02-04-19 @ 08:21  --------------------------------------------------------  IN: 71.3 mL / OUT: 0 mL / NET: 71.3 mL    Gen: Intubated  	HEENT: +Trach  	Pulm: coarse breath sounds B/L  	CV: S1S2  	Abd: Soft, +BS   	Ext: No LE edema B/L  	Neuro: Awake  	Skin: Warm and dry  	Vascular access: LUE AVF site: +thrill, bruit heard.     LABS/STUDIES  --------------------------------------------------------------------------------              9.2    8.72  >-----------<  205      [02-04-19 @ 05:15]              30.9     134  |  97  |  49  ----------------------------<  117      [02-04-19 @ 05:15]  3.8   |  23  |  4.51        Ca     9.4     [02-04-19 @ 05:15]      Mg     2.2     [02-04-19 @ 05:15]      Phos  4.4     [02-04-19 @ 05:15]    TPro  6.3  /  Alb  2.1  /  TBili  0.4  /  DBili  x   /  AST  31  /  ALT  17  /  AlkPhos  372  [02-04-19 @ 05:15]    Creatinine Trend:  SCr 4.51 [02-04 @ 05:15]  SCr 3.51 [02-03 @ 05:50]  SCr 4.45 [02-02 @ 02:30]  SCr 3.50 [02-01 @ 03:40]  SCr 4.52 [01-31 @ 03:20      HbA1c 5.5      [10-12-18 @ 02:53]  TSH 4.79      [10-21-18 @ 04:15]    HBsAb Reactive      [02-01-19 @ 12:20]  HBsAg NEGATIVE      [02-01-19 @ 12:20]  HBcAb Reactive      [02-01-19 @ 12:20]  HCV 0.23, Nonreactive Hepatitis C AB  S/CO Ratio                        Interpretation  < 1.0                                     Non-Reactive  1.0 - 4.9                           Weakly-Reactive  > 5.0                                 Reactive  Non-Reactive: Aperson with a non-reactive HCV antibody  result is considered uninfected.  No further action is  needed unless recent infection is suspected.  In these  cases, consider repeat testing later to detect  seroconversion..  Weakly-Reactive: HCV antibody test is abnormal, HCV RNA  Qualitative test will follow.  Reactive: HCV antibody test is abnormal, HCV RNA  Qualitative test will follow.  Note: HCV antibody testing is performed on the [employer]   system.      [02-01-19 @ 12:20]

## 2019-02-04 NOTE — PROGRESS NOTE ADULT - SUBJECTIVE AND OBJECTIVE BOX
Infectious Diseases progress note:    Subjective: Pt trached in ventilator.  Pressors being weaned off.  No new fevers.  Sputum cx (+) MRSA.  Blood cx single set (+) CNS.      ROS:  Nonverbal    Allergies    No Known Allergies    Intolerances        ANTIBIOTICS/RELEVANT:  antimicrobials  fluconAZOLE IVPB      fluconAZOLE IVPB 200 milliGRAM(s) IV Intermittent every 24 hours  piperacillin/tazobactam IVPB. 3.375 Gram(s) IV Intermittent once  piperacillin/tazobactam IVPB. 3.375 Gram(s) IV Intermittent every 12 hours    immunologic:  epoetin kelly Injectable 3000 Unit(s) IV Push <User Schedule>    OTHER:  acetaminophen  IVPB .. 1000 milliGRAM(s) IV Intermittent once PRN  collagenase Ointment 1 Application(s) Topical daily  dexmedetomidine Infusion 0.5 MICROgram(s)/kG/Hr IV Continuous <Continuous>  fentaNYL   Infusion 2 MICROgram(s)/kG/Hr IV Continuous <Continuous>  heparin  Injectable 5000 Unit(s) SubCutaneous every 12 hours  midodrine 10 milliGRAM(s) Oral every 8 hours  norepinephrine Infusion 0.05 MICROgram(s)/kG/Min IV Continuous <Continuous>  QUEtiapine 25 milliGRAM(s) Oral two times a day  sodium chloride 0.9%. 1000 milliLiter(s) IV Continuous <Continuous>      Objective:  Vital Signs Last 24 Hrs  T(C): 36.3 (04 Feb 2019 16:00), Max: 38.3 (03 Feb 2019 19:00)  T(F): 97.4 (04 Feb 2019 16:00), Max: 100.9 (03 Feb 2019 19:00)  HR: 83 (04 Feb 2019 17:00) (75 - 92)  BP: --  BP(mean): --  RR: 22 (04 Feb 2019 17:00) (21 - 23)  SpO2: 100% (04 Feb 2019 17:00) (96% - 100%)    PHYSICAL EXAM:  Constitutional:NAD  Eyes:CHER, EOMI  Ear/Nose/Throat: no thrush, mucositis.  Moist mucous membranes	  Neck:no JVD, no lymphadenopathy, supple, trached  Respiratory: rt chest tube.    Cardiovascular: S1S2 RRR, no murmurs  Gastrointestinal:soft, nontender,  nondistended (+) BS, PEG  Extremities:no e/e/c  Skin:  rt post chest wound dsg in place        LABS:                        9.2    8.72  )-----------( 205      ( 04 Feb 2019 05:15 )             30.9     02-04    134<L>  |  97<L>  |  49<H>  ----------------------------<  117<H>  3.8   |  23  |  4.51<H>    Ca    9.4      04 Feb 2019 05:15  Phos  4.4     02-04  Mg     2.2     02-04    TPro  6.3  /  Alb  2.1<L>  /  TBili  0.4  /  DBili  x   /  AST  31  /  ALT  17  /  AlkPhos  372<H>  02-04          Culture - Respiratory with Gram Stain (02.01.19 @ 22:35)    -  Tetra/Doxy: S <=1 AAMIR    -  Tigecycline: S    -  Tobramycin: S <=2 AAMIR    -  Trimethoprim/Sulfamethoxazole: S <=0.5/9.5 AAMIR    -  Trimethoprim/Sulfamethoxazole: S <=0.5/9.5 AAMIR    -  Vancomycin: S 2 AAMIR    Culture - Respiratory:   Normal Respiratory Jennifer Also Present  ***** CRITICAL RESULT *****  PERSON CALLED / READ-BACK: ADALGISA CARLSON  RN./Y  DATE / TIME CALLED: 02/04/19 1204  CALLED BY: RAYMUNDO GUAMAN    -  Gentamicin: R >8 AAMIR    -  Gentamicin: S <=1 AAMIR    -  Imipenem: S <=1 AAMIR    -  Levofloxacin: S <=0.5 AAMIR    -  Levofloxacin: S <=1 AAMIR    -  Linezolid: S 4 AAMIR    -  Meropenem: S <=1 AAMIR    -  Moxifloxacin(Aerobic): S <=0.5 AAMIR    -  Oxacillin: R >2 AAMIR    -  Penicillin: R >8 AAMIR    -  Piperacillin/Tazobactam: S <=8 AAMIR    -  Rifampin: S <=1 AAMIR    Gram Stain Sputum:   WBC White Blood Cells  QNTY CELLS IN GRAM STAIN: MODERATE (3+)  GPCCL^Gram Pos Cocci In Clusters  QUANTITY OF BACTERIA SEEN: MODERATE (3+)  YEAST^YEAST.  QUANTITY OF BACTERIA SEEN: FEW (2+)  GPR^Gram Positive Rods  QUANTITY OF BACTERIA SEEN: FEW (2+)    -  Ampicillin: R 16 AMAIR    -  Ampicillin/Sulbactam: S <=4/2 AAMIR    -  Aztreonam: S <=4 AAMIR    -  Cefazolin: R 16 AAMIR    -  Cefazolin: S <=2 AAMIR    -  Cefepime: S <=2 AAMIR    -  Cefoxitin: S <=4 AAMIR    -  Ceftazidime: S <=1 AAMIR    -  Ceftriaxone: S <=1 AAMIR    -  Ciprofloxacin: S <=1 AAMIR    -  Ciprofloxacin: S <=0.5 AAMIR    -  Clindamycin: S 0.5 AAMIR    -  Daptomycin: S 1 AAMIR    -  Ertapenem: S <=0.5 AAMIR    -  Erythromycin: R >4 AAMIR    -  Amikacin: S <=8 AAMIR    Specimen Source: ENDOTRACHEAL SPECIMEN    Organism Identification: Staph. aureus *MRSA*  Klebsiella pneumoniae    Organism: Staph. aureus *MRSA*  QUANTITY OF GROWTH: MODERATE  OXICILLIN-RESISTANT staphylococci should be regarded as  RESISTANT to ALL other Beta-Lactams regardless of the  in-vitro results obtained.  These include: ALL  Penicillins, Cephalosporins, Amoxicillin-clavulanic  acid, Ticarcillin-clavulanic acid,  Ampicillin-sulbactam, and Imipenem.    Organism: Klebsiella pneumoniae  QUANTITY OF GROWTH: MODERATE    Method Type: POSITIVE AAMIR 29    Method Type: NEGATIVE AAMIR 43      Culture - Blood (02.01.19 @ 21:47)    Culture - Blood:   ***Blood Panel PCR results on this specimen are available  approximately 3 hours after the Gram stain result***  Gram stain, PCR, and/or culture results may not always  correspond due to difference in methodologies  ------------------------------------------------------------  This PCR assay was performed using [employer].  The  following targets are tested for:  Enterococcus, vancomycin  resistant enterococci, Listeria monocytogenes,  coagulase  negative staphylococci, S. aureus, methicillin resistant S.  aureus, Streptococcus agalactiae (Group B), S. pneumoniae,  S. pyogenes (Group A), Acinetobacter baumannii, Enterobacter  cloacae, E. coli, Klebsiella oxytoca, K. pneumoniae, Proteus  sp., Serratia marcescens, Haemophilus influenzae, Neisseria  meningitidis, Pseudomonas aeruginosa, Candida albicans, C.  glabrata, C. krusei, C. parapsilosis, C. tropicalis and the  KPC resistance gene.  **NOTE: Due to technical problems, Proteus sp. will NOT be  reported as part of the BCID paneluntil further notice.    Culture - Blood:   Single set isolate, possible contaminant.  Contact microbiology if susceptibility testing is clinically  indicated.    -  Coagulase negative Staphylococcus: + DETECT AAMIR    Specimen Source: BLOOD PERIPHERAL    Organism: BLOOD CULTURE PCR  ***Blood Panel PCR results on this specimen are available  approximately 3 hours after the Gram stain result***  Gram stain, PCR, and/or culture results may not always  correspond due to difference in methodologies  ------------------------------------------------------------  This PCR assay was performed using [employer].  The  following targets are tested for:  Enterococcus, vancomycin  resistant enterococci, Listeria monocytogenes,  coagulase  negative staphylococci, S. aureus, methicillin resistant S.  aureus, Streptococcus agalactiae (Group B), S. pneumoniae,  S. pyogenes (Group A), Acinetobacter baumannii, Enterobacter  cloacae, E. coli, Klebsiella oxytoca, K. pneumoniae, Proteus  sp., Serratia marcescens, Haemophilus influenzae, Neisseria  meningitidis, Pseudomonas aeruginosa, Candida albicans, C.  glabrata, C. krusei, C. parapsilosis, C. tropicalis and the  KPC resistance gene.  **NOTE: Due to technical problems, Proteus sp. will NOT be  reported as part of the BCID panel until further notice.    Organism: Staphylococcus sp.,coag neg    Gram Stain Blood:   ***** CRITICAL RESULT *****  PERSON CALLED / READ-BACK: MEGHA HILL MD/Y  DATE / TIME CALLED: 02/03/19 0548  CALLED BY: HOLLIE EM^Gram Pos Cocci In Clusters  AFTER: 30 HOURS INCUBATION  BOTTLE: AEROBIC BOTTLE    Organism Identification: BLOOD CULTURE PCR  Staphylococcus sp.,coag neg    Method Type: PCR    Culture - Blood (01.29.19 @ 22:40)    Culture - Blood:   NO ORGANISMS ISOLATED    Specimen Source: BLOOD VENOUS    Culture - Respiratory with Gram Stain (01.29.19 @ 13:38)    Gram Stain Sputum:   GPCPR^Gram Pos Cocci in Pairs  QUANTITY OF BACTERIA SEEN: RARE (1+)  WBC^White Blood Cells  QNTY CELLS IN GRAM STAIN: MANY (4+)    Culture - Respiratory:   NRF^Normal Respiratory Jennifer  QUANTITY OF GROWTH: RARE    Specimen Source: LUNG - UPPER LOBE LEFT    Culture - Blood (01.28.19 @ 15:10)    Culture - Blood:   NO ORGANISMS ISOLATED    Specimen Source: BLOOD PERIPHERAL                  MICROBIOLOGY:    see above      RADIOLOGY & ADDITIONAL STUDIES:    < from: US Pelvis Limited or Follow Up (02.04.19 @ 13:08) >  FINDINGS: Within the area of concern in the right inguinal region, there   is marked edema and a couple prominent, but subcentimeter in short axis   lymph nodes, with a reference measuring 0.6 x 0.5 x 1.1 cm. There is no   collection or solid lesion to correspond to the finding on CT.    IMPRESSION: Marked edema of the right inguinal region with a couple small   lymph nodes. No discrete collection or solid lesion to correspond to the   finding on CT. Continue follow-up as clinically warranted. Consider   repeat CT as clinically warranted.    < end of copied text >        < from: Xray Chest 1 View- PORTABLE-Routine (02.04.19 @ 06:56) >  Findings: Portable semiupright view of the chest dated 2/4/2019 is   22/3/2019. Tubes and lines are unchanged. Substernal defibrillator is   unchanged. Small bilateral pleural effusions and bibasilar subsegmental   atelectasis are stable. There is no pneumothorax. Heart and mediastinum   cannot be evaluated.    Impression: No change.    < end of copied text >        < from: CT Abdomen and Pelvis w/ Oral Cont (02.03.19 @ 14:34) >  IMPRESSION:   Overall, there is improved aeration of the bilateral lower lobar distal   airways with decrease in degree of lobar atelectasis.   Small bilateral pleural effusions.  No bowel obstruction or inflammation.  Moderate volume ascites.  Indeterminate thick-walled lesion with central hypodensity in the right   inguinal region, measuring up to 2.8 cm. If there is clinical concern for   abscess, an ultrasound can be performed for further evaluation.    < end of copied text >          < from: CT Chest No Cont (02.03.19 @ 14:34) >    IMPRESSION:   Overall, there is improved aeration of the bilateral lower lobar distal   airways with decrease in degree of lobar atelectasis.   Small bilateral pleural effusions.  No bowel obstruction or inflammation.  Moderate volume ascites.  Indeterminate thick-walled lesion with central hypodensity in the right   inguinal region, measuring up to 2.8 cm. If there is clinical concern for   abscess, an ultrasound can be performed for further evaluation.    < end of copied text >

## 2019-02-05 LAB
ALBUMIN SERPL ELPH-MCNC: 2.2 G/DL — LOW (ref 3.3–5)
ALP SERPL-CCNC: 363 U/L — HIGH (ref 40–120)
ALT FLD-CCNC: 18 U/L — SIGNIFICANT CHANGE UP (ref 4–41)
ANION GAP SERPL CALC-SCNC: 14 MMO/L — SIGNIFICANT CHANGE UP (ref 7–14)
AST SERPL-CCNC: 35 U/L — SIGNIFICANT CHANGE UP (ref 4–40)
BASE EXCESS BLDA CALC-SCNC: 0.3 MMOL/L — SIGNIFICANT CHANGE UP
BASOPHILS # BLD AUTO: 0.04 K/UL — SIGNIFICANT CHANGE UP (ref 0–0.2)
BASOPHILS NFR BLD AUTO: 0.6 % — SIGNIFICANT CHANGE UP (ref 0–2)
BILIRUB SERPL-MCNC: 0.3 MG/DL — SIGNIFICANT CHANGE UP (ref 0.2–1.2)
BUN SERPL-MCNC: 56 MG/DL — HIGH (ref 7–23)
CALCIUM SERPL-MCNC: 9.7 MG/DL — SIGNIFICANT CHANGE UP (ref 8.4–10.5)
CHLORIDE BLDA-SCNC: 101 MMOL/L — SIGNIFICANT CHANGE UP (ref 96–108)
CHLORIDE SERPL-SCNC: 95 MMOL/L — LOW (ref 98–107)
CO2 SERPL-SCNC: 24 MMOL/L — SIGNIFICANT CHANGE UP (ref 22–31)
CREAT SERPL-MCNC: 4.97 MG/DL — HIGH (ref 0.5–1.3)
EOSINOPHIL # BLD AUTO: 0.97 K/UL — HIGH (ref 0–0.5)
EOSINOPHIL NFR BLD AUTO: 14.1 % — HIGH (ref 0–6)
GLUCOSE BLDA-MCNC: 120 MG/DL — HIGH (ref 70–99)
GLUCOSE SERPL-MCNC: 121 MG/DL — HIGH (ref 70–99)
HCO3 BLDA-SCNC: 25 MMOL/L — SIGNIFICANT CHANGE UP (ref 22–26)
HCT VFR BLD CALC: 31 % — LOW (ref 39–50)
HCT VFR BLDA CALC: 29.8 % — LOW (ref 39–51)
HGB BLD-MCNC: 9.6 G/DL — LOW (ref 13–17)
HGB BLDA-MCNC: 9.6 G/DL — LOW (ref 13–17)
IMM GRANULOCYTES NFR BLD AUTO: 1 % — SIGNIFICANT CHANGE UP (ref 0–1.5)
LACTATE BLDA-SCNC: 0.7 MMOL/L — SIGNIFICANT CHANGE UP (ref 0.5–2)
LYMPHOCYTES # BLD AUTO: 0.74 K/UL — LOW (ref 1–3.3)
LYMPHOCYTES # BLD AUTO: 10.8 % — LOW (ref 13–44)
MCHC RBC-ENTMCNC: 30.7 PG — SIGNIFICANT CHANGE UP (ref 27–34)
MCHC RBC-ENTMCNC: 31 % — LOW (ref 32–36)
MCV RBC AUTO: 99 FL — SIGNIFICANT CHANGE UP (ref 80–100)
MONOCYTES # BLD AUTO: 0.39 K/UL — SIGNIFICANT CHANGE UP (ref 0–0.9)
MONOCYTES NFR BLD AUTO: 5.7 % — SIGNIFICANT CHANGE UP (ref 2–14)
NEUTROPHILS # BLD AUTO: 4.65 K/UL — SIGNIFICANT CHANGE UP (ref 1.8–7.4)
NEUTROPHILS NFR BLD AUTO: 67.8 % — SIGNIFICANT CHANGE UP (ref 43–77)
NRBC # FLD: 0 K/UL — LOW (ref 25–125)
PCO2 BLDA: 44 MMHG — SIGNIFICANT CHANGE UP (ref 35–48)
PH BLDA: 7.38 PH — SIGNIFICANT CHANGE UP (ref 7.35–7.45)
PLATELET # BLD AUTO: 217 K/UL — SIGNIFICANT CHANGE UP (ref 150–400)
PMV BLD: 10.2 FL — SIGNIFICANT CHANGE UP (ref 7–13)
PO2 BLDA: 140 MMHG — HIGH (ref 83–108)
POTASSIUM BLDA-SCNC: 3.6 MMOL/L — SIGNIFICANT CHANGE UP (ref 3.4–4.5)
POTASSIUM SERPL-MCNC: 4 MMOL/L — SIGNIFICANT CHANGE UP (ref 3.5–5.3)
POTASSIUM SERPL-SCNC: 4 MMOL/L — SIGNIFICANT CHANGE UP (ref 3.5–5.3)
PROT SERPL-MCNC: 6 G/DL — SIGNIFICANT CHANGE UP (ref 6–8.3)
RBC # BLD: 3.13 M/UL — LOW (ref 4.2–5.8)
RBC # FLD: 16.5 % — HIGH (ref 10.3–14.5)
SAO2 % BLDA: 98.8 % — SIGNIFICANT CHANGE UP (ref 95–99)
SODIUM BLDA-SCNC: 129 MMOL/L — LOW (ref 136–146)
SODIUM SERPL-SCNC: 133 MMOL/L — LOW (ref 135–145)
VANCOMYCIN TROUGH SERPL-MCNC: 16.4 UG/ML — SIGNIFICANT CHANGE UP (ref 10–20)
VANCOMYCIN TROUGH SERPL-MCNC: 9.6 UG/ML — LOW (ref 10–20)
WBC # BLD: 6.86 K/UL — SIGNIFICANT CHANGE UP (ref 3.8–10.5)
WBC # FLD AUTO: 6.86 K/UL — SIGNIFICANT CHANGE UP (ref 3.8–10.5)

## 2019-02-05 PROCEDURE — 99291 CRITICAL CARE FIRST HOUR: CPT

## 2019-02-05 PROCEDURE — 99292 CRITICAL CARE ADDL 30 MIN: CPT

## 2019-02-05 PROCEDURE — 99233 SBSQ HOSP IP/OBS HIGH 50: CPT | Mod: GC

## 2019-02-05 PROCEDURE — 71045 X-RAY EXAM CHEST 1 VIEW: CPT | Mod: 26

## 2019-02-05 RX ORDER — CLONAZEPAM 1 MG
0.25 TABLET ORAL ONCE
Qty: 0 | Refills: 0 | Status: DISCONTINUED | OUTPATIENT
Start: 2019-02-05 | End: 2019-02-05

## 2019-02-05 RX ORDER — VANCOMYCIN HCL 1 G
1000 VIAL (EA) INTRAVENOUS ONCE
Qty: 0 | Refills: 0 | Status: COMPLETED | OUTPATIENT
Start: 2019-02-05 | End: 2019-02-05

## 2019-02-05 RX ORDER — CLONAZEPAM 1 MG
0.5 TABLET ORAL ONCE
Qty: 0 | Refills: 0 | Status: DISCONTINUED | OUTPATIENT
Start: 2019-02-05 | End: 2019-02-05

## 2019-02-05 RX ORDER — QUETIAPINE FUMARATE 200 MG/1
50 TABLET, FILM COATED ORAL EVERY 12 HOURS
Qty: 0 | Refills: 0 | Status: DISCONTINUED | OUTPATIENT
Start: 2019-02-05 | End: 2019-02-17

## 2019-02-05 RX ADMIN — Medication 250 MILLIGRAM(S): at 21:39

## 2019-02-05 RX ADMIN — MIDODRINE HYDROCHLORIDE 10 MILLIGRAM(S): 2.5 TABLET ORAL at 07:08

## 2019-02-05 RX ADMIN — DEXMEDETOMIDINE HYDROCHLORIDE IN 0.9% SODIUM CHLORIDE 7.5 MICROGRAM(S)/KG/HR: 4 INJECTION INTRAVENOUS at 09:00

## 2019-02-05 RX ADMIN — QUETIAPINE FUMARATE 25 MILLIGRAM(S): 200 TABLET, FILM COATED ORAL at 07:08

## 2019-02-05 RX ADMIN — FENTANYL CITRATE 12 MICROGRAM(S)/KG/HR: 50 INJECTION INTRAVENOUS at 09:00

## 2019-02-05 RX ADMIN — HEPARIN SODIUM 5000 UNIT(S): 5000 INJECTION INTRAVENOUS; SUBCUTANEOUS at 07:08

## 2019-02-05 RX ADMIN — FLUCONAZOLE 100 MILLIGRAM(S): 150 TABLET ORAL at 07:09

## 2019-02-05 RX ADMIN — PIPERACILLIN AND TAZOBACTAM 25 GRAM(S): 4; .5 INJECTION, POWDER, LYOPHILIZED, FOR SOLUTION INTRAVENOUS at 17:46

## 2019-02-05 RX ADMIN — SODIUM CHLORIDE 10 MILLILITER(S): 9 INJECTION INTRAMUSCULAR; INTRAVENOUS; SUBCUTANEOUS at 20:48

## 2019-02-05 RX ADMIN — Medication 1 APPLICATION(S): at 13:30

## 2019-02-05 RX ADMIN — HEPARIN SODIUM 5000 UNIT(S): 5000 INJECTION INTRAVENOUS; SUBCUTANEOUS at 17:47

## 2019-02-05 RX ADMIN — Medication 0.5 MILLIGRAM(S): at 20:48

## 2019-02-05 RX ADMIN — QUETIAPINE FUMARATE 50 MILLIGRAM(S): 200 TABLET, FILM COATED ORAL at 17:46

## 2019-02-05 RX ADMIN — MIDODRINE HYDROCHLORIDE 10 MILLIGRAM(S): 2.5 TABLET ORAL at 13:55

## 2019-02-05 RX ADMIN — ERYTHROPOIETIN 3000 UNIT(S): 10000 INJECTION, SOLUTION INTRAVENOUS; SUBCUTANEOUS at 14:41

## 2019-02-05 RX ADMIN — Medication 0.25 MILLIGRAM(S): at 13:40

## 2019-02-05 RX ADMIN — PIPERACILLIN AND TAZOBACTAM 25 GRAM(S): 4; .5 INJECTION, POWDER, LYOPHILIZED, FOR SOLUTION INTRAVENOUS at 07:09

## 2019-02-05 RX ADMIN — Medication 5.62 MICROGRAM(S)/KG/MIN: at 09:00

## 2019-02-05 RX ADMIN — MIDODRINE HYDROCHLORIDE 10 MILLIGRAM(S): 2.5 TABLET ORAL at 21:39

## 2019-02-05 RX ADMIN — DEXMEDETOMIDINE HYDROCHLORIDE IN 0.9% SODIUM CHLORIDE 7.5 MICROGRAM(S)/KG/HR: 4 INJECTION INTRAVENOUS at 20:48

## 2019-02-05 NOTE — PROGRESS NOTE ADULT - SUBJECTIVE AND OBJECTIVE BOX
CLAUDIA HAN   MRN#: [id number]     The patient is a 58y Male who was seen, evaluated, & examined with the CTICU staff post-operatively with a multidisciplinary care plan formulated & implemented.  All available clinical, laboratory, radiographic, pharmacologic, and electrocardiographic data were reviewed & analyzed.      The patient was in the CTICU in critical condition secondary to:     acute hypoxic respiratory failure-persistent cardiopulmonary dysfunction  sepsis-shock    For support and evaluation & prevention of further decompensation secondary to persistent cardiopulmonary dysfunction and cardiogenic shock-cardiovascular dysfunction, respiratory failure required:     supplemental oxygen with full ventilatory support / mechanical ventilation   continuous pulse oximetry monitoring  following ABGs with A-line monitoring  IV Dexmedetomidine infusion  IV Fentanyl infusion     Invasive hemodynamic monitoring with     an A-line was required for continuous MAP/BP monitoring     to ensure adequate cardiovascular support and to evaluate for & help prevent decompensation while receiving     intermittent volume expansion  IV Levophed infusion    secondary to     sepsis-shock    In addition:  Weaned off of Fentanyl, on Precedex with increased Seroquel and Klonopin 0.25 prn agitation  Still on small amount of Levophed - weaning as tolerated  HD today - did not CPAP wean  Vancomycin level post dialysis was low - received 1g for MRSA  Also continued on Zosyn for Klebsiella and Fluconazole for yeast    The patient required critical care management and I provided 80 minutes of non-continuous care to the patient in addition to  discussing the patient and plan at length with the CTICU staff and helping coordinate care.

## 2019-02-05 NOTE — PROGRESS NOTE ADULT - SUBJECTIVE AND OBJECTIVE BOX
CLAUDIA HAN                     [id number]    HPI:  Mr. Han is a 58M who underwent a FB, R thoracotomy, decortication, chest wall reconstruction, lat flap with Dr Pickering on 10/11/18. He was admitted on 1/6/19 to [facility] with a R pleural effusion and respiratory failure,  A R PTC was placed there and he was intubated.  He still had a R SANDY drain in place and he was transferred to [facility]. Patient was found to have aspirated a foreign body and +MRSA empyema. He was treated and discharged on 1/22/2019.     He was found by his home nurse to be in acute distress and recommended to proceed to the ED.  At presentation, he was found to be in acute hypercarbic respiratory failure complicated by hemodynamic instability requiring emergent intubation and pressor support. (28 Jan 2019 15:34)      Procedure:        Trach and PEG  1/31/2019  Bronchoscopy   1/29/2019  Right thoracotomy, resection of portion of R 7th rib, evacuation of infected hemothorax, takedown of pleurocutaneous fistula  10/11/2018    Issues:  Acute hypoxic & hypercapnic respiratory failure  Hypotension  Pneumonia /  Fever / Sepsis  ESRD / HD  Cardiomyopathy  HLD  BPH    Drips:  Levo  Precedex              Fentanyl      PAST MEDICAL & SURGICAL HISTORY:  Smoking hx  Cardiomyopathy  Wound: right chest  ICD (implantable cardioverter-defibrillator) in place  OA (osteoarthritis)  HLD (hyperlipidemia)  BPH (benign prostatic hyperplasia)  Pleural effusion  HTN (hypertension)  ESRD (end stage renal disease)  H/O chest wound: right  S/P thoracotomy: FB, R thoracotomy, decortication, chest wall reconstruction, lat flap  History of wound infection: right chest wall - revision 5/18 and again in 7/18  History of implantable cardioverter-defibrillator (ICD) placement: pt unsure when placed  H/O bilateral hip replacements: 2008, 2009            VITAL SIGNS:  Vital Signs Last 24 Hrs  T(C): 36.9 (05 Feb 2019 00:00), Max: 36.9 (05 Feb 2019 00:00)  T(F): 98.4 (05 Feb 2019 00:00), Max: 98.4 (05 Feb 2019 00:00)  HR: 74 (05 Feb 2019 07:15) (74 - 94)  BP: 134/68 (05 Feb 2019 01:00) (75/49 - 134/68)  BP(mean): 85 (05 Feb 2019 01:00) (78 - 85)  RR: 22 (05 Feb 2019 06:00) (13 - 23)  SpO2: 100% (05 Feb 2019 07:15) (100% - 100%)    I/Os:   I&O's Detail    04 Feb 2019 07:01  -  05 Feb 2019 07:00  --------------------------------------------------------  IN:    dexmedetomidine Infusion: 178.5 mL    Enteral Tube Flush: 200 mL    fentaNYL  Infusion: 135 mL    IV PiggyBack: 100 mL    Nepro with Carb Steady: 770 mL    norepinephrine Infusion: 207.1 mL    sodium chloride 0.9%.: 220 mL  Total IN: 1810.6 mL    OUT:    Bulb: 25 mL    Rectal Tube: 150 mL  Total OUT: 175 mL    Total NET: 1635.6 mL          CAPILLARY BLOOD GLUCOSE          =======================MEDICATIONS===================  MEDICATIONS  (STANDING):  collagenase Ointment 1 Application(s) Topical daily  dexmedetomidine Infusion 0.5 MICROgram(s)/kG/Hr (7.5 mL/Hr) IV Continuous <Continuous>  epoetin kelly Injectable 3000 Unit(s) IV Push <User Schedule>  fentaNYL   Infusion 2 MICROgram(s)/kG/Hr (12 mL/Hr) IV Continuous <Continuous>  fluconAZOLE IVPB      fluconAZOLE IVPB 200 milliGRAM(s) IV Intermittent every 24 hours  heparin  Injectable 5000 Unit(s) SubCutaneous every 12 hours  midodrine 10 milliGRAM(s) Oral every 8 hours  norepinephrine Infusion 0.05 MICROgram(s)/kG/Min (5.625 mL/Hr) IV Continuous <Continuous>  piperacillin/tazobactam IVPB. 3.375 Gram(s) IV Intermittent once  piperacillin/tazobactam IVPB. 3.375 Gram(s) IV Intermittent every 12 hours  QUEtiapine 25 milliGRAM(s) Oral two times a day  sodium chloride 0.9%. 1000 milliLiter(s) (10 mL/Hr) IV Continuous <Continuous>    MEDICATIONS  (PRN):  acetaminophen  IVPB .. 1000 milliGRAM(s) IV Intermittent once PRN Temp greater or equal to 38C (100.4F), Moderate Pain (4 - 6)      =======================VENTILATOR SETTINGS===================  Mode: AC/ CMV (Assist Control/ Continuous Mandatory Ventilation)  RR (machine): 22  TV (machine): 400  FiO2: 50  PEEP: 5  MAP: 11  PIP: 30        PHYSICAL EXAM============================  General:          Sedated, vented,  no distress  Neuro:             sedated	  HEENT:             CHER/   trach  Respiratory:	Lungs clear on auscultation bilaterally with good aeration.                            No rales, rhonchi, no wheezing.   CV:		 Regular rate and rhythm, (+) S1/S2.    Abdomen:	Soft,  nontender, not-distended. Bowel sounds present    Skin:		No rash.  Extremities:	Warm, no cyanosis or edema.  Palpable pulses  ============================LABS=========================                        9.6    6.86  )-----------( 217      ( 05 Feb 2019 05:30 )             31.0     02-05    133<L>  |  95<L>  |  56<H>  ----------------------------<  121<H>  4.0   |  24  |  4.97<H>    Ca    9.7      05 Feb 2019 05:30  Phos  4.4     02-04  Mg     2.2     02-04    TPro  6.0  /  Alb  2.2<L>  /  TBili  0.3  /  DBili  x   /  AST  35  /  ALT  18  /  AlkPhos  363<H>  02-05    LIVER FUNCTIONS - ( 05 Feb 2019 05:30 )  Alb: 2.2 g/dL / Pro: 6.0 g/dL / ALK PHOS: 363 u/L / ALT: 18 u/L / AST: 35 u/L / GGT: x             ABG - ( 05 Feb 2019 05:30 )  pH, Arterial: 7.38  pH, Blood: x     /  pCO2: 44    /  pO2: 140   / HCO3: 25    / Base Excess: 0.3   /  SaO2: 98.8                  ============================IMAGING STUDIES=========================  < from: Xray Chest 1 View- PORTABLE-Routine (02.04.19 @ 06:56) >    INTERPRETATION:  Clinical information: Respiratory failure.    Findings: Portable semiupright view of the chest dated 2/4/2019 is   22/3/2019. Tubes and lines are unchanged. Substernal defibrillator is   unchanged. Small bilateral pleural effusions and bibasilar subsegmental   atelectasis are stable. There is no pneumothorax. Heart and mediastinum   cannot be evaluated.    Impression: No change.     58M who underwent a FB, R thoracotomy, decortication, chest wall reconstruction, lat flap with Dr Pickering on 10/11/18. He was admitted on 1/6/19 to [facility] with a R pleural effusion and respiratory failure,  A R PTC was placed there and he was intubated.  He still had a R SANDY drain in place and he was transferred to [facility]. Patient was found to have aspirated a foreign body and +MRSA empyema. He was treated and discharged on 1/22/2019.     He was found by his home nurse to be in acute distress and recommended to proceed to the ED.  At presentation, he was found to be in acute hypercarbic respiratory failure complicated by hemodynamic instability requiring emergent intubation and pressor support. (28 Jan 2019 15:34)    Procedure:        Trach and PEG  1/31/2019  Bronchoscopy   1/29/2019  Right thoracotomy, resection of portion of R 7th rib, evacuation of infected hemothorax, takedown of pleurocutaneous fistula  10/11/2018    Issues:  Acute hypoxic & hypercapnic respiratory failure  Hypotension  Pneumonia /  Fever / Sepsis  ESRD / HD  Cardiomyopathy  HLD              ====================== NEUROLOGY=======================  Pain control with Fentanyl  Pt is on Precedex for agitation  ==================== RESPIRATORY========================  [facility]. Continue CPAP weaning trails as casey.   Comfortable, no evidence of distress.  Monitor  right chest tube output  	  Mechanical Ventilation:  Mode: AC/ CMV (Assist Control/ Continuous Mandatory Ventilation)  RR (machine): 22  TV (machine): 400  FiO2: 50  PEEP: 5  MAP: 10  PIP: 26  CPAP trials  as tolerated  Mechanical ventilator status assessed & settings reviewed  Continuous pulse oximetry monitoring  Continue bronchodilators, pulmonary toilet  Head of bed elevation to 30-40 degrees  Continue antibiotics -  Vanco based on level + Zosyn. Diflucan added because of yeast in BAL  ====================CARDIOVASCULAR=====================  Continue hemodynamic monitoring/ telemetry   Hypotension: On  /Off Levophed, titrate  to MAP>65, Restarted Midodrine 10mg/TID, Wean off Levo as tolerated  ===================== RENAL ============================  ESRD on HD TIW  Monitor I/Os, BUN/ Cr  and electrolytes  Renal f/up        ==================== GASTROINTESTINAL===================  On J tube feeds Nepro 35cc/hr at goal,  tolerating well  Continue GI prophylaxis with Protonix   Zofran / Reglan for nausea - PRN  =======================    ENDOCRIN  =====================  Glycemic monitoring  F/S with coverage  ===================HEMATOLOGIC/ONCOLOGIC =============   No signs of active bleeding.   Follow CBC, coags  in AM  DVT prophylaxis with SCD, sc Heparin  ========================INFECTIOUS DISEASE===============  Monitor for fever / leukocytosis.  Bronchoscopy on 1/29 showed copious greenish secretions - Continue pulmonary toilet   Blood cultures - GPCC, MRSA, isolation,  Continue Vanco by  level  + Zosyn + Diflucan    Pertinent clinical, laboratory, radiographic, hemodynamic, echocardiographic, respiratory data, microbiologic data and chart were reviewed and analyzed frequently throughout the course of the day and night. GI and DVT prophylaxis, glycemic control, head of bed elevation and skin care issues were addressed.  Patient seen, examined and plan discussed with CT Surgery / CTICU team during rounds.  Pt remains critically ill in imminent risk of  deterioration and requires very careful cardio- pulmonary monitoring and support.    I have spent 75  minutes of critical care time with this pt between  12  am  and   9am         minutes spent on total encounter; more than 50% of the visit was spent counseling and/or coordinating care by the attending physician.      Dave Su DO FACEP

## 2019-02-05 NOTE — PROGRESS NOTE ADULT - PROBLEM SELECTOR PLAN 1
Pt. with ESRD on HD TIW (TTS). Last HD was on 2/2/19 via AVF, tolerated treatment well. Labs reviewed. Plan for maintenance HD today. Please dose the midodrine for prior to HD today. Monitor BP while on HD

## 2019-02-05 NOTE — PROGRESS NOTE ADULT - SUBJECTIVE AND OBJECTIVE BOX
[facility] DIVISION OF KIDNEY DISEASES AND HYPERTENSION -- FOLLOW UP NOTE  --------------------------------------------------------------------------------    HPI: 59 yo male with medical history of NICM with ICD, ESRD on HD, former smoker, BPH admitted with acute hypercapnic respiratory failure. Nephrology consulted for ESRD/HD management. Pt intubated and sedated in the CTICU. Pt. currently admitted with hypercarbic respiratory failure, septic shock. Last HD on 2/2/19. Plan for HD today. Pt seen in the CTICU s/p Trach on 1/31/19.  Pt. on IV Pressor support today at bedside    PAST HISTORY  --------------------------------------------------------------------------------  No significant changes to PMH, PSH, FHx, SHx, unless otherwise noted    ALLERGIES & MEDICATIONS  --------------------------------------------------------------------------------  Allergies    No Known Allergies    Intolerances      Standing Inpatient Medications  collagenase Ointment 1 Application(s) Topical daily  dexmedetomidine Infusion 0.5 MICROgram(s)/kG/Hr IV Continuous <Continuous>  epoetin kelly Injectable 3000 Unit(s) IV Push <User Schedule>  fentaNYL   Infusion 2 MICROgram(s)/kG/Hr IV Continuous <Continuous>  fluconAZOLE IVPB      fluconAZOLE IVPB 200 milliGRAM(s) IV Intermittent every 24 hours  heparin  Injectable 5000 Unit(s) SubCutaneous every 12 hours  midodrine 10 milliGRAM(s) Oral every 8 hours  norepinephrine Infusion 0.05 MICROgram(s)/kG/Min IV Continuous <Continuous>  piperacillin/tazobactam IVPB. 3.375 Gram(s) IV Intermittent once  piperacillin/tazobactam IVPB. 3.375 Gram(s) IV Intermittent every 12 hours  QUEtiapine 25 milliGRAM(s) Oral two times a day  sodium chloride 0.9%. 1000 milliLiter(s) IV Continuous <Continuous>    PRN Inpatient Medications  acetaminophen  IVPB .. 1000 milliGRAM(s) IV Intermittent once PRN      REVIEW OF SYSTEMS  --------------------------------------------------------------------------------  Unable to obtain   All other systems were reviewed and are negative, except as noted.    VITALS/PHYSICAL EXAM  --------------------------------------------------------------------------------  T(C): 36.7 (02-05-19 @ 05:00), Max: 36.9 (02-05-19 @ 00:00)  HR: 74 (02-05-19 @ 07:15) (74 - 94)  BP: 108/60 (02-05-19 @ 07:00) (75/49 - 134/68)  RR: 22 (02-05-19 @ 07:00) (13 - 23)  SpO2: 100% (02-05-19 @ 07:15) (100% - 100%)  Wt(kg): --      02-04-19 @ 07:01  -  02-05-19 @ 07:00  --------------------------------------------------------  IN: 2000.6 mL / OUT: 180 mL / NET: 1820.6 mL    Gen: Intubated  	HEENT: +Trach  	Pulm: coarse breath sounds B/L  	CV: S1S2  	Abd: Soft, +BS   	Ext: No LE edema B/L  	Neuro: Awake  	Skin: Warm and dry  	Vascular access: LUE AVF site: +thrill, bruit heard.     LABS/STUDIES  --------------------------------------------------------------------------------              9.6    6.86  >-----------<  217      [02-05-19 @ 05:30]              31.0     133  |  95  |  56  ----------------------------<  121      [02-05-19 @ 05:30]  4.0   |  24  |  4.97        Ca     9.7     [02-05-19 @ 05:30]      Mg     2.2     [02-04-19 @ 05:15]      Phos  4.4     [02-04-19 @ 05:15]    TPro  6.0  /  Alb  2.2  /  TBili  0.3  /  DBili  x   /  AST  35  /  ALT  18  /  AlkPhos  363  [02-05-19 @ 05:30]      Creatinine Trend:  SCr 4.97 [02-05 @ 05:30]  SCr 4.51 [02-04 @ 05:15]  SCr 3.51 [02-03 @ 05:50]  SCr 4.45 [02-02 @ 02:30]  SCr 3.50 [02-01 @ 03:40]      HbA1c 5.5      [10-12-18 @ 02:53]  TSH 4.79      [10-21-18 @ 04:15]    HBsAb Reactive      [02-01-19 @ 12:20]  HBsAg NEGATIVE      [02-01-19 @ 12:20]  HBcAb Reactive      [02-01-19 @ 12:20]  HCV 0.23, Nonreactive Hepatitis C AB  S/CO Ratio                        Interpretation  < 1.0                                     Non-Reactive  1.0 - 4.9                           Weakly-Reactive  > 5.0                                 Reactive  Non-Reactive: Aperson with a non-reactive HCV antibody  result is considered uninfected.  No further action is  needed unless recent infection is suspected.  In these  cases, consider repeat testing later to detect  seroconversion..  Weakly-Reactive: HCV antibody test is abnormal, HCV RNA  Qualitative test will follow.  Reactive: HCV antibody test is abnormal, HCV RNA  Qualitative test will follow.  Note: HCV antibody testing is performed on the [employer]   system.      [02-01-19 @ 12:20]

## 2019-02-06 ENCOUNTER — APPOINTMENT (OUTPATIENT)
Dept: CARDIOLOGY | Facility: CLINIC | Age: 59
End: 2019-02-06

## 2019-02-06 ENCOUNTER — APPOINTMENT (OUTPATIENT)
Dept: ELECTROPHYSIOLOGY | Facility: CLINIC | Age: 59
End: 2019-02-06

## 2019-02-06 LAB
ANION GAP SERPL CALC-SCNC: 11 MMO/L — SIGNIFICANT CHANGE UP (ref 7–14)
BUN SERPL-MCNC: 37 MG/DL — HIGH (ref 7–23)
CALCIUM SERPL-MCNC: 9.1 MG/DL — SIGNIFICANT CHANGE UP (ref 8.4–10.5)
CHLORIDE SERPL-SCNC: 96 MMOL/L — LOW (ref 98–107)
CO2 SERPL-SCNC: 27 MMOL/L — SIGNIFICANT CHANGE UP (ref 22–31)
CREAT SERPL-MCNC: 3.68 MG/DL — HIGH (ref 0.5–1.3)
GLUCOSE SERPL-MCNC: 133 MG/DL — HIGH (ref 70–99)
HCT VFR BLD CALC: 28.5 % — LOW (ref 39–50)
HGB BLD-MCNC: 8.4 G/DL — LOW (ref 13–17)
MAGNESIUM SERPL-MCNC: 2.2 MG/DL — SIGNIFICANT CHANGE UP (ref 1.6–2.6)
MCHC RBC-ENTMCNC: 29.5 % — LOW (ref 32–36)
MCHC RBC-ENTMCNC: 30.1 PG — SIGNIFICANT CHANGE UP (ref 27–34)
MCV RBC AUTO: 102.2 FL — HIGH (ref 80–100)
NRBC # FLD: 0 K/UL — LOW (ref 25–125)
PHOSPHATE SERPL-MCNC: 4.1 MG/DL — SIGNIFICANT CHANGE UP (ref 2.5–4.5)
PLATELET # BLD AUTO: 216 K/UL — SIGNIFICANT CHANGE UP (ref 150–400)
PMV BLD: 10.3 FL — SIGNIFICANT CHANGE UP (ref 7–13)
POTASSIUM SERPL-MCNC: 3.5 MMOL/L — SIGNIFICANT CHANGE UP (ref 3.5–5.3)
POTASSIUM SERPL-SCNC: 3.5 MMOL/L — SIGNIFICANT CHANGE UP (ref 3.5–5.3)
RBC # BLD: 2.79 M/UL — LOW (ref 4.2–5.8)
RBC # FLD: 16 % — HIGH (ref 10.3–14.5)
SODIUM SERPL-SCNC: 134 MMOL/L — LOW (ref 135–145)
SPECIMEN SOURCE: SIGNIFICANT CHANGE UP
VANCOMYCIN TROUGH SERPL-MCNC: 26 UG/ML — CRITICAL HIGH (ref 10–20)
WBC # BLD: 6.05 K/UL — SIGNIFICANT CHANGE UP (ref 3.8–10.5)
WBC # FLD AUTO: 6.05 K/UL — SIGNIFICANT CHANGE UP (ref 3.8–10.5)

## 2019-02-06 PROCEDURE — 99233 SBSQ HOSP IP/OBS HIGH 50: CPT | Mod: GC

## 2019-02-06 PROCEDURE — 99292 CRITICAL CARE ADDL 30 MIN: CPT

## 2019-02-06 PROCEDURE — 71045 X-RAY EXAM CHEST 1 VIEW: CPT | Mod: 26

## 2019-02-06 PROCEDURE — 99291 CRITICAL CARE FIRST HOUR: CPT

## 2019-02-06 RX ORDER — PANTOPRAZOLE SODIUM 20 MG/1
40 TABLET, DELAYED RELEASE ORAL DAILY
Qty: 0 | Refills: 0 | Status: DISCONTINUED | OUTPATIENT
Start: 2019-02-06 | End: 2019-02-13

## 2019-02-06 RX ORDER — ALPRAZOLAM 0.25 MG
0.5 TABLET ORAL EVERY 8 HOURS
Qty: 0 | Refills: 0 | Status: DISCONTINUED | OUTPATIENT
Start: 2019-02-06 | End: 2019-02-13

## 2019-02-06 RX ORDER — METOCLOPRAMIDE HCL 10 MG
10 TABLET ORAL EVERY 8 HOURS
Qty: 0 | Refills: 0 | Status: COMPLETED | OUTPATIENT
Start: 2019-02-06 | End: 2019-02-07

## 2019-02-06 RX ADMIN — QUETIAPINE FUMARATE 50 MILLIGRAM(S): 200 TABLET, FILM COATED ORAL at 07:09

## 2019-02-06 RX ADMIN — DEXMEDETOMIDINE HYDROCHLORIDE IN 0.9% SODIUM CHLORIDE 7.5 MICROGRAM(S)/KG/HR: 4 INJECTION INTRAVENOUS at 19:52

## 2019-02-06 RX ADMIN — HEPARIN SODIUM 5000 UNIT(S): 5000 INJECTION INTRAVENOUS; SUBCUTANEOUS at 07:08

## 2019-02-06 RX ADMIN — Medication 0.5 MILLIGRAM(S): at 21:12

## 2019-02-06 RX ADMIN — Medication 1 APPLICATION(S): at 16:15

## 2019-02-06 RX ADMIN — PANTOPRAZOLE SODIUM 40 MILLIGRAM(S): 20 TABLET, DELAYED RELEASE ORAL at 21:11

## 2019-02-06 RX ADMIN — MIDODRINE HYDROCHLORIDE 10 MILLIGRAM(S): 2.5 TABLET ORAL at 13:20

## 2019-02-06 RX ADMIN — PIPERACILLIN AND TAZOBACTAM 25 GRAM(S): 4; .5 INJECTION, POWDER, LYOPHILIZED, FOR SOLUTION INTRAVENOUS at 07:10

## 2019-02-06 RX ADMIN — Medication 5.62 MICROGRAM(S)/KG/MIN: at 08:08

## 2019-02-06 RX ADMIN — DEXMEDETOMIDINE HYDROCHLORIDE IN 0.9% SODIUM CHLORIDE 7.5 MICROGRAM(S)/KG/HR: 4 INJECTION INTRAVENOUS at 08:34

## 2019-02-06 RX ADMIN — Medication 0.5 MILLIGRAM(S): at 07:09

## 2019-02-06 RX ADMIN — MIDODRINE HYDROCHLORIDE 10 MILLIGRAM(S): 2.5 TABLET ORAL at 07:09

## 2019-02-06 RX ADMIN — QUETIAPINE FUMARATE 50 MILLIGRAM(S): 200 TABLET, FILM COATED ORAL at 17:09

## 2019-02-06 RX ADMIN — Medication 0.5 MILLIGRAM(S): at 13:20

## 2019-02-06 RX ADMIN — Medication 5.62 MICROGRAM(S)/KG/MIN: at 19:52

## 2019-02-06 RX ADMIN — HEPARIN SODIUM 5000 UNIT(S): 5000 INJECTION INTRAVENOUS; SUBCUTANEOUS at 17:09

## 2019-02-06 RX ADMIN — MIDODRINE HYDROCHLORIDE 10 MILLIGRAM(S): 2.5 TABLET ORAL at 21:12

## 2019-02-06 RX ADMIN — Medication 10 MILLIGRAM(S): at 21:11

## 2019-02-06 RX ADMIN — PIPERACILLIN AND TAZOBACTAM 25 GRAM(S): 4; .5 INJECTION, POWDER, LYOPHILIZED, FOR SOLUTION INTRAVENOUS at 17:09

## 2019-02-06 RX ADMIN — FLUCONAZOLE 100 MILLIGRAM(S): 150 TABLET ORAL at 07:08

## 2019-02-06 NOTE — CHART NOTE - NSCHARTNOTEFT_GEN_A_CORE
Source:  nursing and EMR    Diet : NPO with tube feed - Nepro Carb Steady @ 35 ml/hr 24hrs     Patient unable to speak , noted 1+ L & R hand edema , 3+ scrotal edema , pressure ulcers , noted wt. increase ? 2/2 pt. does not appear to the recorded wt.     Enteral :  EN providing 1512 kcal & 68 gm protein/d       Current Weight: - 78.4 kg on 2/5/19 ???;  Admit wt. - 60 kg ; 63.9 kg on 1/31/19 ; UBW - 65.1 kg     Pertinent Medications: MEDICATIONS  (STANDING):  ALPRAZolam 0.5 milliGRAM(s) Oral every 8 hours  collagenase Ointment 1 Application(s) Topical daily  dexmedetomidine Infusion 0.5 MICROgram(s)/kG/Hr (7.5 mL/Hr) IV Continuous <Continuous>  epoetin kelly Injectable 3000 Unit(s) IV Push <User Schedule>  fluconAZOLE IVPB      fluconAZOLE IVPB 200 milliGRAM(s) IV Intermittent every 24 hours  heparin  Injectable 5000 Unit(s) SubCutaneous every 12 hours  midodrine 10 milliGRAM(s) Oral every 8 hours  norepinephrine Infusion 0.05 MICROgram(s)/kG/Min (5.625 mL/Hr) IV Continuous <Continuous>  piperacillin/tazobactam IVPB. 3.375 Gram(s) IV Intermittent once  piperacillin/tazobactam IVPB. 3.375 Gram(s) IV Intermittent every 12 hours  QUEtiapine 50 milliGRAM(s) Oral every 12 hours  sodium chloride 0.9%. 1000 milliLiter(s) (10 mL/Hr) IV Continuous <Continuous>    MEDICATIONS  (PRN):  acetaminophen  IVPB .. 1000 milliGRAM(s) IV Intermittent once PRN Temp greater or equal to 38C (100.4F), Moderate Pain (4 - 6)    Pertinent Labs:  02-06 Na134 mmol/L<L> Glu 133 mg/dL<H> K+ 3.5 mmol/L Cr  3.68 mg/dL<H> BUN 37 mg/dL<H> 02-06 Phos 4.1 mg/dL 02-05 Alb 2.2 g/dL<L> 02-02 PAB 8 mg/dL<L>      Recommend to continue Nepro Carb Steady @ 35 ml/hr and add No Carb Pro source 1 pack daily ( MD made aware)     Monitoring and Evaluation:  Tolerance to diet prescription , weights and follow up per protocol

## 2019-02-06 NOTE — PROGRESS NOTE ADULT - ASSESSMENT
58M who underwent a FB, R thoracotomy, decortication, chest wall reconstruction, lat flap with Dr Pickering on 10/11/18. He was admitted on 1/6/19 to [facility] with a R pleural effusion and respiratory failure,  A R PTC was placed there and he was intubated.  He still had a R SANDY drain in place and he was transferred to [facility]. Patient was found to have aspirated a foreign body and +MRSA empyema. He was treated and discharged on 1/22/2019.     He was found by his home nurse to be in acute distress and recommended to proceed to the ED.  At presentation, he was found to be in acute hypercarbic respiratory failure complicated by hemodynamic instability requiring emergent intubation and pressor support. (28 Jan 2019 15:34)    Pt had Tm 104.2 (on 1/29), tachycardia, hypotension.  Pt has now been extubated, s/p trach/PEG on 1/31.  CT chest shows complete L sided atelectasis with mucous plugging of right lower lobe  bronchus with near complete atelectasis.      ID consult called for antibiotic managment.     Recommend:    - Pt p/w severe sepsis with shock and respiratory distress.  Pt with high fevers.  Found to have complete and near complete atelectasis of L and R lung respectively with mucous plugging.  Agree with broad spectrum abx to cover for post-obstructive pna.  Repeat CT chest from 2/3 shows improved aeration.      - Cultures results show:    2/5: bcx - NGTD @ 24hrs  2/1: endotrach - MRSA,  Klebsiella (pan sens), yeast (gram stain only)  2/1: bcx - CNS  1/29:  lung cx - nl resp milton  1/28: bcx - NGTD    - Agree with vancomycin and zosyn.   Maintain vanco trough between 15-20.  Dose vanco by level, post HD.  Fluconazole added on 2/2 for yeast.  Seen on gram stain only.      -  Repeat blood cultures from 2/5 NGTD.  CNS likely contaminant.    - CT a/p showed rt inguinal lesion.  Subsequent US shows marked edema and small lymph nodes.  Cont to monitor.      - Cont care as per CTU. Continue hemodynamic monitoring.  Pressors being titrated.  On full mechanical ventilator support, s/p tracheostomy. Continue bronchodilators, pulmonary toilet, Continue GI prophylaxis with Protonix.   Monitor temp curve and WBC.    - Given severe sepsis and shock at presentation, recurrent involvement of MRSA, recommend long course of IV abx treatment (4 week course).  D/c fluconazole after 7 days (suspect yeast is likely a colonizer).  Cont treat of MRSA/KLeb.  Abx may be dosed with pt's HD sessions, no need for picc line.      Will follow,    Anabel Chahal  [phone]

## 2019-02-06 NOTE — PROGRESS NOTE ADULT - SUBJECTIVE AND OBJECTIVE BOX
[facility] DIVISION OF KIDNEY DISEASES AND HYPERTENSION -- FOLLOW UP NOTE  --------------------------------------------------------------------------------    HPI: 59 yo male with medical history of NICM with ICD, ESRD on HD, former smoker, BPH admitted with acute hypercapnic respiratory failure. Nephrology consulted for ESRD/HD management. Pt intubated and sedated in the CTICU. Pt. currently admitted with hypercarbic respiratory failure, septic shock. Last HD on 2/5/19. Pt seen in the CTICU s/p Trach on 1/31/19.  Pt. off IV Pressor support today at bedside    PAST HISTORY  --------------------------------------------------------------------------------  No significant changes to PMH, PSH, FHx, SHx, unless otherwise noted    ALLERGIES & MEDICATIONS  --------------------------------------------------------------------------------  Allergies    No Known Allergies    Intolerances      Standing Inpatient Medications  collagenase Ointment 1 Application(s) Topical daily  dexmedetomidine Infusion 0.5 MICROgram(s)/kG/Hr IV Continuous <Continuous>  epoetin kelly Injectable 3000 Unit(s) IV Push <User Schedule>  fluconAZOLE IVPB      fluconAZOLE IVPB 200 milliGRAM(s) IV Intermittent every 24 hours  heparin  Injectable 5000 Unit(s) SubCutaneous every 12 hours  midodrine 10 milliGRAM(s) Oral every 8 hours  norepinephrine Infusion 0.05 MICROgram(s)/kG/Min IV Continuous <Continuous>  piperacillin/tazobactam IVPB. 3.375 Gram(s) IV Intermittent once  piperacillin/tazobactam IVPB. 3.375 Gram(s) IV Intermittent every 12 hours  QUEtiapine 50 milliGRAM(s) Oral every 12 hours  sodium chloride 0.9%. 1000 milliLiter(s) IV Continuous <Continuous>    PRN Inpatient Medications  acetaminophen  IVPB .. 1000 milliGRAM(s) IV Intermittent once PRN      REVIEW OF SYSTEMS  --------------------------------------------------------------------------------  Unable to obtain     VITALS/PHYSICAL EXAM  --------------------------------------------------------------------------------  T(C): 36.8 (02-06-19 @ 04:00), Max: 37.1 (02-06-19 @ 00:00)  HR: 63 (02-06-19 @ 07:00) (63 - 117)  BP: 109/65 (02-06-19 @ 07:00) (83/60 - 139/66)  RR: 22 (02-06-19 @ 07:00) (21 - 27)  SpO2: 100% (02-06-19 @ 07:00) (95% - 100%)  Wt(kg): --    02-05-19 @ 07:01  -  02-06-19 @ 07:00  --------------------------------------------------------  IN: 1961.5 mL / OUT: 1635 mL / NET: 326.5 mL    Gen: Intubated  	HEENT: +Trach  	Pulm: coarse breath sounds B/L  	CV: S1S2  	Abd: Soft, +BS   	Ext: No LE edema B/L  	Neuro: Awake  	Skin: Warm and dry  	Vascular access: LUE AVF site: +thrill, bruit heard.     LABS/STUDIES  --------------------------------------------------------------------------------              8.4    6.05  >-----------<  216      [02-06-19 @ 04:30]              28.5     134  |  96  |  37  ----------------------------<  133      [02-06-19 @ 04:30]  3.5   |  27  |  3.68        Ca     9.1     [02-06-19 @ 04:30]      Mg     2.2     [02-06-19 @ 04:30]      Phos  4.1     [02-06-19 @ 04:30]    TPro  6.0  /  Alb  2.2  /  TBili  0.3  /  DBili  x   /  AST  35  /  ALT  18  /  AlkPhos  363  [02-05-19 @ 05:30]    Creatinine Trend:  SCr 3.68 [02-06 @ 04:30]  SCr 4.97 [02-05 @ 05:30]  SCr 4.51 [02-04 @ 05:15]  SCr 3.51 [02-03 @ 05:50]  SCr 4.45 [02-02 @ 02:30]    HbA1c 5.5      [10-12-18 @ 02:53]  TSH 4.79      [10-21-18 @ 04:15]    HBsAb Reactive      [02-01-19 @ 12:20]  HBsAg NEGATIVE      [02-01-19 @ 12:20]  HBcAb Reactive      [02-01-19 @ 12:20]  HCV 0.23, Nonreactive Hepatitis C AB  S/CO Ratio                        Interpretation  < 1.0                                     Non-Reactive  1.0 - 4.9                           Weakly-Reactive  > 5.0                                 Reactive  Non-Reactive: Aperson with a non-reactive HCV antibody  result is considered uninfected.  No further action is  needed unless recent infection is suspected.  In these  cases, consider repeat testing later to detect  seroconversion..  Weakly-Reactive: HCV antibody test is abnormal, HCV RNA  Qualitative test will follow.  Reactive: HCV antibody test is abnormal, HCV RNA  Qualitative test will follow.  Note: HCV antibody testing is performed on the [employer]   system.      [02-01-19 @ 12:20]

## 2019-02-06 NOTE — PROGRESS NOTE ADULT - PROBLEM SELECTOR PLAN 1
Pt. with ESRD on HD TIW (TTS). Last HD was on 2/2/19 via AVF, tolerated treatment well. Labs reviewed. No plan for HD today. Monitor BP while on HD  Pt. okay from renal standpoint to receive PICC line on RUE (non-AVF arm).

## 2019-02-06 NOTE — PROGRESS NOTE ADULT - SUBJECTIVE AND OBJECTIVE BOX
Infectious Diseases progress note:    Subjective: Pt off pressor support.  No new fevers or leukocytosis.     ROS:  nonverbal    Allergies    No Known Allergies    Intolerances        ANTIBIOTICS/RELEVANT:  antimicrobials  fluconAZOLE IVPB      fluconAZOLE IVPB 200 milliGRAM(s) IV Intermittent every 24 hours  piperacillin/tazobactam IVPB. 3.375 Gram(s) IV Intermittent once  piperacillin/tazobactam IVPB. 3.375 Gram(s) IV Intermittent every 12 hours    immunologic:  epoetin kelly Injectable 3000 Unit(s) IV Push <User Schedule>    OTHER:  acetaminophen  IVPB .. 1000 milliGRAM(s) IV Intermittent once PRN  ALPRAZolam 0.5 milliGRAM(s) Oral every 8 hours  collagenase Ointment 1 Application(s) Topical daily  dexmedetomidine Infusion 0.5 MICROgram(s)/kG/Hr IV Continuous <Continuous>  heparin  Injectable 5000 Unit(s) SubCutaneous every 12 hours  midodrine 10 milliGRAM(s) Oral every 8 hours  norepinephrine Infusion 0.05 MICROgram(s)/kG/Min IV Continuous <Continuous>  QUEtiapine 50 milliGRAM(s) Oral every 12 hours  sodium chloride 0.9%. 1000 milliLiter(s) IV Continuous <Continuous>      Objective:  Vital Signs Last 24 Hrs  T(C): 36.5 (06 Feb 2019 16:00), Max: 37.1 (06 Feb 2019 00:00)  T(F): 97.7 (06 Feb 2019 16:00), Max: 98.7 (06 Feb 2019 00:00)  HR: 87 (06 Feb 2019 17:00) (62 - 130)  BP: 106/59 (06 Feb 2019 12:00) (83/60 - 115/70)  BP(mean): 69 (06 Feb 2019 12:00) (57 - 89)  RR: 23 (06 Feb 2019 17:00) (20 - 30)  SpO2: 100% (06 Feb 2019 17:00) (95% - 100%)    PHYSICAL EXAM:  Constitutional: NAD  Eyes:CHER, EOMI  Ear/Nose/Throat: no thrush, mucositis.  Moist mucous membranes	  Neck:no JVD, no lymphadenopathy, supple, tracheostomy  Respiratory: CTA adore  Cardiovascular: S1S2 RRR, no murmurs  Gastrointestinal:soft, nontender,  nondistended (+) BS, PEG  Extremities:no e/e/c  Skin:  no rashes, open wounds or ulcerations        LABS:                        8.4    6.05  )-----------( 216      ( 06 Feb 2019 04:30 )             28.5     02-06    134<L>  |  96<L>  |  37<H>  ----------------------------<  133<H>  3.5   |  27  |  3.68<H>    Ca    9.1      06 Feb 2019 04:30  Phos  4.1     02-06  Mg     2.2     02-06    TPro  6.0  /  Alb  2.2<L>  /  TBili  0.3  /  DBili  x   /  AST  35  /  ALT  18  /  AlkPhos  363<H>  02-05        Vancomycin Level, Trough: 26.0: Vancomycin trough levels should be rapidly reached and  maintained at 15-20 ug/ml for life threatening MRSA  infections such as sepsis, endocarditis, osteomyelitis and  pneumonia. A first trough level should be drawn before the  3rd or 4th dose. Riskof renal toxicity is increased for  levels >15 ug/mL, in patients on other nephrotoxic drugs,  who are hemodynamically unstable, have unstable renal  function, or are on vancomycin therapy for >14 days. Renal  function with creatinine levels should be monitored for  those patients. ug/mL (02.06.19 @ 04:30)                  MICROBIOLOGY:      Culture - Blood (02.05.19 @ 14:17)    Culture - Blood:   NO ORGANISMS ISOLATED  NO ORGANISMS ISOLATED AT 24 HOURS    Specimen Source: BLOOD    Culture - Respiratory with Gram Stain (02.01.19 @ 22:35)    Gram Stain Sputum:   WBC White Blood Cells  QNTY CELLS IN GRAM STAIN: MODERATE (3+)  GPCCL^Gram Pos Cocci In Clusters  QUANTITY OF BACTERIA SEEN: MODERATE (3+)  YEAST^YEAST.  QUANTITY OF BACTERIA SEEN: FEW (2+)  GPR^Gram Positive Rods  QUANTITY OF BACTERIA SEEN: FEW (2+)    -  Ciprofloxacin: S <=1 AAMIR    -  Ciprofloxacin: S <=0.5 AAMIR    -  Clindamycin: S 0.5 AAMIR    -  Daptomycin: S 1 AAMIR    -  Ertapenem: S <=0.5 AAMIR    -  Erythromycin: R >4 AAMIR    -  Gentamicin: R >8 AAMIR    -  Gentamicin: S <=1 AAMIR    -  Imipenem: S <=1 AAMIR    -  Levofloxacin: S <=0.5 AAMIR    -  Levofloxacin: S <=1 AAMIR    -  Linezolid: S 4 AAMIR    -  Meropenem: S <=1 AAMIR    -  Moxifloxacin(Aerobic): S <=0.5 AAMIR    -  Oxacillin: R >2 AAMIR    -  Penicillin: R >8 AAMIR    -  Piperacillin/Tazobactam: S <=8 AAMIR    -  Rifampin: S <=1 AAMIR    Culture - Respiratory:   Normal Respiratory Jennifer Also Present  ***** CRITICAL RESULT *****  PERSON CALLED / READ-BACK: ADALGISA CARLSON RN./Y  DATE / TIME CALLED: 02/04/19 1204  CALLED BY: RAYMUNDO GUAMAN    -  Amikacin: S <=8 AAMIR    -  Tetra/Doxy: S <=1 AAMIR    -  Tigecycline: S    -  Tobramycin: S <=2 AAMIR    -  Trimethoprim/Sulfamethoxazole: S <=0.5/9.5 AAMIR    -  Trimethoprim/Sulfamethoxazole: S <=0.5/9.5 AAMIR    -  Ampicillin: R 16 AAMIR    -  Ampicillin/Sulbactam: S <=4/2 AAMIR    -  Aztreonam: S <=4 AAMIR    -  Cefazolin: R 16 AAMIR    -  Cefazolin: S <=2 AAMIR    -  Cefepime: S <=2 AAMIR    -  Cefoxitin: S <=4 AAMIR    -  Ceftazidime: S <=1 AAMIR    -  Ceftriaxone: S <=1 AAMIR    -  Vancomycin: S 2 AAMIR    Specimen Source: ENDOTRACHEAL SPECIMEN    Organism Identification: Staph. aureus *MRSA*  Klebsiella pneumoniae    Organism: Staph. aureus *MRSA*  QUANTITY OF GROWTH: MODERATE  OXICILLIN-RESISTANT staphylococci should be regarded as  RESISTANT to ALL other Beta-Lactams regardless of the  in-vitro results obtained.  These include: ALL  Penicillins, Cephalosporins, Amoxicillin-clavulanic  acid, Ticarcillin-clavulanic acid,  Ampicillin-sulbactam, and Imipenem.    Organism: Klebsiella pneumoniae  QUANTITY OF GROWTH: MODERATE    Method Type: POSITIVE AAMIR 29    Method Type: NEGATIVE AAMIR 43        Culture - Blood (02.01.19 @ 21:47)    -  Coagulase negative Staphylococcus: + DETECT AAMIR    Culture - Blood:   ***Blood Panel PCR results on this specimen are available  approximately 3 hours after the Gram stain result***  Gram stain, PCR, and/or culture results may not always  correspond due to difference in methodologies  ------------------------------------------------------------  This PCR assay was performed using [employer].  The  following targets are tested for:  Enterococcus, vancomycin  resistant enterococci, Listeria monocytogenes,  coagulase  negative staphylococci, S. aureus, methicillin resistant S.  aureus, Streptococcus agalactiae (Group B), S. pneumoniae,  S. pyogenes (Group A), Acinetobacter baumannii, Enterobacter  cloacae, E. coli, Klebsiella oxytoca, K. pneumoniae, Proteus  sp., Serratia marcescens, Haemophilus influenzae, Neisseria  meningitidis, Pseudomonas aeruginosa, Candida albicans, C.  glabrata, C. krusei, C. parapsilosis, C. tropicalis and the  KPC resistance gene.  **NOTE: Due to technical problems, Proteus sp. will NOT be  reported as part of the BCID paneluntil further notice.    Culture - Blood:   Single set isolate, possible contaminant.  Contact microbiology if susceptibility testing is clinically  indicated.    Specimen Source: BLOOD PERIPHERAL    Organism: BLOOD CULTURE PCR  ***Blood Panel PCR results on this specimen are available  approximately 3 hours after the Gram stain result***  Gram stain, PCR, and/or culture results may not always  correspond due to difference in methodologies  ------------------------------------------------------------  This PCR assay was performed using [employer].  The  following targets are tested for:  Enterococcus, vancomycin  resistant enterococci, Listeria monocytogenes,  coagulase  negative staphylococci, S. aureus, methicillin resistant S.  aureus, Streptococcus agalactiae (Group B), S. pneumoniae,  S. pyogenes (Group A), Acinetobacter baumannii, Enterobacter  cloacae, E. coli, Klebsiella oxytoca, K. pneumoniae, Proteus  sp., Serratia marcescens, Haemophilus influenzae, Neisseria  meningitidis, Pseudomonas aeruginosa, Candida albicans, C.  glabrata, C. krusei, C. parapsilosis, C. tropicalis and the  KPC resistance gene.  **NOTE: Due to technical problems, Proteus sp. will NOT be  reported as part of the BCID panel until further notice.    Organism: Staphylococcus sp.,coag neg    Gram Stain Blood:   ***** CRITICAL RESULT *****  PERSON CALLED / READ-BACK: MEGHA HILL MD/Y  DATE / TIME CALLED: 02/03/19 0529  CALLED BY: HOLLIE EM  GPCCL^Gram Pos Cocci In Clusters  AFTER: 30 HOURS INCUBATION  BOTTLE: AEROBIC BOTTLE    Organism Identification: BLOOD CULTURE PCR  Staphylococcus sp.,coag neg    Method Type: PCR        RADIOLOGY & ADDITIONAL STUDIES:    < from: Xray Chest 1 View- PORTABLE-Routine (02.06.19 @ 06:10) >  INTERPRETATION:     The tracheostomy tube, subcutaneous AICD and right IJ line are unchanged.   Bilateral small layering effusions unchanged from the last exam.   Visualized upper lung fields are clear. Heart size is stable. No   pneumothorax.      COMPARISON:  February 5      IMPRESSION:  Follow-up bilateral small effusions unchanged.    < end of copied text >        < from: Xray Chest 1 View- PORTABLE-Routine (02.05.19 @ 06:41) >    IMPRESSION:  Follow-up study showing small effusions slightly improved on   the right.    < end of copied text >

## 2019-02-06 NOTE — PROGRESS NOTE ADULT - SUBJECTIVE AND OBJECTIVE BOX
CLAUDIA HAN   MRN#: [id number]     The patient is a 58y Male who was seen, evaluated, & examined with the CTICU staff post-operatively with a multidisciplinary care plan formulated & implemented.  All available clinical, laboratory, radiographic, pharmacologic, and electrocardiographic data were reviewed & analyzed.      The patient was in the CTICU in critical condition secondary to:     acute hypoxic respiratory failure-persistent cardiopulmonary dysfunction  sepsis-shock    For support and evaluation & prevention of further decompensation secondary to persistent cardiopulmonary dysfunction and cardiogenic shock-cardiovascular dysfunction, respiratory failure required:     supplemental oxygen with full ventilatory support / mechanical ventilation   continuous pulse oximetry monitoring  following ABGs with A-line monitoring  IV Dexmedetomidine infusion  IV Fentanyl infusion     Invasive hemodynamic monitoring with     an A-line was required for continuous MAP/BP monitoring     to ensure adequate cardiovascular support and to evaluate for & help prevent decompensation while receiving     intermittent volume expansion  IV Levophed infusion    secondary to     sepsis-shock    In addition:  Weaned off of Fentanyl, on Precedex with increased Seroquel and Klonopin 0.25 prn agitation  Needed multiple Ativan and Klonopin pushes despite increasing Precedex - will make benzodiazepine standing and wean Precedex  Weaned off of Levophed overnight - on Midodrine 10 q8  HD yesterday - did not CPAP wean  Vancomycin level post dialysis was low - received 1g for MRSA   Also continued on Zosyn for Klebsiella and Fluconazole for yeast    The patient required critical care management and I provided 80 minutes of non-continuous care to the patient in addition to  discussing the patient and plan at length with the CTICU staff and helping coordinate care.

## 2019-02-07 LAB
ALBUMIN SERPL ELPH-MCNC: 2.1 G/DL — LOW (ref 3.3–5)
ALBUMIN SERPL ELPH-MCNC: 2.4 G/DL — LOW (ref 3.3–5)
ALP SERPL-CCNC: 274 U/L — HIGH (ref 40–120)
ALP SERPL-CCNC: 347 U/L — HIGH (ref 40–120)
ALT FLD-CCNC: 19 U/L — SIGNIFICANT CHANGE UP (ref 4–41)
ALT FLD-CCNC: 28 U/L — SIGNIFICANT CHANGE UP (ref 4–41)
ANION GAP SERPL CALC-SCNC: 12 MMO/L — SIGNIFICANT CHANGE UP (ref 7–14)
ANION GAP SERPL CALC-SCNC: 15 MMO/L — HIGH (ref 7–14)
AST SERPL-CCNC: 41 U/L — HIGH (ref 4–40)
AST SERPL-CCNC: 65 U/L — HIGH (ref 4–40)
BASE EXCESS BLDA CALC-SCNC: 0.4 MMOL/L — SIGNIFICANT CHANGE UP
BASE EXCESS BLDA CALC-SCNC: 4.1 MMOL/L — SIGNIFICANT CHANGE UP
BASOPHILS # BLD AUTO: 0.03 K/UL — SIGNIFICANT CHANGE UP (ref 0–0.2)
BASOPHILS NFR BLD AUTO: 0.5 % — SIGNIFICANT CHANGE UP (ref 0–2)
BILIRUB SERPL-MCNC: 0.3 MG/DL — SIGNIFICANT CHANGE UP (ref 0.2–1.2)
BILIRUB SERPL-MCNC: 0.3 MG/DL — SIGNIFICANT CHANGE UP (ref 0.2–1.2)
BUN SERPL-MCNC: 29 MG/DL — HIGH (ref 7–23)
BUN SERPL-MCNC: 44 MG/DL — HIGH (ref 7–23)
CA-I BLDA-SCNC: 1.24 MMOL/L — SIGNIFICANT CHANGE UP (ref 1.15–1.29)
CALCIUM SERPL-MCNC: 8.5 MG/DL — SIGNIFICANT CHANGE UP (ref 8.4–10.5)
CALCIUM SERPL-MCNC: 8.8 MG/DL — SIGNIFICANT CHANGE UP (ref 8.4–10.5)
CHLORIDE BLDA-SCNC: 102 MMOL/L — SIGNIFICANT CHANGE UP (ref 96–108)
CHLORIDE SERPL-SCNC: 98 MMOL/L — SIGNIFICANT CHANGE UP (ref 98–107)
CHLORIDE SERPL-SCNC: 99 MMOL/L — SIGNIFICANT CHANGE UP (ref 98–107)
CO2 SERPL-SCNC: 21 MMOL/L — LOW (ref 22–31)
CO2 SERPL-SCNC: 27 MMOL/L — SIGNIFICANT CHANGE UP (ref 22–31)
CREAT SERPL-MCNC: 3.13 MG/DL — HIGH (ref 0.5–1.3)
CREAT SERPL-MCNC: 4.18 MG/DL — HIGH (ref 0.5–1.3)
EOSINOPHIL # BLD AUTO: 0.5 K/UL — SIGNIFICANT CHANGE UP (ref 0–0.5)
EOSINOPHIL NFR BLD AUTO: 8.6 % — HIGH (ref 0–6)
GLUCOSE BLDA-MCNC: 109 MG/DL — HIGH (ref 70–99)
GLUCOSE BLDA-MCNC: 131 MG/DL — HIGH (ref 70–99)
GLUCOSE SERPL-MCNC: 111 MG/DL — HIGH (ref 70–99)
GLUCOSE SERPL-MCNC: 132 MG/DL — HIGH (ref 70–99)
HCO3 BLDA-SCNC: 25 MMOL/L — SIGNIFICANT CHANGE UP (ref 22–26)
HCO3 BLDA-SCNC: 28 MMOL/L — HIGH (ref 22–26)
HCT VFR BLD CALC: 29.1 % — LOW (ref 39–50)
HCT VFR BLD CALC: 30.2 % — LOW (ref 39–50)
HCT VFR BLDA CALC: 27.8 % — LOW (ref 39–51)
HCT VFR BLDA CALC: 31.5 % — LOW (ref 39–51)
HGB BLD-MCNC: 8.9 G/DL — LOW (ref 13–17)
HGB BLD-MCNC: 9.4 G/DL — LOW (ref 13–17)
HGB BLDA-MCNC: 10.2 G/DL — LOW (ref 13–17)
HGB BLDA-MCNC: 9 G/DL — LOW (ref 13–17)
IMM GRANULOCYTES NFR BLD AUTO: 0.5 % — SIGNIFICANT CHANGE UP (ref 0–1.5)
LACTATE BLDA-SCNC: 0.8 MMOL/L — SIGNIFICANT CHANGE UP (ref 0.5–2)
LACTATE BLDA-SCNC: 0.8 MMOL/L — SIGNIFICANT CHANGE UP (ref 0.5–2)
LYMPHOCYTES # BLD AUTO: 0.77 K/UL — LOW (ref 1–3.3)
LYMPHOCYTES # BLD AUTO: 13.2 % — SIGNIFICANT CHANGE UP (ref 13–44)
MAGNESIUM SERPL-MCNC: 2.2 MG/DL — SIGNIFICANT CHANGE UP (ref 1.6–2.6)
MCHC RBC-ENTMCNC: 30.6 % — LOW (ref 32–36)
MCHC RBC-ENTMCNC: 30.8 PG — SIGNIFICANT CHANGE UP (ref 27–34)
MCHC RBC-ENTMCNC: 31 PG — SIGNIFICANT CHANGE UP (ref 27–34)
MCHC RBC-ENTMCNC: 31.1 % — LOW (ref 32–36)
MCV RBC AUTO: 101.4 FL — HIGH (ref 80–100)
MCV RBC AUTO: 99 FL — SIGNIFICANT CHANGE UP (ref 80–100)
MONOCYTES # BLD AUTO: 0.33 K/UL — SIGNIFICANT CHANGE UP (ref 0–0.9)
MONOCYTES NFR BLD AUTO: 5.7 % — SIGNIFICANT CHANGE UP (ref 2–14)
NEUTROPHILS # BLD AUTO: 4.16 K/UL — SIGNIFICANT CHANGE UP (ref 1.8–7.4)
NEUTROPHILS NFR BLD AUTO: 71.5 % — SIGNIFICANT CHANGE UP (ref 43–77)
NRBC # FLD: 0 K/UL — LOW (ref 25–125)
NRBC # FLD: 0 K/UL — LOW (ref 25–125)
PCO2 BLDA: 38 MMHG — SIGNIFICANT CHANGE UP (ref 35–48)
PCO2 BLDA: 44 MMHG — SIGNIFICANT CHANGE UP (ref 35–48)
PH BLDA: 7.43 PH — SIGNIFICANT CHANGE UP (ref 7.35–7.45)
PH BLDA: 7.43 PH — SIGNIFICANT CHANGE UP (ref 7.35–7.45)
PHOSPHATE SERPL-MCNC: 3.8 MG/DL — SIGNIFICANT CHANGE UP (ref 2.5–4.5)
PLATELET # BLD AUTO: 235 K/UL — SIGNIFICANT CHANGE UP (ref 150–400)
PLATELET # BLD AUTO: 244 K/UL — SIGNIFICANT CHANGE UP (ref 150–400)
PMV BLD: 10.2 FL — SIGNIFICANT CHANGE UP (ref 7–13)
PMV BLD: 9.9 FL — SIGNIFICANT CHANGE UP (ref 7–13)
PO2 BLDA: 174 MMHG — HIGH (ref 83–108)
PO2 BLDA: 76 MMHG — LOW (ref 83–108)
POTASSIUM BLDA-SCNC: 3.4 MMOL/L — SIGNIFICANT CHANGE UP (ref 3.4–4.5)
POTASSIUM BLDA-SCNC: 3.4 MMOL/L — SIGNIFICANT CHANGE UP (ref 3.4–4.5)
POTASSIUM SERPL-MCNC: 3.8 MMOL/L — SIGNIFICANT CHANGE UP (ref 3.5–5.3)
POTASSIUM SERPL-MCNC: 3.8 MMOL/L — SIGNIFICANT CHANGE UP (ref 3.5–5.3)
POTASSIUM SERPL-SCNC: 3.8 MMOL/L — SIGNIFICANT CHANGE UP (ref 3.5–5.3)
POTASSIUM SERPL-SCNC: 3.8 MMOL/L — SIGNIFICANT CHANGE UP (ref 3.5–5.3)
PROT SERPL-MCNC: 5.5 G/DL — LOW (ref 6–8.3)
PROT SERPL-MCNC: 6.3 G/DL — SIGNIFICANT CHANGE UP (ref 6–8.3)
RBC # BLD: 2.87 M/UL — LOW (ref 4.2–5.8)
RBC # BLD: 3.05 M/UL — LOW (ref 4.2–5.8)
RBC # FLD: 15.9 % — HIGH (ref 10.3–14.5)
RBC # FLD: 16 % — HIGH (ref 10.3–14.5)
SAO2 % BLDA: 95 % — SIGNIFICANT CHANGE UP (ref 95–99)
SAO2 % BLDA: 99.6 % — HIGH (ref 95–99)
SODIUM BLDA-SCNC: 132 MMOL/L — LOW (ref 136–146)
SODIUM BLDA-SCNC: 132 MMOL/L — LOW (ref 136–146)
SODIUM SERPL-SCNC: 135 MMOL/L — SIGNIFICANT CHANGE UP (ref 135–145)
SODIUM SERPL-SCNC: 137 MMOL/L — SIGNIFICANT CHANGE UP (ref 135–145)
VANCOMYCIN TROUGH SERPL-MCNC: 13.2 UG/ML — SIGNIFICANT CHANGE UP (ref 10–20)
WBC # BLD: 5.79 K/UL — SIGNIFICANT CHANGE UP (ref 3.8–10.5)
WBC # BLD: 5.82 K/UL — SIGNIFICANT CHANGE UP (ref 3.8–10.5)
WBC # FLD AUTO: 5.79 K/UL — SIGNIFICANT CHANGE UP (ref 3.8–10.5)
WBC # FLD AUTO: 5.82 K/UL — SIGNIFICANT CHANGE UP (ref 3.8–10.5)

## 2019-02-07 PROCEDURE — 71045 X-RAY EXAM CHEST 1 VIEW: CPT | Mod: 26

## 2019-02-07 PROCEDURE — 99291 CRITICAL CARE FIRST HOUR: CPT

## 2019-02-07 PROCEDURE — 99233 SBSQ HOSP IP/OBS HIGH 50: CPT | Mod: GC

## 2019-02-07 RX ORDER — PROPOFOL 10 MG/ML
20 INJECTION, EMULSION INTRAVENOUS
Qty: 1000 | Refills: 0 | Status: DISCONTINUED | OUTPATIENT
Start: 2019-02-07 | End: 2019-02-10

## 2019-02-07 RX ADMIN — FLUCONAZOLE 100 MILLIGRAM(S): 150 TABLET ORAL at 07:04

## 2019-02-07 RX ADMIN — Medication 1 APPLICATION(S): at 11:02

## 2019-02-07 RX ADMIN — SODIUM CHLORIDE 10 MILLILITER(S): 9 INJECTION INTRAMUSCULAR; INTRAVENOUS; SUBCUTANEOUS at 19:37

## 2019-02-07 RX ADMIN — Medication 10 MILLIGRAM(S): at 06:16

## 2019-02-07 RX ADMIN — Medication 10 MILLIGRAM(S): at 13:57

## 2019-02-07 RX ADMIN — QUETIAPINE FUMARATE 50 MILLIGRAM(S): 200 TABLET, FILM COATED ORAL at 18:19

## 2019-02-07 RX ADMIN — PANTOPRAZOLE SODIUM 40 MILLIGRAM(S): 20 TABLET, DELAYED RELEASE ORAL at 11:04

## 2019-02-07 RX ADMIN — MIDODRINE HYDROCHLORIDE 10 MILLIGRAM(S): 2.5 TABLET ORAL at 06:15

## 2019-02-07 RX ADMIN — Medication 0.5 MILLIGRAM(S): at 13:57

## 2019-02-07 RX ADMIN — MIDODRINE HYDROCHLORIDE 10 MILLIGRAM(S): 2.5 TABLET ORAL at 21:55

## 2019-02-07 RX ADMIN — PIPERACILLIN AND TAZOBACTAM 25 GRAM(S): 4; .5 INJECTION, POWDER, LYOPHILIZED, FOR SOLUTION INTRAVENOUS at 08:30

## 2019-02-07 RX ADMIN — ERYTHROPOIETIN 3000 UNIT(S): 10000 INJECTION, SOLUTION INTRAVENOUS; SUBCUTANEOUS at 15:31

## 2019-02-07 RX ADMIN — PROPOFOL 7.2 MICROGRAM(S)/KG/MIN: 10 INJECTION, EMULSION INTRAVENOUS at 07:49

## 2019-02-07 RX ADMIN — HEPARIN SODIUM 5000 UNIT(S): 5000 INJECTION INTRAVENOUS; SUBCUTANEOUS at 06:16

## 2019-02-07 RX ADMIN — MIDODRINE HYDROCHLORIDE 10 MILLIGRAM(S): 2.5 TABLET ORAL at 13:57

## 2019-02-07 RX ADMIN — HEPARIN SODIUM 5000 UNIT(S): 5000 INJECTION INTRAVENOUS; SUBCUTANEOUS at 18:19

## 2019-02-07 RX ADMIN — PROPOFOL 7.2 MICROGRAM(S)/KG/MIN: 10 INJECTION, EMULSION INTRAVENOUS at 01:40

## 2019-02-07 RX ADMIN — Medication 5.62 MICROGRAM(S)/KG/MIN: at 07:48

## 2019-02-07 RX ADMIN — Medication 5.62 MICROGRAM(S)/KG/MIN: at 19:36

## 2019-02-07 RX ADMIN — DEXMEDETOMIDINE HYDROCHLORIDE IN 0.9% SODIUM CHLORIDE 7.5 MICROGRAM(S)/KG/HR: 4 INJECTION INTRAVENOUS at 19:36

## 2019-02-07 RX ADMIN — DEXMEDETOMIDINE HYDROCHLORIDE IN 0.9% SODIUM CHLORIDE 7.5 MICROGRAM(S)/KG/HR: 4 INJECTION INTRAVENOUS at 07:49

## 2019-02-07 RX ADMIN — Medication 0.5 MILLIGRAM(S): at 21:55

## 2019-02-07 RX ADMIN — PIPERACILLIN AND TAZOBACTAM 25 GRAM(S): 4; .5 INJECTION, POWDER, LYOPHILIZED, FOR SOLUTION INTRAVENOUS at 20:17

## 2019-02-07 NOTE — PROGRESS NOTE ADULT - SUBJECTIVE AND OBJECTIVE BOX
CLAUDIA HAN                     [id number]    HPI:  Mr. aHn is a 58M who underwent a FB, R thoracotomy, decortication, chest wall reconstruction, lat flap with Dr Pickering on 10/11/18. He was admitted on 1/6/19 to [facility] with a R pleural effusion and respiratory failure,  A R PTC was placed there and he was intubated.  He still had a R SANDY drain in place and he was transferred to [facility]. Patient was found to have aspirated a foreign body and +MRSA empyema. He was treated and discharged on 1/22/2019.     He was found by his home nurse to be in acute distress and recommended to proceed to the ED.  At presentation, he was found to be in acute hypercarbic respiratory failure complicated by hemodynamic instability requiring emergent intubation and pressor support. (28 Jan 2019 15:34)    Procedure:        Trach and PEG  1/31/2019  Bronchoscopy   1/29/2019  Right thoracotomy, resection of portion of R 7th rib, evacuation of infected hemothorax, takedown of pleurocutaneous fistula  10/11/2018    Issues:  Acute hypoxic & hypercapnic respiratory failure  Hypotension  Pneumonia /  Fever / Sepsis  ESRD / HD  Cardiomyopathy  HLD  BPH        PAST MEDICAL & SURGICAL HISTORY:  Smoking hx  Cardiomyopathy  Wound: right chest  ICD (implantable cardioverter-defibrillator) in place  OA (osteoarthritis)  HLD (hyperlipidemia)  BPH (benign prostatic hyperplasia)  Pleural effusion  HTN (hypertension)  ESRD (end stage renal disease)  H/O chest wound: right  S/P thoracotomy: FB, R thoracotomy, decortication, chest wall reconstruction, lat flap  History of wound infection: right chest wall - revision 5/18 and again in 7/18  History of implantable cardioverter-defibrillator (ICD) placement: pt unsure when placed  H/O bilateral hip replacements: 2008, 2009            VITAL SIGNS:  Vital Signs Last 24 Hrs  T(C): 36.4 (07 Feb 2019 04:00), Max: 36.9 (07 Feb 2019 00:00)  T(F): 97.5 (07 Feb 2019 04:00), Max: 98.5 (07 Feb 2019 00:00)  HR: 69 (07 Feb 2019 06:00) (62 - 139)  BP: 116/74 (07 Feb 2019 06:00) (93/56 - 117/69)  BP(mean): 83 (07 Feb 2019 06:00) (65 - 88)  RR: 21 (07 Feb 2019 06:00) (18 - 30)  SpO2: 100% (07 Feb 2019 06:00) (98% - 100%)    I/Os:   I&O's Detail    06 Feb 2019 07:01  -  07 Feb 2019 07:00  --------------------------------------------------------  IN:    dexmedetomidine Infusion: 363 mL    Enteral Tube Flush: 160 mL    IV PiggyBack: 200 mL    Nepro with Carb Steady: 840 mL    norepinephrine Infusion: 74.4 mL    propofol Infusion: 100.8 mL  Total IN: 1738.2 mL    OUT:    Bulb: 35 mL    Rectal Tube: 75 mL  Total OUT: 110 mL    Total NET: 1628.2 mL      07 Feb 2019 07:01  -  07 Feb 2019 07:08  --------------------------------------------------------  IN:    Nepro with Carb Steady: 35 mL  Total IN: 35 mL    OUT:  Total OUT: 0 mL    Total NET: 35 mL          CAPILLARY BLOOD GLUCOSE          =======================MEDICATIONS===================  MEDICATIONS  (STANDING):  ALPRAZolam 0.5 milliGRAM(s) Oral every 8 hours  collagenase Ointment 1 Application(s) Topical daily  dexmedetomidine Infusion 0.5 MICROgram(s)/kG/Hr (7.5 mL/Hr) IV Continuous <Continuous>  epoetin kelly Injectable 3000 Unit(s) IV Push <User Schedule>  fluconAZOLE IVPB      fluconAZOLE IVPB 200 milliGRAM(s) IV Intermittent every 24 hours  heparin  Injectable 5000 Unit(s) SubCutaneous every 12 hours  metoclopramide Injectable 10 milliGRAM(s) IV Push every 8 hours  midodrine 10 milliGRAM(s) Oral every 8 hours  norepinephrine Infusion 0.05 MICROgram(s)/kG/Min (5.625 mL/Hr) IV Continuous <Continuous>  pantoprazole  Injectable 40 milliGRAM(s) IV Push daily  piperacillin/tazobactam IVPB. 3.375 Gram(s) IV Intermittent once  piperacillin/tazobactam IVPB. 3.375 Gram(s) IV Intermittent every 12 hours  propofol Infusion 20 MICROgram(s)/kG/Min (7.2 mL/Hr) IV Continuous <Continuous>  QUEtiapine 50 milliGRAM(s) Oral every 12 hours  sodium chloride 0.9%. 1000 milliLiter(s) (10 mL/Hr) IV Continuous <Continuous>    MEDICATIONS  (PRN):  acetaminophen  IVPB .. 1000 milliGRAM(s) IV Intermittent once PRN Temp greater or equal to 38C (100.4F), Moderate Pain (4 - 6)      =======================VENTILATOR SETTINGS===================  Mode: AC/ CMV (Assist Control/ Continuous Mandatory Ventilation)  RR (machine): 22  TV (machine): 400  FiO2: 50  PEEP: 5  MAP: 10  PIP: 27        PHYSICAL EXAM============================  General:          Sedated, vented,  no distress  Neuro:             sedated	  HEENT:             CHER/   trach  Respiratory:	Lungs clear on auscultation bilaterally with good aeration.                            No rales, rhonchi, no wheezing.   CV:		 Regular rate and rhythm, (+) S1/S2.    Abdomen:	Soft,  nontender, not-distended. Bowel sounds present    Skin:		No rash.  Extremities:	Warm, no cyanosis or edema.  Palpable pulses  ============================LABS=========================                        8.9    5.82  )-----------( 235      ( 07 Feb 2019 03:50 )             29.1     02-07    135  |  99  |  44<H>  ----------------------------<  111<H>  3.8   |  21<L>  |  4.18<H>    Ca    8.5      07 Feb 2019 03:50  Phos  4.1     02-06  Mg     2.2     02-06    TPro  5.5<L>  /  Alb  2.1<L>  /  TBili  0.3  /  DBili  x   /  AST  41<H>  /  ALT  19  /  AlkPhos  274<H>  02-07    LIVER FUNCTIONS - ( 07 Feb 2019 03:50 )  Alb: 2.1 g/dL / Pro: 5.5 g/dL / ALK PHOS: 274 u/L / ALT: 19 u/L / AST: 41 u/L / GGT: x             ABG - ( 07 Feb 2019 03:50 )  pH, Arterial: 7.43  pH, Blood: x     /  pCO2: 38    /  pO2: 174   / HCO3: 25    / Base Excess: 0.4   /  SaO2: 99.6                  ============================IMAGING STUDIES=========================  < from: Xray Chest 1 View- PORTABLE-Routine (02.06.19 @ 06:10) >  The tracheostomy tube, subcutaneous AICD and right IJ line are unchanged.   Bilateral small layering effusions unchanged from the last exam.   Visualized upper lung fields are clear. Heart size is stable. No   pneumothorax.      COMPARISON:  February 5      IMPRESSION:  Follow-up bilateral small effusions unchanged.      A/P:    58M who underwent a FB, R thoracotomy, decortication, chest wall reconstruction, lat flap with Dr Pickering on 10/11/18. He was admitted on 1/6/19 to [facility] with a R pleural effusion and respiratory failure,  A R PTC was placed there and he was intubated.  He still had a R SANDY drain in place and he was transferred to [facility]. Patient was found to have aspirated a foreign body and +MRSA empyema. He was treated and discharged on 1/22/2019.     He was found by his home nurse to be in acute distress and recommended to proceed to the ED.  At presentation, he was found to be in acute hypercarbic respiratory failure complicated by hemodynamic instability requiring emergent intubation and pressor support. (28 Jan 2019 15:34)    Procedure:        Trach and PEG  1/31/2019  Bronchoscopy   1/29/2019  Right thoracotomy, resection of portion of R 7th rib, evacuation of infected hemothorax, takedown of pleurocutaneous fistula  10/11/2018    Issues:  Acute hypoxic & hypercapnic respiratory failure  Hypotension  Pneumonia /  Fever / Sepsis  ESRD / HD  Cardiomyopathy  HLD              ====================== NEUROLOGY=======================  Pain control with Fentanyl  Pt is on Precedex for agitation  ==================== RESPIRATORY========================  Pt is on  full mech vent support, Via Trach,  Continue CPAP weaning trails as casey.   Comfortable, no evidence of distress.  Monitor  right chest tube output	  Mechanical Ventilation:  Mode: AC/ CMV (Assist Control/ Continuous Mandatory Ventilation)  RR (machine): 22  TV (machine): 400  FiO2: 50  PEEP: 5  MAP: 10  PIP: 26  CPAP trials  as tolerated  Mechanical ventilator status assessed & settings reviewed  Continuous pulse oximetry monitoring  Continue bronchodilators, pulmonary toilet  Head of bed elevation to 30-40 degrees  Continue antibiotics -  Vanco based on level + Zosyn. Diflucan added because of yeast in BAL  ====================CARDIOVASCULAR=====================  Continue hemodynamic monitoring/ telemetry   Hypotension: On  /Off Levophed, titrate  to MAP>65, Restarted Midodrine 10mg/TID, Wean off Levo as tolerated  ===================== RENAL ============================  ESRD on HD TIW  Monitor I/Os, BUN/ Cr  and electrolytes  Renal f/up        ==================== GASTROINTESTINAL===================  On J tube feeds Nepro 35cc/hr at goal,  tolerating well  Continue GI prophylaxis with Protonix   Zofran / Reglan for nausea - PRN  =======================    ENDOCRIN  =====================  Glycemic monitoring  F/S with coverage  ===================HEMATOLOGIC/ONCOLOGIC =============   No signs of active bleeding.   Follow CBC, coags  in AM  DVT prophylaxis with SCD, sc Heparin  ========================INFECTIOUS DISEASE===============  Monitor for fever / leukocytosis.  Bronchoscopy on 1/29 showed copious greenish secretions - Continue pulmonary toilet   Blood cultures - GPCC, MRSA, isolation,  Continue Vanco by  level  + Zosyn + Diflucan    Pertinent clinical, laboratory, radiographic, hemodynamic, echocardiographic, respiratory data, microbiologic data and chart were reviewed and analyzed frequently throughout the course of the day and night. GI and DVT prophylaxis, glycemic control, head of bed elevation and skin care issues were addressed.  Patient seen, examined and plan discussed with CT Surgery / CTICU team during rounds.  Pt remains critically ill in imminent risk of  deterioration and requires very careful cardio- pulmonary monitoring and support.    I have spent 40 minutes of critical care time with this pt between  12  am  and   9am         minutes spent on total encounter; more than 50% of the visit was spent counseling and/or coordinating care by the attending physician.      Dave Su DO FACEP

## 2019-02-07 NOTE — PROGRESS NOTE ADULT - SUBJECTIVE AND OBJECTIVE BOX
[facility] DIVISION OF KIDNEY DISEASES AND HYPERTENSION -- FOLLOW UP NOTE  --------------------------------------------------------------------------------    HPI: 59 yo male with medical history of NICM with ICD, ESRD on HD, former smoker, BPH admitted with acute hypercapnic respiratory failure. Nephrology consulted for ESRD/HD management. Pt intubated and sedated in the CTICU. Pt. currently admitted with hypercarbic respiratory failure, septic shock. Last HD on 2/5/19. Pt seen in the CTICU s/p Trach on 1/31/19.  Pt. on IV Pressor support today at bedside. Plan for HD today     PAST HISTORY  --------------------------------------------------------------------------------  No significant changes to PMH, PSH, FHx, SHx, unless otherwise noted    ALLERGIES & MEDICATIONS  --------------------------------------------------------------------------------  Allergies    No Known Allergies    Intolerances      Standing Inpatient Medications  ALPRAZolam 0.5 milliGRAM(s) Oral every 8 hours  collagenase Ointment 1 Application(s) Topical daily  dexmedetomidine Infusion 0.5 MICROgram(s)/kG/Hr IV Continuous <Continuous>  epoetin kelly Injectable 3000 Unit(s) IV Push <User Schedule>  fluconAZOLE IVPB      fluconAZOLE IVPB 200 milliGRAM(s) IV Intermittent every 24 hours  heparin  Injectable 5000 Unit(s) SubCutaneous every 12 hours  metoclopramide Injectable 10 milliGRAM(s) IV Push every 8 hours  midodrine 10 milliGRAM(s) Oral every 8 hours  norepinephrine Infusion 0.05 MICROgram(s)/kG/Min IV Continuous <Continuous>  pantoprazole  Injectable 40 milliGRAM(s) IV Push daily  piperacillin/tazobactam IVPB. 3.375 Gram(s) IV Intermittent once  piperacillin/tazobactam IVPB. 3.375 Gram(s) IV Intermittent every 12 hours  propofol Infusion 20 MICROgram(s)/kG/Min IV Continuous <Continuous>  QUEtiapine 50 milliGRAM(s) Oral every 12 hours  sodium chloride 0.9%. 1000 milliLiter(s) IV Continuous <Continuous>    PRN Inpatient Medications  acetaminophen  IVPB .. 1000 milliGRAM(s) IV Intermittent once PRN      REVIEW OF SYSTEMS  --------------------------------------------------------------------------------  Unable to obtain     VITALS/PHYSICAL EXAM  --------------------------------------------------------------------------------  T(C): 36.4 (02-07-19 @ 04:00), Max: 36.9 (02-07-19 @ 00:00)  HR: 58 (02-07-19 @ 07:20) (58 - 139)  BP: 116/74 (02-07-19 @ 06:00) (93/56 - 117/69)  RR: 22 (02-07-19 @ 07:20) (18 - 30)  SpO2: 100% (02-07-19 @ 07:20) (98% - 100%)  Wt(kg): --    02-06-19 @ 07:01  -  02-07-19 @ 07:00  --------------------------------------------------------  IN: 1735.9 mL / OUT: 110 mL / NET: 1625.9 mL    02-07-19 @ 07:01  -  02-07-19 @ 08:26  --------------------------------------------------------  IN: 35 mL / OUT: 0 mL / NET: 35 mL    Gen: + Trach  	HEENT: +Trach  	Pulm: coarse breath sounds B/L  	CV: S1S2  	Abd: Soft, +BS   	Ext: No LE edema B/L  	Neuro: Awake  	Skin: Warm and dry  	Vascular access: LUE AVF site: +thrill, bruit heard.     LABS/STUDIES  --------------------------------------------------------------------------------              8.9    5.82  >-----------<  235      [02-07-19 @ 03:50]              29.1     135  |  99  |  44  ----------------------------<  111      [02-07-19 @ 03:50]  3.8   |  21  |  4.18        Ca     8.5     [02-07-19 @ 03:50]      Mg     2.2     [02-06-19 @ 04:30]      Phos  4.1     [02-06-19 @ 04:30]    TPro  5.5  /  Alb  2.1  /  TBili  0.3  /  DBili  x   /  AST  41  /  ALT  19  /  AlkPhos  274  [02-07-19 @ 03:50]    Creatinine Trend:  SCr 4.18 [02-07 @ 03:50]  SCr 3.68 [02-06 @ 04:30]  SCr 4.97 [02-05 @ 05:30]  SCr 4.51 [02-04 @ 05:15]  SCr 3.51 [02-03 @ 05:50]      HbA1c 5.5      [10-12-18 @ 02:53]  TSH 4.79      [10-21-18 @ 04:15]    HBsAb Reactive      [02-01-19 @ 12:20]  HBsAg NEGATIVE      [02-01-19 @ 12:20]  HBcAb Reactive      [02-01-19 @ 12:20]  HCV 0.23, Nonreactive Hepatitis C AB  S/CO Ratio                        Interpretation  < 1.0                                     Non-Reactive  1.0 - 4.9                           Weakly-Reactive  > 5.0                                 Reactive  Non-Reactive: Aperson with a non-reactive HCV antibody  result is considered uninfected.  No further action is  needed unless recent infection is suspected.  In these  cases, consider repeat testing later to detect  seroconversion..  Weakly-Reactive: HCV antibody test is abnormal, HCV RNA  Qualitative test will follow.  Reactive: HCV antibody test is abnormal, HCV RNA  Qualitative test will follow.  Note: HCV antibody testing is performed on the [employer]   system.      [02-01-19 @ 12:20]

## 2019-02-07 NOTE — PROGRESS NOTE ADULT - ASSESSMENT
58M who underwent a FB, R thoracotomy, decortication, chest wall reconstruction, lat flap with Dr Pickering on 10/11/18. He was admitted on 1/6/19 to [facility] with a R pleural effusion and respiratory failure,  A R PTC was placed there and he was intubated.  He still had a R SANDY drain in place and he was transferred to [facility]. Patient was found to have aspirated a foreign body and +MRSA empyema. He was treated and discharged on 1/22/2019.     He was found by his home nurse to be in acute distress and recommended to proceed to the ED.  At presentation, he was found to be in acute hypercarbic respiratory failure complicated by hemodynamic instability requiring emergent intubation and pressor support. (28 Jan 2019 15:34)    Pt had Tm 104.2 (on 1/29), tachycardia, hypotension.  Pt has now been extubated, s/p trach/PEG on 1/31.  CT chest shows complete L sided atelectasis with mucous plugging of right lower lobe  bronchus with near complete atelectasis.      ID consult called for antibiotic managment.     Recommend:    - Pt p/w severe sepsis with shock and respiratory distress.  Pt with high fevers.  Found to have complete and near complete atelectasis of L and R lung respectively with mucous plugging.  Agree with broad spectrum abx to cover for post-obstructive pna.  Repeat CT chest from 2/3 shows improved aeration.      - Cultures results show:    2/5: bcx - NGTD @ 48hrs  2/1: endotrach - MRSA,  Klebsiella (pan sens), yeast (gram stain only)  2/1: bcx - CNS  1/29:  lung cx - nl resp milton  1/28: bcx - NGTD    - Agree with vancomycin and zosyn.   Maintain vanco trough between 15-20.  Dose vanco by level, post HD.  Fluconazole added on 2/2 for yeast.  Seen on gram stain only.      -  Repeat blood cultures from 2/5 NGTD.  CNS likely contaminant.    - CT a/p showed rt inguinal lesion.  Subsequent US shows marked edema and small lymph nodes.  Cont to monitor.      - Cont care as per CTU. Continue hemodynamic monitoring.  Pressors being titrated.  On full mechanical ventilator support, s/p tracheostomy. Continue bronchodilators, pulmonary toilet, Continue GI prophylaxis with Protonix.   Monitor temp curve and WBC.    - Given severe sepsis and shock at presentation, recurrent involvement of MRSA, recommend long course of IV abx treatment (4 week course).  D/c fluconazole after 7 days (suspect yeast is likely a colonizer).  Cont treat of MRSA/KLeb.  Abx may be dosed with pt's HD sessions, no need for picc line.      Will follow,    Anabel Chahal  [phone]

## 2019-02-07 NOTE — PROGRESS NOTE ADULT - SUBJECTIVE AND OBJECTIVE BOX
Infectious Diseases progress note:    Subjective:  Pt seen earlier today, getting dialyzed, several family members at bedside.  Events noted.  NAD    ROS:  CONSTITUTIONAL:  No fever, chills, rigors  CARDIOVASCULAR:  No chest pain or palpitations  RESPIRATORY:   No SOB, cough, dyspnea on exertion.  No wheezing  GASTROINTESTINAL:  No abd pain, N/V, diarrhea/constipation  EXTREMITIES:  No swelling or joint pain  GENITOURINARY:  No burning on urination, increased frequency or urgency.  No flank pain  NEUROLOGIC:  No HA, visual disturbances  SKIN: No rashes    Allergies    No Known Allergies    Intolerances        ANTIBIOTICS/RELEVANT:  antimicrobials  fluconAZOLE IVPB      fluconAZOLE IVPB 200 milliGRAM(s) IV Intermittent every 24 hours  piperacillin/tazobactam IVPB. 3.375 Gram(s) IV Intermittent once  piperacillin/tazobactam IVPB. 3.375 Gram(s) IV Intermittent every 12 hours    immunologic:  epoetin kelly Injectable 3000 Unit(s) IV Push <User Schedule>    OTHER:  acetaminophen  IVPB .. 1000 milliGRAM(s) IV Intermittent once PRN  ALBUTerol    90 MICROgram(s) HFA Inhaler 2 Puff(s) Inhalation every 6 hours PRN  ALPRAZolam 0.5 milliGRAM(s) Oral every 8 hours  collagenase Ointment 1 Application(s) Topical daily  dexmedetomidine Infusion 0.5 MICROgram(s)/kG/Hr IV Continuous <Continuous>  heparin  Injectable 5000 Unit(s) SubCutaneous every 12 hours  ipratropium 17 MICROgram(s) HFA Inhaler 2 Puff(s) Inhalation every 6 hours  midodrine 10 milliGRAM(s) Oral every 8 hours  norepinephrine Infusion 0.05 MICROgram(s)/kG/Min IV Continuous <Continuous>  pantoprazole  Injectable 40 milliGRAM(s) IV Push daily  propofol Infusion 20 MICROgram(s)/kG/Min IV Continuous <Continuous>  QUEtiapine 50 milliGRAM(s) Oral every 12 hours  sodium chloride 0.9%. 1000 milliLiter(s) IV Continuous <Continuous>      Objective:  Vital Signs Last 24 Hrs  T(C): 37.2 (08 Feb 2019 00:00), Max: 37.2 (08 Feb 2019 00:00)  T(F): 98.9 (08 Feb 2019 00:00), Max: 98.9 (08 Feb 2019 00:00)  HR: 88 (08 Feb 2019 01:00) (57 - 124)  BP: 92/63 (08 Feb 2019 00:10) (92/63 - 116/74)  BP(mean): 73 (08 Feb 2019 00:10) (70 - 83)  RR: 22 (08 Feb 2019 01:00) (19 - 24)  SpO2: 100% (08 Feb 2019 01:00) (95% - 100%)    PHYSICAL EXAM:  Constitutional:NAD  Eyes:CHER, EOMI  Ear/Nose/Throat: no thrush, mucositis.  Moist mucous membranes	  Neck:no JVD, no lymphadenopathy, supple  Respiratory: trached  Cardiovascular: S1S2 RRR, no murmurs  Gastrointestinal:soft, nontender,  nondistended (+) BS, peg  Extremities:no e/e/c  Skin:  no rashes, open wounds or ulcerations        LABS:                        9.4    5.79  )-----------( 244      ( 07 Feb 2019 18:00 )             30.2     02-07    137  |  98  |  29<H>  ----------------------------<  132<H>  3.8   |  27  |  3.13<H>    Ca    8.8      07 Feb 2019 20:46  Phos  3.8     02-07  Mg     2.2     02-07    TPro  6.3  /  Alb  2.4<L>  /  TBili  0.3  /  DBili  x   /  AST  65<H>  /  ALT  28  /  AlkPhos  347<H>  02-07            Vancomycin Level, Random:  ug/mL (02-04 @ 05:15)  Vancomycin Level, Random:  ug/mL (02-03 @ 05:50)      Vancomycin Level, Trough: 13.2 ug/mL (02-07 @ 18:00)  Vancomycin Level, Trough: 26.0 ug/mL (02-06 @ 04:30)  Vancomycin Level, Trough: 9.6 ug/mL (02-05 @ 17:02)  Vancomycin Level, Trough: 16.4 ug/mL (02-05 @ 05:30)              MICROBIOLOGY:    Culture - Blood (02.05.19 @ 14:17)    Culture - Blood:   NO ORGANISMS ISOLATED  NO ORGANISMS ISOLATED AT 48 HRS.    Specimen Source: BLOOD    Culture - Respiratory with Gram Stain (02.01.19 @ 22:35)    Gram Stain Sputum:   WBC White Blood Cells  QNTY CELLS IN GRAM STAIN: MODERATE (3+)  GPCCL^Gram Pos Cocci In Clusters  QUANTITY OF BACTERIA SEEN: MODERATE (3+)  YEAST^YEAST.  QUANTITY OF BACTERIA SEEN: FEW (2+)  GPR^Gram Positive Rods  QUANTITY OF BACTERIA SEEN: FEW (2+)    Culture - Respiratory:   Normal Respiratory Jennifer Also Present  ***** CRITICAL RESULT *****  PERSON CALLED / READ-BACK: ADALGISA CARLSON  RN./Y  DATE / TIME CALLED: 02/04/19 1204  CALLED BY: RAYMUNDO GUAMAN    -  Amikacin: S <=8 AAMIR    -  Vancomycin: S 2 AAMIR    -  Tetra/Doxy: S <=1 AAMIR    -  Tigecycline: S    -  Tobramycin: S <=2 AAMIR    -  Trimethoprim/Sulfamethoxazole: S <=0.5/9.5 AAMIR    -  Trimethoprim/Sulfamethoxazole: S <=0.5/9.5 AAMIR    -  Ampicillin: R 16 AAMIR    -  Ampicillin/Sulbactam: S <=4/2 AAMIR    -  Aztreonam: S <=4 AAMIR    -  Cefazolin: R 16 AAMIR    -  Cefazolin: S <=2 AAMIR    -  Cefepime: S <=2 AAMIR    -  Cefoxitin: S <=4 AAMIR    -  Ceftazidime: S <=1 AAMIR    -  Ceftriaxone: S <=1 AAMIR    -  Ciprofloxacin: S <=1 AAMIR    -  Ciprofloxacin: S <=0.5 AAMIR    -  Clindamycin: S 0.5 AAMIR    -  Daptomycin: S 1 AAMIR    -  Ertapenem: S <=0.5 AAMIR    -  Erythromycin: R >4 AAMIR    -  Gentamicin: R >8 AAMIR    -  Gentamicin: S <=1 AAMIR    -  Imipenem: S <=1 AAMIR    -  Levofloxacin: S <=0.5 AAMIR    -  Levofloxacin: S <=1 AAMIR    -  Linezolid: S 4 AAMIR    -  Meropenem: S <=1 AAMIR    -  Moxifloxacin(Aerobic): S <=0.5 AAMIR    -  Oxacillin: R >2 AAMIR    -  Penicillin: R >8 AAMIR    -  Piperacillin/Tazobactam: S <=8 AAMIR    -  Rifampin: S <=1 AAMIR    Specimen Source: ENDOTRACHEAL SPECIMEN    Organism Identification: Staph. aureus *MRSA*  Klebsiella pneumoniae    Organism: Staph. aureus *MRSA*  QUANTITY OF GROWTH: MODERATE  OXICILLIN-RESISTANT staphylococci should be regarded as  RESISTANT to ALL other Beta-Lactams regardless of the  in-vitro results obtained.  These include: ALL  Penicillins, Cephalosporins, Amoxicillin-clavulanic  acid, Ticarcillin-clavulanic acid,  Ampicillin-sulbactam, and Imipenem.    Organism: Klebsiella pneumoniae  QUANTITY OF GROWTH: MODERATE    Method Type: POSITIVE AAMIR 29    Method Type: NEGATIVE AAMIR 43          RADIOLOGY & ADDITIONAL STUDIES:  < from: Xray Chest 1 View- PORTABLE-Routine (02.07.19 @ 07:00) >    Tracheostomy tube, right IJ line and subcutaneous AICD are unchanged.   Bilateral pleural effusions right greater than left is seen. Upper lung   fields are clear. Heart size is stable. No pneumothorax.      COMPARISON:  February 6      IMPRESSION:  Follow-up with no interval change again demonstrating   bilateral small effusions.    < end of copied text >

## 2019-02-07 NOTE — PROGRESS NOTE ADULT - SUBJECTIVE AND OBJECTIVE BOX
CLAUDIA HAN          MRN-[id number]    HPI:  Mr. Han is a 58M who underwent a FB, R thoracotomy, decortication, chest wall reconstruction, lat flap with Dr Pickering on 10/11/18. He was admitted on 1/6/19 to [facility] with a R pleural effusion and respiratory failure,  A R PTC was placed there and he was intubated.  He still had a R SANDY drain in place and he was transferred to [facility]. Patient was found to have aspirated a foreign body and +MRSA empyema. He was treated and discharged on 1/22/2019.     He was found by his home nurse to be in acute distress and recommended to proceed to the ED.  At presentation, he was found to be in acute hypercarbic respiratory failure complicated by hemodynamic instability requiring emergent intubation and pressor support. (28 Jan 2019 15:34)      Procedure:  POD # :     Issues:        Interval/Overnight OR Events/ ROS  Pt extubated in the OR after surgery. On arrival in ICU still drowsy - but easily arousable to following commands; denies SOB, no nausea, no vomiting  Respiratory status required full ventilatory support,  following of ABG’s with A-line monitoring, continuous pulse oximetry monitoring, & an IV Propofol infusion for support & to evaluate for & prevent further decompensation secondary to persistent cardiopulmonary dysfunction.     Pt remained hemodynamically stable overnight, not on any pressors or inotropes. OOB to chair, breathing comfortably with minimal pain. Ambulated several times . Denies pain, no SOB, no palpitations, no nausea/ no vomiting, no dizziness  A-line and sharma d/kirsten       PAST MEDICAL & SURGICAL HISTORY:  Smoking hx  Cardiomyopathy  Wound: right chest  ICD (implantable cardioverter-defibrillator) in place  OA (osteoarthritis)  HLD (hyperlipidemia)  BPH (benign prostatic hyperplasia)  Pleural effusion  HTN (hypertension)  ESRD (end stage renal disease)  H/O chest wound: right  S/P thoracotomy: FB, R thoracotomy, decortication, chest wall reconstruction, lat flap  History of wound infection: right chest wall - revision 5/18 and again in 7/18  History of implantable cardioverter-defibrillator (ICD) placement: pt unsure when placed  H/O bilateral hip replacements: 2008, 2009    Allergies    No Known Allergies    Intolerances      Home Medications:  aspirin 81 mg oral tablet: 1 tab(s) orally once a day  (30 Oct 2018 14:39)  Breo Ellipta 100 mcg-25 mcg/inh inhalation powder: 1 puff(s) inhaled once a day patient denies using it (11 Oct 2018 08:38)  Calcium 500: 1 tab(s) orally 2 times a day (11 Oct 2018 08:38)  docusate sodium 100 mg oral tablet: 1 tab(s) orally 2 times a day, As Needed (11 Oct 2018 08:39)  folic acid 1 mg oral tablet: 1 tab(s) orally once a day (11 Oct 2018 08:38)  Mag-Ox 400 oral tablet: 1 tab(s) orally once a day (11 Oct 2018 08:39)  Multiple Vitamins oral tablet: 1 tab(s) orally once a day  (30 Oct 2018 14:39)  Namenda 10 mg oral tablet: 1 tab(s) orally 2 times a day (11 Oct 2018 08:38)  Nephplex Rx oral tablet: 1 tab(s) orally once a day (11 Oct 2018 08:39)  nortriptyline 50 mg oral capsule: 1 cap(s) orally once a day (11 Oct 2018 08:39)  Renvela 800 mg oral tablet: 2 tab(s) orally every 8 hours (11 Oct 2018 08:39)  simethicone 80 mg oral tablet: 1 tab(s) orally 3 times a day (after meals) (11 Oct 2018 08:39)  Tylenol 325 mg oral tablet: 2 tab(s) orally every 6 hours, As Needed (30 Oct 2018 14:39)  vancomycin 1 g intravenous injection: 1 gram(s) intravenous 3 times a week  Please give 3 x per week with Dialysis until 2/3/2019 (22 Jan 2019 13:41)  vitamin A: 1 tab(s) orally once a day (11 Oct 2018 08:38)  Vitamin B Complex: 1 tab(s) orally once a day (11 Oct 2018 08:38)  Vitamin C 500 mg oral tablet: 1 tab(s) orally once a day (11 Oct 2018 08:38)        ***VITAL SIGNS:  Vital Signs Last 24 Hrs  T(C): 36.7 (07 Feb 2019 16:00), Max: 36.9 (07 Feb 2019 00:00)  T(F): 98 (07 Feb 2019 16:00), Max: 98.5 (07 Feb 2019 00:00)  HR: 78 (07 Feb 2019 17:02) (57 - 139)  BP: 116/74 (07 Feb 2019 06:00) (104/65 - 117/69)  BP(mean): 83 (07 Feb 2019 06:00) (74 - 83)  RR: 22 (07 Feb 2019 17:00) (18 - 26)  SpO2: 100% (07 Feb 2019 17:02) (98% - 100%)    I/Os:   I&O's Detail    06 Feb 2019 07:01  -  07 Feb 2019 07:00  --------------------------------------------------------  IN:    dexmedetomidine Infusion: 363 mL    Enteral Tube Flush: 160 mL    IV PiggyBack: 200 mL    Nepro with Carb Steady: 840 mL    norepinephrine Infusion: 72.1 mL    propofol Infusion: 100.8 mL  Total IN: 1735.9 mL    OUT:    Bulb: 35 mL    Rectal Tube: 75 mL  Total OUT: 110 mL    Total NET: 1625.9 mL      07 Feb 2019 07:01  -  07 Feb 2019 17:33  --------------------------------------------------------  IN:    dexmedetomidine Infusion: 115.5 mL    Enteral Tube Flush: 120 mL    IV PiggyBack: 100 mL    Nepro with Carb Steady: 385 mL    norepinephrine Infusion: 2.3 mL    propofol Infusion: 37.8 mL    sodium chloride 0.9%.: 110 mL  Total IN: 870.6 mL    OUT:  Total OUT: 0 mL    Total NET: 870.6 mL          CAPILLARY BLOOD GLUCOSE          =======================  MEDICATIONS  ===================  MEDICATIONS  (STANDING):  ALPRAZolam 0.5 milliGRAM(s) Oral every 8 hours  collagenase Ointment 1 Application(s) Topical daily  dexmedetomidine Infusion 0.5 MICROgram(s)/kG/Hr (7.5 mL/Hr) IV Continuous <Continuous>  epoetin kelly Injectable 3000 Unit(s) IV Push <User Schedule>  fluconAZOLE IVPB      fluconAZOLE IVPB 200 milliGRAM(s) IV Intermittent every 24 hours  heparin  Injectable 5000 Unit(s) SubCutaneous every 12 hours  midodrine 10 milliGRAM(s) Oral every 8 hours  norepinephrine Infusion 0.05 MICROgram(s)/kG/Min (5.625 mL/Hr) IV Continuous <Continuous>  pantoprazole  Injectable 40 milliGRAM(s) IV Push daily  piperacillin/tazobactam IVPB. 3.375 Gram(s) IV Intermittent once  piperacillin/tazobactam IVPB. 3.375 Gram(s) IV Intermittent every 12 hours  propofol Infusion 20 MICROgram(s)/kG/Min (7.2 mL/Hr) IV Continuous <Continuous>  QUEtiapine 50 milliGRAM(s) Oral every 12 hours  sodium chloride 0.9%. 1000 milliLiter(s) (10 mL/Hr) IV Continuous <Continuous>    MEDICATIONS  (PRN):  acetaminophen  IVPB .. 1000 milliGRAM(s) IV Intermittent once PRN Temp greater or equal to 38C (100.4F), Moderate Pain (4 - 6)      ======================VENTILATOR SETTINGS  ==============  Mode: AC/ CMV (Assist Control/ Continuous Mandatory Ventilation)  RR (machine): 22  TV (machine): 400  FiO2: 50  PEEP: 5  MAP: 10  PIP: 27      =================== PATIENT CARE ACCESS DEVICES ==========  Peripheral IV  Central Venous Line	R	L	IJ	Fem	SC	Placed:   Arterial Line	R	L	PT	DP	Fem	Rad	Ax	Placed:   Midline:				  Urinary Catheter, Date Placed:   Necessity of urinary, arterial, and venous catheters discussed  ======================= PHYSICAL EXAM===================  General:          Comfortable, Awake, alert, no distress  Neuro:             Moving all extremities to commands. No focal deficits	  HEENT:                           CHER/ ETT/ NGT/ trach  Respiratory:	Lungs clear on auscultation bilaterally with good aeration.                            No rales, rhonchi, no wheezing. Effort even and unlabored.  CV:		         Regular rate and rhythm. Normal S1/S2. No murmurs  Abdomen:	Soft,  nontender, not-distended. Bowel sounds present / absent.   Skin:		No rash.  Extremities:	Warm, no cyanosis or edema.  Palpable pulses  ============================ LABS =======================                        8.9    5.82  )-----------( 235      ( 07 Feb 2019 03:50 )             29.1     02-07    135  |  99  |  44<H>  ----------------------------<  111<H>  3.8   |  21<L>  |  4.18<H>    Ca    8.5      07 Feb 2019 03:50  Phos  4.1     02-06  Mg     2.2     02-06    TPro  5.5<L>  /  Alb  2.1<L>  /  TBili  0.3  /  DBili  x   /  AST  41<H>  /  ALT  19  /  AlkPhos  274<H>  02-07    LIVER FUNCTIONS - ( 07 Feb 2019 03:50 )  Alb: 2.1 g/dL / Pro: 5.5 g/dL / ALK PHOS: 274 u/L / ALT: 19 u/L / AST: 41 u/L / GGT: x             ABG - ( 07 Feb 2019 03:50 )  pH, Arterial: 7.43  pH, Blood: x     /  pCO2: 38    /  pO2: 174   / HCO3: 25    / Base Excess: 0.4   /  SaO2: 99.6                BLOOD  02-05-19 --  --  --      ENDOTRACHEAL SPECIMEN  02-01-19 --  --  Staph. aureus *MRSA*  Klebsiella pneumoniae      BLOOD PERIPHERAL  02-01-19 --  --  BLOOD CULTURE PCR  Staphylococcus sp.,coag neg      BLOOD VENOUS  01-29-19 --  --  --      LUNG - UPPER LOBE LEFT  01-29-19 --  --  --      BLOOD PERIPHERAL  01-28-19 --  --  --      BLOOD PERIPHERAL  01-09-19 --  --  --      PLEURAL FLUID  01-09-19 --  --    WBC^White Blood Cells  QNTY CELLS IN GRAM STAIN: FEW (2+)  NOS^No Organisms Seen      BRONCHIAL LAVAGE  01-08-19 --  --  --      BRONCHIAL LAVAGE  01-08-19 --  --  --          ===================== IMAGING STUDIES ===================  Radiology personally reviewed.    ====================ASSESSMENT AND PLAN ================      ====================== NEUROLOGY=======================  Pain control with PCA / PCEA / Tylenol IV / Toradol / Percocet  Pt is on Precedex for agitation  Pt is sedated with Propofol / Fentanyl  ==================== RESPIRATORY========================  Pt is on       L nasal canula / Face tent____% FiO2/ full mech vent support  Comfortable, no evidence of distress.  Using incentive spirometry & doing                ml  Monitor chest tube output  Chest tube to suction / water seal	  Mechanical Ventilation:  Mode: AC/ CMV (Assist Control/ Continuous Mandatory Ventilation)  RR (machine): 22  TV (machine): 400  FiO2: 50  PEEP: 5  MAP: 10  PIP: 27    Mechanical ventilator status assessed & settings reviewed  Continuous pulse oximetry monitoring  Continue bronchodilators, pulmonary toilet  Head of bed elevation to 30-40 degrees  ====================CARDIOVASCULAR=====================  Continue hemodynamic monitoring/ telemetry  Not on any pressors/ titrate pressors for SBP>100, MAP >60-65  Continue cardiovascular / antihypertensive medications  ===================== RENAL ============================  Continue LR 30CC/hr      D/C IVF  Monitor I/Os, BUN/ Cr  and electrolytes  D/C Sharma      Keep Sharma for UO monitoring  BPH: Continue Flomax/ Finasteride  ==================== GASTROINTESTINAL===================  On regular/ tube feeds diet, tolerating well  Continue GI prophylaxis with Pepcid / Protonix  Continue Zofran / Reglan for nausea - PRN	  NPO  =======================    ENDOCRIN  =====================  Glycemic monitoring  F/S with coverage  ===================HEMATOLOGIC/ONCOLOGIC =============  Monitor chest tube output. No signs of active bleeding.   Follow CBC, coags  in AM  DVT prophylaxis with SCD, sc Heparin  ========================INFECTIOUS DISEASE===============  No signs of infection. Monitor for fever / leukocytosis.  All surgical incision / chest tube  sites look clean  D/C Sharma    Pertinent clinical, laboratory, radiographic, hemodynamic, echocardiographic, respiratory data, microbiologic data and chart were reviewed and analyzed frequently throughout the course of the day and night. GI and DVT prophylaxis, glycemic control, head of bed elevation and skin care issues were addressed.  Patient seen, examined and plan discussed with CT Surgery / CTICU team during rounds.  Pt remains critically ill in imminent risk of  deterioration and requires very careful cardio- pulmonary monitoring and support.    I have spent        minutes of critical care time with this pt between      am/pm   and        am/ pm         minutes spent on total encounter; more than 50% of the visit was spent counseling and/or coordinating care by the attending physician.      KERLINE Faith MD CLAUDIA HAN          MRN-[id number]    HPI:  Mr. Han is a 58M who underwent a FB, R thoracotomy, decortication, chest wall reconstruction, lat flap with Dr Pickering on 10/11/18. He was admitted on 1/6/19 to [facility] with a R pleural effusion and respiratory failure,  A R PTC was placed there and he was intubated.  He still had a R SANDY drain in place and he was transferred to [facility]. Patient was found to have aspirated a foreign body and +MRSA empyema. He was treated and discharged on 1/22/2019.     He was found by his home nurse to be in acute distress and recommended to proceed to the ED.  At presentation, he was found to be in acute hypercarbic respiratory failure complicated by hemodynamic instability requiring emergent intubation and pressor support. (28 Jan 2019 15:34)      Procedure:        Trach and PEG  1/31/2019  Bronchoscopy   1/29/2019  Right thoracotomy, resection of portion of R 7th rib, evacuation of infected hemothorax, takedown of pleurocutaneous fistula  10/11/2018    Issues:  Acute hypoxic & hypercapnic respiratory failure  Hypotension  Pneumonia MRSA and Klebsiella /  Fever / Sepsis  ESRD / HD  chronic anemia  Cardiomyopathy  HLD      Drips:  Levo  Precedex              Propofol        Interval/Overnight OR Events/ ROS  Respiratory status required full ventilatory support,  following of ABG’s with A-line monitoring, continuous pulse oximetry monitoring,  Delirium with agitation last night required Quetiapine/Alprazolam/ Propofol. Today neuro status is much better , pt appears back to normal calm mental status- fully interactive. Pt is being dialysed today. Will plan for  CPAP and trach collar trial after HD is done this PM.      PAST MEDICAL & SURGICAL HISTORY:  Smoking hx  Cardiomyopathy  Wound: right chest  ICD (implantable cardioverter-defibrillator) in place  OA (osteoarthritis)  HLD (hyperlipidemia)  BPH (benign prostatic hyperplasia)  Pleural effusion  HTN (hypertension)  ESRD (end stage renal disease)  H/O chest wound: right  S/P thoracotomy: FB, R thoracotomy, decortication, chest wall reconstruction, lat flap  History of wound infection: right chest wall - revision 5/18 and again in 7/18  History of implantable cardioverter-defibrillator (ICD) placement: pt unsure when placed  H/O bilateral hip replacements: 2008, 2009    Allergies  No Known Allergies        Home Medications:  aspirin 81 mg oral tablet: 1 tab(s) orally once a day  (30 Oct 2018 14:39)  Breo Ellipta 100 mcg-25 mcg/inh inhalation powder: 1 puff(s) inhaled once a day patient denies using it (11 Oct 2018 08:38)  Calcium 500: 1 tab(s) orally 2 times a day (11 Oct 2018 08:38)  docusate sodium 100 mg oral tablet: 1 tab(s) orally 2 times a day, As Needed (11 Oct 2018 08:39)  folic acid 1 mg oral tablet: 1 tab(s) orally once a day (11 Oct 2018 08:38)  Mag-Ox 400 oral tablet: 1 tab(s) orally once a day (11 Oct 2018 08:39)  Multiple Vitamins oral tablet: 1 tab(s) orally once a day  (30 Oct 2018 14:39)  Namenda 10 mg oral tablet: 1 tab(s) orally 2 times a day (11 Oct 2018 08:38)  Nephplex Rx oral tablet: 1 tab(s) orally once a day (11 Oct 2018 08:39)  nortriptyline 50 mg oral capsule: 1 cap(s) orally once a day (11 Oct 2018 08:39)  Renvela 800 mg oral tablet: 2 tab(s) orally every 8 hours (11 Oct 2018 08:39)  simethicone 80 mg oral tablet: 1 tab(s) orally 3 times a day (after meals) (11 Oct 2018 08:39)  Tylenol 325 mg oral tablet: 2 tab(s) orally every 6 hours, As Needed (30 Oct 2018 14:39)  vancomycin 1 g intravenous injection: 1 gram(s) intravenous 3 times a week  Please give 3 x per week with Dialysis until 2/3/2019 (22 Jan 2019 13:41)  vitamin A: 1 tab(s) orally once a day (11 Oct 2018 08:38)  Vitamin B Complex: 1 tab(s) orally once a day (11 Oct 2018 08:38)  Vitamin C 500 mg oral tablet: 1 tab(s) orally once a day (11 Oct 2018 08:38)      ***VITAL SIGNS:  Vital Signs Last 24 Hrs  T(C): 36.7 (07 Feb 2019 16:00), Max: 36.9 (07 Feb 2019 00:00)  T(F): 98 (07 Feb 2019 16:00), Max: 98.5 (07 Feb 2019 00:00)  HR: 78 (07 Feb 2019 17:02) (57 - 139)  BP: 116/74 (07 Feb 2019 06:00) (104/65 - 117/69)  BP(mean): 83 (07 Feb 2019 06:00) (74 - 83)  RR: 22 (07 Feb 2019 17:00) (18 - 26)  SpO2: 100% (07 Feb 2019 17:02) (98% - 100%)    I/Os:    06 Feb 2019 07:01  -  07 Feb 2019 07:00  --------------------------------------------------------  IN:    dexmedetomidine Infusion: 363 mL    Enteral Tube Flush: 160 mL    IV PiggyBack: 200 mL    Nepro with Carb Steady: 840 mL    norepinephrine Infusion: 72.1 mL    propofol Infusion: 100.8 mL  Total IN: 1735.9 mL    OUT:    Bulb: 35 mL    Rectal Tube: 75 mL  Total OUT: 110 mL    Total NET: 1625.9 mL      07 Feb 2019 07:01  -  07 Feb 2019 17:33  --------------------------------------------------------  IN:    dexmedetomidine Infusion: 115.5 mL    Enteral Tube Flush: 120 mL    IV PiggyBack: 100 mL    Nepro with Carb Steady: 385 mL    norepinephrine Infusion: 2.3 mL    propofol Infusion: 37.8 mL    sodium chloride 0.9%.: 110 mL  Total IN: 870.6 mL    OUT:  Total OUT: 0 mL    Total NET: 870.6 mL    =======================  MEDICATIONS  ===================  MEDICATIONS  (STANDING):  ALPRAZolam 0.5 milliGRAM(s) Oral every 8 hours  collagenase Ointment 1 Application(s) Topical daily  dexmedetomidine Infusion 0.5 MICROgram(s)/kG/Hr (7.5 mL/Hr) IV Continuous <Continuous>  epoetin kelly Injectable 3000 Unit(s) IV Push <User Schedule>  fluconAZOLE IVPB      fluconAZOLE IVPB 200 milliGRAM(s) IV Intermittent every 24 hours  heparin  Injectable 5000 Unit(s) SubCutaneous every 12 hours  midodrine 10 milliGRAM(s) Oral every 8 hours  norepinephrine Infusion 0.05 MICROgram(s)/kG/Min (5.625 mL/Hr) IV Continuous <Continuous>  pantoprazole  Injectable 40 milliGRAM(s) IV Push daily  piperacillin/tazobactam IVPB. 3.375 Gram(s) IV Intermittent once  piperacillin/tazobactam IVPB. 3.375 Gram(s) IV Intermittent every 12 hours  propofol Infusion 20 MICROgram(s)/kG/Min (7.2 mL/Hr) IV Continuous <Continuous>  QUEtiapine 50 milliGRAM(s) Oral every 12 hours  sodium chloride 0.9%. 1000 milliLiter(s) (10 mL/Hr) IV Continuous <Continuous>    MEDICATIONS  (PRN):  acetaminophen  IVPB .. 1000 milliGRAM(s) IV Intermittent once PRN Temp greater or equal to 38C (100.4F), Moderate Pain (4 - 6)    ======================VENTILATOR SETTINGS  ==============  Mode: AC/ CMV (Assist Control/ Continuous Mandatory Ventilation)  RR (machine): 22  TV (machine): 400  FiO2: 50  PEEP: 5  MAP: 10  PIP: 27    =================== PATIENT CARE ACCESS DEVICES ==========  Peripheral IV  Central Venous Line R/ J(+)  Arterial Line	R/  Ax(+)			  ======================= PHYSICAL EXAM===================  General:          Comfortable, Awake, alert, no distress  Neuro:             Moving all extremities to commands. No focal deficits	  HEENT:                           CHER/  trach  Respiratory:	Lungs clear on auscultation bilaterally with good aeration.                            No rales, rhonchi, no wheezing.   CV:	           Regular rate and rhythm.(+) S1/S2.   Abdomen:	Soft,  nontender, not-distended. Bowel sounds present   Skin:		No rash.  Extremities:	Warm, no cyanosis or edema.  Palpable pulses  ============================ LABS =======================                        8.9    5.82  )-----------( 235      ( 07 Feb 2019 03:50 )             29.1     02-07    135  |  99       |  44<H>  --------------------------------<  111<H>  3.8   |  21<L>  |  4.18<H>    Ca    8.5      07 Feb 2019 03:50  Phos  4.1     02-06  Mg     2.2     02-06    TPro  5.5<L>  /  Alb  2.1<L>  /  TBili  0.3  /  DBili  x   /  AST  41<H>  /  ALT  19  /  AlkPhos  274<H>  02-07    LIVER FUNCTIONS - ( 07 Feb 2019 03:50 )  Alb: 2.1 g/dL / Pro: 5.5 g/dL / ALK PHOS: 274 u/L / ALT: 19 u/L / AST: 41 u/L / GGT: x             ABG - ( 07 Feb 2019 03:50 )  pH, Arterial: 7.43  pH, Blood: x     /  pCO2: 38    /  pO2: 174   / HCO3: 25    / Base Excess: 0.4   /  SaO2: 99.6      C diff neg    BLOOD  02-05-19 --  --  --    ENDOTRACHEAL SPECIMEN  02-01-19 --  --  Staph. aureus *MRSA*  Klebsiella pneumoniae    BLOOD PERIPHERAL  02-01-19 --  --  BLOOD CULTURE PCR  Staphylococcus sp.,coag neg    BLOOD VENOUS  01-29-19 --  --  --    LUNG - UPPER LOBE LEFT  01-29-19 --  --  --    BLOOD PERIPHERAL  01-28-19 --  --  --    BLOOD PERIPHERAL  01-09-19 --  --  --    PLEURAL FLUID  01-09-19 --  --    WBC^White Blood Cells  QNTY CELLS IN GRAM STAIN: FEW (2+)  NOS^No Organisms Seen    BRONCHIAL LAVAGE  01-08-19 --  --  --    BRONCHIAL LAVAGE  01-08-19 --  --  --  ===================== IMAGING STUDIES ===================  Radiology personally reviewed.    < from: Xray Chest 1 View- PORTABLE-Routine (02.07.19 @ 07:00) >  INTERPRETATION:    Tracheostomy tube, right IJ line and subcutaneous AICD are unchanged.   Bilateral pleural effusions right greater than left is seen. Upper lung   fields are clear. Heart size is stable. No pneumothorax.    COMPARISON:  February 6    IMPRESSION:  Follow-up with no interval change again demonstrating   bilateral small effusions.    < end of copied text >    ====================ASSESSMENT AND PLAN ================      ====================== NEUROLOGY=======================  Pain control with PCA / PCEA / Tylenol IV / Toradol / Percocet  Pt is on Precedex for agitation  Pt is sedated with Propofol / Fentanyl  ==================== RESPIRATORY========================  Pt is on       L nasal canula / Face tent____% FiO2/ full mech vent support  Comfortable, no evidence of distress.  Using incentive spirometry & doing                ml  Monitor chest tube output  Chest tube to suction / water seal	  Mechanical Ventilation:  Mode: AC/ CMV (Assist Control/ Continuous Mandatory Ventilation)  RR (machine): 22  TV (machine): 400  FiO2: 50  PEEP: 5  MAP: 10  PIP: 27    Mechanical ventilator status assessed & settings reviewed  Continuous pulse oximetry monitoring  Continue bronchodilators, pulmonary toilet  Head of bed elevation to 30-40 degrees  ====================CARDIOVASCULAR=====================  Continue hemodynamic monitoring/ telemetry  Not on any pressors/ titrate pressors for SBP>100, MAP >60-65  Continue cardiovascular / antihypertensive medications  ===================== RENAL ============================  Continue LR 30CC/hr      D/C IVF  Monitor I/Os, BUN/ Cr  and electrolytes  D/C Moore      Keep Moore for UO monitoring  BPH: Continue Flomax/ Finasteride  ==================== GASTROINTESTINAL===================  On regular/ tube feeds diet, tolerating well  Continue GI prophylaxis with Pepcid / Protonix  Continue Zofran / Reglan for nausea - PRN	  NPO  =======================    ENDOCRIN  =====================  Glycemic monitoring  F/S with coverage  ===================HEMATOLOGIC/ONCOLOGIC =============  Monitor chest tube output. No signs of active bleeding.   Follow CBC, coags  in AM  DVT prophylaxis with SCD, sc Heparin  ========================INFECTIOUS DISEASE===============  No signs of infection. Monitor for fever / leukocytosis.  All surgical incision / chest tube  sites look clean  D/C Moore    Pertinent clinical, laboratory, radiographic, hemodynamic, echocardiographic, respiratory data, microbiologic data and chart were reviewed and analyzed frequently throughout the course of the day and night. GI and DVT prophylaxis, glycemic control, head of bed elevation and skin care issues were addressed.  Patient seen, examined and plan discussed with CT Surgery / CTICU team during rounds.  Pt remains critically ill in imminent risk of  deterioration and requires very careful cardio- pulmonary monitoring and support.    I have spent        minutes of critical care time with this pt between      am/pm   and        am/ pm         minutes spent on total encounter; more than 50% of the visit was spent counseling and/or coordinating care by the attending physician.      KERLINE Faith MD         =====================ASSESSMENT AND PLAN ================  Mr. Han is a 58M who underwent a FB, R thoracotomy, decortication, chest wall reconstruction, lat flap with Dr Pickering on 10/11/18. He was admitted on 1/6/19 to [facility] with a R pleural effusion and respiratory failure,  A R PTC was placed there and he was intubated.  He still had a R SANDY drain in place and he was transferred to [facility]. Patient was found to have aspirated a foreign body and +MRSA empyema. He was treated and discharged on 1/22/2019.     He was found by his home nurse to be in acute distress and recommended to proceed to the ED.  At presentation, he was found to be in acute hypercarbic respiratory failure complicated by hemodynamic instability requiring emergent intubation and pressor support. (28 Jan 2019 15:34)    Procedure:        Trach and PEG  1/31/2019  Bronchoscopy   1/29/2019  Right thoracotomy, resection of portion of R 7th rib, evacuation of infected hemothorax, takedown of pleurocutaneous fistula  10/11/2018    Issues:  Acute hypoxic & hypercapnic respiratory failure  Hypotension  Pneumonia /  Fever / Sepsis  ESRD / HD  Cardiomyopathy  HLD              ====================== NEUROLOGY=======================  Pain control with Fentanyl  Pt is on Precedex for agitation  ==================== RESPIRATORY========================  Pt is on  full [facility] vent support  Comfortable, no evidence of distress.  Monitor  right chest tube output  	  Mechanical Ventilation:  Mode: AC/ CMV (Assist Control/ Continuous Mandatory Ventilation)  RR (machine): 22  TV (machine): 400  FiO2: 50  PEEP: 5  MAP: 10  PIP: 26  CPAP trials  as tolerated  Mechanical ventilator status assessed & settings reviewed  Continuous pulse oximetry monitoring  Continue bronchodilators, pulmonary toilet  Head of bed elevation to 30-40 degrees  Continue antibiotics -  Vanco based on level + Zosyn. Diflucan added because of yeast in BAL  ====================CARDIOVASCULAR=====================  Continue hemodynamic monitoring/ telemetry   Hypotension: On  /Off Levophed, titrate  to MAP>65, Restarted Midodrine 10mg/TID, Wean off Levo as tolerated  ===================== RENAL ============================  ESRD on HD TIW  Monitor I/Os, BUN/ Cr  and electrolytes  Renal f/up        ==================== GASTROINTESTINAL===================  On J tube feeds Nepro 35cc/hr diet, tolerating well  Continue GI prophylaxis with Protonix   Zofran / Reglan for nausea - PRN  =======================    ENDOCRIN  =====================  Glycemic monitoring  F/S with coverage  ===================HEMATOLOGIC/ONCOLOGIC =============   No signs of active bleeding.   Follow CBC, coags  in AM  DVT prophylaxis with SCD, sc Heparin  ========================INFECTIOUS DISEASE===============  Monitor for fever / leukocytosis.  Bronchoscopy on 1/29 showed copious greenish secretions - Continue pulmonary toilet   Blood cultures - [facility], Continue Vanco by  level  + Zosyn + Diflucan    Pertinent clinical, laboratory, radiographic, hemodynamic, echocardiographic, respiratory data, microbiologic data and chart were reviewed and analyzed frequently throughout the course of the day and night. GI and DVT prophylaxis, glycemic control, head of bed elevation and skin care issues were addressed.  Patient seen, examined and plan discussed with CT Surgery / CTICU team during rounds.  Pt remains critically ill in imminent risk of  deterioration and requires very careful cardio- pulmonary monitoring and support.    I have spent  60   minutes of critical care time with this pt between  12  am  and   8 am         minutes spent on total encounter; more than 50% of the visit was spent counseling and/or coordinating care by the attending physician. CLAUDIA HAN          MRN-[id number]    HPI:  Mr. Han is a 58M who underwent a FB, R thoracotomy, decortication, chest wall reconstruction, lat flap with Dr Pickering on 10/11/18. He was admitted on 1/6/19 to [facility] with a R pleural effusion and respiratory failure,  A R PTC was placed there and he was intubated.  He still had a R SANDY drain in place and he was transferred to [facility]. Patient was found to have aspirated a foreign body and +MRSA empyema. He was treated and discharged on 1/22/2019.     He was found by his home nurse to be in acute distress and recommended to proceed to the ED.  At presentation, he was found to be in acute hypercarbic respiratory failure complicated by hemodynamic instability requiring emergent intubation and pressor support. (28 Jan 2019 15:34)      Procedure:        Trach and PEG  1/31/2019  Bronchoscopy   1/29/2019  Right thoracotomy, resection of portion of R 7th rib, evacuation of infected hemothorax, takedown of pleurocutaneous fistula  10/11/2018    Issues:  Acute hypoxic & hypercapnic respiratory failure  Hypotension  Pneumonia MRSA and Klebsiella /  Fever / Sepsis  ESRD / HD  chronic anemia  Cardiomyopathy  HLD      Drips:  Levo  Precedex              Propofol      Interval/Overnight OR Events/ ROS  Respiratory status required full ventilatory support,  following of ABG’s with A-line monitoring, continuous pulse oximetry monitoring,  Delirium with agitation last night required Quetiapine/Alprazolam/ Propofol. Today neuro status is much better , pt appears back to normal calm mental status- fully interactive. Pt is being dialysed today. Will plan for  CPAP and trach collar trial after HD is done this PM.      PAST MEDICAL & SURGICAL HISTORY:  Smoking hx  Cardiomyopathy  Wound: right chest  ICD (implantable cardioverter-defibrillator) in place  OA (osteoarthritis)  HLD (hyperlipidemia)  BPH (benign prostatic hyperplasia)  Pleural effusion  HTN (hypertension)  ESRD (end stage renal disease)  H/O chest wound: right  S/P thoracotomy: FB, R thoracotomy, decortication, chest wall reconstruction, lat flap  History of wound infection: right chest wall - revision 5/18 and again in 7/18  History of implantable cardioverter-defibrillator (ICD) placement: pt unsure when placed  H/O bilateral hip replacements: 2008, 2009    Allergies  No Known Allergies      Home Medications:  aspirin 81 mg oral tablet: 1 tab(s) orally once a day  (30 Oct 2018 14:39)  Breo Ellipta 100 mcg-25 mcg/inh inhalation powder: 1 puff(s) inhaled once a day patient denies using it (11 Oct 2018 08:38)  Calcium 500: 1 tab(s) orally 2 times a day (11 Oct 2018 08:38)  docusate sodium 100 mg oral tablet: 1 tab(s) orally 2 times a day, As Needed (11 Oct 2018 08:39)  folic acid 1 mg oral tablet: 1 tab(s) orally once a day (11 Oct 2018 08:38)  Mag-Ox 400 oral tablet: 1 tab(s) orally once a day (11 Oct 2018 08:39)  Multiple Vitamins oral tablet: 1 tab(s) orally once a day  (30 Oct 2018 14:39)  Namenda 10 mg oral tablet: 1 tab(s) orally 2 times a day (11 Oct 2018 08:38)  Nephplex Rx oral tablet: 1 tab(s) orally once a day (11 Oct 2018 08:39)  nortriptyline 50 mg oral capsule: 1 cap(s) orally once a day (11 Oct 2018 08:39)  Renvela 800 mg oral tablet: 2 tab(s) orally every 8 hours (11 Oct 2018 08:39)  simethicone 80 mg oral tablet: 1 tab(s) orally 3 times a day (after meals) (11 Oct 2018 08:39)  Tylenol 325 mg oral tablet: 2 tab(s) orally every 6 hours, As Needed (30 Oct 2018 14:39)  vancomycin 1 g intravenous injection: 1 gram(s) intravenous 3 times a week  Please give 3 x per week with Dialysis until 2/3/2019 (22 Jan 2019 13:41)  vitamin A: 1 tab(s) orally once a day (11 Oct 2018 08:38)  Vitamin B Complex: 1 tab(s) orally once a day (11 Oct 2018 08:38)  Vitamin C 500 mg oral tablet: 1 tab(s) orally once a day (11 Oct 2018 08:38)      ***VITAL SIGNS:  Vital Signs Last 24 Hrs  T(C): 36.7 (07 Feb 2019 16:00), Max: 36.9 (07 Feb 2019 00:00)  T(F): 98 (07 Feb 2019 16:00), Max: 98.5 (07 Feb 2019 00:00)  HR: 78 (07 Feb 2019 17:02) (57 - 139)  BP: 116/74 (07 Feb 2019 06:00) (104/65 - 117/69)  BP(mean): 83 (07 Feb 2019 06:00) (74 - 83)  RR: 22 (07 Feb 2019 17:00) (18 - 26)  SpO2: 100% (07 Feb 2019 17:02) (98% - 100%)    I/Os:    06 Feb 2019 07:01  -  07 Feb 2019 07:00  --------------------------------------------------------  IN:    dexmedetomidine Infusion: 363 mL    Enteral Tube Flush: 160 mL    IV PiggyBack: 200 mL    Nepro with Carb Steady: 840 mL    norepinephrine Infusion: 72.1 mL    propofol Infusion: 100.8 mL  Total IN: 1735.9 mL    OUT:    Bulb: 35 mL    Rectal Tube: 75 mL  Total OUT: 110 mL    Total NET: 1625.9 mL      07 Feb 2019 07:01  -  07 Feb 2019 17:33  --------------------------------------------------------  IN:    dexmedetomidine Infusion: 115.5 mL    Enteral Tube Flush: 120 mL    IV PiggyBack: 100 mL    Nepro with Carb Steady: 385 mL    norepinephrine Infusion: 2.3 mL    propofol Infusion: 37.8 mL    sodium chloride 0.9%.: 110 mL  Total IN: 870.6 mL    OUT:  Total OUT: 0 mL    Total NET: 870.6 mL    =======================  MEDICATIONS  ===================  MEDICATIONS  (STANDING):  ALPRAZolam 0.5 milliGRAM(s) Oral every 8 hours  collagenase Ointment 1 Application(s) Topical daily  dexmedetomidine Infusion 0.5 MICROgram(s)/kG/Hr (7.5 mL/Hr) IV Continuous <Continuous>  epoetin kelly Injectable 3000 Unit(s) IV Push <User Schedule>  fluconAZOLE IVPB      fluconAZOLE IVPB 200 milliGRAM(s) IV Intermittent every 24 hours  heparin  Injectable 5000 Unit(s) SubCutaneous every 12 hours  midodrine 10 milliGRAM(s) Oral every 8 hours  norepinephrine Infusion 0.05 MICROgram(s)/kG/Min (5.625 mL/Hr) IV Continuous <Continuous>  pantoprazole  Injectable 40 milliGRAM(s) IV Push daily  piperacillin/tazobactam IVPB. 3.375 Gram(s) IV Intermittent once  piperacillin/tazobactam IVPB. 3.375 Gram(s) IV Intermittent every 12 hours  propofol Infusion 20 MICROgram(s)/kG/Min (7.2 mL/Hr) IV Continuous <Continuous>  QUEtiapine 50 milliGRAM(s) Oral every 12 hours  sodium chloride 0.9%. 1000 milliLiter(s) (10 mL/Hr) IV Continuous <Continuous>    MEDICATIONS  (PRN):  acetaminophen  IVPB .. 1000 milliGRAM(s) IV Intermittent once PRN Temp greater or equal to 38C (100.4F), Moderate Pain (4 - 6)    ======================VENTILATOR SETTINGS  ==============  Mode: AC/ CMV (Assist Control/ Continuous Mandatory Ventilation)  RR (machine): 22  TV (machine): 400  FiO2: 50  PEEP: 5  MAP: 10  PIP: 27    =================== PATIENT CARE ACCESS DEVICES ==========  Peripheral IV  Central Venous Line R/ J(+)  Arterial Line	R/  Ax(+)			  ======================= PHYSICAL EXAM===================  General:          Comfortable, Awake, alert, no distress  Neuro:             Moving all extremities to commands. No focal deficits	  HEENT:                           CHER/  trach  Respiratory:	Lungs clear on auscultation bilaterally with good aeration.                           No rales, rhonchi, no wheezing.   CV:	           Regular rate and rhythm.(+) S1/S2.   Abdomen:	Soft,  nontender, not-distended. Bowel sounds present   Skin:		No rash.  Extremities:	Warm, no cyanosis or edema.  Palpable pulses  ============================ LABS =======================                        8.9    5.82  )-----------( 235      ( 07 Feb 2019 03:50 )             29.1     02-07    135  |  99       |  44<H>  --------------------------------<  111<H>  3.8   |  21<L>  |  4.18<H>    Ca    8.5      07 Feb 2019 03:50  Phos  4.1     02-06  Mg     2.2     02-06    TPro  5.5<L>  /  Alb  2.1<L>  /  TBili  0.3  /  DBili  x   /  AST  41<H>  /  ALT  19  /  AlkPhos  274<H>  02-07    LIVER FUNCTIONS - ( 07 Feb 2019 03:50 )  Alb: 2.1 g/dL / Pro: 5.5 g/dL / ALK PHOS: 274 u/L / ALT: 19 u/L / AST: 41 u/L / GGT: x             ABG - ( 07 Feb 2019 03:50 )  pH, Arterial: 7.43  pH, Blood: x     /  pCO2: 38    /  pO2: 174   / HCO3: 25    / Base Excess: 0.4   /  SaO2: 99.6      C diff neg    BLOOD  02-05-19 --  --  --    ENDOTRACHEAL SPECIMEN  02-01-19 --  --  Staph. aureus *MRSA*  Klebsiella pneumoniae    BLOOD PERIPHERAL  02-01-19 --  --  BLOOD CULTURE PCR  Staphylococcus sp.,coag neg    BLOOD VENOUS  01-29-19 --  --  --    LUNG - UPPER LOBE LEFT  01-29-19 --  --  --    BLOOD PERIPHERAL  01-28-19 --  --  --    BLOOD PERIPHERAL  01-09-19 --  --  --    PLEURAL FLUID  01-09-19 --  --    WBC^White Blood Cells  QNTY CELLS IN GRAM STAIN: FEW (2+)  NOS^No Organisms Seen    BRONCHIAL LAVAGE  01-08-19 --  --  --    BRONCHIAL LAVAGE  01-08-19 --  --  --  ===================== IMAGING STUDIES ===================  Radiology personally reviewed.    < from: Xray Chest 1 View- PORTABLE-Routine (02.07.19 @ 07:00) >  INTERPRETATION:    Tracheostomy tube, right IJ line and subcutaneous AICD are unchanged.   Bilateral pleural effusions right greater than left is seen. Upper lung   fields are clear. Heart size is stable. No pneumothorax.    COMPARISON:  February 6    IMPRESSION:  Follow-up with no interval change again demonstrating   bilateral small effusions.    < end of copied text >      =====================ASSESSMENT AND PLAN ================  Mr. Han is a 58M who underwent a FB, R thoracotomy, decortication, chest wall reconstruction, lat flap with Dr Pickering on 10/11/18. He was admitted on 1/6/19 to [facility] with a R pleural effusion and respiratory failure,  A R PTC was placed there and he was intubated.  He still had a R SANDY drain in place and he was transferred to [facility]. Patient was found to have aspirated a foreign body and +MRSA empyema. He was treated and discharged on 1/22/2019.     He was found by his home nurse to be in acute distress and recommended to proceed to the ED.  At presentation, he was found to be in acute hypercarbic respiratory failure complicated by hemodynamic instability requiring emergent intubation and pressor support. (28 Jan 2019 15:34)    Procedure:        Trach and PEG  1/31/2019  Bronchoscopy   1/29/2019                          Right thoracotomy, resection of portion of R 7th rib, evacuation of infected hemothorax, takedown of pleurocutaneous fistula  10/11/2018    Issues:  Acute hypoxic & hypercapnic respiratory failure  Hypotension  Pneumonia MRSA and Klebsiella /  Fever / Sepsis  ESRD / HD  chronic anemia  Cardiomyopathy              HLD  ====================== NEUROLOGY=======================  Pain control with Fentanyl  Pt is on Precedex for agitation  ==================== RESPIRATORY========================  [facility]  Comfortable, no evidence of distress.  Monitor   chest tube output  	  Mechanical Ventilation:  Mode: AC/ CMV (Assist Control/ Continuous Mandatory Ventilation)  RR (machine): 22  TV (machine): 400  FiO2: 50  PEEP: 5  MAP: 10  PIP: 26  CPAP trials  as tolerated  Mechanical ventilator status assessed & settings reviewed  Continuous pulse oximetry monitoring  Continue bronchodilators, pulmonary toilet  Head of bed elevation to 30-40 degrees  Continue antibiotics -  Vanco based on level + Zosyn. Diflucan added because of yeast in BAL  ====================CARDIOVASCULAR=====================  Continue hemodynamic monitoring/ telemetry  Not on any pressors/ titrate pressors for SBP>100, MAP >60-65  Continue cardiovascular / antihypertensive medications  ===================== RENAL ============================  Continue LR 30CC/hr      D/C IVF  Monitor I/Os, BUN/ Cr  and electrolytes  D/C Moore      Keep Moore for UO monitoring  BPH: Continue Flomax/ Finasteride  ==================== GASTROINTESTINAL===================  On regular/ tube feeds diet, tolerating well  Continue GI prophylaxis with Pepcid / Protonix  Continue Zofran / Reglan for nausea - PRN	  NPO  =======================    ENDOCRIN  =====================  Glycemic monitoring  F/S with coverage  ===================HEMATOLOGIC/ONCOLOGIC =============  Monitor chest tube output. No signs of active bleeding.   Follow CBC, coags  in AM  DVT prophylaxis with SCD, sc Heparin  ========================INFECTIOUS DISEASE===============  No signs of infection. Monitor for fever / leukocytosis.  All surgical incision / chest tube  sites look clean  D/C Moore    Pertinent clinical, laboratory, radiographic, hemodynamic, echocardiographic, respiratory data, microbiologic data and chart were reviewed and analyzed frequently throughout the course of the day and night. GI and DVT prophylaxis, glycemic control, head of bed elevation and skin care issues were addressed.  Patient seen, examined and plan discussed with CT Surgery / CTICU team during rounds.  Pt remains critically ill in imminent risk of  deterioration and requires very careful cardio- pulmonary monitoring and support.    I have spent        minutes of critical care time with this pt between      am/pm   and        am/ pm         minutes spent on total encounter; more than 50% of the visit was spent counseling and/or coordinating care by the attending physician.      KERLINE Faith MD            ====================CARDIOVASCULAR=====================  Continue hemodynamic monitoring/ telemetry   Hypotension: On  /Off Levophed, titrate  to MAP>65, Restarted Midodrine 10mg/TID, Wean off Levo as tolerated  ===================== RENAL ============================  ESRD on HD TIW  Monitor I/Os, BUN/ Cr  and electrolytes  Renal f/up        ==================== GASTROINTESTINAL===================  On J tube feeds Nepro 35cc/hr diet, tolerating well  Continue GI prophylaxis with Protonix   Zofran / Reglan for nausea - PRN  =======================    ENDOCRIN  =====================  Glycemic monitoring  F/S with coverage  ===================HEMATOLOGIC/ONCOLOGIC =============   No signs of active bleeding.   Follow CBC, coags  in AM  DVT prophylaxis with SCD, sc Heparin  ========================INFECTIOUS DISEASE===============  Monitor for fever / leukocytosis.  Bronchoscopy on 1/29 showed copious greenish secretions - Continue pulmonary toilet   Blood cultures - [facility], Continue Vanco by  level  + Zosyn + Diflucan    Pertinent clinical, laboratory, radiographic, hemodynamic, echocardiographic, respiratory data, microbiologic data and chart were reviewed and analyzed frequently throughout the course of the day and night. GI and DVT prophylaxis, glycemic control, head of bed elevation and skin care issues were addressed.  Patient seen, examined and plan discussed with CT Surgery / CTICU team during rounds.  Pt remains critically ill in imminent risk of  deterioration and requires very careful cardio- pulmonary monitoring and support.    I have spent  60   minutes of critical care time with this pt between  12  am  and   8 am         minutes spent on total encounter; more than 50% of the visit was spent counseling and/or coordinating care by the attending physician. CLAUDIA HAN          MRN-[id number]    HPI:  Mr. Han is a 58M who underwent a FB, R thoracotomy, decortication, chest wall reconstruction, lat flap with Dr Pickering on 10/11/18. He was admitted on 1/6/19 to [facility] with a R pleural effusion and respiratory failure,  A R PTC was placed there and he was intubated.  He still had a R SANDY drain in place and he was transferred to [facility]. Patient was found to have aspirated a foreign body and +MRSA empyema. He was treated and discharged on 1/22/2019.     He was found by his home nurse to be in acute distress and recommended to proceed to the ED.  At presentation, he was found to be in acute hypercarbic respiratory failure complicated by hemodynamic instability requiring emergent intubation and pressor support. (28 Jan 2019 15:34)      Procedure:        Trach and PEG  1/31/2019  Bronchoscopy   1/29/2019  Right thoracotomy, resection of portion of R 7th rib, evacuation of infected hemothorax, takedown of pleurocutaneous fistula  10/11/2018    Issues:  Acute hypoxic & hypercapnic respiratory failure  Hypotension  Pneumonia MRSA and Klebsiella /  Fever / Sepsis  ESRD / HD  chronic anemia  Cardiomyopathy  HLD      Drips:  Levo  Precedex              Propofol      Interval/Overnight OR Events/ ROS  Respiratory status required full ventilatory support,  following of ABG’s with A-line monitoring, continuous pulse oximetry monitoring,  Delirium with agitation last night required Quetiapine/Alprazolam/ Propofol. Today neuro status is much better , pt appears back to normal calm mental status- fully interactive. Pt is being dialysed today. Will plan for  CPAP and trach collar trial after HD is done this PM.      PAST MEDICAL & SURGICAL HISTORY:  Smoking hx  Cardiomyopathy  Wound: right chest  ICD (implantable cardioverter-defibrillator) in place  OA (osteoarthritis)  HLD (hyperlipidemia)  BPH (benign prostatic hyperplasia)  Pleural effusion  HTN (hypertension)  ESRD (end stage renal disease)  H/O chest wound: right  S/P thoracotomy: FB, R thoracotomy, decortication, chest wall reconstruction, lat flap  History of wound infection: right chest wall - revision 5/18 and again in 7/18  History of implantable cardioverter-defibrillator (ICD) placement: pt unsure when placed  H/O bilateral hip replacements: 2008, 2009    Allergies  No Known Allergies      Home Medications:  aspirin 81 mg oral tablet: 1 tab(s) orally once a day  (30 Oct 2018 14:39)  Breo Ellipta 100 mcg-25 mcg/inh inhalation powder: 1 puff(s) inhaled once a day patient denies using it (11 Oct 2018 08:38)  Calcium 500: 1 tab(s) orally 2 times a day (11 Oct 2018 08:38)  docusate sodium 100 mg oral tablet: 1 tab(s) orally 2 times a day, As Needed (11 Oct 2018 08:39)  folic acid 1 mg oral tablet: 1 tab(s) orally once a day (11 Oct 2018 08:38)  Mag-Ox 400 oral tablet: 1 tab(s) orally once a day (11 Oct 2018 08:39)  Multiple Vitamins oral tablet: 1 tab(s) orally once a day  (30 Oct 2018 14:39)  Namenda 10 mg oral tablet: 1 tab(s) orally 2 times a day (11 Oct 2018 08:38)  Nephplex Rx oral tablet: 1 tab(s) orally once a day (11 Oct 2018 08:39)  nortriptyline 50 mg oral capsule: 1 cap(s) orally once a day (11 Oct 2018 08:39)  Renvela 800 mg oral tablet: 2 tab(s) orally every 8 hours (11 Oct 2018 08:39)  simethicone 80 mg oral tablet: 1 tab(s) orally 3 times a day (after meals) (11 Oct 2018 08:39)  Tylenol 325 mg oral tablet: 2 tab(s) orally every 6 hours, As Needed (30 Oct 2018 14:39)  vancomycin 1 g intravenous injection: 1 gram(s) intravenous 3 times a week  Please give 3 x per week with Dialysis until 2/3/2019 (22 Jan 2019 13:41)  vitamin A: 1 tab(s) orally once a day (11 Oct 2018 08:38)  Vitamin B Complex: 1 tab(s) orally once a day (11 Oct 2018 08:38)  Vitamin C 500 mg oral tablet: 1 tab(s) orally once a day (11 Oct 2018 08:38)      ***VITAL SIGNS:  Vital Signs Last 24 Hrs  T(C): 36.7 (07 Feb 2019 16:00), Max: 36.9 (07 Feb 2019 00:00)  T(F): 98 (07 Feb 2019 16:00), Max: 98.5 (07 Feb 2019 00:00)  HR: 78 (07 Feb 2019 17:02) (57 - 139)  BP: 116/74 (07 Feb 2019 06:00) (104/65 - 117/69)  BP(mean): 83 (07 Feb 2019 06:00) (74 - 83)  RR: 22 (07 Feb 2019 17:00) (18 - 26)  SpO2: 100% (07 Feb 2019 17:02) (98% - 100%)    I/Os:    06 Feb 2019 07:01  -  07 Feb 2019 07:00  --------------------------------------------------------  IN:    dexmedetomidine Infusion: 363 mL    Enteral Tube Flush: 160 mL    IV PiggyBack: 200 mL    Nepro with Carb Steady: 840 mL    norepinephrine Infusion: 72.1 mL    propofol Infusion: 100.8 mL  Total IN: 1735.9 mL    OUT:    Bulb: 35 mL    Rectal Tube: 75 mL  Total OUT: 110 mL    Total NET: 1625.9 mL      07 Feb 2019 07:01  -  07 Feb 2019 17:33  --------------------------------------------------------  IN:    dexmedetomidine Infusion: 115.5 mL    Enteral Tube Flush: 120 mL    IV PiggyBack: 100 mL    Nepro with Carb Steady: 385 mL    norepinephrine Infusion: 2.3 mL    propofol Infusion: 37.8 mL    sodium chloride 0.9%.: 110 mL  Total IN: 870.6 mL    OUT:  Total OUT: 0 mL    Total NET: 870.6 mL    =======================  MEDICATIONS  ===================  MEDICATIONS  (STANDING):  ALPRAZolam 0.5 milliGRAM(s) Oral every 8 hours  collagenase Ointment 1 Application(s) Topical daily  dexmedetomidine Infusion 0.5 MICROgram(s)/kG/Hr (7.5 mL/Hr) IV Continuous <Continuous>  epoetin kelly Injectable 3000 Unit(s) IV Push <User Schedule>  fluconAZOLE IVPB      fluconAZOLE IVPB 200 milliGRAM(s) IV Intermittent every 24 hours  heparin  Injectable 5000 Unit(s) SubCutaneous every 12 hours  midodrine 10 milliGRAM(s) Oral every 8 hours  norepinephrine Infusion 0.05 MICROgram(s)/kG/Min (5.625 mL/Hr) IV Continuous <Continuous>  pantoprazole  Injectable 40 milliGRAM(s) IV Push daily  piperacillin/tazobactam IVPB. 3.375 Gram(s) IV Intermittent once  piperacillin/tazobactam IVPB. 3.375 Gram(s) IV Intermittent every 12 hours  propofol Infusion 20 MICROgram(s)/kG/Min (7.2 mL/Hr) IV Continuous <Continuous>  QUEtiapine 50 milliGRAM(s) Oral every 12 hours  sodium chloride 0.9%. 1000 milliLiter(s) (10 mL/Hr) IV Continuous <Continuous>    MEDICATIONS  (PRN):  acetaminophen  IVPB .. 1000 milliGRAM(s) IV Intermittent once PRN Temp greater or equal to 38C (100.4F), Moderate Pain (4 - 6)    ======================VENTILATOR SETTINGS  ==============  Mode: AC/ CMV (Assist Control/ Continuous Mandatory Ventilation)  RR (machine): 22  TV (machine): 400  FiO2: 50  PEEP: 5  MAP: 10  PIP: 27    =================== PATIENT CARE ACCESS DEVICES ==========  Peripheral IV (+)  Central Venous Line R/ J(+)  Arterial Line	R/  Ax(+)			  ======================= PHYSICAL EXAM===================  General:          Comfortable, Awake, alert, no distress  Neuro:             Moving all extremities to commands. No focal deficits	  HEENT:                           CHER/  trach  Respiratory:	Lungs clear on auscultation bilaterally with good aeration.                           No rales, rhonchi, no wheezing.   CV:	           Regular rate and rhythm.(+) S1/S2.   Abdomen:	Soft,  nontender, not-distended. Bowel sounds present   Skin:		No rash.  Extremities:	Warm, no cyanosis or edema.  Palpable pulses  ============================ LABS =======================                        8.9    5.82  )-----------( 235      ( 07 Feb 2019 03:50 )             29.1     02-07    135  |  99       |  44<H>  --------------------------------<  111<H>  3.8   |  21<L>  |  4.18<H>    Ca    8.5      07 Feb 2019 03:50  Phos  4.1     02-06  Mg     2.2     02-06    TPro  5.5<L>  /  Alb  2.1<L>  /  TBili  0.3  /  DBili  x   /  AST  41<H>  /  ALT  19  /  AlkPhos  274<H>  02-07    LIVER FUNCTIONS - ( 07 Feb 2019 03:50 )  Alb: 2.1 g/dL / Pro: 5.5 g/dL / ALK PHOS: 274 u/L / ALT: 19 u/L / AST: 41 u/L / GGT: x             ABG - ( 07 Feb 2019 03:50 )  pH, Arterial: 7.43  pH, Blood: x     /  pCO2: 38    /  pO2: 174   / HCO3: 25    / Base Excess: 0.4   /  SaO2: 99.6      C diff neg    BLOOD  02-05-19 --  --  --    ENDOTRACHEAL SPECIMEN  02-01-19 --  --  Staph. aureus *MRSA*  Klebsiella pneumoniae    BLOOD PERIPHERAL  02-01-19 --  --  BLOOD CULTURE PCR  Staphylococcus sp.,coag neg    BLOOD VENOUS  01-29-19 --  --  --    LUNG - UPPER LOBE LEFT  01-29-19 --  --  --    BLOOD PERIPHERAL  01-28-19 --  --  --    BLOOD PERIPHERAL  01-09-19 --  --  --    PLEURAL FLUID  01-09-19 --  --    WBC^White Blood Cells  QNTY CELLS IN GRAM STAIN: FEW (2+)  NOS^No Organisms Seen    BRONCHIAL LAVAGE  01-08-19 --  --  --    BRONCHIAL LAVAGE  01-08-19 --  --  --  ===================== IMAGING STUDIES ===================  Radiology personally reviewed.    < from: Xray Chest 1 View- PORTABLE-Routine (02.07.19 @ 07:00) >  INTERPRETATION:    Tracheostomy tube, right IJ line and subcutaneous AICD are unchanged.   Bilateral pleural effusions right greater than left is seen. Upper lung   fields are clear. Heart size is stable. No pneumothorax.    COMPARISON:  February 6    IMPRESSION:  Follow-up with no interval change again demonstrating   bilateral small effusions.    < end of copied text >  =====================ASSESSMENT AND PLAN ================  Mr. Han is a 58M who underwent a FB, R thoracotomy, decortication, chest wall reconstruction, lat flap with Dr Pickering on 10/11/18. He was admitted on 1/6/19 to [facility] with a R pleural effusion and respiratory failure,  A R PTC was placed there and he was intubated.  He still had a R SANDY drain in place and he was transferred to [facility]. Patient was found to have aspirated a foreign body and +MRSA empyema. He was treated and discharged on 1/22/2019.     He was found by his home nurse to be in acute distress and recommended to proceed to the ED.  At presentation, he was found to be in acute hypercarbic respiratory failure complicated by hemodynamic instability requiring emergent intubation and pressor support. (28 Jan 2019 15:34)    Procedure:        Trach and PEG  1/31/2019  Bronchoscopy   1/29/2019                          Right thoracotomy, resection of portion of R 7th rib, evacuation of infected hemothorax, takedown of pleurocutaneous fistula  10/11/2018    Issues:  Acute hypoxic & hypercapnic respiratory failure  Hypotension  Pneumonia MRSA and Klebsiella /  Fever / Sepsis  ESRD / HD  chronic anemia  Cardiomyopathy              HLD  ====================== NEUROLOGY=======================  Pain control with Fentanyl  Pt is on Precedex for agitation  ==================== RESPIRATORY========================  Pt is on  full mech vent support  Comfortable, no evidence of distress.  Monitor R  chest pigtail tube output ( to bulb suction)  	  Mechanical Ventilation:  Mode: AC/ CMV (Assist Control/ Continuous Mandatory Ventilation)  RR (machine): 22  TV (machine): 400  FiO2: 50  PEEP: 5  MAP: 10  PIP: 26  CPAP trials  as tolerated/ possible trach collar if ok on CPAP  Mechanical ventilator status assessed & settings reviewed  Continuous pulse oximetry monitoring  Continue bronchodilators, pulmonary toilet  Head of bed elevation to 30-40 degrees  Continue antibiotics -  Vanco based on level + Zosyn. Diflucan added because of yeast in BAL ( likely d/c after 7 days as per ID)    ====================CARDIOVASCULAR=====================  Continue hemodynamic monitoring/ telemetry   Hypotension: On  /Off Levophed, titrate  to MAP>65, Restarted Midodrine 10mg/TID, Wean off Levo as tolerated  ===================== RENAL ============================  ESRD on HD TIW  Monitor I/Os, BUN/ Cr  and electrolytes  Renal f/up    ==================== GASTROINTESTINAL===================  On J tube feeds Nepro 35cc/hr diet, tolerating well  Continue GI prophylaxis with Protonix   Zofran / Reglan for nausea - PRN  =======================    ENDOCRIN  =====================  Glycemic monitoring  F/S with coverage    ===================HEMATOLOGIC/ONCOLOGIC =============   No signs of active bleeding.   Follow CBC, coags  in AM  DVT prophylaxis with SCD, sc Heparin  ========================INFECTIOUS DISEASE===============  Monitor for fever / leukocytosis.  Bronchoscopy on 1/29 showed copious greenish secretions - Continue pulmonary toilet   Blood cultures - [facility], Continue Vanco by  level  + Zosyn + Diflucan  ID on board - dr Hernadez    Pertinent clinical, laboratory, radiographic, hemodynamic, echocardiographic, respiratory data, microbiologic data and chart were reviewed and analyzed frequently throughout the course of the day and night. GI and DVT prophylaxis, glycemic control, head of bed elevation and skin care issues were addressed.  Patient seen, examined and plan discussed with CT Surgery / CTICU team during rounds.  Pt remains critically ill in imminent risk of  deterioration and requires very careful cardio- pulmonary monitoring and support.    I have spent   80    minutes of critical care time with this pt between 7 am  and  11:55 pm         minutes spent on total encounter; more than 50% of the visit was spent counseling and/or coordinating care by the attending physician.    KERLINE Faith MD CLAUDIA HAN          MRN-[id number]    HPI:  Mr. Han is a 58M who underwent a FB, R thoracotomy, decortication, chest wall reconstruction, lat flap with Dr Pickering on 10/11/18. He was admitted on 1/6/19 to [facility] with a R pleural effusion and respiratory failure,  A R PTC was placed there and he was intubated.  He still had a R SANDY drain in place and he was transferred to [facility]. Patient was found to have aspirated a foreign body and +MRSA empyema. He was treated and discharged on 1/22/2019.     He was found by his home nurse to be in acute distress and recommended to proceed to the ED.  At presentation, he was found to be in acute hypercarbic respiratory failure complicated by hemodynamic instability requiring emergent intubation and pressor support. (28 Jan 2019 15:34)      Procedure:        Trach and PEG  1/31/2019  Bronchoscopy   1/29/2019  Right thoracotomy, resection of portion of R 7th rib, evacuation of infected hemothorax, takedown of pleurocutaneous fistula  10/11/2018    Issues:  Acute hypoxic & hypercapnic respiratory failure  Hypotension   MRSA and Klebsiella PNA /  Fever / Sepsis  ESRD / HD  chronic anemia  Cardiomyopathy  HLD      Drips:  Levo  Precedex              Propofol      Interval/Overnight OR Events/ ROS  Respiratory status required full ventilatory support,  following of ABG’s with A-line monitoring, continuous pulse oximetry monitoring,  Delirium with agitation last night required Quetiapine/Alprazolam/ Propofol. Today neuro status is much better , pt appears back to normal calm mental status- fully interactive. Pt is being dialysed today. Will plan for  CPAP and trach collar trial after HD is done this PM.      PAST MEDICAL & SURGICAL HISTORY:  Smoking hx  Cardiomyopathy  Wound: right chest  ICD (implantable cardioverter-defibrillator) in place  OA (osteoarthritis)  HLD (hyperlipidemia)  BPH (benign prostatic hyperplasia)  Pleural effusion  HTN (hypertension)  ESRD (end stage renal disease)  H/O chest wound: right  S/P thoracotomy: FB, R thoracotomy, decortication, chest wall reconstruction, lat flap  History of wound infection: right chest wall - revision 5/18 and again in 7/18  History of implantable cardioverter-defibrillator (ICD) placement: pt unsure when placed  H/O bilateral hip replacements: 2008, 2009    Allergies  No Known Allergies      Home Medications:  aspirin 81 mg oral tablet: 1 tab(s) orally once a day  (30 Oct 2018 14:39)  Breo Ellipta 100 mcg-25 mcg/inh inhalation powder: 1 puff(s) inhaled once a day patient denies using it (11 Oct 2018 08:38)  Calcium 500: 1 tab(s) orally 2 times a day (11 Oct 2018 08:38)  docusate sodium 100 mg oral tablet: 1 tab(s) orally 2 times a day, As Needed (11 Oct 2018 08:39)  folic acid 1 mg oral tablet: 1 tab(s) orally once a day (11 Oct 2018 08:38)  Mag-Ox 400 oral tablet: 1 tab(s) orally once a day (11 Oct 2018 08:39)  Multiple Vitamins oral tablet: 1 tab(s) orally once a day  (30 Oct 2018 14:39)  Namenda 10 mg oral tablet: 1 tab(s) orally 2 times a day (11 Oct 2018 08:38)  Nephplex Rx oral tablet: 1 tab(s) orally once a day (11 Oct 2018 08:39)  nortriptyline 50 mg oral capsule: 1 cap(s) orally once a day (11 Oct 2018 08:39)  Renvela 800 mg oral tablet: 2 tab(s) orally every 8 hours (11 Oct 2018 08:39)  simethicone 80 mg oral tablet: 1 tab(s) orally 3 times a day (after meals) (11 Oct 2018 08:39)  Tylenol 325 mg oral tablet: 2 tab(s) orally every 6 hours, As Needed (30 Oct 2018 14:39)  vancomycin 1 g intravenous injection: 1 gram(s) intravenous 3 times a week  Please give 3 x per week with Dialysis until 2/3/2019 (22 Jan 2019 13:41)  vitamin A: 1 tab(s) orally once a day (11 Oct 2018 08:38)  Vitamin B Complex: 1 tab(s) orally once a day (11 Oct 2018 08:38)  Vitamin C 500 mg oral tablet: 1 tab(s) orally once a day (11 Oct 2018 08:38)      ***VITAL SIGNS:  Vital Signs Last 24 Hrs  T(C): 36.7 (07 Feb 2019 16:00), Max: 36.9 (07 Feb 2019 00:00)  T(F): 98 (07 Feb 2019 16:00), Max: 98.5 (07 Feb 2019 00:00)  HR: 78 (07 Feb 2019 17:02) (57 - 139)  BP: 116/74 (07 Feb 2019 06:00) (104/65 - 117/69)  BP(mean): 83 (07 Feb 2019 06:00) (74 - 83)  RR: 22 (07 Feb 2019 17:00) (18 - 26)  SpO2: 100% (07 Feb 2019 17:02) (98% - 100%)    I/Os:  06 Feb 2019 07:01  -  07 Feb 2019 07:00  --------------------------------------------------------  IN:    dexmedetomidine Infusion: 363 mL    Enteral Tube Flush: 160 mL    IV PiggyBack: 200 mL    Nepro with Carb Steady: 840 mL    norepinephrine Infusion: 72.1 mL    propofol Infusion: 100.8 mL  Total IN: 1735.9 mL    OUT:    Bulb: 35 mL    Rectal Tube: 75 mL  Total OUT: 110 mL    Total NET: 1625.9 mL      07 Feb 2019 07:01  -  07 Feb 2019 17:33  --------------------------------------------------------  IN:    dexmedetomidine Infusion: 115.5 mL    Enteral Tube Flush: 120 mL    IV PiggyBack: 100 mL    Nepro with Carb Steady: 385 mL    norepinephrine Infusion: 2.3 mL    propofol Infusion: 37.8 mL    sodium chloride 0.9%.: 110 mL  Total IN: 870.6 mL    OUT:  Total OUT: 0 mL    Total NET: 870.6 mL    =======================  MEDICATIONS  ===================  MEDICATIONS  (STANDING):  ALPRAZolam 0.5 milliGRAM(s) Oral every 8 hours  collagenase Ointment 1 Application(s) Topical daily  dexmedetomidine Infusion 0.5 MICROgram(s)/kG/Hr (7.5 mL/Hr) IV Continuous <Continuous>  epoetin kelly Injectable 3000 Unit(s) IV Push <User Schedule>  fluconAZOLE IVPB      fluconAZOLE IVPB 200 milliGRAM(s) IV Intermittent every 24 hours  heparin  Injectable 5000 Unit(s) SubCutaneous every 12 hours  midodrine 10 milliGRAM(s) Oral every 8 hours  norepinephrine Infusion 0.05 MICROgram(s)/kG/Min (5.625 mL/Hr) IV Continuous <Continuous>  pantoprazole  Injectable 40 milliGRAM(s) IV Push daily  piperacillin/tazobactam IVPB. 3.375 Gram(s) IV Intermittent once  piperacillin/tazobactam IVPB. 3.375 Gram(s) IV Intermittent every 12 hours  propofol Infusion 20 MICROgram(s)/kG/Min (7.2 mL/Hr) IV Continuous <Continuous>  QUEtiapine 50 milliGRAM(s) Oral every 12 hours  sodium chloride 0.9%. 1000 milliLiter(s) (10 mL/Hr) IV Continuous <Continuous>    MEDICATIONS  (PRN):  acetaminophen  IVPB .. 1000 milliGRAM(s) IV Intermittent once PRN Temp greater or equal to 38C (100.4F), Moderate Pain (4 - 6)    ======================VENTILATOR SETTINGS  ==============  Mode: AC/ CMV (Assist Control/ Continuous Mandatory Ventilation)  RR (machine): 22  TV (machine): 400  FiO2: 50  PEEP: 5  MAP: 10  PIP: 27    =================== PATIENT CARE ACCESS DEVICES ==========  Peripheral IV (+)  Central Venous Line R/ J(+)  Arterial Line	R/  Ax(+)			  ======================= PHYSICAL EXAM===================  General:          Comfortable, Awake, alert, no distress  Neuro:             Moving all extremities to commands. No focal deficits	  HEENT:                           CHER/  trach  Respiratory:	Lungs clear on auscultation bilaterally with good aeration.                           No rales, rhonchi, no wheezing.   CV:	           Regular rate and rhythm.(+) S1/S2.   Abdomen:	Soft,  nontender, not-distended. Bowel sounds present   Skin:		No rash.  Extremities:	Warm, no cyanosis or edema.  Palpable pulses  ============================ LABS =======================                        8.9    5.82  )-----------( 235      ( 07 Feb 2019 03:50 )             29.1     02-07    135  |  99       |  44<H>  --------------------------------<  111<H>  3.8   |  21<L>  |  4.18<H>    Ca    8.5      07 Feb 2019 03:50  Phos  4.1     02-06  Mg     2.2     02-06    TPro  5.5<L>  /  Alb  2.1<L>  /  TBili  0.3  /  DBili  x   /  AST  41<H>  /  ALT  19  /  AlkPhos  274<H>  02-07    LIVER FUNCTIONS - ( 07 Feb 2019 03:50 )  Alb: 2.1 g/dL / Pro: 5.5 g/dL / ALK PHOS: 274 u/L / ALT: 19 u/L / AST: 41 u/L / GGT: x             ABG - ( 07 Feb 2019 03:50 )  pH, Arterial: 7.43  pH, Blood: x     /  pCO2: 38    /  pO2: 174   / HCO3: 25    / Base Excess: 0.4   /  SaO2: 99.6      C diff neg    BLOOD  02-05-19 --  --  --    ENDOTRACHEAL SPECIMEN  02-01-19 --  --  Staph. aureus *MRSA*  Klebsiella pneumoniae    BLOOD PERIPHERAL  02-01-19 --  --  BLOOD CULTURE PCR  Staphylococcus sp.,coag neg    BLOOD VENOUS  01-29-19 --  --  --    LUNG - UPPER LOBE LEFT  01-29-19 --  --  --    BLOOD PERIPHERAL  01-28-19 --  --  --    BLOOD PERIPHERAL  01-09-19 --  --  --    PLEURAL FLUID  01-09-19 --  --    WBC^White Blood Cells  QNTY CELLS IN GRAM STAIN: FEW (2+)  NOS^No Organisms Seen    BRONCHIAL LAVAGE  01-08-19 --  --  --    BRONCHIAL LAVAGE  01-08-19 --  --  --  ===================== IMAGING STUDIES ===================  Radiology personally reviewed.    < from: Xray Chest 1 View- PORTABLE-Routine (02.07.19 @ 07:00) >  INTERPRETATION:    Tracheostomy tube, right IJ line and subcutaneous AICD are unchanged.   Bilateral pleural effusions right greater than left is seen. Upper lung   fields are clear. Heart size is stable. No pneumothorax.    COMPARISON:  February 6    IMPRESSION:  Follow-up with no interval change again demonstrating   bilateral small effusions.    < end of copied text >  =====================ASSESSMENT AND PLAN ================  Mr. Han is a 58M who underwent a FB, R thoracotomy, decortication, chest wall reconstruction, lat flap with Dr Pickering on 10/11/18. He was admitted on 1/6/19 to [facility] with a R pleural effusion and respiratory failure,  A R PTC was placed there and he was intubated.  He still had a R SANDY drain in place and he was transferred to [facility]. Patient was found to have aspirated a foreign body and +MRSA empyema. He was treated and discharged on 1/22/2019.     He was found by his home nurse to be in acute distress and recommended to proceed to the ED.  At presentation, he was found to be in acute hypercarbic respiratory failure complicated by hemodynamic instability requiring emergent intubation and pressor support. (28 Jan 2019 15:34)    Procedure:        Trach and PEG  1/31/2019  Bronchoscopy   1/29/2019                          Right thoracotomy, resection of portion of R 7th rib, evacuation of infected hemothorax, takedown of pleurocutaneous fistula  10/11/2018    Issues:  Acute hypoxic & hypercapnic respiratory failure  Hypotension  MRSA and Klebsiella PNA /  Fever / Sepsis  ESRD / HD  chronic anemia  Cardiomyopathy              HLD  ====================== NEUROLOGY=======================  Pain control with Fentanyl  Pt is on Precedex for agitation  ==================== RESPIRATORY========================  Pt is on  full mech vent support  Comfortable, no evidence of distress.  Monitor R  chest pigtail tube output ( to bulb suction)  	  Mechanical Ventilation:  Mode: AC/ CMV (Assist Control/ Continuous Mandatory Ventilation)  RR (machine): 22  TV (machine): 400  FiO2: 50  PEEP: 5  MAP: 10  PIP: 26  CPAP trials  as tolerated/ possible trach collar if ok on CPAP  Mechanical ventilator status assessed & settings reviewed  Continuous pulse oximetry monitoring  Continue bronchodilators, pulmonary toilet  Head of bed elevation to 30-40 degrees  Continue antibiotics -  Vanco based on level + Zosyn. Diflucan added because of yeast in BAL ( likely d/c after 7 days as per ID)    ====================CARDIOVASCULAR=====================  Continue hemodynamic monitoring/ telemetry   Hypotension: On  /Off Levophed, titrate  to MAP>65, Restarted Midodrine 10mg/TID, Wean off Levo as tolerated  ===================== RENAL ============================  ESRD on HD TIW  Monitor I/Os, BUN/ Cr  and electrolytes  Renal f/up    ==================== GASTROINTESTINAL===================  On J tube feeds Nepro 35cc/hr diet, tolerating well  Continue GI prophylaxis with Protonix   Zofran / Reglan for nausea - PRN  =======================    ENDOCRIN  =====================  Glycemic monitoring  F/S with coverage    ===================HEMATOLOGIC/ONCOLOGIC =============   No signs of active bleeding.   Follow CBC, coags  in AM  DVT prophylaxis with SCD, sc Heparin  ========================INFECTIOUS DISEASE===============  Monitor for fever / leukocytosis.  Bronchoscopy on 1/29 showed copious greenish secretions - Continue pulmonary toilet   Blood cultures - [facility], Continue Vanco by  level  + Zosyn + Diflucan  ID on board - dr Hernadez    Pertinent clinical, laboratory, radiographic, hemodynamic, echocardiographic, respiratory data, microbiologic data and chart were reviewed and analyzed frequently throughout the course of the day and night. GI and DVT prophylaxis, glycemic control, head of bed elevation and skin care issues were addressed.  Patient seen, examined and plan discussed with CT Surgery / CTICU team during rounds.  Pt remains critically ill in imminent risk of  deterioration and requires very careful cardio- pulmonary monitoring and support.    I have spent   80    minutes of critical care time with this pt between 7 am  and  11:55 pm         minutes spent on total encounter; more than 50% of the visit was spent counseling and/or coordinating care by the attending physician.    KERLINE Faith MD CLAUDIA HAN          MRN-[id number]    HPI:  Mr. Han is a 58M who underwent a FB, R thoracotomy, decortication, chest wall reconstruction, lat flap with Dr Pickering on 10/11/18. He was admitted on 1/6/19 to [facility] with a R pleural effusion and respiratory failure,  A R PTC was placed there and he was intubated.  He still had a R SANDY drain in place and he was transferred to [facility]. Patient was found to have aspirated a foreign body and +MRSA empyema. He was treated and discharged on 1/22/2019.     He was found by his home nurse to be in acute distress and recommended to proceed to the ED.  At presentation, he was found to be in acute hypercarbic respiratory failure complicated by hemodynamic instability requiring emergent intubation and pressor support. (28 Jan 2019 15:34)      Procedure:        Trach and PEG  1/31/2019  Bronchoscopy   1/29/2019  Right thoracotomy, resection of portion of R 7th rib, evacuation of infected hemothorax, takedown of pleurocutaneous fistula  10/11/2018    Issues:  Acute hypoxic & hypercapnic respiratory failure  Hypotension   MRSA and Klebsiella PNA /  Fever / Sepsis  ESRD / HD  chronic anemia  Cardiomyopathy  HLD      Drips:  Levo  Precedex              Propofol      Interval/Overnight OR Events/ ROS  Respiratory status required full ventilatory support,  following of ABG’s with A-line monitoring, continuous pulse oximetry monitoring,  Delirium with agitation last night required Quetiapine/Alprazolam/ Propofol. Today neuro status is much better , pt appears back to normal calm mental status- fully interactive. Pt is being dialysed today. Will plan for  CPAP and trach collar trial after HD is done this PM.      PAST MEDICAL & SURGICAL HISTORY:  Smoking hx  Cardiomyopathy  Wound: right chest  ICD (implantable cardioverter-defibrillator) in place  OA (osteoarthritis)  HLD (hyperlipidemia)  BPH (benign prostatic hyperplasia)  Pleural effusion  HTN (hypertension)  ESRD (end stage renal disease)  H/O chest wound: right  S/P thoracotomy: FB, R thoracotomy, decortication, chest wall reconstruction, lat flap  History of wound infection: right chest wall - revision 5/18 and again in 7/18  History of implantable cardioverter-defibrillator (ICD) placement: pt unsure when placed  H/O bilateral hip replacements: 2008, 2009    Allergies  No Known Allergies      Home Medications:  aspirin 81 mg oral tablet: 1 tab(s) orally once a day  (30 Oct 2018 14:39)  Breo Ellipta 100 mcg-25 mcg/inh inhalation powder: 1 puff(s) inhaled once a day patient denies using it (11 Oct 2018 08:38)  Calcium 500: 1 tab(s) orally 2 times a day (11 Oct 2018 08:38)  docusate sodium 100 mg oral tablet: 1 tab(s) orally 2 times a day, As Needed (11 Oct 2018 08:39)  folic acid 1 mg oral tablet: 1 tab(s) orally once a day (11 Oct 2018 08:38)  Mag-Ox 400 oral tablet: 1 tab(s) orally once a day (11 Oct 2018 08:39)  Multiple Vitamins oral tablet: 1 tab(s) orally once a day  (30 Oct 2018 14:39)  Namenda 10 mg oral tablet: 1 tab(s) orally 2 times a day (11 Oct 2018 08:38)  Nephplex Rx oral tablet: 1 tab(s) orally once a day (11 Oct 2018 08:39)  nortriptyline 50 mg oral capsule: 1 cap(s) orally once a day (11 Oct 2018 08:39)  Renvela 800 mg oral tablet: 2 tab(s) orally every 8 hours (11 Oct 2018 08:39)  simethicone 80 mg oral tablet: 1 tab(s) orally 3 times a day (after meals) (11 Oct 2018 08:39)  Tylenol 325 mg oral tablet: 2 tab(s) orally every 6 hours, As Needed (30 Oct 2018 14:39)  vancomycin 1 g intravenous injection: 1 gram(s) intravenous 3 times a week  Please give 3 x per week with Dialysis until 2/3/2019 (22 Jan 2019 13:41)  vitamin A: 1 tab(s) orally once a day (11 Oct 2018 08:38)  Vitamin B Complex: 1 tab(s) orally once a day (11 Oct 2018 08:38)  Vitamin C 500 mg oral tablet: 1 tab(s) orally once a day (11 Oct 2018 08:38)      ***VITAL SIGNS:  Vital Signs Last 24 Hrs  T(C): 36.7 (07 Feb 2019 16:00), Max: 36.9 (07 Feb 2019 00:00)  T(F): 98 (07 Feb 2019 16:00), Max: 98.5 (07 Feb 2019 00:00)  HR: 78 (07 Feb 2019 17:02) (57 - 139)  BP: 116/74 (07 Feb 2019 06:00) (104/65 - 117/69)  BP(mean): 83 (07 Feb 2019 06:00) (74 - 83)  RR: 22 (07 Feb 2019 17:00) (18 - 26)  SpO2: 100% (07 Feb 2019 17:02) (98% - 100%)    I/Os:  06 Feb 2019 07:01  -  07 Feb 2019 07:00  --------------------------------------------------------  IN:    dexmedetomidine Infusion: 363 mL    Enteral Tube Flush: 160 mL    IV PiggyBack: 200 mL    Nepro with Carb Steady: 840 mL    norepinephrine Infusion: 72.1 mL    propofol Infusion: 100.8 mL  Total IN: 1735.9 mL    OUT:    Bulb: 35 mL    Rectal Tube: 75 mL  Total OUT: 110 mL    Total NET: 1625.9 mL      07 Feb 2019 07:01  -  07 Feb 2019 17:33  --------------------------------------------------------  IN:    dexmedetomidine Infusion: 115.5 mL    Enteral Tube Flush: 120 mL    IV PiggyBack: 100 mL    Nepro with Carb Steady: 385 mL    norepinephrine Infusion: 2.3 mL    propofol Infusion: 37.8 mL    sodium chloride 0.9%.: 110 mL  Total IN: 870.6 mL    OUT:  Total OUT: 0 mL    Total NET: 870.6 mL    =======================  MEDICATIONS  ===================  MEDICATIONS  (STANDING):  ALPRAZolam 0.5 milliGRAM(s) Oral every 8 hours  collagenase Ointment 1 Application(s) Topical daily  dexmedetomidine Infusion 0.5 MICROgram(s)/kG/Hr (7.5 mL/Hr) IV Continuous <Continuous>  epoetin kelly Injectable 3000 Unit(s) IV Push <User Schedule>  fluconAZOLE IVPB      fluconAZOLE IVPB 200 milliGRAM(s) IV Intermittent every 24 hours  heparin  Injectable 5000 Unit(s) SubCutaneous every 12 hours  midodrine 10 milliGRAM(s) Oral every 8 hours  norepinephrine Infusion 0.05 MICROgram(s)/kG/Min (5.625 mL/Hr) IV Continuous <Continuous>  pantoprazole  Injectable 40 milliGRAM(s) IV Push daily  piperacillin/tazobactam IVPB. 3.375 Gram(s) IV Intermittent once  piperacillin/tazobactam IVPB. 3.375 Gram(s) IV Intermittent every 12 hours  propofol Infusion 20 MICROgram(s)/kG/Min (7.2 mL/Hr) IV Continuous <Continuous>  QUEtiapine 50 milliGRAM(s) Oral every 12 hours  sodium chloride 0.9%. 1000 milliLiter(s) (10 mL/Hr) IV Continuous <Continuous>    MEDICATIONS  (PRN):  acetaminophen  IVPB .. 1000 milliGRAM(s) IV Intermittent once PRN Temp greater or equal to 38C (100.4F), Moderate Pain (4 - 6)    ======================VENTILATOR SETTINGS  ==============  Mode: AC/ CMV (Assist Control/ Continuous Mandatory Ventilation)  RR (machine): 22  TV (machine): 400  FiO2: 50  PEEP: 5  MAP: 10  PIP: 27    =================== PATIENT CARE ACCESS DEVICES ==========  Peripheral IV (+)  Central Venous Line R/ J(+)  Arterial Line	R/  Ax(+)			  ======================= PHYSICAL EXAM===================  General:          Comfortable, Awake, alert, no distress  Neuro:             Moving all extremities to commands. No focal deficits	  HEENT:                           CHER/  trach  Respiratory:	Lungs clear on auscultation bilaterally with good aeration.                           No rales, rhonchi, no wheezing.   CV:	           Regular rate and rhythm.(+) S1/S2.   Abdomen:	Soft,  nontender, not-distended. Bowel sounds present   Skin:		No rash.  Extremities:	Warm, no cyanosis or edema.  Palpable pulses  ============================ LABS =======================                        8.9    5.82  )-----------( 235      ( 07 Feb 2019 03:50 )             29.1     02-07    135  |  99       |  44<H>  --------------------------------<  111<H>  3.8   |  21<L>  |  4.18<H>    Ca    8.5      07 Feb 2019 03:50  Phos  4.1     02-06  Mg     2.2     02-06    TPro  5.5<L>  /  Alb  2.1<L>  /  TBili  0.3  /  DBili  x   /  AST  41<H>  /  ALT  19  /  AlkPhos  274<H>  02-07    LIVER FUNCTIONS - ( 07 Feb 2019 03:50 )  Alb: 2.1 g/dL / Pro: 5.5 g/dL / ALK PHOS: 274 u/L / ALT: 19 u/L / AST: 41 u/L / GGT: x             ABG - ( 07 Feb 2019 03:50 )  pH, Arterial: 7.43  pH, Blood: x     /  pCO2: 38    /  pO2: 174   / HCO3: 25    / Base Excess: 0.4   /  SaO2: 99.6      C diff neg    BLOOD  02-05-19 --  --  --    ENDOTRACHEAL SPECIMEN  02-01-19 --  --  Staph. aureus *MRSA*  Klebsiella pneumoniae    BLOOD PERIPHERAL  02-01-19 --  --  BLOOD CULTURE PCR  Staphylococcus sp.,coag neg    BLOOD VENOUS  01-29-19 --  --  --    LUNG - UPPER LOBE LEFT  01-29-19 --  --  --    BLOOD PERIPHERAL  01-28-19 --  --  --    BLOOD PERIPHERAL  01-09-19 --  --  --    PLEURAL FLUID  01-09-19 --  --    WBC^White Blood Cells  QNTY CELLS IN GRAM STAIN: FEW (2+)  NOS^No Organisms Seen    BRONCHIAL LAVAGE  01-08-19 --  --  --    BRONCHIAL LAVAGE  01-08-19 --  --  --  ===================== IMAGING STUDIES ===================  Radiology personally reviewed.    < from: Xray Chest 1 View- PORTABLE-Routine (02.07.19 @ 07:00) >  INTERPRETATION:    Tracheostomy tube, right IJ line and subcutaneous AICD are unchanged.   Bilateral pleural effusions right greater than left is seen. Upper lung   fields are clear. Heart size is stable. No pneumothorax.    COMPARISON:  February 6    IMPRESSION:  Follow-up with no interval change again demonstrating   bilateral small effusions.    < end of copied text >  =====================ASSESSMENT AND PLAN ================  Mr. Han is a 58M who underwent a FB, R thoracotomy, decortication, chest wall reconstruction, lat flap with Dr Pickering on 10/11/18. He was admitted on 1/6/19 to [facility] with a R pleural effusion and respiratory failure,  A R PTC was placed there and he was intubated.  He still had a R SANDY drain in place and he was transferred to [facility]. Patient was found to have aspirated a foreign body and +MRSA empyema. He was treated and discharged on 1/22/2019.     He was found by his home nurse to be in acute distress and recommended to proceed to the ED.  At presentation, he was found to be in acute hypercarbic respiratory failure complicated by hemodynamic instability requiring emergent intubation and pressor support. (28 Jan 2019 15:34)    Procedure:        Trach and PEG  1/31/2019  Bronchoscopy   1/29/2019                          Right thoracotomy, resection of portion of R 7th rib, evacuation of infected hemothorax, takedown of pleurocutaneous fistula  10/11/2018    Issues:  Acute hypoxic & hypercapnic respiratory failure  Hypotension  MRSA and Klebsiella PNA /  Fever / Sepsis  ESRD / HD  chronic anemia  Cardiomyopathy              HLD  ====================== NEUROLOGY=======================  Pain control with Fentanyl  Pt is on Precedex for agitation  ==================== RESPIRATORY========================  Pt is on  full mech vent support  Comfortable, no evidence of distress.  Monitor R  chest pigtail tube output ( to bulb suction)  	  Mechanical Ventilation:  Mode: AC/ CMV (Assist Control/ Continuous Mandatory Ventilation)  RR (machine): 22  TV (machine): 400  FiO2: 50  PEEP: 5  MAP: 10  PIP: 26  CPAP trials  as tolerated/ possible trach collar if ok on CPAP  Mechanical ventilator status assessed & settings reviewed  Continuous pulse oximetry monitoring  Continue bronchodilators, pulmonary toilet  Head of bed elevation to 30-40 degrees  Continue antibiotics -  Vanco based on level + Zosyn. Diflucan added because of yeast in BAL ( likely d/c after 7 days as per ID)    ====================CARDIOVASCULAR=====================  Continue hemodynamic monitoring/ telemetry   Hypotension: On  /Off Levophed, titrate  to MAP>65, Restarted Midodrine 10mg/TID, Wean off Levo as tolerated  ===================== RENAL ============================  ESRD on HD TIW  Monitor I/Os, BUN/ Cr  and electrolytes  Renal f/up    ==================== GASTROINTESTINAL===================  On J tube feeds Nepro 35cc/hr diet, tolerating well  Continue GI prophylaxis with Protonix   Zofran / Reglan for nausea - PRN  =======================    ENDOCRIN  =====================  Glycemic monitoring  F/S with coverage    ===================HEMATOLOGIC/ONCOLOGIC =============   No signs of active bleeding.   Follow CBC, coags  in AM  DVT prophylaxis with SCD, sc Heparin  ========================INFECTIOUS DISEASE===============  Monitor for fever / leukocytosis.  Bronchoscopy on 1/29 showed copious greenish secretions - Continue pulmonary toilet   Blood cultures - [facility], Continue Vanco by  level  + Zosyn + Diflucan  ID on board - dr Hernadez    Pertinent clinical, laboratory, radiographic, hemodynamic, echocardiographic, respiratory data, microbiologic data and chart were reviewed and analyzed frequently throughout the course of the day and night. GI and DVT prophylaxis, glycemic control, head of bed elevation and skin care issues were addressed.  Patient seen, examined and plan discussed with CT Surgery / CTICU team during rounds.  Pt remains critically ill in imminent risk of  deterioration and requires very careful cardio- pulmonary monitoring and support.    I have spent   60    minutes of critical care time with this pt between 7 am  and  11:55 pm         minutes spent on total encounter; more than 50% of the visit was spent counseling and/or coordinating care by the attending physician.    KERLINE Faith MD

## 2019-02-07 NOTE — PROGRESS NOTE ADULT - PROBLEM SELECTOR PLAN 1
Pt. with ESRD on HD TIW (TTS). Last HD was on 2/5/19 via AVF, tolerated treatment well. Labs reviewed. Plan for HD today. Monitor BP while on HD    Pt. okay from renal standpoint to receive PICC line on RUE (non-AVF arm).

## 2019-02-08 LAB
ALBUMIN SERPL ELPH-MCNC: 2.3 G/DL — LOW (ref 3.3–5)
ALP SERPL-CCNC: 326 U/L — HIGH (ref 40–120)
ALT FLD-CCNC: 25 U/L — SIGNIFICANT CHANGE UP (ref 4–41)
ANION GAP SERPL CALC-SCNC: 14 MMO/L — SIGNIFICANT CHANGE UP (ref 7–14)
APTT BLD: 31.3 SEC — SIGNIFICANT CHANGE UP (ref 27.5–36.3)
AST SERPL-CCNC: 65 U/L — HIGH (ref 4–40)
BASE EXCESS BLDA CALC-SCNC: 3.9 MMOL/L — SIGNIFICANT CHANGE UP
BILIRUB SERPL-MCNC: 0.3 MG/DL — SIGNIFICANT CHANGE UP (ref 0.2–1.2)
BUN SERPL-MCNC: 31 MG/DL — HIGH (ref 7–23)
CA-I BLDA-SCNC: 1.23 MMOL/L — SIGNIFICANT CHANGE UP (ref 1.15–1.29)
CALCIUM SERPL-MCNC: 8.9 MG/DL — SIGNIFICANT CHANGE UP (ref 8.4–10.5)
CHLORIDE SERPL-SCNC: 97 MMOL/L — LOW (ref 98–107)
CO2 SERPL-SCNC: 25 MMOL/L — SIGNIFICANT CHANGE UP (ref 22–31)
CREAT SERPL-MCNC: 3.26 MG/DL — HIGH (ref 0.5–1.3)
GLUCOSE BLDA-MCNC: 119 MG/DL — HIGH (ref 70–99)
GLUCOSE SERPL-MCNC: 116 MG/DL — HIGH (ref 70–99)
HBV CORE IGM SER-ACNC: NONREACTIVE — SIGNIFICANT CHANGE UP
HCO3 BLDA-SCNC: 28 MMOL/L — HIGH (ref 22–26)
HCT VFR BLD CALC: 31.5 % — LOW (ref 39–50)
HCT VFR BLDA CALC: 29.5 % — LOW (ref 39–51)
HGB BLD-MCNC: 9.5 G/DL — LOW (ref 13–17)
HGB BLDA-MCNC: 9.5 G/DL — LOW (ref 13–17)
INR BLD: 1.05 — SIGNIFICANT CHANGE UP (ref 0.88–1.17)
LACTATE BLDA-SCNC: 1.1 MMOL/L — SIGNIFICANT CHANGE UP (ref 0.5–2)
MAGNESIUM SERPL-MCNC: 2.2 MG/DL — SIGNIFICANT CHANGE UP (ref 1.6–2.6)
MCHC RBC-ENTMCNC: 30.2 % — LOW (ref 32–36)
MCHC RBC-ENTMCNC: 30.3 PG — SIGNIFICANT CHANGE UP (ref 27–34)
MCV RBC AUTO: 100.3 FL — HIGH (ref 80–100)
NRBC # FLD: 0 K/UL — LOW (ref 25–125)
PCO2 BLDA: 46 MMHG — SIGNIFICANT CHANGE UP (ref 35–48)
PH BLDA: 7.41 PH — SIGNIFICANT CHANGE UP (ref 7.35–7.45)
PHOSPHATE SERPL-MCNC: 4 MG/DL — SIGNIFICANT CHANGE UP (ref 2.5–4.5)
PLATELET # BLD AUTO: 313 K/UL — SIGNIFICANT CHANGE UP (ref 150–400)
PMV BLD: 10 FL — SIGNIFICANT CHANGE UP (ref 7–13)
PO2 BLDA: 101 MMHG — SIGNIFICANT CHANGE UP (ref 83–108)
POTASSIUM BLDA-SCNC: 3.6 MMOL/L — SIGNIFICANT CHANGE UP (ref 3.4–4.5)
POTASSIUM SERPL-MCNC: 3.8 MMOL/L — SIGNIFICANT CHANGE UP (ref 3.5–5.3)
POTASSIUM SERPL-SCNC: 3.8 MMOL/L — SIGNIFICANT CHANGE UP (ref 3.5–5.3)
PROT SERPL-MCNC: 6.1 G/DL — SIGNIFICANT CHANGE UP (ref 6–8.3)
PROTHROM AB SERPL-ACNC: 11.7 SEC — SIGNIFICANT CHANGE UP (ref 9.8–13.1)
RBC # BLD: 3.14 M/UL — LOW (ref 4.2–5.8)
RBC # FLD: 16.2 % — HIGH (ref 10.3–14.5)
SAO2 % BLDA: 97.8 % — SIGNIFICANT CHANGE UP (ref 95–99)
SODIUM BLDA-SCNC: 133 MMOL/L — LOW (ref 136–146)
SODIUM SERPL-SCNC: 136 MMOL/L — SIGNIFICANT CHANGE UP (ref 135–145)
WBC # BLD: 10.97 K/UL — HIGH (ref 3.8–10.5)
WBC # FLD AUTO: 10.97 K/UL — HIGH (ref 3.8–10.5)

## 2019-02-08 PROCEDURE — 99291 CRITICAL CARE FIRST HOUR: CPT

## 2019-02-08 PROCEDURE — 71045 X-RAY EXAM CHEST 1 VIEW: CPT | Mod: 26

## 2019-02-08 RX ORDER — IPRATROPIUM BROMIDE 0.2 MG/ML
2 SOLUTION, NON-ORAL INHALATION EVERY 6 HOURS
Qty: 0 | Refills: 0 | Status: DISCONTINUED | OUTPATIENT
Start: 2019-02-08 | End: 2019-02-28

## 2019-02-08 RX ORDER — ALBUTEROL 90 UG/1
2 AEROSOL, METERED ORAL EVERY 6 HOURS
Qty: 0 | Refills: 0 | Status: DISCONTINUED | OUTPATIENT
Start: 2019-02-08 | End: 2019-02-28

## 2019-02-08 RX ORDER — VANCOMYCIN HCL 1 G
1000 VIAL (EA) INTRAVENOUS ONCE
Qty: 0 | Refills: 0 | Status: COMPLETED | OUTPATIENT
Start: 2019-02-08 | End: 2019-02-08

## 2019-02-08 RX ADMIN — HEPARIN SODIUM 5000 UNIT(S): 5000 INJECTION INTRAVENOUS; SUBCUTANEOUS at 05:35

## 2019-02-08 RX ADMIN — PIPERACILLIN AND TAZOBACTAM 25 GRAM(S): 4; .5 INJECTION, POWDER, LYOPHILIZED, FOR SOLUTION INTRAVENOUS at 09:04

## 2019-02-08 RX ADMIN — SODIUM CHLORIDE 10 MILLILITER(S): 9 INJECTION INTRAMUSCULAR; INTRAVENOUS; SUBCUTANEOUS at 19:04

## 2019-02-08 RX ADMIN — Medication 1 APPLICATION(S): at 11:50

## 2019-02-08 RX ADMIN — Medication 0.5 MILLIGRAM(S): at 05:35

## 2019-02-08 RX ADMIN — MIDODRINE HYDROCHLORIDE 10 MILLIGRAM(S): 2.5 TABLET ORAL at 05:35

## 2019-02-08 RX ADMIN — Medication 2 PUFF(S): at 22:15

## 2019-02-08 RX ADMIN — Medication 0.5 MILLIGRAM(S): at 15:01

## 2019-02-08 RX ADMIN — QUETIAPINE FUMARATE 50 MILLIGRAM(S): 200 TABLET, FILM COATED ORAL at 05:35

## 2019-02-08 RX ADMIN — MIDODRINE HYDROCHLORIDE 10 MILLIGRAM(S): 2.5 TABLET ORAL at 15:01

## 2019-02-08 RX ADMIN — Medication 250 MILLIGRAM(S): at 04:08

## 2019-02-08 RX ADMIN — QUETIAPINE FUMARATE 50 MILLIGRAM(S): 200 TABLET, FILM COATED ORAL at 17:40

## 2019-02-08 RX ADMIN — Medication 2 PUFF(S): at 15:59

## 2019-02-08 RX ADMIN — SODIUM CHLORIDE 10 MILLILITER(S): 9 INJECTION INTRAMUSCULAR; INTRAVENOUS; SUBCUTANEOUS at 09:05

## 2019-02-08 RX ADMIN — PIPERACILLIN AND TAZOBACTAM 25 GRAM(S): 4; .5 INJECTION, POWDER, LYOPHILIZED, FOR SOLUTION INTRAVENOUS at 20:53

## 2019-02-08 RX ADMIN — FLUCONAZOLE 100 MILLIGRAM(S): 150 TABLET ORAL at 06:52

## 2019-02-08 RX ADMIN — MIDODRINE HYDROCHLORIDE 10 MILLIGRAM(S): 2.5 TABLET ORAL at 22:18

## 2019-02-08 RX ADMIN — PANTOPRAZOLE SODIUM 40 MILLIGRAM(S): 20 TABLET, DELAYED RELEASE ORAL at 11:50

## 2019-02-08 RX ADMIN — Medication 2 PUFF(S): at 03:41

## 2019-02-08 RX ADMIN — DEXMEDETOMIDINE HYDROCHLORIDE IN 0.9% SODIUM CHLORIDE 7.5 MICROGRAM(S)/KG/HR: 4 INJECTION INTRAVENOUS at 09:05

## 2019-02-08 RX ADMIN — Medication 0.5 MILLIGRAM(S): at 22:18

## 2019-02-08 RX ADMIN — DEXMEDETOMIDINE HYDROCHLORIDE IN 0.9% SODIUM CHLORIDE 7.5 MICROGRAM(S)/KG/HR: 4 INJECTION INTRAVENOUS at 19:03

## 2019-02-08 RX ADMIN — HEPARIN SODIUM 5000 UNIT(S): 5000 INJECTION INTRAVENOUS; SUBCUTANEOUS at 17:40

## 2019-02-08 RX ADMIN — Medication 2 PUFF(S): at 09:28

## 2019-02-08 NOTE — ADVANCED PRACTICE NURSE CONSULT - REASON FOR CONSULT
Patient seen on skin care rounds after wound care referral received for assessment of skin impairment and recommendations of topical management. Chart reviewed: Serum albumin 2.3, WBC 10.97, Hgb/Hct 9.5/31.5, Reji range 7-14, Febrile to 103 during hospital stay, hypotensive to 70/42 during hospitalization requiring vasopressors, patient on ventilator via tracheostomy oxygen requirement 40-60%, BMI 18.9kg/m2. Patient H/O R thoracotomy, decortication, chest wall reconstruction, lat flap with Dr Pickering on 10/11/18. He was admitted on 1/6/19 to [facility] with a R pleural effusion and respiratory failure,  A R PTC was placed there and he was intubated.  He still had a R SANDY drain in place and he was transferred to [facility]. Patient was found to have aspirated a foreign body and +MRSA empyema. He was treated and discharged on 1/22/2019.

## 2019-02-08 NOTE — PROGRESS NOTE ADULT - ASSESSMENT
58M who underwent a FB, R thoracotomy, decortication, chest wall reconstruction, lat flap with Dr Pickering on 10/11/18. He was admitted on 1/6/19 to [facility] [place] with a R pleural effusion and respiratory failure,  A R PTC was placed there and he was intubated.  He still had a R SANDY drain in place and he was transferred to [facility]. Patient was found to have aspirated a foreign body and +MRSA empyema. He was treated and discharged on 1/22/2019.     He was found by his home nurse to be in acute distress and recommended to proceed to the ED.  At presentation, he was found to be in acute hypercarbic respiratory failure complicated by hemodynamic instability requiring emergent intubation and pressor support. (28 Jan 2019 15:34)    Pt had Tm 104.2 (on 1/29), tachycardia, hypotension.  Pt has now been extubated, s/p trach/PEG on 1/31.  CT chest shows complete L sided atelectasis with mucous plugging of right lower lobe  bronchus with near complete atelectasis.      ID consult called for antibiotic managment.     Recommend:    - Pt p/w severe sepsis with shock and respiratory distress.  Pt with high fevers.  Found to have complete and near complete atelectasis of L and R lung respectively with mucous plugging.  Agree with broad spectrum abx to cover for post-obstructive pna.  Repeat CT chest from 2/3 shows improved aeration.      - Cultures results show:    2/5: bcx - NGTD @ 48hrs  2/1: endotrach - MRSA,  Klebsiella (pan sens), yeast (gram stain only)  2/1: bcx - CNS  1/29:  lung cx - nl resp milton  1/28: bcx - NGTD    - Agree with vancomycin and zosyn.   Maintain vanco trough between 15-20.  Dose vanco by level, post HD.  Fluconazole added on 2/2 for yeast.  Seen on gram stain only.      -  Repeat blood cultures from 2/5 NGTD.  CNS likely contaminant.    - CT a/p showed rt inguinal lesion.  Subsequent US shows marked edema and small lymph nodes.  Cont to monitor.      - Cont care as per CTU. Continue hemodynamic monitoring.  Pressors being titrated.  On full mechanical ventilator support, s/p tracheostomy. Continue bronchodilators, pulmonary toilet, Continue GI prophylaxis with Protonix.   Monitor temp curve and WBC.    - Given severe sepsis and shock at presentation, recurrent involvement of MRSA, recommend long course of IV abx treatment (4 week course).  Completed fluconazole 7 days bet 2/2 - 2/8 (suspect yeast is likely a colonizer).      - Cont treat of MRSA/KLeb.  AFter discharge, Abx may be dosed with pt's HD sessions, no need for picc line.      Will follow,    Anabel Chahal  [phone]

## 2019-02-08 NOTE — ADVANCED PRACTICE NURSE CONSULT - ASSESSMENT
A&Ox3, nonverbal due to tracheostomy able to use phone to text words, bedbound, bowel management in place, anuric. Skin warm, dry with increased moisture in intertriginous folds, adequate skin turgor. Skin beneath tracheostomy plate intact, no erythema. Dried crust under tracheostomy plate. Blue sutures in place. Left lower suture appears to have a suture abscess noted, skin intact. Lower legs with scattered areas of hyperpigmentation and scabs. Surgical scar of left trochanter.    PEG tube in place with scant serosanguinous drainage, no odor. Blanchable erythema in peritubular skin, skin intact.    Left ear: scab removed with cleansing, blanchable erythema beneath scab that measures 0.3cmx0.5bnm0fd.    Sacrum: DTPI measures [id number]. 100% purple/maroon discoloration, well demarcated irregular borders. No drainage, skin intact. Periwound skin no induration, no increased warmth, no erythema. Goal of care: monitor for changes in tissue type.    Left scapula: category 3 skin tear measures [id number]. 5% intact scab at 9 o'clock and 95% exposed pink, moist dermis. Goal of care: maintain moist environment for wound healing.    Right lateral side-posterior back along rib cage and surgical scar line: 2 areas of eschar, more proximal area measures [id number] tissue type 25% slough, 75% eschar, dry, firmly attached to wound base. 2.5cm distal is a second area of eschar that measures [id number], 100% eschar, dry, firmly attached to wound base (unable to determine complete anatomical depth due to necrotic tissue of both areas). No induration, no increased warmth, no erythema. Goal of care: autolytic debridement of necrotic tissue, monitor for changes in tissue type.

## 2019-02-08 NOTE — PROGRESS NOTE ADULT - SUBJECTIVE AND OBJECTIVE BOX
Infectious Diseases progress note:    Subjective:  O/n events noted.  Pt had trial of cpap earlier.  No fever.  WBC slightly elevated.  Awake/alert    ROS:  CONSTITUTIONAL:  No fever, chills, rigors  CARDIOVASCULAR:  No chest pain or palpitations  RESPIRATORY:   No SOB, cough, dyspnea on exertion.  No wheezing  GASTROINTESTINAL:  No abd pain, N/V, diarrhea/constipation  EXTREMITIES:  No swelling or joint pain  GENITOURINARY:  No burning on urination, increased frequency or urgency.  No flank pain  NEUROLOGIC:  No HA, visual disturbances  SKIN: No rashes    Allergies    No Known Allergies    Intolerances        ANTIBIOTICS/RELEVANT:  antimicrobials  piperacillin/tazobactam IVPB. 3.375 Gram(s) IV Intermittent once  piperacillin/tazobactam IVPB. 3.375 Gram(s) IV Intermittent every 12 hours    immunologic:  epoetin kelly Injectable 3000 Unit(s) IV Push <User Schedule>    OTHER:  acetaminophen  IVPB .. 1000 milliGRAM(s) IV Intermittent once PRN  ALBUTerol    90 MICROgram(s) HFA Inhaler 2 Puff(s) Inhalation every 6 hours PRN  ALPRAZolam 0.5 milliGRAM(s) Oral every 8 hours  collagenase Ointment 1 Application(s) Topical daily  dexmedetomidine Infusion 0.5 MICROgram(s)/kG/Hr IV Continuous <Continuous>  heparin  Injectable 5000 Unit(s) SubCutaneous every 12 hours  ipratropium 17 MICROgram(s) HFA Inhaler 2 Puff(s) Inhalation every 6 hours  midodrine 10 milliGRAM(s) Oral every 8 hours  norepinephrine Infusion 0.05 MICROgram(s)/kG/Min IV Continuous <Continuous>  pantoprazole  Injectable 40 milliGRAM(s) IV Push daily  propofol Infusion 20 MICROgram(s)/kG/Min IV Continuous <Continuous>  QUEtiapine 50 milliGRAM(s) Oral every 12 hours  sodium chloride 0.9%. 1000 milliLiter(s) IV Continuous <Continuous>      Objective:  Vital Signs Last 24 Hrs  T(C): 36.9 (09 Feb 2019 07:15), Max: 37.1 (08 Feb 2019 12:00)  T(F): 98.5 (09 Feb 2019 07:15), Max: 98.8 (09 Feb 2019 00:00)  HR: 73 (09 Feb 2019 07:24) (62 - 100)  BP: 105/53 (09 Feb 2019 07:15) (104/54 - 109/51)  BP(mean): 66 (09 Feb 2019 07:15) (66 - 76)  RR: 22 (09 Feb 2019 07:15) (17 - 26)  SpO2: 100% (09 Feb 2019 07:24) (96% - 100%)    PHYSICAL EXAM:  Constitutional: trached  Eyes:CHER, EOMI  Ear/Nose/Throat: no thrush, mucositis.  Moist mucous membranes	  Neck:no JVD, no lymphadenopathy, supple, rt IJ line  Respiratory: CTA adore  Cardiovascular: S1S2 RRR, no murmurs  Gastrointestinal:soft, nontender,  nondistended (+) BS, PEG  Extremities:no e/e/c  Skin:  no rashes, open wounds or ulcerations, chest wall AICD        LABS:             Complete Blood Count in AM (02.08.19 @ 02:20)    WBC Count: 10.97: Test Repeated K/uL    RBC Count: 3.14 M/uL    Hemoglobin: 9.5 g/dL    Hematocrit: 31.5 %    Mean Cell Volume: 100.3 fL    Mean Cell Hemoglobin: 30.3 pg    Mean Cell Hemoglobin Conc: 30.2 %    Red Cell Distrib Width: 16.2 %    Platelet Count - Automated: 313 K/uL    MPV: 10.0 fl    Nucleated RBC #: 0 K/uL                Vancomycin Level, Trough: 13.2 ug/mL (02-07 @ 18:00)  Vancomycin Level, Trough: 26.0 ug/mL (02-06 @ 04:30)  Vancomycin Level, Trough: 9.6 ug/mL (02-05 @ 17:02)  Vancomycin Level, Trough: 16.4 ug/mL (02-05 @ 05:30)              MICROBIOLOGY:      Culture - Blood (02.05.19 @ 14:17)    Culture - Blood:   NO ORGANISMS ISOLATED  NO ORGANISMS ISOLATED AT 72 HRS.    Specimen Source: BLOOD    Culture - Respiratory with Gram Stain (02.01.19 @ 22:35)    Gram Stain Sputum:   WBC White Blood Cells  QNTY CELLS IN GRAM STAIN: MODERATE (3+)  GPCCL^Gram Pos Cocci In Clusters  QUANTITY OF BACTERIA SEEN: MODERATE (3+)  YEAST^YEAST.  QUANTITY OF BACTERIA SEEN: FEW (2+)  GPR^Gram Positive Rods  QUANTITY OF BACTERIA SEEN: FEW (2+)    Culture - Respiratory:   Normal Respiratory Jennifer Also Present  ***** CRITICAL RESULT *****  PERSON CALLED / READ-BACK: ADALGISA CARLSON RN./Y  DATE / TIME CALLED: 02/04/19 1204  CALLED BY: RAYMUNDO GUAMAN    -  Vancomycin: S 2 AAMIR    -  Tetra/Doxy: S <=1 AAMIR    -  Tigecycline: S    -  Tobramycin: S <=2 AAMIR    -  Trimethoprim/Sulfamethoxazole: S <=0.5/9.5 AAMIR    -  Trimethoprim/Sulfamethoxazole: S <=0.5/9.5 AAMIR    -  Amikacin: S <=8 AAMIR    -  Ampicillin: R 16 AAMIR    -  Ampicillin/Sulbactam: S <=4/2 AAMIR    -  Aztreonam: S <=4 AAMIR    -  Cefazolin: R 16 AAMIR    -  Cefazolin: S <=2 AAMIR    -  Cefepime: S <=2 AAMIR    -  Cefoxitin: S <=4 AAMIR    -  Ceftazidime: S <=1 AAMIR    -  Ceftriaxone: S <=1 AAMIR    -  Ciprofloxacin: S <=1 AAMIR    -  Ciprofloxacin: S <=0.5 AAMIR    -  Clindamycin: S 0.5 AAMIR    -  Daptomycin: S 1 AAMIR    -  Ertapenem: S <=0.5 AAMIR    -  Erythromycin: R >4 AAMIR    -  Gentamicin: R >8 AAMIR    -  Gentamicin: S <=1 AAMIR    -  Imipenem: S <=1 AAMIR    -  Levofloxacin: S <=0.5 AAMIR    -  Levofloxacin: S <=1 AAMIR    -  Linezolid: S 4 AAMIR    -  Meropenem: S <=1 AAMIR    -  Moxifloxacin(Aerobic): S <=0.5 AAMIR    -  Oxacillin: R >2 AAMIR    -  Penicillin: R >8 AAMIR    -  Piperacillin/Tazobactam: S <=8 AAMIR    -  Rifampin: S <=1 AAMIR    Specimen Source: ENDOTRACHEAL SPECIMEN    Organism Identification: Staph. aureus *MRSA*  Klebsiella pneumoniae    Organism: Staph. aureus *MRSA*  QUANTITY OF GROWTH: MODERATE  OXICILLIN-RESISTANT staphylococci should be regarded as  RESISTANT to ALL other Beta-Lactams regardless of the  in-vitro results obtained.  These include: ALL  Penicillins, Cephalosporins, Amoxicillin-clavulanic  acid, Ticarcillin-clavulanic acid,  Ampicillin-sulbactam, and Imipenem.    Organism: Klebsiella pneumoniae  QUANTITY OF GROWTH: MODERATE    Method Type: POSITIVE AAMIR 29    Method Type: NEGATIVE AAMIR 43    Culture - Blood (02.01.19 @ 21:47)    Culture - Blood:   ***Blood Panel PCR results on this specimen are available  approximately 3 hours after the Gram stain result***  Gram stain, PCR, and/or culture results may not always  correspond due to difference in methodologies  ------------------------------------------------------------  This PCR assay was performed using [employer].  The  following targets are tested for:  Enterococcus, vancomycin  resistant enterococci, Listeria monocytogenes,  coagulase  negative staphylococci, S. aureus, methicillin resistant S.  aureus, Streptococcus agalactiae (Group B), S. pneumoniae,  S. pyogenes (Group A), Acinetobacter baumannii, Enterobacter  cloacae, E. coli, Klebsiella oxytoca, K. pneumoniae, Proteus  sp., Serratia marcescens, Haemophilus influenzae, Neisseria  meningitidis, Pseudomonas aeruginosa, Candida albicans, C.  glabrata, C. krusei, C. parapsilosis, C. tropicalis and the  KPC resistance gene.  **NOTE: Due to technical problems, Proteus sp. will NOT be  reported as part of the BCID paneluntil further notice.    Culture - Blood:   Single set isolate, possible contaminant.  Contact microbiology if susceptibility testing is clinically  indicated.    -  Coagulase negative Staphylococcus: + DETECT AAMIR    Specimen Source: BLOOD PERIPHERAL    Organism: BLOOD CULTURE PCR  ***Blood Panel PCR results on this specimen are available  approximately 3 hours after the Gram stain result***  Gram stain, PCR, and/or culture results may not always  correspond due to difference in methodologies  ------------------------------------------------------------  This PCR assay was performed using [employer].  The  following targets are tested for:  Enterococcus, vancomycin  resistant enterococci, Listeria monocytogenes,  coagulase  negative staphylococci, S. aureus, methicillin resistant S.  aureus, Streptococcus agalactiae (Group B), S. pneumoniae,  S. pyogenes (Group A), Acinetobacter baumannii, Enterobacter  cloacae, E. coli, Klebsiella oxytoca, K. pneumoniae, Proteus  sp., Serratia marcescens, Haemophilus influenzae, Neisseria  meningitidis, Pseudomonas aeruginosa, Candida albicans, C.  glabrata, C. krusei, C. parapsilosis, C. tropicalis and the  KPC resistance gene.  **NOTE: Due to technical problems, Proteus sp. will NOT be  reported as part of the BCID panel until further notice.    Organism: Staphylococcus sp.,coag neg    Gram Stain Blood:   ***** CRITICAL RESULT *****  PERSON CALLED / READ-BACK: MEGHA HILL MD/Y  DATE / TIME CALLED: 02/03/19 0529  CALLED BY: HOLLIE EM  GPCCL^Gram Pos Cocci In Clusters  AFTER: 30 HOURS INCUBATION  BOTTLE: AEROBIC BOTTLE    Organism Identification: BLOOD CULTURE PCR  Staphylococcus sp.,coag neg    Method Type: PCR        RADIOLOGY & ADDITIONAL STUDIES:    < from: Xray Chest 1 View- PORTABLE-Routine (02.08.19 @ 07:24) >    IMPRESSION:  Small loculated effusions with tubes and lines in place.    < end of copied text >

## 2019-02-08 NOTE — ADVANCED PRACTICE NURSE CONSULT - RECOMMEDATIONS
Sacrum: Apply Liquid barrier film to periwound skin and intact injury. Apply foam with border. Change daily.    Left scapula: Cleanse with NS, pat dry. Apply Liquid barrier film to periwound skin. Apply foam with border. Change every 3 days.    Tracheostomy: Cleanse with NS, pat dry. Apply Liquid barrier film to periwound skin. Apply Foam without border under tracheostomy plate.    Left ear: Apply Liquid barrier film daily.    Right back: Cleanse with SAF-clens, rinse with NS, pat dry. Apply Liquid barrier film to periwound skin. Apply Medihoney gel to wound bases, cover with foam with border. Change daily.    PEG: Cleanse q shift with NS, apply liquid barrier film beneath steven disc.  If redness noted under steven disc bumper apply thin foam  dressing without border (Mepilex Lite)- cut to mid dressing with a 'Y' shape.   Secondary securement to abdomen taping in 'H' fashion with Steri-strips.     Continue low air loss bed therapy, continue heel elevation, continue to turn & reposition q2h, continue moisture management with barrier creams & single breathable pad, continue measures to decrease friction/shear/pressure.     Please call wound care service line is further assistance is needed ([phone]).

## 2019-02-08 NOTE — PROGRESS NOTE ADULT - SUBJECTIVE AND OBJECTIVE BOX
CLAUDIA HAN          MRN-[id number]    HPI:  Mr. Han is a 58M who underwent a FB, R thoracotomy, decortication, chest wall reconstruction, lat flap with Dr Pickering on 10/11/18. He was admitted on 1/6/19 to [facility] with a R pleural effusion and respiratory failure,  A R PTC was placed there and he was intubated.  He still had a R SANDY drain in place and he was transferred to [facility]. Patient was found to have aspirated a foreign body and +MRSA empyema. He was treated and discharged on 1/22/2019.     He was found by his home nurse to be in acute distress and recommended to proceed to the ED.  At presentation, he was found to be in acute hypercarbic respiratory failure complicated by hemodynamic instability requiring emergent intubation and pressor support. (28 Jan 2019 15:34)      Procedure:  POD # :     Issues:        Interval/Overnight OR Events/ ROS  Pt extubated in the OR after surgery. On arrival in ICU still drowsy - but easily arousable to following commands; denies SOB, no nausea, no vomiting  Respiratory status required full ventilatory support,  following of ABG’s with A-line monitoring, continuous pulse oximetry monitoring, & an IV Propofol infusion for support & to evaluate for & prevent further decompensation secondary to persistent cardiopulmonary dysfunction.     Pt remained hemodynamically stable overnight, not on any pressors or inotropes. OOB to chair, breathing comfortably with minimal pain. Ambulated several times . Denies pain, no SOB, no palpitations, no nausea/ no vomiting, no dizziness  A-line and sharma d/kirsten       PAST MEDICAL & SURGICAL HISTORY:  Smoking hx  Cardiomyopathy  Wound: right chest  ICD (implantable cardioverter-defibrillator) in place  OA (osteoarthritis)  HLD (hyperlipidemia)  BPH (benign prostatic hyperplasia)  Pleural effusion  HTN (hypertension)  ESRD (end stage renal disease)  H/O chest wound: right  S/P thoracotomy: FB, R thoracotomy, decortication, chest wall reconstruction, lat flap  History of wound infection: right chest wall - revision 5/18 and again in 7/18  History of implantable cardioverter-defibrillator (ICD) placement: pt unsure when placed  H/O bilateral hip replacements: 2008, 2009    Allergies    No Known Allergies    Intolerances      Home Medications:  aspirin 81 mg oral tablet: 1 tab(s) orally once a day  (30 Oct 2018 14:39)  Breo Ellipta 100 mcg-25 mcg/inh inhalation powder: 1 puff(s) inhaled once a day patient denies using it (11 Oct 2018 08:38)  Calcium 500: 1 tab(s) orally 2 times a day (11 Oct 2018 08:38)  docusate sodium 100 mg oral tablet: 1 tab(s) orally 2 times a day, As Needed (11 Oct 2018 08:39)  folic acid 1 mg oral tablet: 1 tab(s) orally once a day (11 Oct 2018 08:38)  Mag-Ox 400 oral tablet: 1 tab(s) orally once a day (11 Oct 2018 08:39)  Multiple Vitamins oral tablet: 1 tab(s) orally once a day  (30 Oct 2018 14:39)  Namenda 10 mg oral tablet: 1 tab(s) orally 2 times a day (11 Oct 2018 08:38)  Nephplex Rx oral tablet: 1 tab(s) orally once a day (11 Oct 2018 08:39)  nortriptyline 50 mg oral capsule: 1 cap(s) orally once a day (11 Oct 2018 08:39)  Renvela 800 mg oral tablet: 2 tab(s) orally every 8 hours (11 Oct 2018 08:39)  simethicone 80 mg oral tablet: 1 tab(s) orally 3 times a day (after meals) (11 Oct 2018 08:39)  Tylenol 325 mg oral tablet: 2 tab(s) orally every 6 hours, As Needed (30 Oct 2018 14:39)  vancomycin 1 g intravenous injection: 1 gram(s) intravenous 3 times a week  Please give 3 x per week with Dialysis until 2/3/2019 (22 Jan 2019 13:41)  vitamin A: 1 tab(s) orally once a day (11 Oct 2018 08:38)  Vitamin B Complex: 1 tab(s) orally once a day (11 Oct 2018 08:38)  Vitamin C 500 mg oral tablet: 1 tab(s) orally once a day (11 Oct 2018 08:38)        ***VITAL SIGNS:  Vital Signs Last 24 Hrs  T(C): 37.2 (08 Feb 2019 00:00), Max: 37.2 (08 Feb 2019 00:00)  T(F): 98.9 (08 Feb 2019 00:00), Max: 98.9 (08 Feb 2019 00:00)  HR: 93 (08 Feb 2019 03:43) (57 - 124)  BP: 92/63 (08 Feb 2019 00:10) (92/63 - 116/74)  BP(mean): 73 (08 Feb 2019 00:10) (70 - 83)  RR: 22 (08 Feb 2019 03:00) (19 - 24)  SpO2: 98% (08 Feb 2019 03:43) (95% - 100%)    I/Os:   I&O's Detail    06 Feb 2019 07:01  -  07 Feb 2019 07:00  --------------------------------------------------------  IN:    dexmedetomidine Infusion: 363 mL    Enteral Tube Flush: 160 mL    IV PiggyBack: 200 mL    Nepro with Carb Steady: 840 mL    norepinephrine Infusion: 72.1 mL    propofol Infusion: 100.8 mL  Total IN: 1735.9 mL    OUT:    Bulb: 35 mL    Rectal Tube: 75 mL  Total OUT: 110 mL    Total NET: 1625.9 mL      07 Feb 2019 07:01  -  08 Feb 2019 03:57  --------------------------------------------------------  IN:    dexmedetomidine Infusion: 247.5 mL    Enteral Tube Flush: 160 mL    IV PiggyBack: 200 mL    Nepro with Carb Steady: 735 mL    norepinephrine Infusion: 71.5 mL    Other: 400 mL    propofol Infusion: 37.8 mL    sodium chloride 0.9%.: 210 mL  Total IN: 2061.8 mL    OUT:    Bulb: 30 mL    Other: 1400 mL    Rectal Tube: 25 mL  Total OUT: 1455 mL    Total NET: 606.8 mL          CAPILLARY BLOOD GLUCOSE          =======================  MEDICATIONS  ===================  MEDICATIONS  (STANDING):  ALPRAZolam 0.5 milliGRAM(s) Oral every 8 hours  collagenase Ointment 1 Application(s) Topical daily  dexmedetomidine Infusion 0.5 MICROgram(s)/kG/Hr (7.5 mL/Hr) IV Continuous <Continuous>  epoetin kelly Injectable 3000 Unit(s) IV Push <User Schedule>  fluconAZOLE IVPB      fluconAZOLE IVPB 200 milliGRAM(s) IV Intermittent every 24 hours  heparin  Injectable 5000 Unit(s) SubCutaneous every 12 hours  ipratropium 17 MICROgram(s) HFA Inhaler 2 Puff(s) Inhalation every 6 hours  midodrine 10 milliGRAM(s) Oral every 8 hours  norepinephrine Infusion 0.05 MICROgram(s)/kG/Min (5.625 mL/Hr) IV Continuous <Continuous>  pantoprazole  Injectable 40 milliGRAM(s) IV Push daily  piperacillin/tazobactam IVPB. 3.375 Gram(s) IV Intermittent once  piperacillin/tazobactam IVPB. 3.375 Gram(s) IV Intermittent every 12 hours  propofol Infusion 20 MICROgram(s)/kG/Min (7.2 mL/Hr) IV Continuous <Continuous>  QUEtiapine 50 milliGRAM(s) Oral every 12 hours  sodium chloride 0.9%. 1000 milliLiter(s) (10 mL/Hr) IV Continuous <Continuous>  vancomycin  IVPB 1000 milliGRAM(s) IV Intermittent once    MEDICATIONS  (PRN):  acetaminophen  IVPB .. 1000 milliGRAM(s) IV Intermittent once PRN Temp greater or equal to 38C (100.4F), Moderate Pain (4 - 6)  ALBUTerol    90 MICROgram(s) HFA Inhaler 2 Puff(s) Inhalation every 6 hours PRN Shortness of Breath and/or Wheezing      ======================VENTILATOR SETTINGS  ==============  Mode: AC/ CMV (Assist Control/ Continuous Mandatory Ventilation)  RR (machine): 22  TV (machine): 400  FiO2: 40  PEEP: 5  ITime: 0.64  MAP: 10  PIP: 26      =================== PATIENT CARE ACCESS DEVICES ==========  Peripheral IV  Central Venous Line	R	L	IJ	Fem	SC	Placed:   Arterial Line	R	L	PT	DP	Fem	Rad	Ax	Placed:   Midline:				  Urinary Catheter, Date Placed:   Necessity of urinary, arterial, and venous catheters discussed  ======================= PHYSICAL EXAM===================  General:          Comfortable, Awake, alert, no distress  Neuro:             Moving all extremities to commands. No focal deficits	  HEENT:                           CHER/ ETT/ NGT/ trach  Respiratory:	Lungs clear on auscultation bilaterally with good aeration.                            No rales, rhonchi, no wheezing. Effort even and unlabored.  CV:		         Regular rate and rhythm. Normal S1/S2. No murmurs  Abdomen:	Soft,  nontender, not-distended. Bowel sounds present / absent.   Skin:		No rash.  Extremities:	Warm, no cyanosis or edema.  Palpable pulses  ============================ LABS =======================                        9.5    10.97 )-----------( 313      ( 08 Feb 2019 02:20 )             31.5     02-08    136  |  97<L>  |  31<H>  ----------------------------<  116<H>  3.8   |  25  |  3.26<H>    Ca    8.9      08 Feb 2019 02:20  Phos  4.0     02-08  Mg     2.2     02-08    TPro  6.1  /  Alb  2.3<L>  /  TBili  0.3  /  DBili  x   /  AST  65<H>  /  ALT  25  /  AlkPhos  326<H>  02-08    LIVER FUNCTIONS - ( 08 Feb 2019 02:20 )  Alb: 2.3 g/dL / Pro: 6.1 g/dL / ALK PHOS: 326 u/L / ALT: 25 u/L / AST: 65 u/L / GGT: x           PT/INR - ( 08 Feb 2019 02:20 )   PT: 11.7 SEC;   INR: 1.05          PTT - ( 08 Feb 2019 02:20 )  PTT:31.3 SEC  ABG - ( 08 Feb 2019 02:20 )  pH, Arterial: 7.41  pH, Blood: x     /  pCO2: 46    /  pO2: 101   / HCO3: 28    / Base Excess: 3.9   /  SaO2: 97.8                BLOOD  02-05-19 --  --  --      ENDOTRACHEAL SPECIMEN  02-01-19 --  --  Staph. aureus *MRSA*  Klebsiella pneumoniae      BLOOD PERIPHERAL  02-01-19 --  --  BLOOD CULTURE PCR  Staphylococcus sp.,coag neg      BLOOD VENOUS  01-29-19 --  --  --      LUNG - UPPER LOBE LEFT  01-29-19 --  --  --      BLOOD PERIPHERAL  01-28-19 --  --  --      BLOOD PERIPHERAL  01-09-19 --  --  --      PLEURAL FLUID  01-09-19 --  --    WBC^White Blood Cells  QNTY CELLS IN GRAM STAIN: FEW (2+)  NOS^No Organisms Seen          ===================== IMAGING STUDIES ===================  Radiology personally reviewed.    ====================ASSESSMENT AND PLAN ================      ====================== NEUROLOGY=======================  Pain control with PCA / PCEA / Tylenol IV / Toradol / Percocet  Pt is on Precedex for agitation  Pt is sedated with Propofol / Fentanyl  ==================== RESPIRATORY========================  Pt is on       L nasal canula / Face tent____% FiO2/ full mech vent support  Comfortable, no evidence of distress.  Using incentive spirometry & doing                ml  Monitor chest tube output  Chest tube to suction / water seal	  Mechanical Ventilation:  Mode: AC/ CMV (Assist Control/ Continuous Mandatory Ventilation)  RR (machine): 22  TV (machine): 400  FiO2: 40  PEEP: 5  ITime: 0.64  MAP: 10  PIP: 26    Mechanical ventilator status assessed & settings reviewed  Continuous pulse oximetry monitoring  Continue bronchodilators, pulmonary toilet  Head of bed elevation to 30-40 degrees  ====================CARDIOVASCULAR=====================  Continue hemodynamic monitoring/ telemetry  Not on any pressors/ titrate pressors for SBP>100, MAP >60-65  Continue cardiovascular / antihypertensive medications  ===================== RENAL ============================  Continue LR 30CC/hr      D/C IVF  Monitor I/Os, BUN/ Cr  and electrolytes  D/C Sharma      Keep Sharma for UO monitoring  BPH: Continue Flomax/ Finasteride  ==================== GASTROINTESTINAL===================  On regular/ tube feeds diet, tolerating well  Continue GI prophylaxis with Pepcid / Protonix  Continue Zofran / Reglan for nausea - PRN	  NPO  =======================    ENDOCRIN  =====================  Glycemic monitoring  F/S with coverage  ===================HEMATOLOGIC/ONCOLOGIC =============  Monitor chest tube output. No signs of active bleeding.   Follow CBC, coags  in AM  DVT prophylaxis with SCD, sc Heparin  ========================INFECTIOUS DISEASE===============  No signs of infection. Monitor for fever / leukocytosis.  All surgical incision / chest tube  sites look clean  D/C Sharma    Pertinent clinical, laboratory, radiographic, hemodynamic, echocardiographic, respiratory data, microbiologic data and chart were reviewed and analyzed frequently throughout the course of the day and night. GI and DVT prophylaxis, glycemic control, head of bed elevation and skin care issues were addressed.  Patient seen, examined and plan discussed with CT Surgery / CTICU team during rounds.  Pt remains critically ill in imminent risk of  deterioration and requires very careful cardio- pulmonary monitoring and support.    I have spent        minutes of critical care time with this pt between      am/pm   and        am/ pm         minutes spent on total encounter; more than 50% of the visit was spent counseling and/or coordinating care by the attending physician.      KERLINE Faith MD CLAUDIA HAN          MRN-[id number]    HPI:  Mr. Han is a 58M who underwent a FB, R thoracotomy, decortication, chest wall reconstruction, lat flap with Dr Pickering on 10/11/18. He was admitted on 1/6/19 to [facility] with a R pleural effusion and respiratory failure,  A R PTC was placed there and he was intubated.  He still had a R SANDY drain in place and he was transferred to [facility]. Patient was found to have aspirated a foreign body and +MRSA empyema. He was treated and discharged on 1/22/2019.     He was found by his home nurse to be in acute distress and recommended to proceed to the ED.  At presentation, he was found to be in acute hypercarbic respiratory failure complicated by hemodynamic instability requiring emergent intubation and pressor support. (28 Jan 2019 15:34)      Procedure:        Trach and PEG  1/31/2019  Bronchoscopy   1/29/2019  Right thoracotomy, resection of portion of R 7th rib, evacuation of infected hemothorax, takedown of pleurocutaneous fistula  10/11/2018    Issues:  Acute hypoxic & hypercapnic respiratory failure  Hypotension   MRSA and Klebsiella PNA /  Fever / Sepsis  ESRD / HD  chronic anemia  Cardiomyopathy  HLD      Drips:  Levo  Precedex              Propofol      Interval/Overnight OR Events/ ROS  Respiratory status required full ventilatory support,  following of ABG’s with A-line monitoring, continuous pulse oximetry monitoring,  Delirium with agitation previous night required Quetiapine/Alprazolam/ Propofol. Today neuro status is much better , pt appears back to normal calm mental status- fully interactive. Pt was dialysed 2/7/19. Tolerated CPAP  trial after HD for @ 1 hr and switched  back to CMV due to tachypnea.        PAST MEDICAL & SURGICAL HISTORY:  Smoking hx  Cardiomyopathy  Wound: right chest  ICD (implantable cardioverter-defibrillator) in place  OA (osteoarthritis)  HLD (hyperlipidemia)  BPH (benign prostatic hyperplasia)  Pleural effusion  HTN (hypertension)  ESRD (end stage renal disease)  H/O chest wound: right  S/P thoracotomy: FB, R thoracotomy, decortication, chest wall reconstruction, lat flap  History of wound infection: right chest wall - revision 5/18 and again in 7/18  History of implantable cardioverter-defibrillator (ICD) placement: pt unsure when placed  H/O bilateral hip replacements: 2008, 2009    Allergies  No Known Allergies      Home Medications:  aspirin 81 mg oral tablet: 1 tab(s) orally once a day  (30 Oct 2018 14:39)  Breo Ellipta 100 mcg-25 mcg/inh inhalation powder: 1 puff(s) inhaled once a day patient denies using it (11 Oct 2018 08:38)  Calcium 500: 1 tab(s) orally 2 times a day (11 Oct 2018 08:38)  docusate sodium 100 mg oral tablet: 1 tab(s) orally 2 times a day, As Needed (11 Oct 2018 08:39)  folic acid 1 mg oral tablet: 1 tab(s) orally once a day (11 Oct 2018 08:38)  Mag-Ox 400 oral tablet: 1 tab(s) orally once a day (11 Oct 2018 08:39)  Multiple Vitamins oral tablet: 1 tab(s) orally once a day  (30 Oct 2018 14:39)  Namenda 10 mg oral tablet: 1 tab(s) orally 2 times a day (11 Oct 2018 08:38)  Nephplex Rx oral tablet: 1 tab(s) orally once a day (11 Oct 2018 08:39)  nortriptyline 50 mg oral capsule: 1 cap(s) orally once a day (11 Oct 2018 08:39)  Renvela 800 mg oral tablet: 2 tab(s) orally every 8 hours (11 Oct 2018 08:39)  simethicone 80 mg oral tablet: 1 tab(s) orally 3 times a day (after meals) (11 Oct 2018 08:39)  Tylenol 325 mg oral tablet: 2 tab(s) orally every 6 hours, As Needed (30 Oct 2018 14:39)  vancomycin 1 g intravenous injection: 1 gram(s) intravenous 3 times a week  Please give 3 x per week with Dialysis until 2/3/2019 (22 Jan 2019 13:41)  vitamin A: 1 tab(s) orally once a day (11 Oct 2018 08:38)  Vitamin B Complex: 1 tab(s) orally once a day (11 Oct 2018 08:38)  Vitamin C 500 mg oral tablet: 1 tab(s) orally once a day (11 Oct 2018 08:38)        ***VITAL SIGNS:  Vital Signs Last 24 Hrs  T(C): 37.2 (08 Feb 2019 00:00), Max: 37.2 (08 Feb 2019 00:00)  T(F): 98.9 (08 Feb 2019 00:00), Max: 98.9 (08 Feb 2019 00:00)  HR: 93 (08 Feb 2019 03:43) (57 - 124)  BP: 92/63 (08 Feb 2019 00:10) (92/63 - 116/74)  BP(mean): 73 (08 Feb 2019 00:10) (70 - 83)  RR: 22 (08 Feb 2019 03:00) (19 - 24)  SpO2: 98% (08 Feb 2019 03:43) (95% - 100%)    I/Os:   I&O's Detail    06 Feb 2019 07:01  -  07 Feb 2019 07:00  --------------------------------------------------------  IN:    dexmedetomidine Infusion: 363 mL    Enteral Tube Flush: 160 mL    IV PiggyBack: 200 mL    Nepro with Carb Steady: 840 mL    norepinephrine Infusion: 72.1 mL    propofol Infusion: 100.8 mL  Total IN: 1735.9 mL    OUT:    Bulb: 35 mL    Rectal Tube: 75 mL  Total OUT: 110 mL    Total NET: 1625.9 mL      07 Feb 2019 07:01  -  08 Feb 2019 03:57  --------------------------------------------------------  IN:    dexmedetomidine Infusion: 247.5 mL    Enteral Tube Flush: 160 mL    IV PiggyBack: 200 mL    Nepro with Carb Steady: 735 mL    norepinephrine Infusion: 71.5 mL    Other: 400 mL    propofol Infusion: 37.8 mL    sodium chloride 0.9%.: 210 mL  Total IN: 2061.8 mL    OUT:    Bulb: 30 mL    Other: 1400 mL    Rectal Tube: 25 mL  Total OUT: 1455 mL    Total NET: 606.8 mL    =======================  MEDICATIONS  ===================  MEDICATIONS  (STANDING):  ALPRAZolam 0.5 milliGRAM(s) Oral every 8 hours  collagenase Ointment 1 Application(s) Topical daily  dexmedetomidine Infusion 0.5 MICROgram(s)/kG/Hr (7.5 mL/Hr) IV Continuous <Continuous>  epoetin kelly Injectable 3000 Unit(s) IV Push <User Schedule>  fluconAZOLE IVPB      fluconAZOLE IVPB 200 milliGRAM(s) IV Intermittent every 24 hours  heparin  Injectable 5000 Unit(s) SubCutaneous every 12 hours  ipratropium 17 MICROgram(s) HFA Inhaler 2 Puff(s) Inhalation every 6 hours  midodrine 10 milliGRAM(s) Oral every 8 hours  norepinephrine Infusion 0.05 MICROgram(s)/kG/Min (5.625 mL/Hr) IV Continuous <Continuous>  pantoprazole  Injectable 40 milliGRAM(s) IV Push daily  piperacillin/tazobactam IVPB. 3.375 Gram(s) IV Intermittent once  piperacillin/tazobactam IVPB. 3.375 Gram(s) IV Intermittent every 12 hours  propofol Infusion 20 MICROgram(s)/kG/Min (7.2 mL/Hr) IV Continuous <Continuous>  QUEtiapine 50 milliGRAM(s) Oral every 12 hours  sodium chloride 0.9%. 1000 milliLiter(s) (10 mL/Hr) IV Continuous <Continuous>  vancomycin  IVPB 1000 milliGRAM(s) IV Intermittent once    MEDICATIONS  (PRN):  acetaminophen  IVPB .. 1000 milliGRAM(s) IV Intermittent once PRN Temp greater or equal to 38C (100.4F), Moderate Pain (4 - 6)  ALBUTerol    90 MICROgram(s) HFA Inhaler 2 Puff(s) Inhalation every 6 hours PRN Shortness of Breath and/or Wheezing      ======================VENTILATOR SETTINGS  ==============  Mode: AC/ CMV (Assist Control/ Continuous Mandatory Ventilation)  RR (machine): 22  TV (machine): 400  FiO2: 40  PEEP: 5  ITime: 0.64  MAP: 10  PIP: 26    =================== PATIENT CARE ACCESS DEVICES ==========  Peripheral IV (+)  Central Venous Line R/ J(+)  Arterial Line	R/  Ax(+)			  ======================= PHYSICAL EXAM===================  General:          Comfortable, Awake, alert, no distress  Neuro:             Moving all extremities to commands. No focal deficits	  HEENT:                           CHER/  trach  Respiratory:	Lungs clear on auscultation bilaterally with good aeration.                           No rales, rhonchi, no wheezing.   CV:	           Regular rate and rhythm.(+) S1/S2.   Abdomen:	Soft,  nontender, not-distended. Bowel sounds present   Skin:		No rash.  Extremities:	Warm, no cyanosis or edema.  Palpable pulses  ============================ LABS =======================                        9.5    10.97 )-----------( 313      ( 08 Feb 2019 02:20 )             31.5                     8.9    5.82  )-----------( 235      ( 07 Feb 2019 03:50 )             29.1     02-08    136  |  97<L>  |  31<H>  ----------------------------<  116<H>  3.8   |  25         |  3.26<H>    Ca    8.9      08 Feb 2019 02:20  Phos  4.0     02-08  Mg     2.2     02-08    TPro  6.1  /  Alb  2.3<L>  /  TBili  0.3  /  DBili  x   /  AST  65<H>  /  ALT  25  /  AlkPhos  326<H>  02-08    LIVER FUNCTIONS - ( 08 Feb 2019 02:20 )  Alb: 2.3 g/dL / Pro: 6.1 g/dL / ALK PHOS: 326 u/L / ALT: 25 u/L / AST: 65 u/L / GGT: x           PT/INR - ( 08 Feb 2019 02:20 )   PT: 11.7 SEC;   INR: 1.05     PTT:31.3 SEC    ABG - ( 08 Feb 2019 02:20 )  pH, Arterial: 7.41  pH, Blood: x     /  pCO2: 46    /  pO2: 101   / HCO3: 28    / Base Excess: 3.9   /  SaO2: 97.8        BLOOD  02-05-19 --  --  --  ENDOTRACHEAL SPECIMEN  02-01-19 --  --  Staph. aureus *MRSA*  Klebsiella pneumoniae  BLOOD PERIPHERAL  02-01-19 --  --  BLOOD CULTURE PCR  Staphylococcus sp.,coag neg  BLOOD VENOUS  01-29-19 --  --  --  LUNG - UPPER LOBE LEFT  01-29-19 --  --  --  BLOOD PERIPHERAL  01-28-19 --  --  --  BLOOD PERIPHERAL  01-09-19 --  --  --  PLEURAL FLUID  01-09-19 --  --    WBC^White Blood Cells  QNTY CELLS IN GRAM STAIN: FEW (2+)  NOS^No Organisms Seen  ===================== IMAGING STUDIES ===================  Radiology personally reviewed.  < from: Xray Chest 1 View- PORTABLE-Routine (02.07.19 @ 07:00) >  INTERPRETATION:    Tracheostomy tube, right IJ line and subcutaneous AICD are unchanged.   Bilateral pleural effusions right greater than left is seen. Upper lung   fields are clear. Heart size is stable. No pneumothorax.    COMPARISON:  February 6    IMPRESSION:  Follow-up with no interval change again demonstrating   bilateral small effusions.    < end of copied text >       =====================ASSESSMENT AND PLAN ================  Mr. Han is a 58M who underwent a FB, R thoracotomy, decortication, chest wall reconstruction, lat flap with Dr Pickering on 10/11/18. He was admitted on 1/6/19 to [facility] with a R pleural effusion and respiratory failure,  A R PTC was placed there and he was intubated.  He still had a R SANDY drain in place and he was transferred to [facility]. Patient was found to have aspirated a foreign body and +MRSA empyema. He was treated and discharged on 1/22/2019.     He was found by his home nurse to be in acute distress and recommended to proceed to the ED.  At presentation, he was found to be in acute hypercarbic respiratory failure complicated by hemodynamic instability requiring emergent intubation and pressor support. (28 Jan 2019 15:34)    Procedure:        Trach and PEG  1/31/2019  Bronchoscopy   1/29/2019                          Right thoracotomy, resection of portion of R 7th rib, evacuation of infected hemothorax, takedown of pleurocutaneous fistula  10/11/2018    Issues:  Acute hypoxic & hypercapnic respiratory failure  Hypotension  MRSA and Klebsiella PNA /  Fever / Sepsis  ESRD / HD  chronic anemia  Cardiomyopathy              HLD    ====================== NEUROLOGY=======================  Pain control with Fentanyl  Pt is on Precedex for agitation  Seroquel/ Alprazolam for delirium ( improved)  ==================== RESPIRATORY========================  Pt is on  full [facility] vent support  CPAP and possibly trach collar trial today  Comfortable, no evidence of distress.  Monitor R  chest pigtail tube output ( to bulb suction)  	  Mechanical Ventilation:  Mode: AC/ CMV (Assist Control/ Continuous Mandatory Ventilation)  RR (machine): 22  TV (machine): 400  FiO2: 50  PEEP: 5  MAP: 10  PIP: 26  CPAP trials  as tolerated/ possible trach collar if ok on CPAP  Mechanical ventilator status assessed & settings reviewed  Continuous pulse oximetry monitoring  Continue bronchodilators, pulmonary toilet  Head of bed elevation to 30-40 degrees  Continue antibiotics -  Vanco based on level + Zosyn. Diflucan added because of yeast in BAL ( likely d/c after 7 days as per ID)    ====================CARDIOVASCULAR=====================  Continue hemodynamic monitoring/ telemetry   Hypotension: On  Levophed, titrate  to MAP>65,   Restarted Midodrine 10mg/TID, Wean off Levo as tolerated  ===================== RENAL ============================  ESRD on HD TIW  Monitor I/Os, BUN/ Cr  and electrolytes  Renal f/up    ==================== GASTROINTESTINAL===================  On J tube feeds Nepro 35cc/hr diet, tolerating well  Continue GI prophylaxis with Protonix   Zofran / Reglan for nausea - PRN  =======================    ENDOCRIN  =====================  Glycemic monitoring  F/S with coverage  ===================HEMATOLOGIC/ONCOLOGIC =============   No signs of active bleeding.   Follow CBC, coags  in AM  DVT prophylaxis with SCD, sc Heparin  ========================INFECTIOUS DISEASE===============  Monitor for fever / leukocytosis.  - MRSA/ Klebsiella PNA: Bronchoscopy on 1/29 showed copious greenish secretions - Continue pulmonary toilet/ ABX   Blood cultures - [facility], Continue Vanco by  level  + Zosyn + Diflucan  ID on board - dr Hernadez    Pertinent clinical, laboratory, radiographic, hemodynamic, echocardiographic, respiratory data, microbiologic data and chart were reviewed and analyzed frequently throughout the course of the day and night. GI and DVT prophylaxis, glycemic control, head of bed elevation and skin care issues were addressed.  Patient seen, examined and plan discussed with CT Surgery / CTICU team during rounds.  Pt remains critically ill in imminent risk of  deterioration and requires very careful cardio- pulmonary monitoring and support.    I have spent  60      minutes of critical care time with this pt between  12    am/pm   and  8 am         minutes spent on total encounter; more than 50% of the visit was spent counseling and/or coordinating care by the attending physician.      KERLINE Faith MD

## 2019-02-09 LAB
ANION GAP SERPL CALC-SCNC: 15 MMO/L — HIGH (ref 7–14)
BUN SERPL-MCNC: 39 MG/DL — HIGH (ref 7–23)
CALCIUM SERPL-MCNC: 9.3 MG/DL — SIGNIFICANT CHANGE UP (ref 8.4–10.5)
CHLORIDE SERPL-SCNC: 95 MMOL/L — LOW (ref 98–107)
CO2 SERPL-SCNC: 24 MMOL/L — SIGNIFICANT CHANGE UP (ref 22–31)
CREAT SERPL-MCNC: 4.15 MG/DL — HIGH (ref 0.5–1.3)
GLUCOSE SERPL-MCNC: 117 MG/DL — HIGH (ref 70–99)
HCT VFR BLD CALC: 28.9 % — LOW (ref 39–50)
HGB BLD-MCNC: 8.7 G/DL — LOW (ref 13–17)
MCHC RBC-ENTMCNC: 30.1 % — LOW (ref 32–36)
MCHC RBC-ENTMCNC: 30.4 PG — SIGNIFICANT CHANGE UP (ref 27–34)
MCV RBC AUTO: 101 FL — HIGH (ref 80–100)
NRBC # FLD: 0 K/UL — LOW (ref 25–125)
PLATELET # BLD AUTO: 275 K/UL — SIGNIFICANT CHANGE UP (ref 150–400)
PMV BLD: 9.8 FL — SIGNIFICANT CHANGE UP (ref 7–13)
POTASSIUM SERPL-MCNC: 4 MMOL/L — SIGNIFICANT CHANGE UP (ref 3.5–5.3)
POTASSIUM SERPL-SCNC: 4 MMOL/L — SIGNIFICANT CHANGE UP (ref 3.5–5.3)
RBC # BLD: 2.86 M/UL — LOW (ref 4.2–5.8)
RBC # FLD: 15.9 % — HIGH (ref 10.3–14.5)
SODIUM SERPL-SCNC: 134 MMOL/L — LOW (ref 135–145)
VANCOMYCIN FLD-MCNC: 29.1 UG/ML — CRITICAL HIGH
VANCOMYCIN TROUGH SERPL-MCNC: 21.5 UG/ML — HIGH (ref 10–20)
WBC # BLD: 7.6 K/UL — SIGNIFICANT CHANGE UP (ref 3.8–10.5)
WBC # FLD AUTO: 7.6 K/UL — SIGNIFICANT CHANGE UP (ref 3.8–10.5)

## 2019-02-09 PROCEDURE — 71045 X-RAY EXAM CHEST 1 VIEW: CPT | Mod: 26

## 2019-02-09 PROCEDURE — 90935 HEMODIALYSIS ONE EVALUATION: CPT | Mod: GC

## 2019-02-09 PROCEDURE — 99291 CRITICAL CARE FIRST HOUR: CPT

## 2019-02-09 RX ADMIN — Medication 0.5 MILLIGRAM(S): at 21:42

## 2019-02-09 RX ADMIN — SODIUM CHLORIDE 10 MILLILITER(S): 9 INJECTION INTRAMUSCULAR; INTRAVENOUS; SUBCUTANEOUS at 08:00

## 2019-02-09 RX ADMIN — PIPERACILLIN AND TAZOBACTAM 25 GRAM(S): 4; .5 INJECTION, POWDER, LYOPHILIZED, FOR SOLUTION INTRAVENOUS at 21:42

## 2019-02-09 RX ADMIN — HEPARIN SODIUM 5000 UNIT(S): 5000 INJECTION INTRAVENOUS; SUBCUTANEOUS at 05:56

## 2019-02-09 RX ADMIN — MIDODRINE HYDROCHLORIDE 10 MILLIGRAM(S): 2.5 TABLET ORAL at 21:42

## 2019-02-09 RX ADMIN — QUETIAPINE FUMARATE 50 MILLIGRAM(S): 200 TABLET, FILM COATED ORAL at 05:55

## 2019-02-09 RX ADMIN — Medication 2 PUFF(S): at 03:05

## 2019-02-09 RX ADMIN — Medication 2 PUFF(S): at 15:25

## 2019-02-09 RX ADMIN — DEXMEDETOMIDINE HYDROCHLORIDE IN 0.9% SODIUM CHLORIDE 7.5 MICROGRAM(S)/KG/HR: 4 INJECTION INTRAVENOUS at 08:00

## 2019-02-09 RX ADMIN — MIDODRINE HYDROCHLORIDE 10 MILLIGRAM(S): 2.5 TABLET ORAL at 14:08

## 2019-02-09 RX ADMIN — DEXMEDETOMIDINE HYDROCHLORIDE IN 0.9% SODIUM CHLORIDE 7.5 MICROGRAM(S)/KG/HR: 4 INJECTION INTRAVENOUS at 21:42

## 2019-02-09 RX ADMIN — PANTOPRAZOLE SODIUM 40 MILLIGRAM(S): 20 TABLET, DELAYED RELEASE ORAL at 11:05

## 2019-02-09 RX ADMIN — MIDODRINE HYDROCHLORIDE 10 MILLIGRAM(S): 2.5 TABLET ORAL at 05:55

## 2019-02-09 RX ADMIN — HEPARIN SODIUM 5000 UNIT(S): 5000 INJECTION INTRAVENOUS; SUBCUTANEOUS at 17:06

## 2019-02-09 RX ADMIN — ERYTHROPOIETIN 3000 UNIT(S): 10000 INJECTION, SOLUTION INTRAVENOUS; SUBCUTANEOUS at 09:34

## 2019-02-09 RX ADMIN — Medication 0.5 MILLIGRAM(S): at 14:08

## 2019-02-09 RX ADMIN — Medication 2 PUFF(S): at 10:20

## 2019-02-09 RX ADMIN — QUETIAPINE FUMARATE 50 MILLIGRAM(S): 200 TABLET, FILM COATED ORAL at 17:06

## 2019-02-09 RX ADMIN — Medication 2 PUFF(S): at 21:52

## 2019-02-09 RX ADMIN — PIPERACILLIN AND TAZOBACTAM 25 GRAM(S): 4; .5 INJECTION, POWDER, LYOPHILIZED, FOR SOLUTION INTRAVENOUS at 10:53

## 2019-02-09 RX ADMIN — Medication 0.5 MILLIGRAM(S): at 05:56

## 2019-02-09 NOTE — PROGRESS NOTE ADULT - SUBJECTIVE AND OBJECTIVE BOX
CLAUDIA HAN            MRN-[id number]         No Known Allergies               Mr. Han is a 58M who underwent a FB, R thoracotomy, decortication, chest wall reconstruction, lat flap with Dr Pickering on 10/11/18. He was admitted on 1/6/19 to [facility] with a R pleural effusion and respiratory failure,  A R PTC was placed there and he was intubated.  He still had a R SANDY drain in place and he was transferred to [facility]. Patient was found to have aspirated a foreign body and +MRSA empyema. He was treated and discharged on 1/22/2019.     He was found by his home nurse to be in acute distress and recommended to proceed to the ED.  At presentation, he was found to be in acute hypercarbic respiratory failure complicated by hemodynamic instability requiring emergent intubation and pressor support. (28 Jan 2019 15:34)      Procedure:        Trach and PEG  1/31/2019  Bronchoscopy   1/29/2019  Right thoracotomy, resection of portion of R 7th rib, evacuation of infected hemothorax, takedown of pleurocutaneous fistula  10/11/2018      Issues:  Acute hypoxic & hypercapnic respiratory failure  Hypotension  Pneumonia /  Fever / Sepsis  ESRD / HD  Cardiomyopathy  HLD  BPH  Anxiety / Agitation    Drips:  Precedex                     Home Medications:  aspirin 81 mg oral tablet: 1 tab(s) orally once a day  (30 Oct 2018 14:39)  Breo Ellipta 100 mcg-25 mcg/inh inhalation powder: 1 puff(s) inhaled once a day patient denies using it (11 Oct 2018 08:38)  Calcium 500: 1 tab(s) orally 2 times a day (11 Oct 2018 08:38)  docusate sodium 100 mg oral tablet: 1 tab(s) orally 2 times a day, As Needed (11 Oct 2018 08:39)  folic acid 1 mg oral tablet: 1 tab(s) orally once a day (11 Oct 2018 08:38)  Mag-Ox 400 oral tablet: 1 tab(s) orally once a day (11 Oct 2018 08:39)  Multiple Vitamins oral tablet: 1 tab(s) orally once a day  (30 Oct 2018 14:39)  Namenda 10 mg oral tablet: 1 tab(s) orally 2 times a day (11 Oct 2018 08:38)  Nephplex Rx oral tablet: 1 tab(s) orally once a day (11 Oct 2018 08:39)  nortriptyline 50 mg oral capsule: 1 cap(s) orally once a day (11 Oct 2018 08:39)  Renvela 800 mg oral tablet: 2 tab(s) orally every 8 hours (11 Oct 2018 08:39)  simethicone 80 mg oral tablet: 1 tab(s) orally 3 times a day (after meals) (11 Oct 2018 08:39)  Tylenol 325 mg oral tablet: 2 tab(s) orally every 6 hours, As Needed (30 Oct 2018 14:39)  vancomycin 1 g intravenous injection: 1 gram(s) intravenous 3 times a week  Please give 3 x per week with Dialysis until 2/3/2019 (22 Jan 2019 13:41)  vitamin A: 1 tab(s) orally once a day (11 Oct 2018 08:38)  Vitamin B Complex: 1 tab(s) orally once a day (11 Oct 2018 08:38)  Vitamin C 500 mg oral tablet: 1 tab(s) orally once a day (11 Oct 2018 08:38)      PAST MEDICAL & SURGICAL HISTORY:  Smoking hx  Cardiomyopathy  Wound: right chest  ICD (implantable cardioverter-defibrillator) in place  OA (osteoarthritis)  HLD (hyperlipidemia)  BPH (benign prostatic hyperplasia)  Pleural effusion  HTN (hypertension)  ESRD (end stage renal disease)  H/O chest wound: right  S/P thoracotomy: FB, R thoracotomy, decortication, chest wall reconstruction, lat flap  History of wound infection: right chest wall - revision 5/18 and again in 7/18  History of implantable cardioverter-defibrillator (ICD) placement: pt unsure when placed  H/O bilateral hip replacements: 2008, 2009        ICU Vital Signs Last 24 Hrs  T(C): 37.1 (09 Feb 2019 04:00), Max: 37.2 (08 Feb 2019 08:00)  T(F): 98.7 (09 Feb 2019 04:00), Max: 98.9 (08 Feb 2019 08:00)  HR: 66 (09 Feb 2019 06:00) (62 - 100)  BP: 109/51 (09 Feb 2019 05:45) (104/54 - 109/51)  BP(mean): 67 (09 Feb 2019 05:45) (66 - 76)  ABP: 114/63 (09 Feb 2019 06:00) (98/56 - 120/62)  ABP(mean): 80 (09 Feb 2019 06:00) (70 - 85)  RR: 22 (09 Feb 2019 06:00) (17 - 26)  SpO2: 100% (09 Feb 2019 06:00) (96% - 100%)    I&O's Detail    07 Feb 2019 07:01  -  08 Feb 2019 07:00  --------------------------------------------------------  IN:    dexmedetomidine Infusion: 288 mL    Enteral Tube Flush: 200 mL    IV PiggyBack: 550 mL    Nepro with Carb Steady: 840 mL    norepinephrine Infusion: 76.1 mL    Other: 400 mL    propofol Infusion: 37.8 mL    sodium chloride 0.9%.: 240 mL  Total IN: 2631.9 mL    OUT:    Bulb: 45 mL    Other: 1400 mL    Rectal Tube: 125 mL  Total OUT: 1570 mL    Total NET: 1061.9 mL      08 Feb 2019 07:01  -  09 Feb 2019 06:49  --------------------------------------------------------  IN:    dexmedetomidine Infusion: 360 mL    Enteral Tube Flush: 160 mL    IV PiggyBack: 200 mL    Nepro with Carb Steady: 840 mL    sodium chloride 0.9%.: 240 mL  Total IN: 1800 mL    OUT:    Bulb: 45 mL    Rectal Tube: 175 mL  Total OUT: 220 mL    Total NET: 1580 mL        CAPILLARY BLOOD GLUCOSE          Home Medications:  aspirin 81 mg oral tablet: 1 tab(s) orally once a day  (30 Oct 2018 14:39)  Breo Ellipta 100 mcg-25 mcg/inh inhalation powder: 1 puff(s) inhaled once a day patient denies using it (11 Oct 2018 08:38)  Calcium 500: 1 tab(s) orally 2 times a day (11 Oct 2018 08:38)  docusate sodium 100 mg oral tablet: 1 tab(s) orally 2 times a day, As Needed (11 Oct 2018 08:39)  folic acid 1 mg oral tablet: 1 tab(s) orally once a day (11 Oct 2018 08:38)  Mag-Ox 400 oral tablet: 1 tab(s) orally once a day (11 Oct 2018 08:39)  Multiple Vitamins oral tablet: 1 tab(s) orally once a day  (30 Oct 2018 14:39)  Namenda 10 mg oral tablet: 1 tab(s) orally 2 times a day (11 Oct 2018 08:38)  Nephplex Rx oral tablet: 1 tab(s) orally once a day (11 Oct 2018 08:39)  nortriptyline 50 mg oral capsule: 1 cap(s) orally once a day (11 Oct 2018 08:39)  Renvela 800 mg oral tablet: 2 tab(s) orally every 8 hours (11 Oct 2018 08:39)  simethicone 80 mg oral tablet: 1 tab(s) orally 3 times a day (after meals) (11 Oct 2018 08:39)  Tylenol 325 mg oral tablet: 2 tab(s) orally every 6 hours, As Needed (30 Oct 2018 14:39)  vancomycin 1 g intravenous injection: 1 gram(s) intravenous 3 times a week  Please give 3 x per week with Dialysis until 2/3/2019 (22 Jan 2019 13:41)  vitamin A: 1 tab(s) orally once a day (11 Oct 2018 08:38)  Vitamin B Complex: 1 tab(s) orally once a day (11 Oct 2018 08:38)  Vitamin C 500 mg oral tablet: 1 tab(s) orally once a day (11 Oct 2018 08:38)      MEDICATIONS  (STANDING):  ALPRAZolam 0.5 milliGRAM(s) Oral every 8 hours  collagenase Ointment 1 Application(s) Topical daily  dexmedetomidine Infusion 0.5 MICROgram(s)/kG/Hr (7.5 mL/Hr) IV Continuous <Continuous>  epoetin kelly Injectable 3000 Unit(s) IV Push <User Schedule>  heparin  Injectable 5000 Unit(s) SubCutaneous every 12 hours  ipratropium 17 MICROgram(s) HFA Inhaler 2 Puff(s) Inhalation every 6 hours  midodrine 10 milliGRAM(s) Oral every 8 hours  norepinephrine Infusion 0.05 MICROgram(s)/kG/Min (5.625 mL/Hr) IV Continuous <Continuous>  pantoprazole  Injectable 40 milliGRAM(s) IV Push daily  piperacillin/tazobactam IVPB. 3.375 Gram(s) IV Intermittent once  piperacillin/tazobactam IVPB. 3.375 Gram(s) IV Intermittent every 12 hours  propofol Infusion 20 MICROgram(s)/kG/Min (7.2 mL/Hr) IV Continuous <Continuous>  QUEtiapine 50 milliGRAM(s) Oral every 12 hours  sodium chloride 0.9%. 1000 milliLiter(s) (10 mL/Hr) IV Continuous <Continuous>    MEDICATIONS  (PRN):  acetaminophen  IVPB .. 1000 milliGRAM(s) IV Intermittent once PRN Temp greater or equal to 38C (100.4F), Moderate Pain (4 - 6)  ALBUTerol    90 MICROgram(s) HFA Inhaler 2 Puff(s) Inhalation every 6 hours PRN Shortness of Breath and/or Wheezing      Mode: AC/ CMV (Assist Control/ Continuous Mandatory Ventilation)  RR (machine): 22  TV (machine): 400  FiO2: 40  PEEP: 5  ITime: 0.64  MAP: 11  PIP: 33      Physical exam:     General:               Pt awake / alert                                              Neuro:                  awake / alert, following commands                           Cardiovascular:   S1 & S2, regular                           Respiratory:         Air entry is decreased at  both bases, has bilateral conducted sounds                           GI:                          Soft, nondistended and nontender, Bowel sounds active                            Ext:                        No cyanosis or edema     Labs:                                                                           8.7    7.60  )-----------( 275      ( 09 Feb 2019 04:25 )             28.9             02-09    134<L>  |  95<L>  |  39<H>  ----------------------------<  117<H>  4.0   |  24  |  4.15<H>    Ca    9.3      09 Feb 2019 04:25  Phos  4.0     02-08  Mg     2.2     02-08    TPro  6.1  /  Alb  2.3<L>  /  TBili  0.3  /  DBili  x   /  AST  65<H>  /  ALT  25  /  AlkPhos  326<H>  02-08                  PT/INR - ( 08 Feb 2019 02:20 )   PT: 11.7 SEC;   INR: 1.05          PTT - ( 08 Feb 2019 02:20 )  PTT:31.3 SEC  LIVER FUNCTIONS - ( 08 Feb 2019 02:20 )  Alb: 2.3 g/dL / Pro: 6.1 g/dL / ALK PHOS: 326 u/L / ALT: 25 u/L / AST: 65 u/L / GGT: x                   CXR:  < from: Xray Chest 1 View- PORTABLE-Routine (02.08.19 @ 07:24) >  Tracheostomy tube in addition to the left-sided subcutaneous AICD and   right IJ line are unchanged. Bilateral small loculated effusions again   seen. Lungs are free of focal abnormalities. The heart is not enlarged.   No pneumothorax.      COMPARISON:  February 7      IMPRESSION:  Small loculated effusions with tubes and lines in place.            Plan: Mr. Han is a 58M who underwent a FB, R thoracotomy, decortication, chest wall reconstruction, lat flap with Dr Pickering on 10/11/18. He was admitted on 1/6/19 to [facility] with a R pleural effusion and respiratory failure,  A R PTC was placed there and he was intubated.  He still had a R SANDY drain in place and he was transferred to [facility]. Patient was found to have aspirated a foreign body and +MRSA empyema. He was treated and discharged on 1/22/2019.     He was found by his home nurse to be in acute distress and recommended to proceed to the ED.  At presentation, he was found to be in acute hypercarbic respiratory failure complicated by hemodynamic instability requiring emergent intubation and pressor support. (28 Jan 2019 15:34)                            Neuro:                                         Pain control with Fentanyl PRN / Tylenol     Agitation /Anxiety: Continue Xanax and Seroquel. Wean off Precedex                            Cardiovascular:                                          Continue hemodynamic monitoring.    Hypotension: On  Midodrine 10mg/TID                                                  Respiratory:                        Pt is on full mechanical ventilator support ME--40-7. Tolerated CPAP for 8 hrs yesterday, wean as tolerated                                         Trached on 1/31                                                                 Continue bronchodilators, pulmonary toilet     Continue antibiotics -  Vanco based on level + Zosyn.                             GI                                         Nepro 35cc/hr. Flush PEG with 30cc sterile water Q4hrs                                         Continue GI prophylaxis with Protonix                                                                 Renal:                                         HD as per renal / TIW                                          Monitor I/Os and electrolytes                                                                                        Hem/ Onc:                                                                                 Follow CBC in AM                           Infectious disease:     Bronchoscopy on 1/29 showed copious greenish secretions - Continue pulmonary toilet                                         BAL - MRSA, Continue Vanco based on level  + Zosyn for 4 weeks. Blood cultures are negative     I.D f/u                                                                   Endocrine         Continue Accu-Checks with coverage      Pt is on SQ Heparin and Venodyne boots for DVT prophylaxis.     Pertinent clinical, laboratory, radiographic, hemodynamic, echocardiographic, respiratory data, microbiologic data and chart were reviewed and analyzed frequently throughout the course of the day and night  Patient seen, examined and plan discussed with CT Surgeon / CTICU team during rounds.    Pt's status discussed with wife at bedside, updated status.     I have spent 40  minutes of critical care time with this pt between 00am and 9am                Ralph Warren MD

## 2019-02-09 NOTE — PROGRESS NOTE ADULT - SUBJECTIVE AND OBJECTIVE BOX
[facility] DIVISION OF KIDNEY DISEASES AND HYPERTENSION -- FOLLOW UP NOTE  --------------------------------------------------------------------------------    HPI: 59 yo male with medical history of NICM with ICD, ESRD on HD, former smoker, BPH admitted with acute hypercapnic respiratory failure. Nephrology consulted for ESRD/HD management. Pt intubated and sedated in the CTICU. Pt. currently admitted with hypercarbic respiratory failure, septic shock. Last HD on 2/5/19. Pt seen in the CTICU s/p Trach on 1/31/19.  Pt. on IV Pressor support today at bedside. Pt awake alert and responsive tPlan for HD today     PAST HISTORY  --------------------------------------------------------------------------------  No significant changes to PMH, PSH, FHx, SHx, unless otherwise noted    ALLERGIES & MEDICATIONS  --------------------------------------------------------------------------------  Allergies    No Known Allergies    Intolerances      Standing Inpatient Medications  ALPRAZolam 0.5 milliGRAM(s) Oral every 8 hours  collagenase Ointment 1 Application(s) Topical daily  dexmedetomidine Infusion 0.5 MICROgram(s)/kG/Hr IV Continuous <Continuous>  epoetin kelly Injectable 3000 Unit(s) IV Push <User Schedule>  heparin  Injectable 5000 Unit(s) SubCutaneous every 12 hours  ipratropium 17 MICROgram(s) HFA Inhaler 2 Puff(s) Inhalation every 6 hours  midodrine 10 milliGRAM(s) Oral every 8 hours  norepinephrine Infusion 0.05 MICROgram(s)/kG/Min IV Continuous <Continuous>  pantoprazole  Injectable 40 milliGRAM(s) IV Push daily  piperacillin/tazobactam IVPB. 3.375 Gram(s) IV Intermittent once  piperacillin/tazobactam IVPB. 3.375 Gram(s) IV Intermittent every 12 hours  propofol Infusion 20 MICROgram(s)/kG/Min IV Continuous <Continuous>  QUEtiapine 50 milliGRAM(s) Oral every 12 hours  sodium chloride 0.9%. 1000 milliLiter(s) IV Continuous <Continuous>    PRN Inpatient Medications  acetaminophen  IVPB .. 1000 milliGRAM(s) IV Intermittent once PRN  ALBUTerol    90 MICROgram(s) HFA Inhaler 2 Puff(s) Inhalation every 6 hours PRN      REVIEW OF SYSTEMS  --------------------------------------------------------------------------------  Unable to obtain     VITALS/PHYSICAL EXAM  --------------------------------------------------------------------------------  T(C): 36.9 (02-09-19 @ 08:00), Max: 37.1 (02-08-19 @ 12:00)  HR: 64 (02-09-19 @ 09:00) (62 - 100)  BP: 110/59 (02-09-19 @ 09:00) (100/60 - 110/59)  RR: 21 (02-09-19 @ 09:00) (17 - 26)  SpO2: 100% (02-09-19 @ 09:00) (96% - 100%)  Wt(kg): --      02-08-19 @ 07:01  -  02-09-19 @ 07:00  --------------------------------------------------------  IN: 1800 mL / OUT: 220 mL / NET: 1580 mL    02-09-19 @ 07:01  -  02-09-19 @ 09:22  --------------------------------------------------------  IN: 90 mL / OUT: 0 mL / NET: 90 mL    Gen: + Trach  	HEENT: +Trach  	Pulm: coarse breath sounds B/L  	CV: S1S2  	Abd: Soft, +BS   	Ext: No LE edema B/L + RUE PICC  	Neuro: Awake  	Skin: Warm and dry  	Vascular access: LUE AVF site: +thrill, bruit heard.     LABS/STUDIES  --------------------------------------------------------------------------------              8.7    7.60  >-----------<  275      [02-09-19 @ 04:25]              28.9     134  |  95  |  39  ----------------------------<  117      [02-09-19 @ 04:25]  4.0   |  24  |  4.15        Ca     9.3     [02-09-19 @ 04:25]      Mg     2.2     [02-08-19 @ 02:20]      Phos  4.0     [02-08-19 @ 02:20]    TPro  6.1  /  Alb  2.3  /  TBili  0.3  /  DBili  x   /  AST  65  /  ALT  25  /  AlkPhos  326  [02-08-19 @ 02:20]    PT/INR: PT 11.7 , INR 1.05       [02-08-19 @ 02:20]  PTT: 31.3       [02-08-19 @ 02:20]    Creatinine Trend:  SCr 4.15 [02-09 @ 04:25]  SCr 3.26 [02-08 @ 02:20]  SCr 3.13 [02-07 @ 20:46]  SCr 4.18 [02-07 @ 03:50]  SCr 3.68 [02-06 @ 04:30]    HbA1c 5.5      [10-12-18 @ 02:53]  TSH 4.79      [10-21-18 @ 04:15]    HBsAb Reactive      [02-01-19 @ 12:20]  HBsAg NEGATIVE      [02-01-19 @ 12:20]  HBcAb Reactive      [02-01-19 @ 12:20]  HCV 0.23, Nonreactive Hepatitis C AB  S/CO Ratio                        Interpretation  < 1.0                                     Non-Reactive  1.0 - 4.9                           Weakly-Reactive  > 5.0                                 Reactive  Non-Reactive: Aperson with a non-reactive HCV antibody  result is considered uninfected.  No further action is  needed unless recent infection is suspected.  In these  cases, consider repeat testing later to detect  seroconversion..  Weakly-Reactive: HCV antibody test is abnormal, HCV RNA  Qualitative test will follow.  Reactive: HCV antibody test is abnormal, HCV RNA  Qualitative test will follow.  Note: HCV antibody testing is performed on the [employer] system.      [02-01-19 @ 12:20]

## 2019-02-09 NOTE — PROGRESS NOTE ADULT - SUBJECTIVE AND OBJECTIVE BOX
[facility] DIVISION OF KIDNEY DISEASES AND HYPERTENSION -- HEMODIALYSIS NOTE  --------------------------------------------------------------------------------  Chief Complaint: ESRD/Ongoing hemodialysis requirement    24 hour events/subjective:        PAST HISTORY  --------------------------------------------------------------------------------  No significant changes to PMH, PSH, FHx, SHx, unless otherwise noted    ALLERGIES & MEDICATIONS  --------------------------------------------------------------------------------  Allergies    No Known Allergies    Intolerances      Standing Inpatient Medications  ALPRAZolam 0.5 milliGRAM(s) Oral every 8 hours  collagenase Ointment 1 Application(s) Topical daily  dexmedetomidine Infusion 0.5 MICROgram(s)/kG/Hr IV Continuous <Continuous>  epoetin kelly Injectable 3000 Unit(s) IV Push <User Schedule>  heparin  Injectable 5000 Unit(s) SubCutaneous every 12 hours  ipratropium 17 MICROgram(s) HFA Inhaler 2 Puff(s) Inhalation every 6 hours  midodrine 10 milliGRAM(s) Oral every 8 hours  norepinephrine Infusion 0.05 MICROgram(s)/kG/Min IV Continuous <Continuous>  pantoprazole  Injectable 40 milliGRAM(s) IV Push daily  piperacillin/tazobactam IVPB. 3.375 Gram(s) IV Intermittent once  piperacillin/tazobactam IVPB. 3.375 Gram(s) IV Intermittent every 12 hours  propofol Infusion 20 MICROgram(s)/kG/Min IV Continuous <Continuous>  QUEtiapine 50 milliGRAM(s) Oral every 12 hours  sodium chloride 0.9%. 1000 milliLiter(s) IV Continuous <Continuous>    PRN Inpatient Medications  acetaminophen  IVPB .. 1000 milliGRAM(s) IV Intermittent once PRN  ALBUTerol    90 MICROgram(s) HFA Inhaler 2 Puff(s) Inhalation every 6 hours PRN      REVIEW OF SYSTEMS  --------------------------------------------------------------------------------  Gen: No weight changes, fatigue, fevers/chills, weakness  Skin: No rashes  Head/Eyes/Ears/Mouth: No headache; Normal hearing; Normal vision w/o blurriness; No sinus pain/discomfort, sore throat  Respiratory: No dyspnea, cough, wheezing, hemoptysis  CV: No chest pain, PND, orthopnea  GI: No abdominal pain, diarrhea, constipation, nausea, vomiting, melena, hematochezia  : No increased frequency, dysuria, hematuria, nocturia  MSK: No joint pain/swelling; no back pain; no edema  Neuro: No dizziness/lightheadedness, weakness, seizures, numbness, tingling  Heme: No easy bruising or bleeding  Endo: No heat/cold intolerance  Psych: No significant nervousness, anxiety, stress, depression    All other systems were reviewed and are negative, except as noted.    VITALS/PHYSICAL EXAM  --------------------------------------------------------------------------------  T(C): 36.9 (02-09-19 @ 08:00), Max: 37.1 (02-08-19 @ 12:00)  HR: 64 (02-09-19 @ 09:00) (62 - 95)  BP: 110/59 (02-09-19 @ 09:00) (100/60 - 110/59)  RR: 21 (02-09-19 @ 09:00) (17 - 26)  SpO2: 100% (02-09-19 @ 09:00) (97% - 100%)  Wt(kg): --        02-08-19 @ 07:01  -  02-09-19 @ 07:00  --------------------------------------------------------  IN: 1800 mL / OUT: 220 mL / NET: 1580 mL    02-09-19 @ 07:01  -  02-09-19 @ 09:54  --------------------------------------------------------  IN: 90 mL / OUT: 0 mL / NET: 90 mL      Physical Exam:  	Gen: NAD, well-appearing  	HEENT: PERRL, supple neck, clear oropharynx  	Pulm: CTA B/L  	CV: RRR, S1S2; no rub  	Back: No spinal or CVA tenderness; no sacral edema  	Abd: +BS, soft, nontender/nondistended  	: No suprapubic tenderness  	UE: Warm, FROM, no clubbing, intact strength; no edema; no asterixis  	LE: Warm, FROM, no clubbing, intact strength; no edema  	Neuro: No focal deficits, intact gait  	Psych: Normal affect and mood  	Skin: Warm, without rashes  	Vascular access:    LABS/STUDIES  --------------------------------------------------------------------------------              8.7    7.60  >-----------<  275      [02-09-19 @ 04:25]              28.9     134  |  95  |  39  ----------------------------<  117      [02-09-19 @ 04:25]  4.0   |  24  |  4.15        Ca     9.3     [02-09-19 @ 04:25]      Mg     2.2     [02-08-19 @ 02:20]      Phos  4.0     [02-08-19 @ 02:20]    TPro  6.1  /  Alb  2.3  /  TBili  0.3  /  DBili  x   /  AST  65  /  ALT  25  /  AlkPhos  326  [02-08-19 @ 02:20]    PT/INR: PT 11.7 , INR 1.05       [02-08-19 @ 02:20]  PTT: 31.3       [02-08-19 @ 02:20]      HbA1c 5.5      [10-12-18 @ 02:53]  TSH 4.79      [10-21-18 @ 04:15]    HBsAb Reactive      [02-01-19 @ 12:20]  HBsAg NEGATIVE      [02-01-19 @ 12:20]  HBcAb Reactive      [02-01-19 @ 12:20]  HCV 0.23, Nonreactive Hepatitis C AB  S/CO Ratio                        Interpretation  < 1.0                                     Non-Reactive  1.0 - 4.9                           Weakly-Reactive  > 5.0                                 Reactive  Non-Reactive: Aperson with a non-reactive HCV antibody  result is considered uninfected.  No further action is  needed unless recent infection is suspected.  In these  cases, consider repeat testing later to detect  seroconversion..  Weakly-Reactive: HCV antibody test is abnormal, HCV RNA  Qualitative test will follow.  Reactive: HCV antibody test is abnormal, HCV RNA  Qualitative test will follow.  Note: HCV antibody testing is performed on the [employer] system.      [02-01-19 @ 12:20]

## 2019-02-09 NOTE — PROGRESS NOTE ADULT - PROBLEM SELECTOR PLAN 1
Pt. with ESRD on HD TIW (TTS). Last HD was on 2/7/19 via AVF, tolerated treatment well. Labs reviewed. Plan for HD today. Monitor BP while on HD

## 2019-02-09 NOTE — PROGRESS NOTE ADULT - ASSESSMENT
Patient examined and ROS reviewed. A case of ESRD examined during HD. Patient is tolerating dialysis well. Blood flow through access is adequate. Advised to continue UF.

## 2019-02-09 NOTE — PROVIDER CONTACT NOTE (CRITICAL VALUE NOTIFICATION) - SITUATION
glucose off ABG 40
RN notified by  JACIEL Mckinnon that patient w/ Vancomycin Level, Random of 29.1.
Sputum culture send on 2/1/2019 positive for MRSA. Contact precautions initiated.

## 2019-02-10 LAB
ALBUMIN SERPL ELPH-MCNC: 2.3 G/DL — LOW (ref 3.3–5)
ALP SERPL-CCNC: 270 U/L — HIGH (ref 40–120)
ALT FLD-CCNC: 32 U/L — SIGNIFICANT CHANGE UP (ref 4–41)
ANION GAP SERPL CALC-SCNC: 12 MMO/L — SIGNIFICANT CHANGE UP (ref 7–14)
AST SERPL-CCNC: 80 U/L — HIGH (ref 4–40)
BACTERIA BLD CULT: SIGNIFICANT CHANGE UP
BILIRUB SERPL-MCNC: 0.3 MG/DL — SIGNIFICANT CHANGE UP (ref 0.2–1.2)
BUN SERPL-MCNC: 27 MG/DL — HIGH (ref 7–23)
CALCIUM SERPL-MCNC: 8.8 MG/DL — SIGNIFICANT CHANGE UP (ref 8.4–10.5)
CHLORIDE SERPL-SCNC: 99 MMOL/L — SIGNIFICANT CHANGE UP (ref 98–107)
CO2 SERPL-SCNC: 26 MMOL/L — SIGNIFICANT CHANGE UP (ref 22–31)
CREAT SERPL-MCNC: 3.36 MG/DL — HIGH (ref 0.5–1.3)
GLUCOSE SERPL-MCNC: 107 MG/DL — HIGH (ref 70–99)
HCT VFR BLD CALC: 29.5 % — LOW (ref 39–50)
HGB BLD-MCNC: 8.9 G/DL — LOW (ref 13–17)
MCHC RBC-ENTMCNC: 30.2 % — LOW (ref 32–36)
MCHC RBC-ENTMCNC: 30.7 PG — SIGNIFICANT CHANGE UP (ref 27–34)
MCV RBC AUTO: 101.7 FL — HIGH (ref 80–100)
NRBC # FLD: 0 K/UL — LOW (ref 25–125)
PLATELET # BLD AUTO: 317 K/UL — SIGNIFICANT CHANGE UP (ref 150–400)
PMV BLD: 9.4 FL — SIGNIFICANT CHANGE UP (ref 7–13)
POTASSIUM SERPL-MCNC: 3.8 MMOL/L — SIGNIFICANT CHANGE UP (ref 3.5–5.3)
POTASSIUM SERPL-SCNC: 3.8 MMOL/L — SIGNIFICANT CHANGE UP (ref 3.5–5.3)
PROT SERPL-MCNC: 5.7 G/DL — LOW (ref 6–8.3)
RBC # BLD: 2.9 M/UL — LOW (ref 4.2–5.8)
RBC # FLD: 16.1 % — HIGH (ref 10.3–14.5)
SODIUM SERPL-SCNC: 137 MMOL/L — SIGNIFICANT CHANGE UP (ref 135–145)
WBC # BLD: 9.1 K/UL — SIGNIFICANT CHANGE UP (ref 3.8–10.5)
WBC # FLD AUTO: 9.1 K/UL — SIGNIFICANT CHANGE UP (ref 3.8–10.5)

## 2019-02-10 PROCEDURE — 99292 CRITICAL CARE ADDL 30 MIN: CPT

## 2019-02-10 PROCEDURE — 71045 X-RAY EXAM CHEST 1 VIEW: CPT | Mod: 26

## 2019-02-10 PROCEDURE — 99291 CRITICAL CARE FIRST HOUR: CPT

## 2019-02-10 RX ADMIN — HEPARIN SODIUM 5000 UNIT(S): 5000 INJECTION INTRAVENOUS; SUBCUTANEOUS at 05:18

## 2019-02-10 RX ADMIN — QUETIAPINE FUMARATE 50 MILLIGRAM(S): 200 TABLET, FILM COATED ORAL at 05:18

## 2019-02-10 RX ADMIN — PIPERACILLIN AND TAZOBACTAM 25 GRAM(S): 4; .5 INJECTION, POWDER, LYOPHILIZED, FOR SOLUTION INTRAVENOUS at 22:15

## 2019-02-10 RX ADMIN — PIPERACILLIN AND TAZOBACTAM 25 GRAM(S): 4; .5 INJECTION, POWDER, LYOPHILIZED, FOR SOLUTION INTRAVENOUS at 10:31

## 2019-02-10 RX ADMIN — Medication 0.5 MILLIGRAM(S): at 22:15

## 2019-02-10 RX ADMIN — Medication 2 PUFF(S): at 22:21

## 2019-02-10 RX ADMIN — SODIUM CHLORIDE 10 MILLILITER(S): 9 INJECTION INTRAMUSCULAR; INTRAVENOUS; SUBCUTANEOUS at 19:48

## 2019-02-10 RX ADMIN — MIDODRINE HYDROCHLORIDE 10 MILLIGRAM(S): 2.5 TABLET ORAL at 05:17

## 2019-02-10 RX ADMIN — Medication 1 APPLICATION(S): at 15:26

## 2019-02-10 RX ADMIN — Medication 2 PUFF(S): at 10:58

## 2019-02-10 RX ADMIN — HEPARIN SODIUM 5000 UNIT(S): 5000 INJECTION INTRAVENOUS; SUBCUTANEOUS at 19:19

## 2019-02-10 RX ADMIN — Medication 2 PUFF(S): at 15:12

## 2019-02-10 RX ADMIN — PANTOPRAZOLE SODIUM 40 MILLIGRAM(S): 20 TABLET, DELAYED RELEASE ORAL at 12:43

## 2019-02-10 RX ADMIN — MIDODRINE HYDROCHLORIDE 10 MILLIGRAM(S): 2.5 TABLET ORAL at 22:15

## 2019-02-10 RX ADMIN — MIDODRINE HYDROCHLORIDE 10 MILLIGRAM(S): 2.5 TABLET ORAL at 15:00

## 2019-02-10 RX ADMIN — SODIUM CHLORIDE 10 MILLILITER(S): 9 INJECTION INTRAMUSCULAR; INTRAVENOUS; SUBCUTANEOUS at 05:18

## 2019-02-10 RX ADMIN — QUETIAPINE FUMARATE 50 MILLIGRAM(S): 200 TABLET, FILM COATED ORAL at 19:20

## 2019-02-10 RX ADMIN — Medication 0.5 MILLIGRAM(S): at 14:59

## 2019-02-10 RX ADMIN — Medication 0.5 MILLIGRAM(S): at 05:18

## 2019-02-10 RX ADMIN — Medication 2 PUFF(S): at 03:34

## 2019-02-10 NOTE — PROGRESS NOTE ADULT - SUBJECTIVE AND OBJECTIVE BOX
CLAUDIA HAN   MRN#: [id number]     The patient is a 58y Male who was seen, evaluated, & examined with the CTICU staff post-operatively with a multidisciplinary care plan formulated & implemented.  All available clinical, laboratory, radiographic, pharmacologic, and electrocardiographic data were reviewed & analyzed.      The patient was in the CTICU in critical condition secondary to:     acute hypoxic respiratory failure-persistent cardiopulmonary dysfunction  sepsis-shock    For support and evaluation & prevention of further decompensation secondary to persistent cardiopulmonary dysfunction and cardiogenic shock-cardiovascular dysfunction, respiratory failure required:     supplemental oxygen with full ventilatory support / mechanical ventilation   continuous pulse oximetry monitoring  following ABGs with A-line monitoring    Invasive hemodynamic monitoring with     an A-line was required for continuous MAP/BP monitoring     to ensure adequate cardiovascular support and to evaluate for & help prevent decompensation while receiving     intermittent volume expansion    secondary to     sepsis-shock    In addition:  Off pressors, very agitated on the vent despite multiple anxiolytics (Seroquel, Xanax)  Transitioned to trach collar yesterday and doing well - 10 hours yesterday, all day today so far  Will continue as long as patient tolerates  Hemodynamically stable on Midodrine 10 q8  Also continued on Zosyn for Klebsiella and Vancomycin by level for MRSA    The patient required critical care management and I provided 45 minutes of non-continuous care to the patient in addition to  discussing the patient and plan at length with the CTICU staff and helping coordinate care.

## 2019-02-10 NOTE — PROGRESS NOTE ADULT - SUBJECTIVE AND OBJECTIVE BOX
Infectious Diseases progress note:    Subjective: No acute o/n events reported.  No new fevers.  WBC normal.  Pt on full ventilator support, being weaned with CPAP trials.      ROS:  CONSTITUTIONAL:  No fever, chills, rigors  CARDIOVASCULAR:  No chest pain or palpitations  RESPIRATORY:   No SOB, cough, dyspnea on exertion.  No wheezing  GASTROINTESTINAL:  No abd pain, N/V, diarrhea/constipation  EXTREMITIES:  No swelling or joint pain  GENITOURINARY:  No burning on urination, increased frequency or urgency.  No flank pain  NEUROLOGIC:  No HA, visual disturbances  SKIN: No rashes    Allergies    No Known Allergies    Intolerances        ANTIBIOTICS/RELEVANT:  antimicrobials  piperacillin/tazobactam IVPB. 3.375 Gram(s) IV Intermittent once  piperacillin/tazobactam IVPB. 3.375 Gram(s) IV Intermittent every 12 hours    immunologic:  epoetin kelly Injectable 3000 Unit(s) IV Push <User Schedule>    OTHER:  acetaminophen  IVPB .. 1000 milliGRAM(s) IV Intermittent once PRN  ALBUTerol    90 MICROgram(s) HFA Inhaler 2 Puff(s) Inhalation every 6 hours PRN  ALPRAZolam 0.5 milliGRAM(s) Oral every 8 hours  collagenase Ointment 1 Application(s) Topical daily  dexmedetomidine Infusion 0.5 MICROgram(s)/kG/Hr IV Continuous <Continuous>  heparin  Injectable 5000 Unit(s) SubCutaneous every 12 hours  ipratropium 17 MICROgram(s) HFA Inhaler 2 Puff(s) Inhalation every 6 hours  midodrine 10 milliGRAM(s) Oral every 8 hours  norepinephrine Infusion 0.05 MICROgram(s)/kG/Min IV Continuous <Continuous>  pantoprazole  Injectable 40 milliGRAM(s) IV Push daily  propofol Infusion 20 MICROgram(s)/kG/Min IV Continuous <Continuous>  QUEtiapine 50 milliGRAM(s) Oral every 12 hours  sodium chloride 0.9%. 1000 milliLiter(s) IV Continuous <Continuous>      Objective:  Vital Signs Last 24 Hrs  T(C): 36.1 (10 Feb 2019 12:00), Max: 37.1 (10 Feb 2019 08:00)  T(F): 97 (10 Feb 2019 12:00), Max: 98.7 (10 Feb 2019 08:00)  HR: 103 (10 Feb 2019 12:00) (73 - 119)  BP: --  BP(mean): --  RR: 17 (10 Feb 2019 12:00) (16 - 24)  SpO2: 100% (10 Feb 2019 12:00) (95% - 100%)    PHYSICAL EXAM:  Constitutional:NAD  Eyes:CHER, EOMI  Ear/Nose/Throat: no thrush, mucositis.  Moist mucous membranes	  Neck:no JVD, no lymphadenopathy, supple, tracheostomy  Respiratory: CTA adore  Cardiovascular: S1S2 RRR, no murmurs  Gastrointestinal:soft, nontender,  nondistended (+) BS, PEG  Extremities:no e/e/c  Skin:  no rashes, open wounds or ulcerations        LABS:                        8.9    9.10  )-----------( 317      ( 10 Feb 2019 03:30 )             29.5     02-10    137  |  99  |  27<H>  ----------------------------<  107<H>  3.8   |  26  |  3.36<H>    Ca    8.8      10 Feb 2019 03:30    TPro  5.7<L>  /  Alb  2.3<L>  /  TBili  0.3  /  DBili  x   /  AST  80<H>  /  ALT  32  /  AlkPhos  270<H>  02-10          Vancomycin Level, Trough: 21.5: Vancomycin trough levels should be rapidly reached and  maintained at 15-20 ug/ml for life threatening MRSA  infections such as sepsis, endocarditis, osteomyelitis and  pneumonia. A first trough level should be drawn before the  3rd or 4th dose. Riskof renal toxicity is increased for  levels >15 ug/mL, in patients on other nephrotoxic drugs,  who are hemodynamically unstable, have unstable renal  function, or are on vancomycin therapy for >14 days. Renal  function with creatinine levels should be monitored for  those patients. ug/mL (02.09.19 @ 11:17)                MICROBIOLOGY:    Culture - Blood (02.05.19 @ 14:17)    Culture - Blood:   NO ORGANISMS ISOLATED  NO ORGANISMS ISOLATED AT 96 HOURS    Specimen Source: BLOOD          RADIOLOGY & ADDITIONAL STUDIES:    < from: Xray Chest 1 View- PORTABLE-Routine (02.09.19 @ 07:05) >  FINDINGS:     Cardiac silhouette is enlarged. Left-sided subcutaneous pacemaker.   Tracheostomy tube. Right-sided central venous catheter with tip overlying   SVC. Small bilateral pleural effusions. Lungs otherwise clear. No   pneumothorax.    IMPRESSION:   Small bilateral pleural effusions.    < end of copied text >

## 2019-02-10 NOTE — PROGRESS NOTE ADULT - SUBJECTIVE AND OBJECTIVE BOX
CLAUDIA HAN                     MRN-[id number]    HPI:  Mr. Han is a 58M who underwent a FB, R thoracotomy, decortication, chest wall reconstruction, lat flap with Dr Pickering on 10/11/18. He was admitted on 1/6/19 to [facility] with a R pleural effusion and respiratory failure,  A R PTC was placed there and he was intubated.  He still had a R SANDY drain in place and he was transferred to [facility]. Patient was found to have aspirated a foreign body and +MRSA empyema. He was treated and discharged on 1/22/2019.     He was found by his home nurse to be in acute distress and recommended to proceed to the ED.  At presentation, he was found to be in acute hypercarbic respiratory failure complicated by hemodynamic instability requiring emergent intubation and pressor support. (28 Jan 2019 15:34)    Procedure:        Trach and PEG  1/31/2019  Bronchoscopy   1/29/2019  Right thoracotomy, resection of portion of R 7th rib, evacuation of infected hemothorax, takedown of pleurocutaneous fistula  10/11/2018      Issues:  Acute hypoxic & hypercapnic respiratory failure  Hypotension  Pneumonia /  Fever / Sepsis  ESRD / HD  Cardiomyopathy  HLD  BPH  Anxiety / Agitation          PAST MEDICAL & SURGICAL HISTORY:  Smoking hx  Cardiomyopathy  Wound: right chest  ICD (implantable cardioverter-defibrillator) in place  OA (osteoarthritis)  HLD (hyperlipidemia)  BPH (benign prostatic hyperplasia)  Pleural effusion  HTN (hypertension)  ESRD (end stage renal disease)  H/O chest wound: right  S/P thoracotomy: FB, R thoracotomy, decortication, chest wall reconstruction, lat flap  History of wound infection: right chest wall - revision 5/18 and again in 7/18  History of implantable cardioverter-defibrillator (ICD) placement: pt unsure when placed  H/O bilateral hip replacements: 2008, 2009            VITAL SIGNS:  Vital Signs Last 24 Hrs  T(C): 36.9 (10 Feb 2019 04:00), Max: 37 (09 Feb 2019 10:15)  T(F): 98.4 (10 Feb 2019 04:00), Max: 98.6 (09 Feb 2019 10:15)  HR: 77 (10 Feb 2019 06:00) (61 - 119)  BP: 107/50 (09 Feb 2019 10:15) (100/60 - 119/60)  BP(mean): 64 (09 Feb 2019 10:15) (64 - 72)  RR: 21 (10 Feb 2019 06:00) (16 - 24)  SpO2: 100% (10 Feb 2019 06:00) (95% - 100%)    I/Os:   I&O's Detail    09 Feb 2019 07:01  -  10 Feb 2019 07:00  --------------------------------------------------------  IN:    dexmedetomidine Infusion: 284 mL    Enteral Tube Flush: 200 mL    IV PiggyBack: 200 mL    Nepro with Carb Steady: 665 mL    Other: 400 mL    sodium chloride 0.9%.: 230 mL  Total IN: 1979 mL    OUT:    Bulb: 30 mL    Other: 2100 mL    Rectal Tube: 150 mL  Total OUT: 2280 mL    Total NET: -301 mL          CAPILLARY BLOOD GLUCOSE          =======================MEDICATIONS===================  MEDICATIONS  (STANDING):  ALPRAZolam 0.5 milliGRAM(s) Oral every 8 hours  collagenase Ointment 1 Application(s) Topical daily  dexmedetomidine Infusion 0.5 MICROgram(s)/kG/Hr (7.5 mL/Hr) IV Continuous <Continuous>  epoetin kelly Injectable 3000 Unit(s) IV Push <User Schedule>  heparin  Injectable 5000 Unit(s) SubCutaneous every 12 hours  ipratropium 17 MICROgram(s) HFA Inhaler 2 Puff(s) Inhalation every 6 hours  midodrine 10 milliGRAM(s) Oral every 8 hours  norepinephrine Infusion 0.05 MICROgram(s)/kG/Min (5.625 mL/Hr) IV Continuous <Continuous>  pantoprazole  Injectable 40 milliGRAM(s) IV Push daily  piperacillin/tazobactam IVPB. 3.375 Gram(s) IV Intermittent once  piperacillin/tazobactam IVPB. 3.375 Gram(s) IV Intermittent every 12 hours  propofol Infusion 20 MICROgram(s)/kG/Min (7.2 mL/Hr) IV Continuous <Continuous>  QUEtiapine 50 milliGRAM(s) Oral every 12 hours  sodium chloride 0.9%. 1000 milliLiter(s) (10 mL/Hr) IV Continuous <Continuous>    MEDICATIONS  (PRN):  acetaminophen  IVPB .. 1000 milliGRAM(s) IV Intermittent once PRN Temp greater or equal to 38C (100.4F), Moderate Pain (4 - 6)  ALBUTerol    90 MICROgram(s) HFA Inhaler 2 Puff(s) Inhalation every 6 hours PRN Shortness of Breath and/or Wheezing      =======================VENTILATOR SETTINGS===================  Mode: AC/ CMV (Assist Control/ Continuous Mandatory Ventilation)  RR (machine): 22  TV (machine): 400  FiO2: 40  PEEP: 5  MAP: 12  PIP: 30        PHYSICAL EXAM============================  General:                         Awake, alert, not in any distress  Neuro:                            Moving all extremities to commands.   Respiratory:	Air entry fair and  bilateral conducted sounds                                           Effort even and unlabored.  CV:		Regular rate and rhythm. Normal S1/S2                                          Distal pulses present.  Abdomen:	                     Soft, non-distended. Bowel sounds present   Skin:		No rash.  Extremities:	Warm, no cyanosis or edema.  Palpable pulses    ============================LABS=========================                        8.9    9.10  )-----------( 317      ( 10 Feb 2019 03:30 )             29.5     02-10    137  |  99  |  27<H>  ----------------------------<  107<H>  3.8   |  26  |  3.36<H>    Ca    8.8      10 Feb 2019 03:30    TPro  5.7<L>  /  Alb  2.3<L>  /  TBili  0.3  /  DBili  x   /  AST  80<H>  /  ALT  32  /  AlkPhos  270<H>  02-10    LIVER FUNCTIONS - ( 10 Feb 2019 03:30 )  Alb: 2.3 g/dL / Pro: 5.7 g/dL / ALK PHOS: 270 u/L / ALT: 32 u/L / AST: 80 u/L / GGT: x                   ============================IMAGING STUDIES=========================    < from: Xray Chest 1 View- PORTABLE-Routine (02.09.19 @ 07:05) >    Cardiac silhouette is enlarged. Left-sided subcutaneous pacemaker.   Tracheostomy tube. Right-sided central venous catheter with tip overlying   SVC. Small bilateral pleural effusions. Lungs otherwise clear. No   pneumothorax.    IMPRESSION:   Small bilateral pleural effusions.      A/P:  58M who underwent a FB, R thoracotomy, decortication, chest wall reconstruction, lat flap with Dr Pickering on 10/11/18. He was admitted on 1/6/19 to [facility] with a R pleural effusion and respiratory failure,  A R PTC was placed there and he was intubated.  He still had a R SANDY drain in place and he was transferred to [facility]. Patient was found to have aspirated a foreign body and +MRSA empyema. He was treated and discharged on 1/22/2019.     He was found by his home nurse to be in acute distress and recommended to proceed to the ED.  At presentation, he was found to be in acute hypercarbic respiratory failure complicated by hemodynamic instability requiring emergent intubation and pressor support. (28 Jan 2019 15:34)                            Neuro:                                         Pain control with Fentanyl PRN / Tylenol     Agitation /Anxiety: Continue Xanax and Seroquel. Wean off Precedex                            Cardiovascular:                                          Continue hemodynamic monitoring.    Hypotension: On  Midodrine 10mg/TID                                                  Respiratory:                        Pt is on full mechanical ventilator support OP--40-7. Tolerated CPAP and ATC  for10 hrs yesterday, wean as tolerated                                         Trached on 1/31                                                                 Continue bronchodilators, pulmonary toilet     Continue antibiotics -  Vanco based on level + Zosyn.                             GI                                         Nepro 35cc/hr. Flush PEG with 30cc sterile water Q4hrs                                         Continue GI prophylaxis with Protonix                                                                 Renal:                                         HD as per renal / TIW                                          Monitor I/Os and electrolytes                                                                                        Hem/ Onc:                                                                                 Follow CBC in AM                           Infectious disease:     Bronchoscopy on 1/29 showed copious greenish secretions - Continue pulmonary toilet                                         BAL - MRSA, Continue Vanco based on level  + Zosyn for 4 weeks. Blood cultures are negative     I.D f/u                                                                   Endocrine         Continue Accu-Checks with coverage      Pt is on SQ Heparin and Venodyne boots for DVT prophylaxis.     Pertinent clinical, laboratory, radiographic, hemodynamic, echocardiographic, respiratory data, microbiologic data and chart were reviewed and analyzed frequently throughout the course of the day and night  Patient seen, examined and plan discussed with CT Surgeon / CTICU team during rounds. SW for rehab     Pt's status discussed with wife at bedside, updated status.     I have spent 30  minutes of critical care time with this pt between 00am and 9am              Dave Su DO, FACEP

## 2019-02-10 NOTE — PROGRESS NOTE ADULT - ASSESSMENT
58M who underwent a FB, R thoracotomy, decortication, chest wall reconstruction, lat flap with Dr Pickering on 10/11/18. He was admitted on 1/6/19 to [facility] with a R pleural effusion and respiratory failure,  A R PTC was placed there and he was intubated.  He still had a R SANDY drain in place and he was transferred to [facility]. Patient was found to have aspirated a foreign body and +MRSA empyema. He was treated and discharged on 1/22/2019.     He was found by his home nurse to be in acute distress and recommended to proceed to the ED.  At presentation, he was found to be in acute hypercarbic respiratory failure complicated by hemodynamic instability requiring emergent intubation and pressor support. (28 Jan 2019 15:34)    Pt had Tm 104.2 (on 1/29), tachycardia, hypotension.  Pt has now been extubated, s/p trach/PEG on 1/31.  CT chest shows complete L sided atelectasis with mucous plugging of right lower lobe  bronchus with near complete atelectasis.      ID consult called for antibiotic managment.     Recommend:    - Pt p/w severe sepsis with shock and respiratory distress.  Pt with high fevers.  Found to have complete and near complete atelectasis of L and R lung respectively with mucous plugging.  Agree with broad spectrum abx to cover for post-obstructive pna.  Repeat CT chest from 2/3 shows improved aeration.      - Cultures results show:    2/5: bcx - NGTD @ 48hrs  2/1: endotrach - MRSA,  Klebsiella (pan sens), yeast (gram stain only)  2/1: bcx - CNS  1/29:  lung cx - nl resp milton  1/28: bcx - NGTD    - Agree with vancomycin and zosyn.   Maintain vanco trough between 15-20.  Dose vanco by level, post HD.  Fluconazole added on 2/2 for yeast.  Seen on gram stain only.      -  Repeat blood cultures from 2/5 NGTD.  CNS likely contaminant.    - CT a/p showed rt inguinal lesion.  Subsequent US shows marked edema and small lymph nodes.  Cont to monitor.      - Cont care as per CTU. Continue hemodynamic monitoring.  Pressors being titrated.  On full mechanical ventilator support, s/p tracheostomy. Continue bronchodilators, pulmonary toilet, Continue GI prophylaxis with Protonix.   Monitor temp curve and WBC.    - Given severe sepsis and shock at presentation, recurrent involvement of MRSA, recommend long course of IV abx treatment (4 week course).  Completed fluconazole 7 days bet 2/2 - 2/8 (suspect yeast is likely a colonizer).      - Cont treat of MRSA/KLeb.  AFter discharge, Abx may be dosed with pt's HD sessions (can change zosyn to cefepime upon discharge), no need for picc line.  Complete total 4 weeks (1/28 through 2/24).      Will follow,    Anabel Chahal  [phone]

## 2019-02-11 PROBLEM — Z09 POSTOP CHECK: Status: ACTIVE | Noted: 2018-11-13

## 2019-02-11 LAB
ALBUMIN SERPL ELPH-MCNC: 2.5 G/DL — LOW (ref 3.3–5)
ALP SERPL-CCNC: 272 U/L — HIGH (ref 40–120)
ALT FLD-CCNC: 34 U/L — SIGNIFICANT CHANGE UP (ref 4–41)
ANION GAP SERPL CALC-SCNC: 14 MMO/L — SIGNIFICANT CHANGE UP (ref 7–14)
AST SERPL-CCNC: 78 U/L — HIGH (ref 4–40)
BASE EXCESS BLDA CALC-SCNC: 3.3 MMOL/L — SIGNIFICANT CHANGE UP
BILIRUB SERPL-MCNC: 0.3 MG/DL — SIGNIFICANT CHANGE UP (ref 0.2–1.2)
BUN SERPL-MCNC: 36 MG/DL — HIGH (ref 7–23)
CALCIUM SERPL-MCNC: 9.2 MG/DL — SIGNIFICANT CHANGE UP (ref 8.4–10.5)
CHLORIDE SERPL-SCNC: 98 MMOL/L — SIGNIFICANT CHANGE UP (ref 98–107)
CO2 SERPL-SCNC: 26 MMOL/L — SIGNIFICANT CHANGE UP (ref 22–31)
CREAT SERPL-MCNC: 4.21 MG/DL — HIGH (ref 0.5–1.3)
GLUCOSE SERPL-MCNC: 117 MG/DL — HIGH (ref 70–99)
HCO3 BLDA-SCNC: 27 MMOL/L — HIGH (ref 22–26)
HCT VFR BLD CALC: 30.2 % — LOW (ref 39–50)
HGB BLD-MCNC: 8.9 G/DL — LOW (ref 13–17)
MAGNESIUM SERPL-MCNC: 2.4 MG/DL — SIGNIFICANT CHANGE UP (ref 1.6–2.6)
MCHC RBC-ENTMCNC: 29.5 % — LOW (ref 32–36)
MCHC RBC-ENTMCNC: 30.8 PG — SIGNIFICANT CHANGE UP (ref 27–34)
MCV RBC AUTO: 104.5 FL — HIGH (ref 80–100)
NRBC # FLD: 0 K/UL — LOW (ref 25–125)
PCO2 BLDA: 45 MMHG — SIGNIFICANT CHANGE UP (ref 35–48)
PH BLDA: 7.41 PH — SIGNIFICANT CHANGE UP (ref 7.35–7.45)
PHOSPHATE SERPL-MCNC: 5.5 MG/DL — HIGH (ref 2.5–4.5)
PLATELET # BLD AUTO: 376 K/UL — SIGNIFICANT CHANGE UP (ref 150–400)
PMV BLD: 9.3 FL — SIGNIFICANT CHANGE UP (ref 7–13)
PO2 BLDA: 95 MMHG — SIGNIFICANT CHANGE UP (ref 83–108)
POTASSIUM SERPL-MCNC: 3.9 MMOL/L — SIGNIFICANT CHANGE UP (ref 3.5–5.3)
POTASSIUM SERPL-SCNC: 3.9 MMOL/L — SIGNIFICANT CHANGE UP (ref 3.5–5.3)
PROT SERPL-MCNC: 6.3 G/DL — SIGNIFICANT CHANGE UP (ref 6–8.3)
RBC # BLD: 2.89 M/UL — LOW (ref 4.2–5.8)
RBC # FLD: 16.1 % — HIGH (ref 10.3–14.5)
SAO2 % BLDA: 97.6 % — SIGNIFICANT CHANGE UP (ref 95–99)
SODIUM SERPL-SCNC: 138 MMOL/L — SIGNIFICANT CHANGE UP (ref 135–145)
WBC # BLD: 11.22 K/UL — HIGH (ref 3.8–10.5)
WBC # FLD AUTO: 11.22 K/UL — HIGH (ref 3.8–10.5)

## 2019-02-11 PROCEDURE — 99291 CRITICAL CARE FIRST HOUR: CPT

## 2019-02-11 PROCEDURE — 71045 X-RAY EXAM CHEST 1 VIEW: CPT | Mod: 26

## 2019-02-11 PROCEDURE — 99292 CRITICAL CARE ADDL 30 MIN: CPT

## 2019-02-11 RX ORDER — ZOLPIDEM TARTRATE 10 MG/1
5 TABLET ORAL AT BEDTIME
Qty: 0 | Refills: 0 | Status: DISCONTINUED | OUTPATIENT
Start: 2019-02-11 | End: 2019-02-11

## 2019-02-11 RX ORDER — LANOLIN ALCOHOL/MO/W.PET/CERES
6 CREAM (GRAM) TOPICAL AT BEDTIME
Qty: 0 | Refills: 0 | Status: DISCONTINUED | OUTPATIENT
Start: 2019-02-11 | End: 2019-02-17

## 2019-02-11 RX ADMIN — MIDODRINE HYDROCHLORIDE 10 MILLIGRAM(S): 2.5 TABLET ORAL at 21:50

## 2019-02-11 RX ADMIN — PIPERACILLIN AND TAZOBACTAM 25 GRAM(S): 4; .5 INJECTION, POWDER, LYOPHILIZED, FOR SOLUTION INTRAVENOUS at 21:51

## 2019-02-11 RX ADMIN — Medication 2 PUFF(S): at 08:58

## 2019-02-11 RX ADMIN — Medication 0.5 MILLIGRAM(S): at 05:08

## 2019-02-11 RX ADMIN — HEPARIN SODIUM 5000 UNIT(S): 5000 INJECTION INTRAVENOUS; SUBCUTANEOUS at 05:08

## 2019-02-11 RX ADMIN — Medication 0.5 MILLIGRAM(S): at 21:50

## 2019-02-11 RX ADMIN — Medication 2 PUFF(S): at 15:38

## 2019-02-11 RX ADMIN — Medication 1 APPLICATION(S): at 17:47

## 2019-02-11 RX ADMIN — PIPERACILLIN AND TAZOBACTAM 25 GRAM(S): 4; .5 INJECTION, POWDER, LYOPHILIZED, FOR SOLUTION INTRAVENOUS at 09:55

## 2019-02-11 RX ADMIN — Medication 6 MILLIGRAM(S): at 22:09

## 2019-02-11 RX ADMIN — PANTOPRAZOLE SODIUM 40 MILLIGRAM(S): 20 TABLET, DELAYED RELEASE ORAL at 12:22

## 2019-02-11 RX ADMIN — Medication 2 PUFF(S): at 21:35

## 2019-02-11 RX ADMIN — Medication 2 PUFF(S): at 03:30

## 2019-02-11 RX ADMIN — Medication 0.5 MILLIGRAM(S): at 14:32

## 2019-02-11 RX ADMIN — MIDODRINE HYDROCHLORIDE 10 MILLIGRAM(S): 2.5 TABLET ORAL at 05:07

## 2019-02-11 RX ADMIN — QUETIAPINE FUMARATE 50 MILLIGRAM(S): 200 TABLET, FILM COATED ORAL at 05:07

## 2019-02-11 RX ADMIN — MIDODRINE HYDROCHLORIDE 10 MILLIGRAM(S): 2.5 TABLET ORAL at 14:32

## 2019-02-11 NOTE — PROGRESS NOTE ADULT - ASSESSMENT
58M who underwent a FB, R thoracotomy, decortication, chest wall reconstruction, lat flap with Dr Pickering on 10/11/18. [facility] with a R pleural effusion and respiratory failure,  A R PTC was placed there and he was intubated.  He still had a R SANDY drain in place and he was transferred to [facility]. Patient was found to have aspirated a foreign body and +MRSA empyema. He was treated and discharged on 1/22/2019.     He was found by his home nurse to be in acute distress and recommended to proceed to the ED.  At presentation, he was found to be in acute hypercarbic respiratory failure complicated by hemodynamic instability requiring emergent intubation and pressor support. (28 Jan 2019 15:34)    Pt had Tm 104.2 (on 1/29), tachycardia, hypotension.  Pt has now been extubated, s/p trach/PEG on 1/31.  CT chest shows complete L sided atelectasis with mucous plugging of right lower lobe  bronchus with near complete atelectasis.      ID consult called for antibiotic managment.     Recommend:    - Pt p/w severe sepsis with shock and respiratory distress.  Pt with high fevers.  Found to have complete and near complete atelectasis of L and R lung respectively with mucous plugging.  Agree with broad spectrum abx to cover for post-obstructive pna.  Repeat CT chest from 2/3 shows improved aeration.      - Cultures results show:    2/5: bcx - NGTD @ 48hrs  2/1: endotrach - MRSA,  Klebsiella (pan sens), yeast (gram stain only)  2/1: bcx - CNS  1/29:  lung cx - nl resp milton  1/28: bcx - NGTD    - Agree with vancomycin and zosyn.   Maintain vanco trough between 15-20.  Dose vanco by level, post HD.  Fluconazole added on 2/2 for yeast (completed).  Seen on gram stain only.      -  Repeat blood cultures from 2/5 NGTD.  CNS likely contaminant.    - CT a/p showed rt inguinal lesion.  Subsequent US shows marked edema and small lymph nodes.  Cont to monitor.      - Cont care as per CTU. Continue hemodynamic monitoring.  On full mechanical ventilator support, s/p tracheostomy. Continue bronchodilators, pulmonary toilet, Continue GI prophylaxis with Protonix.   Monitor temp curve and WBC.    - Given severe sepsis and shock at presentation, recurrent involvement of MRSA, recommend long course of IV abx treatment (4 week course).  Completed fluconazole 7 days bet 2/2 - 2/8 (suspect yeast is likely a colonizer).      - Cont treat of MRSA/KLeb.  AFter discharge, Abx may be dosed with pt's HD sessions (can change zosyn to cefepime upon discharge), no need for picc line.  Complete total 4 weeks (1/28 through 2/24).      Pt pending placement    Will follow,    Anabel Chahal  [phone]

## 2019-02-11 NOTE — PROGRESS NOTE ADULT - SUBJECTIVE AND OBJECTIVE BOX
Infectious Diseases progress note:    Subjective:  NAD, resting comfortably.  No acute o/n events.     ROS:  CONSTITUTIONAL:  No fever, chills, rigors  CARDIOVASCULAR:  No chest pain or palpitations  RESPIRATORY:   No SOB, cough, dyspnea on exertion.  No wheezing  GASTROINTESTINAL:  No abd pain, N/V, diarrhea/constipation  EXTREMITIES:  No swelling or joint pain  GENITOURINARY:  No burning on urination, increased frequency or urgency.  No flank pain  NEUROLOGIC:  No HA, visual disturbances  SKIN: No rashes    Allergies    No Known Allergies    Intolerances        ANTIBIOTICS/RELEVANT:  antimicrobials  piperacillin/tazobactam IVPB. 3.375 Gram(s) IV Intermittent every 12 hours    immunologic:  epoetin kelly Injectable 3000 Unit(s) IV Push <User Schedule>    OTHER:  acetaminophen  IVPB .. 1000 milliGRAM(s) IV Intermittent once PRN  ALBUTerol    90 MICROgram(s) HFA Inhaler 2 Puff(s) Inhalation every 6 hours PRN  ALPRAZolam 0.5 milliGRAM(s) Oral every 8 hours  collagenase Ointment 1 Application(s) Topical daily  heparin  Injectable 5000 Unit(s) SubCutaneous every 12 hours  ipratropium 17 MICROgram(s) HFA Inhaler 2 Puff(s) Inhalation every 6 hours  midodrine 10 milliGRAM(s) Oral every 8 hours  pantoprazole  Injectable 40 milliGRAM(s) IV Push daily  QUEtiapine 50 milliGRAM(s) Oral every 12 hours  sodium chloride 0.9%. 1000 milliLiter(s) IV Continuous <Continuous>      Objective:  Vital Signs Last 24 Hrs  T(C): 36.8 (11 Feb 2019 12:00), Max: 37.3 (11 Feb 2019 08:00)  T(F): 98.3 (11 Feb 2019 12:00), Max: 99.1 (11 Feb 2019 08:00)  HR: 124 (11 Feb 2019 17:00) (103 - 145)  BP: --  BP(mean): --  RR: 28 (11 Feb 2019 17:00) (21 - 30)  SpO2: 99% (11 Feb 2019 17:00) (91% - 100%)    PHYSICAL EXAM:  Constitutional:NAD  Eyes:CHER, EOMI  Ear/Nose/Throat: no thrush, mucositis.  Moist mucous membranes, trached 	  Neck:no JVD, no lymphadenopathy, supple  Respiratory: CTA adore, chest tube  Cardiovascular: S1S2 RRR, no murmurs  Gastrointestinal:soft, nontender,  nondistended (+) BS, peg  Extremities:no e/e/c  Skin:  no rashes, open wounds or ulcerations        LABS:                        8.9    11.22 )-----------( 376      ( 11 Feb 2019 03:00 )             30.2     02-11    138  |  98  |  36<H>  ----------------------------<  117<H>  3.9   |  26  |  4.21<H>    Ca    9.2      11 Feb 2019 03:00  Phos  5.5     02-11  Mg     2.4     02-11    TPro  6.3  /  Alb  2.5<L>  /  TBili  0.3  /  DBili  x   /  AST  78<H>  /  ALT  34  /  AlkPhos  272<H>  02-11            Vancomycin Level, Random:  ug/mL (02-09 @ 04:25)      Vancomycin Level, Trough: 21.5 ug/mL (02-09 @ 11:17)  Vancomycin Level, Trough: 13.2 ug/mL (02-07 @ 18:00)              MICROBIOLOGY:    Culture - Blood (02.05.19 @ 14:17)    Culture - Blood:   NO ORGANISMS ISOLATED    Specimen Source: BLOOD    Culture - Respiratory with Gram Stain (02.01.19 @ 22:35)    -  Gentamicin: R >8 AAMIR    -  Gentamicin: S <=1 AAMIR    -  Imipenem: S <=1 AAMIR    -  Levofloxacin: S <=0.5 AAMIR    -  Levofloxacin: S <=1 AAMIR    -  Linezolid: S 4 AAMIR    -  Meropenem: S <=1 AAMIR    -  Moxifloxacin(Aerobic): S <=0.5 AAMIR    -  Oxacillin: R >2 AAMIR    -  Penicillin: R >8 AAMIR    -  Piperacillin/Tazobactam: S <=8 AAMIR    -  Rifampin: S <=1 AAMIR    -  Tetra/Doxy: S <=1 AAMIR    -  Tigecycline: S    -  Tobramycin: S <=2 AAMIR    -  Trimethoprim/Sulfamethoxazole: S <=0.5/9.5 AAMIR    -  Trimethoprim/Sulfamethoxazole: S <=0.5/9.5 AAMIR    -  Vancomycin: S 2 AAMIR    Culture - Respiratory:   Normal Respiratory Jennifer Also Present  ***** CRITICAL RESULT *****  PERSON CALLED / READ-BACK: ADALGISA CARLSON  RN./Y  DATE / TIME CALLED: 02/04/19 1204  CALLED BY: RAYMUNDO GUAMAN    Gram Stain Sputum:   WBC White Blood Cells  QNTY CELLS IN GRAM STAIN: MODERATE (3+)  GPCCL^Gram Pos Cocci In Clusters  QUANTITY OF BACTERIA SEEN: MODERATE (3+)  YEAST^YEAST.  QUANTITY OF BACTERIA SEEN: FEW (2+)  GPR^Gram Positive Rods  QUANTITY OF BACTERIA SEEN: FEW (2+)    -  Ampicillin: R 16 AAMIR    -  Ampicillin/Sulbactam: S <=4/2 AAMIR    -  Aztreonam: S <=4 AAMIR    -  Cefazolin: R 16 AAMIR    -  Cefazolin: S <=2 AAMIR    -  Cefepime: S <=2 AAMIR    -  Cefoxitin: S <=4 AAMIR    -  Ceftazidime: S <=1 AAMIR    -  Ceftriaxone: S <=1 AAMIR    -  Ciprofloxacin: S <=1 AAMIR    -  Ciprofloxacin: S <=0.5 AAMIR    -  Clindamycin: S 0.5 AAMIR    -  Daptomycin: S 1 AAMIR    -  Ertapenem: S <=0.5 AAMIR    -  Erythromycin: R >4 AAMIR    -  Amikacin: S <=8 AAMIR    Specimen Source: ENDOTRACHEAL SPECIMEN    Organism Identification: Staph. aureus *MRSA*  Klebsiella pneumoniae    Organism: Staph. aureus *MRSA*  QUANTITY OF GROWTH: MODERATE  OXICILLIN-RESISTANT staphylococci should be regarded as  RESISTANT to ALL other Beta-Lactams regardless of the  in-vitro results obtained.  These include: ALL  Penicillins, Cephalosporins, Amoxicillin-clavulanic  acid, Ticarcillin-clavulanic acid,  Ampicillin-sulbactam, and Imipenem.    Organism: Klebsiella pneumoniae  QUANTITY OF GROWTH: MODERATE    Method Type: POSITIVE AAMIR 29    Method Type: NEGATIVE AAMIR 43          RADIOLOGY & ADDITIONAL STUDIES:    < from: Xray Chest 1 View- PORTABLE-Routine (02.11.19 @ 07:05) >  INTERPRETATION:     Tracheostomy tube, subcutaneous AICD and right IJ line are unchanged.   Bilateral small loculated effusions with underlying unchanged. Upper   lungs are clear.      COMPARISON:  February 10      IMPRESSION:  Follow-up with bilateral small effusions and tracheostomy   tube in place unchanged.    < end of copied text >

## 2019-02-11 NOTE — PROGRESS NOTE ADULT - SUBJECTIVE AND OBJECTIVE BOX
CLAUDIA HAN   MRN#: [id number]     The patient is a 58y Male who was seen, evaluated, & examined with the CTICU staff post-operatively with a multidisciplinary care plan formulated & implemented.  All available clinical, laboratory, radiographic, pharmacologic, and electrocardiographic data were reviewed & analyzed.      The patient was in the CTICU in critical condition secondary to:     acute hypoxic respiratory failure-persistent cardiopulmonary dysfunction  sepsis-shock    For support and evaluation & prevention of further decompensation secondary to persistent cardiopulmonary dysfunction and cardiogenic shock-cardiovascular dysfunction, respiratory failure required:     supplemental oxygen with full ventilatory support / mechanical ventilation   continuous pulse oximetry monitoring  following ABGs with A-line monitoring    Invasive hemodynamic monitoring with     an A-line was required for continuous MAP/BP monitoring     to ensure adequate cardiovascular support and to evaluate for & help prevent decompensation while receiving     intermittent volume expansion    secondary to     sepsis-shock    In addition:  Off pressors, very agitated on the vent despite multiple anxiolytics (Seroquel, Xanax)  Transitioned to trach collar 2 days ago and doing well - 10 hours 2 days ago, 20 hours yesterday, now resting on AC after becoming tachycardic/tachypneic  Will rest on AC for a few hours and restart trach collar  Hemodynamically stable on Midodrine 10 q8  Also continued on Zosyn for Klebsiella and Vancomycin by level for MRSA    The patient required critical care management and I provided 80 minutes of non-continuous care to the patient in addition to  discussing the patient and plan at length with the CTICU staff and helping coordinate care.

## 2019-02-12 ENCOUNTER — APPOINTMENT (OUTPATIENT)
Dept: THORACIC SURGERY | Facility: CLINIC | Age: 59
End: 2019-02-12

## 2019-02-12 DIAGNOSIS — Z09 ENCOUNTER FOR FOLLOW-UP EXAMINATION AFTER COMPLETED TREATMENT FOR CONDITIONS OTHER THAN MALIGNANT NEOPLASM: ICD-10-CM

## 2019-02-12 DIAGNOSIS — R22.2 LOCALIZED SWELLING, MASS AND LUMP, TRUNK: ICD-10-CM

## 2019-02-12 LAB
ANION GAP SERPL CALC-SCNC: 17 MMO/L — HIGH (ref 7–14)
BUN SERPL-MCNC: 43 MG/DL — HIGH (ref 7–23)
CALCIUM SERPL-MCNC: 9 MG/DL — SIGNIFICANT CHANGE UP (ref 8.4–10.5)
CHLORIDE SERPL-SCNC: 99 MMOL/L — SIGNIFICANT CHANGE UP (ref 98–107)
CO2 SERPL-SCNC: 25 MMOL/L — SIGNIFICANT CHANGE UP (ref 22–31)
CREAT SERPL-MCNC: 5.05 MG/DL — HIGH (ref 0.5–1.3)
GLUCOSE SERPL-MCNC: 102 MG/DL — HIGH (ref 70–99)
HCT VFR BLD CALC: 27 % — LOW (ref 39–50)
HGB BLD-MCNC: 8.3 G/DL — LOW (ref 13–17)
MCHC RBC-ENTMCNC: 30.7 % — LOW (ref 32–36)
MCHC RBC-ENTMCNC: 31.2 PG — SIGNIFICANT CHANGE UP (ref 27–34)
MCV RBC AUTO: 101.5 FL — HIGH (ref 80–100)
NRBC # FLD: 0 K/UL — LOW (ref 25–125)
PLATELET # BLD AUTO: 331 K/UL — SIGNIFICANT CHANGE UP (ref 150–400)
PMV BLD: 9.2 FL — SIGNIFICANT CHANGE UP (ref 7–13)
POTASSIUM SERPL-MCNC: 3.9 MMOL/L — SIGNIFICANT CHANGE UP (ref 3.5–5.3)
POTASSIUM SERPL-SCNC: 3.9 MMOL/L — SIGNIFICANT CHANGE UP (ref 3.5–5.3)
RBC # BLD: 2.66 M/UL — LOW (ref 4.2–5.8)
RBC # FLD: 16.1 % — HIGH (ref 10.3–14.5)
SODIUM SERPL-SCNC: 141 MMOL/L — SIGNIFICANT CHANGE UP (ref 135–145)
VANCOMYCIN FLD-MCNC: 20.6 UG/ML — SIGNIFICANT CHANGE UP
WBC # BLD: 8.49 K/UL — SIGNIFICANT CHANGE UP (ref 3.8–10.5)
WBC # FLD AUTO: 8.49 K/UL — SIGNIFICANT CHANGE UP (ref 3.8–10.5)

## 2019-02-12 PROCEDURE — 71045 X-RAY EXAM CHEST 1 VIEW: CPT | Mod: 26

## 2019-02-12 PROCEDURE — 99291 CRITICAL CARE FIRST HOUR: CPT

## 2019-02-12 PROCEDURE — 90935 HEMODIALYSIS ONE EVALUATION: CPT

## 2019-02-12 RX ADMIN — Medication 0.5 MILLIGRAM(S): at 06:03

## 2019-02-12 RX ADMIN — MIDODRINE HYDROCHLORIDE 10 MILLIGRAM(S): 2.5 TABLET ORAL at 13:06

## 2019-02-12 RX ADMIN — PIPERACILLIN AND TAZOBACTAM 25 GRAM(S): 4; .5 INJECTION, POWDER, LYOPHILIZED, FOR SOLUTION INTRAVENOUS at 21:41

## 2019-02-12 RX ADMIN — HEPARIN SODIUM 5000 UNIT(S): 5000 INJECTION INTRAVENOUS; SUBCUTANEOUS at 06:03

## 2019-02-12 RX ADMIN — SODIUM CHLORIDE 10 MILLILITER(S): 9 INJECTION INTRAMUSCULAR; INTRAVENOUS; SUBCUTANEOUS at 21:40

## 2019-02-12 RX ADMIN — PANTOPRAZOLE SODIUM 40 MILLIGRAM(S): 20 TABLET, DELAYED RELEASE ORAL at 13:05

## 2019-02-12 RX ADMIN — Medication 1 APPLICATION(S): at 18:12

## 2019-02-12 RX ADMIN — ERYTHROPOIETIN 3000 UNIT(S): 10000 INJECTION, SOLUTION INTRAVENOUS; SUBCUTANEOUS at 11:33

## 2019-02-12 RX ADMIN — HEPARIN SODIUM 5000 UNIT(S): 5000 INJECTION INTRAVENOUS; SUBCUTANEOUS at 18:13

## 2019-02-12 RX ADMIN — Medication 2 PUFF(S): at 21:11

## 2019-02-12 RX ADMIN — MIDODRINE HYDROCHLORIDE 10 MILLIGRAM(S): 2.5 TABLET ORAL at 06:03

## 2019-02-12 RX ADMIN — Medication 6 MILLIGRAM(S): at 22:25

## 2019-02-12 RX ADMIN — MIDODRINE HYDROCHLORIDE 10 MILLIGRAM(S): 2.5 TABLET ORAL at 21:41

## 2019-02-12 RX ADMIN — Medication 0.5 MILLIGRAM(S): at 21:41

## 2019-02-12 RX ADMIN — Medication 0.5 MILLIGRAM(S): at 14:21

## 2019-02-12 RX ADMIN — QUETIAPINE FUMARATE 50 MILLIGRAM(S): 200 TABLET, FILM COATED ORAL at 06:03

## 2019-02-12 RX ADMIN — QUETIAPINE FUMARATE 50 MILLIGRAM(S): 200 TABLET, FILM COATED ORAL at 18:13

## 2019-02-12 RX ADMIN — Medication 2 PUFF(S): at 15:14

## 2019-02-12 RX ADMIN — PIPERACILLIN AND TAZOBACTAM 25 GRAM(S): 4; .5 INJECTION, POWDER, LYOPHILIZED, FOR SOLUTION INTRAVENOUS at 09:54

## 2019-02-12 RX ADMIN — Medication 2 PUFF(S): at 09:28

## 2019-02-12 RX ADMIN — Medication 2 PUFF(S): at 04:02

## 2019-02-12 NOTE — CHART NOTE - NSCHARTNOTEFT_GEN_A_CORE
NUTRITION FOLLOW-UP:    Pt seen for extended care nutrition follow/up.  Pt now s/p trach and gastrostomy tube placement.  As per RN, pt tolerating TF well at this time.  Pt continues on HD.  Current wt is 14.2kg above admission wt-likely fluid related.  Pt w/2+generalized and 3+ scrotal edema.  Pt w/suspected DTI on sacrum.      Weight:  2/12 - 74.2kg     2/8 - 75.1kg     2/5 - 78.4kg     Adm - 60kg  Labs:  H/H 8.3/27.0  BUN/Cr 43/5.05  Glu 102  Phos 5.5    Current Diet:  Nepro @35ml/h x24h + Prosource 1 pack/d  Enteral Recommendations:  TF currently providing 1512 kcal w/67gm protein +15gm protein via prosource = 82gms/d (25kcal/kg, 1.3gm protein/kg), meeting current needs.      RD to Remain Available:  yes    Additional Recommendations:   1) Monitor weights, labs, BM's, skin integrity, tolerance to TF  2) Continue current TF regimen as ordered

## 2019-02-12 NOTE — PROGRESS NOTE ADULT - PROBLEM SELECTOR PLAN 1
Pt. with ESRD on HD TIW (TTS). Last HD was on 2/9/19 via AVF, tolerated treatment well. Labs reviewed. Plan for HD today. Monitor BP while on HD

## 2019-02-12 NOTE — PROGRESS NOTE ADULT - SUBJECTIVE AND OBJECTIVE BOX
Infectious Diseases progress note:    Subjective:    ROS:  CONSTITUTIONAL:  No fever, chills, rigors  CARDIOVASCULAR:  No chest pain or palpitations  RESPIRATORY:   No SOB, cough, dyspnea on exertion.  No wheezing  GASTROINTESTINAL:  No abd pain, N/V, diarrhea/constipation  EXTREMITIES:  No swelling or joint pain  GENITOURINARY:  No burning on urination, increased frequency or urgency.  No flank pain  NEUROLOGIC:  No HA, visual disturbances  SKIN: No rashes    Allergies    No Known Allergies    Intolerances        ANTIBIOTICS/RELEVANT:  antimicrobials  piperacillin/tazobactam IVPB. 3.375 Gram(s) IV Intermittent every 12 hours    immunologic:  epoetin kelly Injectable 3000 Unit(s) IV Push <User Schedule>    OTHER:  acetaminophen  IVPB .. 1000 milliGRAM(s) IV Intermittent once PRN  ALBUTerol    90 MICROgram(s) HFA Inhaler 2 Puff(s) Inhalation every 6 hours PRN  ALPRAZolam 0.5 milliGRAM(s) Oral every 8 hours  collagenase Ointment 1 Application(s) Topical daily  heparin  Injectable 5000 Unit(s) SubCutaneous every 12 hours  ipratropium 17 MICROgram(s) HFA Inhaler 2 Puff(s) Inhalation every 6 hours  melatonin 6 milliGRAM(s) Oral at bedtime  midodrine 10 milliGRAM(s) Oral every 8 hours  pantoprazole  Injectable 40 milliGRAM(s) IV Push daily  QUEtiapine 50 milliGRAM(s) Oral every 12 hours  sodium chloride 0.9%. 1000 milliLiter(s) IV Continuous <Continuous>      Objective:  Vital Signs Last 24 Hrs  T(C): 37 (13 Feb 2019 04:00), Max: 37 (12 Feb 2019 21:12)  T(F): 98.6 (13 Feb 2019 04:00), Max: 98.6 (12 Feb 2019 21:12)  HR: 121 (13 Feb 2019 08:00) (92 - 125)  BP: 133/80 (13 Feb 2019 06:00) (122/70 - 133/80)  BP(mean): 89 (13 Feb 2019 06:00) (82 - 89)  RR: 26 (13 Feb 2019 08:00) (18 - 26)  SpO2: 99% (13 Feb 2019 08:00) (94% - 100%)    PHYSICAL EXAM:  Constitutional:NAD  Eyes:CHER, EOMI  Ear/Nose/Throat: no thrush, mucositis.  Moist mucous membranes	  Neck:no JVD, no lymphadenopathy, supple  Respiratory: CTA adore  Cardiovascular: S1S2 RRR, no murmurs  Gastrointestinal:soft, nontender,  nondistended (+) BS  Extremities:no e/e/c  Skin:  no rashes, open wounds or ulcerations        LABS:                        8.6    7.59  )-----------( 354      ( 13 Feb 2019 03:20 )             29.0     02-13    132<L>  |  93<L>  |  27<H>  ----------------------------<  103<H>  3.6   |  27  |  3.76<H>    Ca    8.7      13 Feb 2019 03:20    TPro  6.0  /  Alb  2.4<L>  /  TBili  0.4  /  DBili  x   /  AST  77<H>  /  ALT  36  /  AlkPhos  236<H>  02-13            Vancomycin Level, Random:  ug/mL (02-12 @ 04:50)  Vancomycin Level, Random:  ug/mL (02-09 @ 04:25)      Vancomycin Level, Trough: 21.5 ug/mL (02-09 @ 11:17)              MICROBIOLOGY:          RADIOLOGY & ADDITIONAL STUDIES: Infectious Diseases progress note:    Subjective:  No new fever, no leukocytosis.  No acute o/n events    ROS:  CONSTITUTIONAL:  No fever, chills, rigors  CARDIOVASCULAR:  No chest pain or palpitations  RESPIRATORY:   No SOB, cough, dyspnea on exertion.  No wheezing  GASTROINTESTINAL:  No abd pain, N/V, diarrhea/constipation  EXTREMITIES:  No swelling or joint pain  GENITOURINARY:  No burning on urination, increased frequency or urgency.  No flank pain  NEUROLOGIC:  No HA, visual disturbances  SKIN: No rashes    Allergies    No Known Allergies    Intolerances        ANTIBIOTICS/RELEVANT:  antimicrobials  piperacillin/tazobactam IVPB. 3.375 Gram(s) IV Intermittent every 12 hours    immunologic:  epoetin kelly Injectable 3000 Unit(s) IV Push <User Schedule>    OTHER:  acetaminophen  IVPB .. 1000 milliGRAM(s) IV Intermittent once PRN  ALBUTerol    90 MICROgram(s) HFA Inhaler 2 Puff(s) Inhalation every 6 hours PRN  ALPRAZolam 0.5 milliGRAM(s) Oral every 8 hours  collagenase Ointment 1 Application(s) Topical daily  heparin  Injectable 5000 Unit(s) SubCutaneous every 12 hours  ipratropium 17 MICROgram(s) HFA Inhaler 2 Puff(s) Inhalation every 6 hours  melatonin 6 milliGRAM(s) Oral at bedtime  midodrine 10 milliGRAM(s) Oral every 8 hours  pantoprazole  Injectable 40 milliGRAM(s) IV Push daily  QUEtiapine 50 milliGRAM(s) Oral every 12 hours  sodium chloride 0.9%. 1000 milliLiter(s) IV Continuous <Continuous>      Objective:  Vital Signs Last 24 Hrs  T(C): 37 (13 Feb 2019 04:00), Max: 37 (12 Feb 2019 21:12)  T(F): 98.6 (13 Feb 2019 04:00), Max: 98.6 (12 Feb 2019 21:12)  HR: 121 (13 Feb 2019 08:00) (92 - 125)  BP: 133/80 (13 Feb 2019 06:00) (122/70 - 133/80)  BP(mean): 89 (13 Feb 2019 06:00) (82 - 89)  RR: 26 (13 Feb 2019 08:00) (18 - 26)  SpO2: 99% (13 Feb 2019 08:00) (94% - 100%)    PHYSICAL EXAM:  Constitutional:NAD  Eyes:CHER, EOMI  Ear/Nose/Throat: no thrush, mucositis.  Moist mucous membranes, trach	  Neck:no JVD, no lymphadenopathy, supple  Respiratory: CTA adore  Cardiovascular: S1S2 RRR, no murmurs  Gastrointestinal:soft, nontender,  nondistended (+) BS, peg  Extremities:no e/e/c  Skin:  no rashes, open wounds or ulcerations        LABS:                        8.6    7.59  )-----------( 354      ( 13 Feb 2019 03:20 )             29.0     02-13    132<L>  |  93<L>  |  27<H>  ----------------------------<  103<H>  3.6   |  27  |  3.76<H>    Ca    8.7      13 Feb 2019 03:20    TPro  6.0  /  Alb  2.4<L>  /  TBili  0.4  /  DBili  x   /  AST  77<H>  /  ALT  36  /  AlkPhos  236<H>  02-13            Vancomycin Level, Random (02.12.19 @ 04:50)    Vancomycin Level, Random: 20.6: Therapeutic ranges have not been established for random  vancomycin. Interpret results in context of patient's  clinical condition and time sample was drawn relative to  peak and trough therapeutic ranges. Therapeutic ranges for  peak vancomycin are 25-50 and for trough vancomycin 10-20  with 15-20 mcg/mL used for complicated infections. ug/mL                  MICROBIOLOGY:    Culture - Blood (02.05.19 @ 14:17)    Culture - Blood:   NO ORGANISMS ISOLATED    Specimen Source: BLOOD    Culture - Respiratory with Gram Stain (02.01.19 @ 22:35)    Gram Stain Sputum:   WBC White Blood Cells  QNTY CELLS IN GRAM STAIN: MODERATE (3+)  GPCCL^Gram Pos Cocci In Clusters  QUANTITY OF BACTERIA SEEN: MODERATE (3+)  YEAST^YEAST.  QUANTITY OF BACTERIA SEEN: FEW (2+)  GPR^Gram Positive Rods  QUANTITY OF BACTERIA SEEN: FEW (2+)    -  Vancomycin: S 2 AAMIR    Culture - Respiratory:   Normal Respiratory Jennifer Also Present  ***** CRITICAL RESULT *****  PERSON CALLED / READ-BACK: ADALGISA CARLSON RN./Y  DATE / TIME CALLED: 02/04/19 1204  CALLED BY: MATATOVA,RAYMUNDO    -  Amikacin: S <=8 AAMIR    -  Tetra/Doxy: S <=1 AAMIR    -  Tigecycline: S    -  Tobramycin: S <=2 AAMIR    -  Trimethoprim/Sulfamethoxazole: S <=0.5/9.5 AAMIR    -  Trimethoprim/Sulfamethoxazole: S <=0.5/9.5 AAMIR    -  Ampicillin: R 16 AAMIR    -  Ampicillin/Sulbactam: S <=4/2 AAMIR    -  Aztreonam: S <=4 AAMIR    -  Cefazolin: R 16 AAMIR    -  Cefazolin: S <=2 AAMIR    -  Cefepime: S <=2 AAMIR    -  Cefoxitin: S <=4 AAMIR    -  Ceftazidime: S <=1 AAMIR    -  Ceftriaxone: S <=1 AAMIR    -  Ciprofloxacin: S <=1 AAMIR    -  Ciprofloxacin: S <=0.5 AAMIR    -  Clindamycin: S 0.5 AAMIR    -  Daptomycin: S 1 AAMIR    -  Ertapenem: S <=0.5 AAMIR    -  Erythromycin: R >4 AAMIR    -  Gentamicin: R >8 AAMIR    -  Gentamicin: S <=1 AAMIR    -  Imipenem: S <=1 AAMIR    -  Levofloxacin: S <=0.5 AAMIR    -  Levofloxacin: S <=1 AAMIR    -  Linezolid: S 4 AAMIR    -  Meropenem: S <=1 AAMIR    -  Moxifloxacin(Aerobic): S <=0.5 AAMIR    -  Oxacillin: R >2 AAMIR    -  Penicillin: R >8 AAMIR    -  Piperacillin/Tazobactam: S <=8 AAMIR    -  Rifampin: S <=1 AAMIR    Specimen Source: ENDOTRACHEAL SPECIMEN    Organism Identification: Staph. aureus *MRSA*  Klebsiella pneumoniae    Organism: Staph. aureus *MRSA*  QUANTITY OF GROWTH: MODERATE  OXICILLIN-RESISTANT staphylococci should be regarded as  RESISTANT to ALL other Beta-Lactams regardless of the  in-vitro results obtained.  These include: ALL  Penicillins, Cephalosporins, Amoxicillin-clavulanic  acid, Ticarcillin-clavulanic acid,  Ampicillin-sulbactam, and Imipenem.    Organism: Klebsiella pneumoniae  QUANTITY OF GROWTH: MODERATE    Method Type: POSITIVE AAMIR 29    Method Type: NEGATIVE AAMIR 43    Culture - Blood (02.01.19 @ 21:47)    Culture - Blood:   ***Blood Panel PCR results on this specimen are available  approximately 3 hours after the Gram stain result***  Gram stain, PCR, and/or culture results may not always  correspond due to difference in methodologies  ------------------------------------------------------------  This PCR assay was performed using [employer].  The  following targets are tested for:  Enterococcus, vancomycin  resistant enterococci, Listeria monocytogenes,  coagulase  negative staphylococci, S. aureus, methicillin resistant S.  aureus, Streptococcus agalactiae (Group B), S. pneumoniae,  S. pyogenes (Group A), Acinetobacter baumannii, Enterobacter  cloacae, E. coli, Klebsiella oxytoca, K. pneumoniae, Proteus  sp., Serratia marcescens, Haemophilus influenzae, Neisseria  meningitidis, Pseudomonas aeruginosa, Candida albicans, C.  glabrata, C. krusei, C. parapsilosis, C. tropicalis and the  KPC resistance gene.  **NOTE: Due to technical problems, Proteus sp. will NOT be  reported as part of the BCID paneluntil further notice.    Culture - Blood:   Single set isolate, possible contaminant.  Contact microbiology if susceptibility testing is clinically  indicated.    -  Coagulase negative Staphylococcus: + DETECT AAMIR    Specimen Source: BLOOD PERIPHERAL    Organism: BLOOD CULTURE PCR  ***Blood Panel PCR results on this specimen are available  approximately 3 hours after the Gram stain result***  Gram stain, PCR, and/or culture results may not always  correspond due to difference in methodologies  ------------------------------------------------------------  This PCR assay was performed using [employer].  The  following targets are tested for:  Enterococcus, vancomycin  resistant enterococci, Listeria monocytogenes,  coagulase  negative staphylococci, S. aureus, methicillin resistant S.  aureus, Streptococcus agalactiae (Group B), S. pneumoniae,  S. pyogenes (Group A), Acinetobacter baumannii, Enterobacter  cloacae, E. coli, Klebsiella oxytoca, K. pneumoniae, Proteus  sp., Serratia marcescens, Haemophilus influenzae, Neisseria  meningitidis, Pseudomonas aeruginosa, Candida albicans, C.  glabrata, C. krusei, C. parapsilosis, C. tropicalis and the  KPC resistance gene.  **NOTE: Due to technical problems, Proteus sp. will NOT be  reported as part of the BCID panel until further notice.    Organism: Staphylococcus sp.,coag neg    Gram Stain Blood:   ***** CRITICAL RESULT *****  PERSON CALLED / READ-BACK: MEGHA HILL MD/Y  DATE / TIME CALLED: 02/03/19 0529  CALLED BY: HOLLIE EM  GPCCL^Gram Pos Cocci In Clusters  AFTER: 30 HOURS INCUBATION  BOTTLE: AEROBIC BOTTLE    Organism Identification: BLOOD CULTURE PCR  Staphylococcus sp.,coag neg    Method Type: PCR          RADIOLOGY & ADDITIONAL STUDIES:    < from: Xray Chest 1 View- PORTABLE-Routine (02.12.19 @ 00:43) >  IMPRESSION:  Bilateral small loculated effusions with tracheostomy tube.    < end of copied text >

## 2019-02-12 NOTE — PROGRESS NOTE ADULT - SUBJECTIVE AND OBJECTIVE BOX
[facility] DIVISION OF KIDNEY DISEASES AND HYPERTENSION -- FOLLOW UP NOTE  --------------------------------------------------------------------------------    HPI: 57 yo male with medical history of NICM with ICD, ESRD on HD, former smoker, BPH admitted with acute hypercapnic respiratory failure. Nephrology consulted for ESRD/HD management. Pt intubated and sedated in the CTICU. Pt. currently admitted with hypercarbic respiratory failure, septic shock. Last HD on 2/5/19. Pt seen in the CTICU s/p Trach on 1/31/19.  Pt awake alert and responsive Plan for HD today     PAST HISTORY  --------------------------------------------------------------------------------  No significant changes to PMH, PSH, FHx, SHx, unless otherwise noted    ALLERGIES & MEDICATIONS  --------------------------------------------------------------------------------  Allergies    No Known Allergies    Intolerances      Standing Inpatient Medications  ALPRAZolam 0.5 milliGRAM(s) Oral every 8 hours  collagenase Ointment 1 Application(s) Topical daily  epoetin kelly Injectable 3000 Unit(s) IV Push <User Schedule>  heparin  Injectable 5000 Unit(s) SubCutaneous every 12 hours  ipratropium 17 MICROgram(s) HFA Inhaler 2 Puff(s) Inhalation every 6 hours  melatonin 6 milliGRAM(s) Oral at bedtime  midodrine 10 milliGRAM(s) Oral every 8 hours  pantoprazole  Injectable 40 milliGRAM(s) IV Push daily  piperacillin/tazobactam IVPB. 3.375 Gram(s) IV Intermittent every 12 hours  QUEtiapine 50 milliGRAM(s) Oral every 12 hours  sodium chloride 0.9%. 1000 milliLiter(s) IV Continuous <Continuous>    PRN Inpatient Medications  acetaminophen  IVPB .. 1000 milliGRAM(s) IV Intermittent once PRN  ALBUTerol    90 MICROgram(s) HFA Inhaler 2 Puff(s) Inhalation every 6 hours PRN      REVIEW OF SYSTEMS  --------------------------------------------------------------------------------  Unable to obtain     VITALS/PHYSICAL EXAM  --------------------------------------------------------------------------------  T(C): 36.3 (02-12-19 @ 04:00), Max: 36.8 (02-11-19 @ 12:00)  HR: 93 (02-12-19 @ 06:49) (88 - 131)  BP: --  RR: 21 (02-12-19 @ 06:00) (18 - 30)  SpO2: 100% (02-12-19 @ 06:49) (94% - 100%)  Wt(kg): --    02-11-19 @ 07:01  -  02-12-19 @ 07:00  --------------------------------------------------------  IN: 1215 mL / OUT: 325 mL / NET: 890 mL    Gen: + Trach  	HEENT: +Trach  	Pulm: coarse breath sounds B/L  	CV: S1S2  	Abd: Soft, +BS   	Ext: No LE edema B/L + RUE PICC  	Neuro: Awake  	Skin: Warm and dry  	Vascular access: LUE AVF site: +thrill, bruit heard.     LABS/STUDIES  --------------------------------------------------------------------------------              8.3    8.49  >-----------<  331      [02-12-19 @ 04:50]              27.0     141  |  99  |  43  ----------------------------<  102      [02-12-19 @ 04:50]  3.9   |  25  |  5.05        Ca     9.0     [02-12-19 @ 04:50]      Mg     2.4     [02-11-19 @ 03:00]      Phos  5.5     [02-11-19 @ 03:00]    TPro  6.3  /  Alb  2.5  /  TBili  0.3  /  DBili  x   /  AST  78  /  ALT  34  /  AlkPhos  272  [02-11-19 @ 03:00]    Creatinine Trend:  SCr 5.05 [02-12 @ 04:50]  SCr 4.21 [02-11 @ 03:00]  SCr 3.36 [02-10 @ 03:30]  SCr 4.15 [02-09 @ 04:25]  SCr 3.26 [02-08 @ 02:20]    HbA1c 5.5      [10-12-18 @ 02:53]  TSH 4.79      [10-21-18 @ 04:15]    HBsAb Reactive      [02-01-19 @ 12:20]  HBsAg NEGATIVE      [02-01-19 @ 12:20]  HBcAb Reactive      [02-01-19 @ 12:20]  HCV 0.23, Nonreactive Hepatitis C AB  S/CO Ratio                        Interpretation  < 1.0                                     Non-Reactive  1.0 - 4.9                           Weakly-Reactive  > 5.0                                 Reactive  Non-Reactive: Aperson with a non-reactive HCV antibody  result is considered uninfected.  No further action is  needed unless recent infection is suspected.  In these  cases, consider repeat testing later to detect  seroconversion..  Weakly-Reactive: HCV antibody test is abnormal, HCV RNA  Qualitative test will follow.  Reactive: HCV antibody test is abnormal, HCV RNA  Qualitative test will follow.  Note: HCV antibody testing is performed on the [employer]   system.      [02-01-19 @ 12:20]

## 2019-02-12 NOTE — PROGRESS NOTE ADULT - SUBJECTIVE AND OBJECTIVE BOX
CLAUDIA HAN                     MRN-[id number]    HPI:  Mr. Han is a 58M who underwent a FB, R thoracotomy, decortication, chest wall reconstruction, lat flap with Dr Pickering on 10/11/18. He was admitted on 1/6/19 to [facility] with a R pleural effusion and respiratory failure,  A R PTC was placed there and he was intubated.  He still had a R SANDY drain in place and he was transferred to [facility]. Patient was found to have aspirated a foreign body and +MRSA empyema. He was treated and discharged on 1/22/2019.     He was found by his home nurse to be in acute distress and recommended to proceed to the ED.  At presentation, he was found to be in acute hypercarbic respiratory failure complicated by hemodynamic instability requiring emergent intubation and pressor support. (28 Jan 2019 15:34)    Procedure:        Trach and PEG  1/31/2019  Bronchoscopy   1/29/2019  Right thoracotomy, resection of portion of R 7th rib, evacuation of infected hemothorax, takedown of pleurocutaneous fistula  10/11/2018      Issues:  Acute hypoxic & hypercapnic respiratory failure  Hypotension, on Midodrine  Pneumonia /  Fever / Sepsis  ESRD / HD  Cardiomyopathy  HLD  BPH  Anxiety / Agitation          PAST MEDICAL & SURGICAL HISTORY:  Smoking hx  Cardiomyopathy  Wound: right chest  ICD (implantable cardioverter-defibrillator) in place  OA (osteoarthritis)  HLD (hyperlipidemia)  BPH (benign prostatic hyperplasia)  Pleural effusion  HTN (hypertension)  ESRD (end stage renal disease)  H/O chest wound: right  S/P thoracotomy: FB, R thoracotomy, decortication, chest wall reconstruction, lat flap  History of wound infection: right chest wall - revision 5/18 and again in 7/18  History of implantable cardioverter-defibrillator (ICD) placement: pt unsure when placed  H/O bilateral hip replacements: 2008, 2009            VITAL SIGNS:  Vital Signs Last 24 Hrs  T(C): 36.3 (12 Feb 2019 04:00), Max: 36.8 (11 Feb 2019 12:00)  T(F): 97.4 (12 Feb 2019 04:00), Max: 98.3 (11 Feb 2019 12:00)  HR: 92 (12 Feb 2019 09:28) (88 - 131)  BP: --  BP(mean): --  RR: 21 (12 Feb 2019 06:00) (18 - 30)  SpO2: 100% (12 Feb 2019 09:28) (94% - 100%)    I/Os:   I&O's Detail    11 Feb 2019 07:01  -  12 Feb 2019 07:00  --------------------------------------------------------  IN:    Enteral Tube Flush: 80 mL    IV PiggyBack: 100 mL    Nepro with Carb Steady: 805 mL    sodium chloride 0.9%.: 230 mL  Total IN: 1215 mL    OUT:    Bulb: 50 mL    Rectal Tube: 275 mL  Total OUT: 325 mL    Total NET: 890 mL          CAPILLARY BLOOD GLUCOSE          =======================MEDICATIONS===================  MEDICATIONS  (STANDING):  ALPRAZolam 0.5 milliGRAM(s) Oral every 8 hours  collagenase Ointment 1 Application(s) Topical daily  epoetin kelly Injectable 3000 Unit(s) IV Push <User Schedule>  heparin  Injectable 5000 Unit(s) SubCutaneous every 12 hours  ipratropium 17 MICROgram(s) HFA Inhaler 2 Puff(s) Inhalation every 6 hours  melatonin 6 milliGRAM(s) Oral at bedtime  midodrine 10 milliGRAM(s) Oral every 8 hours  pantoprazole  Injectable 40 milliGRAM(s) IV Push daily  piperacillin/tazobactam IVPB. 3.375 Gram(s) IV Intermittent every 12 hours  QUEtiapine 50 milliGRAM(s) Oral every 12 hours  sodium chloride 0.9%. 1000 milliLiter(s) (10 mL/Hr) IV Continuous <Continuous>    MEDICATIONS  (PRN):  acetaminophen  IVPB .. 1000 milliGRAM(s) IV Intermittent once PRN Temp greater or equal to 38C (100.4F), Moderate Pain (4 - 6)  ALBUTerol    90 MICROgram(s) HFA Inhaler 2 Puff(s) Inhalation every 6 hours PRN Shortness of Breath and/or Wheezing      =======================VENTILATOR SETTINGS===================  Mode: AC/ CMV (Assist Control/ Continuous Mandatory Ventilation)  RR (machine): 22  TV (machine): 400  FiO2: 40  PEEP: 5  MAP: 11  PIP: 28    PE:    General:                         Awake, alert, not in any distress, vented  Neuro:                            Moving all extremities to commands.   Respiratory:	Air entry fair and  bilateral conducted sounds                                           Effort even and unlabored.  CV:		Regular rate and rhythm. Normal S1/S2                                          Distal pulses present.  Abdomen:	                     Soft, non-distended. Bowel sounds present   Skin:		No rash.  Extremities:	Warm, no cyanosis or edema.  Palpable pulses    ============================LABS=========================                        8.3    8.49  )-----------( 331      ( 12 Feb 2019 04:50 )             27.0     02-12    141  |  99  |  43<H>  ----------------------------<  102<H>  3.9   |  25  |  5.05<H>    Ca    9.0      12 Feb 2019 04:50  Phos  5.5     02-11  Mg     2.4     02-11    TPro  6.3  /  Alb  2.5<L>  /  TBili  0.3  /  DBili  x   /  AST  78<H>  /  ALT  34  /  AlkPhos  272<H>  02-11    LIVER FUNCTIONS - ( 11 Feb 2019 03:00 )  Alb: 2.5 g/dL / Pro: 6.3 g/dL / ALK PHOS: 272 u/L / ALT: 34 u/L / AST: 78 u/L / GGT: x             ABG - ( 11 Feb 2019 06:28 )  pH, Arterial: 7.41  pH, Blood: x     /  pCO2: 45    /  pO2: 95    / HCO3: 27    / Base Excess: 3.3   /  SaO2: 97.6                  ============================IMAGING STUDIES=========================  < from: Xray Chest 1 View- PORTABLE-Routine (02.11.19 @ 07:05) >  Tracheostomy tube, subcutaneous AICD and right IJ line are unchanged.   Bilateral small loculated effusions with underlying unchanged. Upper   lungs are clear.      COMPARISON:  February 10      IMPRESSION:  Follow-up with bilateral small effusions and tracheostomy   tube in place unchanged.    A/P:  58M who underwent a FB, R thoracotomy, decortication, chest wall reconstruction, lat flap with Dr Pickering on 10/11/18. He was admitted on 1/6/19 to [facility] with a R pleural effusion and respiratory failure,  A R PTC was placed there and he was intubated.  He still had a R SANDY drain in place and he was transferred to [facility]. Patient was found to have aspirated a foreign body and +MRSA empyema. He was treated and discharged on 1/22/2019.     He was found by his home nurse to be in acute distress and recommended to proceed to the ED.  At presentation, he was found to be in acute hypercarbic respiratory failure complicated by hemodynamic instability requiring emergent intubation and pressor support. (28 Jan 2019 15:34)                            Neuro:                                         Pain control with Fentanyl PRN / Tylenol     Agitation /Anxiety: Continue Xanax and Seroquel. Wean off Precedex                            Cardiovascular:                                          Continue hemodynamic monitoring.    Hypotension: On  Midodrine 10mg/TID                                                  Respiratory:                        Pt is on full mechanical ventilator support HL--40-7. Continue Tolerated CPAP and ATC  for10 hrs yesterday, wean as tolerated                                         Trached on 1/31                                                                 Continue bronchodilators, pulmonary toilet     Continue antibiotics -  Vanco based on level + Zosyn.                             GI                                         Nepro 35cc/hr. Flush PEG with 30cc sterile water Q4hrs                                         Continue GI prophylaxis with Protonix                                                                 Renal:                                         HD as per renal / TIW , HD today                                         Monitor I/Os and electrolytes                                                                                        Hem/ Onc:                                                                                 Follow CBC in AM                           Infectious disease:     Bronchoscopy on 1/29 showed copious greenish secretions - Continue pulmonary toilet                                         BAL - MRSA, Continue Vanco based on level  + Zosyn for 4 weeks. Blood cultures are negative     I.D f/u                                                                   Endocrine         Continue Accu-Checks with coverage      Pt is on SQ Heparin and Venodyne boots for DVT prophylaxis.     Pertinent clinical, laboratory, radiographic, hemodynamic, echocardiographic, respiratory data, microbiologic data and chart were reviewed and analyzed frequently throughout the course of the day and night  Patient seen, examined and plan discussed with CT Surgeon / CTICU team during rounds. SW for rehab     Pt's status discussed with wife at bedside, updated status.     I have spent 35  minutes of critical care time with this pt between 00am and 9am          Dave Su DO, FACEP

## 2019-02-12 NOTE — PROGRESS NOTE ADULT - ASSESSMENT
58M who underwent a FB, R thoracotomy, decortication, chest wall reconstruction, lat flap with Dr Pickering on 10/11/18. He was admitted on 1/6/19 to [facility] with a R pleural effusion and respiratory failure,  A R PTC was placed there and he was intubated.  He still had a R SANDY drain in place and he was transferred to [facility]. Patient was found to have aspirated a foreign body and +MRSA empyema. He was treated and discharged on 1/22/2019.     He was found by his home nurse to be in acute distress and recommended to proceed to the ED.  At presentation, he was found to be in acute hypercarbic respiratory failure complicated by hemodynamic instability requiring emergent intubation and pressor support. (28 Jan 2019 15:34)    Pt had Tm 104.2 (on 1/29), tachycardia, hypotension.  Pt has now been extubated, s/p trach/PEG on 1/31.  CT chest shows complete L sided atelectasis with mucous plugging of right lower lobe  bronchus with near complete atelectasis.      ID consult called for antibiotic managment.     Recommend:    - Pt p/w severe sepsis with shock and respiratory distress.  Pt with high fevers.  Found to have complete and near complete atelectasis of L and R lung respectively with mucous plugging.  Agree with broad spectrum abx to cover for post-obstructive pna.  Repeat CT chest from 2/3 shows improved aeration.      - Cultures results show:    2/5: bcx - NGTD @ 48hrs  2/1: endotrach - MRSA,  Klebsiella (pan sens), yeast (gram stain only)  2/1: bcx - CNS  1/29:  lung cx - nl resp [place]  1/28: bcx - NGTD    - Agree with vancomycin and zosyn.   Maintain vanco trough between 15-20.  Dose vanco by level, post HD.  Fluconazole added on 2/2 for yeast (completed).  Seen on gram stain only.      -  Repeat blood cultures from 2/5 NGTD.  CNS likely contaminant.    - CT a/p showed rt inguinal lesion.  Subsequent US shows marked edema and small lymph nodes.  Cont to monitor.      - Cont care as per CTU. Continue hemodynamic monitoring.  On full mechanical ventilator support, s/p tracheostomy. Continue bronchodilators, pulmonary toilet, Continue GI prophylaxis with Protonix.   Monitor temp curve and WBC.    - Given severe sepsis and shock at presentation, recurrent involvement of MRSA, recommend long course of IV abx treatment (4 week course).  Completed fluconazole 7 days bet 2/2 - 2/8 (suspect yeast is likely a colonizer).      - Cont treat of MRSA/KLeb.  AFter discharge, Abx may be dosed with pt's HD sessions (can change zosyn to cefepime upon discharge), no need for picc line.  Complete total 4 weeks (1/28 through 2/24).      Pt pending placement.  No new ID recs at this time.    Will follow,    Anabel Chahal  [phone]

## 2019-02-13 LAB
ALBUMIN SERPL ELPH-MCNC: 2.4 G/DL — LOW (ref 3.3–5)
ALP SERPL-CCNC: 236 U/L — HIGH (ref 40–120)
ALT FLD-CCNC: 36 U/L — SIGNIFICANT CHANGE UP (ref 4–41)
ANION GAP SERPL CALC-SCNC: 12 MMO/L — SIGNIFICANT CHANGE UP (ref 7–14)
AST SERPL-CCNC: 77 U/L — HIGH (ref 4–40)
BASOPHILS # BLD AUTO: 0.08 K/UL — SIGNIFICANT CHANGE UP (ref 0–0.2)
BASOPHILS NFR BLD AUTO: 1.1 % — SIGNIFICANT CHANGE UP (ref 0–2)
BILIRUB SERPL-MCNC: 0.4 MG/DL — SIGNIFICANT CHANGE UP (ref 0.2–1.2)
BUN SERPL-MCNC: 27 MG/DL — HIGH (ref 7–23)
CALCIUM SERPL-MCNC: 8.7 MG/DL — SIGNIFICANT CHANGE UP (ref 8.4–10.5)
CHLORIDE SERPL-SCNC: 93 MMOL/L — LOW (ref 98–107)
CO2 SERPL-SCNC: 27 MMOL/L — SIGNIFICANT CHANGE UP (ref 22–31)
CREAT SERPL-MCNC: 3.76 MG/DL — HIGH (ref 0.5–1.3)
EOSINOPHIL # BLD AUTO: 0.8 K/UL — HIGH (ref 0–0.5)
EOSINOPHIL NFR BLD AUTO: 10.5 % — HIGH (ref 0–6)
GLUCOSE SERPL-MCNC: 103 MG/DL — HIGH (ref 70–99)
HCT VFR BLD CALC: 29 % — LOW (ref 39–50)
HGB BLD-MCNC: 8.6 G/DL — LOW (ref 13–17)
IMM GRANULOCYTES NFR BLD AUTO: 0.4 % — SIGNIFICANT CHANGE UP (ref 0–1.5)
LYMPHOCYTES # BLD AUTO: 0.85 K/UL — LOW (ref 1–3.3)
LYMPHOCYTES # BLD AUTO: 11.2 % — LOW (ref 13–44)
MCHC RBC-ENTMCNC: 29.7 % — LOW (ref 32–36)
MCHC RBC-ENTMCNC: 30.5 PG — SIGNIFICANT CHANGE UP (ref 27–34)
MCV RBC AUTO: 102.8 FL — HIGH (ref 80–100)
MONOCYTES # BLD AUTO: 0.44 K/UL — SIGNIFICANT CHANGE UP (ref 0–0.9)
MONOCYTES NFR BLD AUTO: 5.8 % — SIGNIFICANT CHANGE UP (ref 2–14)
NEUTROPHILS # BLD AUTO: 5.39 K/UL — SIGNIFICANT CHANGE UP (ref 1.8–7.4)
NEUTROPHILS NFR BLD AUTO: 71 % — SIGNIFICANT CHANGE UP (ref 43–77)
NRBC # FLD: 0 K/UL — LOW (ref 25–125)
PLATELET # BLD AUTO: 354 K/UL — SIGNIFICANT CHANGE UP (ref 150–400)
PMV BLD: 8.9 FL — SIGNIFICANT CHANGE UP (ref 7–13)
POTASSIUM SERPL-MCNC: 3.6 MMOL/L — SIGNIFICANT CHANGE UP (ref 3.5–5.3)
POTASSIUM SERPL-SCNC: 3.6 MMOL/L — SIGNIFICANT CHANGE UP (ref 3.5–5.3)
PROT SERPL-MCNC: 6 G/DL — SIGNIFICANT CHANGE UP (ref 6–8.3)
RBC # BLD: 2.82 M/UL — LOW (ref 4.2–5.8)
RBC # FLD: 16.4 % — HIGH (ref 10.3–14.5)
SODIUM SERPL-SCNC: 132 MMOL/L — LOW (ref 135–145)
WBC # BLD: 7.59 K/UL — SIGNIFICANT CHANGE UP (ref 3.8–10.5)
WBC # FLD AUTO: 7.59 K/UL — SIGNIFICANT CHANGE UP (ref 3.8–10.5)

## 2019-02-13 PROCEDURE — 71045 X-RAY EXAM CHEST 1 VIEW: CPT | Mod: 26

## 2019-02-13 PROCEDURE — 99292 CRITICAL CARE ADDL 30 MIN: CPT

## 2019-02-13 PROCEDURE — 99291 CRITICAL CARE FIRST HOUR: CPT

## 2019-02-13 RX ORDER — ALPRAZOLAM 0.25 MG
0.5 TABLET ORAL EVERY 8 HOURS
Qty: 0 | Refills: 0 | Status: DISCONTINUED | OUTPATIENT
Start: 2019-02-13 | End: 2019-02-20

## 2019-02-13 RX ORDER — PANTOPRAZOLE SODIUM 20 MG/1
40 TABLET, DELAYED RELEASE ORAL DAILY
Qty: 0 | Refills: 0 | Status: DISCONTINUED | OUTPATIENT
Start: 2019-02-13 | End: 2019-02-20

## 2019-02-13 RX ADMIN — PANTOPRAZOLE SODIUM 40 MILLIGRAM(S): 20 TABLET, DELAYED RELEASE ORAL at 11:58

## 2019-02-13 RX ADMIN — HEPARIN SODIUM 5000 UNIT(S): 5000 INJECTION INTRAVENOUS; SUBCUTANEOUS at 05:59

## 2019-02-13 RX ADMIN — Medication 2 PUFF(S): at 21:16

## 2019-02-13 RX ADMIN — QUETIAPINE FUMARATE 50 MILLIGRAM(S): 200 TABLET, FILM COATED ORAL at 05:58

## 2019-02-13 RX ADMIN — Medication 0.5 MILLIGRAM(S): at 13:36

## 2019-02-13 RX ADMIN — Medication 6 MILLIGRAM(S): at 22:55

## 2019-02-13 RX ADMIN — Medication 400 MILLIGRAM(S): at 10:06

## 2019-02-13 RX ADMIN — MIDODRINE HYDROCHLORIDE 10 MILLIGRAM(S): 2.5 TABLET ORAL at 22:56

## 2019-02-13 RX ADMIN — Medication 2 PUFF(S): at 10:33

## 2019-02-13 RX ADMIN — Medication 0.5 MILLIGRAM(S): at 05:59

## 2019-02-13 RX ADMIN — QUETIAPINE FUMARATE 50 MILLIGRAM(S): 200 TABLET, FILM COATED ORAL at 18:45

## 2019-02-13 RX ADMIN — MIDODRINE HYDROCHLORIDE 10 MILLIGRAM(S): 2.5 TABLET ORAL at 13:36

## 2019-02-13 RX ADMIN — Medication 2 PUFF(S): at 04:04

## 2019-02-13 RX ADMIN — PIPERACILLIN AND TAZOBACTAM 25 GRAM(S): 4; .5 INJECTION, POWDER, LYOPHILIZED, FOR SOLUTION INTRAVENOUS at 22:00

## 2019-02-13 RX ADMIN — Medication 2 PUFF(S): at 16:53

## 2019-02-13 RX ADMIN — Medication 1000 MILLIGRAM(S): at 10:45

## 2019-02-13 RX ADMIN — HEPARIN SODIUM 5000 UNIT(S): 5000 INJECTION INTRAVENOUS; SUBCUTANEOUS at 18:35

## 2019-02-13 RX ADMIN — MIDODRINE HYDROCHLORIDE 10 MILLIGRAM(S): 2.5 TABLET ORAL at 05:58

## 2019-02-13 RX ADMIN — PIPERACILLIN AND TAZOBACTAM 25 GRAM(S): 4; .5 INJECTION, POWDER, LYOPHILIZED, FOR SOLUTION INTRAVENOUS at 10:06

## 2019-02-13 RX ADMIN — Medication 1 APPLICATION(S): at 11:59

## 2019-02-13 NOTE — PROGRESS NOTE ADULT - SUBJECTIVE AND OBJECTIVE BOX
CLAUDIA HAN                     MRN-[id number]    HPI:  Mr. Han is a 58M who underwent a FB, R thoracotomy, decortication, chest wall reconstruction, lat flap with Dr Pickering on 10/11/18. He was admitted on 1/6/19 to [facility] with a R pleural effusion and respiratory failure,  A R PTC was placed there and he was intubated.  He still had a R SANDY drain in place and he was transferred to [facility]. Patient was found to have aspirated a foreign body and +MRSA empyema. He was treated and discharged on 1/22/2019.     He was found by his home nurse to be in acute distress and recommended to proceed to the ED.  At presentation, he was found to be in acute hypercarbic respiratory failure complicated by hemodynamic instability requiring emergent intubation and pressor support. (28 Jan 2019 15:34)    Procedure:        Trach and PEG  1/31/2019  Bronchoscopy   1/29/2019  Right thoracotomy, resection of portion of R 7th rib, evacuation of infected hemothorax, takedown of pleurocutaneous fistula  10/11/2018      Issues:  Acute hypoxic & hypercapnic respiratory failure  Hypotension, on Midodrine  Pneumonia /  Fever / Sepsis  ESRD / HD  Cardiomyopathy  HLD  BPH  Anxiety / Agitation      PAST MEDICAL & SURGICAL HISTORY:  Smoking hx  Cardiomyopathy  Wound: right chest  ICD (implantable cardioverter-defibrillator) in place  OA (osteoarthritis)  HLD (hyperlipidemia)  BPH (benign prostatic hyperplasia)  Pleural effusion  HTN (hypertension)  ESRD (end stage renal disease)  H/O chest wound: right  S/P thoracotomy: FB, R thoracotomy, decortication, chest wall reconstruction, lat flap  History of wound infection: right chest wall - revision 5/18 and again in 7/18  History of implantable cardioverter-defibrillator (ICD) placement: pt unsure when placed  H/O bilateral hip replacements: 2008, 2009            VITAL SIGNS:  Vital Signs Last 24 Hrs  T(C): 37 (13 Feb 2019 04:00), Max: 37 (12 Feb 2019 21:12)  T(F): 98.6 (13 Feb 2019 04:00), Max: 98.6 (12 Feb 2019 21:12)  HR: 106 (13 Feb 2019 05:00) (85 - 125)  BP: 122/70 (12 Feb 2019 21:12) (122/70 - 122/70)  BP(mean): 82 (12 Feb 2019 21:12) (82 - 82)  RR: 25 (13 Feb 2019 05:00) (20 - 25)  SpO2: 100% (13 Feb 2019 05:00) (94% - 100%)    I/Os:   I&O's Detail    11 Feb 2019 07:01  -  12 Feb 2019 07:00  --------------------------------------------------------  IN:    Enteral Tube Flush: 80 mL    IV PiggyBack: 100 mL    Nepro with Carb Steady: 805 mL    sodium chloride 0.9%.: 230 mL  Total IN: 1215 mL    OUT:    Bulb: 50 mL    Rectal Tube: 275 mL  Total OUT: 325 mL    Total NET: 890 mL      12 Feb 2019 07:01  -  13 Feb 2019 06:02  --------------------------------------------------------  IN:    Enteral Tube Flush: 80 mL    IV PiggyBack: 100 mL    Nepro with Carb Steady: 700 mL    Other: 400 mL    sodium chloride 0.9%.: 170 mL  Total IN: 1450 mL    OUT:    Bulb: 25 mL    Other: 1600 mL    Rectal Tube: 200 mL  Total OUT: 1825 mL    Total NET: -375 mL          CAPILLARY BLOOD GLUCOSE          =======================MEDICATIONS===================  MEDICATIONS  (STANDING):  ALPRAZolam 0.5 milliGRAM(s) Oral every 8 hours  collagenase Ointment 1 Application(s) Topical daily  epoetin kelly Injectable 3000 Unit(s) IV Push <User Schedule>  heparin  Injectable 5000 Unit(s) SubCutaneous every 12 hours  ipratropium 17 MICROgram(s) HFA Inhaler 2 Puff(s) Inhalation every 6 hours  melatonin 6 milliGRAM(s) Oral at bedtime  midodrine 10 milliGRAM(s) Oral every 8 hours  pantoprazole  Injectable 40 milliGRAM(s) IV Push daily  piperacillin/tazobactam IVPB. 3.375 Gram(s) IV Intermittent every 12 hours  QUEtiapine 50 milliGRAM(s) Oral every 12 hours  sodium chloride 0.9%. 1000 milliLiter(s) (10 mL/Hr) IV Continuous <Continuous>    MEDICATIONS  (PRN):  acetaminophen  IVPB .. 1000 milliGRAM(s) IV Intermittent once PRN Temp greater or equal to 38C (100.4F), Moderate Pain (4 - 6)  ALBUTerol    90 MICROgram(s) HFA Inhaler 2 Puff(s) Inhalation every 6 hours PRN Shortness of Breath and/or Wheezing      =======================VENTILATOR SETTINGS===================  Mode: AC/ CMV (Assist Control/ Continuous Mandatory Ventilation)  RR (machine): 22  TV (machine): 400  FiO2: 40  PEEP: 5  MAP: 10  PIP: 26      discussed    PHYSICAL EXAM============================  General:                         Awake, alert, GCS 15, not in any distress  Neuro:                            Moving all extremities to commands.   Respiratory:	Air entry fair and  bilateral conducted sounds                                           Effort even and unlabored.  CV:		Regular rate and rhythm. Normal S1/S2                                          Distal pulses present.  Abdomen:	                     Soft, non-distended. Bowel sounds present   Skin:		No rash.  Extremities:	Warm, no cyanosis or edema.  Palpable pulses    ============================LABS=========================                        8.6    7.59  )-----------( 354      ( 13 Feb 2019 03:20 )             29.0     02-13    132<L>  |  93<L>  |  27<H>  ----------------------------<  103<H>  3.6   |  27  |  3.76<H>    Ca    8.7      13 Feb 2019 03:20    TPro  6.0  /  Alb  2.4<L>  /  TBili  0.4  /  DBili  x   /  AST  77<H>  /  ALT  36  /  AlkPhos  236<H>  02-13    LIVER FUNCTIONS - ( 13 Feb 2019 03:20 )  Alb: 2.4 g/dL / Pro: 6.0 g/dL / ALK PHOS: 236 u/L / ALT: 36 u/L / AST: 77 u/L / GGT: x             ABG - ( 11 Feb 2019 06:28 )  pH, Arterial: 7.41  pH, Blood: x     /  pCO2: 45    /  pO2: 95    / HCO3: 27    / Base Excess: 3.3   /  SaO2: 97.6                  ============================IMAGING STUDIES=========================  < from: Xray Chest 1 View- PORTABLE-Routine (02.12.19 @ 00:43) >  Tracheostomy tube, right IJ line and subcutaneous AICD unchanged.   Bilateral small loculated effusion seen similar to the study of the day   before. Upper lung fields are clear. The heart is not enlarged.      COMPARISON:  February 11      IMPRESSION:  Bilateral small loculated effusions with tracheostomy tube.    A/P:  58M who underwent a FB, R thoracotomy, decortication, chest wall reconstruction, lat flap with Dr Pickering on 10/11/18. He was admitted on 1/6/19 to [facility] with a R pleural effusion and respiratory failure,  A R PTC was placed there and he was intubated.  He still had a R SANDY drain in place and he was transferred to [facility]. Patient was found to have aspirated a foreign body and +MRSA empyema. He was treated and discharged on 1/22/2019.     He was found by his home nurse to be in acute distress and recommended to proceed to the ED.  At presentation, he was found to be in acute hypercarbic respiratory failure complicated by hemodynamic instability requiring emergent intubation and pressor support. (28 Jan 2019 15:34)                            Neuro:                                         Pain control with Fentanyl PRN / Tylenol     Agitation /Anxiety: Continue Xanax and Seroquel. Wean off Precedex                            Cardiovascular:                                          Continue hemodynamic monitoring.    Hypotension: On  Midodrine 10mg/TID                                                  Respiratory:                        Pt is on full mechanical ventilator support SO--40-7. Continue Tolerated CPAP and ATC  for10 hrs yesterday, wean as tolerated                                         Trached on 1/31                                                                 Continue bronchodilators, pulmonary toilet     Continue antibiotics -  Vanco based on level + Zosyn.                             GI                                         Nepro 35cc/hr. Flush PEG with 30cc sterile water Q4hrs                                         Continue GI prophylaxis with Protonix                                                                 Renal:                                         HD as per renal / TIW , HD yesterday                                         Monitor I/Os and electrolytes                                                                                        Hem/ Onc:                                                                                 Follow CBC in AM                           Infectious disease: D/C invasive lines      Bronchoscopy on 1/29 showed copious greenish secretions - Continue pulmonary toilet                                         BAL - MRSA, Continue Vanco based on level  + Zosyn for 4 weeks. Blood cultures are negative     I.D f/u                                                                   Endocrine         Continue Accu-Checks with coverage      Pt is on SQ Heparin and Venodyne boots for DVT prophylaxis.     Pertinent clinical, laboratory, radiographic, hemodynamic, echocardiographic, respiratory data, microbiologic data and chart were reviewed and analyzed frequently throughout the course of the day and night  Patient seen, examined and plan discussed with CT Surgeon / CTICU team during rounds. SW for rehab placement today    Pt's status discussed with wife at bedside, updated status.     I have spent 35  minutes of critical care time with this pt between 00am and 9am        Dave Su DO, FACEP

## 2019-02-13 NOTE — PROGRESS NOTE ADULT - SUBJECTIVE AND OBJECTIVE BOX
CLAUDIA HAN   MRN#: [id number]     The patient is a 58y Male who was seen, evaluated, & examined with the CTICU staff post-operatively with a multidisciplinary care plan formulated & implemented.  All available clinical, laboratory, radiographic, pharmacologic, and electrocardiographic data were reviewed & analyzed.      The patient was in the CTICU in critical condition secondary to:     acute hypoxic respiratory failure-persistent cardiopulmonary dysfunction  sepsis-shock    For support and evaluation & prevention of further decompensation secondary to persistent cardiopulmonary dysfunction and cardiogenic shock-cardiovascular dysfunction, respiratory failure required:     supplemental oxygen with full ventilatory support / mechanical ventilation   continuous pulse oximetry monitoring  following ABGs with A-line monitoring    Invasive hemodynamic monitoring with     an A-line was required for continuous MAP/BP monitoring     to ensure adequate cardiovascular support and to evaluate for & help prevent decompensation while receiving     intermittent volume expansion    secondary to     sepsis-shock    In addition:  Off pressors, very agitated on the vent despite multiple anxiolytics (Seroquel, Xanax)  Transitioned to trach collar and doing well - stays on trach collar all day and rests overnight without agitation  Hemodynamically stable on Midodrine 10 q8  Also continued on Zosyn for Klebsiella and Vancomycin by level for MRSA  LE Dopplers for swelling/pain    The patient required critical care management and I provided 80 minutes of non-continuous care to the patient in addition to  discussing the patient and plan at length with the CTICU staff and helping coordinate care.

## 2019-02-14 LAB
ALBUMIN SERPL ELPH-MCNC: 2.5 G/DL — LOW (ref 3.3–5)
ALP SERPL-CCNC: 249 U/L — HIGH (ref 40–120)
ALT FLD-CCNC: 32 U/L — SIGNIFICANT CHANGE UP (ref 4–41)
ANION GAP SERPL CALC-SCNC: 15 MMO/L — HIGH (ref 7–14)
AST SERPL-CCNC: 67 U/L — HIGH (ref 4–40)
BASE EXCESS BLDV CALC-SCNC: 5.8 MMOL/L — SIGNIFICANT CHANGE UP
BILIRUB SERPL-MCNC: 0.3 MG/DL — SIGNIFICANT CHANGE UP (ref 0.2–1.2)
BLD GP AB SCN SERPL QL: NEGATIVE — SIGNIFICANT CHANGE UP
BUN SERPL-MCNC: 34 MG/DL — HIGH (ref 7–23)
CALCIUM SERPL-MCNC: 9.2 MG/DL — SIGNIFICANT CHANGE UP (ref 8.4–10.5)
CHLORIDE SERPL-SCNC: 96 MMOL/L — LOW (ref 98–107)
CO2 SERPL-SCNC: 27 MMOL/L — SIGNIFICANT CHANGE UP (ref 22–31)
CREAT SERPL-MCNC: 4.48 MG/DL — HIGH (ref 0.5–1.3)
GLUCOSE SERPL-MCNC: 102 MG/DL — HIGH (ref 70–99)
HCO3 BLDV-SCNC: 29 MMOL/L — HIGH (ref 20–27)
HCT VFR BLD CALC: 30.7 % — LOW (ref 39–50)
HGB BLD-MCNC: 9.3 G/DL — LOW (ref 13–17)
MAGNESIUM SERPL-MCNC: 2.4 MG/DL — SIGNIFICANT CHANGE UP (ref 1.6–2.6)
MCHC RBC-ENTMCNC: 30.3 % — LOW (ref 32–36)
MCHC RBC-ENTMCNC: 31.2 PG — SIGNIFICANT CHANGE UP (ref 27–34)
MCV RBC AUTO: 103 FL — HIGH (ref 80–100)
NRBC # FLD: 0 K/UL — LOW (ref 25–125)
PCO2 BLDV: 51 MMHG — SIGNIFICANT CHANGE UP (ref 41–51)
PH BLDV: 7.4 PH — SIGNIFICANT CHANGE UP (ref 7.32–7.43)
PHOSPHATE SERPL-MCNC: 5 MG/DL — HIGH (ref 2.5–4.5)
PLATELET # BLD AUTO: 366 K/UL — SIGNIFICANT CHANGE UP (ref 150–400)
PMV BLD: 9.3 FL — SIGNIFICANT CHANGE UP (ref 7–13)
PO2 BLDV: 45 MMHG — HIGH (ref 35–40)
POTASSIUM SERPL-MCNC: 3.9 MMOL/L — SIGNIFICANT CHANGE UP (ref 3.5–5.3)
POTASSIUM SERPL-SCNC: 3.9 MMOL/L — SIGNIFICANT CHANGE UP (ref 3.5–5.3)
PROT SERPL-MCNC: 6.3 G/DL — SIGNIFICANT CHANGE UP (ref 6–8.3)
RBC # BLD: 2.98 M/UL — LOW (ref 4.2–5.8)
RBC # FLD: 16.6 % — HIGH (ref 10.3–14.5)
RH IG SCN BLD-IMP: POSITIVE — SIGNIFICANT CHANGE UP
SAO2 % BLDV: 76.8 % — SIGNIFICANT CHANGE UP (ref 60–85)
SODIUM SERPL-SCNC: 138 MMOL/L — SIGNIFICANT CHANGE UP (ref 135–145)
VANCOMYCIN TROUGH SERPL-MCNC: 9 UG/ML — LOW (ref 10–20)
WBC # BLD: 7.21 K/UL — SIGNIFICANT CHANGE UP (ref 3.8–10.5)
WBC # FLD AUTO: 7.21 K/UL — SIGNIFICANT CHANGE UP (ref 3.8–10.5)

## 2019-02-14 PROCEDURE — 71045 X-RAY EXAM CHEST 1 VIEW: CPT | Mod: 26

## 2019-02-14 PROCEDURE — 99291 CRITICAL CARE FIRST HOUR: CPT

## 2019-02-14 PROCEDURE — 99292 CRITICAL CARE ADDL 30 MIN: CPT

## 2019-02-14 PROCEDURE — 93970 EXTREMITY STUDY: CPT | Mod: 26

## 2019-02-14 PROCEDURE — 99233 SBSQ HOSP IP/OBS HIGH 50: CPT | Mod: GC

## 2019-02-14 RX ORDER — ALBUMIN HUMAN 25 %
50 VIAL (ML) INTRAVENOUS
Qty: 0 | Refills: 0 | Status: COMPLETED | OUTPATIENT
Start: 2019-02-14 | End: 2019-02-14

## 2019-02-14 RX ORDER — VANCOMYCIN HCL 1 G
500 VIAL (EA) INTRAVENOUS ONCE
Qty: 0 | Refills: 0 | Status: COMPLETED | OUTPATIENT
Start: 2019-02-14 | End: 2019-02-14

## 2019-02-14 RX ORDER — ALBUMIN HUMAN 25 %
50 VIAL (ML) INTRAVENOUS
Qty: 0 | Refills: 0 | Status: DISCONTINUED | OUTPATIENT
Start: 2019-02-14 | End: 2019-02-14

## 2019-02-14 RX ADMIN — Medication 2 PUFF(S): at 04:02

## 2019-02-14 RX ADMIN — Medication 2 PUFF(S): at 16:52

## 2019-02-14 RX ADMIN — SODIUM CHLORIDE 10 MILLILITER(S): 9 INJECTION INTRAMUSCULAR; INTRAVENOUS; SUBCUTANEOUS at 19:58

## 2019-02-14 RX ADMIN — Medication 100 MILLILITER(S): at 21:09

## 2019-02-14 RX ADMIN — Medication 0.5 MILLIGRAM(S): at 12:56

## 2019-02-14 RX ADMIN — SODIUM CHLORIDE 10 MILLILITER(S): 9 INJECTION INTRAMUSCULAR; INTRAVENOUS; SUBCUTANEOUS at 00:40

## 2019-02-14 RX ADMIN — HEPARIN SODIUM 5000 UNIT(S): 5000 INJECTION INTRAVENOUS; SUBCUTANEOUS at 06:28

## 2019-02-14 RX ADMIN — HEPARIN SODIUM 5000 UNIT(S): 5000 INJECTION INTRAVENOUS; SUBCUTANEOUS at 17:24

## 2019-02-14 RX ADMIN — QUETIAPINE FUMARATE 50 MILLIGRAM(S): 200 TABLET, FILM COATED ORAL at 17:24

## 2019-02-14 RX ADMIN — Medication 0.5 MILLIGRAM(S): at 06:50

## 2019-02-14 RX ADMIN — ERYTHROPOIETIN 3000 UNIT(S): 10000 INJECTION, SOLUTION INTRAVENOUS; SUBCUTANEOUS at 12:36

## 2019-02-14 RX ADMIN — Medication 0.5 MILLIGRAM(S): at 00:40

## 2019-02-14 RX ADMIN — QUETIAPINE FUMARATE 50 MILLIGRAM(S): 200 TABLET, FILM COATED ORAL at 06:28

## 2019-02-14 RX ADMIN — Medication 2 PUFF(S): at 22:45

## 2019-02-14 RX ADMIN — SODIUM CHLORIDE 10 MILLILITER(S): 9 INJECTION INTRAMUSCULAR; INTRAVENOUS; SUBCUTANEOUS at 12:55

## 2019-02-14 RX ADMIN — MIDODRINE HYDROCHLORIDE 10 MILLIGRAM(S): 2.5 TABLET ORAL at 06:28

## 2019-02-14 RX ADMIN — MIDODRINE HYDROCHLORIDE 10 MILLIGRAM(S): 2.5 TABLET ORAL at 12:55

## 2019-02-14 RX ADMIN — Medication 100 MILLIGRAM(S): at 17:24

## 2019-02-14 RX ADMIN — PANTOPRAZOLE SODIUM 40 MILLIGRAM(S): 20 TABLET, DELAYED RELEASE ORAL at 12:55

## 2019-02-14 RX ADMIN — Medication 100 MILLILITER(S): at 19:53

## 2019-02-14 RX ADMIN — PIPERACILLIN AND TAZOBACTAM 25 GRAM(S): 4; .5 INJECTION, POWDER, LYOPHILIZED, FOR SOLUTION INTRAVENOUS at 17:24

## 2019-02-14 RX ADMIN — Medication 2 PUFF(S): at 10:30

## 2019-02-14 RX ADMIN — MIDODRINE HYDROCHLORIDE 10 MILLIGRAM(S): 2.5 TABLET ORAL at 21:09

## 2019-02-14 NOTE — PROGRESS NOTE ADULT - SUBJECTIVE AND OBJECTIVE BOX
Pt. readmitted for respiratory failure  Pt. had trach and peg on this admission  Pt. 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

## 2019-02-14 NOTE — PROGRESS NOTE ADULT - PROBLEM SELECTOR PLAN 1
Pt. with ESRD on HD TIW (TTS). Last HD was on 2/12/19 via [facility], tolerated treatment well. Labs reviewed. Plan for HD today. Monitor BP while on HD

## 2019-02-14 NOTE — PROGRESS NOTE ADULT - ASSESSMENT
58M who underwent a FB, R thoracotomy, decortication, chest wall reconstruction, lat flap with Dr Pickering on 10/11/18. He was admitted on 1/6/19 to [facility] with a R pleural effusion and respiratory failure,  A R PTC was placed there and he was intubated.  He still had a R SANDY drain in place and he was transferred to [facility]. Patient was found to have aspirated a foreign body and +MRSA empyema. He was treated and discharged on 1/22/2019.     He was found by his home nurse to be in acute distress and recommended to proceed to the ED.  At presentation, he was found to be in acute hypercarbic respiratory failure complicated by hemodynamic instability requiring emergent intubation and pressor support. (28 Jan 2019 15:34)    Pt had Tm 104.2 (on 1/29), tachycardia, hypotension.  Pt has now been extubated, s/p trach/PEG on 1/31.  CT chest shows complete L sided atelectasis with mucous plugging of right lower lobe  bronchus with near complete atelectasis.      ID consult called for antibiotic managment.     Recommend:    - Pt p/w severe sepsis with shock and respiratory distress.  Pt with high fevers.  Found to have complete and near complete atelectasis of L and R lung respectively with mucous plugging.  Agree with broad spectrum abx to cover for post-obstructive pna.  Repeat CT chest from 2/3 shows improved aeration.      - Cultures results show:    2/5: bcx - NGTD @ 48hrs  2/1: endotrach - MRSA,  Klebsiella (pan sens), yeast (gram stain only)  2/1: bcx - CNS  1/29:  lung cx - nl resp milton  1/28: bcx - NGTD    - Agree with vancomycin and zosyn.   Maintain vanco trough between 15-20.  Dose vanco by level, post HD.  Fluconazole added on 2/2 for yeast (completed).  Seen on gram stain only.      -  Repeat blood cultures from 2/5 NGTD.  CNS likely contaminant.    - CT a/p showed rt inguinal lesion.  Subsequent US shows marked edema and small lymph nodes.  Cont to monitor.      - Cont care as per CTU. Continue hemodynamic monitoring.  On full mechanical ventilator support, s/p tracheostomy. Continue bronchodilators, pulmonary toilet, Continue GI prophylaxis with Protonix.   Monitor temp curve and WBC.    - Given severe sepsis and shock at presentation, recurrent involvement of MRSA, recommend long course of IV abx treatment (4 week course).  Completed fluconazole 7 days bet 2/2 - 2/8 (suspect yeast is likely a colonizer).      - Cont treat of MRSA/KLeb.  AFter discharge, Abx may be dosed with pt's HD sessions (can change zosyn to cefepime upon discharge), no need for picc line.  Complete total 4 weeks (1/28 through 2/24).      Pt pending placement.  No new ID recs at this time.    Will follow,    Anabel Chahal  [phone]

## 2019-02-14 NOTE — PROGRESS NOTE ADULT - SUBJECTIVE AND OBJECTIVE BOX
Infectious Diseases progress note:    Subjective:  Awake, alert, NAD.  No acute o/n events.  Getting dialysis    ROS:  CONSTITUTIONAL:  No fever, chills, rigors  CARDIOVASCULAR:  No chest pain or palpitations  RESPIRATORY:   No SOB, cough, dyspnea on exertion.  No wheezing  GASTROINTESTINAL:  No abd pain, N/V, diarrhea/constipation  EXTREMITIES:  No swelling or joint pain  GENITOURINARY:  No burning on urination, increased frequency or urgency.  No flank pain  NEUROLOGIC:  No HA, visual disturbances  SKIN: No rashes    Allergies    No Known Allergies    Intolerances        ANTIBIOTICS/RELEVANT:  antimicrobials  piperacillin/tazobactam IVPB. 3.375 Gram(s) IV Intermittent every 12 hours    immunologic:  epoetin kelly Injectable 3000 Unit(s) IV Push <User Schedule>    OTHER:  ALBUTerol    90 MICROgram(s) HFA Inhaler 2 Puff(s) Inhalation every 6 hours PRN  ALPRAZolam 0.5 milliGRAM(s) Oral every 8 hours  heparin  Injectable 5000 Unit(s) SubCutaneous every 12 hours  ipratropium 17 MICROgram(s) HFA Inhaler 2 Puff(s) Inhalation every 6 hours  melatonin 6 milliGRAM(s) Oral at bedtime  midodrine 10 milliGRAM(s) Oral every 8 hours  pantoprazole  Injectable 40 milliGRAM(s) IV Push daily  QUEtiapine 50 milliGRAM(s) Oral every 12 hours  sodium chloride 0.9%. 1000 milliLiter(s) IV Continuous <Continuous>      Objective:  Vital Signs Last 24 Hrs  T(C): 36.5 (14 Feb 2019 12:00), Max: 37.1 (14 Feb 2019 04:00)  T(F): 97.7 (14 Feb 2019 12:00), Max: 98.8 (14 Feb 2019 04:00)  HR: 97 (14 Feb 2019 14:00) (96 - 122)  BP: 107/64 (14 Feb 2019 14:00) (90/59 - 116/72)  BP(mean): 75 (14 Feb 2019 14:00) (66 - 82)  RR: 15 (14 Feb 2019 14:00) (15 - 25)  SpO2: 100% (14 Feb 2019 14:00) (93% - 100%)    PHYSICAL EXAM:  Constitutional:NAD  Eyes:CHER, EOMI  Ear/Nose/Throat: no thrush, mucositis.  Moist mucous membranes	  Neck:no JVD, no lymphadenopathy, supple, trach  Respiratory: CTA adore  Cardiovascular: S1S2 RRR, no murmurs  Gastrointestinal:soft, nontender,  nondistended (+) BS, PEG  Extremities:no e/e/c  Skin:  no rashes, open wounds or ulcerations        LABS:                        9.3    7.21  )-----------( 366      ( 14 Feb 2019 03:50 )             30.7     02-14    138  |  96<L>  |  34<H>  ----------------------------<  102<H>  3.9   |  27  |  4.48<H>    Ca    9.2      14 Feb 2019 03:50  Phos  5.0     02-14  Mg     2.4     02-14    TPro  6.3  /  Alb  2.5<L>  /  TBili  0.3  /  DBili  x   /  AST  67<H>  /  ALT  32  /  AlkPhos  249<H>  02-14            Vancomycin Level, Random:  ug/mL (02-12 @ 04:50)                  MICROBIOLOGY:    Culture - Blood (02.05.19 @ 14:17)    Culture - Blood:   NO ORGANISMS ISOLATED    Specimen Source: BLOOD    Culture - Respiratory with Gram Stain (02.01.19 @ 22:35)    Gram Stain Sputum:   WBC White Blood Cells  QNTY CELLS IN GRAM STAIN: MODERATE (3+)  GPCCL^Gram Pos Cocci In Clusters  QUANTITY OF BACTERIA SEEN: MODERATE (3+)  YEAST^YEAST.  QUANTITY OF BACTERIA SEEN: FEW (2+)  GPR^Gram Positive Rods  QUANTITY OF BACTERIA SEEN: FEW (2+)    -  Ciprofloxacin: S <=1 AAMIR    -  Ciprofloxacin: S <=0.5 AAMIR    -  Clindamycin: S 0.5 AAMIR    -  Daptomycin: S 1 AAMIR    -  Ertapenem: S <=0.5 AAMIR    -  Erythromycin: R >4 AAMIR    -  Gentamicin: R >8 AAMIR    -  Gentamicin: S <=1 AAMIR    -  Imipenem: S <=1 AAMIR    -  Levofloxacin: S <=0.5 AAMIR    -  Levofloxacin: S <=1 AAMIR    -  Linezolid: S 4 AAMIR    -  Meropenem: S <=1 AAMIR    -  Moxifloxacin(Aerobic): S <=0.5 AAMIR    -  Oxacillin: R >2 AAMIR    -  Penicillin: R >8 AAMIR    -  Piperacillin/Tazobactam: S <=8 AAMIR    -  Rifampin: S <=1 AAMIR    Culture - Respiratory:   Normal Respiratory Jennifer Also Present  ***** CRITICAL RESULT *****  PERSON CALLED / READ-BACK: ADALGISA CARLSON RN./Y  DATE / TIME CALLED: 02/04/19 1204  CALLED BY: RAYMUNDO GUAMAN    -  Amikacin: S <=8 AAMIR    -  Tetra/Doxy: S <=1 AAMIR    -  Tigecycline: S    -  Tobramycin: S <=2 AAMIR    -  Trimethoprim/Sulfamethoxazole: S <=0.5/9.5 AAMIR    -  Trimethoprim/Sulfamethoxazole: S <=0.5/9.5 AAMIR    -  Ampicillin: R 16 AAMIR    -  Ampicillin/Sulbactam: S <=4/2 AAMIR    -  Aztreonam: S <=4 AAMIR    -  Cefazolin: R 16 AAMIR    -  Cefazolin: S <=2 AAMIR    -  Cefepime: S <=2 AAMIR    -  Cefoxitin: S <=4 AAMIR    -  Ceftazidime: S <=1 AAMIR    -  Ceftriaxone: S <=1 AAMIR    -  Vancomycin: S 2 AAMIR    Specimen Source: ENDOTRACHEAL SPECIMEN    Organism Identification: Staph. aureus *MRSA*  Klebsiella pneumoniae    Organism: Staph. aureus *MRSA*  QUANTITY OF GROWTH: MODERATE  OXICILLIN-RESISTANT staphylococci should be regarded as  RESISTANT to ALL other Beta-Lactams regardless of the  in-vitro results obtained.  These include: ALL  Penicillins, Cephalosporins, Amoxicillin-clavulanic  acid, Ticarcillin-clavulanic acid,  Ampicillin-sulbactam, and Imipenem.    Organism: Klebsiella pneumoniae  QUANTITY OF GROWTH: MODERATE    Method Type: POSITIVE AAMIR 29    Method Type: NEGATIVE AAMIR 43          RADIOLOGY & ADDITIONAL STUDIES:    < from: US Duplex Venous Lower Ext Complete, Bilateral (02.14.19 @ 13:05) >  FINDINGS:    There is normal compressibility of the bilateral common femoral, femoral   and popliteal veins. No calf vein thrombosis is detected.    Doppler examination shows normal spontaneous and phasic flow.    Overlying soft tissue edema is noted in the lower extremities.    IMPRESSION:     No evidence of bilateral lower extremity deep venous thrombosis.    < end of copied text >        < from: Xray Chest 1 View- PORTABLE-Routine (02.13.19 @ 07:04) >  INTERPRETATION:     Since the lastexam, the right IJ line has been removed. Tracheostomy   tube and subcutaneous AICD present. Bilateral small loculated effusions   unchanged from the last exam. Upper lung fields are clear. The heart size   is stable. No pneumothorax.      COMPARISON:  February 12      IMPRESSION:  Status post central line removal with persistent small   loculated effusions.    < end of copied text >

## 2019-02-14 NOTE — PROGRESS NOTE ADULT - SUBJECTIVE AND OBJECTIVE BOX
CLAUDIA HAN   MRN#: [id number]     The patient is a 58y Male who was seen, evaluated, & examined with the CTICU staff post-operatively with a multidisciplinary care plan formulated & implemented.  All available clinical, laboratory, radiographic, pharmacologic, and electrocardiographic data were reviewed & analyzed.      The patient was in the CTICU in critical condition secondary to:     acute hypoxic respiratory failure-persistent cardiopulmonary dysfunction  sepsis-shock    For support and evaluation & prevention of further decompensation secondary to persistent cardiopulmonary dysfunction and cardiogenic shock-cardiovascular dysfunction, respiratory failure required:     supplemental oxygen with full ventilatory support / mechanical ventilation   continuous pulse oximetry monitoring  following ABGs with A-line monitoring    Invasive hemodynamic monitoring with     an A-line was required for continuous MAP/BP monitoring     to ensure adequate cardiovascular support and to evaluate for & help prevent decompensation while receiving     intermittent volume expansion    secondary to     sepsis-shock    In addition:  Off pressors, very agitated on the vent despite multiple anxiolytics (Seroquel, Xanax)  Transitioned to trach collar and doing well - stays on trach collar all day and rests overnight without agitation  Hemodynamically stable on Midodrine 10 q8  Also continued on Zosyn for Klebsiella and Vancomycin by level for MRSA  LE Dopplers for swelling/pain    The patient required critical care management and I provided 80 minutes of non-continuous care to the patient in addition to  discussing the patient and plan at length with the CTICU staff and helping coordinate care.

## 2019-02-14 NOTE — PROGRESS NOTE ADULT - SUBJECTIVE AND OBJECTIVE BOX
[facility] DIVISION OF KIDNEY DISEASES AND HYPERTENSION -- FOLLOW UP NOTE  --------------------------------------------------------------------------------    HPI: 59 yo male with medical history of NICM with ICD, ESRD on HD, former smoker, BPH admitted with acute hypercapnic respiratory failure. Nephrology consulted for ESRD/HD management. Pt intubated and sedated in the CTICU. Pt. currently admitted with hypercarbic respiratory failure, septic shock. Last HD on 2/5/19. Pt seen in the CTICU s/p Trach on 1/31/19.  Pt awake alert and responsive Plan for HD today    PAST HISTORY  --------------------------------------------------------------------------------  No significant changes to PMH, PSH, FHx, SHx, unless otherwise noted    ALLERGIES & MEDICATIONS  --------------------------------------------------------------------------------  Allergies    No Known Allergies    Intolerances      Standing Inpatient Medications  ALPRAZolam 0.5 milliGRAM(s) Oral every 8 hours  collagenase Ointment 1 Application(s) Topical daily  epoetin kelly Injectable 3000 Unit(s) IV Push <User Schedule>  heparin  Injectable 5000 Unit(s) SubCutaneous every 12 hours  ipratropium 17 MICROgram(s) HFA Inhaler 2 Puff(s) Inhalation every 6 hours  melatonin 6 milliGRAM(s) Oral at bedtime  midodrine 10 milliGRAM(s) Oral every 8 hours  pantoprazole  Injectable 40 milliGRAM(s) IV Push daily  piperacillin/tazobactam IVPB. 3.375 Gram(s) IV Intermittent every 12 hours  QUEtiapine 50 milliGRAM(s) Oral every 12 hours  sodium chloride 0.9%. 1000 milliLiter(s) IV Continuous <Continuous>    PRN Inpatient Medications  ALBUTerol    90 MICROgram(s) HFA Inhaler 2 Puff(s) Inhalation every 6 hours PRN      REVIEW OF SYSTEMS  --------------------------------------------------------------------------------  Unable to obtain     VITALS/PHYSICAL EXAM  --------------------------------------------------------------------------------  T(C): 37.1 (02-14-19 @ 08:00), Max: 37.1 (02-14-19 @ 04:00)  HR: 109 (02-14-19 @ 08:00) (96 - 122)  BP: 100/61 (02-14-19 @ 08:00) (90/59 - 116/72)  RR: 17 (02-14-19 @ 08:00) (15 - 25)  SpO2: 100% (02-14-19 @ 08:00) (93% - 100%)  Wt(kg): --    02-13-19 @ 07:01  -  02-14-19 @ 07:00  --------------------------------------------------------  IN: 1440 mL / OUT: 155 mL / NET: 1285 mL    Gen: + Trach  	HEENT: +Trach  	Pulm: coarse breath sounds B/L  	CV: S1S2  	Abd: Soft, +BS   	Ext: No LE edema B/L + RUE PICC  	Neuro: Awake  	Skin: Warm and dry  	Vascular access: LUE AVF site: +thrill, bruit heard.     LABS/STUDIES  --------------------------------------------------------------------------------              9.3    7.21  >-----------<  366      [02-14-19 @ 03:50]              30.7     138  |  96  |  34  ----------------------------<  102      [02-14-19 @ 03:50]  3.9   |  27  |  4.48        Ca     9.2     [02-14-19 @ 03:50]      Mg     2.4     [02-14-19 @ 03:50]      Phos  5.0     [02-14-19 @ 03:50]    TPro  6.3  /  Alb  2.5  /  TBili  0.3  /  DBili  x   /  AST  67  /  ALT  32  /  AlkPhos  249  [02-14-19 @ 03:50]    Creatinine Trend:  SCr 4.48 [02-14 @ 03:50]  SCr 3.76 [02-13 @ 03:20]  SCr 5.05 [02-12 @ 04:50]  SCr 4.21 [02-11 @ 03:00]  SCr 3.36 [02-10 @ 03:30]    HbA1c 5.5      [10-12-18 @ 02:53]  TSH 4.79      [10-21-18 @ 04:15]    HBsAb Reactive      [02-01-19 @ 12:20]  HBsAg NEGATIVE      [02-01-19 @ 12:20]  HBcAb Reactive      [02-01-19 @ 12:20]  HCV 0.23, Nonreactive Hepatitis C AB  S/CO Ratio                        Interpretation  < 1.0                                     Non-Reactive  1.0 - 4.9                           Weakly-Reactive  > 5.0                                 Reactive  Non-Reactive: Aperson with a non-reactive HCV antibody  result is considered uninfected.  No further action is  needed unless recent infection is suspected.  In these  cases, consider repeat testing later to detect  seroconversion..  Weakly-Reactive: HCV antibody test is abnormal, HCV RNA  Qualitative test will follow.  Reactive: HCV antibody test is abnormal, HCV RNA  Qualitative test will follow.  Note: HCV antibody testing is performed on the [employer]   system.      [02-01-19 @ 12:20]

## 2019-02-15 LAB
ANION GAP SERPL CALC-SCNC: 13 MMO/L — SIGNIFICANT CHANGE UP (ref 7–14)
BUN SERPL-MCNC: 24 MG/DL — HIGH (ref 7–23)
CALCIUM SERPL-MCNC: 9.2 MG/DL — SIGNIFICANT CHANGE UP (ref 8.4–10.5)
CHLORIDE SERPL-SCNC: 96 MMOL/L — LOW (ref 98–107)
CO2 SERPL-SCNC: 28 MMOL/L — SIGNIFICANT CHANGE UP (ref 22–31)
CREAT SERPL-MCNC: 3.52 MG/DL — HIGH (ref 0.5–1.3)
GLUCOSE SERPL-MCNC: 102 MG/DL — HIGH (ref 70–99)
HCT VFR BLD CALC: 30.1 % — LOW (ref 39–50)
HGB BLD-MCNC: 8.9 G/DL — LOW (ref 13–17)
MAGNESIUM SERPL-MCNC: 2.2 MG/DL — SIGNIFICANT CHANGE UP (ref 1.6–2.6)
MCHC RBC-ENTMCNC: 29.6 % — LOW (ref 32–36)
MCHC RBC-ENTMCNC: 30.5 PG — SIGNIFICANT CHANGE UP (ref 27–34)
MCV RBC AUTO: 103.1 FL — HIGH (ref 80–100)
NRBC # FLD: 0 K/UL — LOW (ref 25–125)
PHOSPHATE SERPL-MCNC: 3.9 MG/DL — SIGNIFICANT CHANGE UP (ref 2.5–4.5)
PLATELET # BLD AUTO: 370 K/UL — SIGNIFICANT CHANGE UP (ref 150–400)
PMV BLD: 9 FL — SIGNIFICANT CHANGE UP (ref 7–13)
POTASSIUM SERPL-MCNC: 3.9 MMOL/L — SIGNIFICANT CHANGE UP (ref 3.5–5.3)
POTASSIUM SERPL-SCNC: 3.9 MMOL/L — SIGNIFICANT CHANGE UP (ref 3.5–5.3)
RBC # BLD: 2.92 M/UL — LOW (ref 4.2–5.8)
RBC # FLD: 16.7 % — HIGH (ref 10.3–14.5)
SODIUM SERPL-SCNC: 137 MMOL/L — SIGNIFICANT CHANGE UP (ref 135–145)
VANCOMYCIN FLD-MCNC: 19.8 UG/ML — SIGNIFICANT CHANGE UP
WBC # BLD: 7.88 K/UL — SIGNIFICANT CHANGE UP (ref 3.8–10.5)
WBC # FLD AUTO: 7.88 K/UL — SIGNIFICANT CHANGE UP (ref 3.8–10.5)

## 2019-02-15 PROCEDURE — 99291 CRITICAL CARE FIRST HOUR: CPT

## 2019-02-15 RX ORDER — PSYLLIUM SEED (WITH DEXTROSE)
1 POWDER (GRAM) ORAL ONCE
Qty: 0 | Refills: 0 | Status: COMPLETED | OUTPATIENT
Start: 2019-02-15 | End: 2019-02-15

## 2019-02-15 RX ADMIN — QUETIAPINE FUMARATE 50 MILLIGRAM(S): 200 TABLET, FILM COATED ORAL at 06:31

## 2019-02-15 RX ADMIN — Medication 2 PUFF(S): at 15:48

## 2019-02-15 RX ADMIN — MIDODRINE HYDROCHLORIDE 10 MILLIGRAM(S): 2.5 TABLET ORAL at 06:31

## 2019-02-15 RX ADMIN — SODIUM CHLORIDE 10 MILLILITER(S): 9 INJECTION INTRAMUSCULAR; INTRAVENOUS; SUBCUTANEOUS at 22:46

## 2019-02-15 RX ADMIN — PIPERACILLIN AND TAZOBACTAM 25 GRAM(S): 4; .5 INJECTION, POWDER, LYOPHILIZED, FOR SOLUTION INTRAVENOUS at 06:30

## 2019-02-15 RX ADMIN — MIDODRINE HYDROCHLORIDE 10 MILLIGRAM(S): 2.5 TABLET ORAL at 22:45

## 2019-02-15 RX ADMIN — HEPARIN SODIUM 5000 UNIT(S): 5000 INJECTION INTRAVENOUS; SUBCUTANEOUS at 06:30

## 2019-02-15 RX ADMIN — Medication 0.5 MILLIGRAM(S): at 22:45

## 2019-02-15 RX ADMIN — QUETIAPINE FUMARATE 50 MILLIGRAM(S): 200 TABLET, FILM COATED ORAL at 17:42

## 2019-02-15 RX ADMIN — Medication 1 PACKET(S): at 13:25

## 2019-02-15 RX ADMIN — PANTOPRAZOLE SODIUM 40 MILLIGRAM(S): 20 TABLET, DELAYED RELEASE ORAL at 13:24

## 2019-02-15 RX ADMIN — PIPERACILLIN AND TAZOBACTAM 25 GRAM(S): 4; .5 INJECTION, POWDER, LYOPHILIZED, FOR SOLUTION INTRAVENOUS at 16:28

## 2019-02-15 RX ADMIN — MIDODRINE HYDROCHLORIDE 10 MILLIGRAM(S): 2.5 TABLET ORAL at 13:25

## 2019-02-15 RX ADMIN — Medication 0.5 MILLIGRAM(S): at 13:25

## 2019-02-15 RX ADMIN — Medication 2 PUFF(S): at 21:46

## 2019-02-15 RX ADMIN — Medication 6 MILLIGRAM(S): at 22:45

## 2019-02-15 RX ADMIN — Medication 0.5 MILLIGRAM(S): at 06:30

## 2019-02-15 RX ADMIN — Medication 2 PUFF(S): at 04:39

## 2019-02-15 RX ADMIN — HEPARIN SODIUM 5000 UNIT(S): 5000 INJECTION INTRAVENOUS; SUBCUTANEOUS at 17:41

## 2019-02-15 RX ADMIN — Medication 2 PUFF(S): at 09:15

## 2019-02-15 NOTE — PROGRESS NOTE ADULT - SUBJECTIVE AND OBJECTIVE BOX
CLAUDIA HAN            MRN-[id number]         No Known Allergies               Mr. Han is a 58M who underwent a FB, R thoracotomy, decortication, chest wall reconstruction, lat flap with Dr Pickering on 10/11/18. He was admitted on 1/6/19 to [facility] with a R pleural effusion and respiratory failure,  A R PTC was placed there and he was intubated.  He still had a R SANDY drain in place and he was transferred to [facility] Patient was found to have aspirated a foreign body and +MRSA empyema. He was treated and discharged on 1/22/2019.     He was found by his home nurse to be in acute distress and recommended to proceed to the ED.  At presentation, he was found to be in acute hypercarbic respiratory failure complicated by hemodynamic instability requiring emergent intubation and pressor support. (28 Jan 2019 15:34)      Procedure:        Trach and PEG  1/31/2019  Bronchoscopy   1/29/2019  Right thoracotomy, resection of portion of R 7th rib, evacuation of infected hemothorax, takedown of pleurocutaneous fistula  10/11/2018      Issues:  Respiratory failure s/p Trach and PEG  Hypotension on Midodrine  ESRD / HD  Cardiomyopathy  HLD  BPH  Anxiety / Agitation                     Home Medications:  aspirin 81 mg oral tablet: 1 tab(s) orally once a day  (30 Oct 2018 14:39)  Breo Ellipta 100 mcg-25 mcg/inh inhalation powder: 1 puff(s) inhaled once a day patient denies using it (11 Oct 2018 08:38)  Calcium 500: 1 tab(s) orally 2 times a day (11 Oct 2018 08:38)  docusate sodium 100 mg oral tablet: 1 tab(s) orally 2 times a day, As Needed (11 Oct 2018 08:39)  folic acid 1 mg oral tablet: 1 tab(s) orally once a day (11 Oct 2018 08:38)  Mag-Ox 400 oral tablet: 1 tab(s) orally once a day (11 Oct 2018 08:39)  Multiple Vitamins oral tablet: 1 tab(s) orally once a day  (30 Oct 2018 14:39)  Namenda 10 mg oral tablet: 1 tab(s) orally 2 times a day (11 Oct 2018 08:38)  Nephplex Rx oral tablet: 1 tab(s) orally once a day (11 Oct 2018 08:39)  nortriptyline 50 mg oral capsule: 1 cap(s) orally once a day (11 Oct 2018 08:39)  Renvela 800 mg oral tablet: 2 tab(s) orally every 8 hours (11 Oct 2018 08:39)  simethicone 80 mg oral tablet: 1 tab(s) orally 3 times a day (after meals) (11 Oct 2018 08:39)  Tylenol 325 mg oral tablet: 2 tab(s) orally every 6 hours, As Needed (30 Oct 2018 14:39)  vancomycin 1 g intravenous injection: 1 gram(s) intravenous 3 times a week  Please give 3 x per week with Dialysis until 2/3/2019 (22 Jan 2019 13:41)  vitamin A: 1 tab(s) orally once a day (11 Oct 2018 08:38)  Vitamin B Complex: 1 tab(s) orally once a day (11 Oct 2018 08:38)  Vitamin C 500 mg oral tablet: 1 tab(s) orally once a day (11 Oct 2018 08:38)      PAST MEDICAL & SURGICAL HISTORY:  Smoking hx  Cardiomyopathy  Wound: right chest  ICD (implantable cardioverter-defibrillator) in place  OA (osteoarthritis)  HLD (hyperlipidemia)  BPH (benign prostatic hyperplasia)  Pleural effusion  HTN (hypertension)  ESRD (end stage renal disease)  H/O chest wound: right  S/P thoracotomy: FB, R thoracotomy, decortication, chest wall reconstruction, lat flap  History of wound infection: right chest wall - revision 5/18 and again in 7/18  History of implantable cardioverter-defibrillator (ICD) placement: pt unsure when placed  H/O bilateral hip replacements: 2008, 2009        ICU Vital Signs Last 24 Hrs  T(C): 36.8 (14 Feb 2019 20:00), Max: 37.1 (14 Feb 2019 08:00)  T(F): 98.2 (14 Feb 2019 20:00), Max: 98.8 (14 Feb 2019 08:00)  HR: 103 (15 Feb 2019 04:50) (95 - 123)  BP: 95/57 (15 Feb 2019 03:00) (84/56 - 116/72)  BP(mean): 66 (15 Feb 2019 03:00) (62 - 82)  ABP: --  ABP(mean): --  RR: 16 (15 Feb 2019 04:50) (15 - 26)  SpO2: 100% (15 Feb 2019 04:50) (96% - 100%)    I&O's Detail    13 Feb 2019 07:01  -  14 Feb 2019 07:00  --------------------------------------------------------  IN:    Enteral Tube Flush: 200 mL    IV PiggyBack: 300 mL    Nepro with Carb Steady: 770 mL    sodium chloride 0.9%.: 170 mL  Total IN: 1440 mL    OUT:    Bulb: 30 mL    Rectal Tube: 125 mL  Total OUT: 155 mL    Total NET: 1285 mL      14 Feb 2019 07:01  -  15 Feb 2019 06:25  --------------------------------------------------------  IN:    Enteral Tube Flush: 160 mL    IV PiggyBack: 300 mL    Nepro with Carb Steady: 665 mL    Other: 400 mL    sodium chloride 0.9%.: 210 mL  Total IN: 1735 mL    OUT:    Bulb: 10 mL    Other: 1900 mL    Rectal Tube: 400 mL  Total OUT: 2310 mL    Total NET: -575 mL        CAPILLARY BLOOD GLUCOSE          Home Medications:  aspirin 81 mg oral tablet: 1 tab(s) orally once a day  (30 Oct 2018 14:39)  Breo Ellipta 100 mcg-25 mcg/inh inhalation powder: 1 puff(s) inhaled once a day patient denies using it (11 Oct 2018 08:38)  Calcium 500: 1 tab(s) orally 2 times a day (11 Oct 2018 08:38)  docusate sodium 100 mg oral tablet: 1 tab(s) orally 2 times a day, As Needed (11 Oct 2018 08:39)  folic acid 1 mg oral tablet: 1 tab(s) orally once a day (11 Oct 2018 08:38)  Mag-Ox 400 oral tablet: 1 tab(s) orally once a day (11 Oct 2018 08:39)  Multiple Vitamins oral tablet: 1 tab(s) orally once a day  (30 Oct 2018 14:39)  Namenda 10 mg oral tablet: 1 tab(s) orally 2 times a day (11 Oct 2018 08:38)  Nephplex Rx oral tablet: 1 tab(s) orally once a day (11 Oct 2018 08:39)  nortriptyline 50 mg oral capsule: 1 cap(s) orally once a day (11 Oct 2018 08:39)  Renvela 800 mg oral tablet: 2 tab(s) orally every 8 hours (11 Oct 2018 08:39)  simethicone 80 mg oral tablet: 1 tab(s) orally 3 times a day (after meals) (11 Oct 2018 08:39)  Tylenol 325 mg oral tablet: 2 tab(s) orally every 6 hours, As Needed (30 Oct 2018 14:39)  vancomycin 1 g intravenous injection: 1 gram(s) intravenous 3 times a week  Please give 3 x per week with Dialysis until 2/3/2019 (22 Jan 2019 13:41)  vitamin A: 1 tab(s) orally once a day (11 Oct 2018 08:38)  Vitamin B Complex: 1 tab(s) orally once a day (11 Oct 2018 08:38)  Vitamin C 500 mg oral tablet: 1 tab(s) orally once a day (11 Oct 2018 08:38)      MEDICATIONS  (STANDING):  ALPRAZolam 0.5 milliGRAM(s) Oral every 8 hours  epoetin kelly Injectable 3000 Unit(s) IV Push <User Schedule>  heparin  Injectable 5000 Unit(s) SubCutaneous every 12 hours  ipratropium 17 MICROgram(s) HFA Inhaler 2 Puff(s) Inhalation every 6 hours  melatonin 6 milliGRAM(s) Oral at bedtime  midodrine 10 milliGRAM(s) Oral every 8 hours  pantoprazole  Injectable 40 milliGRAM(s) IV Push daily  piperacillin/tazobactam IVPB. 3.375 Gram(s) IV Intermittent every 12 hours  QUEtiapine 50 milliGRAM(s) Oral every 12 hours  sodium chloride 0.9%. 1000 milliLiter(s) (10 mL/Hr) IV Continuous <Continuous>    MEDICATIONS  (PRN):  ALBUTerol    90 MICROgram(s) HFA Inhaler 2 Puff(s) Inhalation every 6 hours PRN Shortness of Breath and/or Wheezing      Mode: AC/ CMV (Assist Control/ Continuous Mandatory Ventilation)  RR (machine): 22  TV (machine): 400  FiO2: 40  PEEP: 5  MAP: 11  PIP: 28      Physical exam:     General:               Pt awake / alert                                              Neuro:                  awake / alert, following commands                           Cardiovascular:   S1 & S2, regular                           Respiratory:         Air entry is decreased at  both bases, has bilateral conducted sounds                           GI:                          Soft and nontender, Bowel sounds active                            Ext:                        No cyanosis or edema                  Labs:                                                                           8.9    7.88  )-----------( 370      ( 15 Feb 2019 05:30 )             30.1             02-15    137  |  96<L>  |  24<H>  ----------------------------<  102<H>  3.9   |  28  |  3.52<H>    Ca    9.2      15 Feb 2019 05:30  Phos  3.9     02-15  Mg     2.2     02-15    TPro  6.3  /  Alb  2.5<L>  /  TBili  0.3  /  DBili  x   /  AST  67<H>  /  ALT  32  /  AlkPhos  249<H>  02-14                    LIVER FUNCTIONS - ( 14 Feb 2019 03:50 )  Alb: 2.5 g/dL / Pro: 6.3 g/dL / ALK PHOS: 249 u/L / ALT: 32 u/L / AST: 67 u/L / GGT: x                 CXR:    < from: US Duplex Venous Lower Ext Complete, Bilateral (02.14.19 @ 13:05) >  No evidence of bilateral lower extremity deep venous thrombosis.      < from: Xray Chest 1 View- PORTABLE-Routine (02.14.19 @ 06:37) >  Tracheostomy tube and subcutaneous AICD are unchanged. Bilateral small   loculated pleural effusions with underlying atelectasis. Remainder of   both lungs are clear. Heart size is stable.        Plan:    General: Mr. Han is a 58M who underwent a FB, R thoracotomy, decortication, chest wall reconstruction, lat flap with Dr Pickering on 10/11/18. He was admitted on 1/6/19 to [facility] with a R pleural effusion and respiratory failure,  A R PTC was placed there and he was intubated.  He still had a R SANDY drain in place and he was transferred to [facility] Patient was found to have aspirated a foreign body and +MRSA empyema. He was treated and discharged on 1/22/2019.     He was found by his home nurse to be in acute distress and recommended to proceed to the ED.  At presentation, he was found to be in acute hypercarbic respiratory failure complicated by hemodynamic instability requiring emergent intubation and pressor support. (28 Jan 2019 15:34)                            Neuro:                                         Pain control with Fentanyl PRN / Tylenol     Agitation /Anxiety: Continue Xanax and Seroquel.                            Cardiovascular:                                          Continue hemodynamic monitoring.    Hypotension: Pt received albumin bolus x 2 last night. Continue  Midodrine 10mg/TID                                                  Respiratory:                        Pt is on full mechanical ventilator support TY--40-7 at night and tolerating  trach collar during the day                                         Trached on 1/31                                                                 Continue bronchodilators, pulmonary toilet     Continue antibiotics -  Vanco based on level + Zosyn                            GI                                         Nepro 35cc/hr. Flush PEG with 30cc sterile water Q4hrs                                         Continue GI prophylaxis with Protonix                                                                 Renal:                                         HD as per renal / TIW                                          Monitor I/Os and electrolytes                                                                                        Hem/ Onc:                                                                                 Follow CBC in AM                           Infectious disease:     Bronchoscopy on 1/29 showed copious greenish secretions - Continue pulmonary toilet                                         BAL - MRSA, Continue Vanco based on level  + Zosyn for 4 weeks. Blood cultures are negative     I.D f/u                                                                   Endocrine         Continue Accu-Checks with coverage      Pt is on SQ Heparin and Venodyne boots for DVT prophylaxis.     Pertinent clinical, laboratory, radiographic, hemodynamic, echocardiographic, respiratory data, microbiologic data and chart were reviewed and analyzed frequently throughout the course of the day and night  Patient seen, examined and plan discussed with CT Surgeon / CTICU team during rounds.    Pt's status discussed with wife at bedside, updated status.     I have spent 35  minutes of critical care time with this pt between 00am and 9am            Ralph Warren MD

## 2019-02-16 LAB
ALBUMIN SERPL ELPH-MCNC: 2.7 G/DL — LOW (ref 3.3–5)
ALP SERPL-CCNC: 200 U/L — HIGH (ref 40–120)
ALT FLD-CCNC: 23 U/L — SIGNIFICANT CHANGE UP (ref 4–41)
ANION GAP SERPL CALC-SCNC: 16 MMO/L — HIGH (ref 7–14)
AST SERPL-CCNC: 60 U/L — HIGH (ref 4–40)
BILIRUB SERPL-MCNC: 0.3 MG/DL — SIGNIFICANT CHANGE UP (ref 0.2–1.2)
BUN SERPL-MCNC: 31 MG/DL — HIGH (ref 7–23)
CALCIUM SERPL-MCNC: 9.3 MG/DL — SIGNIFICANT CHANGE UP (ref 8.4–10.5)
CHLORIDE SERPL-SCNC: 95 MMOL/L — LOW (ref 98–107)
CO2 SERPL-SCNC: 28 MMOL/L — SIGNIFICANT CHANGE UP (ref 22–31)
CREAT SERPL-MCNC: 4.43 MG/DL — HIGH (ref 0.5–1.3)
GLUCOSE SERPL-MCNC: 90 MG/DL — SIGNIFICANT CHANGE UP (ref 70–99)
HCT VFR BLD CALC: 29.2 % — LOW (ref 39–50)
HGB BLD-MCNC: 8.9 G/DL — LOW (ref 13–17)
MAGNESIUM SERPL-MCNC: 2.3 MG/DL — SIGNIFICANT CHANGE UP (ref 1.6–2.6)
MCHC RBC-ENTMCNC: 30.5 % — LOW (ref 32–36)
MCHC RBC-ENTMCNC: 31.1 PG — SIGNIFICANT CHANGE UP (ref 27–34)
MCV RBC AUTO: 102.1 FL — HIGH (ref 80–100)
NRBC # FLD: 0 K/UL — LOW (ref 25–125)
PHOSPHATE SERPL-MCNC: 5.3 MG/DL — HIGH (ref 2.5–4.5)
PLATELET # BLD AUTO: 387 K/UL — SIGNIFICANT CHANGE UP (ref 150–400)
PMV BLD: 9.7 FL — SIGNIFICANT CHANGE UP (ref 7–13)
POTASSIUM SERPL-MCNC: 3.9 MMOL/L — SIGNIFICANT CHANGE UP (ref 3.5–5.3)
POTASSIUM SERPL-SCNC: 3.9 MMOL/L — SIGNIFICANT CHANGE UP (ref 3.5–5.3)
PROT SERPL-MCNC: 6.4 G/DL — SIGNIFICANT CHANGE UP (ref 6–8.3)
RBC # BLD: 2.86 M/UL — LOW (ref 4.2–5.8)
RBC # FLD: 16.9 % — HIGH (ref 10.3–14.5)
SODIUM SERPL-SCNC: 139 MMOL/L — SIGNIFICANT CHANGE UP (ref 135–145)
WBC # BLD: 6.68 K/UL — SIGNIFICANT CHANGE UP (ref 3.8–10.5)
WBC # FLD AUTO: 6.68 K/UL — SIGNIFICANT CHANGE UP (ref 3.8–10.5)

## 2019-02-16 PROCEDURE — 99291 CRITICAL CARE FIRST HOUR: CPT

## 2019-02-16 PROCEDURE — 71045 X-RAY EXAM CHEST 1 VIEW: CPT | Mod: 26

## 2019-02-16 PROCEDURE — 90935 HEMODIALYSIS ONE EVALUATION: CPT | Mod: GC

## 2019-02-16 RX ADMIN — QUETIAPINE FUMARATE 50 MILLIGRAM(S): 200 TABLET, FILM COATED ORAL at 18:27

## 2019-02-16 RX ADMIN — Medication 2 PUFF(S): at 17:26

## 2019-02-16 RX ADMIN — HEPARIN SODIUM 5000 UNIT(S): 5000 INJECTION INTRAVENOUS; SUBCUTANEOUS at 06:46

## 2019-02-16 RX ADMIN — MIDODRINE HYDROCHLORIDE 10 MILLIGRAM(S): 2.5 TABLET ORAL at 13:46

## 2019-02-16 RX ADMIN — Medication 0.5 MILLIGRAM(S): at 06:46

## 2019-02-16 RX ADMIN — MIDODRINE HYDROCHLORIDE 10 MILLIGRAM(S): 2.5 TABLET ORAL at 06:46

## 2019-02-16 RX ADMIN — Medication 0.5 MILLIGRAM(S): at 13:46

## 2019-02-16 RX ADMIN — PIPERACILLIN AND TAZOBACTAM 25 GRAM(S): 4; .5 INJECTION, POWDER, LYOPHILIZED, FOR SOLUTION INTRAVENOUS at 18:00

## 2019-02-16 RX ADMIN — PIPERACILLIN AND TAZOBACTAM 25 GRAM(S): 4; .5 INJECTION, POWDER, LYOPHILIZED, FOR SOLUTION INTRAVENOUS at 06:46

## 2019-02-16 RX ADMIN — MIDODRINE HYDROCHLORIDE 10 MILLIGRAM(S): 2.5 TABLET ORAL at 22:33

## 2019-02-16 RX ADMIN — Medication 2 PUFF(S): at 03:34

## 2019-02-16 RX ADMIN — Medication 2 PUFF(S): at 21:41

## 2019-02-16 RX ADMIN — Medication 6 MILLIGRAM(S): at 22:33

## 2019-02-16 RX ADMIN — PANTOPRAZOLE SODIUM 40 MILLIGRAM(S): 20 TABLET, DELAYED RELEASE ORAL at 13:44

## 2019-02-16 RX ADMIN — HEPARIN SODIUM 5000 UNIT(S): 5000 INJECTION INTRAVENOUS; SUBCUTANEOUS at 18:30

## 2019-02-16 RX ADMIN — ERYTHROPOIETIN 3000 UNIT(S): 10000 INJECTION, SOLUTION INTRAVENOUS; SUBCUTANEOUS at 10:29

## 2019-02-16 RX ADMIN — Medication 0.5 MILLIGRAM(S): at 22:34

## 2019-02-16 RX ADMIN — QUETIAPINE FUMARATE 50 MILLIGRAM(S): 200 TABLET, FILM COATED ORAL at 06:46

## 2019-02-16 NOTE — PROGRESS NOTE ADULT - ASSESSMENT
58M who underwent a FB, R thoracotomy, decortication, chest wall reconstruction, lat flap with Dr Pickering on 10/11/18. He was admitted on 1/6/19 to [facility] with a R pleural effusion and respiratory failure,  A R PTC was placed there and he was intubated.  He still had a R SANDY drain in place and he was transferred to [facility]. Patient was found to have aspirated a foreign body and +MRSA empyema. He was treated and discharged on 1/22/2019.     He was found by his home nurse to be in acute distress and recommended to proceed to the ED.  At presentation, he was found to be in acute hypercarbic respiratory failure complicated by hemodynamic instability requiring emergent intubation and pressor support. (28 Jan 2019 15:34)    Pt had Tm 104.2 (on 1/29), tachycardia, hypotension.  Pt has now been extubated, s/p trach/PEG on 1/31.  CT chest shows complete L sided atelectasis with mucous plugging of right lower lobe  bronchus with near complete atelectasis.      ID consult called for antibiotic managment.     Recommend:    - Pt p/w severe sepsis with shock and respiratory distress.  Pt with high fevers.  Found to have complete and near complete atelectasis of L and R lung respectively with mucous plugging.  Agree with broad spectrum abx to cover for post-obstructive pna.  Repeat CT chest from 2/3 shows improved aeration.      - Cultures results show:    2/5: bcx - NGTD @ 48hrs  2/1: endotrach - MRSA,  Klebsiella (pan sens), yeast (gram stain only)  2/1: bcx - CNS  1/29:  lung cx - nl resp [place]  1/28: bcx - NGTD    - Agree with vancomycin and zosyn.   Maintain vanco trough between 15-20.  Dose vanco by level, post HD.  Fluconazole added on 2/2 for yeast (completed).  Seen on gram stain only.      -  Repeat blood cultures from 2/5 NGTD.  CNS likely contaminant.    - CT a/p showed rt inguinal lesion.  Subsequent US shows marked edema and small lymph nodes.  Cont to monitor.      - Cont care as per CTU. Continue hemodynamic monitoring.  On full mechanical ventilator support, s/p tracheostomy. Continue bronchodilators, pulmonary toilet, Continue GI prophylaxis with Protonix.   Monitor temp curve and WBC.    - Given severe sepsis and shock at presentation, recurrent involvement of MRSA, recommend long course of IV abx treatment (4 week course).  Completed fluconazole 7 days bet 2/2 - 2/8 (suspect yeast is likely a colonizer).      - Cont treat of MRSA/KLeb.  AFter discharge, Abx may be dosed with pt's HD sessions (can change zosyn to cefepime upon discharge), no need for picc line.  Complete total 4 weeks (1/28 through 2/24).      Pt pending placement.  No new ID recs at this time.     Will follow,    Anabel Chahal  [phone]

## 2019-02-16 NOTE — PROGRESS NOTE ADULT - PROBLEM SELECTOR PLAN 1
Pt. with ESRD on HD TIW (TTS). Last HD was on 2/14/19 via AVF, tolerated treatment well. Labs reviewed. Plan for HD today. Monitor BP while on HD

## 2019-02-16 NOTE — PROGRESS NOTE ADULT - ATTENDING COMMENTS
Patient examined and ROS reviewed. A case of ESRD examined during HD. Patient is tolerating dialysis well. Blood flow through access is adequate. Advised to continue UF.
Patient seen while on dialysis.  Monitoring tolerance to treatment and fluid removal.  Further recommendations per clinical course.
Patient examined and ROS reviewed. A case of ESRD examined during HD. Patient is tolerating HD well. Blood flow though the access is adequate. Advised to continue UF.
Pt seen on HD, tolerating well, LUE AVF working well with HD. Plan for next HD on Thursday.

## 2019-02-16 NOTE — PROGRESS NOTE ADULT - SUBJECTIVE AND OBJECTIVE BOX
CLAUDIA HAN          MRN-[id number]    HPI:  Mr. Han is a 58M who underwent a FB, R thoracotomy, decortication, chest wall reconstruction, lat flap with Dr Pickering on 10/11/18. He was admitted on 1/6/19 to [facility] with a R pleural effusion and respiratory failure,  A R PTC was placed there and he was intubated.  He still had a R SANDY drain in place and he was transferred to [facility]. Patient was found to have aspirated a foreign body and +MRSA empyema. He was treated and discharged on 1/22/2019.     He was found by his home nurse to be in acute distress and recommended to proceed to the ED.  At presentation, he was found to be in acute hypercarbic respiratory failure complicated by hemodynamic instability requiring emergent intubation and pressor support. (28 Jan 2019 15:34)      Procedure:  POD # :     Issues:        Interval/Overnight OR Events/ ROS  Pt extubated in the OR after surgery. On arrival in ICU still drowsy - but easily arousable to following commands; denies SOB, no nausea, no vomiting  Respiratory status required full ventilatory support,  following of ABG’s with A-line monitoring, continuous pulse oximetry monitoring, & an IV Propofol infusion for support & to evaluate for & prevent further decompensation secondary to persistent cardiopulmonary dysfunction.     Pt remained hemodynamically stable overnight, not on any pressors or inotropes. OOB to chair, breathing comfortably with minimal pain. Ambulated several times . Denies pain, no SOB, no palpitations, no nausea/ no vomiting, no dizziness  A-line and sharma d/kirsten       PAST MEDICAL & SURGICAL HISTORY:  Smoking hx  Cardiomyopathy  Wound: right chest  ICD (implantable cardioverter-defibrillator) in place  OA (osteoarthritis)  HLD (hyperlipidemia)  BPH (benign prostatic hyperplasia)  Pleural effusion  HTN (hypertension)  ESRD (end stage renal disease)  H/O chest wound: right  S/P thoracotomy: FB, R thoracotomy, decortication, chest wall reconstruction, lat flap  History of wound infection: right chest wall - revision 5/18 and again in 7/18  History of implantable cardioverter-defibrillator (ICD) placement: pt unsure when placed  H/O bilateral hip replacements: 2008, 2009    Allergies    No Known Allergies    Intolerances      Home Medications:  aspirin 81 mg oral tablet: 1 tab(s) orally once a day  (30 Oct 2018 14:39)  Breo Ellipta 100 mcg-25 mcg/inh inhalation powder: 1 puff(s) inhaled once a day patient denies using it (11 Oct 2018 08:38)  Calcium 500: 1 tab(s) orally 2 times a day (11 Oct 2018 08:38)  docusate sodium 100 mg oral tablet: 1 tab(s) orally 2 times a day, As Needed (11 Oct 2018 08:39)  folic acid 1 mg oral tablet: 1 tab(s) orally once a day (11 Oct 2018 08:38)  Mag-Ox 400 oral tablet: 1 tab(s) orally once a day (11 Oct 2018 08:39)  Multiple Vitamins oral tablet: 1 tab(s) orally once a day  (30 Oct 2018 14:39)  Namenda 10 mg oral tablet: 1 tab(s) orally 2 times a day (11 Oct 2018 08:38)  Nephplex Rx oral tablet: 1 tab(s) orally once a day (11 Oct 2018 08:39)  nortriptyline 50 mg oral capsule: 1 cap(s) orally once a day (11 Oct 2018 08:39)  Renvela 800 mg oral tablet: 2 tab(s) orally every 8 hours (11 Oct 2018 08:39)  simethicone 80 mg oral tablet: 1 tab(s) orally 3 times a day (after meals) (11 Oct 2018 08:39)  Tylenol 325 mg oral tablet: 2 tab(s) orally every 6 hours, As Needed (30 Oct 2018 14:39)  vancomycin 1 g intravenous injection: 1 gram(s) intravenous 3 times a week  Please give 3 x per week with Dialysis until 2/3/2019 (22 Jan 2019 13:41)  vitamin A: 1 tab(s) orally once a day (11 Oct 2018 08:38)  Vitamin B Complex: 1 tab(s) orally once a day (11 Oct 2018 08:38)  Vitamin C 500 mg oral tablet: 1 tab(s) orally once a day (11 Oct 2018 08:38)        ***VITAL SIGNS:  Vital Signs Last 24 Hrs  T(C): 36.6 (16 Feb 2019 00:00), Max: 37 (15 Feb 2019 08:00)  T(F): 97.8 (16 Feb 2019 00:00), Max: 98.6 (15 Feb 2019 08:00)  HR: 95 (16 Feb 2019 03:36) (91 - 125)  BP: 114/70 (16 Feb 2019 03:00) (93/54 - 125/68)  BP(mean): 80 (16 Feb 2019 03:00) (62 - 83)  RR: 22 (16 Feb 2019 03:00) (16 - 32)  SpO2: 100% (16 Feb 2019 03:36) (94% - 100%)    I/Os:   I&O's Detail    14 Feb 2019 07:01  -  15 Feb 2019 07:00  --------------------------------------------------------  IN:    Enteral Tube Flush: 160 mL    IV PiggyBack: 300 mL    Nepro with Carb Steady: 805 mL    Other: 400 mL    sodium chloride 0.9%.: 250 mL  Total IN: 1915 mL    OUT:    Bulb: 20 mL    Other: 1900 mL    Rectal Tube: 500 mL  Total OUT: 2420 mL    Total NET: -505 mL      15 Feb 2019 07:01  -  16 Feb 2019 03:44  --------------------------------------------------------  IN:    Enteral Tube Flush: 80 mL    Nepro with Carb Steady: 455 mL    sodium chloride 0.9%.: 130 mL  Total IN: 665 mL    OUT:  Total OUT: 0 mL    Total NET: 665 mL          CAPILLARY BLOOD GLUCOSE          =======================  MEDICATIONS  ===================  MEDICATIONS  (STANDING):  ALPRAZolam 0.5 milliGRAM(s) Oral every 8 hours  epoetin kelly Injectable 3000 Unit(s) IV Push <User Schedule>  heparin  Injectable 5000 Unit(s) SubCutaneous every 12 hours  ipratropium 17 MICROgram(s) HFA Inhaler 2 Puff(s) Inhalation every 6 hours  melatonin 6 milliGRAM(s) Oral at bedtime  midodrine 10 milliGRAM(s) Oral every 8 hours  pantoprazole  Injectable 40 milliGRAM(s) IV Push daily  piperacillin/tazobactam IVPB. 3.375 Gram(s) IV Intermittent every 12 hours  QUEtiapine 50 milliGRAM(s) Oral every 12 hours  sodium chloride 0.9%. 1000 milliLiter(s) (10 mL/Hr) IV Continuous <Continuous>    MEDICATIONS  (PRN):  ALBUTerol    90 MICROgram(s) HFA Inhaler 2 Puff(s) Inhalation every 6 hours PRN Shortness of Breath and/or Wheezing      ======================VENTILATOR SETTINGS  ==============  Mode: AC/ CMV (Assist Control/ Continuous Mandatory Ventilation)  RR (machine): 22  TV (machine): 400  FiO2: 40  PEEP: 5  MAP: 11  PIP: 26      =================== PATIENT CARE ACCESS DEVICES ==========  Peripheral IV  Central Venous Line	R	L	IJ	Fem	SC	Placed:   Arterial Line	R	L	PT	DP	Fem	Rad	Ax	Placed:   Midline:				  Urinary Catheter, Date Placed:   Necessity of urinary, arterial, and venous catheters discussed  ======================= PHYSICAL EXAM===================  General:          Comfortable, Awake, alert, no distress  Neuro:             Moving all extremities to commands. No focal deficits	  HEENT:                           CHER/ ETT/ NGT/ trach  Respiratory:	Lungs clear on auscultation bilaterally with good aeration.                            No rales, rhonchi, no wheezing. Effort even and unlabored.  CV:		         Regular rate and rhythm. Normal S1/S2. No murmurs  Abdomen:	Soft,  nontender, not-distended. Bowel sounds present / absent.   Skin:		No rash.  Extremities:	Warm, no cyanosis or edema.  Palpable pulses  ============================ LABS =======================                        8.9    7.88  )-----------( 370      ( 15 Feb 2019 05:30 )             30.1     02-15    137  |  96<L>  |  24<H>  ----------------------------<  102<H>  3.9   |  28  |  3.52<H>    Ca    9.2      15 Feb 2019 05:30  Phos  3.9     02-15  Mg     2.2     02-15    TPro  6.3  /  Alb  2.5<L>  /  TBili  0.3  /  DBili  x   /  AST  67<H>  /  ALT  32  /  AlkPhos  249<H>  02-14    LIVER FUNCTIONS - ( 14 Feb 2019 03:50 )  Alb: 2.5 g/dL / Pro: 6.3 g/dL / ALK PHOS: 249 u/L / ALT: 32 u/L / AST: 67 u/L / GGT: x                 BLOOD  02-05-19 --  --  --      ENDOTRACHEAL SPECIMEN  02-01-19 --  --  Staph. aureus *MRSA*  Klebsiella pneumoniae      BLOOD PERIPHERAL  02-01-19 --  --  BLOOD CULTURE PCR  Staphylococcus sp.,coag neg      BLOOD VENOUS  01-29-19 --  --  --      LUNG - UPPER LOBE LEFT  01-29-19 --  --  --      BLOOD PERIPHERAL  01-28-19 --  --  --          ===================== IMAGING STUDIES ===================  Radiology personally reviewed.    ====================ASSESSMENT AND PLAN ================      ====================== NEUROLOGY=======================  Pain control with PCA / PCEA / Tylenol IV / Toradol / Percocet  Pt is on Precedex for agitation  Pt is sedated with Propofol / Fentanyl  ==================== RESPIRATORY========================  Pt is on       L nasal canula / Face tent____% FiO2/ full mech vent support  Comfortable, no evidence of distress.  Using incentive spirometry & doing                ml  Monitor chest tube output  Chest tube to suction / water seal	  Mechanical Ventilation:  Mode: AC/ CMV (Assist Control/ Continuous Mandatory Ventilation)  RR (machine): 22  TV (machine): 400  FiO2: 40  PEEP: 5  MAP: 11  PIP: 26    Mechanical ventilator status assessed & settings reviewed  Continuous pulse oximetry monitoring  Continue bronchodilators, pulmonary toilet  Head of bed elevation to 30-40 degrees  ====================CARDIOVASCULAR=====================  Continue hemodynamic monitoring/ telemetry  Not on any pressors/ titrate pressors for SBP>100, MAP >60-65  Continue cardiovascular / antihypertensive medications  ===================== RENAL ============================  Continue LR 30CC/hr      D/C IVF  Monitor I/Os, BUN/ Cr  and electrolytes  D/C Sharma      Keep Sharma for UO monitoring  BPH: Continue Flomax/ Finasteride  ==================== GASTROINTESTINAL===================  On regular/ tube feeds diet, tolerating well  Continue GI prophylaxis with Pepcid / Protonix  Continue Zofran / Reglan for nausea - PRN	  NPO  =======================    ENDOCRIN  =====================  Glycemic monitoring  F/S with coverage  ===================HEMATOLOGIC/ONCOLOGIC =============  Monitor chest tube output. No signs of active bleeding.   Follow CBC, coags  in AM  DVT prophylaxis with SCD, sc Heparin  ========================INFECTIOUS DISEASE===============  No signs of infection. Monitor for fever / leukocytosis.  All surgical incision / chest tube  sites look clean  D/C Sharma    Pertinent clinical, laboratory, radiographic, hemodynamic, echocardiographic, respiratory data, microbiologic data and chart were reviewed and analyzed frequently throughout the course of the day and night. GI and DVT prophylaxis, glycemic control, head of bed elevation and skin care issues were addressed.  Patient seen, examined and plan discussed with CT Surgery / CTICU team during rounds.  Pt remains critically ill in imminent risk of  deterioration and requires very careful cardio- pulmonary monitoring and support.    I have spent        minutes of critical care time with this pt between      am/pm   and        am/ pm         minutes spent on total encounter; more than 50% of the visit was spent counseling and/or coordinating care by the attending physician.      KERLINE Faith MD CLAUDIA HAN          MRN-[id number]    [facility]  Mr. Han is a 58M who underwent a FB, R thoracotomy, decortication, chest wall reconstruction, lat flap with Dr Pickering on 10/11/18. He was admitted on 1/6/19 to [facility] with a R pleural effusion and respiratory failure,  A R PTC was placed there and he was intubated.  He still had a R SANDY drain in place and he was transferred to [facility]. Patient was found to have aspirated a foreign body and +MRSA empyema. He was treated and discharged on 1/22/2019.     He was found by his home nurse to be in acute distress and recommended to proceed to the ED.  At presentation, he was found to be in acute hypercarbic respiratory failure complicated by hemodynamic instability requiring emergent intubation and pressor support. (28 Jan 2019 15:34)      Procedure:        Trach and PEG  1/31/2019  Bronchoscopy   1/29/2019  Right thoracotomy, resection of portion of R 7th rib, evacuation of infected hemothorax, takedown of pleurocutaneous fistula  10/11/2018      Issues:  Respiratory failure s/p Trach and PEG  Hypotension on Midodrine  ESRD / HD  Cardiomyopathy  HLD  BPH  Anxiety / Agitation        Interval/Overnight  Events/ ROS   OOB to chair yesterday on trach collar, breathing comfortably with minimal pain.  Overnight placed back on vent to rest. Denies pain, no SOB, no palpitations, no nausea/ no vomiting, no dizziness  HD due today      PAST MEDICAL & SURGICAL HISTORY:  Smoking hx  Cardiomyopathy  Wound: right chest  ICD (implantable cardioverter-defibrillator) in place  OA (osteoarthritis)  HLD (hyperlipidemia)  BPH (benign prostatic hyperplasia)  Pleural effusion  HTN (hypertension)  ESRD (end stage renal disease)  H/O chest wound: right  S/P thoracotomy: FB, R thoracotomy, decortication, chest wall reconstruction, lat flap  History of wound infection: right chest wall - revision 5/18 and again in 7/18  History of implantable cardioverter-defibrillator (ICD) placement: pt unsure when placed  H/O bilateral hip replacements: 2008, 2009    Allergies  No Known Allergies        Home Medications:  aspirin 81 mg oral tablet: 1 tab(s) orally once a day  (30 Oct 2018 14:39)  Breo Ellipta 100 mcg-25 mcg/inh inhalation powder: 1 puff(s) inhaled once a day patient denies using it (11 Oct 2018 08:38)  Calcium 500: 1 tab(s) orally 2 times a day (11 Oct 2018 08:38)  docusate sodium 100 mg oral tablet: 1 tab(s) orally 2 times a day, As Needed (11 Oct 2018 08:39)  folic acid 1 mg oral tablet: 1 tab(s) orally once a day (11 Oct 2018 08:38)  Mag-Ox 400 oral tablet: 1 tab(s) orally once a day (11 Oct 2018 08:39)  Multiple Vitamins oral tablet: 1 tab(s) orally once a day  (30 Oct 2018 14:39)  Namenda 10 mg oral tablet: 1 tab(s) orally 2 times a day (11 Oct 2018 08:38)  Nephplex Rx oral tablet: 1 tab(s) orally once a day (11 Oct 2018 08:39)  nortriptyline 50 mg oral capsule: 1 cap(s) orally once a day (11 Oct 2018 08:39)  Renvela 800 mg oral tablet: 2 tab(s) orally every 8 hours (11 Oct 2018 08:39)  simethicone 80 mg oral tablet: 1 tab(s) orally 3 times a day (after meals) (11 Oct 2018 08:39)  Tylenol 325 mg oral tablet: 2 tab(s) orally every 6 hours, As Needed (30 Oct 2018 14:39)  vancomycin 1 g intravenous injection: 1 gram(s) intravenous 3 times a week  Please give 3 x per week with Dialysis until 2/3/2019 (22 Jan 2019 13:41)  vitamin A: 1 tab(s) orally once a day (11 Oct 2018 08:38)  Vitamin B Complex: 1 tab(s) orally once a day (11 Oct 2018 08:38)  Vitamin C 500 mg oral tablet: 1 tab(s) orally once a day (11 Oct 2018 08:38)      ***VITAL SIGNS:  Vital Signs Last 24 Hrs  T(C): 36.6 (16 Feb 2019 00:00), Max: 37 (15 Feb 2019 08:00)  T(F): 97.8 (16 Feb 2019 00:00), Max: 98.6 (15 Feb 2019 08:00)  HR: 95 (16 Feb 2019 03:36) (91 - 125)  BP: 114/70 (16 Feb 2019 03:00) (93/54 - 125/68)  BP(mean): 80 (16 Feb 2019 03:00) (62 - 83)  RR: 22 (16 Feb 2019 03:00) (16 - 32)  SpO2: 100% (16 Feb 2019 03:36) (94% - 100%)    I/Os:   I&O's Detail    14 Feb 2019 07:01  -  15 Feb 2019 07:00  --------------------------------------------------------  IN:    Enteral Tube Flush: 160 mL    IV PiggyBack: 300 mL    Nepro with Carb Steady: 805 mL    Other: 400 mL    sodium chloride 0.9%.: 250 mL  Total IN: 1915 mL    OUT:    Bulb: 20 mL    Other: 1900 mL    Rectal Tube: 500 mL  Total OUT: 2420 mL    Total NET: -505 mL      15 Feb 2019 07:01  -  16 Feb 2019 03:44  --------------------------------------------------------  IN:    Enteral Tube Flush: 80 mL    Nepro with Carb Steady: 455 mL    sodium chloride 0.9%.: 130 mL  Total IN: 665 mL    OUT:  Total OUT: 0 mL    Total NET: 665 mL    =======================  MEDICATIONS  ===================  MEDICATIONS  (STANDING):  ALPRAZolam 0.5 milliGRAM(s) Oral every 8 hours  epoetin kelly Injectable 3000 Unit(s) IV Push <User Schedule>  heparin  Injectable 5000 Unit(s) SubCutaneous every 12 hours  ipratropium 17 MICROgram(s) HFA Inhaler 2 Puff(s) Inhalation every 6 hours  melatonin 6 milliGRAM(s) Oral at bedtime  midodrine 10 milliGRAM(s) Oral every 8 hours  pantoprazole  Injectable 40 milliGRAM(s) IV Push daily  piperacillin/tazobactam IVPB. 3.375 Gram(s) IV Intermittent every 12 hours  QUEtiapine 50 milliGRAM(s) Oral every 12 hours  sodium chloride 0.9%. 1000 milliLiter(s) (10 mL/Hr) IV Continuous <Continuous>    MEDICATIONS  (PRN):  ALBUTerol    90 MICROgram(s) HFA Inhaler 2 Puff(s) Inhalation every 6 hours PRN Shortness of Breath and/or Wheezing    ======================VENTILATOR SETTINGS  ==============  Mode: AC/ CMV (Assist Control/ Continuous Mandatory Ventilation)  RR (machine): 22  TV (machine): 400  FiO2: 40  PEEP: 5  MAP: 11  PIP: 26    ======================= PHYSICAL EXAM===================  General:          Comfortable, Awake, alert, no distress  Neuro:             Moving all extremities to commands. No focal deficits	  HEENT:                           CHER/  trach  Respiratory:	Lungs clear on auscultation bilaterally with slightly decreased basal aeration.                            No rales, rhonchi, no wheezing. Effort even and unlabored.  CV:		  Regular rate and rhythm.(+) S1/S2.    Abdomen:	Soft,  nontender, not-distended. Bowel sounds present    Skin:		No rash.  Extremities:	Warm, no cyanosis or edema.  Palpable pulses               ============================ LABS =======================                        8.9    7.88  )-----------( 370      ( 15 Feb 2019 05:30 )             30.1     02-15    137  |  96<L>  |  24<H>  ----------------------------<  102<H>  3.9   |  28         3.52<H>    Ca    9.2      15 Feb 2019 05:30  Phos  3.9     02-15  Mg     2.2     02-15    TPro  6.3  /  Alb  2.5<L>  /  TBili  0.3  /  DBili  x   /  AST  67<H>  /  ALT  32  /  AlkPhos  249<H>  02-14    LIVER FUNCTIONS - ( 14 Feb 2019 03:50 )  Alb: 2.5 g/dL / Pro: 6.3 g/dL / ALK PHOS: 249 u/L / ALT: 32 u/L / AST: 67 u/L / GGT: x           BLOOD  02-05-19 --  --  --    ENDOTRACHEAL SPECIMEN  02-01-19 --  --  Staph. aureus *MRSA*  Klebsiella pneumoniae      BLOOD PERIPHERAL  02-01-19 --  --  BLOOD CULTURE PCR  Staphylococcus sp.,coag neg  BLOOD VENOUS  01-29-19 --  --  --  LUNG - UPPER LOBE LEFT  01-29-19 --  --  --  BLOOD PERIPHERAL  01-28-19 --  --  --  ===================== IMAGING STUDIES ===================  Radiology personally reviewed.  < from: Xray Chest 1 View- PORTABLE-Routine (02.14.19 @ 06:37) >    INTERPRETATION:     Tracheostomy tube and subcutaneous AICD are unchanged. Bilateral small   loculated pleural effusions with underlying atelectasis. Remainder of   both lungs are clear. Heart size is stable.    COMPARISON:  February 13    IMPRESSION:  Bilateral small, loculated effusions unchanged with   tracheostomy tube in position.  < end of copied text >  ====================ASSESSMENT AND PLAN ================  Mr. Han is a 58M who underwent a FB, R thoracotomy, decortication, chest wall reconstruction, lat flap with Dr Pickering on 10/11/18. He was admitted on 1/6/19 to [facility] with a R pleural effusion and respiratory failure,  A R PTC was placed there and he was intubated.  He still had a R SANDY drain in place and he was transferred to [facility]. Patient was found to have aspirated a foreign body and +MRSA empyema. He was treated and discharged on 1/22/2019.   He was found by his home nurse to be in acute distress and recommended to proceed to the ED.  At presentation, he was found to be in acute hypercarbic respiratory failure complicated by hemodynamic instability requiring emergent intubation and pressor support. (28 Jan 2019 15:34)    Procedure:        Trach and PEG  1/31/2019  Bronchoscopy   1/29/2019  Right thoracotomy, resection of portion of R 7th rib, evacuation of infected hemothorax, takedown of pleurocutaneous fistula  10/11/2018      Issues:  Respiratory failure s/p Trach and PEG  Hypotension on Midodrine  ESRD / HD  Cardiomyopathy  HLD  BPH             Anxiety / intermittent Agitation  ====================== NEUROLOGY=======================  Pain control with Tylenol IV / Toradol / Fentanyl PRN    Agitation /Anxiety: Continue Xanax and Seroquel.  ==================== RESPIRATORY========================  Pt is on full mech vent support at night, trach collar daytime as tolerated  Comfortable, no evidence of distress.	  Mechanical Ventilation:  Mode: AC/ CMV (Assist Control/ Continuous Mandatory Ventilation)  RR (machine): 22  TV (machine): 400  FiO2: 40  PEEP: 5  MAP: 11  PIP: 26    Mechanical ventilator status assessed & settings reviewed  Continuous pulse oximetry monitoring  Continue bronchodilators, pulmonary toilet  Head of bed elevation to 30-40 degrees  ====================CARDIOVASCULAR=====================  Continue hemodynamic monitoring/ telemetry  Hypotension on Midodrine 10mg/TID  ===================== RENAL ============================  Monitor I/Os, BUN/ Cr  and electrolytes   HD as per renal / TIW                                  ==================== GASTROINTESTINAL===================  On  tube feeds Nepro 35 ml/hr , tolerating well  Continue GI prophylaxis with  Protonix   Zofran / Reglan for nausea - PRN	  NPO   Flush PEG with 30cc sterile water Q4hrs                                   =======================    ENDOCRIN  =====================  Glycemic monitoring  F/S with coverage  ===================HEMATOLOGIC/ONCOLOGIC =============    No signs of active bleeding.   Follow CBC, coags  in AM  DVT prophylaxis with SCD, sc Heparin  ========================INFECTIOUS DISEASE===============  Monitor for fever / leukocytosis.  All surgical incision / chest tube  sites look clean   Bronchoscopy on 1/29 showed copious greenish secretions - Continue pulmonary toilet    BAL - MRSA, Continue Vanco based on level  + Zosyn for 4 weeks. Blood cultures are negative    I.D f/u    Pertinent clinical, laboratory, radiographic, hemodynamic, echocardiographic, respiratory data, microbiologic data and chart were reviewed and analyzed frequently throughout the course of the day and night. GI and DVT prophylaxis, glycemic control, head of bed elevation and skin care issues were addressed.  Patient seen, examined and plan discussed with CT Surgery / CTICU team during rounds.  Pt remains critically ill in imminent risk of  deterioration and requires very careful cardio- pulmonary monitoring and support.    I have spent   45     minutes of critical care time with this pt between  12    am  and   9     am         minutes spent on total encounter; more than 50% of the visit was spent counseling and/or coordinating care by the attending physician.      KERLINE Faith MD CLAUDIA HAN          MRN-[id number]    [facility]  Mr. Han is a 58M who underwent a FB, R thoracotomy, decortication, chest wall reconstruction, lat flap with Dr Pickering on 10/11/18. He was admitted on 1/6/19 to [facility] with a R pleural effusion and respiratory failure,  A R PTC was placed there and he was intubated.  He still had a R SANDY drain in place and he was transferred to [facility]. Patient was found to have aspirated a foreign body and +MRSA empyema. He was treated and discharged on 1/22/2019.     He was found by his home nurse to be in acute distress and recommended to proceed to the ED.  At presentation, he was found to be in acute hypercarbic respiratory failure complicated by hemodynamic instability requiring emergent intubation and pressor support. (28 Jan 2019 15:34)      Procedure:        Trach and PEG  1/31/2019  Bronchoscopy   1/29/2019  Right thoracotomy, resection of portion of R 7th rib, evacuation of infected hemothorax, takedown of pleurocutaneous fistula  10/11/2018      Issues:  Respiratory failure s/p Trach and PEG  Hypotension on Midodrine  ESRD / HD  Cardiomyopathy  HLD  BPH  Anxiety / Agitation        Interval/Overnight  Events/ ROS   OOB to chair yesterday on trach collar, breathing comfortably with minimal pain.  Overnight placed back on vent to rest. Denies pain, no SOB, no palpitations, no nausea/ no vomiting, no dizziness  HD due today      PAST MEDICAL & SURGICAL HISTORY:  Smoking hx  Cardiomyopathy  Wound: right chest  ICD (implantable cardioverter-defibrillator) in place  OA (osteoarthritis)  HLD (hyperlipidemia)  BPH (benign prostatic hyperplasia)  Pleural effusion  HTN (hypertension)  ESRD (end stage renal disease)  H/O chest wound: right  S/P thoracotomy: FB, R thoracotomy, decortication, chest wall reconstruction, lat flap  History of wound infection: right chest wall - revision 5/18 and again in 7/18  History of implantable cardioverter-defibrillator (ICD) placement: pt unsure when placed  H/O bilateral hip replacements: 2008, 2009    Allergies  No Known Allergies        Home Medications:  aspirin 81 mg oral tablet: 1 tab(s) orally once a day  (30 Oct 2018 14:39)  Breo Ellipta 100 mcg-25 mcg/inh inhalation powder: 1 puff(s) inhaled once a day patient denies using it (11 Oct 2018 08:38)  Calcium 500: 1 tab(s) orally 2 times a day (11 Oct 2018 08:38)  docusate sodium 100 mg oral tablet: 1 tab(s) orally 2 times a day, As Needed (11 Oct 2018 08:39)  folic acid 1 mg oral tablet: 1 tab(s) orally once a day (11 Oct 2018 08:38)  Mag-Ox 400 oral tablet: 1 tab(s) orally once a day (11 Oct 2018 08:39)  Multiple Vitamins oral tablet: 1 tab(s) orally once a day  (30 Oct 2018 14:39)  Namenda 10 mg oral tablet: 1 tab(s) orally 2 times a day (11 Oct 2018 08:38)  Nephplex Rx oral tablet: 1 tab(s) orally once a day (11 Oct 2018 08:39)  nortriptyline 50 mg oral capsule: 1 cap(s) orally once a day (11 Oct 2018 08:39)  Renvela 800 mg oral tablet: 2 tab(s) orally every 8 hours (11 Oct 2018 08:39)  simethicone 80 mg oral tablet: 1 tab(s) orally 3 times a day (after meals) (11 Oct 2018 08:39)  Tylenol 325 mg oral tablet: 2 tab(s) orally every 6 hours, As Needed (30 Oct 2018 14:39)  vancomycin 1 g intravenous injection: 1 gram(s) intravenous 3 times a week  Please give 3 x per week with Dialysis until 2/3/2019 (22 Jan 2019 13:41)  vitamin A: 1 tab(s) orally once a day (11 Oct 2018 08:38)  Vitamin B Complex: 1 tab(s) orally once a day (11 Oct 2018 08:38)  Vitamin C 500 mg oral tablet: 1 tab(s) orally once a day (11 Oct 2018 08:38)      ***VITAL SIGNS:  Vital Signs Last 24 Hrs  T(C): 36.6 (16 Feb 2019 00:00), Max: 37 (15 Feb 2019 08:00)  T(F): 97.8 (16 Feb 2019 00:00), Max: 98.6 (15 Feb 2019 08:00)  HR: 95 (16 Feb 2019 03:36) (91 - 125)  BP: 114/70 (16 Feb 2019 03:00) (93/54 - 125/68)  BP(mean): 80 (16 Feb 2019 03:00) (62 - 83)  RR: 22 (16 Feb 2019 03:00) (16 - 32)  SpO2: 100% (16 Feb 2019 03:36) (94% - 100%)    I/Os:   I&O's Detail    14 Feb 2019 07:01  -  15 Feb 2019 07:00  --------------------------------------------------------  IN:    Enteral Tube Flush: 160 mL    IV PiggyBack: 300 mL    Nepro with Carb Steady: 805 mL    Other: 400 mL    sodium chloride 0.9%.: 250 mL  Total IN: 1915 mL    OUT:    Bulb: 20 mL    Other: 1900 mL    Rectal Tube: 500 mL  Total OUT: 2420 mL    Total NET: -505 mL      15 Feb 2019 07:01  -  16 Feb 2019 03:44  --------------------------------------------------------  IN:    Enteral Tube Flush: 80 mL    Nepro with Carb Steady: 455 mL    sodium chloride 0.9%.: 130 mL  Total IN: 665 mL    OUT:  Total OUT: 0 mL    Total NET: 665 mL    =======================  MEDICATIONS  ===================  MEDICATIONS  (STANDING):  ALPRAZolam 0.5 milliGRAM(s) Oral every 8 hours  epoetin kelly Injectable 3000 Unit(s) IV Push <User Schedule>  heparin  Injectable 5000 Unit(s) SubCutaneous every 12 hours  ipratropium 17 MICROgram(s) HFA Inhaler 2 Puff(s) Inhalation every 6 hours  melatonin 6 milliGRAM(s) Oral at bedtime  midodrine 10 milliGRAM(s) Oral every 8 hours  pantoprazole  Injectable 40 milliGRAM(s) IV Push daily  piperacillin/tazobactam IVPB. 3.375 Gram(s) IV Intermittent every 12 hours  QUEtiapine 50 milliGRAM(s) Oral every 12 hours  sodium chloride 0.9%. 1000 milliLiter(s) (10 mL/Hr) IV Continuous <Continuous>    MEDICATIONS  (PRN):  ALBUTerol    90 MICROgram(s) HFA Inhaler 2 Puff(s) Inhalation every 6 hours PRN Shortness of Breath and/or Wheezing    ======================VENTILATOR SETTINGS  ==============  Mode: AC/ CMV (Assist Control/ Continuous Mandatory Ventilation)  RR (machine): 22  TV (machine): 400  FiO2: 40  PEEP: 5  MAP: 11  PIP: 26    ======================= PHYSICAL EXAM===================  General:          Comfortable, Awake, alert, no distress  Neuro:             Moving all extremities to commands. No focal deficits	  HEENT:                           CHER/  trach  Respiratory:	Lungs clear on auscultation bilaterally with slightly decreased basal aeration.                            No rales, rhonchi, no wheezing. Effort even and unlabored.  CV:		  Regular rate and rhythm.(+) S1/S2.    Abdomen:	Soft,  nontender, not-distended. Bowel sounds present    Skin:		No rash.  Extremities:	Warm, no cyanosis or edema.  Palpable pulses               ============================ LABS =======================                        8.9    7.88  )-----------( 370      ( 15 Feb 2019 05:30 )             30.1     02-15    137  |  96<L>  |  24<H>  ----------------------------<  102<H>  3.9   |  28         3.52<H>    Ca    9.2      15 Feb 2019 05:30  Phos  3.9     02-15  Mg     2.2     02-15    TPro  6.3  /  Alb  2.5<L>  /  TBili  0.3  /  DBili  x   /  AST  67<H>  /  ALT  32  /  AlkPhos  249<H>  02-14    LIVER FUNCTIONS - ( 14 Feb 2019 03:50 )  Alb: 2.5 g/dL / Pro: 6.3 g/dL / ALK PHOS: 249 u/L / ALT: 32 u/L / AST: 67 u/L / GGT: x           BLOOD  02-05-19 --  --  --    ENDOTRACHEAL SPECIMEN  02-01-19 --  --  Staph. aureus *MRSA*  Klebsiella pneumoniae      BLOOD PERIPHERAL  02-01-19 --  --  BLOOD CULTURE PCR  Staphylococcus sp.,coag neg  BLOOD VENOUS  01-29-19 --  --  --  LUNG - UPPER LOBE LEFT  01-29-19 --  --  --  BLOOD PERIPHERAL  01-28-19 --  --  --  ===================== IMAGING STUDIES ===================  Radiology personally reviewed.  < from: Xray Chest 1 View- PORTABLE-Routine (02.14.19 @ 06:37) >    INTERPRETATION:     Tracheostomy tube and subcutaneous AICD are unchanged. Bilateral small   loculated pleural effusions with underlying atelectasis. Remainder of   both lungs are clear. Heart size is stable.    COMPARISON:  February 13    IMPRESSION:  Bilateral small, loculated effusions unchanged with   tracheostomy tube in position.  < end of copied text >  ====================ASSESSMENT AND PLAN ================  Mr. Han is a 58M who underwent a FB, R thoracotomy, decortication, chest wall reconstruction, lat flap with Dr Pickering on 10/11/18. He was admitted on 1/6/19 to [facility] with a R pleural effusion and respiratory failure,  A R PTC was placed there and he was intubated.  He still had a R SANDY drain in place and he was transferred to [facility]. Patient was found to have aspirated a foreign body and +MRSA empyema. He was treated and discharged on 1/22/2019.   He was found by his home nurse to be in acute distress and recommended to proceed to the ED.  At presentation, he was found to be in acute hypercarbic respiratory failure complicated by hemodynamic instability requiring emergent intubation and pressor support. (28 Jan 2019 15:34)    Procedure:        Trach and PEG  1/31/2019  Bronchoscopy   1/29/2019  Right thoracotomy, resection of portion of R 7th rib, evacuation of infected hemothorax, takedown of pleurocutaneous fistula  10/11/2018      Issues:  Respiratory failure s/p Trach and PEG  Hypotension on Midodrine  ESRD / HD  Cardiomyopathy  HLD  BPH             Anxiety / intermittent Agitation  ====================== NEUROLOGY=======================  Pain control with Tylenol IV / Toradol / Fentanyl PRN    Agitation /Anxiety: Continue Xanax and Seroquel.  ==================== RESPIRATORY========================  Pt is on full mech vent support at night, trach collar daytime as tolerated  Comfortable, no evidence of distress.	  Mechanical Ventilation:  Mode: AC/ CMV (Assist Control/ Continuous Mandatory Ventilation)  RR (machine): 22  TV (machine): 400  FiO2: 40  PEEP: 5  MAP: 11  PIP: 26    Mechanical ventilator status assessed & settings reviewed  Continuous pulse oximetry monitoring  Continue bronchodilators, pulmonary toilet  Head of bed elevation to 30-40 degrees  ====================CARDIOVASCULAR=====================  Continue hemodynamic monitoring/ telemetry  Hypotension on Midodrine 10mg/TID  ===================== RENAL ============================  Monitor I/Os, BUN/ Cr  and electrolytes   HD as per renal / TIW                                  ==================== GASTROINTESTINAL===================  On  tube feeds Nepro 35 ml/hr , tolerating well  Continue GI prophylaxis with  Protonix   Zofran / Reglan for nausea - PRN	  NPO   Flush PEG with 30cc sterile water Q4hrs                                   =======================    ENDOCRIN  =====================  Glycemic monitoring  F/S with coverage  ===================HEMATOLOGIC/ONCOLOGIC =============    No signs of active bleeding.   Follow CBC, coags  in AM  DVT prophylaxis with SCD, sc Heparin  ========================INFECTIOUS DISEASE===============  Monitor for fever / leukocytosis.  All surgical incision / chest tube  sites look clean   Bronchoscopy on 1/29 showed copious greenish secretions - Continue pulmonary toilet    BAL - MRSA, Continue Vanco based on level  + Zosyn for 4 weeks ( until 2/24/19). Blood cultures are negative    I.D f/u    Pertinent clinical, laboratory, radiographic, hemodynamic, echocardiographic, respiratory data, microbiologic data and chart were reviewed and analyzed frequently throughout the course of the day and night. GI and DVT prophylaxis, glycemic control, head of bed elevation and skin care issues were addressed.  Patient seen, examined and plan discussed with CT Surgery / CTICU team during rounds.  Pt remains critically ill in imminent risk of  deterioration and requires very careful cardio- pulmonary monitoring and support.    I have spent   45     minutes of critical care time with this pt between  12    am  and   9     am         minutes spent on total encounter; more than 50% of the visit was spent counseling and/or coordinating care by the attending physician.      KERLINE Faith MD CLAUDIA HAN          MRN-[id number]    [facility]  Mr. Han is a 58M who underwent a FB, R thoracotomy, decortication, chest wall reconstruction, lat flap with Dr Pickering on 10/11/18. He was admitted on 1/6/19 to [facility] with a R pleural effusion and respiratory failure,  A R PTC was placed there and he was intubated.  He still had a R SANDY drain in place and he was transferred to [facility]. Patient was found to have aspirated a foreign body and +MRSA empyema. He was treated and discharged on 1/22/2019.     He was found by his home nurse to be in acute distress and recommended to proceed to the ED.  At presentation, he was found to be in acute hypercarbic respiratory failure complicated by hemodynamic instability requiring emergent intubation and pressor support. (28 Jan 2019 15:34)      Procedure:        Trach and PEG  1/31/2019  Bronchoscopy   1/29/2019  Right thoracotomy, resection of portion of R 7th rib, evacuation of infected hemothorax, takedown of pleurocutaneous fistula  10/11/2018      Issues:  Respiratory failure s/p Trach and PEG  Hypotension on Midodrine  ESRD / HD  Cardiomyopathy  HLD  BPH  Anxiety / Agitation        Interval/Overnight  Events/ ROS   OOB to chair yesterday on trach collar, breathing comfortably with minimal pain.  Overnight placed back on vent to rest. Denies pain, no SOB, no palpitations, no nausea/ no vomiting, no dizziness  HD due today      PAST MEDICAL & SURGICAL HISTORY:  Smoking hx  Cardiomyopathy  Wound: right chest  ICD (implantable cardioverter-defibrillator) in place  OA (osteoarthritis)  HLD (hyperlipidemia)  BPH (benign prostatic hyperplasia)  Pleural effusion  HTN (hypertension)  ESRD (end stage renal disease)  H/O chest wound: right  S/P thoracotomy: FB, R thoracotomy, decortication, chest wall reconstruction, lat flap  History of wound infection: right chest wall - revision 5/18 and again in 7/18  History of implantable cardioverter-defibrillator (ICD) placement: pt unsure when placed  H/O bilateral hip replacements: 2008, 2009    Allergies  No Known Allergies        Home Medications:  aspirin 81 mg oral tablet: 1 tab(s) orally once a day  (30 Oct 2018 14:39)  Breo Ellipta 100 mcg-25 mcg/inh inhalation powder: 1 puff(s) inhaled once a day patient denies using it (11 Oct 2018 08:38)  Calcium 500: 1 tab(s) orally 2 times a day (11 Oct 2018 08:38)  docusate sodium 100 mg oral tablet: 1 tab(s) orally 2 times a day, As Needed (11 Oct 2018 08:39)  folic acid 1 mg oral tablet: 1 tab(s) orally once a day (11 Oct 2018 08:38)  Mag-Ox 400 oral tablet: 1 tab(s) orally once a day (11 Oct 2018 08:39)  Multiple Vitamins oral tablet: 1 tab(s) orally once a day  (30 Oct 2018 14:39)  Namenda 10 mg oral tablet: 1 tab(s) orally 2 times a day (11 Oct 2018 08:38)  Nephplex Rx oral tablet: 1 tab(s) orally once a day (11 Oct 2018 08:39)  nortriptyline 50 mg oral capsule: 1 cap(s) orally once a day (11 Oct 2018 08:39)  Renvela 800 mg oral tablet: 2 tab(s) orally every 8 hours (11 Oct 2018 08:39)  simethicone 80 mg oral tablet: 1 tab(s) orally 3 times a day (after meals) (11 Oct 2018 08:39)  Tylenol 325 mg oral tablet: 2 tab(s) orally every 6 hours, As Needed (30 Oct 2018 14:39)  vancomycin 1 g intravenous injection: 1 gram(s) intravenous 3 times a week  Please give 3 x per week with Dialysis until 2/3/2019 (22 Jan 2019 13:41)  vitamin A: 1 tab(s) orally once a day (11 Oct 2018 08:38)  Vitamin B Complex: 1 tab(s) orally once a day (11 Oct 2018 08:38)  Vitamin C 500 mg oral tablet: 1 tab(s) orally once a day (11 Oct 2018 08:38)      ***VITAL SIGNS:  Vital Signs Last 24 Hrs  T(C): 36.6 (16 Feb 2019 00:00), Max: 37 (15 Feb 2019 08:00)  T(F): 97.8 (16 Feb 2019 00:00), Max: 98.6 (15 Feb 2019 08:00)  HR: 95 (16 Feb 2019 03:36) (91 - 125)  BP: 114/70 (16 Feb 2019 03:00) (93/54 - 125/68)  BP(mean): 80 (16 Feb 2019 03:00) (62 - 83)  RR: 22 (16 Feb 2019 03:00) (16 - 32)  SpO2: 100% (16 Feb 2019 03:36) (94% - 100%)    I/Os:   I&O's Detail    14 Feb 2019 07:01  -  15 Feb 2019 07:00  --------------------------------------------------------  IN:    Enteral Tube Flush: 160 mL    IV PiggyBack: 300 mL    Nepro with Carb Steady: 805 mL    Other: 400 mL    sodium chloride 0.9%.: 250 mL  Total IN: 1915 mL    OUT:    Bulb: 20 mL    Other: 1900 mL    Rectal Tube: 500 mL  Total OUT: 2420 mL    Total NET: -505 mL      15 Feb 2019 07:01  -  16 Feb 2019 03:44  --------------------------------------------------------  IN:    Enteral Tube Flush: 80 mL    Nepro with Carb Steady: 455 mL    sodium chloride 0.9%.: 130 mL  Total IN: 665 mL    OUT:  Total OUT: 0 mL    Total NET: 665 mL    =======================  MEDICATIONS  ===================  MEDICATIONS  (STANDING):  ALPRAZolam 0.5 milliGRAM(s) Oral every 8 hours  epoetin kelly Injectable 3000 Unit(s) IV Push <User Schedule>  heparin  Injectable 5000 Unit(s) SubCutaneous every 12 hours  ipratropium 17 MICROgram(s) HFA Inhaler 2 Puff(s) Inhalation every 6 hours  melatonin 6 milliGRAM(s) Oral at bedtime  midodrine 10 milliGRAM(s) Oral every 8 hours  pantoprazole  Injectable 40 milliGRAM(s) IV Push daily  piperacillin/tazobactam IVPB. 3.375 Gram(s) IV Intermittent every 12 hours  QUEtiapine 50 milliGRAM(s) Oral every 12 hours  sodium chloride 0.9%. 1000 milliLiter(s) (10 mL/Hr) IV Continuous <Continuous>    MEDICATIONS  (PRN):  ALBUTerol    90 MICROgram(s) HFA Inhaler 2 Puff(s) Inhalation every 6 hours PRN Shortness of Breath and/or Wheezing    ======================VENTILATOR SETTINGS  ==============  Mode: AC/ CMV (Assist Control/ Continuous Mandatory Ventilation)  RR (machine): 22  TV (machine): 400  FiO2: 40  PEEP: 5  MAP: 11  PIP: 26    ======================= PHYSICAL EXAM===================  General:          Comfortable, Awake, alert, no distress  Neuro:             Moving all extremities to commands. No focal deficits	  HEENT:                           CHER/  trach  Respiratory:	Lungs clear on auscultation bilaterally with slightly decreased basal aeration.                            No rales, rhonchi, no wheezing. Effort even and unlabored.  CV:		  Regular rate and rhythm.(+) S1/S2.    Abdomen:	Soft,  nontender, not-distended. Bowel sounds present    Skin:		No rash.  Extremities:	Warm, no cyanosis or edema.  Palpable pulses               ============================ LABS =======================                          8.9    6.68  )-----------( 387      ( 16 Feb 2019 03:15 )             29.2                       8.9    7.88  )-----------( 370      ( 15 Feb 2019 05:30 )             30.1     02-16    139  |  95<L>  |  31<H>  ----------------------------<  90  3.9   |  28        |  4.43<H>    Ca    9.3      16 Feb 2019 03:15  Phos  5.3     02-16  Mg     2.3     02-16    TPro  6.4  /  Alb  2.7<L>  /  TBili  0.3  /  DBili  x   /  AST  60<H>  /  ALT  23  /  AlkPhos  200<H>  02-16    02-15    137  |  96<L>  |  24<H>  ----------------------------<  102<H>  3.9   |  28         3.52<H>    Ca    9.2      15 Feb 2019 05:30  Phos  3.9     02-15  Mg     2.2     02-15    TPro  6.3  /  Alb  2.5<L>  /  TBili  0.3  /  DBili  x   /  AST  67<H>  /  ALT  32  /  AlkPhos  249<H>  02-14    LIVER FUNCTIONS - ( 14 Feb 2019 03:50 )  Alb: 2.5 g/dL / Pro: 6.3 g/dL / ALK PHOS: 249 u/L / ALT: 32 u/L / AST: 67 u/L / GGT: x           BLOOD  02-05-19 --  --  --    ENDOTRACHEAL SPECIMEN  02-01-19 --  --  Staph. aureus *MRSA*  Klebsiella pneumoniae      BLOOD PERIPHERAL  02-01-19 --  --  BLOOD CULTURE PCR  Staphylococcus sp.,coag neg  BLOOD VENOUS  01-29-19 --  --  --  LUNG - UPPER LOBE LEFT  01-29-19 --  --  --  BLOOD PERIPHERAL  01-28-19 --  --  --  ===================== IMAGING STUDIES ===================  Radiology personally reviewed.  < from: Xray Chest 1 View- PORTABLE-Routine (02.14.19 @ 06:37) >    INTERPRETATION:     Tracheostomy tube and subcutaneous AICD are unchanged. Bilateral small   loculated pleural effusions with underlying atelectasis. Remainder of   both lungs are clear. Heart size is stable.    COMPARISON:  February 13    IMPRESSION:  Bilateral small, loculated effusions unchanged with   tracheostomy tube in position.  < end of copied text >  ====================ASSESSMENT AND PLAN ================  Mr. Han is a 58M who underwent a FB, R thoracotomy, decortication, chest wall reconstruction, lat flap with Dr Pickering on 10/11/18. He was admitted on 1/6/19 to [facility] with a R pleural effusion and respiratory failure,  A R PTC was placed there and he was intubated.  He still had a R SANDY drain in place and he was transferred to [facility]. Patient was found to have aspirated a foreign body and +MRSA empyema. He was treated and discharged on 1/22/2019.   He was found by his home nurse to be in acute distress and recommended to proceed to the ED.  At presentation, he was found to be in acute hypercarbic respiratory failure complicated by hemodynamic instability requiring emergent intubation and pressor support. (28 Jan 2019 15:34)    Procedure:        Trach and PEG  1/31/2019  Bronchoscopy   1/29/2019  Right thoracotomy, resection of portion of R 7th rib, evacuation of infected hemothorax, takedown of pleurocutaneous fistula  10/11/2018      Issues:  Respiratory failure s/p Trach and PEG  Hypotension on Midodrine  ESRD / HD  Cardiomyopathy  HLD  BPH             Anxiety / intermittent Agitation  ====================== NEUROLOGY=======================  Pain control with Tylenol IV / Toradol / Fentanyl PRN    Agitation /Anxiety: Continue Xanax and Seroquel.  ==================== RESPIRATORY========================  Pt is on full mech vent support at night, trach collar daytime as tolerated  Comfortable, no evidence of distress.	  Mechanical Ventilation:  Mode: AC/ CMV (Assist Control/ Continuous Mandatory Ventilation)  RR (machine): 22  TV (machine): 400  FiO2: 40  PEEP: 5  MAP: 11  PIP: 26    Mechanical ventilator status assessed & settings reviewed  Continuous pulse oximetry monitoring  Continue bronchodilators, pulmonary toilet  Head of bed elevation to 30-40 degrees  ====================CARDIOVASCULAR=====================  Continue hemodynamic monitoring/ telemetry  Hypotension on Midodrine 10mg/TID  ===================== RENAL ============================  Monitor I/Os, BUN/ Cr  and electrolytes   HD as per renal / TIW                                  ==================== GASTROINTESTINAL===================  On  tube feeds Nepro 35 ml/hr , tolerating well  Continue GI prophylaxis with  Protonix   Zofran / Reglan for nausea - PRN	  NPO   Flush PEG with 30cc sterile water Q4hrs                                   =======================    ENDOCRIN  =====================  Glycemic monitoring  F/S with coverage  ===================HEMATOLOGIC/ONCOLOGIC =============    No signs of active bleeding.   Follow CBC, coags  in AM  DVT prophylaxis with SCD, sc Heparin  ========================INFECTIOUS DISEASE===============  Monitor for fever / leukocytosis.  All surgical incision / chest tube  sites look clean   Bronchoscopy on 1/29 showed copious greenish secretions - Continue pulmonary toilet    BAL - MRSA, Continue Vanco based on level  + Zosyn for 4 weeks ( until 2/24/19). Blood cultures are negative    I.D f/u    Pertinent clinical, laboratory, radiographic, hemodynamic, echocardiographic, respiratory data, microbiologic data and chart were reviewed and analyzed frequently throughout the course of the day and night. GI and DVT prophylaxis, glycemic control, head of bed elevation and skin care issues were addressed.  Patient seen, examined and plan discussed with CT Surgery / CTICU team during rounds.  Pt remains critically ill in imminent risk of  deterioration and requires very careful cardio- pulmonary monitoring and support.    I have spent   45     minutes of critical care time with this pt between  12    am  and   9     am         minutes spent on total encounter; more than 50% of the visit was spent counseling and/or coordinating care by the attending physician.      KERLINE Faith MD

## 2019-02-16 NOTE — PROGRESS NOTE ADULT - SUBJECTIVE AND OBJECTIVE BOX
[facility] DIVISION OF KIDNEY DISEASES AND HYPERTENSION -- FOLLOW UP NOTE  --------------------------------------------------------------------------------  HPI: 57 yo male with medical history of NICM with ICD, ESRD on HD, former smoker, BPH admitted with acute hypercapnic respiratory failure. Nephrology consulted for ESRD/HD management. Pt intubated and sedated in the CTICU. Pt. currently admitted with hypercarbic respiratory failure, septic shock. Pt seen in the CTICU s/p Trach on 1/31/19.      Pt. seen and examined at bedside prior to HD treatment. Patient feels well and has no complaints, however unable to communicate with full ROS.    PAST HISTORY  --------------------------------------------------------------------------------  No significant changes to PMH, PSH, FHx, SHx, unless otherwise noted    ALLERGIES & MEDICATIONS  --------------------------------------------------------------------------------  Allergies    No Known Allergies    Intolerances    Standing Inpatient Medications  ALPRAZolam 0.5 milliGRAM(s) Oral every 8 hours  epoetin kelly Injectable 3000 Unit(s) IV Push <User Schedule>  heparin  Injectable 5000 Unit(s) SubCutaneous every 12 hours  ipratropium 17 MICROgram(s) HFA Inhaler 2 Puff(s) Inhalation every 6 hours  melatonin 6 milliGRAM(s) Oral at bedtime  midodrine 10 milliGRAM(s) Oral every 8 hours  pantoprazole  Injectable 40 milliGRAM(s) IV Push daily  piperacillin/tazobactam IVPB. 3.375 Gram(s) IV Intermittent every 12 hours  QUEtiapine 50 milliGRAM(s) Oral every 12 hours  sodium chloride 0.9%. 1000 milliLiter(s) IV Continuous <Continuous>    PRN Inpatient Medications  ALBUTerol    90 MICROgram(s) HFA Inhaler 2 Puff(s) Inhalation every 6 hours PRN    REVIEW OF SYSTEMS  --------------------------------------------------------------------------------  Unable to obtain 2/2 trach    VITALS/PHYSICAL EXAM  --------------------------------------------------------------------------------  T(C): 36.4 (02-16-19 @ 07:30), Max: 37.6 (02-16-19 @ 04:00)  HR: 105 (02-16-19 @ 07:30) (81 - 125)  BP: 109/64 (02-16-19 @ 07:30) (93/54 - 125/68)  RR: 18 (02-16-19 @ 07:30) (16 - 32)  SpO2: 100% (02-16-19 @ 07:30) (94% - 100%)  Wt(kg): --    02-15-19 @ 07:01  -  02-16-19 @ 07:00  --------------------------------------------------------  IN: 695 mL / OUT: 20 mL / NET: 675 mL    Physical Exam:  	Gen: + Trach  	HEENT: +Trach  	Pulm: coarse breath sounds B/L  	CV: S1S2  	Abd: Soft, +BS   	Ext: No LE edema B/L + RUE PICC  	Neuro: Awake  	Skin: Warm and dry  	Vascular access: LUE AVF site: +thrill, bruit heard.     LABS/STUDIES  --------------------------------------------------------------------------------              8.9    6.68  >-----------<  387      [02-16-19 @ 03:15]              29.2     139  |  95  |  31  ----------------------------<  90      [02-16-19 @ 03:15]  3.9   |  28  |  4.43        Ca     9.3     [02-16-19 @ 03:15]      Mg     2.3     [02-16-19 @ 03:15]      Phos  5.3     [02-16-19 @ 03:15]    TPro  6.4  /  Alb  2.7  /  TBili  0.3  /  DBili  x   /  AST  60  /  ALT  23  /  AlkPhos  200  [02-16-19 @ 03:15]    Creatinine Trend:  SCr 4.43 [02-16 @ 03:15]  SCr 3.52 [02-15 @ 05:30]  SCr 4.48 [02-14 @ 03:50]  SCr 3.76 [02-13 @ 03:20]  SCr 5.05 [02-12 @ 04:50]

## 2019-02-16 NOTE — PROGRESS NOTE ADULT - SUBJECTIVE AND OBJECTIVE BOX
Infectious Diseases progress note:    Subjective: No acute o/n events.  Pt getting dialyzed.  Awaiting placement    ROS:  CONSTITUTIONAL:  No fever, chills, rigors  CARDIOVASCULAR:  No chest pain or palpitations  RESPIRATORY:   No SOB, cough, dyspnea on exertion.  No wheezing  GASTROINTESTINAL:  No abd pain, N/V, diarrhea/constipation  EXTREMITIES:  No swelling or joint pain  GENITOURINARY:  No burning on urination, increased frequency or urgency.  No flank pain  NEUROLOGIC:  No HA, visual disturbances  SKIN: No rashes    Allergies    No Known Allergies    Intolerances        ANTIBIOTICS/RELEVANT:  antimicrobials  piperacillin/tazobactam IVPB. 3.375 Gram(s) IV Intermittent every 12 hours    immunologic:  epoetin kelly Injectable 3000 Unit(s) IV Push <User Schedule>    OTHER:  ALBUTerol    90 MICROgram(s) HFA Inhaler 2 Puff(s) Inhalation every 6 hours PRN  ALPRAZolam 0.5 milliGRAM(s) Oral every 8 hours  heparin  Injectable 5000 Unit(s) SubCutaneous every 12 hours  ipratropium 17 MICROgram(s) HFA Inhaler 2 Puff(s) Inhalation every 6 hours  melatonin 6 milliGRAM(s) Oral at bedtime  midodrine 10 milliGRAM(s) Oral every 8 hours  pantoprazole  Injectable 40 milliGRAM(s) IV Push daily  QUEtiapine 50 milliGRAM(s) Oral every 12 hours  sodium chloride 0.9%. 1000 milliLiter(s) IV Continuous <Continuous>      Objective:  Vital Signs Last 24 Hrs  T(C): 36.2 (16 Feb 2019 11:03), Max: 37.6 (16 Feb 2019 04:00)  T(F): 97.1 (16 Feb 2019 11:03), Max: 99.6 (16 Feb 2019 04:00)  HR: 105 (16 Feb 2019 11:03) (81 - 125)  BP: 108/66 (16 Feb 2019 11:03) (93/54 - 125/68)  BP(mean): 79 (16 Feb 2019 10:00) (62 - 83)  RR: 18 (16 Feb 2019 11:03) (15 - 32)  SpO2: 100% (16 Feb 2019 11:03) (94% - 100%)    PHYSICAL EXAM:  Constitutional:NAD  Eyes:CHER, EOMI  Ear/Nose/Throat: no thrush, mucositis.  Moist mucous membranes	  Neck:no JVD, no lymphadenopathy, supple  Respiratory: CTA adore  Cardiovascular: S1S2 RRR, no murmurs  Gastrointestinal:soft, nontender,  nondistended (+) BS  Extremities:no e/e/c  Skin:  no rashes, open wounds or ulcerations        LABS:                        8.9    6.68  )-----------( 387      ( 16 Feb 2019 03:15 )             29.2     02-16    139  |  95<L>  |  31<H>  ----------------------------<  90  3.9   |  28  |  4.43<H>    Ca    9.3      16 Feb 2019 03:15  Phos  5.3     02-16  Mg     2.3     02-16    TPro  6.4  /  Alb  2.7<L>  /  TBili  0.3  /  DBili  x   /  AST  60<H>  /  ALT  23  /  AlkPhos  200<H>  02-16        Vancomycin Level, Random (02.15.19 @ 05:30)    Vancomycin Level, Random: 19.8: Therapeutic ranges have not been established for random  vancomycin. Interpret results in context of patient's  clinical condition and time sample was drawn relative to  peak and trough therapeutic ranges. Therapeutic ranges for  peak vancomycin are 25-50 and for trough vancomycin 10-20  with 15-20 mcg/mL used for complicated infections. ug/mL                  MICROBIOLOGY:          RADIOLOGY & ADDITIONAL STUDIES:

## 2019-02-17 LAB — VANCOMYCIN FLD-MCNC: 13.7 UG/ML — SIGNIFICANT CHANGE UP

## 2019-02-17 PROCEDURE — 99291 CRITICAL CARE FIRST HOUR: CPT

## 2019-02-17 RX ORDER — LANOLIN ALCOHOL/MO/W.PET/CERES
6 CREAM (GRAM) TOPICAL AT BEDTIME
Qty: 0 | Refills: 0 | Status: DISCONTINUED | OUTPATIENT
Start: 2019-02-17 | End: 2019-02-28

## 2019-02-17 RX ORDER — QUETIAPINE FUMARATE 200 MG/1
50 TABLET, FILM COATED ORAL EVERY 12 HOURS
Qty: 0 | Refills: 0 | Status: DISCONTINUED | OUTPATIENT
Start: 2019-02-17 | End: 2019-02-28

## 2019-02-17 RX ORDER — MIDODRINE HYDROCHLORIDE 2.5 MG/1
10 TABLET ORAL EVERY 8 HOURS
Qty: 0 | Refills: 0 | Status: DISCONTINUED | OUTPATIENT
Start: 2019-02-17 | End: 2019-02-28

## 2019-02-17 RX ORDER — PSYLLIUM SEED (WITH DEXTROSE)
1 POWDER (GRAM) ORAL DAILY
Qty: 0 | Refills: 0 | Status: DISCONTINUED | OUTPATIENT
Start: 2019-02-17 | End: 2019-02-28

## 2019-02-17 RX ORDER — VANCOMYCIN HCL 1 G
500 VIAL (EA) INTRAVENOUS ONCE
Qty: 0 | Refills: 0 | Status: COMPLETED | OUTPATIENT
Start: 2019-02-17 | End: 2019-02-17

## 2019-02-17 RX ADMIN — Medication 1 PACKET(S): at 16:36

## 2019-02-17 RX ADMIN — PIPERACILLIN AND TAZOBACTAM 25 GRAM(S): 4; .5 INJECTION, POWDER, LYOPHILIZED, FOR SOLUTION INTRAVENOUS at 04:17

## 2019-02-17 RX ADMIN — MIDODRINE HYDROCHLORIDE 10 MILLIGRAM(S): 2.5 TABLET ORAL at 14:00

## 2019-02-17 RX ADMIN — Medication 2 PUFF(S): at 21:49

## 2019-02-17 RX ADMIN — QUETIAPINE FUMARATE 50 MILLIGRAM(S): 200 TABLET, FILM COATED ORAL at 17:47

## 2019-02-17 RX ADMIN — QUETIAPINE FUMARATE 50 MILLIGRAM(S): 200 TABLET, FILM COATED ORAL at 06:32

## 2019-02-17 RX ADMIN — HEPARIN SODIUM 5000 UNIT(S): 5000 INJECTION INTRAVENOUS; SUBCUTANEOUS at 06:32

## 2019-02-17 RX ADMIN — MIDODRINE HYDROCHLORIDE 10 MILLIGRAM(S): 2.5 TABLET ORAL at 06:32

## 2019-02-17 RX ADMIN — Medication 0.5 MILLIGRAM(S): at 14:00

## 2019-02-17 RX ADMIN — PANTOPRAZOLE SODIUM 40 MILLIGRAM(S): 20 TABLET, DELAYED RELEASE ORAL at 14:00

## 2019-02-17 RX ADMIN — Medication 0.5 MILLIGRAM(S): at 06:32

## 2019-02-17 RX ADMIN — Medication 100 MILLIGRAM(S): at 15:36

## 2019-02-17 RX ADMIN — Medication 2 PUFF(S): at 11:29

## 2019-02-17 RX ADMIN — PIPERACILLIN AND TAZOBACTAM 25 GRAM(S): 4; .5 INJECTION, POWDER, LYOPHILIZED, FOR SOLUTION INTRAVENOUS at 17:48

## 2019-02-17 RX ADMIN — Medication 2 PUFF(S): at 16:41

## 2019-02-17 RX ADMIN — Medication 2 PUFF(S): at 03:36

## 2019-02-17 RX ADMIN — HEPARIN SODIUM 5000 UNIT(S): 5000 INJECTION INTRAVENOUS; SUBCUTANEOUS at 17:48

## 2019-02-17 NOTE — PROGRESS NOTE ADULT - SUBJECTIVE AND OBJECTIVE BOX
CLAUDIA HAN            MRN-[id number]         No Known Allergies               Mr. Han is a 58M who underwent a FB, R thoracotomy, decortication, chest wall reconstruction, lat flap with Dr Pickering on 10/11/18. He was admitted on 1/6/19 to [facility] with a R pleural effusion and respiratory failure,  A R PTC was placed there and he was intubated.  He still had a R SANDY drain in place and he was transferred to [facility]. Patient was found to have aspirated a foreign body and +MRSA empyema. He was treated and discharged on 1/22/2019.     He was found by his home nurse to be in acute distress and recommended to proceed to the ED.  At presentation, he was found to be in acute hypercarbic respiratory failure complicated by hemodynamic instability requiring emergent intubation and pressor support. (28 Jan 2019 15:34)      Procedure:        Trach and PEG  1/31/2019  Bronchoscopy   1/29/2019  Right thoracotomy, resection of portion of R 7th rib, evacuation of infected hemothorax, takedown of pleurocutaneous fistula  10/11/2018      Issues:  Respiratory failure s/p Trach and PEG  Hypotension on Midodrine  ESRD / HD  Cardiomyopathy  HLD  BPH  Anxiety / Agitation                 Home Medications:  aspirin 81 mg oral tablet: 1 tab(s) orally once a day  (30 Oct 2018 14:39)  Breo Ellipta 100 mcg-25 mcg/inh inhalation powder: 1 puff(s) inhaled once a day patient denies using it (11 Oct 2018 08:38)  Calcium 500: 1 tab(s) orally 2 times a day (11 Oct 2018 08:38)  docusate sodium 100 mg oral tablet: 1 tab(s) orally 2 times a day, As Needed (11 Oct 2018 08:39)  folic acid 1 mg oral tablet: 1 tab(s) orally once a day (11 Oct 2018 08:38)  Mag-Ox 400 oral tablet: 1 tab(s) orally once a day (11 Oct 2018 08:39)  Multiple Vitamins oral tablet: 1 tab(s) orally once a day  (30 Oct 2018 14:39)  Namenda 10 mg oral tablet: 1 tab(s) orally 2 times a day (11 Oct 2018 08:38)  Nephplex Rx oral tablet: 1 tab(s) orally once a day (11 Oct 2018 08:39)  nortriptyline 50 mg oral capsule: 1 cap(s) orally once a day (11 Oct 2018 08:39)  Renvela 800 mg oral tablet: 2 tab(s) orally every 8 hours (11 Oct 2018 08:39)  simethicone 80 mg oral tablet: 1 tab(s) orally 3 times a day (after meals) (11 Oct 2018 08:39)  Tylenol 325 mg oral tablet: 2 tab(s) orally every 6 hours, As Needed (30 Oct 2018 14:39)  vancomycin 1 g intravenous injection: 1 gram(s) intravenous 3 times a week  Please give 3 x per week with Dialysis until 2/3/2019 (22 Jan 2019 13:41)  vitamin A: 1 tab(s) orally once a day (11 Oct 2018 08:38)  Vitamin B Complex: 1 tab(s) orally once a day (11 Oct 2018 08:38)  Vitamin C 500 mg oral tablet: 1 tab(s) orally once a day (11 Oct 2018 08:38)      PAST MEDICAL & SURGICAL HISTORY:  Smoking hx  Cardiomyopathy  Wound: right chest  ICD (implantable cardioverter-defibrillator) in place  OA (osteoarthritis)  HLD (hyperlipidemia)  BPH (benign prostatic hyperplasia)  Pleural effusion  HTN (hypertension)  ESRD (end stage renal disease)  H/O chest wound: right  S/P thoracotomy: FB, R thoracotomy, decortication, chest wall reconstruction, lat flap  History of wound infection: right chest wall - revision 5/18 and again in 7/18  History of implantable cardioverter-defibrillator (ICD) placement: pt unsure when placed  H/O bilateral hip replacements: 2008, 2009        ICU Vital Signs Last 24 Hrs  T(C): 37.1 (17 Feb 2019 00:00), Max: 37.1 (17 Feb 2019 00:00)  T(F): 98.8 (17 Feb 2019 00:00), Max: 98.8 (17 Feb 2019 00:00)  HR: 110 (17 Feb 2019 06:43) (81 - 123)  BP: 103/64 (17 Feb 2019 05:00) (90/60 - 121/69)  BP(mean): 73 (17 Feb 2019 05:00) (64 - 82)  ABP: --  ABP(mean): --  RR: 20 (17 Feb 2019 05:00) (14 - 25)  SpO2: 97% (17 Feb 2019 06:43) (94% - 100%)    I&O's Detail    15 Feb 2019 07:01  -  16 Feb 2019 07:00  --------------------------------------------------------  IN:    Enteral Tube Flush: 80 mL    Nepro with Carb Steady: 455 mL    sodium chloride 0.9%.: 160 mL  Total IN: 695 mL    OUT:    Bulb: 20 mL  Total OUT: 20 mL    Total NET: 675 mL      16 Feb 2019 07:01  -  17 Feb 2019 06:47  --------------------------------------------------------  IN:    Enteral Tube Flush: 120 mL    Nepro with Carb Steady: 455 mL    Other: 500 mL    sodium chloride 0.9%.: 190 mL  Total IN: 1265 mL    OUT:    Bulb: 15 mL    Other: 2100 mL  Total OUT: 2115 mL    Total NET: -850 mL        CAPILLARY BLOOD GLUCOSE      POCT Blood Glucose.: 95 mg/dL (16 Feb 2019 18:43)      Home Medications:  aspirin 81 mg oral tablet: 1 tab(s) orally once a day  (30 Oct 2018 14:39)  Breo Ellipta 100 mcg-25 mcg/inh inhalation powder: 1 puff(s) inhaled once a day patient denies using it (11 Oct 2018 08:38)  Calcium 500: 1 tab(s) orally 2 times a day (11 Oct 2018 08:38)  docusate sodium 100 mg oral tablet: 1 tab(s) orally 2 times a day, As Needed (11 Oct 2018 08:39)  folic acid 1 mg oral tablet: 1 tab(s) orally once a day (11 Oct 2018 08:38)  Mag-Ox 400 oral tablet: 1 tab(s) orally once a day (11 Oct 2018 08:39)  Multiple Vitamins oral tablet: 1 tab(s) orally once a day  (30 Oct 2018 14:39)  Namenda 10 mg oral tablet: 1 tab(s) orally 2 times a day (11 Oct 2018 08:38)  Nephplex Rx oral tablet: 1 tab(s) orally once a day (11 Oct 2018 08:39)  nortriptyline 50 mg oral capsule: 1 cap(s) orally once a day (11 Oct 2018 08:39)  Renvela 800 mg oral tablet: 2 tab(s) orally every 8 hours (11 Oct 2018 08:39)  simethicone 80 mg oral tablet: 1 tab(s) orally 3 times a day (after meals) (11 Oct 2018 08:39)  Tylenol 325 mg oral tablet: 2 tab(s) orally every 6 hours, As Needed (30 Oct 2018 14:39)  vancomycin 1 g intravenous injection: 1 gram(s) intravenous 3 times a week  Please give 3 x per week with Dialysis until 2/3/2019 (22 Jan 2019 13:41)  vitamin A: 1 tab(s) orally once a day (11 Oct 2018 08:38)  Vitamin B Complex: 1 tab(s) orally once a day (11 Oct 2018 08:38)  Vitamin C 500 mg oral tablet: 1 tab(s) orally once a day (11 Oct 2018 08:38)      MEDICATIONS  (STANDING):  ALPRAZolam 0.5 milliGRAM(s) Oral every 8 hours  epoetin kelly Injectable 3000 Unit(s) IV Push <User Schedule>  heparin  Injectable 5000 Unit(s) SubCutaneous every 12 hours  ipratropium 17 MICROgram(s) HFA Inhaler 2 Puff(s) Inhalation every 6 hours  melatonin 6 milliGRAM(s) Oral at bedtime  midodrine 10 milliGRAM(s) Oral every 8 hours  pantoprazole  Injectable 40 milliGRAM(s) IV Push daily  piperacillin/tazobactam IVPB. 3.375 Gram(s) IV Intermittent every 12 hours  QUEtiapine 50 milliGRAM(s) Oral every 12 hours  sodium chloride 0.9%. 1000 milliLiter(s) (10 mL/Hr) IV Continuous <Continuous>    MEDICATIONS  (PRN):  ALBUTerol    90 MICROgram(s) HFA Inhaler 2 Puff(s) Inhalation every 6 hours PRN Shortness of Breath and/or Wheezing      Mode: standby      Physical exam:     General:               Pt awake / alert                                              Neuro:                 awake / alert, following commands                           Cardiovascular:    S1 & S2, regular                           Respiratory:         Air entry is fair, some conducted sounds                          GI:                          Soft and nontender, Bowel sounds active                            Ext:                        No cyanosis or edema                               Labs:                                                                           8.9    6.68  )-----------( 387      ( 16 Feb 2019 03:15 )             29.2             02-16    139  |  95<L>  |  31<H>  ----------------------------<  90  3.9   |  28  |  4.43<H>    Ca    9.3      16 Feb 2019 03:15  Phos  5.3     02-16  Mg     2.3     02-16    TPro  6.4  /  Alb  2.7<L>  /  TBili  0.3  /  DBili  x   /  AST  60<H>  /  ALT  23  /  AlkPhos  200<H>  02-16                    LIVER FUNCTIONS - ( 16 Feb 2019 03:15 )  Alb: 2.7 g/dL / Pro: 6.4 g/dL / ALK PHOS: 200 u/L / ALT: 23 u/L / AST: 60 u/L / GGT: x                   CXR:  < from: Xray Chest 1 View- PORTABLE-Routine (02.16.19 @ 06:54) >  Tracheostomy tube remains in place.    Stable lower left chest wall SICD.    Persistent small bilateral pleural reactions with left basilar   consolidative changes. Grossly clear upper lungs. No pneumothorax.    Stable prominent appearingcardiac and mediastinal silhouettes.    Right axillary region surgical clips again noted.        Plan:    General: Mr. Han is a 58M who underwent a FB, R thoracotomy, decortication, chest wall reconstruction, lat flap with Dr Pickering on 10/11/18. He was admitted on 1/6/19 to [facility] with a R pleural effusion and respiratory failure,  A R PTC was placed there and he was intubated.  He still had a R SANDY drain in place and he was transferred to [facility]. Patient was found to have aspirated a foreign body and +MRSA empyema. He was treated and discharged on 1/22/2019.     He was found by his home nurse to be in acute distress and recommended to proceed to the ED.  At presentation, he was found to be in acute hypercarbic respiratory failure complicated by hemodynamic instability requiring emergent intubation and pressor support. (28 Jan 2019 15:34)                            Neuro:                                         Pain control with Fentanyl PRN / Tylenol     Agitation /Anxiety: Continue Xanax and Seroquel.                            Cardiovascular:                                          Continue hemodynamic monitoring.    Hypotension:. Continue  Midodrine 10mg/TID.                                                   Respiratory:                        Pt is on full mechanical ventilator support AX--40-7 at night and tolerating  trach collar during the day                                         Trached on 1/31                                                                 Continue bronchodilators, pulmonary toilet     Continue antibiotics -  Vanco based on level + Zosyn                            GI                                         Nepro 35cc/hr. Flush PEG with 30cc sterile water Q4hrs                                         Continue GI prophylaxis with Protonix                                                                 Renal:                                         HD as per renal / TIW                                          Monitor I/Os and electrolytes                                                                                        Hem/ Onc:                                                                                 Follow CBC in AM                           Infectious disease:     Bronchoscopy on 1/29 showed copious greenish secretions - Continue pulmonary toilet                                         BAL - MRSA, Continue Vanco based on level  + Zosyn for 4 weeks. Blood cultures are negative     I.D f/u                                                                   Endocrine         Continue Accu-Checks with coverage      Pt is on SQ Heparin and Venodyne boots for DVT prophylaxis.     Pertinent clinical, laboratory, radiographic, hemodynamic, echocardiographic, respiratory data, microbiologic data and chart were reviewed and analyzed frequently throughout the course of the day and night  Patient seen, examined and plan discussed with CT Surgeon / CTICU team during rounds.    Discussed with wife , updated status    I have spent 40 minutes of critical care time with this patient between 00am and 9am            Ralph Warren MD

## 2019-02-18 LAB
ALBUMIN SERPL ELPH-MCNC: 2.7 G/DL — LOW (ref 3.3–5)
ALP SERPL-CCNC: 198 U/L — HIGH (ref 40–120)
ALT FLD-CCNC: 21 U/L — SIGNIFICANT CHANGE UP (ref 4–41)
ANION GAP SERPL CALC-SCNC: 16 MMO/L — HIGH (ref 7–14)
AST SERPL-CCNC: 43 U/L — HIGH (ref 4–40)
BILIRUB SERPL-MCNC: 0.3 MG/DL — SIGNIFICANT CHANGE UP (ref 0.2–1.2)
BUN SERPL-MCNC: 27 MG/DL — HIGH (ref 7–23)
CALCIUM SERPL-MCNC: 9.3 MG/DL — SIGNIFICANT CHANGE UP (ref 8.4–10.5)
CHLORIDE SERPL-SCNC: 97 MMOL/L — LOW (ref 98–107)
CO2 SERPL-SCNC: 28 MMOL/L — SIGNIFICANT CHANGE UP (ref 22–31)
CREAT SERPL-MCNC: 4.61 MG/DL — HIGH (ref 0.5–1.3)
GLUCOSE SERPL-MCNC: 96 MG/DL — SIGNIFICANT CHANGE UP (ref 70–99)
HCT VFR BLD CALC: 30.7 % — LOW (ref 39–50)
HGB BLD-MCNC: 9.2 G/DL — LOW (ref 13–17)
MAGNESIUM SERPL-MCNC: 2.3 MG/DL — SIGNIFICANT CHANGE UP (ref 1.6–2.6)
MCHC RBC-ENTMCNC: 30 % — LOW (ref 32–36)
MCHC RBC-ENTMCNC: 31.1 PG — SIGNIFICANT CHANGE UP (ref 27–34)
MCV RBC AUTO: 103.7 FL — HIGH (ref 80–100)
NRBC # FLD: 0 K/UL — LOW (ref 25–125)
PHOSPHATE SERPL-MCNC: 5.1 MG/DL — HIGH (ref 2.5–4.5)
PLATELET # BLD AUTO: 358 K/UL — SIGNIFICANT CHANGE UP (ref 150–400)
PMV BLD: 9.5 FL — SIGNIFICANT CHANGE UP (ref 7–13)
POTASSIUM SERPL-MCNC: 3.6 MMOL/L — SIGNIFICANT CHANGE UP (ref 3.5–5.3)
POTASSIUM SERPL-SCNC: 3.6 MMOL/L — SIGNIFICANT CHANGE UP (ref 3.5–5.3)
PROT SERPL-MCNC: 6.5 G/DL — SIGNIFICANT CHANGE UP (ref 6–8.3)
RBC # BLD: 2.96 M/UL — LOW (ref 4.2–5.8)
RBC # FLD: 17.3 % — HIGH (ref 10.3–14.5)
SODIUM SERPL-SCNC: 141 MMOL/L — SIGNIFICANT CHANGE UP (ref 135–145)
WBC # BLD: 6.22 K/UL — SIGNIFICANT CHANGE UP (ref 3.8–10.5)
WBC # FLD AUTO: 6.22 K/UL — SIGNIFICANT CHANGE UP (ref 3.8–10.5)

## 2019-02-18 PROCEDURE — 99292 CRITICAL CARE ADDL 30 MIN: CPT

## 2019-02-18 PROCEDURE — 99291 CRITICAL CARE FIRST HOUR: CPT

## 2019-02-18 RX ADMIN — PIPERACILLIN AND TAZOBACTAM 25 GRAM(S): 4; .5 INJECTION, POWDER, LYOPHILIZED, FOR SOLUTION INTRAVENOUS at 18:31

## 2019-02-18 RX ADMIN — MIDODRINE HYDROCHLORIDE 10 MILLIGRAM(S): 2.5 TABLET ORAL at 20:34

## 2019-02-18 RX ADMIN — PANTOPRAZOLE SODIUM 40 MILLIGRAM(S): 20 TABLET, DELAYED RELEASE ORAL at 13:12

## 2019-02-18 RX ADMIN — Medication 2 PUFF(S): at 04:10

## 2019-02-18 RX ADMIN — QUETIAPINE FUMARATE 50 MILLIGRAM(S): 200 TABLET, FILM COATED ORAL at 18:30

## 2019-02-18 RX ADMIN — MIDODRINE HYDROCHLORIDE 10 MILLIGRAM(S): 2.5 TABLET ORAL at 05:44

## 2019-02-18 RX ADMIN — MIDODRINE HYDROCHLORIDE 10 MILLIGRAM(S): 2.5 TABLET ORAL at 00:00

## 2019-02-18 RX ADMIN — Medication 0.5 MILLIGRAM(S): at 13:13

## 2019-02-18 RX ADMIN — Medication 2 PUFF(S): at 21:50

## 2019-02-18 RX ADMIN — QUETIAPINE FUMARATE 50 MILLIGRAM(S): 200 TABLET, FILM COATED ORAL at 05:44

## 2019-02-18 RX ADMIN — PIPERACILLIN AND TAZOBACTAM 25 GRAM(S): 4; .5 INJECTION, POWDER, LYOPHILIZED, FOR SOLUTION INTRAVENOUS at 05:45

## 2019-02-18 RX ADMIN — Medication 2 PUFF(S): at 16:02

## 2019-02-18 RX ADMIN — SODIUM CHLORIDE 10 MILLILITER(S): 9 INJECTION INTRAMUSCULAR; INTRAVENOUS; SUBCUTANEOUS at 20:34

## 2019-02-18 RX ADMIN — HEPARIN SODIUM 5000 UNIT(S): 5000 INJECTION INTRAVENOUS; SUBCUTANEOUS at 05:45

## 2019-02-18 RX ADMIN — Medication 0.5 MILLIGRAM(S): at 06:37

## 2019-02-18 RX ADMIN — MIDODRINE HYDROCHLORIDE 10 MILLIGRAM(S): 2.5 TABLET ORAL at 13:12

## 2019-02-18 RX ADMIN — HEPARIN SODIUM 5000 UNIT(S): 5000 INJECTION INTRAVENOUS; SUBCUTANEOUS at 18:31

## 2019-02-18 RX ADMIN — Medication 1 PACKET(S): at 15:32

## 2019-02-18 RX ADMIN — Medication 2 PUFF(S): at 09:35

## 2019-02-18 RX ADMIN — Medication 6 MILLIGRAM(S): at 00:00

## 2019-02-18 NOTE — PROGRESS NOTE ADULT - SUBJECTIVE AND OBJECTIVE BOX
CLAUDIA HAN   MRN#: [id number]     The patient is a 58y Male who was seen, evaluated, & examined with the CTICU staff post-operatively with a multidisciplinary care plan formulated & implemented.  All available clinical, laboratory, radiographic, pharmacologic, and electrocardiographic data were reviewed & analyzed.      The patient was in the CTICU in critical condition secondary to:     acute hypoxic respiratory failure-persistent cardiopulmonary dysfunction  sepsis    For support and evaluation & prevention of further decompensation secondary to persistent cardiopulmonary dysfunction, respiratory failure required:     supplemental oxygen with full ventilatory support / mechanical ventilation   continuous pulse oximetry monitoring  following ABGs with A-line monitoring    In addition:  Off pressors, very agitated on the vent despite multiple anxiolytics (Seroquel, Xanax)  Transitioned to trach collar and doing well - stays on trach collar all day and rests overnight without agitation  Will attempt to increase amount of time off of vent and see if patient can go 24 hours on trach collar  Hemodynamically stable on Midodrine 10 q8  Acute sepsis resolved, continue on Zosyn for Klebsiella and Vancomycin by level for MRSA; will change Zosyn to Cefepime on discharge    The patient required critical care management and I provided 65 minutes of non-continuous care to the patient in addition to  discussing the patient and plan at length with the CTICU staff and helping coordinate care.

## 2019-02-18 NOTE — PROGRESS NOTE ADULT - SUBJECTIVE AND OBJECTIVE BOX
CLAUDIA HAN          MRN-[id number]    HPI:  Mr. Han is a 58M who underwent a FB, R thoracotomy, decortication, chest wall reconstruction, lat flap with Dr Pickering on 10/11/18. He was admitted on 1/6/19 to [facility] with a R pleural effusion and respiratory failure,  A R PTC was placed there and he was intubated.  He still had a R SANDY drain in place and he was transferred to [facility]. Patient was found to have aspirated a foreign body and +MRSA empyema. He was treated and discharged on 1/22/2019.     He was found by his home nurse to be in acute distress and recommended to proceed to the ED.  At presentation, he was found to be in acute hypercarbic respiratory failure complicated by hemodynamic instability requiring emergent intubation and pressor support. (28 Jan 2019 15:34)      Procedure:  POD # :     Issues:        Interval/Overnight OR Events/ ROS  Pt extubated in the OR after surgery. On arrival in ICU still drowsy - but easily arousable to following commands; denies SOB, no nausea, no vomiting  Respiratory status required full ventilatory support,  following of ABG’s with A-line monitoring, continuous pulse oximetry monitoring, & an IV Propofol infusion for support & to evaluate for & prevent further decompensation secondary to persistent cardiopulmonary dysfunction.     Pt remained hemodynamically stable overnight, not on any pressors or inotropes. OOB to chair, breathing comfortably with minimal pain. Ambulated several times . Denies pain, no SOB, no palpitations, no nausea/ no vomiting, no dizziness  A-line and sharma d/kirsten       PAST MEDICAL & SURGICAL HISTORY:  Smoking hx  Cardiomyopathy  Wound: right chest  ICD (implantable cardioverter-defibrillator) in place  OA (osteoarthritis)  HLD (hyperlipidemia)  BPH (benign prostatic hyperplasia)  Pleural effusion  HTN (hypertension)  ESRD (end stage renal disease)  H/O chest wound: right  S/P thoracotomy: FB, R thoracotomy, decortication, chest wall reconstruction, lat flap  History of wound infection: right chest wall - revision 5/18 and again in 7/18  History of implantable cardioverter-defibrillator (ICD) placement: pt unsure when placed  H/O bilateral hip replacements: 2008, 2009    Allergies    No Known Allergies    Intolerances      Home Medications:  aspirin 81 mg oral tablet: 1 tab(s) orally once a day  (30 Oct 2018 14:39)  Breo Ellipta 100 mcg-25 mcg/inh inhalation powder: 1 puff(s) inhaled once a day patient denies using it (11 Oct 2018 08:38)  Calcium 500: 1 tab(s) orally 2 times a day (11 Oct 2018 08:38)  docusate sodium 100 mg oral tablet: 1 tab(s) orally 2 times a day, As Needed (11 Oct 2018 08:39)  folic acid 1 mg oral tablet: 1 tab(s) orally once a day (11 Oct 2018 08:38)  Mag-Ox 400 oral tablet: 1 tab(s) orally once a day (11 Oct 2018 08:39)  Multiple Vitamins oral tablet: 1 tab(s) orally once a day  (30 Oct 2018 14:39)  Namenda 10 mg oral tablet: 1 tab(s) orally 2 times a day (11 Oct 2018 08:38)  Nephplex Rx oral tablet: 1 tab(s) orally once a day (11 Oct 2018 08:39)  nortriptyline 50 mg oral capsule: 1 cap(s) orally once a day (11 Oct 2018 08:39)  Renvela 800 mg oral tablet: 2 tab(s) orally every 8 hours (11 Oct 2018 08:39)  simethicone 80 mg oral tablet: 1 tab(s) orally 3 times a day (after meals) (11 Oct 2018 08:39)  Tylenol 325 mg oral tablet: 2 tab(s) orally every 6 hours, As Needed (30 Oct 2018 14:39)  vancomycin 1 g intravenous injection: 1 gram(s) intravenous 3 times a week  Please give 3 x per week with Dialysis until 2/3/2019 (22 Jan 2019 13:41)  vitamin A: 1 tab(s) orally once a day (11 Oct 2018 08:38)  Vitamin B Complex: 1 tab(s) orally once a day (11 Oct 2018 08:38)  Vitamin C 500 mg oral tablet: 1 tab(s) orally once a day (11 Oct 2018 08:38)        ***VITAL SIGNS:  Vital Signs Last 24 Hrs  T(C): 36.9 (18 Feb 2019 03:00), Max: 36.9 (17 Feb 2019 23:00)  T(F): 98.4 (18 Feb 2019 03:00), Max: 98.4 (17 Feb 2019 23:00)  HR: 92 (18 Feb 2019 04:13) (92 - 135)  BP: 101/65 (18 Feb 2019 03:00) (81/49 - 146/105)  BP(mean): 72 (18 Feb 2019 03:00) (51 - 114)  RR: 23 (18 Feb 2019 03:00) (17 - 33)  SpO2: 100% (18 Feb 2019 04:13) (92% - 100%)    I/Os:   I&O's Detail    16 Feb 2019 07:01  -  17 Feb 2019 07:00  --------------------------------------------------------  IN:    Enteral Tube Flush: 120 mL    Nepro with Carb Steady: 490 mL    Other: 500 mL    sodium chloride 0.9%.: 200 mL  Total IN: 1310 mL    OUT:    Bulb: 15 mL    Other: 2100 mL  Total OUT: 2115 mL    Total NET: -805 mL      17 Feb 2019 07:01  -  18 Feb 2019 04:30  --------------------------------------------------------  IN:    Enteral Tube Flush: 120 mL    IV PiggyBack: 100 mL    Nepro with Carb Steady: 855 mL    sodium chloride 0.9%.: 210 mL  Total IN: 1285 mL    OUT:    Bulb: 5 mL  Total OUT: 5 mL    Total NET: 1280 mL          CAPILLARY BLOOD GLUCOSE          =======================  MEDICATIONS  ===================  MEDICATIONS  (STANDING):  ALPRAZolam 0.5 milliGRAM(s) Oral every 8 hours  epoetin kelly Injectable 3000 Unit(s) IV Push <User Schedule>  heparin  Injectable 5000 Unit(s) SubCutaneous every 12 hours  ipratropium 17 MICROgram(s) HFA Inhaler 2 Puff(s) Inhalation every 6 hours  melatonin 6 milliGRAM(s) Oral at bedtime  midodrine 10 milliGRAM(s) Oral every 8 hours  pantoprazole  Injectable 40 milliGRAM(s) IV Push daily  piperacillin/tazobactam IVPB. 3.375 Gram(s) IV Intermittent every 12 hours  psyllium Powder 1 Packet(s) Oral daily  QUEtiapine 50 milliGRAM(s) Oral every 12 hours  sodium chloride 0.9%. 1000 milliLiter(s) (10 mL/Hr) IV Continuous <Continuous>    MEDICATIONS  (PRN):  ALBUTerol    90 MICROgram(s) HFA Inhaler 2 Puff(s) Inhalation every 6 hours PRN Shortness of Breath and/or Wheezing      ======================VENTILATOR SETTINGS  ==============  Mode: AC/ CMV (Assist Control/ Continuous Mandatory Ventilation)  RR (machine): 22  TV (machine): 400  FiO2: 40  PEEP: 5  MAP: 11  PIP: 28      =================== PATIENT CARE ACCESS DEVICES ==========  Peripheral IV  Central Venous Line	R	L	IJ	Fem	SC	Placed:   Arterial Line	R	L	PT	DP	Fem	Rad	Ax	Placed:   Midline:				  Urinary Catheter, Date Placed:   Necessity of urinary, arterial, and venous catheters discussed  ======================= PHYSICAL EXAM===================  General:          Comfortable, Awake, alert, no distress  Neuro:             Moving all extremities to commands. No focal deficits	  HEENT:                           CHER/ ETT/ NGT/ trach  Respiratory:	Lungs clear on auscultation bilaterally with good aeration.                            No rales, rhonchi, no wheezing. Effort even and unlabored.  CV:		         Regular rate and rhythm. Normal S1/S2. No murmurs  Abdomen:	Soft,  nontender, not-distended. Bowel sounds present / absent.   Skin:		No rash.  Extremities:	Warm, no cyanosis or edema.  Palpable pulses  ============================ LABS =======================                  BLOOD  02-05-19 --  --  --      ENDOTRACHEAL SPECIMEN  02-01-19 --  --  Staph. aureus *MRSA*  Klebsiella pneumoniae      BLOOD PERIPHERAL  02-01-19 --  --  BLOOD CULTURE PCR  Staphylococcus sp.,coag neg      BLOOD VENOUS  01-29-19 --  --  --      LUNG - UPPER LOBE LEFT  01-29-19 --  --  --      BLOOD PERIPHERAL  01-28-19 --  --  --          ===================== IMAGING STUDIES ===================  Radiology personally reviewed.    ====================ASSESSMENT AND PLAN ================      ====================== NEUROLOGY=======================  Pain control with PCA / PCEA / Tylenol IV / Toradol / Percocet  Pt is on Precedex for agitation  Pt is sedated with Propofol / Fentanyl  ==================== RESPIRATORY========================  Pt is on       L nasal canula / Face tent____% FiO2/ full mech vent support  Comfortable, no evidence of distress.  Using incentive spirometry & doing                ml  Monitor chest tube output  Chest tube to suction / water seal	  Mechanical Ventilation:  Mode: AC/ CMV (Assist Control/ Continuous Mandatory Ventilation)  RR (machine): 22  TV (machine): 400  FiO2: 40  PEEP: 5  MAP: 11  PIP: 28    Mechanical ventilator status assessed & settings reviewed  Continuous pulse oximetry monitoring  Continue bronchodilators, pulmonary toilet  Head of bed elevation to 30-40 degrees  ====================CARDIOVASCULAR=====================  Continue hemodynamic monitoring/ telemetry  Not on any pressors/ titrate pressors for SBP>100, MAP >60-65  Continue cardiovascular / antihypertensive medications  ===================== RENAL ============================  Continue LR 30CC/hr      D/C IVF  Monitor I/Os, BUN/ Cr  and electrolytes  D/C Sharma      Keep Sharma for UO monitoring  BPH: Continue Flomax/ Finasteride  ==================== GASTROINTESTINAL===================  On regular/ tube feeds diet, tolerating well  Continue GI prophylaxis with Pepcid / Protonix  Continue Zofran / Reglan for nausea - PRN	  NPO  =======================    ENDOCRIN  =====================  Glycemic monitoring  F/S with coverage  ===================HEMATOLOGIC/ONCOLOGIC =============  Monitor chest tube output. No signs of active bleeding.   Follow CBC, coags  in AM  DVT prophylaxis with SCD, sc Heparin  ========================INFECTIOUS DISEASE===============  No signs of infection. Monitor for fever / leukocytosis.  All surgical incision / chest tube  sites look clean  D/C Sharma    Pertinent clinical, laboratory, radiographic, hemodynamic, echocardiographic, respiratory data, microbiologic data and chart were reviewed and analyzed frequently throughout the course of the day and night. GI and DVT prophylaxis, glycemic control, head of bed elevation and skin care issues were addressed.  Patient seen, examined and plan discussed with CT Surgery / CTICU team during rounds.  Pt remains critically ill in imminent risk of  deterioration and requires very careful cardio- pulmonary monitoring and support.    I have spent        minutes of critical care time with this pt between      am/pm   and        am/ pm         minutes spent on total encounter; more than 50% of the visit was spent counseling and/or coordinating care by the attending physician.      KERLINE Faith MD CLAUDIA HAN          MRN-[id number]    [facility]  Mr. Han is a 58M who underwent a FB, R thoracotomy, decortication, chest wall reconstruction, lat flap with Dr Pickering on 10/11/18. He was admitted on 1/6/19 to [facility] with a R pleural effusion and respiratory failure,  A R PTC was placed there and he was intubated.  He still had a R SANDY drain in place and he was transferred to [facility]. Patient was found to have aspirated a foreign body and +MRSA empyema. He was treated and discharged on 1/22/2019.     He was found by his home nurse to be in acute distress and recommended to proceed to the ED.  At presentation, he was found to be in acute hypercarbic respiratory failure complicated by hemodynamic instability requiring emergent intubation and pressor support. (28 Jan 2019 15:34)      Procedure:        Trach and PEG  1/31/2019  Bronchoscopy   1/29/2019  Right thoracotomy, resection of portion of R 7th rib, evacuation of infected hemothorax, takedown of pleurocutaneous fistula  10/11/2018      Issues:  Respiratory failure s/p Trach and PEG  Hypotension on Midodrine  ESRD / HD  Cardiomyopathy  HLD  BPH  Anxiety / Agitation      Interval/Overnight  Events/ ROS   OOB to chair yesterday on trach collar, breathing comfortably with minimal pain.  Overnight placed back on vent to rest. Denies pain, no SOB, no palpitations, no nausea/ no vomiting, no dizziness  HD due tomorrow      PAST MEDICAL & SURGICAL HISTORY:  Smoking hx  Cardiomyopathy  Wound: right chest  ICD (implantable cardioverter-defibrillator) in place  OA (osteoarthritis)  HLD (hyperlipidemia)  BPH (benign prostatic hyperplasia)  Pleural effusion  HTN (hypertension)  ESRD (end stage renal disease)  H/O chest wound: right  S/P thoracotomy: FB, R thoracotomy, decortication, chest wall reconstruction, lat flap  History of wound infection: right chest wall - revision 5/18 and again in 7/18  History of implantable cardioverter-defibrillator (ICD) placement: pt unsure when placed  H/O bilateral hip replacements: 2008, 2009    Allergies  No Known Allergies      Home Medications:  aspirin 81 mg oral tablet: 1 tab(s) orally once a day  (30 Oct 2018 14:39)  Breo Ellipta 100 mcg-25 mcg/inh inhalation powder: 1 puff(s) inhaled once a day patient denies using it (11 Oct 2018 08:38)  Calcium 500: 1 tab(s) orally 2 times a day (11 Oct 2018 08:38)  docusate sodium 100 mg oral tablet: 1 tab(s) orally 2 times a day, As Needed (11 Oct 2018 08:39)  folic acid 1 mg oral tablet: 1 tab(s) orally once a day (11 Oct 2018 08:38)  Mag-Ox 400 oral tablet: 1 tab(s) orally once a day (11 Oct 2018 08:39)  Multiple Vitamins oral tablet: 1 tab(s) orally once a day  (30 Oct 2018 14:39)  Namenda 10 mg oral tablet: 1 tab(s) orally 2 times a day (11 Oct 2018 08:38)  Nephplex Rx oral tablet: 1 tab(s) orally once a day (11 Oct 2018 08:39)  nortriptyline 50 mg oral capsule: 1 cap(s) orally once a day (11 Oct 2018 08:39)  Renvela 800 mg oral tablet: 2 tab(s) orally every 8 hours (11 Oct 2018 08:39)  simethicone 80 mg oral tablet: 1 tab(s) orally 3 times a day (after meals) (11 Oct 2018 08:39)  Tylenol 325 mg oral tablet: 2 tab(s) orally every 6 hours, As Needed (30 Oct 2018 14:39)  vancomycin 1 g intravenous injection: 1 gram(s) intravenous 3 times a week  Please give 3 x per week with Dialysis until 2/3/2019 (22 Jan 2019 13:41)  vitamin A: 1 tab(s) orally once a day (11 Oct 2018 08:38)  Vitamin B Complex: 1 tab(s) orally once a day (11 Oct 2018 08:38)  Vitamin C 500 mg oral tablet: 1 tab(s) orally once a day (11 Oct 2018 08:38)      ***VITAL SIGNS:  Vital Signs Last 24 Hrs  T(C): 36.9 (18 Feb 2019 03:00), Max: 36.9 (17 Feb 2019 23:00)  T(F): 98.4 (18 Feb 2019 03:00), Max: 98.4 (17 Feb 2019 23:00)  HR: 92 (18 Feb 2019 04:13) (92 - 135)  BP: 101/65 (18 Feb 2019 03:00) (81/49 - 146/105)  BP(mean): 72 (18 Feb 2019 03:00) (51 - 114)  RR: 23 (18 Feb 2019 03:00) (17 - 33)  SpO2: 100% (18 Feb 2019 04:13) (92% - 100%)    I/Os:  16 Feb 2019 07:01  -  17 Feb 2019 07:00  --------------------------------------------------------  IN:    Enteral Tube Flush: 120 mL    Nepro with Carb Steady: 490 mL    Other: 500 mL    sodium chloride 0.9%.: 200 mL  Total IN: 1310 mL    OUT:    Bulb: 15 mL    Other: 2100 mL  Total OUT: 2115 mL    Total NET: -805 mL      17 Feb 2019 07:01  -  18 Feb 2019 04:30  --------------------------------------------------------  IN:    Enteral Tube Flush: 120 mL    IV PiggyBack: 100 mL    Nepro with Carb Steady: 855 mL    sodium chloride 0.9%.: 210 mL  Total IN: 1285 mL    OUT:    Bulb: 5 mL  Total OUT: 5 mL    Total NET: 1280 mL  =======================  MEDICATIONS  ===================  MEDICATIONS  (STANDING):  ALPRAZolam 0.5 milliGRAM(s) Oral every 8 hours  epoetin kelly Injectable 3000 Unit(s) IV Push <User Schedule>  heparin  Injectable 5000 Unit(s) SubCutaneous every 12 hours  ipratropium 17 MICROgram(s) HFA Inhaler 2 Puff(s) Inhalation every 6 hours  melatonin 6 milliGRAM(s) Oral at bedtime  midodrine 10 milliGRAM(s) Oral every 8 hours  pantoprazole  Injectable 40 milliGRAM(s) IV Push daily  piperacillin/tazobactam IVPB. 3.375 Gram(s) IV Intermittent every 12 hours  psyllium Powder 1 Packet(s) Oral daily  QUEtiapine 50 milliGRAM(s) Oral every 12 hours  sodium chloride 0.9%. 1000 milliLiter(s) (10 mL/Hr) IV Continuous <Continuous>    MEDICATIONS  (PRN):  ALBUTerol    90 MICROgram(s) HFA Inhaler 2 Puff(s) Inhalation every 6 hours PRN Shortness of Breath and/or Wheezing    ======================VENTILATOR SETTINGS  ==============  Mode: AC/ CMV (Assist Control/ Continuous Mandatory Ventilation)  RR (machine): 22  TV (machine): 400  FiO2: 40  PEEP: 5  MAP: 11  PIP: 28    ======================= PHYSICAL EXAM===================  General:          Comfortable, Awake, alert, no distress  Neuro:             Moving all extremities to commands. No focal deficits	  HEENT:                           CHER/  trach  Respiratory:	Lungs clear on auscultation bilaterally with slightly decreased basal aeration.                            No rales, rhonchi, no wheezing. Effort even and unlabored.  CV:		  Regular rate and rhythm.(+) S1/S2.    Abdomen:	Soft,  nontender, not-distended. Bowel sounds present    Skin:		No rash.  Extremities:	Warm, no cyanosis or edema.  Palpable pulses     ============================ LABS =======================    BLOOD  02-05-19 --  --  --      ENDOTRACHEAL SPECIMEN  02-01-19 --  --  Staph. aureus *MRSA*  Klebsiella pneumoniae      BLOOD PERIPHERAL  02-01-19 --  --  BLOOD CULTURE PCR  Staphylococcus sp.,coag neg      BLOOD VENOUS  01-29-19 --  --  --      LUNG - UPPER LOBE LEFT  01-29-19 --  --  --      BLOOD PERIPHERAL  01-28-19 --  --  --          ===================== IMAGING STUDIES ===================  Radiology personally reviewed.    ====================ASSESSMENT AND PLAN ================      ====================== NEUROLOGY=======================  Pain control with PCA / PCEA / Tylenol IV / Toradol / Percocet  Pt is on Precedex for agitation  Pt is sedated with Propofol / Fentanyl  ==================== RESPIRATORY========================  Pt is on       L nasal canula / Face tent____% FiO2/ full mech vent support  Comfortable, no evidence of distress.  Using incentive spirometry & doing                ml  Monitor chest tube output  Chest tube to suction / water seal	  Mechanical Ventilation:  Mode: AC/ CMV (Assist Control/ Continuous Mandatory Ventilation)  RR (machine): 22  TV (machine): 400  FiO2: 40  PEEP: 5  MAP: 11  PIP: 28    Mechanical ventilator status assessed & settings reviewed  Continuous pulse oximetry monitoring  Continue bronchodilators, pulmonary toilet  Head of bed elevation to 30-40 degrees  ====================CARDIOVASCULAR=====================  Continue hemodynamic monitoring/ telemetry  Not on any pressors/ titrate pressors for SBP>100, MAP >60-65  Continue cardiovascular / antihypertensive medications  ===================== RENAL ============================  Continue LR 30CC/hr      D/C IVF  Monitor I/Os, BUN/ Cr  and electrolytes  D/C Moore      Keep Moore for UO monitoring  BPH: Continue Flomax/ Finasteride  ==================== GASTROINTESTINAL===================  On regular/ tube feeds diet, tolerating well  Continue GI prophylaxis with Pepcid / Protonix  Continue Zofran / Reglan for nausea - PRN	  NPO  =======================    ENDOCRIN  =====================  Glycemic monitoring  F/S with coverage  ===================HEMATOLOGIC/ONCOLOGIC =============  Monitor chest tube output. No signs of active bleeding.   Follow CBC, coags  in AM  DVT prophylaxis with SCD, sc Heparin  ========================INFECTIOUS DISEASE===============  No signs of infection. Monitor for fever / leukocytosis.  All surgical incision / chest tube  sites look clean  D/C Moore    Pertinent clinical, laboratory, radiographic, hemodynamic, echocardiographic, respiratory data, microbiologic data and chart were reviewed and analyzed frequently throughout the course of the day and night. GI and DVT prophylaxis, glycemic control, head of bed elevation and skin care issues were addressed.  Patient seen, examined and plan discussed with CT Surgery / CTICU team during rounds.  Pt remains critically ill in imminent risk of  deterioration and requires very careful cardio- pulmonary monitoring and support.    I have spent        minutes of critical care time with this pt between      am/pm   and        am/ pm         minutes spent on total encounter; more than 50% of the visit was spent counseling and/or coordinating care by the attending physician.      KERLINE Faith MD                    ============================ LABS =======================                          8.9    6.68  )-----------( 387      ( 16 Feb 2019 03:15 )             29.2                       8.9    7.88  )-----------( 370      ( 15 Feb 2019 05:30 )             30.1     02-16    139  |  95<L>  |  31<H>  ----------------------------<  90  3.9   |  28        |  4.43<H>    Ca    9.3      16 Feb 2019 03:15  Phos  5.3     02-16  Mg     2.3     02-16    TPro  6.4  /  Alb  2.7<L>  /  TBili  0.3  /  DBili  x   /  AST  60<H>  /  ALT  23  /  AlkPhos  200<H>  02-16    02-15    137  |  96<L>  |  24<H>  ----------------------------<  102<H>  3.9   |  28         3.52<H>    Ca    9.2      15 Feb 2019 05:30  Phos  3.9     02-15  Mg     2.2     02-15    TPro  6.3  /  Alb  2.5<L>  /  TBili  0.3  /  DBili  x   /  AST  67<H>  /  ALT  32  /  AlkPhos  249<H>  02-14    LIVER FUNCTIONS - ( 14 Feb 2019 03:50 )  Alb: 2.5 g/dL / Pro: 6.3 g/dL / ALK PHOS: 249 u/L / ALT: 32 u/L / AST: 67 u/L / GGT: x           BLOOD  02-05-19 --  --  --    ENDOTRACHEAL SPECIMEN  02-01-19 --  --  Staph. aureus *MRSA*  Klebsiella pneumoniae      BLOOD PERIPHERAL  02-01-19 --  --  BLOOD CULTURE PCR  Staphylococcus sp.,coag neg  BLOOD VENOUS  01-29-19 --  --  --  LUNG - UPPER LOBE LEFT  01-29-19 --  --  --  BLOOD PERIPHERAL  01-28-19 --  --  --  ===================== IMAGING STUDIES ===================  Radiology personally reviewed.  < from: Xray Chest 1 View- PORTABLE-Routine (02.14.19 @ 06:37) >    INTERPRETATION:     Tracheostomy tube and subcutaneous AICD are unchanged. Bilateral small   loculated pleural effusions with underlying atelectasis. Remainder of   both lungs are clear. Heart size is stable.    COMPARISON:  February 13    IMPRESSION:  Bilateral small, loculated effusions unchanged with   tracheostomy tube in position.  < end of copied text >  ====================ASSESSMENT AND PLAN ================  Mr. Han is a 58M who underwent a FB, R thoracotomy, decortication, chest wall reconstruction, lat flap with Dr Pickering on 10/11/18. He was admitted on 1/6/19 to [facility] with a R pleural effusion and respiratory failure,  A R PTC was placed there and he was intubated.  He still had a R SANDY drain in place and he was transferred to [facility]. Patient was found to have aspirated a foreign body and +MRSA empyema. He was treated and discharged on 1/22/2019.   He was found by his home nurse to be in acute distress and recommended to proceed to the ED.  At presentation, he was found to be in acute hypercarbic respiratory failure complicated by hemodynamic instability requiring emergent intubation and pressor support. (28 Jan 2019 15:34)    Procedure:        Trach and PEG  1/31/2019  Bronchoscopy   1/29/2019  Right thoracotomy, resection of portion of R 7th rib, evacuation of infected hemothorax, takedown of pleurocutaneous fistula  10/11/2018      Issues:  Respiratory failure s/p Trach and PEG  Hypotension on Midodrine  ESRD / HD  Cardiomyopathy  HLD  BPH             Anxiety / intermittent Agitation  ====================== NEUROLOGY=======================  Pain control with Tylenol IV / Toradol / Fentanyl PRN    Agitation /Anxiety: Continue Xanax and Seroquel.  ==================== RESPIRATORY========================  Pt is on full mech vent support at night, trach collar daytime as tolerated  Comfortable, no evidence of distress.	  Mechanical Ventilation:  Mode: AC/ CMV (Assist Control/ Continuous Mandatory Ventilation)  RR (machine): 22  TV (machine): 400  FiO2: 40  PEEP: 5  MAP: 11  PIP: 26    Mechanical ventilator status assessed & settings reviewed  Continuous pulse oximetry monitoring  Continue bronchodilators, pulmonary toilet  Head of bed elevation to 30-40 degrees  ====================CARDIOVASCULAR=====================  Continue hemodynamic monitoring/ telemetry  Hypotension on Midodrine 10mg/TID  ===================== RENAL ============================  Monitor I/Os, BUN/ Cr  and electrolytes   HD as per renal / TIW                                  ==================== GASTROINTESTINAL===================  On  tube feeds Nepro 35 ml/hr , tolerating well  Continue GI prophylaxis with  Protonix   Zofran / Reglan for nausea - PRN	  NPO   Flush PEG with 30cc sterile water Q4hrs                                   =======================    ENDOCRIN  =====================  Glycemic monitoring  F/S with coverage  ===================HEMATOLOGIC/ONCOLOGIC =============    No signs of active bleeding.   Follow CBC, coags  in AM  DVT prophylaxis with SCD, sc Heparin  ========================INFECTIOUS DISEASE===============  Monitor for fever / leukocytosis.  All surgical incision / chest tube  sites look clean   Bronchoscopy on 1/29 showed copious greenish secretions - Continue pulmonary toilet    BAL - MRSA, Continue Vanco based on level  + Zosyn for 4 weeks ( until 2/24/19). Blood cultures are negative    I.D f/u    Pertinent clinical, laboratory, radiographic, hemodynamic, echocardiographic, respiratory data, microbiologic data and chart were reviewed and analyzed frequently throughout the course of the day and night. GI and DVT prophylaxis, glycemic control, head of bed elevation and skin care issues were addressed.  Patient seen, examined and plan discussed with CT Surgery / CTICU team during rounds.  Pt remains critically ill in imminent risk of  deterioration and requires very careful cardio- pulmonary monitoring and support.    I have spent   45     minutes of critical care time with this pt between  12    am  and   9     am         minutes spent on total encounter; more than 50% of the visit was spent counseling and/or coordinating care by the attending physician.      KERLINE Faith MD CLAUDIA HAN          MRN-[id number]    [facility]  Mr. Han is a 58M who underwent a FB, R thoracotomy, decortication, chest wall reconstruction, lat flap with Dr Pickering on 10/11/18. He was admitted on 1/6/19 to [facility] with a R pleural effusion and respiratory failure,  A R PTC was placed there and he was intubated.  He still had a R SANDY drain in place and he was transferred to [facility]. Patient was found to have aspirated a foreign body and +MRSA empyema. He was treated and discharged on 1/22/2019.     He was found by his home nurse to be in acute distress and recommended to proceed to the ED.  At presentation, he was found to be in acute hypercarbic respiratory failure complicated by hemodynamic instability requiring emergent intubation and pressor support. (28 Jan 2019 15:34)      Procedure:        Trach and PEG  1/31/2019  Bronchoscopy   1/29/2019  Right thoracotomy, resection of portion of R 7th rib, evacuation of infected hemothorax, takedown of pleurocutaneous fistula  10/11/2018      Issues:  Respiratory failure s/p Trach and PEG  Hypotension on Midodrine  ESRD / HD  Cardiomyopathy  HLD  BPH  Anxiety / Agitation      Interval/Overnight  Events/ ROS   OOB to chair yesterday on trach collar, breathing comfortably with minimal pain.  Overnight placed back on vent to rest. Denies pain, no SOB, no palpitations, no nausea/ no vomiting, no dizziness  HD due tomorrow      PAST MEDICAL & SURGICAL HISTORY:  Smoking hx  Cardiomyopathy  Wound: right chest  ICD (implantable cardioverter-defibrillator) in place  OA (osteoarthritis)  HLD (hyperlipidemia)  BPH (benign prostatic hyperplasia)  Pleural effusion  HTN (hypertension)  ESRD (end stage renal disease)  H/O chest wound: right  S/P thoracotomy: FB, R thoracotomy, decortication, chest wall reconstruction, lat flap  History of wound infection: right chest wall - revision 5/18 and again in 7/18  History of implantable cardioverter-defibrillator (ICD) placement: pt unsure when placed  H/O bilateral hip replacements: 2008, 2009    Allergies  No Known Allergies      Home Medications:  aspirin 81 mg oral tablet: 1 tab(s) orally once a day  (30 Oct 2018 14:39)  Breo Ellipta 100 mcg-25 mcg/inh inhalation powder: 1 puff(s) inhaled once a day patient denies using it (11 Oct 2018 08:38)  Calcium 500: 1 tab(s) orally 2 times a day (11 Oct 2018 08:38)  docusate sodium 100 mg oral tablet: 1 tab(s) orally 2 times a day, As Needed (11 Oct 2018 08:39)  folic acid 1 mg oral tablet: 1 tab(s) orally once a day (11 Oct 2018 08:38)  Mag-Ox 400 oral tablet: 1 tab(s) orally once a day (11 Oct 2018 08:39)  Multiple Vitamins oral tablet: 1 tab(s) orally once a day  (30 Oct 2018 14:39)  Namenda 10 mg oral tablet: 1 tab(s) orally 2 times a day (11 Oct 2018 08:38)  Nephplex Rx oral tablet: 1 tab(s) orally once a day (11 Oct 2018 08:39)  nortriptyline 50 mg oral capsule: 1 cap(s) orally once a day (11 Oct 2018 08:39)  Renvela 800 mg oral tablet: 2 tab(s) orally every 8 hours (11 Oct 2018 08:39)  simethicone 80 mg oral tablet: 1 tab(s) orally 3 times a day (after meals) (11 Oct 2018 08:39)  Tylenol 325 mg oral tablet: 2 tab(s) orally every 6 hours, As Needed (30 Oct 2018 14:39)  vancomycin 1 g intravenous injection: 1 gram(s) intravenous 3 times a week  Please give 3 x per week with Dialysis until 2/3/2019 (22 Jan 2019 13:41)  vitamin A: 1 tab(s) orally once a day (11 Oct 2018 08:38)  Vitamin B Complex: 1 tab(s) orally once a day (11 Oct 2018 08:38)  Vitamin C 500 mg oral tablet: 1 tab(s) orally once a day (11 Oct 2018 08:38)      ***VITAL SIGNS:  Vital Signs Last 24 Hrs  T(C): 36.9 (18 Feb 2019 03:00), Max: 36.9 (17 Feb 2019 23:00)  T(F): 98.4 (18 Feb 2019 03:00), Max: 98.4 (17 Feb 2019 23:00)  HR: 92 (18 Feb 2019 04:13) (92 - 135)  BP: 101/65 (18 Feb 2019 03:00) (81/49 - 146/105)  BP(mean): 72 (18 Feb 2019 03:00) (51 - 114)  RR: 23 (18 Feb 2019 03:00) (17 - 33)  SpO2: 100% (18 Feb 2019 04:13) (92% - 100%)    I/Os:  16 Feb 2019 07:01  -  17 Feb 2019 07:00  --------------------------------------------------------  IN:    Enteral Tube Flush: 120 mL    Nepro with Carb Steady: 490 mL    Other: 500 mL    sodium chloride 0.9%.: 200 mL  Total IN: 1310 mL    OUT:    Bulb: 15 mL    Other: 2100 mL  Total OUT: 2115 mL    Total NET: -805 mL      17 Feb 2019 07:01  -  18 Feb 2019 04:30  --------------------------------------------------------  IN:    Enteral Tube Flush: 120 mL    IV PiggyBack: 100 mL    Nepro with Carb Steady: 855 mL    sodium chloride 0.9%.: 210 mL  Total IN: 1285 mL    OUT:    Bulb: 5 mL  Total OUT: 5 mL    Total NET: 1280 mL  =======================  MEDICATIONS  ===================  MEDICATIONS  (STANDING):  ALPRAZolam 0.5 milliGRAM(s) Oral every 8 hours  epoetin kelly Injectable 3000 Unit(s) IV Push <User Schedule>  heparin  Injectable 5000 Unit(s) SubCutaneous every 12 hours  ipratropium 17 MICROgram(s) HFA Inhaler 2 Puff(s) Inhalation every 6 hours  melatonin 6 milliGRAM(s) Oral at bedtime  midodrine 10 milliGRAM(s) Oral every 8 hours  pantoprazole  Injectable 40 milliGRAM(s) IV Push daily  piperacillin/tazobactam IVPB. 3.375 Gram(s) IV Intermittent every 12 hours  psyllium Powder 1 Packet(s) Oral daily  QUEtiapine 50 milliGRAM(s) Oral every 12 hours  sodium chloride 0.9%. 1000 milliLiter(s) (10 mL/Hr) IV Continuous <Continuous>    MEDICATIONS  (PRN):  ALBUTerol    90 MICROgram(s) HFA Inhaler 2 Puff(s) Inhalation every 6 hours PRN Shortness of Breath and/or Wheezing    ======================VENTILATOR SETTINGS  ==============  Mode: AC/ CMV (Assist Control/ Continuous Mandatory Ventilation)  RR (machine): 22  TV (machine): 400  FiO2: 40  PEEP: 5  MAP: 11  PIP: 28    ======================= PHYSICAL EXAM===================  General:          Comfortable, Awake, alert, no distress  Neuro:             Moving all extremities to commands. No focal deficits	  HEENT:                           CHER/  trach  Respiratory:	Lungs clear on auscultation bilaterally with slightly decreased basal aeration.                            No rales, rhonchi, no wheezing. Effort even and unlabored.  CV:		  Regular rate and rhythm.(+) S1/S2.    Abdomen:	Soft,  nontender, not-distended. Bowel sounds present    Skin:		No rash.  Extremities:	Warm, no cyanosis or edema.  Palpable pulses     ============================ LABS =======================                        8.9    6.68  )-----------( 387      ( 16 Feb 2019 03:15 )             29.2                       8.9    7.88  )-----------( 370      ( 15 Feb 2019 05:30 )             30.1     02-16    139  |  95<L>  |  31<H>  ----------------------------<  90  3.9   |  28        |  4.43<H>    Ca    9.3      16 Feb 2019 03:15  Phos  5.3     02-16  Mg     2.3     02-16    TPro  6.4  /  Alb  2.7<L>  /  TBili  0.3  /  DBili  x   /  AST  60<H>  /  ALT  23  /  AlkPhos  200<H>  02-16    BLOOD  02-05-19 --  --  --    ENDOTRACHEAL SPECIMEN  02-01-19 --  --  Staph. aureus *MRSA*  Klebsiella pneumoniae    BLOOD PERIPHERAL  02-01-19 --  --  BLOOD CULTURE PCR  Staphylococcus sp.,coag neg    BLOOD VENOUS  01-29-19 --  --  --  LUNG - UPPER LOBE LEFT  01-29-19 --  --  --  BLOOD PERIPHERAL  01-28-19 --  --  --  ===================== IMAGING STUDIES ===================  Radiology personally reviewed.    < from: Xray Chest 1 View- PORTABLE-Routine (02.16.19 @ 06:54) >  IMPRESSION:  Tracheostomy tube remains in place.  Stable lower left chest wall SICD.  Persistent small bilateral pleural reactions with left basilar   consolidative changes. Grossly clear upper lungs. No pneumothorax.  Stable prominent appearing cardiac and mediastinal silhouettes.  Right axillary region surgical clips again noted.    < end of copied text >    ====================ASSESSMENT AND PLAN ================  Mr. Han is a 58M who underwent a FB, R thoracotomy, decortication, chest wall reconstruction, lat flap with Dr Pickering on 10/11/18. He was admitted on 1/6/19 to [facility] with a R pleural effusion and respiratory failure,  A R PTC was placed there and he was intubated.  He still had a R SANDY drain in place and he was transferred to [facility]. Patient was found to have aspirated a foreign body and +MRSA empyema. He was treated and discharged on 1/22/2019.   He was found by his home nurse to be in acute distress and recommended to proceed to the ED.  At presentation, he was found to be in acute hypercarbic respiratory failure complicated by hemodynamic instability requiring emergent intubation and pressor support. (28 Jan 2019 15:34)    Procedure:        Trach and PEG  1/31/2019  Bronchoscopy   1/29/2019  Right thoracotomy, resection of portion of R 7th rib, evacuation of infected hemothorax, takedown of pleurocutaneous fistula  10/11/2018    Issues:  Respiratory failure s/p Trach and PEG  Hypotension on Midodrine  ESRD / HD  Cardiomyopathy  HLD  BPH             Anxiety / intermittent Agitation    ====================== NEUROLOGY=======================  Pain control with Tylenol IV / Toradol / Fentanyl PRN    Agitation /Anxiety: Continue Xanax and Seroquel.    ==================== RESPIRATORY========================  Pt is on full mech vent support at night, trach collar daytime as tolerated  Comfortable, no evidence of distress.	  Mechanical Ventilation:  Mode: AC/ CMV (Assist Control/ Continuous Mandatory Ventilation)  RR (machine): 22  TV (machine): 400  FiO2: 40  PEEP: 5  MAP: 11  PIP: 26    Mechanical ventilator status assessed & settings reviewed  Continuous pulse oximetry monitoring  Continue bronchodilators, pulmonary toilet  Head of bed elevation to 30-40 degrees  ====================CARDIOVASCULAR=====================  Continue hemodynamic monitoring/ telemetry  Hypotension on Midodrine 10mg/TID  ===================== RENAL ============================  Monitor I/Os, BUN/ Cr  and electrolytes   HD as per renal / TIW    ==================== GASTROINTESTINAL===================  On  tube feeds Nepro 35 ml/hr , tolerating well  Continue GI prophylaxis with  Protonix   Zofran / Reglan for nausea - PRN	  NPO   Flush PEG with 30cc sterile water Q4hrs                                   =======================    ENDOCRIN  =====================  Glycemic monitoring  F/S with coverage  ===================HEMATOLOGIC/ONCOLOGIC =============    No signs of active bleeding.   Follow CBC, coags  in AM  DVT prophylaxis with SCD, sc Heparin  ========================INFECTIOUS DISEASE===============  Monitor for fever / leukocytosis.  All surgical incision / chest tube  sites look clean   Bronchoscopy on 1/29 showed copious greenish secretions - Continue pulmonary toilet    BAL - MRSA, Continue Vanco based on level  + Zosyn for 4 weeks ( until 2/24/19). Blood cultures are negative    I.D f/u    Pertinent clinical, laboratory, radiographic, hemodynamic, echocardiographic, respiratory data, microbiologic data and chart were reviewed and analyzed frequently throughout the course of the day and night. GI and DVT prophylaxis, glycemic control, head of bed elevation and skin care issues were addressed.  Patient seen, examined and plan discussed with CT Surgery / CTICU team during rounds.  Pt remains critically ill in imminent risk of  deterioration and requires very careful cardio- pulmonary monitoring and support.    I have spent   40  minutes of critical care time with this pt between   12 am  and    9 am         minutes spent on total encounter; more than 50% of the visit was spent counseling and/or coordinating care by the attending physician.      KERLINE Faith MD

## 2019-02-18 NOTE — PROGRESS NOTE ADULT - SUBJECTIVE AND OBJECTIVE BOX
Infectious Diseases progress note:    Subjective: NAD, no acute o/n events.     ROS:  CONSTITUTIONAL:  No fever, chills, rigors  CARDIOVASCULAR:  No chest pain or palpitations  RESPIRATORY:   No SOB, cough, dyspnea on exertion.  No wheezing  GASTROINTESTINAL:  No abd pain, N/V, diarrhea/constipation  EXTREMITIES:  No swelling or joint pain  GENITOURINARY:  No burning on urination, increased frequency or urgency.  No flank pain  NEUROLOGIC:  No HA, visual disturbances  SKIN: No rashes    Allergies    No Known Allergies    Intolerances        ANTIBIOTICS/RELEVANT:  antimicrobials  piperacillin/tazobactam IVPB. 3.375 Gram(s) IV Intermittent every 12 hours    immunologic:  epoetin kelly Injectable 3000 Unit(s) IV Push <User Schedule>    OTHER:  ALBUTerol    90 MICROgram(s) HFA Inhaler 2 Puff(s) Inhalation every 6 hours PRN  ALPRAZolam 0.5 milliGRAM(s) Oral every 8 hours  heparin  Injectable 5000 Unit(s) SubCutaneous every 12 hours  ipratropium 17 MICROgram(s) HFA Inhaler 2 Puff(s) Inhalation every 6 hours  melatonin 6 milliGRAM(s) Oral at bedtime  midodrine 10 milliGRAM(s) Oral every 8 hours  pantoprazole  Injectable 40 milliGRAM(s) IV Push daily  psyllium Powder 1 Packet(s) Oral daily  QUEtiapine 50 milliGRAM(s) Oral every 12 hours  sodium chloride 0.9%. 1000 milliLiter(s) IV Continuous <Continuous>      Objective:  Vital Signs Last 24 Hrs  T(C): 36.7 (18 Feb 2019 12:00), Max: 36.9 (17 Feb 2019 23:00)  T(F): 98.1 (18 Feb 2019 12:00), Max: 98.4 (17 Feb 2019 23:00)  HR: 105 (18 Feb 2019 16:03) (75 - 122)  BP: 102/54 (18 Feb 2019 15:00) (81/49 - 118/63)  BP(mean): 64 (18 Feb 2019 15:00) (57 - 78)  RR: 17 (18 Feb 2019 16:00) (17 - 30)  SpO2: 100% (18 Feb 2019 16:03) (92% - 100%)    PHYSICAL EXAM:  Constitutional:NAD  Eyes:CHER, EOMI  Ear/Nose/Throat: no thrush, mucositis.  Moist mucous membranes	  Neck:no JVD, no lymphadenopathy, supple, tracheostomy  Respiratory: CTA adore  Cardiovascular: S1S2 RRR, no murmurs  Gastrointestinal:soft, nontender,  nondistended (+) BS, peg  Extremities:no e/e/c  Skin:  no rashes, open wounds or ulcerations        LABS:                        9.2    6.22  )-----------( 358      ( 18 Feb 2019 04:20 )             30.7     02-18    141  |  97<L>  |  27<H>  ----------------------------<  96  3.6   |  28  |  4.61<H>    Ca    9.3      18 Feb 2019 04:20  Phos  5.1     02-18  Mg     2.3     02-18    TPro  6.5  /  Alb  2.7<L>  /  TBili  0.3  /  DBili  x   /  AST  43<H>  /  ALT  21  /  AlkPhos  198<H>  02-18            Vancomycin Level, Random:  ug/mL (02-17 @ 09:00)  Vancomycin Level, Random:  ug/mL (02-15 @ 05:30)      Vancomycin Level, Trough: 9.0 ug/mL (02-14 @ 14:20)              MICROBIOLOGY:          RADIOLOGY & ADDITIONAL STUDIES:    < from: Xray Chest 1 View- PORTABLE-Routine (02.16.19 @ 06:54) >  IMPRESSION:  Tracheostomy tube remains in place.    Stable lower left chest wall SICD.    Persistent small bilateral pleural reactions with left basilar   consolidative changes. Grossly clear upper lungs. No pneumothorax.    Stable prominent appearingcardiac and mediastinal silhouettes.    Right axillary region surgical clips again noted.    < end of copied text >

## 2019-02-18 NOTE — PROGRESS NOTE ADULT - ASSESSMENT
58M who underwent a FB, R thoracotomy, decortication, chest wall reconstruction, lat flap with Dr Pickering on 10/11/18. He was admitted on 1/6/19 to [facility] with a R pleural effusion and respiratory failure,  A R PTC was placed there and he was intubated.  He still had a R SANDY drain in place and he was transferred to [facility]. Patient was found to have aspirated a foreign body and +MRSA empyema. He was treated and discharged on 1/22/2019.     He was found by his home nurse to be in acute distress and recommended to proceed to the ED.  At presentation, he was found to be in acute hypercarbic respiratory failure complicated by hemodynamic instability requiring emergent intubation and pressor support. (28 Jan 2019 15:34)    Pt had Tm 104.2 (on 1/29), tachycardia, hypotension.  Pt has now been extubated, s/p trach/PEG on 1/31.  CT chest shows complete L sided atelectasis with mucous plugging of right lower lobe  bronchus with near complete atelectasis.      ID consult called for antibiotic managment.     Recommend:    - Pt p/w severe sepsis with shock and respiratory distress.  Pt with high fevers.  Found to have complete and near complete atelectasis of L and R lung respectively with mucous plugging.  Agree with broad spectrum abx to cover for post-obstructive pna.  Repeat CT chest from 2/3 shows improved aeration.      - Cultures results show:    2/5: bcx - NGTD @ 48hrs  2/1: endotrach - MRSA,  Klebsiella (pan sens), yeast (gram stain only)  2/1: bcx - CNS  1/29:  lung cx - nl resp [place]  1/28: bcx - NGTD    - Agree with vancomycin and zosyn.   Maintain vanco trough between 15-20.  Dose vanco by level, post HD.  Repeat blood cultures from 2/5 NGTD.  CNS likely contaminant.    - Cont care as per CTU. Continue hemodynamic monitoring.  On full mechanical ventilator support, s/p tracheostomy. Continue bronchodilators, pulmonary toilet, Continue GI prophylaxis with Protonix.   Monitor temp curve and WBC.    - Given severe sepsis and shock at presentation, recurrent involvement of MRSA, recommend long course of IV abx treatment (4 week course).  Completed fluconazole 7 days bet 2/2 - 2/8 (suspect yeast is likely a colonizer).      - Cont treat of MRSA/KLeb.  AFter discharge, Abx may be dosed with pt's HD sessions (can change zosyn to cefepime upon discharge), no need for picc line.  Complete total 4 weeks (1/28 through 2/24).      Pt pending placement.  No new ID recs at this time.     Will follow,    Anabel Chahal  [phone]

## 2019-02-19 LAB
ALBUMIN SERPL ELPH-MCNC: 2.7 G/DL — LOW (ref 3.3–5)
ALP SERPL-CCNC: 202 U/L — HIGH (ref 40–120)
ALT FLD-CCNC: 16 U/L — SIGNIFICANT CHANGE UP (ref 4–41)
ANION GAP SERPL CALC-SCNC: 17 MMO/L — HIGH (ref 7–14)
AST SERPL-CCNC: 33 U/L — SIGNIFICANT CHANGE UP (ref 4–40)
BILIRUB SERPL-MCNC: 0.3 MG/DL — SIGNIFICANT CHANGE UP (ref 0.2–1.2)
BLD GP AB SCN SERPL QL: NEGATIVE — SIGNIFICANT CHANGE UP
BUN SERPL-MCNC: 34 MG/DL — HIGH (ref 7–23)
CALCIUM SERPL-MCNC: 9.3 MG/DL — SIGNIFICANT CHANGE UP (ref 8.4–10.5)
CHLORIDE SERPL-SCNC: 98 MMOL/L — SIGNIFICANT CHANGE UP (ref 98–107)
CO2 SERPL-SCNC: 28 MMOL/L — SIGNIFICANT CHANGE UP (ref 22–31)
CREAT SERPL-MCNC: 5.68 MG/DL — HIGH (ref 0.5–1.3)
GLUCOSE SERPL-MCNC: 100 MG/DL — HIGH (ref 70–99)
HCT VFR BLD CALC: 30.8 % — LOW (ref 39–50)
HGB BLD-MCNC: 9.2 G/DL — LOW (ref 13–17)
MAGNESIUM SERPL-MCNC: 2.5 MG/DL — SIGNIFICANT CHANGE UP (ref 1.6–2.6)
MCHC RBC-ENTMCNC: 29.9 % — LOW (ref 32–36)
MCHC RBC-ENTMCNC: 31.4 PG — SIGNIFICANT CHANGE UP (ref 27–34)
MCV RBC AUTO: 105.1 FL — HIGH (ref 80–100)
NRBC # FLD: 0 K/UL — LOW (ref 25–125)
PHOSPHATE SERPL-MCNC: 6.2 MG/DL — HIGH (ref 2.5–4.5)
PLATELET # BLD AUTO: 341 K/UL — SIGNIFICANT CHANGE UP (ref 150–400)
PMV BLD: 9.4 FL — SIGNIFICANT CHANGE UP (ref 7–13)
POTASSIUM SERPL-MCNC: 3.8 MMOL/L — SIGNIFICANT CHANGE UP (ref 3.5–5.3)
POTASSIUM SERPL-SCNC: 3.8 MMOL/L — SIGNIFICANT CHANGE UP (ref 3.5–5.3)
PROT SERPL-MCNC: 6.5 G/DL — SIGNIFICANT CHANGE UP (ref 6–8.3)
RBC # BLD: 2.93 M/UL — LOW (ref 4.2–5.8)
RBC # FLD: 17.4 % — HIGH (ref 10.3–14.5)
RH IG SCN BLD-IMP: POSITIVE — SIGNIFICANT CHANGE UP
SODIUM SERPL-SCNC: 143 MMOL/L — SIGNIFICANT CHANGE UP (ref 135–145)
VANCOMYCIN TROUGH SERPL-MCNC: 16.1 UG/ML — SIGNIFICANT CHANGE UP (ref 10–20)
WBC # BLD: 6.62 K/UL — SIGNIFICANT CHANGE UP (ref 3.8–10.5)
WBC # FLD AUTO: 6.62 K/UL — SIGNIFICANT CHANGE UP (ref 3.8–10.5)

## 2019-02-19 PROCEDURE — 99292 CRITICAL CARE ADDL 30 MIN: CPT

## 2019-02-19 PROCEDURE — 71045 X-RAY EXAM CHEST 1 VIEW: CPT | Mod: 26

## 2019-02-19 PROCEDURE — 99232 SBSQ HOSP IP/OBS MODERATE 35: CPT | Mod: GC

## 2019-02-19 PROCEDURE — 99291 CRITICAL CARE FIRST HOUR: CPT

## 2019-02-19 RX ORDER — VANCOMYCIN HCL 1 G
500 VIAL (EA) INTRAVENOUS ONCE
Qty: 0 | Refills: 0 | Status: COMPLETED | OUTPATIENT
Start: 2019-02-19 | End: 2019-02-19

## 2019-02-19 RX ORDER — CHLORHEXIDINE GLUCONATE 213 G/1000ML
1 SOLUTION TOPICAL
Qty: 0 | Refills: 0 | Status: DISCONTINUED | OUTPATIENT
Start: 2019-02-19 | End: 2019-02-28

## 2019-02-19 RX ADMIN — Medication 0.5 MILLIGRAM(S): at 05:44

## 2019-02-19 RX ADMIN — PIPERACILLIN AND TAZOBACTAM 25 GRAM(S): 4; .5 INJECTION, POWDER, LYOPHILIZED, FOR SOLUTION INTRAVENOUS at 18:03

## 2019-02-19 RX ADMIN — Medication 100 MILLIGRAM(S): at 23:28

## 2019-02-19 RX ADMIN — Medication 2 PUFF(S): at 16:37

## 2019-02-19 RX ADMIN — Medication 0.5 MILLIGRAM(S): at 15:39

## 2019-02-19 RX ADMIN — PANTOPRAZOLE SODIUM 40 MILLIGRAM(S): 20 TABLET, DELAYED RELEASE ORAL at 15:39

## 2019-02-19 RX ADMIN — Medication 2 PUFF(S): at 03:33

## 2019-02-19 RX ADMIN — QUETIAPINE FUMARATE 50 MILLIGRAM(S): 200 TABLET, FILM COATED ORAL at 18:04

## 2019-02-19 RX ADMIN — Medication 1 PACKET(S): at 15:39

## 2019-02-19 RX ADMIN — HEPARIN SODIUM 5000 UNIT(S): 5000 INJECTION INTRAVENOUS; SUBCUTANEOUS at 18:03

## 2019-02-19 RX ADMIN — Medication 2 PUFF(S): at 23:38

## 2019-02-19 RX ADMIN — ERYTHROPOIETIN 3000 UNIT(S): 10000 INJECTION, SOLUTION INTRAVENOUS; SUBCUTANEOUS at 11:37

## 2019-02-19 RX ADMIN — PIPERACILLIN AND TAZOBACTAM 25 GRAM(S): 4; .5 INJECTION, POWDER, LYOPHILIZED, FOR SOLUTION INTRAVENOUS at 05:44

## 2019-02-19 RX ADMIN — Medication 2 PUFF(S): at 12:01

## 2019-02-19 RX ADMIN — MIDODRINE HYDROCHLORIDE 10 MILLIGRAM(S): 2.5 TABLET ORAL at 15:39

## 2019-02-19 RX ADMIN — HEPARIN SODIUM 5000 UNIT(S): 5000 INJECTION INTRAVENOUS; SUBCUTANEOUS at 05:44

## 2019-02-19 RX ADMIN — QUETIAPINE FUMARATE 50 MILLIGRAM(S): 200 TABLET, FILM COATED ORAL at 11:52

## 2019-02-19 RX ADMIN — MIDODRINE HYDROCHLORIDE 10 MILLIGRAM(S): 2.5 TABLET ORAL at 23:28

## 2019-02-19 RX ADMIN — MIDODRINE HYDROCHLORIDE 10 MILLIGRAM(S): 2.5 TABLET ORAL at 05:44

## 2019-02-19 NOTE — PROGRESS NOTE ADULT - SUBJECTIVE AND OBJECTIVE BOX
CLAUDIA HAN   MRN#: [id number]     The patient is a 58y Male who was seen, evaluated, & examined with the CTICU staff post-operatively with a multidisciplinary care plan formulated & implemented.  All available clinical, laboratory, radiographic, pharmacologic, and electrocardiographic data were reviewed & analyzed.      The patient was in the CTICU in critical condition secondary to:     acute hypoxic respiratory failure-persistent cardiopulmonary dysfunction  sepsis    For support and evaluation & prevention of further decompensation secondary to persistent cardiopulmonary dysfunction, respiratory failure required:     supplemental oxygen with full ventilatory support / mechanical ventilation   continuous pulse oximetry monitoring  following ABGs with A-line monitoring    In addition:  Off pressors, very agitated on the vent despite multiple anxiolytics (Seroquel, Xanax)  Transitioned to trach collar and doing well - stays on trach collar all day and rests overnight without agitation  Will attempt to increase amount of time off of vent; patient has been 24 hours on trach collar with stable vitals/hemodynamics  Hemodynamically stable on Midodrine 10 q8  Acute sepsis resolved, continue on Zosyn for Klebsiella and Vancomycin by level for MRSA; will change Zosyn to Cefepime on discharge  HD today - as outpatient he gets 3L off each session per wife while here he gets about 1.5L off per session - will discuss volume removal with renal  Labs with HD today    The patient required critical care management and I provided 80 minutes of non-continuous care to the patient in addition to  discussing the patient and plan at length with the CTICU staff and helping coordinate care.

## 2019-02-19 NOTE — PROGRESS NOTE ADULT - PROBLEM SELECTOR PLAN 1
Pt. with ESRD on HD TIW (TTS). Last HD was on 2/16/19 via AVF, tolerated treatment well. Labs reviewed. Plan for HD today. Monitor BP while on HD

## 2019-02-19 NOTE — PROGRESS NOTE ADULT - SUBJECTIVE AND OBJECTIVE BOX
[facility] DIVISION OF KIDNEY DISEASES AND HYPERTENSION -- FOLLOW UP NOTE  --------------------------------------------------------------------------------    HPI: 57 yo male with medical history of NICM with ICD, ESRD on HD, former smoker, BPH admitted with acute hypercapnic respiratory failure. Nephrology consulted for ESRD/HD management. Pt intubated and sedated in the CTICU. Pt. currently admitted with hypercarbic respiratory failure, septic shock. Pt seen in the CTICU s/p Trach on 1/31/19.      Pt. seen and examined at bedside prior to HD treatment. Patient feels well and has no complaints, however unable to communicate with full ROS.    PAST HISTORY  --------------------------------------------------------------------------------  No significant changes to PMH, PSH, FHx, SHx, unless otherwise noted    ALLERGIES & MEDICATIONS  --------------------------------------------------------------------------------  Allergies    No Known Allergies    Intolerances      Standing Inpatient Medications  ALPRAZolam 0.5 milliGRAM(s) Oral every 8 hours  epoetin kelly Injectable 3000 Unit(s) IV Push <User Schedule>  heparin  Injectable 5000 Unit(s) SubCutaneous every 12 hours  ipratropium 17 MICROgram(s) HFA Inhaler 2 Puff(s) Inhalation every 6 hours  melatonin 6 milliGRAM(s) Oral at bedtime  midodrine 10 milliGRAM(s) Oral every 8 hours  pantoprazole  Injectable 40 milliGRAM(s) IV Push daily  piperacillin/tazobactam IVPB. 3.375 Gram(s) IV Intermittent every 12 hours  psyllium Powder 1 Packet(s) Oral daily  QUEtiapine 50 milliGRAM(s) Oral every 12 hours  sodium chloride 0.9%. 1000 milliLiter(s) IV Continuous <Continuous>    PRN Inpatient Medications  ALBUTerol    90 MICROgram(s) HFA Inhaler 2 Puff(s) Inhalation every 6 hours PRN      REVIEW OF SYSTEMS  --------------------------------------------------------------------------------  Unable to obtain     VITALS/PHYSICAL EXAM  --------------------------------------------------------------------------------  T(C): 36.3 (02-19-19 @ 09:33), Max: 36.9 (02-18-19 @ 20:00)  HR: 110 (02-19-19 @ 09:33) (94 - 122)  BP: 105/61 (02-19-19 @ 09:33) (85/49 - 115/66)  RR: 25 (02-19-19 @ 09:33) (15 - 27)  SpO2: 97% (02-19-19 @ 09:33) (92% - 100%)  Wt(kg): --      02-18-19 @ 07:01  -  02-19-19 @ 07:00  --------------------------------------------------------  IN: 1290 mL / OUT: 10 mL / NET: 1280 mL    02-19-19 @ 07:01  -  02-19-19 @ 09:44  --------------------------------------------------------  IN: 90 mL / OUT: 0 mL / NET: 90 mL    Physical Exam:  	Gen: + Trach  	HEENT: +Trach  	Pulm: coarse breath sounds B/L  	CV: S1S2  	Abd: Soft, +BS   	Ext: No LE edema B/L + RUE PICC  	Neuro: Awake  	Skin: Warm and dry  	Vascular access: LUE AVF site: +thrill, bruit heard.     LABS/STUDIES  --------------------------------------------------------------------------------              9.2    6.22  >-----------<  358      [02-18-19 @ 04:20]              30.7     141  |  97  |  27  ----------------------------<  96      [02-18-19 @ 04:20]  3.6   |  28  |  4.61        Ca     9.3     [02-18-19 @ 04:20]      Mg     2.3     [02-18-19 @ 04:20]      Phos  5.1     [02-18-19 @ 04:20]    TPro  6.5  /  Alb  2.7  /  TBili  0.3  /  DBili  x   /  AST  43  /  ALT  21  /  AlkPhos  198  [02-18-19 @ 04:20]      Creatinine Trend:  SCr 4.61 [02-18 @ 04:20]  SCr 4.43 [02-16 @ 03:15]  SCr 3.52 [02-15 @ 05:30]  SCr 4.48 [02-14 @ 03:50]  SCr 3.76 [02-13 @ 03:20]    HbA1c 5.5      [10-12-18 @ 02:53]  TSH 4.79      [10-21-18 @ 04:15]    HBsAb Reactive      [02-01-19 @ 12:20]  HBsAg NEGATIVE      [02-01-19 @ 12:20]  HBcAb Reactive      [02-01-19 @ 12:20]  HCV 0.23, Nonreactive Hepatitis C AB  S/CO Ratio                        Interpretation  < 1.0                                     Non-Reactive  1.0 - 4.9                           Weakly-Reactive  > 5.0                                 Reactive  Non-Reactive: Aperson with a non-reactive HCV antibody  result is considered uninfected.  No further action is  needed unless recent infection is suspected.  In these  cases, consider repeat testing later to detect  seroconversion..  Weakly-Reactive: HCV antibody test is abnormal, HCV RNA  Qualitative test will follow.  Reactive: HCV antibody test is abnormal, HCV RNA  Qualitative test will follow.  Note: HCV antibody testing is performed on the [employer]   system.      [02-01-19 @ 12:20]

## 2019-02-20 PROCEDURE — 99233 SBSQ HOSP IP/OBS HIGH 50: CPT

## 2019-02-20 PROCEDURE — 99291 CRITICAL CARE FIRST HOUR: CPT

## 2019-02-20 RX ORDER — PANTOPRAZOLE SODIUM 20 MG/1
40 TABLET, DELAYED RELEASE ORAL DAILY
Qty: 0 | Refills: 0 | Status: DISCONTINUED | OUTPATIENT
Start: 2019-02-20 | End: 2019-02-28

## 2019-02-20 RX ORDER — ALPRAZOLAM 0.25 MG
0.5 TABLET ORAL EVERY 8 HOURS
Qty: 0 | Refills: 0 | Status: DISCONTINUED | OUTPATIENT
Start: 2019-02-20 | End: 2019-02-27

## 2019-02-20 RX ADMIN — HEPARIN SODIUM 5000 UNIT(S): 5000 INJECTION INTRAVENOUS; SUBCUTANEOUS at 06:13

## 2019-02-20 RX ADMIN — MIDODRINE HYDROCHLORIDE 10 MILLIGRAM(S): 2.5 TABLET ORAL at 06:14

## 2019-02-20 RX ADMIN — PANTOPRAZOLE SODIUM 40 MILLIGRAM(S): 20 TABLET, DELAYED RELEASE ORAL at 14:01

## 2019-02-20 RX ADMIN — Medication 0.5 MILLIGRAM(S): at 14:09

## 2019-02-20 RX ADMIN — MIDODRINE HYDROCHLORIDE 10 MILLIGRAM(S): 2.5 TABLET ORAL at 14:09

## 2019-02-20 RX ADMIN — QUETIAPINE FUMARATE 50 MILLIGRAM(S): 200 TABLET, FILM COATED ORAL at 06:14

## 2019-02-20 RX ADMIN — CHLORHEXIDINE GLUCONATE 1 APPLICATION(S): 213 SOLUTION TOPICAL at 05:00

## 2019-02-20 RX ADMIN — MIDODRINE HYDROCHLORIDE 10 MILLIGRAM(S): 2.5 TABLET ORAL at 21:29

## 2019-02-20 RX ADMIN — PIPERACILLIN AND TAZOBACTAM 25 GRAM(S): 4; .5 INJECTION, POWDER, LYOPHILIZED, FOR SOLUTION INTRAVENOUS at 17:30

## 2019-02-20 RX ADMIN — Medication 2 PUFF(S): at 21:52

## 2019-02-20 RX ADMIN — Medication 1 PACKET(S): at 14:09

## 2019-02-20 RX ADMIN — Medication 0.5 MILLIGRAM(S): at 22:14

## 2019-02-20 RX ADMIN — HEPARIN SODIUM 5000 UNIT(S): 5000 INJECTION INTRAVENOUS; SUBCUTANEOUS at 17:19

## 2019-02-20 RX ADMIN — PIPERACILLIN AND TAZOBACTAM 25 GRAM(S): 4; .5 INJECTION, POWDER, LYOPHILIZED, FOR SOLUTION INTRAVENOUS at 06:14

## 2019-02-20 RX ADMIN — Medication 2 PUFF(S): at 04:19

## 2019-02-20 RX ADMIN — Medication 0.5 MILLIGRAM(S): at 06:14

## 2019-02-20 RX ADMIN — Medication 2 PUFF(S): at 16:20

## 2019-02-20 RX ADMIN — QUETIAPINE FUMARATE 50 MILLIGRAM(S): 200 TABLET, FILM COATED ORAL at 17:19

## 2019-02-20 RX ADMIN — Medication 2 PUFF(S): at 10:55

## 2019-02-20 RX ADMIN — SODIUM CHLORIDE 10 MILLILITER(S): 9 INJECTION INTRAMUSCULAR; INTRAVENOUS; SUBCUTANEOUS at 21:29

## 2019-02-20 NOTE — PROGRESS NOTE ADULT - SUBJECTIVE AND OBJECTIVE BOX
CLAUDIA HAN          MRN-[id number]    HPI:  Mr. Han is a 58M who underwent a FB, R thoracotomy, decortication, chest wall reconstruction, lat flap with Dr Pickering on 10/11/18. He was admitted on 1/6/19 to [facility] with a R pleural effusion and respiratory failure,  A R PTC was placed there and he was intubated.  He still had a R SANDY drain in place and he was [facility]. Patient was found to have aspirated a foreign body and +MRSA empyema. He was treated and discharged on 1/22/2019.     He was found by his home nurse to be in acute distress and recommended to proceed to the ED.  At presentation, he was found to be in acute hypercarbic respiratory failure complicated by hemodynamic instability requiring emergent intubation and pressor support. (28 Jan 2019 15:34)      Procedure:  POD # :     Issues:        Interval/Overnight OR Events/ ROS  Pt extubated in the OR after surgery. On arrival in ICU still drowsy - but easily arousable to following commands; denies SOB, no nausea, no vomiting  Respiratory status required full ventilatory support,  following of ABG’s with A-line monitoring, continuous pulse oximetry monitoring, & an IV Propofol infusion for support & to evaluate for & prevent further decompensation secondary to persistent cardiopulmonary dysfunction.     Pt remained hemodynamically stable overnight, not on any pressors or inotropes. OOB to chair, breathing comfortably with minimal pain. Ambulated several times . Denies pain, no SOB, no palpitations, no nausea/ no vomiting, no dizziness  A-line and sharma d/kirsten       PAST MEDICAL & SURGICAL HISTORY:  Smoking hx  Cardiomyopathy  Wound: right chest  ICD (implantable cardioverter-defibrillator) in place  OA (osteoarthritis)  HLD (hyperlipidemia)  BPH (benign prostatic hyperplasia)  Pleural effusion  HTN (hypertension)  ESRD (end stage renal disease)  H/O chest wound: right  S/P thoracotomy: FB, R thoracotomy, decortication, chest wall reconstruction, lat flap  History of wound infection: right chest wall - revision 5/18 and again in 7/18  History of implantable cardioverter-defibrillator (ICD) placement: pt unsure when placed  H/O bilateral hip replacements: 2008, 2009    Allergies    No Known Allergies    Intolerances      Home Medications:  aspirin 81 mg oral tablet: 1 tab(s) orally once a day  (30 Oct 2018 14:39)  Breo Ellipta 100 mcg-25 mcg/inh inhalation powder: 1 puff(s) inhaled once a day patient denies using it (11 Oct 2018 08:38)  Calcium 500: 1 tab(s) orally 2 times a day (11 Oct 2018 08:38)  docusate sodium 100 mg oral tablet: 1 tab(s) orally 2 times a day, As Needed (11 Oct 2018 08:39)  folic acid 1 mg oral tablet: 1 tab(s) orally once a day (11 Oct 2018 08:38)  Mag-Ox 400 oral tablet: 1 tab(s) orally once a day (11 Oct 2018 08:39)  Multiple Vitamins oral tablet: 1 tab(s) orally once a day  (30 Oct 2018 14:39)  Namenda 10 mg oral tablet: 1 tab(s) orally 2 times a day (11 Oct 2018 08:38)  Nephplex Rx oral tablet: 1 tab(s) orally once a day (11 Oct 2018 08:39)  nortriptyline 50 mg oral capsule: 1 cap(s) orally once a day (11 Oct 2018 08:39)  Renvela 800 mg oral tablet: 2 tab(s) orally every 8 hours (11 Oct 2018 08:39)  simethicone 80 mg oral tablet: 1 tab(s) orally 3 times a day (after meals) (11 Oct 2018 08:39)  Tylenol 325 mg oral tablet: 2 tab(s) orally every 6 hours, As Needed (30 Oct 2018 14:39)  vancomycin 1 g intravenous injection: 1 gram(s) intravenous 3 times a week  Please give 3 x per week with Dialysis until 2/3/2019 (22 Jan 2019 13:41)  vitamin A: 1 tab(s) orally once a day (11 Oct 2018 08:38)  Vitamin B Complex: 1 tab(s) orally once a day (11 Oct 2018 08:38)  Vitamin C 500 mg oral tablet: 1 tab(s) orally once a day (11 Oct 2018 08:38)        ***VITAL SIGNS:  Vital Signs Last 24 Hrs  T(C): 36.8 (19 Feb 2019 22:00), Max: 37 (19 Feb 2019 16:00)  T(F): 98.3 (19 Feb 2019 22:00), Max: 98.6 (19 Feb 2019 16:00)  HR: 114 (20 Feb 2019 02:00) (96 - 118)  BP: 100/60 (20 Feb 2019 02:00) (89/58 - 113/64)  BP(mean): 69 (20 Feb 2019 02:00) (65 - 78)  RR: 22 (20 Feb 2019 02:00) (15 - 27)  SpO2: 99% (20 Feb 2019 02:00) (90% - 100%)    I/Os:   I&O's Detail    18 Feb 2019 07:01  -  19 Feb 2019 07:00  --------------------------------------------------------  IN:    Enteral Tube Flush: 120 mL    IV PiggyBack: 100 mL    Nepro with Carb Steady: 840 mL    sodium chloride 0.9%.: 230 mL  Total IN: 1290 mL    OUT:    Bulb: 10 mL  Total OUT: 10 mL    Total NET: 1280 mL      19 Feb 2019 07:01  -  20 Feb 2019 03:18  --------------------------------------------------------  IN:    Enteral Tube Flush: 120 mL    IV PiggyBack: 100 mL    Nepro with Carb Steady: 700 mL    Other: 400 mL    sodium chloride 0.9%.: 190 mL  Total IN: 1510 mL    OUT:    Bulb: 5 mL    Other: 2000 mL  Total OUT: 2005 mL    Total NET: -495 mL          CAPILLARY BLOOD GLUCOSE          =======================  MEDICATIONS  ===================  MEDICATIONS  (STANDING):  ALPRAZolam 0.5 milliGRAM(s) Oral every 8 hours  chlorhexidine 4% Liquid 1 Application(s) Topical <User Schedule>  epoetin kelly Injectable 3000 Unit(s) IV Push <User Schedule>  heparin  Injectable 5000 Unit(s) SubCutaneous every 12 hours  ipratropium 17 MICROgram(s) HFA Inhaler 2 Puff(s) Inhalation every 6 hours  melatonin 6 milliGRAM(s) Oral at bedtime  midodrine 10 milliGRAM(s) Oral every 8 hours  pantoprazole  Injectable 40 milliGRAM(s) IV Push daily  piperacillin/tazobactam IVPB. 3.375 Gram(s) IV Intermittent every 12 hours  psyllium Powder 1 Packet(s) Oral daily  QUEtiapine 50 milliGRAM(s) Oral every 12 hours  sodium chloride 0.9%. 1000 milliLiter(s) (10 mL/Hr) IV Continuous <Continuous>    MEDICATIONS  (PRN):  ALBUTerol    90 MICROgram(s) HFA Inhaler 2 Puff(s) Inhalation every 6 hours PRN Shortness of Breath and/or Wheezing      ======================VENTILATOR SETTINGS  ==============  Mode: standby      =================== PATIENT CARE ACCESS DEVICES ==========  Peripheral IV  Central Venous Line	R	L	IJ	Fem	SC	Placed:   Arterial Line	R	L	PT	DP	Fem	Rad	Ax	Placed:   Midline:				  Urinary Catheter, Date Placed:   Necessity of urinary, arterial, and venous catheters discussed  ======================= PHYSICAL EXAM===================  General:          Comfortable, Awake, alert, no distress  Neuro:             Moving all extremities to commands. No focal deficits	  HEENT:                           CHER/ ETT/ NGT/ trach  Respiratory:	Lungs clear on auscultation bilaterally with good aeration.                            No rales, rhonchi, no wheezing. Effort even and unlabored.  CV:		         Regular rate and rhythm. Normal S1/S2. No murmurs  Abdomen:	Soft,  nontender, not-distended. Bowel sounds present / absent.   Skin:		No rash.  Extremities:	Warm, no cyanosis or edema.  Palpable pulses  ============================ LABS =======================                        9.2    6.62  )-----------( 341      ( 19 Feb 2019 09:00 )             30.8     02-19    143  |  98  |  34<H>  ----------------------------<  100<H>  3.8   |  28  |  5.68<H>    Ca    9.3      19 Feb 2019 09:00  Phos  6.2     02-19  Mg     2.5     02-19    TPro  6.5  /  Alb  2.7<L>  /  TBili  0.3  /  DBili  x   /  AST  33  /  ALT  16  /  AlkPhos  202<H>  02-19    LIVER FUNCTIONS - ( 19 Feb 2019 09:00 )  Alb: 2.7 g/dL / Pro: 6.5 g/dL / ALK PHOS: 202 u/L / ALT: 16 u/L / AST: 33 u/L / GGT: x                 BLOOD  02-05-19 --  --  --      ENDOTRACHEAL SPECIMEN  02-01-19 --  --  Staph. aureus *MRSA*  Klebsiella pneumoniae      BLOOD PERIPHERAL  02-01-19 --  --  BLOOD CULTURE PCR  Staphylococcus sp.,coag neg      BLOOD VENOUS  01-29-19 --  --  --      LUNG - UPPER LOBE LEFT  01-29-19 --  --  --      BLOOD PERIPHERAL  01-28-19 --  --  --          ===================== IMAGING STUDIES ===================  Radiology personally reviewed.    ====================ASSESSMENT AND PLAN ================      ====================== NEUROLOGY=======================  Pain control with PCA / PCEA / Tylenol IV / Toradol / Percocet  Pt is on Precedex for agitation  Pt is sedated with Propofol / Fentanyl  ==================== RESPIRATORY========================  Pt is on       L nasal canula / Face tent____% FiO2/ full mech vent support  Comfortable, no evidence of distress.  Using incentive spirometry & doing                ml  Monitor chest tube output  Chest tube to suction / water seal	  Mechanical Ventilation:  Mode: standby    Mechanical ventilator status assessed & settings reviewed  Continuous pulse oximetry monitoring  Continue bronchodilators, pulmonary toilet  Head of bed elevation to 30-40 degrees  ====================CARDIOVASCULAR=====================  Continue hemodynamic monitoring/ telemetry  Not on any pressors/ titrate pressors for SBP>100, MAP >60-65  Continue cardiovascular / antihypertensive medications  ===================== RENAL ============================  Continue LR 30CC/hr      D/C IVF  Monitor I/Os, BUN/ Cr  and electrolytes  D/C Sharma      Keep Sharma for UO monitoring  BPH: Continue Flomax/ Finasteride  ==================== GASTROINTESTINAL===================  On regular/ tube feeds diet, tolerating well  Continue GI prophylaxis with Pepcid / Protonix  Continue Zofran / Reglan for nausea - PRN	  NPO  =======================    ENDOCRIN  =====================  Glycemic monitoring  F/S with coverage  ===================HEMATOLOGIC/ONCOLOGIC =============  Monitor chest tube output. No signs of active bleeding.   Follow CBC, coags  in AM  DVT prophylaxis with SCD, sc Heparin  ========================INFECTIOUS DISEASE===============  No signs of infection. Monitor for fever / leukocytosis.  All surgical incision / chest tube  sites look clean  D/C Sharma    Pertinent clinical, laboratory, radiographic, hemodynamic, echocardiographic, respiratory data, microbiologic data and chart were reviewed and analyzed frequently throughout the course of the day and night. GI and DVT prophylaxis, glycemic control, head of bed elevation and skin care issues were addressed.  Patient seen, examined and plan discussed with CT Surgery / CTICU team during rounds.  Pt remains critically ill in imminent risk of  deterioration and requires very careful cardio- pulmonary monitoring and support.    I have spent        minutes of critical care time with this pt between      am/pm   and        am/ pm         minutes spent on total encounter; more than 50% of the visit was spent counseling and/or coordinating care by the attending physician.      KERLINE Faith MD CLAUDIA HAN          MRN-[id number]      [facility]  Mr. Han is a 58M who underwent a FB, R thoracotomy, decortication, chest wall reconstruction, lat flap with Dr Pickering on 10/11/18. He was admitted on 1/6/19 to [facility] with a R pleural effusion and respiratory failure,  A R PTC was placed there and he was intubated.  He still had a R SANDY drain in place and he was transferred to [facility]. Patient was found to have aspirated a foreign body and +MRSA empyema. He was treated and discharged on 1/22/2019.     He was found by his home nurse to be in acute distress and recommended to proceed to the ED.  At presentation, he was found to be in acute hypercarbic respiratory failure complicated by hemodynamic instability requiring emergent intubation and pressor support. (28 Jan 2019 15:34)      Procedure:        Trach and PEG  1/31/2019  Bronchoscopy   1/29/2019  Right thoracotomy, resection of portion of R 7th rib, evacuation of infected hemothorax, takedown of pleurocutaneous fistula  10/11/2018      Issues:  Respiratory failure s/p Trach and PEG  Hypotension on Midodrine  ESRD / HD  Cardiomyopathy  HLD  BPH  Anxiety / Agitation      Interval/Overnight  Events/ ROS   OOB to chair yesterday on trach collar, breathing comfortably with minimal pain.  Stays on trach collar for last  36 hr. Denies pain, no SOB, no palpitations, no nausea/ no vomiting, no dizziness  s/p HD yesterday        PAST MEDICAL & SURGICAL HISTORY:  Smoking hx  Cardiomyopathy  Wound: right chest  ICD (implantable cardioverter-defibrillator) in place  OA (osteoarthritis)  HLD (hyperlipidemia)  BPH (benign prostatic hyperplasia)  Pleural effusion  HTN (hypertension)  ESRD (end stage renal disease)  H/O chest wound: right  S/P thoracotomy: FB, R thoracotomy, decortication, chest wall reconstruction, lat flap  History of wound infection: right chest wall - revision 5/18 and again in 7/18  History of implantable cardioverter-defibrillator (ICD) placement: pt unsure when placed  H/O bilateral hip replacements: 2008, 2009    Allergies  No Known Allergies      Home Medications:  aspirin 81 mg oral tablet: 1 tab(s) orally once a day  (30 Oct 2018 14:39)  Breo Ellipta 100 mcg-25 mcg/inh inhalation powder: 1 puff(s) inhaled once a day patient denies using it (11 Oct 2018 08:38)  Calcium 500: 1 tab(s) orally 2 times a day (11 Oct 2018 08:38)  docusate sodium 100 mg oral tablet: 1 tab(s) orally 2 times a day, As Needed (11 Oct 2018 08:39)  folic acid 1 mg oral tablet: 1 tab(s) orally once a day (11 Oct 2018 08:38)  Mag-Ox 400 oral tablet: 1 tab(s) orally once a day (11 Oct 2018 08:39)  Multiple Vitamins oral tablet: 1 tab(s) orally once a day  (30 Oct 2018 14:39)  Namenda 10 mg oral tablet: 1 tab(s) orally 2 times a day (11 Oct 2018 08:38)  Nephplex Rx oral tablet: 1 tab(s) orally once a day (11 Oct 2018 08:39)  nortriptyline 50 mg oral capsule: 1 cap(s) orally once a day (11 Oct 2018 08:39)  Renvela 800 mg oral tablet: 2 tab(s) orally every 8 hours (11 Oct 2018 08:39)  simethicone 80 mg oral tablet: 1 tab(s) orally 3 times a day (after meals) (11 Oct 2018 08:39)  Tylenol 325 mg oral tablet: 2 tab(s) orally every 6 hours, As Needed (30 Oct 2018 14:39)  vancomycin 1 g intravenous injection: 1 gram(s) intravenous 3 times a week  Please give 3 x per week with Dialysis until 2/3/2019 (22 Jan 2019 13:41)  vitamin A: 1 tab(s) orally once a day (11 Oct 2018 08:38)  Vitamin B Complex: 1 tab(s) orally once a day (11 Oct 2018 08:38)  Vitamin C 500 mg oral tablet: 1 tab(s) orally once a day (11 Oct 2018 08:38)      ***VITAL SIGNS:  Vital Signs Last 24 Hrs  T(C): 36.8 (19 Feb 2019 22:00), Max: 37 (19 Feb 2019 16:00)  T(F): 98.3 (19 Feb 2019 22:00), Max: 98.6 (19 Feb 2019 16:00)  HR: 114 (20 Feb 2019 02:00) (96 - 118)  BP: 100/60 (20 Feb 2019 02:00) (89/58 - 113/64)  BP(mean): 69 (20 Feb 2019 02:00) (65 - 78)  RR: 22 (20 Feb 2019 02:00) (15 - 27)  SpO2: 99% (20 Feb 2019 02:00) (90% - 100%)    I/Os:   I&O's Detail    18 Feb 2019 07:01  -  19 Feb 2019 07:00  --------------------------------------------------------  IN:    Enteral Tube Flush: 120 mL    IV PiggyBack: 100 mL    Nepro with Carb Steady: 840 mL    sodium chloride 0.9%.: 230 mL  Total IN: 1290 mL    OUT:    Bulb: 10 mL  Total OUT: 10 mL    Total NET: 1280 mL      19 Feb 2019 07:01  -  20 Feb 2019 03:18  --------------------------------------------------------  IN:    Enteral Tube Flush: 120 mL    IV PiggyBack: 100 mL    Nepro with Carb Steady: 700 mL    Other: 400 mL    sodium chloride 0.9%.: 190 mL  Total IN: 1510 mL    OUT:    Bulb: 5 mL    Other: 2000 mL  Total OUT: 2005 mL    Total NET: -495 mL    =======================  MEDICATIONS  ===================  MEDICATIONS  (STANDING):  ALPRAZolam 0.5 milliGRAM(s) Oral every 8 hours  chlorhexidine 4% Liquid 1 Application(s) Topical <User Schedule>  epoetin kelly Injectable 3000 Unit(s) IV Push <User Schedule>  heparin  Injectable 5000 Unit(s) SubCutaneous every 12 hours  ipratropium 17 MICROgram(s) HFA Inhaler 2 Puff(s) Inhalation every 6 hours  melatonin 6 milliGRAM(s) Oral at bedtime  midodrine 10 milliGRAM(s) Oral every 8 hours  pantoprazole  Injectable 40 milliGRAM(s) IV Push daily  piperacillin/tazobactam IVPB. 3.375 Gram(s) IV Intermittent every 12 hours  psyllium Powder 1 Packet(s) Oral daily  QUEtiapine 50 milliGRAM(s) Oral every 12 hours  sodium chloride 0.9%. 1000 milliLiter(s) (10 mL/Hr) IV Continuous <Continuous>    MEDICATIONS  (PRN):  ALBUTerol    90 MICROgram(s) HFA Inhaler 2 Puff(s) Inhalation every 6 hours PRN Shortness of Breath and/or Wheezing    ======================VENTILATOR SETTINGS  ==============  Mode: standby  ======================= PHYSICAL EXAM===================  General:          Comfortable, Awake, alert, no distress  Neuro:             Moving all extremities to commands. No focal deficits	  HEENT:                           CHER/  trach  Respiratory:	Lungs clear on auscultation bilaterally with slightly decreased basal aeration.                            No rales, rhonchi, no wheezing. Effort even and unlabored.  CV:		  Regular rate and rhythm.(+) S1/S2.    Abdomen:	Soft,  nontender, not-distended. Bowel sounds present    Skin:		No rash.  Extremities:	Warm, no cyanosis or edema.  Palpable pulses   ============================ LABS =======================                        9.2    6.62  )-----------( 341      ( 19 Feb 2019 09:00 )             30.8     02-19    143  |  98  |  34<H>  ----------------------------<  100<H>  3.8   |  28  |  5.68<H>    Ca    9.3      19 Feb 2019 09:00  Phos  6.2     02-19  Mg     2.5     02-19    TPro  6.5  /  Alb  2.7<L>  /  TBili  0.3  /  DBili  x   /  AST  33  /  ALT  16  /  AlkPhos  202<H>  02-19    LIVER FUNCTIONS - ( 19 Feb 2019 09:00 )  Alb: 2.7 g/dL / Pro: 6.5 g/dL / ALK PHOS: 202 u/L / ALT: 16 u/L / AST: 33 u/L / GGT: x           BLOOD  02-05-19 --  --  --  ENDOTRACHEAL SPECIMEN  02-01-19 --  --  Staph. aureus *MRSA*  Klebsiella pneumoniae    BLOOD PERIPHERAL  02-01-19 --  --  BLOOD CULTURE PCR  Staphylococcus sp.,coag neg    BLOOD VENOUS  01-29-19 --  --  --    LUNG - UPPER LOBE LEFT  01-29-19 --  --  --    BLOOD PERIPHERAL  01-28-19 --  --  --  ===================== IMAGING STUDIES ===================  Radiology personally reviewed.  < from: Xray Chest 1 View- PORTABLE-Routine (02.19.19 @ 07:16) >  INTERPRETATION:     Heart size and the mediastinum cannot be accurately evaluated on this   projection. A tracheostomy tube is in place. A left chest subcutaneous   ICD is unchanged in position.  A linear tubular like opacity projecting over the right mainstem bronchus   may be artifactual.  Small, bilateral, loculated pleural effusions with likely associated   passive atelectasis are not significantly changed. There is continued   left basilar opacity which could be due to atelectasis and or pneumonia.      IMPRESSION:  Linear tubular like opacity projecting over the right   mainstem bronchus, possibly artifactual. Suggest repeat image.  No significant interval change in small bilateral loculated pleural   effusions with likely associated passive atelectasis.  Continued left basilar opacity which could be due to atelectasis and or   pneumonia.    < end of copied text >    ====================ASSESSMENT AND PLAN ================  Mr. Han is a 58M who underwent a FB, R thoracotomy, decortication, chest wall reconstruction, lat flap with Dr Pickering on 10/11/18. He was admitted on 1/6/19 to [facility] with a R pleural effusion and respiratory failure,  A R PTC was placed there and he was intubated.  He still had a R SANDY drain in place and he was transferred to [facility]. Patient was found to have aspirated a foreign body and +MRSA empyema. He was treated and discharged on 1/22/2019.   He was found by his home nurse to be in acute distress and recommended to proceed to the ED.  At presentation, he was found to be in acute hypercarbic respiratory failure complicated by hemodynamic instability requiring emergent intubation and pressor support. (28 Jan 2019 15:34)    Procedure:        Trach and PEG  1/31/2019  Bronchoscopy   1/29/2019  Right thoracotomy, resection of portion of R 7th rib, evacuation of infected hemothorax, takedown of pleurocutaneous fistula  10/11/2018    Issues:  Respiratory failure s/p Trach and PEG  Hypotension on Midodrine  ESRD / HD  Cardiomyopathy  HLD  BPH             Anxiety / intermittent Agitation  ====================== NEUROLOGY=======================  Pain control with Tylenol IV / Toradol / Fentanyl PRN    Agitation /Anxiety: Continue Xanax and Seroquel.  ==================== RESPIRATORY========================  Pt is on trach collar for last 36 hr, vent standby  Comfortable, no evidence of distress.	  Mechanical Ventilation:  Mode: standby  Mechanical ventilator status assessed & settings reviewed  Continuous pulse oximetry monitoring  Continue bronchodilators, pulmonary toilet  Head of bed elevation to 30-40 degrees  ====================CARDIOVASCULAR=====================  Continue hemodynamic monitoring/ telemetry  Hypotension on Midodrine 10mg/TID  ===================== RENAL ============================  Monitor I/Os, BUN/ Cr  and electrolytes   HD as per renal / TIW    ==================== GASTROINTESTINAL===================  On  tube feeds Nepro 35 ml/hr , tolerating well  Continue GI prophylaxis with  Protonix   Zofran / Reglan for nausea - PRN	  NPO   Flush PEG with 30cc sterile water Q4hrs                                   =======================    ENDOCRIN  =====================  Glycemic monitoring  F/S with coverage  ===================HEMATOLOGIC/ONCOLOGIC =============    No signs of active bleeding.   Follow CBC, coags  in AM  DVT prophylaxis with SCD, sc Heparin  ========================INFECTIOUS DISEASE===============  Monitor for fever / leukocytosis.  All surgical incision / chest tube  sites look clean   Bronchoscopy on 1/29 showed copious greenish secretions - Continue pulmonary toilet    BAL - MRSA, Continue Vanco based on level  + Zosyn for 4 weeks ( until 2/24/19). Blood cultures are negative    I.D f/u    Pertinent clinical, laboratory, radiographic, hemodynamic, echocardiographic, respiratory data, microbiologic data and chart were reviewed and analyzed frequently throughout the course of the day and night. GI and DVT prophylaxis, glycemic control, head of bed elevation and skin care issues were addressed.  Patient seen, examined and plan discussed with CT Surgery / CTICU team during rounds.  Pt remains critically ill in imminent risk of  deterioration and requires very careful cardio- pulmonary monitoring and support.    I have spent   35     minutes of critical care time with this pt between   12 am  and    8 am         minutes spent on total encounter; more than 50% of the visit was spent counseling and/or coordinating care by the attending physician.      KERLINE Faith MD

## 2019-02-20 NOTE — PROGRESS NOTE ADULT - ASSESSMENT
58M who underwent a FB, R thoracotomy, decortication, chest wall reconstruction, lat flap with Dr Pickering on 10/11/18. He was admitted on 1/6/19 to [facility] with a R pleural effusion and respiratory failure,  A R PTC was placed there and he was intubated.  He still had a R SANDY drain in place and he was transferred to [facility]. Patient was found to have aspirated a foreign body and +MRSA empyema. He was treated and discharged on 1/22/2019.     He was found by his home nurse to be in acute distress and recommended to proceed to the ED.  At presentation, he was found to be in acute hypercarbic respiratory failure complicated by hemodynamic instability requiring emergent intubation and pressor support. (28 Jan 2019 15:34)    Pt had Tm 104.2 (on 1/29), tachycardia, hypotension.  Pt has now been extubated, s/p trach/PEG on 1/31.  CT chest shows complete L sided atelectasis with mucous plugging of right lower lobe  bronchus with near complete atelectasis.      ID consult called for antibiotic managment.     Recommend:    - Pt p/w severe sepsis with shock and respiratory distress.  Pt with high fevers.  Found to have complete and near complete atelectasis of L and R lung respectively with mucous plugging.  Agree with broad spectrum abx to cover for post-obstructive pna.  Repeat CT chest from 2/3 shows improved aeration.      - Cultures results show:    2/5: bcx - NGTD @ 48hrs  2/1: endotrach - MRSA,  Klebsiella (pan sens), yeast (gram stain only)  2/1: bcx - CNS  1/29:  lung cx - nl resp miltno  1/28: bcx - NGTD    - Agree with vancomycin and zosyn.   Maintain vanco trough between 15-20.  Dose vanco by level, post HD.  Repeat blood cultures from 2/5 NGTD.  CNS likely contaminant.    - Cont care as per CTU. Continue hemodynamic monitoring.  On full mechanical ventilator support, s/p tracheostomy. Continue bronchodilators, pulmonary toilet, Continue GI prophylaxis with Protonix.   Monitor temp curve and WBC.    - Given severe sepsis and shock at presentation, recurrent involvement of MRSA, recommend long course of IV abx treatment (4 week course).  Completed fluconazole 7 days bet 2/2 - 2/8 (suspect yeast is likely a colonizer).      - Cont treat of MRSA/KLeb.  AFter discharge, Abx may be dosed with pt's HD sessions (can change zosyn to cefepime upon discharge), no need for picc line.  Complete total 4 weeks (1/28 through 2/24).      Pt pending placement.  No new ID recs at this time.   Last vanco dosed on 2/19 PM.  Can check repeat vanco after next HD    Will follow,    Anabel Chahal  [phone]

## 2019-02-20 NOTE — PROGRESS NOTE ADULT - SUBJECTIVE AND OBJECTIVE BOX
CLAUDIA HAN   MRN#: [id number]     The patient is a 58y Male who was seen, evaluated, & examined with the CTICU staff post-operatively with a multidisciplinary care plan formulated & implemented.  All available clinical, laboratory, radiographic, pharmacologic, and electrocardiographic data were reviewed & analyzed.      The patient was in the CTICU in critical condition secondary to:     acute hypoxic respiratory failure-persistent cardiopulmonary dysfunction  sepsis    For support and evaluation & prevention of further decompensation secondary to persistent cardiopulmonary dysfunction, respiratory failure required:     supplemental oxygen with full ventilatory support / mechanical ventilation   continuous pulse oximetry monitoring  following ABGs with A-line monitoring    In addition:  Off pressors, very agitated on the vent despite multiple anxiolytics (Seroquel, Xanax)  Transitioned to trach collar and doing well - stays on trach collar all day and rests overnight without agitation  Will attempt to increase amount of time off of vent; patient has been 48 hours on trach collar with stable vitals/hemodynamics  Hemodynamically stable on Midodrine 10 q8  Acute sepsis resolved, continue on Zosyn for Klebsiella and Vancomycin by level for MRSA; will change Zosyn to Cefepime on discharge  HD tomorrow  Labs with HD     The patient required critical care management and I provided 40 minutes of non-continuous care to the patient in addition to  discussing the patient and plan at length with the CTICU staff and helping coordinate care.

## 2019-02-20 NOTE — PROGRESS NOTE ADULT - SUBJECTIVE AND OBJECTIVE BOX
Infectious Diseases progress note:    Subjective: NAD, resting comfortably.  No acute o/n events.  Afebrile.  No leukocytosis.     ROS:  CONSTITUTIONAL:  No fever, chills, rigors  CARDIOVASCULAR:  No chest pain or palpitations  RESPIRATORY:   No SOB, cough, dyspnea on exertion.  No wheezing  GASTROINTESTINAL:  No abd pain, N/V, diarrhea/constipation  EXTREMITIES:  No swelling or joint pain  GENITOURINARY:  No burning on urination, increased frequency or urgency.  No flank pain  NEUROLOGIC:  No HA, visual disturbances  SKIN: No rashes    Allergies    No Known Allergies    Intolerances        ANTIBIOTICS/RELEVANT:  antimicrobials  piperacillin/tazobactam IVPB. 3.375 Gram(s) IV Intermittent every 12 hours    immunologic:  epoetin kelly Injectable 3000 Unit(s) IV Push <User Schedule>    OTHER:  ALBUTerol    90 MICROgram(s) HFA Inhaler 2 Puff(s) Inhalation every 6 hours PRN  ALPRAZolam 0.5 milliGRAM(s) Oral every 8 hours  chlorhexidine 4% Liquid 1 Application(s) Topical <User Schedule>  heparin  Injectable 5000 Unit(s) SubCutaneous every 12 hours  ipratropium 17 MICROgram(s) HFA Inhaler 2 Puff(s) Inhalation every 6 hours  melatonin 6 milliGRAM(s) Oral at bedtime  midodrine 10 milliGRAM(s) Oral every 8 hours  pantoprazole  Injectable 40 milliGRAM(s) IV Push daily  psyllium Powder 1 Packet(s) Oral daily  QUEtiapine 50 milliGRAM(s) Oral every 12 hours  sodium chloride 0.9%. 1000 milliLiter(s) IV Continuous <Continuous>      Objective:  Vital Signs Last 24 Hrs  T(C): 36.3 (20 Feb 2019 01:00), Max: 37 (19 Feb 2019 16:00)  T(F): 97.3 (20 Feb 2019 01:00), Max: 98.6 (19 Feb 2019 16:00)  HR: 101 (20 Feb 2019 15:00) (101 - 118)  BP: 100/55 (20 Feb 2019 15:00) (82/54 - 115/64)  BP(mean): 65 (20 Feb 2019 15:00) (61 - 76)  RR: 17 (20 Feb 2019 15:00) (17 - 27)  SpO2: 100% (20 Feb 2019 15:00) (62% - 100%)    PHYSICAL EXAM:  Constitutional:NAD  Eyes:CHER, EOMI  Ear/Nose/Throat: no thrush, mucositis.  Moist mucous membranes	  Neck:no JVD, no lymphadenopathy, supple  Respiratory: CTA adore  Cardiovascular: S1S2 RRR, no murmurs  Gastrointestinal:soft, nontender,  nondistended (+) BS  Extremities:no e/e/c  Skin:  no rashes, open wounds or ulcerations        LABS:                        9.2    6.62  )-----------( 341      ( 19 Feb 2019 09:00 )             30.8     02-19    143  |  98  |  34<H>  ----------------------------<  100<H>  3.8   |  28  |  5.68<H>    Ca    9.3      19 Feb 2019 09:00  Phos  6.2     02-19  Mg     2.5     02-19    TPro  6.5  /  Alb  2.7<L>  /  TBili  0.3  /  DBili  x   /  AST  33  /  ALT  16  /  AlkPhos  202<H>  02-19            Vancomycin Level, Trough: 16.1: Vancomycin trough levels should be rapidly reached and  maintained at 15-20 ug/ml for life threatening MRSA  infections such as sepsis, endocarditis, osteomyelitis and  pneumonia. A first trough level should be drawn before the  3rd or 4th dose. Riskof renal toxicity is increased for  levels >15 ug/mL, in patients on other nephrotoxic drugs,  who are hemodynamically unstable, have unstable renal  function, or are on vancomycin therapy for >14 days. Renal  function with creatinine levels should be monitored for  those patients. ug/mL (02.19.19 @ 13:30)                  MICROBIOLOGY:    Culture - Blood (02.05.19 @ 14:17)    Culture - Blood:   NO ORGANISMS ISOLATED    Specimen Source: BLOOD          RADIOLOGY & ADDITIONAL STUDIES:    < from: Xray Chest 1 View- PORTABLE-Routine (02.19.19 @ 07:16) >  IMPRESSION:  Linear tubular like opacity projecting over the right   mainstem bronchus, possibly artifactual. Suggest repeat image.    No significant interval change in small bilateral loculated pleural   effusions with likely associated passive atelectasis.    Continued left basilar opacity which could be due to atelectasis and or   pneumonia.    < end of copied text >

## 2019-02-21 LAB
ANION GAP SERPL CALC-SCNC: 18 MMO/L — HIGH (ref 7–14)
BUN SERPL-MCNC: 31 MG/DL — HIGH (ref 7–23)
CALCIUM SERPL-MCNC: 9.8 MG/DL — SIGNIFICANT CHANGE UP (ref 8.4–10.5)
CHLORIDE SERPL-SCNC: 93 MMOL/L — LOW (ref 98–107)
CO2 SERPL-SCNC: 28 MMOL/L — SIGNIFICANT CHANGE UP (ref 22–31)
CREAT SERPL-MCNC: 5 MG/DL — HIGH (ref 0.5–1.3)
GLUCOSE SERPL-MCNC: 108 MG/DL — HIGH (ref 70–99)
POTASSIUM SERPL-MCNC: SIGNIFICANT CHANGE UP MMOL/L (ref 3.5–5.3)
POTASSIUM SERPL-SCNC: SIGNIFICANT CHANGE UP MMOL/L (ref 3.5–5.3)
SODIUM SERPL-SCNC: 139 MMOL/L — SIGNIFICANT CHANGE UP (ref 135–145)

## 2019-02-21 PROCEDURE — 71045 X-RAY EXAM CHEST 1 VIEW: CPT | Mod: 26

## 2019-02-21 PROCEDURE — 99291 CRITICAL CARE FIRST HOUR: CPT

## 2019-02-21 PROCEDURE — 99292 CRITICAL CARE ADDL 30 MIN: CPT

## 2019-02-21 PROCEDURE — 99232 SBSQ HOSP IP/OBS MODERATE 35: CPT | Mod: GC

## 2019-02-21 RX ADMIN — MIDODRINE HYDROCHLORIDE 10 MILLIGRAM(S): 2.5 TABLET ORAL at 06:44

## 2019-02-21 RX ADMIN — PIPERACILLIN AND TAZOBACTAM 25 GRAM(S): 4; .5 INJECTION, POWDER, LYOPHILIZED, FOR SOLUTION INTRAVENOUS at 18:23

## 2019-02-21 RX ADMIN — MIDODRINE HYDROCHLORIDE 10 MILLIGRAM(S): 2.5 TABLET ORAL at 20:11

## 2019-02-21 RX ADMIN — PANTOPRAZOLE SODIUM 40 MILLIGRAM(S): 20 TABLET, DELAYED RELEASE ORAL at 13:54

## 2019-02-21 RX ADMIN — Medication 2 PUFF(S): at 09:41

## 2019-02-21 RX ADMIN — QUETIAPINE FUMARATE 50 MILLIGRAM(S): 200 TABLET, FILM COATED ORAL at 18:24

## 2019-02-21 RX ADMIN — Medication 2 PUFF(S): at 17:20

## 2019-02-21 RX ADMIN — Medication 0.5 MILLIGRAM(S): at 06:45

## 2019-02-21 RX ADMIN — PIPERACILLIN AND TAZOBACTAM 25 GRAM(S): 4; .5 INJECTION, POWDER, LYOPHILIZED, FOR SOLUTION INTRAVENOUS at 06:30

## 2019-02-21 RX ADMIN — Medication 1 PACKET(S): at 13:54

## 2019-02-21 RX ADMIN — CHLORHEXIDINE GLUCONATE 1 APPLICATION(S): 213 SOLUTION TOPICAL at 06:44

## 2019-02-21 RX ADMIN — ERYTHROPOIETIN 3000 UNIT(S): 10000 INJECTION, SOLUTION INTRAVENOUS; SUBCUTANEOUS at 08:53

## 2019-02-21 RX ADMIN — HEPARIN SODIUM 5000 UNIT(S): 5000 INJECTION INTRAVENOUS; SUBCUTANEOUS at 06:43

## 2019-02-21 RX ADMIN — MIDODRINE HYDROCHLORIDE 10 MILLIGRAM(S): 2.5 TABLET ORAL at 13:54

## 2019-02-21 RX ADMIN — QUETIAPINE FUMARATE 50 MILLIGRAM(S): 200 TABLET, FILM COATED ORAL at 06:45

## 2019-02-21 RX ADMIN — Medication 2 PUFF(S): at 22:32

## 2019-02-21 RX ADMIN — Medication 0.5 MILLIGRAM(S): at 13:54

## 2019-02-21 RX ADMIN — HEPARIN SODIUM 5000 UNIT(S): 5000 INJECTION INTRAVENOUS; SUBCUTANEOUS at 18:23

## 2019-02-21 RX ADMIN — Medication 2 PUFF(S): at 04:03

## 2019-02-21 NOTE — PROGRESS NOTE ADULT - SUBJECTIVE AND OBJECTIVE BOX
CLAUDIA HAN   MRN#: [id number]     The patient is a 58y Male who was seen, evaluated, & examined with the CTICU staff post-operatively with a multidisciplinary care plan formulated & implemented.  All available clinical, laboratory, radiographic, pharmacologic, and electrocardiographic data were reviewed & analyzed.      The patient was in the CTICU in critical condition secondary to:     acute hypoxic respiratory failure-persistent cardiopulmonary dysfunction  sepsis    For support and evaluation & prevention of further decompensation secondary to persistent cardiopulmonary dysfunction, respiratory failure required:     supplemental oxygen with full ventilatory support / mechanical ventilation   continuous pulse oximetry monitoring  following ABGs with A-line monitoring    In addition:  Off pressors, very agitated on the vent despite multiple anxiolytics (Seroquel, Xanax)  Transitioned to trach collar and doing well - stays on trach collar all day and rests overnight without agitation  Will attempt to increase amount of time off of vent; patient has been roughly 60 hours on trach collar with stable vitals/hemodynamics  Hemodynamically stable on Midodrine 10 q8  Acute sepsis resolved, continue on Zosyn for Klebsiella and Vancomycin by level for MRSA; will change Zosyn to Cefepime on discharge  Removed Candido drain which has had minimal drainage  HD today  Labs with HD, vanc level also     The patient required critical care management and I provided 80 minutes of non-continuous care to the patient in addition to  discussing the patient and plan at length with the CTICU staff and helping coordinate care.

## 2019-02-21 NOTE — PROGRESS NOTE ADULT - PROBLEM SELECTOR PLAN 1
Pt. with ESRD on HD TIW (TTS). Last HD was on 2/19/19 via AVF, tolerated treatment with lower UF goal. Labs reviewed. Plan for HD today. Monitor BP/HR while on HD

## 2019-02-21 NOTE — PROGRESS NOTE ADULT - SUBJECTIVE AND OBJECTIVE BOX
[facility] DIVISION OF KIDNEY DISEASES AND HYPERTENSION -- FOLLOW UP NOTE  --------------------------------------------------------------------------------    HPI: 59 yo male with medical history of NICM with ICD, ESRD on HD, former smoker, BPH admitted with acute hypercapnic respiratory failure. Nephrology consulted for ESRD/HD management. Pt intubated and sedated in the CTICU. Pt. currently admitted with hypercarbic respiratory failure, septic shock. Pt seen in the CTICU s/p Trach on 1/31/19.      Pt. seen and examined at bedside during HD treatment. Patient feels well and has no complaints, however unable to communicate with full ROS. Pt with low BP and Tachycardia. Goal UF change to 1L     PAST HISTORY  --------------------------------------------------------------------------------  No significant changes to PMH, PSH, FHx, SHx, unless otherwise noted    ALLERGIES & MEDICATIONS  --------------------------------------------------------------------------------  Allergies    No Known Allergies    Intolerances      Standing Inpatient Medications  ALPRAZolam 0.5 milliGRAM(s) Oral every 8 hours  chlorhexidine 4% Liquid 1 Application(s) Topical <User Schedule>  epoetin kelly Injectable 3000 Unit(s) IV Push <User Schedule>  heparin  Injectable 5000 Unit(s) SubCutaneous every 12 hours  ipratropium 17 MICROgram(s) HFA Inhaler 2 Puff(s) Inhalation every 6 hours  melatonin 6 milliGRAM(s) Oral at bedtime  midodrine 10 milliGRAM(s) Oral every 8 hours  pantoprazole  Injectable 40 milliGRAM(s) IV Push daily  piperacillin/tazobactam IVPB. 3.375 Gram(s) IV Intermittent every 12 hours  psyllium Powder 1 Packet(s) Oral daily  QUEtiapine 50 milliGRAM(s) Oral every 12 hours  sodium chloride 0.9%. 1000 milliLiter(s) IV Continuous <Continuous>    PRN Inpatient Medications  ALBUTerol    90 MICROgram(s) HFA Inhaler 2 Puff(s) Inhalation every 6 hours PRN      REVIEW OF SYSTEMS  --------------------------------------------------------------------------------  Unable to obtain     VITALS/PHYSICAL EXAM  --------------------------------------------------------------------------------  T(C): 36.7 (02-21-19 @ 08:28), Max: 36.7 (02-21-19 @ 08:28)  HR: 118 (02-21-19 @ 09:00) (96 - 118)  BP: 87/59 (02-21-19 @ 09:00) (87/59 - 121/67)  RR: 26 (02-21-19 @ 09:00) (14 - 26)  SpO2: 97% (02-21-19 @ 09:00) (62% - 100%)  Wt(kg): --    02-20-19 @ 07:01  -  02-21-19 @ 07:00  --------------------------------------------------------  IN: 1430 mL / OUT: 10 mL / NET: 1420 mL    Physical Exam:  	Gen: + Trach  	HEENT: +Trach  	Pulm: coarse breath sounds B/L  	CV: S1S2  	Abd: Soft, +BS   	Ext: No LE edema B/L + RUE PICC  	Neuro: Awake  	Skin: Warm and dry  	Vascular access: LUE AVF site: +thrill, bruit heard.     LABS/STUDIES  --------------------------------------------------------------------------------    139  |  93  |  31  ----------------------------<  108      [02-21-19 @ 03:20]  Test not performed SPECIMEN GROSSLY HEMOLYZED   |  28  |  5.00        Ca     9.8     [02-21-19 @ 03:20]    Creatinine Trend:  SCr 5.00 [02-21 @ 03:20]  SCr 5.68 [02-19 @ 09:00]  SCr 4.61 [02-18 @ 04:20]  SCr 4.43 [02-16 @ 03:15]  SCr 3.52 [02-15 @ 05:30]    HbA1c 5.5      [10-12-18 @ 02:53]  TSH 4.79      [10-21-18 @ 04:15]    HBsAb Reactive      [02-01-19 @ 12:20]  HBsAg NEGATIVE      [02-01-19 @ 12:20]  HBcAb Reactive      [02-01-19 @ 12:20]  HCV 0.23, Nonreactive Hepatitis C AB  S/CO Ratio                        Interpretation  < 1.0                                     Non-Reactive  1.0 - 4.9                           Weakly-Reactive  > 5.0                                 Reactive  Non-Reactive: Aperson with a non-reactive HCV antibody  result is considered uninfected.  No further action is  needed unless recent infection is suspected.  In these  cases, consider repeat testing later to detect  seroconversion..  Weakly-Reactive: HCV antibody test is abnormal, HCV RNA  Qualitative test will follow.  Reactive: HCV antibody test is abnormal, HCV RNA  Qualitative test will follow.  Note: HCV antibody testing is performed on the [facility] system.      [02-01-19 @ 12:20]

## 2019-02-21 NOTE — CHART NOTE - NSCHARTNOTEFT_GEN_A_CORE
Source: nursing and EMR    Diet : NPO with tube feed - Nepro Carb Steady @ 35 ml/hr 24hrs daily     Patient alert, oriented, tolerating EN @ goal rate, receiving adequate nutrition.  Noted 2+ L & R foot and leg edema , pressure ulcer and wound continues .      Enteral : EN provides 1512 kcal & 82 gm protein/d        Current Weight: - 70.3 kg on 2/21/19; was 74.2 kg on 2/12/19; Admit wt. - 60 kg     Pertinent Medications: MEDICATIONS  (STANDING):  ALPRAZolam 0.5 milliGRAM(s) Oral every 8 hours  chlorhexidine 4% Liquid 1 Application(s) Topical <User Schedule>  epoetin kelly Injectable 3000 Unit(s) IV Push <User Schedule>  heparin  Injectable 5000 Unit(s) SubCutaneous every 12 hours  ipratropium 17 MICROgram(s) HFA Inhaler 2 Puff(s) Inhalation every 6 hours  melatonin 6 milliGRAM(s) Oral at bedtime  midodrine 10 milliGRAM(s) Oral every 8 hours  pantoprazole  Injectable 40 milliGRAM(s) IV Push daily  piperacillin/tazobactam IVPB. 3.375 Gram(s) IV Intermittent every 12 hours  psyllium Powder 1 Packet(s) Oral daily  QUEtiapine 50 milliGRAM(s) Oral every 12 hours  sodium chloride 0.9%. 1000 milliLiter(s) (10 mL/Hr) IV Continuous <Continuous>    MEDICATIONS  (PRN):  ALBUTerol    90 MICROgram(s) HFA Inhaler 2 Puff(s) Inhalation every 6 hours PRN Shortness of Breath and/or Wheezing    Pertinent Labs:  02-21 Na139 mmol/L Glu 108 mg/dL<H> K+ Test not performed SPECIMEN GROSSLY HEMOLYZED mmol/L Cr  5.00 mg/dL<H> BUN 31 mg/dL<H> 02-19 Phos 6.2 mg/dL<H> 02-19 Alb 2.7 g/dL<L> 02-02 PAB 8 mg/dL<L>      New Nutrition Diagnosis: [ ] not applicable    Recommend to continue current EN .    Monitoring and Evaluation:  Tolerance to diet prescription , weights and follow up per protocol

## 2019-02-22 LAB
ALBUMIN SERPL ELPH-MCNC: 2.9 G/DL — LOW (ref 3.3–5)
ALP SERPL-CCNC: 204 U/L — HIGH (ref 40–120)
ALT FLD-CCNC: 19 U/L — SIGNIFICANT CHANGE UP (ref 4–41)
ANION GAP SERPL CALC-SCNC: 14 MMO/L — SIGNIFICANT CHANGE UP (ref 7–14)
AST SERPL-CCNC: 39 U/L — SIGNIFICANT CHANGE UP (ref 4–40)
BASOPHILS # BLD AUTO: 0.1 K/UL — SIGNIFICANT CHANGE UP (ref 0–0.2)
BASOPHILS NFR BLD AUTO: 1.5 % — SIGNIFICANT CHANGE UP (ref 0–2)
BILIRUB SERPL-MCNC: 0.3 MG/DL — SIGNIFICANT CHANGE UP (ref 0.2–1.2)
BUN SERPL-MCNC: 21 MG/DL — SIGNIFICANT CHANGE UP (ref 7–23)
CALCIUM SERPL-MCNC: 9.3 MG/DL — SIGNIFICANT CHANGE UP (ref 8.4–10.5)
CHLORIDE SERPL-SCNC: 94 MMOL/L — LOW (ref 98–107)
CO2 SERPL-SCNC: 30 MMOL/L — SIGNIFICANT CHANGE UP (ref 22–31)
CREAT SERPL-MCNC: 4.17 MG/DL — HIGH (ref 0.5–1.3)
EOSINOPHIL # BLD AUTO: 0.66 K/UL — HIGH (ref 0–0.5)
EOSINOPHIL NFR BLD AUTO: 9.7 % — HIGH (ref 0–6)
GLUCOSE SERPL-MCNC: 115 MG/DL — HIGH (ref 70–99)
HCT VFR BLD CALC: 32.6 % — LOW (ref 39–50)
HGB BLD-MCNC: 9.7 G/DL — LOW (ref 13–17)
IMM GRANULOCYTES NFR BLD AUTO: 0.6 % — SIGNIFICANT CHANGE UP (ref 0–1.5)
LYMPHOCYTES # BLD AUTO: 1.01 K/UL — SIGNIFICANT CHANGE UP (ref 1–3.3)
LYMPHOCYTES # BLD AUTO: 14.9 % — SIGNIFICANT CHANGE UP (ref 13–44)
MCHC RBC-ENTMCNC: 29.8 % — LOW (ref 32–36)
MCHC RBC-ENTMCNC: 31.2 PG — SIGNIFICANT CHANGE UP (ref 27–34)
MCV RBC AUTO: 104.8 FL — HIGH (ref 80–100)
MONOCYTES # BLD AUTO: 0.52 K/UL — SIGNIFICANT CHANGE UP (ref 0–0.9)
MONOCYTES NFR BLD AUTO: 7.6 % — SIGNIFICANT CHANGE UP (ref 2–14)
NEUTROPHILS # BLD AUTO: 4.47 K/UL — SIGNIFICANT CHANGE UP (ref 1.8–7.4)
NEUTROPHILS NFR BLD AUTO: 65.7 % — SIGNIFICANT CHANGE UP (ref 43–77)
NRBC # FLD: 0 K/UL — LOW (ref 25–125)
PLATELET # BLD AUTO: 303 K/UL — SIGNIFICANT CHANGE UP (ref 150–400)
PMV BLD: 9.7 FL — SIGNIFICANT CHANGE UP (ref 7–13)
POTASSIUM SERPL-MCNC: 3.7 MMOL/L — SIGNIFICANT CHANGE UP (ref 3.5–5.3)
POTASSIUM SERPL-SCNC: 3.7 MMOL/L — SIGNIFICANT CHANGE UP (ref 3.5–5.3)
PROT SERPL-MCNC: 7 G/DL — SIGNIFICANT CHANGE UP (ref 6–8.3)
RBC # BLD: 3.11 M/UL — LOW (ref 4.2–5.8)
RBC # FLD: 17.2 % — HIGH (ref 10.3–14.5)
SODIUM SERPL-SCNC: 138 MMOL/L — SIGNIFICANT CHANGE UP (ref 135–145)
VANCOMYCIN TROUGH SERPL-MCNC: 17.4 UG/ML — SIGNIFICANT CHANGE UP (ref 10–20)
WBC # BLD: 6.8 K/UL — SIGNIFICANT CHANGE UP (ref 3.8–10.5)
WBC # FLD AUTO: 6.8 K/UL — SIGNIFICANT CHANGE UP (ref 3.8–10.5)

## 2019-02-22 PROCEDURE — 99292 CRITICAL CARE ADDL 30 MIN: CPT

## 2019-02-22 PROCEDURE — 99291 CRITICAL CARE FIRST HOUR: CPT

## 2019-02-22 PROCEDURE — 71045 X-RAY EXAM CHEST 1 VIEW: CPT | Mod: 26

## 2019-02-22 RX ORDER — VANCOMYCIN HCL 1 G
500 VIAL (EA) INTRAVENOUS ONCE
Qty: 0 | Refills: 0 | Status: COMPLETED | OUTPATIENT
Start: 2019-02-22 | End: 2019-02-22

## 2019-02-22 RX ADMIN — MIDODRINE HYDROCHLORIDE 10 MILLIGRAM(S): 2.5 TABLET ORAL at 06:45

## 2019-02-22 RX ADMIN — PIPERACILLIN AND TAZOBACTAM 25 GRAM(S): 4; .5 INJECTION, POWDER, LYOPHILIZED, FOR SOLUTION INTRAVENOUS at 06:47

## 2019-02-22 RX ADMIN — HEPARIN SODIUM 5000 UNIT(S): 5000 INJECTION INTRAVENOUS; SUBCUTANEOUS at 06:51

## 2019-02-22 RX ADMIN — Medication 0.5 MILLIGRAM(S): at 01:54

## 2019-02-22 RX ADMIN — HEPARIN SODIUM 5000 UNIT(S): 5000 INJECTION INTRAVENOUS; SUBCUTANEOUS at 17:30

## 2019-02-22 RX ADMIN — QUETIAPINE FUMARATE 50 MILLIGRAM(S): 200 TABLET, FILM COATED ORAL at 17:30

## 2019-02-22 RX ADMIN — PANTOPRAZOLE SODIUM 40 MILLIGRAM(S): 20 TABLET, DELAYED RELEASE ORAL at 12:08

## 2019-02-22 RX ADMIN — Medication 250 MILLIGRAM(S): at 09:52

## 2019-02-22 RX ADMIN — MIDODRINE HYDROCHLORIDE 10 MILLIGRAM(S): 2.5 TABLET ORAL at 21:04

## 2019-02-22 RX ADMIN — SODIUM CHLORIDE 10 MILLILITER(S): 9 INJECTION INTRAMUSCULAR; INTRAVENOUS; SUBCUTANEOUS at 01:00

## 2019-02-22 RX ADMIN — Medication 0.5 MILLIGRAM(S): at 14:18

## 2019-02-22 RX ADMIN — Medication 0.5 MILLIGRAM(S): at 21:04

## 2019-02-22 RX ADMIN — MIDODRINE HYDROCHLORIDE 10 MILLIGRAM(S): 2.5 TABLET ORAL at 14:18

## 2019-02-22 RX ADMIN — Medication 0.5 MILLIGRAM(S): at 06:51

## 2019-02-22 RX ADMIN — PIPERACILLIN AND TAZOBACTAM 25 GRAM(S): 4; .5 INJECTION, POWDER, LYOPHILIZED, FOR SOLUTION INTRAVENOUS at 17:30

## 2019-02-22 RX ADMIN — Medication 2 PUFF(S): at 03:16

## 2019-02-22 RX ADMIN — Medication 2 PUFF(S): at 22:29

## 2019-02-22 RX ADMIN — Medication 1 PACKET(S): at 12:09

## 2019-02-22 RX ADMIN — CHLORHEXIDINE GLUCONATE 1 APPLICATION(S): 213 SOLUTION TOPICAL at 06:47

## 2019-02-22 RX ADMIN — QUETIAPINE FUMARATE 50 MILLIGRAM(S): 200 TABLET, FILM COATED ORAL at 10:03

## 2019-02-22 RX ADMIN — Medication 2 PUFF(S): at 16:01

## 2019-02-22 RX ADMIN — Medication 2 PUFF(S): at 10:26

## 2019-02-22 NOTE — PROGRESS NOTE ADULT - SUBJECTIVE AND OBJECTIVE BOX
CLAUDIA HAN                     MRN-[id number]    HPI:  Mr. Han is a 58M who underwent a FB, R thoracotomy, decortication, chest wall reconstruction, lat flap with Dr Pickering on 10/11/18. He was admitted on 1/6/19 to [facility] with a R pleural effusion and respiratory failure,  A R PTC was placed there and he was intubated.  He still had a R SANDY drain in place and he was transferred to [facility]. Patient was found to have aspirated a foreign body and +MRSA empyema. He was treated and discharged on 1/22/2019.     He was found by his home nurse to be in acute distress and recommended to proceed to the ED.  At presentation, he was found to be in acute hypercarbic respiratory failure complicated by hemodynamic instability requiring emergent intubation and pressor support. (28 Jan 2019 15:34)    Procedure:        Trach and PEG  1/31/2019  Bronchoscopy   1/29/2019  Right thoracotomy, resection of portion of R 7th rib, evacuation of infected hemothorax, takedown of pleurocutaneous fistula  10/11/2018      Issues:  Respiratory failure s/p Trach and PEG  Hypotension on Midodrine  ESRD / HD  Cardiomyopathy  HLD  BPH  Anxiety / Agitation        PAST MEDICAL & SURGICAL HISTORY:  Smoking hx  Cardiomyopathy  Wound: right chest  ICD (implantable cardioverter-defibrillator) in place  OA (osteoarthritis)  HLD (hyperlipidemia)  BPH (benign prostatic hyperplasia)  Pleural effusion  HTN (hypertension)  ESRD (end stage renal disease)  H/O chest wound: right  S/P thoracotomy: FB, R thoracotomy, decortication, chest wall reconstruction, lat flap  History of wound infection: right chest wall - revision 5/18 and again in 7/18  History of implantable cardioverter-defibrillator (ICD) placement: pt unsure when placed  H/O bilateral hip replacements: 2008, 2009            VITAL SIGNS:  Vital Signs Last 24 Hrs  T(C): 36.5 (22 Feb 2019 04:00), Max: 36.8 (21 Feb 2019 12:00)  T(F): 97.7 (22 Feb 2019 04:00), Max: 98.3 (21 Feb 2019 12:00)  HR: 105 (22 Feb 2019 07:00) (98 - 118)  BP: 114/62 (22 Feb 2019 07:00) (84/52 - 114/62)  BP(mean): 75 (22 Feb 2019 07:00) (60 - 81)  RR: 25 (22 Feb 2019 07:00) (14 - 28)  SpO2: 98% (22 Feb 2019 07:00) (92% - 100%)    I/Os:   I&O's Detail    21 Feb 2019 07:01  -  22 Feb 2019 07:00  --------------------------------------------------------  IN:    Enteral Tube Flush: 250 mL    IV PiggyBack: 150 mL    Nepro with Carb Steady: 805 mL    Other: 400 mL    sodium chloride 0.9%.: 230 mL  Total IN: 1835 mL    OUT:    Other: 1400 mL  Total OUT: 1400 mL    Total NET: 435 mL      22 Feb 2019 07:01  -  22 Feb 2019 07:59  --------------------------------------------------------  IN:    Enteral Tube Flush: 40 mL    Nepro with Carb Steady: 35 mL    sodium chloride 0.9%.: 10 mL  Total IN: 85 mL    OUT:  Total OUT: 0 mL    Total NET: 85 mL          CAPILLARY BLOOD GLUCOSE          =======================MEDICATIONS===================  MEDICATIONS  (STANDING):  ALPRAZolam 0.5 milliGRAM(s) Oral every 8 hours  chlorhexidine 4% Liquid 1 Application(s) Topical <User Schedule>  epoetin kelly Injectable 3000 Unit(s) IV Push <User Schedule>  heparin  Injectable 5000 Unit(s) SubCutaneous every 12 hours  ipratropium 17 MICROgram(s) HFA Inhaler 2 Puff(s) Inhalation every 6 hours  melatonin 6 milliGRAM(s) Oral at bedtime  midodrine 10 milliGRAM(s) Oral every 8 hours  pantoprazole  Injectable 40 milliGRAM(s) IV Push daily  piperacillin/tazobactam IVPB. 3.375 Gram(s) IV Intermittent every 12 hours  psyllium Powder 1 Packet(s) Oral daily  QUEtiapine 50 milliGRAM(s) Oral every 12 hours  sodium chloride 0.9%. 1000 milliLiter(s) (10 mL/Hr) IV Continuous <Continuous>  vancomycin  IVPB 500 milliGRAM(s) IV Intermittent once    MEDICATIONS  (PRN):  ALBUTerol    90 MICROgram(s) HFA Inhaler 2 Puff(s) Inhalation every 6 hours PRN Shortness of Breath and/or Wheezing      =======================VENTILATOR SETTINGS===================  Mode: standby      PHYSICAL EXAM============================  General:                         Awake, alert, not in any distress  Neuro:                           Ambulatory on ATC   Respiratory:	Air entry fair and  bilateral conducted sounds                                           Effort even and unlabored.  CV:		Regular rate and rhythm. Normal S1/S2                                          Distal pulses present.  Abdomen:	                     Soft, non-distended. Bowel sounds present   Skin:		No rash.  Extremities:	Warm, no cyanosis or edema.  Palpable pulses    ============================LABS=========================                        9.7    6.80  )-----------( 303      ( 22 Feb 2019 05:00 )             32.6     02-22    138  |  94<L>  |  21  ----------------------------<  115<H>  3.7   |  30  |  4.17<H>    Ca    9.3      22 Feb 2019 05:00    TPro  7.0  /  Alb  2.9<L>  /  TBili  0.3  /  DBili  x   /  AST  39  /  ALT  19  /  AlkPhos  204<H>  02-22    LIVER FUNCTIONS - ( 22 Feb 2019 05:00 )  Alb: 2.9 g/dL / Pro: 7.0 g/dL / ALK PHOS: 204 u/L / ALT: 19 u/L / AST: 39 u/L / GGT: x                   ============================IMAGING STUDIES=========================  < from: Xray Chest 1 View- PORTABLE-Routine (02.21.19 @ 07:02) >    IMPRESSION:  Tracheostomy tube in place.    No significant interval change in small bilateral loculated pleural   effusions with likely associated passive atelectasis.    Linear tubular structure again projects over the lower trachea, right   main stem bronchus, and medial right lower chest, of uncertain etiology   and location. A CT scan could be performed for further evaluation if felt   to be clinically indicated.      58M who underwent a FB, R thoracotomy, decortication, chest wall reconstruction, lat flap with Dr Pickering on 10/11/18. He was admitted on 1/6/19 to [facility] with a R pleural effusion and respiratory failure,  A R PTC was placed there and he was intubated.  He still had a R SANDY drain in place and he was transferred to [facility]. Patient was found to have aspirated a foreign body and +MRSA empyema. He was treated and discharged on 1/22/2019.   He was found by his home nurse to be in acute distress and recommended to proceed to the ED.  At presentation, he was found to be in acute hypercarbic respiratory failure complicated by hemodynamic instability requiring emergent intubation and pressor support. (28 Jan 2019 15:34)    Procedure:        Trach and PEG  1/31/2019  Bronchoscopy   1/29/2019  Right thoracotomy, resection of portion of R 7th rib, evacuation of infected hemothorax, takedown of pleurocutaneous fistula  10/11/2018    Issues:  Respiratory failure s/p Trach and PEG  Hypotension on Midodrine  ESRD / HD  Cardiomyopathy  HLD  BPH             Anxiety / intermittent Agitation  ====================== NEUROLOGY=======================  Pain control with Tylenol IV / Fentanyl PRN    Agitation /Anxiety: Continue Xanax and Seroquel.  ==================== RESPIRATORY========================  Pt is on trach collar for last 4 days, vent standby  Comfortable, no evidence of distress.	  Mechanical Ventilation:  Mode: standby  Mechanical ventilator status assessed & settings reviewed  Continuous pulse oximetry monitoring  Continue bronchodilators, pulmonary toilet  Head of bed elevation to 30-40 degrees  ====================CARDIOVASCULAR=====================  Continue hemodynamic monitoring/ telemetry  Hypotension on Midodrine 10mg/TID  ===================== RENAL ============================  Monitor I/Os, BUN/ Cr  and electrolytes   HD as per renal / TIW    ==================== GASTROINTESTINAL===================  On  tube feeds Nepro 35 ml/hr , tolerating well  Continue GI prophylaxis with  Protonix   Zofran / Reglan for nausea - PRN	  NPO   Flush PEG with 30cc sterile water Q4hrs                                   =======================    ENDOCRIN  =====================  Glycemic monitoring  F/S with coverage  ===================HEMATOLOGIC/ONCOLOGIC =============    No signs of active bleeding.   Follow CBC, coags  in AM  DVT prophylaxis with SCD, sc Heparin  ========================INFECTIOUS DISEASE===============  Monitor for fever / leukocytosis. Chest tube/bilb d/c's  All surgical incision / chest tube  sites look clean   Bronchoscopy on 1/29 showed copious greenish secretions - Continue pulmonary toilet    BAL - MRSA, Continue Vanco based on level  + Zosyn for 4 weeks ( until 2/24/19). Blood cultures are negative    I.D f/u    Pertinent clinical, laboratory, radiographic, hemodynamic, echocardiographic, respiratory data, microbiologic data and chart were reviewed and analyzed frequently throughout the course of the day and night. GI and DVT prophylaxis, glycemic control, head of bed elevation and skin care issues were addressed.  Patient seen, examined and plan discussed with CT Surgery / CTICU team during rounds.  Pt remains critically ill in imminent risk of  deterioration and requires very careful cardio- pulmonary monitoring and support.    I have spent   30   minutes of critical care time with this pt between   12 am  and    8 am         minutes spent on total encounter; more than 50% of the visit was spent counseling and/or coordinating care by the attending physician.      Dave MILLANP

## 2019-02-22 NOTE — PROGRESS NOTE ADULT - ASSESSMENT
58M who underwent a FB, R thoracotomy, decortication, chest wall reconstruction, lat flap with Dr Pickering on 10/11/18. He was admitted on 1/6/19 to [facility] with a R pleural effusion and respiratory failure,  A R PTC was placed there and he was intubated.  He still had a R SANDY drain in place and he was transferred to [facility]. Patient was found to have aspirated a foreign body and +MRSA empyema. He was treated and discharged on 1/22/2019.     He was found by his home nurse to be in acute distress and recommended to proceed to the ED.  At presentation, he was found to be in acute hypercarbic respiratory failure complicated by hemodynamic instability requiring emergent intubation and pressor support. (28 Jan 2019 15:34)    Pt had Tm 104.2 (on 1/29), tachycardia, hypotension.  Pt has now been extubated, s/p trach/PEG on 1/31.  CT chest shows complete L sided atelectasis with mucous plugging of right lower lobe  bronchus with near complete atelectasis.      ID consult called for antibiotic managment.     Recommend:    - Pt p/w severe sepsis with shock and respiratory distress.  Pt with high fevers.  Found to have complete and near complete atelectasis of L and R lung respectively with mucous plugging.  Agree with broad spectrum abx to cover for post-obstructive pna.  Repeat CT chest from 2/3 shows improved aeration.      - Cultures results show:    2/5: bcx - NGTD @ 48hrs  2/1: endotrach - MRSA,  Klebsiella (pan sens), yeast (gram stain only)  2/1: bcx - CNS  1/29:  lung cx - nl resp milton  1/28: bcx - NGTD    - Agree with vancomycin and zosyn.   Maintain vanco trough between 15-20.  Dose vanco by level, post HD.  Repeat blood cultures from 2/5 NGTD.  CNS likely contaminant.    - Cont care as per CTU. Continue hemodynamic monitoring.  s/p tracheostomy, now on trach collar.   Continue bronchodilators, pulmonary toilet, Continue GI prophylaxis with Protonix.   Monitor temp curve and WBC.    - Given severe sepsis and shock at presentation, recurrent involvement of MRSA, recommend long course of IV abx treatment (4 week course).  Completed fluconazole 7 days bet 2/2 - 2/8 (suspect yeast is likely a colonizer).      - Cont treat of MRSA/KLeb.  AFter discharge, Abx may be dosed with pt's HD sessions (can change zosyn to cefepime upon discharge), no need for picc line.  Complete total 4 weeks (1/28 through 2/24).      Pt pending placement.  No new ID recs at this time.   Last vanco dosed on 2/22 PM.  Can check repeat vanco after next HD    Will follow,    Anabel Chahal  [phone]

## 2019-02-22 NOTE — PROGRESS NOTE ADULT - SUBJECTIVE AND OBJECTIVE BOX
Infectious Diseases progress note:    Subjective:  NAD.  Events noted.  Pt has been on trach collar.      ROS:  CONSTITUTIONAL:  No fever, chills, rigors  CARDIOVASCULAR:  No chest pain or palpitations  RESPIRATORY:   No SOB, cough, dyspnea on exertion.  No wheezing  GASTROINTESTINAL:  No abd pain, N/V, diarrhea/constipation  EXTREMITIES:  No swelling or joint pain  GENITOURINARY:  No burning on urination, increased frequency or urgency.  No flank pain  NEUROLOGIC:  No HA, visual disturbances  SKIN: No rashes    Allergies    No Known Allergies    Intolerances        ANTIBIOTICS/RELEVANT:  antimicrobials  piperacillin/tazobactam IVPB. 3.375 Gram(s) IV Intermittent every 12 hours    immunologic:  epoetin kelly Injectable 3000 Unit(s) IV Push <User Schedule>    OTHER:  ALBUTerol    90 MICROgram(s) HFA Inhaler 2 Puff(s) Inhalation every 6 hours PRN  ALPRAZolam 0.5 milliGRAM(s) Oral every 8 hours  chlorhexidine 4% Liquid 1 Application(s) Topical <User Schedule>  heparin  Injectable 5000 Unit(s) SubCutaneous every 12 hours  ipratropium 17 MICROgram(s) HFA Inhaler 2 Puff(s) Inhalation every 6 hours  melatonin 6 milliGRAM(s) Oral at bedtime  midodrine 10 milliGRAM(s) Oral every 8 hours  pantoprazole  Injectable 40 milliGRAM(s) IV Push daily  psyllium Powder 1 Packet(s) Oral daily  QUEtiapine 50 milliGRAM(s) Oral every 12 hours      Objective:  Vital Signs Last 24 Hrs  T(C): 36.9 (22 Feb 2019 08:00), Max: 36.9 (22 Feb 2019 08:00)  T(F): 98.4 (22 Feb 2019 08:00), Max: 98.4 (22 Feb 2019 08:00)  HR: 120 (22 Feb 2019 12:00) (100 - 120)  BP: 98/64 (22 Feb 2019 12:00) (84/52 - 114/65)  BP(mean): 77 (22 Feb 2019 11:00) (60 - 81)  RR: 25 (22 Feb 2019 12:00) (16 - 28)  SpO2: 99% (22 Feb 2019 12:00) (92% - 100%)    PHYSICAL EXAM:  Constitutional:NAD  Eyes:CHER, EOMI  Ear/Nose/Throat: no thrush, mucositis.  Moist mucous membranes	  Neck:no JVD, no lymphadenopathy, supple, tracheostomy  Respiratory: CTA adore  Cardiovascular: S1S2 RRR, no murmurs  Gastrointestinal:soft, nontender,  nondistended (+) BS  Extremities:no e/e/c  Skin:  no rashes, open wounds or ulcerations        LABS:                        9.7    6.80  )-----------( 303      ( 22 Feb 2019 05:00 )             32.6     02-22    138  |  94<L>  |  21  ----------------------------<  115<H>  3.7   |  30  |  4.17<H>    Ca    9.3      22 Feb 2019 05:00    TPro  7.0  /  Alb  2.9<L>  /  TBili  0.3  /  DBili  x   /  AST  39  /  ALT  19  /  AlkPhos  204<H>  02-22                Vancomycin Level, Trough: 17.4 ug/mL (02-22 @ 05:00)  Vancomycin Level, Trough: 16.1 ug/mL (02-19 @ 13:30)              MICROBIOLOGY:          RADIOLOGY & ADDITIONAL STUDIES:    < from: Xray Chest 1 View- PORTABLE-Routine (02.21.19 @ 07:02) >  IMPRESSION:  Tracheostomy tube in place.    No significant interval change in small bilateral loculated pleural   effusions with likely associated passive atelectasis.    Linear tubular structure again projects over the lower trachea, right   main stem bronchus, and medial right lower chest, of uncertain etiology   and location. A CT scan could be performed for further evaluation if felt   to be clinically indicated.    < end of copied text >

## 2019-02-22 NOTE — PROGRESS NOTE ADULT - SUBJECTIVE AND OBJECTIVE BOX
CLAUDIA HAN   MRN#: [id number]     The patient is a 58y Male who was seen, evaluated, & examined with the CTICU staff post-operatively with a multidisciplinary care plan formulated & implemented.  All available clinical, laboratory, radiographic, pharmacologic, and electrocardiographic data were reviewed & analyzed.      The patient was in the CTICU in critical condition secondary to:     acute hypoxic respiratory failure-persistent cardiopulmonary dysfunction  sepsis    For support and evaluation & prevention of further decompensation secondary to persistent cardiopulmonary dysfunction, respiratory failure required:     supplemental oxygen with full ventilatory support / mechanical ventilation   continuous pulse oximetry monitoring  following ABGs with A-line monitoring    In addition:  Off pressors, very agitated on the vent despite multiple anxiolytics (Seroquel, Xanax)  Transitioned to trach collar and doing well - stays on trach collar all day and rests overnight without agitation  Patient has been on trach collar with stable vitals/hemodynamics for 4 days  Decannulated at bedside today - will monitor for respiratory decompensation but currently stable  Hemodynamically stable on Midodrine 10 q8  Acute sepsis resolved, continue on Zosyn for Klebsiella and Vancomycin by level for MRSA; will change Zosyn to Cefepime on discharge  HD tomorrow  Labs with HD, vanc level also     The patient required critical care management and I provided 45 minutes of non-continuous care to the patient in addition to  discussing the patient and plan at length with the CTICU staff and helping coordinate care.

## 2019-02-23 ENCOUNTER — TRANSCRIPTION ENCOUNTER (OUTPATIENT)
Age: 59
End: 2019-02-23

## 2019-02-23 LAB
ANION GAP SERPL CALC-SCNC: 14 MMO/L — SIGNIFICANT CHANGE UP (ref 7–14)
BUN SERPL-MCNC: 30 MG/DL — HIGH (ref 7–23)
CALCIUM SERPL-MCNC: 9.5 MG/DL — SIGNIFICANT CHANGE UP (ref 8.4–10.5)
CHLORIDE SERPL-SCNC: 96 MMOL/L — LOW (ref 98–107)
CO2 SERPL-SCNC: 29 MMOL/L — SIGNIFICANT CHANGE UP (ref 22–31)
CREAT SERPL-MCNC: 5.01 MG/DL — HIGH (ref 0.5–1.3)
GAS PNL BLDV: 141 MMOL/L — SIGNIFICANT CHANGE UP (ref 136–146)
GLUCOSE SERPL-MCNC: 132 MG/DL — HIGH (ref 70–99)
HCT VFR BLD CALC: 33.3 % — LOW (ref 39–50)
HGB BLD-MCNC: 10 G/DL — LOW (ref 13–17)
MCHC RBC-ENTMCNC: 30 % — LOW (ref 32–36)
MCHC RBC-ENTMCNC: 31.8 PG — SIGNIFICANT CHANGE UP (ref 27–34)
MCV RBC AUTO: 106.1 FL — HIGH (ref 80–100)
NRBC # FLD: 0 K/UL — LOW (ref 25–125)
PLATELET # BLD AUTO: 274 K/UL — SIGNIFICANT CHANGE UP (ref 150–400)
PMV BLD: 9.8 FL — SIGNIFICANT CHANGE UP (ref 7–13)
POTASSIUM BLDV-SCNC: 3.3 MMOL/L — LOW (ref 3.4–4.5)
POTASSIUM SERPL-MCNC: 3.7 MMOL/L — SIGNIFICANT CHANGE UP (ref 3.5–5.3)
POTASSIUM SERPL-SCNC: 3.7 MMOL/L — SIGNIFICANT CHANGE UP (ref 3.5–5.3)
RBC # BLD: 3.14 M/UL — LOW (ref 4.2–5.8)
RBC # FLD: 17.6 % — HIGH (ref 10.3–14.5)
SODIUM SERPL-SCNC: 139 MMOL/L — SIGNIFICANT CHANGE UP (ref 135–145)
VANCOMYCIN FLD-MCNC: 13.4 UG/ML — SIGNIFICANT CHANGE UP
WBC # BLD: 5.57 K/UL — SIGNIFICANT CHANGE UP (ref 3.8–10.5)
WBC # FLD AUTO: 5.57 K/UL — SIGNIFICANT CHANGE UP (ref 3.8–10.5)

## 2019-02-23 PROCEDURE — 99292 CRITICAL CARE ADDL 30 MIN: CPT

## 2019-02-23 PROCEDURE — 71045 X-RAY EXAM CHEST 1 VIEW: CPT | Mod: 26

## 2019-02-23 PROCEDURE — 99291 CRITICAL CARE FIRST HOUR: CPT

## 2019-02-23 PROCEDURE — 99232 SBSQ HOSP IP/OBS MODERATE 35: CPT | Mod: GC

## 2019-02-23 RX ORDER — VANCOMYCIN HCL 1 G
500 VIAL (EA) INTRAVENOUS ONCE
Qty: 0 | Refills: 0 | Status: COMPLETED | OUTPATIENT
Start: 2019-02-23 | End: 2019-02-23

## 2019-02-23 RX ORDER — PHENYLEPHRINE HYDROCHLORIDE 10 MG/ML
0.3 INJECTION INTRAVENOUS
Qty: 40 | Refills: 0 | Status: DISCONTINUED | OUTPATIENT
Start: 2019-02-23 | End: 2019-02-27

## 2019-02-23 RX ADMIN — Medication 0.5 MILLIGRAM(S): at 22:12

## 2019-02-23 RX ADMIN — Medication 0.5 MILLIGRAM(S): at 05:39

## 2019-02-23 RX ADMIN — Medication 100 MILLIGRAM(S): at 17:38

## 2019-02-23 RX ADMIN — ERYTHROPOIETIN 3000 UNIT(S): 10000 INJECTION, SOLUTION INTRAVENOUS; SUBCUTANEOUS at 07:47

## 2019-02-23 RX ADMIN — PANTOPRAZOLE SODIUM 40 MILLIGRAM(S): 20 TABLET, DELAYED RELEASE ORAL at 13:52

## 2019-02-23 RX ADMIN — Medication 2 PUFF(S): at 22:05

## 2019-02-23 RX ADMIN — CHLORHEXIDINE GLUCONATE 1 APPLICATION(S): 213 SOLUTION TOPICAL at 05:39

## 2019-02-23 RX ADMIN — Medication 0.5 MILLIGRAM(S): at 15:52

## 2019-02-23 RX ADMIN — PIPERACILLIN AND TAZOBACTAM 25 GRAM(S): 4; .5 INJECTION, POWDER, LYOPHILIZED, FOR SOLUTION INTRAVENOUS at 05:39

## 2019-02-23 RX ADMIN — QUETIAPINE FUMARATE 50 MILLIGRAM(S): 200 TABLET, FILM COATED ORAL at 05:40

## 2019-02-23 RX ADMIN — HEPARIN SODIUM 5000 UNIT(S): 5000 INJECTION INTRAVENOUS; SUBCUTANEOUS at 05:38

## 2019-02-23 RX ADMIN — MIDODRINE HYDROCHLORIDE 10 MILLIGRAM(S): 2.5 TABLET ORAL at 22:13

## 2019-02-23 RX ADMIN — QUETIAPINE FUMARATE 50 MILLIGRAM(S): 200 TABLET, FILM COATED ORAL at 18:25

## 2019-02-23 RX ADMIN — MIDODRINE HYDROCHLORIDE 10 MILLIGRAM(S): 2.5 TABLET ORAL at 05:40

## 2019-02-23 RX ADMIN — MIDODRINE HYDROCHLORIDE 10 MILLIGRAM(S): 2.5 TABLET ORAL at 15:52

## 2019-02-23 RX ADMIN — Medication 2 PUFF(S): at 10:23

## 2019-02-23 RX ADMIN — PIPERACILLIN AND TAZOBACTAM 25 GRAM(S): 4; .5 INJECTION, POWDER, LYOPHILIZED, FOR SOLUTION INTRAVENOUS at 18:25

## 2019-02-23 RX ADMIN — Medication 2 PUFF(S): at 03:31

## 2019-02-23 RX ADMIN — Medication 2 PUFF(S): at 15:54

## 2019-02-23 RX ADMIN — HEPARIN SODIUM 5000 UNIT(S): 5000 INJECTION INTRAVENOUS; SUBCUTANEOUS at 18:25

## 2019-02-23 NOTE — PROGRESS NOTE ADULT - PROBLEM SELECTOR PLAN 1
Pt. with ESRD on HD TIW (TTS). Last HD was on 2/21/19 via AVF, tolerated treatment with lower UF goal. Labs reviewed. Plan for HD today. Monitor BP/HR while on HD.  Sodium modelling low dialysate temp 1 kg uf only today as tolerated.

## 2019-02-23 NOTE — PROGRESS NOTE ADULT - SUBJECTIVE AND OBJECTIVE BOX
CLAUDIA HAN   MRN#: [id number]     The patient is a 58y Male who was seen, evaluated, & examined with the CTICU staff post-operatively with a multidisciplinary care plan formulated & implemented.  All available clinical, laboratory, radiographic, pharmacologic, and electrocardiographic data were reviewed & analyzed.      The patient was in the CTICU in critical condition secondary to:     acute hypoxic respiratory failure-persistent cardiopulmonary dysfunction  undifferentiated shock    For support and evaluation & prevention of further decompensation secondary to persistent cardiopulmonary dysfunction and undifferentiated shock, respiratory failure and shock required:     supplemental oxygen with full ventilatory support / mechanical ventilation   continuous pulse oximetry monitoring  following ABGs with A-line monitoring  IV Phenylephrine infusion    In addition:  Hypotensive with systolic blood pressure in 70s and MAPs in 50s requiring Phenylephrine infusion prior to dialysis in addition to standing Midodrine  Unclear etiology of hypotension - patient does not appear infected/septic and is total body volume overloaded but may be intravascularly depleted  Getting HD now - usually gets 1.5L off - will monitor volume status and check Vancomycin level post  Anxious despite multiple anxiolytics (Seroquel, Xanax)  S/p trach collar with stable vitals/hemodynamics for 4 days  Decannulated at bedside yesterday - no signs of respiratory decompensation, currently with SpO2 95% on nasal cannula - will check VBG to monitor pCO2  Acute sepsis resolved, continue on Zosyn for Klebsiella and Vancomycin by level for MRSA; will change Zosyn to Cefepime on discharge    The patient required critical care management and I provided 80 minutes of non-continuous care to the patient in addition to  discussing the patient and plan at length with the CTICU staff and helping coordinate care.

## 2019-02-23 NOTE — PROGRESS NOTE ADULT - SUBJECTIVE AND OBJECTIVE BOX
[facility] Division of Kidney Diseases & Hypertension  FOLLOW UP NOTE  --------------------------------------------------------------------------------  Chief Complaint: ESRD ongoing HD requirement    24 hour events/subjective: The patient was seen and examined at bedside in the SICU this morning.  The patient is unable to communicate fully but is not sob and is not in pain.        PAST HISTORY  --------------------------------------------------------------------------------  No significant changes to PMH, PSH, FHx, SHx, unless otherwise noted    ALLERGIES & MEDICATIONS  --------------------------------------------------------------------------------  Allergies    No Known Allergies    Intolerances      Standing Inpatient Medications  ALPRAZolam 0.5 milliGRAM(s) Oral every 8 hours  chlorhexidine 4% Liquid 1 Application(s) Topical <User Schedule>  epoetin kelly Injectable 3000 Unit(s) IV Push <User Schedule>  heparin  Injectable 5000 Unit(s) SubCutaneous every 12 hours  ipratropium 17 MICROgram(s) HFA Inhaler 2 Puff(s) Inhalation every 6 hours  melatonin 6 milliGRAM(s) Oral at bedtime  midodrine 10 milliGRAM(s) Oral every 8 hours  pantoprazole  Injectable 40 milliGRAM(s) IV Push daily  phenylephrine    Infusion 0.3 MICROgram(s)/kG/Min IV Continuous <Continuous>  piperacillin/tazobactam IVPB. 3.375 Gram(s) IV Intermittent every 12 hours  psyllium Powder 1 Packet(s) Oral daily  QUEtiapine 50 milliGRAM(s) Oral every 12 hours    PRN Inpatient Medications  ALBUTerol    90 MICROgram(s) HFA Inhaler 2 Puff(s) Inhalation every 6 hours PRN      REVIEW OF SYSTEMS  --------------------------------------------------------------------------------  limited as above    VITALS/PHYSICAL EXAM  --------------------------------------------------------------------------------  T(C): 36.4 (02-23-19 @ 07:36), Max: 36.7 (02-22-19 @ 13:00)  HR: 106 (02-23-19 @ 08:00) (100 - 131)  BP: 97/60 (02-23-19 @ 08:00) (72/52 - 115/62)  RR: 15 (02-23-19 @ 08:00) (15 - 32)  SpO2: 100% (02-23-19 @ 08:00) (95% - 100%)      02-22-19 @ 07:01  -  02-23-19 @ 07:00  --------------------------------------------------------  IN: 1350 mL / OUT: 0 mL / NET: 1350 mL      Physical Exam:  	Gen: no apparent distress  	HEENT: decannulated trach  	Pulm: clear lungs sounds anteriorly  	CV: S1S2  	Abd: Soft, +BS   	Ext: No LE edema B/L + RUE PICC  	Neuro: Awake  	Skin: Warm and dry  	Vascular access: E [facility] site    LABS/STUDIES  --------------------------------------------------------------------------------              10.0   5.57  >-----------<  274      [02-23-19 @ 03:30]              33.3     139  |  96  |  30  ----------------------------<  132      [02-23-19 @ 03:30]  3.7   |  29  |  5.01        Ca     9.5     [02-23-19 @ 03:30]    TPro  7.0  /  Alb  2.9  /  TBili  0.3  /  DBili  x   /  AST  39  /  ALT  19  /  AlkPhos  204  [02-22-19 @ 05:00]          Creatinine Trend:  SCr 5.01 [02-23 @ 03:30]  SCr 4.17 [02-22 @ 05:00]  SCr 5.00 [02-21 @ 03:20]  SCr 5.68 [02-19 @ 09:00]  SCr 4.61 [02-18 @ 04:20]

## 2019-02-24 LAB
ALBUMIN SERPL ELPH-MCNC: 2.8 G/DL — LOW (ref 3.3–5)
ALP SERPL-CCNC: 177 U/L — HIGH (ref 40–120)
ALT FLD-CCNC: 24 U/L — SIGNIFICANT CHANGE UP (ref 4–41)
ANION GAP SERPL CALC-SCNC: 13 MMO/L — SIGNIFICANT CHANGE UP (ref 7–14)
AST SERPL-CCNC: 48 U/L — HIGH (ref 4–40)
BASE EXCESS BLDA CALC-SCNC: 5 MMOL/L — SIGNIFICANT CHANGE UP
BASOPHILS # BLD AUTO: 0.08 K/UL — SIGNIFICANT CHANGE UP (ref 0–0.2)
BASOPHILS NFR BLD AUTO: 1.5 % — SIGNIFICANT CHANGE UP (ref 0–2)
BILIRUB SERPL-MCNC: 0.3 MG/DL — SIGNIFICANT CHANGE UP (ref 0.2–1.2)
BUN SERPL-MCNC: 20 MG/DL — SIGNIFICANT CHANGE UP (ref 7–23)
CALCIUM SERPL-MCNC: 9.2 MG/DL — SIGNIFICANT CHANGE UP (ref 8.4–10.5)
CHLORIDE BLDA-SCNC: 102 MMOL/L — SIGNIFICANT CHANGE UP (ref 96–108)
CHLORIDE SERPL-SCNC: 97 MMOL/L — LOW (ref 98–107)
CO2 SERPL-SCNC: 28 MMOL/L — SIGNIFICANT CHANGE UP (ref 22–31)
CREAT SERPL-MCNC: 3.88 MG/DL — HIGH (ref 0.5–1.3)
EOSINOPHIL # BLD AUTO: 0.71 K/UL — HIGH (ref 0–0.5)
EOSINOPHIL NFR BLD AUTO: 13.4 % — HIGH (ref 0–6)
GLUCOSE BLDA-MCNC: 130 MG/DL — HIGH (ref 70–99)
GLUCOSE SERPL-MCNC: 127 MG/DL — HIGH (ref 70–99)
HCO3 BLDA-SCNC: 28 MMOL/L — HIGH (ref 22–26)
HCT VFR BLD CALC: 34.1 % — LOW (ref 39–50)
HCT VFR BLDA CALC: 30.9 % — LOW (ref 39–51)
HGB BLD-MCNC: 9.8 G/DL — LOW (ref 13–17)
HGB BLDA-MCNC: 10 G/DL — LOW (ref 13–17)
IMM GRANULOCYTES NFR BLD AUTO: 0.4 % — SIGNIFICANT CHANGE UP (ref 0–1.5)
LACTATE BLDA-SCNC: 0.7 MMOL/L — SIGNIFICANT CHANGE UP (ref 0.5–2)
LYMPHOCYTES # BLD AUTO: 0.86 K/UL — LOW (ref 1–3.3)
LYMPHOCYTES # BLD AUTO: 16.2 % — SIGNIFICANT CHANGE UP (ref 13–44)
MCHC RBC-ENTMCNC: 28.7 % — LOW (ref 32–36)
MCHC RBC-ENTMCNC: 31.3 PG — SIGNIFICANT CHANGE UP (ref 27–34)
MCV RBC AUTO: 108.9 FL — HIGH (ref 80–100)
MONOCYTES # BLD AUTO: 0.45 K/UL — SIGNIFICANT CHANGE UP (ref 0–0.9)
MONOCYTES NFR BLD AUTO: 8.5 % — SIGNIFICANT CHANGE UP (ref 2–14)
NEUTROPHILS # BLD AUTO: 3.18 K/UL — SIGNIFICANT CHANGE UP (ref 1.8–7.4)
NEUTROPHILS NFR BLD AUTO: 60 % — SIGNIFICANT CHANGE UP (ref 43–77)
NRBC # FLD: 0 K/UL — LOW (ref 25–125)
PCO2 BLDA: 78 MMHG — CRITICAL HIGH (ref 35–48)
PH BLDA: 7.24 PH — LOW (ref 7.35–7.45)
PLATELET # BLD AUTO: 255 K/UL — SIGNIFICANT CHANGE UP (ref 150–400)
PMV BLD: 9.8 FL — SIGNIFICANT CHANGE UP (ref 7–13)
PO2 BLDA: 135 MMHG — HIGH (ref 83–108)
POTASSIUM BLDA-SCNC: 3.8 MMOL/L — SIGNIFICANT CHANGE UP (ref 3.4–4.5)
POTASSIUM SERPL-MCNC: 3.8 MMOL/L — SIGNIFICANT CHANGE UP (ref 3.5–5.3)
POTASSIUM SERPL-SCNC: 3.8 MMOL/L — SIGNIFICANT CHANGE UP (ref 3.5–5.3)
PROT SERPL-MCNC: 6.9 G/DL — SIGNIFICANT CHANGE UP (ref 6–8.3)
RBC # BLD: 3.13 M/UL — LOW (ref 4.2–5.8)
RBC # FLD: 17.7 % — HIGH (ref 10.3–14.5)
SAO2 % BLDA: 98.7 % — SIGNIFICANT CHANGE UP (ref 95–99)
SODIUM BLDA-SCNC: 142 MMOL/L — SIGNIFICANT CHANGE UP (ref 136–146)
SODIUM SERPL-SCNC: 138 MMOL/L — SIGNIFICANT CHANGE UP (ref 135–145)
WBC # BLD: 5.3 K/UL — SIGNIFICANT CHANGE UP (ref 3.8–10.5)
WBC # FLD AUTO: 5.3 K/UL — SIGNIFICANT CHANGE UP (ref 3.8–10.5)

## 2019-02-24 PROCEDURE — 74018 RADEX ABDOMEN 1 VIEW: CPT | Mod: 26

## 2019-02-24 PROCEDURE — 71045 X-RAY EXAM CHEST 1 VIEW: CPT | Mod: 26,76

## 2019-02-24 PROCEDURE — 31645 BRNCHSC W/THER ASPIR 1ST: CPT

## 2019-02-24 PROCEDURE — 99291 CRITICAL CARE FIRST HOUR: CPT

## 2019-02-24 PROCEDURE — 31613 TRACHEOSTOMA REVJ SIMPLE: CPT

## 2019-02-24 RX ORDER — ALBUMIN HUMAN 25 %
50 VIAL (ML) INTRAVENOUS
Qty: 0 | Refills: 0 | Status: COMPLETED | OUTPATIENT
Start: 2019-02-24 | End: 2019-02-24

## 2019-02-24 RX ORDER — ACETAMINOPHEN 500 MG
1000 TABLET ORAL ONCE
Qty: 0 | Refills: 0 | Status: COMPLETED | OUTPATIENT
Start: 2019-02-24 | End: 2019-02-24

## 2019-02-24 RX ORDER — MIDAZOLAM HYDROCHLORIDE 1 MG/ML
2 INJECTION, SOLUTION INTRAMUSCULAR; INTRAVENOUS ONCE
Qty: 0 | Refills: 0 | Status: DISCONTINUED | OUTPATIENT
Start: 2019-02-24 | End: 2019-02-24

## 2019-02-24 RX ORDER — FENTANYL CITRATE 50 UG/ML
25 INJECTION INTRAVENOUS ONCE
Qty: 0 | Refills: 0 | Status: DISCONTINUED | OUTPATIENT
Start: 2019-02-24 | End: 2019-02-24

## 2019-02-24 RX ORDER — ACETAMINOPHEN 500 MG
1000 TABLET ORAL ONCE
Qty: 0 | Refills: 0 | Status: COMPLETED | OUTPATIENT
Start: 2019-02-25 | End: 2019-02-25

## 2019-02-24 RX ADMIN — Medication 2 PUFF(S): at 03:54

## 2019-02-24 RX ADMIN — Medication 0.5 MILLIGRAM(S): at 13:39

## 2019-02-24 RX ADMIN — MIDODRINE HYDROCHLORIDE 10 MILLIGRAM(S): 2.5 TABLET ORAL at 06:28

## 2019-02-24 RX ADMIN — PIPERACILLIN AND TAZOBACTAM 25 GRAM(S): 4; .5 INJECTION, POWDER, LYOPHILIZED, FOR SOLUTION INTRAVENOUS at 08:56

## 2019-02-24 RX ADMIN — HEPARIN SODIUM 5000 UNIT(S): 5000 INJECTION INTRAVENOUS; SUBCUTANEOUS at 17:21

## 2019-02-24 RX ADMIN — Medication 2 PUFF(S): at 15:58

## 2019-02-24 RX ADMIN — PANTOPRAZOLE SODIUM 40 MILLIGRAM(S): 20 TABLET, DELAYED RELEASE ORAL at 14:19

## 2019-02-24 RX ADMIN — CHLORHEXIDINE GLUCONATE 1 APPLICATION(S): 213 SOLUTION TOPICAL at 05:38

## 2019-02-24 RX ADMIN — QUETIAPINE FUMARATE 50 MILLIGRAM(S): 200 TABLET, FILM COATED ORAL at 18:17

## 2019-02-24 RX ADMIN — MIDODRINE HYDROCHLORIDE 10 MILLIGRAM(S): 2.5 TABLET ORAL at 14:20

## 2019-02-24 RX ADMIN — Medication 1000 MILLIGRAM(S): at 18:00

## 2019-02-24 RX ADMIN — FENTANYL CITRATE 25 MICROGRAM(S): 50 INJECTION INTRAVENOUS at 11:00

## 2019-02-24 RX ADMIN — Medication 100 MILLILITER(S): at 20:00

## 2019-02-24 RX ADMIN — Medication 0.5 MILLIGRAM(S): at 06:25

## 2019-02-24 RX ADMIN — Medication 100 MILLILITER(S): at 22:30

## 2019-02-24 RX ADMIN — PIPERACILLIN AND TAZOBACTAM 25 GRAM(S): 4; .5 INJECTION, POWDER, LYOPHILIZED, FOR SOLUTION INTRAVENOUS at 17:20

## 2019-02-24 RX ADMIN — QUETIAPINE FUMARATE 50 MILLIGRAM(S): 200 TABLET, FILM COATED ORAL at 06:28

## 2019-02-24 RX ADMIN — MIDAZOLAM HYDROCHLORIDE 2 MILLIGRAM(S): 1 INJECTION, SOLUTION INTRAMUSCULAR; INTRAVENOUS at 11:16

## 2019-02-24 RX ADMIN — Medication 400 MILLIGRAM(S): at 17:20

## 2019-02-24 RX ADMIN — Medication 2 PUFF(S): at 10:13

## 2019-02-24 RX ADMIN — Medication 2 PUFF(S): at 21:51

## 2019-02-24 RX ADMIN — MIDODRINE HYDROCHLORIDE 10 MILLIGRAM(S): 2.5 TABLET ORAL at 22:43

## 2019-02-24 RX ADMIN — Medication 0.5 MILLIGRAM(S): at 22:44

## 2019-02-24 RX ADMIN — FENTANYL CITRATE 25 MICROGRAM(S): 50 INJECTION INTRAVENOUS at 11:15

## 2019-02-24 RX ADMIN — HEPARIN SODIUM 5000 UNIT(S): 5000 INJECTION INTRAVENOUS; SUBCUTANEOUS at 06:28

## 2019-02-24 RX ADMIN — Medication 1 PACKET(S): at 13:39

## 2019-02-24 RX ADMIN — Medication 6 MILLIGRAM(S): at 22:44

## 2019-02-24 NOTE — PROGRESS NOTE ADULT - SUBJECTIVE AND OBJECTIVE BOX
Infectious Diseases progress note:    Subjective:  Pt seen earlier today.  Comfortable, awake, alert.  No new fevers or leukocytosis.  No acute o/n events.  s/p tracheostomy tube placement.  Off vent.      ROS:  CONSTITUTIONAL:  No fever, chills, rigors  CARDIOVASCULAR:  No chest pain or palpitations  RESPIRATORY:   No SOB, cough, dyspnea on exertion.  No wheezing  GASTROINTESTINAL:  No abd pain, N/V, diarrhea/constipation  EXTREMITIES:  No swelling or joint pain  GENITOURINARY:  No burning on urination, increased frequency or urgency.  No flank pain  NEUROLOGIC:  No HA, visual disturbances  SKIN: No rashes    Allergies    No Known Allergies    Intolerances        ANTIBIOTICS/RELEVANT:  antimicrobials  piperacillin/tazobactam IVPB. 3.375 Gram(s) IV Intermittent every 12 hours    immunologic:  epoetin kelly Injectable 3000 Unit(s) IV Push <User Schedule>    OTHER:  ALBUTerol    90 MICROgram(s) HFA Inhaler 2 Puff(s) Inhalation every 6 hours PRN  ALPRAZolam 0.5 milliGRAM(s) Oral every 8 hours  chlorhexidine 4% Liquid 1 Application(s) Topical <User Schedule>  heparin  Injectable 5000 Unit(s) SubCutaneous every 12 hours  ipratropium 17 MICROgram(s) HFA Inhaler 2 Puff(s) Inhalation every 6 hours  melatonin 6 milliGRAM(s) Oral at bedtime  midodrine 10 milliGRAM(s) Oral every 8 hours  pantoprazole  Injectable 40 milliGRAM(s) IV Push daily  phenylephrine    Infusion 0.3 MICROgram(s)/kG/Min IV Continuous <Continuous>  psyllium Powder 1 Packet(s) Oral daily  QUEtiapine 50 milliGRAM(s) Oral every 12 hours      Objective:  Vital Signs Last 24 Hrs  T(C): 36.9 (24 Feb 2019 16:03), Max: 36.9 (24 Feb 2019 16:03)  T(F): 98.5 (24 Feb 2019 16:03), Max: 98.5 (24 Feb 2019 16:03)  HR: 106 (24 Feb 2019 18:00) (99 - 112)  BP: 103/62 (24 Feb 2019 18:00) (75/49 - 109/60)  BP(mean): 71 (24 Feb 2019 18:00) (47 - 93)  RR: 24 (24 Feb 2019 18:00) (13 - 30)  SpO2: 94% (24 Feb 2019 18:00) (91% - 100%)    PHYSICAL EXAM:  Constitutional:NAD  Eyes:CHER, EOMI  Ear/Nose/Throat: no thrush, mucositis.  Moist mucous membranes	  Neck:no JVD, no lymphadenopathy, supple, tracheostomy in place  Respiratory: CTA adore  Cardiovascular: S1S2 RRR, no murmurs  Gastrointestinal:soft, nontender,  nondistended (+) BS  Extremities:no e/e/c  Skin:  no rashes, open wounds or ulcerations        LABS:                        9.8    5.30  )-----------( 255      ( 24 Feb 2019 05:13 )             34.1     02-24    138  |  97<L>  |  20  ----------------------------<  127<H>  3.8   |  28  |  3.88<H>    Ca    9.2      24 Feb 2019 05:13    TPro  6.9  /  Alb  2.8<L>  /  TBili  0.3  /  DBili  x   /  AST  48<H>  /  ALT  24  /  AlkPhos  177<H>  02-24            Vancomycin Level, Random (02.23.19 @ 10:25)    Vancomycin Level, Random: 13.4: Therapeutic ranges have not been established for random  vancomycin. Interpret results in context of patient's  clinical condition and time sample was drawn relative to  peak and trough therapeutic ranges. Therapeutic ranges for  peak vancomycin are 25-50 and for trough vancomycin 10-20  with 15-20 mcg/mL used for complicated infections. ug/mL                  MICROBIOLOGY:    no new culture data      RADIOLOGY & ADDITIONAL STUDIES:    < from: Xray Chest 1 View- PORTABLE-Urgent (02.24.19 @ 14:04) >  FINDINGS:     Cardiac silhouette mildly enlarged. Small bilateral pleural effusions.   Left-sided subcutaneous defibrillator. Tracheostomy tube. No pneumothorax.    IMPRESSION:   Tracheostomy tube placement.    < end of copied text >

## 2019-02-24 NOTE — BRIEF OPERATIVE NOTE - OPERATION/FINDINGS
Tracheostomy reinserted via existing tracheostomy stoma with ease.   Airways open without secretions.
good translumination  end tidal after trach, good position on bronch

## 2019-02-24 NOTE — PROVIDER CONTACT NOTE (CRITICAL VALUE NOTIFICATION) - ACTION/TREATMENT ORDERED:
BIPAP
No new interventions ordered by MD at this time. Safety maintained. Will continue to monitor.
Contact precautions initiated per provider orders.

## 2019-02-24 NOTE — PROGRESS NOTE ADULT - ASSESSMENT
58M who underwent a FB, R thoracotomy, decortication, chest wall reconstruction, lat flap with Dr Pickering on 10/11/18. He was admitted on 1/6/19 to [facility] with a R pleural effusion and respiratory failure,  A R PTC was placed there and he was intubated.  He still had a R SANDY drain in place and he was transferred to [facility]. Patient was found to have aspirated a foreign body and +MRSA empyema. He was treated and discharged on 1/22/2019.     He was found by his home nurse to be in acute distress and recommended to proceed to the ED.  At presentation, he was found to be in acute hypercarbic respiratory failure complicated by hemodynamic instability requiring emergent intubation and pressor support. (28 Jan 2019 15:34)    Pt had Tm 104.2 (on 1/29), tachycardia, hypotension.  Pt has now been extubated, s/p trach/PEG on 1/31.  CT chest shows complete L sided atelectasis with mucous plugging of right lower lobe  bronchus with near complete atelectasis.      ID consult called for antibiotic managment.     Recommend:    - Pt p/w severe sepsis with shock and respiratory distress.  Pt with high fevers.  Found to have complete and near complete atelectasis of L and R lung respectively with mucous plugging.  Agree with broad spectrum abx to cover for post-obstructive pna.  Repeat CT chest from 2/3 shows improved aeration.      - Cultures results show:    2/5: bcx - NGTD @ 48hrs  2/1: endotrach - MRSA,  Klebsiella (pan sens), yeast (gram stain only)  2/1: bcx - CNS  1/29:  lung cx - nl resp milton  1/28: bcx - NGTD    - Agree with vancomycin and zosyn.   Maintain vanco trough between 15-20.  Dose vanco by level, post HD.  Repeat blood cultures from 2/5 NGTD.  CNS likely contaminant.    - Cont care as per CTU. Continue hemodynamic monitoring.  s/p tracheostomy, now on trach collar.   Continue bronchodilators, pulmonary toilet, Continue GI prophylaxis with Protonix.   Monitor temp curve and WBC.    - Given severe sepsis and shock at presentation, recurrent involvement of MRSA, recommend long course of IV abx treatment (4 week course).  Completed fluconazole 7 days bet 2/2 - 2/8 (suspect yeast is likely a colonizer).      - Cont treat of MRSA/KLeb.  AFter discharge, Abx may be dosed with pt's HD sessions (can change zosyn to cefepime upon discharge), no need for picc line.  Complete total 4 weeks (1/28 through 2/24).      Pt pending placement.  No new ID recs at this time.   Last vanco dosed on 2/23.  d/c abx today    Will follow,    Anabel Chaahl  [phone]

## 2019-02-24 NOTE — BRIEF OPERATIVE NOTE - POST-OP DX
Acute on chronic respiratory failure with hypoxia and hypercapnia  01/31/2019    Active  Dimas Azul
Acute on chronic respiratory failure with hypoxia and hypercapnia  01/31/2019    Active  Dimas Azul

## 2019-02-24 NOTE — BRIEF OPERATIVE NOTE - PROCEDURE
<<-----Click on this checkbox to enter Procedure Tracheostomy  01/31/2019  Pt underwent reinsertion of Shiley Cuffed 4 through existing tracheostomy stoma.  Active  Dimas Azul  Bronchoscopy  01/31/2019    Active  Dimas Azul

## 2019-02-24 NOTE — PROGRESS NOTE ADULT - SUBJECTIVE AND OBJECTIVE BOX
CLAUDIA HAN                     MRN-[id number]    HPI:  Mr. Han is a 58M who underwent a FB, R thoracotomy, decortication, chest wall reconstruction, lat flap with Dr Pickering on 10/11/18. He was admitted on 1/6/19 to [facility] with a R pleural effusion and respiratory failure,  A R PTC was placed there and he was intubated.  He still had a R SANDY drain in place and he was transferred to [facility]. Patient was found to have aspirated a foreign body and +MRSA empyema. He was treated and discharged on 1/22/2019.     He was found by his home nurse to be in acute distress and recommended to proceed to the ED.  At presentation, he was found to be in acute hypercarbic respiratory failure complicated by hemodynamic instability requiring emergent intubation and pressor support. (28 Jan 2019 15:34)    Procedure:        Trach and PEG  1/31/2019  Bronchoscopy   1/29/2019  Right thoracotomy, resection of portion of R 7th rib, evacuation of infected hemothorax, takedown of pleurocutaneous fistula  10/11/2018      Issues:  Respiratory failure , hypercapnia s/p Trach and PEG  Hypotension on Midodrine  ESRD / HD  Cardiomyopathy  HLD  BPH  Anxiety / Agitation        PAST MEDICAL & SURGICAL HISTORY:  Smoking hx  Cardiomyopathy  Wound: right chest  ICD (implantable cardioverter-defibrillator) in place  OA (osteoarthritis)  HLD (hyperlipidemia)  BPH (benign prostatic hyperplasia)  Pleural effusion  HTN (hypertension)  ESRD (end stage renal disease)  H/O chest wound: right  S/P thoracotomy: FB, R thoracotomy, decortication, chest wall reconstruction, lat flap  History of wound infection: right chest wall - revision 5/18 and again in 7/18  History of implantable cardioverter-defibrillator (ICD) placement: pt unsure when placed  H/O bilateral hip replacements: 2008, 2009            VITAL SIGNS:  Vital Signs Last 24 Hrs  T(C): 36.6 (24 Feb 2019 03:00), Max: 36.6 (23 Feb 2019 19:00)  T(F): 97.8 (24 Feb 2019 03:00), Max: 97.8 (23 Feb 2019 19:00)  HR: 109 (24 Feb 2019 06:04) (96 - 111)  BP: 97/64 (24 Feb 2019 05:00) (72/52 - 131/61)  BP(mean): 71 (24 Feb 2019 05:00) (47 - 80)  RR: 19 (24 Feb 2019 05:00) (13 - 23)  SpO2: 100% (24 Feb 2019 06:04) (91% - 100%)    I/Os:   I&O's Detail    22 Feb 2019 07:01  -  23 Feb 2019 07:00  --------------------------------------------------------  IN:    Enteral Tube Flush: 200 mL    IV PiggyBack: 300 mL    Nepro with Carb Steady: 840 mL    sodium chloride 0.9%: 10 mL  Total IN: 1350 mL    OUT:  Total OUT: 0 mL    Total NET: 1350 mL      23 Feb 2019 07:01  -  24 Feb 2019 06:42  --------------------------------------------------------  IN:    Enteral Tube Flush: 40 mL    Nepro with Carb Steady: 630 mL    Other: 400 mL    phenylephrine   Infusion: 54 mL  Total IN: 1124 mL    OUT:    Other: 1400 mL  Total OUT: 1400 mL    Total NET: -276 mL          CAPILLARY BLOOD GLUCOSE          =======================MEDICATIONS===================  MEDICATIONS  (STANDING):  ALPRAZolam 0.5 milliGRAM(s) Oral every 8 hours  chlorhexidine 4% Liquid 1 Application(s) Topical <User Schedule>  epoetin kelly Injectable 3000 Unit(s) IV Push <User Schedule>  heparin  Injectable 5000 Unit(s) SubCutaneous every 12 hours  ipratropium 17 MICROgram(s) HFA Inhaler 2 Puff(s) Inhalation every 6 hours  melatonin 6 milliGRAM(s) Oral at bedtime  midodrine 10 milliGRAM(s) Oral every 8 hours  pantoprazole  Injectable 40 milliGRAM(s) IV Push daily  phenylephrine    Infusion 0.3 MICROgram(s)/kG/Min (6.75 mL/Hr) IV Continuous <Continuous>  piperacillin/tazobactam IVPB. 3.375 Gram(s) IV Intermittent every 12 hours  psyllium Powder 1 Packet(s) Oral daily  QUEtiapine 50 milliGRAM(s) Oral every 12 hours    MEDICATIONS  (PRN):  ALBUTerol    90 MICROgram(s) HFA Inhaler 2 Puff(s) Inhalation every 6 hours PRN Shortness of Breath and/or Wheezing        PHYSICAL EXAM============================  General:                      Became lethargic overnight On BiPAP now  Neuro:                            Moving all extremities to commands.   Respiratory:	Air entry fair and  bilateral conducted sounds                                           Effort even and unlabored.  CV:		Regular rate and rhythm. Normal S1/S2                                          Distal pulses present.  Abdomen:	                     Soft, non-distended. Bowel sounds present   Skin:		No rash.  Extremities:	Warm, no cyanosis or edema.  Palpable pulses    ============================LABS=========================                        9.8    5.30  )-----------( 255      ( 24 Feb 2019 05:13 )             34.1     02-24    138  |  97<L>  |  20  ----------------------------<  127<H>  3.8   |  28  |  3.88<H>    Ca    9.2      24 Feb 2019 05:13    TPro  6.9  /  Alb  2.8<L>  /  TBili  0.3  /  DBili  x   /  AST  48<H>  /  ALT  24  /  AlkPhos  177<H>  02-24    LIVER FUNCTIONS - ( 24 Feb 2019 05:13 )  Alb: 2.8 g/dL / Pro: 6.9 g/dL / ALK PHOS: 177 u/L / ALT: 24 u/L / AST: 48 u/L / GGT: x             ABG - ( 24 Feb 2019 05:13 )  pH, Arterial: 7.24  pH, Blood: x     /  pCO2: 78    /  pO2: 135   / HCO3: 28    / Base Excess: 5.0   /  SaO2: 98.7                  ============================IMAGING STUDIES=========================  < from: Xray Chest 1 View- PORTABLE-Routine (02.23.19 @ 06:50) >  Previously seen tracheostomy tube not present currently.    Persistent small bilateral pleural reactions with adjacent basilar   consolidative changes. Clear upper lungs. No pneumothorax.    Stable left chest wall subcutaneous ICD and cardiomegaly.    Midline normal caliber trachea.    Stable osseous structures.    Surgical clips again seen over lower right hemithorax and in right   axillary region.    A/P:  58M who underwent a FB, R thoracotomy, decortication, chest wall reconstruction, lat flap with Dr Pickering on 10/11/18. He was admitted on 1/6/19 to [facility] with a R pleural effusion and respiratory failure,  A R PTC was placed there and he was intubated.  He still had a R SANDY drain in place and he was transferred to [facility]. Patient was found to have aspirated a foreign body and +MRSA empyema. He was treated and discharged on 1/22/2019.   He was found by his home nurse to be in acute distress and recommended to proceed to the ED.  At presentation, he was found to be in acute hypercarbic respiratory failure complicated by hemodynamic instability requiring emergent intubation and pressor support. (28 Jan 2019 15:34)    Procedure:        Trach and PEG  1/31/2019  Bronchoscopy   1/29/2019  Right thoracotomy, resection of portion of R 7th rib, evacuation of infected hemothorax, takedown of pleurocutaneous fistula  10/11/2018    Issues:  Respiratory failure s/p Trach and PEG  Hypotension on Midodrine  ESRD / HD  Cardiomyopathy  HLD  BPH             Anxiety / intermittent Agitation  ====================== NEUROLOGY=======================  Pain control with Tylenol IV / Fentanyl PRN    Agitation /Anxiety: Continue Xanax and Seroquel.  ==================== RESPIRATORY========================  Pt is on trach collar for 5days, was decannulated on 2/22,   Lethargic overnight, Pco2 78, hypercapnia, on BiPAP, casey well.   Comfortable, no evidence of distress.  Continuous pulse oximetry monitoring  Continue bronchodilators, pulmonary toilet  Head of bed elevation to 30-40 degrees  ====================CARDIOVASCULAR=====================  Continue hemodynamic monitoring/ telemetry  Hypotension on Midodrine 10mg/TID  ===================== RENAL ============================  Monitor I/Os, BUN/ Cr  and electrolytes   HD as per renal / TIW    ==================== GASTROINTESTINAL===================  On  tube feeds Nepro 35 ml/hr , tolerating well  Continue GI prophylaxis with  Protonix   Zofran / Reglan for nausea - PRN	   Flush PEG with 30cc sterile water Q4hrs                                   =======================    ENDOCRIN  =====================  Glycemic monitoring  F/S with coverage  ===================HEMATOLOGIC/ONCOLOGIC =============    No signs of active bleeding.   Follow CBC, coags  in AM  DVT prophylaxis with SCD, sc Heparin  ========================INFECTIOUS DISEASE===============  Monitor for fever / leukocytosis. Chest tube/bilb d/c's  All surgical incision / chest tube  sites look clean   Bronchoscopy on 1/29 showed copious greenish secretions - Continue pulmonary toilet    BAL - MRSA, Continue Vanco based on level  + Zosyn for 4 weeks ( until 2/24/19). Blood cultures are negative    I.D f/u    Pertinent clinical, laboratory, radiographic, hemodynamic, echocardiographic, respiratory data, microbiologic data and chart were reviewed and analyzed frequently throughout the course of the day and night. GI and DVT prophylaxis, glycemic control, head of bed elevation and skin care issues were addressed.  Patient seen, examined and plan discussed with CT Surgery / CTICU team during rounds.  Pt remains critically ill in imminent risk of  deterioration and requires very careful cardio- pulmonary monitoring and support.    I have spent   35   minutes of critical care time with this pt between   12 am  and    9 am         minutes spent on total encounter; more than 50% of the visit was spent counseling and/or coordinating care by the attending physician.  Dave MILLANP

## 2019-02-24 NOTE — BRIEF OPERATIVE NOTE - PRE-OP DX
Acute on chronic respiratory failure with hypercapnia  01/31/2019    Active  Dimas Azul  Dysphagia, unspecified type  01/31/2019    Active  Dimas Azul  Malnutrition related to chronic disease  01/31/2019    Active  Dimas Azul
Acute on chronic respiratory failure with hypercapnia  01/31/2019    Active  Dimas Azul  Dysphagia, unspecified type  01/31/2019    Active  Dimas Azul  Malnutrition related to chronic disease  01/31/2019    Active  Dimas Azul

## 2019-02-25 LAB
ANION GAP SERPL CALC-SCNC: 18 MMO/L — HIGH (ref 7–14)
BUN SERPL-MCNC: 28 MG/DL — HIGH (ref 7–23)
CALCIUM SERPL-MCNC: 9.7 MG/DL — SIGNIFICANT CHANGE UP (ref 8.4–10.5)
CHLORIDE SERPL-SCNC: 98 MMOL/L — SIGNIFICANT CHANGE UP (ref 98–107)
CO2 SERPL-SCNC: 27 MMOL/L — SIGNIFICANT CHANGE UP (ref 22–31)
CREAT SERPL-MCNC: 4.96 MG/DL — HIGH (ref 0.5–1.3)
GLUCOSE SERPL-MCNC: 106 MG/DL — HIGH (ref 70–99)
HCT VFR BLD CALC: 35.6 % — LOW (ref 39–50)
HGB BLD-MCNC: 10.5 G/DL — LOW (ref 13–17)
MCHC RBC-ENTMCNC: 29.5 % — LOW (ref 32–36)
MCHC RBC-ENTMCNC: 31.6 PG — SIGNIFICANT CHANGE UP (ref 27–34)
MCV RBC AUTO: 107.2 FL — HIGH (ref 80–100)
NRBC # FLD: 0 K/UL — LOW (ref 25–125)
PLATELET # BLD AUTO: 274 K/UL — SIGNIFICANT CHANGE UP (ref 150–400)
PMV BLD: 10.1 FL — SIGNIFICANT CHANGE UP (ref 7–13)
POTASSIUM SERPL-MCNC: 3.9 MMOL/L — SIGNIFICANT CHANGE UP (ref 3.5–5.3)
POTASSIUM SERPL-SCNC: 3.9 MMOL/L — SIGNIFICANT CHANGE UP (ref 3.5–5.3)
RBC # BLD: 3.32 M/UL — LOW (ref 4.2–5.8)
RBC # FLD: 17.7 % — HIGH (ref 10.3–14.5)
SODIUM SERPL-SCNC: 143 MMOL/L — SIGNIFICANT CHANGE UP (ref 135–145)
WBC # BLD: 6.09 K/UL — SIGNIFICANT CHANGE UP (ref 3.8–10.5)
WBC # FLD AUTO: 6.09 K/UL — SIGNIFICANT CHANGE UP (ref 3.8–10.5)

## 2019-02-25 PROCEDURE — 99232 SBSQ HOSP IP/OBS MODERATE 35: CPT | Mod: GC

## 2019-02-25 PROCEDURE — 71045 X-RAY EXAM CHEST 1 VIEW: CPT | Mod: 26

## 2019-02-25 PROCEDURE — 99291 CRITICAL CARE FIRST HOUR: CPT

## 2019-02-25 RX ORDER — LACTULOSE 10 G/15ML
30 SOLUTION ORAL ONCE
Qty: 0 | Refills: 0 | Status: COMPLETED | OUTPATIENT
Start: 2019-02-25 | End: 2019-02-25

## 2019-02-25 RX ADMIN — QUETIAPINE FUMARATE 50 MILLIGRAM(S): 200 TABLET, FILM COATED ORAL at 09:22

## 2019-02-25 RX ADMIN — Medication 2 PUFF(S): at 10:35

## 2019-02-25 RX ADMIN — Medication 400 MILLIGRAM(S): at 11:41

## 2019-02-25 RX ADMIN — Medication 1000 MILLIGRAM(S): at 12:10

## 2019-02-25 RX ADMIN — LACTULOSE 30 GRAM(S): 10 SOLUTION ORAL at 11:41

## 2019-02-25 RX ADMIN — PANTOPRAZOLE SODIUM 40 MILLIGRAM(S): 20 TABLET, DELAYED RELEASE ORAL at 11:41

## 2019-02-25 RX ADMIN — Medication 2 PUFF(S): at 03:44

## 2019-02-25 RX ADMIN — Medication 2 PUFF(S): at 15:43

## 2019-02-25 RX ADMIN — QUETIAPINE FUMARATE 50 MILLIGRAM(S): 200 TABLET, FILM COATED ORAL at 21:33

## 2019-02-25 RX ADMIN — Medication 1 PACKET(S): at 11:41

## 2019-02-25 RX ADMIN — CHLORHEXIDINE GLUCONATE 1 APPLICATION(S): 213 SOLUTION TOPICAL at 06:56

## 2019-02-25 RX ADMIN — Medication 0.5 MILLIGRAM(S): at 06:55

## 2019-02-25 RX ADMIN — Medication 6 MILLIGRAM(S): at 21:32

## 2019-02-25 RX ADMIN — MIDODRINE HYDROCHLORIDE 10 MILLIGRAM(S): 2.5 TABLET ORAL at 06:56

## 2019-02-25 RX ADMIN — MIDODRINE HYDROCHLORIDE 10 MILLIGRAM(S): 2.5 TABLET ORAL at 13:22

## 2019-02-25 RX ADMIN — Medication 0.5 MILLIGRAM(S): at 17:45

## 2019-02-25 RX ADMIN — HEPARIN SODIUM 5000 UNIT(S): 5000 INJECTION INTRAVENOUS; SUBCUTANEOUS at 17:45

## 2019-02-25 RX ADMIN — Medication 2 PUFF(S): at 22:35

## 2019-02-25 RX ADMIN — MIDODRINE HYDROCHLORIDE 10 MILLIGRAM(S): 2.5 TABLET ORAL at 21:32

## 2019-02-25 RX ADMIN — HEPARIN SODIUM 5000 UNIT(S): 5000 INJECTION INTRAVENOUS; SUBCUTANEOUS at 06:55

## 2019-02-25 NOTE — SWALLOW BEDSIDE ASSESSMENT ADULT - COMMENTS
58M who underwent a FB, R thoracotomy, decortication, chest wall reconstruction, lat flap with Dr Pickering on 10/11/18. He was admitted on 1/6/19 to [facility] with a R pleural effusion and respiratory failure,  A R PTC was placed there and he was intubated.  He still had a R SANDY drain in place and he was transferred to [facility]. Patient was found to have aspirated a foreign body and +MRSA empyema. He was treated and discharged on 1/22/2019.     He was found by his home nurse to be in acute distress and recommended to proceed to the ED.  At presentation, he was found to be in acute hypercarbic respiratory failure complicated by hemodynamic instability requiring emergent intubation and pressor support. (28 Jan 2019 15:34)    Procedure:        Trach recannulated 2/24/2019  Decannulated 2/22/19  Trach and PEG  1/31/2019  Bronchoscopy   1/29/2019  Right thoracotomy, resection of portion of R 7th rib, evacuation of infected hemothorax, takedown of pleurocutaneous fistula  10/11/2018    Patient is with #4 LPC Tracheostomy (Deflated Cuff Status) with  in place. However, Patient requires Mechanical Ventilation Support at night time. Trach Collar or Capping for Daytime only.      Baseline Stats:  SPO2 100% with Cap in place; RR ~ 20 and HR ~100  Patient is able to produce voicing with  placed onto Tracheostomy.     [employer] #[phone]

## 2019-02-25 NOTE — SWALLOW BEDSIDE ASSESSMENT ADULT - SWALLOW EVAL: DIAGNOSIS
Patient was given PO trials of Puree and Honey Thick Liquids with Green Food Coloring to assess for Gross Aspiration given Patient is with Tracheostomy in place.  Patient was given PO trials of Puree and Honey Thick Liquids (2 ounces each) mixed with Green Food Coloring and exhibited adequate bolus manipulation and transfer with adequate oral clearance. There is laryngeal elevation upon palpation with repeated effortful swallow noted which may be indicative of pharyngeal stasis post primary swallow.  Patient was not provided with Tracheal suctioning to assess for Green Dye return given patient requested not to suction due to discomfort and may cause him to vomit post PO intake.  Nursing will monitor for any delayed green dye coming from the Tracheostomy during Tracheostomy care.

## 2019-02-25 NOTE — SWALLOW BEDSIDE ASSESSMENT ADULT - NS SPL SWALLOW CLINIC TRIAL FT
Patient requested not to suction via tracheally due to discomfort and fear of vomiting given patient had oral intake since being hospitalized.

## 2019-02-26 PROCEDURE — 99291 CRITICAL CARE FIRST HOUR: CPT

## 2019-02-26 PROCEDURE — 74230 X-RAY XM SWLNG FUNCJ C+: CPT | Mod: 26

## 2019-02-26 PROCEDURE — 71045 X-RAY EXAM CHEST 1 VIEW: CPT | Mod: 26

## 2019-02-26 RX ORDER — POLYETHYLENE GLYCOL 3350 17 G/17G
17 POWDER, FOR SOLUTION ORAL AT BEDTIME
Qty: 0 | Refills: 0 | Status: DISCONTINUED | OUTPATIENT
Start: 2019-02-26 | End: 2019-02-28

## 2019-02-26 RX ADMIN — HEPARIN SODIUM 5000 UNIT(S): 5000 INJECTION INTRAVENOUS; SUBCUTANEOUS at 06:39

## 2019-02-26 RX ADMIN — QUETIAPINE FUMARATE 50 MILLIGRAM(S): 200 TABLET, FILM COATED ORAL at 13:48

## 2019-02-26 RX ADMIN — Medication 2 PUFF(S): at 09:39

## 2019-02-26 RX ADMIN — PANTOPRAZOLE SODIUM 40 MILLIGRAM(S): 20 TABLET, DELAYED RELEASE ORAL at 14:01

## 2019-02-26 RX ADMIN — Medication 0.5 MILLIGRAM(S): at 13:48

## 2019-02-26 RX ADMIN — MIDODRINE HYDROCHLORIDE 10 MILLIGRAM(S): 2.5 TABLET ORAL at 13:49

## 2019-02-26 RX ADMIN — Medication 2 PUFF(S): at 16:21

## 2019-02-26 RX ADMIN — Medication 2 PUFF(S): at 03:46

## 2019-02-26 RX ADMIN — CHLORHEXIDINE GLUCONATE 1 APPLICATION(S): 213 SOLUTION TOPICAL at 07:00

## 2019-02-26 RX ADMIN — Medication 2 PUFF(S): at 21:24

## 2019-02-26 RX ADMIN — POLYETHYLENE GLYCOL 3350 17 GRAM(S): 17 POWDER, FOR SOLUTION ORAL at 22:11

## 2019-02-26 RX ADMIN — MIDODRINE HYDROCHLORIDE 10 MILLIGRAM(S): 2.5 TABLET ORAL at 22:10

## 2019-02-26 RX ADMIN — HEPARIN SODIUM 5000 UNIT(S): 5000 INJECTION INTRAVENOUS; SUBCUTANEOUS at 18:02

## 2019-02-26 RX ADMIN — MIDODRINE HYDROCHLORIDE 10 MILLIGRAM(S): 2.5 TABLET ORAL at 06:39

## 2019-02-26 RX ADMIN — Medication 1 PACKET(S): at 13:48

## 2019-02-26 RX ADMIN — Medication 6 MILLIGRAM(S): at 22:10

## 2019-02-26 RX ADMIN — QUETIAPINE FUMARATE 50 MILLIGRAM(S): 200 TABLET, FILM COATED ORAL at 22:10

## 2019-02-26 NOTE — PROGRESS NOTE ADULT - SUBJECTIVE AND OBJECTIVE BOX
Infectious Diseases progress note:    Subjective:  Events noted.  Pt completed abx.  Resting comfortably.  s/p swallow eval today, no aspiration    ROS:  CONSTITUTIONAL:  No fever, chills, rigors  CARDIOVASCULAR:  No chest pain or palpitations  RESPIRATORY:   No SOB, cough, dyspnea on exertion.  No wheezing  GASTROINTESTINAL:  No abd pain, N/V, diarrhea/constipation  EXTREMITIES:  No swelling or joint pain  GENITOURINARY:  No burning on urination, increased frequency or urgency.  No flank pain  NEUROLOGIC:  No HA, visual disturbances  SKIN: No rashes    Allergies    No Known Allergies    Intolerances        ANTIBIOTICS/RELEVANT:  antimicrobials    immunologic:    OTHER:  ALBUTerol    90 MICROgram(s) HFA Inhaler 2 Puff(s) Inhalation every 6 hours PRN  ALPRAZolam 0.5 milliGRAM(s) Oral every 8 hours  bisacodyl Suppository 10 milliGRAM(s) Rectal daily PRN  chlorhexidine 4% Liquid 1 Application(s) Topical <User Schedule>  heparin  Injectable 5000 Unit(s) SubCutaneous every 12 hours  ipratropium 17 MICROgram(s) HFA Inhaler 2 Puff(s) Inhalation every 6 hours  melatonin 6 milliGRAM(s) Oral at bedtime  midodrine 10 milliGRAM(s) Oral every 8 hours  pantoprazole  Injectable 40 milliGRAM(s) IV Push daily  phenylephrine    Infusion 0.3 MICROgram(s)/kG/Min IV Continuous <Continuous>  polyethylene glycol 3350 17 Gram(s) Oral at bedtime  psyllium Powder 1 Packet(s) Oral daily  QUEtiapine 50 milliGRAM(s) Oral every 12 hours  simethicone 80 milliGRAM(s) Chew every 6 hours      Objective:  Vital Signs Last 24 Hrs  T(C): 36.6 (27 Feb 2019 08:00), Max: 37.2 (27 Feb 2019 04:00)  T(F): 97.9 (27 Feb 2019 08:00), Max: 99 (27 Feb 2019 04:00)  HR: 110 (27 Feb 2019 09:00) (78 - 110)  BP: 82/53 (27 Feb 2019 09:00) (79/60 - 105/61)  BP(mean): 59 (27 Feb 2019 09:00) (59 - 74)  RR: 25 (27 Feb 2019 09:00) (18 - 29)  SpO2: 93% (27 Feb 2019 09:00) (92% - 100%)    PHYSICAL EXAM:  Constitutional:NAD  Eyes:CHER, EOMI  Ear/Nose/Throat: no thrush, mucositis.  Moist mucous membranes	  Neck:no JVD, no lymphadenopathy, supple, tracheostomy  Respiratory: CTA adore  Cardiovascular: S1S2 RRR, no murmurs  Gastrointestinal:soft, nontender,  nondistended (+) BS  Extremities:no e/e/c  Skin:  no rashes, open wounds or ulcerations        LABS:                  Vancomycin Level, Random:  ug/mL (02-23 @ 10:25)                  MICROBIOLOGY:    no new culture data      RADIOLOGY & ADDITIONAL STUDIES:    < from: Xray Chest 1 View- PORTABLE-Routine (02.26.19 @ 06:18) >  INTERPRETATION:     Tracheostomy tube in place. Bilateral small loculated effusions   unchanged. No focal consolidations. Subcutaneous AICD in position.      COMPARISON:  February 25      IMPRESSION:  Small loculated effusions with tracheostomy tube unchanged.    < end of copied text >

## 2019-02-26 NOTE — SWALLOW VFSS/MBS ASSESSMENT ADULT - COMMENTS
58M who underwent a FB, R thoracotomy, decortication, chest wall reconstruction, lat flap with Dr Pickering on 10/11/18. He was admitted on 1/6/19 to [facility] with a R pleural effusion and respiratory failure,  A R PTC was placed there and he was intubated.  He still had a R SANDY drain in place and he was transferred to [facility]. Patient was found to have aspirated a foreign body and +MRSA empyema. He was treated and discharged on 1/22/2019.     He was found by his home nurse to be in acute distress and recommended to proceed to the ED.  At presentation, he was found to be in acute hypercarbic respiratory failure complicated by hemodynamic instability requiring emergent intubation and pressor support. (28 Jan 2019 15:34)    Procedure:        Trach recannulated 2/24/2019  Decannulated 2/22/19  Trach and PEG  1/31/2019  Bronchoscopy   1/29/2019  Right thoracotomy, resection of portion of R 7th rib, evacuation of infected hemothorax, takedown of pleurocutaneous fistula  10/11/2018    Patient is with #4 LPC Tracheostomy (Deflated Cuff Status) with  in place. However, Patient requires Mechanical Ventilation Support at night time. Trach Collar or Capping for Daytime only.     Patient was seen for a Clinical Swallow Evaluation on 2/25/2019 (See Consult).

## 2019-02-26 NOTE — PROGRESS NOTE ADULT - SUBJECTIVE AND OBJECTIVE BOX
CLAUDIA HAN                     MRN-[id number]    HPI:  Mr. Han is a 58M who underwent a FB, R thoracotomy, decortication, chest wall reconstruction, lat flap with Dr Pickering on 10/11/18. He was admitted on 1/6/19 to [facility] with a R pleural effusion and respiratory failure,  A R PTC was placed there and he was intubated.  He still had a R SANDY drain in place and he was [facility]. Patient was found to have aspirated a foreign body and +MRSA empyema. He was treated and discharged on 1/22/2019.     He was found by his home nurse to be in acute distress and recommended to proceed to the ED.  At presentation, he was found to be in acute hypercarbic respiratory failure complicated by hemodynamic instability requiring emergent intubation and pressor support. (28 Jan 2019 15:34)      Procedure:        Trach recannulated 2/24/2019  Decannulated 2/22/19  Trach and PEG  1/31/2019  Bronchoscopy   1/29/2019  Right thoracotomy, resection of portion of R 7th rib, evacuation of infected hemothorax, takedown of pleurocutaneous fistula  10/11/2018      Issues:  Respiratory failure , hypercapnia s/p Trach and PEG  Hypotension on Midodrine  ESRD / HD  Cardiomyopathy  HLD  BPH  Anxiety / Agitation                 Home Medications:  aspirin 81 mg oral tablet: 1 tab(s) orally once a day  (30 Oct 2018 14:39)  Breo Ellipta 100 mcg-25 mcg/inh inhalation powder: 1 puff(s) inhaled once a day patient denies using it (11 Oct 2018 08:38)  Calcium 500: 1 tab(s) orally 2 times a day (11 Oct 2018 08:38)  docusate sodium 100 mg oral tablet: 1 tab(s) orally 2 times a day, As Needed (11 Oct 2018 08:39)  folic acid 1 mg oral tablet: 1 tab(s) orally once a day (11 Oct 2018 08:38)  Mag-Ox 400 oral tablet: 1 tab(s) orally once a day (11 Oct 2018 08:39)  Multiple Vitamins oral tablet: 1 tab(s) orally once a day  (30 Oct 2018 14:39)  Namenda 10 mg oral tablet: 1 tab(s) orally 2 times a day (11 Oct 2018 08:38)  Nephplex Rx oral tablet: 1 tab(s) orally once a day (11 Oct 2018 08:39)  nortriptyline 50 mg oral capsule: 1 cap(s) orally once a day (11 Oct 2018 08:39)    PAST MEDICAL & SURGICAL HISTORY:  Smoking hx  Cardiomyopathy  Wound: right chest  ICD (implantable cardioverter-defibrillator) in place  OA (osteoarthritis)  HLD (hyperlipidemia)  BPH (benign prostatic hyperplasia)  Pleural effusion  HTN (hypertension)  ESRD (end stage renal disease)  H/O chest wound: right  S/P thoracotomy: FB, R thoracotomy, decortication, chest wall reconstruction, lat flap  History of wound infection: right chest wall - revision 5/18 and again in 7/18  History of implantable cardioverter-defibrillator (ICD) placement: pt unsure when placed  H/O bilateral hip replacements: 2008, 2009            VITAL SIGNS:  Vital Signs Last 24 Hrs  T(C): 36.7 (26 Feb 2019 06:00), Max: 36.8 (26 Feb 2019 03:00)  T(F): 98.1 (26 Feb 2019 06:00), Max: 98.3 (26 Feb 2019 03:00)  HR: 94 (26 Feb 2019 07:00) (94 - 120)  BP: 96/69 (26 Feb 2019 07:00) (83/58 - 113/71)  BP(mean): 76 (26 Feb 2019 07:00) (57 - 93)  RR: 17 (26 Feb 2019 07:00) (14 - 23)  SpO2: 100% (26 Feb 2019 07:00) (91% - 100%)    I/Os:   I&O's Detail    25 Feb 2019 07:01  -  26 Feb 2019 07:00  --------------------------------------------------------  IN:    Enteral Tube Flush: 430 mL    Nepro with Carb Steady: 805 mL  Total IN: 1235 mL    OUT:  Total OUT: 0 mL    Total NET: 1235 mL          CAPILLARY BLOOD GLUCOSE          =======================MEDICATIONS===================  MEDICATIONS  (STANDING):  ALPRAZolam 0.5 milliGRAM(s) Oral every 8 hours  chlorhexidine 4% Liquid 1 Application(s) Topical <User Schedule>  epoetin kelly Injectable 3000 Unit(s) IV Push <User Schedule>  heparin  Injectable 5000 Unit(s) SubCutaneous every 12 hours  ipratropium 17 MICROgram(s) HFA Inhaler 2 Puff(s) Inhalation every 6 hours  melatonin 6 milliGRAM(s) Oral at bedtime  midodrine 10 milliGRAM(s) Oral every 8 hours  pantoprazole  Injectable 40 milliGRAM(s) IV Push daily  phenylephrine    Infusion 0.3 MICROgram(s)/kG/Min (6.75 mL/Hr) IV Continuous <Continuous>  psyllium Powder 1 Packet(s) Oral daily  QUEtiapine 50 milliGRAM(s) Oral every 12 hours    MEDICATIONS  (PRN):  ALBUTerol    90 MICROgram(s) HFA Inhaler 2 Puff(s) Inhalation every 6 hours PRN Shortness of Breath and/or Wheezing      =======================VENTILATOR SETTINGS===================  Mode: CPAP with PS  FiO2: 40  PEEP: 5  PS: 15  MAP: 10      PHYSICAL EXAM============================  General:               Pt awake / alert                                              Neuro:                 Awake / alert, following commands                           Cardiovascular:    S1 & S2, regular                           Respiratory:         Air entry is fair, some conducted sounds                          GI:                          Soft and nontender, Bowel sounds active                            Ext:                        No cyanosis or edema             ============================LABS=========================                        10.5   6.09  )-----------( 274      ( 25 Feb 2019 06:00 )             35.6     02-25    143  |  98  |  28<H>  ----------------------------<  106<H>  3.9   |  27  |  4.96<H>    Ca    9.7      25 Feb 2019 06:00                ============================IMAGING STUDIES=========================    < from: Xray Chest 1 View- PORTABLE-Routine (02.25.19 @ 07:38) >  The tracheostomy and subcutaneous AICD are unchanged.    Likewise, small, loculated bilateral effusions are present. Upper lung   fields are clear. Heart size is stable. No vascular congestion to   indicate CHF.        COMPARISON:  February 24      IMPRESSION:  Tracheostomy tube in place with bilateral, stable small   loculated effusions.     58M who underwent a FB, R thoracotomy, decortication, chest wall reconstruction, lat flap with Dr Pickering on 10/11/18. He was admitted on 1/6/19 to [facility] with a R pleural effusion and respiratory failure,  A R PTC was placed there and he was intubated.  He still had a R SANDY drain in place and he was [facility]. Patient was found to have aspirated a foreign body and +MRSA empyema. He was treated and discharged on 1/22/2019.     He was found by his home nurse to be in acute distress and recommended to proceed to the ED.  At presentation, he was found to be in acute hypercarbic respiratory failure complicated by hemodynamic instability requiring emergent intubation and pressor support. (28 Jan 2019 15:34)                            Neuro:                                         Pain control with Fentanyl PRN / Tylenol     Agitation /Anxiety: Continue Xanax and Seroquel.                            Cardiovascular:                                          Continue hemodynamic monitoring.    Hypotension:. Continue  Midodrine 10mg/TID.                                                   Respiratory:                        Pt is on full mechanical ventilator support SX--40-5 at night and tolerating  trach collar during the day                                         Trached on 1/31, decannulated on 2/22. Pt was found to have altered MS with CO2 retention. Pt was recannulated on 2/24 and remained on vent support overnight                                                                 Continue bronchodilators, pulmonary toilet                               GI                                         Nepro 35cc/hr. Flush PEG with 30cc sterile water Q4hrs                                         Continue GI prophylaxis with Protonix                                                                 Renal:                                         HD as per renal / TIW                                          Monitor I/Os and electrolytes                                                                                        Hem/ Onc:                                                                                 Follow CBC in AM                           Infectious disease:     Continue pulmonary toilet                                         BAL - MRSA, Continue Vanco based on level  + Zosyn for 4 weeks completed. Blood cultures are negative     Antibiotics to be d/cd as per I.D                                                                   Endocrine         Continue Accu-Checks with coverage      Pt is on SQ Heparin and Venodyne boots for DVT prophylaxis.     Pertinent clinical, laboratory, radiographic, hemodynamic, echocardiographic, respiratory data, microbiologic data and chart were reviewed and analyzed frequently throughout the course of the day and night  Patient seen, examined and plan discussed with CT Surgeon Dr. Pickering / CTICU team during rounds.    Discussed with wife , updated status    I have spent 35 minutes of critical care time with this patient between 00am and 8am    Dave Su DO, FACEP

## 2019-02-26 NOTE — PROGRESS NOTE ADULT - ASSESSMENT
58M who underwent a FB, R thoracotomy, decortication, chest wall reconstruction, lat flap with Dr Pickering on 10/11/18. He was admitted on 1/6/19 to [facility] with a R pleural effusion and respiratory failure,  A R PTC was placed there and he was intubated.  He still had a R SANDY drain in place and he was transferred to [facility]. Patient was found to have aspirated a foreign body and +MRSA empyema. He was treated and discharged on 1/22/2019.     He was found by his home nurse to be in acute distress and recommended to proceed to the ED.  At presentation, he was found to be in acute hypercarbic respiratory failure complicated by hemodynamic instability requiring emergent intubation and pressor support. (28 Jan 2019 15:34)    Pt had Tm 104.2 (on 1/29), tachycardia, hypotension.  Pt has now been extubated, s/p trach/PEG on 1/31.  CT chest shows complete L sided atelectasis with mucous plugging of right lower lobe  bronchus with near complete atelectasis.      ID consult called for antibiotic managment.     Recommend:    - Pt p/w severe sepsis with shock and respiratory distress.  Pt with high fevers.  Found to have complete and near complete atelectasis of L and R lung respectively with mucous plugging.  Agree with broad spectrum abx to cover for post-obstructive pna.  Repeat CT chest from 2/3 shows improved aeration.      - Cultures results show:    2/5: bcx - NGTD @ 48hrs  2/1: endotrach - MRSA,  Klebsiella (pan sens), yeast (gram stain only)  2/1: bcx - CNS  1/29:  lung cx - nl resp milton  1/28: bcx - NGTD    - Agree with vancomycin and zosyn.   Maintain vanco trough between 15-20.  Dose vanco by level, post HD.  Repeat blood cultures from 2/5 NGTD.  CNS likely contaminant.    - Cont care as per CTU. Continue hemodynamic monitoring.  s/p tracheostomy, now on trach collar.   Continue bronchodilators, pulmonary toilet, Continue GI prophylaxis with Protonix.   Monitor temp curve and WBC.    - Given severe sepsis and shock at presentation, recurrent involvement of MRSA, recommend long course of IV abx treatment (4 week course).  Completed fluconazole 7 days bet 2/2 - 2/8 (suspect yeast is likely a colonizer).      - Cont treat of MRSA/KLeb.  AFter discharge, Abx may be dosed with pt's HD sessions (can change zosyn to cefepime upon discharge), no need for picc line.  Complete total 4 weeks (1/28 through 2/24).      Pt pending placement.  No new ID recs at this time.   Cont to monitor off abx  Will follow,    Anabel Chahal  [phone]

## 2019-02-26 NOTE — PROGRESS NOTE ADULT - PROBLEM SELECTOR PLAN 1
Pt. with ESRD on HD TIW (TTS). Last HD was on 2/23/19 via [facility]. Labs reviewed. Plan for HD today. Monitor BP/HR while on HD.  1.5 kg UF with low dialysate temp.

## 2019-02-26 NOTE — SWALLOW VFSS/MBS ASSESSMENT ADULT - DIAGNOSTIC IMPRESSIONS
Patient presents with Mild Oral Stage and Moderate to Severe Pharyngeal Stage Dysphagia. The Oral Stage is characterized by adequate oral containment, slow chewing for solid consistency, adequate bolus manipulation, adequate tongue motion with adequate anterior to posterior transfer of the bolus; piecemeal deglutition for puree/solids; with adequate oral clearance post swallow.   The Pharyngeal Stage is characterized by delayed initiation of the pharyngeal swallow (Bolus head is at the pyriforms for Thin Liquids/Nectar Thick Liquids), reduced laryngeal elevation with incomplete laryngeal vestibular closure, reduced tongue base retraction resulting in mild to moderate vallecular residue post primary swallow, reduced pharyngeal constriction resulting in mild to moderate pyriforms/pharyngeal wall residue post primary swallow and reduced opening at the pharyngoesophageal segment. There is mild to moderate pharyngeal clearance deficits located diffused in the hypopharynx. There is Laryngeal Penetration during the swallow for Thin Liquids and Nectar Thick Liquids without retrieval leaving residue in the laryngeal vestibule with migration to the level of the anterior vocal folds. Patient is not sensate given no reflexive cough response. Patient is verbally cued to cough and able to expectorate the contrast from the airway.  There was No Aspiration observed before, during or after the swallow for puree/honey thick liquids.    Of Note: Patient is with #4 LPC Tracheostomy (Deflated Cuff Status) with  in place. Patient is able to produce adequate voicing to communicate and is able to expectorate/spit out mucous/phlegm independently. Cinesophagram completed with Tracheostomy with  in place. Patient presents with Mild Oral Stage and Moderate to Severe Pharyngeal Stage Dysphagia. The Oral Stage is characterized by adequate oral containment, slow chewing for solid consistency, adequate bolus manipulation, adequate tongue motion with adequate anterior to posterior transfer of the bolus; piecemeal deglutition for puree/solids; with adequate oral clearance post swallow.   The Pharyngeal Stage is characterized by delayed initiation of the pharyngeal swallow (Bolus head is at the pyriforms for Thin Liquids/Nectar Thick Liquids), reduced laryngeal elevation with incomplete laryngeal vestibular closure, reduced tongue base retraction resulting in mild to moderate vallecular residue post primary swallow, reduced pharyngeal constriction resulting in mild to moderate pyriforms/pharyngeal wall residue post primary swallow and reduced opening at the pharyngoesophageal segment. There is mild to moderate pharyngeal clearance deficits located diffused in the hypopharynx. There is Laryngeal Penetration (Silent) during the swallow for Thin Liquids and Nectar Thick Liquids without retrieval leaving residue in the laryngeal vestibule with migration to the level of the anterior vocal folds. Patient is not sensate given no reflexive cough response. Patient is verbally cued to cough and able to expectorate the contrast from the upper airway.  There was No Aspiration observed before, during or after the swallow for puree/honey thick liquids.    Of Note: Patient is with #4 LPC Tracheostomy (Deflated Cuff Status) with  in place. Patient is able to produce adequate voicing to communicate and is able to expectorate/spit out mucous/phlegm independently. Cinesophagram completed with Tracheostomy with  in place.

## 2019-02-26 NOTE — PROGRESS NOTE ADULT - SUBJECTIVE AND OBJECTIVE BOX
[facility] Division of Kidney Diseases & Hypertension  FOLLOW UP NOTE  --------------------------------------------------------------------------------  Chief Complaint: respiratory distress    24 hour events/subjective: Patient was seen and evaluated at bedside in the CTICU.  The patient had trach reinserted yesterday.  Unable to obtain review of systems secondary to trach.  As per RN bp has been low but otherwise no acute events.    PAST HISTORY  --------------------------------------------------------------------------------  No significant changes to PMH, PSH, FHx, SHx, unless otherwise noted    ALLERGIES & MEDICATIONS  --------------------------------------------------------------------------------  Allergies    No Known Allergies    Intolerances      Standing Inpatient Medications  ALPRAZolam 0.5 milliGRAM(s) Oral every 8 hours  chlorhexidine 4% Liquid 1 Application(s) Topical <User Schedule>  epoetin kelly Injectable 3000 Unit(s) IV Push <User Schedule>  heparin  Injectable 5000 Unit(s) SubCutaneous every 12 hours  ipratropium 17 MICROgram(s) HFA Inhaler 2 Puff(s) Inhalation every 6 hours  melatonin 6 milliGRAM(s) Oral at bedtime  midodrine 10 milliGRAM(s) Oral every 8 hours  pantoprazole  Injectable 40 milliGRAM(s) IV Push daily  phenylephrine    Infusion 0.3 MICROgram(s)/kG/Min IV Continuous <Continuous>  psyllium Powder 1 Packet(s) Oral daily  QUEtiapine 50 milliGRAM(s) Oral every 12 hours    PRN Inpatient Medications  ALBUTerol    90 MICROgram(s) HFA Inhaler 2 Puff(s) Inhalation every 6 hours PRN      REVIEW OF SYSTEMS  --------------------------------------------------------------------------------  unable to obtain.    VITALS/PHYSICAL EXAM  --------------------------------------------------------------------------------  T(C): 36.7 (02-26-19 @ 06:00), Max: 36.8 (02-26-19 @ 03:00)  HR: 94 (02-26-19 @ 07:00) (94 - 120)  BP: 96/69 (02-26-19 @ 07:00) (83/58 - 113/71)  RR: 17 (02-26-19 @ 07:00) (14 - 23)  SpO2: 100% (02-26-19 @ 07:00) (91% - 100%)  CVP(mm Hg): --        02-25-19 @ 07:01  -  02-26-19 @ 07:00  --------------------------------------------------------  IN: 1235 mL / OUT: 0 mL / NET: 1235 mL      Physical Exam:    	Gen: no apparent distress  	HEENT: trach  	Pulm: clear lungs sounds anteriorly  	CV: S1S2  	Abd: distended abdomen but non tender  	Ext: No LE edema B/L + RUE PICC  	Neuro: Awake  	Skin: Warm and dry  	Vascular access: [facility] site    LABS/STUDIES  --------------------------------------------------------------------------------              10.5   6.09  >-----------<  274      [02-25-19 @ 06:00]              35.6     143  |  98  |  28  ----------------------------<  106      [02-25-19 @ 06:00]  3.9   |  27  |  4.96        Ca     9.7     [02-25-19 @ 06:00]    Creatinine Trend:  SCr 4.96 [02-25 @ 06:00]  SCr 3.88 [02-24 @ 05:13]  SCr 5.01 [02-23 @ 03:30]  SCr 4.17 [02-22 @ 05:00]  SCr 5.00 [02-21 @ 03:20]

## 2019-02-26 NOTE — PROGRESS NOTE ADULT - PROBLEM SELECTOR PLAN 2
Patient with anemia in the setting of ESRD. Hemoglobin has risen with in the last few days into target range.  hold epo and monitor cbc.

## 2019-02-26 NOTE — SWALLOW VFSS/MBS ASSESSMENT ADULT - ROSENBEK'S PENETRATION ASPIRATION SCALE
(1) no aspiration, contrast does not enter airway (3) contrast remains above the vocal cords, visible residue remains (penetration) 3 to 5

## 2019-02-26 NOTE — SWALLOW VFSS/MBS ASSESSMENT ADULT - PHARYNGEAL PHASE COMMENTS
adequate initiation of the pharyngeal swallow, reduced laryngeal elevation, reduced tongue base retraction, reduced pharyngeal constriction, reduced opening at the pharyngoesophageal segment delayed initiation of the pharyngeal swallow, reduced laryngeal elevation, reduced tongue base retraction, reduced pharyngeal constriction, reduced opening at the pharyngoesophageal segment

## 2019-02-26 NOTE — SWALLOW VFSS/MBS ASSESSMENT ADULT - RECOMMENDED CONSISTENCY
1.) Puree and Honey Thick Liquids. Continue with PEG Tube Feedings as PO intake may be guarded to meet caloric/hydration needs as patient will benefit nutritional support via PEG Tube to maintain caloric needs.  Of Note: Patient to have  in place with oral intake given Cinesophagram completed with  in place.    2.) Feeding/Swallowing Guidelines: Sit upright, encourage/provide teaspoon amount at a time, small single cup sip of honey thick liquids; two swallows per puree/honey thick liquid; alternate puree and honey thick liquids.  3.) Aspiration and Reflux Precautions  4.) Maintain Good Oral Hygiene Care 1.) Puree and Honey Thick Liquids. Continue with PEG Tube Feedings as PO intake may be guarded to meet caloric/hydration needs as patient will benefit nutritional support via PEG Tube.  Of Note: Patient to have  in place with oral intake as Cinesophagram completed with  in place.    2.) Feeding/Swallowing Guidelines: Sit upright, encourage/provide teaspoon amount at a time, small single cup sip of honey thick liquids; two swallows per puree/honey thick liquid; alternate puree and honey thick liquids.  3.) Aspiration and Reflux Precautions  4.) Maintain Good Oral Hygiene Care

## 2019-02-27 PROCEDURE — 99291 CRITICAL CARE FIRST HOUR: CPT

## 2019-02-27 RX ORDER — QUETIAPINE FUMARATE 200 MG/1
1 TABLET, FILM COATED ORAL
Qty: 0 | Refills: 0 | COMMUNITY
Start: 2019-02-27

## 2019-02-27 RX ORDER — HEPARIN SODIUM 5000 [USP'U]/ML
5000 INJECTION INTRAVENOUS; SUBCUTANEOUS
Qty: 0 | Refills: 0 | COMMUNITY
Start: 2019-02-27

## 2019-02-27 RX ORDER — SEVELAMER CARBONATE 2400 MG/1
2 POWDER, FOR SUSPENSION ORAL
Qty: 0 | Refills: 0 | COMMUNITY

## 2019-02-27 RX ORDER — ASCORBIC ACID 60 MG
1 TABLET,CHEWABLE ORAL
Qty: 0 | Refills: 0 | COMMUNITY

## 2019-02-27 RX ORDER — FLUTICASONE FUROATE AND VILANTEROL TRIFENATATE 100; 25 UG/1; UG/1
1 POWDER RESPIRATORY (INHALATION)
Qty: 0 | Refills: 0 | COMMUNITY

## 2019-02-27 RX ORDER — DOCUSATE SODIUM 100 MG
1 CAPSULE ORAL
Qty: 0 | Refills: 0 | COMMUNITY

## 2019-02-27 RX ORDER — LANOLIN ALCOHOL/MO/W.PET/CERES
2 CREAM (GRAM) TOPICAL
Qty: 0 | Refills: 0 | COMMUNITY
Start: 2019-02-27

## 2019-02-27 RX ORDER — MIDODRINE HYDROCHLORIDE 2.5 MG/1
1 TABLET ORAL
Qty: 0 | Refills: 0 | COMMUNITY
Start: 2019-02-27

## 2019-02-27 RX ORDER — ALPRAZOLAM 0.25 MG
0.5 TABLET ORAL EVERY 8 HOURS
Qty: 0 | Refills: 0 | Status: DISCONTINUED | OUTPATIENT
Start: 2019-02-27 | End: 2019-02-28

## 2019-02-27 RX ORDER — NORTRIPTYLINE HYDROCHLORIDE 10 MG/1
1 CAPSULE ORAL
Qty: 0 | Refills: 0 | COMMUNITY

## 2019-02-27 RX ORDER — SIMETHICONE 80 MG/1
80 TABLET, CHEWABLE ORAL EVERY 6 HOURS
Qty: 0 | Refills: 0 | Status: DISCONTINUED | OUTPATIENT
Start: 2019-02-27 | End: 2019-02-28

## 2019-02-27 RX ORDER — FOLIC ACID 0.8 MG
1 TABLET ORAL
Qty: 0 | Refills: 0 | COMMUNITY

## 2019-02-27 RX ORDER — MEMANTINE HYDROCHLORIDE 10 MG/1
1 TABLET ORAL
Qty: 0 | Refills: 0 | COMMUNITY

## 2019-02-27 RX ORDER — FOLIC ACID/VIT B COMPLEX AND C 400 MCG
1 TABLET ORAL
Qty: 0 | Refills: 0 | COMMUNITY

## 2019-02-27 RX ORDER — MAGNESIUM OXIDE 400 MG ORAL TABLET 241.3 MG
1 TABLET ORAL
Qty: 0 | Refills: 0 | COMMUNITY

## 2019-02-27 RX ORDER — ASPIRIN/CALCIUM CARB/MAGNESIUM 324 MG
1 TABLET ORAL
Qty: 0 | Refills: 0 | COMMUNITY

## 2019-02-27 RX ORDER — ALBUTEROL 90 UG/1
2 AEROSOL, METERED ORAL
Qty: 0 | Refills: 0 | COMMUNITY
Start: 2019-02-27

## 2019-02-27 RX ORDER — ALPRAZOLAM 0.25 MG
1 TABLET ORAL
Qty: 0 | Refills: 0 | COMMUNITY
Start: 2019-02-27

## 2019-02-27 RX ORDER — VANCOMYCIN HCL 1 G
1 VIAL (EA) INTRAVENOUS
Qty: 0 | Refills: 0 | COMMUNITY

## 2019-02-27 RX ORDER — POLYETHYLENE GLYCOL 3350 17 G/17G
17 POWDER, FOR SOLUTION ORAL
Qty: 0 | Refills: 0 | COMMUNITY
Start: 2019-02-27

## 2019-02-27 RX ORDER — PSYLLIUM SEED (WITH DEXTROSE)
0 POWDER (GRAM) ORAL
Qty: 0 | Refills: 0 | COMMUNITY
Start: 2019-02-27

## 2019-02-27 RX ORDER — IPRATROPIUM BROMIDE 0.2 MG/ML
2 SOLUTION, NON-ORAL INHALATION
Qty: 0 | Refills: 0 | COMMUNITY
Start: 2019-02-27

## 2019-02-27 RX ADMIN — SIMETHICONE 80 MILLIGRAM(S): 80 TABLET, CHEWABLE ORAL at 06:18

## 2019-02-27 RX ADMIN — SIMETHICONE 80 MILLIGRAM(S): 80 TABLET, CHEWABLE ORAL at 22:08

## 2019-02-27 RX ADMIN — POLYETHYLENE GLYCOL 3350 17 GRAM(S): 17 POWDER, FOR SOLUTION ORAL at 22:07

## 2019-02-27 RX ADMIN — HEPARIN SODIUM 5000 UNIT(S): 5000 INJECTION INTRAVENOUS; SUBCUTANEOUS at 17:23

## 2019-02-27 RX ADMIN — SIMETHICONE 80 MILLIGRAM(S): 80 TABLET, CHEWABLE ORAL at 12:56

## 2019-02-27 RX ADMIN — Medication 2 PUFF(S): at 04:07

## 2019-02-27 RX ADMIN — SIMETHICONE 80 MILLIGRAM(S): 80 TABLET, CHEWABLE ORAL at 17:23

## 2019-02-27 RX ADMIN — MIDODRINE HYDROCHLORIDE 10 MILLIGRAM(S): 2.5 TABLET ORAL at 22:07

## 2019-02-27 RX ADMIN — HEPARIN SODIUM 5000 UNIT(S): 5000 INJECTION INTRAVENOUS; SUBCUTANEOUS at 06:18

## 2019-02-27 RX ADMIN — MIDODRINE HYDROCHLORIDE 10 MILLIGRAM(S): 2.5 TABLET ORAL at 12:56

## 2019-02-27 RX ADMIN — QUETIAPINE FUMARATE 50 MILLIGRAM(S): 200 TABLET, FILM COATED ORAL at 22:07

## 2019-02-27 RX ADMIN — Medication 2 PUFF(S): at 23:04

## 2019-02-27 RX ADMIN — Medication 2 PUFF(S): at 09:30

## 2019-02-27 RX ADMIN — Medication 0.5 MILLIGRAM(S): at 17:22

## 2019-02-27 RX ADMIN — Medication 1 PACKET(S): at 12:56

## 2019-02-27 RX ADMIN — MIDODRINE HYDROCHLORIDE 10 MILLIGRAM(S): 2.5 TABLET ORAL at 06:18

## 2019-02-27 RX ADMIN — Medication 2 PUFF(S): at 15:38

## 2019-02-27 RX ADMIN — PANTOPRAZOLE SODIUM 40 MILLIGRAM(S): 20 TABLET, DELAYED RELEASE ORAL at 12:56

## 2019-02-27 RX ADMIN — Medication 6 MILLIGRAM(S): at 22:07

## 2019-02-27 RX ADMIN — Medication 0.5 MILLIGRAM(S): at 08:44

## 2019-02-27 RX ADMIN — CHLORHEXIDINE GLUCONATE 1 APPLICATION(S): 213 SOLUTION TOPICAL at 06:17

## 2019-02-27 RX ADMIN — QUETIAPINE FUMARATE 50 MILLIGRAM(S): 200 TABLET, FILM COATED ORAL at 08:44

## 2019-02-27 NOTE — CHART NOTE - NSCHARTNOTEFT_GEN_A_CORE
Source:  nursing, MD , EMR     Diet : Dysphagia 1 Pureed - Honey Consistency Fluid - No Carbonated Beverages Tube Feeding via Jejunostomy Nepro Carb Steady 20 ml/hr 20 hrs     Patient reports  dislike to American food , prefer cultural and ethnic food , not taking 50% of the meals , but receiving EN adjusted down with PO diet advancing. Hospital has limited supply of chinese food in texture modified consistency . Will continue EN and adjusted to nocturnal feed to facilitate increased PO nutrition.      Enteral : 720 kcal & 32 gm protein/d        Current Weight: - 62 kg on 2/27/19; was 70.3 kg on 2/21/19; 74.2 kg on 2/12/19; Admit wt. - 60 kg     Pertinent Medications: MEDICATIONS  (STANDING):  ALPRAZolam 0.5 milliGRAM(s) Oral every 8 hours  chlorhexidine 4% Liquid 1 Application(s) Topical <User Schedule>  heparin  Injectable 5000 Unit(s) SubCutaneous every 12 hours  ipratropium 17 MICROgram(s) HFA Inhaler 2 Puff(s) Inhalation every 6 hours  melatonin 6 milliGRAM(s) Oral at bedtime  midodrine 10 milliGRAM(s) Oral every 8 hours  pantoprazole  Injectable 40 milliGRAM(s) IV Push daily  phenylephrine    Infusion 0.3 MICROgram(s)/kG/Min (6.75 mL/Hr) IV Continuous <Continuous>  polyethylene glycol 3350 17 Gram(s) Oral at bedtime  psyllium Powder 1 Packet(s) Oral daily  QUEtiapine 50 milliGRAM(s) Oral every 12 hours  simethicone 80 milliGRAM(s) Chew every 6 hours    MEDICATIONS  (PRN):  ALBUTerol    90 MICROgram(s) HFA Inhaler 2 Puff(s) Inhalation every 6 hours PRN Shortness of Breath and/or Wheezing  bisacodyl Suppository 10 milliGRAM(s) Rectal daily PRN Constipation    Pertinent Labs:  02-25 Na143 mmol/L Glu 106 mg/dL<H> K+ 3.9 mmol/L Cr  4.96 mg/dL<H> BUN 28 mg/dL<H> 02-24 Alb 2.8 g/dL<L> 02-02 PAB 8 mg/dL<L>      Recommend - Nepro Carb Steady @ 45 ml/hr x15hrs daily from 6 PM - 9 AM and Dysphagia 1 Honey Consistency Fluid , Renal Replacement diet     Monitoring and Evaluation:  PO intake , Tolerance to diet prescription , weights and follow up per protocol

## 2019-02-27 NOTE — PROGRESS NOTE ADULT - SUBJECTIVE AND OBJECTIVE BOX
CLAUDIA HAN            MRN-[id number]         No Known Allergies                 Mr. Han is a 58M who underwent a FB, R thoracotomy, decortication, chest wall reconstruction, lat flap with Dr Pickering on 10/11/18. He was admitted on 1/6/19 to [facility] with a R pleural effusion and respiratory failure,  A R PTC was placed there and he was intubated.  He still had a R SANDY drain in place and he was transferred to [facility]. Patient was found to have aspirated a foreign body and +MRSA empyema. He was treated and discharged on 1/22/2019.     He was found by his home nurse to be in acute distress and recommended to proceed to the ED.  At presentation, he was found to be in acute hypercarbic respiratory failure complicated by hemodynamic instability requiring emergent intubation and pressor support. (28 Jan 2019 15:34)    Procedure:        Trach recannulated 2/24/2019  Decannulated 2/22/19  Trach and PEG  1/31/2019  Bronchoscopy   1/29/2019  Right thoracotomy, resection of portion of R 7th rib, evacuation of infected hemothorax, takedown of pleurocutaneous fistula  10/11/2018      Issues:  Respiratory failure , hypercapnia s/p Trach and PEG  Hypotension on Midodrine  ESRD / HD  Cardiomyopathy  HLD  BPH  Anxiety / Agitation                 Home Medications:  aspirin 81 mg oral tablet: 1 tab(s) orally once a day  (30 Oct 2018 14:39)  Breo Ellipta 100 mcg-25 mcg/inh inhalation powder: 1 puff(s) inhaled once a day patient denies using it (11 Oct 2018 08:38)  Calcium 500: 1 tab(s) orally 2 times a day (11 Oct 2018 08:38)  docusate sodium 100 mg oral tablet: 1 tab(s) orally 2 times a day, As Needed (11 Oct 2018 08:39)  folic acid 1 mg oral tablet: 1 tab(s) orally once a day (11 Oct 2018 08:38)  Mag-Ox 400 oral tablet: 1 tab(s) orally once a day (11 Oct 2018 08:39)  Multiple Vitamins oral tablet: 1 tab(s) orally once a day  (30 Oct 2018 14:39)  Namenda 10 mg oral tablet: 1 tab(s) orally 2 times a day (11 Oct 2018 08:38)  Nephplex Rx oral tablet: 1 tab(s) orally once a day (11 Oct 2018 08:39)  nortriptyline 50 mg oral capsule: 1 cap(s) orally once a day (11 Oct 2018 08:39)  Renvela 800 mg oral tablet: 2 tab(s) orally every 8 hours (11 Oct 2018 08:39)  simethicone 80 mg oral tablet: 1 tab(s) orally 3 times a day (after meals) (11 Oct 2018 08:39)  Tylenol 325 mg oral tablet: 2 tab(s) orally every 6 hours, As Needed (30 Oct 2018 14:39)  vancomycin 1 g intravenous injection: 1 gram(s) intravenous 3 times a week  Please give 3 x per week with Dialysis until 2/3/2019 (22 Jan 2019 13:41)  vitamin A: 1 tab(s) orally once a day (11 Oct 2018 08:38)  Vitamin B Complex: 1 tab(s) orally once a day (11 Oct 2018 08:38)  Vitamin C 500 mg oral tablet: 1 tab(s) orally once a day (11 Oct 2018 08:38)      PAST MEDICAL & SURGICAL HISTORY:  Smoking hx  Cardiomyopathy  Wound: right chest  ICD (implantable cardioverter-defibrillator) in place  OA (osteoarthritis)  HLD (hyperlipidemia)  BPH (benign prostatic hyperplasia)  Pleural effusion  HTN (hypertension)  ESRD (end stage renal disease)  H/O chest wound: right  S/P thoracotomy: FB, R thoracotomy, decortication, chest wall reconstruction, lat flap  History of wound infection: right chest wall - revision 5/18 and again in 7/18  History of implantable cardioverter-defibrillator (ICD) placement: pt unsure when placed  H/O bilateral hip replacements: 2008, 2009        ICU Vital Signs Last 24 Hrs  T(C): 37.2 (27 Feb 2019 04:00), Max: 37.2 (27 Feb 2019 04:00)  T(F): 99 (27 Feb 2019 04:00), Max: 99 (27 Feb 2019 04:00)  HR: 93 (27 Feb 2019 05:00) (78 - 113)  BP: 87/61 (27 Feb 2019 05:00) (79/60 - 101/61)  BP(mean): 66 (27 Feb 2019 05:00) (60 - 76)  ABP: --  ABP(mean): --  RR: 23 (27 Feb 2019 05:00) (15 - 29)  SpO2: 100% (27 Feb 2019 05:00) (94% - 100%)    I&O's Detail    25 Feb 2019 07:01  -  26 Feb 2019 07:00  --------------------------------------------------------  IN:    Enteral Tube Flush: 430 mL    Nepro with Carb Steady: 805 mL  Total IN: 1235 mL    OUT:  Total OUT: 0 mL    Total NET: 1235 mL      26 Feb 2019 07:01  -  27 Feb 2019 05:54  --------------------------------------------------------  IN:    Enteral Tube Flush: 160 mL    Nepro with Carb Steady: 595 mL    Other: 500 mL  Total IN: 1255 mL    OUT:    Other: 1500 mL  Total OUT: 1500 mL    Total NET: -245 mL        CAPILLARY BLOOD GLUCOSE          Home Medications:  aspirin 81 mg oral tablet: 1 tab(s) orally once a day  (30 Oct 2018 14:39)  Breo Ellipta 100 mcg-25 mcg/inh inhalation powder: 1 puff(s) inhaled once a day patient denies using it (11 Oct 2018 08:38)  Calcium 500: 1 tab(s) orally 2 times a day (11 Oct 2018 08:38)  docusate sodium 100 mg oral tablet: 1 tab(s) orally 2 times a day, As Needed (11 Oct 2018 08:39)  folic acid 1 mg oral tablet: 1 tab(s) orally once a day (11 Oct 2018 08:38)  Mag-Ox 400 oral tablet: 1 tab(s) orally once a day (11 Oct 2018 08:39)  Multiple Vitamins oral tablet: 1 tab(s) orally once a day  (30 Oct 2018 14:39)  Namenda 10 mg oral tablet: 1 tab(s) orally 2 times a day (11 Oct 2018 08:38)  Nephplex Rx oral tablet: 1 tab(s) orally once a day (11 Oct 2018 08:39)  nortriptyline 50 mg oral capsule: 1 cap(s) orally once a day (11 Oct 2018 08:39)  Renvela 800 mg oral tablet: 2 tab(s) orally every 8 hours (11 Oct 2018 08:39)  simethicone 80 mg oral tablet: 1 tab(s) orally 3 times a day (after meals) (11 Oct 2018 08:39)  Tylenol 325 mg oral tablet: 2 tab(s) orally every 6 hours, As Needed (30 Oct 2018 14:39)  vancomycin 1 g intravenous injection: 1 gram(s) intravenous 3 times a week  Please give 3 x per week with Dialysis until 2/3/2019 (22 Jan 2019 13:41)  vitamin A: 1 tab(s) orally once a day (11 Oct 2018 08:38)  Vitamin B Complex: 1 tab(s) orally once a day (11 Oct 2018 08:38)  Vitamin C 500 mg oral tablet: 1 tab(s) orally once a day (11 Oct 2018 08:38)      MEDICATIONS  (STANDING):  ALPRAZolam 0.5 milliGRAM(s) Oral every 8 hours  chlorhexidine 4% Liquid 1 Application(s) Topical <User Schedule>  heparin  Injectable 5000 Unit(s) SubCutaneous every 12 hours  ipratropium 17 MICROgram(s) HFA Inhaler 2 Puff(s) Inhalation every 6 hours  melatonin 6 milliGRAM(s) Oral at bedtime  midodrine 10 milliGRAM(s) Oral every 8 hours  pantoprazole  Injectable 40 milliGRAM(s) IV Push daily  phenylephrine    Infusion 0.3 MICROgram(s)/kG/Min (6.75 mL/Hr) IV Continuous <Continuous>  polyethylene glycol 3350 17 Gram(s) Oral at bedtime  psyllium Powder 1 Packet(s) Oral daily  QUEtiapine 50 milliGRAM(s) Oral every 12 hours  simethicone 80 milliGRAM(s) Chew every 6 hours    MEDICATIONS  (PRN):  ALBUTerol    90 MICROgram(s) HFA Inhaler 2 Puff(s) Inhalation every 6 hours PRN Shortness of Breath and/or Wheezing  bisacodyl Suppository 10 milliGRAM(s) Rectal daily PRN Constipation      Mode: CPAP with PS  FiO2: 40  PEEP: 5  PS: 15  MAP: 15  PIP: 21      Physical exam:                             General:               Pt is awake, alert,  not in any distress                                                  Neuro:                  Nonfocal                             Cardiovascular:   S1 & S2, regular                           Respiratory:         Air entry is fair and equal on both sides, has bilateral conducted sounds                           GI:                          Soft but distended and nontender, Bowel sounds active                            Ext:                        No cyanosis or edema     Labs:                                                                           10.5   6.09  )-----------( 274      ( 25 Feb 2019 06:00 )             35.6             02-25    143  |  98  |  28<H>  ----------------------------<  106<H>  3.9   |  27  |  4.96<H>    Ca    9.7      25 Feb 2019 06:00                          CXR:    < from: Xray Chest 1 View- PORTABLE-Routine (02.26.19 @ 06:18) >  Tracheostomy tube in place. Bilateral small loculated effusions   unchanged. No focal consolidations. Subcutaneous AICD in position.        Plan:    General: Mr. Han is a 58M who underwent a FB, R thoracotomy, decortication, chest wall reconstruction, lat flap with Dr Pickering on 10/11/18. He was admitted on 1/6/19 to [facility] with a R pleural effusion and respiratory failure,  A R PTC was placed there and he was intubated.  He still had a R SANDY drain in place and he was transferred to [facility]. Patient was found to have aspirated a foreign body and +MRSA empyema. He was treated and discharged on 1/22/2019.     He was found by his home nurse to be in acute distress and recommended to proceed to the ED.  At presentation, he was found to be in acute hypercarbic respiratory failure complicated by hemodynamic instability requiring emergent intubation and pressor support. (28 Jan 2019 15:34)                            Neuro:                                         Pain control with Fentanyl PRN / Tylenol     Agitation /Anxiety: Continue Xanax and Seroquel.                            Cardiovascular:                                          Continue hemodynamic monitoring.    Hypotension:. Continue  Midodrine 10mg/TID.                                                   Respiratory:                        Pt is on full mechanical ventilator support CL--40-5 at night and tolerating  trach collar during the day                                         Trached on 1/31, decannulated on 2/22. Pt was found to have altered MS with CO2 retention. Pt was recannulated on 2/24.      Remained on vent support overnight                                                                 Continue bronchodilators, pulmonary toilet                               GI                                         Had bedside S/S and Cine - On Pureed / Honey consistency liquids. Aspiration precautions     Nepro 35cc/hr. Flush PEG with 30cc sterile water Q4hrs                                         Continue GI prophylaxis with Protonix     Abdomen is distended but non-tender - Continue bowel regimen, Dulcolax / Miralax / SImethacone                                                                 Renal:                                         HD as per renal / TIW                                          Monitor I/Os and electrolytes                                                                                        Hem/ Onc:                                                                                 Follow CBC in AM                           Infectious disease:     Continue pulmonary toilet                                         BAL - MRSA, Continue Vanco based on level  + Zosyn for 4 weeks, completed. Blood cultures are negative     Antibiotics  d/cd as per I.D                                                                   Endocrine         Continue Accu-Checks with coverage      Pt is on SQ Heparin and Venodyne boots for DVT prophylaxis.     Pertinent clinical, laboratory, radiographic, hemodynamic, echocardiographic, respiratory data, microbiologic data and chart were reviewed and analyzed frequently throughout the course of the day and night  Patient seen, examined and plan discussed with CT Surgeon Dr. Pickering / CTICU team during rounds.    Discussed with wife , updated status    I have spent 40 minutes of critical care time with this patient between 00am and 8am              Ralph Warren MD

## 2019-02-28 ENCOUNTER — TRANSCRIPTION ENCOUNTER (OUTPATIENT)
Age: 59
End: 2019-02-28

## 2019-02-28 VITALS
SYSTOLIC BLOOD PRESSURE: 83 MMHG | DIASTOLIC BLOOD PRESSURE: 57 MMHG | RESPIRATION RATE: 23 BRPM | OXYGEN SATURATION: 99 % | HEART RATE: 105 BPM

## 2019-02-28 LAB
ALBUMIN SERPL ELPH-MCNC: 3.1 G/DL — LOW (ref 3.3–5)
ALP SERPL-CCNC: 208 U/L — HIGH (ref 40–120)
ALT FLD-CCNC: 23 U/L — SIGNIFICANT CHANGE UP (ref 4–41)
ANION GAP SERPL CALC-SCNC: 16 MMO/L — HIGH (ref 7–14)
AST SERPL-CCNC: 19 U/L — SIGNIFICANT CHANGE UP (ref 4–40)
BILIRUB SERPL-MCNC: 0.4 MG/DL — SIGNIFICANT CHANGE UP (ref 0.2–1.2)
BUN SERPL-MCNC: 33 MG/DL — HIGH (ref 7–23)
CALCIUM SERPL-MCNC: 9.8 MG/DL — SIGNIFICANT CHANGE UP (ref 8.4–10.5)
CHLORIDE SERPL-SCNC: 96 MMOL/L — LOW (ref 98–107)
CO2 SERPL-SCNC: 28 MMOL/L — SIGNIFICANT CHANGE UP (ref 22–31)
CREAT SERPL-MCNC: 5.58 MG/DL — HIGH (ref 0.5–1.3)
GLUCOSE SERPL-MCNC: 108 MG/DL — HIGH (ref 70–99)
HCT VFR BLD CALC: 32.9 % — LOW (ref 39–50)
HGB BLD-MCNC: 9.9 G/DL — LOW (ref 13–17)
MCHC RBC-ENTMCNC: 30.1 % — LOW (ref 32–36)
MCHC RBC-ENTMCNC: 31.7 PG — SIGNIFICANT CHANGE UP (ref 27–34)
MCV RBC AUTO: 105.4 FL — HIGH (ref 80–100)
NRBC # FLD: 0 K/UL — LOW (ref 25–125)
PLATELET # BLD AUTO: 240 K/UL — SIGNIFICANT CHANGE UP (ref 150–400)
PMV BLD: 9.9 FL — SIGNIFICANT CHANGE UP (ref 7–13)
POTASSIUM SERPL-MCNC: 3.8 MMOL/L — SIGNIFICANT CHANGE UP (ref 3.5–5.3)
POTASSIUM SERPL-SCNC: 3.8 MMOL/L — SIGNIFICANT CHANGE UP (ref 3.5–5.3)
PROT SERPL-MCNC: 7.1 G/DL — SIGNIFICANT CHANGE UP (ref 6–8.3)
RBC # BLD: 3.12 M/UL — LOW (ref 4.2–5.8)
RBC # FLD: 18.6 % — HIGH (ref 10.3–14.5)
SODIUM SERPL-SCNC: 140 MMOL/L — SIGNIFICANT CHANGE UP (ref 135–145)
WBC # BLD: 4.89 K/UL — SIGNIFICANT CHANGE UP (ref 3.8–10.5)
WBC # FLD AUTO: 4.89 K/UL — SIGNIFICANT CHANGE UP (ref 3.8–10.5)

## 2019-02-28 PROCEDURE — 99291 CRITICAL CARE FIRST HOUR: CPT

## 2019-02-28 PROCEDURE — 71045 X-RAY EXAM CHEST 1 VIEW: CPT | Mod: 26

## 2019-02-28 RX ORDER — ACETAMINOPHEN 500 MG
750 TABLET ORAL ONCE
Qty: 0 | Refills: 0 | Status: COMPLETED | OUTPATIENT
Start: 2019-02-28 | End: 2019-02-28

## 2019-02-28 RX ORDER — ACETAMINOPHEN 500 MG
2 TABLET ORAL
Qty: 0 | Refills: 0 | COMMUNITY

## 2019-02-28 RX ORDER — ACETAMINOPHEN 500 MG
75 TABLET ORAL
Qty: 0 | Refills: 0 | COMMUNITY
Start: 2019-02-28

## 2019-02-28 RX ADMIN — Medication 300 MILLIGRAM(S): at 10:15

## 2019-02-28 RX ADMIN — SIMETHICONE 80 MILLIGRAM(S): 80 TABLET, CHEWABLE ORAL at 05:59

## 2019-02-28 RX ADMIN — CHLORHEXIDINE GLUCONATE 1 APPLICATION(S): 213 SOLUTION TOPICAL at 05:59

## 2019-02-28 RX ADMIN — QUETIAPINE FUMARATE 50 MILLIGRAM(S): 200 TABLET, FILM COATED ORAL at 08:56

## 2019-02-28 RX ADMIN — HEPARIN SODIUM 5000 UNIT(S): 5000 INJECTION INTRAVENOUS; SUBCUTANEOUS at 05:59

## 2019-02-28 RX ADMIN — MIDODRINE HYDROCHLORIDE 10 MILLIGRAM(S): 2.5 TABLET ORAL at 05:59

## 2019-02-28 RX ADMIN — Medication 2 PUFF(S): at 03:45

## 2019-02-28 RX ADMIN — PANTOPRAZOLE SODIUM 40 MILLIGRAM(S): 20 TABLET, DELAYED RELEASE ORAL at 11:41

## 2019-02-28 RX ADMIN — MIDODRINE HYDROCHLORIDE 10 MILLIGRAM(S): 2.5 TABLET ORAL at 14:19

## 2019-02-28 RX ADMIN — SIMETHICONE 80 MILLIGRAM(S): 80 TABLET, CHEWABLE ORAL at 11:42

## 2019-02-28 RX ADMIN — Medication 750 MILLIGRAM(S): at 10:30

## 2019-02-28 RX ADMIN — Medication 2 PUFF(S): at 09:19

## 2019-02-28 NOTE — DISCHARGE NOTE ADULT - MEDICATION SUMMARY - MEDICATIONS TO STOP TAKING
I will STOP taking the medications listed below when I get home from the hospital:    nortriptyline 50 mg oral capsule  -- 1 cap(s) by mouth once a day    Breo Ellipta 100 mcg-25 mcg/inh inhalation powder  -- 1 puff(s) inhaled once a day patient denies using it    Mag-Ox 400 oral tablet  -- 1 tab(s) by mouth once a day    Namenda 10 mg oral tablet  -- 1 tab(s) by mouth 2 times a day    Renvela 800 mg oral tablet  -- 2 tab(s) by mouth every 8 hours    metoprolol succinate 25 mg oral tablet, extended release  -- 1 tab(s) by mouth once a day    vancomycin 1 g intravenous injection  -- 1 gram(s) intravenous 3 times a week  Please give 3 x per week with Dialysis until 2/3/2019

## 2019-02-28 NOTE — PROGRESS NOTE ADULT - PROVIDER SPECIALTY LIST ADULT
Anesthesia
Critical Care
Infectious Disease
Nephrology
Plastic Surgery
Nephrology
Infectious Disease
Infectious Disease
Nephrology

## 2019-02-28 NOTE — PROGRESS NOTE ADULT - PROBLEM SELECTOR PROBLEM 1
ESRD (end stage renal disease)

## 2019-02-28 NOTE — DISCHARGE NOTE ADULT - MEDICATION SUMMARY - MEDICATIONS TO TAKE
I will START or STAY ON the medications listed below when I get home from the hospital:    heparin  -- 5000 unit(s) subcutaneous 2 times a day  -- Indication: For DVT ppx    QUEtiapine 50 mg oral tablet  -- 1 tab(s) by mouth every 12 hours  -- Indication: For Phych medication    ALPRAZolam 0.5 mg oral tablet  -- 1 tab(s) by mouth every 8 hours  -- Indication: For Anxiety    ipratropium CFC free 17 mcg/inh inhalation aerosol  -- 2 puff(s) inhaled every 6 hours  -- Indication: For Respiratory agent    albuterol 90 mcg/inh inhalation aerosol  -- 2 puff(s) inhaled every 6 hours, As needed, Shortness of Breath and/or Wheezing  -- Indication: For Respiratory agent    bisacodyl 10 mg rectal suppository  -- 1 suppository(ies) rectally once a day, As needed, Constipation  -- Indication: For For constipation    psyllium 3.4 g/7 g oral powder for reconstitution  -- orally once a day  -- Indication: For For Constipation    polyethylene glycol 3350 oral powder for reconstitution  -- 17 gram(s) by mouth once a day (at bedtime)  -- Indication: For For constipation    midodrine 10 mg oral tablet  -- 1 tab(s) by mouth every 8 hours  -- Indication: For For blood pressure    simethicone 80 mg oral tablet  -- 1 tab(s) by mouth 3 times a day (after meals)  -- Indication: For Gastrointestinal motility    melatonin 3 mg oral tablet  -- 2 tab(s) by mouth once a day (at bedtime)  -- Indication: For for sleep

## 2019-02-28 NOTE — DISCHARGE NOTE ADULT - CARE PROVIDER_API CALL
Jose Alfredo Pickering (MD)  Surgery; Thoracic Surgery  [address] [address] [place] [address] [place] [address]  Phone: [phone]  Fax: [phone]  Follow Up Time:

## 2019-02-28 NOTE — PROGRESS NOTE ADULT - PROBLEM SELECTOR PLAN 1
Pt. with ESRD on HD TIW (TTS). Last HD was on 2/23/19 via [facility]. Labs reviewed. Plan for HD today. Monitor BP/HR while on HD. 1 kg UF with low dialysate temp.

## 2019-02-28 NOTE — PROGRESS NOTE ADULT - REASON FOR ADMISSION
Hypercapneic Respiratory failure
Respiratory Failure
Respiratory Failure, Sepsis
Respiratory failure
Septic Shock
Septic Shock
Shock, Respiratory Failure
septic shock
Sepsis
Septic shock
sepsis
sepsis
septic shock
HD
ESRD
ESRD
ESRD ON HD
JOSE
respiratory failure

## 2019-02-28 NOTE — DISCHARGE NOTE ADULT - CARE PLAN
Principal Discharge DX:	Respiratory failure  Goal:	Recover  Assessment and plan of treatment:	s/p open tracheostomy  Continue with trach collar as tolerated and full ventilatory support at night  Secondary Diagnosis:	ESRD (end stage renal disease)  Goal:	Continue with Hemodialysis  Assessment and plan of treatment:	Continue with hemodialysis 3 x week (Tuesday, Thursday, Saturday)

## 2019-02-28 NOTE — DISCHARGE NOTE ADULT - ADDITIONAL INSTRUCTIONS
Patient to continue with care per accepting rehabilitation facility.   Medications, labs and medical management per rehabilitation facility.   Patient is to continue with a dysphagia diet of honey thickened and thick liquids  Wound care from thoracotomy site in lower back clean daily and apply skin barrier   Patient is on full mechanical ventilator support MX--40-5 at night and tolerating  trach collar during the day  Trach tube management per facility  Patient to follow up with Dr. Pickering as an out patient in 3 weeks. Call the office to make appointment at [phone], please bring Chest Xray  to office visit

## 2019-02-28 NOTE — PROGRESS NOTE ADULT - PROBLEM SELECTOR PLAN 2
Patient with anemia in the setting of ESRD. Hemoglobin has risen with in the last few days into target range. Hold epogen and monitor cbc.

## 2019-02-28 NOTE — DISCHARGE NOTE ADULT - PATIENT PORTAL LINK FT
You can access the [facility] Patient Portal, offered by , by registering with the following website: [email or web address]

## 2019-02-28 NOTE — PROGRESS NOTE ADULT - PROBLEM SELECTOR PROBLEM 2
Anemia

## 2019-02-28 NOTE — PROGRESS NOTE ADULT - NSHPATTENDINGPLANDISCUSS_GEN_ALL_CORE
CTU attending
Dialysis nurese
nurse
CTICU
CTU
ICU
patient
patient and team
team
CTICU attending
Dialysis nurse
RICHARD RN

## 2019-02-28 NOTE — PROGRESS NOTE ADULT - SUBJECTIVE AND OBJECTIVE BOX
[facility] DIVISION OF KIDNEY DISEASES AND HYPERTENSION -- FOLLOW UP NOTE  --------------------------------------------------------------------------------    HPI: 59 yo male with medical history of NICM with ICD, ESRD on HD, former smoker, BPH admitted with acute hypercapnic respiratory failure. Nephrology consulted for ESRD/HD management. Pt intubated and sedated in the CTICU. Pt. admitted with hypercarbic respiratory failure, septic shock. Pt seen in the CTICU s/p Trach on 1/31/19.      Pt. seen and examined at bedside. Patient feels well and has no complaints, however unable to communicate with full ROS. Pt with low BP and Tachycardia. Goal UF 1L     PAST HISTORY  --------------------------------------------------------------------------------  No significant changes to PMH, PSH, FHx, SHx, unless otherwise noted    ALLERGIES & MEDICATIONS  --------------------------------------------------------------------------------  Allergies    No Known Allergies    Intolerances      Standing Inpatient Medications  chlorhexidine 4% Liquid 1 Application(s) Topical <User Schedule>  heparin  Injectable 5000 Unit(s) SubCutaneous every 12 hours  ipratropium 17 MICROgram(s) HFA Inhaler 2 Puff(s) Inhalation every 6 hours  melatonin 6 milliGRAM(s) Oral at bedtime  midodrine 10 milliGRAM(s) Oral every 8 hours  pantoprazole  Injectable 40 milliGRAM(s) IV Push daily  polyethylene glycol 3350 17 Gram(s) Oral at bedtime  psyllium Powder 1 Packet(s) Oral daily  QUEtiapine 50 milliGRAM(s) Oral every 12 hours  simethicone 80 milliGRAM(s) Chew every 6 hours    PRN Inpatient Medications  ALBUTerol    90 MICROgram(s) HFA Inhaler 2 Puff(s) Inhalation every 6 hours PRN  ALPRAZolam 0.5 milliGRAM(s) Oral every 8 hours PRN  bisacodyl Suppository 10 milliGRAM(s) Rectal daily PRN      REVIEW OF SYSTEMS  --------------------------------------------------------------------------------  Unable to obtain     VITALS/PHYSICAL EXAM  --------------------------------------------------------------------------------  T(C): 37.1 (02-28-19 @ 04:00), Max: 37.1 (02-28-19 @ 04:00)  HR: 96 (02-28-19 @ 07:29) (88 - 118)  BP: 100/62 (02-28-19 @ 07:00) (71/45 - 115/87)  RR: 29 (02-28-19 @ 07:28) (12 - 29)  SpO2: 97% (02-28-19 @ 07:29) (92% - 100%)  Wt(kg): --    02-27-19 @ 07:01  -  02-28-19 @ 07:00  --------------------------------------------------------  IN: 715 mL / OUT: 60 mL / NET: 655 mL    Physical Exam:  	Gen: + Trach  	HEENT: +Trach  	Pulm: CTA B/L  	CV: S1S2  	Abd: Soft, +BS   	Ext: + LE edema B/L (improved) + RUE PICC  	Neuro: Awake  	Skin: Warm and dry  	Vascular access: LUE AVF site: +thrill, bruit heard.     LABS/STUDIES  --------------------------------------------------------------------------------              9.9    4.89  >-----------<  240      [02-28-19 @ 03:30]              32.9     140  |  96  |  33  ----------------------------<  108      [02-28-19 @ 03:30]  3.8   |  28  |  5.58        Ca     9.8     [02-28-19 @ 03:30]    TPro  7.1  /  Alb  3.1  /  TBili  0.4  /  DBili  x   /  AST  19  /  ALT  23  /  AlkPhos  208  [02-28-19 @ 03:30]      Creatinine Trend:  SCr 5.58 [02-28 @ 03:30]  SCr 4.96 [02-25 @ 06:00]  SCr 3.88 [02-24 @ 05:13]  SCr 5.01 [02-23 @ 03:30]  SCr 4.17 [02-22 @ 05:00]    HbA1c 5.5      [10-12-18 @ 02:53]  TSH 4.79      [10-21-18 @ 04:15]    HBsAb Reactive      [02-01-19 @ 12:20]  HBsAg NEGATIVE      [02-01-19 @ 12:20]  HBcAb Reactive      [02-01-19 @ 12:20]  HCV 0.23, Nonreactive Hepatitis C AB  S/CO Ratio                        Interpretation  < 1.0                                     Non-Reactive  1.0 - 4.9                           Weakly-Reactive  > 5.0                                 Reactive  Non-Reactive: Aperson with a non-reactive HCV antibody  result is considered uninfected.  No further action is  needed unless recent infection is suspected.  In these  cases, consider repeat testing later to detect  seroconversion..  Weakly-Reactive: HCV antibody test is abnormal, HCV RNA  Qualitative test will follow.  Reactive: HCV antibody test is abnormal, HCV RNA  Qualitative test will follow.  Note: HCV antibody testing is performed on the [employer]   system.      [02-01-19 @ 12:20]

## 2019-02-28 NOTE — PROGRESS NOTE ADULT - SUBJECTIVE AND OBJECTIVE BOX
CLAUDIA HAN                     MRN-[id number]    HPI:  Mr. Han is a 58M who underwent a FB, R thoracotomy, decortication, chest wall reconstruction, lat flap with Dr Pickering on 10/11/18. He was admitted on 1/6/19 to Health system with a R pleural effusion and respiratory failure,  A R PTC was placed there and he was intubated.  He still had a R SANDY drain in place and he was transferred to [facility]. Patient was found to have aspirated a foreign body and +MRSA empyema. He was treated and discharged on 1/22/2019.     He was found by his home nurse to be in acute distress and recommended to proceed to the ED.  At presentation, he was found to be in acute hypercarbic respiratory failure complicated by hemodynamic instability requiring emergent intubation and pressor support. (28 Jan 2019 15:34)    Procedure:        Trach recannulated 2/24/2019  Decannulated 2/22/19  Trach and PEG  1/31/2019  Bronchoscopy   1/29/2019  Right thoracotomy, resection of portion of R 7th rib, evacuation of infected hemothorax, takedown of pleurocutaneous fistula  10/11/2018      Issues:  Respiratory failure , hypercapnia s/p Trach and PEG  Hypotension on Midodrine  ESRD / HD  Cardiomyopathy  HLD  BPH  Anxiety / Agitation      PAST MEDICAL & SURGICAL HISTORY:  Smoking hx  Cardiomyopathy  Wound: right chest  ICD (implantable cardioverter-defibrillator) in place  OA (osteoarthritis)  HLD (hyperlipidemia)  BPH (benign prostatic hyperplasia)  Pleural effusion  HTN (hypertension)  ESRD (end stage renal disease)  H/O chest wound: right  S/P thoracotomy: FB, R thoracotomy, decortication, chest wall reconstruction, lat flap  History of wound infection: right chest wall - revision 5/18 and again in 7/18  History of implantable cardioverter-defibrillator (ICD) placement: pt unsure when placed  H/O bilateral hip replacements: 2008, 2009            VITAL SIGNS:  Vital Signs Last 24 Hrs  T(C): 37.1 (28 Feb 2019 04:00), Max: 37.1 (28 Feb 2019 04:00)  T(F): 98.7 (28 Feb 2019 04:00), Max: 98.7 (28 Feb 2019 04:00)  HR: 106 (28 Feb 2019 06:00) (88 - 118)  BP: 110/87 (28 Feb 2019 06:00) (71/45 - 115/87)  BP(mean): 92 (28 Feb 2019 06:00) (50 - 93)  RR: 23 (28 Feb 2019 06:00) (12 - 26)  SpO2: 90% (28 Feb 2019 06:00) (90% - 100%)    I/Os:   I&O's Detail    26 Feb 2019 07:01  -  27 Feb 2019 07:00  --------------------------------------------------------  IN:    Enteral Tube Flush: 160 mL    Nepro with Carb Steady: 630 mL    Other: 500 mL  Total IN: 1290 mL    OUT:    Other: 1500 mL  Total OUT: 1500 mL    Total NET: -210 mL      27 Feb 2019 07:01  -  28 Feb 2019 06:25  --------------------------------------------------------  IN:    Enteral Tube Flush: 300 mL    Nepro with Carb Steady: 395 mL  Total IN: 695 mL    OUT:    Other: 60 mL  Total OUT: 60 mL    Total NET: 635 mL          CAPILLARY BLOOD GLUCOSE          =======================MEDICATIONS===================  MEDICATIONS  (STANDING):  chlorhexidine 4% Liquid 1 Application(s) Topical <User Schedule>  heparin  Injectable 5000 Unit(s) SubCutaneous every 12 hours  ipratropium 17 MICROgram(s) HFA Inhaler 2 Puff(s) Inhalation every 6 hours  melatonin 6 milliGRAM(s) Oral at bedtime  midodrine 10 milliGRAM(s) Oral every 8 hours  pantoprazole  Injectable 40 milliGRAM(s) IV Push daily  polyethylene glycol 3350 17 Gram(s) Oral at bedtime  psyllium Powder 1 Packet(s) Oral daily  QUEtiapine 50 milliGRAM(s) Oral every 12 hours  simethicone 80 milliGRAM(s) Chew every 6 hours    MEDICATIONS  (PRN):  ALBUTerol    90 MICROgram(s) HFA Inhaler 2 Puff(s) Inhalation every 6 hours PRN Shortness of Breath and/or Wheezing  ALPRAZolam 0.5 milliGRAM(s) Oral every 8 hours PRN anxiety  bisacodyl Suppository 10 milliGRAM(s) Rectal daily PRN Constipation      =======================VENTILATOR SETTINGS===================  Mode: CPAP with PS  FiO2: 40  PEEP: 5  PS: 15  MAP: 11  PIP: 21      PHYSICAL EXAM============================  General:                         Awake, alert, not in any distress  Neuro:                            Moving all extremities to commands.   Respiratory:	Air entry fair and  bilateral conducted sounds                                           Effort even and unlabored.  CV:		Regular rate and rhythm. Normal S1/S2                                          Distal pulses present.  Abdomen:	                     Soft, non-distended. Bowel sounds present   Skin:		No rash.  Extremities:	Warm, no cyanosis or edema.  Palpable pulses    ============================LABS=========================                        9.9    4.89  )-----------( 240      ( 28 Feb 2019 03:30 )             32.9     02-28    140  |  96<L>  |  33<H>  ----------------------------<  108<H>  3.8   |  28  |  5.58<H>    Ca    9.8      28 Feb 2019 03:30    TPro  7.1  /  Alb  3.1<L>  /  TBili  0.4  /  DBili  x   /  AST  19  /  ALT  23  /  AlkPhos  208<H>  02-28    LIVER FUNCTIONS - ( 28 Feb 2019 03:30 )  Alb: 3.1 g/dL / Pro: 7.1 g/dL / ALK PHOS: 208 u/L / ALT: 23 u/L / AST: 19 u/L / GGT: x                   ============================IMAGING STUDIES=========================  < from: Xray Cinesophagram (02.26.19 @ 12:39) >    Silent laryngeal penetration for thin liquids and nectar thick liquids   without retrieval. No aspiration for puree or honey thick liquids.    IMPRESSION:       Silent laryngeal penetration for thin liquids and nectar thick liquids.    For further information and recommendations, please refer to the speech   pathologist final report which is available for review in the electronic   medical record.      A/P:     58M who underwent a FB, R thoracotomy, decortication, chest wall reconstruction, lat flap with Dr Pickering on 10/11/18. He was admitted on 1/6/19 to Health system with a R pleural effusion and respiratory failure,  A R PTC was placed there and he was intubated.  He still had a R SANDY drain in place and he was transferred to [facility]. Patient was found to have aspirated a foreign body and +MRSA empyema. He was treated and discharged on 1/22/2019.     He was found by his home nurse to be in acute distress and recommended to proceed to the ED.  At presentation, he was found to be in acute hypercarbic respiratory failure complicated by hemodynamic instability requiring emergent intubation and pressor support. (28 Jan 2019 15:34)                            Neuro:                                         Pain control with Fentanyl PRN / Tylenol     Agitation /Anxiety: Continue Xanax and Seroquel.                            Cardiovascular:                                          Continue hemodynamic monitoring.    Hypotension:. Continue  Midodrine 10mg/TID.                                                   Respiratory:                        Pt is on full mechanical ventilator support RC--40-5 at night and tolerating  trach collar during the day                                         Trached on 1/31, decannulated on 2/22. Pt was found to have altered MS with CO2 retention. Pt was recannulated on 2/24.      Remained on vent support overnight                                                                 Continue bronchodilators, pulmonary toilet                               GI                                         Had bedside S/S and Cine - On Pureed / Honey consistency liquids. Aspiration precautions     Nepro 35cc/hr. Flush PEG with 30cc sterile water Q4hrs                                         Continue GI prophylaxis with Protonix     Abdomen is distended but non-tender - Continue bowel regimen, Dulcolax / Miralax / SImethacone                                                                 Renal:                                         HD as per renal / TIW                                          Monitor I/Os and electrolytes                                                                                                                 Infectious disease:     Continue pulmonary toilet                                         BAL - MRSA, Continue Vanco based on level  + Zosyn for 4 weeks, completed. Blood cultures are negative     Antibiotics  d/cd as per I.D                                                                   Endocrine         Continue Accu-Checks with coverage      Pt is on SQ Heparin and Venodyne boots for DVT prophylaxis.     Pertinent clinical, laboratory, radiographic, hemodynamic, echocardiographic, respiratory data, microbiologic data and chart were reviewed and analyzed frequently throughout the course of the day and night  Patient seen, examined and plan discussed with CT Surgeon Dr. Pickering / CTICU team during rounds.    Discussed with wife , updated status, transfer to Rehab today     I have spent 40 minutes of critical care time with this patient between 00am and 8am              Dave Su DO, FACEP

## 2019-02-28 NOTE — DISCHARGE NOTE ADULT - PLAN OF CARE
Recover s/p open tracheostomy  Continue with trach collar as tolerated and full ventilatory support at night Continue with Hemodialysis Continue with hemodialysis 3 x week (Tuesday, Thursday, Saturday)

## 2019-03-03 ENCOUNTER — INPATIENT (INPATIENT)
Facility: HOSPITAL | Age: 59
LOS: 2 days | Discharge: NURSING, REHAB OR LONG-TERM CARE | End: 2019-03-06
Attending: STUDENT IN AN ORGANIZED HEALTH CARE EDUCATION/TRAINING PROGRAM | Admitting: STUDENT IN AN ORGANIZED HEALTH CARE EDUCATION/TRAINING PROGRAM
Payer: MEDICARE

## 2019-03-03 VITALS
SYSTOLIC BLOOD PRESSURE: 105 MMHG | OXYGEN SATURATION: 96 % | DIASTOLIC BLOOD PRESSURE: 65 MMHG | TEMPERATURE: 100 F | HEART RATE: 114 BPM | RESPIRATION RATE: 22 BRPM

## 2019-03-03 DIAGNOSIS — Z96.643 PRESENCE OF ARTIFICIAL HIP JOINT, BILATERAL: Chronic | ICD-10-CM

## 2019-03-03 DIAGNOSIS — Z86.19 PERSONAL HISTORY OF OTHER INFECTIOUS AND PARASITIC DISEASES: Chronic | ICD-10-CM

## 2019-03-03 DIAGNOSIS — Z98.890 OTHER SPECIFIED POSTPROCEDURAL STATES: Chronic | ICD-10-CM

## 2019-03-03 DIAGNOSIS — Z95.810 PRESENCE OF AUTOMATIC (IMPLANTABLE) CARDIAC DEFIBRILLATOR: Chronic | ICD-10-CM

## 2019-03-03 LAB
ALBUMIN SERPL ELPH-MCNC: 3.4 G/DL — SIGNIFICANT CHANGE UP (ref 3.3–5)
ALP SERPL-CCNC: 231 U/L — HIGH (ref 40–120)
ALT FLD-CCNC: 21 U/L — SIGNIFICANT CHANGE UP (ref 4–41)
ANION GAP SERPL CALC-SCNC: 16 MMO/L — HIGH (ref 7–14)
ANISOCYTOSIS BLD QL: SLIGHT — SIGNIFICANT CHANGE UP
APTT BLD: 31.3 SEC — SIGNIFICANT CHANGE UP (ref 27.5–36.3)
AST SERPL-CCNC: 25 U/L — SIGNIFICANT CHANGE UP (ref 4–40)
BASE EXCESS BLDV CALC-SCNC: 12.1 MMOL/L — SIGNIFICANT CHANGE UP
BASOPHILS # BLD AUTO: 0.03 K/UL — SIGNIFICANT CHANGE UP (ref 0–0.2)
BASOPHILS NFR BLD AUTO: 0.4 % — SIGNIFICANT CHANGE UP (ref 0–2)
BASOPHILS NFR SPEC: 0.9 % — SIGNIFICANT CHANGE UP (ref 0–2)
BILIRUB SERPL-MCNC: 0.5 MG/DL — SIGNIFICANT CHANGE UP (ref 0.2–1.2)
BLASTS # FLD: 0 % — SIGNIFICANT CHANGE UP (ref 0–0)
BLD GP AB SCN SERPL QL: POSITIVE — SIGNIFICANT CHANGE UP
BLOOD GAS VENOUS - CREATININE: 5.36 MG/DL — HIGH (ref 0.5–1.3)
BUN SERPL-MCNC: 39 MG/DL — HIGH (ref 7–23)
CALCIUM SERPL-MCNC: 10.5 MG/DL — SIGNIFICANT CHANGE UP (ref 8.4–10.5)
CHLORIDE BLDV-SCNC: 100 MMOL/L — SIGNIFICANT CHANGE UP (ref 96–108)
CHLORIDE SERPL-SCNC: 95 MMOL/L — LOW (ref 98–107)
CO2 SERPL-SCNC: 33 MMOL/L — HIGH (ref 22–31)
CREAT SERPL-MCNC: 5.42 MG/DL — HIGH (ref 0.5–1.3)
EOSINOPHIL # BLD AUTO: 0.16 K/UL — SIGNIFICANT CHANGE UP (ref 0–0.5)
EOSINOPHIL NFR BLD AUTO: 2.1 % — SIGNIFICANT CHANGE UP (ref 0–6)
EOSINOPHIL NFR FLD: 0.9 % — SIGNIFICANT CHANGE UP (ref 0–6)
GAS PNL BLDV: 142 MMOL/L — SIGNIFICANT CHANGE UP (ref 136–146)
GLUCOSE BLDV-MCNC: 87 — SIGNIFICANT CHANGE UP (ref 70–99)
GLUCOSE SERPL-MCNC: 91 MG/DL — SIGNIFICANT CHANGE UP (ref 70–99)
HCO3 BLDV-SCNC: 33 MMOL/L — HIGH (ref 20–27)
HCT VFR BLD CALC: 33.6 % — LOW (ref 39–50)
HCT VFR BLDV CALC: 32.2 % — LOW (ref 39–51)
HGB BLD-MCNC: 9.9 G/DL — LOW (ref 13–17)
HGB BLDV-MCNC: 10.4 G/DL — LOW (ref 13–17)
HYPOCHROMIA BLD QL: SLIGHT — SIGNIFICANT CHANGE UP
IMM GRANULOCYTES NFR BLD AUTO: 0.5 % — SIGNIFICANT CHANGE UP (ref 0–1.5)
INR BLD: 1.07 — SIGNIFICANT CHANGE UP (ref 0.88–1.17)
LACTATE BLDV-MCNC: 2 MMOL/L — SIGNIFICANT CHANGE UP (ref 0.5–2)
LYMPHOCYTES # BLD AUTO: 0.48 K/UL — LOW (ref 1–3.3)
LYMPHOCYTES # BLD AUTO: 6.2 % — LOW (ref 13–44)
LYMPHOCYTES NFR SPEC AUTO: 8.7 % — LOW (ref 13–44)
MACROCYTES BLD QL: SLIGHT — SIGNIFICANT CHANGE UP
MCHC RBC-ENTMCNC: 29.5 % — LOW (ref 32–36)
MCHC RBC-ENTMCNC: 31.5 PG — SIGNIFICANT CHANGE UP (ref 27–34)
MCV RBC AUTO: 107 FL — HIGH (ref 80–100)
METAMYELOCYTES # FLD: 0 % — SIGNIFICANT CHANGE UP (ref 0–1)
MICROCYTES BLD QL: SLIGHT — SIGNIFICANT CHANGE UP
MONOCYTES # BLD AUTO: 0.57 K/UL — SIGNIFICANT CHANGE UP (ref 0–0.9)
MONOCYTES NFR BLD AUTO: 7.3 % — SIGNIFICANT CHANGE UP (ref 2–14)
MONOCYTES NFR BLD: 6.9 % — SIGNIFICANT CHANGE UP (ref 2–9)
MYELOCYTES NFR BLD: 0 % — SIGNIFICANT CHANGE UP (ref 0–0)
NEUTROPHIL AB SER-ACNC: 80.8 % — HIGH (ref 43–77)
NEUTROPHILS # BLD AUTO: 6.51 K/UL — SIGNIFICANT CHANGE UP (ref 1.8–7.4)
NEUTROPHILS NFR BLD AUTO: 83.5 % — HIGH (ref 43–77)
NEUTS BAND # BLD: 0.9 % — SIGNIFICANT CHANGE UP (ref 0–6)
NRBC # FLD: 0 K/UL — LOW (ref 25–125)
OB PNL STL: NEGATIVE — SIGNIFICANT CHANGE UP
OTHER - HEMATOLOGY %: 0 — SIGNIFICANT CHANGE UP
OVALOCYTES BLD QL SMEAR: SLIGHT — SIGNIFICANT CHANGE UP
PCO2 BLDV: 71 MMHG — HIGH (ref 41–51)
PH BLDV: 7.35 PH — SIGNIFICANT CHANGE UP (ref 7.32–7.43)
PLATELET # BLD AUTO: 203 K/UL — SIGNIFICANT CHANGE UP (ref 150–400)
PLATELET COUNT - ESTIMATE: NORMAL — SIGNIFICANT CHANGE UP
PMV BLD: 10 FL — SIGNIFICANT CHANGE UP (ref 7–13)
PO2 BLDV: < 24 MMHG — LOW (ref 35–40)
POLYCHROMASIA BLD QL SMEAR: SLIGHT — SIGNIFICANT CHANGE UP
POTASSIUM BLDV-SCNC: 3.3 MMOL/L — LOW (ref 3.4–4.5)
POTASSIUM SERPL-MCNC: 3.3 MMOL/L — LOW (ref 3.5–5.3)
POTASSIUM SERPL-SCNC: 3.3 MMOL/L — LOW (ref 3.5–5.3)
PROMYELOCYTES # FLD: 0 % — SIGNIFICANT CHANGE UP (ref 0–0)
PROT SERPL-MCNC: 8.1 G/DL — SIGNIFICANT CHANGE UP (ref 6–8.3)
PROTHROM AB SERPL-ACNC: 11.9 SEC — SIGNIFICANT CHANGE UP (ref 9.8–13.1)
RBC # BLD: 3.14 M/UL — LOW (ref 4.2–5.8)
RBC # FLD: 18 % — HIGH (ref 10.3–14.5)
RH IG SCN BLD-IMP: POSITIVE — SIGNIFICANT CHANGE UP
SAO2 % BLDV: 32.3 % — LOW (ref 60–85)
SODIUM SERPL-SCNC: 144 MMOL/L — SIGNIFICANT CHANGE UP (ref 135–145)
TROPONIN T, HIGH SENSITIVITY: 109 NG/L — CRITICAL HIGH (ref ?–14)
TROPONIN T, HIGH SENSITIVITY: 119 NG/L — CRITICAL HIGH (ref ?–14)
VARIANT LYMPHS # BLD: 0.9 % — SIGNIFICANT CHANGE UP
WBC # BLD: 7.79 K/UL — SIGNIFICANT CHANGE UP (ref 3.8–10.5)
WBC # FLD AUTO: 7.79 K/UL — SIGNIFICANT CHANGE UP (ref 3.8–10.5)

## 2019-03-03 PROCEDURE — 71045 X-RAY EXAM CHEST 1 VIEW: CPT | Mod: 26

## 2019-03-03 PROCEDURE — 74177 CT ABD & PELVIS W/CONTRAST: CPT | Mod: 26

## 2019-03-03 PROCEDURE — 71260 CT THORAX DX C+: CPT | Mod: 26

## 2019-03-03 RX ORDER — SODIUM CHLORIDE 9 MG/ML
1000 INJECTION INTRAMUSCULAR; INTRAVENOUS; SUBCUTANEOUS ONCE
Qty: 0 | Refills: 0 | Status: COMPLETED | OUTPATIENT
Start: 2019-03-03 | End: 2019-03-03

## 2019-03-03 RX ORDER — MORPHINE SULFATE 50 MG/1
4 CAPSULE, EXTENDED RELEASE ORAL ONCE
Qty: 0 | Refills: 0 | Status: DISCONTINUED | OUTPATIENT
Start: 2019-03-03 | End: 2019-03-03

## 2019-03-03 RX ORDER — PIPERACILLIN AND TAZOBACTAM 4; .5 G/20ML; G/20ML
3.38 INJECTION, POWDER, LYOPHILIZED, FOR SOLUTION INTRAVENOUS ONCE
Qty: 0 | Refills: 0 | Status: COMPLETED | OUTPATIENT
Start: 2019-03-03 | End: 2019-03-03

## 2019-03-03 RX ORDER — PANTOPRAZOLE SODIUM 20 MG/1
80 TABLET, DELAYED RELEASE ORAL ONCE
Qty: 0 | Refills: 0 | Status: COMPLETED | OUTPATIENT
Start: 2019-03-03 | End: 2019-03-03

## 2019-03-03 RX ORDER — FAMOTIDINE 10 MG/ML
20 INJECTION INTRAVENOUS ONCE
Qty: 0 | Refills: 0 | Status: COMPLETED | OUTPATIENT
Start: 2019-03-03 | End: 2019-03-03

## 2019-03-03 RX ORDER — ACETAMINOPHEN 500 MG
1000 TABLET ORAL ONCE
Qty: 0 | Refills: 0 | Status: COMPLETED | OUTPATIENT
Start: 2019-03-03 | End: 2019-03-03

## 2019-03-03 RX ADMIN — SODIUM CHLORIDE 1000 MILLILITER(S): 9 INJECTION INTRAMUSCULAR; INTRAVENOUS; SUBCUTANEOUS at 23:00

## 2019-03-03 RX ADMIN — Medication 400 MILLIGRAM(S): at 20:57

## 2019-03-03 RX ADMIN — PANTOPRAZOLE SODIUM 80 MILLIGRAM(S): 20 TABLET, DELAYED RELEASE ORAL at 20:57

## 2019-03-03 RX ADMIN — SODIUM CHLORIDE 1000 MILLILITER(S): 9 INJECTION INTRAMUSCULAR; INTRAVENOUS; SUBCUTANEOUS at 21:25

## 2019-03-03 RX ADMIN — PIPERACILLIN AND TAZOBACTAM 200 GRAM(S): 4; .5 INJECTION, POWDER, LYOPHILIZED, FOR SOLUTION INTRAVENOUS at 21:20

## 2019-03-03 RX ADMIN — FAMOTIDINE 20 MILLIGRAM(S): 10 INJECTION INTRAVENOUS at 20:57

## 2019-03-03 NOTE — ED ADULT NURSE REASSESSMENT NOTE - NS ED NURSE REASSESS COMMENT FT1
Report received from ERNST Morrell. Pt. received with trach, on ventilator, PEG tube, 20 gauge IV in right wrist, left arm fistula with pink do not use extremity band in place. Pt. is Mandarin speaking  #[id number] used with iPad. Pt. offered pen and paper to write, as per  pt. states "I can't write." Able to speak softly, will not elaborate on writing. Pt. is A&Ox3; poor historian. Call bell within reach. Awaiting CT. Will continue to monitor. Report received from ERNST Morrell. Pt. received with trach, on ventilator, PEG tube, 20 gauge IV in right wrist, left arm fistula with pink do not use extremity band in place, stage 2 pressure ulcer on right buttock. Pt. is Mandarin speaking  #[id number] used with iPad. Pt. offered pen and paper to write, as per  pt. states "I can't write." Able to speak softly, will not elaborate on writing. Pt. is A&Ox3; poor historian. Call bell within reach. Awaiting CT. Will continue to monitor.

## 2019-03-03 NOTE — ED ADULT NURSE NOTE - NSIMPLEMENTINTERV_GEN_ALL_ED
Implemented All Universal Safety Interventions:  [place] to call system. Call bell, personal items and telephone within reach. Instruct patient to call for assistance. Room bathroom lighting operational. Non-slip footwear when patient is off stretcher. Physically safe environment: no spills, clutter or unnecessary equipment. Stretcher in lowest position, wheels locked, appropriate side rails in place.

## 2019-03-03 NOTE — ED PROVIDER NOTE - OBJECTIVE STATEMENT
58 year old male with pmhx of ESRD, ICD on heparin, chronic resp failure s/p trache, presenting with 1 day of abdominal pain, fevers, and "coffee ground emesis. [facility] due to 1 day of "coffee ground emesis" as well as "dark colored drainage" from PEG tube. On arrival patient was tachycardic and febrile at 101.5 F. Patient complaining of epigastric abdominal pain nonradiating. 58 year old male with pmhx of ESRD, ICD on heparin, chronic resp failure s/p trache, presenting with 1 day of abdominal pain, fevers, and "coffee ground emesis. Patient lives in [facility] rehab facility where he was BIBEMS due to 1 day of "coffee ground emesis" as well as "dark colored drainage" from PEG tube. On arrival patient was tachycardic and febrile at 101.5 F. Patient complaining of epigastric abdominal pain nonradiating. Has some difficulty breathing as well.    Denies CP, LOC 58 year old male with pmhx of ESRD, ICD on heparin, chronic resp failure s/p trache, presenting with 1 day of abdominal pain, fevers, and "coffee ground emesis. Patient lives in [facility] rehab facility where he was BIBEMS due to 1 day of "coffee ground emesis" as well as "dark colored drainage" from PEG tube. On arrival patient was tachycardic and febrile at 101.5 F. Patient complaining of epigastric abdominal pain nonradiating. Has some difficulty breathing as well.     Denies CP, LOC 58 year old male with pmhx of ESRD, ICD, chronic resp failure s/p trache, presenting with 1 day of abdominal pain, fevers, and "coffee ground emesis. Patient lives in [facility] rehab facility where he was BIBEMS due to 1 day of "coffee ground emesis" as well as "dark colored drainage" from PEG tube. On arrival patient was tachycardic and febrile at 101.5 F. Patient complaining of epigastric abdominal pain nonradiating. Has some difficulty breathing as well.     Patient recently discharged from hospital few days ago for chronic respiratory failure, hospital course included intubation, bronch, trach PEG. Was MRSA+ in pleural fluid, finished course of abx then discharged to rehab facility.    Denies CP, LOC

## 2019-03-03 NOTE — ED ADULT NURSE NOTE - CHIEF COMPLAINT QUOTE
BIBEMS for [facility] for fever, dark brown sputum, abdominal pain , hx: Chronic respiratory failure (on the Vent)

## 2019-03-03 NOTE — ED ADULT TRIAGE NOTE - CHIEF COMPLAINT QUOTE
BIBEMS for [facility] for fever, dark brown sputum, abdominal pain , hx: Chronic respiratory failure (on the Vent)

## 2019-03-03 NOTE — ED PROVIDER NOTE - ATTENDING CONTRIBUTION TO CARE
AJM: Patient seen with resident and agree with above note. 58 year old male with pmhx of ESRD, ICD on heparin, chronic resp failure s/p trache, presenting with 1 day of abdominal pain, fevers, and "coffee ground emesis. [facility] due to 1 day of "coffee ground emesis" as well as "dark colored drainage" from PEG tube. On arrival patient was tachycardic and febrile at 101.5 F. Patient complaining of epigastric abdominal pain nonradiating. Has some difficulty breathing as well.  + ttp to upper abdomen on exam. + SIRS here. concern for possible UGIB, PUD, perforation. will obtain sepsis workup, zosyn, ct a/p, UGIB cocktail. pt will need admission

## 2019-03-03 NOTE — ED PROVIDER NOTE - PROGRESS NOTE DETAILS
Afia Hinojosa PGY1  Nephro consulted for dialysis tomorrow. Patient pending CT chest and abdomen with IV contrast. Afia Hinojosa PGY1  Performed dx paracentesis for suspicion of SBP due to increased ascitic fluid. Sent to lab.  MICU consulted for possible admission for sepsis/SBP Afia Hinojosa PGY1  CT-ICU recommended MICU, admitted to MICU for further care

## 2019-03-04 DIAGNOSIS — A41.9 SEPSIS, UNSPECIFIED ORGANISM: ICD-10-CM

## 2019-03-04 DIAGNOSIS — R58 HEMORRHAGE, NOT ELSEWHERE CLASSIFIED: ICD-10-CM

## 2019-03-04 DIAGNOSIS — N18.6 END STAGE RENAL DISEASE: ICD-10-CM

## 2019-03-04 LAB
ALBUMIN FLD-MCNC: 2.3 G/DL — SIGNIFICANT CHANGE UP
ALBUMIN SERPL ELPH-MCNC: 3.1 G/DL — LOW (ref 3.3–5)
ALP SERPL-CCNC: 192 U/L — HIGH (ref 40–120)
ALT FLD-CCNC: 20 U/L — SIGNIFICANT CHANGE UP (ref 4–41)
ANION GAP SERPL CALC-SCNC: 12 MMO/L — SIGNIFICANT CHANGE UP (ref 7–14)
AST SERPL-CCNC: 20 U/L — SIGNIFICANT CHANGE UP (ref 4–40)
B PERT DNA SPEC QL NAA+PROBE: NOT DETECTED — SIGNIFICANT CHANGE UP
BASOPHILS # BLD AUTO: 0.05 K/UL — SIGNIFICANT CHANGE UP (ref 0–0.2)
BASOPHILS NFR BLD AUTO: 0.9 % — SIGNIFICANT CHANGE UP (ref 0–2)
BILIRUB SERPL-MCNC: 0.4 MG/DL — SIGNIFICANT CHANGE UP (ref 0.2–1.2)
BODY FLUID TYPE: SIGNIFICANT CHANGE UP
BUN SERPL-MCNC: 40 MG/DL — HIGH (ref 7–23)
C PNEUM DNA SPEC QL NAA+PROBE: NOT DETECTED — SIGNIFICANT CHANGE UP
CALCIUM SERPL-MCNC: 9.8 MG/DL — SIGNIFICANT CHANGE UP (ref 8.4–10.5)
CHLORIDE SERPL-SCNC: 98 MMOL/L — SIGNIFICANT CHANGE UP (ref 98–107)
CLARITY SPEC: SIGNIFICANT CHANGE UP
CO2 SERPL-SCNC: 35 MMOL/L — HIGH (ref 22–31)
COLOR FLD: SIGNIFICANT CHANGE UP
CREAT SERPL-MCNC: 5.59 MG/DL — HIGH (ref 0.5–1.3)
CRYSTALS FLD MICRO: SIGNIFICANT CHANGE UP
EOSINOPHIL # BLD AUTO: 0.23 K/UL — SIGNIFICANT CHANGE UP (ref 0–0.5)
EOSINOPHIL NFR BLD AUTO: 4.3 % — SIGNIFICANT CHANGE UP (ref 0–6)
FLUAV H1 2009 PAND RNA SPEC QL NAA+PROBE: NOT DETECTED — SIGNIFICANT CHANGE UP
FLUAV H1 RNA SPEC QL NAA+PROBE: NOT DETECTED — SIGNIFICANT CHANGE UP
FLUAV H3 RNA SPEC QL NAA+PROBE: NOT DETECTED — SIGNIFICANT CHANGE UP
FLUAV SUBTYP SPEC NAA+PROBE: NOT DETECTED — SIGNIFICANT CHANGE UP
FLUBV RNA SPEC QL NAA+PROBE: NOT DETECTED — SIGNIFICANT CHANGE UP
GLUCOSE FLD-MCNC: 112 MG/DL — SIGNIFICANT CHANGE UP
GLUCOSE SERPL-MCNC: 83 MG/DL — SIGNIFICANT CHANGE UP (ref 70–99)
GRAM STN FLD: SIGNIFICANT CHANGE UP
HADV DNA SPEC QL NAA+PROBE: NOT DETECTED — SIGNIFICANT CHANGE UP
HBV SURFACE AG SER-ACNC: NEGATIVE — SIGNIFICANT CHANGE UP
HCOV PNL SPEC NAA+PROBE: SIGNIFICANT CHANGE UP
HCT VFR BLD CALC: 26.4 % — LOW (ref 39–50)
HCT VFR BLD CALC: 33.3 % — LOW (ref 39–50)
HGB BLD-MCNC: 10 G/DL — LOW (ref 13–17)
HGB BLD-MCNC: 8.1 G/DL — LOW (ref 13–17)
HMPV RNA SPEC QL NAA+PROBE: NOT DETECTED — SIGNIFICANT CHANGE UP
HPIV1 RNA SPEC QL NAA+PROBE: NOT DETECTED — SIGNIFICANT CHANGE UP
HPIV2 RNA SPEC QL NAA+PROBE: NOT DETECTED — SIGNIFICANT CHANGE UP
HPIV3 RNA SPEC QL NAA+PROBE: NOT DETECTED — SIGNIFICANT CHANGE UP
HPIV4 RNA SPEC QL NAA+PROBE: NOT DETECTED — SIGNIFICANT CHANGE UP
IMM GRANULOCYTES NFR BLD AUTO: 0.9 % — SIGNIFICANT CHANGE UP (ref 0–1.5)
LDH SERPL L TO P-CCNC: 70 U/L — SIGNIFICANT CHANGE UP
LYMPHOCYTES # BLD AUTO: 0.46 K/UL — LOW (ref 1–3.3)
LYMPHOCYTES # BLD AUTO: 8.5 % — LOW (ref 13–44)
LYMPHOCYTES NFR FLD: 19 % — SIGNIFICANT CHANGE UP
MACROPHAGES # FLD: 84 % — SIGNIFICANT CHANGE UP
MAGNESIUM SERPL-MCNC: 2.5 MG/DL — SIGNIFICANT CHANGE UP (ref 1.6–2.6)
MCHC RBC-ENTMCNC: 30 % — LOW (ref 32–36)
MCHC RBC-ENTMCNC: 30.7 % — LOW (ref 32–36)
MCHC RBC-ENTMCNC: 32.5 PG — SIGNIFICANT CHANGE UP (ref 27–34)
MCHC RBC-ENTMCNC: 32.9 PG — SIGNIFICANT CHANGE UP (ref 27–34)
MCV RBC AUTO: 107.3 FL — HIGH (ref 80–100)
MCV RBC AUTO: 108.1 FL — HIGH (ref 80–100)
MESOTHL CELL # FLD: 4 % — SIGNIFICANT CHANGE UP
MONOCYTES # BLD AUTO: 0.32 K/UL — SIGNIFICANT CHANGE UP (ref 0–0.9)
MONOCYTES # FLD: 1 % — SIGNIFICANT CHANGE UP
MONOCYTES NFR BLD AUTO: 5.9 % — SIGNIFICANT CHANGE UP (ref 2–14)
NEUTROPHILS # BLD AUTO: 4.29 K/UL — SIGNIFICANT CHANGE UP (ref 1.8–7.4)
NEUTROPHILS NFR BLD AUTO: 79.5 % — HIGH (ref 43–77)
NEUTS SEG NFR FLD MANUAL: 2 % — SIGNIFICANT CHANGE UP
NRBC # FLD: 0 K/UL — LOW (ref 25–125)
NRBC # FLD: 0 K/UL — LOW (ref 25–125)
NT-PROBNP SERPL-SCNC: SIGNIFICANT CHANGE UP PG/ML
PHOSPHATE SERPL-MCNC: 5.8 MG/DL — HIGH (ref 2.5–4.5)
PLATELET # BLD AUTO: 177 K/UL — SIGNIFICANT CHANGE UP (ref 150–400)
PLATELET # BLD AUTO: 198 K/UL — SIGNIFICANT CHANGE UP (ref 150–400)
PMV BLD: 10.1 FL — SIGNIFICANT CHANGE UP (ref 7–13)
PMV BLD: 9.8 FL — SIGNIFICANT CHANGE UP (ref 7–13)
POTASSIUM SERPL-MCNC: 3.8 MMOL/L — SIGNIFICANT CHANGE UP (ref 3.5–5.3)
POTASSIUM SERPL-SCNC: 3.8 MMOL/L — SIGNIFICANT CHANGE UP (ref 3.5–5.3)
PROT SERPL-MCNC: 7.3 G/DL — SIGNIFICANT CHANGE UP (ref 6–8.3)
RBC # BLD: 2.46 M/UL — LOW (ref 4.2–5.8)
RBC # BLD: 3.08 M/UL — LOW (ref 4.2–5.8)
RBC # FLD: 17.6 % — HIGH (ref 10.3–14.5)
RBC # FLD: 17.9 % — HIGH (ref 10.3–14.5)
RCV VOL RI: HIGH CELL/UL (ref 0–5)
RSV RNA SPEC QL NAA+PROBE: NOT DETECTED — SIGNIFICANT CHANGE UP
RV+EV RNA SPEC QL NAA+PROBE: NOT DETECTED — SIGNIFICANT CHANGE UP
SODIUM SERPL-SCNC: 145 MMOL/L — SIGNIFICANT CHANGE UP (ref 135–145)
SPECIMEN SOURCE: SIGNIFICANT CHANGE UP
TOTAL CELLS COUNTED, BODY FLUID: 100 CELLS — SIGNIFICANT CHANGE UP
TOTAL NUCLEATED CELL COUNT, BODY FLUID: 172 CELL/UL — HIGH (ref 0–5)
VANCOMYCIN FLD-MCNC: 26.7 UG/ML — CRITICAL HIGH
VIT B12 SERPL-MCNC: 1325 PG/ML — HIGH (ref 200–900)
WBC # BLD: 5.4 K/UL — SIGNIFICANT CHANGE UP (ref 3.8–10.5)
WBC # BLD: 6.45 K/UL — SIGNIFICANT CHANGE UP (ref 3.8–10.5)
WBC # FLD AUTO: 5.4 K/UL — SIGNIFICANT CHANGE UP (ref 3.8–10.5)
WBC # FLD AUTO: 6.45 K/UL — SIGNIFICANT CHANGE UP (ref 3.8–10.5)

## 2019-03-04 PROCEDURE — 99221 1ST HOSP IP/OBS SF/LOW 40: CPT

## 2019-03-04 PROCEDURE — 76705 ECHO EXAM OF ABDOMEN: CPT | Mod: 26

## 2019-03-04 PROCEDURE — 99291 CRITICAL CARE FIRST HOUR: CPT

## 2019-03-04 PROCEDURE — 99223 1ST HOSP IP/OBS HIGH 75: CPT | Mod: GC

## 2019-03-04 RX ORDER — SIMETHICONE 80 MG/1
80 TABLET, CHEWABLE ORAL THREE TIMES A DAY
Qty: 0 | Refills: 0 | Status: DISCONTINUED | OUTPATIENT
Start: 2019-03-04 | End: 2019-03-04

## 2019-03-04 RX ORDER — MORPHINE SULFATE 50 MG/1
2 CAPSULE, EXTENDED RELEASE ORAL ONCE
Qty: 0 | Refills: 0 | Status: DISCONTINUED | OUTPATIENT
Start: 2019-03-04 | End: 2019-03-04

## 2019-03-04 RX ORDER — LANOLIN ALCOHOL/MO/W.PET/CERES
3 CREAM (GRAM) TOPICAL AT BEDTIME
Qty: 0 | Refills: 0 | Status: DISCONTINUED | OUTPATIENT
Start: 2019-03-04 | End: 2019-03-06

## 2019-03-04 RX ORDER — MIDODRINE HYDROCHLORIDE 2.5 MG/1
10 TABLET ORAL EVERY 8 HOURS
Qty: 0 | Refills: 0 | Status: DISCONTINUED | OUTPATIENT
Start: 2019-03-04 | End: 2019-03-04

## 2019-03-04 RX ORDER — ALPRAZOLAM 0.25 MG
0.5 TABLET ORAL THREE TIMES A DAY
Qty: 0 | Refills: 0 | Status: DISCONTINUED | OUTPATIENT
Start: 2019-03-04 | End: 2019-03-06

## 2019-03-04 RX ORDER — CEFEPIME 1 G/1
1000 INJECTION, POWDER, FOR SOLUTION INTRAMUSCULAR; INTRAVENOUS ONCE
Qty: 0 | Refills: 0 | Status: DISCONTINUED | OUTPATIENT
Start: 2019-03-04 | End: 2019-03-04

## 2019-03-04 RX ORDER — MIDODRINE HYDROCHLORIDE 2.5 MG/1
10 TABLET ORAL EVERY 8 HOURS
Qty: 0 | Refills: 0 | Status: DISCONTINUED | OUTPATIENT
Start: 2019-03-04 | End: 2019-03-06

## 2019-03-04 RX ORDER — SIMETHICONE 80 MG/1
80 TABLET, CHEWABLE ORAL THREE TIMES A DAY
Qty: 0 | Refills: 0 | Status: DISCONTINUED | OUTPATIENT
Start: 2019-03-04 | End: 2019-03-06

## 2019-03-04 RX ORDER — QUETIAPINE FUMARATE 200 MG/1
50 TABLET, FILM COATED ORAL EVERY 12 HOURS
Qty: 0 | Refills: 0 | Status: DISCONTINUED | OUTPATIENT
Start: 2019-03-04 | End: 2019-03-04

## 2019-03-04 RX ORDER — CEFTRIAXONE 500 MG/1
1 INJECTION, POWDER, FOR SOLUTION INTRAMUSCULAR; INTRAVENOUS ONCE
Qty: 0 | Refills: 0 | Status: DISCONTINUED | OUTPATIENT
Start: 2019-03-04 | End: 2019-03-04

## 2019-03-04 RX ORDER — PIPERACILLIN AND TAZOBACTAM 4; .5 G/20ML; G/20ML
3.38 INJECTION, POWDER, LYOPHILIZED, FOR SOLUTION INTRAVENOUS EVERY 12 HOURS
Qty: 0 | Refills: 0 | Status: DISCONTINUED | OUTPATIENT
Start: 2019-03-04 | End: 2019-03-05

## 2019-03-04 RX ORDER — CEFTRIAXONE 500 MG/1
1 INJECTION, POWDER, FOR SOLUTION INTRAMUSCULAR; INTRAVENOUS ONCE
Qty: 0 | Refills: 0 | Status: COMPLETED | OUTPATIENT
Start: 2019-03-04 | End: 2019-03-04

## 2019-03-04 RX ORDER — QUETIAPINE FUMARATE 200 MG/1
50 TABLET, FILM COATED ORAL EVERY 12 HOURS
Qty: 0 | Refills: 0 | Status: DISCONTINUED | OUTPATIENT
Start: 2019-03-04 | End: 2019-03-06

## 2019-03-04 RX ORDER — LIDOCAINE 4 G/100G
10 CREAM TOPICAL ONCE
Qty: 0 | Refills: 0 | Status: COMPLETED | OUTPATIENT
Start: 2019-03-04 | End: 2019-03-04

## 2019-03-04 RX ORDER — PSYLLIUM SEED (WITH DEXTROSE)
1 POWDER (GRAM) ORAL DAILY
Qty: 0 | Refills: 0 | Status: DISCONTINUED | OUTPATIENT
Start: 2019-03-04 | End: 2019-03-04

## 2019-03-04 RX ORDER — MIDODRINE HYDROCHLORIDE 2.5 MG/1
10 TABLET ORAL ONCE
Qty: 0 | Refills: 0 | Status: COMPLETED | OUTPATIENT
Start: 2019-03-04 | End: 2019-03-04

## 2019-03-04 RX ORDER — CHLORHEXIDINE GLUCONATE 213 G/1000ML
1 SOLUTION TOPICAL
Qty: 0 | Refills: 0 | Status: COMPLETED | OUTPATIENT
Start: 2019-03-04 | End: 2019-03-04

## 2019-03-04 RX ORDER — IPRATROPIUM/ALBUTEROL SULFATE 18-103MCG
3 AEROSOL WITH ADAPTER (GRAM) INHALATION EVERY 6 HOURS
Qty: 0 | Refills: 0 | Status: DISCONTINUED | OUTPATIENT
Start: 2019-03-04 | End: 2019-03-06

## 2019-03-04 RX ORDER — VANCOMYCIN HCL 1 G
1000 VIAL (EA) INTRAVENOUS ONCE
Qty: 0 | Refills: 0 | Status: COMPLETED | OUTPATIENT
Start: 2019-03-04 | End: 2019-03-04

## 2019-03-04 RX ORDER — FAMOTIDINE 10 MG/ML
20 INJECTION INTRAVENOUS ONCE
Qty: 0 | Refills: 0 | Status: COMPLETED | OUTPATIENT
Start: 2019-03-04 | End: 2019-03-04

## 2019-03-04 RX ORDER — PIPERACILLIN AND TAZOBACTAM 4; .5 G/20ML; G/20ML
3.38 INJECTION, POWDER, LYOPHILIZED, FOR SOLUTION INTRAVENOUS EVERY 8 HOURS
Qty: 0 | Refills: 0 | Status: DISCONTINUED | OUTPATIENT
Start: 2019-03-04 | End: 2019-03-04

## 2019-03-04 RX ADMIN — MIDODRINE HYDROCHLORIDE 10 MILLIGRAM(S): 2.5 TABLET ORAL at 22:15

## 2019-03-04 RX ADMIN — MIDODRINE HYDROCHLORIDE 10 MILLIGRAM(S): 2.5 TABLET ORAL at 13:21

## 2019-03-04 RX ADMIN — CHLORHEXIDINE GLUCONATE 1 APPLICATION(S): 213 SOLUTION TOPICAL at 18:54

## 2019-03-04 RX ADMIN — MORPHINE SULFATE 2 MILLIGRAM(S): 50 CAPSULE, EXTENDED RELEASE ORAL at 19:00

## 2019-03-04 RX ADMIN — PIPERACILLIN AND TAZOBACTAM 25 GRAM(S): 4; .5 INJECTION, POWDER, LYOPHILIZED, FOR SOLUTION INTRAVENOUS at 20:21

## 2019-03-04 RX ADMIN — MIDODRINE HYDROCHLORIDE 10 MILLIGRAM(S): 2.5 TABLET ORAL at 21:41

## 2019-03-04 RX ADMIN — FAMOTIDINE 20 MILLIGRAM(S): 10 INJECTION INTRAVENOUS at 01:19

## 2019-03-04 RX ADMIN — MORPHINE SULFATE 4 MILLIGRAM(S): 50 CAPSULE, EXTENDED RELEASE ORAL at 00:30

## 2019-03-04 RX ADMIN — CEFTRIAXONE 100 GRAM(S): 500 INJECTION, POWDER, FOR SOLUTION INTRAMUSCULAR; INTRAVENOUS at 01:32

## 2019-03-04 RX ADMIN — QUETIAPINE FUMARATE 50 MILLIGRAM(S): 200 TABLET, FILM COATED ORAL at 19:32

## 2019-03-04 RX ADMIN — Medication 250 MILLIGRAM(S): at 08:10

## 2019-03-04 RX ADMIN — LIDOCAINE 10 MILLILITER(S): 4 CREAM TOPICAL at 13:21

## 2019-03-04 RX ADMIN — MORPHINE SULFATE 4 MILLIGRAM(S): 50 CAPSULE, EXTENDED RELEASE ORAL at 00:15

## 2019-03-04 RX ADMIN — MORPHINE SULFATE 2 MILLIGRAM(S): 50 CAPSULE, EXTENDED RELEASE ORAL at 05:30

## 2019-03-04 RX ADMIN — SIMETHICONE 80 MILLIGRAM(S): 80 TABLET, CHEWABLE ORAL at 21:41

## 2019-03-04 RX ADMIN — PIPERACILLIN AND TAZOBACTAM 25 GRAM(S): 4; .5 INJECTION, POWDER, LYOPHILIZED, FOR SOLUTION INTRAVENOUS at 08:19

## 2019-03-04 RX ADMIN — Medication 1000 MILLIGRAM(S): at 00:15

## 2019-03-04 RX ADMIN — SIMETHICONE 80 MILLIGRAM(S): 80 TABLET, CHEWABLE ORAL at 15:02

## 2019-03-04 NOTE — H&P ADULT - HISTORY OF PRESENT ILLNESS
57yo M with PMH of Chronic Respiratory Failure (s/p Trach/PEG), ESRD (HD T/T/S), CHF (EF 13%, s/p AICD), multiple Thoracic interventions (10/2018, R Thoracotomy/Decortication/Chest Wall Reconstruction), and recent admission for septic shock and respiratory failure in the setting of aspirated foreign body and MRSA empyema presenting from rehab with reported "coffee ground emesis" and dark-colored drainage from PEG and found to be febrile. Patient is Mandarin speaking but difficult to communicate due to Trach,  Services were attempted without good results. Patient complaining of sore throat and epigastric pain. Patient was discharged 4 days ago after prolonged hospitalization.    In the ED, H/H noted to be stable from discharge and FOBT was negative. Given patient's fever and worsening ascites, a diagnostic paracentesis was performed. s/p CT A/P and empiric Abx. 59yo M with PMH of Chronic Respiratory Failure (s/p Trach/PEG), ESRD (HD M/W/F), CHF (EF 13%, s/p AICD), multiple Thoracic interventions (10/2018, R Thoracotomy/Decortication/Chest Wall Reconstruction), and recent admission for septic shock and respiratory failure in the setting of aspirated foreign body and MRSA empyema presenting from rehab with reported "coffee ground emesis" and dark-colored drainage from PEG and found to be febrile. Patient is Mandarin speaking but difficult to communicate due to Trach,  Services were attempted without good results. Patient complaining of sore throat and epigastric pain. Patient was discharged 4 days ago after prolonged hospitalization.    In the ED, H/H noted to be stable from discharge and FOBT was negative. Given patient's fever and worsening ascites, a diagnostic paracentesis was performed. s/p CT A/P and empiric Abx.

## 2019-03-04 NOTE — PATIENT PROFILE ADULT - NSPRONUTRITIONRISK_GEN_A_NUR
Intubation greater than 48 hours Enteral/parenteral nutrition prior to admission/Pressure injury stage 2 or greater

## 2019-03-04 NOTE — SWALLOW BEDSIDE ASSESSMENT ADULT - COMMENTS
59yo M with PMH of Chronic Respiratory Failure (s/p Trach/PEG), ESRD (HD M/W/F), CHF (EF 13%, s/p AICD), and multiple Thoracic interventions (10/2018, R Thoracotomy/Decortication/Chest Wall Reconstruction) p/w reported hematemesis but found to be febrile with bilateral consolidations concerning for potential PNA, post-obstructive vs aspiration.    Of Note: Patient is known to this service from previous admission. Patient had a Cinesophagram completed on 2/26/2019 (See Report) with recommendations at that time for a Puree and Honey Thick Liquid diet along with PEG Tube Feedings for caloric/hydration/medication needs. It should be noted that the Cinesophagram to objectively assess the swallowing mechanism was completed with Patient having Tracheostomy in place off ventilator support and been tolerating  in place during day time hours and placed back on ventilator support for night time only at that time.     Given Patient is currently and requires Ventilator Support, a Clinical Swallow Evaluation is deferred at this time as discussed with MD/Team. Reconsult once patient is off ventilator support and tolerating trach collar or  in place.

## 2019-03-04 NOTE — CONSULT NOTE ADULT - ASSESSMENT
58 year old male with history of ESRD on HD TTS last dialysis Friday, CHF (EF 13%, s/p AICD), multiple Thoracic interventions (10/2018, R Thoracotomy/Decortication/Chest Wall Reconstruction), and recent admission for septic shock and respiratory failure in the setting of aspirated foreign body and MRSA empyema presenting from rehab with reported "coffee ground emesis" and dark-colored drainage from PEG and found to be febrile. Nephrology consulted as patient is ESRD with HD.

## 2019-03-04 NOTE — ADVANCED PRACTICE NURSE CONSULT - REASON FOR CONSULT
Patient seen on skin care rounds after wound care referral received for assessment of skin impairment and recommendations of topical management. Chart reviewed: Serum albumin 3.1 g/dL (increased from previous assessment 2.3g/dL), WBC 5.40, Hgb/Hct 10.0/33.3, Reji range 16 BMI 20.1kg/m2. Patient H/O R thoracotomy, decortication, chest wall reconstruction, lat flap with Dr Pickering on 10/11/18. Recent admission on 1/6/19 to [facility] with a R pleural effusion and respiratory failure,  A R PTC was placed there and he was intubated.  He still had a R SANDY drain in place and he was transferred to [facility]. Patient was found to have aspirated a foreign body and +MRSA empyema, tracheostomy and PEG placed during that hospitalization. Seen by nephrology services for HD (ESRD), and seen by thoracic surgery for history of thoracotomy and hemithorax. Admitted with sepsis/spontaneous bacterial peritonitis. (+) ascites in ED on US of abdomen, required paracentesis in ED.

## 2019-03-04 NOTE — H&P ADULT - NSHPPHYSICALEXAM_GEN_ALL_CORE
GENERAL: No acute distress, chronically ill-appearing  HEAD:  Atraumatic, Normocephalic  ENT: PERRLA, EOMI, trach  CHEST/LUNG: AICD, Clear to auscultation bilaterally but diminished at bases  BACK: No spinal tenderness  HEART: Regular rate and rhythm  ABDOMEN: Soft, epigastric tenderness, distended, +PEG w/ dark colored sediment  EXTREMITIES:  No clubbing, cyanosis, or edema  NEUROLOGY: Awake and Alert, non-focal, cranial nerves intact  SKIN: Normal color, No rashes or lesions GENERAL: No acute distress, chronically ill-appearing  HEAD:  Atraumatic, Normocephalic  ENT: PERRLA, EOMI, trach  CHEST/LUNG: AICD, Clear to auscultation bilaterally but diminished at bases. R Chest Wall wound with green discharge  BACK: No spinal tenderness  HEART: Regular rate and rhythm  ABDOMEN: Soft, epigastric tenderness, distended, +PEG w/ dark colored sediment  EXTREMITIES:  No clubbing, cyanosis, or edema  NEUROLOGY: Awake and Alert, non-focal, cranial nerves intact  SKIN: Normal color, No rashes or lesions GENERAL: No acute distress, chronically ill-appearing  HEAD:  Atraumatic, Normocephalic  ENT: PERRLA, EOMI, trach  CHEST/LUNG: AICD, Clear to auscultation bilaterally but diminished at bases. R Chest Wall wound with green discharge  BACK: No spinal tenderness  HEART: Regular rate and rhythm  ABDOMEN: Soft, epigastric tenderness, distended, +PEG w/ dark colored sediment  EXTREMITIES: +LUE AVF. No clubbing, cyanosis, or edema  NEUROLOGY: Awake and Alert, non-focal, cranial nerves intact  SKIN: Normal color, No rashes or lesions

## 2019-03-04 NOTE — SWALLOW BEDSIDE ASSESSMENT ADULT - SWALLOW EVAL: DIAGNOSIS
Patient is known to this service from previous admission. Patient had a Cinesophagram at that time (See Report).  Given Patient is currently and requires Ventilator Support, a Clinical Swallow Evaluation is deferred at this time as discussed with MD/Team. Reconsult once patient is off ventilator support and tolerating trach collar or  in place.

## 2019-03-04 NOTE — H&P ADULT - NSHPSOCIALHISTORY_GEN_ALL_CORE
Lives with Wife    On disability      Denies smoking, EtOH, Substance Abuse Lives with Wife    On disability      Denies EtOH, Substance Abuse  Former smoker

## 2019-03-04 NOTE — CONSULT NOTE ADULT - SUBJECTIVE AND OBJECTIVE BOX
[facility] Division of Kidney Diseases & Hypertension  INITIAL CONSULT NOTE  [phone]--------------------------------------------------------------------------------  HPI: 58 year old male with history of ESRD on HD TTS last dialysis Friday, CHF (EF 13%, s/p AICD), multiple Thoracic interventions (10/2018, R Thoracotomy/Decortication/Chest Wall Reconstruction), and recent admission for septic shock and respiratory failure in the setting of aspirated foreign body and MRSA empyema presenting from rehab with reported "coffee ground emesis" and dark-colored drainage from PEG and found to be febrile. Patient seen at bedside.    States he has abdominal pain however otherwise has no complaints however difficult to communicate as patient has tracheostomy. Otherwise patient is hemodynamically stable. Nephrology consulted for HD.    PAST HISTORY  --------------------------------------------------------------------------------  PAST MEDICAL & SURGICAL HISTORY:  Smoking hx  Cardiomyopathy  Wound: right chest  ICD (implantable cardioverter-defibrillator) in place  OA (osteoarthritis)  HLD (hyperlipidemia)  BPH (benign prostatic hyperplasia)  Pleural effusion  HTN (hypertension)  ESRD (end stage renal disease)  H/O chest wound: right  S/P thoracotomy: FB, R thoracotomy, decortication, chest wall reconstruction, lat flap  History of wound infection: right chest wall - revision 5/18 and again in 7/18  History of implantable cardioverter-defibrillator (ICD) placement: pt unsure when placed  H/O bilateral hip replacements: 2008, 2009    FAMILY HISTORY:    PAST SOCIAL HISTORY:    ALLERGIES & MEDICATIONS  --------------------------------------------------------------------------------  Allergies    No Known Allergies    Intolerances      Standing Inpatient Medications  chlorhexidine 4% Liquid 1 Application(s) Topical two times a day  melatonin 3 milliGRAM(s) Oral at bedtime  midodrine 10 milliGRAM(s) Oral every 8 hours  piperacillin/tazobactam IVPB. 3.375 Gram(s) IV Intermittent every 12 hours  QUEtiapine 50 milliGRAM(s) Oral every 12 hours  simethicone 80 milliGRAM(s) Chew three times a day    PRN Inpatient Medications  ALBUTerol/ipratropium for Nebulization 3 milliLiter(s) Nebulizer every 6 hours PRN  ALPRAZolam 0.5 milliGRAM(s) Oral three times a day PRN      REVIEW OF SYSTEMS:    GI: Abdominal pain  Other review of systems unable to obtain.      VITALS/PHYSICAL EXAM  --------------------------------------------------------------------------------  T(C): 36.9 (03-04-19 @ 08:00), Max: 38.6 (03-03-19 @ 19:35)  HR: 104 (03-04-19 @ 13:00) (86 - 120)  BP: 95/67 (03-04-19 @ 13:00) (91/54 - 114/72)  RR: 14 (03-04-19 @ 13:00) (14 - 28)  SpO2: 100% (03-04-19 @ 13:00) (96% - 100%)  Wt(kg): --  Height (cm): 172.72 (03-04-19 @ 06:15)  Weight (kg): 60 (03-04-19 @ 06:15)  BMI (kg/m2): 20.1 (03-04-19 @ 06:15)  BSA (m2): 1.71 (03-04-19 @ 06:15)      03-04-19 @ 07:01  -  03-04-19 @ 14:34  --------------------------------------------------------  IN: 390 mL / OUT: 0 mL / NET: 390 mL      Physical Exam:  	Gen: In mild distress, tracheostomy in place  	HEENT: Anicteric  	Pulm: Mechanical breath sounds  	CV:  S1S2  	Abd: +BS, pain with palpation of LE  	Ext: No B/L Lower ext edema  	Neuro: AAOx3, alert, no focal deficits  	Skin: Warm, without rashes  	Vascular access: СЕРГЕЙ AV    LABS/STUDIES  --------------------------------------------------------------------------------              10.0   5.40  >-----------<  198      [03-04-19 @ 06:45]              33.3     145  |  98  |  40  ----------------------------<  83      [03-04-19 @ 06:45]  3.8   |  35  |  5.59        Ca     9.8     [03-04-19 @ 06:45]      Mg     2.5     [03-04-19 @ 06:45]      Phos  5.8     [03-04-19 @ 06:45]    TPro  7.3  /  Alb  3.1  /  TBili  0.4  /  DBili  x   /  AST  20  /  ALT  20  /  AlkPhos  192  [03-04-19 @ 06:45]    PT/INR: PT 11.9 , INR 1.07       [03-03-19 @ 19:50]  PTT: 31.3       [03-03-19 @ 19:50]      Creatinine Trend:  SCr 5.59 [03-04 @ 06:45]  SCr 5.42 [03-03 @ 19:50]  SCr 5.58 [02-28 @ 03:30]  SCr 4.96 [02-25 @ 06:00]  SCr 3.88 [02-24 @ 05:13] [facility] Division of Kidney Diseases & Hypertension  INITIAL CONSULT NOTE  [phone]--------------------------------------------------------------------------------  HPI: 58 year old male with history of ESRD on HD TTS last dialysis Friday, CHF (EF 13%, s/p AICD), multiple Thoracic interventions (10/2018, R Thoracotomy/Decortication/Chest Wall Reconstruction), and recent admission for septic shock and respiratory failure in the setting of aspirated foreign body and MRSA empyema presenting from rehab with reported "coffee ground emesis" and dark-colored drainage from PEG and found to be febrile. Patient seen at bedside.    States he has abdominal pain however otherwise has no complaints however difficult to communicate as patient has tracheostomy. Otherwise patient is hemodynamically stable. Nephrology consulted for HD.    PAST HISTORY  --------------------------------------------------------------------------------  PAST MEDICAL & SURGICAL HISTORY:  Smoking hx  Cardiomyopathy  Wound: right chest  ICD (implantable cardioverter-defibrillator) in place  OA (osteoarthritis)  HLD (hyperlipidemia)  BPH (benign prostatic hyperplasia)  Pleural effusion  HTN (hypertension)  ESRD (end stage renal disease)  H/O chest wound: right  S/P thoracotomy: FB, R thoracotomy, decortication, chest wall reconstruction, lat flap  History of wound infection: right chest wall - revision 5/18 and again in 7/18  History of implantable cardioverter-defibrillator (ICD) placement: pt unsure when placed  H/O bilateral hip replacements: 2008, 2009    FAMILY HISTORY: not contributory    PAST SOCIAL HISTORY: frequent hospitalizations    ALLERGIES & MEDICATIONS  --------------------------------------------------------------------------------  Allergies    No Known Allergies    Intolerances      Standing Inpatient Medications  chlorhexidine 4% Liquid 1 Application(s) Topical two times a day  melatonin 3 milliGRAM(s) Oral at bedtime  midodrine 10 milliGRAM(s) Oral every 8 hours  piperacillin/tazobactam IVPB. 3.375 Gram(s) IV Intermittent every 12 hours  QUEtiapine 50 milliGRAM(s) Oral every 12 hours  simethicone 80 milliGRAM(s) Chew three times a day    PRN Inpatient Medications  ALBUTerol/ipratropium for Nebulization 3 milliLiter(s) Nebulizer every 6 hours PRN  ALPRAZolam 0.5 milliGRAM(s) Oral three times a day PRN      REVIEW OF SYSTEMS:    GI: Abdominal pain  Other review of systems unable to obtain.      VITALS/PHYSICAL EXAM  --------------------------------------------------------------------------------  T(C): 36.9 (03-04-19 @ 08:00), Max: 38.6 (03-03-19 @ 19:35)  HR: 104 (03-04-19 @ 13:00) (86 - 120)  BP: 95/67 (03-04-19 @ 13:00) (91/54 - 114/72)  RR: 14 (03-04-19 @ 13:00) (14 - 28)  SpO2: 100% (03-04-19 @ 13:00) (96% - 100%)  Wt(kg): --  Height (cm): 172.72 (03-04-19 @ 06:15)  Weight (kg): 60 (03-04-19 @ 06:15)  BMI (kg/m2): 20.1 (03-04-19 @ 06:15)  BSA (m2): 1.71 (03-04-19 @ 06:15)      03-04-19 @ 07:01  -  03-04-19 @ 14:34  --------------------------------------------------------  IN: 390 mL / OUT: 0 mL / NET: 390 mL      Physical Exam:  	Gen: In mild distress, tracheostomy in place  	HEENT: Anicteric, trach noted  	Pulm: Mechanical breath sounds  	CV:  S1S2  	Abd: +BS, pain with palpation of LE  	Ext: No B/L Lower ext edema  	Neuro: alert follows command              Pysch: appropriate affect  	Skin: Warm, without rashes  	Vascular access: СЕРГЕЙ SPAULDING    LABS/STUDIES  --------------------------------------------------------------------------------              10.0   5.40  >-----------<  198      [03-04-19 @ 06:45]              33.3     145  |  98  |  40  ----------------------------<  83      [03-04-19 @ 06:45]  3.8   |  35  |  5.59        Ca     9.8     [03-04-19 @ 06:45]      Mg     2.5     [03-04-19 @ 06:45]      Phos  5.8     [03-04-19 @ 06:45]    TPro  7.3  /  Alb  3.1  /  TBili  0.4  /  DBili  x   /  AST  20  /  ALT  20  /  AlkPhos  192  [03-04-19 @ 06:45]    PT/INR: PT 11.9 , INR 1.07       [03-03-19 @ 19:50]  PTT: 31.3       [03-03-19 @ 19:50]      Creatinine Trend:  SCr 5.59 [03-04 @ 06:45]  SCr 5.42 [03-03 @ 19:50]  SCr 5.58 [02-28 @ 03:30]  SCr 4.96 [02-25 @ 06:00]  SCr 3.88 [02-24 @ 05:13]

## 2019-03-04 NOTE — CONSULT NOTE ADULT - PROBLEM SELECTOR RECOMMENDATION 9
Patient with ESRD on HD TTS, last dialysis on Friday. Will do dialysis today however patient has possible GI bleed and will not remove fluid to avoid hypotension and only do clearance. Will see how he tolerates and continue to monitor electrolytes and fluid status.

## 2019-03-04 NOTE — ADVANCED PRACTICE NURSE CONSULT - ASSESSMENT
A&Ox3, nonverbal due to tracheostomy but able to use phone to text words, able to make needs known, bedbound, anuric on HD. Skin warm, dry, adequate skin turgor. Lower legs with scattered areas of hyperpigmentation and scabs. Overgrown toenails. Right lateral chest wall (mid) with one black suture and a noted area of dried serosanguinous exudate. Sacrum area of hypopigmentation, noted to have a DTPI on previous assessment that has 100% resolved. Patient able to move in bed independently and does not require use of z-flex boots at this time.    Left clavicle area/ two areas of dried serosanguinous exudate not beneath tracheostomy plate. Skin beneath tracheostomy plate intact, no erythema. Dried crust under tracheostomy plate was noted on previous admission (February 2019 assessment by wound care team).    PEG tube in place with scant serosanguinous drainage, no odor. Able to remove dried crust in peritubular skin with cleansing. Blanchable erythema in peritubular skin: skin otherwise intact.    Right lateral side-posterior back along rib cage and a surgical scar line that is in a upside down "L" shape: 2 areas of eschar and an area of exposed dermis: most proximal area measures [id number] tissue type 50% slough, 50% friable, flat, agranular tissue. 2.5cm distal is a second area of eschar that measures [id number], 100% eschar, soft, minimally moist brown/tan eschar firmly attached to wound base (unable to determine complete anatomical depth due to necrotic tissue of both areas). 7cm distal is an area of exposed dermis, friable tissue that measures 0.5cmx0.5cmx0.1cm. No induration, no increased warmth, no erythema. Goal of care: autolytic debridement of necrotic tissue, monitor for changes in tissue type, decrease/control bioburden of tissue.

## 2019-03-04 NOTE — ADVANCED PRACTICE NURSE CONSULT - RECOMMEDATIONS
Recommend thoracic team to follow up with chronic surgical incisional wounds.    Left clavicle (areas of dried crust): Apply Liquid barrier film daily.    PEG: Cleanse q shift with NS, apply liquid barrier film beneath steven disc.  If redness noted under steven disc bumper apply thin foam  dressing without border (Mepilex Lite)- cut to mid dressing with a 'Y' shape.   Secondary securement to abdomen taping in 'H' fashion with Steri-strips.     Tracheostomy: Cleanse q shift with NS, apply liquid barrier film tracheostomy plate. Apply foam dressing without border- cut to mid dressing with a 'Y' shape. Every shift.    Right lateral side-posterior midback: Cleanse with SAF-clens, rinse with NS, pat dry. Apply Liquid barrier film to periwound skin. Apply Medihoney gel to areas of eschar and friable tissue. Cover with foam with border. Change daily.    Continue low air loss bed therapy, continue to turn & reposition q2h, continue measures to decrease friction/shear/pressure.     Please call wound care service line is further assistance is needed ([phone]).

## 2019-03-04 NOTE — H&P ADULT - NSHPLABSRESULTS_GEN_ALL_CORE
LABS: Personally Read and Interpreted                        9.9    7.79  )-----------( 203      ( 03 Mar 2019 19:50 )             33.6     Hgb Trend: 9.9<--, 9.9<--    PT/INR - ( 03 Mar 2019 19:50 )   PT: 11.9 SEC;   INR: 1.07     PTT - ( 03 Mar 2019 19:50 )  PTT:31.3 SEC    144  |  95<L>  |  39<H>  ----------------------------<  91  3.3<L>   |  33<H>  |  5.42<H>    Ca    10.5      03 Mar 2019 19:50    TPro  8.1  /  Alb  3.4  /  TBili  0.5  /  DBili  x   /  AST  25  /  ALT  21  /  AlkPhos  231<H>  03-03    Creatinine Trend: 5.42<--, 5.58<--, 4.96<--, 3.88<--, 5.01<--, 4.17<--      EKG: tracing personally read and reviewed;    IMAGING  CXR: personally read and reviewed;  < from: CT Chest w/ IV Cont (03.03.19 @ 23:44) >  LUNGS AND LARGE AIRWAYS: A tracheostomy tube is noted. Postsurgical changes from prior right-sided VATS. Secretions are noted in the trachea and bronchi, with distal mucus plugging in the segmental bronchi of the   bilateral lower lobes with distal atelectasis and/or pneumonia. Centrilobular emphysema.  PLEURA: Small bilateral pleural effusions with partial loculation on the right.  VESSELS: Main pulmonary artery is again noted to be mildly dilated,   measuring 3.8 cm, which can be seen in the setting of pulmonary arterial   hypertension. Atherosclerotic disease of the aorta and coronary arteries  HEART: Heart size is enlarged. No pericardial effusion.  MEDIASTINUM AND RACHEL: No lymphadenopathy.  CHEST WALL AND LOWER NECK: Cachexia. Left chest wall external AICD device.    ABDOMEN AND PELVIS:  LIVER: Calcified granulomas.  BILE DUCTS: Normal caliber.  GALLBLADDER: Within normal limits.  SPLEEN: Within normal limits.  PANCREAS: Within normal limits.  ADRENALS: Within normal limits.  KIDNEYS/URETERS: Atrophic bilateral kidneys. Bilateral cysts and   additional bilateral subcentimeter hypodense and hyperdense foci, too small to characterize.    Significantly limited evaluation the pelvis secondary to streak artifact from hip arthroplasties.    BOWEL: PEG tube with balloon in the gastric lumen. No bowel obstruction.   Appendix is normal.  PERITONEUM: Large amount of ascites, increased from the prior study.  VESSELS:  Atherosclerotic disease.  RETROPERITONEUM: No lymphadenopathy.    ABDOMINAL WALL: Anasarca.  BONES: Status post bilateral hip arthroplasties. Degenerative changes.    IMPRESSION:   Small bilateral pleural effusions with partial loculation on the right, not significant changed from prior study.  Bilateral lower lobe bronchial mucous plugging with partial atelectasis and/or pneumonia involving the bilateral lower lobes.  Dilated main pulmonary artery, which can be seen in the setting of pulmonary arterial hypertension.  Large volume abdominal ascites, increased from prior study.

## 2019-03-04 NOTE — CONSULT NOTE ADULT - SUBJECTIVE AND OBJECTIVE BOX
HPI: 58 year old male presented to the ER from his SNF c/o coffee ground hematemesis and drainage from his PEG.  He is known to Thoracic Surgery as he underwent a R thoracotomy, decortication, chest wall reconstruction , lat flap on 10/11/18 and from an admission in January 2019 where he was treated for respiratory failure and MRSA hemithorax and had a trach and a PEG.  In the ER last night, he was found to be febrile to 101.5 with tachycardia, epigastric pain, SOB, and nausea.  He was given Zosyn and IV fluid.  As his abdomen was distended and a CT scan scan showed increased abdominal ascites, a paracentesis was performed in the ER and he is to be admitted with sepsis and SBP (spontaneous bacterial peritonitis)      PAST MEDICAL & SURGICAL HISTORY:  Smoking hx  Cardiomyopathy  Wound: right chest  ICD (implantable cardioverter-defibrillator) in place  OA (osteoarthritis)  HLD (hyperlipidemia)  BPH (benign prostatic hyperplasia)  Pleural effusion  HTN (hypertension)  ESRD (end stage renal disease)  S/P thoracotomy: FB, R thoracotomy, decortication, chest wall reconstruction, lat flap  History of wound infection: right chest wall - revision 5/18 and again in 7/18  History of implantable cardioverter-defibrillator (ICD) placement: pt unsure when placed  H/O bilateral hip replacements: 2008, 2009      REVIEW OF SYSTEMS	  Respiratory and Thorax: + SOB, Dyspnea  Gastrointestinal: +Nausea/ Vomiting/ Hematemesis/ epigastric pain	  Otherwise negative    Allergic/Immunologic:	 No Anaphylaxis/ Intolerance/ Recent illnesses    MEDICATIONS:  piperacillin/tazobactam IVPB. 3.375 Gram(s) IV Intermittent every 8 hours    Allergies: No Known Allergies    SOCIAL HISTORY: From [facility]      Vital Signs Last 24 Hrs  T(C): 36.5 (04 Mar 2019 04:57), Max: 38.6 (03 Mar 2019 19:35)  T(F): 97.7 (04 Mar 2019 04:57), Max: 101.5 (03 Mar 2019 19:35)  HR: 102 (04 Mar 2019 05:30) (91 - 120)  BP: 107/66 (04 Mar 2019 05:30) (91/54 - 107/66)  RR: 20 (04 Mar 2019 05:30) (18 - 28)  SpO2: 100% (04 Mar 2019 05:30) (96% - 100%)    General: Thin but awake and alert  Neurology: Awake, nonfocal, [place]RA[place] x 4  Eyes: Scleras clear, PERRLA/ EOMI,   ENT:  no stridor  Neck: Neck supple, trachea midline, No JVD, Trach to vent   Respiratory: CTA B/L, No wheezing  CV: RR, S1S2, no murmurs  Abdominal: Distended, tight, epigastric TTP, +BS, PEG with coffee ground drainage  Extremities: No edema, + peripheral pulses  Skin: Well-healed R chest surgical wounds      LABS:                        9.9    7.79  )-----------( 203      ( 03 Mar 2019 19:50 )             33.6     03-03    144  |  95<L>  |  39<H>  ----------------------------<  91  3.3<L>   |  33<H>  |  5.42<H>    Ca    10.5      03 Mar 2019 19:50    TPro  8.1  /  Alb  3.4  /  TBili  0.5  /  DBili  x   /  AST  25  /  ALT  21  /  AlkPhos  231<H>  03-03    PT/INR - ( 03 Mar 2019 19:50 )   PT: 11.9 SEC;   INR: 1.07          PTT - ( 03 Mar 2019 19:50 )  PTT:31.3 SEC      RADIOLOGY & ADDITIONAL STUDIES:  CT Chest/ Abd: Large volume abd ascites increased, dilated pulm art, BL LL mucous plugging with atelectasis and BL LL PNA, PEG in stomach    ASSESSMENT:   Hematemesis, ascites, sepsis    Plan:  Admit to medical service  As discussed with Dr Pickering

## 2019-03-04 NOTE — CHART NOTE - NSCHARTNOTEFT_GEN_A_CORE
:  Sherice Mendez, PGY 3  Internal Medicine  Pager [phone] | [phone]    INDICATION: r/o consolidation, assess LV function    PROCEDURE:  [x] LIMITED ECHO  [x] LIMITED CHEST  [ ] LIMITED RETROPERITONEAL  [ ] LIMITED ABDOMINAL  [ ] LIMITED DVT  [ ] NEEDLE GUIDANCE VASCULAR  [ ] NEEDLE GUIDANCE THORACENTESIS  [ ] NEEDLE GUIDANCE PARACENTESIS  [ ] NEEDLE GUIDANCE PERICARDIOCENTESIS  [ ] OTHER    FINDINGS:  Chest: minimal B-lines anteriorly. B lines present at bases. Irregular pleura. Lung bases difficult to visualize due to presence of ICD and patient discomfort during exam.     Cardiac: LV function diminished.       INTERPRETATION:   B lines present at lung bases. LV function diminished. :  Sherice Mendez, PGY 3  Internal Medicine  Pager [phone] | [phone]    INDICATION: respiratory failure, fever    PROCEDURE:  [x] LIMITED ECHO  [x] LIMITED CHEST  [ ] LIMITED RETROPERITONEAL  [ ] LIMITED ABDOMINAL  [ ] LIMITED DVT  [ ] NEEDLE GUIDANCE VASCULAR  [ ] NEEDLE GUIDANCE THORACENTESIS  [ ] NEEDLE GUIDANCE PARACENTESIS  [ ] NEEDLE GUIDANCE PERICARDIOCENTESIS  [ ] OTHER    FINDINGS:  Chest: minimal B-lines anteriorly. B lines present at bases. Irregular pleura. Lung bases difficult to visualize due to presence of ICD and patient discomfort during exam.     Cardiac: severely reduced LV function      INTERPRETATION:   B lines present at lung bases a-lines anteriorly, severely reduced LV function

## 2019-03-04 NOTE — H&P ADULT - ATTENDING COMMENTS
57yo M with PMH of Chronic Respiratory Failure (s/p Trach/PEG), ESRD (HD M/W/F), CHF (EF 13%, s/p AICD), and multiple Thoracic interventions p/w reported hematemesis but found to have bilateral consolidations concerning for potential PNA, post-obstructive vs aspiration.

## 2019-03-04 NOTE — ED ADULT NURSE REASSESSMENT NOTE - NS ED NURSE REASSESS COMMENT FT1
Pt. refusing to be suctioned. MD aware. Pt. appears comfortable at present. Awaiting dispo. Call bell within reach. O2 saturation 100%.

## 2019-03-04 NOTE — H&P ADULT - ASSESSMENT
57yo M with PMH of Chronic Respiratory Failure (s/p Trach/PEG), ESRD (HD T/T/S), CHF (EF 13%, s/p AICD), and multiple Thoracic interventions (10/2018, R Thoracotomy/Decortication/Chest Wall Reconstruction) p/w reported hematemesis but found to be febrile with bilateral consolidations concerning for potential PNA, post-obstructive vs aspiration.    #Neuro: no active issues  -appears to be at baseline mental status  -would reach out to NH and family for collateral    #CV: Chronic Systolic Heart Failure, Chronic Hypotension  -no acute exacerbation, caution with IVF given CHF and ESRD. would avoid further fluid despite concern for infection  -c/w with home Midodrine 10mg TID    #Pulm: Chronic Respiratory Failure, Bilateral Pulmonary Consolidations  -c/w ventilator, routine trach care  -check ABG to assess current settings given elevated CO2 on VBG  -Abx coverage as below    #GI: r/o GIB, Ascites  -trend CBC, c/w PPI for now  -hold tube feeds for now, can restart if H/H is stable  -s/p diagnostic Para w/o evidence of SBP, may benefit from therapeutic tap. SAAG = 1.1; no known liver dysfunction can check RUQ US  -hold bowel regimen for now until bleed is r/o      #Renal/Fluid: ESRD  -Renal c/s for HD  -monitor BMP/electrolytes    #ID: Sepsis  -met 2/4 SIRS criteria (WBC, HR) with potential Pulm vs GI source  -diagnostic para negative for SBP (nucleated cells <250)  -CT Chest with bilateral consolidations and mucus plugging, may be post-obstructive vs aspiration PNA in the setting of emesis  -c/w Vanc/Zosyn  -f/u Bld CX, UA, RVP    #Heme: Macrocytic Anemia, r/o GIB  -likely some component of anemia of chronic disease  -check B12, Folate  -trend CBC, maintain active T&S    #Endo: no active issues    #PPX:   -DVT PPX: SCDs while r/o GIB  -DIET: NPO for now, restart tube feeds as tolerated 59yo M with PMH of Chronic Respiratory Failure (s/p Trach/PEG), ESRD (HD T/T/S), CHF (EF 13%, s/p AICD), and multiple Thoracic interventions (10/2018, R Thoracotomy/Decortication/Chest Wall Reconstruction) p/w reported hematemesis but found to be febrile with bilateral consolidations concerning for potential PNA, post-obstructive vs aspiration.    #Neuro: no active issues  -appears to be at baseline mental status  -would reach out to NH and family for collateral    #CV: Chronic Systolic Heart Failure, Chronic Hypotension  -no acute exacerbation, caution with IVF given CHF and ESRD. would avoid further fluid despite concern for infection  -c/w with home Midodrine 10mg TID    #Pulm: Chronic Respiratory Failure, Bilateral Pulmonary Consolidations  -c/w ventilator, routine trach care  -check ABG to assess current settings given elevated CO2 on VBG  -Abx coverage as below    #GI: r/o GIB, Ascites  -trend CBC, c/w PPI for now  -hold tube feeds for now, can restart if H/H is stable  -s/p diagnostic Para w/o evidence of SBP, may benefit from therapeutic tap. SAAG = 1.1; no known liver dysfunction can check RUQ US  -hold bowel regimen for now until bleed is r/o      #Renal/Fluid: ESRD  -Renal c/s for HD  -monitor BMP/electrolytes    #ID: Sepsis  -met 2/4 SIRS criteria (WBC, HR) with potential Pulm vs GI vs Wound source  -diagnostic para negative for SBP (nucleated cells <250)  -CT Chest with bilateral consolidations and mucus plugging, may be post-obstructive vs aspiration PNA in the setting of emesis  -c/w Vanc/Zosyn  -f/u Bld CX, UA, RVP  -Wound Care consult    #Heme: Macrocytic Anemia, r/o GIB  -likely some component of anemia of chronic disease  -check B12, Folate  -trend CBC, maintain active T&S    #Endo: no active issues    #PPX:   -DVT PPX: SCDs while r/o GIB  -DIET: NPO for now, restart tube feeds as tolerated 57yo M with PMH of Chronic Respiratory Failure (s/p Trach/PEG), ESRD (HD M/W/F), CHF (EF 13%, s/p AICD), and multiple Thoracic interventions (10/2018, R Thoracotomy/Decortication/Chest Wall Reconstruction) p/w reported hematemesis but found to be febrile with bilateral consolidations concerning for potential PNA, post-obstructive vs aspiration.    #Neuro: no active issues  -appears to be at baseline mental status  -would reach out to NH and family for collateral    #CV: Chronic Systolic Heart Failure, Chronic Hypotension  -no acute exacerbation, caution with IVF given CHF and ESRD. would avoid further fluid despite concern for infection  -c/w with home Midodrine 10mg TID    #Pulm: Chronic Respiratory Failure, Bilateral Pulmonary Consolidations  -c/w ventilator, routine trach care  -check ABG to assess current settings given elevated CO2 on VBG  -Abx coverage as below    #GI: r/o GIB, Ascites  -trend CBC, c/w PPI for now  -hold tube feeds for now, can restart if H/H is stable  -s/p diagnostic Para w/o evidence of SBP, may benefit from therapeutic tap. SAAG = 1.1; no known liver dysfunction can check RUQ US  -hold bowel regimen for now until bleed is r/o      #Renal/Fluid: ESRD  -Renal c/s for HD  -monitor BMP/electrolytes    #ID: Sepsis  -met 2/4 SIRS criteria (WBC, HR) with potential Pulm vs GI vs Wound source  -diagnostic para negative for SBP (nucleated cells <250)  -CT Chest with bilateral consolidations and mucus plugging, may be post-obstructive vs aspiration PNA in the setting of emesis  -c/w Vanc/Zosyn  -f/u Bld CX, UA, RVP  -Wound Care consult    #Heme: Macrocytic Anemia, r/o GIB  -likely some component of anemia of chronic disease  -check B12, Folate  -trend CBC, maintain active T&S    #Endo: no active issues    #PPX:   -DVT PPX: SCDs while r/o GIB  -DIET: NPO for now, restart tube feeds as tolerated

## 2019-03-05 LAB
ALBUMIN SERPL ELPH-MCNC: 2.6 G/DL — LOW (ref 3.3–5)
ALP SERPL-CCNC: 164 U/L — HIGH (ref 40–120)
ALT FLD-CCNC: 17 U/L — SIGNIFICANT CHANGE UP (ref 4–41)
ANION GAP SERPL CALC-SCNC: 14 MMO/L — SIGNIFICANT CHANGE UP (ref 7–14)
ANION GAP SERPL CALC-SCNC: 14 MMO/L — SIGNIFICANT CHANGE UP (ref 7–14)
AST SERPL-CCNC: 26 U/L — SIGNIFICANT CHANGE UP (ref 4–40)
BASOPHILS # BLD AUTO: 0.06 K/UL — SIGNIFICANT CHANGE UP (ref 0–0.2)
BASOPHILS NFR BLD AUTO: 1.1 % — SIGNIFICANT CHANGE UP (ref 0–2)
BILIRUB SERPL-MCNC: 0.5 MG/DL — SIGNIFICANT CHANGE UP (ref 0.2–1.2)
BUN SERPL-MCNC: 25 MG/DL — HIGH (ref 7–23)
BUN SERPL-MCNC: 25 MG/DL — HIGH (ref 7–23)
CALCIUM SERPL-MCNC: 9.1 MG/DL — SIGNIFICANT CHANGE UP (ref 8.4–10.5)
CALCIUM SERPL-MCNC: 9.1 MG/DL — SIGNIFICANT CHANGE UP (ref 8.4–10.5)
CHLORIDE SERPL-SCNC: 98 MMOL/L — SIGNIFICANT CHANGE UP (ref 98–107)
CHLORIDE SERPL-SCNC: 98 MMOL/L — SIGNIFICANT CHANGE UP (ref 98–107)
CO2 SERPL-SCNC: 28 MMOL/L — SIGNIFICANT CHANGE UP (ref 22–31)
CO2 SERPL-SCNC: 28 MMOL/L — SIGNIFICANT CHANGE UP (ref 22–31)
CREAT SERPL-MCNC: 3.54 MG/DL — HIGH (ref 0.5–1.3)
CREAT SERPL-MCNC: 3.54 MG/DL — HIGH (ref 0.5–1.3)
EOSINOPHIL # BLD AUTO: 0.47 K/UL — SIGNIFICANT CHANGE UP (ref 0–0.5)
EOSINOPHIL NFR BLD AUTO: 8.3 % — HIGH (ref 0–6)
GLUCOSE SERPL-MCNC: 77 MG/DL — SIGNIFICANT CHANGE UP (ref 70–99)
GLUCOSE SERPL-MCNC: 77 MG/DL — SIGNIFICANT CHANGE UP (ref 70–99)
HBV SURFACE AB SER-ACNC: 13.2 MLU/ML — SIGNIFICANT CHANGE UP
HCT VFR BLD CALC: 26.5 % — LOW (ref 39–50)
HGB BLD-MCNC: 8 G/DL — LOW (ref 13–17)
IMM GRANULOCYTES NFR BLD AUTO: 0.7 % — SIGNIFICANT CHANGE UP (ref 0–1.5)
LYMPHOCYTES # BLD AUTO: 0.8 K/UL — LOW (ref 1–3.3)
LYMPHOCYTES # BLD AUTO: 14.2 % — SIGNIFICANT CHANGE UP (ref 13–44)
MAGNESIUM SERPL-MCNC: 2.2 MG/DL — SIGNIFICANT CHANGE UP (ref 1.6–2.6)
MCHC RBC-ENTMCNC: 30.2 % — LOW (ref 32–36)
MCHC RBC-ENTMCNC: 32.5 PG — SIGNIFICANT CHANGE UP (ref 27–34)
MCV RBC AUTO: 107.7 FL — HIGH (ref 80–100)
MONOCYTES # BLD AUTO: 0.68 K/UL — SIGNIFICANT CHANGE UP (ref 0–0.9)
MONOCYTES NFR BLD AUTO: 12 % — SIGNIFICANT CHANGE UP (ref 2–14)
NEUTROPHILS # BLD AUTO: 3.6 K/UL — SIGNIFICANT CHANGE UP (ref 1.8–7.4)
NEUTROPHILS NFR BLD AUTO: 63.7 % — SIGNIFICANT CHANGE UP (ref 43–77)
NRBC # FLD: 0 K/UL — LOW (ref 25–125)
PHOSPHATE SERPL-MCNC: 2.6 MG/DL — SIGNIFICANT CHANGE UP (ref 2.5–4.5)
PLATELET # BLD AUTO: 194 K/UL — SIGNIFICANT CHANGE UP (ref 150–400)
PMV BLD: 10.2 FL — SIGNIFICANT CHANGE UP (ref 7–13)
POTASSIUM SERPL-MCNC: 3.5 MMOL/L — SIGNIFICANT CHANGE UP (ref 3.5–5.3)
POTASSIUM SERPL-MCNC: 3.5 MMOL/L — SIGNIFICANT CHANGE UP (ref 3.5–5.3)
POTASSIUM SERPL-SCNC: 3.5 MMOL/L — SIGNIFICANT CHANGE UP (ref 3.5–5.3)
POTASSIUM SERPL-SCNC: 3.5 MMOL/L — SIGNIFICANT CHANGE UP (ref 3.5–5.3)
PROT SERPL-MCNC: 6.8 G/DL — SIGNIFICANT CHANGE UP (ref 6–8.3)
RBC # BLD: 2.46 M/UL — LOW (ref 4.2–5.8)
RBC # FLD: 17.7 % — HIGH (ref 10.3–14.5)
SODIUM SERPL-SCNC: 140 MMOL/L — SIGNIFICANT CHANGE UP (ref 135–145)
SODIUM SERPL-SCNC: 140 MMOL/L — SIGNIFICANT CHANGE UP (ref 135–145)
WBC # BLD: 5.65 K/UL — SIGNIFICANT CHANGE UP (ref 3.8–10.5)
WBC # FLD AUTO: 5.65 K/UL — SIGNIFICANT CHANGE UP (ref 3.8–10.5)

## 2019-03-05 PROCEDURE — 99233 SBSQ HOSP IP/OBS HIGH 50: CPT | Mod: GC

## 2019-03-05 RX ORDER — CHLORHEXIDINE GLUCONATE 213 G/1000ML
1 SOLUTION TOPICAL
Qty: 0 | Refills: 0 | Status: DISCONTINUED | OUTPATIENT
Start: 2019-03-05 | End: 2019-03-06

## 2019-03-05 RX ADMIN — MIDODRINE HYDROCHLORIDE 10 MILLIGRAM(S): 2.5 TABLET ORAL at 14:07

## 2019-03-05 RX ADMIN — QUETIAPINE FUMARATE 50 MILLIGRAM(S): 200 TABLET, FILM COATED ORAL at 19:04

## 2019-03-05 RX ADMIN — MIDODRINE HYDROCHLORIDE 10 MILLIGRAM(S): 2.5 TABLET ORAL at 23:02

## 2019-03-05 RX ADMIN — CHLORHEXIDINE GLUCONATE 1 APPLICATION(S): 213 SOLUTION TOPICAL at 19:04

## 2019-03-05 RX ADMIN — Medication 3 MILLIGRAM(S): at 23:03

## 2019-03-05 RX ADMIN — SIMETHICONE 80 MILLIGRAM(S): 80 TABLET, CHEWABLE ORAL at 14:06

## 2019-03-05 RX ADMIN — Medication 3 MILLIGRAM(S): at 00:25

## 2019-03-05 RX ADMIN — SIMETHICONE 80 MILLIGRAM(S): 80 TABLET, CHEWABLE ORAL at 05:23

## 2019-03-05 RX ADMIN — PIPERACILLIN AND TAZOBACTAM 25 GRAM(S): 4; .5 INJECTION, POWDER, LYOPHILIZED, FOR SOLUTION INTRAVENOUS at 09:27

## 2019-03-05 RX ADMIN — SIMETHICONE 80 MILLIGRAM(S): 80 TABLET, CHEWABLE ORAL at 23:02

## 2019-03-05 RX ADMIN — QUETIAPINE FUMARATE 50 MILLIGRAM(S): 200 TABLET, FILM COATED ORAL at 05:23

## 2019-03-05 RX ADMIN — MIDODRINE HYDROCHLORIDE 10 MILLIGRAM(S): 2.5 TABLET ORAL at 05:23

## 2019-03-05 NOTE — PROGRESS NOTE ADULT - SUBJECTIVE AND OBJECTIVE BOX
CHIEF COMPLAINT: SOB    Interval Events: No overnight events.     REVIEW OF SYSTEMS:  Constitutional: [ ] negative [ ] fevers [ ] chills [ ] weight loss [ ] weight gain  HEENT: [ ] negative [ ] dry eyes [ ] eye irritation [ ] postnasal drip [ ] nasal congestion  CV: [ ] negative  [ ] chest pain [ ] orthopnea [ ] palpitations [ ] murmur  Resp: [ ] negative [ ] cough [ ] shortness of breath [ ] dyspnea [ ] wheezing [ ] sputum [ ] hemoptysis  GI: [ ] negative [ ] nausea [ ] vomiting [ ] diarrhea [ ] constipation [ ] abd pain [ ] dysphagia   : [ ] negative [ ] dysuria [ ] nocturia [ ] hematuria [ ] increased urinary frequency  Musculoskeletal: [ ] negative [ ] back pain [ ] myalgias [ ] arthralgias [ ] fracture  Skin: [ ] negative [ ] rash [ ] itch  Neurological: [ ] negative [ ] headache [ ] dizziness [ ] syncope [ ] weakness [ ] numbness  Psychiatric: [ ] negative [ ] anxiety [ ] depression  Endocrine: [ ] negative [ ] diabetes [ ] thyroid problem  Hematologic/Lymphatic: [ ] negative [ ] anemia [ ] bleeding problem  Allergic/Immunologic: [ ] negative [ ] itchy eyes [ ] nasal discharge [ ] hives [ ] angioedema  [ x ] All other systems negative  [ ] Unable to assess ROS because intubated, sedated    OBJECTIVE:  ICU Vital Signs Last 24 Hrs  T(C): 37.1 (05 Mar 2019 04:00), Max: 37.3 (04 Mar 2019 16:00)  T(F): 98.7 (05 Mar 2019 04:00), Max: 99.2 (04 Mar 2019 16:00)  HR: 81 (05 Mar 2019 07:00) (73 - 105)  BP: 95/60 (05 Mar 2019 07:00) (80/66 - 114/75)  BP(mean): 73 (05 Mar 2019 07:00) (62 - 88)  ABP: --  ABP(mean): --  RR: 13 (05 Mar 2019 07:00) (13 - 30)  SpO2: 100% (05 Mar 2019 07:00) (95% - 100%)    Mode: AC/ CMV (Assist Control/ Continuous Mandatory Ventilation), RR (machine): 20, TV (machine): 400, FiO2: 40, PEEP: 5, MAP: 14, PIP: 29    03-04 @ 07:01  -  03-05 @ 07:00  --------------------------------------------------------  IN: 1490 mL / OUT: 600 mL / NET: 890 mL      PHYSICAL EXAM:    Constitutional: NAD  Head: NC/AT  Eyes: PERRLA, EOMI, clear conjunctiva  ENT: no nasal discharge; no oropharyngeal erythema or exudates; dry oral mucosa  Neck: supple; no JVD or thyromegaly  Respiratory: CTA B/L; no W/R/R, no retractions  Cardiac: +S1/S2; RRR; no M/R/G; PMI non-displaced  Gastrointestinal: soft, NT/ND; no rebound or guarding; +BS  Extremities: WWP, no clubbing or cyanosis; no peripheral edema  Vascular: 2+ radial, DP/PT pulses B/L  Lymphatic: no submandibular or cervical LAD  Neurologic: AAOx0, intubated and sedated    LINES:    HOSPITAL MEDICATIONS:  Standing Meds:  melatonin 3 milliGRAM(s) Oral at bedtime  midodrine 10 milliGRAM(s) Oral every 8 hours  piperacillin/tazobactam IVPB. 3.375 Gram(s) IV Intermittent every 12 hours  QUEtiapine 50 milliGRAM(s) Oral every 12 hours  simethicone 80 milliGRAM(s) Chew three times a day      PRN Meds:  ALBUTerol/ipratropium for Nebulization 3 milliLiter(s) Nebulizer every 6 hours PRN  ALPRAZolam 0.5 milliGRAM(s) Oral three times a day PRN      LABS:                        8.0    5.65  )-----------( 194      ( 05 Mar 2019 03:00 )             26.5     Hgb Trend: 8.0<--, 8.1<--, 10.0<--, 9.9<--, 9.9<--  03-05    140  |  98  |  25<H>  ----------------------------<  77  3.5   |  28  |  3.54<H>    Ca    9.1      05 Mar 2019 03:00  Phos  2.6     03-05  Mg     2.2     03-05    TPro  6.8  /  Alb  2.6<L>  /  TBili  0.5  /  DBili  x   /  AST  26  /  ALT  17  /  AlkPhos  164<H>  03-05    Creatinine Trend: 3.54<--, 5.59<--, 5.42<--, 5.58<--, 4.96<--, 3.88<--  PT/INR - ( 03 Mar 2019 19:50 )   PT: 11.9 SEC;   INR: 1.07          PTT - ( 03 Mar 2019 19:50 )  PTT:31.3 SEC      Venous Blood Gas:  03-03 @ 19:50  7.35/71/< 24/33/32.3  VBG Lactate: 2.0      MICROBIOLOGY:     Culture - Body Fluid with Gram Stain (collected 04 Mar 2019 03:16)  Source: PERITONEAL FLUID  Gram Stain (04 Mar 2019 04:33):    WBC^White Blood Cells    QNTY CELLS IN GRAM STAIN: NO CELLS SEEN    NOS^No Organisms Seen    Culture - Blood (collected 03 Mar 2019 20:15)  Source: BLOOD PERIPHERAL  Preliminary Report (04 Mar 2019 20:13):    NO ORGANISMS ISOLATED    NO ORGANISMS ISOLATED AT 24 HOURS    Culture - Blood (collected 03 Mar 2019 20:15)  Source: BLOOD VENOUS  Preliminary Report (04 Mar 2019 20:13):    NO ORGANISMS ISOLATED    NO ORGANISMS ISOLATED AT 24 HOURS      RADIOLOGY:  [ ] Reviewed and interpreted by me    EKG: CHIEF COMPLAINT: SOB    Interval Events: Overnight pt had tube feeds briefly held due to bloating. Pt feels better this AM, had normal BM this AM, denies any other bloody output from PEG tube.     REVIEW OF SYSTEMS:  Constitutional: [ ] negative [ ] fevers [ ] chills [ ] weight loss [ ] weight gain  HEENT: [ ] negative [ ] dry eyes [ ] eye irritation [ ] postnasal drip [ ] nasal congestion  CV: [ ] negative  [ ] chest pain [ ] orthopnea [ ] palpitations [ ] murmur  Resp: [ ] negative [ ] cough [ ] shortness of breath [ ] dyspnea [ ] wheezing [ ] sputum [ ] hemoptysis  GI: [ ] negative [ ] nausea [ ] vomiting [ ] diarrhea [ ] constipation [ ] abd pain [ ] dysphagia   : [ ] negative [ ] dysuria [ ] nocturia [ ] hematuria [ ] increased urinary frequency  Musculoskeletal: [ ] negative [ ] back pain [ ] myalgias [ ] arthralgias [ ] fracture  Skin: [ ] negative [ ] rash [ ] itch  Neurological: [ ] negative [ ] headache [ ] dizziness [ ] syncope [ ] weakness [ ] numbness  Psychiatric: [ ] negative [ ] anxiety [ ] depression  Endocrine: [ ] negative [ ] diabetes [ ] thyroid problem  Hematologic/Lymphatic: [ ] negative [ ] anemia [ ] bleeding problem  Allergic/Immunologic: [ ] negative [ ] itchy eyes [ ] nasal discharge [ ] hives [ ] angioedema  [ x ] All other systems negative  [ ] Unable to assess ROS because intubated, sedated    OBJECTIVE:  ICU Vital Signs Last 24 Hrs  T(C): 37.1 (05 Mar 2019 04:00), Max: 37.3 (04 Mar 2019 16:00)  T(F): 98.7 (05 Mar 2019 04:00), Max: 99.2 (04 Mar 2019 16:00)  HR: 81 (05 Mar 2019 07:00) (73 - 105)  BP: 95/60 (05 Mar 2019 07:00) (80/66 - 114/75)  BP(mean): 73 (05 Mar 2019 07:00) (62 - 88)  ABP: --  ABP(mean): --  RR: 13 (05 Mar 2019 07:00) (13 - 30)  SpO2: 100% (05 Mar 2019 07:00) (95% - 100%)    Mode: AC/ CMV (Assist Control/ Continuous Mandatory Ventilation), RR (machine): 20, TV (machine): 400, FiO2: 40, PEEP: 5, MAP: 14, PIP: 29    03-04 @ 07:01  -  03-05 @ 07:00  --------------------------------------------------------  IN: 1490 mL / OUT: 600 mL / NET: 890 mL      PHYSICAL EXAM:    Constitutional: NAD  Head: NC/AT  Eyes: PERRLA, EOMI, clear conjunctiva  ENT: no nasal discharge; no oropharyngeal erythema or exudates; dry oral mucosa  Neck: supple; no JVD or thyromegaly  Respiratory: CTA B/L; no W/R/R, no retractions  Cardiac: +S1/S2; RRR; no M/R/G  Gastrointestinal: soft, NT/ND, PEG tube intact  Extremities: WWP, no clubbing or cyanosis; no peripheral edema  Vascular: 2+ radial, DP/PT pulses B/L  Lymphatic: no submandibular or cervical LAD  Neurologic: AAOx3    LINES:    HOSPITAL MEDICATIONS:  Standing Meds:  melatonin 3 milliGRAM(s) Oral at bedtime  midodrine 10 milliGRAM(s) Oral every 8 hours  piperacillin/tazobactam IVPB. 3.375 Gram(s) IV Intermittent every 12 hours  QUEtiapine 50 milliGRAM(s) Oral every 12 hours  simethicone 80 milliGRAM(s) Chew three times a day      PRN Meds:  ALBUTerol/ipratropium for Nebulization 3 milliLiter(s) Nebulizer every 6 hours PRN  ALPRAZolam 0.5 milliGRAM(s) Oral three times a day PRN      LABS:                        8.0    5.65  )-----------( 194      ( 05 Mar 2019 03:00 )             26.5     Hgb Trend: 8.0<--, 8.1<--, 10.0<--, 9.9<--, 9.9<--  03-05    140  |  98  |  25<H>  ----------------------------<  77  3.5   |  28  |  3.54<H>    Ca    9.1      05 Mar 2019 03:00  Phos  2.6     03-05  Mg     2.2     03-05    TPro  6.8  /  Alb  2.6<L>  /  TBili  0.5  /  DBili  x   /  AST  26  /  ALT  17  /  AlkPhos  164<H>  03-05    Creatinine Trend: 3.54<--, 5.59<--, 5.42<--, 5.58<--, 4.96<--, 3.88<--  PT/INR - ( 03 Mar 2019 19:50 )   PT: 11.9 SEC;   INR: 1.07          PTT - ( 03 Mar 2019 19:50 )  PTT:31.3 SEC      Venous Blood Gas:  03-03 @ 19:50  7.35/71/< 24/33/32.3  VBG Lactate: 2.0      MICROBIOLOGY:     Culture - Body Fluid with Gram Stain (collected 04 Mar 2019 03:16)  Source: PERITONEAL FLUID  Gram Stain (04 Mar 2019 04:33):    WBC^White Blood Cells    QNTY CELLS IN GRAM STAIN: NO CELLS SEEN    NOS^No Organisms Seen    Culture - Blood (collected 03 Mar 2019 20:15)  Source: BLOOD PERIPHERAL  Preliminary Report (04 Mar 2019 20:13):    NO ORGANISMS ISOLATED    NO ORGANISMS ISOLATED AT 24 HOURS    Culture - Blood (collected 03 Mar 2019 20:15)  Source: BLOOD VENOUS  Preliminary Report (04 Mar 2019 20:13):    NO ORGANISMS ISOLATED    NO ORGANISMS ISOLATED AT 24 HOURS      RADIOLOGY:  [ ] Reviewed and interpreted by me    EKG:

## 2019-03-05 NOTE — DIETITIAN INITIAL EVALUATION ADULT. - NS AS NUTRI INTERV ENTERAL NUTRITION
Composition/1) Suggest decrease TF rate to 35ml/h to provide 1512  kcal w/67gm protein and add 1 pack prosource/d to increase protein intake to 82gms/d/Rate/Volume

## 2019-03-05 NOTE — PROGRESS NOTE ADULT - ASSESSMENT
57yo M with PMH of Chronic Respiratory Failure (s/p Trach/PEG), ESRD (HD M/W/F), CHF (EF 13%, s/p AICD), and multiple Thoracic interventions (10/2018, R Thoracotomy/Decortication/Chest Wall Reconstruction) p/w reported hematemesis but found to be febrile with bilateral consolidations concerning for potential PNA, post-obstructive vs aspiration.    #Neuro: no active issues  -appears to be at baseline mental status  -would reach out to NH and family for collateral    #CV: Chronic Systolic Heart Failure, Chronic Hypotension  -no acute exacerbation, caution with IVF given CHF and ESRD. would avoid further fluid despite concern for infection  -c/w with home Midodrine 10mg TID    #Pulm: Chronic Respiratory Failure, Bilateral Pulmonary Consolidations  -c/w ventilator, routine trach care  -check ABG to assess current settings given elevated CO2 on VBG  -Abx coverage as below    #GI: r/o GIB, Ascites  -trend CBC, c/w PPI for now  -hold tube feeds for now, can restart if H/H is stable  -s/p diagnostic Para w/o evidence of SBP, may benefit from therapeutic tap. SAAG = 1.1; no known liver dysfunction can check RUQ US  -hold bowel regimen for now until bleed is r/o      #Renal/Fluid: ESRD  -Renal c/s for HD  -monitor BMP/electrolytes    #ID: Sepsis  -met 2/4 SIRS criteria (WBC, HR) with potential Pulm vs GI vs Wound source  -diagnostic para negative for SBP (nucleated cells <250)  -CT Chest with bilateral consolidations and mucus plugging, may be post-obstructive vs aspiration PNA in the setting of emesis  -c/w Vanc/Zosyn  -f/u Bld CX, UA, RVP  -Wound Care consult    #Heme: Macrocytic Anemia, r/o GIB  -likely some component of anemia of chronic disease  -check B12, Folate  -trend CBC, maintain active T&S    #Endo: no active issues    #PPX:   -DVT PPX: SCDs while r/o GIB  -DIET: NPO for now, restart tube feeds as tolerated 57yo M with PMH of Chronic Respiratory Failure (s/p Trach/PEG), ESRD (HD M/W/F), CHF (EF 13%, s/p AICD), and multiple Thoracic interventions (10/2018, R Thoracotomy/Decortication/Chest Wall Reconstruction) p/w reported hematemesis but found to be febrile with bilateral consolidations concerning for potential PNA, post-obstructive vs aspiration.    #Neuro: no active issues  -A&Ox3, at baseline mental status    #CV: Chronic Systolic Heart Failure, Chronic Hypotension  -no acute exacerbation, caution with IVF given CHF and ESRD. would avoid further fluid despite concern for infection  -c/w with home Midodrine 10mg TID    #Pulm: Chronic Respiratory Failure, Bilateral Pulmonary Consolidations  -c/w ventilator, routine trach care  -afebrile, blood cx negative, will switch abx to levaquin    #GI: r/o GIB, Ascites  -H/H stable  -c/w tube feeds, has not had any repeat episodes of bloody output       #Renal/Fluid: ESRD  -Renal c/s for HD  -monitor BMP/electrolytes    #ID: Sepsis  -met 2/4 SIRS criteria (WBC, HR) with potential Pulm vs GI vs Wound source on admission  -CT Chest with bilateral consolidations and mucus plugging, may be post-obstructive vs aspiration PNA in the setting of emesis  -afebrile and blood cultures negative after 24 hours, will de-escalate abx to levaquin renally dosed  -f/u Bld CX, UA, RVP  - f/u wound care consult    #Heme: Macrocytic Anemia, r/o GIB  -H&H stable since admission  -trend CBC QD unless actively bleeding, maintain active T&S    #Endo: no active issues    #PPX:   -DVT PPX: SCDs   -DIET: tube feeds

## 2019-03-05 NOTE — DIETITIAN INITIAL EVALUATION ADULT. - OTHER INFO
Pt referred for nutrition consult 2/2 intubated greater than 48h.  Pt admitted w/ dx of Sepsis.  S/P Trach/PEG PTA.  Pt unable to provide hx 2/2 non-verbal and Chinese speaking.  Pt transferred from [facility] - In that facility, he received enteral nutrition support w/Nepro @50ml/h x22h.  Wt recorded as 57.7kg.  Pt w/recent  [facility] admission-at that time, pt was on a Dysphagia 1 diet w/honey consistency fluids plus TF of Nepro.  Food intake was noted to be poor and nocturnal TF was recommended plus oral diet.  RN reports pt complaining of feeling full.  TF rate was decreased.  Spoke w/MICU team that pt not tolerating TF at current rate.  On evaluation, TF order w/excessive kcal content.  TF rate recalculated and recommendation made to MICU team to adjust goal of TF.

## 2019-03-06 ENCOUNTER — TRANSCRIPTION ENCOUNTER (OUTPATIENT)
Age: 59
End: 2019-03-06

## 2019-03-06 VITALS
DIASTOLIC BLOOD PRESSURE: 77 MMHG | SYSTOLIC BLOOD PRESSURE: 106 MMHG | HEART RATE: 89 BPM | RESPIRATION RATE: 18 BRPM | OXYGEN SATURATION: 100 % | TEMPERATURE: 99 F

## 2019-03-06 LAB
ANION GAP SERPL CALC-SCNC: 16 MMO/L — HIGH (ref 7–14)
BASOPHILS # BLD AUTO: 0.05 K/UL — SIGNIFICANT CHANGE UP (ref 0–0.2)
BASOPHILS NFR BLD AUTO: 1 % — SIGNIFICANT CHANGE UP (ref 0–2)
BUN SERPL-MCNC: 35 MG/DL — HIGH (ref 7–23)
CALCIUM SERPL-MCNC: 9.4 MG/DL — SIGNIFICANT CHANGE UP (ref 8.4–10.5)
CHLORIDE SERPL-SCNC: 97 MMOL/L — LOW (ref 98–107)
CO2 SERPL-SCNC: 29 MMOL/L — SIGNIFICANT CHANGE UP (ref 22–31)
CREAT SERPL-MCNC: 4.64 MG/DL — HIGH (ref 0.5–1.3)
EOSINOPHIL # BLD AUTO: 0.39 K/UL — SIGNIFICANT CHANGE UP (ref 0–0.5)
EOSINOPHIL NFR BLD AUTO: 7.8 % — HIGH (ref 0–6)
FOLATE SERPL-MCNC: 19.7 NG/ML — SIGNIFICANT CHANGE UP (ref 4.7–20)
GLUCOSE SERPL-MCNC: 105 MG/DL — HIGH (ref 70–99)
HCT VFR BLD CALC: 29.5 % — LOW (ref 39–50)
HGB BLD-MCNC: 8.8 G/DL — LOW (ref 13–17)
IMM GRANULOCYTES NFR BLD AUTO: 0.2 % — SIGNIFICANT CHANGE UP (ref 0–1.5)
LYMPHOCYTES # BLD AUTO: 0.62 K/UL — LOW (ref 1–3.3)
LYMPHOCYTES # BLD AUTO: 12.4 % — LOW (ref 13–44)
MAGNESIUM SERPL-MCNC: 2.3 MG/DL — SIGNIFICANT CHANGE UP (ref 1.6–2.6)
MCHC RBC-ENTMCNC: 29.8 % — LOW (ref 32–36)
MCHC RBC-ENTMCNC: 32.2 PG — SIGNIFICANT CHANGE UP (ref 27–34)
MCV RBC AUTO: 108.1 FL — HIGH (ref 80–100)
MONOCYTES # BLD AUTO: 0.37 K/UL — SIGNIFICANT CHANGE UP (ref 0–0.9)
MONOCYTES NFR BLD AUTO: 7.4 % — SIGNIFICANT CHANGE UP (ref 2–14)
NEUTROPHILS # BLD AUTO: 3.54 K/UL — SIGNIFICANT CHANGE UP (ref 1.8–7.4)
NEUTROPHILS NFR BLD AUTO: 71.2 % — SIGNIFICANT CHANGE UP (ref 43–77)
NRBC # FLD: 0 K/UL — LOW (ref 25–125)
PHOSPHATE SERPL-MCNC: 3.2 MG/DL — SIGNIFICANT CHANGE UP (ref 2.5–4.5)
PLATELET # BLD AUTO: 200 K/UL — SIGNIFICANT CHANGE UP (ref 150–400)
PMV BLD: 9.7 FL — SIGNIFICANT CHANGE UP (ref 7–13)
POTASSIUM SERPL-MCNC: 3.5 MMOL/L — SIGNIFICANT CHANGE UP (ref 3.5–5.3)
POTASSIUM SERPL-SCNC: 3.5 MMOL/L — SIGNIFICANT CHANGE UP (ref 3.5–5.3)
RBC # BLD: 2.73 M/UL — LOW (ref 4.2–5.8)
RBC # FLD: 17.1 % — HIGH (ref 10.3–14.5)
SODIUM SERPL-SCNC: 142 MMOL/L — SIGNIFICANT CHANGE UP (ref 135–145)
WBC # BLD: 4.98 K/UL — SIGNIFICANT CHANGE UP (ref 3.8–10.5)
WBC # FLD AUTO: 4.98 K/UL — SIGNIFICANT CHANGE UP (ref 3.8–10.5)

## 2019-03-06 PROCEDURE — 99232 SBSQ HOSP IP/OBS MODERATE 35: CPT | Mod: GC

## 2019-03-06 PROCEDURE — 99233 SBSQ HOSP IP/OBS HIGH 50: CPT | Mod: GC

## 2019-03-06 RX ORDER — HEPARIN SODIUM 5000 [USP'U]/ML
5000 INJECTION INTRAVENOUS; SUBCUTANEOUS EVERY 8 HOURS
Qty: 0 | Refills: 0 | Status: DISCONTINUED | OUTPATIENT
Start: 2019-03-06 | End: 2019-03-06

## 2019-03-06 RX ADMIN — QUETIAPINE FUMARATE 50 MILLIGRAM(S): 200 TABLET, FILM COATED ORAL at 17:33

## 2019-03-06 RX ADMIN — SIMETHICONE 80 MILLIGRAM(S): 80 TABLET, CHEWABLE ORAL at 13:09

## 2019-03-06 RX ADMIN — CHLORHEXIDINE GLUCONATE 1 APPLICATION(S): 213 SOLUTION TOPICAL at 13:04

## 2019-03-06 RX ADMIN — HEPARIN SODIUM 5000 UNIT(S): 5000 INJECTION INTRAVENOUS; SUBCUTANEOUS at 13:09

## 2019-03-06 RX ADMIN — MIDODRINE HYDROCHLORIDE 10 MILLIGRAM(S): 2.5 TABLET ORAL at 13:09

## 2019-03-06 RX ADMIN — SIMETHICONE 80 MILLIGRAM(S): 80 TABLET, CHEWABLE ORAL at 05:56

## 2019-03-06 RX ADMIN — MIDODRINE HYDROCHLORIDE 10 MILLIGRAM(S): 2.5 TABLET ORAL at 05:57

## 2019-03-06 RX ADMIN — QUETIAPINE FUMARATE 50 MILLIGRAM(S): 200 TABLET, FILM COATED ORAL at 05:57

## 2019-03-06 NOTE — PROGRESS NOTE ADULT - SUBJECTIVE AND OBJECTIVE BOX
INTERVAL HPI/OVERNIGHT EVENTS: No acute events overnight    SUBJECTIVE: Patient seen and examined at bedside. Pt feels well, denies pain, bloody output from PEG tube, abd pain, F/C/N/V    OBJECTIVE:    VITAL SIGNS:  ICU Vital Signs Last 24 Hrs  T(C): 36.9 (06 Mar 2019 04:00), Max: 37.1 (05 Mar 2019 20:00)  T(F): 98.4 (06 Mar 2019 04:00), Max: 98.7 (05 Mar 2019 20:00)  HR: 97 (06 Mar 2019 06:00) (77 - 104)  BP: 97/73 (06 Mar 2019 06:00) (89/65 - 116/94)  BP(mean): 81 (06 Mar 2019 06:00) (70 - 102)  ABP: --  ABP(mean): --  RR: 25 (06 Mar 2019 06:00) (11 - 28)  SpO2: 100% (06 Mar 2019 06:00) (98% - 100%)    Mode: AC/ CMV (Assist Control/ Continuous Mandatory Ventilation), RR (machine): 20, TV (machine): 400, FiO2: 40, PEEP: 5, MAP: 12, PIP: 32    03-05 @ 07:01  -  03-06 @ 07:00  --------------------------------------------------------  IN: 680 mL / OUT: 0 mL / NET: 680 mL      CAPILLARY BLOOD GLUCOSE          PHYSICAL EXAM:    Constitutional: NAD  Head: NC/AT  Eyes: PERRLA, EOMI, clear conjunctiva  ENT: no nasal discharge; no oropharyngeal erythema or exudates; dry oral mucosa  Neck: supple; no JVD or thyromegaly  Respiratory: CTA B/L; no W/R/R, no retractions  Cardiac: +S1/S2; RRR; no M/R/G  Gastrointestinal: soft, NT/ND, PEG tube intact  Extremities: WWP, no clubbing or cyanosis; no peripheral edema  Vascular: 2+ radial, DP/PT pulses B/L  Lymphatic: no submandibular or cervical LAD  Neurologic: AAOx3    MEDICATIONS:  MEDICATIONS  (STANDING):  chlorhexidine 4% Liquid 1 Application(s) Topical <User Schedule>  levoFLOXacin  Tablet 500 milliGRAM(s) Oral every 48 hours  melatonin 3 milliGRAM(s) Oral at bedtime  midodrine 10 milliGRAM(s) Oral every 8 hours  QUEtiapine 50 milliGRAM(s) Oral every 12 hours  simethicone 80 milliGRAM(s) Chew three times a day    MEDICATIONS  (PRN):  ALBUTerol/ipratropium for Nebulization 3 milliLiter(s) Nebulizer every 6 hours PRN Shortness of Breath and/or Wheezing  ALPRAZolam 0.5 milliGRAM(s) Oral three times a day PRN Anxiety      ALLERGIES:  Allergies    No Known Allergies    Intolerances        LABS:                        8.8    4.98  )-----------( 200      ( 06 Mar 2019 02:20 )             29.5     Hemoglobin: 8.8 g/dL (03-06 @ 02:20)  Hemoglobin: 8.0 g/dL (03-05 @ 03:00)  Hemoglobin: 8.1 g/dL (03-04 @ 17:18)  Hemoglobin: 10.0 g/dL (03-04 @ 06:45)  Hemoglobin: 9.9 g/dL (03-03 @ 19:50)    CBC Full  -  ( 06 Mar 2019 02:20 )  WBC Count : 4.98 K/uL  Hemoglobin : 8.8 g/dL  Hematocrit : 29.5 %  Platelet Count - Automated : 200 K/uL  Mean Cell Volume : 108.1 fL  Mean Cell Hemoglobin : 32.2 pg  Mean Cell Hemoglobin Concentration : 29.8 %  Auto Neutrophil # : 3.54 K/uL  Auto Lymphocyte # : 0.62 K/uL  Auto Monocyte # : 0.37 K/uL  Auto Eosinophil # : 0.39 K/uL  Auto Basophil # : 0.05 K/uL  Auto Neutrophil % : 71.2 %  Auto Lymphocyte % : 12.4 %  Auto Monocyte % : 7.4 %  Auto Eosinophil % : 7.8 %  Auto Basophil % : 1.0 %    03-06    142  |  97<L>  |  35<H>  ----------------------------<  105<H>  3.5   |  29  |  4.64<H>    Ca    9.4      06 Mar 2019 02:20  Phos  3.2     03-06  Mg     2.3     03-06    TPro  6.8  /  Alb  2.6<L>  /  TBili  0.5  /  DBili  x   /  AST  26  /  ALT  17  /  AlkPhos  164<H>  03-05    Creatinine Trend: 4.64<--, 3.54<--, 5.59<--, 5.42<--, 5.58<--, 4.96<--  LIVER FUNCTIONS - ( 05 Mar 2019 03:00 )  Alb: 2.6 g/dL / Pro: 6.8 g/dL / ALK PHOS: 164 u/L / ALT: 17 u/L / AST: 26 u/L / GGT: x                             EKG:   MICROBIOLOGY:    Culture - Body Fluid with Gram Stain (collected 04 Mar 2019 03:16)  Source: PERITONEAL FLUID  Gram Stain (04 Mar 2019 04:33):    WBC^White Blood Cells    QNTY CELLS IN GRAM STAIN: NO CELLS SEEN    NOS^No Organisms Seen  Preliminary Report (05 Mar 2019 09:16):    NO ORGANISMS ISOLATED AT 24 HOURS    Culture - Blood (collected 03 Mar 2019 20:15)  Source: BLOOD PERIPHERAL  Preliminary Report (05 Mar 2019 20:13):    NO ORGANISMS ISOLATED    NO ORGANISMS ISOLATED AT 48 HRS.    Culture - Blood (collected 03 Mar 2019 20:15)  Source: BLOOD VENOUS  Preliminary Report (05 Mar 2019 20:13):    NO ORGANISMS ISOLATED    NO ORGANISMS ISOLATED AT 48 HRS.      IMAGING:    RADIOLOGY & ADDITIONAL TESTS: Reviewed. INTERVAL HPI/OVERNIGHT EVENTS: No acute events overnight    SUBJECTIVE: Patient seen and examined at bedside. Pt feels well, denies pain, bloody output from PEG tube, abd pain, F/C/N/V    OBJECTIVE:     VITAL SIGNS:  ICU Vital Signs Last 24 Hrs  T(C): 36.9 (06 Mar 2019 04:00), Max: 37.1 (05 Mar 2019 20:00)  T(F): 98.4 (06 Mar 2019 04:00), Max: 98.7 (05 Mar 2019 20:00)  HR: 97 (06 Mar 2019 06:00) (77 - 104)  BP: 97/73 (06 Mar 2019 06:00) (89/65 - 116/94)  BP(mean): 81 (06 Mar 2019 06:00) (70 - 102)  ABP: --  ABP(mean): --  RR: 25 (06 Mar 2019 06:00) (11 - 28)  SpO2: 100% (06 Mar 2019 06:00) (98% - 100%)    Mode: AC/ CMV (Assist Control/ Continuous Mandatory Ventilation), RR (machine): 20, TV (machine): 400, FiO2: 40, PEEP: 5, MAP: 12, PIP: 32    03-05 @ 07:01  -  03-06 @ 07:00  --------------------------------------------------------  IN: 680 mL / OUT: 0 mL / NET: 680 mL      CAPILLARY BLOOD GLUCOSE          PHYSICAL EXAM:    Constitutional: NAD  Head: NC/AT  Eyes: PERRLA, EOMI, clear conjunctiva  ENT: no nasal discharge; no oropharyngeal erythema or exudates; dry oral mucosa  Neck: supple; no JVD or thyromegaly  Respiratory: CTA B/L; no W/R/R, no retractions  Cardiac: +S1/S2; RRR; no M/R/G  Gastrointestinal: soft, NT/ND, PEG tube intact  Extremities: WWP, no clubbing or cyanosis; no peripheral edema  Vascular: 2+ radial, DP/PT pulses B/L  Lymphatic: no submandibular or cervical LAD  Neurologic: AAOx3    MEDICATIONS:  MEDICATIONS  (STANDING):  chlorhexidine 4% Liquid 1 Application(s) Topical <User Schedule>  levoFLOXacin  Tablet 500 milliGRAM(s) Oral every 48 hours  melatonin 3 milliGRAM(s) Oral at bedtime  midodrine 10 milliGRAM(s) Oral every 8 hours  QUEtiapine 50 milliGRAM(s) Oral every 12 hours  simethicone 80 milliGRAM(s) Chew three times a day    MEDICATIONS  (PRN):  ALBUTerol/ipratropium for Nebulization 3 milliLiter(s) Nebulizer every 6 hours PRN Shortness of Breath and/or Wheezing  ALPRAZolam 0.5 milliGRAM(s) Oral three times a day PRN Anxiety      ALLERGIES:  Allergies    No Known Allergies    Intolerances        LABS:                        8.8    4.98  )-----------( 200      ( 06 Mar 2019 02:20 )             29.5     Hemoglobin: 8.8 g/dL (03-06 @ 02:20)  Hemoglobin: 8.0 g/dL (03-05 @ 03:00)  Hemoglobin: 8.1 g/dL (03-04 @ 17:18)  Hemoglobin: 10.0 g/dL (03-04 @ 06:45)  Hemoglobin: 9.9 g/dL (03-03 @ 19:50)    CBC Full  -  ( 06 Mar 2019 02:20 )  WBC Count : 4.98 K/uL  Hemoglobin : 8.8 g/dL  Hematocrit : 29.5 %  Platelet Count - Automated : 200 K/uL  Mean Cell Volume : 108.1 fL  Mean Cell Hemoglobin : 32.2 pg  Mean Cell Hemoglobin Concentration : 29.8 %  Auto Neutrophil # : 3.54 K/uL  Auto Lymphocyte # : 0.62 K/uL  Auto Monocyte # : 0.37 K/uL  Auto Eosinophil # : 0.39 K/uL  Auto Basophil # : 0.05 K/uL  Auto Neutrophil % : 71.2 %  Auto Lymphocyte % : 12.4 %  Auto Monocyte % : 7.4 %  Auto Eosinophil % : 7.8 %  Auto Basophil % : 1.0 %    03-06    142  |  97<L>  |  35<H>  ----------------------------<  105<H>  3.5   |  29  |  4.64<H>    Ca    9.4      06 Mar 2019 02:20  Phos  3.2     03-06  Mg     2.3     03-06    TPro  6.8  /  Alb  2.6<L>  /  TBili  0.5  /  DBili  x   /  AST  26  /  ALT  17  /  AlkPhos  164<H>  03-05    Creatinine Trend: 4.64<--, 3.54<--, 5.59<--, 5.42<--, 5.58<--, 4.96<--  LIVER FUNCTIONS - ( 05 Mar 2019 03:00 )  Alb: 2.6 g/dL / Pro: 6.8 g/dL / ALK PHOS: 164 u/L / ALT: 17 u/L / AST: 26 u/L / GGT: x                             EKG:   MICROBIOLOGY:    Culture - Body Fluid with Gram Stain (collected 04 Mar 2019 03:16)  Source: PERITONEAL FLUID  Gram Stain (04 Mar 2019 04:33):    WBC^White Blood Cells    QNTY CELLS IN GRAM STAIN: NO CELLS SEEN    NOS^No Organisms Seen  Preliminary Report (05 Mar 2019 09:16):    NO ORGANISMS ISOLATED AT 24 HOURS    Culture - Blood (collected 03 Mar 2019 20:15)  Source: BLOOD PERIPHERAL  Preliminary Report (05 Mar 2019 20:13):    NO ORGANISMS ISOLATED    NO ORGANISMS ISOLATED AT 48 HRS.    Culture - Blood (collected 03 Mar 2019 20:15)  Source: BLOOD VENOUS  Preliminary Report (05 Mar 2019 20:13):    NO ORGANISMS ISOLATED    NO ORGANISMS ISOLATED AT 48 HRS.      IMAGING:    RADIOLOGY & ADDITIONAL TESTS: Reviewed.

## 2019-03-06 NOTE — PROGRESS NOTE ADULT - ASSESSMENT
57yo M with PMH of Chronic Respiratory Failure (s/p Trach/PEG), ESRD (HD M/W/F), CHF (EF 13%, s/p AICD), and multiple Thoracic interventions (10/2018, R Thoracotomy/Decortication/Chest Wall Reconstruction) p/w reported hematemesis but found to be febrile with bilateral consolidations concerning for potential PNA, post-obstructive vs aspiration.    #Neuro: no active issues  -A&Ox3, at baseline mental status    #CV: Chronic Systolic Heart Failure, Chronic Hypotension  -no acute exacerbation, caution with IVF given CHF and ESRD. would avoid further fluid despite concern for infection  -c/w with home Midodrine 10mg TID    #Pulm: Chronic Respiratory Failure, Bilateral Pulmonary Consolidations  -c/w ventilator, routine trach care  -on levaquin (day 3 of antibiotics)      #GI: r/o GIB, Ascites  -H/H stable  -c/w tube feeds, has not had any repeat episodes of bloody output       #Renal/Fluid: ESRD  -c/w dialysis per renal  -monitor BMP/electrolytes    #ID: Sepsis  -met 2/4 SIRS criteria (WBC, HR) with potential Pulm vs GI vs Wound source on admission  -CT Chest with bilateral consolidations and mucus plugging, may be post-obstructive vs aspiration PNA in the setting of emesis  -afebrile and blood cultures negative after 48 hours, on day 3 of abx  -remains afebrile, WBC now wnl    #Heme: Macrocytic Anemia, r/o GIB  -H&H stable since admission  -trend CBC QD unless actively bleeding, maintain active T&S    #Endo: no active issues    #PPX:   -DVT PPX: SCDs   -DIET: tube feeds 59yo M with PMH of Chronic Respiratory Failure (s/p Trach/PEG), ESRD (HD M/W/F), CHF (EF 13%, s/p AICD), and multiple Thoracic interventions (10/2018, R Thoracotomy/Decortication/Chest Wall Reconstruction) p/w reported hematemesis but found to be febrile with bilateral consolidations concerning for potential PNA, post-obstructive vs aspiration.    #Neuro: no active issues  -A&Ox3, at baseline mental status    #CV: Chronic Systolic Heart Failure, Chronic Hypotension  -no acute exacerbation, caution with IVF given CHF and ESRD. would avoid further fluid despite concern for infection  -c/w with home Midodrine 10mg TID    #Pulm: Chronic Respiratory Failure, Bilateral Pulmonary Consolidations  -c/w ventilator, routine trach care  -on levaquin (day 3/5 of antibiotics)      #GI: r/o GIB, Ascites  -H/H stable  -c/w tube feeds, has not had any repeat episodes of bloody output       #Renal/Fluid: ESRD  -c/w dialysis per renal  -monitor BMP/electrolytes    #ID: Sepsis  -met 2/4 SIRS criteria (WBC, HR) with potential Pulm vs GI vs Wound source on admission  -CT Chest with bilateral consolidations and mucus plugging, may be post-obstructive vs aspiration PNA in the setting of emesis  -afebrile and blood cultures negative after 48 hours, on day 3 of abx  -remains afebrile, WBC now wnl    #Heme: Macrocytic Anemia, r/o GIB  -H&H stable since admission  -trend CBC QD unless actively bleeding, maintain active T&S    #Endo: no active issues    #PPX:   -DVT PPX: SCDs   -DIET: tube feeds 59yo M with PMH of Chronic Respiratory Failure (s/p Trach/PEG), ESRD (HD M/W/F), CHF (EF 13%, s/p AICD), and multiple Thoracic interventions (10/2018, R Thoracotomy/Decortication/Chest Wall Reconstruction) p/w reported hematemesis but found to be febrile with bilateral consolidations concerning for potential PNA, post-obstructive vs aspiration.    #Neuro: no active issues  -A&Ox3, at baseline mental status    #CV: Chronic Systolic Heart Failure, Chronic Hypotension  -no acute exacerbation, caution with IVF given CHF and ESRD. would avoid further fluid despite concern for infection  -c/w with home Midodrine 10mg TID    #Pulm: Chronic Respiratory Failure, Bilateral Pulmonary Consolidations  -c/w ventilator, routine trach care  -on levaquin (day 3/5 of antibiotics)      #GI: r/o GIB, Ascites  -H/H stable  -c/w tube feeds, has not had any repeat episodes of bloody output       #Renal/Fluid: ESRD  -c/w dialysis per renal  -monitor BMP/electrolytes    #ID: Sepsis  -met 2/4 SIRS criteria (WBC, HR) with potential Pulm vs GI vs Wound source on admission  -CT Chest with bilateral consolidations and mucus plugging, may be post-obstructive vs aspiration PNA in the setting of emesis  -afebrile and blood cultures negative after 48 hours, on day 3 of abx  -remains afebrile, WBC now wnl    #Heme: Macrocytic Anemia, r/o GIB  -H&H stable since admission  -trend CBC QD unless actively bleeding, maintain active T&S    #Endo: no active issues    #PPX:   -DVT PPX: heparin subQ  -DIET: tube feeds

## 2019-03-06 NOTE — DISCHARGE NOTE PROVIDER - CARE PROVIDER_API CALL
Jose Alfredo Pickering (MD)  Surgery; Thoracic Surgery  [address] [address] [place] [address] [place] [address]  Phone: [phone]  Fax: [phone]  Follow Up Time:

## 2019-03-06 NOTE — PROGRESS NOTE ADULT - PROBLEM SELECTOR PLAN 1
Patient with ESRD on HD TTS, however in hospital MWF. Patient dialyzed today 03/04. Will do dialysis today however patient has possible GI bleed and will UF gently. Will see how he tolerates and continue to monitor electrolytes and fluid status. Patient with ESRD on HD TTS, however in hospital MWF. Patient dialyzed today 03/04. Will do dialysis today however patient has possible GI bleed and will UF gently, today 500 cc, can attempt more as patient resolved, currently appears euvolemic. Will see how he tolerates and continue to monitor electrolytes and fluid status. Patient with ESRD on HD TTS, however in hospital MWF. Patient dialyzed today 03/06. Will do dialysis today.  continue to monitor electrolytes and fluid status.

## 2019-03-06 NOTE — DISCHARGE NOTE NURSING/CASE MANAGEMENT/SOCIAL WORK - NSDCFUADDAPPT_GEN_ALL_CORE_FT
Pt is returning to [facility] nursing and rehab on vent/peg .Wife is aware and in agreement with d/c plan.

## 2019-03-06 NOTE — DISCHARGE NOTE PROVIDER - HOSPITAL COURSE
57yo M with PMH of Chronic Respiratory Failure (s/p Trach/PEG), ESRD (HD M/W/F), CHF (EF 13%, s/p AICD), multiple Thoracic interventions (10/2018, R Thoracotomy/Decortication/Chest Wall Reconstruction), and recent admission for septic shock and respiratory failure in the setting of aspirated foreign body and MRSA empyema presenting from rehab with reported "coffee ground emesis" and dark-colored drainage from PEG and found to be febrile. Patient is Mandarin speaking but difficult to communicate due to Trach,  Services were attempted without good results. Patient complaining of sore throat and epigastric pain. Patient was discharged 4 days ago after prolonged hospitalization.        In the ED, H/H noted to be stable from discharge and FOBT was negative. Given patient's fever and worsening ascites, a diagnostic paracentesis was performed, which was negative for SBP. CT Chest showed bilateral consolidations and mucus plugging, which may be post-obstructive vs aspiration PNA in the setting of emesis. He was started on broad spectrum antibiotics and serial CBCs were sent to monitor H/H. During his hospitalization he had no recurrent bleeding from his PEG tube. He remained afebrile and without leukocytosis so his antibiotics were downgraded to levaquin. He remained vitally stable and without recurrent bleed. He was determined to be stable for discharge on a 5 day course of levaquin. 57yo M with PMH of Chronic Respiratory Failure (s/p Trach/PEG), ESRD (HD M/W/F), CHF (EF 13%, s/p AICD), multiple Thoracic interventions (10/2018, R Thoracotomy/Decortication/Chest Wall Reconstruction), and recent admission for septic shock and respiratory failure in the setting of aspirated foreign body and MRSA empyema presenting from rehab with reported "coffee ground emesis" and dark-colored drainage from PEG and found to be febrile. Patient is Mandarin speaking but difficult to communicate due to Trach,  Services were attempted without good results. Patient complaining of sore throat and epigastric pain. Patient was discharged 4 days ago after prolonged hospitalization.        In the ED, H/H noted to be stable from discharge and FOBT was negative. Given patient's fever and worsening ascites, a diagnostic paracentesis was performed, which was negative for SBP. CT Chest showed bilateral consolidations and mucus plugging, which may be post-obstructive vs aspiration PNA in the setting of emesis. He was started on broad spectrum antibiotics and serial CBCs were sent to monitor H/H. Speech and swallow team evaluated him and determined he was to remain NPO and only receive tube feeds for nutrition given high risk for aspiration with a tracheostomy. His tube feeds were resumed and slowly titrated up. During his hospitalization he had no recurrent bleeding from his PEG tube. He remained afebrile and without leukocytosis so his antibiotics were downgraded to levaquin. He remained vitally stable and without recurrent bleed. He was determined to be stable for discharge on a 5 day course of levaquin.

## 2019-03-06 NOTE — PROGRESS NOTE ADULT - SUBJECTIVE AND OBJECTIVE BOX
[facility] Division of Kidney Diseases & Hypertension  FOLLOW UP NOTE  [phone]--------------------------------------------------------------------------------  Chief Complaint:Sepsis    58 year old male with history of ESRD on HD TTS last dialysis Friday, CHF (EF 13%, s/p AICD), multiple Thoracic interventions (10/2018, R Thoracotomy/Decortication/Chest Wall Reconstruction), and recent admission for septic shock and respiratory failure in the setting of aspirated foreign body and MRSA empyema presenting from rehab with reported "coffee ground emesis" and dark-colored drainage from PEG and found to be febrile. Patient seen at bedside.    Patient seen at bedside awake and interactive. Looks well without any complaints and abdominal pain is resolving. Hemodynamically stable. Proceed with dialysis today.      PAST HISTORY  --------------------------------------------------------------------------------  No significant changes to PMH, PSH, FHx, SHx, unless otherwise noted    ALLERGIES & MEDICATIONS  --------------------------------------------------------------------------------  Allergies    No Known Allergies    Intolerances      Standing Inpatient Medications  chlorhexidine 4% Liquid 1 Application(s) Topical <User Schedule>  levoFLOXacin  Tablet 500 milliGRAM(s) Oral every 48 hours  melatonin 3 milliGRAM(s) Oral at bedtime  midodrine 10 milliGRAM(s) Oral every 8 hours  QUEtiapine 50 milliGRAM(s) Oral every 12 hours  simethicone 80 milliGRAM(s) Chew three times a day    PRN Inpatient Medications  ALBUTerol/ipratropium for Nebulization 3 milliLiter(s) Nebulizer every 6 hours PRN  ALPRAZolam 0.5 milliGRAM(s) Oral three times a day PRN      REVIEW OF SYSTEMS  --------------------------------------------------------------------------------  General: Patient says he feels well.  GI: No abdominal pain.      All other systems were reviewed and are negative, except as noted.    VITALS/PHYSICAL EXAM  --------------------------------------------------------------------------------  T(C): 36.2 (03-06-19 @ 12:03), Max: 37.1 (03-05-19 @ 20:00)  HR: 88 (03-06-19 @ 12:03) (80 - 115)  BP: 103/64 (03-06-19 @ 12:03) (88/66 - 116/94)  RR: 18 (03-06-19 @ 12:03) (11 - 32)  SpO2: 100% (03-06-19 @ 12:03) (98% - 100%)  Wt(kg): --        03-05-19 @ 07:01  -  03-06-19 @ 07:00  --------------------------------------------------------  IN: 680 mL / OUT: 0 mL / NET: 680 mL    03-06-19 @ 07:01  -  03-06-19 @ 12:23  --------------------------------------------------------  IN: 520 mL / OUT: 500 mL / NET: 20 mL      Physical Exam:  	Gen: NAD, patient awake and interactive  	HEENT: Anicteric  	Pulm: Clear to auscultation  	CV: RRR, S1S2;  	Abd: +BS, non-distended  	: No suprapubic fullness              Extremities: no bilateral LE edema noted.               Neuro: No focal deficits  	Skin: Warm  	Vascular access: LUE AVF, +thrill, +bruit    LABS/STUDIES  --------------------------------------------------------------------------------              8.8    4.98  >-----------<  200      [03-06-19 @ 02:20]              29.5     142  |  97  |  35  ----------------------------<  105      [03-06-19 @ 02:20]  3.5   |  29  |  4.64        Ca     9.4     [03-06-19 @ 02:20]      Mg     2.3     [03-06-19 @ 02:20]      Phos  3.2     [03-06-19 @ 02:20]    TPro  6.8  /  Alb  2.6  /  TBili  0.5  /  DBili  x   /  AST  26  /  ALT  17  /  AlkPhos  164  [03-05-19 @ 03:00]          Creatinine Trend:  SCr 4.64 [03-06 @ 02:20]  SCr 3.54 [03-05 @ 03:00]  SCr 5.59 [03-04 @ 06:45]  SCr 5.42 [03-03 @ 19:50]  SCr 5.58 [02-28 @ 03:30]          HBsAb 13.2      [03-04-19 @ 17:18]  HBsAg NEGATIVE      [03-04-19 @ 17:18]

## 2019-03-06 NOTE — PROGRESS NOTE ADULT - PROBLEM SELECTOR PLAN 2
Continue to manage as per primary team. Will continue dialysis as scheduled in setting of possible uremic platelet dysfunction.

## 2019-03-06 NOTE — DISCHARGE NOTE NURSING/CASE MANAGEMENT/SOCIAL WORK - NSDCDPATPORTLINK_GEN_ALL_CORE
Problem: Communication  Goal: The ability to communicate needs accurately and effectively will improve  Patient able to communicate verbally in English.  Intervention: [place] patient and significant other/support system to call light to alert staff of needs  Educated patient on use of call light to call for assistance.  Received verbal understanding.      Problem: Venous Thromboembolism (VTW)/Deep Vein Thrombosis (DVT) Prevention:  Goal: Patient will participate in Venous Thrombosis (VTE)/Deep Vein Thrombosis (DVT)Prevention Measures  SCDs ordered.    Problem: Bowel/Gastric:  Goal: Normal bowel function is maintained or improved  Patient has not had a bowel movement since 1/12.  Miralax and senna administered.      Problem: Skin Integrity  Goal: Risk for impaired skin integrity will decrease  Patient is standby assistance.  Does not require assistance to turn in bed.  Patient is independent with all ADLs.       You can access the [facility] Patient Portal, offered by [facility], by registering with the following website: [email or web address]

## 2019-03-06 NOTE — DISCHARGE NOTE PROVIDER - NSDCCPCAREPLAN_GEN_ALL_CORE_FT
PRINCIPAL DISCHARGE DIAGNOSIS  Problem: Sepsis  Assessment and Plan of Treatment: In the hospital you were found to have pneumonia. You were given antibiotics to treat this infection, continue taking this medication as precribed. Do not drink or eat anything through your mouth, as this may increase your risk of obstructing your airway or jayden pneumonia. Return to the hospital immediately if you experience fevers, chills, nausea, vomiting, shortness of breath, or general signs of distress.

## 2019-03-06 NOTE — PROGRESS NOTE ADULT - ATTENDING COMMENTS
58 year old ma with Chronic Respiratory Failure (s/p Trach/PEG), ESRD, systolic dysfunction (EF 13%, s/p AICD), and multiple Thoracic interventions,  (10/2018, R Thoracotomy/Decortication/Chest Wall Reconstruction) sent from nursing home for coffee ground emesis and was found to be febrile in the ED admitted to MICU are there were no vent beds in the RCU    - evaluated by GI no evidence of active bleeding on PPI  - on broad spectrum ABX cultures negative to date  - no more fevers    awaiting transfer to RCU
58 year old ma with Chronic Respiratory Failure (s/p Trach/PEG), ESRD, systolic dysfunction (EF 13%, s/p AICD), and multiple Thoracic interventions,  (10/2018, R Thoracotomy/Decortication/Chest Wall Reconstruction) sent from nursing home for coffee ground emesis and was found to be febrile in the ED admitted to MICU are there were no vent beds in the RCU    - evaluated by GI no evidence of active bleeding on PPI  - on ABx   - cultures negative to date  - no more fevers    eligible for d/c back to SNF

## 2019-03-08 LAB
BACTERIA BLD CULT: SIGNIFICANT CHANGE UP
BACTERIA BLD CULT: SIGNIFICANT CHANGE UP

## 2019-03-09 LAB — BACTERIA FLD CULT: SIGNIFICANT CHANGE UP

## 2019-03-11 NOTE — ED ADULT NURSE NOTE - NS PRO PASSIVE SMOKE EXP
<<-----Click on this checkbox to enter Pre-Op Dx <<-----Click on this checkbox to enter Pre-Op Dx No

## 2019-04-23 ENCOUNTER — APPOINTMENT (OUTPATIENT)
Dept: THORACIC SURGERY | Facility: CLINIC | Age: 59
End: 2019-04-23

## 2019-05-14 ENCOUNTER — APPOINTMENT (OUTPATIENT)
Dept: THORACIC SURGERY | Facility: CLINIC | Age: 59
End: 2019-05-14
Payer: MEDICARE

## 2019-05-14 VITALS
OXYGEN SATURATION: 92 % | TEMPERATURE: 98.5 F | DIASTOLIC BLOOD PRESSURE: 69 MMHG | RESPIRATION RATE: 18 BRPM | SYSTOLIC BLOOD PRESSURE: 109 MMHG | HEART RATE: 78 BPM

## 2019-05-14 DIAGNOSIS — J86.9 PYOTHORAX W/OUT FISTULA: ICD-10-CM

## 2019-05-14 DIAGNOSIS — Z93.0 TRACHEOSTOMY STATUS: ICD-10-CM

## 2019-05-14 DIAGNOSIS — S21.201A UNSPECIFIED OPEN WOUND OF RIGHT BACK WALL OF THORAX W/OUT PENETRATION INTO THORACIC CAVITY, INITIAL ENCOUNTER: ICD-10-CM

## 2019-05-14 PROCEDURE — 99214 OFFICE O/P EST MOD 30 MIN: CPT | Mod: 24

## 2019-05-15 PROBLEM — Z93.0 STATUS POST TRACHEOSTOMY: Status: ACTIVE | Noted: 2019-05-15

## 2019-05-15 PROBLEM — J86.9 EMPYEMA: Status: ACTIVE | Noted: 2018-11-03

## 2019-05-15 PROBLEM — S21.201A WOUND OF RIGHT SIDE OF BACK: Status: ACTIVE | Noted: 2019-05-15

## 2019-05-16 NOTE — HISTORY OF PRESENT ILLNESS
[FreeTextEntry1] : 57 y/o M, former smoker, w/ hx of ESRD on hemodialysis, NICM, CHF, B/L pleural effusion s/p Lt PleurX placement in 2015, and recurrence of chest wall cyst. \par \par He is s/p Rt pleural effusion drainage with IR; Lt VATS, pleural biopsy, drainage of Lt pleural effusion, partial decortication of LLL and PleurX catheter placement on 1/15/2015 by Dr. Jose Alfredo Pickering at [place]. Path was negative for malignancy and markedly degenerated cell, fibrosis, chronic inflammation. Lt PleurX removed in 2015.\par \par Also s/p Right VATS, evacuation of hemothorax, pleural biopsy on 12/4/2017 at [facility]. Path "benign".\par \par Also s/p Right chest wound debridement and revision on 5/9/2018 for enterococcal wound at old port side at [facility]. \par \par Also s/p re-excision of right chest wall mass on 7/25/18 for recurrent chest wall cyst by Dr. Javi Pickering at [facility]. Path showed skin w/ deep abscess formation.\par \par CT Chest on 9/11/18:\par - persistent moderate Rt pleural effusion\par - minimal Lt pleural effusion\par - atelectasis in the bilateral lung base\par - decreasing 1.2 x 0.5cm Lt hilar LN (perviously 1.6 x 0.8cm) \par - atherosclerotic vascular disease\par \par Now 6 months s/p Rt Thoracotomy, excision of empyema cavity, total decortication, partial pleurectomy, partial excision of part of 7th rib by Dr. Jose Alfredo Pickering, w/ latissimus dorsi muscle flap to chest cavity and serratus flap coverage of chest wall defect by Dr. Kang Bach on 10/11/18. Path of Rt pleural fluid showed severe acute inflammatory exudate c/w empyema, negative for malignancy. 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 Pleural peel and empyema cavity showed acute fibrinous pleuritis. Decortication lower lobe, upper lobe and middle lobe showed acute fibrinous pleuritis.\par \par Post-op complicated by cardiogenic shock requiring Dobutamine drip, Levophed drip, phenylephrine drip, Vaso drip and epinephrine drip, and prolonged hospital stay. Consulted by Heart Failure team.\par SANDY Drain #1 removed on 10/30/18. Discharged home on 10/30/18 with PICC line, IV antibiotics administered by VNS at home, completed 11/7/18.\par \par He still have SANDY drain to right sided chest with 75- 100 ml serosanguineous drainage per day. \par \par Pt admitted for ARDS from 1/7-22/19. CT scan showed airway obstruction in the Rt bronchus intermedius, s/p Flex Bronch w/ BAL and retrieval of foreign body (pill) on 1/8/19. +MRSA in pleural fluid, treated w/ Vanco and Zosyn, discharged home with PO Cipro, home O2 and BIPAP. \par \par Pt readmitted for COPD exacerbation/atelectasis, s/p Flex Bronch w/ BAL on 1/29/19 by Dr. Ledesma. \par Also s/p EGD, PEG tube placement, Trach #6 cuffed Shiley, Flex Bronch w/ BAL on 1/31/19. \par \par Now 2 mo s/p FB, revision tracheostomy w/ placement of a #4 cuffed Shiley tracheostomy tube on 2/24/19. \par \par He was admitted to [facility] on 3/12/19 for acute hypoxic respiratory failure, and CXR showed left sided mucus plugging, which were successfully removed. He was D/C to [facility] rehab on 3/22/19 in stable condition. \par \par He presents today for followup visit. His peg is in place, C/D/I, tolerating tube feeding well;  trach connected to vent on 40% O2, started trial of po liquid for few times at rehab, tolerating well. Denies chest pain, fever, chills. His right sided back wound is healing well, receiving dressing change 1-2 times/ day at rehab.

## 2019-05-16 NOTE — PHYSICAL EXAM
[Sclera] : the sclera and conjunctiva were normal [PERRL With Normal Accommodation] : pupils were equal in size, round, and reactive to light [Auscultation Breath Sounds / Voice Sounds] : lungs were clear to auscultation bilaterally [Heart Rate And Rhythm] : heart rate was normal and rhythm regular [Heart Sounds] : normal S1 and S2 [Examination Of The Chest] : the chest was normal in appearance [2+] : left 2+ [Bowel Sounds] : normal bowel sounds [Abdomen Soft] : soft [Cervical Lymph Nodes Enlarged Anterior Bilaterally] : anterior cervical [Cervical Lymph Nodes Enlarged Posterior Bilaterally] : posterior cervical [Skin Color & Pigmentation] : normal skin color and pigmentation [Affect] : the affect was normal [Oriented To Time, Place, And Person] : oriented to person, place, and time [No Focal Deficits] : no focal deficits [Mood] : the mood was normal [FreeTextEntry1] : on strebrittaney

## 2019-05-16 NOTE — REVIEW OF SYSTEMS
[As Noted in HPI] : as noted in HPI [Negative] : Heme/Lymph [FreeTextEntry4] : trach in place [FreeTextEntry7] : Peg in place [FreeTextEntry8] : hemodialysis

## 2019-05-16 NOTE — ASSESSMENT
[FreeTextEntry1] : 59 y/o M, former smoker, w/ hx of ESRD on hemodialysis, NICM, CHF, B/L pleural effusion s/p Lt PleurX placement in 2015, and recurrence of chest wall cyst. \par \par He is s/p Rt pleural effusion drainage with IR; Lt VATS, pleural biopsy, drainage of Lt pleural effusion, partial decortication of LLL and PleurX catheter placement on 1/15/2015 by Dr. Jose Alfredo Pickering at [place]. Path was negative for malignancy and markedly degenerated cell, fibrosis, chronic inflammation. Lt PleurX removed in 2015.\par \par Also s/p Right VATS, evacuation of hemothorax, pleural biopsy on 12/4/2017 at [facility]. Path "benign".\par \par Also s/p Right chest wound debridement and revision on 5/9/2018 for enterococcal wound at old port side at [facility]. \par \par Also s/p re-excision of right chest wall mass on 7/25/18 for recurrent chest wall cyst by Dr. Javi Pickering at [facility]. Path showed skin w/ deep abscess formation.\par \par CT Chest on 9/11/18:\par - persistent moderate Rt pleural effusion\par - minimal Lt pleural effusion\par - atelectasis in the bilateral lung base\par - decreasing 1.2 x 0.5cm Lt hilar LN (perviously 1.6 x 0.8cm) \par - atherosclerotic vascular disease\par \par Now 6 months s/p Rt Thoracotomy, excision of empyema cavity, total decortication, partial pleurectomy, partial excision of part of 7th rib by Dr. Jose Alfredo Pickering, w/ latissimus dorsi muscle flap to chest cavity and serratus flap coverage of chest wall defect by Dr. Kang Bach on 10/11/18. Path of Rt pleural fluid showed severe acute inflammatory exudate c/w empyema, negative for malignancy. 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 Pleural peel and empyema cavity showed acute fibrinous pleuritis. Decortication lower lobe, upper lobe and middle lobe showed acute fibrinous pleuritis.\par \par Post-op complicated by cardiogenic shock requiring Dobutamine drip, Levophed drip, phenylephrine drip, Vaso drip and epinephrine drip, and prolonged hospital stay. Consulted by Heart Failure team.\par SANDY Drain #1 removed on 10/30/18. Discharged home on 10/30/18 with PICC line, IV antibiotics administered by VNS at home, completed 11/7/18.\par \par He still have SANDY drain to right sided chest with 75- 100 ml serosanguineous drainage per day. \par \par Pt admitted for ARDS from 1/7-22/19. CT scan showed airway obstruction in the Rt bronchus intermedius, s/p Flex Bronch w/ BAL and retrieval of foreign body (pill) on 1/8/19. +MRSA in pleural fluid, treated w/ Vanco and Zosyn, discharged home with PO Cipro, home O2 and BIPAP. \par \par Pt readmitted for COPD exacerbation/atelectasis, s/p Flex Bronch w/ BAL on 1/29/19 by Dr. Ledesma. \par Also s/p EGD, PEG tube placement, Trach #6 cuffed Shiley, Flex Bronch w/ BAL on 1/31/19. \par \par Now 2 mo s/p FB, revision tracheostomy w/ placement of a #4 cuffed Shiley tracheostomy tube on 2/24/19. \par \par He was admitted to [facility] on 3/12/19 for acute hypoxic respiratory failure, and CXR showed left sided mucus plugging, which were successfully removed. He was D/C to [facility] rehab on 3/22/19 in stable condition. \par \par He presents today for followup visit. His peg is in place, C/D/I, tolerating tube feeding well;  trach connected to vent on 40% O2, started trial of po liquid for few times at rehab, tolerating well. Denies chest pain, fever, chills. His right sided back wound is healing well, receiving dressing change 1-2 times/ day at rehab. \par \par I have reviewed the patient's medical records and diagnostic images at time of this office consultation and have made the following recommendation:\par Plan:\par 1. Continue to follow up with Plastic surgeon Dr. Kang Bach for the right sided back wound. \par 2. RTC prn. \par \par \par \par \par Written by Luz Haskins NP, acting as a scribe for Dr. Jose Alfredo Pickering.\par \par The documentation recorded by the scribe accurately reflects the service I personally performed and the decisions made by me. JOSE ALFREDO PICKERING MD\par

## 2019-05-16 NOTE — CONSULT LETTER
[FreeTextEntry2] : Al Curran MD (PCP) \barbara Hanna MD (Cardiologist)\barbara Bach MD (Plastic Sx)  [FreeTextEntry3] : Jose Alfredo Pickering MD, MPH \par System Director of Thoracic Surgery \par Director of Comprehensive Lung [facility] [place] \par Professor Cardiovascular & Thoracic Surgery  \par [facility] [employer] at [facility]\par

## 2019-10-02 ENCOUNTER — APPOINTMENT (OUTPATIENT)
Dept: CARDIOLOGY | Facility: CLINIC | Age: 59
End: 2019-10-02

## 2019-11-13 NOTE — PROGRESS NOTE ADULT - PROBLEM SELECTOR PLAN 1
"Outpatient Neurological Rehabilitation   Speech and Language Therapy Daily Note  Date:  11/13/2019     Name: Jae Millan   MRN: [id number]   Therapy Diagnosis:   Encounter Diagnoses   Name Primary?    Dysarthria     Oropharyngeal dysphagia    Physician: Charis Salazar MD  Physician Orders: [id number] - Ambulatory consult to Speech Therapy  Medical Diagnosis: I69.354 (ICD-10-CM) - Hemiparesis affecting left side as late effect of cerebrovascular accident (CVA)  I63.329 (ICD-10-CM) - Cerebrovascular accident (CVA) due to thrombosis of anterior cerebral artery, unspecified blood vessel laterality  R47.1 (ICD-10-CM) - Dysarthria     Visit #/ Visits Authorized: 10/20  Date of Evaluation:  9/9/2019   Insurance Authorization Period: 09/09/2019-12/31/2019  Plan of Care Certification:    9/9/2019 -11/4/19   Extended POC:  1/4/20  Progress Note: due 12/9/19   Visits Cancelled: 2  Visits No Show: 0    Time In:  9:04  Time Out: 9:46  Total Billable Time: 42 min    Precautions: Standard  Subjective:   Pt reports: "I feel good today" Pt states he feels as if his speech and swallowing are continuing to improve. Pt stated that he saw some friends that haven't seen him since right after his stroke and his friends said that his speech was back to normal. Pt reported "I'm comfortable with my speech."  He was compliant to home exercise program.   Response to previous treatment: n/a  Pain Scale:  0/10 on VAS currently.   Pain Location: n/a  Objective:   UNTIMED  Procedure Min.   Speech- Language- Voice Therapy 15   Dysphagia Therapy 27   Total Timed Units: 0  Total Untimed Units: 2  Charges Billed/# of units: 2    Short Term Goals: (4 weeks) Current Progress:   1. Pt will rate his speech while reading as at least a 2 on a 3 point scale ( 1 = same as before beginning therapy, 2 =better but not best, 3 =best possible outcome) on  8 /10 trials.   Goal Met 10/9/19 / Discontinue      2. Pt will differentiate between his speech and " "error-free speech by giving specific examples of differences on  8 /10 trials.  Goal Met 10/16/19 / Discontinue      3. Pt will identify a self-cue for use of therapeutic speech and use it 5x per session independently Goal Not Met / Discontinue      4. Pt will use motor speech strategies and answer questions in conversation with a self-rating of at least 2 on a 3 point scale ( 1 = same as before beginning therapy, 2 =better but not best, 3 =best possible outcome) on  8 /10 trials.  Pt answered questions in conversation and rated himself a 1/3 on 0/10 trials, a 2/3 on 10/10, a 3/3 on 0/10 trials  Goal Met 11/13/19 / Discontinue      5. Pt will recall 3/4 clear speech strategies independently    Goal Met 10/7/19/ Discontinue      6. Pt will participate in MBSS to objectively assess his swallow, rule out silent aspiration, and determine the safest possible diet.     Goal Met 10/3/19 / Discontinue   Impressions/Recs:     "Impressions:  Patient presents with mild oral and moderate pharyngeal Dysphagia. See above.   Recommendations/Precautions:    1. Regular diet  2. Thin liquids  3. Medications buried in puree or crushed   4. Aspiration precautions:   · NO STRAWS  · Very small sips of liquid one at a time OR head turn to the left with all thin liquid swallows  · Excellent and frequent oral care  · Upright for all po intake and remain upright for 30 minutes after intake  · One bite at a time at a slow rate (allow time to complete second swallow per bite)  · Continue to monitor for signs and symptoms of aspiration and discontinue oral feeding should you notice any of the following: watery eyes, reddened facial area, wet vocal quality, increased work of breathing, change in respiratory status, increased congestion, coughing, fever, etc.  5. Initiate Dysphagia therapy with Outpatient Speech Therapy"     7. Pt will complete oral motor exercises x 40 each focusing on labial and lingual muscles given min A to improve " articulation.     Goal Met  / Ongoing    Pt reports that he has been completing prescribed oral motor exercises 3x per day   8.  Pt will participate in tongue base retraction exercises x 30 given min A to improve swallow function.      Goal Met 11/5/19 / Ongoing  - Hard Effortfull Swallow (incorporated thin liquids, puree, and dry swallows) x75   9. Pt will participate in laryngeal elevation exercises x 30 with min A to improve hyolaryngeal excursion.      Goal Met 11/5/19 / Ongoing  Not formally addressed     10. Pt will complete chin tuck against resistance (CTAR) hold x60 seconds x3 with min A to improve hyolaryngeal elevation / excursion.    Goal Met 10/23/19 / Ongoing - CTAR hold for 60 seconds 3x    11. Pt will complete chin tuck against resistance (CTAR) repetitions x30 with min A to improve hyolaryngeal elevation / excursion.    Goal Met 10/23/19 / Ongoing - CTAR 45 repetitions    12. Pt will recall and utilize aspiration precautions and safe swallow strategies independently (no straws, medications crushed/burried in puree, small bites/sips, eat/drink slowly, head turn to the left, frequent oral care, 1 bite at a time, upright for all PO intake/remain upright for all PO intake)    Goal Met 10/23/19 / Ongoing Pt verbalized aspiration precautions/safe swallow strategies independently. Pt observed to take small sips of thin liquids and perform a head turn to the left indly on 100% of observed swallows this session    13. Pt will participate in po trials of regular consistencies and thin liquids with no overt signs/symptoms of aspiration.     Progressing/ Not Met 11/13/2019   -Pt observed consuming thin liquids (approximately 6 oz) and small bites of pudding throughout session - no overt s/s aspiration appreciated today, vocal quality remained clear throughout po trials, no coughing/thraot clearing throughout po trials        Patient Education/Response:   Discussed motor speech strategies, safe swallow  strategies/aspiration precautions, pt progress, and pt's HEP. Pt verbalized understanding of all discussed.      Written Home Exercises Provided: Yes. Laryngeal elevation/excursion exercises, tongue base retraction exercises, and CTAR exercises. Pt instructed to repeat multisyllabic words 3x each at a fast rate (30 times) to improve speed and coordination of articulators. Pt instructed to complete OME 3x a day. Pt instructed to read sentences aloud (provided) using motor speech strategies.   Exercises were reviewed and Jae was able to demonstrate them prior to the end of the session.  Jae demonstrated good  understanding of the education provided.     Assessment:   Jae is progressing well towards his goals. Pt observed with thin liquids and puree textures, no overt s/s aspiration appreciated this session with  PO intake (thin liquids and puree textures). Pt has demonstrated ability to complete dysphagia exercises independently; pt reports consistent adherence to completion of dysphagia HEP and that he feels more comfortable swallowing with less fear associated with swallowing. Pt states he has noticed significantly less coughing with liquids since initiation of dysphagia exercises. Pt states that he is satisfied with his speech as it has significantly improved; subjectively, pt with 100% intelligible speech. Current goals remain appropriate. Goals to be updated as necessary. Pt prognosis is Fair. Pt will continue to benefit from skilled outpatient speech and language therapy to address the deficits listed in the problem list on initial evaluation, provide pt/family education and to maximize pt's level of independence in the home and community environment.     Medical necessity is demonstrated by the following IMPAIRMENTS:  Reduced speech intelligibility limits functional communication with both familiar and unfamiliar communication partners. Pt's dysphagia puts him at risk for aspiration pneumonia.       Barriers to Therapy: none identified   Pt's spiritual, cultural and educational needs considered and pt agreeable to plan of care and goals.  Plan:   Updated POC today. Continue to focus on improving swallow function    LINCOLN Shirley, CF-SLP  Speech Language Pathologist   11/13/2019   Pt. with ESRD on HD TIW (MWF). Last HD as was on 1/15/19 via [facility]. Plan for HD today. Monitor labs.

## 2020-03-04 ENCOUNTER — APPOINTMENT (OUTPATIENT)
Dept: CARDIOLOGY | Facility: CLINIC | Age: 60
End: 2020-03-04

## 2020-04-02 PROBLEM — R22.2 CHEST WALL MASS: Status: ACTIVE | Noted: 2018-09-09

## 2021-06-05 NOTE — PATIENT PROFILE ADULT - NSASFALLNEEDSASSISTWITH_GEN_A_NUR
Refill Approved    Rx renewed per Medication Renewal Policy. Medication was last renewed on 2/11/19.    Crys Joseph, [facility] Triage/Med Refill 1/14/2020     Requested Prescriptions   Pending Prescriptions Disp Refills     ACCU-CHEK FASTCLIX LANCET DRUM 204 each 3     Sig: USE 1 APPLICATION AS DIRECTED 2 (TWO) TIMES A DAY AT 7:30AM AND 4:30PM.       Diabetic Supplies Refill Protocol Passed - 1/12/2020  4:32 PM        Passed - Visit with PCP or prescribing provider visit in last 6 months     Last office visit with prescriber/PCP: 6/13/2019 Catalino Nuñez MD OR same dept: 6/13/2019 Catalino Nuñez MD OR same specialty: 6/13/2019 Catalino Nuñez MD  Last physical: 11/4/2019 Last MTM visit: Visit date not found   Next visit within 3 mo: Visit date not found  Next physical within 3 mo: Visit date not found  Prescriber OR PCP: Catalino Nuñez MD  Last diagnosis associated with med order: There are no diagnoses linked to this encounter.  If protocol passes may refill for 12 months if within 3 months of last provider visit (or a total of 15 months).             Passed - A1C in last 6 months     Hemoglobin A1c   Date Value Ref Range Status   11/04/2019 7.2 (H) 3.5 - 6.0 % Final                           
walking/standing/toileting

## 2022-09-30 NOTE — PROGRESS NOTE ADULT - ASSESSMENT
59 yo male (Mandarin speaking) with medical history of NICM with ICD, ESRD on HD, former smoker, BPH who was transfer for [facility] due to septic shock on 1/7/19. Pt. was initially admitted due to fever and right pleural effusion, found to have respiratory failure and intubated and afterwards developed shock, thought to be septic. Renal now called for management of pts ESRD Pt. with ESRD on HD TIW. No

## 2022-11-01 NOTE — DISCHARGE NOTE ADULT - FUNCTIONAL SCREEN CURRENT LEVEL: AMBULATION, MLM
2 = assistive person 3 = assistive equipment and person Apixaban/Eliquis increases your risk for bleeding. Notify your doctor if you experience any of the following side effects: bleeding, coughing or vomiting blood, red or black stool, unexpected pain or swelling, itching or hives, chest pain, chest tightness, trouble breathing, changes in how much or how often you urinate, red or pink urine, numbness or tingling in your feet, or unusual muscle weakness. When Apixaban/Eliquis is taken with other medicines, they can affect how it works. Taking other medications such as aspirin, blood thinners, nonsteroidal anti-inflammatories, and medications that treat depression can increase your risk of bleeding. It is very important to tell your health care provider about all of the other medicines, including over-the-counter medications, herbs, and vitamins you are taking. DO NOT start, stop, or change the dosage of any medicine, including over-the-counter medicines, vitamins, and herbal products without your doctor’s approval. Any products containing aspirin or are nonsteroidal anti-inflammatories lessen the blood’s ability to form clots and add to the effect of Apixaban/Eliquis. Never take aspirin or medicines that contain aspirin without speaking to your doctor.

## 2022-11-08 NOTE — DISCHARGE NOTE ADULT - INSTRUCTIONS
[Well Developed] : well developed [Well Nourished] : well nourished [No Acute Distress] : no acute distress [Normal Conjunctiva] : normal conjunctiva [Normal Venous Pressure] : normal venous pressure [No Carotid Bruit] : no carotid bruit [Normal S1, S2] : normal S1, S2 [No Murmur] : no murmur [No Rub] : no rub [No Gallop] : no gallop [Clear Lung Fields] : clear lung fields [Good Air Entry] : good air entry [No Respiratory Distress] : no respiratory distress  [Soft] : abdomen soft [Non Tender] : non-tender [No Masses/organomegaly] : no masses/organomegaly [Normal Bowel Sounds] : normal bowel sounds [Normal Gait] : normal gait [No Edema] : no edema [No Cyanosis] : no cyanosis [No Clubbing] : no clubbing [No Varicosities] : no varicosities [No Rash] : no rash [No Skin Lesions] : no skin lesions [Moves all extremities] : moves all extremities [No Focal Deficits] : no focal deficits [Normal Speech] : normal speech [Alert and Oriented] : alert and oriented - Patient to follow a dysphagia diet with mechanical soft foods and thickened liquids.   - [Normal memory] : normal memory - Patient to follow a dysphagia diet honey consistency with mechanical soft foods and thickened liquids.   - Patient should be upright and  in place while eating to prevent aspiration  - Follow renal diet  - Nepro 20cc/hr start at 16:00 for 15hrs via PEG Tube. Flush PEG with 40cc sterile water Q4hrs  - PEG management per rehab facility if you experience chest pain or difficulty breathing, call [phone] immediately

## 2023-12-13 NOTE — ED PROCEDURE NOTE - CPROC ED INFORMED CONSENT1
This was an emergent procedure and consent was implied.
This was an emergent procedure and consent was implied.
Never

## 2024-03-18 NOTE — ED ADULT NURSE REASSESSMENT NOTE - NS ED NURSE REASSESS COMMENT FT1
Pt intubated at 1445 for hypoxia, hypotension, AMS. 7.5 ET tube placed at 22cm R lipline. NG tube dropped by MD through R nostril. BP improved with fluids and levophed as documented in FS. Pt tolerating vent at this time.
Break coverage RN:  assumed 1:1 nurse facilitator care of pt.  Pt intubated and sedated, NGT connected to suction.  Levophed infusing as ordered; BP noted to be stable.  Will continue to monitor.
1
Principal Discharge DX:	Conjunctivitis, right eye

## 2024-09-19 NOTE — PROGRESS NOTE ADULT - SUBJECTIVE AND OBJECTIVE BOX
Subjective:    Vital Signs:  Vital Signs Last 24 Hrs  T(C): 36.3 (01-21-19 @ 05:28), Max: 36.7 (01-20-19 @ 09:00)  T(F): 97.3 (01-21-19 @ 05:28), Max: 98 (01-20-19 @ 09:00)  HR: 102 (01-21-19 @ 05:28) (100 - 115)  BP: 111/69 (01-21-19 @ 05:28) (102/60 - 120/64)  RR: 17 (01-21-19 @ 05:28) (17 - 18)  SpO2: 100% (01-21-19 @ 05:28) (96% - 100%) on (O2)    Telemetry/Alarms:  General: WN/WD NAD  Neurology: Awake, nonfocal, [place] x 4  Eyes: Scleras clear, PERRLA/ EOMI, Gross vision intact  ENT:Gross hearing intact, grossly patent pharynx, no stridor  Neck: Neck supple, trachea midline, No JVD,   Respiratory: CTA B/L, No wheezing, rales, rhonchi  CV: RRR, S1S2, no murmurs, rubs or gallops  Abdominal: Soft, NT, ND +BS,   Extremities: No edema, + peripheral pulses  Skin: No Rashes, Hematoma, Ecchymosis  Lymphatic: No Neck, axilla, groin LAD  Psych: Oriented x 3, normal affect    Relevant labs, radiology and Medications reviewed            MEDICATIONS  (STANDING):  ALBUTerol    90 MICROgram(s) HFA Inhaler 1 Puff(s) Inhalation every 4 hours  aspirin  chewable 81 milliGRAM(s) Oral daily  ciprofloxacin     Tablet 250 milliGRAM(s) Oral daily  docusate sodium 100 milliGRAM(s) Oral daily  heparin  Injectable 5000 Unit(s) SubCutaneous every 12 hours  midodrine 10 milliGRAM(s) Oral every 8 hours  pantoprazole    Tablet 40 milliGRAM(s) Oral before breakfast    MEDICATIONS  (PRN):    Pertinent Physical Exam  I&O's Summary    20 Jan 2019 07:01  -  21 Jan 2019 07:00  --------------------------------------------------------  IN: 0 mL / OUT: 322 mL / NET: -322 mL      Assessment  58y Male  w/ PAST MEDICAL & SURGICAL HISTORY:  Smoking hx  Cardiomyopathy  Wound: right chest  ICD (implantable cardioverter-defibrillator) in place  OA (osteoarthritis)  HLD (hyperlipidemia)  BPH (benign prostatic hyperplasia)  Pleural effusion  HTN (hypertension)  ESRD (end stage renal disease)  H/O chest wound: right  S/P thoracotomy: FB, R thoracotomy, decortication, chest wall reconstruction, lat flap  History of wound infection: right chest wall - revision 5/18 and again in 7/18  History of implantable cardioverter-defibrillator (ICD) placement: pt unsure when placed  H/O bilateral hip replacements: 2008, 2009  admitted with complaints of Patient is a 58y old  Male who presents with a chief complaint of Sepsis, respiratory failure (19 Jan 2019 08:59)  Pt is a 58 yr old  male who underwent a FB, R thoracotomy, decortication, chest wall reconstruction, lat flap with Dr Pickering on 10/11/18. He was admitted on 1/6/19 to [facility] with a R pleural effusion/empyema and respiratory failure,  A R PTC was placed there and he was intubated.  He still had a R SANDY drain in place.  He presented intubated and sedated on pressors. (07 Jan 2019 23:10). Pt is currently extubated but on / off BiPAP for  hypoxia / respiratory acidosis. Pt desaturates to 80's very quickly at night without BiPAP.  Now had overnight oximetry shows desat to 70s with 2LNC in place. Will need bipap.     PLAN  Neuro: Pain management  Pulm: Encourage coughing, deep breathing and use of incentive spirometry. Cont Oxygen during the day Cont Bipap at night. Awaiting input from Case management about BiPAP approval  Cardio: Monitor telemetry/alarms  GI: Tolerating diet. Continue stool softeners. Encourage po intake. Continue stool softners.   Renal: monitor urine output, supplement electrolytes as needed. s/p HD today  Vasc: Heparin SC/SCDs for DVT prophylaxis  Heme: Stable H/H. .   ID: Cont Cipro. VANCO x 1 ordered today based on level  Therapy: OOB/ambulate  Tubes: Cont Rt. PTC to bulb  Disposition: Aim to D/C to home once Bipap approved.   Discussed with Cardiothoracic Team at AM rounds. No

## 2025-04-30 NOTE — PROVIDER CONTACT NOTE (CRITICAL VALUE NOTIFICATION) - ASSESSMENT
Lab Results   Component Value Date    HGBA1C 5.8 (H) 04/26/2025     Stable with A1C at 5.8  Continue metformin 1000mg daily  She is on statin therapy   Eye exam with [facility] (note is in Media)  Foot exam is UTD       Orders:    CBC and differential; Future    Comprehensive metabolic panel; Future    Hemoglobin A1C; Future    Albumin / creatinine urine ratio; Future       No s/s distress noted at this time.

## 2025-06-12 NOTE — ED ADULT NURSE REASSESSMENT NOTE - NS ED NURSE REASSESS COMMENT FT1
Last appointment: 5/6/25  Next appointment: Advised to follow-up 5/2026  Previous refill encounter(s): 4/9/24    Requested Prescriptions     Pending Prescriptions Disp Refills    calcium carb-cholecalciferol 600-10 MG-MCG TABS per tab [Pharmacy Med Name: CALCIUM 600 MG-VIT D3 10MCG TB] 90 tablet 3     Sig: TAKE 1 TABLET BY MOUTH DAILY         For Pharmacy Admin Tracking Only    Program: Medication Refill  CPA in place:    Recommendation Provided To:   Intervention Detail: New Rx: 1, reason: Patient Preference  Intervention Accepted By:   Gap Closed?:    Time Spent (min): 5   Pt. suctioned. Admitted to MICU, awaiting to give report. Will continue to monitor.